# Patient Record
Sex: FEMALE | Race: WHITE | Employment: OTHER | URBAN - NONMETROPOLITAN AREA
[De-identification: names, ages, dates, MRNs, and addresses within clinical notes are randomized per-mention and may not be internally consistent; named-entity substitution may affect disease eponyms.]

---

## 2017-01-09 ENCOUNTER — TELEPHONE (OUTPATIENT)
Dept: FAMILY MEDICINE CLINIC | Age: 57
End: 2017-01-09

## 2017-01-10 ENCOUNTER — HOSPITAL ENCOUNTER (OUTPATIENT)
Dept: INTERVENTIONAL RADIOLOGY/VASCULAR | Facility: HOSPITAL | Age: 57
Discharge: HOME | End: 2017-01-10
Attending: PHYSICAL MEDICINE & REHABILITATION | Admitting: PHYSICAL MEDICINE & REHABILITATION

## 2017-01-10 ENCOUNTER — TRANSCRIBE ORDERS (OUTPATIENT)
Dept: ADMINISTRATIVE | Facility: HOSPITAL | Age: 57
End: 2017-01-10

## 2017-01-10 VITALS
RESPIRATION RATE: 22 BRPM | HEART RATE: 87 BPM | TEMPERATURE: 98 F | HEIGHT: 60 IN | BODY MASS INDEX: 37.3 KG/M2 | WEIGHT: 190 LBS | DIASTOLIC BLOOD PRESSURE: 59 MMHG | SYSTOLIC BLOOD PRESSURE: 108 MMHG | OXYGEN SATURATION: 92 %

## 2017-01-10 DIAGNOSIS — G47.9 REPETITIVE INTRUSIONS OF SLEEP: ICD-10-CM

## 2017-01-10 DIAGNOSIS — M72.2 PLANTAR FASCIAL FIBROMATOSIS: ICD-10-CM

## 2017-01-10 DIAGNOSIS — M79.10 MYALGIA: ICD-10-CM

## 2017-01-10 DIAGNOSIS — R52 PAIN MANAGEMENT: ICD-10-CM

## 2017-01-10 DIAGNOSIS — M46.1 SACROILIITIS, NOT ELSEWHERE CLASSIFIED (HCC): ICD-10-CM

## 2017-01-10 DIAGNOSIS — M51.16 NEURITIS OR RADICULITIS DUE TO RUPTURE OF LUMBAR INTERVERTEBRAL DISC: ICD-10-CM

## 2017-01-10 DIAGNOSIS — G47.9 REPETITIVE INTRUSIONS OF SLEEP: Primary | ICD-10-CM

## 2017-01-10 DIAGNOSIS — M51.26 DISPLACEMENT OF LUMBAR INTERVERTEBRAL DISC WITHOUT MYELOPATHY: ICD-10-CM

## 2017-01-10 DIAGNOSIS — M51.27 LUMBAGO-SCIATICA DUE TO DISPLACEMENT OF LUMBAR INTERVERTEBRAL DISC: ICD-10-CM

## 2017-01-10 PROCEDURE — 77003 FLUOROGUIDE FOR SPINE INJECT: CPT

## 2017-01-10 PROCEDURE — 25010000002 METHYLPREDNISOLONE PER 80 MG: Performed by: PHYSICAL MEDICINE & REHABILITATION

## 2017-01-10 RX ORDER — METHYLPREDNISOLONE ACETATE 80 MG/ML
INJECTION, SUSPENSION INTRA-ARTICULAR; INTRALESIONAL; INTRAMUSCULAR; SOFT TISSUE
Status: COMPLETED | OUTPATIENT
Start: 2017-01-10 | End: 2017-01-10

## 2017-01-10 RX ORDER — LIDOCAINE HYDROCHLORIDE 10 MG/ML
INJECTION, SOLUTION INFILTRATION; PERINEURAL
Status: COMPLETED | OUTPATIENT
Start: 2017-01-10 | End: 2017-01-10

## 2017-01-10 RX ORDER — 0.9 % SODIUM CHLORIDE 0.9 %
VIAL (ML) INJECTION
Status: COMPLETED | OUTPATIENT
Start: 2017-01-10 | End: 2017-01-10

## 2017-01-10 RX ADMIN — SODIUM CHLORIDE 3 ML: 9 INJECTION INTRAMUSCULAR; INTRAVENOUS; SUBCUTANEOUS at 09:47

## 2017-01-10 RX ADMIN — LIDOCAINE HYDROCHLORIDE 5 ML: 10 INJECTION, SOLUTION INFILTRATION; PERINEURAL at 09:45

## 2017-01-10 RX ADMIN — METHYLPREDNISOLONE ACETATE 80 MG: 80 INJECTION, SUSPENSION INTRA-ARTICULAR; INTRALESIONAL; INTRAMUSCULAR; SOFT TISSUE at 09:47

## 2017-01-10 RX ADMIN — SODIUM CHLORIDE 2 ML: 9 INJECTION INTRAMUSCULAR; INTRAVENOUS; SUBCUTANEOUS at 09:47

## 2017-01-13 ENCOUNTER — HOSPITAL ENCOUNTER (OUTPATIENT)
Dept: NON INVASIVE DIAGNOSTICS | Age: 57
Discharge: HOME | End: 2017-01-13
Payer: COMMERCIAL

## 2017-01-13 DIAGNOSIS — R60.0 BILATERAL EDEMA OF LOWER EXTREMITY: ICD-10-CM

## 2017-01-13 LAB
LV EF: 58 %
LVEF MODALITY: NORMAL

## 2017-01-13 PROCEDURE — 93306 TTE W/DOPPLER COMPLETE: CPT

## 2017-01-20 ENCOUNTER — TELEPHONE (OUTPATIENT)
Dept: FAMILY MEDICINE CLINIC | Age: 57
End: 2017-01-20

## 2017-01-20 ENCOUNTER — TRANSCRIBE ORDERS (OUTPATIENT)
Dept: ADMINISTRATIVE | Facility: HOSPITAL | Age: 57
End: 2017-01-20

## 2017-01-20 DIAGNOSIS — M51.27 LUMBAGO-SCIATICA DUE TO DISPLACEMENT OF LUMBAR INTERVERTEBRAL DISC: ICD-10-CM

## 2017-01-20 DIAGNOSIS — M72.2 PLANTAR FASCIAL FIBROMATOSIS: ICD-10-CM

## 2017-01-20 DIAGNOSIS — M79.10 MYALGIA: ICD-10-CM

## 2017-01-20 DIAGNOSIS — G47.9 REPETITIVE INTRUSIONS OF SLEEP: Primary | ICD-10-CM

## 2017-01-20 DIAGNOSIS — M51.26 DISPLACEMENT OF LUMBAR INTERVERTEBRAL DISC WITHOUT MYELOPATHY: ICD-10-CM

## 2017-01-20 DIAGNOSIS — M51.16 NEURITIS OR RADICULITIS DUE TO RUPTURE OF LUMBAR INTERVERTEBRAL DISC: ICD-10-CM

## 2017-01-20 DIAGNOSIS — M46.1 SACROILIITIS, NOT ELSEWHERE CLASSIFIED (HCC): ICD-10-CM

## 2017-01-24 ENCOUNTER — HOSPITAL ENCOUNTER (OUTPATIENT)
Dept: INTERVENTIONAL RADIOLOGY/VASCULAR | Facility: HOSPITAL | Age: 57
Discharge: HOME | End: 2017-01-24
Attending: PHYSICAL MEDICINE & REHABILITATION | Admitting: PHYSICAL MEDICINE & REHABILITATION

## 2017-01-24 VITALS
DIASTOLIC BLOOD PRESSURE: 66 MMHG | WEIGHT: 190 LBS | RESPIRATION RATE: 18 BRPM | OXYGEN SATURATION: 96 % | SYSTOLIC BLOOD PRESSURE: 105 MMHG | BODY MASS INDEX: 37.3 KG/M2 | TEMPERATURE: 97.6 F | HEIGHT: 60 IN | HEART RATE: 63 BPM

## 2017-01-24 DIAGNOSIS — M51.16 NEURITIS OR RADICULITIS DUE TO RUPTURE OF LUMBAR INTERVERTEBRAL DISC: ICD-10-CM

## 2017-01-24 DIAGNOSIS — M79.10 MYALGIA: ICD-10-CM

## 2017-01-24 DIAGNOSIS — M51.26 DISPLACEMENT OF LUMBAR INTERVERTEBRAL DISC WITHOUT MYELOPATHY: ICD-10-CM

## 2017-01-24 DIAGNOSIS — M51.27 LUMBAGO-SCIATICA DUE TO DISPLACEMENT OF LUMBAR INTERVERTEBRAL DISC: ICD-10-CM

## 2017-01-24 DIAGNOSIS — G47.9 REPETITIVE INTRUSIONS OF SLEEP: ICD-10-CM

## 2017-01-24 DIAGNOSIS — M46.1 SACROILIITIS, NOT ELSEWHERE CLASSIFIED (HCC): ICD-10-CM

## 2017-01-24 DIAGNOSIS — M72.2 PLANTAR FASCIAL FIBROMATOSIS: ICD-10-CM

## 2017-01-24 PROCEDURE — 25010000002 METHYLPREDNISOLONE PER 80 MG: Performed by: PHYSICAL MEDICINE & REHABILITATION

## 2017-01-24 RX ORDER — METHYLPREDNISOLONE ACETATE 80 MG/ML
INJECTION, SUSPENSION INTRA-ARTICULAR; INTRALESIONAL; INTRAMUSCULAR; SOFT TISSUE
Status: COMPLETED | OUTPATIENT
Start: 2017-01-24 | End: 2017-01-24

## 2017-01-24 RX ORDER — 0.9 % SODIUM CHLORIDE 0.9 %
VIAL (ML) INJECTION
Status: COMPLETED | OUTPATIENT
Start: 2017-01-24 | End: 2017-01-24

## 2017-01-24 RX ORDER — LIDOCAINE HYDROCHLORIDE 10 MG/ML
INJECTION, SOLUTION INFILTRATION; PERINEURAL
Status: COMPLETED | OUTPATIENT
Start: 2017-01-24 | End: 2017-01-24

## 2017-01-24 RX ADMIN — SODIUM CHLORIDE 2 ML: 9 INJECTION INTRAMUSCULAR; INTRAVENOUS; SUBCUTANEOUS at 08:53

## 2017-01-24 RX ADMIN — LIDOCAINE HYDROCHLORIDE 5 ML: 10 INJECTION, SOLUTION INFILTRATION; PERINEURAL at 08:52

## 2017-01-24 RX ADMIN — METHYLPREDNISOLONE ACETATE 80 MG: 80 INJECTION, SUSPENSION INTRA-ARTICULAR; INTRALESIONAL; INTRAMUSCULAR; SOFT TISSUE at 08:53

## 2017-01-24 RX ADMIN — SODIUM CHLORIDE 3 ML: 9 INJECTION INTRAMUSCULAR; INTRAVENOUS; SUBCUTANEOUS at 08:53

## 2017-01-27 ENCOUNTER — TELEPHONE (OUTPATIENT)
Dept: FAMILY MEDICINE CLINIC | Age: 57
End: 2017-01-27

## 2017-02-07 ENCOUNTER — HOSPITAL ENCOUNTER (OUTPATIENT)
Dept: GENERAL RADIOLOGY | Age: 57
Discharge: HOME | End: 2017-02-07
Payer: COMMERCIAL

## 2017-02-07 ENCOUNTER — HOSPITAL ENCOUNTER (OUTPATIENT)
Age: 57
Setting detail: OUTPATIENT SURGERY
Discharge: HOME | End: 2017-02-07
Attending: PHYSICAL MEDICINE & REHABILITATION | Admitting: PHYSICAL MEDICINE & REHABILITATION

## 2017-02-07 VITALS
BODY MASS INDEX: 37.3 KG/M2 | RESPIRATION RATE: 18 BRPM | TEMPERATURE: 97.8 F | DIASTOLIC BLOOD PRESSURE: 81 MMHG | HEIGHT: 60 IN | OXYGEN SATURATION: 95 % | HEART RATE: 74 BPM | WEIGHT: 190 LBS | SYSTOLIC BLOOD PRESSURE: 121 MMHG

## 2017-02-07 DIAGNOSIS — R52 PAIN: ICD-10-CM

## 2017-02-07 PROCEDURE — G8907 PT DOC NO EVENTS ON DISCHARG: HCPCS

## 2017-02-07 PROCEDURE — 64493 INJ PARAVERT F JNT L/S 1 LEV: CPT

## 2017-02-07 PROCEDURE — G8918 PT W/O PREOP ORDER IV AB PRO: HCPCS

## 2017-02-07 PROCEDURE — 3209999900 FLUORO FOR SURGICAL PROCEDURES

## 2017-02-07 RX ORDER — METHYLPREDNISOLONE ACETATE 80 MG/ML
INJECTION, SUSPENSION INTRA-ARTICULAR; INTRALESIONAL; INTRAMUSCULAR; SOFT TISSUE PRN
Status: DISCONTINUED | OUTPATIENT
Start: 2017-02-07 | End: 2017-02-07 | Stop reason: HOSPADM

## 2017-02-07 RX ORDER — BUPIVACAINE HYDROCHLORIDE 5 MG/ML
INJECTION, SOLUTION EPIDURAL; INTRACAUDAL PRN
Status: DISCONTINUED | OUTPATIENT
Start: 2017-02-07 | End: 2017-02-07 | Stop reason: HOSPADM

## 2017-02-07 RX ORDER — LIDOCAINE HYDROCHLORIDE 10 MG/ML
INJECTION, SOLUTION EPIDURAL; INFILTRATION; INTRACAUDAL; PERINEURAL PRN
Status: DISCONTINUED | OUTPATIENT
Start: 2017-02-07 | End: 2017-02-07 | Stop reason: HOSPADM

## 2017-02-16 ENCOUNTER — OFFICE VISIT (OUTPATIENT)
Dept: FAMILY MEDICINE CLINIC | Age: 57
End: 2017-02-16
Payer: COMMERCIAL

## 2017-02-16 ENCOUNTER — HOSPITAL ENCOUNTER (OUTPATIENT)
Dept: GENERAL RADIOLOGY | Age: 57
Discharge: HOME | End: 2017-02-16
Payer: COMMERCIAL

## 2017-02-16 VITALS
BODY MASS INDEX: 37.5 KG/M2 | DIASTOLIC BLOOD PRESSURE: 82 MMHG | TEMPERATURE: 98 F | HEART RATE: 79 BPM | SYSTOLIC BLOOD PRESSURE: 120 MMHG | OXYGEN SATURATION: 96 % | WEIGHT: 192 LBS

## 2017-02-16 DIAGNOSIS — R10.9 FLANK PAIN: ICD-10-CM

## 2017-02-16 DIAGNOSIS — R10.9 FLANK PAIN: Primary | ICD-10-CM

## 2017-02-16 DIAGNOSIS — N15.9 KIDNEY INFECTION: ICD-10-CM

## 2017-02-16 LAB
ALBUMIN SERPL-MCNC: 4.2 G/DL (ref 3.5–5.2)
ALP BLD-CCNC: 69 U/L (ref 35–104)
ALT SERPL-CCNC: 29 U/L (ref 5–33)
ANION GAP SERPL CALCULATED.3IONS-SCNC: 12 MMOL/L (ref 7–19)
AST SERPL-CCNC: 19 U/L (ref 5–32)
BILIRUB SERPL-MCNC: 0.3 MG/DL (ref 0.2–1.2)
BILIRUBIN URINE: NEGATIVE
BLOOD, URINE: NEGATIVE
BUN BLDV-MCNC: 25 MG/DL (ref 6–20)
CALCIUM SERPL-MCNC: 9.9 MG/DL (ref 8.6–10)
CHLORIDE BLD-SCNC: 95 MMOL/L (ref 98–111)
CLARITY: CLEAR
CO2: 36 MMOL/L (ref 22–29)
COLOR: YELLOW
CREAT SERPL-MCNC: 0.8 MG/DL (ref 0.5–0.9)
GFR NON-AFRICAN AMERICAN: >60
GLOBULIN: 2.7 G/DL
GLUCOSE BLD-MCNC: 115 MG/DL (ref 74–109)
GLUCOSE URINE: NEGATIVE MG/DL
HCT VFR BLD CALC: 44.6 % (ref 37–47)
HEMOGLOBIN: 14.7 G/DL (ref 12–16)
KETONES, URINE: NEGATIVE MG/DL
LEUKOCYTE ESTERASE, URINE: NEGATIVE
MCH RBC QN AUTO: 30.9 PG (ref 27–31)
MCHC RBC AUTO-ENTMCNC: 33 G/DL (ref 33–37)
MCV RBC AUTO: 93.9 FL (ref 81–99)
NITRITE, URINE: NEGATIVE
PDW BLD-RTO: 13.1 % (ref 11.5–14.5)
PH UA: 7
PLATELET # BLD: 301 K/UL (ref 130–400)
PMV BLD AUTO: 11.2 FL (ref 7.4–10.4)
POTASSIUM SERPL-SCNC: 4.1 MMOL/L (ref 3.5–5)
PROTEIN UA: NEGATIVE MG/DL
RBC # BLD: 4.75 M/UL (ref 4.2–5.4)
SODIUM BLD-SCNC: 143 MMOL/L (ref 136–145)
SPECIFIC GRAVITY UA: 1.02
TOTAL PROTEIN: 6.9 G/DL (ref 6.6–8.7)
URINE TYPE: NORMAL
UROBILINOGEN, URINE: 0.2 E.U./DL
WBC # BLD: 11.2 K/UL (ref 4.8–10.8)

## 2017-02-16 PROCEDURE — 99214 OFFICE O/P EST MOD 30 MIN: CPT | Performed by: FAMILY MEDICINE

## 2017-02-16 PROCEDURE — 74000 XR ABDOMEN LIMITED (KUB): CPT

## 2017-02-16 RX ORDER — GABAPENTIN 600 MG/1
600 TABLET ORAL 2 TIMES DAILY
COMMUNITY
End: 2018-04-13 | Stop reason: DRUGHIGH

## 2017-02-16 ASSESSMENT — ENCOUNTER SYMPTOMS
GASTROINTESTINAL NEGATIVE: 1
EYES NEGATIVE: 1
RESPIRATORY NEGATIVE: 1
VOMITING: 0
NAUSEA: 0
ALLERGIC/IMMUNOLOGIC NEGATIVE: 1

## 2017-03-13 ENCOUNTER — OFFICE VISIT (OUTPATIENT)
Dept: FAMILY MEDICINE CLINIC | Age: 57
End: 2017-03-13
Payer: COMMERCIAL

## 2017-03-13 VITALS
OXYGEN SATURATION: 96 % | SYSTOLIC BLOOD PRESSURE: 128 MMHG | DIASTOLIC BLOOD PRESSURE: 82 MMHG | HEIGHT: 60 IN | WEIGHT: 197 LBS | BODY MASS INDEX: 38.68 KG/M2 | TEMPERATURE: 98.4 F | HEART RATE: 84 BPM | RESPIRATION RATE: 16 BRPM

## 2017-03-13 DIAGNOSIS — I10 ESSENTIAL HYPERTENSION: Primary | ICD-10-CM

## 2017-03-13 DIAGNOSIS — M79.89 SWELLING OF BOTH LOWER EXTREMITIES: ICD-10-CM

## 2017-03-13 DIAGNOSIS — Z23 NEED FOR PNEUMOCOCCAL VACCINATION: ICD-10-CM

## 2017-03-13 DIAGNOSIS — I10 ESSENTIAL HYPERTENSION: ICD-10-CM

## 2017-03-13 LAB
ALBUMIN SERPL-MCNC: 4.1 G/DL (ref 3.5–5.2)
ALP BLD-CCNC: 68 U/L (ref 35–104)
ALT SERPL-CCNC: 36 U/L (ref 5–33)
ANION GAP SERPL CALCULATED.3IONS-SCNC: 15 MMOL/L (ref 7–19)
AST SERPL-CCNC: 25 U/L (ref 5–32)
BASOPHILS ABSOLUTE: 0 K/UL (ref 0–0.2)
BASOPHILS RELATIVE PERCENT: 0.2 % (ref 0–1)
BILIRUB SERPL-MCNC: 0.3 MG/DL (ref 0.2–1.2)
BUN BLDV-MCNC: 22 MG/DL (ref 6–20)
CALCIUM SERPL-MCNC: 9.9 MG/DL (ref 8.6–10)
CHLORIDE BLD-SCNC: 99 MMOL/L (ref 98–111)
CO2: 32 MMOL/L (ref 22–29)
CREAT SERPL-MCNC: 0.7 MG/DL (ref 0.5–0.9)
EOSINOPHILS ABSOLUTE: 0.2 K/UL (ref 0–0.6)
EOSINOPHILS RELATIVE PERCENT: 2.3 % (ref 0–5)
GFR NON-AFRICAN AMERICAN: >60
GLOBULIN: 2.8 G/DL
GLUCOSE BLD-MCNC: 124 MG/DL (ref 74–109)
HCT VFR BLD CALC: 47 % (ref 37–47)
HEMOGLOBIN: 15 G/DL (ref 12–16)
LYMPHOCYTES ABSOLUTE: 3.6 K/UL (ref 1.1–4.5)
LYMPHOCYTES RELATIVE PERCENT: 36.8 % (ref 20–40)
MCH RBC QN AUTO: 30.8 PG (ref 27–31)
MCHC RBC AUTO-ENTMCNC: 31.9 G/DL (ref 33–37)
MCV RBC AUTO: 96.5 FL (ref 81–99)
MONOCYTES ABSOLUTE: 0.5 K/UL (ref 0–0.9)
MONOCYTES RELATIVE PERCENT: 5.1 % (ref 0–10)
NEUTROPHILS ABSOLUTE: 5.5 K/UL (ref 1.5–7.5)
NEUTROPHILS RELATIVE PERCENT: 55.3 % (ref 50–65)
PDW BLD-RTO: 13.3 % (ref 11.5–14.5)
PLATELET # BLD: 296 K/UL (ref 130–400)
PMV BLD AUTO: 11.2 FL (ref 9.4–12.3)
POTASSIUM SERPL-SCNC: 4.1 MMOL/L (ref 3.5–5)
RBC # BLD: 4.87 M/UL (ref 4.2–5.4)
SODIUM BLD-SCNC: 146 MMOL/L (ref 136–145)
T4 FREE: 1.1 NG/ML (ref 0.9–1.7)
TOTAL PROTEIN: 6.9 G/DL (ref 6.6–8.7)
WBC # BLD: 9.9 K/UL (ref 4.8–10.8)

## 2017-03-13 PROCEDURE — 99214 OFFICE O/P EST MOD 30 MIN: CPT | Performed by: NURSE PRACTITIONER

## 2017-03-13 PROCEDURE — 90471 IMMUNIZATION ADMIN: CPT | Performed by: NURSE PRACTITIONER

## 2017-03-13 PROCEDURE — 90732 PPSV23 VACC 2 YRS+ SUBQ/IM: CPT | Performed by: NURSE PRACTITIONER

## 2017-03-13 RX ORDER — ALBUTEROL SULFATE 90 UG/1
2 AEROSOL, METERED RESPIRATORY (INHALATION) 3 TIMES DAILY
COMMUNITY
End: 2017-03-29 | Stop reason: ALTCHOICE

## 2017-03-13 RX ORDER — CHLORTHALIDONE 25 MG/1
25 TABLET ORAL DAILY
Qty: 30 TABLET | Refills: 5 | Status: SHIPPED | OUTPATIENT
Start: 2017-03-13 | End: 2017-08-22 | Stop reason: SDUPTHER

## 2017-03-13 RX ORDER — CLOBETASOL PROPIONATE 0.5 MG/G
CREAM TOPICAL
Qty: 30 G | Refills: 0 | Status: SHIPPED | OUTPATIENT
Start: 2017-03-13 | End: 2017-08-22 | Stop reason: ALTCHOICE

## 2017-03-13 ASSESSMENT — ENCOUNTER SYMPTOMS
DIARRHEA: 0
NAUSEA: 0
BACK PAIN: 1

## 2017-03-14 ENCOUNTER — TELEPHONE (OUTPATIENT)
Dept: FAMILY MEDICINE CLINIC | Age: 57
End: 2017-03-14

## 2017-03-14 DIAGNOSIS — M25.50 POLYARTHRALGIA: Primary | ICD-10-CM

## 2017-03-16 ENCOUNTER — TRANSCRIBE ORDERS (OUTPATIENT)
Dept: ADMINISTRATIVE | Facility: HOSPITAL | Age: 57
End: 2017-03-16

## 2017-03-16 DIAGNOSIS — M46.1 INFLAMMATION OF SACROILIAC JOINT (HCC): Primary | ICD-10-CM

## 2017-03-21 ENCOUNTER — HOSPITAL ENCOUNTER (OUTPATIENT)
Dept: INTERVENTIONAL RADIOLOGY/VASCULAR | Facility: HOSPITAL | Age: 57
Discharge: HOME | End: 2017-03-21
Attending: PHYSICAL MEDICINE & REHABILITATION | Admitting: PHYSICAL MEDICINE & REHABILITATION

## 2017-03-21 VITALS
RESPIRATION RATE: 16 BRPM | BODY MASS INDEX: 39.27 KG/M2 | HEIGHT: 60 IN | DIASTOLIC BLOOD PRESSURE: 69 MMHG | HEART RATE: 72 BPM | WEIGHT: 200 LBS | TEMPERATURE: 97.7 F | OXYGEN SATURATION: 94 % | SYSTOLIC BLOOD PRESSURE: 116 MMHG

## 2017-03-21 DIAGNOSIS — R52 PAIN MANAGEMENT: ICD-10-CM

## 2017-03-21 DIAGNOSIS — M46.1 INFLAMMATION OF SACROILIAC JOINT (HCC): ICD-10-CM

## 2017-03-21 PROCEDURE — 25010000002 METHYLPREDNISOLONE PER 80 MG: Performed by: PHYSICAL MEDICINE & REHABILITATION

## 2017-03-21 RX ORDER — BUPIVACAINE HYDROCHLORIDE 5 MG/ML
INJECTION, SOLUTION EPIDURAL; INTRACAUDAL
Status: COMPLETED | OUTPATIENT
Start: 2017-03-21 | End: 2017-03-21

## 2017-03-21 RX ORDER — LIDOCAINE HYDROCHLORIDE 10 MG/ML
INJECTION, SOLUTION EPIDURAL; INFILTRATION; INTRACAUDAL; PERINEURAL
Status: COMPLETED | OUTPATIENT
Start: 2017-03-21 | End: 2017-03-21

## 2017-03-21 RX ORDER — LIDOCAINE HYDROCHLORIDE 10 MG/ML
INJECTION, SOLUTION INFILTRATION; PERINEURAL
Status: COMPLETED | OUTPATIENT
Start: 2017-03-21 | End: 2017-03-21

## 2017-03-21 RX ORDER — 0.9 % SODIUM CHLORIDE 0.9 %
VIAL (ML) INJECTION
Status: COMPLETED | OUTPATIENT
Start: 2017-03-21 | End: 2017-03-21

## 2017-03-21 RX ORDER — METHYLPREDNISOLONE ACETATE 80 MG/ML
INJECTION, SUSPENSION INTRA-ARTICULAR; INTRALESIONAL; INTRAMUSCULAR; SOFT TISSUE
Status: COMPLETED | OUTPATIENT
Start: 2017-03-21 | End: 2017-03-21

## 2017-03-21 RX ADMIN — LIDOCAINE HYDROCHLORIDE 2 ML: 10 INJECTION, SOLUTION EPIDURAL; INFILTRATION; INTRACAUDAL; PERINEURAL at 09:00

## 2017-03-21 RX ADMIN — LIDOCAINE HYDROCHLORIDE 5 ML: 10 INJECTION, SOLUTION INFILTRATION; PERINEURAL at 09:00

## 2017-03-21 RX ADMIN — BUPIVACAINE HYDROCHLORIDE 2 ML: 5 INJECTION, SOLUTION EPIDURAL; INTRACAUDAL; PERINEURAL at 09:00

## 2017-03-21 RX ADMIN — METHYLPREDNISOLONE ACETATE 80 MG: 80 INJECTION, SUSPENSION INTRA-ARTICULAR; INTRALESIONAL; INTRAMUSCULAR; SOFT TISSUE at 09:01

## 2017-03-21 RX ADMIN — SODIUM CHLORIDE 3 ML: 9 INJECTION INTRAMUSCULAR; INTRAVENOUS; SUBCUTANEOUS at 09:01

## 2017-03-29 ENCOUNTER — OFFICE VISIT (OUTPATIENT)
Dept: NEUROSURGERY | Facility: CLINIC | Age: 57
End: 2017-03-29

## 2017-03-29 ENCOUNTER — OFFICE VISIT (OUTPATIENT)
Dept: FAMILY MEDICINE CLINIC | Age: 57
End: 2017-03-29
Payer: COMMERCIAL

## 2017-03-29 VITALS
RESPIRATION RATE: 16 BRPM | HEART RATE: 80 BPM | WEIGHT: 193 LBS | OXYGEN SATURATION: 96 % | BODY MASS INDEX: 37.89 KG/M2 | TEMPERATURE: 98.4 F | HEIGHT: 60 IN | SYSTOLIC BLOOD PRESSURE: 132 MMHG | DIASTOLIC BLOOD PRESSURE: 80 MMHG

## 2017-03-29 VITALS
SYSTOLIC BLOOD PRESSURE: 138 MMHG | WEIGHT: 197 LBS | BODY MASS INDEX: 38.68 KG/M2 | DIASTOLIC BLOOD PRESSURE: 80 MMHG | HEIGHT: 60 IN

## 2017-03-29 DIAGNOSIS — I10 ESSENTIAL HYPERTENSION: ICD-10-CM

## 2017-03-29 DIAGNOSIS — F17.210 CIGARETTE SMOKER: ICD-10-CM

## 2017-03-29 DIAGNOSIS — E79.0 ELEVATED URIC ACID IN BLOOD: Primary | ICD-10-CM

## 2017-03-29 DIAGNOSIS — M79.89 SWELLING OF BOTH LOWER EXTREMITIES: ICD-10-CM

## 2017-03-29 DIAGNOSIS — M47.16 SPONDYLOSIS WITH MYELOPATHY, LUMBAR REGION: Primary | ICD-10-CM

## 2017-03-29 DIAGNOSIS — R79.89 ABNORMAL THYROID BLOOD TEST: Primary | ICD-10-CM

## 2017-03-29 DIAGNOSIS — E66.3 OVER WEIGHT: ICD-10-CM

## 2017-03-29 DIAGNOSIS — M25.50 POLYARTHRALGIA: ICD-10-CM

## 2017-03-29 LAB
ANION GAP SERPL CALCULATED.3IONS-SCNC: 15 MMOL/L (ref 7–19)
BUN BLDV-MCNC: 19 MG/DL (ref 6–20)
CALCIUM SERPL-MCNC: 10 MG/DL (ref 8.6–10)
CHLORIDE BLD-SCNC: 94 MMOL/L (ref 98–111)
CHOLESTEROL, TOTAL: 190 MG/DL (ref 160–199)
CO2: 32 MMOL/L (ref 22–29)
CREAT SERPL-MCNC: 0.7 MG/DL (ref 0.5–0.9)
GFR NON-AFRICAN AMERICAN: >60
GLUCOSE BLD-MCNC: 94 MG/DL (ref 74–109)
HDLC SERPL-MCNC: 44 MG/DL (ref 65–121)
LDL CHOLESTEROL CALCULATED: 100 MG/DL
POTASSIUM SERPL-SCNC: 3.6 MMOL/L (ref 3.5–5)
RHEUMATOID FACTOR: <10 IU/ML
SEDIMENTATION RATE, ERYTHROCYTE: 11 MM/HR (ref 0–25)
SODIUM BLD-SCNC: 141 MMOL/L (ref 136–145)
TRIGL SERPL-MCNC: 232 MG/DL (ref 150–199)
URIC ACID, SERUM: 6.5 MG/DL (ref 2.4–5.7)

## 2017-03-29 PROCEDURE — 4004F PT TOBACCO SCREEN RCVD TLK: CPT | Performed by: NEUROLOGICAL SURGERY

## 2017-03-29 PROCEDURE — G8417 CALC BMI ABV UP PARAM F/U: HCPCS | Performed by: NEUROLOGICAL SURGERY

## 2017-03-29 PROCEDURE — 99214 OFFICE O/P EST MOD 30 MIN: CPT | Performed by: NURSE PRACTITIONER

## 2017-03-29 PROCEDURE — 99203 OFFICE O/P NEW LOW 30 MIN: CPT | Performed by: NEUROLOGICAL SURGERY

## 2017-03-29 RX ORDER — TIZANIDINE 4 MG/1
TABLET ORAL
COMMUNITY
End: 2017-05-09 | Stop reason: SDUPTHER

## 2017-03-29 RX ORDER — GABAPENTIN 300 MG/1
600 CAPSULE ORAL 4 TIMES DAILY
COMMUNITY
Start: 2016-12-21 | End: 2017-07-25 | Stop reason: DRUGHIGH

## 2017-03-29 RX ORDER — ACETAMINOPHEN AND CODEINE PHOSPHATE 300; 60 MG/1; MG/1
TABLET ORAL
COMMUNITY
Start: 2017-02-20 | End: 2017-07-25

## 2017-03-29 RX ORDER — GABAPENTIN 600 MG/1
TABLET ORAL
COMMUNITY
End: 2017-05-09 | Stop reason: SDUPTHER

## 2017-03-29 RX ORDER — OMEPRAZOLE 20 MG/1
CAPSULE, DELAYED RELEASE ORAL
COMMUNITY
Start: 2016-05-07 | End: 2017-05-09 | Stop reason: SDUPTHER

## 2017-03-29 RX ORDER — DULOXETIN HYDROCHLORIDE 60 MG/1
CAPSULE, DELAYED RELEASE ORAL
COMMUNITY
End: 2017-05-09 | Stop reason: SDUPTHER

## 2017-03-29 RX ORDER — CLOBETASOL PROPIONATE 0.5 MG/G
CREAM TOPICAL
COMMUNITY
Start: 2017-03-13 | End: 2017-05-09

## 2017-03-29 RX ORDER — FUROSEMIDE 20 MG/1
TABLET ORAL
COMMUNITY
Start: 2016-12-30 | End: 2017-05-09

## 2017-03-29 RX ORDER — DIVALPROEX SODIUM 500 MG/1
1000 TABLET, EXTENDED RELEASE ORAL DAILY
COMMUNITY
End: 2017-07-25 | Stop reason: DRUGHIGH

## 2017-03-29 RX ORDER — QUETIAPINE FUMARATE 25 MG/1
TABLET, FILM COATED ORAL
COMMUNITY
End: 2017-07-25 | Stop reason: DRUGHIGH

## 2017-03-29 RX ORDER — TROLAMINE SALICYLATE 10 G/100G
CREAM TOPICAL
Refills: 0 | COMMUNITY
Start: 2017-02-01 | End: 2017-05-09

## 2017-03-29 RX ORDER — LIDOCAINE AND PRILOCAINE 25; 25 MG/G; MG/G
CREAM TOPICAL
Refills: 2 | COMMUNITY
Start: 2017-02-01 | End: 2017-05-09

## 2017-03-29 RX ORDER — CHLORTHALIDONE 25 MG/1
TABLET ORAL
COMMUNITY
Start: 2017-03-13 | End: 2017-05-09 | Stop reason: SDUPTHER

## 2017-03-29 RX ORDER — CHOLECALCIFEROL (VITAMIN D3) 125 MCG
CAPSULE ORAL
COMMUNITY
End: 2017-05-09 | Stop reason: SDUPTHER

## 2017-03-29 RX ORDER — AZELASTINE 1 MG/ML
SPRAY, METERED NASAL
COMMUNITY
Start: 2016-12-30 | End: 2017-05-09

## 2017-03-29 RX ORDER — TRAMADOL HYDROCHLORIDE 50 MG/1
TABLET ORAL EVERY 8 HOURS
COMMUNITY
End: 2017-05-09 | Stop reason: SDUPTHER

## 2017-03-29 RX ORDER — PHENOL 1.4 %
AEROSOL, SPRAY (ML) MUCOUS MEMBRANE
COMMUNITY
End: 2017-05-09 | Stop reason: SDUPTHER

## 2017-03-29 RX ORDER — ALPRAZOLAM 0.5 MG/1
TABLET ORAL DAILY
COMMUNITY
End: 2017-05-09 | Stop reason: SDUPTHER

## 2017-03-29 RX ORDER — KETOROLAC TROMETHAMINE 10 MG/1
TABLET, FILM COATED ORAL
COMMUNITY
Start: 2016-12-21 | End: 2017-05-09

## 2017-03-29 RX ORDER — PRAMIPEXOLE DIHYDROCHLORIDE 1 MG/1
TABLET ORAL
COMMUNITY
End: 2017-05-09 | Stop reason: SDUPTHER

## 2017-03-29 RX ORDER — PILOCARPINE HYDROCHLORIDE 5 MG/1
TABLET, FILM COATED ORAL
COMMUNITY
End: 2017-05-09 | Stop reason: SDUPTHER

## 2017-03-29 RX ORDER — LIDOCAINE 50 MG/G
OINTMENT TOPICAL
Refills: 2 | COMMUNITY
Start: 2017-02-01 | End: 2017-05-09

## 2017-03-29 RX ORDER — QUETIAPINE FUMARATE 25 MG/1
25 TABLET, FILM COATED ORAL NIGHTLY
COMMUNITY
End: 2018-02-15 | Stop reason: DRUGHIGH

## 2017-03-29 RX ORDER — CARVEDILOL 6.25 MG/1
TABLET ORAL
COMMUNITY
Start: 2016-08-29 | End: 2017-05-09 | Stop reason: SDUPTHER

## 2017-03-29 RX ORDER — GABAPENTIN 400 MG/1
CAPSULE ORAL
COMMUNITY
Start: 2016-12-29 | End: 2017-05-09 | Stop reason: SDUPTHER

## 2017-03-29 NOTE — PROGRESS NOTES
"    Chief complaint   Chief Complaint   Patient presents with   • Back Pain        Subjective     HPI: This patient is a 57-year-old female who has had a 15 year history of chronic low back pain.  She states that it occurred while cleaning a tub.  She states equality is constant, severity is 7 out of 10, the timing is nonspecific, location is the low back.  It is associated with bilateral hip pain.  She states her left hip pain is severe.  She recently had injection into her left hip which provided up to 80% relief.  She states her left hip pain is returning and she has not yet had it evaluated.  She has had physical therapy in the past for her low back pain and she has had injections into her low back by Dr. Smith for low back pain these are provided up to 3-4 months of relief.  Modifying factors include standing, horizontal position worsen her back pain.  She has no relieving factors.  She denies any leg pain, weakness, numbness, tingling.    Review of Systems     A 12 point review of systems is obtained and is negative except for as described in HPI    Past Medical History:   Diagnosis Date   • Bipolar 1 disorder    • Hypertension      Past Surgical History:   Procedure Laterality Date   • CARPAL TUNNEL RELEASE Bilateral    • CHOLECYSTECTOMY Bilateral      History reviewed. No pertinent family history.  Social History   Substance Use Topics   • Smoking status: Current Every Day Smoker   • Smokeless tobacco: Never Used   • Alcohol use Yes      Comment: maybe 3 x yearly       (Not in a hospital admission)  Allergies:  Hctz [hydrochlorothiazide]    Objective      Vital Signs  /80  Ht 60\" (152.4 cm)  Wt 197 lb (89.4 kg)  BMI 38.47 kg/m2    Physical Exam    No acute distress  Awake alert oriented ×3  HEENT atraumatic normocephalic  Neck supple  Abdomen soft nontender  Extremities no clubbing, edema, cyanosis  Neurologic exam  Cranial nerves II through XII intact  Patient moves all extremities 5 out of 5 " strength  2+ reflexes in patellar and Achilles  No long tract signs  Gait is normal  Positive pain with left hip with abduction a left hip    Results Review:     MRI lumbar spine shows L3, 4 L4, 5 L5, S1 disc desiccation with mild to moderate L3, 4 stenosis          Assessment/Plan:     57-year-old female with low back pain and left hip pain.  She states that she will follow-up with her primary care to have her left hip evaluated.  She does have findings of disc degeneration from L3 to S1 with her MRI lumbar spine which may be contributing factors to her low back pain.  I reviewed imaging with the patient and I do not recommend surgical intervention.  I have advised her to lose weight and stop nicotine use.  I will refer her to the bariatric weight loss center and follow-up with her on a as-needed basis.  Thank you for this consultation.    I discussed the patients findings and my recommendations with patient    César Lowery MD  03/29/17  3:46 PM

## 2017-03-30 ASSESSMENT — ENCOUNTER SYMPTOMS
DIARRHEA: 1
SHORTNESS OF BREATH: 0
COUGH: 0
BACK PAIN: 1

## 2017-03-31 LAB — ANA IGG, ELISA: NORMAL

## 2017-04-20 DIAGNOSIS — E79.0 ELEVATED URIC ACID IN BLOOD: ICD-10-CM

## 2017-04-20 LAB — URIC ACID, SERUM: 7.3 MG/DL (ref 2.4–5.7)

## 2017-04-22 LAB — ANTISTREPTOLYSIN-O: <55 IU/ML (ref 0–330)

## 2017-05-08 ENCOUNTER — OFFICE VISIT (OUTPATIENT)
Dept: FAMILY MEDICINE CLINIC | Age: 57
End: 2017-05-08
Payer: COMMERCIAL

## 2017-05-08 VITALS
SYSTOLIC BLOOD PRESSURE: 124 MMHG | WEIGHT: 200 LBS | TEMPERATURE: 100.5 F | BODY MASS INDEX: 39.06 KG/M2 | OXYGEN SATURATION: 97 % | DIASTOLIC BLOOD PRESSURE: 82 MMHG | HEART RATE: 80 BPM

## 2017-05-08 DIAGNOSIS — E79.0 ELEVATED URIC ACID IN BLOOD: Primary | ICD-10-CM

## 2017-05-08 PROCEDURE — 99213 OFFICE O/P EST LOW 20 MIN: CPT | Performed by: NURSE PRACTITIONER

## 2017-05-08 RX ORDER — MIRABEGRON 25 MG/1
TABLET, FILM COATED, EXTENDED RELEASE ORAL
Qty: 90 TABLET | Refills: 0 | Status: SHIPPED | OUTPATIENT
Start: 2017-05-08 | End: 2017-06-29 | Stop reason: SDUPTHER

## 2017-05-08 RX ORDER — ALLOPURINOL 100 MG/1
100 TABLET ORAL DAILY
Qty: 30 TABLET | Refills: 3 | Status: SHIPPED | OUTPATIENT
Start: 2017-05-08 | End: 2017-08-09 | Stop reason: SDUPTHER

## 2017-05-08 ASSESSMENT — ENCOUNTER SYMPTOMS
BACK PAIN: 1
DIARRHEA: 1

## 2017-05-09 ENCOUNTER — OFFICE VISIT (OUTPATIENT)
Dept: OTOLARYNGOLOGY | Facility: CLINIC | Age: 57
End: 2017-05-09

## 2017-05-09 VITALS
HEART RATE: 83 BPM | HEIGHT: 60 IN | SYSTOLIC BLOOD PRESSURE: 131 MMHG | BODY MASS INDEX: 37.3 KG/M2 | DIASTOLIC BLOOD PRESSURE: 69 MMHG | WEIGHT: 190 LBS | TEMPERATURE: 97.1 F

## 2017-05-09 DIAGNOSIS — J34.89 NASAL VALVE STENOSIS: Primary | ICD-10-CM

## 2017-05-09 DIAGNOSIS — Z72.0 TOBACCO ABUSE DISORDER: ICD-10-CM

## 2017-05-09 DIAGNOSIS — J30.9 ALLERGIC RHINITIS, UNSPECIFIED ALLERGIC RHINITIS TRIGGER, UNSPECIFIED RHINITIS SEASONALITY: ICD-10-CM

## 2017-05-09 PROCEDURE — 99203 OFFICE O/P NEW LOW 30 MIN: CPT | Performed by: NURSE PRACTITIONER

## 2017-05-09 PROCEDURE — 4004F PT TOBACCO SCREEN RCVD TLK: CPT | Performed by: NURSE PRACTITIONER

## 2017-05-09 RX ORDER — AZELASTINE 1 MG/ML
2 SPRAY, METERED NASAL 2 TIMES DAILY
Qty: 30 ML | Refills: 6 | Status: SHIPPED | OUTPATIENT
Start: 2017-05-09 | End: 2017-06-08

## 2017-05-09 RX ORDER — ALLOPURINOL 100 MG/1
100 TABLET ORAL DAILY
COMMUNITY
Start: 2017-05-08

## 2017-05-09 RX ORDER — FLUTICASONE PROPIONATE 50 MCG
2 SPRAY, SUSPENSION (ML) NASAL DAILY
Qty: 1 BOTTLE | Refills: 6 | Status: SHIPPED | OUTPATIENT
Start: 2017-05-09 | End: 2017-06-08

## 2017-05-09 RX ORDER — QUETIAPINE FUMARATE 50 MG/1
TABLET, FILM COATED ORAL
COMMUNITY
Start: 2017-04-26 | End: 2017-05-09 | Stop reason: SDUPTHER

## 2017-05-10 ENCOUNTER — TELEPHONE (OUTPATIENT)
Dept: FAMILY MEDICINE CLINIC | Age: 57
End: 2017-05-10

## 2017-05-15 ENCOUNTER — HOSPITAL ENCOUNTER (OUTPATIENT)
Dept: GENERAL RADIOLOGY | Age: 57
Discharge: HOME | End: 2017-05-15
Payer: COMMERCIAL

## 2017-05-15 ENCOUNTER — OFFICE VISIT (OUTPATIENT)
Dept: FAMILY MEDICINE CLINIC | Age: 57
End: 2017-05-15
Payer: COMMERCIAL

## 2017-05-15 VITALS
DIASTOLIC BLOOD PRESSURE: 74 MMHG | RESPIRATION RATE: 16 BRPM | OXYGEN SATURATION: 96 % | BODY MASS INDEX: 38.28 KG/M2 | TEMPERATURE: 98.3 F | HEART RATE: 79 BPM | WEIGHT: 196 LBS | SYSTOLIC BLOOD PRESSURE: 120 MMHG

## 2017-05-15 DIAGNOSIS — M54.10 RADICULAR PAIN OF RIGHT LOWER EXTREMITY: ICD-10-CM

## 2017-05-15 DIAGNOSIS — M54.10 RADICULAR PAIN OF RIGHT LOWER EXTREMITY: Primary | ICD-10-CM

## 2017-05-15 DIAGNOSIS — M25.551 RIGHT HIP PAIN: ICD-10-CM

## 2017-05-15 LAB
BASOPHILS ABSOLUTE: 0 K/UL (ref 0–0.2)
BASOPHILS RELATIVE PERCENT: 0.4 % (ref 0–1)
EOSINOPHILS ABSOLUTE: 0.2 K/UL (ref 0–0.6)
EOSINOPHILS RELATIVE PERCENT: 1.7 % (ref 0–5)
HCT VFR BLD CALC: 39.6 % (ref 37–47)
HEMOGLOBIN: 13.1 G/DL (ref 12–16)
LYMPHOCYTES ABSOLUTE: 3.4 K/UL (ref 1.1–4.5)
LYMPHOCYTES RELATIVE PERCENT: 31 % (ref 20–40)
MCH RBC QN AUTO: 30.8 PG (ref 27–31)
MCHC RBC AUTO-ENTMCNC: 33.1 G/DL (ref 33–37)
MCV RBC AUTO: 93.2 FL (ref 81–99)
MONOCYTES ABSOLUTE: 0.7 K/UL (ref 0–0.9)
MONOCYTES RELATIVE PERCENT: 6.7 % (ref 0–10)
NEUTROPHILS ABSOLUTE: 6.5 K/UL (ref 1.5–7.5)
NEUTROPHILS RELATIVE PERCENT: 59.5 % (ref 50–65)
PDW BLD-RTO: 13 % (ref 11.5–14.5)
PLATELET # BLD: 291 K/UL (ref 130–400)
PMV BLD AUTO: 10.8 FL (ref 7.4–10.4)
RBC # BLD: 4.25 M/UL (ref 4.2–5.4)
WBC # BLD: 10.9 K/UL (ref 4.8–10.8)

## 2017-05-15 PROCEDURE — 99214 OFFICE O/P EST MOD 30 MIN: CPT | Performed by: NURSE PRACTITIONER

## 2017-05-15 PROCEDURE — 73502 X-RAY EXAM HIP UNI 2-3 VIEWS: CPT

## 2017-05-15 RX ORDER — METHYLPREDNISOLONE 4 MG/1
TABLET ORAL
Qty: 1 KIT | Refills: 0 | Status: SHIPPED | OUTPATIENT
Start: 2017-05-15 | End: 2017-05-21

## 2017-05-15 ASSESSMENT — ENCOUNTER SYMPTOMS
BACK PAIN: 1
ABDOMINAL PAIN: 1
NAUSEA: 0
DIARRHEA: 0
CONSTIPATION: 0
SHORTNESS OF BREATH: 0

## 2017-06-08 DIAGNOSIS — I10 ESSENTIAL HYPERTENSION: ICD-10-CM

## 2017-06-09 RX ORDER — CARVEDILOL 6.25 MG/1
6.25 TABLET ORAL 2 TIMES DAILY WITH MEALS
Qty: 180 TABLET | Refills: 1 | Status: SHIPPED | OUTPATIENT
Start: 2017-06-09 | End: 2017-12-28 | Stop reason: SDUPTHER

## 2017-06-28 ENCOUNTER — OFFICE VISIT (OUTPATIENT)
Dept: OTOLARYNGOLOGY | Facility: CLINIC | Age: 57
End: 2017-06-28

## 2017-06-28 VITALS
HEIGHT: 60 IN | SYSTOLIC BLOOD PRESSURE: 119 MMHG | TEMPERATURE: 98 F | DIASTOLIC BLOOD PRESSURE: 66 MMHG | BODY MASS INDEX: 38.28 KG/M2 | HEART RATE: 84 BPM | RESPIRATION RATE: 20 BRPM | WEIGHT: 195 LBS

## 2017-06-28 DIAGNOSIS — J34.2 NASAL SEPTAL DEVIATION: ICD-10-CM

## 2017-06-28 DIAGNOSIS — Z01.818 PREOPERATIVE TESTING: ICD-10-CM

## 2017-06-28 DIAGNOSIS — J34.89 NASAL VALVE STENOSIS: Primary | ICD-10-CM

## 2017-06-28 DIAGNOSIS — J30.9 ALLERGIC RHINITIS, UNSPECIFIED ALLERGIC RHINITIS TRIGGER, UNSPECIFIED RHINITIS SEASONALITY: ICD-10-CM

## 2017-06-28 DIAGNOSIS — Z72.0 TOBACCO ABUSE DISORDER: ICD-10-CM

## 2017-06-28 PROCEDURE — 99214 OFFICE O/P EST MOD 30 MIN: CPT | Performed by: OTOLARYNGOLOGY

## 2017-06-28 RX ORDER — DEXAMETHASONE SODIUM PHOSPHATE 10 MG/ML
10 INJECTION INTRAMUSCULAR; INTRAVENOUS ONCE
Status: CANCELLED | OUTPATIENT
Start: 2017-06-28 | End: 2017-06-28

## 2017-06-28 NOTE — PROGRESS NOTES
PRIMARY CARE PROVIDER: ARETHA Jhaveri  REFERRING PROVIDER: No ref. provider found    Chief Complaint   Patient presents with   • Follow-up     EVAL FOR POSSIBLE SINUS SURGERY       Subjective   History of Present Illness:  Robyn Minaya is a  57 y.o.  female who presents for followup regarding her bilateral nasal obstruction. She complains of nasal congestion and nasal obstruction. The symptoms are localized to the bilateral nostril. The patient has had worsening symptoms. The symptoms have been present since she was in her 30s. The symptoms are aggravated by  no identifiable factors (she was struck in the face by a baseball as a child - no surgery to correct).  The symptoms are improved by no identifiable factors. She states she has tried breathe right strips without improvement. She has tried nasal sprays (azelastine and fluticasone for 6 weeks) without improvement. She also denies nasal drainage or frequent sinus infections.     Bipolar 1 - she can take steroids (does so when she gets an occasional sinus infection - she describes these as more of a cold than a sinus infection and declines further sinus workup).    Review of Systems:  Review of Systems   Constitutional: Negative for chills, fatigue and fever.   HENT: Positive for congestion. Negative for ear discharge, ear pain, facial swelling, postnasal drip, rhinorrhea, sinus pressure, trouble swallowing and voice change.    Musculoskeletal: Positive for back pain.   All other systems reviewed and are negative.      Past History:  Past Medical History:   Diagnosis Date   • Allergic rhinitis    • Back pain    • Bipolar 1 disorder    • Gout    • Hypertension    • Nasal valve stenosis    • Tobacco use disorder      Past Surgical History:   Procedure Laterality Date   • CARPAL TUNNEL RELEASE Bilateral    • CHOLECYSTECTOMY Bilateral    • CYST REMOVAL      neck   • LASER ABLATION      right back     Family History   Problem Relation Age of Onset   • Cancer  Mother    • Diabetes Father    • Hypertension Father    • Cancer Father      Social History   Substance Use Topics   • Smoking status: Current Every Day Smoker   • Smokeless tobacco: Never Used   • Alcohol use Yes      Comment: maybe 3 x yearly     Allergies:  Hctz [hydrochlorothiazide]    Current Outpatient Prescriptions:   •  acetaminophen-codeine (TYLENOL with CODEINE #4) 300-60 MG per tablet, , Disp: , Rfl:   •  albuterol (PROVENTIL) (2.5 MG/3ML) 0.083% nebulizer solution, Take 2.5 mg by nebulization Every 4 (Four) Hours As Needed for wheezing. 2 puffs every 4 hours as needed, Disp: , Rfl:   •  allopurinol (ZYLOPRIM) 100 MG tablet, Take  by mouth., Disp: , Rfl:   •  ALPRAZolam (XANAX) 0.5 MG tablet, Take 0.5 mg by mouth At Night As Needed for anxiety., Disp: , Rfl:   •  calcium carbonate (OS-KAYLEY) 600 MG tablet, Take 600 mg by mouth Daily., Disp: , Rfl:   •  carvedilol (COREG) 6.25 MG tablet, Take 6.25 mg by mouth 2 (Two) Times a Day With Meals., Disp: , Rfl:   •  CHANTIX STARTING MONTH KAVEH 0.5 MG X 11 & 1 MG X 42 tablet, , Disp: , Rfl:   •  chlorthalidone (HYGROTEN) 25 MG tablet, Take 25 mg by mouth Daily. 1/2 tablet daily, Disp: , Rfl:   •  cholecalciferol (VITAMIN D3) 1000 UNITS tablet, Take 1,000 Units by mouth Daily., Disp: , Rfl:   •  divalproex (DEPAKOTE) 500 MG 24 hr tablet, Take  by mouth., Disp: , Rfl:   •  DULoxetine (CYMBALTA) 60 MG capsule, Take 60 mg by mouth Daily., Disp: , Rfl:   •  gabapentin (NEURONTIN) 300 MG capsule, , Disp: , Rfl:   •  Mirabegron ER (MYRBETRIQ) 25 MG tablet sustained-release 24 hour, Take 25 mg by mouth Daily., Disp: , Rfl:   •  Multiple Vitamin (MULTI VITAMIN PO), Take  by mouth., Disp: , Rfl:   •  pilocarpine (SALAGEN) 5 MG tablet, Take 5 mg by mouth 3 (Three) Times a Day., Disp: , Rfl:   •  Potassium 99 MG tablet, Take 99 mg by mouth Daily., Disp: , Rfl:   •  pramipexole (MIRAPEX) 1 MG tablet, Take 1 mg by mouth 2 (Two) Times a Day., Disp: , Rfl:   •  QUEtiapine (SEROquel)  "25 MG tablet, Take  by mouth., Disp: , Rfl:   •  tiZANidine (ZANAFLEX) 4 MG tablet, Take 4 mg by mouth At Night As Needed for muscle spasms., Disp: , Rfl:   •  traMADol (ULTRAM) 50 MG tablet, Take 50 mg by mouth Every 8 (Eight) Hours As Needed for moderate pain (4-6)., Disp: , Rfl:   •  vitamin B-12 (CYANOCOBALAMIN) 500 MCG tablet, Take 500 mcg by mouth Daily., Disp: , Rfl:       Objective     Vital Signs:  Temp:  [98 °F (36.7 °C)] 98 °F (36.7 °C)  Heart Rate:  [84] 84  Resp:  [20] 20  BP: (119)/(66) 119/66 /66  Pulse 84  Temp 98 °F (36.7 °C)  Resp 20  Ht 60\" (152.4 cm)  Wt 195 lb (88.5 kg)  BMI 38.08 kg/m2    Physical Exam:  Physical Exam   Constitutional: She is oriented to person, place, and time. She appears well-developed and well-nourished. She is cooperative. No distress.   HENT:   Head: Normocephalic and atraumatic.   Right Ear: Hearing and external ear normal. No swelling or tenderness. Tympanic membrane is not perforated and not erythematous. No middle ear effusion.   Left Ear: Hearing and external ear normal. No swelling or tenderness. Tympanic membrane is not perforated and not erythematous.  No middle ear effusion.   Nose: Septal deviation (very mild rightward) present.   Mouth/Throat: Uvula is midline, oropharynx is clear and moist and mucous membranes are normal.   Nasal analysis:  Very short nose  Very narrow nose  Nasal bones narrow and deflected to the left  Nasal dorsum narrow with INV collapse  Prominent medialization of the ala with bilateral parenthesis deformity - obstruction resolves with support of the supraalar depression.    Lateral view: No dorsal hump.  Appropriate rotation.  Short nose.      Eyes: Conjunctivae, EOM and lids are normal. Pupils are equal, round, and reactive to light.   Neck: Phonation normal. Neck supple.   Pulmonary/Chest: Effort normal. No stridor. No respiratory distress.   Lymphadenopathy:        Head (right side): No submental, no submandibular, no " tonsillar, no preauricular, no posterior auricular and no occipital adenopathy present.        Head (left side): No submental, no submandibular, no tonsillar, no preauricular, no posterior auricular and no occipital adenopathy present.     She has no cervical adenopathy.   Neurological: She is alert and oriented to person, place, and time. She has normal strength. No cranial nerve deficit.   Skin: Skin is warm, dry and intact.   Psychiatric: She has a normal mood and affect. Her behavior is normal. Judgment and thought content normal.       Assessment   Assessment:  1. Nasal valve stenosis    2. Allergic rhinitis, unspecified allergic rhinitis trigger, unspecified rhinitis seasonality    3. Tobacco abuse disorder    4. Preoperative testing    5. Nasal septal deviation        Plan   Plan:    Have offered septoplasty with repair of nasal vestibular stenosis.  Discussed risks benefits alternatives and complications with her.  Specifically, we will plan on performing septoplasty, bilateral  grafts, bilateral non-anatomic battens, likely osteotomies, and possible lateral crural batten grafts.          Return for 1 week postoperatively.     My findings and recommendations were discussed and questions were answered.     Mark Anthony Anderson MD

## 2017-06-30 RX ORDER — MIRABEGRON 25 MG/1
TABLET, FILM COATED, EXTENDED RELEASE ORAL
Qty: 90 TABLET | Refills: 0 | Status: SHIPPED | OUTPATIENT
Start: 2017-06-30 | End: 2017-09-11 | Stop reason: SDUPTHER

## 2017-07-25 ENCOUNTER — HOSPITAL ENCOUNTER (OUTPATIENT)
Dept: GENERAL RADIOLOGY | Facility: HOSPITAL | Age: 57
Discharge: HOME | End: 2017-07-25
Admitting: OTOLARYNGOLOGY

## 2017-07-25 ENCOUNTER — APPOINTMENT (OUTPATIENT)
Dept: PREADMISSION TESTING | Facility: HOSPITAL | Age: 57
End: 2017-07-25

## 2017-07-25 VITALS
HEIGHT: 60 IN | WEIGHT: 194.4 LBS | DIASTOLIC BLOOD PRESSURE: 77 MMHG | SYSTOLIC BLOOD PRESSURE: 108 MMHG | OXYGEN SATURATION: 94 % | BODY MASS INDEX: 38.17 KG/M2 | RESPIRATION RATE: 14 BRPM | HEART RATE: 72 BPM

## 2017-07-25 DIAGNOSIS — Z01.818 PREOPERATIVE TESTING: ICD-10-CM

## 2017-07-25 LAB
ANION GAP SERPL CALCULATED.3IONS-SCNC: 9 MMOL/L (ref 4–13)
BUN BLD-MCNC: 25 MG/DL (ref 5–21)
BUN/CREAT SERPL: 31.3 (ref 7–25)
CALCIUM SPEC-SCNC: 9.5 MG/DL (ref 8.4–10.4)
CHLORIDE SERPL-SCNC: 95 MMOL/L (ref 98–110)
CO2 SERPL-SCNC: 38 MMOL/L (ref 24–31)
CREAT BLD-MCNC: 0.8 MG/DL (ref 0.5–1.4)
DEPRECATED RDW RBC AUTO: 46.4 FL (ref 40–54)
ERYTHROCYTE [DISTWIDTH] IN BLOOD BY AUTOMATED COUNT: 13.6 % (ref 12–15)
GFR SERPL CREATININE-BSD FRML MDRD: 74 ML/MIN/1.73
GLUCOSE BLD-MCNC: 99 MG/DL (ref 70–100)
HCT VFR BLD AUTO: 44.5 % (ref 37–47)
HGB BLD-MCNC: 14.2 G/DL (ref 12–16)
MCH RBC QN AUTO: 29.8 PG (ref 28–32)
MCHC RBC AUTO-ENTMCNC: 31.9 G/DL (ref 33–36)
MCV RBC AUTO: 93.3 FL (ref 82–98)
PLATELET # BLD AUTO: 321 10*3/MM3 (ref 130–400)
PMV BLD AUTO: 10.5 FL (ref 6–12)
POTASSIUM BLD-SCNC: 3.8 MMOL/L (ref 3.5–5.3)
RBC # BLD AUTO: 4.77 10*6/MM3 (ref 4.2–5.4)
SODIUM BLD-SCNC: 142 MMOL/L (ref 135–145)
WBC NRBC COR # BLD: 12.25 10*3/MM3 (ref 4.8–10.8)

## 2017-07-25 PROCEDURE — 71010 HC CHEST PA OR AP: CPT

## 2017-07-25 PROCEDURE — 93010 ELECTROCARDIOGRAM REPORT: CPT | Performed by: INTERNAL MEDICINE

## 2017-07-25 PROCEDURE — 80048 BASIC METABOLIC PNL TOTAL CA: CPT | Performed by: OTOLARYNGOLOGY

## 2017-07-25 PROCEDURE — 93005 ELECTROCARDIOGRAM TRACING: CPT

## 2017-07-25 PROCEDURE — 85027 COMPLETE CBC AUTOMATED: CPT | Performed by: OTOLARYNGOLOGY

## 2017-07-25 PROCEDURE — 36415 COLL VENOUS BLD VENIPUNCTURE: CPT

## 2017-07-25 RX ORDER — MELOXICAM 15 MG/1
15 TABLET ORAL DAILY
COMMUNITY
End: 2017-08-01 | Stop reason: HOSPADM

## 2017-07-25 RX ORDER — DIVALPROEX SODIUM 500 MG/1
1000 TABLET, EXTENDED RELEASE ORAL DAILY
COMMUNITY
End: 2018-04-09 | Stop reason: SDUPTHER

## 2017-07-25 RX ORDER — QUETIAPINE FUMARATE 50 MG/1
25 TABLET, FILM COATED ORAL NIGHTLY
COMMUNITY
End: 2019-07-02

## 2017-07-25 RX ORDER — GABAPENTIN 600 MG/1
600 TABLET ORAL
COMMUNITY
End: 2019-04-09

## 2017-07-25 RX ORDER — MELATONIN
1000 DAILY
COMMUNITY
End: 2020-11-20

## 2017-07-25 RX ORDER — PRAMIPEXOLE DIHYDROCHLORIDE 1.5 MG/1
1.5 TABLET ORAL NIGHTLY
COMMUNITY

## 2017-07-25 NOTE — DISCHARGE INSTRUCTIONS
DAY OF SURGERY INSTRUCTIONS        YOUR SURGEON: VIPUL ISLAS    PROCEDURE: REPAIR OF NASAL VESTIBULAR STENOSIS AND SEPTOPLASTY     DATE OF SURGERY: AUGUST 1, 2017     ARRIVAL TIME: AS DIRECTED BY OFFICE    DAY OF SURGERY TAKE ONLY THESE MEDICATIONS: CARVEDILOL             BEFORE YOU COME TO THE HOSPITAL  (Pre-op instructions)  • Do not eat, drink, smoke or chew gum after midnight the night before surgery.  This also includes no mints.  • Morning of surgery take only the medicines you have been instructed with a sip of water unless otherwise instructed  by your physician.  • Do not shave, wear makeup or dark nail polish.  • Remove all jewelry including rings.  • Leave anything you consider valuable at home.  • Leave your suitcase in the car until after your surgery.  • Bring the following with you if applicable:  o Picture ID and insurance, Medicare or Medicaid cards  o Co-pay/deductible required by insurance (cash, check, credit card)  o Copy of advance directive, living will or power-of- documents if not brought to PAT  o CPAP or BIPAP mask and tubing  o Relaxation aids (MP3 player, book, magazine)  • On the day of surgery check in at registration located at the main entrance of the hospital.       Outpatient Surgery Guidelines, Adult  Outpatient procedures are those for which the person having the procedure is allowed to go home the same day as the procedure. Various procedures are done on an outpatient basis. You should follow some general guidelines if you will be having an outpatient procedure.  LET YOUR HEALTH CARE PROVIDER KNOW ABOUT:  · Any allergies you have.  · All medicines you are taking, including vitamins, herbs, eye drops, creams, and over-the-counter medicines.  · Previous problems you or members of your family have had with the use of anesthetics.  · Any blood disorders you have.  · Previous surgeries you have had.  · Medical conditions you have.  RISKS AND COMPLICATIONS  Your health care  provider will discuss possible risks and complications with you before surgery. Common risks and complications include:    · Problems due to the use of anesthetics.  · Blood loss and replacement (does not apply to minor surgical procedures).  · Temporary increase in pain due to surgery.  · Uncorrected pain or problems that the surgery was meant to correct.  · Infection.  · New damage.  BEFORE THE PROCEDURE  · Ask your health care provider about changing or stopping your regular medicines. You may need to stop taking certain medicines in the days or weeks before the procedure.  · Stop smoking at least 2 weeks before surgery. This lowers your risk for complications during and after surgery. Ask your health care provider for help with this if needed.  · Eat your usual meals and a light supper the day before surgery. Continue fluid intake. Do not drink alcohol.  · Do not eat or drink after midnight the night before your surgery.   · Arrange for someone to take you home and to stay with you for 24 hours after the procedure. Medicine given for your procedure may affect your ability to drive or to care for yourself.  · Call your health care provider's office if you develop an illness or problem that may prevent you from safely having your procedure.  AFTER THE PROCEDURE  After surgery, you will be taken to a recovery area, where your progress will be monitored. If there are no complications, you will be allowed to go home when you are awake, stable, and taking fluids well. You may have numbness around the surgical site. Healing will take some time. You will have tenderness at the surgical site and may have some swelling and bruising. You may also have some nausea.  HOME CARE INSTRUCTIONS  · Do not drive for 24 hours, or as directed by your health care provider. Do not drive while taking prescription pain medicines.  · Do not drink alcohol for 24 hours.  · Do not make important decisions or sign legal documents for 24  hours.  · You may resume a normal diet and activities as directed.  · Do not lift anything heavier than 10 pounds (4.5 kg) or play contact sports until your health care provider says it is okay.  · Change your bandages (dressings) as directed.  · Only take over-the-counter or prescription medicines as directed by your health care provider.  · Follow up with your health care provider as directed.  SEEK MEDICAL CARE IF:  · You have increased bleeding (more than a small spot) from the surgical site.  · You have redness, swelling, or increasing pain in the wound.  · You see pus coming from the wound.  · You have a fever.  · You notice a bad smell coming from the wound or dressing.  · You feel lightheaded or faint.  · You develop a rash.  · You have trouble breathing.  · You develop allergies.  MAKE SURE YOU:  · Understand these instructions.  · Will watch your condition.  · Will get help right away if you are not doing well or get worse.     This information is not intended to replace advice given to you by your health care provider. Make sure you discuss any questions you have with your health care provider.     Document Released: 09/12/2002 Document Revised: 05/03/2016 Document Reviewed: 05/22/2014  [employer] Interactive Patient Education ©2016 [employer]       Fall Prevention in Hospitals, Adult  As a hospital patient, your condition and the treatments you receive can increase your risk for falls. Some additional risk factors for falls in a hospital include:  · Being in an unfamiliar environment.  · Being on bed rest.  · Your surgery.  · Taking certain medicines.  · Your tubing requirements, such as intravenous (IV) therapy or catheters.  It is important that you learn how to decrease fall risks while at the hospital. Below are important tips that can help prevent falls.  SAFETY TIPS FOR PREVENTING FALLS  Talk about your risk of falling.  · Ask your health care provider why you are at risk for falling. Is it your  medicine, illness, tubing placement, or something else?  · Make a plan with your health care provider to keep you safe from falls.  · Ask your health care provider or pharmacist about side effects of your medicines. Some medicines can make you dizzy or affect your coordination.  Ask for help.  · Ask for help before getting out of bed. You may need to press your call button.  · Ask for assistance in getting safely to the toilet.  · Ask for a walker or cane to be put at your bedside. Ask that most of the side rails on your bed be placed up before your health care provider leaves the room.  · Ask family or friends to sit with you.  · Ask for things that are out of your reach, such as your glasses, hearing aids, telephone, bedside table, or call button.  Follow these tips to avoid falling:  · Stay lying or seated, rather than standing, while waiting for help.  · Wear rubber-soled slippers or shoes whenever you walk in the hospital.  · Avoid quick, sudden movements.  ¨ Change positions slowly.  ¨ Sit on the side of your bed before standing.  ¨ Stand up slowly and wait before you start to walk.  · Let your health care provider know if there is a spill on the floor.  · Pay careful attention to the medical equipment, electrical cords, and tubes around you.  · When you need help, use your call button by your bed or in the bathroom. Wait for one of your health care providers to help you.  · If you feel dizzy or unsure of your footing, return to bed and wait for assistance.  · Avoid being distracted by the TV, telephone, or another person in your room.  · Do not lean or support yourself on rolling objects, such as IV poles or bedside tables.     This information is not intended to replace advice given to you by your health care provider. Make sure you discuss any questions you have with your health care provider.     Document Released: 12/15/2001 Document Revised: 01/08/2016 Document Reviewed: 08/25/2013  [employer]  Patient Education ©2016 [employer]       Surgical Site Infections FAQs  What is a Surgical Site Infection (SSI)?  A surgical site infection is an infection that occurs after surgery in the part of the body where the surgery took place. Most patients who have surgery do not develop an infection. However, infections develop in about 1 to 3 out of every 100 patients who have surgery.  Some of the common symptoms of a surgical site infection are:  · Redness and pain around the area where you had surgery  · Drainage of cloudy fluid from your surgical wound  · Fever  Can SSIs be treated?  Yes. Most surgical site infections can be treated with antibiotics. The antibiotic given to you depends on the bacteria (germs) causing the infection. Sometimes patients with SSIs also need another surgery to treat the infection.  What are some of the things that hospitals are doing to prevent SSIs?  To prevent SSIs, doctors, nurses, and other healthcare providers:  · Clean their hands and arms up to their elbows with an antiseptic agent just before the surgery.  · Clean their hands with soap and water or an alcohol-based hand rub before and after caring for each patient.  · May remove some of your hair immediately before your surgery using electric clippers if the hair is in the same area where the procedure will occur. They should not shave you with a razor.  · Wear special hair covers, masks, gowns, and gloves during surgery to keep the surgery area clean.  · Give you antibiotics before your surgery starts. In most cases, you should get antibiotics within 60 minutes before the surgery starts and the antibiotics should be stopped within 24 hours after surgery.  · Clean the skin at the site of your surgery with a special soap that kills germs.  What can I do to help prevent SSIs?  Before your surgery:  · Tell your doctor about other medical problems you may have. Health problems such as allergies, diabetes, and obesity could affect  your surgery and your treatment.  · Quit smoking. Patients who smoke get more infections. Talk to your doctor about how you can quit before your surgery.  · Do not shave near where you will have surgery. Shaving with a razor can irritate your skin and make it easier to develop an infection.  At the time of your surgery:  · Speak up if someone tries to shave you with a razor before surgery. Ask why you need to be shaved and talk with your surgeon if you have any concerns.  · Ask if you will get antibiotics before surgery.  After your surgery:  · Make sure that your healthcare providers clean their hands before examining you, either with soap and water or an alcohol-based hand rub.  · If you do not see your providers clean their hands, please ask them to do so.  · Family and friends who visit you should not touch the surgical wound or dressings.  · Family and friends should clean their hands with soap and water or an alcohol-based hand rub before and after visiting you. If you do not see them clean their hands, ask them to clean their hands.  What do I need to do when I go home from the hospital?  · Before you go home, your doctor or nurse should explain everything you need to know about taking care of your wound. Make sure you understand how to care for your wound before you leave the hospital.  · Always clean your hands before and after caring for your wound.  · Before you go home, make sure you know who to contact if you have questions or problems after you get home.  · If you have any symptoms of an infection, such as redness and pain at the surgery site, drainage, or fever, call your doctor immediately.  If you have additional questions, please ask your doctor or nurse.  Developed and co-sponsored by The Society for Healthcare Epidemiology of [place] (SHEA); Infectious Diseases Society of [place] (IDSA); American Hospital Association; Association for Professionals in Infection Control and Epidemiology (APIC);  Centers for Disease Control and Prevention (CDC); and The Joint Commission.     This information is not intended to replace advice given to you by your health care provider. Make sure you discuss any questions you have with your health care provider.     Document Released: 12/23/2014 Document Revised: 01/08/2016 Document Reviewed: 03/02/2016  [employer] Interactive Patient Education ©2016 [employer]     PATIENT/FAMILY/RESPONSIBLE PARTY VERBALIZES UNDERSTANDING OF ABOVE EDUCATION.  COPY OF PAIN SCALE GIVEN AND REVIEWED WITH VERBALIZED UNDERSTANDING.

## 2017-08-01 ENCOUNTER — HOSPITAL ENCOUNTER (OUTPATIENT)
Facility: HOSPITAL | Age: 57
Setting detail: HOSPITAL OUTPATIENT SURGERY
Discharge: HOME | End: 2017-08-01
Attending: OTOLARYNGOLOGY | Admitting: OTOLARYNGOLOGY

## 2017-08-01 ENCOUNTER — ANESTHESIA (OUTPATIENT)
Dept: PERIOP | Facility: HOSPITAL | Age: 57
End: 2017-08-01

## 2017-08-01 ENCOUNTER — ANESTHESIA EVENT (OUTPATIENT)
Dept: PERIOP | Facility: HOSPITAL | Age: 57
End: 2017-08-01

## 2017-08-01 VITALS
HEART RATE: 86 BPM | DIASTOLIC BLOOD PRESSURE: 70 MMHG | RESPIRATION RATE: 16 BRPM | OXYGEN SATURATION: 95 % | TEMPERATURE: 98.1 F | SYSTOLIC BLOOD PRESSURE: 117 MMHG

## 2017-08-01 DIAGNOSIS — J34.89 NASAL VALVE STENOSIS: ICD-10-CM

## 2017-08-01 DIAGNOSIS — J34.2 NASAL SEPTAL DEVIATION: ICD-10-CM

## 2017-08-01 PROCEDURE — 25010000002 FENTANYL CITRATE (PF) 100 MCG/2ML SOLUTION: Performed by: NURSE ANESTHETIST, CERTIFIED REGISTERED

## 2017-08-01 PROCEDURE — 21235 EAR CARTILAGE GRAFT: CPT | Performed by: OTOLARYNGOLOGY

## 2017-08-01 PROCEDURE — 25010000002 PROPOFOL 10 MG/ML EMULSION: Performed by: NURSE ANESTHETIST, CERTIFIED REGISTERED

## 2017-08-01 PROCEDURE — 30802 ABLATE INF TURBINATE SUBMUC: CPT | Performed by: OTOLARYNGOLOGY

## 2017-08-01 PROCEDURE — 30520 REPAIR OF NASAL SEPTUM: CPT | Performed by: OTOLARYNGOLOGY

## 2017-08-01 PROCEDURE — 30465 REPAIR NASAL STENOSIS: CPT | Performed by: OTOLARYNGOLOGY

## 2017-08-01 PROCEDURE — 25010000002 DEXAMETHASONE PER 1 MG: Performed by: OTOLARYNGOLOGY

## 2017-08-01 PROCEDURE — 25010000003 CEFAZOLIN PER 500 MG: Performed by: NURSE ANESTHETIST, CERTIFIED REGISTERED

## 2017-08-01 PROCEDURE — 25010000002 ONDANSETRON PER 1 MG: Performed by: NURSE ANESTHETIST, CERTIFIED REGISTERED

## 2017-08-01 PROCEDURE — 25010000002 MIDAZOLAM PER 1 MG: Performed by: ANESTHESIOLOGY

## 2017-08-01 RX ORDER — HYDROCODONE BITARTRATE AND ACETAMINOPHEN 7.5; 325 MG/1; MG/1
1 TABLET ORAL EVERY 4 HOURS PRN
Qty: 40 TABLET | Refills: 0 | Status: SHIPPED | OUTPATIENT
Start: 2017-08-01 | End: 2019-01-14

## 2017-08-01 RX ORDER — MIDAZOLAM HYDROCHLORIDE 1 MG/ML
2 INJECTION INTRAMUSCULAR; INTRAVENOUS
Status: DISCONTINUED | OUTPATIENT
Start: 2017-08-01 | End: 2017-08-01 | Stop reason: HOSPADM

## 2017-08-01 RX ORDER — FAMOTIDINE 10 MG/ML
20 INJECTION, SOLUTION INTRAVENOUS
Status: DISCONTINUED | OUTPATIENT
Start: 2017-08-01 | End: 2017-08-01 | Stop reason: HOSPADM

## 2017-08-01 RX ORDER — PROPOFOL 10 MG/ML
VIAL (ML) INTRAVENOUS AS NEEDED
Status: DISCONTINUED | OUTPATIENT
Start: 2017-08-01 | End: 2017-08-01 | Stop reason: SURG

## 2017-08-01 RX ORDER — MORPHINE SULFATE 2 MG/ML
2 INJECTION, SOLUTION INTRAMUSCULAR; INTRAVENOUS AS NEEDED
Status: DISCONTINUED | OUTPATIENT
Start: 2017-08-01 | End: 2017-08-01 | Stop reason: HOSPADM

## 2017-08-01 RX ORDER — HYDROCODONE BITARTRATE AND ACETAMINOPHEN 7.5; 325 MG/1; MG/1
1 TABLET ORAL ONCE AS NEEDED
Status: DISCONTINUED | OUTPATIENT
Start: 2017-08-01 | End: 2017-08-01 | Stop reason: HOSPADM

## 2017-08-01 RX ORDER — CEFAZOLIN SODIUM 1 G/3ML
INJECTION, POWDER, FOR SOLUTION INTRAMUSCULAR; INTRAVENOUS AS NEEDED
Status: DISCONTINUED | OUTPATIENT
Start: 2017-08-01 | End: 2017-08-01 | Stop reason: SURG

## 2017-08-01 RX ORDER — ONDANSETRON 2 MG/ML
INJECTION INTRAMUSCULAR; INTRAVENOUS AS NEEDED
Status: DISCONTINUED | OUTPATIENT
Start: 2017-08-01 | End: 2017-08-01 | Stop reason: SURG

## 2017-08-01 RX ORDER — FLUMAZENIL 0.1 MG/ML
0.2 INJECTION INTRAVENOUS AS NEEDED
Status: DISCONTINUED | OUTPATIENT
Start: 2017-08-01 | End: 2017-08-01 | Stop reason: HOSPADM

## 2017-08-01 RX ORDER — MEPERIDINE HYDROCHLORIDE 25 MG/ML
12.5 INJECTION INTRAMUSCULAR; INTRAVENOUS; SUBCUTANEOUS
Status: DISCONTINUED | OUTPATIENT
Start: 2017-08-01 | End: 2017-08-01 | Stop reason: HOSPADM

## 2017-08-01 RX ORDER — MIDAZOLAM HYDROCHLORIDE 1 MG/ML
1 INJECTION INTRAMUSCULAR; INTRAVENOUS
Status: DISCONTINUED | OUTPATIENT
Start: 2017-08-01 | End: 2017-08-01 | Stop reason: HOSPADM

## 2017-08-01 RX ORDER — CEPHALEXIN 500 MG/1
500 CAPSULE ORAL 4 TIMES DAILY
Qty: 28 CAPSULE | Refills: 0 | Status: SHIPPED | OUTPATIENT
Start: 2017-08-01 | End: 2017-09-20

## 2017-08-01 RX ORDER — IPRATROPIUM BROMIDE AND ALBUTEROL SULFATE 2.5; .5 MG/3ML; MG/3ML
3 SOLUTION RESPIRATORY (INHALATION) ONCE
Status: COMPLETED | OUTPATIENT
Start: 2017-08-01 | End: 2017-08-01

## 2017-08-01 RX ORDER — LIDOCAINE HYDROCHLORIDE 10 MG/ML
0.5 INJECTION, SOLUTION INFILTRATION; PERINEURAL ONCE AS NEEDED
Status: DISCONTINUED | OUTPATIENT
Start: 2017-08-01 | End: 2017-08-01

## 2017-08-01 RX ORDER — SODIUM CHLORIDE, SODIUM LACTATE, POTASSIUM CHLORIDE, CALCIUM CHLORIDE 600; 310; 30; 20 MG/100ML; MG/100ML; MG/100ML; MG/100ML
100 INJECTION, SOLUTION INTRAVENOUS CONTINUOUS
Status: DISCONTINUED | OUTPATIENT
Start: 2017-08-01 | End: 2017-08-01 | Stop reason: HOSPADM

## 2017-08-01 RX ORDER — FENTANYL CITRATE 50 UG/ML
INJECTION, SOLUTION INTRAMUSCULAR; INTRAVENOUS AS NEEDED
Status: DISCONTINUED | OUTPATIENT
Start: 2017-08-01 | End: 2017-08-01 | Stop reason: SURG

## 2017-08-01 RX ORDER — ONDANSETRON 2 MG/ML
4 INJECTION INTRAMUSCULAR; INTRAVENOUS AS NEEDED
Status: DISCONTINUED | OUTPATIENT
Start: 2017-08-01 | End: 2017-08-01 | Stop reason: HOSPADM

## 2017-08-01 RX ORDER — ROCURONIUM BROMIDE 10 MG/ML
INJECTION, SOLUTION INTRAVENOUS AS NEEDED
Status: DISCONTINUED | OUTPATIENT
Start: 2017-08-01 | End: 2017-08-01 | Stop reason: SURG

## 2017-08-01 RX ORDER — LABETALOL HYDROCHLORIDE 5 MG/ML
5 INJECTION, SOLUTION INTRAVENOUS
Status: DISCONTINUED | OUTPATIENT
Start: 2017-08-01 | End: 2017-08-01 | Stop reason: HOSPADM

## 2017-08-01 RX ORDER — IPRATROPIUM BROMIDE AND ALBUTEROL SULFATE 2.5; .5 MG/3ML; MG/3ML
3 SOLUTION RESPIRATORY (INHALATION) ONCE AS NEEDED
Status: COMPLETED | OUTPATIENT
Start: 2017-08-01 | End: 2017-08-01

## 2017-08-01 RX ORDER — SUFENTANIL CITRATE 50 UG/ML
INJECTION EPIDURAL; INTRAVENOUS AS NEEDED
Status: DISCONTINUED | OUTPATIENT
Start: 2017-08-01 | End: 2017-08-01 | Stop reason: SURG

## 2017-08-01 RX ORDER — LIDOCAINE HYDROCHLORIDE 20 MG/ML
INJECTION, SOLUTION INFILTRATION; PERINEURAL AS NEEDED
Status: DISCONTINUED | OUTPATIENT
Start: 2017-08-01 | End: 2017-08-01 | Stop reason: SURG

## 2017-08-01 RX ORDER — OXYMETAZOLINE HYDROCHLORIDE 0.05 G/100ML
SPRAY NASAL AS NEEDED
Status: DISCONTINUED | OUTPATIENT
Start: 2017-08-01 | End: 2017-08-01 | Stop reason: HOSPADM

## 2017-08-01 RX ORDER — LIDOCAINE HYDROCHLORIDE AND EPINEPHRINE 10; 10 MG/ML; UG/ML
INJECTION, SOLUTION INFILTRATION; PERINEURAL AS NEEDED
Status: DISCONTINUED | OUTPATIENT
Start: 2017-08-01 | End: 2017-08-01 | Stop reason: HOSPADM

## 2017-08-01 RX ORDER — SODIUM CHLORIDE, SODIUM LACTATE, POTASSIUM CHLORIDE, CALCIUM CHLORIDE 600; 310; 30; 20 MG/100ML; MG/100ML; MG/100ML; MG/100ML
1000 INJECTION, SOLUTION INTRAVENOUS CONTINUOUS PRN
Status: DISCONTINUED | OUTPATIENT
Start: 2017-08-01 | End: 2017-08-01 | Stop reason: HOSPADM

## 2017-08-01 RX ORDER — DEXAMETHASONE SODIUM PHOSPHATE 10 MG/ML
10 INJECTION INTRAMUSCULAR; INTRAVENOUS ONCE
Status: COMPLETED | OUTPATIENT
Start: 2017-08-01 | End: 2017-08-01

## 2017-08-01 RX ORDER — SODIUM CHLORIDE 0.9 % (FLUSH) 0.9 %
3 SYRINGE (ML) INJECTION AS NEEDED
Status: DISCONTINUED | OUTPATIENT
Start: 2017-08-01 | End: 2017-08-01 | Stop reason: HOSPADM

## 2017-08-01 RX ORDER — SODIUM CHLORIDE 0.9 % (FLUSH) 0.9 %
1-10 SYRINGE (ML) INJECTION AS NEEDED
Status: DISCONTINUED | OUTPATIENT
Start: 2017-08-01 | End: 2017-08-01 | Stop reason: HOSPADM

## 2017-08-01 RX ORDER — HYDRALAZINE HYDROCHLORIDE 20 MG/ML
5 INJECTION INTRAMUSCULAR; INTRAVENOUS
Status: DISCONTINUED | OUTPATIENT
Start: 2017-08-01 | End: 2017-08-01 | Stop reason: HOSPADM

## 2017-08-01 RX ORDER — METOCLOPRAMIDE HYDROCHLORIDE 5 MG/ML
5 INJECTION INTRAMUSCULAR; INTRAVENOUS
Status: DISCONTINUED | OUTPATIENT
Start: 2017-08-01 | End: 2017-08-01 | Stop reason: HOSPADM

## 2017-08-01 RX ORDER — NALOXONE HCL 0.4 MG/ML
0.04 VIAL (ML) INJECTION AS NEEDED
Status: DISCONTINUED | OUTPATIENT
Start: 2017-08-01 | End: 2017-08-01 | Stop reason: HOSPADM

## 2017-08-01 RX ADMIN — SODIUM CHLORIDE, POTASSIUM CHLORIDE, SODIUM LACTATE AND CALCIUM CHLORIDE 1000 ML: 600; 310; 30; 20 INJECTION, SOLUTION INTRAVENOUS at 07:13

## 2017-08-01 RX ADMIN — PROPOFOL 150 MG: 10 INJECTION, EMULSION INTRAVENOUS at 09:16

## 2017-08-01 RX ADMIN — MIDAZOLAM HYDROCHLORIDE 2 MG: 1 INJECTION, SOLUTION INTRAMUSCULAR; INTRAVENOUS at 09:00

## 2017-08-01 RX ADMIN — IPRATROPIUM BROMIDE AND ALBUTEROL SULFATE 3 ML: .5; 3 SOLUTION RESPIRATORY (INHALATION) at 08:53

## 2017-08-01 RX ADMIN — FAMOTIDINE 20 MG: 10 INJECTION, SOLUTION INTRAVENOUS at 08:53

## 2017-08-01 RX ADMIN — LIDOCAINE HYDROCHLORIDE 80 MG: 20 INJECTION, SOLUTION INFILTRATION; PERINEURAL at 09:16

## 2017-08-01 RX ADMIN — IPRATROPIUM BROMIDE AND ALBUTEROL SULFATE 3 ML: .5; 3 SOLUTION RESPIRATORY (INHALATION) at 14:46

## 2017-08-01 RX ADMIN — FENTANYL CITRATE 100 MCG: 50 INJECTION, SOLUTION INTRAMUSCULAR; INTRAVENOUS at 09:15

## 2017-08-01 RX ADMIN — LIDOCAINE HYDROCHLORIDE 0.5 ML: 10 INJECTION, SOLUTION EPIDURAL; INFILTRATION; INTRACAUDAL; PERINEURAL at 07:16

## 2017-08-01 RX ADMIN — CEFAZOLIN 2 G: 1 INJECTION, POWDER, FOR SOLUTION INTRAVENOUS at 09:18

## 2017-08-01 RX ADMIN — DEXAMETHASONE SODIUM PHOSPHATE 10 MG: 10 INJECTION, SOLUTION INTRAMUSCULAR; INTRAVENOUS at 08:53

## 2017-08-01 RX ADMIN — HYDROCODONE BITARTRATE AND ACETAMINOPHEN 1 TABLET: 7.5; 325 TABLET ORAL at 16:11

## 2017-08-01 RX ADMIN — ROCURONIUM BROMIDE 50 MG: 10 INJECTION INTRAVENOUS at 09:16

## 2017-08-01 RX ADMIN — SUFENTANIL CITRATE 20 MCG: 50 INJECTION, SOLUTION EPIDURAL; INTRAVENOUS at 09:30

## 2017-08-01 RX ADMIN — SODIUM CHLORIDE, POTASSIUM CHLORIDE, SODIUM LACTATE AND CALCIUM CHLORIDE: 600; 310; 30; 20 INJECTION, SOLUTION INTRAVENOUS at 09:13

## 2017-08-01 RX ADMIN — ONDANSETRON HYDROCHLORIDE 4 MG: 2 SOLUTION INTRAMUSCULAR; INTRAVENOUS at 11:00

## 2017-08-01 NOTE — OP NOTE
Preprocedure diagnosis:     1. Nasal septal deviation    2.  External nasal valve stenosis    3.  Internal nasal valve stenosis    4.  Inferior turbinate hypertrophy, bilaterally       Post procedure diagnosis:     same      Procedure performed: 1. Repair of nasal vestibular stenosis with  grafting, lateral crural batten grafts, and non-anatomic alar batten grafts    2.  Septoplasty    3.  Conchal cartilage graft from left ear to nose    4.  Bilateral inferior turbinate Coblation    Surgeon: Mark Anthony Anderson M.D.    Anesthesia: General    IV fluids: Per anesthesia    Estimated blood loss: 150cc    Drains: None    Disposition: PACU    Complications: None    Specimens: None    Operative findings: The nose is very narrow and shortened.  There is bilateral internal nasal valve stenosis.  There is re-curvature of the lateral crura with collapse at the supra-alar crease.  There is a slight left septal deviation.  The inferior turbinates were large bilaterally.    The following procedures were performed to improve nasal airflow:    1) external approach to the nose    2) septoplasty for harvesting of cartilage for  grafts    3) bilateral  grafts    4) bilateral lateral crural auto batten grafts    5) bilateral nonanatomic alar batten grafts    6) bilateral inferior turbinate Coblation and outfracture    Details: after patient verification and consent material was reviewed, the patient was taken to the operating room and laid supine on the operative table.  A formal timeout procedure was performed after the induction of general endotracheal anesthesia.    I infiltrated the nasal septum and envelope with 5 mL of 1% lidocaine with 1 100,000 epinephrine.  A placed pledgets soaked in cocaine intranasally.    The patient was sterilely prepped and draped.  The pledgets were removed.      An open approach to the nose was performed.  A inverted V incision was crated using a 15 blade in the mid columella.  The  lower lateral cartilages were identified at the medial crura using curved iris scissors.  The medial crura were then skeletonized the intermediate and lateral crura, completing the marginal incision.  The anterior septal angle was identified and noted to be off towards the patient's left.  Dissection was then carried along the nasal dorsum up to the nasal bones using the curved iris scissors.  I then incised the periosteum of the upper lateral cartilages and elevated submucoperiosteal tunnel.    At this point nasal bones were again identified and noted to be straight.  The anterior septal angle was identified again.  I incised the perichondrium of the anterior septal angle and elevated bilateral submucoperichondrial and periosteal flaps over the septum.  I then released the upper lateral cartilages from the septum using a 15 blade.  I then left 1.5 centimeters of dorsal and caudal strut and incised the septal cartilage using a 15 blade.  I released the perpendicular plate of the ethmoid from the quadrangular cartilage using the Deweese elevator. I placed the cartilage on the back table in saline for later use.     The cartilage was carved for bilateral  grafts.  The  grafts were temporarily affixed to the septal dorsum with 25-gauge hypodermic needles.  I then secured the  grafts to the septum using clear 5-0 nylon suture.  I then reapproximated the upper lateral cartilages to this complex using 5-0 clear nylon suture.    I then dissected the cephalic most aspect of the lateral crura from the underlying vestibular mucosa using curved iris scissors.  The cartilage was then incised creating a cephalic trim.  I folded the cartilage on top of the lateral crura and secured this using 5-0 Vicryl suture to perform an auto batten graft into address to recur curvature.    Redraping nasal envelope, there is still a significant amount of pinching in the supra-alar crease.  As a result, I felt that a left  conchal cartilage graft would be required to relieve the obstruction.  I infiltrated the skin of the left ear using 1 mL 1% lidocaine with 1 100,000 epinephrine.  After approximately 10 minutes I incised the conchal bowl anteriorly and elevated the skin off the conchal cartilage.  Then incised, cartilages, making sure to leave the helical root in place.  I dissected this from the posterior skin using a Alpaugh elevator.  The cartilage was removed using curved iris scissors and using placed in saline and the back table for later use.    Tunnels were placed using curved iris scissors to dissect lateral to the piriform aperture at the supra-alar depression.  I carved the conchal cartilage to fit in this area, placed this secured this to the intermediate crura using 3-0 chromic suture.  This effectively lateralized the supra-alar depression.    I then placed a running locking 4-0 plain gut to reapproximate the mucosal flaps.    The interdomal ligament and medial crura were reapproximated using 5-0 clear nylon and an intradomal stitch.    The nose was closed using a deep 5-0 undyed Vicryl suture at the mid columella.  I also placed 5-0 nylon simple sutures intranasally to reapproximate the marginal incisions.  The skin was further closed using 6-0 fast absorbing gut.      I then performed the inferior turbinate reduction by placing 3, 10 second pulses of the Coblator set at 4 to each inferior turbinate.  I then used the Dickerson elevator to outfracture the inferior turbinates    I then placed bilateral Gabriel splints coated in Bactroban and secured these to the caudal septum using 2-0 silk.    A thermoplastic splint consisting of Mastisol, Steri-Strips,the foam strip, a medium thermoplastic splint, Steri-Strips, and brown paper tape was placed.next    This marks the conclusion of the procedure the patient was weaned from anesthesia and transferred to the PACU for recovery without complication

## 2017-08-01 NOTE — DISCHARGE INSTRUCTIONS

## 2017-08-01 NOTE — PLAN OF CARE
Problem: Patient Care Overview (Adult)  Goal: Plan of Care Review  Outcome: Ongoing (interventions implemented as appropriate)    08/01/17 0836   Coping/Psychosocial Response Interventions   Plan Of Care Reviewed With patient   Patient Care Overview   Progress no change

## 2017-08-01 NOTE — ANESTHESIA PROCEDURE NOTES
Airway  Urgency: elective    Date/Time: 8/1/2017 9:20 AM  Airway not difficult    General Information and Staff    Patient location during procedure: OR  CRNA: COLLINS JAIN    Indications and Patient Condition  Indications for airway management: airway protection    Preoxygenated: yes  MILS maintained throughout  Mask difficulty assessment: 1 - vent by mask    Final Airway Details  Final airway type: endotracheal airway      Successful airway: ETT  Cuffed: yes   Successful intubation technique: direct laryngoscopy  Facilitating devices/methods: intubating stylet  Endotracheal tube insertion site: oral  Blade: Marni  Blade size: #3  ETT size: 7.0 mm  Cormack-Lehane Classification: grade IIa - partial view of glottis  Placement verified by: chest auscultation and capnometry   Cuff volume (mL): 5  Measured from: lips  ETT to lips (cm): 22  Number of attempts at approach: 1

## 2017-08-01 NOTE — ANESTHESIA POSTPROCEDURE EVALUATION
Patient: Robyn Minaya    Procedure Summary     Date Anesthesia Start Anesthesia Stop Room / Location    08/01/17 0908 1205  [facility] OR 03 / [facility] OR       Procedure Diagnosis Surgeon Provider    Repair of nasal vestibular stenosis and septoplasty; cartilage graft from left ear (N/A Nose) Nasal septal deviation; Nasal valve stenosis  (Nasal septal deviation [J34.2]; Nasal valve stenosis [J34.89]) MD Garret Nuno CRNA          Anesthesia Type: general  Last vitals  BP   117/70 (08/01/17 1632)    Temp        Pulse   86 (08/01/17 1632)   Resp   16 (08/01/17 1632)    SpO2   95 % (08/01/17 1632)      Post Anesthesia Care and Evaluation    Patient location during evaluation: PACU  Patient participation: complete - patient participated  Level of consciousness: awake and alert  Pain management: adequate  Airway patency: patent  Anesthetic complications: No anesthetic complications    Cardiovascular status: acceptable  Respiratory status: acceptable  Hydration status: acceptable

## 2017-08-01 NOTE — NURSING NOTE
Dr Anderson called by SARAVANAN Mroales patient nose still driping slow and steady drips in mustache dressing. Dr Anderson ordered cool mist face tent @ 35%

## 2017-08-01 NOTE — NURSING NOTE
Dr Akins consulted to review case and evaluate ability for patient to go to opc. Patient o2 sat drops to 88% then quickly rises with deep breaths to 98% on room air. Continues to use incentive spirometer without difficulty. No periods of apnea noted.

## 2017-08-01 NOTE — NURSING NOTE
Dr Anderson here checking on pt states to try breathing tx that is ordered to improve o2 sat and order for incentive spirometer.

## 2017-08-01 NOTE — ANESTHESIA PREPROCEDURE EVALUATION
Anesthesia Evaluation     Patient summary reviewed and Nursing notes reviewed   no history of anesthetic complications:  NPO Solid Status: > 8 hours  NPO Liquid Status: > 8 hours     Airway   Mallampati: III  TM distance: >3 FB  Neck ROM: full  possible difficult intubation  Dental      Pulmonary     breath sounds clear to auscultation  (+) a smoker Current,   (-) sleep apnea  Cardiovascular     ECG reviewed  Patient on routine beta blocker and Beta blocker given within 24 hours of surgery  Rhythm: regular  Rate: normal    (+) hypertension,   (-) past MI, CAD      Neuro/Psych  (+) psychiatric history Bipolar,    (-) seizures, TIA, CVA  GI/Hepatic/Renal/Endo    (+) obesity,    (-) GERD, liver disease, no renal disease, diabetes    Musculoskeletal     Abdominal    Substance History      OB/GYN          Other   (+) arthritis                                   Anesthesia Plan    ASA 2     general     intravenous induction   Anesthetic plan and risks discussed with patient.

## 2017-08-01 NOTE — PLAN OF CARE
Problem: Patient Care Overview (Adult)  Goal: Plan of Care Review  Outcome: Ongoing (interventions implemented as appropriate)    08/01/17 7509   Coping/Psychosocial Response Interventions   Plan Of Care Reviewed With patient   Patient Care Overview   Progress improving   Outcome Evaluation   Outcome Summary/Follow up Plan o2 sat improved after using incentive spirometer meets pacu discharge criteria          Problem: Perioperative Period (Adult)  Goal: Signs and Symptoms of Listed Potential Problems Will be Absent or Manageable (Perioperative Period)  Outcome: Ongoing (interventions implemented as appropriate)

## 2017-08-01 NOTE — H&P
Chief Complaint   Patient presents with   • Follow-up       EVAL FOR POSSIBLE SINUS SURGERY            Subjective      History of Present Illness:  Robyn Minaya is a  57 y.o.  female who presents for followup regarding her bilateral nasal obstruction. She complains of nasal congestion and nasal obstruction. The symptoms are localized to the bilateral nostril. The patient has had worsening symptoms. The symptoms have been present since she was in her 30s. The symptoms are aggravated by  no identifiable factors (she was struck in the face by a baseball as a child - no surgery to correct).  The symptoms are improved by no identifiable factors. She states she has tried breathe right strips without improvement. She has tried nasal sprays (azelastine and fluticasone for 6 weeks) without improvement. She also denies nasal drainage or frequent sinus infections.      Bipolar 1 - she can take steroids (does so when she gets an occasional sinus infection - she describes these as more of a cold than a sinus infection and declines further sinus workup).    Requesting pain medications already postoperatively because of her back pain.  On Ultram and T#4.     Review of Systems:  Review of Systems   Constitutional: Negative for chills, fatigue and fever.   HENT: Positive for congestion. Negative for ear discharge, ear pain, facial swelling, postnasal drip, rhinorrhea, sinus pressure, trouble swallowing and voice change.    Musculoskeletal: Positive for back pain.   All other systems reviewed and are negative.        Past History:   Medical History         Past Medical History:   Diagnosis Date   • Allergic rhinitis     • Back pain     • Bipolar 1 disorder     • Gout     • Hypertension     • Nasal valve stenosis     • Tobacco use disorder            Surgical History          Past Surgical History:   Procedure Laterality Date   • CARPAL TUNNEL RELEASE Bilateral     • CHOLECYSTECTOMY Bilateral     • CYST REMOVAL         neck   • LASER  ABLATION         right back               Family History   Problem Relation Age of Onset   • Cancer Mother     • Diabetes Father     • Hypertension Father     • Cancer Father        Social History    Substance Use Topics    • Smoking status: Current Every Day Smoker    • Smokeless tobacco: Never Used    • Alcohol use Yes         Comment: maybe 3 x yearly       Allergies:  Hctz [hydrochlorothiazide]     Current Outpatient Prescriptions:   •  acetaminophen-codeine (TYLENOL with CODEINE #4) 300-60 MG per tablet, , Disp: , Rfl:   •  albuterol (PROVENTIL) (2.5 MG/3ML) 0.083% nebulizer solution, Take 2.5 mg by nebulization Every 4 (Four) Hours As Needed for wheezing. 2 puffs every 4 hours as needed, Disp: , Rfl:   •  allopurinol (ZYLOPRIM) 100 MG tablet, Take  by mouth., Disp: , Rfl:   •  ALPRAZolam (XANAX) 0.5 MG tablet, Take 0.5 mg by mouth At Night As Needed for anxiety., Disp: , Rfl:   •  calcium carbonate (OS-KAYLEY) 600 MG tablet, Take 600 mg by mouth Daily., Disp: , Rfl:   •  carvedilol (COREG) 6.25 MG tablet, Take 6.25 mg by mouth 2 (Two) Times a Day With Meals., Disp: , Rfl:   •  CHANTIX STARTING MONTH KAVEH 0.5 MG X 11 & 1 MG X 42 tablet, , Disp: , Rfl:   •  chlorthalidone (HYGROTEN) 25 MG tablet, Take 25 mg by mouth Daily. 1/2 tablet daily, Disp: , Rfl:   •  cholecalciferol (VITAMIN D3) 1000 UNITS tablet, Take 1,000 Units by mouth Daily., Disp: , Rfl:   •  divalproex (DEPAKOTE) 500 MG 24 hr tablet, Take  by mouth., Disp: , Rfl:   •  DULoxetine (CYMBALTA) 60 MG capsule, Take 60 mg by mouth Daily., Disp: , Rfl:   •  gabapentin (NEURONTIN) 300 MG capsule, , Disp: , Rfl:   •  Mirabegron ER (MYRBETRIQ) 25 MG tablet sustained-release 24 hour, Take 25 mg by mouth Daily., Disp: , Rfl:   •  Multiple Vitamin (MULTI VITAMIN PO), Take  by mouth., Disp: , Rfl:   •  pilocarpine (SALAGEN) 5 MG tablet, Take 5 mg by mouth 3 (Three) Times a Day., Disp: , Rfl:   •  Potassium 99 MG tablet, Take 99 mg by mouth Daily., Disp: , Rfl:   •   "pramipexole (MIRAPEX) 1 MG tablet, Take 1 mg by mouth 2 (Two) Times a Day., Disp: , Rfl:   •  QUEtiapine (SEROquel) 25 MG tablet, Take  by mouth., Disp: , Rfl:   •  tiZANidine (ZANAFLEX) 4 MG tablet, Take 4 mg by mouth At Night As Needed for muscle spasms., Disp: , Rfl:   •  traMADol (ULTRAM) 50 MG tablet, Take 50 mg by mouth Every 8 (Eight) Hours As Needed for moderate pain (4-6)., Disp: , Rfl:   •  vitamin B-12 (CYANOCOBALAMIN) 500 MCG tablet, Take 500 mcg by mouth Daily., Disp: , Rfl:            Objective         Vital Signs:  Temp:  [98 °F (36.7 °C)] 98 °F (36.7 °C)  Heart Rate:  [84] 84  Resp:  [20] 20  BP: (119)/(66) 119/66 /66  Pulse 84  Temp 98 °F (36.7 °C)  Resp 20  Ht 60\" (152.4 cm)  Wt 195 lb (88.5 kg)  BMI 38.08 kg/m2     Physical Exam:  Physical Exam   Constitutional: She is oriented to person, place, and time. She appears well-developed and well-nourished. She is cooperative. No distress.   HENT:   Head: Normocephalic and atraumatic.   Right Ear: Hearing and external ear normal. No swelling or tenderness. Tympanic membrane is not perforated and not erythematous. No middle ear effusion.   Left Ear: Hearing and external ear normal. No swelling or tenderness. Tympanic membrane is not perforated and not erythematous.  No middle ear effusion.   Nose: Septal deviation (very mild rightward) present.   Mouth/Throat: Uvula is midline, oropharynx is clear and moist and mucous membranes are normal.   Nasal analysis:  Very short nose  Very narrow nose  Nasal bones narrow and deflected to the left  Nasal dorsum narrow with INV collapse  Prominent medialization of the ala with bilateral parenthesis deformity - obstruction resolves with support of the supraalar depression.    Lateral view: No dorsal hump.  Appropriate rotation.  Short nose.      Eyes: Conjunctivae, EOM and lids are normal. Pupils are equal, round, and reactive to light.   Neck: Phonation normal. Neck supple.   Pulmonary/Chest: Effort " normal. No stridor. No respiratory distress.   Lymphadenopathy:        Head (right side): No submental, no submandibular, no tonsillar, no preauricular, no posterior auricular and no occipital adenopathy present.        Head (left side): No submental, no submandibular, no tonsillar, no preauricular, no posterior auricular and no occipital adenopathy present.     She has no cervical adenopathy.   Neurological: She is alert and oriented to person, place, and time. She has normal strength. No cranial nerve deficit.   Skin: Skin is warm, dry and intact.   Psychiatric: She has a normal mood and affect. Her behavior is normal. Judgment and thought content normal.            Assessment      Assessment:  1. Nasal valve stenosis    2. Allergic rhinitis, unspecified allergic rhinitis trigger, unspecified rhinitis seasonality    3. Tobacco abuse disorder    4. Preoperative testing    5. Nasal septal deviation             Plan      Plan:     Have offered septoplasty with repair of nasal vestibular stenosis.  Discussed risks benefits alternatives and complications with her.  Specifically, we will plan on performing septoplasty, bilateral  grafts, bilateral non-anatomic battens, likely osteotomies, and possible lateral crural batten grafts.      Return for 1 week postoperatively.      My findings and recommendations were discussed and questions were answered.      Mark Anthony Anderson MD

## 2017-08-01 NOTE — PLAN OF CARE
Problem: Patient Care Overview (Adult)  Goal: Plan of Care Review  Outcome: Outcome(s) achieved Date Met:  08/01/17 08/01/17 0390   Coping/Psychosocial Response Interventions   Plan Of Care Reviewed With patient;spouse   Patient Care Overview   Progress improving   Outcome Evaluation   Outcome Summary/Follow up Plan Patient meets discharge criteria and is ready for discharge.       Goal: Adult Individualization and Mutuality  Outcome: Outcome(s) achieved Date Met:  08/01/17  Goal: Discharge Needs Assessment  Outcome: Outcome(s) achieved Date Met:  08/01/17    Problem: Perioperative Period (Adult)  Goal: Signs and Symptoms of Listed Potential Problems Will be Absent or Manageable (Perioperative Period)  Outcome: Outcome(s) achieved Date Met:  08/01/17

## 2017-08-03 NOTE — ADDENDUM NOTE
Addendum  created 08/03/17 0964 by Niraj Mario CRNA    Visit Navigator Flowsheet section accepted

## 2017-08-03 NOTE — ADDENDUM NOTE
Addendum  created 08/03/17 1283 by Garret Fisher CRNA    Visit Navigator Flowsheet section accepted

## 2017-08-07 ENCOUNTER — OFFICE VISIT (OUTPATIENT)
Dept: OTOLARYNGOLOGY | Facility: CLINIC | Age: 57
End: 2017-08-07

## 2017-08-07 VITALS
BODY MASS INDEX: 38.09 KG/M2 | TEMPERATURE: 98 F | WEIGHT: 194 LBS | HEART RATE: 85 BPM | SYSTOLIC BLOOD PRESSURE: 113 MMHG | DIASTOLIC BLOOD PRESSURE: 76 MMHG | HEIGHT: 60 IN

## 2017-08-07 DIAGNOSIS — J34.2 NASAL SEPTAL DEVIATION: ICD-10-CM

## 2017-08-07 DIAGNOSIS — J34.3 HYPERTROPHY OF BOTH INFERIOR NASAL TURBINATES: ICD-10-CM

## 2017-08-07 DIAGNOSIS — Z72.0 TOBACCO ABUSE DISORDER: ICD-10-CM

## 2017-08-07 DIAGNOSIS — J30.9 ALLERGIC RHINITIS, UNSPECIFIED ALLERGIC RHINITIS TRIGGER, UNSPECIFIED RHINITIS SEASONALITY: ICD-10-CM

## 2017-08-07 DIAGNOSIS — J34.89 NASAL VALVE STENOSIS: Primary | ICD-10-CM

## 2017-08-07 PROCEDURE — 99024 POSTOP FOLLOW-UP VISIT: CPT | Performed by: OTOLARYNGOLOGY

## 2017-08-07 RX ORDER — VARENICLINE TARTRATE 1 MG/1
TABLET, FILM COATED ORAL
COMMUNITY
Start: 2017-07-22 | End: 2017-09-20

## 2017-08-07 NOTE — PROGRESS NOTES
Robyn Minaya presents for a routine postoperative visit following repair of nasal vestibular stenosis with  grafting, lateral crural batten grafts, and non-anatomic alar batten grafts, septoplasty, conchal cartilage graft from the left ear to nose, and bilateral ITR on 8/01/07.     Subjective: Since surgery, she is doing well without complaints.  Symptoms denied postoperatively include fever, chills, bleeding and drainage. The patient states the pain has decreased since surgery.     Objective:   Pathology review: Pathology is not applicable.     Physical Exam:  Thermoplastic splint removed. Nose very swollen. Gabriel splints removed. Decongested and suctioned. Septum is straight without hematoma or perforation. Bolster removed from the left ear- incision healing well. Good airflow    Assessment:  Robyn was seen today for post-op.    Diagnoses and all orders for this visit:    Nasal valve stenosis    Nasal septal deviation    Hypertrophy of both inferior nasal turbinates    Allergic rhinitis, unspecified allergic rhinitis trigger, unspecified rhinitis seasonality    Tobacco abuse disorder        Plan:  Nasal saline spray Q2 hours PRN. Protect from trauma.     Return in about 6 weeks (around 9/18/2017), or if symptoms worsen or fail to improve, for Recheck.      Mark Anthony Anderson MD  08/07/17  11:25 AM

## 2017-08-09 DIAGNOSIS — E79.0 ELEVATED URIC ACID IN BLOOD: ICD-10-CM

## 2017-08-10 RX ORDER — ALLOPURINOL 100 MG/1
TABLET ORAL
Qty: 30 TABLET | Refills: 0 | Status: SHIPPED | OUTPATIENT
Start: 2017-08-10 | End: 2017-10-06 | Stop reason: SDUPTHER

## 2017-08-22 ENCOUNTER — HOSPITAL ENCOUNTER (OUTPATIENT)
Dept: GENERAL RADIOLOGY | Age: 57
Discharge: HOME | End: 2017-08-22
Payer: COMMERCIAL

## 2017-08-22 ENCOUNTER — TELEPHONE (OUTPATIENT)
Dept: FAMILY MEDICINE CLINIC | Age: 57
End: 2017-08-22

## 2017-08-22 ENCOUNTER — OFFICE VISIT (OUTPATIENT)
Dept: FAMILY MEDICINE CLINIC | Age: 57
End: 2017-08-22
Payer: COMMERCIAL

## 2017-08-22 VITALS
RESPIRATION RATE: 20 BRPM | HEIGHT: 60 IN | HEART RATE: 64 BPM | TEMPERATURE: 98.4 F | WEIGHT: 209 LBS | DIASTOLIC BLOOD PRESSURE: 80 MMHG | OXYGEN SATURATION: 98 % | SYSTOLIC BLOOD PRESSURE: 136 MMHG | BODY MASS INDEX: 41.03 KG/M2

## 2017-08-22 DIAGNOSIS — G89.29 CHRONIC MIDLINE LOW BACK PAIN WITHOUT SCIATICA: ICD-10-CM

## 2017-08-22 DIAGNOSIS — G25.81 RLS (RESTLESS LEGS SYNDROME): ICD-10-CM

## 2017-08-22 DIAGNOSIS — F31.60 BIPOLAR 1 DISORDER, MIXED (HCC): ICD-10-CM

## 2017-08-22 DIAGNOSIS — Z13.220 SCREENING FOR HYPERLIPIDEMIA: ICD-10-CM

## 2017-08-22 DIAGNOSIS — M79.89 SWELLING OF BOTH LOWER EXTREMITIES: ICD-10-CM

## 2017-08-22 DIAGNOSIS — M54.50 CHRONIC MIDLINE LOW BACK PAIN WITHOUT SCIATICA: ICD-10-CM

## 2017-08-22 DIAGNOSIS — I10 ESSENTIAL HYPERTENSION: ICD-10-CM

## 2017-08-22 LAB
ALBUMIN SERPL-MCNC: 3.8 G/DL (ref 3.5–5.2)
ALP BLD-CCNC: 77 U/L (ref 35–104)
ALT SERPL-CCNC: 30 U/L (ref 5–33)
ANION GAP SERPL CALCULATED.3IONS-SCNC: 14 MMOL/L (ref 7–19)
AST SERPL-CCNC: 22 U/L (ref 5–32)
BILIRUB SERPL-MCNC: <0.2 MG/DL (ref 0.2–1.2)
BILIRUBIN DIRECT: 0.1 MG/DL (ref 0–0.3)
BILIRUBIN URINE: NEGATIVE
BILIRUBIN, INDIRECT: 0.1 MG/DL (ref 0.1–1)
BLOOD, URINE: NEGATIVE
BUN BLDV-MCNC: 17 MG/DL (ref 6–20)
CALCIUM SERPL-MCNC: 9.6 MG/DL (ref 8.6–10)
CHLORIDE BLD-SCNC: 95 MMOL/L (ref 98–111)
CLARITY: ABNORMAL
CO2: 34 MMOL/L (ref 22–29)
COLOR: YELLOW
CREAT SERPL-MCNC: 0.6 MG/DL (ref 0.5–0.9)
GFR NON-AFRICAN AMERICAN: >60
GLUCOSE BLD-MCNC: 105 MG/DL (ref 74–109)
GLUCOSE URINE: NEGATIVE MG/DL
HCT VFR BLD CALC: 41.1 % (ref 37–47)
HEMOGLOBIN: 13.6 G/DL (ref 12–16)
KETONES, URINE: NEGATIVE MG/DL
LEUKOCYTE ESTERASE, URINE: NEGATIVE
MCH RBC QN AUTO: 31.7 PG (ref 27–31)
MCHC RBC AUTO-ENTMCNC: 33.1 G/DL (ref 33–37)
MCV RBC AUTO: 95.8 FL (ref 81–99)
NITRITE, URINE: NEGATIVE
PDW BLD-RTO: 13.7 % (ref 11.5–14.5)
PH UA: 7.5
PLATELET # BLD: 299 K/UL (ref 130–400)
PMV BLD AUTO: 11.4 FL (ref 9.4–12.3)
POTASSIUM SERPL-SCNC: 3.1 MMOL/L (ref 3.5–5)
PRO-BNP: 24 PG/ML (ref 0–900)
PROTEIN UA: NEGATIVE MG/DL
RBC # BLD: 4.29 M/UL (ref 4.2–5.4)
SODIUM BLD-SCNC: 143 MMOL/L (ref 136–145)
SPECIFIC GRAVITY UA: 1.02
T4 FREE: 0.9 NG/ML (ref 0.9–1.7)
TOTAL PROTEIN: 6.6 G/DL (ref 6.6–8.7)
TSH SERPL DL<=0.05 MIU/L-ACNC: 4.73 UIU/ML (ref 0.27–4.2)
UROBILINOGEN, URINE: 1 E.U./DL
WBC # BLD: 9.5 K/UL (ref 4.8–10.8)

## 2017-08-22 PROCEDURE — 71020 XR CHEST STANDARD TWO VW: CPT

## 2017-08-22 PROCEDURE — 99214 OFFICE O/P EST MOD 30 MIN: CPT | Performed by: FAMILY MEDICINE

## 2017-08-22 RX ORDER — MELOXICAM 15 MG/1
15 TABLET ORAL DAILY
COMMUNITY
End: 2017-08-28 | Stop reason: ALTCHOICE

## 2017-08-22 RX ORDER — CHLORTHALIDONE 25 MG/1
25 TABLET ORAL 2 TIMES DAILY
Qty: 60 TABLET | Refills: 5 | Status: SHIPPED | OUTPATIENT
Start: 2017-08-22 | End: 2017-09-06 | Stop reason: ALTCHOICE

## 2017-08-22 ASSESSMENT — ENCOUNTER SYMPTOMS
NAUSEA: 0
VOMITING: 0
DIARRHEA: 0
CHEST TIGHTNESS: 0
CONSTIPATION: 0
SHORTNESS OF BREATH: 0
COUGH: 0
ABDOMINAL PAIN: 0
BLOOD IN STOOL: 0

## 2017-08-23 ENCOUNTER — TELEPHONE (OUTPATIENT)
Dept: FAMILY MEDICINE CLINIC | Age: 57
End: 2017-08-23

## 2017-08-24 LAB — KEPPRA: <2 UG/ML (ref 12–46)

## 2017-08-25 LAB — MISCELLANEOUS LAB TEST ORDER: ABNORMAL

## 2017-08-28 ENCOUNTER — OFFICE VISIT (OUTPATIENT)
Dept: FAMILY MEDICINE CLINIC | Age: 57
End: 2017-08-28
Payer: COMMERCIAL

## 2017-08-28 VITALS
SYSTOLIC BLOOD PRESSURE: 138 MMHG | HEART RATE: 67 BPM | DIASTOLIC BLOOD PRESSURE: 88 MMHG | OXYGEN SATURATION: 97 % | RESPIRATION RATE: 20 BRPM | TEMPERATURE: 98 F | BODY MASS INDEX: 40.82 KG/M2 | WEIGHT: 209 LBS

## 2017-08-28 DIAGNOSIS — E03.9 ACQUIRED HYPOTHYROIDISM: Primary | ICD-10-CM

## 2017-08-28 DIAGNOSIS — M79.89 SWELLING OF LOWER EXTREMITY: ICD-10-CM

## 2017-08-28 DIAGNOSIS — E87.6 HYPOKALEMIA: ICD-10-CM

## 2017-08-28 PROCEDURE — 99214 OFFICE O/P EST MOD 30 MIN: CPT | Performed by: NURSE PRACTITIONER

## 2017-08-28 RX ORDER — POTASSIUM CHLORIDE 1500 MG/1
TABLET, FILM COATED, EXTENDED RELEASE ORAL
Qty: 30 TABLET | Refills: 1 | Status: SHIPPED | OUTPATIENT
Start: 2017-08-28 | End: 2017-09-06 | Stop reason: SDUPTHER

## 2017-08-28 RX ORDER — LEVOTHYROXINE SODIUM 0.05 MG/1
50 TABLET ORAL
Qty: 30 TABLET | Refills: 3 | Status: SHIPPED | OUTPATIENT
Start: 2017-08-28 | End: 2017-09-13 | Stop reason: ALTCHOICE

## 2017-08-28 RX ORDER — BUMETANIDE 2 MG/1
TABLET ORAL
Qty: 30 TABLET | Refills: 1 | Status: SHIPPED | OUTPATIENT
Start: 2017-08-28 | End: 2017-09-13 | Stop reason: ALTCHOICE

## 2017-08-28 RX ORDER — DESONIDE 0.5 MG/G
CREAM TOPICAL
Qty: 15 G | Refills: 0 | Status: SHIPPED | OUTPATIENT
Start: 2017-08-28 | End: 2017-12-01 | Stop reason: ALTCHOICE

## 2017-08-30 ASSESSMENT — ENCOUNTER SYMPTOMS
TROUBLE SWALLOWING: 0
DIARRHEA: 0
WHEEZING: 0
CONSTIPATION: 0
SHORTNESS OF BREATH: 0

## 2017-09-01 ENCOUNTER — OFFICE VISIT (OUTPATIENT)
Dept: FAMILY MEDICINE CLINIC | Age: 57
End: 2017-09-01
Payer: COMMERCIAL

## 2017-09-01 VITALS
RESPIRATION RATE: 16 BRPM | BODY MASS INDEX: 38.86 KG/M2 | WEIGHT: 199 LBS | SYSTOLIC BLOOD PRESSURE: 126 MMHG | HEART RATE: 61 BPM | DIASTOLIC BLOOD PRESSURE: 82 MMHG | OXYGEN SATURATION: 94 % | TEMPERATURE: 98.7 F

## 2017-09-01 DIAGNOSIS — M79.89 SWELLING OF BOTH LOWER EXTREMITIES: Primary | ICD-10-CM

## 2017-09-01 DIAGNOSIS — E87.6 HYPOKALEMIA: ICD-10-CM

## 2017-09-01 LAB
CALCIUM IONIZED: 0.98 MMOL/L (ref 1.1–1.3)
CO2: 38 MEQ/L (ref 21–32)
GFR NON-AFRICAN AMERICAN: 46
GLUCOSE BLD-MCNC: 181 MG/DL (ref 70–99)
PERFORMED ON: ABNORMAL
POC ANION GAP: 17
POC BUN: 25 MG/DL (ref 7–18)
POC CHLORIDE: 80 MEQ/L (ref 99–110)
POC CREATININE: 1.2 MG/DL (ref 0.3–1.3)
POC POTASSIUM: 2.1 MEQ/L (ref 3.5–5.1)
POC SODIUM: 135 MEQ/L (ref 136–145)

## 2017-09-01 PROCEDURE — 99213 OFFICE O/P EST LOW 20 MIN: CPT | Performed by: NURSE PRACTITIONER

## 2017-09-02 ENCOUNTER — TELEPHONE (OUTPATIENT)
Dept: FAMILY MEDICINE CLINIC | Age: 57
End: 2017-09-02

## 2017-09-05 ENCOUNTER — TELEPHONE (OUTPATIENT)
Dept: FAMILY MEDICINE CLINIC | Age: 57
End: 2017-09-05

## 2017-09-05 DIAGNOSIS — E87.6 HYPOKALEMIA: Primary | ICD-10-CM

## 2017-09-06 ENCOUNTER — OFFICE VISIT (OUTPATIENT)
Dept: FAMILY MEDICINE CLINIC | Age: 57
End: 2017-09-06
Payer: COMMERCIAL

## 2017-09-06 VITALS
SYSTOLIC BLOOD PRESSURE: 118 MMHG | BODY MASS INDEX: 40.62 KG/M2 | TEMPERATURE: 97.9 F | OXYGEN SATURATION: 94 % | DIASTOLIC BLOOD PRESSURE: 64 MMHG | RESPIRATION RATE: 20 BRPM | WEIGHT: 208 LBS | HEART RATE: 74 BPM

## 2017-09-06 DIAGNOSIS — E87.6 HYPOKALEMIA: ICD-10-CM

## 2017-09-06 DIAGNOSIS — E87.6 HYPOKALEMIA: Primary | ICD-10-CM

## 2017-09-06 LAB
CALCIUM IONIZED: 1.09 MMOL/L (ref 1.1–1.3)
CO2: 36 MEQ/L (ref 21–32)
GFR NON-AFRICAN AMERICAN: >60
GLUCOSE BLD-MCNC: 140 MG/DL (ref 70–99)
PERFORMED ON: ABNORMAL
POC ANION GAP: 14
POC BUN: 13 MG/DL (ref 7–18)
POC CHLORIDE: 90 MEQ/L (ref 99–110)
POC CREATININE: 0.9 MG/DL (ref 0.3–1.3)
POC POTASSIUM: 3.1 MEQ/L (ref 3.5–5.1)
POC SODIUM: 140 MEQ/L (ref 136–145)

## 2017-09-06 PROCEDURE — 99213 OFFICE O/P EST LOW 20 MIN: CPT | Performed by: NURSE PRACTITIONER

## 2017-09-06 RX ORDER — POTASSIUM CHLORIDE 1500 MG/1
TABLET, FILM COATED, EXTENDED RELEASE ORAL
Qty: 120 TABLET | Refills: 1 | Status: SHIPPED | OUTPATIENT
Start: 2017-09-06 | End: 2017-09-13 | Stop reason: ALTCHOICE

## 2017-09-07 DIAGNOSIS — E87.6 HYPOKALEMIA: ICD-10-CM

## 2017-09-07 PROCEDURE — 80047 BASIC METABLC PNL IONIZED CA: CPT | Performed by: NURSE PRACTITIONER

## 2017-09-11 DIAGNOSIS — E87.6 HYPOKALEMIA: Primary | ICD-10-CM

## 2017-09-11 RX ORDER — MIRABEGRON 25 MG/1
TABLET, FILM COATED, EXTENDED RELEASE ORAL
Qty: 90 TABLET | Refills: 0 | Status: SHIPPED | OUTPATIENT
Start: 2017-09-11 | End: 2017-12-01 | Stop reason: DRUGHIGH

## 2017-09-13 ENCOUNTER — OFFICE VISIT (OUTPATIENT)
Dept: FAMILY MEDICINE CLINIC | Age: 57
End: 2017-09-13
Payer: COMMERCIAL

## 2017-09-13 VITALS
BODY MASS INDEX: 41.01 KG/M2 | OXYGEN SATURATION: 95 % | TEMPERATURE: 98.5 F | SYSTOLIC BLOOD PRESSURE: 118 MMHG | DIASTOLIC BLOOD PRESSURE: 60 MMHG | HEART RATE: 82 BPM | WEIGHT: 210 LBS

## 2017-09-13 DIAGNOSIS — I10 ESSENTIAL HYPERTENSION: ICD-10-CM

## 2017-09-13 DIAGNOSIS — M54.50 CHRONIC MIDLINE LOW BACK PAIN WITHOUT SCIATICA: ICD-10-CM

## 2017-09-13 DIAGNOSIS — G89.29 CHRONIC MIDLINE LOW BACK PAIN WITHOUT SCIATICA: ICD-10-CM

## 2017-09-13 DIAGNOSIS — F32.A DEPRESSION, UNSPECIFIED DEPRESSION TYPE: ICD-10-CM

## 2017-09-13 DIAGNOSIS — F31.60 BIPOLAR 1 DISORDER, MIXED (HCC): ICD-10-CM

## 2017-09-13 DIAGNOSIS — G25.81 RLS (RESTLESS LEGS SYNDROME): ICD-10-CM

## 2017-09-13 DIAGNOSIS — E87.6 HYPOKALEMIA: ICD-10-CM

## 2017-09-13 DIAGNOSIS — R60.1 GENERALIZED EDEMA: ICD-10-CM

## 2017-09-13 PROCEDURE — 99214 OFFICE O/P EST MOD 30 MIN: CPT | Performed by: FAMILY MEDICINE

## 2017-09-13 RX ORDER — POTASSIUM CHLORIDE 20 MEQ/1
40 TABLET, EXTENDED RELEASE ORAL 3 TIMES DAILY
Qty: 180 TABLET | Refills: 5 | Status: SHIPPED | OUTPATIENT
Start: 2017-09-13 | End: 2017-09-20 | Stop reason: ALTCHOICE

## 2017-09-13 RX ORDER — BUMETANIDE 2 MG/1
2 TABLET ORAL 2 TIMES DAILY
Qty: 60 TABLET | Refills: 5 | Status: SHIPPED | OUTPATIENT
Start: 2017-09-13 | End: 2017-10-04 | Stop reason: DRUGHIGH

## 2017-09-13 RX ORDER — SPIRONOLACTONE 50 MG/1
50 TABLET, FILM COATED ORAL 2 TIMES DAILY
Qty: 60 TABLET | Refills: 5 | Status: SHIPPED | OUTPATIENT
Start: 2017-09-13 | End: 2018-03-19 | Stop reason: SDUPTHER

## 2017-09-13 RX ORDER — LEVOTHYROXINE SODIUM 0.1 MG/1
100 TABLET ORAL DAILY
Qty: 30 TABLET | Refills: 5 | Status: SHIPPED
Start: 2017-09-13 | End: 2020-05-21 | Stop reason: DRUGHIGH

## 2017-09-13 RX ORDER — ATORVASTATIN CALCIUM 40 MG/1
40 TABLET, FILM COATED ORAL DAILY
Qty: 30 TABLET | Refills: 5 | Status: SHIPPED | OUTPATIENT
Start: 2017-09-13 | End: 2018-02-10 | Stop reason: SDUPTHER

## 2017-09-13 ASSESSMENT — ENCOUNTER SYMPTOMS
EYES NEGATIVE: 1
NAUSEA: 0
CONSTIPATION: 0
VOMITING: 0
CHEST TIGHTNESS: 0
WHEEZING: 0
SHORTNESS OF BREATH: 0
ALLERGIC/IMMUNOLOGIC NEGATIVE: 1
BLOOD IN STOOL: 0
DIARRHEA: 0

## 2017-09-14 LAB
C-REACTIVE PROTEIN: 0.6 MG/DL (ref 0–0.5)
TSH SERPL DL<=0.05 MIU/L-ACNC: 4.56 UIU/ML (ref 0.27–4.2)

## 2017-09-20 ENCOUNTER — OFFICE VISIT (OUTPATIENT)
Dept: FAMILY MEDICINE CLINIC | Age: 57
End: 2017-09-20
Payer: COMMERCIAL

## 2017-09-20 ENCOUNTER — OFFICE VISIT (OUTPATIENT)
Dept: OTOLARYNGOLOGY | Facility: CLINIC | Age: 57
End: 2017-09-20

## 2017-09-20 ENCOUNTER — TELEPHONE (OUTPATIENT)
Dept: FAMILY MEDICINE CLINIC | Age: 57
End: 2017-09-20

## 2017-09-20 VITALS
DIASTOLIC BLOOD PRESSURE: 70 MMHG | SYSTOLIC BLOOD PRESSURE: 122 MMHG | OXYGEN SATURATION: 93 % | BODY MASS INDEX: 40.08 KG/M2 | TEMPERATURE: 97.9 F | WEIGHT: 205.2 LBS | HEART RATE: 83 BPM

## 2017-09-20 VITALS
TEMPERATURE: 96.6 F | BODY MASS INDEX: 40.17 KG/M2 | WEIGHT: 204.6 LBS | DIASTOLIC BLOOD PRESSURE: 88 MMHG | HEIGHT: 60 IN | SYSTOLIC BLOOD PRESSURE: 132 MMHG

## 2017-09-20 DIAGNOSIS — E87.6 HYPOKALEMIA: ICD-10-CM

## 2017-09-20 DIAGNOSIS — E03.9 HYPOTHYROIDISM, UNSPECIFIED TYPE: Primary | ICD-10-CM

## 2017-09-20 DIAGNOSIS — J30.9 ALLERGIC RHINITIS, UNSPECIFIED ALLERGIC RHINITIS TRIGGER, UNSPECIFIED RHINITIS SEASONALITY: ICD-10-CM

## 2017-09-20 DIAGNOSIS — I10 ESSENTIAL HYPERTENSION: ICD-10-CM

## 2017-09-20 DIAGNOSIS — R60.1 GENERALIZED EDEMA: ICD-10-CM

## 2017-09-20 DIAGNOSIS — J34.89 NASAL VALVE STENOSIS: ICD-10-CM

## 2017-09-20 DIAGNOSIS — M72.2 PLANTAR FASCIITIS: ICD-10-CM

## 2017-09-20 DIAGNOSIS — R60.9 PITTING EDEMA: ICD-10-CM

## 2017-09-20 DIAGNOSIS — J34.2 NASAL SEPTAL DEVIATION: ICD-10-CM

## 2017-09-20 DIAGNOSIS — F31.60 BIPOLAR 1 DISORDER, MIXED (HCC): ICD-10-CM

## 2017-09-20 LAB
ANION GAP SERPL CALCULATED.3IONS-SCNC: 16 MMOL/L (ref 7–19)
BUN BLDV-MCNC: 18 MG/DL (ref 6–20)
CALCIUM SERPL-MCNC: 9.8 MG/DL (ref 8.6–10)
CHLORIDE BLD-SCNC: 93 MMOL/L (ref 98–111)
CO2: 31 MMOL/L (ref 22–29)
CREAT SERPL-MCNC: 0.9 MG/DL (ref 0.5–0.9)
GFR NON-AFRICAN AMERICAN: >60
GLUCOSE BLD-MCNC: 104 MG/DL (ref 74–109)
POTASSIUM SERPL-SCNC: 4.7 MMOL/L (ref 3.5–5)
SODIUM BLD-SCNC: 140 MMOL/L (ref 136–145)

## 2017-09-20 PROCEDURE — 99214 OFFICE O/P EST MOD 30 MIN: CPT | Performed by: FAMILY MEDICINE

## 2017-09-20 PROCEDURE — 99024 POSTOP FOLLOW-UP VISIT: CPT | Performed by: OTOLARYNGOLOGY

## 2017-09-20 RX ORDER — DESONIDE 0.5 MG/G
CREAM TOPICAL
Status: ON HOLD | COMMUNITY
Start: 2017-08-28 | End: 2019-04-16

## 2017-09-20 RX ORDER — POTASSIUM CHLORIDE 20 MEQ/1
20 TABLET, EXTENDED RELEASE ORAL DAILY
COMMUNITY
Start: 2017-09-13 | End: 2019-10-14

## 2017-09-20 RX ORDER — POTASSIUM CHLORIDE 20 MEQ/1
20 TABLET, EXTENDED RELEASE ORAL DAILY
Qty: 30 TABLET | Refills: 5 | Status: SHIPPED | OUTPATIENT
Start: 2017-09-20 | End: 2018-05-30 | Stop reason: SDUPTHER

## 2017-09-20 RX ORDER — BUMETANIDE 2 MG/1
3 TABLET ORAL 2 TIMES DAILY
Qty: 90 TABLET | Refills: 5 | Status: SHIPPED | OUTPATIENT
Start: 2017-09-20 | End: 2017-12-18 | Stop reason: DRUGHIGH

## 2017-09-20 RX ORDER — BUMETANIDE 2 MG/1
TABLET ORAL DAILY
COMMUNITY
Start: 2017-09-13 | End: 2019-07-02

## 2017-09-20 RX ORDER — SPIRONOLACTONE 50 MG/1
25 TABLET, FILM COATED ORAL DAILY
COMMUNITY
Start: 2017-09-13 | End: 2019-07-02

## 2017-09-20 RX ORDER — LEVOTHYROXINE SODIUM 0.1 MG/1
TABLET ORAL
COMMUNITY
Start: 2017-09-13 | End: 2019-04-09

## 2017-09-20 RX ORDER — ATORVASTATIN CALCIUM 40 MG/1
40 TABLET, FILM COATED ORAL NIGHTLY
COMMUNITY
Start: 2017-09-13

## 2017-09-20 ASSESSMENT — ENCOUNTER SYMPTOMS
VOMITING: 0
SHORTNESS OF BREATH: 0
CHEST TIGHTNESS: 0
DIARRHEA: 0
BLOOD IN STOOL: 0
NAUSEA: 0
WHEEZING: 0
CONSTIPATION: 0
EYES NEGATIVE: 1
ALLERGIC/IMMUNOLOGIC NEGATIVE: 1

## 2017-09-20 NOTE — PROGRESS NOTES
Robyn Minaya presents for a routine postoperative visit following repair of nasal vestibular stenosis with  grafting, lateral crural batten grafts, and non-anatomic alar batten grafts, septoplasty, conchal cartilage graft from the left ear to nose, and bilateral ITR on 8/01/07.     Subjective: Since surgery, she is doing fairly well, but the following is reported: tenderness to her nose while wearing glasses.  Symptoms denied postoperatively include fever, chills, bleeding and drainage. The patient states the pain has decreased since surgery.     Potassium has been down to 2.1.  Required IV infusion - Dr. Wells looking into this.  3.2 on discharge.  Skin is also very shiny.  Lower extremity edema.  K was 3.4 on 7/8/2017.  Set up to see Dr. Kemp in Nephrology.  On Bumex (new, but K was low prior).  On Aldactone for 1 week.  Now on 40mg TID of K.  Thyroid is being treated with synthroid to help with Bipolar.    Objective:     Physical Exam:  Septum is straight without hematoma or perforation. Left ear- incision healing well. Good airflow.      Assessment:  Robyn was seen today for follow-up.    Diagnoses and all orders for this visit:    Hypothyroidism, unspecified type    Hypokalemia    Nasal valve stenosis    Nasal septal deviation    Allergic rhinitis, unspecified allergic rhinitis trigger, unspecified rhinitis seasonality      Plan:  Potassium being worked up by Conner Wells and Alec Ludwig.  Protect from trauma. I expect tenderness to improve over the next 3 months.    Return in about 6 months (around 3/20/2018).      Mark Anthony Anderson MD  09/20/17  9:54 AM

## 2017-10-04 ENCOUNTER — OFFICE VISIT (OUTPATIENT)
Dept: FAMILY MEDICINE CLINIC | Age: 57
End: 2017-10-04
Payer: COMMERCIAL

## 2017-10-04 VITALS
BODY MASS INDEX: 39.85 KG/M2 | RESPIRATION RATE: 18 BRPM | SYSTOLIC BLOOD PRESSURE: 128 MMHG | DIASTOLIC BLOOD PRESSURE: 68 MMHG | TEMPERATURE: 98.6 F | WEIGHT: 203 LBS | HEIGHT: 60 IN | HEART RATE: 91 BPM | OXYGEN SATURATION: 98 %

## 2017-10-04 DIAGNOSIS — E03.9 ACQUIRED HYPOTHYROIDISM: ICD-10-CM

## 2017-10-04 DIAGNOSIS — I10 ESSENTIAL HYPERTENSION: ICD-10-CM

## 2017-10-04 DIAGNOSIS — F31.60 BIPOLAR 1 DISORDER, MIXED (HCC): ICD-10-CM

## 2017-10-04 DIAGNOSIS — E87.6 HYPOKALEMIA: ICD-10-CM

## 2017-10-04 LAB
CO2: 35 MEQ/L (ref 21–32)
GFR NON-AFRICAN AMERICAN: 51
GLUCOSE BLD-MCNC: 98 MG/DL (ref 70–99)
HEMOGLOBIN: 15 GM/DL (ref 12–18)
PERFORMED ON: ABNORMAL
POC ANION GAP: 14
POC BUN: 23 MG/DL (ref 7–18)
POC CHLORIDE: 89 MEQ/L (ref 99–110)
POC CREATININE: 1.1 MG/DL (ref 0.3–1.3)
POC HEMATOCRIT: 44 % (ref 37–52)
POC POTASSIUM: 3.5 MEQ/L (ref 3.5–5.1)
POC SODIUM: 138 MEQ/L (ref 136–145)

## 2017-10-04 PROCEDURE — 99214 OFFICE O/P EST MOD 30 MIN: CPT | Performed by: FAMILY MEDICINE

## 2017-10-04 ASSESSMENT — ENCOUNTER SYMPTOMS
EYES NEGATIVE: 1
CONSTIPATION: 0
BLOOD IN STOOL: 0
WHEEZING: 0
ALLERGIC/IMMUNOLOGIC NEGATIVE: 1
CHEST TIGHTNESS: 0
DIARRHEA: 0
VOMITING: 0
NAUSEA: 0
SHORTNESS OF BREATH: 0

## 2017-10-04 NOTE — MR AVS SNAPSHOT
After Visit Summary             Loyda Clifton   10/4/2017 10:15 AM   Office Visit    Description:  Female : 1960   Provider:  Fer Rader MD   Department:  [address] and Future Appointments         Below is a list of your follow-up and future appointments. This may not be a complete list as you may have made appointments directly with providers that we are not aware of or your providers may have made some for you. Please call your providers to confirm appointments. It is important to keep your appointments. Please bring your current insurance card, photo ID, co-pay, and all medication bottles to your appointment. If self-pay, payment is expected at the time of service. Your To-Do List     Future Appointments Provider Department Dept Phone    10/17/2017 10:30 AM Fer Rader MD [facility] [phone]    Please arrive 15 minutes prior to appointment, bring photo ID and insurance card. Future Orders Complete By Expires    POCT Chem Basic w ICA [RBT889 Custom]  10/18/2017 10/4/2018    Scheduling Instructions:    STAT         Information from Your Visit        Department     Name Address Phone Fax    [address] [phone]      You Were Seen for:         Comments    Bipolar 1 disorder, mixed [facility])   [[id number]]         Vital Signs     Blood Pressure Pulse Temperature Respirations Height Weight    128/68 (Site: Right Arm, Position: Sitting, Cuff Size: Medium Adult) 91 98.6 °F (37 °C) (Oral) 18 5' (1.524 m) 203 lb (92.1 kg)    Oxygen Saturation Body Mass Index Smoking Status             98% 39.65 kg/m2 Current Every Day Smoker         Additional Information about your Body Mass Index (BMI)           Your BMI as listed above is considered obese (30 or more). BMI is an estimate of body fat, calculated from your height and weight.   The higher aged 48 - 69, and every year for high risk patients per updated national guidelines. However these guidelines can be individualized by your provider. 9/1/2018    Colonoscopy 10/25/2019    Cholesterol Screening 3/29/2022            MyChart Signup           Our records indicate that you have an active F[employer] account. You can view your After Visit Summary by going to [email or web address] and logging in with your LeBUZZ username and password. If you don't have a LeBUZZ username and password but a parent or guardian has access to your record, the parent or guardian should login with their own Frenzoot username and password and access your record to view the After Visit Summary. Additional Information  If you have questions, please contact the physician practice where you receive care. Remember, LeBUZZ is NOT to be used for urgent needs. For medical emergencies, dial 911. For questions regarding your Frenzoot account call [phone]. If you have a clinical question, please call your doctor's office.

## 2017-10-04 NOTE — PROGRESS NOTES
Subjective:      Patient ID: Elisa Coyne is a 65 y.o. female. Chief Complaint   Patient presents with    2 Week Follow-Up     on low Potassium.  Leg Swelling     and feet x weeks. HPI  The patient is seen here today in follow-up of problem in the past with hypokalemia. She also has had significant issues with the pedal edema as well. She is seen by the undersigned regularly for management of several chronic disease states. She does have essential hypertension as well as the hypokalemia and pitting edema. Plantar fasciitis in the past has been accentuated by the peripheral edema. He states that the swelling is better and that we did obtain fasting lab BMP on her today and noted that her potassium is 3.5 which is good for her. Normal range is 3.5-5. Presently, her medication regimen includes Klor-Con 20 mEq 1 by mouth daily, Bumex 2 mg and takes one and a half pills by mouth twice a day, Aldactone 50 which is taken twice a day. She also is on Coreg 6.25 taken twice a day. Her blood pressure today was good at 128/68. This is very close to what her blood pressure usually is in the past as oftentimes it is running of just above 110 up to 120 usually. Diastolic is quite similar to usual. Patient states that she is feeling better and that the swelling is reduced and improved some. She has noted here in the office to still have at least +1 pitting pedal edema bilaterally and does have some obvious tenderness the persists in the inter aspect of her feet bilaterally. Likely as not as discussed previously, this is because of the peripheral edema causing increased tension over the area affected by plantar fasciitis. Aside from the current above-mentioned medications which are being utilized by the patient, other medications are currently remaining the same.  What I have directed the patient to do is to continue the current regimen as she is using presently, and then I would like to receive her back in 2 weeks' time and obtain a BMP stat prior to my note the patient on that date. I then will make further adjustments as is deemed appropriate. The patient's bipolar status reportedly remains stable currently. Angelica Cummings does have a history of acquired hypothyroidism. Presently she is maintained on levothyroxine at 100 µg daily and tolerates this well. Review of Systems   Constitutional: Negative. HENT: Negative. Eyes: Negative. Respiratory: Negative for chest tightness, shortness of breath and wheezing. Cardiovascular: Negative for chest pain, palpitations and leg swelling. Gastrointestinal: Negative for blood in stool, constipation, diarrhea, nausea and vomiting. Endocrine:        EDEMA   Genitourinary: Negative. Negative for hematuria. Musculoskeletal: Negative. Skin: Negative. Allergic/Immunologic: Negative. Neurological: Negative. Hematological: Negative. Psychiatric/Behavioral: Negative. Objective:   Physical Exam   Constitutional: She is oriented to person, place, and time. Vital signs are normal. She appears well-developed. She is active. Weight remains somewhat in excess of ideal.   HENT:   Head: Normocephalic and atraumatic. Right Ear: External ear normal.   Left Ear: External ear normal.   Nose: Nose normal.   Mouth/Throat: Oropharynx is clear and moist.   Eyes: Conjunctivae and EOM are normal. Pupils are equal, round, and reactive to light. Neck: Normal range of motion and full passive range of motion without pain. Neck supple. Cardiovascular: Normal rate, regular rhythm, S1 normal, S2 normal, normal heart sounds, intact distal pulses and normal pulses. The patient does have a +1 to +2 pitting pedal edema which is somewhat improved relative to prior visit on 9/20/17 she still does have some discomfort on applying weight 2 feet as she tries to walk because of the heading plantar fasciitis that is now impacted by the pedal edema as well.    Pulmonary/Chest: Effort normal and breath sounds normal.   Abdominal: Soft. Normal appearance. Musculoskeletal: Normal range of motion. Neurological: She is alert and oriented to person, place, and time. She has normal strength and normal reflexes. Skin: Skin is warm, dry and intact. Psychiatric: She has a normal mood and affect. Her speech is normal and behavior is normal. Judgment and thought content normal. Cognition and memory are normal.   Nursing note and vitals reviewed. Assessment:       1. Essential hypertension  POCT Chem Basic w ICA   2. Hypokalemia  POCT Chem Basic w ICA    CANCELED: Basic Metabolic Panel   3. Bipolar 1 disorder, mixed ([facility] [address]     4. Acquired hypothyroidism               Plan:      I am having Ms. Reynolds Dose maintain her :   Outpatient Prescriptions Marked as Taking for the 10/4/17 encounter (Office Visit) with Kaylene Wilder MD   Medication Sig Dispense Refill    bumetanide (BUMEX) 2 MG tablet Take 1.5 tablets by mouth 2 times daily 90 tablet 5    potassium chloride (KLOR-CON M) 20 MEQ extended release tablet Take 1 tablet by mouth daily 30 tablet 5    levothyroxine (SYNTHROID) 100 MCG tablet Take 1 tablet by mouth Daily 30 tablet 5    atorvastatin (LIPITOR) 40 MG tablet Take 1 tablet by mouth daily 30 tablet 5    spironolactone (ALDACTONE) 50 MG tablet Take 1 tablet by mouth 2 times daily 60 tablet 5    MYRBETRIQ 25 MG TB24 Take 1 tablet by mouth  daily for bladder 90 tablet 0    desonide (DESOWEN) 0.05 % cream Apply topically 2 times daily.  15 g 0    allopurinol (ZYLOPRIM) 100 MG tablet TAKE ONE TABLET BY MOUTH DAILY 30 tablet 0    carvedilol (COREG) 6.25 MG tablet Take 1 tablet by mouth 2 times daily (with meals) 180 tablet 1    QUEtiapine (SEROQUEL) 25 MG tablet Take 25 mg by mouth nightly      gabapentin (NEURONTIN) 600 MG tablet Take 600 mg by mouth 3 times daily      Tens Unit MISC by Does not apply route Apply and use tid prn  Dx -lumbar pain  Dispense tens and supplies 1

## 2017-10-06 DIAGNOSIS — E79.0 ELEVATED URIC ACID IN BLOOD: ICD-10-CM

## 2017-10-06 RX ORDER — ALLOPURINOL 100 MG/1
TABLET ORAL
Qty: 30 TABLET | Refills: 0 | Status: SHIPPED | OUTPATIENT
Start: 2017-10-06 | End: 2017-11-09 | Stop reason: SDUPTHER

## 2017-10-17 ENCOUNTER — OFFICE VISIT (OUTPATIENT)
Dept: FAMILY MEDICINE CLINIC | Age: 57
End: 2017-10-17
Payer: COMMERCIAL

## 2017-10-17 VITALS
HEART RATE: 77 BPM | WEIGHT: 202.13 LBS | HEIGHT: 60 IN | BODY MASS INDEX: 39.68 KG/M2 | SYSTOLIC BLOOD PRESSURE: 108 MMHG | RESPIRATION RATE: 18 BRPM | TEMPERATURE: 97.8 F | OXYGEN SATURATION: 94 % | DIASTOLIC BLOOD PRESSURE: 64 MMHG

## 2017-10-17 DIAGNOSIS — I10 ESSENTIAL HYPERTENSION: ICD-10-CM

## 2017-10-17 DIAGNOSIS — L84 CLAVUS: ICD-10-CM

## 2017-10-17 DIAGNOSIS — M54.5 MIDLINE LOW BACK PAIN, UNSPECIFIED CHRONICITY, WITH SCIATICA PRESENCE UNSPECIFIED: ICD-10-CM

## 2017-10-17 DIAGNOSIS — F31.60 BIPOLAR 1 DISORDER, MIXED (HCC): ICD-10-CM

## 2017-10-17 DIAGNOSIS — B07.8 OTHER VIRAL WARTS: ICD-10-CM

## 2017-10-17 LAB
CALCIUM IONIZED: 1.07 MMOL/L (ref 1.1–1.3)
CO2: 33 MEQ/L (ref 21–32)
GFR NON-AFRICAN AMERICAN: 51
GLUCOSE BLD-MCNC: 122 MG/DL (ref 70–99)
PERFORMED ON: ABNORMAL
POC ANION GAP: 18
POC BUN: 22 MG/DL (ref 7–18)
POC CHLORIDE: 87 MEQ/L (ref 99–110)
POC CREATININE: 1.1 MG/DL (ref 0.3–1.3)
POC POTASSIUM: 3.9 MEQ/L (ref 3.5–5.1)
POC SODIUM: 138 MEQ/L (ref 136–145)

## 2017-10-17 PROCEDURE — 99214 OFFICE O/P EST MOD 30 MIN: CPT | Performed by: FAMILY MEDICINE

## 2017-10-17 RX ORDER — CHLORAL HYDRATE 500 MG
1000 CAPSULE ORAL DAILY
COMMUNITY
End: 2018-02-14

## 2017-10-17 RX ORDER — HYDROCODONE BITARTRATE AND ACETAMINOPHEN 7.5; 325 MG/1; MG/1
1 TABLET ORAL EVERY 6 HOURS PRN
COMMUNITY
End: 2018-05-03 | Stop reason: ALTCHOICE

## 2017-10-17 RX ORDER — TORSEMIDE 20 MG/1
20 TABLET ORAL DAILY
Qty: 30 TABLET | Refills: 3 | Status: SHIPPED | OUTPATIENT
Start: 2017-10-17 | End: 2017-12-01 | Stop reason: ALTCHOICE

## 2017-10-17 RX ORDER — AMOXICILLIN AND CLAVULANATE POTASSIUM 875; 125 MG/1; MG/1
1 TABLET, FILM COATED ORAL 2 TIMES DAILY
COMMUNITY
End: 2017-12-01 | Stop reason: ALTCHOICE

## 2017-10-17 ASSESSMENT — ENCOUNTER SYMPTOMS
CHEST TIGHTNESS: 0
VOMITING: 0
WHEEZING: 0
SHORTNESS OF BREATH: 0
BLOOD IN STOOL: 0
COUGH: 0
DIARRHEA: 0
ALLERGIC/IMMUNOLOGIC NEGATIVE: 1
EYES NEGATIVE: 1
CONSTIPATION: 0
NAUSEA: 0

## 2017-10-17 NOTE — PROGRESS NOTES
Subjective:      Patient ID: Julius Kaur is a 65 y.o. female. Chief Complaint   Patient presents with    Edema     Following up on the leg and ankle swelling.  Callouses     elbow bilaterally         HPI   This patient is seen here intermittently by the undersigned regarding management of several chronic disease states. Presently she is being followed for her pedal edema. She also has had some electrolyte abnormalities which had been followed very closely as well. The edema is slightly better but she already is on Bumex 2 mg and takes one half tablets by mouth twice a day. I therefore am going to add Demadex 20 and have her use one half of a pill each morning. Her weight is down about 1 pound over the past 2 weeks. She is also on Klor-Con 20 mEq by mouth daily. Electrolytes today shows potassium has improved to 3.9 whereas sodium is 138. Creatinine is good at 1.1 with BUN up to 2. Sugar was slightly elevated at 1-2 but this is a nonfasting specimen. She is also maintained on Aldactone 50 and takes one by mouth twice a day presently. I'm not going to change the regimen of medication other than at the [facility]. She will be checked again regarding this in about 2 weeks' time at which time we will need to recheck the electrolytes as well since we are adding additional diuretic load. She will monitor her blood pressure as her pressure today was somewhat low at 108/64. She is on other blood pressure medication as well including Coreg 6.25 which is taken twice a day. She does have a lesion on her left elbow in an area that is quite callused. She appears to have a warty area. I'm going to send her to dermatology to evaluate this situation and take care of removing it for her. We will try to get her an appointment with Dr. Vicky Alvarez. Her bipolar issues, anxiety, depression, as well as her chronic low back pain issues remain stable currently on the current medication regimens as previously noted.   Review of Systems   Constitutional: Negative. HENT: Negative. Eyes: Negative. Respiratory: Negative for cough, chest tightness, shortness of breath and wheezing. Cardiovascular: Positive for leg swelling. Negative for chest pain and palpitations. Gastrointestinal: Negative for blood in stool, constipation, diarrhea, nausea and vomiting. Endocrine: Negative. Genitourinary: Negative. Negative for hematuria. Musculoskeletal: Negative. Skin:        Callus on both elbows   Allergic/Immunologic: Negative. Neurological: Negative. Hematological: Negative. Psychiatric/Behavioral: Negative. Objective:   Physical Exam   Constitutional: She is oriented to person, place, and time. Vital signs are normal. She appears well-developed. She is active. Weight remains in excess of ideal.   HENT:   Head: Normocephalic and atraumatic. Right Ear: External ear normal.   Left Ear: External ear normal.   Nose: Nose normal.   Mouth/Throat: Oropharynx is clear and moist.   Eyes: Conjunctivae and EOM are normal. Pupils are equal, round, and reactive to light. Neck: Normal range of motion and full passive range of motion without pain. Neck supple. Cardiovascular: Normal rate, regular rhythm, S1 normal, S2 normal, normal heart sounds, intact distal pulses and normal pulses. Patient is noted to have improvement in her pedal edema. She has approximate 1+ pitting pedal edema in lower extremities bilaterally. There is no evidence of any weeping and Homans sign is negative currently. Pulmonary/Chest: Effort normal and breath sounds normal.   Abdominal: Soft. Normal appearance. Musculoskeletal: Normal range of motion. Neurological: She is alert and oriented to person, place, and time. She has normal strength and normal reflexes. Skin: Skin is warm, dry and intact. Psychiatric: She has a normal mood and affect.  Her speech is normal and behavior is normal. Judgment and thought content normal. Cognition

## 2017-11-09 DIAGNOSIS — E79.0 ELEVATED URIC ACID IN BLOOD: ICD-10-CM

## 2017-11-09 RX ORDER — ALLOPURINOL 100 MG/1
100 TABLET ORAL DAILY
Qty: 30 TABLET | Refills: 2 | Status: SHIPPED | OUTPATIENT
Start: 2017-11-09 | End: 2018-01-16 | Stop reason: SDUPTHER

## 2017-11-16 ENCOUNTER — OFFICE VISIT (OUTPATIENT)
Dept: FAMILY MEDICINE CLINIC | Age: 57
End: 2017-11-16
Payer: COMMERCIAL

## 2017-11-16 VITALS
SYSTOLIC BLOOD PRESSURE: 124 MMHG | OXYGEN SATURATION: 96 % | RESPIRATION RATE: 20 BRPM | HEART RATE: 93 BPM | BODY MASS INDEX: 40.49 KG/M2 | HEIGHT: 60 IN | DIASTOLIC BLOOD PRESSURE: 80 MMHG | TEMPERATURE: 98.6 F | WEIGHT: 206.25 LBS

## 2017-11-16 DIAGNOSIS — I10 ESSENTIAL HYPERTENSION: ICD-10-CM

## 2017-11-16 DIAGNOSIS — R60.9 PITTING EDEMA: ICD-10-CM

## 2017-11-16 DIAGNOSIS — E87.6 HYPOKALEMIA: ICD-10-CM

## 2017-11-16 LAB
CALCIUM IONIZED: 1.11 MMOL/L (ref 1.1–1.3)
CO2: 36 MEQ/L (ref 21–32)
GFR NON-AFRICAN AMERICAN: >60
GLUCOSE BLD-MCNC: 193 MG/DL (ref 70–99)
PERFORMED ON: ABNORMAL
POC ANION GAP: 10
POC BUN: 16 MG/DL (ref 7–18)
POC CHLORIDE: 90 MEQ/L (ref 99–110)
POC CREATININE: 0.9 MG/DL (ref 0.3–1.3)
POC POTASSIUM: 3.6 MEQ/L (ref 3.5–5.1)
POC SODIUM: 136 MEQ/L (ref 136–145)

## 2017-11-16 PROCEDURE — 99214 OFFICE O/P EST MOD 30 MIN: CPT | Performed by: FAMILY MEDICINE

## 2017-11-16 ASSESSMENT — ENCOUNTER SYMPTOMS
NAUSEA: 0
SHORTNESS OF BREATH: 0
DIARRHEA: 0
BLOOD IN STOOL: 0
CONSTIPATION: 0
COUGH: 0
WHEEZING: 0
CHEST TIGHTNESS: 0
VOMITING: 0
ALLERGIC/IMMUNOLOGIC NEGATIVE: 1

## 2017-11-16 NOTE — PROGRESS NOTES
Subjective:      Patient ID: Roman Barboza is a 65 y.o. female. Chief Complaint   Patient presents with    Foot Swelling      bilateral leg swelling, patient states they are still alittle swollen, but not as much as they have been. HPI -  This patient is seen once again here today regarding follow-up of her chronic edema condition. She has edema involving the lower extremities bilaterally. This has been ongoing for some time and she has been put on some medications over various visits. Currently she is taking Demadex 20 and takes this once daily, Bumex 2 mg and takes 1.5 tablets by mouth twice a day, Aldactone 50 mg which she takes twice a day and she takes potassium replacement with Klor-Con 20 mEq taken it once daily. She has had trouble with muscle cramps and has had potassium issues as well. For this reason she has been placed on potassium. 2 months ago the potassium was noted to be 3.1 but follow-up later was 3.6. This was after receiving some mild high dose potassium replacement. One month later it was 3.5 in 1 month ago it was 3.9. The most recent lab done on 11/16/17 was 3.6, that being done today. Because of this is decreasing from 3.91 month ago to 3.6 currently, and increasing her Klor-Con dosing pattern to one by mouth twice a day. We will check her back in one month and perhaps recheck labs at that time as well. The patient does have essential hypertension. Her medications are helpful with control of her blood pressure. Pressure today was good at 124/80. No changes will be made as far as her antihypertensive regimen. Most of her antihypertensive regimen does involve her utilization of the diuretics. She also is maintained on Coreg at 6.25 mg by mouth twice a day as well. The other medications at the patient chronically utilizes were reviewed with her today as well. These remain unchanged presently.  So we are not going to change anything else only add the additional potassium, encourage her to avoid sodium intake and to drink plenty of water. The patient stated that she was on a trip recently and probably eating foods that were not typical for her and likely as not would have contributed to promoting swelling as well. She stated that she will redouble her efforts to try to be mindful of the proper diet and water intake. Review of Systems   Constitutional: Negative. HENT: Negative. Respiratory: Negative for cough, chest tightness, shortness of breath and wheezing. Cardiovascular: Positive for leg swelling (bilateral, improved. ). Negative for chest pain and palpitations. Gastrointestinal: Negative for blood in stool, constipation, diarrhea, nausea and vomiting. Endocrine: Negative. Genitourinary: Negative. Negative for hematuria. Musculoskeletal: Negative. Skin: Negative. Allergic/Immunologic: Negative. Neurological: Positive for headaches. Hematological: Negative. Psychiatric/Behavioral: Negative. Objective:   Physical Exam   Constitutional: She is oriented to person, place, and time. Vital signs are normal. She appears well-developed. She is active. Weight remains in excess of ideal.   HENT:   Head: Normocephalic and atraumatic. Right Ear: External ear normal.   Left Ear: External ear normal.   Nose: Nose normal.   Mouth/Throat: Oropharynx is clear and moist.   Eyes: Conjunctivae and EOM are normal. Pupils are equal, round, and reactive to light. Neck: Normal range of motion and full passive range of motion without pain. Neck supple. Cardiovascular: Normal rate, regular rhythm, S1 normal, S2 normal, normal heart sounds, intact distal pulses and normal pulses. The patient is noted to have +1 pitting pedal edema bilateral lower extremities but this is notably much improved. Pulmonary/Chest: Effort normal and breath sounds normal.   Abdominal: Soft. Normal appearance. Musculoskeletal: Normal range of motion.    Neurological: She is alert and

## 2017-12-01 ENCOUNTER — OFFICE VISIT (OUTPATIENT)
Dept: PSYCHIATRY | Age: 57
End: 2017-12-01
Payer: COMMERCIAL

## 2017-12-01 ENCOUNTER — OFFICE VISIT (OUTPATIENT)
Dept: FAMILY MEDICINE CLINIC | Age: 57
End: 2017-12-01
Payer: COMMERCIAL

## 2017-12-01 VITALS
OXYGEN SATURATION: 95 % | RESPIRATION RATE: 16 BRPM | BODY MASS INDEX: 40.04 KG/M2 | TEMPERATURE: 98.3 F | HEART RATE: 72 BPM | DIASTOLIC BLOOD PRESSURE: 80 MMHG | WEIGHT: 205 LBS | SYSTOLIC BLOOD PRESSURE: 124 MMHG

## 2017-12-01 DIAGNOSIS — G47.19 EXCESSIVE DAYTIME SLEEPINESS: ICD-10-CM

## 2017-12-01 DIAGNOSIS — Z12.31 SCREENING MAMMOGRAM, ENCOUNTER FOR: ICD-10-CM

## 2017-12-01 DIAGNOSIS — R06.83 SNORING: Primary | ICD-10-CM

## 2017-12-01 DIAGNOSIS — F43.23 ADJUSTMENT DISORDER WITH MIXED ANXIETY AND DEPRESSED MOOD: Primary | ICD-10-CM

## 2017-12-01 DIAGNOSIS — F41.9 ANXIETY AND DEPRESSION: ICD-10-CM

## 2017-12-01 DIAGNOSIS — F32.A ANXIETY AND DEPRESSION: ICD-10-CM

## 2017-12-01 PROCEDURE — 99213 OFFICE O/P EST LOW 20 MIN: CPT | Performed by: NURSE PRACTITIONER

## 2017-12-01 PROCEDURE — 90791 PSYCH DIAGNOSTIC EVALUATION: CPT | Performed by: SOCIAL WORKER

## 2017-12-01 NOTE — PATIENT INSTRUCTIONS
1.  Provided handout on Constructive Worry and Personal thought control   2. Return in 2 weeks as scheduled. 3.  Call Kallie Tiffanie at [phone] if you need to come in earlier or reschedule. Constructive Worry Worksheet  [employer]     1. ________________________                        2.  ______________________                        3.  _______________________     1. __________________________        2. __________________________        3. __________________________        1.  _________________________        2. __________________________        3. __________________________        1. __________________________        2. __________________________        3.  __________________________           Constructive Worry Instructions  When we have challenges, we tend to use our problem-solving skills to make our lives better and to relieve ourselves of anxiety. It is not surprising that some of us may use our problem-solving skills at the wrong time and place, namely bedtime. We may think about a problem, trying to solve it, but unfortunately this will oftentimes keep us awake. Constructive worry is a method for managing the tendency to worry during that quiet time when sleep is supposed to be taking over. Do this exercise early in the evening (at least 2 hours before bed). It should take only about 15 minutes to complete. Here is how it is done:  1. Write down the problem(s) facing you that has the greatest chance of keeping you awake a bedtime, and list them in the Concerns column of the [address]. 2. Then, think of the next step that might help fix it. Write it down in the Solutions column. This need not be the final solution to the problem, since most problems have to be solved by taking steps anyhow, and you will be doing this again tomorrow night and the night after until you finally get to the best solution.    If you know how to fix the problem completely, then write

## 2017-12-01 NOTE — PROGRESS NOTES
affect  Thought Content    hopelessness and excessive preoccupations  Thought Process    circumstantial and perservative  Associations    logical connections  Insight    Good  Judgment    Intact  Orientation    oriented to person, place, time, and general circumstances  Memory    recent and remote memory intact  Attention/Concentration    impaired  Morbid ideation No  Suicide Assessment    no suicidal ideation      History:    Medications:   Current Outpatient Prescriptions   Medication Sig Dispense Refill    Mirabegron ER (MYRBETRIQ) 50 MG TB24 1 po every evening 30 tablet 1    Mirabegron ER 50 MG TB24 1 po every evening for bladder 90 tablet 1    allopurinol (ZYLOPRIM) 100 MG tablet Take 1 tablet by mouth daily 30 tablet 2    Omega-3 Fatty Acids (FISH OIL) 1000 MG CAPS Take 1,000 mg by mouth daily      HYDROcodone-acetaminophen (NORCO) 7.5-325 MG per tablet Take 1 tablet by mouth every 6 hours as needed for Pain .       bumetanide (BUMEX) 2 MG tablet Take 1.5 tablets by mouth 2 times daily 90 tablet 5    potassium chloride (KLOR-CON M) 20 MEQ extended release tablet Take 1 tablet by mouth daily 30 tablet 5    levothyroxine (SYNTHROID) 100 MCG tablet Take 1 tablet by mouth Daily (Patient taking differently: Take 100 mcg by mouth Daily ) 30 tablet 5    atorvastatin (LIPITOR) 40 MG tablet Take 1 tablet by mouth daily 30 tablet 5    spironolactone (ALDACTONE) 50 MG tablet Take 1 tablet by mouth 2 times daily 60 tablet 5    carvedilol (COREG) 6.25 MG tablet Take 1 tablet by mouth 2 times daily (with meals) 180 tablet 1    QUEtiapine (SEROQUEL) 25 MG tablet Take 25 mg by mouth nightly      gabapentin (NEURONTIN) 600 MG tablet Take 600 mg by mouth 3 times daily      Tens Unit MISC by Does not apply route Apply and use tid prn  Dx -lumbar pain  Dispense tens and supplies 1 each 0    UNABLE TO FIND EMPI TENs unit pads  Dx-lumbar pain 1 Device 0    tiZANidine (ZANAFLEX) 4 MG tablet Take 4 mg by mouth nightly  Potassium 99 MG TABS Take 99 mg by mouth daily       pilocarpine (SALAGEN) 5 MG tablet Take 5 mg by mouth 3 times daily      Vitamin D (CHOLECALCIFEROL) 1000 UNITS CAPS capsule Take 1,000 Units by mouth daily      ALPRAZolam (XANAX) 0.5 MG tablet Take 0.5 mg by mouth nightly as needed for Sleep      Multiple Vitamins-Minerals (MULTIVITAMIN PO) Take by mouth daily      DULoxetine (CYMBALTA) 60 MG capsule Take 60 mg by mouth 2 times daily       traMADol (ULTRAM) 50 MG tablet Take 100 mg by mouth every 12 hours       calcium carbonate 600 MG TABS tablet Take 1 tablet by mouth daily.  divalproex (DEPAKOTE) 500 MG ER tablet Take 1,000 mg by mouth nightly.  pramipexole (MIRAPEX) 1 MG tablet Take 1 mg by mouth 2 times daily Indications: Take 1 tablet in the morning and 1 and a 1/2 at night Take 1 tablet in the morning and 1 and a 1/2 at night       No current facility-administered medications for this visit. Social History:   Social History     Social History    Marital status:      Spouse name: N/A    Number of children: 2    Years of education: N/A     Occupational History     [employer]     Social History Main Topics    Smoking status: Current Every Day Smoker     Packs/day: 1.00     Years: 30.00     Types: Cigarettes     Last attempt to quit: 7/20/2003    Smokeless tobacco: Never Used    Alcohol use Yes      Comment: rarely    Drug use: No    Sexual activity: Not on file     Other Topics Concern    Not on file     Social History Narrative    No narrative on file       TOBACCO:   reports that she has been smoking Cigarettes. She has a 30.00 pack-year smoking history. She has never used smokeless tobacco.  ETOH:   reports that she drinks alcohol.     Family History:   Family History   Problem Relation Age of Onset    Cancer Mother      lung    Heart Disease Mother     Hypertension Mother     Bipolar Disorder Mother     Heart Disease Father     Hypertension Father after until you finally get to the best solution.  If you know how to fix the problem completely, then write that down.  If you decide that this is not really a big problem, and you will just deal with it when the time comes, then write that down.  If you decide that you simply do not know what to do about it, and need to ask someone to help you, write that down.  If you decide that it is a problem, but there seems to be no good solution at all, and that you will just have to live with it, write that down, with a note to yourself that maybe sometime soon you or someone you speak with will give you a clue that will lead you to a solution. 3. Repeat this for any other concerns you have. 4. Fold the Constructive Worry Worksheet in half and place it on the nightstand next to your bed and forget about it until bedtime. 5. At bedtime, if you begin to worry actually tell yourself that you have dealt with your problems already in the best way you know how, and when you were at your problem-solving best.  Remind yourself that you will be working on them again tomorrow evening and that nothing you can do while you are so tired can help you any more than what you have already done; more effort will only make the matters worst.  Personal Thought  Control. Our thinking often creates anxiety for us. Getting better control of our thinking can go a long way in helping us cope. The following steps can be useful. Let yourself become aware of thoughts you have when you are anxious. What are the words that you are saying to yourself at that moment? Sometimes it takes a little practice before we become aware of our thoughts. Some examples might be:  I know something bad is going to happen, or This is horrible or Wynell Smiley is this happening to me!?  Write your thoughts down. Its much easier to work with our thoughts, analyze them, and replace them if they are in black and white.   Ask yourself the following questions about your thoughts:  Is it true? (Is it logically correct? Where is the evidence to support the truth of that thought? Are there alternative ways of thinking that would be more correct?). If a thought is not as true as it could be, replace it with a more realistic and helpful one. The majority of thoughts we have that generate anxiety are not the most realistic appraisals of the situation. So what? (If this is logically correct, what does it mean to me? Is there anything I can do about the situation? Is it in my best interest to get anxious about this?). Use coping self-statements. When feeling anxious, you may be able to tell yourself automatic phrases without thinking too much about it. A couple of examples would be phrases such as Its OK, I can handle it, or Ive been through things like this before and have done all right.   Notice that these statements tend to be true for all of us. Notice a change in your emotional state as you change your thinking. As your thoughts become more realistic, you will probably notice a decrease in anxiety and tension, and an increase in your ability to cope.

## 2017-12-18 ENCOUNTER — OFFICE VISIT (OUTPATIENT)
Dept: FAMILY MEDICINE CLINIC | Age: 57
End: 2017-12-18
Payer: COMMERCIAL

## 2017-12-18 VITALS
TEMPERATURE: 98.2 F | WEIGHT: 209.13 LBS | HEART RATE: 98 BPM | BODY MASS INDEX: 40.84 KG/M2 | DIASTOLIC BLOOD PRESSURE: 72 MMHG | OXYGEN SATURATION: 94 % | SYSTOLIC BLOOD PRESSURE: 126 MMHG

## 2017-12-18 DIAGNOSIS — G25.81 RLS (RESTLESS LEGS SYNDROME): ICD-10-CM

## 2017-12-18 DIAGNOSIS — R60.9 PITTING EDEMA: ICD-10-CM

## 2017-12-18 DIAGNOSIS — G89.29 CHRONIC MIDLINE LOW BACK PAIN WITHOUT SCIATICA: ICD-10-CM

## 2017-12-18 DIAGNOSIS — E87.6 HYPOKALEMIA: ICD-10-CM

## 2017-12-18 DIAGNOSIS — F31.60 BIPOLAR 1 DISORDER, MIXED (HCC): ICD-10-CM

## 2017-12-18 DIAGNOSIS — I10 ESSENTIAL HYPERTENSION: ICD-10-CM

## 2017-12-18 DIAGNOSIS — M54.50 CHRONIC MIDLINE LOW BACK PAIN WITHOUT SCIATICA: ICD-10-CM

## 2017-12-18 PROCEDURE — 99214 OFFICE O/P EST MOD 30 MIN: CPT | Performed by: FAMILY MEDICINE

## 2017-12-18 RX ORDER — BUMETANIDE 1 MG/1
1 TABLET ORAL 2 TIMES DAILY
Qty: 60 TABLET | Refills: 2 | Status: SHIPPED | OUTPATIENT
Start: 2017-12-18 | End: 2018-03-01 | Stop reason: SDUPTHER

## 2017-12-18 RX ORDER — BUMETANIDE 1 MG/1
1 TABLET ORAL 2 TIMES DAILY
Qty: 60 TABLET | Refills: 2 | Status: SHIPPED | COMMUNITY
Start: 2017-12-18 | End: 2017-12-18 | Stop reason: SDUPTHER

## 2017-12-18 RX ORDER — BUMETANIDE 1 MG/1
1 TABLET ORAL DAILY
COMMUNITY
End: 2017-12-18 | Stop reason: DRUGHIGH

## 2017-12-18 ASSESSMENT — ENCOUNTER SYMPTOMS
WHEEZING: 0
CONSTIPATION: 0
SHORTNESS OF BREATH: 0
DIARRHEA: 0
EYES NEGATIVE: 1
VOMITING: 0
CHEST TIGHTNESS: 0
BLOOD IN STOOL: 0
COUGH: 0
NAUSEA: 0
ALLERGIC/IMMUNOLOGIC NEGATIVE: 1

## 2017-12-18 NOTE — PROGRESS NOTES
Subjective:      Patient ID: Yaniv Hickman is a 65 y.o. female. Chief Complaint   Patient presents with    Follow-up     check up after medication changes and edema       HPI  This patient is seen here regularly by the undersigned for management of several chronic disease states. In addition, she does see Ioana Riojas at pain management with Dr. Magdaleno Bravo and Dr. Sixto rose. She did have lab work done this past Thursday by Dr. Sixto rose. It is my understanding that she did have electrolytes done so we called and got the results of potassium which was good at 4.3. Also I'm told that the Ioana Riojas at pain management did prescribe recently Lyrica for her. It was noted and I mentioned it to the patient that they were on Neurontin 600 mg 3 times daily already by pain management and she stated that they wanted to add the Lyrica in addition to the Neurontin. She was not able to specify the exact dose of the Lyrica. She is taking Zanaflex 4 mg by mouth at bedtime for muscle spasms. Ultram 50 has utilized at 2 pills at 12 are intervals for breakthrough pain as well. For issues with depression, Dr. Noelle Atkinson has her on 60 mg of Cymbalta by mouth twice a day as well. She does have issues with the restless leg syndrome. Currently she uses Mirapex 1 mg and takes one in the morning and one half at night. She reports that this remains helpful at controlling her restless legs. Recently, I have been following her for pitting edema. The pitting edema has been under control but has now begun to recur. I am going to increase her Bumex from 1 mg to 1 mg twice a day and have prescribed this to to the pharmacy at [address] [place] [address] for her electronically. She is maintained also on Aldactone at 50 mg by mouth twice a day. This I am not changing. Regarding management of her chronic pain, pain management does have her on Norco 7.5/325 every 6 hours intervals.  This she uses, in addition to the Neurontin 600 3 times a day and now I am told there adding Lyrica to this regimen. She does have a history of bipolar disease. She does see Dr. Rock Lawton for this. She is maintained on Depakote and takes 500 mg tablet as his 2 of these at night. Dr. Hannah Lerner does also have her on Xanax 0.5 and uses one at night to help sleep area he also has her on Seroquel 25 mg by mouth at night as well. For issues with essential hypertension, in addition to the meds above, she does utilize Coreg 6.25 twice a day, the Bumex, the Aldactone and tolerates these well. Blood pressure today was good at 126/72. Potassium replacement is taken with the Klor-Con milliequivalents extended release and she does use 1 daily currently. As noted above, potassium was within acceptable range currently. For acquired hypothyroidism, she is maintained on levothyroxine 100 µg daily. She tolerates this medicine well. For bladder spasms, she is maintained on Mirabegron ER 50 mg by mouth every 24 hours. For hyperlipidemia, she is maintained on atorvastatin 40 mg by mouth daily at bedtime. She also uses Fish oil 1000 mg by mouth daily  Review of Systems   Constitutional: Negative. HENT: Negative. Eyes: Negative. Respiratory: Negative for cough, chest tightness, shortness of breath and wheezing. Cardiovascular: Negative for chest pain, palpitations and leg swelling. Gastrointestinal: Negative for blood in stool, constipation, diarrhea, nausea and vomiting. Endocrine: Negative. Genitourinary: Negative. Negative for hematuria. Musculoskeletal: Negative. Skin: Negative. Allergic/Immunologic: Negative. Neurological: Negative. Hematological: Negative. Psychiatric/Behavioral: Negative. Objective:   Physical Exam   Constitutional: She is oriented to person, place, and time. Vital signs are normal. She appears well-developed and well-nourished. She is active. HENT:   Head: Normocephalic and atraumatic.    Right Ear: External ear normal.   Left Ear: External ear Fatty Acids (FISH OIL) 1000 MG CAPS Take 1,000 mg by mouth daily      HYDROcodone-acetaminophen (NORCO) 7.5-325 MG per tablet Take 1 tablet by mouth every 6 hours as needed for Pain .  potassium chloride (KLOR-CON M) 20 MEQ extended release tablet Take 1 tablet by mouth daily 30 tablet 5    levothyroxine (SYNTHROID) 100 MCG tablet Take 1 tablet by mouth Daily (Patient taking differently: Take 100 mcg by mouth Daily ) 30 tablet 5    atorvastatin (LIPITOR) 40 MG tablet Take 1 tablet by mouth daily 30 tablet 5    spironolactone (ALDACTONE) 50 MG tablet Take 1 tablet by mouth 2 times daily 60 tablet 5    carvedilol (COREG) 6.25 MG tablet Take 1 tablet by mouth 2 times daily (with meals) 180 tablet 1    QUEtiapine (SEROQUEL) 25 MG tablet Take 25 mg by mouth nightly      gabapentin (NEURONTIN) 600 MG tablet Take 600 mg by mouth 3 times daily      Tens Unit MISC by Does not apply route Apply and use tid prn  Dx -lumbar pain  Dispense tens and supplies 1 each 0    UNABLE TO FIND EMPI TENs unit pads  Dx-lumbar pain 1 Device 0    tiZANidine (ZANAFLEX) 4 MG tablet Take 4 mg by mouth nightly      Potassium 99 MG TABS Take 99 mg by mouth daily       pilocarpine (SALAGEN) 5 MG tablet Take 5 mg by mouth 3 times daily      Vitamin D (CHOLECALCIFEROL) 1000 UNITS CAPS capsule Take 1,000 Units by mouth daily      ALPRAZolam (XANAX) 0.5 MG tablet Take 0.5 mg by mouth nightly as needed for Sleep      Multiple Vitamins-Minerals (MULTIVITAMIN PO) Take by mouth daily      DULoxetine (CYMBALTA) 60 MG capsule Take 60 mg by mouth 2 times daily       traMADol (ULTRAM) 50 MG tablet Take 100 mg by mouth every 12 hours       calcium carbonate 600 MG TABS tablet Take 1 tablet by mouth daily.  divalproex (DEPAKOTE) 500 MG ER tablet Take 1,000 mg by mouth nightly.  pramipexole (MIRAPEX) 1 MG tablet Take 1 mg by mouth 2 times daily Indications:  Take 1 tablet in the morning and 1 and a 1/2 at night Take 1 tablet in the morning and 1 and a 1/2 at night     ,Patient states they are no longer utilizing these medication:   Medications Discontinued During This Encounter   Medication Reason    bumetanide (BUMEX) 2 MG tablet Dose adjustment    Mirabegron ER (MYRBETRIQ) 50 MG LD77 Duplicate Order    bumetanide (BUMEX) 1 MG tablet Dose adjustment    bumetanide (BUMEX) 1 MG tablet Reorder    I have refilled the following medication today:   Requested Prescriptions     Signed Prescriptions Disp Refills    bumetanide (BUMEX) 1 MG tablet 60 tablet 2     Sig: Take 1 tablet by mouth 2 times daily   .

## 2017-12-28 DIAGNOSIS — I10 ESSENTIAL HYPERTENSION: ICD-10-CM

## 2017-12-28 RX ORDER — CARVEDILOL 6.25 MG/1
TABLET ORAL
Qty: 180 TABLET | Refills: 0 | Status: SHIPPED | OUTPATIENT
Start: 2017-12-28 | End: 2018-03-01 | Stop reason: SDUPTHER

## 2018-01-02 ENCOUNTER — OFFICE VISIT (OUTPATIENT)
Dept: FAMILY MEDICINE CLINIC | Age: 58
End: 2018-01-02
Payer: COMMERCIAL

## 2018-01-02 VITALS
OXYGEN SATURATION: 96 % | HEART RATE: 100 BPM | DIASTOLIC BLOOD PRESSURE: 70 MMHG | BODY MASS INDEX: 39.72 KG/M2 | TEMPERATURE: 97.9 F | SYSTOLIC BLOOD PRESSURE: 122 MMHG | WEIGHT: 203.38 LBS

## 2018-01-02 DIAGNOSIS — I10 ESSENTIAL HYPERTENSION: ICD-10-CM

## 2018-01-02 DIAGNOSIS — E03.9 ACQUIRED HYPOTHYROIDISM: ICD-10-CM

## 2018-01-02 DIAGNOSIS — G25.81 RLS (RESTLESS LEGS SYNDROME): ICD-10-CM

## 2018-01-02 DIAGNOSIS — G89.29 CHRONIC MIDLINE LOW BACK PAIN WITHOUT SCIATICA: ICD-10-CM

## 2018-01-02 DIAGNOSIS — E87.6 HYPOKALEMIA: ICD-10-CM

## 2018-01-02 DIAGNOSIS — M54.50 CHRONIC MIDLINE LOW BACK PAIN WITHOUT SCIATICA: ICD-10-CM

## 2018-01-02 DIAGNOSIS — F31.60 BIPOLAR 1 DISORDER, MIXED (HCC): ICD-10-CM

## 2018-01-02 DIAGNOSIS — R60.9 PERIPHERAL EDEMA: ICD-10-CM

## 2018-01-02 PROCEDURE — 99214 OFFICE O/P EST MOD 30 MIN: CPT | Performed by: FAMILY MEDICINE

## 2018-01-02 RX ORDER — LANOLIN ALCOHOL/MO/W.PET/CERES
500 CREAM (GRAM) TOPICAL DAILY
COMMUNITY
End: 2021-01-08

## 2018-01-02 RX ORDER — ARIPIPRAZOLE 20 MG/1
20 TABLET ORAL DAILY
COMMUNITY
End: 2018-02-15 | Stop reason: DRUGHIGH

## 2018-01-02 ASSESSMENT — ENCOUNTER SYMPTOMS
ALLERGIC/IMMUNOLOGIC NEGATIVE: 1
RESPIRATORY NEGATIVE: 1
EYES NEGATIVE: 1
GASTROINTESTINAL NEGATIVE: 1

## 2018-01-02 NOTE — PROGRESS NOTES
(SALAGEN) 5 MG tablet Take 5 mg by mouth 3 times daily      Vitamin D (CHOLECALCIFEROL) 1000 UNITS CAPS capsule Take 1,000 Units by mouth daily      ALPRAZolam (XANAX) 0.5 MG tablet Take 0.5 mg by mouth nightly as needed for Sleep      Multiple Vitamins-Minerals (MULTIVITAMIN PO) Take by mouth daily      DULoxetine (CYMBALTA) 60 MG capsule Take 60 mg by mouth 2 times daily       traMADol (ULTRAM) 50 MG tablet Take 100 mg by mouth every 12 hours       calcium carbonate 600 MG TABS tablet Take 1 tablet by mouth daily.  divalproex (DEPAKOTE) 500 MG ER tablet Take 1,000 mg by mouth nightly.  pramipexole (MIRAPEX) 1 MG tablet Take 1 mg by mouth 2 times daily Indications: Take 1 tablet in the morning and 1 and a 1/2 at night Take 1 tablet in the morning and 1 and a 1/2 at night     ,Patient states they are no longer utilizing these medication:   Medications Discontinued During This Encounter   Medication Reason    Potassium 99 MG TABS Alternate therapy    I have refilled the following medication today:   Requested Prescriptions      No prescriptions requested or ordered in this encounter   .

## 2018-01-10 ENCOUNTER — OFFICE VISIT (OUTPATIENT)
Dept: PSYCHIATRY | Age: 58
End: 2018-01-10
Payer: COMMERCIAL

## 2018-01-10 DIAGNOSIS — F32.A DEPRESSIVE DISORDER: Primary | ICD-10-CM

## 2018-01-10 PROCEDURE — 90837 PSYTX W PT 60 MINUTES: CPT | Performed by: SOCIAL WORKER

## 2018-01-10 NOTE — PATIENT INSTRUCTIONS
1.  Recommended to think about some actions to work toward improving your quality of life and overall health and wellness. 2.  Return in 2 weeks as scheduled.

## 2018-01-11 ENCOUNTER — HOSPITAL ENCOUNTER (OUTPATIENT)
Dept: WOMENS IMAGING | Age: 58
Discharge: HOME | End: 2018-01-11
Payer: COMMERCIAL

## 2018-01-11 DIAGNOSIS — Z12.31 SCREENING MAMMOGRAM, ENCOUNTER FOR: ICD-10-CM

## 2018-01-11 PROCEDURE — 77063 BREAST TOMOSYNTHESIS BI: CPT

## 2018-01-16 DIAGNOSIS — E79.0 ELEVATED URIC ACID IN BLOOD: ICD-10-CM

## 2018-01-18 RX ORDER — ALLOPURINOL 100 MG/1
TABLET ORAL
Qty: 30 TABLET | Refills: 0 | Status: SHIPPED | OUTPATIENT
Start: 2018-01-18 | End: 2018-03-01 | Stop reason: SDUPTHER

## 2018-01-24 ENCOUNTER — OFFICE VISIT (OUTPATIENT)
Dept: FAMILY MEDICINE CLINIC | Age: 58
End: 2018-01-24
Payer: COMMERCIAL

## 2018-01-24 ENCOUNTER — OFFICE VISIT (OUTPATIENT)
Dept: PSYCHIATRY | Age: 58
End: 2018-01-24

## 2018-01-24 ENCOUNTER — HOSPITAL ENCOUNTER (EMERGENCY)
Age: 58
Discharge: HOME | End: 2018-01-24
Attending: FAMILY MEDICINE
Payer: COMMERCIAL

## 2018-01-24 VITALS
HEART RATE: 82 BPM | OXYGEN SATURATION: 90 % | BODY MASS INDEX: 39.27 KG/M2 | WEIGHT: 200 LBS | TEMPERATURE: 98.5 F | RESPIRATION RATE: 16 BRPM | SYSTOLIC BLOOD PRESSURE: 108 MMHG | DIASTOLIC BLOOD PRESSURE: 60 MMHG | HEIGHT: 60 IN

## 2018-01-24 VITALS — HEART RATE: 90 BPM | OXYGEN SATURATION: 85 % | DIASTOLIC BLOOD PRESSURE: 80 MMHG | SYSTOLIC BLOOD PRESSURE: 144 MMHG

## 2018-01-24 DIAGNOSIS — R41.82 ALTERED MENTAL STATUS, UNSPECIFIED ALTERED MENTAL STATUS TYPE: Primary | ICD-10-CM

## 2018-01-24 DIAGNOSIS — M54.50 CHRONIC MIDLINE LOW BACK PAIN WITHOUT SCIATICA: ICD-10-CM

## 2018-01-24 DIAGNOSIS — F31.60 BIPOLAR 1 DISORDER, MIXED (HCC): ICD-10-CM

## 2018-01-24 DIAGNOSIS — R09.02 HYPOXEMIA: ICD-10-CM

## 2018-01-24 DIAGNOSIS — R40.0 SOMNOLENCE: ICD-10-CM

## 2018-01-24 DIAGNOSIS — T50.905A MEDICATION SIDE EFFECT, INITIAL ENCOUNTER: ICD-10-CM

## 2018-01-24 DIAGNOSIS — G89.29 CHRONIC MIDLINE LOW BACK PAIN WITHOUT SCIATICA: ICD-10-CM

## 2018-01-24 DIAGNOSIS — F32.A DEPRESSIVE DISORDER: Primary | ICD-10-CM

## 2018-01-24 DIAGNOSIS — I10 ESSENTIAL HYPERTENSION: ICD-10-CM

## 2018-01-24 DIAGNOSIS — R42 DIZZINESS: ICD-10-CM

## 2018-01-24 LAB
ACETAMINOPHEN LEVEL: <15 UG/ML
ALBUMIN SERPL-MCNC: 3.3 G/DL (ref 3.5–5.2)
ALP BLD-CCNC: 80 U/L (ref 35–104)
ALT SERPL-CCNC: 17 U/L (ref 5–33)
AMPHETAMINE SCREEN, URINE: NEGATIVE
ANION GAP SERPL CALCULATED.3IONS-SCNC: 13 MMOL/L (ref 7–19)
AST SERPL-CCNC: 26 U/L (ref 5–32)
BARBITURATE SCREEN URINE: NEGATIVE
BASE EXCESS ARTERIAL: 7 MMOL/L (ref -2–2)
BASOPHILS ABSOLUTE: 0 K/UL (ref 0–0.2)
BASOPHILS RELATIVE PERCENT: 0.2 % (ref 0–1)
BENZODIAZEPINE SCREEN, URINE: NEGATIVE
BILIRUB SERPL-MCNC: <0.2 MG/DL (ref 0.2–1.2)
BILIRUBIN URINE: NEGATIVE
BLOOD, URINE: NEGATIVE
BUN BLDV-MCNC: 18 MG/DL (ref 6–20)
CALCIUM SERPL-MCNC: 8.5 MG/DL (ref 8.6–10)
CANNABINOID SCREEN URINE: NEGATIVE
CARBOXYHEMOGLOBIN ARTERIAL: 3.2 % (ref 0–5)
CHLORIDE BLD-SCNC: 89 MMOL/L (ref 98–111)
CLARITY: CLEAR
CO2: 33 MMOL/L (ref 22–29)
COCAINE METABOLITE SCREEN URINE: NEGATIVE
COLOR: YELLOW
CREAT SERPL-MCNC: 0.9 MG/DL (ref 0.5–0.9)
EOSINOPHILS ABSOLUTE: 0.1 K/UL (ref 0–0.6)
EOSINOPHILS RELATIVE PERCENT: 1 % (ref 0–5)
ETHANOL: <10 MG/DL (ref 0–0.08)
GFR NON-AFRICAN AMERICAN: >60
GLUCOSE BLD-MCNC: 140 MG/DL (ref 74–109)
GLUCOSE URINE: NEGATIVE MG/DL
HCO3 ARTERIAL: 32.5 MMOL/L (ref 22–26)
HCT VFR BLD CALC: 36.1 % (ref 37–47)
HEMOGLOBIN, ART, EXTENDED: 12.3 G/DL (ref 12–16)
HEMOGLOBIN: 11.4 G/DL (ref 12–16)
KETONES, URINE: NEGATIVE MG/DL
LEUKOCYTE ESTERASE, URINE: NEGATIVE
LYMPHOCYTES ABSOLUTE: 3.2 K/UL (ref 1.1–4.5)
LYMPHOCYTES RELATIVE PERCENT: 33.5 % (ref 20–40)
Lab: NORMAL
MCH RBC QN AUTO: 29.6 PG (ref 27–31)
MCHC RBC AUTO-ENTMCNC: 31.6 G/DL (ref 33–37)
MCV RBC AUTO: 93.8 FL (ref 81–99)
METHEMOGLOBIN ARTERIAL: 1.2 %
MONOCYTES ABSOLUTE: 0.7 K/UL (ref 0–0.9)
MONOCYTES RELATIVE PERCENT: 7.7 % (ref 0–10)
NEUTROPHILS ABSOLUTE: 5.3 K/UL (ref 1.5–7.5)
NEUTROPHILS RELATIVE PERCENT: 56.5 % (ref 50–65)
NITRITE, URINE: NEGATIVE
O2 CONTENT ARTERIAL: 15.2 ML/DL
O2 SAT, ARTERIAL: 88.1 %
O2 THERAPY: ABNORMAL
OPIATE SCREEN URINE: NEGATIVE
PCO2 ARTERIAL: 49 MMHG (ref 35–45)
PDW BLD-RTO: 14.2 % (ref 11.5–14.5)
PH ARTERIAL: 7.43 (ref 7.35–7.45)
PH UA: 6
PLATELET # BLD: 306 K/UL (ref 130–400)
PMV BLD AUTO: 10.3 FL (ref 9.4–12.3)
PO2 ARTERIAL: 55 MMHG (ref 80–100)
POTASSIUM REFLEX MAGNESIUM: 3.8 MMOL/L (ref 3.5–5)
POTASSIUM, WHOLE BLOOD: 3.4
PROTEIN UA: NEGATIVE MG/DL
RBC # BLD: 3.85 M/UL (ref 4.2–5.4)
SALICYLATE, SERUM: <3 MG/DL (ref 3–10)
SODIUM BLD-SCNC: 135 MMOL/L (ref 136–145)
SPECIFIC GRAVITY UA: 1.01
TOTAL PROTEIN: 6.2 G/DL (ref 6.6–8.7)
TROPONIN: <0.01 NG/ML (ref 0–0.03)
UROBILINOGEN, URINE: 0.2 E.U./DL
WBC # BLD: 9.4 K/UL (ref 4.8–10.8)

## 2018-01-24 PROCEDURE — 99215 OFFICE O/P EST HI 40 MIN: CPT | Performed by: FAMILY MEDICINE

## 2018-01-24 PROCEDURE — 99284 EMERGENCY DEPT VISIT MOD MDM: CPT | Performed by: FAMILY MEDICINE

## 2018-01-24 PROCEDURE — G0480 DRUG TEST DEF 1-7 CLASSES: HCPCS

## 2018-01-24 PROCEDURE — 36415 COLL VENOUS BLD VENIPUNCTURE: CPT

## 2018-01-24 PROCEDURE — 82803 BLOOD GASES ANY COMBINATION: CPT

## 2018-01-24 PROCEDURE — 2580000003 HC RX 258: Performed by: FAMILY MEDICINE

## 2018-01-24 PROCEDURE — 36600 WITHDRAWAL OF ARTERIAL BLOOD: CPT

## 2018-01-24 PROCEDURE — 80307 DRUG TEST PRSMV CHEM ANLYZR: CPT

## 2018-01-24 PROCEDURE — 81003 URINALYSIS AUTO W/O SCOPE: CPT

## 2018-01-24 PROCEDURE — 96361 HYDRATE IV INFUSION ADD-ON: CPT

## 2018-01-24 PROCEDURE — 6360000002 HC RX W HCPCS: Performed by: FAMILY MEDICINE

## 2018-01-24 PROCEDURE — 84132 ASSAY OF SERUM POTASSIUM: CPT

## 2018-01-24 PROCEDURE — 96374 THER/PROPH/DIAG INJ IV PUSH: CPT

## 2018-01-24 PROCEDURE — 99999 PR OFFICE/OUTPT VISIT,PROCEDURE ONLY: CPT | Performed by: SOCIAL WORKER

## 2018-01-24 PROCEDURE — 99284 EMERGENCY DEPT VISIT MOD MDM: CPT

## 2018-01-24 PROCEDURE — 93005 ELECTROCARDIOGRAM TRACING: CPT

## 2018-01-24 PROCEDURE — 80053 COMPREHEN METABOLIC PANEL: CPT

## 2018-01-24 PROCEDURE — 85025 COMPLETE CBC W/AUTO DIFF WBC: CPT

## 2018-01-24 PROCEDURE — 84484 ASSAY OF TROPONIN QUANT: CPT

## 2018-01-24 RX ORDER — 0.9 % SODIUM CHLORIDE 0.9 %
500 INTRAVENOUS SOLUTION INTRAVENOUS ONCE
Status: COMPLETED | OUTPATIENT
Start: 2018-01-24 | End: 2018-01-24

## 2018-01-24 RX ORDER — NALOXONE HYDROCHLORIDE 0.4 MG/ML
0.2 INJECTION, SOLUTION INTRAMUSCULAR; INTRAVENOUS; SUBCUTANEOUS ONCE
Status: COMPLETED | OUTPATIENT
Start: 2018-01-24 | End: 2018-01-24

## 2018-01-24 RX ADMIN — SODIUM CHLORIDE 500 ML: 9 INJECTION, SOLUTION INTRAVENOUS at 14:58

## 2018-01-24 RX ADMIN — NALXONE HYDROCHLORIDE 0.2 MG: 0.4 INJECTION INTRAMUSCULAR; INTRAVENOUS; SUBCUTANEOUS at 13:31

## 2018-01-24 ASSESSMENT — ENCOUNTER SYMPTOMS
TROUBLE SWALLOWING: 0
BACK PAIN: 0
CONSTIPATION: 0
NAUSEA: 0
APNEA: 0
VOMITING: 0
WHEEZING: 1
VOMITING: 0
CONSTIPATION: 0
DIARRHEA: 0
ABDOMINAL PAIN: 0
DIARRHEA: 0
WHEEZING: 0
BLOOD IN STOOL: 0
CHEST TIGHTNESS: 0
SORE THROAT: 0
EYES NEGATIVE: 1
ABDOMINAL DISTENTION: 0
SHORTNESS OF BREATH: 1
SHORTNESS OF BREATH: 0
CHEST TIGHTNESS: 0
ALLERGIC/IMMUNOLOGIC NEGATIVE: 1
COUGH: 0
COUGH: 0

## 2018-01-24 NOTE — PROGRESS NOTES
Behavioral Health Consultation  [facility]  1/24/2018  11:00 AM      Time spent with Patient: 20 minutes  This is patient's third  [facility] appointment. Reason for Consult:    Chief Complaint   Patient presents with    Depression    Stress     Referring Provider: No referring provider defined for this encounter. Feedback given to PCP. S:  CC:  Depressed mood and insomnia/sleep issues   Observed pt to have difficulties with her ability to remain alert. Shared that she felt \"detached\" this morning and distracted. Appeared sedated. She tried to discuss occurrences with [facility] while in session but  [facility] felt it more important for pt to be seen by her provider as she did not appear well.        O:  MSE:    Appearance    Difficulty focusing, mild distress  Appetite normal  Sleep disturbance unable to assess  Fatigue unable to assess  Loss of pleasure unable to assess  Impulsive behavior unable to assess  Speech    slurred  Mood    Euthymic   Affect    flat affect  Thought Content    Unable to assess  Thought Process    Unable to assess  Associations    logical connections, unable to assess fully   Insight    Unable to Assess  Judgment    N/A  Orientation    oriented to person, place, time, and general circumstances  Memory    Unable to assess  Attention/Concentration    impaired  Morbid ideation No  Suicide Assessment    no suicidal ideation      History:    Medications:   Current Outpatient Prescriptions   Medication Sig Dispense Refill    allopurinol (ZYLOPRIM) 100 MG tablet TAKE ONE TABLET BY MOUTH DAILY 30 tablet 0    ARIPiprazole (ABILIFY) 20 MG tablet Take 20 mg by mouth daily      niacin (SLO-NIACIN) 500 MG extended release tablet Take 500 mg by mouth nightly      carvedilol (COREG) 6.25 MG tablet TAKE 1 TABLET BY MOUTH TWO  TIMES DAILY WITH MEALS 180 tablet 0    bumetanide (BUMEX) 1 MG tablet Take 1 tablet by mouth 2 times daily 60 tablet 2    Mirabegron ER 50 MG TB24 1 po every

## 2018-01-24 NOTE — PROGRESS NOTES
and 16 respectively. He also noted that she was remaining on a lower dose of Bumex despite stopping the Demadex and lowering the dose of the Bumex Bumex was lowered to 1 mg twice a day alternating with 1 mg daily. It was noted that the patient is maintained on Neurontin at 600 mg morning, lunch and dinner and at bedtime. This had been prescribed previously by Dr. Ning Guillermo for back pain. She also is on Cymbalta 60 and takes one in the morning and 1 in the evening prescribed by Dr. Ning Guillermo. Zanaflex had been prescribed by Dr. Ning Guillermo in my understanding is that she had taken 4 mg of Zanaflex this morning. She does see Dr. Cait Rosenbaum with a history of bipolar disorder. She is on Depakote but my understanding is that this is taken as 1000 mg but only at bedtime. Dr. 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 With the stopping of the torsemide in the Bumex, he did note that if indeed her potassium became depleted, it would be appropriate to increase her Aldactone to 50 mg 3 times daily. It was noted that he found aldosterone markedly increased at 72.4 with plasma renin activity increased at 14.55. 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 He also noted that her TSH was mildly elevated at 4.73 so he did increase her levothyroxine 0.1 up to 1 tablet 5 days weekly and 1-1/2 tablets on Sunday and Wednesday. He did also suggest that she perhaps has sleep apnea and would benefit from a sleep study. When her  Denae Chun who she called did show up here. He stated that she has been having terrible issues with her chronic back pain and has not been sleeping well at night. He did note that she has become somnolent in times past when she has had sleep deprivation. As noted, she was dizzy and somewhat short of breath initially.  We did place her on oxygen at low flow 2

## 2018-01-25 ENCOUNTER — HOSPITAL ENCOUNTER (OUTPATIENT)
Dept: SLEEP CENTER | Age: 58
Discharge: HOME | End: 2018-01-25
Payer: COMMERCIAL

## 2018-01-25 PROCEDURE — 95811 POLYSOM 6/>YRS CPAP 4/> PARM: CPT

## 2018-01-25 PROCEDURE — 95811 POLYSOM 6/>YRS CPAP 4/> PARM: CPT | Performed by: PSYCHIATRY & NEUROLOGY

## 2018-01-25 NOTE — ED PROVIDER NOTES
[facility]  eMERGENCY dEPARTMENT eNCOUnter      Pt Name: Dre Torres  MRN: [id number]  [place] 1960  Date of evaluation: 1/24/2018  Provider: Jose J Murphy MD    CHIEF COMPLAINT       Chief Complaint   Patient presents with    Altered Mental Status     lethargy/decreased O2 sats          HISTORY OF PRESENT ILLNESS   (Location/Symptom, Timing/Onset, Context/Setting, Quality, Duration, Modifying Factors, Severity)  Note limiting factors. Dre Torres is a 63 y.o. female who presents to the emergency department . She was apparently at her behavioral health physician's office. They felt patient was possibly oversedated. She is having difficulty staying awake. They sent her to ED for further evaluation. On presentation patient was lethargic but was speaking. He denies any specific complaints. She has a very long medication list including Xanax, Seroquel, and several other agents which could possibly sedate her. HPI    Nursing Notes were reviewed. REVIEW OF SYSTEMS    (2-9 systems for level 4, 10 or more for level 5)     Review of Systems   Constitutional: Positive for fatigue. Negative for diaphoresis and fever. HENT: Negative for sore throat and trouble swallowing. Eyes: Negative for visual disturbance. Respiratory: Negative for apnea, cough, chest tightness, shortness of breath and wheezing. Cardiovascular: Negative for chest pain, palpitations and leg swelling. Gastrointestinal: Negative for abdominal distention, abdominal pain, constipation, diarrhea and vomiting. Musculoskeletal: Negative for back pain and myalgias. Skin: Negative for pallor. Neurological: Positive for dizziness, speech difficulty and weakness. Negative for light-headedness and headaches. Psychiatric/Behavioral: Negative for behavioral problems and suicidal ideas. All other systems reviewed and are negative.            PAST MEDICAL HISTORY     Past Medical History:   Diagnosis Date    Bipolar I components within normal limits   CBC WITH AUTO DIFFERENTIAL - Abnormal; Notable for the following:     RBC 3.85 (*)     Hemoglobin 11.4 (*)     Hematocrit 36.1 (*)     MCHC 31.6 (*)     All other components within normal limits   COMPREHENSIVE METABOLIC PANEL W/ REFLEX TO MG FOR LOW K - Abnormal; Notable for the following:     Sodium 135 (*)     Chloride 89 (*)     CO2 33 (*)     Glucose 140 (*)     Calcium 8.5 (*)     Total Protein 6.2 (*)     Alb 3.3 (*)     All other components within normal limits   POTASSIUM, WHOLE BLOOD   ETHANOL   ACETAMINOPHEN LEVEL   SALICYLATE LEVEL   URINE DRUG SCREEN   TROPONIN   URINALYSIS       All other labs were within normal range or not returned as of this dictation. EMERGENCY DEPARTMENT COURSE and DIFFERENTIAL DIAGNOSIS/MDM:   Vitals:    Vitals:    01/24/18 1547 01/24/18 1639 01/24/18 1740 01/24/18 1805   BP: (!) 89/57 106/63 110/62 116/64   Pulse: 71 75  79   Resp: 16 16 18 18   Temp:    98 °F (36.7 °C)   TempSrc:    Oral   SpO2: 90% 91% (!) 86% 91%   Weight:       Height:           MDM  Number of Diagnoses or Management Options     Amount and/or Complexity of Data Reviewed  Clinical lab tests: ordered and reviewed  Tests in the radiology section of CPT®: ordered and reviewed    Risk of Complications, Morbidity, and/or Mortality  Presenting problems: moderate  Diagnostic procedures: moderate    Patient Progress  Patient progress: improved      Reassessment  Patient markedly improved after observation period in ED. She is able to ambulate under her own power. Pulse oximetry improved to 91% on room air. Patient is stable for discharge home. She was advised to discuss her medication list with her primary care physician within one to 2 days. She was advised not to take any medication that could possibly sedate her for at least 24 hours. CONSULTS:  None    PROCEDURES:  Unless otherwise noted below, none     Procedures    FINAL IMPRESSION      1.  Altered mental status,

## 2018-01-25 NOTE — ED NOTES
MD at bedside     Donna Santos, [facility]  01/24/18 1437

## 2018-01-26 ENCOUNTER — TELEPHONE (OUTPATIENT)
Dept: FAMILY MEDICINE CLINIC | Age: 58
End: 2018-01-26

## 2018-01-26 LAB
EKG P AXIS: 50 DEGREES
EKG P-R INTERVAL: 122 MS
EKG Q-T INTERVAL: 386 MS
EKG QRS DURATION: 88 MS
EKG QTC CALCULATION (BAZETT): 408 MS
EKG T AXIS: 38 DEGREES

## 2018-01-30 ENCOUNTER — OFFICE VISIT (OUTPATIENT)
Dept: PSYCHIATRY | Age: 58
End: 2018-01-30
Payer: COMMERCIAL

## 2018-01-30 DIAGNOSIS — F32.A DEPRESSIVE DISORDER: Primary | ICD-10-CM

## 2018-01-30 PROCEDURE — 90834 PSYTX W PT 45 MINUTES: CPT | Performed by: SOCIAL WORKER

## 2018-01-30 PROCEDURE — 99999 PR OFFICE/OUTPT VISIT,PROCEDURE ONLY: CPT | Performed by: SOCIAL WORKER

## 2018-01-30 ASSESSMENT — PATIENT HEALTH QUESTIONNAIRE - PHQ9
6. FEELING BAD ABOUT YOURSELF - OR THAT YOU ARE A FAILURE OR HAVE LET YOURSELF OR YOUR FAMILY DOWN: 3
5. POOR APPETITE OR OVEREATING: 3
9. THOUGHTS THAT YOU WOULD BE BETTER OFF DEAD, OR OF HURTING YOURSELF: 1
SUM OF ALL RESPONSES TO PHQ QUESTIONS 1-9: 23
8. MOVING OR SPEAKING SO SLOWLY THAT OTHER PEOPLE COULD HAVE NOTICED. OR THE OPPOSITE, BEING SO FIGETY OR RESTLESS THAT YOU HAVE BEEN MOVING AROUND A LOT MORE THAN USUAL: 2
10. IF YOU CHECKED OFF ANY PROBLEMS, HOW DIFFICULT HAVE THESE PROBLEMS MADE IT FOR YOU TO DO YOUR WORK, TAKE CARE OF THINGS AT HOME, OR GET ALONG WITH OTHER PEOPLE: 2
4. FEELING TIRED OR HAVING LITTLE ENERGY: 3
2. FEELING DOWN, DEPRESSED OR HOPELESS: 3
7. TROUBLE CONCENTRATING ON THINGS, SUCH AS READING THE NEWSPAPER OR WATCHING TELEVISION: 2
3. TROUBLE FALLING OR STAYING ASLEEP: 3
SUM OF ALL RESPONSES TO PHQ9 QUESTIONS 1 & 2: 6
1. LITTLE INTEREST OR PLEASURE IN DOING THINGS: 3

## 2018-01-30 NOTE — PATIENT INSTRUCTIONS
behaviors, emotions, and environmental and biological factors can cause, maintain, and worsen depression. Fortunately, there are effective ways of breaking the spiral of depression. Since all the factors are interrelated (with one aspect affecting the others), even making small changes in just one or two areas can gradually lead to significant improvements in other areas. For example, Jen Guerrero noticed her worsening moods and decided to take action to break the depression spiral.  She focused first on one area that she felt would be easy to change:  her behaviors. Specifically, she wanted to make changes in how she spent her time in the evenings after work. She made a goal of spending 30 minutes each evening relaxing and doing fun activities with her family (behaviors). After several weeks, she noticed that she was beginning to feel lower levels of stress and her sleep began to improve (biological factor). Feeling more rested and better able to concentrate (biological factors) increased her belief that she could effectively manage the demands of her new job (thoughts). She noticed that she had fewer days of feeling down and depressed (emotions). Other people working to improve their depression may choose to focus initially on other areas. Some people benefit from starting an antidepressant medication (biological factor) to begin to break the depression cycle. Others focus first on increasing regular physical exercise (a behavioral and biological factor that may help decrease depression), recognizing and changing thought patterns that contribute to depression or worry (thoughts), or learning and trying out new behaviors to improve work or family situations (behaviors, e.g., problem-solving, effective communication, time management, etc.). As you can see, there are a variety of coping methods and behavioral strategies that may be helpful in decreasing depression.   It is probably not in your best interests to try all or too many strategies at any one time. Rather, keep it simple and do not overwhelm yourself. It is usually best to pick one or two strategies that sound most relevant to you, try those coping strategies for a few weeks or longer, and then move on to other coping strategies that you think may be important later on. And remember -- even if you are just working directly on one or two coping strategies, you will probably be having an indirect positive effect on other areas. Your Behavioral Health Consultant [facility]) can work with you to develop skills in some of the most effective strategies for breaking the depression spiral and decreasing depression. Four effective strategies include:    _____  a. Increasing rewarding activities    _____  b. Taking antidepressant medication as directed by PCP     _____  c. Increasing physical exercise     _____  d. Increasing balanced thinking     Talk with your [employer] [facility] about which of the above you are interested in working on to better manage your depression. Depression is an extremely common problem     Estimates are that up to 25 million, or one in ten Americans, experience depression each year and that over half of us will experience a depressive episode at some time in our lives. Depression has been called the worlds number one public health problem both because it is so prevalent and because it makes any other pre-existing health problems even worse. The fact that a large part of our population will experience significant depressive symptoms at some point in their lives suggests that depression is not a sign of weakness.   To a large degree, it is often just part of life. Sleep Hygiene Guidelines    Good dental hygiene is important in determining the health of your teeth and gums. We all know we are supposed to brush and floss regularly. Those who do so are more likely to have strong, healthy gums and less cavities.    Similarly good sleep hygiene is important in determining the quality and quantity of your sleep. Below are guidelines for good sleep hygiene practices. Review these guidelines and evaluate how well you practice good sleep hygiene. Caffeine:  Avoid Caffeine 6-8 Hours Before Bedtime       Caffeine disturbs sleep, even in people who do not think they experience a stimulation effect. Individuals with insomnia are often more sensitive to mild stimulants than are normal sleepers. Caffeine is found in items such as coffee, tea, soda, chocolate, and many over-the-counter medications (e.g., Excedrin). Thus, drinking caffeinated beverages should be avoided near bedtime and during the night. You might consider a trial period of no caffeine if you tend to be sensitive to its effects. Nicotine:  Avoid Nicotine Before Bedtime       Although some smokers claim that smoking helps them relax, but nicotine is a stimulant. The initial relaxing effects occur with the initial entry of the nicotine, but as the nicotine builds in the system it produces an effect similar to caffeine. Thus, smoking, dipping, or chewing tobacco should be avoided near bedtime and during the night. Dont smoke to get yourself back to sleep. Alcohol:  Avoid Alcohol After Dinner       Alcohol often promotes the onset of sleep, but as alcohol is metabolized sleep becomes disturbed and fragmented. Thus, a large amount of alcohol is a poor sleep aid and should not be used as such. Limit alcohol use to small quantities to moderate quantities. Sleeping Pills:  Sleep Medications are Effective Only Temporarily       Scientists have shown that sleep medications lose their effectiveness in about 2 - 4 weeks when taken regularly. Despite advertisements to the contrary, over-the-counter sleeping aids have little impact on sleep beyond the placebo effect. Over time, sleeping pills actually can make sleep problems worse.   When sleeping pills have been used for meals and spices in the evening. Do not go to bed too hungry or too full. It may help to elevate you head with some pillows. Avoid Naps       Avoid naps, the sleep you obtain during the day takes away from you sleep need that night resulting in lighter, more restless sleep, difficulty falling asleep or early morning awakening. If you must nap, keep it brief, and take the nap about 8 hours after arising. It is best to set an alarm to ensure you dont sleep more than 10-15 minutes. Limit Your Time in Bed        Restrict your sleep period to the average number of hours you have actually slept per night during the preceding week. Quality of sleep is important. Too much time in bed can decrease the quality on subsequent night and contribute to the maintenance of existing sleep problems. Dont lay in bed for extended times not sleep. If you arent asleep in about 15-20 minutes go ahead and get up. Do something outside the bedroom that is relaxing. When you feel sleepy (i.e., yawning, head bobbing, eyes closing, concentration decreasing, then return to bed. Dont confuse tiredness with sleepiness, they are different. Tiredness doesnt lead to sleep, only sleepiness does. Regular Sleep Schedule       Keep a regular time each day, 7 days a week, to get out of bed. Keeping a regular awaking time helps set your circadian rhythm set so that your body learns to sleep at the desired time. Use the attached form to develop a plan for improving you sleep hygiene. It will take time for you sleep to get back in line so once you begin your sleep hygiene plan, stick with if for at least 6-8 weeks. Planned Improvements of My Sleep Hygiene    Check Those  That Apply  _____ Avoid Caffeine 6-8 Hours Before Bedtime. I will not have caffeine after ________ PM.    ____ Avoid Nicotine Before Bedtime. I will not have a cigarette after _________ PM.    ______  Limit Alcohol Use.   I will not have more than _______ drinks in the evening.    ______ Avoid Use of Sleeping Pills. (If you are currently using them regularly, all changes should be   medical supervised by your medical provider). ______ Do Exercise Regularly, But Not Within 2 Hours of Bedtime. I ________________ for ____   minutes, on the following days ____________________________________________________    ______ Ensure your Bedroom is a Comfortable Temperature, Quiet, and Dark and Your   Mattress and Pillow are good. I will make the  following changes to my bedroom   ____________________________________________________________________________    ______ Do Take a Hot Bath 1-2 Hours Prior to Bedtime. I will take a hot bath about ______ PM.    ______ Eat a Light Snack at Bedtime but Avoid Large or Problematic Foods. I will eat     __________________  or _____________________ or __________________ before bed.    ______ Avoid Naps. I try not to nap, if I must, I will limit it to _______ minutes, about 8 hours after I   awoke and will use alarm to limit my nap time. ______ Limit Time In Bed. I have been sleeping on average ______ hours per night, therefore I will   limit my time in bed to _____ hours (the same number). If Im not asleep in about 15 to 20   minutes I will get up and not return to bed until Im sleepy.    ______ Stay on a Regular Sleep Schedule  I will get up at _______ AM, 7 days a week, no matter   how poorly I slept that night.

## 2018-01-30 NOTE — PROGRESS NOTES
tablet Take 20 mg by mouth daily      niacin (SLO-NIACIN) 500 MG extended release tablet Take 500 mg by mouth nightly      carvedilol (COREG) 6.25 MG tablet TAKE 1 TABLET BY MOUTH TWO  TIMES DAILY WITH MEALS 180 tablet 0    bumetanide (BUMEX) 1 MG tablet Take 1 tablet by mouth 2 times daily 60 tablet 2    Mirabegron ER 50 MG TB24 1 po every evening for bladder 90 tablet 1    Omega-3 Fatty Acids (FISH OIL) 1000 MG CAPS Take 1,000 mg by mouth daily      HYDROcodone-acetaminophen (NORCO) 7.5-325 MG per tablet Take 1 tablet by mouth every 6 hours as needed for Pain .  potassium chloride (KLOR-CON M) 20 MEQ extended release tablet Take 1 tablet by mouth daily 30 tablet 5    levothyroxine (SYNTHROID) 100 MCG tablet Take 1 tablet by mouth Daily (Patient taking differently: Take 100 mcg by mouth Daily ) 30 tablet 5    atorvastatin (LIPITOR) 40 MG tablet Take 1 tablet by mouth daily 30 tablet 5    spironolactone (ALDACTONE) 50 MG tablet Take 1 tablet by mouth 2 times daily 60 tablet 5    QUEtiapine (SEROQUEL) 25 MG tablet Take 25 mg by mouth nightly      gabapentin (NEURONTIN) 600 MG tablet Take 600 mg by mouth 3 times daily      Tens Unit MISC by Does not apply route Apply and use tid prn  Dx -lumbar pain  Dispense tens and supplies 1 each 0    UNABLE TO FIND EMPI TENs unit pads  Dx-lumbar pain 1 Device 0    tiZANidine (ZANAFLEX) 4 MG tablet Take 4 mg by mouth nightly      pilocarpine (SALAGEN) 5 MG tablet Take 5 mg by mouth 3 times daily      Vitamin D (CHOLECALCIFEROL) 1000 UNITS CAPS capsule Take 1,000 Units by mouth daily      ALPRAZolam (XANAX) 0.5 MG tablet Take 0.5 mg by mouth nightly as needed for Sleep      Multiple Vitamins-Minerals (MULTIVITAMIN PO) Take by mouth daily      DULoxetine (CYMBALTA) 60 MG capsule Take 60 mg by mouth 2 times daily       traMADol (ULTRAM) 50 MG tablet Take 100 mg by mouth every 12 hours       calcium carbonate 600 MG TABS tablet Take 1 tablet by mouth daily.  divalproex (DEPAKOTE) 500 MG ER tablet Take 1,000 mg by mouth nightly.  pramipexole (MIRAPEX) 1 MG tablet Take 1 mg by mouth 2 times daily Indications: Take 1 tablet in the morning and 1 and a 1/2 at night Take 1 tablet in the morning and 1 and a 1/2 at night       No current facility-administered medications for this visit. Social History:   Social History     Social History    Marital status:      Spouse name: N/A    Number of children: 2    Years of education: N/A     Occupational History     [employer]     Social History Main Topics    Smoking status: Current Every Day Smoker     Packs/day: 1.00     Years: 30.00     Types: Cigarettes     Last attempt to quit: 7/20/2003    Smokeless tobacco: Never Used    Alcohol use Yes      Comment: rarely    Drug use: No    Sexual activity: Not on file     Other Topics Concern    Not on file     Social History Narrative    No narrative on file      Social History   Substance Use Topics    Smoking status: Current Every Day Smoker     Packs/day: 1.00     Years: 30.00     Types: Cigarettes     Last attempt to quit: 7/20/2003    Smokeless tobacco: Never Used    Alcohol use Yes      Comment: rarely       Family History:   Family History   Problem Relation Age of Onset    Cancer Mother      lung    Heart Disease Mother     Hypertension Mother     Bipolar Disorder Mother     Heart Disease Father     Hypertension Father        Medical History:  Past Medical History:   Diagnosis Date    Bipolar I disorder, most recent episode (or current) mixed, unspecified     Chronic back pain     Dry mouth     Hypertension     Hypokalemia     Menopause     Overactive bladder     Plantar fasciitis     RLS (restless legs syndrome)           A:  ----------------------------------------------------------------------------------------------------------------------  Diagnosis:    1.  Depressive disorder           PHQ Scores 1/30/2018   PHQ2 Score 6   PHQ9 Score 23     Interpretation of Total Score Depression Severity: 1-4 = Minimal depression, 5-9 = Mild depression, 10-14 = Moderate depression, 15-19 = Moderately severe depression, 20-27 = Severe depression    [facility] and patient completed PHQ-9 in session today. She presents with some significant depressive symptoms and [facility] recommended pt speak with her psychiatrist about addressing depression symptoms. Discussion about medications possibly being ineffective, or due to lack of a proper nights sleep pt may be experiencing significantly more depressive symptoms. Presents as at no risk of harm to herself or others. P:  ----------------------------------------------------------------------------------------------------------------------  Pt interventions:    General:   [x] Conducted functional assessment   [x] Supportive interventions    [x] Ada-setting to identify pt's primary goals for [facility] visit / overall health   [x] Provided psychoeducation/handout on Depression management. Pt Behavioral Change Plan:  1. Recommended to speak with her Psychiatrist about her Depression Score of 23 = Severe Depression. 2.  Provided information to patient about managing depression and the depression spiral.    OVERCOMING DEPRESSION    This booklet is designed to provide information about strategies for overcoming depression. It discusses a model or framework for understanding depression (the depression spiral) and presents an overview of treatment of depression, including use of medication and strategic coping strategies. The booklet is designed to be used with the assistance of your Behavioral Health Consultant. The Depression Spiral    The figure below depicts one helpful way to think about and understand depression. Our life experience (including depression) is influenced by a number of interrelated factors: our environment, biological factors, our thoughts and beliefs, our behaviors, and our emotions. The initial relaxing effects occur with the initial entry of the nicotine, but as the nicotine builds in the system it produces an effect similar to caffeine. Thus, smoking, dipping, or chewing tobacco should be avoided near bedtime and during the night. Dont smoke to get yourself back to sleep. Alcohol:  Avoid Alcohol After Dinner       Alcohol often promotes the onset of sleep, but as alcohol is metabolized sleep becomes disturbed and fragmented. Thus, a large amount of alcohol is a poor sleep aid and should not be used as such. Limit alcohol use to small quantities to moderate quantities. Sleeping Pills:  Sleep Medications are Effective Only Temporarily       Scientists have shown that sleep medications lose their effectiveness in about 2 - 4 weeks when taken regularly. Despite advertisements to the contrary, over-the-counter sleeping aids have little impact on sleep beyond the placebo effect. Over time, sleeping pills actually can make sleep problems worse. When sleeping pills have been used for a long period, withdrawal from the medication can lead to an insomnia rebound. Thus, after long-term use, many individuals incorrectly conclude that they need sleeping pills in order to sleep normally. Keep use of sleep pills infrequent, but dont worry if you need t use one on an occasional basis. Regular Exercise       Get regular exercise, preferably 40 minutes each day of an activity that causes sweating. .  Exercise in the late afternoon or early evening seems to aid sleep, although the positive effect often takes several weeks to become noticeable. Exercising sporadically is not likely to improve sleep, and exercise within 2 hours of bedtime may elevate nervous system activity and interfere with sleep onset. Hot Baths  Spending 20 minutes in a tub of hot water an hour or two prior to bedtime may promote sleep and is strongly recommended.         Bedroom Environment: Moderate Temperature, Quiet, and Dark       Extremes of heat or cold can disrupt sleep. A quiet environment is more sleep promoting than a noisy one. Noises can be masked with background white noise (such as the noise of a fan) or with earplugs. Bedrooms may be darkened with black-out shades or sleep masks can be worn. Position clocks out-of-sight since clock-watching can increase worry about the effects of lack of sleep. Be sure your mattress is not too soft or too firm and that your pillow is the right height and firmness. Eating       A light bedtime snack, such a glass of warm milk, cheese, or a bowl of cereal can promote sleep. You should avoid the following foods at bedtime:  any caffeinated foods (e.g., chocolate), peanuts, beans, most raw fruits and vegetables (since they may cause gas), and high-fat foods such as potato chips or corn chips. Avoid snacks in the middle of the nights since awakening may become associated with hunger. If you have trouble with regurgitation, be especially careful to avid heavy meals and spices in the evening. Do not go to bed too hungry or too full. It may help to elevate you head with some pillows. Avoid Naps       Avoid naps, the sleep you obtain during the day takes away from you sleep need that night resulting in lighter, more restless sleep, difficulty falling asleep or early morning awakening. If you must nap, keep it brief, and take the nap about 8 hours after arising. It is best to set an alarm to ensure you dont sleep more than 10-15 minutes. Limit Your Time in Bed        Restrict your sleep period to the average number of hours you have actually slept per night during the preceding week. Quality of sleep is important. Too much time in bed can decrease the quality on subsequent night and contribute to the maintenance of existing sleep problems. Dont lay in bed for extended times not sleep. If you arent asleep in about 15-20 minutes go ahead and get up.   Do something outside the bedroom that is relaxing. When you feel sleepy (i.e., yawning, head bobbing, eyes closing, concentration decreasing, then return to bed. Dont confuse tiredness with sleepiness, they are different. Tiredness doesnt lead to sleep, only sleepiness does. Regular Sleep Schedule       Keep a regular time each day, 7 days a week, to get out of bed. Keeping a regular awaking time helps set your circadian rhythm set so that your body learns to sleep at the desired time. Use the attached form to develop a plan for improving you sleep hygiene. It will take time for you sleep to get back in line so once you begin your sleep hygiene plan, stick with if for at least 6-8 weeks. Planned Improvements of My Sleep Hygiene    Check Those  That Apply  _____ Avoid Caffeine 6-8 Hours Before Bedtime. I will not have caffeine after ________ PM.    ____ Avoid Nicotine Before Bedtime. I will not have a cigarette after _________ PM.    ______  Limit Alcohol Use. I will not have more than _______ drinks in the evening.    ______ Avoid Use of Sleeping Pills. (If you are currently using them regularly, all changes should be   medical supervised by your medical provider). ______ Do Exercise Regularly, But Not Within 2 Hours of Bedtime. I ________________ for ____   minutes, on the following days ____________________________________________________    ______ Ensure your Bedroom is a Comfortable Temperature, Quiet, and Dark and Your   Mattress and Pillow are good. I will make the  following changes to my bedroom   ____________________________________________________________________________    ______ Do Take a Hot Bath 1-2 Hours Prior to Bedtime. I will take a hot bath about ______ PM.    ______ Eat a Light Snack at Bedtime but Avoid Large or Problematic Foods. I will eat     __________________  or _____________________ or __________________ before bed.    ______ Avoid Naps.   I try not to nap, if I

## 2018-02-11 DIAGNOSIS — G47.33 OBSTRUCTIVE SLEEP APNEA: Primary | ICD-10-CM

## 2018-02-12 RX ORDER — ATORVASTATIN CALCIUM 40 MG/1
TABLET, FILM COATED ORAL
Qty: 30 TABLET | Refills: 0 | Status: SHIPPED | OUTPATIENT
Start: 2018-02-12 | End: 2018-03-01 | Stop reason: SDUPTHER

## 2018-02-13 ENCOUNTER — OFFICE VISIT (OUTPATIENT)
Dept: PSYCHIATRY | Age: 58
End: 2018-02-13
Payer: COMMERCIAL

## 2018-02-13 DIAGNOSIS — F32.A DEPRESSIVE DISORDER: Primary | ICD-10-CM

## 2018-02-13 PROCEDURE — 90832 PSYTX W PT 30 MINUTES: CPT | Performed by: SOCIAL WORKER

## 2018-02-13 NOTE — PATIENT INSTRUCTIONS
1.  Recommended to go to Sleep about 9:30 or 10:00 p.m. To get your sleep machine ready for sleep. 2.  Return in April after IOP is finished. Call Leah Vanegas if you need to reschedule or come in at [phone]. Sleep Hygiene Guidelines    Good dental hygiene is important in determining the health of your teeth and gums. We all know we are supposed to brush and floss regularly. Those who do so are more likely to have strong, healthy gums and less cavities. Similarly good sleep hygiene is important in determining the quality and quantity of your sleep. Below are guidelines for good sleep hygiene practices. Review these guidelines and evaluate how well you practice good sleep hygiene. Caffeine:  Avoid Caffeine 6-8 Hours Before Bedtime       Caffeine disturbs sleep, even in people who do not think they experience a stimulation effect. Individuals with insomnia are often more sensitive to mild stimulants than are normal sleepers. Caffeine is found in items such as coffee, tea, soda, chocolate, and many over-the-counter medications (e.g., Excedrin). Thus, drinking caffeinated beverages should be avoided near bedtime and during the night. You might consider a trial period of no caffeine if you tend to be sensitive to its effects. Nicotine:  Avoid Nicotine Before Bedtime       Although some smokers claim that smoking helps them relax, but nicotine is a stimulant. The initial relaxing effects occur with the initial entry of the nicotine, but as the nicotine builds in the system it produces an effect similar to caffeine. Thus, smoking, dipping, or chewing tobacco should be avoided near bedtime and during the night. Dont smoke to get yourself back to sleep. Alcohol:  Avoid Alcohol After Dinner       Alcohol often promotes the onset of sleep, but as alcohol is metabolized sleep becomes disturbed and fragmented. Thus, a large amount of alcohol is a poor sleep aid and should not be used as such. Limit alcohol use to small quantities to moderate quantities. Sleeping Pills:  Sleep Medications are Effective Only Temporarily       Scientists have shown that sleep medications lose their effectiveness in about 2 - 4 weeks when taken regularly. Despite advertisements to the contrary, over-the-counter sleeping aids have little impact on sleep beyond the placebo effect. Over time, sleeping pills actually can make sleep problems worse. When sleeping pills have been used for a long period, withdrawal from the medication can lead to an insomnia rebound. Thus, after long-term use, many individuals incorrectly conclude that they need sleeping pills in order to sleep normally. Keep use of sleep pills infrequent, but dont worry if you need t use one on an occasional basis. Regular Exercise       Get regular exercise, preferably 40 minutes each day of an activity that causes sweating. .  Exercise in the late afternoon or early evening seems to aid sleep, although the positive effect often takes several weeks to become noticeable. Exercising sporadically is not likely to improve sleep, and exercise within 2 hours of bedtime may elevate nervous system activity and interfere with sleep onset. Hot Baths  Spending 20 minutes in a tub of hot water an hour or two prior to bedtime may promote sleep and is strongly recommended. Bedroom Environment: Moderate Temperature, Quiet, and Dark       Extremes of heat or cold can disrupt sleep. A quiet environment is more sleep promoting than a noisy one. Noises can be masked with background white noise (such as the noise of a fan) or with earplugs. Bedrooms may be darkened with black-out shades or sleep masks can be worn. Position clocks out-of-sight since clock-watching can increase worry about the effects of lack of sleep. Be sure your mattress is not too soft or too firm and that your pillow is the right height and firmness.     Eating       A light bedtime snack, such a glass of warm milk, cheese, or a bowl of cereal can promote sleep. You should avoid the following foods at bedtime:  any caffeinated foods (e.g., chocolate), peanuts, beans, most raw fruits and vegetables (since they may cause gas), and high-fat foods such as potato chips or corn chips. Avoid snacks in the middle of the nights since awakening may become associated with hunger. If you have trouble with regurgitation, be especially careful to avid heavy meals and spices in the evening. Do not go to bed too hungry or too full. It may help to elevate you head with some pillows. Avoid Naps       Avoid naps, the sleep you obtain during the day takes away from you sleep need that night resulting in lighter, more restless sleep, difficulty falling asleep or early morning awakening. If you must nap, keep it brief, and take the nap about 8 hours after arising. It is best to set an alarm to ensure you dont sleep more than 10-15 minutes. Limit Your Time in Bed        Restrict your sleep period to the average number of hours you have actually slept per night during the preceding week. Quality of sleep is important. Too much time in bed can decrease the quality on subsequent night and contribute to the maintenance of existing sleep problems. Dont lay in bed for extended times not sleep. If you arent asleep in about 15-20 minutes go ahead and get up. Do something outside the bedroom that is relaxing. When you feel sleepy (i.e., yawning, head bobbing, eyes closing, concentration decreasing, then return to bed. Dont confuse tiredness with sleepiness, they are different. Tiredness doesnt lead to sleep, only sleepiness does. Regular Sleep Schedule       Keep a regular time each day, 7 days a week, to get out of bed. Keeping a regular awaking time helps set your circadian rhythm set so that your body learns to sleep at the desired time.      Use the attached form to develop a plan for

## 2018-02-13 NOTE — PROGRESS NOTES
Behavioral Health Consultation  Hui Olguin [place]  2/13/2018  8:46 AM      Time spent with Patient: 30 minutes  This is patient's fifth  [facility] appointment. Reason for Consult:    Chief Complaint   Patient presents with    Depression    Stress     Referring Provider: No referring provider defined for this encounter. Feedback given to PCP. S:  CC:  Stress/Depressed Mood  Followed through with sleep study and says she has sleep apnea  Discussed Sleep Apnea  Starting IOP- her psychiatrist referred her for the program.     Discussed program's objectives in session   Discussed occurrences since last session; stressors with special needs grandchild. Pt discussed strengths of child as well as needs. Provided care for child has been challenging to pt. Pt shares she has an awareness that her health has not been the best and has the impression that stress is a contributing factor. Pt shared that her  wants her to go to bed at the same time that he does but she hasn't been able to do so.     Discussed recent dental procedures, fibromyalgia issues   O:  MSE:    Appearance    cooperative, smiling  Appetite normal  Sleep disturbance Yes and sleep apnea diag  Fatigue Yes  Loss of pleasure Yes  Impulsive behavior No  Speech    spontaneous and normal rate  Mood    Euthymic in presentation   Affect    normal affect  Thought Content    intact  Thought Process    coherent  Associations    logical connections  Insight    Fair  Judgment    Impaired  Orientation    oriented to person, place, time, and general circumstances  Memory    recent and remote memory intact  Attention/Concentration    impaired  Morbid ideation No  Suicide Assessment    no suicidal ideation      History:    Medications:   Current Outpatient Prescriptions   Medication Sig Dispense Refill    atorvastatin (LIPITOR) 40 MG tablet TAKE ONE TABLET BY MOUTH DAILY 30 tablet 0    allopurinol (ZYLOPRIM) 100 MG tablet TAKE ONE TABLET BY MOUTH DAILY 30 tablet 0    ARIPiprazole (ABILIFY) 20 MG tablet Take 20 mg by mouth daily      niacin (SLO-NIACIN) 500 MG extended release tablet Take 500 mg by mouth nightly      carvedilol (COREG) 6.25 MG tablet TAKE 1 TABLET BY MOUTH TWO  TIMES DAILY WITH MEALS 180 tablet 0    bumetanide (BUMEX) 1 MG tablet Take 1 tablet by mouth 2 times daily 60 tablet 2    Mirabegron ER 50 MG TB24 1 po every evening for bladder 90 tablet 1    Omega-3 Fatty Acids (FISH OIL) 1000 MG CAPS Take 1,000 mg by mouth daily      HYDROcodone-acetaminophen (NORCO) 7.5-325 MG per tablet Take 1 tablet by mouth every 6 hours as needed for Pain .  potassium chloride (KLOR-CON M) 20 MEQ extended release tablet Take 1 tablet by mouth daily 30 tablet 5    levothyroxine (SYNTHROID) 100 MCG tablet Take 1 tablet by mouth Daily (Patient taking differently: Take 100 mcg by mouth Daily ) 30 tablet 5    spironolactone (ALDACTONE) 50 MG tablet Take 1 tablet by mouth 2 times daily 60 tablet 5    QUEtiapine (SEROQUEL) 25 MG tablet Take 25 mg by mouth nightly      gabapentin (NEURONTIN) 600 MG tablet Take 600 mg by mouth 3 times daily      Tens Unit MISC by Does not apply route Apply and use tid prn  Dx -lumbar pain  Dispense tens and supplies 1 each 0    UNABLE TO FIND EMPI TENs unit pads  Dx-lumbar pain 1 Device 0    tiZANidine (ZANAFLEX) 4 MG tablet Take 4 mg by mouth nightly      pilocarpine (SALAGEN) 5 MG tablet Take 5 mg by mouth 3 times daily      Vitamin D (CHOLECALCIFEROL) 1000 UNITS CAPS capsule Take 1,000 Units by mouth daily      ALPRAZolam (XANAX) 0.5 MG tablet Take 0.5 mg by mouth nightly as needed for Sleep      Multiple Vitamins-Minerals (MULTIVITAMIN PO) Take by mouth daily      DULoxetine (CYMBALTA) 60 MG capsule Take 60 mg by mouth 2 times daily       traMADol (ULTRAM) 50 MG tablet Take 100 mg by mouth every 12 hours       calcium carbonate 600 MG TABS tablet Take 1 tablet by mouth daily.       divalproex although the positive effect often takes several weeks to become noticeable. Exercising sporadically is not likely to improve sleep, and exercise within 2 hours of bedtime may elevate nervous system activity and interfere with sleep onset. Hot Baths  Spending 20 minutes in a tub of hot water an hour or two prior to bedtime may promote sleep and is strongly recommended. Bedroom Environment: Moderate Temperature, Quiet, and Dark       Extremes of heat or cold can disrupt sleep. A quiet environment is more sleep promoting than a noisy one. Noises can be masked with background white noise (such as the noise of a fan) or with earplugs. Bedrooms may be darkened with black-out shades or sleep masks can be worn. Position clocks out-of-sight since clock-watching can increase worry about the effects of lack of sleep. Be sure your mattress is not too soft or too firm and that your pillow is the right height and firmness. Eating       A light bedtime snack, such a glass of warm milk, cheese, or a bowl of cereal can promote sleep. You should avoid the following foods at bedtime:  any caffeinated foods (e.g., chocolate), peanuts, beans, most raw fruits and vegetables (since they may cause gas), and high-fat foods such as potato chips or corn chips. Avoid snacks in the middle of the nights since awakening may become associated with hunger. If you have trouble with regurgitation, be especially careful to avid heavy meals and spices in the evening. Do not go to bed too hungry or too full. It may help to elevate you head with some pillows. Avoid Naps       Avoid naps, the sleep you obtain during the day takes away from you sleep need that night resulting in lighter, more restless sleep, difficulty falling asleep or early morning awakening. If you must nap, keep it brief, and take the nap about 8 hours after arising.   It is best to set an alarm to ensure you dont sleep more than 10-15 make the  following changes to my bedroom   ____________________________________________________________________________    ______ Do Take a Hot Bath 1-2 Hours Prior to Bedtime. I will take a hot bath about ______ PM.    ______ Eat a Light Snack at Bedtime but Avoid Large or Problematic Foods. I will eat     __________________  or _____________________ or __________________ before bed.    ______ Avoid Naps. I try not to nap, if I must, I will limit it to _______ minutes, about 8 hours after I   awoke and will use alarm to limit my nap time. ______ Limit Time In Bed. I have been sleeping on average ______ hours per night, therefore I will   limit my time in bed to _____ hours (the same number). If Im not asleep in about 15 to 20   minutes I will get up and not return to bed until Im sleepy.    ______ Stay on a Regular Sleep Schedule  I will get up at _______ AM, 7 days a week, no matter   how poorly I slept that night.

## 2018-02-14 ENCOUNTER — OFFICE VISIT (OUTPATIENT)
Dept: FAMILY MEDICINE CLINIC | Age: 58
End: 2018-02-14
Payer: COMMERCIAL

## 2018-02-14 ENCOUNTER — TELEPHONE (OUTPATIENT)
Dept: NEUROLOGY | Age: 58
End: 2018-02-14

## 2018-02-14 VITALS
BODY MASS INDEX: 36.13 KG/M2 | HEART RATE: 86 BPM | OXYGEN SATURATION: 94 % | SYSTOLIC BLOOD PRESSURE: 122 MMHG | DIASTOLIC BLOOD PRESSURE: 84 MMHG | WEIGHT: 185 LBS | TEMPERATURE: 98 F | RESPIRATION RATE: 16 BRPM

## 2018-02-14 DIAGNOSIS — R25.1 TREMOR: ICD-10-CM

## 2018-02-14 DIAGNOSIS — N39.3 STRESS INCONTINENCE OF URINE: Primary | ICD-10-CM

## 2018-02-14 DIAGNOSIS — N39.3 STRESS INCONTINENCE OF URINE: ICD-10-CM

## 2018-02-14 LAB
ALBUMIN SERPL-MCNC: 3.3 G/DL (ref 3.5–5.2)
ALP BLD-CCNC: 92 U/L (ref 35–104)
ALT SERPL-CCNC: 15 U/L (ref 5–33)
ANION GAP SERPL CALCULATED.3IONS-SCNC: 15 MMOL/L (ref 7–19)
AST SERPL-CCNC: 15 U/L (ref 5–32)
BASOPHILS ABSOLUTE: 0.1 K/UL (ref 0–0.2)
BASOPHILS RELATIVE PERCENT: 0.6 % (ref 0–1)
BILIRUB SERPL-MCNC: 0.3 MG/DL (ref 0.2–1.2)
BILIRUBIN URINE: ABNORMAL
BLOOD, URINE: NEGATIVE
BUN BLDV-MCNC: 15 MG/DL (ref 6–20)
CALCIUM SERPL-MCNC: 9.4 MG/DL (ref 8.6–10)
CHLORIDE BLD-SCNC: 90 MMOL/L (ref 98–111)
CLARITY: CLEAR
CO2: 29 MMOL/L (ref 22–29)
COLOR: ABNORMAL
CREAT SERPL-MCNC: 0.8 MG/DL (ref 0.5–0.9)
EOSINOPHILS ABSOLUTE: 0.1 K/UL (ref 0–0.6)
EOSINOPHILS RELATIVE PERCENT: 0.5 % (ref 0–5)
GFR NON-AFRICAN AMERICAN: >60
GLUCOSE BLD-MCNC: 117 MG/DL (ref 74–109)
GLUCOSE URINE: NEGATIVE MG/DL
HCT VFR BLD CALC: 40 % (ref 37–47)
HEMOGLOBIN: 12.3 G/DL (ref 12–16)
KETONES, URINE: ABNORMAL MG/DL
LEUKOCYTE ESTERASE, URINE: NEGATIVE
LYMPHOCYTES ABSOLUTE: 3.3 K/UL (ref 1.1–4.5)
LYMPHOCYTES RELATIVE PERCENT: 25.7 % (ref 20–40)
MAGNESIUM: 2.1 MG/DL (ref 1.6–2.6)
MCH RBC QN AUTO: 28.6 PG (ref 27–31)
MCHC RBC AUTO-ENTMCNC: 30.8 G/DL (ref 33–37)
MCV RBC AUTO: 93 FL (ref 81–99)
MONOCYTES ABSOLUTE: 1 K/UL (ref 0–0.9)
MONOCYTES RELATIVE PERCENT: 7.4 % (ref 0–10)
NEUTROPHILS ABSOLUTE: 8.2 K/UL (ref 1.5–7.5)
NEUTROPHILS RELATIVE PERCENT: 63.4 % (ref 50–65)
NITRITE, URINE: NEGATIVE
PDW BLD-RTO: 14.6 % (ref 11.5–14.5)
PH UA: 7
PLATELET # BLD: 475 K/UL (ref 130–400)
PMV BLD AUTO: 10.7 FL (ref 9.4–12.3)
POTASSIUM SERPL-SCNC: 4.6 MMOL/L (ref 3.5–5)
PROTEIN UA: NEGATIVE MG/DL
RBC # BLD: 4.3 M/UL (ref 4.2–5.4)
SODIUM BLD-SCNC: 134 MMOL/L (ref 136–145)
SPECIFIC GRAVITY UA: 1.02
T4 FREE: 1.8 NG/DL (ref 0.9–1.7)
TOTAL PROTEIN: 7 G/DL (ref 6.6–8.7)
TSH SERPL DL<=0.05 MIU/L-ACNC: 2.86 UIU/ML (ref 0.27–4.2)
URINE REFLEX TO CULTURE: ABNORMAL
UROBILINOGEN, URINE: 1 E.U./DL
VITAMIN B-12: 735 PG/ML (ref 211–946)
VITAMIN D 25-HYDROXY: 58.3 NG/ML
WBC # BLD: 13 K/UL (ref 4.8–10.8)

## 2018-02-14 PROCEDURE — 99214 OFFICE O/P EST MOD 30 MIN: CPT | Performed by: NURSE PRACTITIONER

## 2018-02-14 RX ORDER — TROSPIUM CHLORIDE 20 MG/1
20 TABLET, FILM COATED ORAL 2 TIMES DAILY
Qty: 60 TABLET | Refills: 3 | Status: SHIPPED | OUTPATIENT
Start: 2018-02-14 | End: 2018-04-30

## 2018-02-14 NOTE — PROGRESS NOTES
MG tablet Take 100 mg by mouth every 12 hours       calcium carbonate 600 MG TABS tablet Take 1 tablet by mouth daily.  divalproex (DEPAKOTE) 500 MG ER tablet Take 1,000 mg by mouth nightly.  pramipexole (MIRAPEX) 1 MG tablet Take 1 mg by mouth 2 times daily Indications: Take 1 tablet in the morning and 1 and a 1/2 at night Take 1 tablet in the morning and 1 and a 1/2 at night       No current facility-administered medications on file prior to visit. Allergies   Allergen Reactions    Hctz [Hydrochlorothiazide] Other (See Comments)     Acid reflux         Objective:   Physical Exam   Constitutional: She is oriented to person, place, and time. She appears well-developed and well-nourished. Overweight but bmi improving   HENT:   Head: Normocephalic and atraumatic. Eyes: Conjunctivae and EOM are normal. Pupils are equal, round, and reactive to light. Neck: Normal range of motion. Neck supple. No tracheal deviation present. Cardiovascular: Normal rate, regular rhythm and normal heart sounds. Pulmonary/Chest: Effort normal and breath sounds normal.   Musculoskeletal: She exhibits no edema. Neurological: She is alert and oriented to person, place, and time. She displays tremor (noted with intent to BUE, BLE mildly worse on right UE and RLE). Skin: Skin is warm and dry. Psychiatric: She has a normal mood and affect. Her speech is normal and behavior is normal. Judgment and thought content normal. Her mood appears not anxious. Her affect is not angry. Cognition and memory are normal. She does not exhibit a depressed mood. Nursing note and vitals reviewed. /84 (Site: Right Arm, Position: Sitting, Cuff Size: Small Adult)   Pulse 86   Temp 98 °F (36.7 °C) (Temporal)   Resp 16   Wt 185 lb (83.9 kg)   SpO2 94%   BMI 36.13 kg/m²     Assessment:      1.  Stress incontinence of urine  trospium (SANCTURA) 20 MG tablet    [facility] [address], [place]    Urinalysis

## 2018-02-15 ENCOUNTER — OFFICE VISIT (OUTPATIENT)
Dept: NEUROLOGY | Age: 58
End: 2018-02-15
Payer: COMMERCIAL

## 2018-02-15 ENCOUNTER — HOSPITAL ENCOUNTER (OUTPATIENT)
Dept: PSYCHIATRY | Age: 58
Setting detail: THERAPIES SERIES
Discharge: HOME | End: 2018-02-15
Payer: COMMERCIAL

## 2018-02-15 VITALS
BODY MASS INDEX: 36.32 KG/M2 | OXYGEN SATURATION: 98 % | WEIGHT: 185 LBS | HEIGHT: 60 IN | TEMPERATURE: 97.4 F | DIASTOLIC BLOOD PRESSURE: 79 MMHG | SYSTOLIC BLOOD PRESSURE: 114 MMHG | RESPIRATION RATE: 20 BRPM | HEART RATE: 80 BPM

## 2018-02-15 VITALS
HEART RATE: 175 BPM | OXYGEN SATURATION: 81 % | SYSTOLIC BLOOD PRESSURE: 98 MMHG | DIASTOLIC BLOOD PRESSURE: 67 MMHG | BODY MASS INDEX: 36.52 KG/M2 | WEIGHT: 186 LBS | HEIGHT: 60 IN

## 2018-02-15 DIAGNOSIS — R27.8 ASTERIXIS: ICD-10-CM

## 2018-02-15 DIAGNOSIS — G47.33 SLEEP APNEA, OBSTRUCTIVE: ICD-10-CM

## 2018-02-15 DIAGNOSIS — F31.60 BIPOLAR 1 DISORDER, MIXED (HCC): Primary | ICD-10-CM

## 2018-02-15 DIAGNOSIS — G25.0 TREMOR, ESSENTIAL: Primary | ICD-10-CM

## 2018-02-15 PROCEDURE — 99203 OFFICE O/P NEW LOW 30 MIN: CPT | Performed by: PSYCHIATRY & NEUROLOGY

## 2018-02-15 PROCEDURE — 90792 PSYCH DIAG EVAL W/MED SRVCS: CPT | Performed by: NURSE PRACTITIONER

## 2018-02-15 RX ORDER — ARIPIPRAZOLE 5 MG/1
5 TABLET ORAL DAILY
COMMUNITY
End: 2018-02-28 | Stop reason: ALTCHOICE

## 2018-02-15 RX ORDER — QUETIAPINE FUMARATE 50 MG/1
25 TABLET, FILM COATED ORAL NIGHTLY
COMMUNITY
End: 2020-10-26 | Stop reason: ALTCHOICE

## 2018-02-15 ASSESSMENT — PAIN DESCRIPTION - PROGRESSION: CLINICAL_PROGRESSION: GRADUALLY WORSENING

## 2018-02-15 ASSESSMENT — PAIN DESCRIPTION - ORIENTATION: ORIENTATION: LOWER

## 2018-02-15 ASSESSMENT — PAIN DESCRIPTION - LOCATION: LOCATION: BACK;NECK

## 2018-02-15 ASSESSMENT — PAIN DESCRIPTION - DESCRIPTORS: DESCRIPTORS: DULL

## 2018-02-15 ASSESSMENT — PAIN DESCRIPTION - FREQUENCY: FREQUENCY: CONTINUOUS

## 2018-02-15 ASSESSMENT — PAIN DESCRIPTION - PAIN TYPE: TYPE: CHRONIC PAIN

## 2018-02-15 ASSESSMENT — PAIN SCALES - GENERAL: PAINLEVEL_OUTOF10: 8

## 2018-02-15 ASSESSMENT — PAIN DESCRIPTION - ONSET: ONSET: ON-GOING

## 2018-02-15 NOTE — PROGRESS NOTES
CARE PLAN/ TREATMENT PLAN:      CSW met with pt to review care plan goals. Pt verbalized understanding of care plan goals and is in agreement with goals. Treatment team and pt completed the signature sheet.
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
## 2018-02-15 NOTE — PLAN OF CARE
Problem: Pain:  Goal: Control of chronic pain  Control of chronic pain   Outcome: Ongoing  D:  Pt admitted to [facility] this am.  She reports hx of chronic pain in back, neck and legs. She reports that she goes to pain mgmt for injections. Pt ambulates with a cane. She says she has a TENS unit that she occasionally uses. A:  Pt given verbal and written info on measures that may help her manage her pain. Discussed with pt how CBT may help her be able to  Manage her pain more effectively. Pt verbalized understanding of info provided.

## 2018-02-15 NOTE — PROGRESS NOTES
in [facility], and is unemployed    REVIEW OF SYSTEMS:  Review of Systems   Constitutional: Negative for chills and fever. HENT: Negative for hearing loss and tinnitus. Eyes: Positive for blurred vision. Negative for double vision. Respiratory: Positive for cough and shortness of breath. Negative for hemoptysis and sputum production. Cardiovascular: Negative for chest pain and palpitations. Gastrointestinal: Positive for constipation and diarrhea. Negative for abdominal pain, blood in stool, nausea and vomiting. Genitourinary: Positive for frequency. Negative for hematuria and urgency. Musculoskeletal: Positive for back pain, joint pain, myalgias and neck pain. Skin: Negative for itching and rash. Neurological: Positive for dizziness, tingling, tremors and sensory change. Negative for speech change, focal weakness, seizures, loss of consciousness and headaches. Endo/Heme/Allergies: Positive for environmental allergies. Bruises/bleeds easily. Psychiatric/Behavioral: Positive for depression. Negative for hallucinations, memory loss, substance abuse and suicidal ideas. The patient is nervous/anxious and has insomnia. PHYSICAL EXAMINATION:  Vitals: BP 98/67   Pulse 175   Ht 5' (1.524 m)   Wt 186 lb (84.4 kg)   SpO2 (!) 81%   BMI 36.33 kg/m²   General appearance: alert, appears stated age and cooperative  Skin: Skin color, texture, turgor normal. No rashes or lesions  HEENT: Head: Normal, normocephalic, atraumatic.   Neck: no adenopathy, no carotid bruit, no JVD, supple, symmetrical, trachea midline and thyroid not enlarged, symmetric, no tenderness/mass/nodules  Lungs: clear to auscultation bilaterally  Heart: regular rate and rhythm, S1, S2 normal, no murmur, click, rub or gallop  Abdomen: soft, non-tender; bowel sounds normal; no masses,  no organomegaly  Extremities: extremities normal, atraumatic, no cyanosis or edema    NEUROLOGIC EXAMINATION:  Neurologic Exam     Mental absent  Intention tremor: absent  Action tremor: left arm and right arm    Reflexes   Right brachioradialis: 1+  Left brachioradialis: 1+  Right biceps: 1+  Left biceps: 1+  Right triceps: 1+  Left triceps: 1+  Right patellar: 0  Left patellar: 0  Right achilles: 0  Left achilles: 0  Right plantar: normal  Left plantar: normal  Right Varghese: absent  Left Varghese: absent  Right ankle clonus: absent  Left ankle clonus: absent      ADDITIONAL REVIEW:  TFTs recently were normal                          IMPRESSION:    ICD-10-CM ICD-9-CM    1. Tremor, essential G25.0 333.1    2. Asterixis R27.8 781.3    3. Sleep apnea, obstructive G47.33 327.23    The tremor may be due to medications - antipsychotics or Depakote. The Asterixis could be from gabapentin or high ammonia related to Depakote, or she could have high pCO2 levels as she is a smoker. She has severe RADHA with hypoxemia. I discussed the diagnosis of obstructive sleep apnea with Jaime Nye including the pathophysiology (namely the mechanism of breathing and obstruction of upper airway, interruptions of sleep, hypoxemia, hypercapnia, and results of repetitive sympathetic activation), risks, evaluation, and treatment options. I discussed the risks of driving when drowsy and advised that Jaime Nye not drive when drowsy and avoid sedating medications and respiratory suppressants. PLAN:  1. Labs - Depakote level and ammonia level  2. Reduce the gabapentin to 600 mg twice a day for a week, then 1/2 tablet twice a day  3. Note to Dr. Rachana Vargas, can we get her off one of the antipsychotics? 4. Consider use of primidone at night for the tremor  5. Use the BiPAP during sleep  6. FU in 2 months.     Narciso Hernandez M.D.

## 2018-02-15 NOTE — BH NOTE
observed during the nursing assessment. A:  Support provided. Nursing assessment completed. Dania Chatman MAEVE given update on pt prior to seeing her. Pt oriented to IOP. Medications reviewed and discussed in detail. R:  Pt appears tired with dyphoric affect. Pt says she needs to learn how to take care of herself. Pt can benefit from IOP for stabilization of her mood and anxiety and to learn effective coping skills.

## 2018-02-15 NOTE — PATIENT INSTRUCTIONS
INSTRUCTIONS:  1. Will have your get some labs, Depakote level and ammonia level  2. Reduce the gabapentin to 600 mg twice a day for a week, then 1/2 tablet twice a day  3. Will ask Dr. Marcus Smith if can we get her off one of the antipsychotics? 4. Consider use of primidone at night for the tremor  5. Use the BiPAP with oxygen during sleep  6. Follow up in 2 months.

## 2018-02-16 ENCOUNTER — APPOINTMENT (OUTPATIENT)
Dept: PSYCHIATRY | Age: 58
End: 2018-02-16
Payer: COMMERCIAL

## 2018-02-18 ASSESSMENT — ENCOUNTER SYMPTOMS: BACK PAIN: 1

## 2018-02-19 ENCOUNTER — HOSPITAL ENCOUNTER (OUTPATIENT)
Dept: PSYCHIATRY | Age: 58
Setting detail: THERAPIES SERIES
Discharge: HOME | End: 2018-02-19
Payer: COMMERCIAL

## 2018-02-19 VITALS — SYSTOLIC BLOOD PRESSURE: 108 MMHG | DIASTOLIC BLOOD PRESSURE: 72 MMHG | RESPIRATION RATE: 18 BRPM | HEART RATE: 103 BPM

## 2018-02-19 PROCEDURE — 90853 GROUP PSYCHOTHERAPY: CPT | Performed by: SOCIAL WORKER

## 2018-02-19 NOTE — PLAN OF CARE
Problem: Falls - Risk of:  Goal: Will remain free from falls  Will remain free from falls   Outcome: Ongoing  Pt here for IOP. She is ambulating with assist of cane without difficulty.   No dizziness or drowsiness observed or reported this am.

## 2018-02-19 NOTE — PLAN OF CARE
Problem: Altered Mood, Depressive Behavior:  Goal: Able to verbalize support systems  Able to verbalize support systems   Outcome: Ongoing                                                                      Group Therapy Note    Date: 2/19/2018  Start Time:1000  End Time:  1100  Number of Participants: 9    Type of Group: Psychoeducation    Patient's Goal:  Handout: [employer] - Smart[employer]. Process emotions and actions in how to relate to others or their relationship with others. Notes:  Pt attended process group as schedule. Pt participated by identified an issue to work on today regarding how they interact and relate to others. Participated in group discussion of emotional, reasonable, and wise minded thoughts and discussions. Pt gave support and encouragement to group members. Status After Intervention:  Unchanged    Participation Level:  Active Listener and Interactive    Participation Quality: Appropriate, Attentive, Sharing and Supportive      Speech:  normal      Thought Process/Content: Logical  Linear      Affective Functioning: Congruent      Mood: depressed      Level of consciousness:  Alert, Oriented x4 and Attentive      Response to Learning: Able to verbalize current knowledge/experience and Progressing to goal      Endings: None Reported    Modes of Intervention: Education, Support, Socialization, Exploration and Clarifying      Discipline Responsible: /Counselor      Signature:  BETTY Hinds Mt

## 2018-02-20 ENCOUNTER — HOSPITAL ENCOUNTER (OUTPATIENT)
Dept: PSYCHIATRY | Age: 58
Setting detail: THERAPIES SERIES
Discharge: HOME | End: 2018-02-20
Payer: COMMERCIAL

## 2018-02-20 VITALS — DIASTOLIC BLOOD PRESSURE: 72 MMHG | SYSTOLIC BLOOD PRESSURE: 103 MMHG | RESPIRATION RATE: 18 BRPM | HEART RATE: 84 BPM

## 2018-02-20 PROCEDURE — 90853 GROUP PSYCHOTHERAPY: CPT | Performed by: SOCIAL WORKER

## 2018-02-20 PROCEDURE — 90832 PSYTX W PT 30 MINUTES: CPT | Performed by: SOCIAL WORKER

## 2018-02-20 ASSESSMENT — SLEEP AND FATIGUE QUESTIONNAIRES
DO YOU USE A SLEEP AID: YES
DIFFICULTY ARISING: NO
RESTFUL SLEEP: NO
SLEEP PATTERN: DIFFICULTY FALLING ASLEEP;INSOMNIA
AVERAGE NUMBER OF SLEEP HOURS: 7
DIFFICULTY FALLING ASLEEP: YES
DIFFICULTY STAYING ASLEEP: NO
DO YOU HAVE DIFFICULTY SLEEPING: NO

## 2018-02-20 ASSESSMENT — LIFESTYLE VARIABLES: HISTORY_ALCOHOL_USE: NO

## 2018-02-20 ASSESSMENT — PATIENT HEALTH QUESTIONNAIRE - PHQ9: SUM OF ALL RESPONSES TO PHQ QUESTIONS 1-9: 21

## 2018-02-20 NOTE — PLAN OF CARE
Problem: Altered Mood, Depressive Behavior:  Goal: Able to verbalize and/or display a decrease in depressive symptoms  Able to verbalize and/or display a decrease in depressive symptoms   Outcome: Ongoing                                                                      Group Therapy Note    Date: 2/20/2018  Start Time: 1000  End Time:  1100  Number of Participants: 11    Type of Group: Psychotherapy    Patient's Goal:  To process emotions and how those relate to others and those relationships    Notes:  Pt was present for group. Pt participated and was able to identify an issue to work on today and how this relates to others. Pt stated she has a lot of anger and resentment with her daughter because she is raising her grandson. The group offered support and encouragement. Status After Intervention:  Unchanged    Participation Level: Active Listener and Interactive    Participation Quality: Appropriate, Attentive, Sharing and Supportive      Speech:  normal      Thought Process/Content: Logical  Linear      Affective Functioning: Congruent      Mood:  anxious and depressed      Level of consciousness:  Alert, Oriented x4      Response to Learning: Able to verbalize current knowledge/experience, Able to verbalize/acknowledge new learning and Able to retain information    Endings: None Reported    Modes of Intervention: Education, Support, Socialization, Exploration and Clarifying      Discipline Responsible: /Counselor      Signature:   Betty Hatfield

## 2018-02-20 NOTE — BH NOTE
D:  Pt here for IOP today. She reports that her PCP did call her yesterday in response to her pulse ox readings while in IOP and she has appt with them on Thursday of this week. Pt says she is using her CPAP during the night.

## 2018-02-20 NOTE — BH NOTE
D:  While pt here at 11:00 am took pulse ox just after walking 85% then after several seconds went up to 93% then after pt coughed and took a few deep breathes went up to 97%--YANNI GRECO

## 2018-02-21 ENCOUNTER — HOSPITAL ENCOUNTER (OUTPATIENT)
Dept: PSYCHIATRY | Age: 58
Setting detail: THERAPIES SERIES
Discharge: HOME | End: 2018-02-21
Payer: COMMERCIAL

## 2018-02-21 VITALS
HEART RATE: 85 BPM | DIASTOLIC BLOOD PRESSURE: 68 MMHG | RESPIRATION RATE: 18 BRPM | SYSTOLIC BLOOD PRESSURE: 104 MMHG | OXYGEN SATURATION: 94 %

## 2018-02-21 PROCEDURE — 90853 GROUP PSYCHOTHERAPY: CPT | Performed by: SOCIAL WORKER

## 2018-02-21 NOTE — PLAN OF CARE
Problem: Altered Mood, Depressive Behavior:  Goal: Participates in care planning  Participates in care planning   Outcome: Ongoing                                                                      Group Therapy Note    Date: 2/21/2018  Start Time: 1115  End Time:  1215  Number of Participants: 10    Type of Group: Cognitive Skills    Patient's Goal:  Handouts: February Journal Prompts and 30 day [employer]. Process emotions and actions in how to relate to others or their relationship with others. Notes:  Pt attended process group as schedule. Pt participated by identified an issue to work on today regarding how they interact and relate to others. Participated in group discussion. Pt gave support and encouragement to group members. Status After Intervention:  Unchanged    Participation Level:  Active Listener and Interactive    Participation Quality: Appropriate, Attentive and Sharing      Speech:  normal      Thought Process/Content: Logical  Linear      Affective Functioning: Congruent      Mood: depressed      Level of consciousness:  Alert, Oriented x4 and Attentive      Response to Learning: Able to verbalize current knowledge/experience and Progressing to goal      Endings: None Reported    Modes of Intervention: Education, Support, Socialization, Exploration and Clarifying      Discipline Responsible: /Counselor      Signature:  BETTY Castle

## 2018-02-22 ENCOUNTER — OFFICE VISIT (OUTPATIENT)
Dept: FAMILY MEDICINE CLINIC | Age: 58
End: 2018-02-22
Payer: COMMERCIAL

## 2018-02-22 ENCOUNTER — OFFICE VISIT (OUTPATIENT)
Dept: UROLOGY | Age: 58
End: 2018-02-22
Payer: COMMERCIAL

## 2018-02-22 ENCOUNTER — HOSPITAL ENCOUNTER (OUTPATIENT)
Dept: GENERAL RADIOLOGY | Age: 58
Discharge: HOME | End: 2018-02-22
Payer: COMMERCIAL

## 2018-02-22 VITALS
SYSTOLIC BLOOD PRESSURE: 119 MMHG | BODY MASS INDEX: 35.53 KG/M2 | TEMPERATURE: 97.5 F | HEART RATE: 90 BPM | DIASTOLIC BLOOD PRESSURE: 65 MMHG | HEIGHT: 60 IN | WEIGHT: 181 LBS

## 2018-02-22 VITALS
WEIGHT: 181 LBS | DIASTOLIC BLOOD PRESSURE: 70 MMHG | SYSTOLIC BLOOD PRESSURE: 132 MMHG | TEMPERATURE: 97.6 F | RESPIRATION RATE: 18 BRPM | HEART RATE: 89 BPM | BODY MASS INDEX: 35.35 KG/M2 | OXYGEN SATURATION: 96 %

## 2018-02-22 DIAGNOSIS — D72.829 LEUKOCYTOSIS, UNSPECIFIED TYPE: ICD-10-CM

## 2018-02-22 DIAGNOSIS — N39.46 MIXED URGE AND STRESS INCONTINENCE: Primary | ICD-10-CM

## 2018-02-22 DIAGNOSIS — J40 BRONCHITIS: Primary | ICD-10-CM

## 2018-02-22 DIAGNOSIS — R79.89 ABNORMAL THYROID BLOOD TEST: ICD-10-CM

## 2018-02-22 DIAGNOSIS — R27.8 ASTERIXIS: ICD-10-CM

## 2018-02-22 DIAGNOSIS — J40 BRONCHITIS: ICD-10-CM

## 2018-02-22 DIAGNOSIS — G25.0 TREMOR, ESSENTIAL: ICD-10-CM

## 2018-02-22 LAB
AMMONIA: 46 UMOL/L (ref 11–51)
APPEARANCE FLUID: NORMAL
BILIRUBIN, POC: NORMAL
BLOOD URINE, POC: NORMAL
CLARITY, POC: CLEAR
COLOR, POC: YELLOW
GLUCOSE URINE, POC: NORMAL
KETONES, POC: NORMAL
LEUKOCYTE EST, POC: NORMAL
NITRITE, POC: NORMAL
PH, POC: 6.5
PROTEIN, POC: NORMAL
SPECIFIC GRAVITY, POC: 1.01
UROBILINOGEN, POC: 0.2
VALPROIC ACID LEVEL: 87 UG/ML (ref 50–100)

## 2018-02-22 PROCEDURE — 51798 US URINE CAPACITY MEASURE: CPT | Performed by: PHYSICIAN ASSISTANT

## 2018-02-22 PROCEDURE — 99202 OFFICE O/P NEW SF 15 MIN: CPT | Performed by: PHYSICIAN ASSISTANT

## 2018-02-22 PROCEDURE — 71046 X-RAY EXAM CHEST 2 VIEWS: CPT

## 2018-02-22 PROCEDURE — 81003 URINALYSIS AUTO W/O SCOPE: CPT | Performed by: PHYSICIAN ASSISTANT

## 2018-02-22 PROCEDURE — 99213 OFFICE O/P EST LOW 20 MIN: CPT | Performed by: NURSE PRACTITIONER

## 2018-02-22 RX ORDER — METHYLPREDNISOLONE 4 MG/1
TABLET ORAL
Qty: 1 KIT | Refills: 0 | Status: SHIPPED | OUTPATIENT
Start: 2018-02-22 | End: 2018-02-28

## 2018-02-22 RX ORDER — BENZONATATE 200 MG/1
200 CAPSULE ORAL 3 TIMES DAILY PRN
Qty: 21 CAPSULE | Refills: 0 | Status: SHIPPED | OUTPATIENT
Start: 2018-02-22 | End: 2018-03-01

## 2018-02-22 RX ORDER — DOXYCYCLINE HYCLATE 100 MG
100 TABLET ORAL 2 TIMES DAILY
Qty: 20 TABLET | Refills: 0 | Status: SHIPPED | OUTPATIENT
Start: 2018-02-22 | End: 2018-03-04

## 2018-02-22 ASSESSMENT — ENCOUNTER SYMPTOMS
COUGH: 1
WHEEZING: 0
SHORTNESS OF BREATH: 0
EYE PAIN: 0
SINUS PAIN: 0
SHORTNESS OF BREATH: 0
ABDOMINAL PAIN: 0
BLURRED VISION: 0
BACK PAIN: 0
VOMITING: 0

## 2018-02-22 NOTE — PROGRESS NOTES
Sherice Watson is a 63 y.o. female who presents today   Chief Complaint   Patient presents with    Incontinence     Urinary Incontinence  Patient complains of urinary incontinence. This has been present for a few years But worse last 6 months. She leaks urine with bending, coughing, lifting, standing, sneezing, with urge. Patient describes the symptoms as urge to urinate with little or no warning and urine leakage with coughing/heavy physical activity. Patient has some urge incontinence but primarily stress type incontinence. She has tried [employer] and more recently Miriam Alfred and Farida without any improvement in symptoms. Evaluation so far has been just urinalysis. She has not seen a urologist before patient denies any history of kidney stones gross hematuria or recurrent urinary tract infections. Past Medical History:   Diagnosis Date    Arthritis     Bipolar I disorder, most recent episode (or current) mixed, unspecified     Chronic back pain     Dry mouth     Hyperlipidemia     Hypertension     Hypokalemia     Menopause     Overactive bladder     Plantar fasciitis     RLS (restless legs syndrome)     Thyroid disease        Past Surgical History:   Procedure Laterality Date    CARPAL TUNNEL RELEASE Bilateral 2002    CHOLECYSTECTOMY  10/2014    COLON SURGERY      COLON SURGERY      biopsy     COLONOSCOPY      CYST REMOVAL  05/2017    Under left ear    HARDWARE REMOVAL Right 2011    Foot     HERNIA REPAIR  10/2014    NOSE SURGERY  08/01/2017    Dr Alcon Gonzalez L/S,1 LEVEL Bilateral 2/7/2017    INJECTION MEDICATION FACET JOINT performed by Mariano Garcia at [facility] OR       Current Outpatient Prescriptions   Medication Sig Dispense Refill    methylPREDNISolone (MEDROL DOSEPACK) 4 MG tablet Take by mouth.  1 kit 0    benzonatate (TESSALON) 200 MG capsule Take 1 capsule by mouth 3 times daily as needed for Cough 21 capsule 0    doxycycline hyclate (VIBRA-TABS) 100 MG tablet Take 1 Constitutional: Negative for malaise/fatigue and weight loss. HENT: Negative for nosebleeds and sinus pain. Eyes: Negative for blurred vision and pain. Respiratory: Negative for shortness of breath and wheezing. Cardiovascular: Negative for palpitations and leg swelling. Gastrointestinal: Negative for abdominal pain and vomiting. Genitourinary: Positive for flank pain, frequency and urgency. Negative for dysuria and hematuria. Musculoskeletal: Negative for back pain and myalgias. Skin: Negative for itching and rash. Neurological: Negative for tremors and sensory change. Endo/Heme/Allergies: Negative for environmental allergies and polydipsia. Psychiatric/Behavioral: Negative for hallucinations. The patient is not nervous/anxious. PHYSICAL EXAM:  /65   Pulse 90   Temp 97.5 °F (36.4 °C) (Temporal)   Ht 5' (1.524 m)   Wt 181 lb (82.1 kg)   BMI 35.35 kg/m²   Physical Exam   Constitutional: No distress. HENT:   Mouth/Throat: No oropharyngeal exudate. Eyes: Left eye exhibits no discharge. No scleral icterus. Neck: No JVD present. No tracheal deviation present. No thyromegaly present. Cardiovascular: Exam reveals no gallop and no friction rub. Pulmonary/Chest: No stridor. She has no rales. Abdominal: She exhibits no distension and no mass. There is no rebound and no guarding. Musculoskeletal: She exhibits no edema. Neurological: No cranial nerve deficit. She exhibits normal muscle tone. Coordination normal.   Skin: She is not diaphoretic. No pallor.            DATA:  U/A:    Lab Results   Component Value Date    NITRITE neg 02/22/2018    COLORU yellow 02/22/2018    COLORU DK YELLOW 02/14/2018    PROTEINU neg 02/22/2018    PROTEINU Negative 02/14/2018    PHUR 6.5 02/22/2018    PHUR 7.0 02/14/2018    WBCUA 9 06/18/2014    RBCUA 1 06/18/2014    BACTERIA NEGATIVE 06/18/2014    CLARITYU clear 02/22/2018    CLARITYU Clear 02/14/2018    SPECGRAV 1.010 02/22/2018 SPECGRAV 1.024 02/14/2018    LEUKOCYTESUR neg 02/22/2018    LEUKOCYTESUR Negative 02/14/2018    UROBILINOGEN 1.0 02/14/2018    BILIRUBINUR neg 02/22/2018    BLOODU neg 02/22/2018    BLOODU Negative 02/14/2018    GLUCOSEU neg 02/22/2018    GLUCOSEU Negative 02/14/2018               1. Mixed urge and stress incontinence  Patient has primarily stress incontinence which she has had for several years however it but worse last 6 months. She is on [place] twice a day she's also tried Myrbetriq she's had no response. I explained that stress incontinence is normally not improve the medication and requires surgical procedure such as a TVT sling. I finally we need to schedule her for urodynamics to better evaluate her incontinence and urine symptoms.  - POCT Urinalysis no Micro  - [place] MEASUREMENT,POST-VOID RESIDUAL VOLUME BY US,NON-IMAGING      Orders Placed This Encounter   Procedures    POCT Urinalysis no Micro    W[place] MEASUREMENT,POST-VOID RESIDUAL VOLUME BY US,NON-IMAGING     27 ml bladder scan     No orders of the defined types were placed in this encounter. Plan:  Patient will be scheduled for urodynamics.

## 2018-02-22 NOTE — PROGRESS NOTES
SUBJECTIVE:    Patient ID: Rico Robles is a 63 y.o. female. Chief Complaint   Patient presents with    Other     Patient reports having decreased o2 levels after walking, etc.       HPI:   HPI       Pt is reporting that she was told that her Spo2 was low at her outpatient behavioral health visit. Pt reports o2 dropped to 84 when ambulating but states it did go up to 88 when sitting. Pt reports that with coughing and work, o2 did measure up to 97% at that time. Today pt's spo2 is measured at 80 and that is with ambulation. Pt does report having a lingering cough that she states has been ongoing x 1yr. Pt feels draining in her throat. No fevers. Pt states over the year she has had treatment for sinuses and bronchitis. Pt feels she has had an increase in cough and congestion over the last 1-2wks. Since our last ov pt's lab results are in for review and are as follows:  No visits with results within 1 Day(s) from this visit.    Latest known visit with results is:   Orders Only on 02/14/2018   Component Date Value Ref Range Status    WBC 02/14/2018 13.0* 4.8 - 10.8 K/uL Final    RBC 02/14/2018 4.30  4.20 - 5.40 M/uL Final    Hemoglobin 02/14/2018 12.3  12.0 - 16.0 g/dL Final    Hematocrit 02/14/2018 40.0  37.0 - 47.0 % Final    MCV 02/14/2018 93.0  81.0 - 99.0 fL Final    MCH 02/14/2018 28.6  27.0 - 31.0 pg Final    MCHC 02/14/2018 30.8* 33.0 - 37.0 g/dL Final    RDW 02/14/2018 14.6* 11.5 - 14.5 % Final    Platelets 14/88/0636 475* 130 - 400 K/uL Final    MPV 02/14/2018 10.7  9.4 - 12.3 fL Final    Neutrophils % 02/14/2018 63.4  50.0 - 65.0 % Final    Lymphocytes % 02/14/2018 25.7  20.0 - 40.0 % Final    Monocytes % 02/14/2018 7.4  0.0 - 10.0 % Final    Eosinophils % 02/14/2018 0.5  0.0 - 5.0 % Final    Basophils % 02/14/2018 0.6  0.0 - 1.0 % Final    Neutrophils # 02/14/2018 8.2* 1.5 - 7.5 K/uL Final    Lymphocytes # 02/14/2018 3.3  1.1 - 4.5 K/uL Final    Monocytes # 02/14/2018  Protein, UA 02/14/2018 Negative  Negative mg/dL Final    Urobilinogen, Urine 02/14/2018 1.0  <2.0 E.U./dL Final    Nitrite, Urine 02/14/2018 Negative  Negative Final    Leukocyte Esterase, Urine 02/14/2018 Negative  Negative Final    Urine Reflex to Culture 02/14/2018 Not Indicated   Final     Lab reviewed with patient in detail               Past Medical History:   Diagnosis Date    Arthritis     Bipolar I disorder, most recent episode (or current) mixed, unspecified     Chronic back pain     Dry mouth     Hyperlipidemia     Hypertension     Hypokalemia     Menopause     Overactive bladder     Plantar fasciitis     RLS (restless legs syndrome)     Thyroid disease      Current Outpatient Prescriptions on File Prior to Visit   Medication Sig Dispense Refill    QUEtiapine (SEROQUEL) 50 MG tablet Take 50 mg by mouth nightly      ARIPiprazole (ABILIFY) 5 MG tablet Take 5 mg by mouth daily      metFORMIN (GLUCOPHAGE) 500 MG tablet Take 500 mg by mouth 2 times daily (with meals)      trospium (SANCTURA) 20 MG tablet Take 1 tablet by mouth 2 times daily For bladder 60 tablet 3    atorvastatin (LIPITOR) 40 MG tablet TAKE ONE TABLET BY MOUTH DAILY 30 tablet 0    allopurinol (ZYLOPRIM) 100 MG tablet TAKE ONE TABLET BY MOUTH DAILY 30 tablet 0    niacin (SLO-NIACIN) 500 MG extended release tablet Take 500 mg by mouth daily       carvedilol (COREG) 6.25 MG tablet TAKE 1 TABLET BY MOUTH TWO  TIMES DAILY WITH MEALS 180 tablet 0    bumetanide (BUMEX) 1 MG tablet Take 1 tablet by mouth 2 times daily (Patient taking differently: Take 1 mg by mouth daily Pt states that she take a total of 2 mg in the am only.) 60 tablet 2    HYDROcodone-acetaminophen (NORCO) 7.5-325 MG per tablet Take 1 tablet by mouth every 6 hours as needed for Pain Pt only take with dental work. Myra Albarran potassium chloride (KLOR-CON M) 20 MEQ extended release tablet Take 1 tablet by mouth daily 30 tablet 5    levothyroxine (SYNTHROID) 100 MCG tablet Take 1 tablet by mouth Daily (Patient taking differently: Take 100 mcg by mouth Daily 2/15/18 pt reports PCP has increased to 125 mcg daily.) 30 tablet 5    spironolactone (ALDACTONE) 50 MG tablet Take 1 tablet by mouth 2 times daily 60 tablet 5    gabapentin (NEURONTIN) 600 MG tablet Take 600 mg by mouth 2 times daily 2/15/18 pt reports that she is suppose to take med 4 times a day. 2/20/18 pt states neurologist decreased the med to 2 times a day. Zarina Pritchett Tens Unit MISC by Does not apply route Apply and use tid prn  Dx -lumbar pain  Dispense tens and supplies 1 each 0    UNABLE TO FIND EMPI TENs unit pads  Dx-lumbar pain 1 Device 0    tiZANidine (ZANAFLEX) 4 MG tablet Take 4 mg by mouth nightly      pilocarpine (SALAGEN) 5 MG tablet Take 5 mg by mouth 3 times daily      Vitamin D (CHOLECALCIFEROL) 1000 UNITS CAPS capsule Take 1,000 Units by mouth daily      ALPRAZolam (XANAX) 0.5 MG tablet Take 0.5 mg by mouth nightly as needed for Sleep      Multiple Vitamins-Minerals (MULTIVITAMIN PO) Take by mouth daily      DULoxetine (CYMBALTA) 60 MG capsule Take 60 mg by mouth 2 times daily       traMADol (ULTRAM) 50 MG tablet Take 100 mg by mouth every 12 hours       calcium carbonate 600 MG TABS tablet Take 1 tablet by mouth daily.  divalproex (DEPAKOTE) 500 MG ER tablet Take 1,000 mg by mouth nightly.  pramipexole (MIRAPEX) 1 MG tablet Take 1 mg by mouth 2 times daily Indications: Take 1 tablet in the morning and 1 and a 1/2 at night Take 1 tablet in the morning and 1 and a 1/2 at night       No current facility-administered medications on file prior to visit.       Allergies   Allergen Reactions    Hctz [Hydrochlorothiazide] Other (See Comments)     Acid reflux       Past Surgical History:   Procedure Laterality Date    CARPAL TUNNEL RELEASE Bilateral 2002    CHOLECYSTECTOMY  10/2014    COLON SURGERY      COLON SURGERY      biopsy     COLONOSCOPY      CYST REMOVAL  05/2017 Neck: Normal range of motion. Neck supple. No tracheal deviation present. Cardiovascular: Normal rate, regular rhythm and normal heart sounds. Pulmonary/Chest: Effort normal and breath sounds normal. No respiratory distress. Musculoskeletal: She exhibits edema (trace to ble). Lymphadenopathy:     She has no cervical adenopathy. Neurological: She is alert and oriented to person, place, and time. Skin: Skin is warm and dry. She is not diaphoretic. Psychiatric: She has a normal mood and affect. Her behavior is normal.   Nursing note and vitals reviewed. /70 (Site: Left Arm, Position: Sitting, Cuff Size: Large Adult)   Pulse 89   Temp 97.6 °F (36.4 °C) (Temporal)   Resp 18   Wt 181 lb (82.1 kg)   SpO2 96% Comment: ambulating  BMI 35.35 kg/m²      ASSESSMENT:      ICD-10-CM ICD-9-CM    1. Bronchitis J40 490 XR CHEST STANDARD (2 VW)      methylPREDNISolone (MEDROL DOSEPACK) 4 MG tablet      benzonatate (TESSALON) 200 MG capsule      doxycycline hyclate (VIBRA-TABS) 100 MG tablet   2. Leukocytosis, unspecified type D72.829 288.60 doxycycline hyclate (VIBRA-TABS) 100 MG tablet   3. Abnormal thyroid blood test R94.6 794.5 Pt to keep f/u with endo/Dr. Yaron Duke       PLAN:    Lien Oz: Other (Patient reports having decreased o2 levels after walking, etc.)  Spo2 needed again as pt walks the [place] of clinic setting. Pt states she does not have time to do this and will have to come back today for this and CXR. Lab reviewed with pt. Pt will be f/u with Dr. Yaron Duke for thyroid in approx 2wks. Plan as associated above. Increase fluids, rest, tylenol or ibuprofen as needed. Follow up in 3 days if you are not feeling improvement and ASAP if worse. Diagnosis and orders for this visit are above.

## 2018-02-23 ENCOUNTER — APPOINTMENT (OUTPATIENT)
Dept: PSYCHIATRY | Age: 58
End: 2018-02-23
Payer: COMMERCIAL

## 2018-02-23 ENCOUNTER — TELEPHONE (OUTPATIENT)
Dept: NEUROLOGY | Age: 58
End: 2018-02-23

## 2018-02-23 NOTE — TELEPHONE ENCOUNTER
----- Message from Lonnie Chacon MD sent at 2/22/2018  5:08 PM CST -----  depakote and ammonia levels were good

## 2018-02-23 NOTE — PROGRESS NOTES
Patient returned to clinic 2/22/18 for cxr and additional ambulating pulse ox (o2). I walked with patient around clinic. Patient's ambulating O2 was 96%.

## 2018-02-24 ASSESSMENT — ENCOUNTER SYMPTOMS
ABDOMINAL PAIN: 0
COUGH: 1
DOUBLE VISION: 0
SPUTUM PRODUCTION: 0
VOMITING: 0
BLURRED VISION: 1
HEMOPTYSIS: 0
DIARRHEA: 1
SHORTNESS OF BREATH: 1
BACK PAIN: 1
NAUSEA: 0
CONSTIPATION: 1
BLOOD IN STOOL: 0

## 2018-02-26 ENCOUNTER — HOSPITAL ENCOUNTER (OUTPATIENT)
Dept: PSYCHIATRY | Age: 58
Setting detail: THERAPIES SERIES
Discharge: HOME | End: 2018-02-26
Payer: COMMERCIAL

## 2018-02-26 VITALS
OXYGEN SATURATION: 94 % | RESPIRATION RATE: 18 BRPM | DIASTOLIC BLOOD PRESSURE: 80 MMHG | SYSTOLIC BLOOD PRESSURE: 127 MMHG | HEART RATE: 88 BPM

## 2018-02-26 PROCEDURE — 90853 GROUP PSYCHOTHERAPY: CPT | Performed by: SOCIAL WORKER

## 2018-02-26 NOTE — PLAN OF CARE
Problem: Altered Mood, Depressive Behavior:  Goal: Participates in care planning  Participates in care planning   Outcome: Ongoing                                                                      Group Therapy Note    Date: 2/26/2018  Start Time: 1000  End Time:  1100  Number of Participants: 9    Type of Group: Psychotherapy    Patient's Goal:  Process emotions and actions in how to relate to others or their relationship with others. Notes:  Pt attended process group as schedule. Pt participated by identified an issue to work on today regarding how they interact and relate to others. Participated in group discussion. Pt gave support and encouragement to group members. Status After Intervention:  Unchanged    Participation Level:  Active Listener and Interactive    Participation Quality: Appropriate, Attentive, Sharing and Supportive      Speech:  normal      Thought Process/Content: Logical  Linear      Affective Functioning: Congruent      Mood: depressed      Level of consciousness:  Alert, Oriented x4 and Attentive      Response to Learning: Able to verbalize current knowledge/experience and Progressing to goal      Endings: None Reported    Modes of Intervention: Education, Support, Socialization and Exploration      Discipline Responsible: /Counselor      Signature:  BETTY Lubin

## 2018-02-27 ENCOUNTER — TELEPHONE (OUTPATIENT)
Dept: PSYCHIATRY | Age: 58
End: 2018-02-27

## 2018-02-28 ENCOUNTER — HOSPITAL ENCOUNTER (OUTPATIENT)
Dept: PSYCHIATRY | Age: 58
Setting detail: THERAPIES SERIES
Discharge: HOME | End: 2018-02-28
Payer: COMMERCIAL

## 2018-02-28 PROCEDURE — 90853 GROUP PSYCHOTHERAPY: CPT | Performed by: SOCIAL WORKER

## 2018-03-01 ENCOUNTER — OFFICE VISIT (OUTPATIENT)
Dept: FAMILY MEDICINE CLINIC | Age: 58
End: 2018-03-01
Payer: COMMERCIAL

## 2018-03-01 VITALS
WEIGHT: 178.38 LBS | TEMPERATURE: 98.8 F | SYSTOLIC BLOOD PRESSURE: 122 MMHG | DIASTOLIC BLOOD PRESSURE: 80 MMHG | BODY MASS INDEX: 34.84 KG/M2 | OXYGEN SATURATION: 96 % | HEART RATE: 89 BPM

## 2018-03-01 DIAGNOSIS — G89.29 CHRONIC MIDLINE LOW BACK PAIN WITHOUT SCIATICA: ICD-10-CM

## 2018-03-01 DIAGNOSIS — M67.40 GANGLION CYST: ICD-10-CM

## 2018-03-01 DIAGNOSIS — M54.50 CHRONIC MIDLINE LOW BACK PAIN WITHOUT SCIATICA: ICD-10-CM

## 2018-03-01 DIAGNOSIS — F31.60 BIPOLAR 1 DISORDER, MIXED (HCC): ICD-10-CM

## 2018-03-01 DIAGNOSIS — E78.2 MIXED HYPERLIPIDEMIA: ICD-10-CM

## 2018-03-01 DIAGNOSIS — I10 ESSENTIAL HYPERTENSION: ICD-10-CM

## 2018-03-01 DIAGNOSIS — E79.0 ELEVATED URIC ACID IN BLOOD: ICD-10-CM

## 2018-03-01 DIAGNOSIS — J18.9 PNEUMONIA OF LEFT LOWER LOBE DUE TO INFECTIOUS ORGANISM: Primary | ICD-10-CM

## 2018-03-01 DIAGNOSIS — R73.9 HYPERGLYCEMIA: Primary | ICD-10-CM

## 2018-03-01 DIAGNOSIS — R60.9 SWELLING: ICD-10-CM

## 2018-03-01 DIAGNOSIS — G25.81 RLS (RESTLESS LEGS SYNDROME): ICD-10-CM

## 2018-03-01 PROCEDURE — 99214 OFFICE O/P EST MOD 30 MIN: CPT | Performed by: FAMILY MEDICINE

## 2018-03-01 RX ORDER — ALLOPURINOL 100 MG/1
TABLET ORAL
Qty: 90 TABLET | Refills: 3 | Status: SHIPPED | OUTPATIENT
Start: 2018-03-01 | End: 2019-01-22 | Stop reason: SDUPTHER

## 2018-03-01 RX ORDER — CARVEDILOL 6.25 MG/1
TABLET ORAL
Qty: 180 TABLET | Refills: 3 | Status: SHIPPED | OUTPATIENT
Start: 2018-03-01 | End: 2019-01-22 | Stop reason: SDUPTHER

## 2018-03-01 RX ORDER — ATORVASTATIN CALCIUM 40 MG/1
TABLET, FILM COATED ORAL
Qty: 30 TABLET | Refills: 0 | Status: SHIPPED | OUTPATIENT
Start: 2018-03-01 | End: 2019-08-07 | Stop reason: SDUPTHER

## 2018-03-01 RX ORDER — ATORVASTATIN CALCIUM 40 MG/1
TABLET, FILM COATED ORAL
Qty: 90 TABLET | Refills: 3 | Status: SHIPPED | OUTPATIENT
Start: 2018-03-01 | End: 2018-03-01 | Stop reason: SDUPTHER

## 2018-03-01 RX ORDER — BUMETANIDE 1 MG/1
1 TABLET ORAL 2 TIMES DAILY
Qty: 180 TABLET | Refills: 3 | Status: ON HOLD | OUTPATIENT
Start: 2018-03-01 | End: 2020-07-03

## 2018-03-01 ASSESSMENT — ENCOUNTER SYMPTOMS
DIARRHEA: 0
NAUSEA: 0
EYES NEGATIVE: 1
ALLERGIC/IMMUNOLOGIC NEGATIVE: 1
SHORTNESS OF BREATH: 0
CHEST TIGHTNESS: 0
CONSTIPATION: 0
WHEEZING: 0
VOMITING: 0
COUGH: 0
BLOOD IN STOOL: 0

## 2018-03-02 ENCOUNTER — TELEPHONE (OUTPATIENT)
Dept: PSYCHIATRY | Age: 58
End: 2018-03-02

## 2018-03-02 NOTE — TELEPHONE ENCOUNTER
Pt called to report she would not be attending IOP as scheduled today or possible all next week. Pt's daughter who lives in [place] has passed away for unknown reasons.

## 2018-03-06 ENCOUNTER — TELEPHONE (OUTPATIENT)
Dept: PSYCHIATRY | Age: 58
End: 2018-03-06

## 2018-03-06 NOTE — TELEPHONE ENCOUNTER
D:  Pt called last week stating that her daughter who lived in [place] had  and that she would be going there. A:  She reported that she would not be back to IOP this week. R:  She plans to return to IOP next week.

## 2018-03-12 ENCOUNTER — APPOINTMENT (OUTPATIENT)
Dept: PSYCHIATRY | Age: 58
End: 2018-03-12
Payer: COMMERCIAL

## 2018-03-12 ENCOUNTER — TELEPHONE (OUTPATIENT)
Dept: PSYCHIATRY | Age: 58
End: 2018-03-12

## 2018-03-12 NOTE — TELEPHONE ENCOUNTER
Pt reported she would be at [facility] on Wednesday, they are still traveling home from their daughter's .

## 2018-03-13 ENCOUNTER — APPOINTMENT (OUTPATIENT)
Dept: PSYCHIATRY | Age: 58
End: 2018-03-13
Payer: COMMERCIAL

## 2018-03-14 ENCOUNTER — HOSPITAL ENCOUNTER (OUTPATIENT)
Dept: PSYCHIATRY | Age: 58
Setting detail: THERAPIES SERIES
Discharge: HOME | End: 2018-03-14
Payer: COMMERCIAL

## 2018-03-14 ENCOUNTER — HOSPITAL ENCOUNTER (OUTPATIENT)
Dept: GENERAL RADIOLOGY | Age: 58
Discharge: HOME | End: 2018-03-14
Payer: COMMERCIAL

## 2018-03-14 ENCOUNTER — OFFICE VISIT (OUTPATIENT)
Dept: FAMILY MEDICINE CLINIC | Age: 58
End: 2018-03-14
Payer: COMMERCIAL

## 2018-03-14 VITALS
TEMPERATURE: 98.4 F | HEART RATE: 88 BPM | WEIGHT: 181 LBS | OXYGEN SATURATION: 96 % | RESPIRATION RATE: 20 BRPM | DIASTOLIC BLOOD PRESSURE: 76 MMHG | SYSTOLIC BLOOD PRESSURE: 128 MMHG | BODY MASS INDEX: 35.35 KG/M2

## 2018-03-14 DIAGNOSIS — Z09 FOLLOW-UP EXAM AFTER TREATMENT: Primary | ICD-10-CM

## 2018-03-14 DIAGNOSIS — J18.9 PNEUMONIA OF LEFT LOWER LOBE DUE TO INFECTIOUS ORGANISM: ICD-10-CM

## 2018-03-14 DIAGNOSIS — J30.9 CHRONIC ALLERGIC RHINITIS, UNSPECIFIED SEASONALITY, UNSPECIFIED TRIGGER: ICD-10-CM

## 2018-03-14 PROCEDURE — 99213 OFFICE O/P EST LOW 20 MIN: CPT | Performed by: NURSE PRACTITIONER

## 2018-03-14 PROCEDURE — 90853 GROUP PSYCHOTHERAPY: CPT | Performed by: SOCIAL WORKER

## 2018-03-14 PROCEDURE — 71046 X-RAY EXAM CHEST 2 VIEWS: CPT

## 2018-03-14 NOTE — PROGRESS NOTES
ADMINISTRATIVE NOTE:  Reviewed and approved group notes authored by BETTY Cuellar today. We discussed progress of this patient and group issues and participation level.

## 2018-03-14 NOTE — PROGRESS NOTES
SUBJECTIVE:    Patient ID: Disha Montenegro is a 63 y.o. female. Chief Complaint   Patient presents with    Follow-up     Patient is here today for a follow up r/t cough. Patient reports that the cough is not any better. HPI:   HPI     Patient is here today following up for cough. Patient was seen on February 22, 2018 complaining of cough congestion and shortness of breath. That day I did order a chest x-ray and patient had returned later that day for the x-ray. X-ray was then later received as a result and did show that there was a left lung pneumonia beginning. Patient was started on doxycycline and has completed treatment plan. Patient states that her cough is better although still continuing at times. Patient denies fever. Patient has ongoing allergy symptoms that do contribute to an occasional cough. Since last office visit, patient's daughter who lives in [place] has passed away. Patient has become very upset in discussion of this today however feels that she is still in denial. Patient states that she is doing intense therapy 3 times per week and will be continuing this. CXR has been done today in office and pt has waited for results. No infection has been noted by radiology and pt is aware.       Past Medical History:   Diagnosis Date    Arthritis     Bipolar I disorder, most recent episode (or current) mixed, unspecified     Chronic back pain     Dry mouth     Hyperlipidemia     Hypertension     Hypokalemia     Menopause     Overactive bladder     Plantar fasciitis     RLS (restless legs syndrome)     Thyroid disease      Current Outpatient Prescriptions on File Prior to Visit   Medication Sig Dispense Refill    allopurinol (ZYLOPRIM) 100 MG tablet TAKE ONE TABLET BY MOUTH DAILY 90 tablet 3    carvedilol (COREG) 6.25 MG tablet TAKE 1 TABLET BY MOUTH TWO  TIMES DAILY WITH MEALS 180 tablet 3    bumetanide (BUMEX) 1 MG tablet Take 1 tablet by mouth 2 times daily 180 tablet 3   

## 2018-03-14 NOTE — PLAN OF CARE
Problem: Altered Mood, Depressive Behavior:  Goal: Able to verbalize acceptance of life and situations over which he or she has no control  Able to verbalize acceptance of life and situations over which he or she has no control   Outcome: Ongoing                                                                      Group Therapy Note    Date: 3/14/2018  Start Time: 830  End Time:  945  Number of Participants: 12    Type of Group: Psychotherapy    Patient's Goal:  To process emotions and how those relate to others and those relationships    Notes:  Pt was present for group. Pt participated and was able to identify an issue to work on today and how this relates to others. The group offered support and encouragement. Status After Intervention:  Unchanged    Participation Level: Active Listener and Interactive    Participation Quality: Appropriate, Attentive, Sharing and Supportive      Speech:  normal      Thought Process/Content: Logical  Linear      Affective Functioning: Congruent      Mood: anxious and depressed      Level of consciousness:  Alert, Oriented x4 and Attentive      Response to Learning: Able to verbalize current knowledge/experience, Able to verbalize/acknowledge new learning, Able to retain information, Capable of insight and Able to change behavior      Endings: None Reported    Modes of Intervention: Education, Support, Socialization, Exploration and Clarifying      Discipline Responsible: /Counselor      Signature:   Gissell Omalley

## 2018-03-15 ASSESSMENT — ENCOUNTER SYMPTOMS
COUGH: 1
SHORTNESS OF BREATH: 0

## 2018-03-16 ENCOUNTER — HOSPITAL ENCOUNTER (OUTPATIENT)
Dept: PSYCHIATRY | Age: 58
Setting detail: THERAPIES SERIES
Discharge: HOME | End: 2018-03-16
Payer: COMMERCIAL

## 2018-03-16 PROCEDURE — 90832 PSYTX W PT 30 MINUTES: CPT | Performed by: NURSE PRACTITIONER

## 2018-03-16 PROCEDURE — 90853 GROUP PSYCHOTHERAPY: CPT | Performed by: SOCIAL WORKER

## 2018-03-16 NOTE — PLAN OF CARE
Problem: Altered Mood, Depressive Behavior:  Goal: Able to verbalize acceptance of life and situations over which he or she has no control  Able to verbalize acceptance of life and situations over which he or she has no control   Outcome: Ongoing                                                                      Group Therapy Note    Date: 3/16/2018  Start Time: 830  End Time:  945  Number of Participants: 8    Type of Group: Psychotherapy    Patient's Goal:  To process emotions and how those relate to others and those relationships    Notes:  Pt was present for group. Pt participated and was able to identify an issue to work on today and how this relates to others. Pt stated she still feels numb over the death of her daughter. The group offered support and encouragement. Status After Intervention:  Unchanged    Participation Level: Active Listener and Interactive    Participation Quality: Appropriate, Attentive, Sharing and Supportive      Speech:  normal      Thought Process/Content: Logical  Linear      Affective Functioning: Flat      Mood: anxious and depressed      Level of consciousness:  Alert, Oriented x4 and Attentive      Response to Learning: Able to verbalize current knowledge/experience      Endings: None Reported    Modes of Intervention: Education, Support, Socialization and Exploration      Discipline Responsible: /Counselor      Signature:   Omar Herrera

## 2018-03-16 NOTE — PLAN OF CARE
Problem: Altered Mood, Depressive Behavior:  Goal: Able to verbalize and/or display a decrease in depressive symptoms  Able to verbalize and/or display a decrease in depressive symptoms   Outcome: Ongoing                                                                      Group Therapy Note    Date: 3/16/2018  Start Time: 1115  End Time:  1215  Number of Participants: 7    Type of Group: Psychotherapy    Patient's Goal:  Hand Out: Gratitude Exercise and to process emotions and how those relate to others and those relationships    Notes:  Pt was present for group. Pt participated and was able to identify things they are grateful for and how these impact their life. Pt participated in group conversation and offered feed back as needed. The group offered support and encouragement. Status After Intervention:  Unchanged    Participation Level: Active Listener and Interactive    Participation Quality: Appropriate, Attentive, Sharing and Supportive      Speech:  normal      Thought Process/Content: Logical  Linear      Affective Functioning: Blunted      Mood: anxious and depressed and angry      Level of consciousness:  Alert, Oriented x4 and Attentive      Response to Learning: Able to verbalize current knowledge/experience, Able to verbalize new learning, Capable of Insight      Endings: None Reported    Modes of Intervention: Education, Support, Socialization and Exploration      Discipline Responsible: /Counselor      Signature:   Wilver Noland

## 2018-03-16 NOTE — BH NOTE
and patient    Current Meds:    Prior to Admission medications    Medication Sig Start Date End Date Taking? Authorizing Provider   allopurinol (ZYLOPRIM) 100 MG tablet TAKE ONE TABLET BY MOUTH DAILY 3/1/18   Amalia Sharma MD   carvedilol (COREG) 6.25 MG tablet TAKE 1 TABLET BY MOUTH TWO  TIMES DAILY WITH MEALS 3/1/18   Amalia Sharma MD   bumetanide [facility]) 1 MG tablet Take 1 tablet by mouth 2 times daily 3/1/18   Amalia Sharma MD   atorvastatin (LIPITOR) 40 MG tablet TAKE ONE TABLET BY MOUTH DAILY 3/1/18   Amalia Sharma MD   QUEtiapine (SEROQUEL) 50 MG tablet Take 50 mg by mouth nightly    Historical Provider, MD   metFORMIN (GLUCOPHAGE) 500 MG tablet Take 500 mg by mouth 2 times daily (with meals)    Cleatus Donell   trospium (SANCTURA) 20 MG tablet Take 1 tablet by mouth 2 times daily For bladder 2/14/18   Malinda Lesches, APRN   niacin (SLO-NIACIN) 500 MG extended release tablet Take 500 mg by mouth daily     Historical Provider, MD   HYDROcodone-acetaminophen (NORCO) 7.5-325 MG per tablet Take 1 tablet by mouth every 6 hours as needed for Pain Pt only take with dental work. .    Historical Provider, MD   potassium chloride (KLOR-CON M) 20 MEQ extended release tablet Take 1 tablet by mouth daily 9/20/17   Amalia Sharma MD   levothyroxine (SYNTHROID) 100 MCG tablet Take 1 tablet by mouth Daily  Patient taking differently: Take 100 mcg by mouth Daily 2/15/18 pt reports PCP has increased to 125 mcg daily. 9/13/17   Amalia Sharma MD   spironolactone (ALDACTONE) 50 MG tablet Take 1 tablet by mouth 2 times daily 9/13/17   Amalia Sharma MD   gabapentin (NEURONTIN) 600 MG tablet Take 600 mg by mouth 2 times daily 2/15/18 pt reports that she is suppose to take med 4 times a day. 2/20/18 pt states neurologist decreased the med to 2 times a day. [place]     Historical Provider, MD   Tens Unit MISC by Does not apply route Apply and use tid prn  Dx -lumbar pain  Dispense tens and supplies 1/30/17   Malinda Lesches, APRN   UNABLE TO FIND EMPI TENs unit pads  Dx-lumbar pain 1/20/17   Malinda Lesches, APRN   tiZANidine (ZANAFLEX) 4 MG tablet Take 4 mg by mouth nightly    Historical Provider, MD   pilocarpine (SALAGEN) 5 MG tablet Take 5 mg by mouth 3 times daily    Historical Provider, MD   Vitamin D (CHOLECALCIFEROL) 1000 UNITS CAPS capsule Take 1,000 Units by mouth daily    Historical Provider, MD   ALPRAZolam Jerlene Reading) 0.5 MG tablet Take 0.5 mg by mouth nightly as needed for Sleep    Historical Provider, MD   Multiple Vitamins-Minerals (MULTIVITAMIN PO) Take by mouth daily    Historical Provider, MD   DULoxetine (CYMBALTA) 60 MG capsule Take 60 mg by mouth 2 times daily     Historical Provider, MD   traMADol (ULTRAM) 50 MG tablet Take 100 mg by mouth every 12 hours     Historical Provider, MD   calcium carbonate 600 MG TABS tablet Take 1 tablet by mouth daily. Historical Provider, MD   divalproex (DEPAKOTE) 500 MG ER tablet Take 1,000 mg by mouth nightly. Historical Provider, MD   pramipexole (MIRAPEX) 1 MG tablet Take 1 mg by mouth 2 times daily Indications: Take 1 tablet in the morning and 1 and a 1/2 at night Take 1 tablet in the morning and 1 and a 1/2 at night    Historical Provider, MD        MSE:  Patient is  A & O x3. Appearance:  well-appearing appropriately dressed for season and age. Cognition:  Recent memory intact , remote memory intact , good fund of knowledge, average  intelligence level. Speech:  normal  Language: Naming: intact; Word Finding: intact  Conversation no evidence of delusions  Behavior:  Cooperative and Good eye contact  Mood: euthymic  Affect: congruent with mood  Thought Content: no evidence of overt psychosis, delusional thought or suicidal /homicidal ideation or plan  Thought Process: linear, goal directed and coherent  Judgement Insight:  normal and appropriate  Gait and Station: slow to rise and move   Musculoskeletal: WNL      Assesment: See above    Plan: Continue medications and IOP program  1.

## 2018-03-19 ENCOUNTER — HOSPITAL ENCOUNTER (OUTPATIENT)
Dept: PSYCHIATRY | Age: 58
Setting detail: THERAPIES SERIES
Discharge: HOME | End: 2018-03-19
Payer: COMMERCIAL

## 2018-03-19 VITALS — HEART RATE: 83 BPM | DIASTOLIC BLOOD PRESSURE: 71 MMHG | RESPIRATION RATE: 18 BRPM | SYSTOLIC BLOOD PRESSURE: 103 MMHG

## 2018-03-19 PROCEDURE — 90832 PSYTX W PT 30 MINUTES: CPT | Performed by: NURSE PRACTITIONER

## 2018-03-19 PROCEDURE — 90853 GROUP PSYCHOTHERAPY: CPT | Performed by: SOCIAL WORKER

## 2018-03-19 NOTE — PLAN OF CARE
Problem: Falls - Risk of:  Goal: Will remain free from falls  Will remain free from falls   Outcome: Ongoing  D:  Pt here for IOP this am.  Gait was steady. No c/o dizziness.

## 2018-03-19 NOTE — PLAN OF CARE
Problem: Altered Mood, Depressive Behavior:  Goal: Ability to disclose and discuss suicidal ideas will improve  Ability to disclose and discuss suicidal ideas will improve   Outcome: Ongoing                                                                      Group Therapy Note    Date: 3/19/2018  Start Time: 1115  End Time:  1215  Number of Participants: 9    Type of Group: Cognitive Skills    Patient's Goal:  Activity: Draw three pictures that represent your past, present, and future. Process emotions and actions in how to relate to others or their relationship with others. Notes:  Pt attended process group as schedule. Pt participated by drawsing pictures and sharing with the group, thoughts about drawing. Participated in group discussion. Pt gave support and encouragement to group members. Status After Intervention:  Improved    Participation Level:  Active Listener and Interactive    Participation Quality: Appropriate, Attentive, Sharing and Supportive      Speech:  normal      Thought Process/Content: Logical  Linear      Affective Functioning: Congruent      Mood: anxious and depressed      Level of consciousness:  Alert, Oriented x4 and Attentive      Response to Learning: Able to verbalize current knowledge/experience and Progressing to goal      Endings: None Reported    Modes of Intervention: Support, Socialization and Activity      Discipline Responsible: /Counselor      Signature:  BETTY Thomas

## 2018-03-19 NOTE — BH NOTE
except Denies change  History obtained via chart review and patient    Current Meds:    Prior to Admission medications    Medication Sig Start Date End Date Taking? Authorizing Provider   allopurinol (ZYLOPRIM) 100 MG tablet TAKE ONE TABLET BY MOUTH DAILY 3/1/18   Sari Eddy MD   carvedilol (COREG) 6.25 MG tablet TAKE 1 TABLET BY MOUTH TWO  TIMES DAILY WITH MEALS 3/1/18   Sari Eddy MD   bumetanide [facility]) 1 MG tablet Take 1 tablet by mouth 2 times daily 3/1/18   Sari Eddy MD   atorvastatin (LIPITOR) 40 MG tablet TAKE ONE TABLET BY MOUTH DAILY 3/1/18   Sari Eddy MD   QUEtiapine (SEROQUEL) 50 MG tablet Take 50 mg by mouth nightly    Historical Provider, MD   metFORMIN (GLUCOPHAGE) 500 MG tablet Take 500 mg by mouth 2 times daily (with meals)    Lesvia De La Garza   trospium (SANCTURA) 20 MG tablet Take 1 tablet by mouth 2 times daily For bladder 2/14/18   MAEVE Ely   niacin (SLO-NIACIN) 500 MG extended release tablet Take 500 mg by mouth daily     Historical Provider, MD   HYDROcodone-acetaminophen (NORCO) 7.5-325 MG per tablet Take 1 tablet by mouth every 6 hours as needed for Pain Pt only take with dental work. .    Historical Provider, MD   potassium chloride (KLOR-CON M) 20 MEQ extended release tablet Take 1 tablet by mouth daily 9/20/17   Sari Eddy MD   levothyroxine (SYNTHROID) 100 MCG tablet Take 1 tablet by mouth Daily  Patient taking differently: Take 100 mcg by mouth Daily 2/15/18 pt reports PCP has increased to 125 mcg daily. 9/13/17   Sari Eddy MD   spironolactone (ALDACTONE) 50 MG tablet Take 1 tablet by mouth 2 times daily 9/13/17   Sari Eddy MD   gabapentin (NEURONTIN) 600 MG tablet Take 600 mg by mouth 2 times daily 2/15/18 pt reports that she is suppose to take med 4 times a day. 2/20/18 pt states neurologist decreased the med to 2 times a day. Lisy Mediate     Historical Provider, MD   Tens Unit MISC by Does not apply route Apply and use tid prn  Dx -lumbar pain  Dispense tens and supplies 1/30/17   MAEVE Ireland   UNABLE TO FIND EMPI TENs unit pads  Dx-lumbar pain 1/20/17   MAEVE Ireland   tiZANidine (ZANAFLEX) 4 MG tablet Take 4 mg by mouth nightly    Historical Provider, MD   pilocarpine (SALAGEN) 5 MG tablet Take 5 mg by mouth 3 times daily    Historical Provider, MD   Vitamin D (CHOLECALCIFEROL) 1000 UNITS CAPS capsule Take 1,000 Units by mouth daily    Historical Provider, MD   ALPRAZolam Selestino Light) 0.5 MG tablet Take 0.5 mg by mouth nightly as needed for Sleep    Historical Provider, MD   Multiple Vitamins-Minerals (MULTIVITAMIN PO) Take by mouth daily    Historical Provider, MD   DULoxetine (CYMBALTA) 60 MG capsule Take 60 mg by mouth 2 times daily     Historical Provider, MD   traMADol (ULTRAM) 50 MG tablet Take 100 mg by mouth every 12 hours     Historical Provider, MD   calcium carbonate 600 MG TABS tablet Take 1 tablet by mouth daily. Historical Provider, MD   divalproex (DEPAKOTE) 500 MG ER tablet Take 1,000 mg by mouth nightly. Historical Provider, MD   pramipexole (MIRAPEX) 1 MG tablet Take 1 mg by mouth 2 times daily Indications: Take 1 tablet in the morning and 1 and a 1/2 at night Take 1 tablet in the morning and 1 and a 1/2 at night    Historical Provider, MD      MSE:  Patient is  A & O x3. Appearance:  well-appearing appropriately dressed for season and age. Cognition:  Recent memory intact , remote memory intact , good fund of knowledge, average  intelligence level. Speech:  normal  Language: Naming: intact;  Word Finding: intact  Conversation no evidence of delusions  Behavior:  Cooperative and Good eye contact  Mood: euthymic  Affect: congruent with mood  Thought Content: no evidence of overt psychosis, delusional thought or suicidal /homicidal ideation or plan  Thought Process: linear, goal directed and coherent  Judgement Insight:  Fair   Gait and Station: slow to rise and walk   Musculoskeletal: WNL      Assesment: See above    Plan:

## 2018-03-20 RX ORDER — SPIRONOLACTONE 50 MG/1
TABLET, FILM COATED ORAL
Qty: 60 TABLET | Refills: 5 | Status: SHIPPED | OUTPATIENT
Start: 2018-03-20 | End: 2018-05-18 | Stop reason: SDUPTHER

## 2018-03-21 ENCOUNTER — HOSPITAL ENCOUNTER (OUTPATIENT)
Dept: PSYCHIATRY | Age: 58
Setting detail: THERAPIES SERIES
Discharge: HOME | End: 2018-03-21
Payer: COMMERCIAL

## 2018-03-21 VITALS — OXYGEN SATURATION: 96 %

## 2018-03-21 PROCEDURE — 90853 GROUP PSYCHOTHERAPY: CPT

## 2018-03-21 NOTE — PLAN OF CARE
Problem: Altered Mood, Depressive Behavior:  Goal: Ability to disclose and discuss suicidal ideas will improve  Ability to disclose and discuss suicidal ideas will improve   Outcome: Ongoing                                                                      Group Therapy Note    Date: 3/21/2018  Start Time: 1000  End Time:  1100  Number of Participants: 7    Type of Group: Psychotherapy    Patient's Goal:  Process emotions and actions in how to relate to others or their relationship with others. Notes:  Pt attended process group as schedule. Pt participated by identified an issue to work on today regarding how they interact and relate to others. Participated in group discussion. Pt gave support and encouragement to group members. Pt shared she didn't know if she could stay in IOP due to her falling asleep, and continued grief over her daughter. Status After Intervention:  Unchanged    Participation Level:  Active Listener and Interactive    Participation Quality: Appropriate, Attentive, Sharing and Supportive      Speech:  normal      Thought Process/Content: Logical  Linear      Affective Functioning: Congruent      Mood: depressed      Level of consciousness:  Alert, Oriented x4 and Attentive      Response to Learning: Able to verbalize current knowledge/experience, Able to change behavior and Progressing to goal      Endings: None Reported    Modes of Intervention: Education, Support, Socialization, Exploration and Clarifying      Discipline Responsible: /Counselor      Signature:  BETTY Winchester

## 2018-03-23 ENCOUNTER — HOSPITAL ENCOUNTER (OUTPATIENT)
Dept: PSYCHIATRY | Age: 58
Setting detail: THERAPIES SERIES
Discharge: HOME | End: 2018-03-23
Payer: COMMERCIAL

## 2018-03-23 PROCEDURE — 90853 GROUP PSYCHOTHERAPY: CPT | Performed by: SOCIAL WORKER

## 2018-03-26 ENCOUNTER — HOSPITAL ENCOUNTER (OUTPATIENT)
Dept: PSYCHIATRY | Age: 58
Setting detail: THERAPIES SERIES
Discharge: HOME | End: 2018-03-26
Payer: COMMERCIAL

## 2018-03-26 VITALS
RESPIRATION RATE: 18 BRPM | HEART RATE: 92 BPM | SYSTOLIC BLOOD PRESSURE: 117 MMHG | TEMPERATURE: 97.9 F | DIASTOLIC BLOOD PRESSURE: 74 MMHG

## 2018-03-26 PROCEDURE — 90853 GROUP PSYCHOTHERAPY: CPT

## 2018-03-26 NOTE — PROGRESS NOTES
ADMINISTRATIVE NOTE:  Reviewed and approved group notes authored by Noemi Doan on 3/23/18. We discussed progress of this patient and group issues and participation level.

## 2018-03-26 NOTE — PLAN OF CARE
Problem: Altered Mood, Depressive Behavior:  Goal: STG-Medication therapy compliance  Pt will report any med side effects to staff. Outcome: Ongoing  D:  Pt attended IOP this am.  She rates her depression, anger and anxiety 10 with 10 beng the highest.  She reports an increased appetite and low energy level. She denies any thoughts of self harm. Pt reports that she only slept 3 hrs last night. She says she is compliant with her CPAP. She reports Friday night she woke up and had difficulty catching her breathe. Today B/P 117/74 P=92 R=18 unable to obtain pulse ox due to \"fake nails\". A: Discussed with pt her sleep issues. She reports that she has a lot on her mind due to the report made to [employer] on Friday. She says she did not try to go to sleep. A:  Discussed with pt measures for good sleep hygiene and given written info. Pt says she knows what she needs to do-\"It's a behavioral modification issue\". Jai MCFARLANE given update on pt to include the above info. R:  Pt with worried affect. Pt with several stressors to include the recent death of her daughter and the need for a report to [employer].

## 2018-03-26 NOTE — PROGRESS NOTES
ADMINISTRATIVE NOTE:  Reviewed and approved group notes authored by BETTY Blackwood today. We discussed progress of this patient and group issues and participation level.

## 2018-03-26 NOTE — PLAN OF CARE
Problem: Altered Mood, Depressive Behavior:  Goal: Able to verbalize and/or display a decrease in depressive symptoms  Able to verbalize and/or display a decrease in depressive symptoms   Outcome: Ongoing                                                                      Group Therapy Note    Date: 3/26/2018  Start Time: 1000  End Time:  1100  Number of Participants: 6    Type of Group: Psychotherapy    Patient's Goal:  Hand Out: 10 Things to Stop Expecting to fromOthers and how those relate to others and those relationships    Notes:  Pt was present for group. Pt participated and was able to identify things they expect from others and how this affects those relationships. Pt participated in group discussion and offered feed back as needed. The group offered support and encouragement. Status After Intervention:  Unchanged    Participation Level: Active Listener and Interactive    Participation Quality: Appropriate, Attentive, Sharing and Supportive      Speech:  normal      Thought Process/Content: Logical  Linear      Affective Functioning: Congruent      Mood: anxious and depressed and angry      Level of consciousness:  Alert, Oriented x4 and Attentive      Response to Learning: Able to verbalize current knowledge/experience, Able to verbalize/acknowledge new learning, Able to retain information and Capable of insight      Endings: None Reported    Modes of Intervention: Education, Support, Socialization, Exploration and Clarifying      Discipline Responsible: /Counselor      Signature:   Jennifer Neri

## 2018-03-28 ENCOUNTER — HOSPITAL ENCOUNTER (OUTPATIENT)
Dept: PSYCHIATRY | Age: 58
Setting detail: THERAPIES SERIES
Discharge: HOME | End: 2018-03-28
Payer: COMMERCIAL

## 2018-03-28 PROCEDURE — 90853 GROUP PSYCHOTHERAPY: CPT

## 2018-03-28 NOTE — PLAN OF CARE
Problem: Altered Mood, Depressive Behavior:  Goal: Able to verbalize and/or display a decrease in depressive symptoms  Able to verbalize and/or display a decrease in depressive symptoms   Outcome: Ongoing                                                                      Group Therapy Note    Date: 3/28/2018  Start Time: 1115  End Time:  1215  Number of Participants: 9    Type of Group: Psychotherapy    Patient's Goal:  Hand Out: Fun Activities to do to cleaning depression     Notes:  Pt was present for group. Pt participated and was able to identify things they do to help decrease depression. Pt offered feed back to other group members and participated in group discussion. The group offered support and encouragement. Status After Intervention:  Unchanged    Participation Level: Active Listener and Interactive    Participation Quality: Appropriate, Attentive, Sharing and Supportive      Speech:  normal      Thought Process/Content: Logical  Linear      Affective Functioning: Blunted    Mood: anxious and depressed and angry      Level of consciousness:  Alert, Oriented x4 and Attentive      Response to Learning: Able to verbalize current knowledge/experience, Able to verbalize/acknowledge new learning, Able to retain information, Capable of insight       Endings: None Reported    Modes of Intervention: Education, Support, Socialization, Exploration and Clarifying      Discipline Responsible: /Counselor      Signature:   Zulay Dean

## 2018-03-29 ENCOUNTER — OFFICE VISIT (OUTPATIENT)
Dept: FAMILY MEDICINE CLINIC | Age: 58
End: 2018-03-29
Payer: COMMERCIAL

## 2018-03-29 VITALS
DIASTOLIC BLOOD PRESSURE: 60 MMHG | WEIGHT: 181 LBS | SYSTOLIC BLOOD PRESSURE: 114 MMHG | TEMPERATURE: 98.8 F | BODY MASS INDEX: 35.53 KG/M2 | HEART RATE: 76 BPM | RESPIRATION RATE: 16 BRPM | OXYGEN SATURATION: 98 % | HEIGHT: 60 IN

## 2018-03-29 DIAGNOSIS — J34.89 POSTERIOR RHINORRHEA: ICD-10-CM

## 2018-03-29 DIAGNOSIS — J01.90 ACUTE BACTERIAL SINUSITIS: Primary | ICD-10-CM

## 2018-03-29 DIAGNOSIS — R05.9 COUGH: ICD-10-CM

## 2018-03-29 DIAGNOSIS — B96.89 ACUTE BACTERIAL SINUSITIS: Primary | ICD-10-CM

## 2018-03-29 LAB
INFLUENZA A ANTIBODY: NEGATIVE
INFLUENZA B ANTIBODY: NEGATIVE

## 2018-03-29 PROCEDURE — 87804 INFLUENZA ASSAY W/OPTIC: CPT | Performed by: FAMILY MEDICINE

## 2018-03-29 PROCEDURE — 99213 OFFICE O/P EST LOW 20 MIN: CPT | Performed by: FAMILY MEDICINE

## 2018-03-29 RX ORDER — AMOXICILLIN AND CLAVULANATE POTASSIUM 875; 125 MG/1; MG/1
1 TABLET, FILM COATED ORAL 2 TIMES DAILY
Qty: 20 TABLET | Refills: 0 | Status: SHIPPED | OUTPATIENT
Start: 2018-03-29 | End: 2018-04-08

## 2018-03-29 RX ORDER — DICLOFENAC SODIUM 75 MG/1
TABLET, DELAYED RELEASE ORAL 2 TIMES DAILY
Status: ON HOLD | COMMUNITY
Start: 2018-03-13 | End: 2020-04-25 | Stop reason: HOSPADM

## 2018-03-29 RX ORDER — LORATADINE 10 MG/1
10 TABLET ORAL DAILY
Qty: 30 TABLET | Refills: 2 | Status: SHIPPED | OUTPATIENT
Start: 2018-03-29 | End: 2019-06-13

## 2018-03-29 ASSESSMENT — ENCOUNTER SYMPTOMS
SINUS PAIN: 1
EYE PAIN: 0
SHORTNESS OF BREATH: 0
WHEEZING: 0
NAUSEA: 0
VOMITING: 0
SORE THROAT: 0
SINUS PRESSURE: 1
ABDOMINAL PAIN: 0
EYE DISCHARGE: 0
CONSTIPATION: 0
COUGH: 1
DIARRHEA: 0
RHINORRHEA: 1

## 2018-03-29 NOTE — PROGRESS NOTES
Lien Hagan is a 63 y.o. female who presents today for   Chief Complaint   Patient presents with    Cough    Congestion     moderate drainage    Facial Pain     states she has sinus pressure       Chief Complaint: Sinus pain    HPI   Patient reports that for the last 7-10 days she's been having congestion with a cough that has been mildly productive. She reports that her biggest problem has been facial pain with sinus pressure. She is also noticed some ear pressure associated with the symptoms. She is not taking anything in attempts to improve her symptoms. She denies any aggravating or alleviating factors. She denies any fever or known sick contacts. Review of Systems   Constitutional: Negative for activity change, appetite change and fever. HENT: Positive for congestion, rhinorrhea, sinus pain and sinus pressure. Negative for ear discharge, ear pain and sore throat. Eyes: Negative for pain and discharge. Respiratory: Positive for cough. Negative for shortness of breath and wheezing. Cardiovascular: Negative for chest pain and palpitations. Gastrointestinal: Negative for abdominal pain, constipation, diarrhea, nausea and vomiting. Endocrine: Negative for cold intolerance and heat intolerance. Genitourinary: Negative for dysuria and hematuria. Musculoskeletal: Negative for gait problem and neck pain. Skin: Negative for rash and wound. Neurological: Negative for syncope, weakness and numbness. Hematological: Negative for adenopathy. Does not bruise/bleed easily. Psychiatric/Behavioral: Negative for dysphoric mood and sleep disturbance. The patient is not nervous/anxious.         Past Medical History:   Diagnosis Date    Arthritis     Bipolar I disorder, most recent episode (or current) mixed, unspecified     Chronic back pain     Dry mouth     Hyperlipidemia     Hypertension     Hypokalemia     Menopause     Overactive bladder     Plantar fasciitis     RLS (restless legs EMPI TENs unit pads  Dx-lumbar pain 1 Device 0    tiZANidine (ZANAFLEX) 4 MG tablet Take 4 mg by mouth nightly      pilocarpine (SALAGEN) 5 MG tablet Take 5 mg by mouth 3 times daily      Vitamin D (CHOLECALCIFEROL) 1000 UNITS CAPS capsule Take 1,000 Units by mouth daily      Multiple Vitamins-Minerals (MULTIVITAMIN PO) Take by mouth daily      DULoxetine (CYMBALTA) 60 MG capsule Take 60 mg by mouth 2 times daily       traMADol (ULTRAM) 50 MG tablet Take 100 mg by mouth every 12 hours       calcium carbonate 600 MG TABS tablet Take 1 tablet by mouth daily.  divalproex (DEPAKOTE) 500 MG ER tablet Take 1,000 mg by mouth nightly.  pramipexole (MIRAPEX) 1 MG tablet Take 1 mg by mouth 2 times daily Indications: Take 1 tablet in the morning and 1 and a 1/2 at night Take 1 tablet in the morning and 1 and a 1/2 at night       No current facility-administered medications for this visit. Allergies   Allergen Reactions    Hctz [Hydrochlorothiazide] Other (See Comments)     Acid reflux         Past Surgical History:   Procedure Laterality Date    CARPAL TUNNEL RELEASE Bilateral 2002    CHOLECYSTECTOMY  10/2014    COLON SURGERY      COLON SURGERY      biopsy     COLONOSCOPY      CYST REMOVAL  05/2017    Under left ear    HARDWARE REMOVAL Right 2011    Foot     HERNIA REPAIR  10/2014    NOSE SURGERY  08/01/2017    Dr Carlos Arzate L/S,1 LEVEL Bilateral 2/7/2017    INJECTION MEDICATION FACET JOINT performed by Carley Hawthorne at [facility] History   Substance Use Topics    Smoking status: Current Every Day Smoker     Packs/day: 1.00     Years: 30.00     Types: Cigarettes     Last attempt to quit: 7/20/2003    Smokeless tobacco: Never Used      Comment: Pt not interested at this time in stopping.     Alcohol use Yes      Comment: rarely       Family History   Problem Relation Age of Onset    Cancer Mother      lung    Heart Disease Mother     Hypertension Mother  Bipolar Disorder Mother     Heart Disease Father     Hypertension Father        /60 (Site: Left Arm, Position: Sitting, Cuff Size: Large Adult)   Pulse 76   Temp 98.8 °F (37.1 °C) (Temporal)   Resp 16   Ht 5' (1.524 m)   Wt 181 lb (82.1 kg)   SpO2 98%   BMI 35.35 kg/m²     Physical Exam   Constitutional: She is oriented to person, place, and time. She appears well-developed and well-nourished. HENT:   Head: Normocephalic and atraumatic. Right Ear: Hearing, tympanic membrane, external ear and ear canal normal.   Left Ear: Hearing, tympanic membrane, external ear and ear canal normal.   Mouth/Throat: No oropharyngeal exudate. Posterior drainage  Left frontal sinus tenderness   Eyes: Conjunctivae are normal. Pupils are equal, round, and reactive to light. Right eye exhibits no discharge. Left eye exhibits no discharge. No scleral icterus. Neck: Neck supple. No tracheal deviation present. No thyromegaly present. Cardiovascular: Normal rate, regular rhythm, normal heart sounds and intact distal pulses. Exam reveals no gallop and no friction rub. No murmur heard. Pulmonary/Chest: Effort normal and breath sounds normal. No respiratory distress. She has no wheezes. She has no rales. Abdominal: Soft. Bowel sounds are normal. She exhibits no distension. There is no tenderness. Musculoskeletal: She exhibits no edema (bilateral lower extremities) or deformity ( no gross deformities of upper or lower extremities bilaterally). Lymphadenopathy:     She has no cervical adenopathy. Neurological: She is alert and oriented to person, place, and time. No cranial nerve deficit. She exhibits normal muscle tone. Skin: Skin is warm and dry. No rash noted. No erythema. Psychiatric: She has a normal mood and affect. Her behavior is normal. Thought content normal.   Nursing note and vitals reviewed. Assessment:    ICD-10-CM ICD-9-CM    1.  Acute bacterial sinusitis J01.90 461.9

## 2018-03-30 NOTE — PATIENT INSTRUCTIONS

## 2018-04-02 ENCOUNTER — HOSPITAL ENCOUNTER (OUTPATIENT)
Dept: PSYCHIATRY | Age: 58
Setting detail: THERAPIES SERIES
Discharge: HOME | End: 2018-04-02
Payer: COMMERCIAL

## 2018-04-02 VITALS — SYSTOLIC BLOOD PRESSURE: 109 MMHG | DIASTOLIC BLOOD PRESSURE: 75 MMHG | RESPIRATION RATE: 18 BRPM | HEART RATE: 86 BPM

## 2018-04-02 DIAGNOSIS — F31.9 BIPOLAR I DISORDER WITH MIXED FEATURES (HCC): ICD-10-CM

## 2018-04-02 DIAGNOSIS — F31.9 BIPOLAR I DISORDER WITH MIXED FEATURES (HCC): Primary | ICD-10-CM

## 2018-04-02 LAB
AMPHETAMINE SCREEN, URINE: NEGATIVE
BARBITURATE SCREEN URINE: NEGATIVE
BENZODIAZEPINE SCREEN, URINE: NEGATIVE
CANNABINOID SCREEN URINE: NEGATIVE
COCAINE METABOLITE SCREEN URINE: NEGATIVE
Lab: NORMAL
OPIATE SCREEN URINE: NEGATIVE

## 2018-04-02 PROCEDURE — 90832 PSYTX W PT 30 MINUTES: CPT | Performed by: NURSE PRACTITIONER

## 2018-04-02 PROCEDURE — 80307 DRUG TEST PRSMV CHEM ANLYZR: CPT

## 2018-04-02 PROCEDURE — 90853 GROUP PSYCHOTHERAPY: CPT | Performed by: SOCIAL WORKER

## 2018-04-05 ENCOUNTER — TELEPHONE (OUTPATIENT)
Dept: PSYCHIATRY | Age: 58
End: 2018-04-05

## 2018-04-05 ENCOUNTER — OFFICE VISIT (OUTPATIENT)
Dept: FAMILY MEDICINE CLINIC | Age: 58
End: 2018-04-05
Payer: COMMERCIAL

## 2018-04-05 VITALS
TEMPERATURE: 98.2 F | BODY MASS INDEX: 35.15 KG/M2 | RESPIRATION RATE: 16 BRPM | HEART RATE: 82 BPM | DIASTOLIC BLOOD PRESSURE: 76 MMHG | OXYGEN SATURATION: 95 % | WEIGHT: 180 LBS | SYSTOLIC BLOOD PRESSURE: 110 MMHG

## 2018-04-05 DIAGNOSIS — R40.0 DAYTIME SLEEPINESS: Primary | ICD-10-CM

## 2018-04-05 PROCEDURE — 99213 OFFICE O/P EST LOW 20 MIN: CPT | Performed by: NURSE PRACTITIONER

## 2018-04-09 ENCOUNTER — TELEPHONE (OUTPATIENT)
Dept: PSYCHIATRY | Age: 58
End: 2018-04-09

## 2018-04-09 ENCOUNTER — OFFICE VISIT (OUTPATIENT)
Dept: OTOLARYNGOLOGY | Facility: CLINIC | Age: 58
End: 2018-04-09

## 2018-04-09 VITALS
DIASTOLIC BLOOD PRESSURE: 61 MMHG | SYSTOLIC BLOOD PRESSURE: 116 MMHG | WEIGHT: 204 LBS | HEART RATE: 85 BPM | TEMPERATURE: 98 F | HEIGHT: 60 IN | RESPIRATION RATE: 20 BRPM | BODY MASS INDEX: 40.05 KG/M2

## 2018-04-09 DIAGNOSIS — J30.9 ALLERGIC RHINITIS, UNSPECIFIED CHRONICITY, UNSPECIFIED SEASONALITY, UNSPECIFIED TRIGGER: ICD-10-CM

## 2018-04-09 DIAGNOSIS — J34.89 NASAL VALVE STENOSIS: Primary | ICD-10-CM

## 2018-04-09 DIAGNOSIS — Z72.0 TOBACCO ABUSE DISORDER: ICD-10-CM

## 2018-04-09 DIAGNOSIS — J34.3 HYPERTROPHY OF BOTH INFERIOR NASAL TURBINATES: ICD-10-CM

## 2018-04-09 DIAGNOSIS — J34.2 NASAL SEPTAL DEVIATION: ICD-10-CM

## 2018-04-09 PROCEDURE — 99214 OFFICE O/P EST MOD 30 MIN: CPT | Performed by: OTOLARYNGOLOGY

## 2018-04-09 RX ORDER — DIVALPROEX SODIUM 500 MG/1
1000 TABLET, EXTENDED RELEASE ORAL NIGHTLY
COMMUNITY
End: 2020-10-22

## 2018-04-09 RX ORDER — TROSPIUM CHLORIDE 20 MG/1
TABLET, FILM COATED ORAL
COMMUNITY
Start: 2018-03-19 | End: 2018-04-09 | Stop reason: SDUPTHER

## 2018-04-09 RX ORDER — TROSPIUM CHLORIDE 20 MG/1
20 TABLET, FILM COATED ORAL
COMMUNITY
Start: 2018-02-14 | End: 2019-04-09

## 2018-04-09 RX ORDER — FLUTICASONE PROPIONATE 50 MCG
2 SPRAY, SUSPENSION (ML) NASAL DAILY
Qty: 1 BOTTLE | Refills: 6 | Status: SHIPPED | OUTPATIENT
Start: 2018-04-09 | End: 2018-05-09

## 2018-04-09 NOTE — PROGRESS NOTES
PRIMARY CARE PROVIDER: ARETHA Jhaveri  REFERRING PROVIDER: No ref. provider found    Chief Complaint   Patient presents with   • Follow-up     NASAL VESTIBULAR STENOSIS       Subjective   History of Present Illness:  Robyn Minaya is a  58 y.o. female  who presents for follow up s/p repair of nasal vestibular stenosis with  grafting, lateral crural batten grafts, non-anatomic alar batten grafts, septoplasty, conchal cartilage graft from the left ear to nose, and bilateral ITR on 8/1/17. The patient has had a relatively normal postoperative course. The patient has had a recent flair up of nasal congestion. The symptoms are localized to both nasal cavities. The symptoms severity was described as: mild to moderate The symptoms have been: present for the last several weeks The symptoms are aggravated by  allergy. There have been no factors that have improved the symptoms. .She is currently taking Claritin for this. She reports she is overall breathing better postoperatively and is pleased with the appearance of her nose. She also reports the tenderness has also improved.     Review of Systems:  Review of Systems   Constitutional: Negative for chills and fever.   HENT: Positive for congestion. Negative for ear discharge, ear pain, postnasal drip, rhinorrhea, sinus pain, sinus pressure, sore throat, trouble swallowing and voice change.    Musculoskeletal: Positive for arthralgias and back pain.   Allergic/Immunologic: Positive for environmental allergies.   All other systems reviewed and are negative.      Past History:  Past Medical History:   Diagnosis Date   • Acid reflux     RESOLVED PT PT   • Allergic rhinitis    • Arthritis     BACK   • Back pain    • Bipolar 1 disorder    • Deviated nasal septum    • Disease of thyroid gland    • Gout    • Hypertension    • Hypertrophy of nasal turbinates    • Hypokalemia    • Hypothyroidism    • Nasal septal deviation    • Nasal valve stenosis    • Spinal headache     • Tobacco use disorder      Past Surgical History:   Procedure Laterality Date   • CARPAL TUNNEL RELEASE Bilateral 2004   • CHOLECYSTECTOMY  2013   • CYST REMOVAL  2016    neck   • FOOT SURGERY Right 2010    X3   • LASER ABLATION      right back   • NASAL VESTIBULE LESION/PAPILLOMA EXCISION N/A 8/1/2017    Procedure: Repair of nasal vestibular stenosis and septoplasty; cartilage graft from left ear;  Surgeon: Mark Anthony Anderson MD;  Location: [facility] OR;  Service:      Family History   Problem Relation Age of Onset   • Cancer Mother    • Diabetes Father    • Hypertension Father    • Cancer Father      Social History   Substance Use Topics   • Smoking status: Current Every Day Smoker     Packs/day: 1.00     Types: Cigarettes   • Smokeless tobacco: Never Used   • Alcohol use Yes      Comment: maybe 3 x yearly     Allergies:  Hctz [hydrochlorothiazide]    Current Outpatient Prescriptions:   •  allopurinol (ZYLOPRIM) 100 MG tablet, Take 100 mg by mouth Daily., Disp: , Rfl:   •  atorvastatin (LIPITOR) 40 MG tablet, Take  by mouth., Disp: , Rfl:   •  bumetanide (BUMEX) 2 MG tablet, Take  by mouth., Disp: , Rfl:   •  calcium carbonate (OS-KAYLEY) 600 MG tablet, Take 600 mg by mouth Daily., Disp: , Rfl:   •  carvedilol (COREG) 6.25 MG tablet, Take 6.25 mg by mouth 2 (Two) Times a Day With Meals., Disp: , Rfl:   •  cholecalciferol (VITAMIN D3) 1000 UNITS tablet, Take 1,000 Units by mouth Daily., Disp: , Rfl:   •  desonide (DESOWEN) 0.05 % cream, , Disp: , Rfl:   •  divalproex (DEPAKOTE) 500 MG 24 hr tablet, Take 1,000 mg by mouth., Disp: , Rfl:   •  DULoxetine (CYMBALTA) 60 MG capsule, Take 60 mg by mouth 2 (Two) Times a Day., Disp: , Rfl:   •  gabapentin (NEURONTIN) 600 MG tablet, Take 600 mg by mouth 4 (Four) Times a Day Before Meals & at Bedtime., Disp: , Rfl:   •  HYDROcodone-acetaminophen (NORCO) 7.5-325 MG per tablet, Take 1 tablet by mouth Every 4 (Four) Hours As Needed for Moderate Pain (4-6) (Pain)., Disp: 40 tablet,  Rfl: 0  •  levothyroxine (SYNTHROID, LEVOTHROID) 100 MCG tablet, Take  by mouth., Disp: , Rfl:   •  Multiple Vitamin (MULTI VITAMIN PO), Take 1 dose by mouth Daily., Disp: , Rfl:   •  pilocarpine (SALAGEN) 5 MG tablet, Take 5 mg by mouth 3 (Three) Times a Day., Disp: , Rfl:   •  Potassium 99 MG tablet, Take  by mouth., Disp: , Rfl:   •  potassium chloride (K-DUR,KLOR-CON) 20 MEQ CR tablet, Take  by mouth., Disp: , Rfl:   •  pramipexole (MIRAPEX) 1 MG tablet, Take 1 mg by mouth Every Morning., Disp: , Rfl:   •  pramipexole (MIRAPEX) 1.5 MG tablet, Take 1.5 mg by mouth Every Night., Disp: , Rfl:   •  QUEtiapine (SEROquel) 50 MG tablet, Take 50 mg by mouth Every Night., Disp: , Rfl:   •  spironolactone (ALDACTONE) 50 MG tablet, Take  by mouth., Disp: , Rfl:   •  tiZANidine (ZANAFLEX) 4 MG tablet, Take 4 mg by mouth Take As Directed. PT TAKES 1/2 4 MG TABLET IN AM AND AT NOON AND ONE 4 MG TABLET AT BEDTIME., Disp: , Rfl:   •  traMADol (ULTRAM) 50 MG tablet, Take 50 mg by mouth 2 (Two) Times a Day., Disp: , Rfl:   •  trospium (SANCTURA) 20 MG tablet, Take 20 mg by mouth., Disp: , Rfl:   •  fluticasone (FLONASE) 50 MCG/ACT nasal spray, 2 sprays into each nostril Daily for 30 days. Administer 2 sprays in each nostril for each dose., Disp: 1 bottle, Rfl: 6      Objective     Vital Signs:  Temp:  [98 °F (36.7 °C)] 98 °F (36.7 °C)  Heart Rate:  [85] 85  Resp:  [20] 20  BP: (116)/(61) 116/61    Physical Exam:  Physical Exam   Constitutional: She is oriented to person, place, and time. She appears well-developed and well-nourished. She is cooperative. No distress.   HENT:   Head: Normocephalic and atraumatic.   Right Ear: Tympanic membrane, external ear and ear canal normal.   Left Ear: Tympanic membrane, external ear and ear canal normal.   Nose: Nose normal. No mucosal edema, rhinorrhea or septal deviation.   Mouth/Throat: Uvula is midline, oropharynx is clear and moist and mucous membranes are normal.   Base of nose slightly  asymmetric and twisted. Nasal cavities appear dry with crusting   Eyes: Conjunctivae, EOM and lids are normal.   Neck: Phonation normal. Neck supple. No thyroid mass and no thyromegaly present.   Pulmonary/Chest: Effort normal. No stridor.   Lymphadenopathy:        Head (right side): No submental, no submandibular, no tonsillar, no preauricular, no posterior auricular and no occipital adenopathy present.        Head (left side): No submental, no submandibular, no tonsillar, no preauricular, no posterior auricular and no occipital adenopathy present.     She has no cervical adenopathy.   Neurological: She is alert and oriented to person, place, and time. No cranial nerve deficit.   Psychiatric: She has a normal mood and affect. Her behavior is normal. Judgment and thought content normal.     PREOP 6/28/17                Assessment   Assessment:  1. Nasal valve stenosis    2. Nasal septal deviation    3. Hypertrophy of both inferior nasal turbinates    4. Allergic rhinitis, unspecified chronicity, unspecified seasonality, unspecified trigger    5. Tobacco abuse disorder    6. BMI 39.0-39.9,adult        Plan   Plan:    May start Flonase. The proper use of nasal inhalers was discussed including the need for regular use and build up of drug levels before full effects. Call for any problems or worsening symptoms.     QUALITY MEASURES    Body Mass Index Screening and Follow-Up Plan  Body mass index is 39.84 kg/m².  Discussed the patient's BMI with her. BMI is above normal parameters. Follow-up plan includes:  educational material.    Tobacco Use: Screening and Cessation Intervention  Smoking status: Current Every Day Smoker                                                   Packs/day: 1.00      Years: 0.00         Types: Cigarettes  Smokeless tobacco: Never Used                        Smoking cessation information given in after visit summary.    Return in about 6 months (around 10/9/2018), or if symptoms worsen or fail to  improve, for Recheck.    My findings and recommendations were discussed and questions were answered.     Mark Anthony Anderson MD  04/09/18  2:18 PM

## 2018-04-09 NOTE — PATIENT INSTRUCTIONS
##### TOBACCO CESSATION #####   IF YOU SMOKE OR USE TOBACCO PLEASE READ THE FOLLOWING:    Why is smoking bad for me?  Smoking increases the risk of heart disease, lung disease, vascular disease, stroke, and cancer.     If you smoke, STOP!    If you would like more information on quitting smoking, please visit the [employer] website: [email or web address]/[email or web address]/healthier-[email or web address]/smoke   This link will provide additional resources including the QUIT line and the Beat the Pack support groups.     For more information:    Quit Now [place]  1-800-QUIT-NOW  https://[place].quitlogix.org/en-[place]/  ###### BMI  #####   MyPlate from [employer]  The general, healthful diet is based on the 2010 Dietary Guidelines for Americans. The amount of food you need to eat from each food group depends on your age, sex, and level of physical activity and can be individualized by a dietitian. Go to ChooseMyPlate.gov for more information.  What do I need to know about the MyPlate plan?  · Enjoy your food, but eat less.  · Avoid oversized portions.  ¨ ½ of your plate should include fruits and vegetables.  ¨ ¼ of your plate should be grains.  ¨ ¼ of your plate should be protein.  Grains   · Make at least half of your grains whole grains.  · For a 2,000 calorie daily food plan, eat 6 oz every day.  · 1 oz is about 1 slice bread, 1 cup cereal, or ½ cup cooked rice, cereal, or pasta.  Vegetables   · Make half your plate fruits and vegetables.  · For a 2,000 calorie daily food plan, eat 2½ cups every day.  · 1 cup is about 1 cup raw or cooked vegetables or vegetable juice or 2 cups raw leafy greens.  Fruits   · Make half your plate fruits and vegetables.  · For a 2,000 calorie daily food plan, eat 2 cups every day.  · 1 cup is about 1 cup fruit or 100% fruit juice or ½ cup dried fruit.  Protein   · For a 2,000 calorie daily food plan, eat 5½ oz every day.  · 1 oz is about 1 oz meat, poultry, or fish, ¼ cup cooked beans, 1 egg, 1 Tbsp  peanut butter, or ½ oz nuts or seeds.  Dairy   · Switch to fat-free or low-fat (1%) milk.  · For a 2,000 calorie daily food plan, eat 3 cups every day.  · 1 cup is about 1 cup milk or yogurt or soy milk (soy beverage), 1½ oz natural cheese, or 2 oz processed cheese.  Fats, Oils, and Empty Calories   · Only small amounts of oils are recommended.  · Empty calories are calories from solid fats or added sugars.  · Compare sodium in foods like soup, bread, and frozen meals. Choose the foods with lower numbers.  · Drink water instead of sugary drinks.  What foods can I eat?  Grains   Whole grains such as whole wheat, quinoa, millet, and bulgur. Bread, rolls, and pasta made from whole grains. Brown or wild rice. Hot or cold cereals made from whole grains and without added sugar.  Vegetables   All fresh vegetables, especially fresh red, dark green, or orange vegetables. Peas and beans. Low-sodium frozen or canned vegetables prepared without added salt. Low-sodium vegetable juices.  Fruits   All fresh, frozen, and dried fruits. Canned fruit packed in water or fruit juice without added sugar. Fruit juices without added sugar.  Meats and Other Protein Sources   Boiled, baked, or grilled lean meat trimmed of fat. Skinless poultry. Fresh seafood and shellfish. Canned seafood packed in water. Unsalted nuts and unsalted nut butters. Tofu. Dried beans and pea. Eggs.  Dairy   Low-fat or fat-free milk, yogurt, and cheeses.  Sweets and Desserts   Frozen desserts made from low-fat milk.  Fats and Oils   Olive, peanut, and canola oils and margarine. Salad dressing and mayonnaise made from these oils.  Other   Soups and casseroles made from allowed ingredients and without added fat or salt.  The items listed above may not be a complete list of recommended foods or beverages. Contact your dietitian for more options.   What foods are not recommended?  Grains   Sweetened, low-fiber cereals. Packaged baked goods. Snack crackers and chips.  Cheese crackers, butter crackers, and biscuits. Frozen waffles, sweet breads, doughnuts, pastries, packaged baking mixes, pancakes, cakes, and cookies.  Vegetables   Regular canned or frozen vegetables or vegetables prepared with salt. Canned tomatoes. Canned tomato sauce. Fried vegetables. Vegetables in cream sauce or cheese sauce.  Fruits   Fruits packed in syrup or made with added sugar.  Meats and Other Protein Sources   Marbled or fatty meats such as ribs. Poultry with skin. Fried meats, poultry, eggs, or fish. Sausages, hot dogs, and deli meats such as pastrami, bologna, or salami.  Dairy   Whole milk, cream, cheeses made from whole milk, sour cream. Ice cream or yogurt made from whole milk or with added sugar.  Beverages   For adults, no more than one alcoholic drink per day. Regular soft drinks or other sugary beverages. Juice drinks.  Sweets and Desserts   Sugary or fatty desserts, candy, and other sweets.  Fats and Oils   Solid shortening or partially hydrogenated oils. Solid margarine. Margarine that contains trans fats. Butter.  The items listed above may not be a complete list of foods and beverages to avoid. Contact your dietitian for more information.   This information is not intended to replace advice given to you by your health care provider. Make sure you discuss any questions you have with your health care provider.  Document Released: 01/06/2009 Document Revised: 05/25/2017 Document Reviewed: 11/26/2014  [employer] Interactive Patient Education © 2017 [employer]     Calorie Counting for Weight Loss  Calories are units of energy. Your body needs a certain amount of calories from food to keep you going throughout the day. When you eat more calories than your body needs, your body stores the extra calories as fat. When you eat fewer calories than your body needs, your body burns fat to get the energy it needs.  Calorie counting means keeping track of how many calories you eat and drink each day.  Calorie counting can be helpful if you need to lose weight. If you make sure to eat fewer calories than your body needs, you should lose weight. Ask your health care provider what a healthy weight is for you.  For calorie counting to work, you will need to eat the right number of calories in a day in order to lose a healthy amount of weight per week. A dietitian can help you determine how many calories you need in a day and will give you suggestions on how to reach your calorie goal.  · A healthy amount of weight to lose per week is usually 1-2 lb (0.5-0.9 kg). This usually means that your daily calorie intake should be reduced by 500-750 calories.  · Eating 1,200 - 1,500 calories per day can help most women lose weight.  · Eating 1,500 - 1,800 calories per day can help most men lose weight.  What is my plan?  My goal is to have __________ calories per day.  If I have this many calories per day, I should lose around __________ pounds per week.  What do I need to know about calorie counting?  In order to meet your daily calorie goal, you will need to:  · Find out how many calories are in each food you would like to eat. Try to do this before you eat.  · Decide how much of the food you plan to eat.  · Write down what you ate and how many calories it had. Doing this is called keeping a food log.  To successfully lose weight, it is important to balance calorie counting with a healthy lifestyle that includes regular activity. Aim for 150 minutes of moderate exercise (such as walking) or 75 minutes of vigorous exercise (such as running) each week.  Where do I find calorie information?     The number of calories in a food can be found on a Nutrition Facts label. If a food does not have a Nutrition Facts label, try to look up the calories online or ask your dietitian for help.  Remember that calories are listed per serving. If you choose to have more than one serving of a food, you will have to multiply the calories per  "serving by the amount of servings you plan to eat. For example, the label on a package of bread might say that a serving size is 1 slice and that there are 90 calories in a serving. If you eat 1 slice, you will have eaten 90 calories. If you eat 2 slices, you will have eaten 180 calories.  How do I keep a food log?  Immediately after each meal, record the following information in your food log:  · What you ate. Don't forget to include toppings, sauces, and other extras on the food.  · How much you ate. This can be measured in cups, ounces, or number of items.  · How many calories each food and drink had.  · The total number of calories in the meal.  Keep your food log near you, such as in a small notebook in your pocket, or use a mobile bernard or website. Some programs will calculate calories for you and show you how many calories you have left for the day to meet your goal.  What are some calorie counting tips?  · Use your calories on foods and drinks that will fill you up and not leave you hungry:  ¨ Some examples of foods that fill you up are nuts and nut butters, vegetables, lean proteins, and high-fiber foods like whole grains. High-fiber foods are foods with more than 5 g fiber per serving.  ¨ Drinks such as sodas, specialty coffee drinks, alcohol, and juices have a lot of calories, yet do not fill you up.  · Eat nutritious foods and avoid empty calories. Empty calories are calories you get from foods or beverages that do not have many vitamins or protein, such as candy, sweets, and soda. It is better to have a nutritious high-calorie food (such as an avocado) than a food with few nutrients (such as a bag of chips).  · Know how many calories are in the foods you eat most often. This will help you calculate calorie counts faster.  · Pay attention to calories in drinks. Low-calorie drinks include water and unsweetened drinks.  · Pay attention to nutrition labels for \"low fat\" or \"fat free\" foods. These foods " sometimes have the same amount of calories or more calories than the full fat versions. They also often have added sugar, starch, or salt, to make up for flavor that was removed with the fat.  · Find a way of tracking calories that works for you. Get creative. Try different apps or programs if writing down calories does not work for you.  What are some portion control tips?  · Know how many calories are in a serving. This will help you know how many servings of a certain food you can have.  · Use a measuring cup to measure serving sizes. You could also try weighing out portions on a kitchen scale. With time, you will be able to estimate serving sizes for some foods.  · Take some time to put servings of different foods on your favorite plates, bowls, and cups so you know what a serving looks like.  · Try not to eat straight from a bag or box. Doing this can lead to overeating. Put the amount you would like to eat in a cup or on a plate to make sure you are eating the right portion.  · Use smaller plates, glasses, and bowls to prevent overeating.  · Try not to multitask (for example, watch TV or use your computer) while eating. If it is time to eat, sit down at a table and enjoy your food. This will help you to know when you are full. It will also help you to be aware of what you are eating and how much you are eating.  What are tips for following this plan?  Reading food labels   · Check the calorie count compared to the serving size. The serving size may be smaller than what you are used to eating.  · Check the source of the calories. Make sure the food you are eating is high in vitamins and protein and low in saturated and trans fats.  Shopping   · Read nutrition labels while you shop. This will help you make healthy decisions before you decide to purchase your food.  · Make a grocery list and stick to it.  Cooking   · Try to cook your favorite foods in a healthier way. For example, try baking instead of  frying.  · Use low-fat dairy products.  Meal planning   · Use more fruits and vegetables. Half of your plate should be fruits and vegetables.  · Include lean proteins like poultry and fish.  How do I count calories when eating out?  · Ask for smaller portion sizes.  · Consider sharing an entree and sides instead of getting your own entree.  · If you get your own entree, eat only half. Ask for a box at the beginning of your meal and put the rest of your entree in it so you are not tempted to eat it.  · If calories are listed on the menu, choose the lower calorie options.  · Choose dishes that include vegetables, fruits, whole grains, low-fat dairy products, and lean protein.  · Choose items that are boiled, broiled, grilled, or steamed. Stay away from items that are buttered, battered, fried, or served with cream sauce. Items labeled “crispy” are usually fried, unless stated otherwise.  · Choose water, low-fat milk, unsweetened iced tea, or other drinks without added sugar. If you want an alcoholic beverage, choose a lower calorie option such as a glass of wine or light beer.  · Ask for dressings, sauces, and syrups on the side. These are usually high in calories, so you should limit the amount you eat.  · If you want a salad, choose a garden salad and ask for grilled meats. Avoid extra toppings like ewing, cheese, or fried items. Ask for the dressing on the side, or ask for olive oil and vinegar or lemon to use as dressing.  · Estimate how many servings of a food you are given. For example, a serving of cooked rice is ½ cup or about the size of half a baseball. Knowing serving sizes will help you be aware of how much food you are eating at restaurants. The list below tells you how big or small some common portion sizes are based on everyday objects:  ¨ 1 oz--4 stacked dice.  ¨ 3 oz--1 deck of cards.  ¨ 1 tsp--1 die.  ¨ 1 Tbsp--½ a ping-pong ball.  ¨ 2 Tbsp--1 ping-pong ball.  ¨ ½ cup--½ baseball.  ¨ 1 cup--1  baseball.  Summary  · Calorie counting means keeping track of how many calories you eat and drink each day. If you eat fewer calories than your body needs, you should lose weight.  · A healthy amount of weight to lose per week is usually 1-2 lb (0.5-0.9 kg). This usually means reducing your daily calorie intake by 500-750 calories.  · The number of calories in a food can be found on a Nutrition Facts label. If a food does not have a Nutrition Facts label, try to look up the calories online or ask your dietitian for help.  · Use your calories on foods and drinks that will fill you up, and not on foods and drinks that will leave you hungry.  · Use smaller plates, glasses, and bowls to prevent overeating.  This information is not intended to replace advice given to you by your health care provider. Make sure you discuss any questions you have with your health care provider.  Document Released: 12/18/2006 Document Revised: 11/17/2017 Document Reviewed: 11/17/2017  [employer] Interactive Patient Education © 2017 [employer]     Exercising to Lose Weight  Exercising can help you to lose weight. In order to lose weight through exercise, you need to do vigorous-intensity exercise. You can tell that you are exercising with vigorous intensity if you are breathing very hard and fast and cannot hold a conversation while exercising.  Moderate-intensity exercise helps to maintain your current weight. You can tell that you are exercising at a moderate level if you have a higher heart rate and faster breathing, but you are still able to hold a conversation.  How often should I exercise?  Choose an activity that you enjoy and set realistic goals. Your health care provider can help you to make an activity plan that works for you. Exercise regularly as directed by your health care provider. This may include:  · Doing resistance training twice each week, such as:  ¨ Push-ups.  ¨ Sit-ups.  ¨ Lifting weights.  ¨ Using resistance  bands.  · Doing a given intensity of exercise for a given amount of time. Choose from these options:  ¨ 150 minutes of moderate-intensity exercise every week.  ¨ 75 minutes of vigorous-intensity exercise every week.  ¨ A mix of moderate-intensity and vigorous-intensity exercise every week.  Children, pregnant women, people who are out of shape, people who are overweight, and older adults may need to consult a health care provider for individual recommendations. If you have any sort of medical condition, be sure to consult your health care provider before starting a new exercise program.  What are some activities that can help me to lose weight?  · Walking at a rate of at least 4.5 miles an hour.  · Jogging or running at a rate of 5 miles per hour.  · Biking at a rate of at least 10 miles per hour.  · Lap swimming.  · Roller-skating or in-line skating.  · Cross-country skiing.  · Vigorous competitive sports, such as football, basketball, and soccer.  · Jumping rope.  · Aerobic dancing.  How can I be more active in my day-to-day activities?  · Use the stairs instead of the elevator.  · Take a walk during your lunch break.  · If you drive, park your car farther away from work or school.  · If you take public transportation, get off one stop early and walk the rest of the way.  · Make all of your phone calls while standing up and walking around.  · Get up, stretch, and walk around every 30 minutes throughout the day.  What guidelines should I follow while exercising?  · Do not exercise so much that you hurt yourself, feel dizzy, or get very short of breath.  · Consult your health care provider prior to starting a new exercise program.  · Wear comfortable clothes and shoes with good support.  · Drink plenty of water while you exercise to prevent dehydration or heat stroke. Body water is lost during exercise and must be replaced.  · Work out until you breathe faster and your heart beats faster.  This information is not  intended to replace advice given to you by your health care provider. Make sure you discuss any questions you have with your health care provider.  Document Released: 01/20/2012 Document Revised: 05/25/2017 Document Reviewed: 05/21/2015  [employer] Interactive Patient Education © 2017 [employer]

## 2018-04-10 ENCOUNTER — TELEPHONE (OUTPATIENT)
Dept: PSYCHIATRY | Age: 58
End: 2018-04-10

## 2018-04-12 ENCOUNTER — HOSPITAL ENCOUNTER (OUTPATIENT)
Dept: PSYCHIATRY | Age: 58
Setting detail: THERAPIES SERIES
Discharge: HOME | End: 2018-04-12
Payer: COMMERCIAL

## 2018-04-12 PROCEDURE — 90853 GROUP PSYCHOTHERAPY: CPT

## 2018-04-13 ENCOUNTER — TELEPHONE (OUTPATIENT)
Dept: PSYCHIATRY | Age: 58
End: 2018-04-13

## 2018-04-13 RX ORDER — PRAMIPEXOLE DIHYDROCHLORIDE 1.5 MG/1
1.5 TABLET ORAL NIGHTLY
COMMUNITY
End: 2020-05-11 | Stop reason: SDUPTHER

## 2018-04-13 RX ORDER — GABAPENTIN 300 MG/1
300 CAPSULE ORAL NIGHTLY
COMMUNITY
End: 2018-04-30

## 2018-04-16 ENCOUNTER — TELEPHONE (OUTPATIENT)
Dept: PSYCHIATRY | Age: 58
End: 2018-04-16

## 2018-04-17 ENCOUNTER — HOSPITAL ENCOUNTER (OUTPATIENT)
Dept: PSYCHIATRY | Age: 58
Setting detail: THERAPIES SERIES
Discharge: HOME | End: 2018-04-17
Payer: COMMERCIAL

## 2018-04-17 PROCEDURE — 90853 GROUP PSYCHOTHERAPY: CPT | Performed by: SOCIAL WORKER

## 2018-04-26 ENCOUNTER — TELEPHONE (OUTPATIENT)
Dept: PSYCHIATRY | Age: 58
End: 2018-04-26

## 2018-04-27 ENCOUNTER — HOSPITAL ENCOUNTER (OUTPATIENT)
Dept: PSYCHIATRY | Age: 58
Setting detail: THERAPIES SERIES
Discharge: HOME | End: 2018-04-27
Payer: COMMERCIAL

## 2018-04-27 VITALS
RESPIRATION RATE: 18 BRPM | DIASTOLIC BLOOD PRESSURE: 70 MMHG | SYSTOLIC BLOOD PRESSURE: 130 MMHG | HEART RATE: 78 BPM | OXYGEN SATURATION: 96 %

## 2018-04-27 PROCEDURE — 90853 GROUP PSYCHOTHERAPY: CPT | Performed by: SOCIAL WORKER

## 2018-04-30 ENCOUNTER — OFFICE VISIT (OUTPATIENT)
Dept: FAMILY MEDICINE CLINIC | Age: 58
End: 2018-04-30
Payer: COMMERCIAL

## 2018-04-30 ENCOUNTER — HOSPITAL ENCOUNTER (OUTPATIENT)
Dept: PSYCHIATRY | Age: 58
Setting detail: THERAPIES SERIES
Discharge: HOME | End: 2018-04-30
Payer: COMMERCIAL

## 2018-04-30 VITALS
BODY MASS INDEX: 33.01 KG/M2 | HEART RATE: 90 BPM | DIASTOLIC BLOOD PRESSURE: 66 MMHG | WEIGHT: 169 LBS | TEMPERATURE: 98.2 F | OXYGEN SATURATION: 95 % | SYSTOLIC BLOOD PRESSURE: 112 MMHG | RESPIRATION RATE: 20 BRPM

## 2018-04-30 DIAGNOSIS — N39.46 MIXED URGE AND STRESS INCONTINENCE: Primary | ICD-10-CM

## 2018-04-30 PROCEDURE — 90853 GROUP PSYCHOTHERAPY: CPT

## 2018-04-30 PROCEDURE — 99213 OFFICE O/P EST LOW 20 MIN: CPT | Performed by: NURSE PRACTITIONER

## 2018-05-02 ENCOUNTER — HOSPITAL ENCOUNTER (OUTPATIENT)
Dept: PSYCHIATRY | Age: 58
Setting detail: THERAPIES SERIES
Discharge: HOME | End: 2018-05-02
Payer: COMMERCIAL

## 2018-05-02 PROCEDURE — 90853 GROUP PSYCHOTHERAPY: CPT | Performed by: SOCIAL WORKER

## 2018-05-03 ENCOUNTER — OFFICE VISIT (OUTPATIENT)
Dept: NEUROLOGY | Age: 58
End: 2018-05-03
Payer: COMMERCIAL

## 2018-05-03 VITALS
BODY MASS INDEX: 34.16 KG/M2 | HEIGHT: 60 IN | SYSTOLIC BLOOD PRESSURE: 112 MMHG | DIASTOLIC BLOOD PRESSURE: 68 MMHG | HEART RATE: 80 BPM | OXYGEN SATURATION: 94 % | WEIGHT: 174 LBS

## 2018-05-03 DIAGNOSIS — G25.0 TREMOR, ESSENTIAL: ICD-10-CM

## 2018-05-03 DIAGNOSIS — R27.8 ASTERIXIS: ICD-10-CM

## 2018-05-03 DIAGNOSIS — G47.33 SLEEP APNEA, OBSTRUCTIVE: Primary | ICD-10-CM

## 2018-05-03 PROCEDURE — 99213 OFFICE O/P EST LOW 20 MIN: CPT | Performed by: PSYCHIATRY & NEUROLOGY

## 2018-05-03 RX ORDER — FLUTICASONE PROPIONATE 50 MCG
2 SPRAY, SUSPENSION (ML) NASAL DAILY PRN
COMMUNITY
Start: 2018-04-09 | End: 2018-05-09

## 2018-05-03 RX ORDER — DIVALPROEX SODIUM 500 MG/1
1000 TABLET, EXTENDED RELEASE ORAL DAILY
COMMUNITY
End: 2018-05-03 | Stop reason: SDUPTHER

## 2018-05-04 ENCOUNTER — HOSPITAL ENCOUNTER (OUTPATIENT)
Dept: PSYCHIATRY | Age: 58
Setting detail: THERAPIES SERIES
Discharge: HOME | End: 2018-05-04
Payer: COMMERCIAL

## 2018-05-04 PROCEDURE — 90853 GROUP PSYCHOTHERAPY: CPT | Performed by: SOCIAL WORKER

## 2018-05-07 ENCOUNTER — HOSPITAL ENCOUNTER (OUTPATIENT)
Dept: PSYCHIATRY | Age: 58
Setting detail: THERAPIES SERIES
Discharge: HOME | End: 2018-05-07
Payer: COMMERCIAL

## 2018-05-07 VITALS
DIASTOLIC BLOOD PRESSURE: 69 MMHG | TEMPERATURE: 97.6 F | RESPIRATION RATE: 18 BRPM | OXYGEN SATURATION: 96 % | HEART RATE: 86 BPM | SYSTOLIC BLOOD PRESSURE: 105 MMHG

## 2018-05-07 PROCEDURE — 90853 GROUP PSYCHOTHERAPY: CPT | Performed by: SOCIAL WORKER

## 2018-05-09 ENCOUNTER — HOSPITAL ENCOUNTER (OUTPATIENT)
Dept: PSYCHIATRY | Age: 58
Setting detail: THERAPIES SERIES
Discharge: HOME | End: 2018-05-09
Payer: COMMERCIAL

## 2018-05-09 DIAGNOSIS — F31.9 BIPOLAR I DISORDER WITH MIXED FEATURES (HCC): Primary | ICD-10-CM

## 2018-05-09 PROCEDURE — 90832 PSYTX W PT 30 MINUTES: CPT | Performed by: NURSE PRACTITIONER

## 2018-05-09 PROCEDURE — 90853 GROUP PSYCHOTHERAPY: CPT | Performed by: SOCIAL WORKER

## 2018-05-09 RX ORDER — ESCITALOPRAM OXALATE 10 MG/1
10 TABLET ORAL DAILY
Qty: 30 TABLET | Refills: 0 | Status: SHIPPED | OUTPATIENT
Start: 2018-05-09 | End: 2019-06-13

## 2018-05-10 ENCOUNTER — PROCEDURE VISIT (OUTPATIENT)
Dept: UROLOGY | Age: 58
End: 2018-05-10
Payer: COMMERCIAL

## 2018-05-10 DIAGNOSIS — N39.46 MIXED URGE AND STRESS INCONTINENCE: Primary | ICD-10-CM

## 2018-05-10 DIAGNOSIS — F31.9 BIPOLAR I DISORDER WITH MIXED FEATURES (HCC): ICD-10-CM

## 2018-05-10 LAB
ALBUMIN SERPL-MCNC: 3.9 G/DL (ref 3.5–5.2)
ALP BLD-CCNC: 79 U/L (ref 35–104)
ALT SERPL-CCNC: 12 U/L (ref 5–33)
ANION GAP SERPL CALCULATED.3IONS-SCNC: 12 MMOL/L (ref 7–19)
APPEARANCE FLUID: CLEAR
AST SERPL-CCNC: 11 U/L (ref 5–32)
BILIRUB SERPL-MCNC: 0.3 MG/DL (ref 0.2–1.2)
BILIRUBIN, POC: NORMAL
BLOOD URINE, POC: NORMAL
BUN BLDV-MCNC: 31 MG/DL (ref 6–20)
CALCIUM SERPL-MCNC: 10.4 MG/DL (ref 8.6–10)
CHLORIDE BLD-SCNC: 96 MMOL/L (ref 98–111)
CLARITY, POC: NORMAL
CO2: 33 MMOL/L (ref 22–29)
COLOR, POC: NORMAL
CREAT SERPL-MCNC: 1.1 MG/DL (ref 0.5–0.9)
GFR NON-AFRICAN AMERICAN: 51
GLUCOSE BLD-MCNC: 90 MG/DL (ref 74–109)
GLUCOSE URINE, POC: NORMAL
KETONES, POC: NORMAL
LEUKOCYTE EST, POC: NORMAL
NITRITE, POC: NORMAL
PH, POC: 5.5
POTASSIUM SERPL-SCNC: 5.9 MMOL/L (ref 3.5–5)
PROTEIN, POC: NORMAL
SODIUM BLD-SCNC: 141 MMOL/L (ref 136–145)
SPECIFIC GRAVITY, POC: 1.02
TOTAL PROTEIN: 7.3 G/DL (ref 6.6–8.7)
UROBILINOGEN, POC: 0.2

## 2018-05-10 PROCEDURE — 99999 PR OFFICE/OUTPT VISIT,PROCEDURE ONLY: CPT | Performed by: UROLOGY

## 2018-05-10 PROCEDURE — 51729 CYSTOMETROGRAM W/VP&UP: CPT | Performed by: UROLOGY

## 2018-05-10 PROCEDURE — 51784 ANAL/URINARY MUSCLE STUDY: CPT | Performed by: UROLOGY

## 2018-05-10 PROCEDURE — 51797 INTRAABDOMINAL PRESSURE TEST: CPT | Performed by: UROLOGY

## 2018-05-10 PROCEDURE — 81003 URINALYSIS AUTO W/O SCOPE: CPT | Performed by: UROLOGY

## 2018-05-11 ENCOUNTER — HOSPITAL ENCOUNTER (OUTPATIENT)
Dept: PSYCHIATRY | Age: 58
Setting detail: THERAPIES SERIES
Discharge: HOME | End: 2018-05-11
Payer: COMMERCIAL

## 2018-05-11 PROCEDURE — 90853 GROUP PSYCHOTHERAPY: CPT | Performed by: SOCIAL WORKER

## 2018-05-12 LAB — VALPROIC ACID, FREE: 6.2 UG/ML (ref 7–23)

## 2018-05-14 ENCOUNTER — HOSPITAL ENCOUNTER (OUTPATIENT)
Dept: PSYCHIATRY | Age: 58
Setting detail: THERAPIES SERIES
End: 2018-05-14
Payer: COMMERCIAL

## 2018-05-16 ENCOUNTER — HOSPITAL ENCOUNTER (OUTPATIENT)
Dept: PSYCHIATRY | Age: 58
Setting detail: THERAPIES SERIES
Discharge: HOME | End: 2018-05-16
Payer: COMMERCIAL

## 2018-05-16 VITALS
OXYGEN SATURATION: 98 % | DIASTOLIC BLOOD PRESSURE: 71 MMHG | RESPIRATION RATE: 18 BRPM | SYSTOLIC BLOOD PRESSURE: 112 MMHG | TEMPERATURE: 97.7 F | HEART RATE: 70 BPM

## 2018-05-16 PROCEDURE — 90853 GROUP PSYCHOTHERAPY: CPT | Performed by: SOCIAL WORKER

## 2018-05-16 RX ORDER — METHYLPHENIDATE HYDROCHLORIDE 5 MG/1
5 TABLET ORAL 2 TIMES DAILY
COMMUNITY
End: 2018-06-15

## 2018-05-18 ENCOUNTER — OFFICE VISIT (OUTPATIENT)
Dept: FAMILY MEDICINE CLINIC | Age: 58
End: 2018-05-18
Payer: COMMERCIAL

## 2018-05-18 VITALS
TEMPERATURE: 98 F | HEIGHT: 60 IN | WEIGHT: 170 LBS | OXYGEN SATURATION: 96 % | HEART RATE: 81 BPM | SYSTOLIC BLOOD PRESSURE: 100 MMHG | RESPIRATION RATE: 16 BRPM | BODY MASS INDEX: 33.38 KG/M2 | DIASTOLIC BLOOD PRESSURE: 60 MMHG

## 2018-05-18 DIAGNOSIS — B96.89 ACUTE BACTERIAL SINUSITIS: Primary | ICD-10-CM

## 2018-05-18 DIAGNOSIS — S00.411A ABRASION OF RIGHT EAR, INITIAL ENCOUNTER: ICD-10-CM

## 2018-05-18 DIAGNOSIS — I10 ESSENTIAL HYPERTENSION: ICD-10-CM

## 2018-05-18 DIAGNOSIS — J01.90 ACUTE BACTERIAL SINUSITIS: Primary | ICD-10-CM

## 2018-05-18 PROCEDURE — 99214 OFFICE O/P EST MOD 30 MIN: CPT | Performed by: NURSE PRACTITIONER

## 2018-05-18 RX ORDER — SPIRONOLACTONE 25 MG/1
TABLET ORAL
Qty: 90 TABLET | Refills: 2 | Status: SHIPPED | OUTPATIENT
Start: 2018-05-18 | End: 2018-07-18 | Stop reason: SDUPTHER

## 2018-05-18 RX ORDER — AZELASTINE 1 MG/ML
2 SPRAY, METERED NASAL 2 TIMES DAILY
Qty: 1 BOTTLE | Refills: 3 | Status: SHIPPED | OUTPATIENT
Start: 2018-05-18 | End: 2018-06-15

## 2018-05-18 RX ORDER — ALBUTEROL SULFATE 1.25 MG/3ML
1 SOLUTION RESPIRATORY (INHALATION) EVERY 6 HOURS PRN
Qty: 120 ML | Refills: 0 | Status: SHIPPED | OUTPATIENT
Start: 2018-05-18 | End: 2018-06-15

## 2018-05-18 RX ORDER — DOXYCYCLINE HYCLATE 100 MG
100 TABLET ORAL 2 TIMES DAILY
Qty: 20 TABLET | Refills: 0 | Status: SHIPPED | OUTPATIENT
Start: 2018-05-18 | End: 2018-05-28

## 2018-05-18 ASSESSMENT — ENCOUNTER SYMPTOMS
COUGH: 1
SORE THROAT: 1
SINUS PAIN: 1

## 2018-05-30 DIAGNOSIS — E87.6 HYPOKALEMIA: ICD-10-CM

## 2018-05-30 RX ORDER — POTASSIUM CHLORIDE 20 MEQ/1
TABLET, EXTENDED RELEASE ORAL
Qty: 30 TABLET | Refills: 0 | Status: SHIPPED | OUTPATIENT
Start: 2018-05-30 | End: 2018-07-06 | Stop reason: SDUPTHER

## 2018-05-31 ENCOUNTER — OFFICE VISIT (OUTPATIENT)
Dept: UROLOGY | Age: 58
End: 2018-05-31
Payer: COMMERCIAL

## 2018-05-31 VITALS
SYSTOLIC BLOOD PRESSURE: 114 MMHG | BODY MASS INDEX: 32.98 KG/M2 | DIASTOLIC BLOOD PRESSURE: 62 MMHG | TEMPERATURE: 97.6 F | WEIGHT: 168 LBS | HEART RATE: 84 BPM | HEIGHT: 60 IN

## 2018-05-31 DIAGNOSIS — N39.46 MIXED URGE AND STRESS INCONTINENCE: Primary | ICD-10-CM

## 2018-05-31 LAB
APPEARANCE FLUID: CLEAR
BILIRUBIN, POC: NORMAL
BLOOD URINE, POC: NORMAL
CLARITY, POC: NORMAL
COLOR, POC: NORMAL
GLUCOSE URINE, POC: NORMAL
KETONES, POC: NORMAL
LEUKOCYTE EST, POC: NORMAL
NITRITE, POC: NORMAL
PH, POC: 5
PROTEIN, POC: NORMAL
SPECIFIC GRAVITY, POC: 1.01
UROBILINOGEN, POC: 0.2

## 2018-05-31 PROCEDURE — 81002 URINALYSIS NONAUTO W/O SCOPE: CPT | Performed by: UROLOGY

## 2018-05-31 PROCEDURE — 99214 OFFICE O/P EST MOD 30 MIN: CPT | Performed by: UROLOGY

## 2018-05-31 RX ORDER — NORTRIPTYLINE HYDROCHLORIDE 10 MG/1
CAPSULE ORAL
COMMUNITY
Start: 2018-05-14 | End: 2018-06-15

## 2018-05-31 ASSESSMENT — ENCOUNTER SYMPTOMS
NAUSEA: 0
EYE REDNESS: 0
WHEEZING: 0
EYE DISCHARGE: 0
HEARTBURN: 0
SHORTNESS OF BREATH: 0

## 2018-06-06 ENCOUNTER — NURSE ONLY (OUTPATIENT)
Dept: UROLOGY | Age: 58
End: 2018-06-06
Payer: COMMERCIAL

## 2018-06-06 DIAGNOSIS — N39.46 MIXED URGE AND STRESS INCONTINENCE: Primary | ICD-10-CM

## 2018-06-06 PROCEDURE — 64566 NEUROELTRD STIM POST TIBIAL: CPT | Performed by: PHYSICIAN ASSISTANT

## 2018-06-06 PROCEDURE — 99999 PR OFFICE/OUTPT VISIT,PROCEDURE ONLY: CPT | Performed by: PHYSICIAN ASSISTANT

## 2018-06-06 PROCEDURE — 99999 PR POST TIBIAL NEUROSTIMULATION,PERC NEEDLE ELECTRODE: CPT | Performed by: PHYSICIAN ASSISTANT

## 2018-06-06 ASSESSMENT — ENCOUNTER SYMPTOMS
SORE THROAT: 0
BLURRED VISION: 0
HEARTBURN: 0
SHORTNESS OF BREATH: 0
DOUBLE VISION: 0
NAUSEA: 0
WHEEZING: 0

## 2018-06-12 ENCOUNTER — NURSE ONLY (OUTPATIENT)
Dept: UROLOGY | Age: 58
End: 2018-06-12
Payer: COMMERCIAL

## 2018-06-12 VITALS — TEMPERATURE: 96.3 F

## 2018-06-12 DIAGNOSIS — N39.46 MIXED URGE AND STRESS INCONTINENCE: Primary | ICD-10-CM

## 2018-06-12 PROCEDURE — 64566 NEUROELTRD STIM POST TIBIAL: CPT | Performed by: PHYSICIAN ASSISTANT

## 2018-06-12 PROCEDURE — 99999 PR OFFICE/OUTPT VISIT,PROCEDURE ONLY: CPT | Performed by: PHYSICIAN ASSISTANT

## 2018-06-12 ASSESSMENT — ENCOUNTER SYMPTOMS
SHORTNESS OF BREATH: 0
WHEEZING: 0
NAUSEA: 0
HEARTBURN: 0
SORE THROAT: 0
BLURRED VISION: 0
DOUBLE VISION: 0

## 2018-06-15 ENCOUNTER — OFFICE VISIT (OUTPATIENT)
Dept: PRIMARY CARE CLINIC | Age: 58
End: 2018-06-15
Payer: COMMERCIAL

## 2018-06-15 VITALS
DIASTOLIC BLOOD PRESSURE: 76 MMHG | WEIGHT: 171 LBS | TEMPERATURE: 97.6 F | OXYGEN SATURATION: 96 % | HEART RATE: 88 BPM | HEIGHT: 60 IN | SYSTOLIC BLOOD PRESSURE: 130 MMHG | BODY MASS INDEX: 33.57 KG/M2

## 2018-06-15 DIAGNOSIS — B96.89 ACUTE BACTERIAL SINUSITIS: Primary | ICD-10-CM

## 2018-06-15 DIAGNOSIS — R09.81 NASAL CONGESTION: ICD-10-CM

## 2018-06-15 DIAGNOSIS — J01.90 ACUTE BACTERIAL SINUSITIS: Primary | ICD-10-CM

## 2018-06-15 PROCEDURE — 99213 OFFICE O/P EST LOW 20 MIN: CPT | Performed by: NURSE PRACTITIONER

## 2018-06-15 RX ORDER — AZELASTINE 1 MG/ML
2 SPRAY, METERED NASAL 2 TIMES DAILY
Qty: 1 BOTTLE | Refills: 3 | Status: SHIPPED | OUTPATIENT
Start: 2018-06-15 | End: 2018-10-19

## 2018-06-15 RX ORDER — AMOXICILLIN AND CLAVULANATE POTASSIUM 875; 125 MG/1; MG/1
1 TABLET, FILM COATED ORAL 2 TIMES DAILY
Qty: 20 TABLET | Refills: 0 | Status: SHIPPED | OUTPATIENT
Start: 2018-06-15 | End: 2018-06-25

## 2018-06-15 ASSESSMENT — ENCOUNTER SYMPTOMS
WHEEZING: 0
COUGH: 0
SORE THROAT: 0
BLOOD IN STOOL: 0
ABDOMINAL PAIN: 0
TROUBLE SWALLOWING: 0
EYE REDNESS: 0
DIARRHEA: 0
SHORTNESS OF BREATH: 0
RHINORRHEA: 1
CONSTIPATION: 0
EYE DISCHARGE: 0

## 2018-06-19 ENCOUNTER — NURSE ONLY (OUTPATIENT)
Dept: UROLOGY | Age: 58
End: 2018-06-19
Payer: COMMERCIAL

## 2018-06-19 DIAGNOSIS — N39.46 MIXED URGE AND STRESS INCONTINENCE: Primary | ICD-10-CM

## 2018-06-19 PROCEDURE — 64566 NEUROELTRD STIM POST TIBIAL: CPT | Performed by: PHYSICIAN ASSISTANT

## 2018-06-19 RX ORDER — TRIAMCINOLONE ACETONIDE 5 MG/G
OINTMENT TOPICAL
Status: ON HOLD | COMMUNITY
Start: 2018-06-04 | End: 2020-07-03

## 2018-06-19 ASSESSMENT — ENCOUNTER SYMPTOMS
DOUBLE VISION: 0
NAUSEA: 0
WHEEZING: 0
SHORTNESS OF BREATH: 0
SORE THROAT: 0
BLURRED VISION: 0
HEARTBURN: 0

## 2018-06-26 ENCOUNTER — NURSE ONLY (OUTPATIENT)
Dept: UROLOGY | Age: 58
End: 2018-06-26
Payer: COMMERCIAL

## 2018-06-26 VITALS — TEMPERATURE: 96.3 F

## 2018-06-26 DIAGNOSIS — N39.46 MIXED URGE AND STRESS INCONTINENCE: Primary | ICD-10-CM

## 2018-06-26 PROCEDURE — 99999 PR OFFICE/OUTPT VISIT,PROCEDURE ONLY: CPT | Performed by: PHYSICIAN ASSISTANT

## 2018-06-26 PROCEDURE — 99999 PR POST TIBIAL NEUROSTIMULATION,PERC NEEDLE ELECTRODE: CPT | Performed by: PHYSICIAN ASSISTANT

## 2018-06-26 PROCEDURE — 64566 NEUROELTRD STIM POST TIBIAL: CPT | Performed by: PHYSICIAN ASSISTANT

## 2018-06-26 ASSESSMENT — ENCOUNTER SYMPTOMS
DOUBLE VISION: 0
BLURRED VISION: 0
HEARTBURN: 0
SORE THROAT: 0
WHEEZING: 0
NAUSEA: 0
SHORTNESS OF BREATH: 0

## 2018-07-06 DIAGNOSIS — E87.6 HYPOKALEMIA: ICD-10-CM

## 2018-07-10 RX ORDER — POTASSIUM CHLORIDE 20 MEQ/1
TABLET, EXTENDED RELEASE ORAL
Qty: 30 TABLET | Refills: 1 | Status: SHIPPED | OUTPATIENT
Start: 2018-07-10 | End: 2018-08-09 | Stop reason: SDUPTHER

## 2018-07-11 ENCOUNTER — NURSE ONLY (OUTPATIENT)
Dept: UROLOGY | Age: 58
End: 2018-07-11
Payer: COMMERCIAL

## 2018-07-11 VITALS — HEIGHT: 60 IN | BODY MASS INDEX: 33.57 KG/M2 | WEIGHT: 171 LBS

## 2018-07-11 DIAGNOSIS — N39.46 MIXED URGE AND STRESS INCONTINENCE: Primary | ICD-10-CM

## 2018-07-11 PROCEDURE — 99999 PR OFFICE/OUTPT VISIT,PROCEDURE ONLY: CPT | Performed by: PHYSICIAN ASSISTANT

## 2018-07-11 PROCEDURE — 64566 NEUROELTRD STIM POST TIBIAL: CPT | Performed by: PHYSICIAN ASSISTANT

## 2018-07-11 ASSESSMENT — ENCOUNTER SYMPTOMS
NAUSEA: 0
BLURRED VISION: 0
SORE THROAT: 0
WHEEZING: 0
DOUBLE VISION: 0
SHORTNESS OF BREATH: 0
HEARTBURN: 0

## 2018-07-11 NOTE — PROGRESS NOTES
Gurmeet Faulkner is a 63 y.o. female who presents today   Chief Complaint   Patient presents with    Follow-up     I am here today for my uroplasty treatment # 5     Uroplasty treatment #5 history of mixed urge and stress incontinence:  Patient is a 78-year-old female with history of incontinence she has primarily urgent continence that was demonstrated on urodynamics. Classic stress incontinence was not demonstrated. She had failed first-line medical therapy with Myrbetriq and an anticholinergic so she was offered neuromodulation treatment which she is here for today. She is not interested in permanent implant nor is she interested in Botox. This past week patient has noticed a slight improvement in symptoms she is going less frequently.     Past Medical History:   Diagnosis Date    Arthritis     Bipolar I disorder, most recent episode (or current) mixed, unspecified     Chronic back pain     Dry mouth     Hyperlipidemia     Hypertension     Hypokalemia     Menopause     Overactive bladder     Plantar fasciitis     RLS (restless legs syndrome)     Thyroid disease        Past Surgical History:   Procedure Laterality Date    CARPAL TUNNEL RELEASE Bilateral 2002    CHOLECYSTECTOMY  10/2014    COLON SURGERY      COLON SURGERY      biopsy     COLONOSCOPY      CYST REMOVAL  05/2017    Under left ear    HARDWARE REMOVAL Right 2011    Foot     HERNIA REPAIR  10/2014    NOSE SURGERY  08/01/2017    Dr Susan Domínguez L/S,1 LEVEL Bilateral 2/7/2017    INJECTION MEDICATION FACET JOINT performed by Enedina Chapa at [address] [facility] OR       Current Outpatient Prescriptions   Medication Sig Dispense Refill    potassium chloride (KLOR-CON M) 20 MEQ extended release tablet TAKE ONE TABLET BY MOUTH DAILY 30 tablet 1    triamcinolone (ARISTOCORT) 0.5 % ointment       azelastine (ASTELIN) 0.1 % nasal spray 2 sprays by Nasal route 2 times daily Use in each nostril as directed 1 Bottle 3   

## 2018-07-18 ENCOUNTER — NURSE ONLY (OUTPATIENT)
Dept: UROLOGY | Age: 58
End: 2018-07-18
Payer: COMMERCIAL

## 2018-07-18 DIAGNOSIS — N39.46 MIXED URGE AND STRESS INCONTINENCE: Primary | ICD-10-CM

## 2018-07-18 PROCEDURE — 64566 NEUROELTRD STIM POST TIBIAL: CPT | Performed by: PHYSICIAN ASSISTANT

## 2018-07-18 PROCEDURE — 99999 PR OFFICE/OUTPT VISIT,PROCEDURE ONLY: CPT | Performed by: PHYSICIAN ASSISTANT

## 2018-07-18 ASSESSMENT — ENCOUNTER SYMPTOMS
NAUSEA: 0
BLURRED VISION: 0
SHORTNESS OF BREATH: 0
HEARTBURN: 0
DOUBLE VISION: 0
SORE THROAT: 0
WHEEZING: 0

## 2018-07-18 NOTE — PROGRESS NOTES
cephalad to the medial malleolus. 3. Surface electrode pad is placed over the medial aspect of the calcaneus on the same leg. 4.Needle electrode is connected to the external pulse generator. 5.The pulse generator is turned on and the millivolts used are 9. 6.Patient is treated for 30 minutes. 7.The needle and pad are removed.     Plan:  Follow-up in 1 week for next treatment #7

## 2018-07-25 ENCOUNTER — NURSE ONLY (OUTPATIENT)
Dept: UROLOGY | Age: 58
End: 2018-07-25
Payer: COMMERCIAL

## 2018-07-25 DIAGNOSIS — N39.46 MIXED URGE AND STRESS INCONTINENCE: Primary | ICD-10-CM

## 2018-07-25 PROCEDURE — 99999 PR OFFICE/OUTPT VISIT,PROCEDURE ONLY: CPT | Performed by: PHYSICIAN ASSISTANT

## 2018-07-25 PROCEDURE — 64566 NEUROELTRD STIM POST TIBIAL: CPT | Performed by: PHYSICIAN ASSISTANT

## 2018-07-25 ASSESSMENT — ENCOUNTER SYMPTOMS
DOUBLE VISION: 0
SHORTNESS OF BREATH: 0
HEARTBURN: 0
NAUSEA: 0
SORE THROAT: 0
BLURRED VISION: 0
WHEEZING: 0

## 2018-07-25 NOTE — PROGRESS NOTES
Elaina Pelayo is a 63 y.o. female who presents today   Chief Complaint   Patient presents with    Follow-up     I am here today for my uroplasty # 7 for urge and stress incontinence. Uroplasty treatment #7 history of mixed urge and stress incontinence:  Patient is a 71-year-old female with history of incontinence she has primarily urgent continence that was demonstrated on urodynamics. Classic stress incontinence was not demonstrated. She had failed first-line medical therapy with Myrbetriq and an anticholinergic so she was offered neuromodulation treatment which she is here for today. She is not interested in permanent implant nor is she interested in Botox. This past week patient has noticed a slight improvement in symptoms she is going less frequently.     Past Medical History:   Diagnosis Date    Arthritis     Bipolar I disorder, most recent episode (or current) mixed, unspecified     Chronic back pain     Dry mouth     Hyperlipidemia     Hypertension     Hypokalemia     Menopause     Overactive bladder     Plantar fasciitis     RLS (restless legs syndrome)     Thyroid disease        Past Surgical History:   Procedure Laterality Date    CARPAL TUNNEL RELEASE Bilateral 2002    CHOLECYSTECTOMY  10/2014    COLON SURGERY      COLON SURGERY      biopsy     COLONOSCOPY      CYST REMOVAL  05/2017    Under left ear    HARDWARE REMOVAL Right 2011    Foot     HERNIA REPAIR  10/2014    NOSE SURGERY  08/01/2017    Dr Elder Manifold L/S,1 LEVEL Bilateral 2/7/2017    INJECTION MEDICATION FACET JOINT performed by Asya Velez at [address] [facility] OR       Current Outpatient Prescriptions   Medication Sig Dispense Refill    spironolactone (ALDACTONE) 25 MG tablet TAKE TWO TABLETS BY MOUTH EVERY MORNING & TAKE ONE TABLET EVERY EVENING 90 tablet 0    potassium chloride (KLOR-CON M) 20 MEQ extended release tablet TAKE ONE TABLET BY MOUTH DAILY 30 tablet 1    triamcinolone (ARISTOCORT) 0.5 % ointment       azelastine (ASTELIN) 0.1 % nasal spray 2 sprays by Nasal route 2 times daily Use in each nostril as directed 1 Bottle 3    escitalopram (LEXAPRO) 10 MG tablet Take 1 tablet by mouth daily 30 tablet 0    pramipexole (MIRAPEX) 1.5 MG tablet Take 1.5 mg by mouth nightly      diclofenac (VOLTAREN) 75 MG EC tablet 2 times daily       loratadine (CLARITIN) 10 MG tablet Take 1 tablet by mouth daily 30 tablet 2    allopurinol (ZYLOPRIM) 100 MG tablet TAKE ONE TABLET BY MOUTH DAILY 90 tablet 3    carvedilol (COREG) 6.25 MG tablet TAKE 1 TABLET BY MOUTH TWO  TIMES DAILY WITH MEALS 180 tablet 3    bumetanide (BUMEX) 1 MG tablet Take 1 tablet by mouth 2 times daily (Patient taking differently: Take 1 mg by mouth 2 times daily ONE TAB M/W/F/S AND 1/2 T/TH/S) 180 tablet 3    atorvastatin (LIPITOR) 40 MG tablet TAKE ONE TABLET BY MOUTH DAILY 30 tablet 0    QUEtiapine (SEROQUEL) 50 MG tablet Take 25 mg by mouth nightly       metFORMIN (GLUCOPHAGE) 500 MG tablet Take 500 mg by mouth 2 times daily (with meals)      niacin (SLO-NIACIN) 500 MG extended release tablet Take 500 mg by mouth daily       levothyroxine (SYNTHROID) 100 MCG tablet Take 1 tablet by mouth Daily (Patient taking differently: Take 100 mcg by mouth Daily 2/15/18 pt reports PCP has increased to 125 mcg daily.) 30 tablet 5    Tens Unit MISC by Does not apply route Apply and use tid prn  Dx -lumbar pain  Dispense tens and supplies 1 each 0    tiZANidine (ZANAFLEX) 4 MG tablet Take 4 mg by mouth nightly as needed       pilocarpine (SALAGEN) 5 MG tablet Take 5 mg by mouth 3 times daily      Vitamin D (CHOLECALCIFEROL) 1000 UNITS CAPS capsule Take 1,000 Units by mouth daily      Multiple Vitamins-Minerals (MULTIVITAMIN PO) Take by mouth daily      DULoxetine (CYMBALTA) 60 MG capsule Take 60 mg by mouth 2 times daily       traMADol (ULTRAM) 50 MG tablet Take 100 mg by mouth every 12 hours       calcium carbonate 600 MG TABS tablet Take 1

## 2018-08-09 ENCOUNTER — NURSE ONLY (OUTPATIENT)
Dept: UROLOGY | Age: 58
End: 2018-08-09
Payer: COMMERCIAL

## 2018-08-09 VITALS — TEMPERATURE: 97.2 F | WEIGHT: 168 LBS | HEIGHT: 60 IN | BODY MASS INDEX: 32.98 KG/M2

## 2018-08-09 DIAGNOSIS — N39.46 MIXED URGE AND STRESS INCONTINENCE: ICD-10-CM

## 2018-08-09 DIAGNOSIS — E87.6 HYPOKALEMIA: ICD-10-CM

## 2018-08-09 PROCEDURE — 99999 PR OFFICE/OUTPT VISIT,PROCEDURE ONLY: CPT | Performed by: PHYSICIAN ASSISTANT

## 2018-08-09 PROCEDURE — 64566 NEUROELTRD STIM POST TIBIAL: CPT | Performed by: PHYSICIAN ASSISTANT

## 2018-08-09 RX ORDER — POTASSIUM CHLORIDE 20 MEQ/1
TABLET, EXTENDED RELEASE ORAL
Qty: 30 TABLET | Refills: 1 | Status: SHIPPED | OUTPATIENT
Start: 2018-08-09 | End: 2020-01-07

## 2018-08-09 ASSESSMENT — ENCOUNTER SYMPTOMS
BLURRED VISION: 0
SORE THROAT: 0
WHEEZING: 0
NAUSEA: 0
HEARTBURN: 0
SHORTNESS OF BREATH: 0
DOUBLE VISION: 0

## 2018-08-09 NOTE — PROGRESS NOTES
treatment. # 9    1. Patient is seated with the left treatment leg elevated. 2. 34 gauge fine needle electrode is inserted into lower inner aspect of the leg. Slightly cephalad to the medial malleolus. 3. Surface electrode pad is placed over the medial aspect of the calcaneus on the same leg. 4.Needle electrode is connected to the external pulse generator. 5.The pulse generator is turned on and the millivolts used are 9. 6.Patient is treated for 30 minutes. 7.The needle and pad are removed.     Plan:  Follow-up in 1 weeks for next treatment #9

## 2018-08-15 ENCOUNTER — NURSE ONLY (OUTPATIENT)
Dept: UROLOGY | Age: 58
End: 2018-08-15
Payer: COMMERCIAL

## 2018-08-15 VITALS — TEMPERATURE: 96.3 F

## 2018-08-15 DIAGNOSIS — N39.46 MIXED URGE AND STRESS INCONTINENCE: Primary | ICD-10-CM

## 2018-08-15 PROCEDURE — 64566 NEUROELTRD STIM POST TIBIAL: CPT | Performed by: PHYSICIAN ASSISTANT

## 2018-08-15 PROCEDURE — 99999 PR OFFICE/OUTPT VISIT,PROCEDURE ONLY: CPT | Performed by: PHYSICIAN ASSISTANT

## 2018-08-15 ASSESSMENT — ENCOUNTER SYMPTOMS
HEARTBURN: 0
SORE THROAT: 0
SHORTNESS OF BREATH: 0
NAUSEA: 0
WHEEZING: 0
BLURRED VISION: 0
DOUBLE VISION: 0

## 2018-08-15 NOTE — PROGRESS NOTES
distress. HENT:   Mouth/Throat: No oropharyngeal exudate. Eyes: Left eye exhibits no discharge. No scleral icterus. Neck: No JVD present. No tracheal deviation present. No thyromegaly present. Cardiovascular: Exam reveals no gallop and no friction rub. Pulmonary/Chest: No stridor. She has no rales. Abdominal: She exhibits no distension and no mass. There is no rebound and no guarding. Musculoskeletal: She exhibits no edema. Neurological: No cranial nerve deficit. She exhibits normal muscle tone. Coordination normal.   Skin: She is not diaphoretic. No pallor. DATA:  U/A:    Lab Results   Component Value Date    NITRITE neg 05/31/2018    COLORU yellow 02/22/2018    COLORU DK YELLOW 02/14/2018    PROTEINU neg 05/31/2018    PROTEINU Negative 02/14/2018    PHUR 5.0 05/31/2018    PHUR 7.0 02/14/2018    WBCUA 9 06/18/2014    RBCUA 1 06/18/2014    BACTERIA NEGATIVE 06/18/2014    CLARITYU clear 02/22/2018    CLARITYU Clear 02/14/2018    SPECGRAV 1.015 05/31/2018    SPECGRAV 1.024 02/14/2018    LEUKOCYTESUR neg 05/31/2018    LEUKOCYTESUR Negative 02/14/2018    UROBILINOGEN 1.0 02/14/2018    BILIRUBINUR neg 05/31/2018    BLOODU neg 05/31/2018    BLOODU Negative 02/14/2018    GLUCOSEU neg 05/31/2018    GLUCOSEU Negative 02/14/2018                1. Mixed urge and stress incontinence  Patient who has failed conservative therapy on medications Myrbetriq anticholinergics is here for her 9th Treatment. Since last treatment patient states she's noticed a regression or worsening of symptoms. She will complete the 12 week course and reassess at that time. - CT POST TIBIAL NEUROSTIMULATION,PERC NEEDLE ELECTRODE      No orders of the defined types were placed in this encounter. No orders of the defined types were placed in this encounter. Plan:  Follow up in 1 week for next treatment. # 10    1. Patient is seated with the left treatment leg elevated.   2. 34 gauge fine needle electrode is inserted into lower inner aspect of the leg. Slightly cephalad to the medial malleolus. 3. Surface electrode pad is placed over the medial aspect of the calcaneus on the same leg. 4.Needle electrode is connected to the external pulse generator. 5.The pulse generator is turned on and the millivolts used are 7. 6.Patient is treated for 30 minutes. 7.The needle and pad are removed.     Plan:  Follow-up in 1 weeks for next treatment #10

## 2018-08-22 ENCOUNTER — NURSE ONLY (OUTPATIENT)
Dept: UROLOGY | Age: 58
End: 2018-08-22
Payer: COMMERCIAL

## 2018-08-22 VITALS — TEMPERATURE: 96.3 F

## 2018-08-22 DIAGNOSIS — N39.46 MIXED URGE AND STRESS INCONTINENCE: Primary | ICD-10-CM

## 2018-08-22 PROCEDURE — 99211 OFF/OP EST MAY X REQ PHY/QHP: CPT | Performed by: PHYSICIAN ASSISTANT

## 2018-08-22 ASSESSMENT — ENCOUNTER SYMPTOMS
SHORTNESS OF BREATH: 0
WHEEZING: 0
DOUBLE VISION: 0
NAUSEA: 0
BLURRED VISION: 0
SORE THROAT: 0
HEARTBURN: 0

## 2018-08-22 NOTE — PROGRESS NOTES
Abundio Lott is a 63 y.o. female who presents today   Chief Complaint   Patient presents with    Follow-up     im here today for my urolplasty      Uroplasty treatment #9 history of mixed urge and stress incontinence:  Patient is a 52-year-old female with history of incontinence she has primarily urgent continence that was demonstrated on urodynamics. Classic stress incontinence was not demonstrated. She had failed first-line medical therapy with Myrbetriq and an anticholinergic so she was offered neuromodulation treatment which she is here for today. She is not interested in permanent implant nor is she interested in Botox. The patient States she's had some worsening of symptoms since last treatment.     Past Medical History:   Diagnosis Date    Arthritis     Bipolar I disorder, most recent episode (or current) mixed, unspecified     Chronic back pain     Dry mouth     Hyperlipidemia     Hypertension     Hypokalemia     Menopause     Overactive bladder     Plantar fasciitis     RLS (restless legs syndrome)     Thyroid disease        Past Surgical History:   Procedure Laterality Date    CARPAL TUNNEL RELEASE Bilateral 2002    CHOLECYSTECTOMY  10/2014    COLON SURGERY      COLON SURGERY      biopsy     COLONOSCOPY      CYST REMOVAL  05/2017    Under left ear    HARDWARE REMOVAL Right 2011    Foot     HERNIA REPAIR  10/2014    NOSE SURGERY  08/01/2017    Dr Michelle Haskins L/S,1 LEVEL Bilateral 2/7/2017    INJECTION MEDICATION FACET JOINT performed by Juventino Jackson at [address] [facility] OR       Current Outpatient Prescriptions   Medication Sig Dispense Refill    potassium chloride (KLOR-CON M) 20 MEQ extended release tablet TAKE ONE TABLET BY MOUTH DAILY 30 tablet 1    spironolactone (ALDACTONE) 25 MG tablet TAKE TWO TABLETS BY MOUTH EVERY MORNING & TAKE ONE TABLET EVERY EVENING 90 tablet 0    triamcinolone (ARISTOCORT) 0.5 % ointment       azelastine (ASTELIN) 0.1 % nasal spray 2 sprays by Nasal route 2 times daily Use in each nostril as directed 1 Bottle 3    escitalopram (LEXAPRO) 10 MG tablet Take 1 tablet by mouth daily 30 tablet 0    pramipexole (MIRAPEX) 1.5 MG tablet Take 1.5 mg by mouth nightly      diclofenac (VOLTAREN) 75 MG EC tablet 2 times daily       loratadine (CLARITIN) 10 MG tablet Take 1 tablet by mouth daily 30 tablet 2    allopurinol (ZYLOPRIM) 100 MG tablet TAKE ONE TABLET BY MOUTH DAILY 90 tablet 3    carvedilol (COREG) 6.25 MG tablet TAKE 1 TABLET BY MOUTH TWO  TIMES DAILY WITH MEALS 180 tablet 3    bumetanide (BUMEX) 1 MG tablet Take 1 tablet by mouth 2 times daily (Patient taking differently: Take 1 mg by mouth 2 times daily ONE TAB M/W/F/S AND 1/2 T/TH/S) 180 tablet 3    atorvastatin (LIPITOR) 40 MG tablet TAKE ONE TABLET BY MOUTH DAILY 30 tablet 0    QUEtiapine (SEROQUEL) 50 MG tablet Take 25 mg by mouth nightly       metFORMIN (GLUCOPHAGE) 500 MG tablet Take 500 mg by mouth 2 times daily (with meals)      niacin (SLO-NIACIN) 500 MG extended release tablet Take 500 mg by mouth daily       levothyroxine (SYNTHROID) 100 MCG tablet Take 1 tablet by mouth Daily (Patient taking differently: Take 100 mcg by mouth Daily 2/15/18 pt reports PCP has increased to 125 mcg daily.) 30 tablet 5    Tens Unit MISC by Does not apply route Apply and use tid prn  Dx -lumbar pain  Dispense tens and supplies 1 each 0    tiZANidine (ZANAFLEX) 4 MG tablet Take 4 mg by mouth nightly as needed       pilocarpine (SALAGEN) 5 MG tablet Take 5 mg by mouth 3 times daily      Vitamin D (CHOLECALCIFEROL) 1000 UNITS CAPS capsule Take 1,000 Units by mouth daily      Multiple Vitamins-Minerals (MULTIVITAMIN PO) Take by mouth daily      DULoxetine (CYMBALTA) 60 MG capsule Take 60 mg by mouth 2 times daily       traMADol (ULTRAM) 50 MG tablet Take 100 mg by mouth every 12 hours       calcium carbonate 600 MG TABS tablet Take 1 tablet by mouth daily.       divalproex (DEPAKOTE) 500 MG ER tablet Take 1,000 mg by mouth nightly. No current facility-administered medications for this visit. Allergies   Allergen Reactions    Hctz [Hydrochlorothiazide] Other (See Comments)     Acid reflux         Social History     Social History    Marital status:      Spouse name: Elo Mcneal Number of children: 2    Years of education: some college     Occupational History     [employer]     Social History Main Topics    Smoking status: Current Every Day Smoker     Packs/day: 1.00     Years: 30.00     Types: Cigarettes     Last attempt to quit: 7/20/2003    Smokeless tobacco: Never Used      Comment: Pt not interested at this time in stopping.  Alcohol use Yes      Comment: rarely    Drug use: No      Comment: pt admits \"may have misused in past\"--Pt was vague with details.  Sexual activity: Not Asked     Other Topics Concern    None     Social History Narrative    None       Family History   Problem Relation Age of Onset    Cancer Mother         lung    Heart Disease Mother     Hypertension Mother     Bipolar Disorder Mother     Heart Disease Father     Hypertension Father        REVIEW OF SYSTEMS:  Review of Systems   Constitutional: Negative for chills and fever. HENT: Negative for congestion and sore throat. Eyes: Negative for blurred vision and double vision. Respiratory: Negative for shortness of breath and wheezing. Cardiovascular: Negative for chest pain and palpitations. Gastrointestinal: Negative for heartburn and nausea. Genitourinary: Negative for dysuria, flank pain, frequency, hematuria and urgency. Musculoskeletal: Negative for falls and neck pain. Skin: Negative for itching and rash. Neurological: Negative for dizziness and tingling. Endo/Heme/Allergies: Does not bruise/bleed easily. Psychiatric/Behavioral: The patient does not have insomnia.         PHYSICAL EXAM:  Temp 96.3 °F (35.7 °C) (Temporal)   Physical Exam   Constitutional: No distress. HENT:   Mouth/Throat: No oropharyngeal exudate. Eyes: Left eye exhibits no discharge. No scleral icterus. Neck: No JVD present. No tracheal deviation present. No thyromegaly present. Cardiovascular: Exam reveals no gallop and no friction rub. Pulmonary/Chest: No stridor. She has no rales. Abdominal: She exhibits no distension and no mass. There is no rebound and no guarding. Musculoskeletal: She exhibits no edema. Neurological: No cranial nerve deficit. She exhibits normal muscle tone. Coordination normal.   Skin: She is not diaphoretic. No pallor. DATA:  U/A:    Lab Results   Component Value Date    NITRITE neg 05/31/2018    COLORU yellow 02/22/2018    COLORU DK YELLOW 02/14/2018    PROTEINU neg 05/31/2018    PROTEINU Negative 02/14/2018    PHUR 5.0 05/31/2018    PHUR 7.0 02/14/2018    WBCUA 9 06/18/2014    RBCUA 1 06/18/2014    BACTERIA NEGATIVE 06/18/2014    CLARITYU clear 02/22/2018    CLARITYU Clear 02/14/2018    SPECGRAV 1.015 05/31/2018    SPECGRAV 1.024 02/14/2018    LEUKOCYTESUR neg 05/31/2018    LEUKOCYTESUR Negative 02/14/2018    UROBILINOGEN 1.0 02/14/2018    BILIRUBINUR neg 05/31/2018    BLOODU neg 05/31/2018    BLOODU Negative 02/14/2018    GLUCOSEU neg 05/31/2018    GLUCOSEU Negative 02/14/2018                1. Mixed urge and stress incontinence  Patient who has failed conservative therapy on medications Myrbetriq anticholinergics is here for her 10th Treatment. Since last treatment patient states she's noticed a regression or worsening of symptoms. She will complete the 12 week course and reassess at that time. - [place] POST TIBIAL NEUROSTIMULATION,PERC NEEDLE ELECTRODE      No orders of the defined types were placed in this encounter. No orders of the defined types were placed in this encounter. Plan:  Follow up in 1 week for next treatment. # 11    1. Patient is seated with the left treatment leg elevated.   2. 34 gauge fine needle electrode is inserted into

## 2018-08-29 ENCOUNTER — NURSE ONLY (OUTPATIENT)
Dept: UROLOGY | Age: 58
End: 2018-08-29
Payer: COMMERCIAL

## 2018-08-29 DIAGNOSIS — N39.46 MIXED URGE AND STRESS INCONTINENCE: Primary | ICD-10-CM

## 2018-08-29 PROCEDURE — 64566 NEUROELTRD STIM POST TIBIAL: CPT | Performed by: PHYSICIAN ASSISTANT

## 2018-08-29 PROCEDURE — 99999 PR OFFICE/OUTPT VISIT,PROCEDURE ONLY: CPT | Performed by: PHYSICIAN ASSISTANT

## 2018-08-29 ASSESSMENT — ENCOUNTER SYMPTOMS
WHEEZING: 0
NAUSEA: 0
SHORTNESS OF BREATH: 0
DOUBLE VISION: 0
HEARTBURN: 0
BLURRED VISION: 0
SORE THROAT: 0

## 2018-08-29 NOTE — PROGRESS NOTES
hours       calcium carbonate 600 MG TABS tablet Take 1 tablet by mouth daily.  divalproex (DEPAKOTE) 500 MG ER tablet Take 1,000 mg by mouth nightly. No current facility-administered medications for this visit. Allergies   Allergen Reactions    Hctz [Hydrochlorothiazide] Other (See Comments)     Acid reflux         Social History     Social History    Marital status:      Spouse name: Jamel Soriano Number of children: 2    Years of education: some college     Occupational History     [employer]     Social History Main Topics    Smoking status: Current Every Day Smoker     Packs/day: 1.00     Years: 30.00     Types: Cigarettes     Last attempt to quit: 7/20/2003    Smokeless tobacco: Never Used      Comment: Pt not interested at this time in stopping.  Alcohol use Yes      Comment: rarely    Drug use: No      Comment: pt admits \"may have misused in past\"--Pt was vague with details.  Sexual activity: Not Asked     Other Topics Concern    None     Social History Narrative    None       Family History   Problem Relation Age of Onset    Cancer Mother         lung    Heart Disease Mother     Hypertension Mother     Bipolar Disorder Mother     Heart Disease Father     Hypertension Father        REVIEW OF SYSTEMS:  Review of Systems   Constitutional: Negative for chills and fever. HENT: Negative for congestion and sore throat. Eyes: Negative for blurred vision and double vision. Respiratory: Negative for shortness of breath and wheezing. Cardiovascular: Negative for chest pain and palpitations. Gastrointestinal: Negative for heartburn and nausea. Genitourinary: Negative for dysuria, flank pain, frequency, hematuria and urgency. Musculoskeletal: Negative for falls and neck pain. Skin: Negative for itching and rash. Neurological: Negative for dizziness and tingling. Endo/Heme/Allergies: Does not bruise/bleed easily.    Psychiatric/Behavioral: The patient does not have insomnia. PHYSICAL EXAM:  There were no vitals taken for this visit. Physical Exam   Constitutional: No distress. HENT:   Mouth/Throat: No oropharyngeal exudate. Eyes: Left eye exhibits no discharge. No scleral icterus. Neck: No JVD present. No tracheal deviation present. No thyromegaly present. Cardiovascular: Exam reveals no gallop and no friction rub. Pulmonary/Chest: No stridor. She has no rales. Abdominal: She exhibits no distension and no mass. There is no rebound and no guarding. Musculoskeletal: She exhibits no edema. Neurological: No cranial nerve deficit. She exhibits normal muscle tone. Coordination normal.   Skin: She is not diaphoretic. No pallor. DATA:  U/A:    Lab Results   Component Value Date    NITRITE neg 05/31/2018    COLORU yellow 02/22/2018    COLORU DK YELLOW 02/14/2018    PROTEINU neg 05/31/2018    PROTEINU Negative 02/14/2018    PHUR 5.0 05/31/2018    PHUR 7.0 02/14/2018    WBCUA 9 06/18/2014    RBCUA 1 06/18/2014    BACTERIA NEGATIVE 06/18/2014    CLARITYU clear 02/22/2018    CLARITYU Clear 02/14/2018    SPECGRAV 1.015 05/31/2018    SPECGRAV 1.024 02/14/2018    LEUKOCYTESUR neg 05/31/2018    LEUKOCYTESUR Negative 02/14/2018    UROBILINOGEN 1.0 02/14/2018    BILIRUBINUR neg 05/31/2018    BLOODU neg 05/31/2018    BLOODU Negative 02/14/2018    GLUCOSEU neg 05/31/2018    GLUCOSEU Negative 02/14/2018                1. Mixed urge and stress incontinence  Patient who has failed conservative therapy on medications Myrbetriq anticholinergics is here for her 10th Treatment. Since last treatment patient states she's noticed a regression or worsening of symptoms. She will complete the 12 week course and reassess at that time.     - [place] POST TIBIAL NEUROSTIMULATION,PERC NEEDLE ELECTRODE      Orders Placed This Encounter   Procedures    [place] POST TIBIAL NEUROSTIMULATION,PERC NEEDLE ELECTRODE     # 10     No orders of the defined types were placed in this

## 2018-09-05 ENCOUNTER — NURSE ONLY (OUTPATIENT)
Dept: UROLOGY | Age: 58
End: 2018-09-05
Payer: COMMERCIAL

## 2018-09-05 VITALS — TEMPERATURE: 96.3 F

## 2018-09-05 DIAGNOSIS — N39.46 MIXED URGE AND STRESS INCONTINENCE: Primary | ICD-10-CM

## 2018-09-05 PROCEDURE — 64566 NEUROELTRD STIM POST TIBIAL: CPT | Performed by: PHYSICIAN ASSISTANT

## 2018-09-05 ASSESSMENT — ENCOUNTER SYMPTOMS
NAUSEA: 0
SHORTNESS OF BREATH: 0
HEARTBURN: 0
WHEEZING: 0
SORE THROAT: 0
BLURRED VISION: 0
DOUBLE VISION: 0

## 2018-09-05 NOTE — PROGRESS NOTES
ointment       azelastine (ASTELIN) 0.1 % nasal spray 2 sprays by Nasal route 2 times daily Use in each nostril as directed 1 Bottle 3    escitalopram (LEXAPRO) 10 MG tablet Take 1 tablet by mouth daily 30 tablet 0    pramipexole (MIRAPEX) 1.5 MG tablet Take 1.5 mg by mouth nightly      diclofenac (VOLTAREN) 75 MG EC tablet 2 times daily       loratadine (CLARITIN) 10 MG tablet Take 1 tablet by mouth daily 30 tablet 2    allopurinol (ZYLOPRIM) 100 MG tablet TAKE ONE TABLET BY MOUTH DAILY 90 tablet 3    carvedilol (COREG) 6.25 MG tablet TAKE 1 TABLET BY MOUTH TWO  TIMES DAILY WITH MEALS 180 tablet 3    bumetanide (BUMEX) 1 MG tablet Take 1 tablet by mouth 2 times daily (Patient taking differently: Take 1 mg by mouth 2 times daily ONE TAB M/W/F/S AND 1/2 T/TH/S) 180 tablet 3    atorvastatin (LIPITOR) 40 MG tablet TAKE ONE TABLET BY MOUTH DAILY 30 tablet 0    QUEtiapine (SEROQUEL) 50 MG tablet Take 25 mg by mouth nightly       metFORMIN (GLUCOPHAGE) 500 MG tablet Take 500 mg by mouth 2 times daily (with meals)      niacin (SLO-NIACIN) 500 MG extended release tablet Take 500 mg by mouth daily       levothyroxine (SYNTHROID) 100 MCG tablet Take 1 tablet by mouth Daily (Patient taking differently: Take 100 mcg by mouth Daily 2/15/18 pt reports PCP has increased to 125 mcg daily.) 30 tablet 5    Tens Unit MISC by Does not apply route Apply and use tid prn  Dx -lumbar pain  Dispense tens and supplies 1 each 0    tiZANidine (ZANAFLEX) 4 MG tablet Take 4 mg by mouth nightly as needed       pilocarpine (SALAGEN) 5 MG tablet Take 5 mg by mouth 3 times daily      Vitamin D (CHOLECALCIFEROL) 1000 UNITS CAPS capsule Take 1,000 Units by mouth daily      Multiple Vitamins-Minerals (MULTIVITAMIN PO) Take by mouth daily      DULoxetine (CYMBALTA) 60 MG capsule Take 60 mg by mouth 2 times daily       traMADol (ULTRAM) 50 MG tablet Take 100 mg by mouth every 12 hours       calcium carbonate 600 MG TABS tablet Take 1 tablet by mouth daily.  divalproex (DEPAKOTE) 500 MG ER tablet Take 1,000 mg by mouth nightly. No current facility-administered medications for this visit. Allergies   Allergen Reactions    Hctz [Hydrochlorothiazide] Other (See Comments)     Acid reflux         Social History     Social History    Marital status:      Spouse name: Bentley Godinez Number of children: 2    Years of education: some college     Occupational History     [employer]     Social History Main Topics    Smoking status: Current Every Day Smoker     Packs/day: 1.00     Years: 30.00     Types: Cigarettes     Last attempt to quit: 7/20/2003    Smokeless tobacco: Never Used      Comment: Pt not interested at this time in stopping.  Alcohol use Yes      Comment: rarely    Drug use: No      Comment: pt admits \"may have misused in past\"--Pt was vague with details.  Sexual activity: Not Asked     Other Topics Concern    None     Social History Narrative    None       Family History   Problem Relation Age of Onset    Cancer Mother         lung    Heart Disease Mother     Hypertension Mother     Bipolar Disorder Mother     Heart Disease Father     Hypertension Father        REVIEW OF SYSTEMS:  Review of Systems   Constitutional: Negative for chills and fever. HENT: Negative for congestion and sore throat. Eyes: Negative for blurred vision and double vision. Respiratory: Negative for shortness of breath and wheezing. Cardiovascular: Negative for chest pain and palpitations. Gastrointestinal: Negative for heartburn and nausea. Genitourinary: Negative for dysuria, flank pain, frequency, hematuria and urgency. Musculoskeletal: Negative for falls and neck pain. Skin: Negative for itching and rash. Neurological: Negative for dizziness and tingling. Endo/Heme/Allergies: Does not bruise/bleed easily. Psychiatric/Behavioral: The patient does not have insomnia.         PHYSICAL EXAM:  Temp 96.3 °F (35.7 types were placed in this encounter. No orders of the defined types were placed in this encounter. Plan:  Follow up in 1 week for next treatment. # 12    1. Patient is seated with the left treatment leg elevated. 2. 34 gauge fine needle electrode is inserted into lower inner aspect of the leg. Slightly cephalad to the medial malleolus. 3. Surface electrode pad is placed over the medial aspect of the calcaneus on the same leg. 4.Needle electrode is connected to the external pulse generator. 5.The pulse generator is turned on and the millivolts used are 3. 6.Patient is treated for 30 minutes. 7.The needle and pad are removed.     Plan:  Follow-up in 1 weeks for next treatment #12

## 2018-09-12 ENCOUNTER — NURSE ONLY (OUTPATIENT)
Dept: UROLOGY | Age: 58
End: 2018-09-12
Payer: COMMERCIAL

## 2018-09-12 DIAGNOSIS — N39.46 MIXED URGE AND STRESS INCONTINENCE: Primary | ICD-10-CM

## 2018-09-12 PROCEDURE — 64566 NEUROELTRD STIM POST TIBIAL: CPT | Performed by: PHYSICIAN ASSISTANT

## 2018-09-12 PROCEDURE — 99999 PR OFFICE/OUTPT VISIT,PROCEDURE ONLY: CPT | Performed by: PHYSICIAN ASSISTANT

## 2018-09-12 ASSESSMENT — ENCOUNTER SYMPTOMS
NAUSEA: 0
BLURRED VISION: 0
WHEEZING: 0
DOUBLE VISION: 0
HEARTBURN: 0
SORE THROAT: 0
SHORTNESS OF BREATH: 0

## 2018-09-12 NOTE — PROGRESS NOTES
EVENING 90 tablet 0    triamcinolone (ARISTOCORT) 0.5 % ointment       azelastine (ASTELIN) 0.1 % nasal spray 2 sprays by Nasal route 2 times daily Use in each nostril as directed 1 Bottle 3    escitalopram (LEXAPRO) 10 MG tablet Take 1 tablet by mouth daily 30 tablet 0    pramipexole (MIRAPEX) 1.5 MG tablet Take 1.5 mg by mouth nightly      diclofenac (VOLTAREN) 75 MG EC tablet 2 times daily       loratadine (CLARITIN) 10 MG tablet Take 1 tablet by mouth daily 30 tablet 2    allopurinol (ZYLOPRIM) 100 MG tablet TAKE ONE TABLET BY MOUTH DAILY 90 tablet 3    carvedilol (COREG) 6.25 MG tablet TAKE 1 TABLET BY MOUTH TWO  TIMES DAILY WITH MEALS 180 tablet 3    bumetanide (BUMEX) 1 MG tablet Take 1 tablet by mouth 2 times daily (Patient taking differently: Take 1 mg by mouth 2 times daily ONE TAB M/W/F/S AND 1/2 T/TH/S) 180 tablet 3    atorvastatin (LIPITOR) 40 MG tablet TAKE ONE TABLET BY MOUTH DAILY 30 tablet 0    QUEtiapine (SEROQUEL) 50 MG tablet Take 25 mg by mouth nightly       metFORMIN (GLUCOPHAGE) 500 MG tablet Take 500 mg by mouth 2 times daily (with meals)      niacin (SLO-NIACIN) 500 MG extended release tablet Take 500 mg by mouth daily       levothyroxine (SYNTHROID) 100 MCG tablet Take 1 tablet by mouth Daily (Patient taking differently: Take 100 mcg by mouth Daily 2/15/18 pt reports PCP has increased to 125 mcg daily.) 30 tablet 5    Tens Unit MISC by Does not apply route Apply and use tid prn  Dx -lumbar pain  Dispense tens and supplies 1 each 0    tiZANidine (ZANAFLEX) 4 MG tablet Take 4 mg by mouth nightly as needed       pilocarpine (SALAGEN) 5 MG tablet Take 5 mg by mouth 3 times daily      Vitamin D (CHOLECALCIFEROL) 1000 UNITS CAPS capsule Take 1,000 Units by mouth daily      Multiple Vitamins-Minerals (MULTIVITAMIN PO) Take by mouth daily      DULoxetine (CYMBALTA) 60 MG capsule Take 60 mg by mouth 2 times daily       traMADol (ULTRAM) 50 MG tablet Take 100 mg by mouth every 12 encounter. Plan:  Follow up in 1 month for next treatment    1. Patient is seated with the left treatment leg elevated. 2. 34 gauge fine needle electrode is inserted into lower inner aspect of the leg. Slightly cephalad to the medial malleolus. 3. Surface electrode pad is placed over the medial aspect of the calcaneus on the same leg. 4.Needle electrode is connected to the external pulse generator. 5.The pulse generator is turned on and the millivolts used are 8. 6.Patient is treated for 30 minutes. 7.The needle and pad are removed.     Plan:  Follow-up in 1 month for next treatment

## 2018-10-19 ENCOUNTER — OFFICE VISIT (OUTPATIENT)
Dept: FAMILY MEDICINE CLINIC | Age: 58
End: 2018-10-19
Payer: COMMERCIAL

## 2018-10-19 VITALS
HEIGHT: 60 IN | TEMPERATURE: 97.9 F | OXYGEN SATURATION: 98 % | WEIGHT: 166 LBS | BODY MASS INDEX: 32.59 KG/M2 | RESPIRATION RATE: 20 BRPM | HEART RATE: 76 BPM | SYSTOLIC BLOOD PRESSURE: 96 MMHG | DIASTOLIC BLOOD PRESSURE: 62 MMHG

## 2018-10-19 DIAGNOSIS — B96.89 ACUTE BACTERIAL SINUSITIS: Primary | ICD-10-CM

## 2018-10-19 DIAGNOSIS — J01.90 ACUTE BACTERIAL SINUSITIS: Primary | ICD-10-CM

## 2018-10-19 PROCEDURE — 99213 OFFICE O/P EST LOW 20 MIN: CPT | Performed by: NURSE PRACTITIONER

## 2018-10-19 RX ORDER — SPIRONOLACTONE 25 MG/1
25 TABLET ORAL DAILY
COMMUNITY
End: 2018-11-16 | Stop reason: SDUPTHER

## 2018-10-19 RX ORDER — AZELASTINE 1 MG/ML
2 SPRAY, METERED NASAL 2 TIMES DAILY
Qty: 1 BOTTLE | Refills: 3 | Status: SHIPPED | OUTPATIENT
Start: 2018-10-19 | End: 2019-06-13

## 2018-10-19 RX ORDER — ALPRAZOLAM 0.5 MG/1
0.5 TABLET ORAL 2 TIMES DAILY
Refills: 1 | COMMUNITY
Start: 2018-09-06 | End: 2020-10-26 | Stop reason: ALTCHOICE

## 2018-10-19 RX ORDER — GUAIFENESIN 600 MG/1
1200 TABLET, EXTENDED RELEASE ORAL 2 TIMES DAILY
Qty: 60 TABLET | Refills: 0 | Status: SHIPPED | OUTPATIENT
Start: 2018-10-19 | End: 2021-01-08

## 2018-10-19 RX ORDER — DOXYCYCLINE HYCLATE 100 MG
100 TABLET ORAL 2 TIMES DAILY
Qty: 42 TABLET | Refills: 0 | Status: SHIPPED | OUTPATIENT
Start: 2018-10-19 | End: 2018-11-09

## 2018-10-19 ASSESSMENT — ENCOUNTER SYMPTOMS
SHORTNESS OF BREATH: 0
SINUS PAIN: 1
SINUS PRESSURE: 1
COUGH: 1

## 2018-10-22 ENCOUNTER — NURSE ONLY (OUTPATIENT)
Dept: UROLOGY | Age: 58
End: 2018-10-22
Payer: COMMERCIAL

## 2018-10-22 VITALS — TEMPERATURE: 96.3 F

## 2018-10-22 DIAGNOSIS — N39.46 MIXED URGE AND STRESS INCONTINENCE: Primary | ICD-10-CM

## 2018-10-22 PROCEDURE — 99213 OFFICE O/P EST LOW 20 MIN: CPT | Performed by: PHYSICIAN ASSISTANT

## 2018-10-22 ASSESSMENT — ENCOUNTER SYMPTOMS
SORE THROAT: 0
HEARTBURN: 0
WHEEZING: 0
SHORTNESS OF BREATH: 0
NAUSEA: 0
BLURRED VISION: 0
DOUBLE VISION: 0

## 2018-10-22 NOTE — PROGRESS NOTES
times daily 60 tablet 0    azelastine (ASTELIN) 0.1 % nasal spray 2 sprays by Nasal route 2 times daily Use in each nostril as directed 1 Bottle 3    potassium chloride (KLOR-CON M) 20 MEQ extended release tablet TAKE ONE TABLET BY MOUTH DAILY (Patient taking differently: takes one on monday wednesday and friday) 30 tablet 1    triamcinolone (ARISTOCORT) 0.5 % ointment       escitalopram (LEXAPRO) 10 MG tablet Take 1 tablet by mouth daily 30 tablet 0    pramipexole (MIRAPEX) 1.5 MG tablet Take 1.5 mg by mouth nightly      diclofenac (VOLTAREN) 75 MG EC tablet 2 times daily       loratadine (CLARITIN) 10 MG tablet Take 1 tablet by mouth daily 30 tablet 2    allopurinol (ZYLOPRIM) 100 MG tablet TAKE ONE TABLET BY MOUTH DAILY 90 tablet 3    carvedilol (COREG) 6.25 MG tablet TAKE 1 TABLET BY MOUTH TWO  TIMES DAILY WITH MEALS 180 tablet 3    bumetanide (BUMEX) 1 MG tablet Take 1 tablet by mouth 2 times daily (Patient taking differently: Take 1 mg by mouth daily takes one on monday wednesday and friday) 180 tablet 3    atorvastatin (LIPITOR) 40 MG tablet TAKE ONE TABLET BY MOUTH DAILY 30 tablet 0    QUEtiapine (SEROQUEL) 50 MG tablet Take 25 mg by mouth nightly       metFORMIN (GLUCOPHAGE) 500 MG tablet Take 500 mg by mouth 2 times daily (with meals)      niacin (SLO-NIACIN) 500 MG extended release tablet Take 500 mg by mouth daily       levothyroxine (SYNTHROID) 100 MCG tablet Take 1 tablet by mouth Daily (Patient taking differently: Take 100 mcg by mouth Daily 2/15/18 pt reports PCP has increased to 125 mcg daily.) 30 tablet 5    Tens Unit MISC by Does not apply route Apply and use tid prn  Dx -lumbar pain  Dispense tens and supplies 1 each 0    tiZANidine (ZANAFLEX) 4 MG tablet Take 4 mg by mouth nightly as needed       pilocarpine (SALAGEN) 5 MG tablet Take 5 mg by mouth 3 times daily      Vitamin D (CHOLECALCIFEROL) 1000 UNITS CAPS capsule Take 1,000 Units by mouth daily      Multiple schedule a monthly treatment. - [place] POST TIBIAL NEUROSTIMULATION,PERC NEEDLE ELECTRODE      No orders of the defined types were placed in this encounter. No orders of the defined types were placed in this encounter. Plan:  Follow-up For next available uroplasty treatment.

## 2018-10-24 ENCOUNTER — NURSE ONLY (OUTPATIENT)
Dept: UROLOGY | Age: 58
End: 2018-10-24
Payer: COMMERCIAL

## 2018-10-24 DIAGNOSIS — N39.46 MIXED URGE AND STRESS INCONTINENCE: Primary | ICD-10-CM

## 2018-10-24 PROCEDURE — 99999 PR OFFICE/OUTPT VISIT,PROCEDURE ONLY: CPT | Performed by: PHYSICIAN ASSISTANT

## 2018-10-24 PROCEDURE — 64566 NEUROELTRD STIM POST TIBIAL: CPT | Performed by: PHYSICIAN ASSISTANT

## 2018-10-24 ASSESSMENT — ENCOUNTER SYMPTOMS
SHORTNESS OF BREATH: 0
HEARTBURN: 0
BLURRED VISION: 0
NAUSEA: 0
SORE THROAT: 0
WHEEZING: 0
DOUBLE VISION: 0

## 2018-10-24 NOTE — PROGRESS NOTES
Caroline Lord is a 63 y.o. female who presents today   Chief Complaint   Patient presents with    Follow-up     I'm here for my uroplasty     Follow-up mixed urge and stress incontinence:  Patient is a 81-year-old female who was undergoing posterior tibial nerve stimulation treatments she completed 12 week course of treatments with her last treatment being 09/12/18. She had mixed response to the treatments however after being off any form of treatment about 5-6 weeks she's has that her symptoms worsen and would like to resume and start monthly treatments at this time. She is here for her treatment.     Past Medical History:   Diagnosis Date    Arthritis     Bipolar I disorder, most recent episode (or current) mixed, unspecified     Chronic back pain     Depression     Dry mouth     Hyperlipidemia     Hypertension     Hypokalemia     Hypothyroidism     Menopause     Overactive bladder     Plantar fasciitis     RLS (restless legs syndrome)     Sleep apnea     Thyroid disease        Past Surgical History:   Procedure Laterality Date    CARPAL TUNNEL RELEASE Bilateral 2002    CHOLECYSTECTOMY  10/2014    COLON SURGERY      COLON SURGERY      biopsy     COLONOSCOPY      CYST REMOVAL  05/2017    Under left ear    HARDWARE REMOVAL Right 2011    Foot     HERNIA REPAIR  10/2014    NOSE SURGERY  08/01/2017    Dr Charis Rubinstein L/S,1 LEVEL Bilateral 2/7/2017    INJECTION MEDICATION FACET JOINT performed by Krys Granda at [address] [facility] OR       Current Outpatient Prescriptions   Medication Sig Dispense Refill    ALPRAZolam (XANAX) 0.5 MG tablet   1    spironolactone (ALDACTONE) 25 MG tablet Take 25 mg by mouth daily      doxycycline hyclate (VIBRA-TABS) 100 MG tablet Take 1 tablet by mouth 2 times daily for 21 days 42 tablet 0    guaiFENesin (MUCINEX) 600 MG extended release tablet Take 2 tablets by mouth 2 times daily 60 tablet 0    azelastine (ASTELIN) 0.1 % nasal spray 2 sprays by capsule Take 60 mg by mouth 2 times daily       traMADol (ULTRAM) 50 MG tablet Take 100 mg by mouth every 12 hours       calcium carbonate 600 MG TABS tablet Take 1 tablet by mouth daily.  divalproex (DEPAKOTE) 500 MG ER tablet Take 1,000 mg by mouth nightly. No current facility-administered medications for this visit. Allergies   Allergen Reactions    Hctz [Hydrochlorothiazide] Other (See Comments)     Acid reflux         Social History     Social History    Marital status:      Spouse name: Thierno Davidson Number of children: 2    Years of education: some college     Occupational History     [employer]     Social History Main Topics    Smoking status: Current Every Day Smoker     Packs/day: 1.00     Years: 30.00     Types: Cigarettes     Last attempt to quit: 7/20/2003    Smokeless tobacco: Never Used      Comment: Pt not interested at this time in stopping.  Alcohol use Yes      Comment: rarely    Drug use: No      Comment: pt admits \"may have misused in past\"--Pt was vague with details.  Sexual activity: Not on file     Other Topics Concern    Not on file     Social History Narrative    No narrative on file       Family History   Problem Relation Age of Onset    Cancer Mother         lung    Heart Disease Mother     Hypertension Mother     Bipolar Disorder Mother     Heart Disease Father     Hypertension Father        REVIEW OF SYSTEMS:  Review of Systems   Constitutional: Negative for chills and fever. HENT: Negative for congestion and sore throat. Eyes: Negative for blurred vision and double vision. Respiratory: Negative for shortness of breath and wheezing. Cardiovascular: Negative for chest pain and palpitations. Gastrointestinal: Negative for heartburn and nausea. Genitourinary: Negative for dysuria, flank pain, frequency, hematuria and urgency. Musculoskeletal: Negative for falls and neck pain. Skin: Negative for itching and rash.    Neurological: Negative for dizziness and tingling. Endo/Heme/Allergies: Does not bruise/bleed easily. Psychiatric/Behavioral: The patient does not have insomnia. PHYSICAL EXAM:  There were no vitals taken for this visit. Physical Exam   Constitutional: No distress. HENT:   Mouth/Throat: No oropharyngeal exudate. Eyes: Left eye exhibits no discharge. No scleral icterus. Neck: No JVD present. No tracheal deviation present. No thyromegaly present. Cardiovascular: Exam reveals no gallop and no friction rub. Pulmonary/Chest: No stridor. She has no rales. Abdominal: She exhibits no distension and no mass. There is no rebound and no guarding. Musculoskeletal: She exhibits no edema. Neurological: No cranial nerve deficit. She exhibits normal muscle tone. Coordination normal.   Skin: She is not diaphoretic. No pallor. DATA:  U/A:    Lab Results   Component Value Date    NITRITE neg 05/31/2018    COLORU yellow 02/22/2018    COLORU DK YELLOW 02/14/2018    PROTEINU neg 05/31/2018    PROTEINU Negative 02/14/2018    PHUR 5.0 05/31/2018    PHUR 7.0 02/14/2018    WBCUA 9 06/18/2014    RBCUA 1 06/18/2014    BACTERIA NEGATIVE 06/18/2014    CLARITYU clear 02/22/2018    CLARITYU Clear 02/14/2018    SPECGRAV 1.015 05/31/2018    SPECGRAV 1.024 02/14/2018    LEUKOCYTESUR neg 05/31/2018    LEUKOCYTESUR Negative 02/14/2018    UROBILINOGEN 1.0 02/14/2018    BILIRUBINUR neg 05/31/2018    BLOODU neg 05/31/2018    BLOODU Negative 02/14/2018    GLUCOSEU neg 05/31/2018    GLUCOSEU Negative 02/14/2018       Procedure:  1. Patient is seated with the right treatment leg elevated. 2. 34 gauge fine needle electrode is inserted into lower inner aspect of the leg. Slightly cephalad to the medial malleolus. 3. Surface electrode pad is placed over the medial aspect of the calcaneus on the same leg. 4.Needle electrode is connected to the external pulse generator.   5.The pulse generator is turned on and the millivolts used are 3. 6.Patient is treated for 30 minutes. 7.The needle and pad are removed. 1. Mixed urge and stress incontinence  Follow-up for next available your plasty treatment patient came in today primarily to us to discuss how she had done on a 12 week course of treatment. He has had mixed response although since she has been off the treatment since September 2018 she has noticed that her symptoms have worsened she would like to go ahead and schedule a monthly treatment. - [place] POST TIBIAL NEUROSTIMULATION,PERC NEEDLE ELECTRODE      No orders of the defined types were placed in this encounter. No orders of the defined types were placed in this encounter. Plan:  Follow-up 1 month for next treatment.

## 2018-11-05 ENCOUNTER — OFFICE VISIT (OUTPATIENT)
Dept: NEUROLOGY | Age: 58
End: 2018-11-05
Payer: COMMERCIAL

## 2018-11-05 VITALS
HEART RATE: 92 BPM | WEIGHT: 166.8 LBS | HEIGHT: 60 IN | DIASTOLIC BLOOD PRESSURE: 69 MMHG | BODY MASS INDEX: 32.75 KG/M2 | RESPIRATION RATE: 20 BRPM | SYSTOLIC BLOOD PRESSURE: 111 MMHG

## 2018-11-05 DIAGNOSIS — G47.33 SLEEP APNEA, OBSTRUCTIVE: Primary | ICD-10-CM

## 2018-11-05 DIAGNOSIS — G25.0 TREMOR, ESSENTIAL: ICD-10-CM

## 2018-11-05 DIAGNOSIS — F51.01 PRIMARY INSOMNIA: ICD-10-CM

## 2018-11-05 DIAGNOSIS — G25.81 RLS (RESTLESS LEGS SYNDROME): ICD-10-CM

## 2018-11-05 PROCEDURE — 99213 OFFICE O/P EST LOW 20 MIN: CPT | Performed by: PSYCHIATRY & NEUROLOGY

## 2018-11-05 NOTE — PROGRESS NOTES
[facility]  [address] [place] [address] Drive, [address] [place] [address],[address]  Elena Perry  Phone [phone]  Fax [phone]     [address] Follow Up Encounter  2018 11:26 AM    Information:   Patient Name: Sapphire Bowen  :   1960  Age:   63 y.o. MRN:   [id number]  Account #:  [de-identified]  Today:  18    Provider: Franci Bui M.D. Chief Complaint:   Chief Complaint   Patient presents with    6 Month Follow-Up    Sleep Apnea       Subjective:   Sapphire Bowen is a 63 y.o. woman  with a history of obstructive sleep apnea, tremor, insomnia, and restless legs who is following up. She has noted no further tremor. She is sleeping better but is not using her BiPAP nightly. She says she falls asleep in the living room and sleeps there much or all of the night. Her RLS is doing well on the Mirapex. Objective:     Past Medical History:  Past Medical History:   Diagnosis Date    Arthritis     Bipolar I disorder, most recent episode (or current) mixed, unspecified     Chronic back pain     Depression     Dry mouth     Hyperlipidemia     Hypertension     Hypokalemia     Hypothyroidism     Menopause     Overactive bladder     Plantar fasciitis     RLS (restless legs syndrome)     Sleep apnea     Thyroid disease        Past Surgical History:   Procedure Laterality Date    CARPAL TUNNEL RELEASE Bilateral     CHOLECYSTECTOMY  10/2014    COLON SURGERY      COLON SURGERY      biopsy     COLONOSCOPY      CYST REMOVAL  2017    Under left ear    HARDWARE REMOVAL Right     Foot     HERNIA REPAIR  10/2014    NOSE SURGERY  2017    Dr Dioni Weeks L/S,1 LEVEL Bilateral 2017    INJECTION MEDICATION FACET JOINT performed by Nikolai Lloyd at [facility] 3  · None    Significant Injuries  · None    Habits  Sapphire Bowen reports that she has been smoking Cigarettes. She has a 30.00 pack-year smoking history.  She has never used smokeless tobacco. She reports that she drinks alcohol. She reports that she does not use drugs. Family History   Problem Relation Age of Onset    Cancer Mother         lung    Heart Disease Mother     Hypertension Mother     Bipolar Disorder Mother     Heart Disease Father     Hypertension Father        Social History  Asia Perkins is , lives in [facility], and is unemployed.     Medications:  Current Outpatient Prescriptions   Medication Sig Dispense Refill    ALPRAZolam (XANAX) 0.5 MG tablet   1    spironolactone (ALDACTONE) 25 MG tablet Take 25 mg by mouth daily      doxycycline hyclate (VIBRA-TABS) 100 MG tablet Take 1 tablet by mouth 2 times daily for 21 days 42 tablet 0    guaiFENesin (MUCINEX) 600 MG extended release tablet Take 2 tablets by mouth 2 times daily 60 tablet 0    azelastine (ASTELIN) 0.1 % nasal spray 2 sprays by Nasal route 2 times daily Use in each nostril as directed 1 Bottle 3    potassium chloride (KLOR-CON M) 20 MEQ extended release tablet TAKE ONE TABLET BY MOUTH DAILY (Patient taking differently: takes one on monday wednesday and friday) 30 tablet 1    pramipexole (MIRAPEX) 1.5 MG tablet Take 1.5 mg by mouth nightly      diclofenac (VOLTAREN) 75 MG EC tablet 2 times daily       allopurinol (ZYLOPRIM) 100 MG tablet TAKE ONE TABLET BY MOUTH DAILY 90 tablet 3    carvedilol (COREG) 6.25 MG tablet TAKE 1 TABLET BY MOUTH TWO  TIMES DAILY WITH MEALS 180 tablet 3    bumetanide (BUMEX) 1 MG tablet Take 1 tablet by mouth 2 times daily (Patient taking differently: Take 1 mg by mouth daily takes one on monday wednesday and friday) 180 tablet 3    atorvastatin (LIPITOR) 40 MG tablet TAKE ONE TABLET BY MOUTH DAILY 30 tablet 0    QUEtiapine (SEROQUEL) 50 MG tablet Take 25 mg by mouth nightly       metFORMIN (GLUCOPHAGE) 500 MG tablet Take 500 mg by mouth 2 times daily (with meals)      niacin (SLO-NIACIN) 500 MG extended release tablet Take 500 mg by

## 2018-11-16 ENCOUNTER — OFFICE VISIT (OUTPATIENT)
Dept: FAMILY MEDICINE CLINIC | Age: 58
End: 2018-11-16
Payer: COMMERCIAL

## 2018-11-16 VITALS
RESPIRATION RATE: 20 BRPM | OXYGEN SATURATION: 98 % | WEIGHT: 173 LBS | TEMPERATURE: 99 F | HEART RATE: 82 BPM | HEIGHT: 60 IN | DIASTOLIC BLOOD PRESSURE: 66 MMHG | SYSTOLIC BLOOD PRESSURE: 124 MMHG | BODY MASS INDEX: 33.96 KG/M2

## 2018-11-16 DIAGNOSIS — R71.0 DECREASED HEMOGLOBIN: Primary | ICD-10-CM

## 2018-11-16 DIAGNOSIS — R19.7 DIARRHEA, UNSPECIFIED TYPE: ICD-10-CM

## 2018-11-16 DIAGNOSIS — J32.0 CHRONIC MAXILLARY SINUSITIS: ICD-10-CM

## 2018-11-16 DIAGNOSIS — R19.7 DIARRHEA, UNSPECIFIED TYPE: Primary | ICD-10-CM

## 2018-11-16 LAB
ALBUMIN SERPL-MCNC: 3.8 G/DL (ref 3.5–5.2)
ALP BLD-CCNC: 84 U/L (ref 35–104)
ALT SERPL-CCNC: 14 U/L (ref 5–33)
ANION GAP SERPL CALCULATED.3IONS-SCNC: 16 MMOL/L (ref 7–19)
AST SERPL-CCNC: 12 U/L (ref 5–32)
BASOPHILS ABSOLUTE: 0.1 K/UL (ref 0–0.2)
BASOPHILS RELATIVE PERCENT: 0.6 % (ref 0–1)
BILIRUB SERPL-MCNC: <0.2 MG/DL (ref 0.2–1.2)
BUN BLDV-MCNC: 17 MG/DL (ref 6–20)
CALCIUM SERPL-MCNC: 9.8 MG/DL (ref 8.6–10)
CHLORIDE BLD-SCNC: 98 MMOL/L (ref 98–111)
CO2: 29 MMOL/L (ref 22–29)
CREAT SERPL-MCNC: 0.7 MG/DL (ref 0.5–0.9)
EOSINOPHILS ABSOLUTE: 0.3 K/UL (ref 0–0.6)
EOSINOPHILS RELATIVE PERCENT: 3.3 % (ref 0–5)
GFR NON-AFRICAN AMERICAN: >60
GLUCOSE BLD-MCNC: 88 MG/DL (ref 74–109)
HCT VFR BLD CALC: 37.6 % (ref 37–47)
HEMOGLOBIN: 11.5 G/DL (ref 12–16)
LYMPHOCYTES ABSOLUTE: 3.8 K/UL (ref 1.1–4.5)
LYMPHOCYTES RELATIVE PERCENT: 36.9 % (ref 20–40)
MCH RBC QN AUTO: 28.7 PG (ref 27–31)
MCHC RBC AUTO-ENTMCNC: 30.6 G/DL (ref 33–37)
MCV RBC AUTO: 93.8 FL (ref 81–99)
MONOCYTES ABSOLUTE: 0.6 K/UL (ref 0–0.9)
MONOCYTES RELATIVE PERCENT: 6.2 % (ref 0–10)
NEUTROPHILS ABSOLUTE: 5.4 K/UL (ref 1.5–7.5)
NEUTROPHILS RELATIVE PERCENT: 51.9 % (ref 50–65)
PDW BLD-RTO: 14.6 % (ref 11.5–14.5)
PLATELET # BLD: 323 K/UL (ref 130–400)
PMV BLD AUTO: 10.8 FL (ref 9.4–12.3)
POTASSIUM SERPL-SCNC: 4.2 MMOL/L (ref 3.5–5)
RBC # BLD: 4.01 M/UL (ref 4.2–5.4)
SODIUM BLD-SCNC: 143 MMOL/L (ref 136–145)
TOTAL PROTEIN: 6.8 G/DL (ref 6.6–8.7)
WBC # BLD: 10.3 K/UL (ref 4.8–10.8)

## 2018-11-16 PROCEDURE — 99214 OFFICE O/P EST MOD 30 MIN: CPT | Performed by: NURSE PRACTITIONER

## 2018-11-16 RX ORDER — DOXYCYCLINE HYCLATE 100 MG
100 TABLET ORAL 2 TIMES DAILY
Qty: 14 TABLET | Refills: 0 | Status: SHIPPED | OUTPATIENT
Start: 2018-11-16 | End: 2018-11-23

## 2018-11-16 RX ORDER — SPIRONOLACTONE 25 MG/1
25 TABLET ORAL DAILY
Qty: 30 TABLET | Refills: 0 | Status: SHIPPED | OUTPATIENT
Start: 2018-11-16 | End: 2018-11-16 | Stop reason: SDUPTHER

## 2018-11-16 RX ORDER — SPIRONOLACTONE 25 MG/1
25 TABLET ORAL DAILY
Qty: 90 TABLET | Refills: 1 | Status: SHIPPED | OUTPATIENT
Start: 2018-11-16 | End: 2021-01-08 | Stop reason: SDUPTHER

## 2018-11-16 RX ORDER — SPIRONOLACTONE 25 MG/1
25 TABLET ORAL DAILY
Qty: 90 TABLET | Refills: 1 | Status: SHIPPED | OUTPATIENT
Start: 2018-11-16 | End: 2019-06-13 | Stop reason: SDUPTHER

## 2018-11-16 RX ORDER — SPIRONOLACTONE 25 MG/1
25 TABLET ORAL DAILY
Qty: 30 TABLET | Refills: 0 | Status: CANCELLED | OUTPATIENT
Start: 2018-11-16

## 2018-11-16 ASSESSMENT — ENCOUNTER SYMPTOMS
COUGH: 1
DIARRHEA: 1
SINUS PRESSURE: 1

## 2018-11-21 DIAGNOSIS — D64.9 LOW HEMOGLOBIN: Primary | ICD-10-CM

## 2018-11-26 ENCOUNTER — HOSPITAL ENCOUNTER (OUTPATIENT)
Dept: GENERAL RADIOLOGY | Age: 58
Discharge: HOME | End: 2018-11-26
Payer: COMMERCIAL

## 2018-11-26 DIAGNOSIS — J32.0 CHRONIC MAXILLARY SINUSITIS: ICD-10-CM

## 2018-11-26 PROCEDURE — 70486 CT MAXILLOFACIAL W/O DYE: CPT

## 2018-11-27 ENCOUNTER — NURSE ONLY (OUTPATIENT)
Dept: UROLOGY | Age: 58
End: 2018-11-27
Payer: COMMERCIAL

## 2018-11-27 VITALS — TEMPERATURE: 97.3 F

## 2018-11-27 DIAGNOSIS — N39.46 MIXED URGE AND STRESS INCONTINENCE: Primary | ICD-10-CM

## 2018-11-27 PROCEDURE — 64566 NEUROELTRD STIM POST TIBIAL: CPT | Performed by: PHYSICIAN ASSISTANT

## 2018-11-27 PROCEDURE — 99999 PR OFFICE/OUTPT VISIT,PROCEDURE ONLY: CPT | Performed by: PHYSICIAN ASSISTANT

## 2018-11-27 ASSESSMENT — ENCOUNTER SYMPTOMS
DOUBLE VISION: 0
SHORTNESS OF BREATH: 0
SORE THROAT: 0
WHEEZING: 0
BLURRED VISION: 0
HEARTBURN: 0
NAUSEA: 0

## 2018-11-27 NOTE — PROGRESS NOTES
97.3 °F (36.3 °C) (Temporal)   Physical Exam   Constitutional: No distress. HENT:   Mouth/Throat: No oropharyngeal exudate. Eyes: Left eye exhibits no discharge. No scleral icterus. Neck: No JVD present. No tracheal deviation present. No thyromegaly present. Cardiovascular: Exam reveals no gallop and no friction rub. Pulmonary/Chest: No stridor. She has no rales. Abdominal: She exhibits no distension and no mass. There is no rebound and no guarding. Musculoskeletal: She exhibits no edema. Neurological: No cranial nerve deficit. She exhibits normal muscle tone. Coordination normal.   Skin: She is not diaphoretic. No pallor. DATA:  U/A:    Lab Results   Component Value Date    NITRITE neg 05/31/2018    COLORU yellow 02/22/2018    COLORU DK YELLOW 02/14/2018    PROTEINU neg 05/31/2018    PROTEINU Negative 02/14/2018    PHUR 5.0 05/31/2018    PHUR 7.0 02/14/2018    WBCUA 9 06/18/2014    RBCUA 1 06/18/2014    BACTERIA NEGATIVE 06/18/2014    CLARITYU clear 02/22/2018    CLARITYU Clear 02/14/2018    SPECGRAV 1.015 05/31/2018    SPECGRAV 1.024 02/14/2018    LEUKOCYTESUR neg 05/31/2018    LEUKOCYTESUR Negative 02/14/2018    UROBILINOGEN 1.0 02/14/2018    BILIRUBINUR neg 05/31/2018    BLOODU neg 05/31/2018    BLOODU Negative 02/14/2018    GLUCOSEU neg 05/31/2018    GLUCOSEU Negative 02/14/2018       Procedure:  1. Patient is seated with the right treatment leg elevated. 2. 34 gauge fine needle electrode is inserted into lower inner aspect of the leg. Slightly cephalad to the medial malleolus. 3. Surface electrode pad is placed over the medial aspect of the calcaneus on the same leg. 4.Needle electrode is connected to the external pulse generator. 5.The pulse generator is turned on and the millivolts used are 7. 6.Patient is treated for 30 minutes. 7.The needle and pad are removed.            1. Mixed urge and stress incontinence  Follow-up In 1 month for next treatment she still has incontinence but thinks it is improved with the treatments. - [place] POST TIBIAL NEUROSTIMULATION,PERC NEEDLE ELECTRODE      No orders of the defined types were placed in this encounter. No orders of the defined types were placed in this encounter. Plan:  Follow-up 1 month for next treatment.

## 2018-11-30 ENCOUNTER — OFFICE VISIT (OUTPATIENT)
Dept: OTOLARYNGOLOGY | Age: 58
End: 2018-11-30
Payer: COMMERCIAL

## 2018-11-30 VITALS
DIASTOLIC BLOOD PRESSURE: 78 MMHG | BODY MASS INDEX: 33.96 KG/M2 | SYSTOLIC BLOOD PRESSURE: 124 MMHG | RESPIRATION RATE: 18 BRPM | WEIGHT: 173 LBS | HEIGHT: 60 IN | TEMPERATURE: 98.1 F | HEART RATE: 86 BPM | OXYGEN SATURATION: 98 %

## 2018-11-30 DIAGNOSIS — J34.89 NASAL SEPTAL PERFORATION: ICD-10-CM

## 2018-11-30 DIAGNOSIS — J32.0 CHRONIC LEFT MAXILLARY SINUSITIS: ICD-10-CM

## 2018-11-30 PROCEDURE — 31231 NASAL ENDOSCOPY DX: CPT | Performed by: OTOLARYNGOLOGY

## 2018-11-30 PROCEDURE — 99242 OFF/OP CONSLTJ NEW/EST SF 20: CPT | Performed by: OTOLARYNGOLOGY

## 2018-11-30 NOTE — PROGRESS NOTES
[] N  congestion [x] Y [] N   obstruction [x] Y [] N        Allergic/ immunologic:    [x] normal immune status    [] Y [] N  seasonal allergies  [] Y [] N  perennial allergies   allergy testing: [] Y [] N  [] pos [] neg    HIV risk   [] Y [] N      Throat:   [] denies all unless marked     pain [] Y [] N tonsillitis  [] Y [] N   bruxism  [] Y [] N halitosis [] Y [] N   loss of taste [] Y [] N hoarse  [] Y [] N   vocal difficulties [] Y [x] N globus/ lump in throat [] Y [] N   difficulty swallowing [] Y [] N painful swallowing  [] Y [] N   reflux [] Y [] N  phlegm [] Y [] N   throat clearing [] Y [] N      Neck:  [] denies all unless marked      lumps/ mass [] Y [x] N thyroid problem  [] Y [] N   enlarged thyroid [] Y [] N thyroid nodule  [] Y [] N   swollen glands [] Y [] N neck pain  [] Y [] N     Respiratory:   [] denies all unless marked    cough [] Y [] N sputum [] Y [] N   hemoptysis [] Y [] N pleurisy [] Y [] N   pneumonia or bronchitis [] Y [] N asthma  [] Y [] N   wheezing [] Y [] N dyspnea on exertion [] Y [] N   emphysema [] Y [] N  chronic bronchitis [] Y [] N   stridor [] Y [] N      Skin:  [] denies all unless marked     pigmentation [] Y [] N rash  [] Y [] N   scaling [] Y [] N itching [] Y [] N   bruising [] Y [] N lumps or bumps [] Y [] N   hair changes [] Y [] N nail changes [] Y [] N   suspicious lesions [] Y [] N dry [] Y [] N        Comments:     PHYSICAL EXAM:    Vitals:    11/30/18 1042   BP: 124/78   Pulse: 86   Resp: 18   Temp: 98.1 °F (36.7 °C)   SpO2: 98%     Body mass index is 33.79 kg/m².     General Appearance:   [x] well-developed and well-nourished     [] N [] Y  distress:  []mild    []moderate [] severe   [] frail-appearing [] cachectic   [] thin [] husky   [x] overweight [] obese   [] morbidly obese [] disheveled    [] pale    [] appears older than stated age [] appears younger than stated age   [] ambulatory [] wheel chair      Vocal Quanity:   [x] good/ normal     [] hyponasal [] Tubes: [] patent dry good position [] in position but occluded                      [] in position but extruding [] extruded tube in canal                      [] purulent discharge through tube [] granulation tissue     Hearing:   [x] grossly intact  [] diminished   Rinne A>B:  [] L   [] R Rinne A<B: [] L   []  R   Rinne A=B :  [] L   [] R   [] Clark M   [] Clark L [] Clark R   [] Air R=L  [] Air R>L   [] Air R<L [] see audiogram      Nose:    [] nares normal [x] septal perforation    [] septum midline [] septum deviated  [] L  [] R    mucosa       []normal [x] inflamed    [] active bleeding [] clotted blood    [x] N [] Y discharge: []clear []purulent [] mucoid [] sero-sanguinous [] serous     [] scant [] mild  []moderate   []copious        Turbinates: [] normal  [x] hypertrophic [] engorged [] pale [x] inflamed    [] bullous middle turbinate      mass [x] N  [] Y  [] L [] R  [] B    polyps [x] N [] Y  [] L [] R  [] B    sinus tenderness [] N  [] Y  [] L [] R  [] B    [x] See endoscopy report      Oral:   Lips: [x] normal    [] leision: [] upper [] lower [] s/p cleft repair   [] scarring        Teeth:    []good dentition  dentition: [] full  []sparce []caries  []malocclosion                  [] gingivitis   [] edentulous  [] maxilla [] mandible [] full                 [] gingival hypertrophy [] alveolar granuloma                 TMJ tender [] Y[]N [] torus mandibulatis                 []orthidontia [] extensive restoration/ vaneers       Palate: []normal      [] inflamed  [] petechiae   [] vesicles  [] ulcers   [] torus palatinus  [] elongated    Uvula []normal    [] elongated  [] hypotrophic  [] leision       Tongue: [x]normal     [] inflamed [] glassey   [] enlarged  [] black hairy   [] geographic [] leukoplakia   [] petechia [] ulcers   [] mass        Pharynx: [x]normal      [] inflamed   [] cobblestoned    [] postnasal discharge [] ulcers    [] mass    [] arch adequate [] arch narrowed       Tonsils: [] normal coordination and gait    [] confusion      [] anxious   rhonberg [] pos [] neg    finger- nose: [] NL  [] ABNL     Psych/ mood:     [x] co-oporative    []agitated     [] no depression, anxiety or agitated  [] flat affect       [] depressed      [] labile   [] inappropriate affect    [] anxious     Eyes:     [] no gross abnormalities     [] conjunctivitis     [] sclera icteric     [] strabismus     [] proptosis   [] Y [] N  nystagmus []  R []  L    Respiratory:    [] not examined     [] no rales, rhonchi, or wheezing   [] rales    [] rhonchi      [] wheezing     [] stritor [] Y [] N    Cardiovascular:     [] not examined     []regular rate and rhythm    Murmur [] Y [] N    [] strong carotid pulse     [] weak carotid pulse     carotid bruit [] Y [] N      Assessment & Plan:    Problem List Items Addressed This Visit     Nasal septal perforation     Likely postsurgical but patient unaware that she has a perforation and impossible for me to know given her history of prior surgery. There is inflammation and adhesions to the middle turbinates which compromises her nasal airway. This is likely contributing factor in her chronic nasal obstruction. Relevant Orders    [place] Nasal endoscopy, diagnostic (Completed)    Chronic left maxillary sinusitis     I reviewed her CT scan and basically her other sinuses are clear. She appears to have polypoid mucosa obstructing the ostiomeatal complex at the left maxillary sinus and some mild mucoperiosteal thickening. She was tender over the left maxillary sinus. She underwent nasal surgery in the past for symptoms of nasal obstruction by Dr. Russell Balderrama. It does not appear she had any type of sinus surgery. It is likely that some of the scarring that has occurred postoperatively has occluded the sinus outflow on the left side. For now I recommended nasal irrigations.  It does not sound like she had a bad experience with Dr. Russell Balderrama therefore with her history I feel is her

## 2018-12-14 ENCOUNTER — OFFICE VISIT (OUTPATIENT)
Dept: FAMILY MEDICINE CLINIC | Age: 58
End: 2018-12-14
Payer: COMMERCIAL

## 2018-12-14 ENCOUNTER — TELEPHONE (OUTPATIENT)
Dept: FAMILY MEDICINE CLINIC | Age: 58
End: 2018-12-14

## 2018-12-14 VITALS
RESPIRATION RATE: 20 BRPM | OXYGEN SATURATION: 98 % | SYSTOLIC BLOOD PRESSURE: 110 MMHG | HEART RATE: 93 BPM | DIASTOLIC BLOOD PRESSURE: 60 MMHG | WEIGHT: 176 LBS | TEMPERATURE: 98.3 F | BODY MASS INDEX: 34.37 KG/M2

## 2018-12-14 DIAGNOSIS — Z79.899 MEDICATION MANAGEMENT: ICD-10-CM

## 2018-12-14 DIAGNOSIS — R09.89 CHRONIC SINUS COMPLAINTS: Primary | ICD-10-CM

## 2018-12-14 PROCEDURE — 99213 OFFICE O/P EST LOW 20 MIN: CPT | Performed by: NURSE PRACTITIONER

## 2018-12-14 RX ORDER — DOXYCYCLINE HYCLATE 100 MG
100 TABLET ORAL 2 TIMES DAILY
Qty: 28 TABLET | Refills: 0 | Status: SHIPPED | OUTPATIENT
Start: 2018-12-14 | End: 2019-05-15 | Stop reason: SDUPTHER

## 2018-12-14 ASSESSMENT — ENCOUNTER SYMPTOMS
COUGH: 0
SINUS PAIN: 1
SINUS PRESSURE: 1

## 2018-12-14 NOTE — PROGRESS NOTES
pilocarpine (SALAGEN) 5 MG tablet Take 5 mg by mouth 3 times daily Yes Historical Provider, MD   Vitamin D (CHOLECALCIFEROL) 1000 UNITS CAPS capsule Take 1,000 Units by mouth daily Yes Historical Provider, MD   Multiple Vitamins-Minerals (MULTIVITAMIN PO) Take by mouth daily Yes Historical Provider, MD   DULoxetine (CYMBALTA) 60 MG capsule Take 60 mg by mouth 2 times daily  Yes Historical Provider, MD   traMADol (ULTRAM) 50 MG tablet Take 100 mg by mouth every 12 hours  Yes Historical Provider, MD   calcium carbonate 600 MG TABS tablet Take 1 tablet by mouth daily. Yes Historical Provider, MD   divalproex (DEPAKOTE) 500 MG ER tablet Take 1,000 mg by mouth nightly.  Yes Historical Provider, MD     Allergies   Allergen Reactions    Hctz [Hydrochlorothiazide] Other (See Comments)     Acid reflux       Past Surgical History:   Procedure Laterality Date    CARPAL TUNNEL RELEASE Bilateral 2002    CHOLECYSTECTOMY  10/2014    COLON SURGERY      COLON SURGERY      biopsy     COLONOSCOPY      CYST REMOVAL  05/2017    Under left ear    HARDWARE REMOVAL Right 2011    Foot     HERNIA REPAIR  10/2014    NOSE SURGERY  08/01/2017    Dr Tawanna Crook L/S,1 LEVEL Bilateral 2/7/2017    INJECTION MEDICATION FACET JOINT performed by Marizol Power at [facility]     Family History   Problem Relation Age of Onset    Cancer Mother         lung    Heart Disease Mother     Hypertension Mother     Bipolar Disorder Mother     Heart Disease Father     Hypertension Father      Social History     Social History    Marital status:      Spouse name: Tao Pierre Number of children: 2    Years of education: some college     Occupational History     [employer]     Social History Main Topics    Smoking status: Current Every Day Smoker     Packs/day: 1.00     Years: 30.00     Types: Cigarettes     Last attempt to quit: 7/20/2003    Smokeless tobacco: Never Used      Comment: Pt not interested at this time in

## 2018-12-14 NOTE — TELEPHONE ENCOUNTER
I have potassium as daily on med list.  However, pt reported that she was taking Bumex only on MWF. If she is only doing potassium on those days, then when increasing bumex to 1/2 dose on TTSS, she needs to add potassium at 1/2 dose on those days as well. She may be recalling that I said recheck lab in 1wk.

## 2018-12-20 DIAGNOSIS — E79.0 ELEVATED URIC ACID IN BLOOD: ICD-10-CM

## 2018-12-20 DIAGNOSIS — R73.9 HYPERGLYCEMIA: ICD-10-CM

## 2018-12-20 DIAGNOSIS — D64.9 LOW HEMOGLOBIN: ICD-10-CM

## 2018-12-20 DIAGNOSIS — Z79.899 MEDICATION MANAGEMENT: ICD-10-CM

## 2018-12-20 LAB
ANION GAP SERPL CALCULATED.3IONS-SCNC: 14 MMOL/L (ref 7–19)
BASOPHILS ABSOLUTE: 0.1 K/UL (ref 0–0.2)
BASOPHILS RELATIVE PERCENT: 0.5 % (ref 0–1)
BUN BLDV-MCNC: 24 MG/DL (ref 6–20)
CALCIUM SERPL-MCNC: 9.7 MG/DL (ref 8.6–10)
CHLORIDE BLD-SCNC: 97 MMOL/L (ref 98–111)
CO2: 30 MMOL/L (ref 22–29)
CREAT SERPL-MCNC: 1 MG/DL (ref 0.5–0.9)
EOSINOPHILS ABSOLUTE: 0.2 K/UL (ref 0–0.6)
EOSINOPHILS RELATIVE PERCENT: 2.4 % (ref 0–5)
GFR NON-AFRICAN AMERICAN: 57
GLUCOSE BLD-MCNC: 119 MG/DL (ref 74–109)
HBA1C MFR BLD: 5.9 % (ref 4–6)
HCT VFR BLD CALC: 38.1 % (ref 37–47)
HEMOGLOBIN: 11.8 G/DL (ref 12–16)
LYMPHOCYTES ABSOLUTE: 3.8 K/UL (ref 1.1–4.5)
LYMPHOCYTES RELATIVE PERCENT: 37.7 % (ref 20–40)
MCH RBC QN AUTO: 28.6 PG (ref 27–31)
MCHC RBC AUTO-ENTMCNC: 31 G/DL (ref 33–37)
MCV RBC AUTO: 92.3 FL (ref 81–99)
MONOCYTES ABSOLUTE: 0.6 K/UL (ref 0–0.9)
MONOCYTES RELATIVE PERCENT: 6.3 % (ref 0–10)
NEUTROPHILS ABSOLUTE: 5.3 K/UL (ref 1.5–7.5)
NEUTROPHILS RELATIVE PERCENT: 52.3 % (ref 50–65)
PDW BLD-RTO: 14.1 % (ref 11.5–14.5)
PLATELET # BLD: 411 K/UL (ref 130–400)
PMV BLD AUTO: 10 FL (ref 9.4–12.3)
POTASSIUM SERPL-SCNC: 4.2 MMOL/L (ref 3.5–5)
RBC # BLD: 4.13 M/UL (ref 4.2–5.4)
SODIUM BLD-SCNC: 141 MMOL/L (ref 136–145)
URIC ACID, SERUM: 5.6 MG/DL (ref 2.4–5.7)
WBC # BLD: 10.1 K/UL (ref 4.8–10.8)

## 2019-01-02 ENCOUNTER — NURSE ONLY (OUTPATIENT)
Dept: UROLOGY | Age: 59
End: 2019-01-02
Payer: COMMERCIAL

## 2019-01-02 VITALS — TEMPERATURE: 96.3 F

## 2019-01-02 DIAGNOSIS — N39.46 MIXED URGE AND STRESS INCONTINENCE: Primary | ICD-10-CM

## 2019-01-02 PROCEDURE — 64566 NEUROELTRD STIM POST TIBIAL: CPT | Performed by: PHYSICIAN ASSISTANT

## 2019-01-02 PROCEDURE — 99999 PR OFFICE/OUTPT VISIT,PROCEDURE ONLY: CPT | Performed by: PHYSICIAN ASSISTANT

## 2019-01-02 ASSESSMENT — ENCOUNTER SYMPTOMS
NAUSEA: 0
DOUBLE VISION: 0
SORE THROAT: 0
BLURRED VISION: 0
HEARTBURN: 0
SHORTNESS OF BREATH: 0
WHEEZING: 0

## 2019-01-07 ENCOUNTER — OFFICE VISIT (OUTPATIENT)
Dept: FAMILY MEDICINE CLINIC | Age: 59
End: 2019-01-07
Payer: COMMERCIAL

## 2019-01-07 VITALS
OXYGEN SATURATION: 98 % | HEART RATE: 92 BPM | DIASTOLIC BLOOD PRESSURE: 78 MMHG | WEIGHT: 173 LBS | BODY MASS INDEX: 33.79 KG/M2 | RESPIRATION RATE: 20 BRPM | TEMPERATURE: 98.2 F | SYSTOLIC BLOOD PRESSURE: 128 MMHG

## 2019-01-07 DIAGNOSIS — J32.9 CHRONIC SINUSITIS, UNSPECIFIED LOCATION: Primary | ICD-10-CM

## 2019-01-07 PROCEDURE — 99213 OFFICE O/P EST LOW 20 MIN: CPT | Performed by: NURSE PRACTITIONER

## 2019-01-07 RX ORDER — DOXYCYCLINE HYCLATE 100 MG
100 TABLET ORAL 2 TIMES DAILY
Qty: 14 TABLET | Refills: 0 | Status: SHIPPED | OUTPATIENT
Start: 2019-01-07 | End: 2019-01-14

## 2019-01-07 ASSESSMENT — ENCOUNTER SYMPTOMS: SINUS PRESSURE: 1

## 2019-01-14 ENCOUNTER — OFFICE VISIT (OUTPATIENT)
Dept: OTOLARYNGOLOGY | Facility: CLINIC | Age: 59
End: 2019-01-14

## 2019-01-14 VITALS
DIASTOLIC BLOOD PRESSURE: 82 MMHG | TEMPERATURE: 97.7 F | BODY MASS INDEX: 33.21 KG/M2 | SYSTOLIC BLOOD PRESSURE: 118 MMHG | HEART RATE: 81 BPM | WEIGHT: 169.13 LBS | HEIGHT: 60 IN

## 2019-01-14 DIAGNOSIS — J34.89 NASAL VESTIBULITIS: ICD-10-CM

## 2019-01-14 DIAGNOSIS — J34.89 NASAL SEPTAL PERFORATION: ICD-10-CM

## 2019-01-14 DIAGNOSIS — M90.80 WEGNER'S DISEASE (CONGENITAL SYPHILITIC OSTEOCHONDRITIS): Primary | ICD-10-CM

## 2019-01-14 DIAGNOSIS — A50.02 WEGNER'S DISEASE (CONGENITAL SYPHILITIC OSTEOCHONDRITIS): Primary | ICD-10-CM

## 2019-01-14 PROCEDURE — 99214 OFFICE O/P EST MOD 30 MIN: CPT | Performed by: NURSE PRACTITIONER

## 2019-01-14 PROCEDURE — 31231 NASAL ENDOSCOPY DX: CPT | Performed by: NURSE PRACTITIONER

## 2019-01-14 RX ORDER — AZELASTINE HYDROCHLORIDE 137 UG/1
SPRAY, METERED NASAL
Refills: 3 | COMMUNITY
Start: 2018-10-19 | End: 2019-04-09

## 2019-01-14 RX ORDER — DOXYCYCLINE HYCLATE 100 MG
100 TABLET ORAL
COMMUNITY
Start: 2019-01-07 | End: 2019-01-14

## 2019-01-14 NOTE — PATIENT INSTRUCTIONS
Add nasal moisture, bactroban, saline rinses    Obtain her images from [facility] on CD    Call for problems or worsening symptoms    Do not pick scabbing    Dr Anderson has seen and evaluated

## 2019-01-14 NOTE — PROGRESS NOTES
YOB: 1960  Location: [facility]  Location Address: [address] [place] [address],  [address], [address] [place] [address]  Location Phone: [phone]    Chief Complaint   Patient presents with   • Sinus Problem       History of Present Illness  Robyn Minaya is a 58 y.o. female.  Robyn Minaya is here for follow up of ENT complaints. The patient has had problems with nasal congestion, nasal obstruction and sinus pressure  The symptoms are not localized to a particular location. The patient has had moderate symptoms. The symptoms have been present for the last several months The symptoms are aggravated by  no identifiable factors. The symptoms are improved by no identifiable factors.  Hx of nasal surgery 17  1. Repair of nasal vestibular stenosis with  grafting, lateral crural batten grafts, and non-anatomic alar batten grafts    2.  Septoplasty    3.  Conchal cartilage graft from left ear to nose    4.  Bilateral inferior turbinate Coblation    She has recently had a reexacerbation of symptoms.  She was seen by Dr Urias at [facility] and referred back to us for followup evaluation.      November  CT SINUS WO [id number]/26/2018  [place], [place]  Result Impression   1. Diffuse mucosal thickening involving the right maxillary sinus and  extending into the maxillary sinus ostia and infundibulum with  obstruction.  2. Asymmetric soft tissue masslike prominence involving the right  nasal sinus extending minimally into the ethmoid sinus anteriorly, as  described above in detail. Chronic inflammatory changes considered,  and  Malignancy not excluded.    Signed by Dr Cristian Mercer on 2018 9:03 AM   Result Narrative   CT SINUS WO CONTRAST 2018 7:21 AM  HISTORY: J32.0 chronic maxillary sinus disease  COMPARISON: None.  TECHNIQUE:  Radiation dose equals  mGy cm. AUTOMATED EXPOSURE  CONTROL dose reduction technique was implemented.  Thin section axial images were obtained without intravenous  contrast.  Multi projection reconstruction images were generated.  FINDINGS:   There is mucosal thickening identified diffusely in the right  maxillary sinus extending superiorly to involve the maxillary sinus  ostia and infundibulum that appears obstructed  The left maxillary sinuses clear without significant wall thickening.  The maxillary sinus ostium and infundibulum are unobstructed.  The frontal sinuses are hypoplastic with mild frontal sinus  development appreciated on the left.  There is mucosal thickening observed in the ethmoid sinuses,  particularly anteriorly.  There is mucosal thickening and soft tissue prominence identified in  the nasal cavity particularly the right side extends along the nasal  septum superiorly again asymmetrically greater on the right. This soft  tissue prominence is observed along the anterior portion of the nasal  septum and extends along the right side of the superior nasal cavity  anteriorly with mild soft tissue irregularity and underlying mass not  excluded.. Correlate with physical findings. ENT consultation  recommended as clinically indicated.  The right sided nasal cavity soft tissue prominence extends inferiorly  along the septum to involve the inferior turbinate with inferior  turbinate hypertrophy.  The sphenoid sinus is clear. Hypoplastic frontal sinuses clear.  There is right-sided nasal septal deviation cartilaginous spur.  Mild erosive changes of the anterior nasal septum and the inferior  right medial turbinate questioned with bony irregularity. Again  correlate with physical examination and clinical findings.  Again these findings may represent changes from chronic nasal sinus  disease. Underlying malignancy not excluded.   Other Result Information   [facility] Incoming Radiology Results From [facility] - 11/26/2018  8:05 AM CST  CT SINUS WO CONTRAST 11/26/2018 7:21 AM  HISTORY: J32.0 chronic maxillary sinus disease  COMPARISON: None.  TECHNIQUE:  Radiation dose  equals  mGy cm. AUTOMATED EXPOSURE  CONTROL dose reduction technique was implemented.  Thin section axial images were obtained without intravenous contrast.  Multi projection reconstruction images were generated.  FINDINGS:   There is mucosal thickening identified diffusely in the right  maxillary sinus extending superiorly to involve the maxillary sinus  ostia and infundibulum that appears obstructed  The left maxillary sinuses clear without significant wall thickening.  The maxillary sinus ostium and infundibulum are unobstructed.  The frontal sinuses are hypoplastic with mild frontal sinus  development appreciated on the left.  There is mucosal thickening observed in the ethmoid sinuses,  particularly anteriorly.  There is mucosal thickening and soft tissue prominence identified in  the nasal cavity particularly the right side extends along the nasal  septum superiorly again asymmetrically greater on the right. This soft  tissue prominence is observed along the anterior portion of the nasal  septum and extends along the right side of the superior nasal cavity  anteriorly with mild soft tissue irregularity and underlying mass not  excluded.. Correlate with physical findings. ENT consultation  recommended as clinically indicated.  The right sided nasal cavity soft tissue prominence extends inferiorly  along the septum to involve the inferior turbinate with inferior  turbinate hypertrophy.  The sphenoid sinus is clear. Hypoplastic frontal sinuses clear.  There is right-sided nasal septal deviation cartilaginous spur.  Mild erosive changes of the anterior nasal septum and the inferior  right medial turbinate questioned with bony irregularity. Again  correlate with physical examination and clinical findings.  Again these findings may represent changes from chronic nasal sinus  disease. Underlying malignancy not excluded.  IMPRESSION:  1. Diffuse mucosal thickening involving the right maxillary sinus  and  extending into the maxillary sinus ostia and infundibulum with  obstruction.  2. Asymmetric soft tissue masslike prominence involving the right  nasal sinus extending minimally into the ethmoid sinus anteriorly, as  described above in detail. Chronic inflammatory changes considered,  and  Malignancy not excluded.    Signed by Dr Cristian Mercer on 11/26/2018 9:03 AM              Past Medical History:   Diagnosis Date   • Acid reflux     RESOLVED PT PT   • Allergic rhinitis    • Arthritis     BACK   • Bipolar 1 disorder (CMS/HCC)    • Deviated nasal septum    • Disease of thyroid gland    • Gout    • Hypertension    • Hypertrophy of nasal turbinates    • Hypokalemia    • Hypothyroidism    • Nasal septal deviation    • Nasal valve stenosis    • Spinal headache        Past Surgical History:   Procedure Laterality Date   • CARPAL TUNNEL RELEASE Bilateral 2004   • CHOLECYSTECTOMY  2013   • CYST REMOVAL  2016    neck   • FOOT SURGERY Right 2010    X3   • LASER ABLATION      right back   • NASAL VESTIBULE LESION/PAPILLOMA EXCISION N/A 8/1/2017    Procedure: Repair of nasal vestibular stenosis and septoplasty; cartilage graft from left ear;  Surgeon: Mark Anthony Anderson MD;  Location: [facility] OR;  Service:        Outpatient Medications Marked as Taking for the 1/14/19 encounter (Office Visit) with Zelda Callaway APRN   Medication Sig Dispense Refill   • allopurinol (ZYLOPRIM) 100 MG tablet Take 100 mg by mouth Daily.     • atorvastatin (LIPITOR) 40 MG tablet Take  by mouth.     • Azelastine HCl 137 MCG/SPRAY solution   3   • bumetanide (BUMEX) 2 MG tablet Take  by mouth.     • calcium carbonate (OS-KAYLEY) 600 MG tablet Take 600 mg by mouth Daily.     • carvedilol (COREG) 6.25 MG tablet Take 6.25 mg by mouth 2 (Two) Times a Day With Meals.     • cholecalciferol (VITAMIN D3) 1000 UNITS tablet Take 1,000 Units by mouth Daily.     • desonide (DESOWEN) 0.05 % cream      • divalproex (DEPAKOTE) 500 MG 24 hr tablet Take 1,000 mg by  mouth.     • doxycycline (VIBRAMYICN) 100 MG tablet Take 100 mg by mouth.     • DULoxetine (CYMBALTA) 60 MG capsule Take 60 mg by mouth 2 (Two) Times a Day.     • gabapentin (NEURONTIN) 600 MG tablet Take 600 mg by mouth 4 (Four) Times a Day Before Meals & at Bedtime.     • levothyroxine (SYNTHROID, LEVOTHROID) 100 MCG tablet Take  by mouth.     • metFORMIN (GLUCOPHAGE) 500 MG tablet Take 500 mg by mouth.     • Multiple Vitamin (MULTI VITAMIN PO) Take 1 dose by mouth Daily.     • pilocarpine (SALAGEN) 5 MG tablet Take 5 mg by mouth 3 (Three) Times a Day.     • Potassium 99 MG tablet Take  by mouth.     • potassium chloride (K-DUR,KLOR-CON) 20 MEQ CR tablet Take  by mouth.     • pramipexole (MIRAPEX) 1.5 MG tablet Take 1.5 mg by mouth Every Night.     • QUEtiapine (SEROquel) 50 MG tablet Take 50 mg by mouth Every Night.     • spironolactone (ALDACTONE) 50 MG tablet Take  by mouth.     • tiZANidine (ZANAFLEX) 4 MG tablet Take 4 mg by mouth Take As Directed. PT TAKES 1/2 4 MG TABLET IN AM AND AT NOON AND ONE 4 MG TABLET AT BEDTIME.     • traMADol (ULTRAM) 50 MG tablet Take 50 mg by mouth 2 (Two) Times a Day.     • trospium (SANCTURA) 20 MG tablet Take 20 mg by mouth.     • [DISCONTINUED] HYDROcodone-acetaminophen (NORCO) 7.5-325 MG per tablet Take 1 tablet by mouth Every 4 (Four) Hours As Needed for Moderate Pain (4-6) (Pain). 40 tablet 0       Hctz [hydrochlorothiazide]    Family History   Problem Relation Age of Onset   • Cancer Mother    • Diabetes Father    • Hypertension Father    • Cancer Father        Social History     Socioeconomic History   • Marital status:      Spouse name: Not on file   • Number of children: Not on file   • Years of education: Not on file   • Highest education level: Not on file   Social Needs   • Financial resource strain: Not on file   • Food insecurity - worry: Not on file   • Food insecurity - inability: Not on file   • Transportation needs - medical: Not on file   •  Transportation needs - non-medical: Not on file   Occupational History   • Not on file   Tobacco Use   • Smoking status: Current Every Day Smoker     Packs/day: 1.00     Types: Cigarettes   • Smokeless tobacco: Never Used   Substance and Sexual Activity   • Alcohol use: Yes     Comment: maybe 3 x yearly   • Drug use: No   • Sexual activity: Defer   Other Topics Concern   • Not on file   Social History Narrative   • Not on file       Review of Systems   Constitutional: Negative.    HENT:        SEE HPI   Eyes: Negative.    Respiratory: Negative.    Cardiovascular: Negative.    Gastrointestinal: Negative.    Endocrine: Negative.    Genitourinary: Negative.    Musculoskeletal: Negative.    Skin: Negative.    Allergic/Immunologic: Negative.    Neurological: Negative.    Hematological: Negative.    Psychiatric/Behavioral: Negative.        Vitals:    01/14/19 1337   BP: 118/82   Pulse: 81   Temp: 97.7 °F (36.5 °C)       Body mass index is 33.03 kg/m².    Objective     Physical Exam  CONSTITUTIONAL: well nourished, alert, oriented, in no acute distress     COMMUNICATION AND VOICE: able to communicate normally, normal voice quality    HEAD: normocephalic, no lesions, atraumatic, no tenderness, no masses     FACE: appearance normal, no lesions, no tenderness, no deformities, facial motion symmetric    SALIVARY GLANDS: parotid glands with no tenderness, no swelling, no masses, submandibular glands with normal size, nontender    EYES: ocular motility normal, eyelids normal, orbits normal, no proptosis, conjunctiva normal , pupils equal, round     EARS:  Hearing: response to conversational voice normal bilaterally   External Ears: auricles without lesions  Otoscopic: tympanic membrane appearance normal, no lesions, no perforation, normal mobility, no fluid    NOSE:  External Nose: mild nasal bridge collapse, mild stenosis right nare   Intranasal Exam: see scope    ORAL:  Lips: upper and lower lips without lesion   Teeth:  dentition within normal limits for age   Gums: gingivae healthy   Oral Mucosa: oral mucosa normal, no mucosal lesions   Floor of Mouth: Warthin’s duct patent, mucosa normal  Tongue: lingual mucosa normal without lesions, normal tongue mobility   Palate: soft and hard palates with normal mucosa and structure  Oropharynx: oropharyngeal mucosa normal    NECK: neck appearance normal, no mass,  noted without erythema or tenderness    LYMPH NODES: no lymphadenopathy    CHEST/RESPIRATORY: respiratory effort normal,     CARDIOVASCULAR:  extremities without cyanosis or edema      NEUROLOGIC/PSYCHIATRIC: oriented to time, place and person, mood normal, affect appropriate, CN II-XII intact grossly    Assessment/Plan   Robyn was seen today for sinus problem.    Diagnoses and all orders for this visit:    Marlene's disease (congenital syphilitic osteochondritis)  -     Anti-neutrophil Antibody; Future  -     Anti-neutrophilic Cytoplasmic Antibody; Future  -     Sedimentation Rate; Future  -     C-reactive Protein; Future  -     Rheumatoid Factor, Quant; Future    Nasal septal perforation    Nasal vestibulitis    Other orders  -     mupirocin (BACTROBAN) 2 % nasal ointment; Apply intranasally twice daily      * Surgery not found *  Orders Placed This Encounter   Procedures   • Anti-neutrophil Antibody     Standing Status:   Future     Standing Expiration Date:   1/14/2020   • Anti-neutrophilic Cytoplasmic Antibody     Standing Status:   Future     Standing Expiration Date:   1/14/2020   • Sedimentation Rate     Standing Status:   Future     Standing Expiration Date:   1/14/2020   • C-reactive Protein     Standing Status:   Future     Standing Expiration Date:   1/14/2020   • Rheumatoid Factor, Quant     Standing Status:   Future     Standing Expiration Date:   1/14/2020     Return in about 6 weeks (around 2/25/2019) for ballert.       Patient Instructions   Add nasal moisture, bactroban, saline rinses    Obtain her images from  [facility] on CD    Call for problems or worsening symptoms    Do not pick scabbing    Dr Anderson has seen and evaluated

## 2019-01-14 NOTE — PROGRESS NOTES
PROCEDURE NOTE      PROCEDURE:    Nasal Endoscopy  ANESTHESIA:  Topical Ponticaine and Afrin Spray   REASON FOR THE PROCEDURE:  Patient is status post recent nasal surgery with a need for evaluation of the surgical siteThe patient consented to operative intervention after a full discussion of risks, benefits, and alternatives. No guarantees were made or implied.   DETAILS OF THE OPERATION:  The patient was seated in the exam room chair. Nasal endoscopy with debridement was performed with the 0 degree scope through the nares . A 0 degree endoscope was introduced into the nasal cavity and gently directed to the deeper nasal cavity examining the following structures.   FINDINGS:  Mucosal Surfaces:  The mucosal surfaces demonstrated dryness with dried bloody crusting  Nasal Septum:  The nasal septum was found to have large anterior septal perforation with bloody crusting  Inferior Turbinates:  The inferior turbinates were found to have normal mucosa.   Middle Turbinates:  The middle turbinates were found to have normal mucosa.   Middle Meatus:  The middle meatus were found to have crusting  Sphenoethmoid Recess:  The sphenoethmoidal recess was not visualized  Nasopharynx:  The nasopharynx was not visualized due to crusting

## 2019-01-22 DIAGNOSIS — I10 ESSENTIAL HYPERTENSION: ICD-10-CM

## 2019-01-22 DIAGNOSIS — E79.0 ELEVATED URIC ACID IN BLOOD: ICD-10-CM

## 2019-01-22 DIAGNOSIS — E78.2 MIXED HYPERLIPIDEMIA: ICD-10-CM

## 2019-01-22 RX ORDER — ALLOPURINOL 100 MG/1
TABLET ORAL
Qty: 90 TABLET | Refills: 0 | Status: SHIPPED | OUTPATIENT
Start: 2019-01-22 | End: 2019-05-06 | Stop reason: SDUPTHER

## 2019-01-22 RX ORDER — ATORVASTATIN CALCIUM 40 MG/1
TABLET, FILM COATED ORAL
Qty: 90 TABLET | Refills: 0 | Status: SHIPPED | OUTPATIENT
Start: 2019-01-22 | End: 2019-05-06 | Stop reason: SDUPTHER

## 2019-01-23 RX ORDER — CARVEDILOL 6.25 MG/1
TABLET ORAL
Qty: 180 TABLET | Refills: 3 | Status: ON HOLD | OUTPATIENT
Start: 2019-01-23 | End: 2020-04-25 | Stop reason: HOSPADM

## 2019-01-24 RX ORDER — AMOXICILLIN AND CLAVULANATE POTASSIUM 875; 125 MG/1; MG/1
1 TABLET, FILM COATED ORAL EVERY 12 HOURS SCHEDULED
Qty: 20 TABLET | Refills: 0 | Status: SHIPPED | OUTPATIENT
Start: 2019-01-24 | End: 2019-02-03

## 2019-02-05 ENCOUNTER — NURSE ONLY (OUTPATIENT)
Dept: UROLOGY | Age: 59
End: 2019-02-05
Payer: COMMERCIAL

## 2019-02-05 VITALS — TEMPERATURE: 96.3 F

## 2019-02-05 DIAGNOSIS — N39.46 MIXED URGE AND STRESS INCONTINENCE: Primary | ICD-10-CM

## 2019-02-05 PROCEDURE — 64566 NEUROELTRD STIM POST TIBIAL: CPT | Performed by: PHYSICIAN ASSISTANT

## 2019-02-05 PROCEDURE — 99999 PR OFFICE/OUTPT VISIT,PROCEDURE ONLY: CPT | Performed by: PHYSICIAN ASSISTANT

## 2019-02-05 ASSESSMENT — ENCOUNTER SYMPTOMS
NAUSEA: 0
SORE THROAT: 0
DOUBLE VISION: 0
HEARTBURN: 0
BLURRED VISION: 0
SHORTNESS OF BREATH: 0
WHEEZING: 0

## 2019-02-13 ENCOUNTER — OFFICE VISIT (OUTPATIENT)
Dept: UROLOGY | Age: 59
End: 2019-02-13
Payer: COMMERCIAL

## 2019-02-13 DIAGNOSIS — N39.46 MIXED URGE AND STRESS INCONTINENCE: Primary | ICD-10-CM

## 2019-02-13 DIAGNOSIS — N39.46 MIXED INCONTINENCE URGE AND STRESS (MALE)(FEMALE): Primary | ICD-10-CM

## 2019-02-13 PROBLEM — M47.16 SPONDYLOSIS WITH MYELOPATHY, LUMBAR REGION: Status: ACTIVE | Noted: 2017-03-29

## 2019-02-13 PROBLEM — R60.9 EDEMA: Status: ACTIVE | Noted: 2019-02-13

## 2019-02-13 PROBLEM — Z87.59 HISTORY OF SPONTANEOUS ABORTION: Status: ACTIVE | Noted: 2019-02-13

## 2019-02-13 PROBLEM — K80.50 BILIARY COLIC: Status: ACTIVE | Noted: 2019-02-13

## 2019-02-13 PROBLEM — E78.5 HYPERLIPIDEMIA: Status: ACTIVE | Noted: 2019-02-13

## 2019-02-13 PROCEDURE — 99213 OFFICE O/P EST LOW 20 MIN: CPT | Performed by: PHYSICIAN ASSISTANT

## 2019-02-13 ASSESSMENT — ENCOUNTER SYMPTOMS
WHEEZING: 0
ABDOMINAL PAIN: 0
BACK PAIN: 0
EYE PAIN: 0
SHORTNESS OF BREATH: 0
VOMITING: 0
SINUS PAIN: 0

## 2019-02-19 LAB
C-REACTIVE PROTEIN: 0.33 MG/DL (ref 0–0.5)
RHEUMATOID FACTOR: <10 IU/ML
SEDIMENTATION RATE, ERYTHROCYTE: 12 MM/HR (ref 0–25)

## 2019-02-21 LAB — ANCA IFA: NORMAL

## 2019-03-11 ENCOUNTER — TELEPHONE (OUTPATIENT)
Dept: FAMILY MEDICINE CLINIC | Age: 59
End: 2019-03-11

## 2019-03-11 DIAGNOSIS — R19.7 DIARRHEA, UNSPECIFIED TYPE: Primary | ICD-10-CM

## 2019-03-11 DIAGNOSIS — R19.7 DIARRHEA, UNSPECIFIED TYPE: ICD-10-CM

## 2019-03-11 LAB
OCCULT BLOOD DIAGNOSTIC: NORMAL
OCCULT BLOOD QC: NORMAL

## 2019-03-14 DIAGNOSIS — K52.9 CHRONIC DIARRHEA: Primary | ICD-10-CM

## 2019-03-14 LAB
CULTURE, STOOL: NORMAL
E COLI SHIGA TOXIN ASSAY: NORMAL
H PYLORI ANTIGEN STOOL: POSITIVE
OVA AND PARASITE TRICHROME: NEGATIVE
OVA AND PARASITE WET MOUNT: NEGATIVE

## 2019-03-15 LAB
C DIFFICILE TOXIN, EIA: NORMAL
LACTOFERRIN, FECAL: NEGATIVE

## 2019-03-27 ENCOUNTER — OFFICE VISIT (OUTPATIENT)
Dept: OTOLARYNGOLOGY | Facility: CLINIC | Age: 59
End: 2019-03-27

## 2019-03-27 ENCOUNTER — TELEPHONE (OUTPATIENT)
Dept: FAMILY MEDICINE CLINIC | Age: 59
End: 2019-03-27

## 2019-03-27 VITALS
SYSTOLIC BLOOD PRESSURE: 124 MMHG | TEMPERATURE: 98 F | DIASTOLIC BLOOD PRESSURE: 76 MMHG | BODY MASS INDEX: 34.36 KG/M2 | HEIGHT: 60 IN | HEART RATE: 80 BPM | RESPIRATION RATE: 20 BRPM | WEIGHT: 175 LBS

## 2019-03-27 DIAGNOSIS — Z72.0 TOBACCO ABUSE DISORDER: ICD-10-CM

## 2019-03-27 DIAGNOSIS — J34.89 NASAL VALVE STENOSIS: ICD-10-CM

## 2019-03-27 DIAGNOSIS — J32.0 CHRONIC MAXILLARY SINUSITIS: ICD-10-CM

## 2019-03-27 DIAGNOSIS — J34.89 NASAL SEPTAL PERFORATION: Primary | ICD-10-CM

## 2019-03-27 DIAGNOSIS — Q30.9 ABNORMAL NASAL SEPTUM: ICD-10-CM

## 2019-03-27 PROCEDURE — 99214 OFFICE O/P EST MOD 30 MIN: CPT | Performed by: OTOLARYNGOLOGY

## 2019-03-27 RX ORDER — TETRACYCLINE HYDROCHLORIDE 500 MG/1
500 CAPSULE ORAL 4 TIMES DAILY
Qty: 40 CAPSULE | Refills: 0 | OUTPATIENT
Start: 2019-03-27 | End: 2019-06-13

## 2019-03-27 RX ORDER — BISMUTH SUBSALICYLATE 262 MG/1
524 TABLET, CHEWABLE ORAL 4 TIMES DAILY
Qty: 40 TABLET | Refills: 0 | Status: SHIPPED | OUTPATIENT
Start: 2019-03-27 | End: 2019-06-13

## 2019-03-27 RX ORDER — PREDNISOLONE ACETATE 10 MG/ML
SUSPENSION/ DROPS OPHTHALMIC
Refills: 0 | COMMUNITY
Start: 2019-03-25 | End: 2019-04-09

## 2019-03-27 RX ORDER — OMEPRAZOLE 20 MG/1
20 CAPSULE, DELAYED RELEASE ORAL
Qty: 20 CAPSULE | Refills: 0 | Status: SHIPPED | OUTPATIENT
Start: 2019-03-27

## 2019-03-27 RX ORDER — LANOLIN ALCOHOL/MO/W.PET/CERES
500 CREAM (GRAM) TOPICAL DAILY
COMMUNITY
End: 2020-11-20

## 2019-03-27 RX ORDER — TETRACYCLINE HYDROCHLORIDE 500 MG/1
500 CAPSULE ORAL 4 TIMES DAILY
Qty: 40 CAPSULE | Refills: 0 | Status: SHIPPED | OUTPATIENT
Start: 2019-03-27 | End: 2019-03-27 | Stop reason: SDUPTHER

## 2019-03-27 RX ORDER — OMEPRAZOLE 20 MG/1
20 CAPSULE, DELAYED RELEASE ORAL DAILY
COMMUNITY
Start: 2019-03-27

## 2019-03-27 RX ORDER — METRONIDAZOLE 250 MG/1
250 TABLET ORAL 4 TIMES DAILY
Qty: 40 TABLET | Refills: 0 | Status: SHIPPED | OUTPATIENT
Start: 2019-03-27 | End: 2019-04-06

## 2019-03-27 RX ORDER — GUAIFENESIN 600 MG/1
1200 TABLET, EXTENDED RELEASE ORAL 2 TIMES DAILY
COMMUNITY
Start: 2018-10-19 | End: 2020-11-20

## 2019-03-27 RX ORDER — LEVOTHYROXINE SODIUM 0.12 MG/1
100 TABLET ORAL DAILY
COMMUNITY
Start: 2019-03-13 | End: 2020-11-20

## 2019-03-27 NOTE — PROGRESS NOTES
PRIMARY CARE PROVIDER: Tena Hough APRN  REFERRING PROVIDER: No ref. provider found    Chief Complaint   Patient presents with   • Follow-up     Recurrent Sinus Infections       Subjective   History of Present Illness:  Robyn Minaya is a  58 y.o. female  who presents for follow up. She is s/p repair of nasal vestibular stenosis with  grafting, lateral crural batten grafts, non-anatomic alar batten grafts, septoplasty, conchal cartilage graft from the left ear to nose, and bilateral ITR on 8/1/17. The patient has had problems with nasal congestion, nasal obstruction, and sinus pressure. The symptoms are localized to both nasal cavities. The symptoms severity was described as: moderate. The symptoms have been: present for the last several months. She did well for approximately 1 year after her surgery.  She noticed increased L>R nasal obstruction and sinus drainage and facial pressure.  The pressure lasts most of the day.  It is mild-moderate and improves with antibiotics and possibly with steroids.  Nasal obstruction is 5/10 and worse on the left.  Nasal drainage is clear and thin - not worse with exertion.      She was evaluated with CT scan of the sinuses (11/26/18) on CD.    Review of Systems:  Review of Systems   Constitutional: Negative for chills and fever.   HENT: Positive for congestion, dental problem (Tooth decay secondary to dry mouth), ear pain, rhinorrhea and sinus pressure. Negative for ear discharge and postnasal drip.         Dry mouth   Eyes: Negative for visual disturbance.        Dry eyes   Musculoskeletal: Negative for neck pain.   Skin: Negative for rash.   Neurological: Positive for headaches. Negative for dizziness, facial asymmetry and light-headedness.   Hematological: Negative for adenopathy.   Psychiatric/Behavioral: Negative for agitation and behavioral problems.       Past History:  Past Medical History:   Diagnosis Date   • Acid reflux     RESOLVED PT PT   • Allergic rhinitis     • Arthritis     BACK   • Bipolar 1 disorder (CMS/HCC)    • Deviated nasal septum    • Disease of thyroid gland    • Gout    • Hypertension    • Hypertrophy of nasal turbinates    • Hypokalemia    • Hypothyroidism    • Nasal septal deviation    • Nasal valve stenosis    • Spinal headache      Past Surgical History:   Procedure Laterality Date   • CARPAL TUNNEL RELEASE Bilateral 2004   • CHOLECYSTECTOMY  2013   • CYST REMOVAL  2016    neck   • FOOT SURGERY Right 2010    X3   • LASER ABLATION      right back   • NASAL VESTIBULE LESION/PAPILLOMA EXCISION N/A 8/1/2017    Procedure: Repair of nasal vestibular stenosis and septoplasty; cartilage graft from left ear;  Surgeon: Mark Anthony Anderson MD;  Location: [facility] OR;  Service:      Family History   Problem Relation Age of Onset   • Cancer Mother    • Diabetes Father    • Hypertension Father    • Cancer Father      Social History     Tobacco Use   • Smoking status: Current Every Day Smoker     Packs/day: 1.00     Types: Cigarettes   • Smokeless tobacco: Never Used   Substance Use Topics   • Alcohol use: Yes     Comment: maybe 3 x yearly   • Drug use: No     Allergies:  Hctz [hydrochlorothiazide]    Current Outpatient Medications:   •  allopurinol (ZYLOPRIM) 100 MG tablet, Take 100 mg by mouth Daily., Disp: , Rfl:   •  atorvastatin (LIPITOR) 40 MG tablet, Take  by mouth., Disp: , Rfl:   •  Azelastine HCl 137 MCG/SPRAY solution, , Disp: , Rfl: 3  •  bumetanide (BUMEX) 2 MG tablet, Take  by mouth., Disp: , Rfl:   •  calcium carbonate (OS-KAYLEY) 600 MG tablet, Take 600 mg by mouth Daily., Disp: , Rfl:   •  carvedilol (COREG) 6.25 MG tablet, Take 6.25 mg by mouth 2 (Two) Times a Day With Meals., Disp: , Rfl:   •  cholecalciferol (VITAMIN D3) 1000 UNITS tablet, Take 1,000 Units by mouth Daily., Disp: , Rfl:   •  desonide (DESOWEN) 0.05 % cream, , Disp: , Rfl:   •  divalproex (DEPAKOTE) 500 MG 24 hr tablet, Take 1,000 mg by mouth., Disp: , Rfl:   •  DULoxetine (CYMBALTA) 60 MG  capsule, Take 60 mg by mouth 2 (Two) Times a Day., Disp: , Rfl:   •  gabapentin (NEURONTIN) 600 MG tablet, Take 600 mg by mouth 4 (Four) Times a Day Before Meals & at Bedtime., Disp: , Rfl:   •  guaiFENesin (MUCINEX) 600 MG 12 hr tablet, Take 1,200 mg by mouth., Disp: , Rfl:   •  levothyroxine (SYNTHROID, LEVOTHROID) 100 MCG tablet, Take  by mouth., Disp: , Rfl:   •  levothyroxine (SYNTHROID, LEVOTHROID) 125 MCG tablet, , Disp: , Rfl:   •  metFORMIN (GLUCOPHAGE) 500 MG tablet, Take 500 mg by mouth., Disp: , Rfl:   •  Multiple Vitamin (MULTI VITAMIN PO), Take 1 dose by mouth Daily., Disp: , Rfl:   •  mupirocin (BACTROBAN) 2 % nasal ointment, Apply intranasally twice daily, Disp: 3 g, Rfl: 3  •  niacin (SLO-NIACIN) 500 MG CR tablet, Take 500 mg by mouth., Disp: , Rfl:   •  omeprazole (priLOSEC) 20 MG capsule, Take 20 mg by mouth., Disp: , Rfl:   •  pilocarpine (SALAGEN) 5 MG tablet, Take 5 mg by mouth 3 (Three) Times a Day., Disp: , Rfl:   •  Potassium 99 MG tablet, Take  by mouth., Disp: , Rfl:   •  potassium chloride (K-DUR,KLOR-CON) 20 MEQ CR tablet, Take  by mouth., Disp: , Rfl:   •  pramipexole (MIRAPEX) 1.5 MG tablet, Take 1.5 mg by mouth Every Night., Disp: , Rfl:   •  prednisoLONE acetate (PRED FORTE) 1 % ophthalmic suspension, , Disp: , Rfl: 0  •  QUEtiapine (SEROquel) 50 MG tablet, Take 50 mg by mouth Every Night., Disp: , Rfl:   •  spironolactone (ALDACTONE) 50 MG tablet, Take  by mouth., Disp: , Rfl:   •  tiZANidine (ZANAFLEX) 4 MG tablet, Take 4 mg by mouth Take As Directed. PT TAKES 1/2 4 MG TABLET IN AM AND AT NOON AND ONE 4 MG TABLET AT BEDTIME., Disp: , Rfl:   •  traMADol (ULTRAM) 50 MG tablet, Take 50 mg by mouth 2 (Two) Times a Day., Disp: , Rfl:   •  trospium (SANCTURA) 20 MG tablet, Take 20 mg by mouth., Disp: , Rfl:       Objective     Vital Signs:  Temp:  [98 °F (36.7 °C)] 98 °F (36.7 °C)  Heart Rate:  [80] 80  Resp:  [20] 20  BP: (124)/(76) 124/76    Physical Exam:  Physical Exam    Constitutional: She is oriented to person, place, and time. She appears well-developed and well-nourished. She is cooperative. No distress.   HENT:   Head: Normocephalic and atraumatic.   Right Ear: Hearing, tympanic membrane, external ear and ear canal normal.   Left Ear: Hearing, tympanic membrane, external ear and ear canal normal.   Nose:       Mouth/Throat: Uvula is midline and oropharynx is clear and moist. She has dentures.   Upper denture removed - no palatal defect.   Eyes: Conjunctivae and EOM are normal. Pupils are equal, round, and reactive to light. Right eye exhibits no discharge. Left eye exhibits no discharge. No scleral icterus.   Neck: Normal range of motion. Neck supple. No JVD present. No tracheal deviation present. No thyromegaly present.   Pulmonary/Chest: Effort normal. No stridor.   Musculoskeletal: Normal range of motion. She exhibits no edema or deformity.   Lymphadenopathy:     She has no cervical adenopathy.   Neurological: She is alert and oriented to person, place, and time. She has normal strength. No cranial nerve deficit. Coordination normal.   Skin: Skin is warm and dry. No rash noted. She is not diaphoretic. No erythema. No pallor.   Psychiatric: She has a normal mood and affect. Her speech is normal and behavior is normal. Judgment and thought content normal. Cognition and memory are normal.   Nursing note and vitals reviewed.      Results Review:     I have personally reviewed the CT scan images of the sinuses from the outside hospital dated November 26, 2018.  The following is my interpretation: There is a large nasal septal perforation with a soft tissue mass density seen at the superior aspect.  This is slightly deflected towards the right.  There is mucosal thickening in the right greater than left maxillary sinuses.  The remainder of the sinuses appear healthy.          Assessment   Assessment:  1. Nasal septal perforation    2. Chronic maxillary sinusitis    3. Nasal  valve stenosis    4. Tobacco abuse disorder    5. Abnormal nasal septum        Plan   Plan:    With her complaints of dry mouth and eyes, will obtain Sjogren's labs and ZEKE.  I have recommended nasal endoscopy with biopsy of the nasal septum and bilateral maxillary antrostomy with septal prosthesis placement.  Follow-up 1 week postoperatively.  If the labs and path are all benign, and she does not like the prosthesis, then we would consider a rib graft rhinoplasty for her developing saddle nose deformity.    Return for 1 week postoperatively.    My findings and recommendations were discussed and questions were answered.     Mark Anthony Anderson MD  03/27/19  12:02 PM

## 2019-03-30 LAB
ANA IGG, ELISA: NORMAL
ENA TO SSB (LA) ANTIBODY: 10 AU/ML (ref 0–40)
SSA 52 (RO) (ENA) AB, IGG: 7 AU/ML (ref 0–40)
SSA 60 (RO) (ENA) AB, IGG: 1 AU/ML (ref 0–40)

## 2019-04-01 ENCOUNTER — TELEPHONE (OUTPATIENT)
Dept: OTOLARYNGOLOGY | Facility: CLINIC | Age: 59
End: 2019-04-01

## 2019-04-01 NOTE — TELEPHONE ENCOUNTER
Patient called-requesting to reschedule surgery with Dr. Anderson to a later date. She is having all of her lower teeth pulled this week. Patient feels she will need extra time to heal before proceeding with surgery. Patient scheudled for 04/15-Will have surgery schedulers (Kiera) give her a call with a new date.

## 2019-04-02 ENCOUNTER — TELEPHONE (OUTPATIENT)
Dept: FAMILY MEDICINE CLINIC | Age: 59
End: 2019-04-02

## 2019-04-02 NOTE — TELEPHONE ENCOUNTER
Pt has appt this week with GI. Please advise that she can stop tx until speaking with GI as he/she may wish to scope prior to completing any further tx. (I had hoped she would be completed immediately prior to GI eval).

## 2019-04-02 NOTE — TELEPHONE ENCOUNTER
Patient called, states she is unable to tolerate one or both of the antibiotics being used to treat h pylori. She said it is causing severe abdominal cramping, vomiting and diarrhea. She said she cannot function on the meds, but does not know which one it is.

## 2019-04-05 ENCOUNTER — TELEPHONE (OUTPATIENT)
Dept: OTOLARYNGOLOGY | Facility: CLINIC | Age: 59
End: 2019-04-05

## 2019-04-05 LAB
ALBUMIN SERPL-MCNC: 4.1 G/DL (ref 3.5–5.2)
ALP BLD-CCNC: 103 U/L (ref 35–104)
ALT SERPL-CCNC: 32 U/L (ref 5–33)
ANION GAP SERPL CALCULATED.3IONS-SCNC: 14 MMOL/L (ref 7–19)
AST SERPL-CCNC: 22 U/L (ref 5–32)
BILIRUB SERPL-MCNC: 0.4 MG/DL (ref 0.2–1.2)
BUN BLDV-MCNC: 31 MG/DL (ref 6–20)
CALCIUM SERPL-MCNC: 9.9 MG/DL (ref 8.6–10)
CHLORIDE BLD-SCNC: 97 MMOL/L (ref 98–111)
CHOLESTEROL, TOTAL: 87 MG/DL (ref 160–199)
CO2: 30 MMOL/L (ref 22–29)
CREAT SERPL-MCNC: 0.9 MG/DL (ref 0.5–0.9)
CREATININE URINE: 198.5 MG/DL (ref 4.2–622)
GFR NON-AFRICAN AMERICAN: >60
GLUCOSE BLD-MCNC: 111 MG/DL (ref 74–109)
HBA1C MFR BLD: 6 % (ref 4–6)
HDLC SERPL-MCNC: 35 MG/DL (ref 65–121)
LDL CHOLESTEROL CALCULATED: 29 MG/DL
MAGNESIUM: 2 MG/DL (ref 1.6–2.6)
MICROALBUMIN UR-MCNC: <1.2 MG/DL (ref 0–19)
MICROALBUMIN/CREAT UR-RTO: NORMAL MG/G
POTASSIUM SERPL-SCNC: 4.2 MMOL/L (ref 3.5–5)
SODIUM BLD-SCNC: 141 MMOL/L (ref 136–145)
TOTAL PROTEIN: 7.1 G/DL (ref 6.6–8.7)
TRIGL SERPL-MCNC: 117 MG/DL (ref 0–149)
TSH SERPL DL<=0.05 MIU/L-ACNC: 0.95 UIU/ML (ref 0.27–4.2)

## 2019-04-05 NOTE — TELEPHONE ENCOUNTER
Patient called. States that she wants to keep her surgery date. She postponed her dental work for now. Patient was placed back on surgery schedule.

## 2019-04-09 ENCOUNTER — APPOINTMENT (OUTPATIENT)
Dept: PREADMISSION TESTING | Facility: HOSPITAL | Age: 59
End: 2019-04-09

## 2019-04-09 VITALS
HEIGHT: 61 IN | BODY MASS INDEX: 33.38 KG/M2 | HEART RATE: 84 BPM | DIASTOLIC BLOOD PRESSURE: 73 MMHG | WEIGHT: 176.81 LBS | SYSTOLIC BLOOD PRESSURE: 132 MMHG | RESPIRATION RATE: 16 BRPM | OXYGEN SATURATION: 94 %

## 2019-04-09 LAB
ANION GAP SERPL CALCULATED.3IONS-SCNC: 7 MMOL/L (ref 4–13)
BUN BLD-MCNC: 21 MG/DL (ref 5–21)
BUN/CREAT SERPL: 27.3 (ref 7–25)
CALCIUM SPEC-SCNC: 9.7 MG/DL (ref 8.4–10.4)
CHLORIDE SERPL-SCNC: 100 MMOL/L (ref 98–110)
CO2 SERPL-SCNC: 34 MMOL/L (ref 24–31)
CREAT BLD-MCNC: 0.77 MG/DL (ref 0.5–1.4)
DEPRECATED RDW RBC AUTO: 45 FL (ref 40–54)
ERYTHROCYTE [DISTWIDTH] IN BLOOD BY AUTOMATED COUNT: 13.9 % (ref 12–15)
GFR SERPL CREATININE-BSD FRML MDRD: 77 ML/MIN/1.73
GLUCOSE BLD-MCNC: 103 MG/DL (ref 70–100)
HCT VFR BLD AUTO: 37.3 % (ref 37–47)
HGB BLD-MCNC: 12 G/DL (ref 12–16)
MCH RBC QN AUTO: 28.6 PG (ref 28–32)
MCHC RBC AUTO-ENTMCNC: 32.2 G/DL (ref 33–36)
MCV RBC AUTO: 89 FL (ref 82–98)
PLATELET # BLD AUTO: 317 10*3/MM3 (ref 130–400)
PMV BLD AUTO: 10.7 FL (ref 6–12)
POTASSIUM BLD-SCNC: 4.3 MMOL/L (ref 3.5–5.3)
RBC # BLD AUTO: 4.19 10*6/MM3 (ref 4.2–5.4)
SODIUM BLD-SCNC: 141 MMOL/L (ref 135–145)
WBC NRBC COR # BLD: 15.32 10*3/MM3 (ref 4.8–10.8)

## 2019-04-09 PROCEDURE — 85027 COMPLETE CBC AUTOMATED: CPT | Performed by: OTOLARYNGOLOGY

## 2019-04-09 PROCEDURE — 80048 BASIC METABOLIC PNL TOTAL CA: CPT | Performed by: OTOLARYNGOLOGY

## 2019-04-09 PROCEDURE — 93005 ELECTROCARDIOGRAM TRACING: CPT

## 2019-04-09 PROCEDURE — 93010 ELECTROCARDIOGRAM REPORT: CPT | Performed by: INTERNAL MEDICINE

## 2019-04-09 PROCEDURE — 36415 COLL VENOUS BLD VENIPUNCTURE: CPT

## 2019-04-09 RX ORDER — LURASIDONE HYDROCHLORIDE 40 MG/1
20 TABLET, FILM COATED ORAL
COMMUNITY
End: 2019-05-29

## 2019-04-09 RX ORDER — DICLOFENAC SODIUM AND MISOPROSTOL 75; 200 MG/1; UG/1
1 TABLET, DELAYED RELEASE ORAL 2 TIMES DAILY
COMMUNITY
End: 2019-07-02

## 2019-04-09 NOTE — DISCHARGE INSTRUCTIONS
DAY OF SURGERY INSTRUCTIONS        YOUR SURGEON: ***jon gruber    PROCEDURE: ***sinus surgery    DATE OF SURGERY: ***04/16/2019    ARRIVAL TIME: AS DIRECTED BY OFFICE    YOU MAY TAKE THE FOLLOWING MEDICATION(S) THE MORNING OF SURGERY WITH A SIP OF WATER: ***    ALL OTHER HOME MEDICATIONS CHECK WITH YOUR DOCTOR              MANAGING PAIN AFTER SURGERY    We know you are probably wondering what your pain will be like after surgery.  Following surgery it is unrealistic to expect you will not have pain.   Pain is how our bodies let us know that something is wrong or cautions us to be careful.  That said, our goal is to make your pain tolerable.    Methods we may use to treat your pain include (oral or IV medications, PCAs, epidurals, nerve blocks, etc.)   While some procedures require IV pain medications for a short time after surgery, transitioning to pain medications by mouth allows for better management of pain.   Your nurse will encourage you to take oral pain medications whenever possible.  IV medications work almost immediately, but only last a short while.  Taking medications by mouth allows for a more constant level of medication in your blood stream for a longer period of time.      Once your pain is out of control it is harder to get back under control.  It is important you are aware when your next dose of pain medication is due.  If you are admitted, your nurse may write the time of your next dose on the white board in your room to help you remember.      We are interested in your pain and encourage you to inform us about aggravating factors during your visit.   Many times a simple repositioning every few hours can make a big difference.    If your physician says it is okay, do not let your pain prevent you from getting out of bed. Be sure to call your nurse for assistance prior to getting up so you do not fall.      Before surgery, please decide your tolerable pain goal.  These faces help describe the pain  ratings we use on a 0-10 scale.   Be prepared to tell us your goal and whether or not you take pain or anxiety medications at home.            BEFORE YOU COME TO THE HOSPITAL  (Pre-op instructions)  • Do not eat, drink, smoke or chew gum after midnight the night before surgery.  This also includes no mints.  • Morning of surgery take only the medicines you have been instructed with a sip of water unless otherwise instructed  by your physician.  • Do not shave, wear makeup or dark nail polish.  • Remove all jewelry including rings.  • Leave anything you consider valuable at home.  • Leave your suitcase in the car until after your surgery.  • Bring the following with you if applicable:  o Picture ID and insurance, Medicare or Medicaid cards  o Co-pay/deductible required by insurance (cash, check, credit card)  o Copy of advance directive, living will or power-of- documents if not brought to PAT  o CPAP or BIPAP mask and tubing  o Relaxation aids (MP3 player, book, magazine)  • On the day of surgery check in at registration located at the main entrance of the hospital.       Outpatient Surgery Guidelines, Adult  Outpatient procedures are those for which the person having the procedure is allowed to go home the same day as the procedure. Various procedures are done on an outpatient basis. You should follow some general guidelines if you will be having an outpatient procedure.  LET YOUR HEALTH CARE PROVIDER KNOW ABOUT:  · Any allergies you have.  · All medicines you are taking, including vitamins, herbs, eye drops, creams, and over-the-counter medicines.  · Previous problems you or members of your family have had with the use of anesthetics.  · Any blood disorders you have.  · Previous surgeries you have had.  · Medical conditions you have.  RISKS AND COMPLICATIONS  Your health care provider will discuss possible risks and complications with you before surgery. Common risks and complications include:     · Problems due to the use of anesthetics.  · Blood loss and replacement (does not apply to minor surgical procedures).  · Temporary increase in pain due to surgery.  · Uncorrected pain or problems that the surgery was meant to correct.  · Infection.  · New damage.  BEFORE THE PROCEDURE  · Ask your health care provider about changing or stopping your regular medicines. You may need to stop taking certain medicines in the days or weeks before the procedure.  · Stop smoking at least 2 weeks before surgery. This lowers your risk for complications during and after surgery. Ask your health care provider for help with this if needed.  · Eat your usual meals and a light supper the day before surgery. Continue fluid intake. Do not drink alcohol.  · Do not eat or drink after midnight the night before your surgery.   · Arrange for someone to take you home and to stay with you for 24 hours after the procedure. Medicine given for your procedure may affect your ability to drive or to care for yourself.  · Call your health care provider's office if you develop an illness or problem that may prevent you from safely having your procedure.  AFTER THE PROCEDURE  After surgery, you will be taken to a recovery area, where your progress will be monitored. If there are no complications, you will be allowed to go home when you are awake, stable, and taking fluids well. You may have numbness around the surgical site. Healing will take some time. You will have tenderness at the surgical site and may have some swelling and bruising. You may also have some nausea.  HOME CARE INSTRUCTIONS  · Do not drive for 24 hours, or as directed by your health care provider. Do not drive while taking prescription pain medicines.  · Do not drink alcohol for 24 hours.  · Do not make important decisions or sign legal documents for 24 hours.  · You may resume a normal diet and activities as directed.  · Do not lift anything heavier than 10 pounds (4.5 kg) or  play contact sports until your health care provider says it is okay.  · Change your bandages (dressings) as directed.  · Only take over-the-counter or prescription medicines as directed by your health care provider.  · Follow up with your health care provider as directed.  SEEK MEDICAL CARE IF:  · You have increased bleeding (more than a small spot) from the surgical site.  · You have redness, swelling, or increasing pain in the wound.  · You see pus coming from the wound.  · You have a fever.  · You notice a bad smell coming from the wound or dressing.  · You feel lightheaded or faint.  · You develop a rash.  · You have trouble breathing.  · You develop allergies.  MAKE SURE YOU:  · Understand these instructions.  · Will watch your condition.  · Will get help right away if you are not doing well or get worse.     This information is not intended to replace advice given to you by your health care provider. Make sure you discuss any questions you have with your health care provider.     Document Released: 09/12/2002 Document Revised: 05/03/2016 Document Reviewed: 05/22/2014  [employer] Interactive Patient Education ©2016 [employer]       Fall Prevention in Hospitals, Adult  As a hospital patient, your condition and the treatments you receive can increase your risk for falls. Some additional risk factors for falls in a hospital include:  · Being in an unfamiliar environment.  · Being on bed rest.  · Your surgery.  · Taking certain medicines.  · Your tubing requirements, such as intravenous (IV) therapy or catheters.  It is important that you learn how to decrease fall risks while at the hospital. Below are important tips that can help prevent falls.  SAFETY TIPS FOR PREVENTING FALLS  Talk about your risk of falling.  · Ask your health care provider why you are at risk for falling. Is it your medicine, illness, tubing placement, or something else?  · Make a plan with your health care provider to keep you safe from  falls.  · Ask your health care provider or pharmacist about side effects of your medicines. Some medicines can make you dizzy or affect your coordination.  Ask for help.  · Ask for help before getting out of bed. You may need to press your call button.  · Ask for assistance in getting safely to the toilet.  · Ask for a walker or cane to be put at your bedside. Ask that most of the side rails on your bed be placed up before your health care provider leaves the room.  · Ask family or friends to sit with you.  · Ask for things that are out of your reach, such as your glasses, hearing aids, telephone, bedside table, or call button.  Follow these tips to avoid falling:  · Stay lying or seated, rather than standing, while waiting for help.  · Wear rubber-soled slippers or shoes whenever you walk in the hospital.  · Avoid quick, sudden movements.  ¨ Change positions slowly.  ¨ Sit on the side of your bed before standing.  ¨ Stand up slowly and wait before you start to walk.  · Let your health care provider know if there is a spill on the floor.  · Pay careful attention to the medical equipment, electrical cords, and tubes around you.  · When you need help, use your call button by your bed or in the bathroom. Wait for one of your health care providers to help you.  · If you feel dizzy or unsure of your footing, return to bed and wait for assistance.  · Avoid being distracted by the TV, telephone, or another person in your room.  · Do not lean or support yourself on rolling objects, such as IV poles or bedside tables.     This information is not intended to replace advice given to you by your health care provider. Make sure you discuss any questions you have with your health care provider.     Document Released: 12/15/2001 Document Revised: 01/08/2016 Document Reviewed: 08/25/2013  [employer] Interactive Patient Education ©2016 [employer]       Surgical Site Infections FAQs  What is a Surgical Site Infection (SSI)?  A  surgical site infection is an infection that occurs after surgery in the part of the body where the surgery took place. Most patients who have surgery do not develop an infection. However, infections develop in about 1 to 3 out of every 100 patients who have surgery.  Some of the common symptoms of a surgical site infection are:  · Redness and pain around the area where you had surgery  · Drainage of cloudy fluid from your surgical wound  · Fever  Can SSIs be treated?  Yes. Most surgical site infections can be treated with antibiotics. The antibiotic given to you depends on the bacteria (germs) causing the infection. Sometimes patients with SSIs also need another surgery to treat the infection.  What are some of the things that hospitals are doing to prevent SSIs?  To prevent SSIs, doctors, nurses, and other healthcare providers:  · Clean their hands and arms up to their elbows with an antiseptic agent just before the surgery.  · Clean their hands with soap and water or an alcohol-based hand rub before and after caring for each patient.  · May remove some of your hair immediately before your surgery using electric clippers if the hair is in the same area where the procedure will occur. They should not shave you with a razor.  · Wear special hair covers, masks, gowns, and gloves during surgery to keep the surgery area clean.  · Give you antibiotics before your surgery starts. In most cases, you should get antibiotics within 60 minutes before the surgery starts and the antibiotics should be stopped within 24 hours after surgery.  · Clean the skin at the site of your surgery with a special soap that kills germs.  What can I do to help prevent SSIs?  Before your surgery:  · Tell your doctor about other medical problems you may have. Health problems such as allergies, diabetes, and obesity could affect your surgery and your treatment.  · Quit smoking. Patients who smoke get more infections. Talk to your doctor about how  you can quit before your surgery.  · Do not shave near where you will have surgery. Shaving with a razor can irritate your skin and make it easier to develop an infection.  At the time of your surgery:  · Speak up if someone tries to shave you with a razor before surgery. Ask why you need to be shaved and talk with your surgeon if you have any concerns.  · Ask if you will get antibiotics before surgery.  After your surgery:  · Make sure that your healthcare providers clean their hands before examining you, either with soap and water or an alcohol-based hand rub.  · If you do not see your providers clean their hands, please ask them to do so.  · Family and friends who visit you should not touch the surgical wound or dressings.  · Family and friends should clean their hands with soap and water or an alcohol-based hand rub before and after visiting you. If you do not see them clean their hands, ask them to clean their hands.  What do I need to do when I go home from the hospital?  · Before you go home, your doctor or nurse should explain everything you need to know about taking care of your wound. Make sure you understand how to care for your wound before you leave the hospital.  · Always clean your hands before and after caring for your wound.  · Before you go home, make sure you know who to contact if you have questions or problems after you get home.  · If you have any symptoms of an infection, such as redness and pain at the surgery site, drainage, or fever, call your doctor immediately.  If you have additional questions, please ask your doctor or nurse.  Developed and co-sponsored by The Society for Healthcare Epidemiology of [place] (SHEA); Infectious Diseases Society of [place] (IDSA); American Hospital Association; Association for Professionals in Infection Control and Epidemiology (APIC); Centers for Disease Control and Prevention (CDC); and The Joint Commission.     This information is not intended to replace  advice given to you by your health care provider. Make sure you discuss any questions you have with your health care provider.     Document Released: 12/23/2014 Document Revised: 01/08/2016 Document Reviewed: 03/02/2016  [employer] Interactive Patient Education ©2016 [employer]     PATIENT/FAMILY/RESPONSIBLE PARTY VERBALIZES UNDERSTANDING OF ABOVE EDUCATION.  COPY OF PAIN SCALE GIVEN AND REVIEWED WITH VERBALIZED UNDERSTANDING.

## 2019-04-10 ENCOUNTER — OFFICE VISIT (OUTPATIENT)
Dept: GASTROENTEROLOGY | Facility: CLINIC | Age: 59
End: 2019-04-10

## 2019-04-10 VITALS
OXYGEN SATURATION: 99 % | TEMPERATURE: 98.3 F | SYSTOLIC BLOOD PRESSURE: 122 MMHG | BODY MASS INDEX: 33.96 KG/M2 | HEART RATE: 78 BPM | HEIGHT: 60 IN | DIASTOLIC BLOOD PRESSURE: 70 MMHG | WEIGHT: 173 LBS

## 2019-04-10 DIAGNOSIS — R10.30 LOWER ABDOMINAL PAIN: ICD-10-CM

## 2019-04-10 DIAGNOSIS — R10.13 EPIGASTRIC PAIN: Primary | ICD-10-CM

## 2019-04-10 DIAGNOSIS — Z79.1 ENCOUNTER FOR LONG-TERM (CURRENT) USE OF NSAIDS: ICD-10-CM

## 2019-04-10 DIAGNOSIS — R10.9 ABDOMINAL CRAMPING: ICD-10-CM

## 2019-04-10 DIAGNOSIS — R19.7 DIARRHEA, UNSPECIFIED TYPE: ICD-10-CM

## 2019-04-10 DIAGNOSIS — R11.0 NAUSEA: ICD-10-CM

## 2019-04-10 PROCEDURE — 99214 OFFICE O/P EST MOD 30 MIN: CPT | Performed by: NURSE PRACTITIONER

## 2019-04-10 NOTE — PROGRESS NOTES
Chief Complaint:   Chief Complaint   Patient presents with   • Diarrhea     Pt has been having issues with diarrhea for the last year or so-just keeps getting worse; Pt had stool studies last month that showed h.pylori-was put on flagyl and tetracycline but did not tolerate the antiobiotics   • Abdominal Pain     Pt has intermittent abdominal pain/cramping         Patient ID: Robyn Minaya is a 59 y.o. female     History of Present Illness: This is a very pleasant 59-year-old female who is here today with complaints of diarrhea and abdominal pain.    The pateint states for a year she has had intermittent diarrhea with about 6 watery stools a day and treated herself with immodium and then finally went to see PCP with labs noted below.  The patient states she was treated with Flagyl and tetracycline for stools positive for H pylori.  She states that she was able to tolerate about 4 days of the medication and then began to have extreme nausea and diarrhea and therefore stopped the medications.  She states that she is continued on having the watery diarrhea with associated lower abdominal pain and abdominal cramping.  She states she is also had quite a bit of nausea and epigastric pain.  She states that her symptoms are not always associated with eating and can occur at any time.  She states that she has been taking Prilosec 20 mg daily.    The patients last colonoscopy was 10/17/14 with findings of colon polyps. 4/9/19 CMP wnl. CBC with WBC 15.  Stools negative for C. difficile and E. coli.  Stools positive for H pylori    The patient denies any vomiting, dysphagia, pyrosis or hematemesis.  The patient denies any fever or chills.  Denies any melena or hematochezia.  Denies any unintentional weight loss or loss of appetite.    Past Medical History:   Diagnosis Date   • Acid reflux     RESOLVED PT PT   • Allergic rhinitis    • Arthritis     BACK   • Bipolar 1 disorder (CMS/HCC)    • Chronic back pain    • Degenerative  arthritis    • Depression    • Deviated nasal septum    • Disease of thyroid gland    • Eczema    • Gout    • Hyperlipidemia    • Hypertension    • Hypertrophy of nasal turbinates    • Hypokalemia    • Hypothyroidism    • Menopause    • Nasal septal deviation    • Nasal valve stenosis    • Overactive bladder    • Plantar fasciitis    • RLS (restless legs syndrome)    • Sleep apnea    • Spinal headache        Past Surgical History:   Procedure Laterality Date   • CARPAL TUNNEL RELEASE Bilateral 2004   • CHOLECYSTECTOMY  2013   • COLONOSCOPY  10/17/2014    One 5-6mm tubular adenomatous polyp in the ascending colon; Two less than 4mm hyperplastic polyps in the sigmoid colon; Three rectal hyperplastic polyps; Diverticulosis; Repeat 5 years    • COLONOSCOPY  07/14/2010    Internal hemorrhoids; Repeat 7 years    • CYST REMOVAL  2016    neck   • FOOT SURGERY Right 2010    X3   • HERNIA REPAIR     • LASER ABLATION      right back   • NASAL VESTIBULE LESION/PAPILLOMA EXCISION N/A 8/1/2017    Procedure: Repair of nasal vestibular stenosis and septoplasty; cartilage graft from left ear;  Surgeon: Mark Anthony Anderson MD;  Location: [facility] OR;  Service:          Current Outpatient Medications:   •  allopurinol (ZYLOPRIM) 100 MG tablet, Take 100 mg by mouth Daily., Disp: , Rfl:   •  atorvastatin (LIPITOR) 40 MG tablet, Take 40 mg by mouth Every Night., Disp: , Rfl:   •  bumetanide (BUMEX) 2 MG tablet, Take  by mouth Daily. Takes 1 tab m- w-f and 1/2 tab on Tuesday and thursday, Disp: , Rfl:   •  calcium carbonate (OS-KAYLEY) 600 MG tablet, Take 600 mg by mouth Daily., Disp: , Rfl:   •  carvedilol (COREG) 6.25 MG tablet, Take 6.25 mg by mouth 2 (Two) Times a Day With Meals., Disp: , Rfl:   •  cholecalciferol (VITAMIN D3) 1000 UNITS tablet, Take 1,000 Units by mouth Daily., Disp: , Rfl:   •  desonide (DESOWEN) 0.05 % cream, , Disp: , Rfl:   •  diclofenac-misoprostol (ARTHROTEC 75) 75-0.2 MG EC tablet, Take 1 tablet by mouth 2 (Two) Times  a Day., Disp: , Rfl:   •  divalproex (DEPAKOTE) 500 MG 24 hr tablet, Take 1,000 mg by mouth Every Night., Disp: , Rfl:   •  DULoxetine (CYMBALTA) 60 MG capsule, Take 60 mg by mouth 2 (Two) Times a Day., Disp: , Rfl:   •  guaiFENesin (MUCINEX) 600 MG 12 hr tablet, Take 1,200 mg by mouth 2 (Two) Times a Day., Disp: , Rfl:   •  levothyroxine (SYNTHROID, LEVOTHROID) 125 MCG tablet, Take  by mouth Daily. Patient states  0.125mg, Disp: , Rfl:   •  Loperamide HCl (IMODIUM PO), Take 2 mg by mouth As Needed., Disp: , Rfl:   •  lurasidone (LATUDA) 40 MG tablet tablet, Take 20 mg by mouth Daily Before Supper., Disp: , Rfl:   •  metFORMIN (GLUCOPHAGE) 500 MG tablet, Take 500 mg by mouth 2 (Two) Times a Day With Meals., Disp: , Rfl:   •  Multiple Vitamin (MULTI VITAMIN PO), Take 1 dose by mouth Daily., Disp: , Rfl:   •  mupirocin (BACTROBAN) 2 % nasal ointment, Apply intranasally twice daily (Patient taking differently: into the nostril(s) as directed by provider Daily As Needed. Apply intranasally twice daily), Disp: 3 g, Rfl: 3  •  niacin (SLO-NIACIN) 500 MG CR tablet, Take 500 mg by mouth Daily., Disp: , Rfl:   •  omeprazole (priLOSEC) 20 MG capsule, Take 20 mg by mouth Daily., Disp: , Rfl:   •  pilocarpine (SALAGEN) 5 MG tablet, Take 5 mg by mouth 3 (Three) Times a Day., Disp: , Rfl:   •  potassium chloride (K-DUR,KLOR-CON) 20 MEQ CR tablet, Daily. Daily on Monday only, Disp: , Rfl:   •  pramipexole (MIRAPEX) 1.5 MG tablet, Take 1.5 mg by mouth Every Night., Disp: , Rfl:   •  QUEtiapine (SEROquel) 50 MG tablet, Take 25 mg by mouth Every Night., Disp: , Rfl:   •  spironolactone (ALDACTONE) 50 MG tablet, Take 25 mg by mouth Daily., Disp: , Rfl:   •  tiZANidine (ZANAFLEX) 4 MG tablet, Take 4 mg by mouth Every Night., Disp: , Rfl:   •  traMADol (ULTRAM) 50 MG tablet, Take 100 mg by mouth 2 (Two) Times a Day., Disp: , Rfl:     Allergies   Allergen Reactions   • Hctz [Hydrochlorothiazide] Other (See Comments)     Acid reflux  "      Social History     Socioeconomic History   • Marital status:      Spouse name: Not on file   • Number of children: Not on file   • Years of education: Not on file   • Highest education level: Not on file   Tobacco Use   • Smoking status: Current Every Day Smoker     Packs/day: 1.00     Types: Cigarettes   • Smokeless tobacco: Never Used   Substance and Sexual Activity   • Alcohol use: Yes     Comment: maybe 3 x yearly   • Drug use: No   • Sexual activity: Defer       Family History   Problem Relation Age of Onset   • Cancer Mother    • Diabetes Father    • Hypertension Father    • Cancer Father    • Colon cancer Neg Hx    • Colon polyps Neg Hx        Vitals:    04/10/19 0850   BP: 122/70   BP Location: Left arm   Patient Position: Sitting   Cuff Size: Adult   Pulse: 78   Temp: 98.3 °F (36.8 °C)   TempSrc: Tympanic   SpO2: 99%   Weight: 78.5 kg (173 lb)   Height: 152.4 cm (60\")       Review of Systems:    General:    Present -feeling well   Skin:    Not Present-Rash   HEENT:     Not Present-Acute visual changes or Acute hearing changes   Neck :    Not Present- swollen glands   Genitourinary:      Not Present- burning, frequency, urgency hematuria, dysuria,   Cardiovascular:   Not Present-chest pain, palpitations, or pressure   Respiratory:   Not Present- shortness of breath or cough   Gastrointestinal:  Musculoskeletal:  Neurological:  Psychiatric:   Present as mentioned in the HP    Not Present. Recent gait disturbances.    Not Present-Seizures and weakness in extremities.    Not Present- Anxiety or Depression.       Physical Exam:    General Appearance:    Alert, cooperative, in no acute distress   Psych:    Mood appropriate    Eyes:          conjunctivae and sclerae normal, no   icterus, no pallor   ENMT:    Ears appear intact with no abnormalities noted oral mucosa moist   Neck:   No adenopathy, supple, trachea midline, no thyromegaly, no   carotid bruit, no JVD    Cardiovascular:    Regular rhythm " and normal rate, normal S1 and S2, no            murmur, no gallop, no rub, no click   Gastrointestinal:     Inspection normal.  Normal bowel sounds, no masses, no organomegaly, soft round non-tender, non-distended, no guarding, no rebound or tenderness. No hepatosplenomegaly.   Skin:   No bleeding, bruising or rash   Neurologic:   nonfocal       Lab Results   Component Value Date    WBC 15.32 (H) 04/09/2019    WBC 12.25 (H) 07/25/2017    WBC 8.82 07/08/2014    HGB 12.0 04/09/2019    HGB 14.2 07/25/2017    HGB 13.9 07/08/2014    HCT 37.3 04/09/2019    HCT 44.5 07/25/2017    HCT 40.9 07/08/2014     04/09/2019     07/25/2017     07/08/2014        Lab Results   Component Value Date     04/09/2019     07/25/2017     07/08/2014    K 4.3 04/09/2019    K 3.8 07/25/2017    K 3.4 (L) 07/08/2014     04/09/2019    CL 95 (L) 07/25/2017    CL 96 (L) 07/08/2014    CO2 34.0 (H) 04/09/2019    CO2 38.0 (H) 07/25/2017    CO2 34 (H) 07/08/2014    BUN 21 04/09/2019    BUN 25 (H) 07/25/2017    BUN 16 07/08/2014    CREATININE 0.77 04/09/2019    CREATININE 0.80 07/25/2017    CREATININE 0.72 07/08/2014    BILITOT <0.1 (L) 07/08/2014    ALKPHOS 59 07/08/2014    ALT 30 07/08/2014    AST 18 07/08/2014    GLUCOSE 103 (H) 04/09/2019    GLUCOSE 99 07/25/2017    GLUCOSE 117 (H) 07/08/2014       Lab Results   Component Value Date    INR 0.89 (L) 07/08/2014       Body mass index is 33.79 kg/m². Patient's Body mass index is 33.79 kg/m². BMI is above normal parameters. Recommendations include: nutrition counseling.    Assessment and Plan:  Assessment/Plan   Robyn was seen today for diarrhea and abdominal pain.    Diagnoses and all orders for this visit:    Epigastric pain  Comments:  Schedule EGD.  Continue PPI  Orders:  -     Case Request; Standing  -     Case Request    Nausea  -     CT Abdomen Pelvis With & Without Contrast; Future  -     Case Request; Standing  -     Case Request    Diarrhea,  unspecified type  Comments:  Will check CT of abdomen and pelvis and repeat gastrointestinal panel  Orders:  -     CT Abdomen Pelvis With & Without Contrast; Future  -     Gastrointestinal Panel, PCR - Stool, Per Rectum; Future  -     Fecal Leukocytes - Stool, Per Rectum; Future    Lower abdominal pain  -     CT Abdomen Pelvis With & Without Contrast; Future    Abdominal cramping  -     CT Abdomen Pelvis With & Without Contrast; Future    Encounter for long-term (current) use of NSAIDs  Comments:  Patient instructed to discontinue all NSAIDs and use Tylenol only for pain    Other orders  -     Follow Anesthesia Guidelines / Standing Orders; Future  -     Implement Anesthesia Orders Day of Procedure; Standing  -     Obtain Informed Consent; Standing             There are no Patient Instructions on file for this visit.    Next follow-up appointment      The risks, benefits, and alternatives of endoscopy were reviewed with the patient today.  Risks including perforation, with or without dilation, possibly requiring surgery.  Additional risks include risk of bleeding.  There is also the risk of a drug reaction or problems with anesthesia.  This will be discussed with the further by the anesthesia team on the day of the procedure. The benefits include the diagnosis and management of disease of the upper digestive tract.  Alternatives to endoscopy include upper GI series, laboratory testing, radiographic evaluation, or no intervention.  The patient verbalizes understanding and agrees.      EMR Dragon/Transcription disclaimer:  Much of this encounter note is an electronic transcription/translation of spoken language to printed text. The electronic translation of spoken language may permit erroneous, or at times, nonsensical words or phrases to be inadvertently transcribed; although I have reviewed the note for such errors, some may still exist.

## 2019-04-10 NOTE — H&P (VIEW-ONLY)
Chief Complaint:   Chief Complaint   Patient presents with   • Diarrhea     Pt has been having issues with diarrhea for the last year or so-just keeps getting worse; Pt had stool studies last month that showed h.pylori-was put on flagyl and tetracycline but did not tolerate the antiobiotics   • Abdominal Pain     Pt has intermittent abdominal pain/cramping         Patient ID: Robyn Minaya is a 59 y.o. female     History of Present Illness: This is a very pleasant 59-year-old female who is here today with complaints of diarrhea and abdominal pain.    The pateint states for a year she has had intermittent diarrhea with about 6 watery stools a day and treated herself with immodium and then finally went to see PCP with labs noted below.  The patient states she was treated with Flagyl and tetracycline for stools positive for H pylori.  She states that she was able to tolerate about 4 days of the medication and then began to have extreme nausea and diarrhea and therefore stopped the medications.  She states that she is continued on having the watery diarrhea with associated lower abdominal pain and abdominal cramping.  She states she is also had quite a bit of nausea and epigastric pain.  She states that her symptoms are not always associated with eating and can occur at any time.  She states that she has been taking Prilosec 20 mg daily.    The patients last colonoscopy was 10/17/14 with findings of colon polyps. 4/9/19 CMP wnl. CBC with WBC 15.  Stools negative for C. difficile and E. coli.  Stools positive for H pylori    The patient denies any vomiting, dysphagia, pyrosis or hematemesis.  The patient denies any fever or chills.  Denies any melena or hematochezia.  Denies any unintentional weight loss or loss of appetite.    Past Medical History:   Diagnosis Date   • Acid reflux     RESOLVED PT PT   • Allergic rhinitis    • Arthritis     BACK   • Bipolar 1 disorder (CMS/HCC)    • Chronic back pain    • Degenerative  arthritis    • Depression    • Deviated nasal septum    • Disease of thyroid gland    • Eczema    • Gout    • Hyperlipidemia    • Hypertension    • Hypertrophy of nasal turbinates    • Hypokalemia    • Hypothyroidism    • Menopause    • Nasal septal deviation    • Nasal valve stenosis    • Overactive bladder    • Plantar fasciitis    • RLS (restless legs syndrome)    • Sleep apnea    • Spinal headache        Past Surgical History:   Procedure Laterality Date   • CARPAL TUNNEL RELEASE Bilateral 2004   • CHOLECYSTECTOMY  2013   • COLONOSCOPY  10/17/2014    One 5-6mm tubular adenomatous polyp in the ascending colon; Two less than 4mm hyperplastic polyps in the sigmoid colon; Three rectal hyperplastic polyps; Diverticulosis; Repeat 5 years    • COLONOSCOPY  07/14/2010    Internal hemorrhoids; Repeat 7 years    • CYST REMOVAL  2016    neck   • FOOT SURGERY Right 2010    X3   • HERNIA REPAIR     • LASER ABLATION      right back   • NASAL VESTIBULE LESION/PAPILLOMA EXCISION N/A 8/1/2017    Procedure: Repair of nasal vestibular stenosis and septoplasty; cartilage graft from left ear;  Surgeon: Mark Anthony Anderson MD;  Location: [facility] OR;  Service:          Current Outpatient Medications:   •  allopurinol (ZYLOPRIM) 100 MG tablet, Take 100 mg by mouth Daily., Disp: , Rfl:   •  atorvastatin (LIPITOR) 40 MG tablet, Take 40 mg by mouth Every Night., Disp: , Rfl:   •  bumetanide (BUMEX) 2 MG tablet, Take  by mouth Daily. Takes 1 tab m- w-f and 1/2 tab on Tuesday and thursday, Disp: , Rfl:   •  calcium carbonate (OS-KAYLEY) 600 MG tablet, Take 600 mg by mouth Daily., Disp: , Rfl:   •  carvedilol (COREG) 6.25 MG tablet, Take 6.25 mg by mouth 2 (Two) Times a Day With Meals., Disp: , Rfl:   •  cholecalciferol (VITAMIN D3) 1000 UNITS tablet, Take 1,000 Units by mouth Daily., Disp: , Rfl:   •  desonide (DESOWEN) 0.05 % cream, , Disp: , Rfl:   •  diclofenac-misoprostol (ARTHROTEC 75) 75-0.2 MG EC tablet, Take 1 tablet by mouth 2 (Two) Times  a Day., Disp: , Rfl:   •  divalproex (DEPAKOTE) 500 MG 24 hr tablet, Take 1,000 mg by mouth Every Night., Disp: , Rfl:   •  DULoxetine (CYMBALTA) 60 MG capsule, Take 60 mg by mouth 2 (Two) Times a Day., Disp: , Rfl:   •  guaiFENesin (MUCINEX) 600 MG 12 hr tablet, Take 1,200 mg by mouth 2 (Two) Times a Day., Disp: , Rfl:   •  levothyroxine (SYNTHROID, LEVOTHROID) 125 MCG tablet, Take  by mouth Daily. Patient states  0.125mg, Disp: , Rfl:   •  Loperamide HCl (IMODIUM PO), Take 2 mg by mouth As Needed., Disp: , Rfl:   •  lurasidone (LATUDA) 40 MG tablet tablet, Take 20 mg by mouth Daily Before Supper., Disp: , Rfl:   •  metFORMIN (GLUCOPHAGE) 500 MG tablet, Take 500 mg by mouth 2 (Two) Times a Day With Meals., Disp: , Rfl:   •  Multiple Vitamin (MULTI VITAMIN PO), Take 1 dose by mouth Daily., Disp: , Rfl:   •  mupirocin (BACTROBAN) 2 % nasal ointment, Apply intranasally twice daily (Patient taking differently: into the nostril(s) as directed by provider Daily As Needed. Apply intranasally twice daily), Disp: 3 g, Rfl: 3  •  niacin (SLO-NIACIN) 500 MG CR tablet, Take 500 mg by mouth Daily., Disp: , Rfl:   •  omeprazole (priLOSEC) 20 MG capsule, Take 20 mg by mouth Daily., Disp: , Rfl:   •  pilocarpine (SALAGEN) 5 MG tablet, Take 5 mg by mouth 3 (Three) Times a Day., Disp: , Rfl:   •  potassium chloride (K-DUR,KLOR-CON) 20 MEQ CR tablet, Daily. Daily on Monday only, Disp: , Rfl:   •  pramipexole (MIRAPEX) 1.5 MG tablet, Take 1.5 mg by mouth Every Night., Disp: , Rfl:   •  QUEtiapine (SEROquel) 50 MG tablet, Take 25 mg by mouth Every Night., Disp: , Rfl:   •  spironolactone (ALDACTONE) 50 MG tablet, Take 25 mg by mouth Daily., Disp: , Rfl:   •  tiZANidine (ZANAFLEX) 4 MG tablet, Take 4 mg by mouth Every Night., Disp: , Rfl:   •  traMADol (ULTRAM) 50 MG tablet, Take 100 mg by mouth 2 (Two) Times a Day., Disp: , Rfl:     Allergies   Allergen Reactions   • Hctz [Hydrochlorothiazide] Other (See Comments)     Acid reflux  "      Social History     Socioeconomic History   • Marital status:      Spouse name: Not on file   • Number of children: Not on file   • Years of education: Not on file   • Highest education level: Not on file   Tobacco Use   • Smoking status: Current Every Day Smoker     Packs/day: 1.00     Types: Cigarettes   • Smokeless tobacco: Never Used   Substance and Sexual Activity   • Alcohol use: Yes     Comment: maybe 3 x yearly   • Drug use: No   • Sexual activity: Defer       Family History   Problem Relation Age of Onset   • Cancer Mother    • Diabetes Father    • Hypertension Father    • Cancer Father    • Colon cancer Neg Hx    • Colon polyps Neg Hx        Vitals:    04/10/19 0850   BP: 122/70   BP Location: Left arm   Patient Position: Sitting   Cuff Size: Adult   Pulse: 78   Temp: 98.3 °F (36.8 °C)   TempSrc: Tympanic   SpO2: 99%   Weight: 78.5 kg (173 lb)   Height: 152.4 cm (60\")       Review of Systems:    General:    Present -feeling well   Skin:    Not Present-Rash   HEENT:     Not Present-Acute visual changes or Acute hearing changes   Neck :    Not Present- swollen glands   Genitourinary:      Not Present- burning, frequency, urgency hematuria, dysuria,   Cardiovascular:   Not Present-chest pain, palpitations, or pressure   Respiratory:   Not Present- shortness of breath or cough   Gastrointestinal:  Musculoskeletal:  Neurological:  Psychiatric:   Present as mentioned in the HP    Not Present. Recent gait disturbances.    Not Present-Seizures and weakness in extremities.    Not Present- Anxiety or Depression.       Physical Exam:    General Appearance:    Alert, cooperative, in no acute distress   Psych:    Mood appropriate    Eyes:          conjunctivae and sclerae normal, no   icterus, no pallor   ENMT:    Ears appear intact with no abnormalities noted oral mucosa moist   Neck:   No adenopathy, supple, trachea midline, no thyromegaly, no   carotid bruit, no JVD    Cardiovascular:    Regular rhythm " and normal rate, normal S1 and S2, no            murmur, no gallop, no rub, no click   Gastrointestinal:     Inspection normal.  Normal bowel sounds, no masses, no organomegaly, soft round non-tender, non-distended, no guarding, no rebound or tenderness. No hepatosplenomegaly.   Skin:   No bleeding, bruising or rash   Neurologic:   nonfocal       Lab Results   Component Value Date    WBC 15.32 (H) 04/09/2019    WBC 12.25 (H) 07/25/2017    WBC 8.82 07/08/2014    HGB 12.0 04/09/2019    HGB 14.2 07/25/2017    HGB 13.9 07/08/2014    HCT 37.3 04/09/2019    HCT 44.5 07/25/2017    HCT 40.9 07/08/2014     04/09/2019     07/25/2017     07/08/2014        Lab Results   Component Value Date     04/09/2019     07/25/2017     07/08/2014    K 4.3 04/09/2019    K 3.8 07/25/2017    K 3.4 (L) 07/08/2014     04/09/2019    CL 95 (L) 07/25/2017    CL 96 (L) 07/08/2014    CO2 34.0 (H) 04/09/2019    CO2 38.0 (H) 07/25/2017    CO2 34 (H) 07/08/2014    BUN 21 04/09/2019    BUN 25 (H) 07/25/2017    BUN 16 07/08/2014    CREATININE 0.77 04/09/2019    CREATININE 0.80 07/25/2017    CREATININE 0.72 07/08/2014    BILITOT <0.1 (L) 07/08/2014    ALKPHOS 59 07/08/2014    ALT 30 07/08/2014    AST 18 07/08/2014    GLUCOSE 103 (H) 04/09/2019    GLUCOSE 99 07/25/2017    GLUCOSE 117 (H) 07/08/2014       Lab Results   Component Value Date    INR 0.89 (L) 07/08/2014       Body mass index is 33.79 kg/m². Patient's Body mass index is 33.79 kg/m². BMI is above normal parameters. Recommendations include: nutrition counseling.    Assessment and Plan:  Assessment/Plan   Robyn was seen today for diarrhea and abdominal pain.    Diagnoses and all orders for this visit:    Epigastric pain  Comments:  Schedule EGD.  Continue PPI  Orders:  -     Case Request; Standing  -     Case Request    Nausea  -     CT Abdomen Pelvis With & Without Contrast; Future  -     Case Request; Standing  -     Case Request    Diarrhea,  unspecified type  Comments:  Will check CT of abdomen and pelvis and repeat gastrointestinal panel  Orders:  -     CT Abdomen Pelvis With & Without Contrast; Future  -     Gastrointestinal Panel, PCR - Stool, Per Rectum; Future  -     Fecal Leukocytes - Stool, Per Rectum; Future    Lower abdominal pain  -     CT Abdomen Pelvis With & Without Contrast; Future    Abdominal cramping  -     CT Abdomen Pelvis With & Without Contrast; Future    Encounter for long-term (current) use of NSAIDs  Comments:  Patient instructed to discontinue all NSAIDs and use Tylenol only for pain    Other orders  -     Follow Anesthesia Guidelines / Standing Orders; Future  -     Implement Anesthesia Orders Day of Procedure; Standing  -     Obtain Informed Consent; Standing             There are no Patient Instructions on file for this visit.    Next follow-up appointment      The risks, benefits, and alternatives of endoscopy were reviewed with the patient today.  Risks including perforation, with or without dilation, possibly requiring surgery.  Additional risks include risk of bleeding.  There is also the risk of a drug reaction or problems with anesthesia.  This will be discussed with the further by the anesthesia team on the day of the procedure. The benefits include the diagnosis and management of disease of the upper digestive tract.  Alternatives to endoscopy include upper GI series, laboratory testing, radiographic evaluation, or no intervention.  The patient verbalizes understanding and agrees.      EMR Dragon/Transcription disclaimer:  Much of this encounter note is an electronic transcription/translation of spoken language to printed text. The electronic translation of spoken language may permit erroneous, or at times, nonsensical words or phrases to be inadvertently transcribed; although I have reviewed the note for such errors, some may still exist.

## 2019-04-11 ENCOUNTER — ANESTHESIA (OUTPATIENT)
Dept: GASTROENTEROLOGY | Facility: HOSPITAL | Age: 59
End: 2019-04-11

## 2019-04-11 ENCOUNTER — ANESTHESIA EVENT (OUTPATIENT)
Dept: GASTROENTEROLOGY | Facility: HOSPITAL | Age: 59
End: 2019-04-11

## 2019-04-11 ENCOUNTER — HOSPITAL ENCOUNTER (OUTPATIENT)
Facility: HOSPITAL | Age: 59
Setting detail: HOSPITAL OUTPATIENT SURGERY
Discharge: HOME | End: 2019-04-11
Attending: INTERNAL MEDICINE | Admitting: ANESTHESIOLOGY

## 2019-04-11 VITALS
OXYGEN SATURATION: 95 % | TEMPERATURE: 97.2 F | HEIGHT: 60 IN | RESPIRATION RATE: 19 BRPM | BODY MASS INDEX: 33.96 KG/M2 | HEART RATE: 71 BPM | SYSTOLIC BLOOD PRESSURE: 108 MMHG | WEIGHT: 173 LBS | DIASTOLIC BLOOD PRESSURE: 64 MMHG

## 2019-04-11 DIAGNOSIS — R11.0 NAUSEA: ICD-10-CM

## 2019-04-11 DIAGNOSIS — R10.13 EPIGASTRIC PAIN: ICD-10-CM

## 2019-04-11 LAB — GLUCOSE BLDC GLUCOMTR-MCNC: 103 MG/DL (ref 70–130)

## 2019-04-11 PROCEDURE — 25010000002 PROPOFOL 10 MG/ML EMULSION: Performed by: NURSE ANESTHETIST, CERTIFIED REGISTERED

## 2019-04-11 PROCEDURE — 82962 GLUCOSE BLOOD TEST: CPT

## 2019-04-11 PROCEDURE — 88305 TISSUE EXAM BY PATHOLOGIST: CPT | Performed by: INTERNAL MEDICINE

## 2019-04-11 PROCEDURE — 43239 EGD BIOPSY SINGLE/MULTIPLE: CPT | Performed by: INTERNAL MEDICINE

## 2019-04-11 PROCEDURE — 87081 CULTURE SCREEN ONLY: CPT | Performed by: INTERNAL MEDICINE

## 2019-04-11 RX ORDER — SODIUM CHLORIDE 0.9 % (FLUSH) 0.9 %
3 SYRINGE (ML) INJECTION AS NEEDED
Status: DISCONTINUED | OUTPATIENT
Start: 2019-04-11 | End: 2019-04-11 | Stop reason: HOSPADM

## 2019-04-11 RX ORDER — SODIUM CHLORIDE 9 MG/ML
500 INJECTION, SOLUTION INTRAVENOUS CONTINUOUS PRN
Status: DISCONTINUED | OUTPATIENT
Start: 2019-04-11 | End: 2019-04-11 | Stop reason: HOSPADM

## 2019-04-11 RX ORDER — PROPOFOL 10 MG/ML
VIAL (ML) INTRAVENOUS AS NEEDED
Status: DISCONTINUED | OUTPATIENT
Start: 2019-04-11 | End: 2019-04-11 | Stop reason: SURG

## 2019-04-11 RX ORDER — LIDOCAINE HYDROCHLORIDE 20 MG/ML
INJECTION, SOLUTION INFILTRATION; PERINEURAL AS NEEDED
Status: DISCONTINUED | OUTPATIENT
Start: 2019-04-11 | End: 2019-04-11 | Stop reason: SURG

## 2019-04-11 RX ADMIN — LIDOCAINE HYDROCHLORIDE 100 MG: 20 INJECTION, SOLUTION INFILTRATION; PERINEURAL at 09:34

## 2019-04-11 RX ADMIN — PROPOFOL 250 MG: 10 INJECTION, EMULSION INTRAVENOUS at 09:34

## 2019-04-11 RX ADMIN — SODIUM CHLORIDE 500 ML: 9 INJECTION, SOLUTION INTRAVENOUS at 09:08

## 2019-04-11 NOTE — ANESTHESIA POSTPROCEDURE EVALUATION
"Patient: Robyn Minaya    Procedure Summary     Date:  04/11/19 Room / Location:  [facility] ENDOSCOPY 2 / [facility]    Anesthesia Start:  0930 Anesthesia Stop:  0946    Procedure:  ESOPHAGOGASTRODUODENOSCOPY WITH ANESTHESIA (N/A ) Diagnosis:       Nausea      Epigastric pain      (Nausea [R11.0])      (Epigastric pain [R10.13])    Surgeon:  Robyn Frazier MD Provider:  Paul Dior CRNA    Anesthesia Type:  MAC ASA Status:  2          Anesthesia Type: MAC  Last vitals  BP   126/77 (04/11/19 0851)   Temp   97.2 °F (36.2 °C) (04/11/19 0851)   Pulse   73 (04/11/19 0851)   Resp   20 (04/11/19 0851)     SpO2   94 % (04/11/19 0851)     Post Anesthesia Care and Evaluation    Patient location during evaluation: PHASE II  Patient participation: complete - patient participated  Level of consciousness: awake and alert  Pain management: adequate  Airway patency: patent  Anesthetic complications: No anesthetic complications    Cardiovascular status: acceptable  Respiratory status: acceptable  Hydration status: acceptable    Comments: Blood pressure 126/77, pulse 73, temperature 97.2 °F (36.2 °C), temperature source Temporal, resp. rate 20, height 151.1 cm (59.5\"), weight 78.5 kg (173 lb), SpO2 94 %, not currently breastfeeding.    Pt discharged from PACU based on migdalia score >8      "

## 2019-04-11 NOTE — ANESTHESIA PREPROCEDURE EVALUATION
Anesthesia Evaluation     Patient summary reviewed and Nursing notes reviewed   no history of anesthetic complications:  NPO Solid Status: > 8 hours  NPO Liquid Status: > 8 hours           Airway   Mallampati: I  TM distance: >3 FB  Neck ROM: full  No difficulty expected  Dental      Pulmonary    (+) a smoker Current, sleep apnea on CPAP,   (-) COPD  Cardiovascular   Exercise tolerance: poor (<4 METS)    Patient on routine beta blocker and Beta blocker given within 24 hours of surgery    (+) hypertension, hyperlipidemia,       Neuro/Psych  (+) psychiatric history Depression and Bipolar,     GI/Hepatic/Renal/Endo    (+)  GERD,  hypothyroidism,   (-) liver disease, no renal disease, diabetes    Musculoskeletal     (+) back pain,   Abdominal    Substance History      OB/GYN          Other   (+) arthritis                     Anesthesia Plan    ASA 2     MAC     intravenous induction   Anesthetic plan, all risks, benefits, and alternatives have been provided, discussed and informed consent has been obtained with: patient.

## 2019-04-11 NOTE — INTERVAL H&P NOTE
H&P updated. The patient was examined and the following changes are noted:  See below     She has had diarrhea for about a year which is about the same time she started metformin.    She is on diclofenac.

## 2019-04-12 LAB — UREASE TISS QL: NEGATIVE

## 2019-04-15 ENCOUNTER — TELEPHONE (OUTPATIENT)
Dept: GASTROENTEROLOGY | Facility: CLINIC | Age: 59
End: 2019-04-15

## 2019-04-15 ENCOUNTER — HOSPITAL ENCOUNTER (OUTPATIENT)
Dept: CT IMAGING | Facility: HOSPITAL | Age: 59
Discharge: HOME | End: 2019-04-15
Admitting: NURSE PRACTITIONER

## 2019-04-15 DIAGNOSIS — R19.7 DIARRHEA, UNSPECIFIED TYPE: ICD-10-CM

## 2019-04-15 DIAGNOSIS — R10.9 ABDOMINAL CRAMPING: ICD-10-CM

## 2019-04-15 DIAGNOSIS — R11.0 NAUSEA: ICD-10-CM

## 2019-04-15 DIAGNOSIS — R10.30 LOWER ABDOMINAL PAIN: ICD-10-CM

## 2019-04-15 LAB
CREAT BLDA-MCNC: 1 MG/DL (ref 0.6–1.3)
CYTO UR: NORMAL
LAB AP CASE REPORT: NORMAL
PATH REPORT.FINAL DX SPEC: NORMAL
PATH REPORT.GROSS SPEC: NORMAL

## 2019-04-15 PROCEDURE — 82565 ASSAY OF CREATININE: CPT

## 2019-04-15 PROCEDURE — 25010000002 IOPAMIDOL 61 % SOLUTION: Performed by: NURSE PRACTITIONER

## 2019-04-15 PROCEDURE — 74178 CT ABD&PLV WO CNTR FLWD CNTR: CPT

## 2019-04-15 RX ADMIN — IOPAMIDOL 100 ML: 612 INJECTION, SOLUTION INTRAVENOUS at 09:05

## 2019-04-15 NOTE — TELEPHONE ENCOUNTER
Pt called me and wanted to let you know that she discussed discontinuing her Metformin with her PCP. Her PCP is going to switch her to the Metformin ER to see if that does better for pt. Advised her I would let you know and if she needed anything else to call us back.

## 2019-04-15 NOTE — TELEPHONE ENCOUNTER
Pt states that since she stopped the metformin she has not had any watery stool. She states that she is doing better.

## 2019-04-16 ENCOUNTER — ANESTHESIA (OUTPATIENT)
Dept: PERIOP | Facility: HOSPITAL | Age: 59
End: 2019-04-16

## 2019-04-16 ENCOUNTER — HOSPITAL ENCOUNTER (OUTPATIENT)
Facility: HOSPITAL | Age: 59
Setting detail: HOSPITAL OUTPATIENT SURGERY
Discharge: HOME | End: 2019-04-16
Attending: OTOLARYNGOLOGY | Admitting: OTOLARYNGOLOGY

## 2019-04-16 ENCOUNTER — TELEPHONE (OUTPATIENT)
Dept: FAMILY MEDICINE CLINIC | Age: 59
End: 2019-04-16

## 2019-04-16 ENCOUNTER — ANESTHESIA EVENT (OUTPATIENT)
Dept: PERIOP | Facility: HOSPITAL | Age: 59
End: 2019-04-16

## 2019-04-16 VITALS
DIASTOLIC BLOOD PRESSURE: 73 MMHG | RESPIRATION RATE: 14 BRPM | SYSTOLIC BLOOD PRESSURE: 129 MMHG | TEMPERATURE: 97.6 F | HEART RATE: 69 BPM | OXYGEN SATURATION: 90 %

## 2019-04-16 DIAGNOSIS — J34.89 NASAL SEPTAL PERFORATION: ICD-10-CM

## 2019-04-16 DIAGNOSIS — G47.33 SLEEP APNEA, OBSTRUCTIVE: Primary | ICD-10-CM

## 2019-04-16 DIAGNOSIS — H02.831 DERMATOCHALASIS OF BOTH UPPER EYELIDS: Primary | ICD-10-CM

## 2019-04-16 DIAGNOSIS — H02.834 DERMATOCHALASIS OF BOTH UPPER EYELIDS: Primary | ICD-10-CM

## 2019-04-16 DIAGNOSIS — J32.0 CHRONIC MAXILLARY SINUSITIS: ICD-10-CM

## 2019-04-16 LAB
GLUCOSE BLDC GLUCOMTR-MCNC: 115 MG/DL (ref 70–130)
GLUCOSE BLDC GLUCOMTR-MCNC: 129 MG/DL (ref 70–130)

## 2019-04-16 PROCEDURE — 82962 GLUCOSE BLOOD TEST: CPT

## 2019-04-16 PROCEDURE — 31237 NSL/SINS NDSC SURG BX POLYPC: CPT | Performed by: OTOLARYNGOLOGY

## 2019-04-16 PROCEDURE — 25010000002 METOCLOPRAMIDE PER 10 MG: Performed by: ANESTHESIOLOGY

## 2019-04-16 PROCEDURE — 25010000002 PROPOFOL 10 MG/ML EMULSION: Performed by: NURSE ANESTHETIST, CERTIFIED REGISTERED

## 2019-04-16 PROCEDURE — 25010000002 SUCCINYLCHOLINE PER 20 MG: Performed by: NURSE ANESTHETIST, CERTIFIED REGISTERED

## 2019-04-16 PROCEDURE — 25010000002 ONDANSETRON PER 1 MG: Performed by: NURSE ANESTHETIST, CERTIFIED REGISTERED

## 2019-04-16 PROCEDURE — 25010000002 FENTANYL CITRATE (PF) 250 MCG/5ML SOLUTION: Performed by: NURSE ANESTHETIST, CERTIFIED REGISTERED

## 2019-04-16 PROCEDURE — 88334 PATH CONSLTJ SURG CYTO XM EA: CPT | Performed by: OTOLARYNGOLOGY

## 2019-04-16 PROCEDURE — 88312 SPECIAL STAINS GROUP 1: CPT | Performed by: OTOLARYNGOLOGY

## 2019-04-16 PROCEDURE — 88305 TISSUE EXAM BY PATHOLOGIST: CPT | Performed by: OTOLARYNGOLOGY

## 2019-04-16 PROCEDURE — 31256 EXPLORATION MAXILLARY SINUS: CPT | Performed by: OTOLARYNGOLOGY

## 2019-04-16 PROCEDURE — 30220 INSERT NASAL SEPTAL BUTTON: CPT | Performed by: OTOLARYNGOLOGY

## 2019-04-16 DEVICE — DEVICE 1524105 NASAL SEPTAL BUTTON SILIC: Type: IMPLANTABLE DEVICE | Status: NON-FUNCTIONAL

## 2019-04-16 RX ORDER — SODIUM CHLORIDE, SODIUM LACTATE, POTASSIUM CHLORIDE, CALCIUM CHLORIDE 600; 310; 30; 20 MG/100ML; MG/100ML; MG/100ML; MG/100ML
100 INJECTION, SOLUTION INTRAVENOUS CONTINUOUS
Status: DISCONTINUED | OUTPATIENT
Start: 2019-04-16 | End: 2019-04-16 | Stop reason: HOSPADM

## 2019-04-16 RX ORDER — MIDAZOLAM HYDROCHLORIDE 1 MG/ML
2 INJECTION INTRAMUSCULAR; INTRAVENOUS
Status: DISCONTINUED | OUTPATIENT
Start: 2019-04-16 | End: 2019-04-16 | Stop reason: HOSPADM

## 2019-04-16 RX ORDER — SODIUM CHLORIDE 0.9 % (FLUSH) 0.9 %
3 SYRINGE (ML) INJECTION EVERY 12 HOURS SCHEDULED
Status: DISCONTINUED | OUTPATIENT
Start: 2019-04-16 | End: 2019-04-16 | Stop reason: HOSPADM

## 2019-04-16 RX ORDER — METOCLOPRAMIDE HYDROCHLORIDE 5 MG/ML
5 INJECTION INTRAMUSCULAR; INTRAVENOUS ONCE AS NEEDED
Status: COMPLETED | OUTPATIENT
Start: 2019-04-16 | End: 2019-04-16

## 2019-04-16 RX ORDER — FENTANYL CITRATE 50 UG/ML
25 INJECTION, SOLUTION INTRAMUSCULAR; INTRAVENOUS AS NEEDED
Status: DISCONTINUED | OUTPATIENT
Start: 2019-04-16 | End: 2019-04-16 | Stop reason: HOSPADM

## 2019-04-16 RX ORDER — MEPERIDINE HYDROCHLORIDE 25 MG/ML
12.5 INJECTION INTRAMUSCULAR; INTRAVENOUS; SUBCUTANEOUS
Status: DISCONTINUED | OUTPATIENT
Start: 2019-04-16 | End: 2019-04-16 | Stop reason: HOSPADM

## 2019-04-16 RX ORDER — ACETAMINOPHEN 500 MG
1000 TABLET ORAL ONCE
Status: DISCONTINUED | OUTPATIENT
Start: 2019-04-16 | End: 2019-04-16 | Stop reason: HOSPADM

## 2019-04-16 RX ORDER — ONDANSETRON 2 MG/ML
4 INJECTION INTRAMUSCULAR; INTRAVENOUS ONCE AS NEEDED
Status: DISCONTINUED | OUTPATIENT
Start: 2019-04-16 | End: 2019-04-16 | Stop reason: HOSPADM

## 2019-04-16 RX ORDER — LABETALOL HYDROCHLORIDE 5 MG/ML
5 INJECTION, SOLUTION INTRAVENOUS
Status: DISCONTINUED | OUTPATIENT
Start: 2019-04-16 | End: 2019-04-16 | Stop reason: HOSPADM

## 2019-04-16 RX ORDER — VASOPRESSIN 20 U/ML
INJECTION PARENTERAL AS NEEDED
Status: DISCONTINUED | OUTPATIENT
Start: 2019-04-16 | End: 2019-04-16 | Stop reason: SURG

## 2019-04-16 RX ORDER — OXYCODONE AND ACETAMINOPHEN 10; 325 MG/1; MG/1
1 TABLET ORAL ONCE AS NEEDED
Status: COMPLETED | OUTPATIENT
Start: 2019-04-16 | End: 2019-04-16

## 2019-04-16 RX ORDER — SUCCINYLCHOLINE CHLORIDE 20 MG/ML
INJECTION INTRAMUSCULAR; INTRAVENOUS AS NEEDED
Status: DISCONTINUED | OUTPATIENT
Start: 2019-04-16 | End: 2019-04-16 | Stop reason: SURG

## 2019-04-16 RX ORDER — FENTANYL CITRATE 50 UG/ML
INJECTION, SOLUTION INTRAMUSCULAR; INTRAVENOUS AS NEEDED
Status: DISCONTINUED | OUTPATIENT
Start: 2019-04-16 | End: 2019-04-16 | Stop reason: SURG

## 2019-04-16 RX ORDER — NALOXONE HCL 0.4 MG/ML
0.4 VIAL (ML) INJECTION AS NEEDED
Status: DISCONTINUED | OUTPATIENT
Start: 2019-04-16 | End: 2019-04-16 | Stop reason: HOSPADM

## 2019-04-16 RX ORDER — HYDROCODONE BITARTRATE AND ACETAMINOPHEN 7.5; 325 MG/1; MG/1
1 TABLET ORAL ONCE AS NEEDED
Status: DISCONTINUED | OUTPATIENT
Start: 2019-04-16 | End: 2019-04-16 | Stop reason: HOSPADM

## 2019-04-16 RX ORDER — OXYCODONE AND ACETAMINOPHEN 7.5; 325 MG/1; MG/1
2 TABLET ORAL EVERY 4 HOURS PRN
Status: DISCONTINUED | OUTPATIENT
Start: 2019-04-16 | End: 2019-04-16 | Stop reason: HOSPADM

## 2019-04-16 RX ORDER — LIDOCAINE HYDROCHLORIDE 20 MG/ML
INJECTION, SOLUTION INFILTRATION; PERINEURAL AS NEEDED
Status: DISCONTINUED | OUTPATIENT
Start: 2019-04-16 | End: 2019-04-16 | Stop reason: SURG

## 2019-04-16 RX ORDER — HYDROCODONE BITARTRATE AND ACETAMINOPHEN 7.5; 325 MG/1; MG/1
1 TABLET ORAL EVERY 4 HOURS PRN
Qty: 12 TABLET | Refills: 0 | Status: SHIPPED | OUTPATIENT
Start: 2019-04-16 | End: 2019-04-22

## 2019-04-16 RX ORDER — SODIUM CHLORIDE, SODIUM LACTATE, POTASSIUM CHLORIDE, CALCIUM CHLORIDE 600; 310; 30; 20 MG/100ML; MG/100ML; MG/100ML; MG/100ML
1000 INJECTION, SOLUTION INTRAVENOUS CONTINUOUS
Status: DISCONTINUED | OUTPATIENT
Start: 2019-04-16 | End: 2019-04-16 | Stop reason: HOSPADM

## 2019-04-16 RX ORDER — PROPOFOL 10 MG/ML
VIAL (ML) INTRAVENOUS AS NEEDED
Status: DISCONTINUED | OUTPATIENT
Start: 2019-04-16 | End: 2019-04-16 | Stop reason: SURG

## 2019-04-16 RX ORDER — OXYMETAZOLINE HYDROCHLORIDE 0.05 G/100ML
2 SPRAY NASAL 2 TIMES DAILY
Status: DISCONTINUED | OUTPATIENT
Start: 2019-04-16 | End: 2019-04-16 | Stop reason: HOSPADM

## 2019-04-16 RX ORDER — SODIUM CHLORIDE 0.9 % (FLUSH) 0.9 %
1-10 SYRINGE (ML) INJECTION AS NEEDED
Status: DISCONTINUED | OUTPATIENT
Start: 2019-04-16 | End: 2019-04-16 | Stop reason: HOSPADM

## 2019-04-16 RX ORDER — MAGNESIUM HYDROXIDE 1200 MG/15ML
LIQUID ORAL AS NEEDED
Status: DISCONTINUED | OUTPATIENT
Start: 2019-04-16 | End: 2019-04-16 | Stop reason: HOSPADM

## 2019-04-16 RX ORDER — BUPIVACAINE HCL/0.9 % NACL/PF 0.1 %
2 PLASTIC BAG, INJECTION (ML) EPIDURAL ONCE
Status: COMPLETED | OUTPATIENT
Start: 2019-04-16 | End: 2019-04-16

## 2019-04-16 RX ORDER — ROCURONIUM BROMIDE 10 MG/ML
INJECTION, SOLUTION INTRAVENOUS AS NEEDED
Status: DISCONTINUED | OUTPATIENT
Start: 2019-04-16 | End: 2019-04-16 | Stop reason: SURG

## 2019-04-16 RX ORDER — IPRATROPIUM BROMIDE AND ALBUTEROL SULFATE 2.5; .5 MG/3ML; MG/3ML
3 SOLUTION RESPIRATORY (INHALATION) ONCE AS NEEDED
Status: DISCONTINUED | OUTPATIENT
Start: 2019-04-16 | End: 2019-04-16 | Stop reason: HOSPADM

## 2019-04-16 RX ORDER — PROMETHAZINE HYDROCHLORIDE 25 MG/1
12.5 TABLET ORAL ONCE AS NEEDED
Status: DISCONTINUED | OUTPATIENT
Start: 2019-04-16 | End: 2019-04-16 | Stop reason: HOSPADM

## 2019-04-16 RX ORDER — METOCLOPRAMIDE HYDROCHLORIDE 5 MG/ML
5 INJECTION INTRAMUSCULAR; INTRAVENOUS
Status: DISCONTINUED | OUTPATIENT
Start: 2019-04-16 | End: 2019-04-16 | Stop reason: HOSPADM

## 2019-04-16 RX ORDER — OXYMETAZOLINE HYDROCHLORIDE 0.05 G/100ML
SPRAY NASAL AS NEEDED
Status: DISCONTINUED | OUTPATIENT
Start: 2019-04-16 | End: 2019-04-16 | Stop reason: HOSPADM

## 2019-04-16 RX ORDER — MIDAZOLAM HYDROCHLORIDE 1 MG/ML
1 INJECTION INTRAMUSCULAR; INTRAVENOUS
Status: DISCONTINUED | OUTPATIENT
Start: 2019-04-16 | End: 2019-04-16 | Stop reason: HOSPADM

## 2019-04-16 RX ORDER — ONDANSETRON 2 MG/ML
INJECTION INTRAMUSCULAR; INTRAVENOUS AS NEEDED
Status: DISCONTINUED | OUTPATIENT
Start: 2019-04-16 | End: 2019-04-16 | Stop reason: SURG

## 2019-04-16 RX ORDER — IBUPROFEN 600 MG/1
600 TABLET ORAL ONCE AS NEEDED
Status: DISCONTINUED | OUTPATIENT
Start: 2019-04-16 | End: 2019-04-16 | Stop reason: HOSPADM

## 2019-04-16 RX ORDER — SODIUM CHLORIDE 0.9 % (FLUSH) 0.9 %
3 SYRINGE (ML) INJECTION AS NEEDED
Status: DISCONTINUED | OUTPATIENT
Start: 2019-04-16 | End: 2019-04-16 | Stop reason: HOSPADM

## 2019-04-16 RX ADMIN — ONDANSETRON HYDROCHLORIDE 4 MG: 2 SOLUTION INTRAMUSCULAR; INTRAVENOUS at 09:25

## 2019-04-16 RX ADMIN — VASOPRESSIN 1 UNITS: 20 INJECTION INTRAVENOUS at 09:22

## 2019-04-16 RX ADMIN — LIDOCAINE HYDROCHLORIDE 100 MG: 20 INJECTION, SOLUTION INFILTRATION; PERINEURAL at 09:05

## 2019-04-16 RX ADMIN — FENTANYL CITRATE 50 MCG: 50 INJECTION INTRAMUSCULAR; INTRAVENOUS at 09:35

## 2019-04-16 RX ADMIN — SODIUM CHLORIDE, POTASSIUM CHLORIDE, SODIUM LACTATE AND CALCIUM CHLORIDE 1000 ML: 600; 310; 30; 20 INJECTION, SOLUTION INTRAVENOUS at 08:20

## 2019-04-16 RX ADMIN — OXYCODONE HYDROCHLORIDE AND ACETAMINOPHEN 1 TABLET: 10; 325 TABLET ORAL at 10:10

## 2019-04-16 RX ADMIN — PROPOFOL 150 MG: 10 INJECTION, EMULSION INTRAVENOUS at 09:05

## 2019-04-16 RX ADMIN — SUCCINYLCHOLINE CHLORIDE 120 MG: 20 INJECTION, SOLUTION INTRAMUSCULAR; INTRAVENOUS at 09:05

## 2019-04-16 RX ADMIN — OXYMETAZOLINE HYDROCHLORIDE 2 SPRAY: 5 SPRAY NASAL at 08:46

## 2019-04-16 RX ADMIN — ROCURONIUM BROMIDE 5 MG: 10 INJECTION INTRAVENOUS at 09:05

## 2019-04-16 RX ADMIN — Medication 2 G: at 09:09

## 2019-04-16 RX ADMIN — METOCLOPRAMIDE 5 MG: 5 INJECTION, SOLUTION INTRAMUSCULAR; INTRAVENOUS at 09:15

## 2019-04-16 RX ADMIN — FENTANYL CITRATE 100 MCG: 50 INJECTION INTRAMUSCULAR; INTRAVENOUS at 09:05

## 2019-04-16 NOTE — TELEPHONE ENCOUNTER
Patient had nasal surgery today. (Dr. Valeria Solis) She said she always has problems with her oxygen saturation, and is wanting you to order a pulse ox. We are to let her know when it is ready and she will  at  to take to medical supply store.

## 2019-04-16 NOTE — ANESTHESIA POSTPROCEDURE EVALUATION
Patient: Robyn Minaya    Procedure Summary     Date:  04/16/19 Room / Location:   [facility] OR 03 /  [facility] OR    Anesthesia Start:  0859 Anesthesia Stop:  1001    Procedures:           1. Functional endoscopic sinus surgery with bilateral maxillary antrostomy    2. Bilateral nasal endoscopy with biopsy of nasal septum    3.  Nasal septal prosthesis placement   (N/A Nose)      PLACEMENT OF NASAL SEPTAL PROSTHESIS (N/A )      ENDOSCOPIC FUNCTIONAL SINUS SURGERY (bilareral maxillary antrostomy without image guidance) (Bilateral Nose) Diagnosis:       Nasal septal perforation      Chronic maxillary sinusitis      (Nasal septal perforation [J34.89])      (Chronic maxillary sinusitis [J32.0])    Surgeon:  Mark Anthony Anderson MD Provider:  Ronel Saldana CRNA    Anesthesia Type:  general ASA Status:  3          Anesthesia Type: general  Last vitals  BP   129/73 (04/16/19 1115)   Temp   97.6 °F (36.4 °C) (04/16/19 1055)   Pulse   69 (04/16/19 1115)   Resp   14 (04/16/19 1055)     SpO2   90 % (04/16/19 1115)     Post Anesthesia Care and Evaluation    Patient location during evaluation: PACU  Patient participation: complete - patient participated  Level of consciousness: awake and alert  Pain management: adequate  Airway patency: patent  Anesthetic complications: No anesthetic complications  PONV Status: none  Cardiovascular status: acceptable and hemodynamically stable  Respiratory status: acceptable  Hydration status: acceptable    Comments: Blood pressure 129/73, pulse 69, temperature 97.6 °F (36.4 °C), temperature source Temporal, resp. rate 14, SpO2 90 %, not currently breastfeeding.    Patient discharged from PACU based upon Sherman score. Please see RN notes for further details

## 2019-04-16 NOTE — ANESTHESIA PROCEDURE NOTES
Airway  Urgency: elective    Airway not difficult    General Information and Staff    Patient location during procedure: OR  CRNA: Eye, Ronel, CRNA    Indications and Patient Condition  Indications for airway management: airway protection    Preoxygenated: yes  Mask difficulty assessment: 1 - vent by mask    Final Airway Details  Final airway type: endotracheal airway      Successful airway: ETT  Cuffed: yes   Successful intubation technique: direct laryngoscopy  Facilitating devices/methods: intubating stylet  Endotracheal tube insertion site: oral  Blade: Marni  Blade size: 3.5.  ETT size (mm): 7.5  Cormack-Lehane Classification: grade I - full view of glottis  Placement verified by: chest auscultation and capnometry   Cuff volume (mL): 5  Measured from: lips  ETT to lips (cm): 21  Number of attempts at approach: 1

## 2019-04-16 NOTE — ANESTHESIA PREPROCEDURE EVALUATION
Anesthesia Evaluation     Patient summary reviewed   no history of anesthetic complications:  NPO Solid Status: > 8 hours  NPO Liquid Status: > 2 hours           Airway   Mallampati: I  TM distance: >3 FB  Neck ROM: full  Dental - normal exam         Pulmonary - normal exam    breath sounds clear to auscultation  (+) a smoker (occasional) Current Abstained day of surgery, sleep apnea (not compliant with CPAP),   (-) asthma, recent URI  Cardiovascular - normal exam  Exercise tolerance: good (4-7 METS)    ECG reviewed  Patient on routine beta blocker and Beta blocker given within 24 hours of surgery  Rhythm: regular  Rate: normal    (+) hypertension, hyperlipidemia,   (-) pacemaker, past MI, angina, cardiac stents, CABG      Neuro/Psych  (+) psychiatric history Depression and Bipolar,     (-) seizures, TIA, CVA  GI/Hepatic/Renal/Endo    (+) obesity,  GERD,  diabetes mellitus, hypothyroidism,   (-) liver disease, no renal disease, hyperthyroidism    Musculoskeletal     Abdominal    Substance History      OB/GYN          Other                        Anesthesia Plan    ASA 3     general     intravenous induction   Anesthetic plan, all risks, benefits, and alternatives have been provided, discussed and informed consent has been obtained with: patient.

## 2019-04-18 LAB
CYTO UR: NORMAL
LAB AP CASE REPORT: NORMAL
LAB AP CLINICAL INFORMATION: NORMAL
LAB AP INTRADEPARTMENTAL CONSULT: NORMAL
Lab: NORMAL
PATH REPORT.FINAL DX SPEC: NORMAL
PATH REPORT.GROSS SPEC: NORMAL

## 2019-04-22 ENCOUNTER — OFFICE VISIT (OUTPATIENT)
Dept: OTOLARYNGOLOGY | Facility: CLINIC | Age: 59
End: 2019-04-22

## 2019-04-22 VITALS
BODY MASS INDEX: 33.69 KG/M2 | SYSTOLIC BLOOD PRESSURE: 100 MMHG | WEIGHT: 171.6 LBS | TEMPERATURE: 97.7 F | RESPIRATION RATE: 18 BRPM | DIASTOLIC BLOOD PRESSURE: 74 MMHG | HEIGHT: 60 IN

## 2019-04-22 DIAGNOSIS — H02.831 DERMATOCHALASIS OF BOTH UPPER EYELIDS: Primary | ICD-10-CM

## 2019-04-22 DIAGNOSIS — J32.0 CHRONIC MAXILLARY SINUSITIS: ICD-10-CM

## 2019-04-22 DIAGNOSIS — H02.834 DERMATOCHALASIS OF BOTH UPPER EYELIDS: Primary | ICD-10-CM

## 2019-04-22 DIAGNOSIS — J34.89 NASAL SEPTAL PERFORATION: ICD-10-CM

## 2019-04-22 PROCEDURE — 99024 POSTOP FOLLOW-UP VISIT: CPT | Performed by: OTOLARYNGOLOGY

## 2019-04-22 NOTE — PROGRESS NOTES
"Robyn Minaya returns to the office status post FESS with bilateral maxillary antrostomy, nasal endoscopy with biopsy of nasal septum, and nasal septal prothesis placement on 4/16/19.    SUBJECTIVE:  Since surgery, she is doing relatively well. She does report some nasal congestion. She does not feel the nasal septal prothesis is bothersome. She is tolerating this without difficulty. She denies any pain, fever, chills, bleeding, or drainage.     She states she is also interested in referral for visual field testing.     OBJECTIVE:  /74 (BP Location: Left arm, Patient Position: Sitting, Cuff Size: Adult)   Temp 97.7 °F (36.5 °C) (Temporal)   Resp 18   Ht 152.4 cm (60\")   Wt 77.8 kg (171 lb 9.6 oz)   BMI 33.51 kg/m²   Nasal septal prosthesis in place. There was significant crusting to both nasal cavities removed with forceps. This did help improve her congestion. Bilateral upper eyelids with hooding present.           ASSESSMENT:  Robyn was seen today for post-op.    Diagnoses and all orders for this visit:    Dermatochalasis of both upper eyelids    Nasal septal perforation    Chronic maxillary sinusitis    Other orders  -     Ambulatory Referral to Ophthalmology        PLAN:   Continue nasal saline sprays Q2 hours while awake. Continue saline rinses BID-TID. I would like to see her back in 4-6 weeks. I will refer her to Dr. Lock for visual field testing in the meantime.         Mark Anthony Anderson MD   04/22/2019  5:04 PM      "

## 2019-04-23 ENCOUNTER — OFFICE VISIT (OUTPATIENT)
Dept: UROLOGY | Facility: CLINIC | Age: 59
End: 2019-04-23

## 2019-04-23 ENCOUNTER — TELEPHONE (OUTPATIENT)
Dept: OTOLARYNGOLOGY | Facility: CLINIC | Age: 59
End: 2019-04-23

## 2019-04-23 VITALS — HEIGHT: 60 IN | BODY MASS INDEX: 33.4 KG/M2 | TEMPERATURE: 97.5 F | WEIGHT: 170.1 LBS

## 2019-04-23 DIAGNOSIS — R32 URINARY INCONTINENCE, UNSPECIFIED TYPE: Primary | ICD-10-CM

## 2019-04-23 PROCEDURE — 99204 OFFICE O/P NEW MOD 45 MIN: CPT | Performed by: UROLOGY

## 2019-04-23 PROCEDURE — 51798 US URINE CAPACITY MEASURE: CPT | Performed by: UROLOGY

## 2019-04-23 NOTE — PATIENT INSTRUCTIONS

## 2019-04-23 NOTE — PROGRESS NOTES
Ms. Minaya is 59 y.o. female    Chief Complaint   Patient presents with   • Urinary Incontinence   • Urinary Frequency       History of Present Illness  Urinary Incontinence  Patient complains of urinary incontinence. This has been present for several years. The patient leaks urine with with urge. Patient describes the symptoms as urge to urinate with little or no warning and urine leaking unpredictably. Factors associated with symptoms include none known. Evaluation to date includes Urodynamics: OAB. Treatment to date includes anticholinergics, myrbetriq, uroplasty.    The following portions of the patient's history were reviewed and updated as appropriate: allergies, current medications, past family history, past medical history, past social history, past surgical history and problem list.    Review of Systems   Constitutional: Negative for appetite change, chills, fatigue, fever and unexpected weight change.   HENT: Negative for congestion, dental problem, ear pain, hearing loss, nosebleeds, sinus pressure and trouble swallowing.    Eyes: Negative for pain, discharge, redness and itching.   Respiratory: Negative for apnea, cough, choking and shortness of breath.    Cardiovascular: Negative for chest pain and palpitations.   Gastrointestinal: Negative for abdominal distention, abdominal pain, blood in stool, constipation, diarrhea, nausea and vomiting.   Endocrine: Negative for cold intolerance and heat intolerance.   Genitourinary: Positive for frequency and urgency. Negative for decreased urine volume, difficulty urinating, dyspareunia, dysuria, enuresis, flank pain, genital sores, hematuria, menstrual problem, pelvic pain, vaginal bleeding, vaginal discharge and vaginal pain.   Musculoskeletal: Negative for arthralgias, back pain and gait problem.   Skin: Negative for pallor, rash and wound.   Allergic/Immunologic: Negative for immunocompromised state.   Neurological: Negative for dizziness, tremors, seizures,  weakness, numbness and headaches.   Hematological: Negative for adenopathy. Does not bruise/bleed easily.   Psychiatric/Behavioral: Negative for agitation, behavioral problems, hallucinations, self-injury and suicidal ideas.       I have reviewed the review of systems      Current Outpatient Medications:   •  allopurinol (ZYLOPRIM) 100 MG tablet, Take 100 mg by mouth Daily., Disp: , Rfl:   •  atorvastatin (LIPITOR) 40 MG tablet, Take 40 mg by mouth Every Night., Disp: , Rfl:   •  bumetanide (BUMEX) 2 MG tablet, Take  by mouth Daily. Takes 1 tab m- w-f and 1/2 tab on Tuesday and thursday, Disp: , Rfl:   •  calcium carbonate (OS-KAYLEY) 600 MG tablet, Take 600 mg by mouth Daily., Disp: , Rfl:   •  carvedilol (COREG) 6.25 MG tablet, Take 6.25 mg by mouth 2 (Two) Times a Day With Meals., Disp: , Rfl:   •  cholecalciferol (VITAMIN D3) 1000 UNITS tablet, Take 1,000 Units by mouth Daily., Disp: , Rfl:   •  diclofenac-misoprostol (ARTHROTEC 75) 75-0.2 MG EC tablet, Take 1 tablet by mouth 2 (Two) Times a Day., Disp: , Rfl:   •  divalproex (DEPAKOTE) 500 MG 24 hr tablet, Take 1,000 mg by mouth Every Night., Disp: , Rfl:   •  DULoxetine (CYMBALTA) 60 MG capsule, Take 60 mg by mouth 2 (Two) Times a Day., Disp: , Rfl:   •  guaiFENesin (MUCINEX) 600 MG 12 hr tablet, Take 1,200 mg by mouth 2 (Two) Times a Day., Disp: , Rfl:   •  levothyroxine (SYNTHROID, LEVOTHROID) 125 MCG tablet, Take  by mouth Daily. Patient states  0.125mg, Disp: , Rfl:   •  lurasidone (LATUDA) 40 MG tablet tablet, Take 20 mg by mouth Daily Before Supper., Disp: , Rfl:   •  metFORMIN (GLUCOPHAGE) 500 MG tablet, Take 500 mg by mouth 2 (Two) Times a Day With Meals., Disp: , Rfl:   •  Multiple Vitamin (MULTI VITAMIN PO), Take 1 dose by mouth Daily., Disp: , Rfl:   •  niacin (SLO-NIACIN) 500 MG CR tablet, Take 500 mg by mouth Daily., Disp: , Rfl:   •  omeprazole (priLOSEC) 20 MG capsule, Take 800 mg by mouth Daily., Disp: , Rfl:   •  pilocarpine (SALAGEN) 5 MG  tablet, Take 5 mg by mouth 3 (Three) Times a Day., Disp: , Rfl:   •  potassium chloride (K-DUR,KLOR-CON) 20 MEQ CR tablet, Daily. Daily on Monday only, Disp: , Rfl:   •  pramipexole (MIRAPEX) 1.5 MG tablet, Take 1.5 mg by mouth Every Night., Disp: , Rfl:   •  QUEtiapine (SEROquel) 50 MG tablet, Take 25 mg by mouth Every Night., Disp: , Rfl:   •  spironolactone (ALDACTONE) 50 MG tablet, Take 25 mg by mouth Daily., Disp: , Rfl:   •  tiZANidine (ZANAFLEX) 4 MG tablet, Take 4 mg by mouth Every Night., Disp: , Rfl:   •  traMADol (ULTRAM) 50 MG tablet, Take 100 mg by mouth 2 (Two) Times a Day., Disp: , Rfl:     Past Medical History:   Diagnosis Date   • Acid reflux     RESOLVED PT PT   • Allergic rhinitis    • Arthritis     BACK   • Bipolar 1 disorder (CMS/HCC)    • Chronic back pain    • Degenerative arthritis    • Depression    • Deviated nasal septum    • Disease of thyroid gland    • Eczema    • Gout    • Hyperlipidemia    • Hypertension    • Hypertrophy of nasal turbinates    • Hypokalemia    • Hypothyroidism    • Menopause    • Nasal septal deviation    • Nasal valve stenosis    • Overactive bladder    • Plantar fasciitis    • RLS (restless legs syndrome)    • Sleep apnea    • Spinal headache        Past Surgical History:   Procedure Laterality Date   • CARPAL TUNNEL RELEASE Bilateral 2004   • CHOLECYSTECTOMY  2013   • COLONOSCOPY  10/17/2014    One 5-6mm tubular adenomatous polyp in the ascending colon; Two less than 4mm hyperplastic polyps in the sigmoid colon; Three rectal hyperplastic polyps; Diverticulosis; Repeat 5 years    • COLONOSCOPY  07/14/2010    Internal hemorrhoids; Repeat 7 years    • CYST REMOVAL  2016    neck   • ENDOSCOPIC FUNCTIONAL SINUS SURGERY (FESS) Bilateral 4/16/2019    Procedure: ENDOSCOPIC FUNCTIONAL SINUS SURGERY (bilareral maxillary antrostomy without image guidance);  Surgeon: Mark Anthony Anderson MD;  Location: [facility];  Service: ENT   • ENDOSCOPY N/A 4/11/2019    Procedure:  "ESOPHAGOGASTRODUODENOSCOPY WITH ANESTHESIA;  Surgeon: Robyn Frazier MD;  Location: [facility] ENDOSCOPY;  Service: Gastroenterology   • FOOT SURGERY Right 2010    X3   • HERNIA REPAIR     • LASER ABLATION      right back   • NASAL ENDOSCOPY N/A 4/16/2019    Procedure:     1. Functional endoscopic sinus surgery with bilateral maxillary antrostomy    2. Bilateral nasal endoscopy with biopsy of nasal septum    3.  Nasal septal prosthesis placement  ;  Surgeon: Mark Anthony Anderson MD;  Location: [facility] OR;  Service: ENT   • NASAL VESTIBULE LESION/PAPILLOMA EXCISION N/A 8/1/2017    Procedure: Repair of nasal vestibular stenosis and septoplasty; cartilage graft from left ear;  Surgeon: Mark Anthony Anderson MD;  Location: [facility] OR;  Service:    • SEPTOPLASTY N/A 4/16/2019    Procedure: PLACEMENT OF NASAL SEPTAL PROSTHESIS;  Surgeon: Mark Anthony Anderson MD;  Location: [facility] OR;  Service: ENT       Social History     Socioeconomic History   • Marital status:      Spouse name: Not on file   • Number of children: Not on file   • Years of education: Not on file   • Highest education level: Not on file   Tobacco Use   • Smoking status: Current Every Day Smoker     Packs/day: 0.50     Types: Cigarettes   • Smokeless tobacco: Never Used   Substance and Sexual Activity   • Alcohol use: Yes     Comment: maybe 3 x yearly   • Drug use: No   • Sexual activity: Defer       Family History   Problem Relation Age of Onset   • Cancer Mother    • Diabetes Father    • Hypertension Father    • Cancer Father    • Colon cancer Neg Hx    • Colon polyps Neg Hx        Objective    Temp 97.5 °F (36.4 °C)   Ht 152.4 cm (60\")   Wt 77.2 kg (170 lb 1.6 oz)   BMI 33.22 kg/m²     Physical Exam  Constitutional: Well nourished, Well developed; No apparent distress, her vital signs are reviewed  Psychiatric: Appropriate affect; Alert and oriented  Eyes: Unremarkable  Musculoskeletal: Normal gait and station  GI: Abdomen is soft, non-tender  Respiratory: No " distress; Unlabored movement; No accessory musculature needed with symmetric movements  Skin: No pallor or diaphoresis  : Labia normal; Urethral meatus normal position, not stenotic; grade 1 cystocele   Admission on 04/16/2019, Discharged on 04/16/2019   Component Date Value Ref Range Status   • Glucose 04/16/2019 115  70 - 130 mg/dL Final    : [id number] Gabino Cedillo ID: [id number]   • Case Report 04/16/2019    Final                    Value:Surgical Pathology Report                         Case: [id number]                                  Authorizing Provider:  Mark Anthony Anderson MD        Collected:           04/16/2019 09:21 AM          Ordering Location:     [facility] OR  Received:            04/16/2019 10:03 AM          Pathologist:           Darleen Knight MD                                                          Intraop:               Darleen Knight MD                                                          Specimen:    Nose, nasal septal mass                                                                   • Clinical Information 04/16/2019    Final                    Value:This result contains rich text formatting which cannot be displayed here.   • Final Diagnosis 04/16/2019    Final                    Value:This result contains rich text formatting which cannot be displayed here.   • Intraoperative Consultation 04/16/2019    Final                    Value:This result contains rich text formatting which cannot be displayed here.   • Intradepartmental Consult 04/16/2019    Final                    Value:This result contains rich text formatting which cannot be displayed here.   • Gross Description 04/16/2019    Final                    Value:This result contains rich text formatting which cannot be displayed here.   • Microscopic Description 04/16/2019    Final                    Value:This result contains rich text formatting which cannot be displayed here.   • Glucose  04/16/2019 129  70 - 130 mg/dL Final    : [id number] Silvino EverettMeter ID: [id number]       Results for orders placed or performed during the hospital encounter of 04/16/19   POC Glucose Once   Result Value Ref Range    Glucose 115 70 - 130 mg/dL   POC Glucose Once   Result Value Ref Range    Glucose 129 70 - 130 mg/dL   Tissue Pathology Exam   Result Value Ref Range    Case Report       Surgical Pathology Report                         Case: [id number]                                  Authorizing Provider:  Mark Anthony Anderson MD        Collected:           04/16/2019 09:21 AM          Ordering Location:     [facility] OR  Received:            04/16/2019 10:03 AM          Pathologist:           Darleen Knight MD                                                          Intraop:               Darleen Knight MD                                                          Specimen:    Nose, nasal septal mass                                                                    Clinical Information       Dr anderson took spec to path      Final Diagnosis       Nasal septal mass, excision:  Fragments of benign respiratory mucosa and submucosa with chronic inflammation  Fragments of squamous mucosa and submucosa with mixed inflammation  Fragments of degenerated, non-viable bone  Fragments of viable bone  Necrotic tissue with mixed inflammation  Negative for fungus ball  Negative for malignancy        Intraoperative Consultation       Paranasal biopsy:  Touch preparations demonstrate epithelial tissue  Negative for evidence of lymphoma  Flow cytometry will not be performed on the specimen  The report was given to Dr. Fontana by Dr. Knight on 4-.      Intradepartmental Consult       Dr. Juwan López has reviewed this case and concurs with the above benign diagnosis.      Gross Description       Specimen #1 is received fresh labeled with the patient's name, date of birth, and designated nasal septal mass.   The specimen consists of a mesh sock with tissue inside and outside of that.  The tissue is pink-gray, soft and aggregates to 2.4 x 1.5 x 0.7 cm.  A small amount of calcified tissue is noted.  Touch prep slides are made and submitted for H&E and Diff Quik stains.  The fragments are totally submitted in blocks 1A and 1B.  Block 1B is submitted for decalcification.      Microscopic Description       Histologic sections demonstrate benign, viable bone having nuclei within the lacunae.  In addition, there are fragments of markedly devitalized bone.  There is soft tissue with ulceration and acute inflammation.  There are fragments of respiratory mucosa having columnar epithelium with basally oriented nuclei.  There fragments of stratified squamous mucosa and submucosa with mixed inflammation.  Given the necrosis and acute inflammation, a GMS stain is performed on blocks 1A and 1B.  Fungal organisms are not identified.  Control stains appropriately.  Patient's history is reviewed including the patient's previous sinus procedure in 2017.  Given the patient's previous surgery, these findings most likely represent reactive/reparative changes.  Negative for dysplasia or malignancy.       Patient's Body mass index is 33.22 kg/m². BMI is above normal parameters. Recommendations include: educational material.    Estimation of residual urine via abdominal ultrasound  Residual Urine: 307 ml  Indication: incontinence  Position: Supine  Examination: Incremental scanning of the suprapubic area using 3 MHz transducer using copious amounts of acoustic gel.   Findings: An anechoic area was demonstrated which represented the bladder, with measurement of residual urine as noted. I inspected this myself. In that the residual urine was stable or insignificant, no treatment will be necessary at this time.     Post void:  Estimation of residual urine via abdominal ultrasound  Residual Urine: 13 ml  Indication: incontinence  Position:  Supine  Examination: Incremental scanning of the suprapubic area using 3 MHz transducer using copious amounts of acoustic gel.   Findings: An anechoic area was demonstrated which represented the bladder, with measurement of residual urine as noted. I inspected this myself. In that the residual urine was stable or insignificant, no treatment will be necessary at this time.             Assessment and Plan    Robyn was seen today for urinary incontinence and urinary frequency.    Diagnoses and all orders for this visit:    Urinary incontinence, unspecified type    I reviewed her outside records.  In summary this is a 59-year-old female with significant urge with urge incontinence who was previously seen by Dr. Irene and Garret Abad at [facility] urology.  She had been tried on anticholinergics and Myrbetriq with no help.  She was started on neuroplasty which also did not improve her symptoms.  She refused Botox.  They did a urodynamic study showing no significant stress incontinence but she did have significant involuntary bladder contractions per her report.    Patient does have refractory urge urinary incontinence and I think she would likely benefit from a trial of InterStim with a percutaneous nerve evaluation.  She has never had a cystoscopy and I think she does need to start with this.  Risk as below.  I have given her information on InterStim today    Cystoscopy as the definitive lower urinary tract study is discussed . The risks of pain and discomfort, infection, and urethral stricture are discussed with the patient including the technique used in the office setting.  All patient questions were answered.

## 2019-04-23 NOTE — TELEPHONE ENCOUNTER
Patient has been contacted with her date and time with Dr Hayde Becker on 04/30/2019 @ 10am for her VFT.

## 2019-04-30 ENCOUNTER — PROCEDURE VISIT (OUTPATIENT)
Dept: UROLOGY | Facility: CLINIC | Age: 59
End: 2019-04-30

## 2019-04-30 DIAGNOSIS — R32 URINARY INCONTINENCE, UNSPECIFIED TYPE: Primary | ICD-10-CM

## 2019-04-30 LAB
BILIRUB BLD-MCNC: ABNORMAL MG/DL
CLARITY, POC: CLEAR
COLOR UR: YELLOW
GLUCOSE UR STRIP-MCNC: NEGATIVE MG/DL
KETONES UR QL: ABNORMAL
LEUKOCYTE EST, POC: NEGATIVE
NITRITE UR-MCNC: NEGATIVE MG/ML
PH UR: 5 [PH] (ref 5–8)
PROT UR STRIP-MCNC: NEGATIVE MG/DL
RBC # UR STRIP: NEGATIVE /UL
SP GR UR: 1.03 (ref 1–1.03)
UROBILINOGEN UR QL: NORMAL

## 2019-04-30 PROCEDURE — 81003 URINALYSIS AUTO W/O SCOPE: CPT | Performed by: UROLOGY

## 2019-04-30 PROCEDURE — 52000 CYSTOURETHROSCOPY: CPT | Performed by: UROLOGY

## 2019-04-30 NOTE — PROGRESS NOTES
Pre- operative diagnosis:  incontinence    Post operative diagnosis:  Same    Procedure:  The patient was prepped and draped in a normal sterile fashion.  The urethra was anesthetized with 2% lidocaine jelly.  A rigid cystoscope was introduced per urethra.  The urethra is normal in appearance without obstruction or mass.  The bladder is without evidence of mucosal lesion.   There is no abnormality of the urothelium.  There is minimal trabeculation of the detrusor muscle.  The ureteral orifices are relatively normal in position and they efflux clear urine.    Patient tolerated the procedure well    Complications: none    Blood loss: minimal    Follow up:    No abnormality seen within the bladder.  She is interested in InterStim.  I would like to start with a trial percutaneous nerve evaluation.  She understands the risks involved in this including but not limited to bleeding, infection, damage to nerve, pain associated with this.  I will do this for her at her convenience.

## 2019-05-01 ENCOUNTER — TELEPHONE (OUTPATIENT)
Dept: UROLOGY | Facility: CLINIC | Age: 59
End: 2019-05-01

## 2019-05-06 ENCOUNTER — PROCEDURE VISIT (OUTPATIENT)
Dept: UROLOGY | Facility: CLINIC | Age: 59
End: 2019-05-06

## 2019-05-06 DIAGNOSIS — E78.2 MIXED HYPERLIPIDEMIA: ICD-10-CM

## 2019-05-06 DIAGNOSIS — E79.0 ELEVATED URIC ACID IN BLOOD: ICD-10-CM

## 2019-05-06 DIAGNOSIS — R32 URINARY INCONTINENCE, UNSPECIFIED TYPE: Primary | ICD-10-CM

## 2019-05-06 PROCEDURE — 64561 IMPLANT NEUROELECTRODES: CPT | Performed by: UROLOGY

## 2019-05-06 RX ORDER — ALLOPURINOL 100 MG/1
TABLET ORAL
Qty: 90 TABLET | Refills: 0 | Status: SHIPPED | OUTPATIENT
Start: 2019-05-06 | End: 2019-08-07 | Stop reason: SDUPTHER

## 2019-05-06 RX ORDER — ATORVASTATIN CALCIUM 40 MG/1
TABLET, FILM COATED ORAL
Qty: 90 TABLET | Refills: 0 | Status: SHIPPED | OUTPATIENT
Start: 2019-05-06 | End: 2019-06-13 | Stop reason: SDUPTHER

## 2019-05-09 ENCOUNTER — OFFICE VISIT (OUTPATIENT)
Dept: UROLOGY | Facility: CLINIC | Age: 59
End: 2019-05-09

## 2019-05-09 VITALS — BODY MASS INDEX: 33.38 KG/M2 | WEIGHT: 170 LBS | HEIGHT: 60 IN | TEMPERATURE: 98 F

## 2019-05-09 DIAGNOSIS — R32 URINARY INCONTINENCE, UNSPECIFIED TYPE: Primary | ICD-10-CM

## 2019-05-09 PROCEDURE — 99212 OFFICE O/P EST SF 10 MIN: CPT | Performed by: UROLOGY

## 2019-05-09 NOTE — PROGRESS NOTES
Ms. Minaya is 59 y.o. female    Chief Complaint   Patient presents with   • Urinary Frequency       Urinary Frequency    This is a chronic problem. The current episode started more than 1 year ago. The problem has been gradually worsening. The patient is experiencing no pain. There has been no fever. Pertinent negatives include no chills, flank pain, frequency, hematuria or urgency. Treatments tried: PNE. The treatment provided significant relief.       The following portions of the patient's history were reviewed and updated as appropriate: allergies, current medications, past family history, past medical history, past social history, past surgical history and problem list.    Review of Systems   Constitutional: Negative for chills and fever.   Gastrointestinal: Negative for abdominal pain, anal bleeding and blood in stool.   Genitourinary: Negative for decreased urine volume, difficulty urinating, dyspareunia, dysuria, enuresis, flank pain, frequency, genital sores, hematuria, menstrual problem, pelvic pain, urgency, vaginal bleeding, vaginal discharge and vaginal pain.        Symptoms have improved since having the PNE placed. Pt is interested in have the Interstim       I have reviewed the review of systems      Current Outpatient Medications:   •  allopurinol (ZYLOPRIM) 100 MG tablet, Take 100 mg by mouth Daily., Disp: , Rfl:   •  atorvastatin (LIPITOR) 40 MG tablet, Take 40 mg by mouth Every Night., Disp: , Rfl:   •  bumetanide (BUMEX) 2 MG tablet, Take  by mouth Daily. Takes 1 tab m- w-f and 1/2 tab on Tuesday and thursday, Disp: , Rfl:   •  calcium carbonate (OS-KAYLEY) 600 MG tablet, Take 600 mg by mouth Daily., Disp: , Rfl:   •  carvedilol (COREG) 6.25 MG tablet, Take 6.25 mg by mouth 2 (Two) Times a Day With Meals., Disp: , Rfl:   •  cholecalciferol (VITAMIN D3) 1000 UNITS tablet, Take 1,000 Units by mouth Daily., Disp: , Rfl:   •  diclofenac-misoprostol (ARTHROTEC 75) 75-0.2 MG EC tablet, Take 1 tablet by  mouth 2 (Two) Times a Day., Disp: , Rfl:   •  divalproex (DEPAKOTE) 500 MG 24 hr tablet, Take 1,000 mg by mouth Every Night., Disp: , Rfl:   •  DULoxetine (CYMBALTA) 60 MG capsule, Take 60 mg by mouth 2 (Two) Times a Day., Disp: , Rfl:   •  guaiFENesin (MUCINEX) 600 MG 12 hr tablet, Take 1,200 mg by mouth 2 (Two) Times a Day., Disp: , Rfl:   •  levothyroxine (SYNTHROID, LEVOTHROID) 125 MCG tablet, Take  by mouth Daily. Patient states  0.125mg, Disp: , Rfl:   •  lurasidone (LATUDA) 40 MG tablet tablet, Take 20 mg by mouth Daily Before Supper., Disp: , Rfl:   •  metFORMIN (GLUCOPHAGE) 500 MG tablet, Take 500 mg by mouth 2 (Two) Times a Day With Meals., Disp: , Rfl:   •  Multiple Vitamin (MULTI VITAMIN PO), Take 1 dose by mouth Daily., Disp: , Rfl:   •  niacin (SLO-NIACIN) 500 MG CR tablet, Take 500 mg by mouth Daily., Disp: , Rfl:   •  omeprazole (priLOSEC) 20 MG capsule, Take 800 mg by mouth Daily., Disp: , Rfl:   •  pilocarpine (SALAGEN) 5 MG tablet, Take 5 mg by mouth 3 (Three) Times a Day., Disp: , Rfl:   •  potassium chloride (K-DUR,KLOR-CON) 20 MEQ CR tablet, Daily. Daily on Monday only, Disp: , Rfl:   •  pramipexole (MIRAPEX) 1.5 MG tablet, Take 1.5 mg by mouth Every Night., Disp: , Rfl:   •  QUEtiapine (SEROquel) 50 MG tablet, Take 25 mg by mouth Every Night., Disp: , Rfl:   •  spironolactone (ALDACTONE) 50 MG tablet, Take 25 mg by mouth Daily., Disp: , Rfl:   •  tiZANidine (ZANAFLEX) 4 MG tablet, Take 4 mg by mouth Every Night., Disp: , Rfl:   •  traMADol (ULTRAM) 50 MG tablet, Take 100 mg by mouth 2 (Two) Times a Day., Disp: , Rfl:     Past Medical History:   Diagnosis Date   • Acid reflux     RESOLVED PT PT   • Allergic rhinitis    • Arthritis     BACK   • Bipolar 1 disorder (CMS/HCC)    • Chronic back pain    • Degenerative arthritis    • Depression    • Deviated nasal septum    • Disease of thyroid gland    • Eczema    • Gout    • Hyperlipidemia    • Hypertension    • Hypertrophy of nasal turbinates    •  Hypokalemia    • Hypothyroidism    • Menopause    • Nasal septal deviation    • Nasal valve stenosis    • Overactive bladder    • Plantar fasciitis    • RLS (restless legs syndrome)    • Sleep apnea    • Spinal headache        Past Surgical History:   Procedure Laterality Date   • CARPAL TUNNEL RELEASE Bilateral 2004   • CHOLECYSTECTOMY  2013   • COLONOSCOPY  10/17/2014    One 5-6mm tubular adenomatous polyp in the ascending colon; Two less than 4mm hyperplastic polyps in the sigmoid colon; Three rectal hyperplastic polyps; Diverticulosis; Repeat 5 years    • COLONOSCOPY  07/14/2010    Internal hemorrhoids; Repeat 7 years    • CYST REMOVAL  2016    neck   • ENDOSCOPIC FUNCTIONAL SINUS SURGERY (FESS) Bilateral 4/16/2019    Procedure: ENDOSCOPIC FUNCTIONAL SINUS SURGERY (bilareral maxillary antrostomy without image guidance);  Surgeon: Mark Anthony Anderson MD;  Location: [facility] OR;  Service: ENT   • ENDOSCOPY N/A 4/11/2019    Procedure: ESOPHAGOGASTRODUODENOSCOPY WITH ANESTHESIA;  Surgeon: Robyn Frazier MD;  Location: [facility] ENDOSCOPY;  Service: Gastroenterology   • FOOT SURGERY Right 2010    X3   • HERNIA REPAIR     • LASER ABLATION      right back   • NASAL ENDOSCOPY N/A 4/16/2019    Procedure:     1. Functional endoscopic sinus surgery with bilateral maxillary antrostomy    2. Bilateral nasal endoscopy with biopsy of nasal septum    3.  Nasal septal prosthesis placement  ;  Surgeon: Mark Anthony Anderson MD;  Location: [facility] OR;  Service: ENT   • NASAL VESTIBULE LESION/PAPILLOMA EXCISION N/A 8/1/2017    Procedure: Repair of nasal vestibular stenosis and septoplasty; cartilage graft from left ear;  Surgeon: Mark Anthony Anderson MD;  Location:  [facility] OR;  Service:    • SEPTOPLASTY N/A 4/16/2019    Procedure: PLACEMENT OF NASAL SEPTAL PROSTHESIS;  Surgeon: Mark Anthony Anderson MD;  Location: [facility] OR;  Service: ENT       Social History     Socioeconomic History   • Marital status:      Spouse name: Not on file   • Number of  "children: Not on file   • Years of education: Not on file   • Highest education level: Not on file   Tobacco Use   • Smoking status: Current Every Day Smoker     Packs/day: 0.50     Types: Cigarettes   • Smokeless tobacco: Never Used   Substance and Sexual Activity   • Alcohol use: Yes     Comment: maybe 3 x yearly   • Drug use: No   • Sexual activity: Defer       Family History   Problem Relation Age of Onset   • Cancer Mother    • Diabetes Father    • Hypertension Father    • Cancer Father    • Colon cancer Neg Hx    • Colon polyps Neg Hx        Objective    Temp 98 °F (36.7 °C)   Ht 152.4 cm (60\")   Wt 77.1 kg (170 lb)   BMI 33.20 kg/m²     Physical Exam   Constitutional: She is oriented to person, place, and time. She appears well-developed and well-nourished. No distress.   Pulmonary/Chest: Effort normal.   Abdominal: Soft. She exhibits no distension and no mass. There is no tenderness. There is no rebound and no guarding. No hernia.   Neurological: She is alert and oriented to person, place, and time.   Skin: Skin is warm and dry. She is not diaphoretic.   Psychiatric: She has a normal mood and affect.   Vitals reviewed.      Procedure visit on 04/30/2019   Component Date Value Ref Range Status   • Color 04/30/2019 Yellow  Yellow, Straw, Dark Yellow, Ml Final   • Clarity, UA 04/30/2019 Clear  Clear Final   • Glucose, UA 04/30/2019 Negative  Negative, 1000 mg/dL (3+) mg/dL Final   • Bilirubin 04/30/2019 Small (1+)* Negative Final   • Ketones, UA 04/30/2019 15 mg/dL* Negative Final   • Specific Gravity  04/30/2019 1.030  1.005 - 1.030 Final   • Blood, UA 04/30/2019 Negative  Negative Final   • pH, Urine 04/30/2019 5.0  5.0 - 8.0 Final   • Protein, POC 04/30/2019 Negative  Negative mg/dL Final   • Urobilinogen, UA 04/30/2019 Normal  Normal Final   • Nitrite, UA 04/30/2019 Negative  Negative Final   • Leukocytes 04/30/2019 Negative  Negative Final       Results for orders placed or performed in visit on " 04/30/19   POC Urinalysis Dipstick, Multipro   Result Value Ref Range    Color Yellow Yellow, Straw, Dark Yellow, Ml    Clarity, UA Clear Clear    Glucose, UA Negative Negative, 1000 mg/dL (3+) mg/dL    Bilirubin Small (1+) (A) Negative    Ketones, UA 15 mg/dL (A) Negative    Specific Gravity  1.030 1.005 - 1.030    Blood, UA Negative Negative    pH, Urine 5.0 5.0 - 8.0    Protein, POC Negative Negative mg/dL    Urobilinogen, UA Normal Normal    Nitrite, UA Negative Negative    Leukocytes Negative Negative     Patient's Body mass index is 33.2 kg/m². BMI is above normal parameters. Recommendations include: educational material.    Assessment and Plan    Robyn was seen today for urinary frequency.    Diagnoses and all orders for this visit:    Urinary incontinence, unspecified type  -     Case Request; Standing  -     ceFAZolin (ANCEF) 2 g in sodium chloride 0.9 % 100 mL IVPB  -     Case Request    Other orders  -     Follow Anesthesia Guidelines / Standing Orders; Future  -     Obtain informed consent  -     Provide NPO Instructions to Patient; Future  -     Follow Anesthesia Guidelines / Standing Orders; Standing  -     Verify NPO Status; Standing  -     Verify / Perform Chlorhexidine Skin Prep if Indicated (If Not Already Completed); Standing    Patient returns today after percutaneous nerve evaluation.  She is overall very happy with the results with greater than 50% reduction in both frequency and incontinence episodes.  Stimulated on the right.  She is very happy with these results and would like to proceed with full implant of the InterStim.  She understands risks of this including but not limited to bleeding, infection, damage to nerves, need for removal, pain associated with device, device malfunction or migration, inability to get an MRI below the level of the neck.

## 2019-05-09 NOTE — PATIENT INSTRUCTIONS

## 2019-05-15 ENCOUNTER — OFFICE VISIT (OUTPATIENT)
Dept: FAMILY MEDICINE CLINIC | Age: 59
End: 2019-05-15
Payer: COMMERCIAL

## 2019-05-15 VITALS
RESPIRATION RATE: 18 BRPM | OXYGEN SATURATION: 97 % | HEIGHT: 60 IN | TEMPERATURE: 98.1 F | SYSTOLIC BLOOD PRESSURE: 128 MMHG | WEIGHT: 169 LBS | BODY MASS INDEX: 33.18 KG/M2 | DIASTOLIC BLOOD PRESSURE: 82 MMHG | HEART RATE: 83 BPM

## 2019-05-15 DIAGNOSIS — J01.90 ACUTE BACTERIAL SINUSITIS: Primary | ICD-10-CM

## 2019-05-15 DIAGNOSIS — B96.89 ACUTE BACTERIAL SINUSITIS: Primary | ICD-10-CM

## 2019-05-15 PROCEDURE — 99213 OFFICE O/P EST LOW 20 MIN: CPT | Performed by: NURSE PRACTITIONER

## 2019-05-15 RX ORDER — DOXYCYCLINE HYCLATE 100 MG
100 TABLET ORAL 2 TIMES DAILY
Qty: 28 TABLET | Refills: 0 | Status: SHIPPED | OUTPATIENT
Start: 2019-05-15 | End: 2019-05-29

## 2019-05-15 RX ORDER — GUAIFENESIN 600 MG/1
1200 TABLET, EXTENDED RELEASE ORAL 2 TIMES DAILY
Qty: 40 TABLET | Refills: 0 | Status: SHIPPED | OUTPATIENT
Start: 2019-05-15 | End: 2019-05-25

## 2019-05-15 RX ORDER — FLUCONAZOLE 150 MG/1
TABLET ORAL
Qty: 2 TABLET | Refills: 0 | Status: SHIPPED | OUTPATIENT
Start: 2019-05-15 | End: 2020-01-07

## 2019-05-15 ASSESSMENT — ENCOUNTER SYMPTOMS
COUGH: 1
SINUS PRESSURE: 1

## 2019-05-15 NOTE — PROGRESS NOTES
100 MG tablet TAKE 1 TABLET BY MOUTH  DAILY Yes MAEVE Cuevas   atorvastatin (LIPITOR) 40 MG tablet TAKE 1 TABLET BY MOUTH  DAILY Yes MAEVE Cuevas   spironolactone (ALDACTONE) 50 MG tablet TAKE ONE TABLET BY MOUTH TWICE A DAY Yes MAEVE Cuevas   bismuth subsalicylate (PEPTO BISMOL) 262 MG chewable tablet Take 2 tablets by mouth 4 times daily Yes MAEVE Cuevas   omeprazole (PRILOSEC) 20 MG delayed release capsule Take 1 capsule by mouth 2 times daily (before meals) Yes MAEVE Cuevas   tetracycline (ACHROMYCIN;SUMYCIN) 500 MG capsule Take 1 capsule by mouth 4 times daily Yes MAEVE Cuevas   carvedilol (COREG) 6.25 MG tablet TAKE 1 TABLET BY MOUTH TWO  TIMES DAILY WITH MEALS Yes MAEVE Cuevas   spironolactone (ALDACTONE) 25 MG tablet Take 1 tablet by mouth daily Yes MAEVE Cuevas   spironolactone (ALDACTONE) 25 MG tablet Take 1 tablet by mouth daily Yes MAEVE Cuevas   ALPRAZolam (XANAX) 0.5 MG tablet Take 0.5 mg by mouth nightly as needed.  . Yes Historical Provider, MD   guaiFENesin (MUCINEX) 600 MG extended release tablet Take 2 tablets by mouth 2 times daily Yes MAEVE Cuevas   azelastine (ASTELIN) 0.1 % nasal spray 2 sprays by Nasal route 2 times daily Use in each nostril as directed Yes MAEVE Cuevas   potassium chloride (KLOR-CON M) 20 MEQ extended release tablet TAKE ONE TABLET BY MOUTH DAILY  Patient taking differently: takes one on monday wednesday and friday Yes Kristyn Lux MD   triamcinolone (ARISTOCORT) 0.5 % ointment  Yes Historical Provider, MD   escitalopram (LEXAPRO) 10 MG tablet Take 1 tablet by mouth daily Yes MAEVE Lew   pramipexole (MIRAPEX) 1.5 MG tablet Take 1.5 mg by mouth nightly Yes Historical Provider, MD   diclofenac (VOLTAREN) 75 MG EC tablet 2 times daily  Yes Historical Provider, MD   loratadine (CLARITIN) 10 MG tablet Take 1 tablet by mouth daily Yes Lo Hartley MD   bumetanide (BUMEX) 1 MG tablet Take 1 tablet by mouth 2 times daily  Patient taking differently: Take 1 mg by mouth daily takes one on monday wednesday and friday Yes Lynette King MD   atorvastatin (LIPITOR) 40 MG tablet TAKE ONE TABLET BY MOUTH DAILY Yes Lynette King MD   QUEtiapine (SEROQUEL) 50 MG tablet Take 25 mg by mouth nightly  Yes Historical Provider, MD   metFORMIN (GLUCOPHAGE) 500 MG tablet Take 500 mg by mouth 2 times daily (with meals) Yes Kristina Urrutia   niacin (SLO-NIACIN) 500 MG extended release tablet Take 500 mg by mouth daily  Yes Historical Provider, MD   levothyroxine (SYNTHROID) 100 MCG tablet Take 1 tablet by mouth Daily  Patient taking differently: Take 100 mcg by mouth Daily 2/15/18 pt reports PCP has increased to 125 mcg daily. Yes Lynette King MD   Tens Unit MISC by Does not apply route Apply and use tid prn  Dx -lumbar pain  Dispense tens and supplies Yes MAEVE Cuevsa   tiZANidine (ZANAFLEX) 4 MG tablet Take 4 mg by mouth nightly as needed  Yes Historical Provider, MD   pilocarpine (SALAGEN) 5 MG tablet Take 5 mg by mouth 3 times daily Yes Historical Provider, MD   Vitamin D (CHOLECALCIFEROL) 1000 UNITS CAPS capsule Take 1,000 Units by mouth daily Yes Historical Provider, MD   Multiple Vitamins-Minerals (MULTIVITAMIN PO) Take by mouth daily Yes Historical Provider, MD   DULoxetine (CYMBALTA) 60 MG capsule Take 60 mg by mouth 2 times daily  Yes Historical Provider, MD   traMADol (ULTRAM) 50 MG tablet Take 100 mg by mouth every 12 hours  Yes Historical Provider, MD   calcium carbonate 600 MG TABS tablet Take 1 tablet by mouth daily. Yes Historical Provider, MD   divalproex (DEPAKOTE) 500 MG ER tablet Take 1,000 mg by mouth nightly.  Yes Historical Provider, MD     Allergies   Allergen Reactions    Hctz [Hydrochlorothiazide] Other (See Comments)     Acid reflux       Past Surgical History:   Procedure Laterality Date    CARPAL TUNNEL RELEASE Bilateral 2002    CHOLECYSTECTOMY  10/2014    COLON SURGERY      COLON SURGERY      biopsy     Fear of current or ex partner: Not on file     Emotionally abused: Not on file     Physically abused: Not on file     Forced sexual activity: Not on file   Other Topics Concern    Not on file   Social History Narrative    Not on file       Review of Systems   Constitutional: Negative for fever. HENT: Positive for congestion, ear pain, sinus pressure and sneezing. Respiratory: Positive for cough. Musculoskeletal:        Leg numbness to left         OBJECTIVE:    Physical Exam   Constitutional: She is oriented to person, place, and time. She appears well-developed and well-nourished. No distress. HENT:   Head: Normocephalic and atraumatic. Right Ear: Tympanic membrane, external ear and ear canal normal.   Left Ear: Tympanic membrane, external ear and ear canal normal.   Nose: Right sinus exhibits maxillary sinus tenderness and frontal sinus tenderness. Left sinus exhibits maxillary sinus tenderness and frontal sinus tenderness. Mouth/Throat: Uvula is midline, oropharynx is clear and moist and mucous membranes are normal. No oropharyngeal exudate, posterior oropharyngeal edema or posterior oropharyngeal erythema (pnd noted). No tonsillar exudate. Eyes: Pupils are equal, round, and reactive to light. Conjunctivae and EOM are normal.   Neck: Neck supple. No tracheal deviation present. Cardiovascular: Normal rate, regular rhythm and normal heart sounds. Pulmonary/Chest: Effort normal and breath sounds normal. No stridor. No respiratory distress. She has no wheezes. Lymphadenopathy:     She has no cervical adenopathy. Neurological: She is alert and oriented to person, place, and time. Skin: Skin is warm and dry. She is not diaphoretic. Psychiatric: She has a normal mood and affect. Her behavior is normal.   Nursing note and vitals reviewed.       /82 (Site: Left Upper Arm, Position: Sitting, Cuff Size: Large Adult)   Pulse 83   Temp 98.1 °F (36.7 °C) (Oral)   Resp 18   Ht 4' 11.5\"

## 2019-05-16 ENCOUNTER — TELEPHONE (OUTPATIENT)
Dept: UROLOGY | Facility: CLINIC | Age: 59
End: 2019-05-16

## 2019-05-16 NOTE — TELEPHONE ENCOUNTER
Pt called stating that since she has had the interstem she has developed numbness in her leg. She is scheduled to get the permanent procedure on 6/5/19. Pt is concerned that this numbness and tingling will be a chronic issue or will this subside. Will call back and get more information from pt on what is going on and make Dr. Guerrero aware of her concerns prior to procedure.

## 2019-05-20 NOTE — TELEPHONE ENCOUNTER
Numbness is not a typical reaction with this procedure.  If she is having numbness in her leg that is persistent at this point, I will need her to see a neurologist.

## 2019-05-20 NOTE — TELEPHONE ENCOUNTER
Called pt back and she states it is just her left leg that is having the numbness and tingling feeling since having the interstem placed. She will call back on what she would like to do regarding the referral and the upcoming surgery.

## 2019-05-29 ENCOUNTER — APPOINTMENT (OUTPATIENT)
Dept: PREADMISSION TESTING | Facility: HOSPITAL | Age: 59
End: 2019-05-29

## 2019-05-29 VITALS
HEART RATE: 80 BPM | DIASTOLIC BLOOD PRESSURE: 53 MMHG | OXYGEN SATURATION: 95 % | SYSTOLIC BLOOD PRESSURE: 129 MMHG | HEIGHT: 60 IN | BODY MASS INDEX: 33.33 KG/M2 | WEIGHT: 169.75 LBS | RESPIRATION RATE: 18 BRPM

## 2019-05-29 LAB
ANION GAP SERPL CALCULATED.3IONS-SCNC: 6 MMOL/L (ref 4–13)
BUN BLD-MCNC: 22 MG/DL (ref 5–21)
BUN/CREAT SERPL: 22.2 (ref 7–25)
CALCIUM SPEC-SCNC: 9.5 MG/DL (ref 8.4–10.4)
CHLORIDE SERPL-SCNC: 99 MMOL/L (ref 98–110)
CO2 SERPL-SCNC: 36 MMOL/L (ref 24–31)
CREAT BLD-MCNC: 0.99 MG/DL (ref 0.5–1.4)
DEPRECATED RDW RBC AUTO: 43.8 FL (ref 40–54)
ERYTHROCYTE [DISTWIDTH] IN BLOOD BY AUTOMATED COUNT: 13.4 % (ref 12–15)
GFR SERPL CREATININE-BSD FRML MDRD: 58 ML/MIN/1.73
GLUCOSE BLD-MCNC: 110 MG/DL (ref 70–100)
HCT VFR BLD AUTO: 37.9 % (ref 37–47)
HGB BLD-MCNC: 12.2 G/DL (ref 12–16)
MCH RBC QN AUTO: 29 PG (ref 28–32)
MCHC RBC AUTO-ENTMCNC: 32.2 G/DL (ref 33–36)
MCV RBC AUTO: 90.2 FL (ref 82–98)
PLATELET # BLD AUTO: 318 10*3/MM3 (ref 130–400)
PMV BLD AUTO: 11.4 FL (ref 6–12)
POTASSIUM BLD-SCNC: 3.9 MMOL/L (ref 3.5–5.3)
RBC # BLD AUTO: 4.2 10*6/MM3 (ref 4.2–5.4)
SODIUM BLD-SCNC: 141 MMOL/L (ref 135–145)
WBC NRBC COR # BLD: 7.85 10*3/MM3 (ref 4.8–10.8)

## 2019-05-29 PROCEDURE — 80048 BASIC METABOLIC PNL TOTAL CA: CPT | Performed by: UROLOGY

## 2019-05-29 PROCEDURE — 85027 COMPLETE CBC AUTOMATED: CPT | Performed by: UROLOGY

## 2019-05-29 PROCEDURE — 36415 COLL VENOUS BLD VENIPUNCTURE: CPT

## 2019-06-04 DIAGNOSIS — M54.10 RADICULAR PAIN OF RIGHT LOWER EXTREMITY: Primary | ICD-10-CM

## 2019-06-04 DIAGNOSIS — M25.551 RIGHT HIP PAIN: ICD-10-CM

## 2019-06-05 ENCOUNTER — APPOINTMENT (OUTPATIENT)
Dept: GENERAL RADIOLOGY | Facility: HOSPITAL | Age: 59
End: 2019-06-05

## 2019-06-05 ENCOUNTER — HOSPITAL ENCOUNTER (OUTPATIENT)
Facility: HOSPITAL | Age: 59
Setting detail: HOSPITAL OUTPATIENT SURGERY
Discharge: HOME | End: 2019-06-05
Attending: UROLOGY | Admitting: UROLOGY

## 2019-06-05 ENCOUNTER — ANESTHESIA EVENT (OUTPATIENT)
Dept: PERIOP | Facility: HOSPITAL | Age: 59
End: 2019-06-05

## 2019-06-05 ENCOUNTER — ANESTHESIA (OUTPATIENT)
Dept: PERIOP | Facility: HOSPITAL | Age: 59
End: 2019-06-05

## 2019-06-05 VITALS
OXYGEN SATURATION: 90 % | RESPIRATION RATE: 16 BRPM | DIASTOLIC BLOOD PRESSURE: 86 MMHG | TEMPERATURE: 97.2 F | SYSTOLIC BLOOD PRESSURE: 115 MMHG | HEART RATE: 74 BPM

## 2019-06-05 DIAGNOSIS — R32 URINARY INCONTINENCE, UNSPECIFIED TYPE: ICD-10-CM

## 2019-06-05 LAB
GLUCOSE BLDC GLUCOMTR-MCNC: 104 MG/DL (ref 70–130)
GLUCOSE BLDC GLUCOMTR-MCNC: 107 MG/DL (ref 70–130)

## 2019-06-05 PROCEDURE — 72220 X-RAY EXAM SACRUM TAILBONE: CPT

## 2019-06-05 PROCEDURE — 64581 OPN IMPLTJ NEA SACRAL NERVE: CPT | Performed by: UROLOGY

## 2019-06-05 PROCEDURE — 25010000002 PROPOFOL 10 MG/ML EMULSION: Performed by: NURSE ANESTHETIST, CERTIFIED REGISTERED

## 2019-06-05 PROCEDURE — C1767 GENERATOR, NEURO NON-RECHARG: HCPCS | Performed by: UROLOGY

## 2019-06-05 PROCEDURE — 25010000002 SUCCINYLCHOLINE PER 20 MG: Performed by: NURSE ANESTHETIST, CERTIFIED REGISTERED

## 2019-06-05 PROCEDURE — 25010000002 ONDANSETRON PER 1 MG: Performed by: NURSE ANESTHETIST, CERTIFIED REGISTERED

## 2019-06-05 PROCEDURE — 76000 FLUOROSCOPY <1 HR PHYS/QHP: CPT

## 2019-06-05 PROCEDURE — C1778 LEAD, NEUROSTIMULATOR: HCPCS | Performed by: UROLOGY

## 2019-06-05 PROCEDURE — 82962 GLUCOSE BLOOD TEST: CPT

## 2019-06-05 PROCEDURE — 95972 ALYS CPLX SP/PN NPGT W/PRGRM: CPT | Performed by: UROLOGY

## 2019-06-05 PROCEDURE — 64590 INS/RPL PRPH SAC/GSTR NPG/R: CPT | Performed by: UROLOGY

## 2019-06-05 PROCEDURE — 94640 AIRWAY INHALATION TREATMENT: CPT

## 2019-06-05 PROCEDURE — C1787 PATIENT PROGR, NEUROSTIM: HCPCS | Performed by: UROLOGY

## 2019-06-05 PROCEDURE — 99024 POSTOP FOLLOW-UP VISIT: CPT | Performed by: UROLOGY

## 2019-06-05 PROCEDURE — C1894 INTRO/SHEATH, NON-LASER: HCPCS | Performed by: UROLOGY

## 2019-06-05 PROCEDURE — 25010000002 FENTANYL CITRATE (PF) 250 MCG/5ML SOLUTION: Performed by: NURSE ANESTHETIST, CERTIFIED REGISTERED

## 2019-06-05 DEVICE — LD INTERSTIM TINED 28CM 388928: Type: IMPLANTABLE DEVICE | Status: FUNCTIONAL

## 2019-06-05 DEVICE — NEUROSTIMULAR INTERSTIM 2: Type: IMPLANTABLE DEVICE | Status: FUNCTIONAL

## 2019-06-05 RX ORDER — IBUPROFEN 600 MG/1
600 TABLET ORAL ONCE AS NEEDED
Status: DISCONTINUED | OUTPATIENT
Start: 2019-06-05 | End: 2019-06-05 | Stop reason: HOSPADM

## 2019-06-05 RX ORDER — HYDROCODONE BITARTRATE AND ACETAMINOPHEN 5; 325 MG/1; MG/1
1 TABLET ORAL EVERY 4 HOURS PRN
Qty: 18 TABLET | Refills: 0 | Status: ON HOLD | OUTPATIENT
Start: 2019-06-05 | End: 2019-07-09

## 2019-06-05 RX ORDER — HYDROCODONE BITARTRATE AND ACETAMINOPHEN 5; 325 MG/1; MG/1
1 TABLET ORAL ONCE AS NEEDED
Status: DISCONTINUED | OUTPATIENT
Start: 2019-06-05 | End: 2019-06-05 | Stop reason: HOSPADM

## 2019-06-05 RX ORDER — OXYCODONE AND ACETAMINOPHEN 7.5; 325 MG/1; MG/1
2 TABLET ORAL EVERY 4 HOURS PRN
Status: DISCONTINUED | OUTPATIENT
Start: 2019-06-05 | End: 2019-06-05 | Stop reason: HOSPADM

## 2019-06-05 RX ORDER — ONDANSETRON 2 MG/ML
4 INJECTION INTRAMUSCULAR; INTRAVENOUS ONCE AS NEEDED
Status: DISCONTINUED | OUTPATIENT
Start: 2019-06-05 | End: 2019-06-05 | Stop reason: SDUPTHER

## 2019-06-05 RX ORDER — ONDANSETRON 2 MG/ML
4 INJECTION INTRAMUSCULAR; INTRAVENOUS ONCE AS NEEDED
Status: DISCONTINUED | OUTPATIENT
Start: 2019-06-05 | End: 2019-06-05 | Stop reason: HOSPADM

## 2019-06-05 RX ORDER — ONDANSETRON 2 MG/ML
INJECTION INTRAMUSCULAR; INTRAVENOUS AS NEEDED
Status: DISCONTINUED | OUTPATIENT
Start: 2019-06-05 | End: 2019-06-05 | Stop reason: SURG

## 2019-06-05 RX ORDER — ROCURONIUM BROMIDE 10 MG/ML
INJECTION, SOLUTION INTRAVENOUS AS NEEDED
Status: DISCONTINUED | OUTPATIENT
Start: 2019-06-05 | End: 2019-06-05 | Stop reason: SURG

## 2019-06-05 RX ORDER — SODIUM CHLORIDE 0.9 % (FLUSH) 0.9 %
3 SYRINGE (ML) INJECTION EVERY 12 HOURS SCHEDULED
Status: DISCONTINUED | OUTPATIENT
Start: 2019-06-05 | End: 2019-06-05 | Stop reason: HOSPADM

## 2019-06-05 RX ORDER — BUPIVACAINE HCL/0.9 % NACL/PF 0.1 %
2 PLASTIC BAG, INJECTION (ML) EPIDURAL ONCE
Status: COMPLETED | OUTPATIENT
Start: 2019-06-05 | End: 2019-06-05

## 2019-06-05 RX ORDER — SULFAMETHOXAZOLE AND TRIMETHOPRIM 800; 160 MG/1; MG/1
1 TABLET ORAL 2 TIMES DAILY
Qty: 14 TABLET | Refills: 0 | Status: SHIPPED | OUTPATIENT
Start: 2019-06-05 | End: 2019-06-12

## 2019-06-05 RX ORDER — SODIUM CHLORIDE, SODIUM LACTATE, POTASSIUM CHLORIDE, CALCIUM CHLORIDE 600; 310; 30; 20 MG/100ML; MG/100ML; MG/100ML; MG/100ML
1000 INJECTION, SOLUTION INTRAVENOUS CONTINUOUS
Status: DISCONTINUED | OUTPATIENT
Start: 2019-06-05 | End: 2019-06-05 | Stop reason: HOSPADM

## 2019-06-05 RX ORDER — SODIUM CHLORIDE 0.9 % (FLUSH) 0.9 %
3 SYRINGE (ML) INJECTION AS NEEDED
Status: DISCONTINUED | OUTPATIENT
Start: 2019-06-05 | End: 2019-06-05 | Stop reason: HOSPADM

## 2019-06-05 RX ORDER — ACETAMINOPHEN 500 MG
1000 TABLET ORAL ONCE
Status: COMPLETED | OUTPATIENT
Start: 2019-06-05 | End: 2019-06-05

## 2019-06-05 RX ORDER — OXYCODONE AND ACETAMINOPHEN 10; 325 MG/1; MG/1
1 TABLET ORAL ONCE AS NEEDED
Status: DISCONTINUED | OUTPATIENT
Start: 2019-06-05 | End: 2019-06-05 | Stop reason: HOSPADM

## 2019-06-05 RX ORDER — LIDOCAINE HYDROCHLORIDE 20 MG/ML
INJECTION, SOLUTION INFILTRATION; PERINEURAL AS NEEDED
Status: DISCONTINUED | OUTPATIENT
Start: 2019-06-05 | End: 2019-06-05 | Stop reason: SURG

## 2019-06-05 RX ORDER — MAGNESIUM HYDROXIDE 1200 MG/15ML
LIQUID ORAL AS NEEDED
Status: DISCONTINUED | OUTPATIENT
Start: 2019-06-05 | End: 2019-06-05 | Stop reason: HOSPADM

## 2019-06-05 RX ORDER — IPRATROPIUM BROMIDE AND ALBUTEROL SULFATE 2.5; .5 MG/3ML; MG/3ML
3 SOLUTION RESPIRATORY (INHALATION) ONCE
Status: COMPLETED | OUTPATIENT
Start: 2019-06-05 | End: 2019-06-05

## 2019-06-05 RX ORDER — IPRATROPIUM BROMIDE AND ALBUTEROL SULFATE 2.5; .5 MG/3ML; MG/3ML
3 SOLUTION RESPIRATORY (INHALATION) ONCE AS NEEDED
Status: COMPLETED | OUTPATIENT
Start: 2019-06-05 | End: 2019-06-05

## 2019-06-05 RX ORDER — FENTANYL CITRATE 50 UG/ML
25 INJECTION, SOLUTION INTRAMUSCULAR; INTRAVENOUS AS NEEDED
Status: DISCONTINUED | OUTPATIENT
Start: 2019-06-05 | End: 2019-06-05 | Stop reason: HOSPADM

## 2019-06-05 RX ORDER — FENTANYL CITRATE 50 UG/ML
INJECTION, SOLUTION INTRAMUSCULAR; INTRAVENOUS AS NEEDED
Status: DISCONTINUED | OUTPATIENT
Start: 2019-06-05 | End: 2019-06-05 | Stop reason: SURG

## 2019-06-05 RX ORDER — BUPIVACAINE HYDROCHLORIDE 2.5 MG/ML
INJECTION, SOLUTION INFILTRATION; PERINEURAL AS NEEDED
Status: DISCONTINUED | OUTPATIENT
Start: 2019-06-05 | End: 2019-06-05 | Stop reason: HOSPADM

## 2019-06-05 RX ORDER — PROPOFOL 10 MG/ML
VIAL (ML) INTRAVENOUS AS NEEDED
Status: DISCONTINUED | OUTPATIENT
Start: 2019-06-05 | End: 2019-06-05 | Stop reason: SURG

## 2019-06-05 RX ORDER — LABETALOL HYDROCHLORIDE 5 MG/ML
5 INJECTION, SOLUTION INTRAVENOUS
Status: DISCONTINUED | OUTPATIENT
Start: 2019-06-05 | End: 2019-06-05 | Stop reason: HOSPADM

## 2019-06-05 RX ORDER — NALOXONE HCL 0.4 MG/ML
0.4 VIAL (ML) INJECTION AS NEEDED
Status: DISCONTINUED | OUTPATIENT
Start: 2019-06-05 | End: 2019-06-05 | Stop reason: HOSPADM

## 2019-06-05 RX ORDER — LIDOCAINE HYDROCHLORIDE 40 MG/ML
SOLUTION TOPICAL AS NEEDED
Status: DISCONTINUED | OUTPATIENT
Start: 2019-06-05 | End: 2019-06-05 | Stop reason: SURG

## 2019-06-05 RX ORDER — SODIUM CHLORIDE 0.9 % (FLUSH) 0.9 %
1-10 SYRINGE (ML) INJECTION AS NEEDED
Status: DISCONTINUED | OUTPATIENT
Start: 2019-06-05 | End: 2019-06-05 | Stop reason: HOSPADM

## 2019-06-05 RX ORDER — SUCCINYLCHOLINE CHLORIDE 20 MG/ML
INJECTION INTRAMUSCULAR; INTRAVENOUS AS NEEDED
Status: DISCONTINUED | OUTPATIENT
Start: 2019-06-05 | End: 2019-06-05 | Stop reason: SURG

## 2019-06-05 RX ORDER — SODIUM CHLORIDE, SODIUM LACTATE, POTASSIUM CHLORIDE, CALCIUM CHLORIDE 600; 310; 30; 20 MG/100ML; MG/100ML; MG/100ML; MG/100ML
100 INJECTION, SOLUTION INTRAVENOUS CONTINUOUS
Status: DISCONTINUED | OUTPATIENT
Start: 2019-06-05 | End: 2019-06-05 | Stop reason: HOSPADM

## 2019-06-05 RX ORDER — METOCLOPRAMIDE HYDROCHLORIDE 5 MG/ML
5 INJECTION INTRAMUSCULAR; INTRAVENOUS
Status: DISCONTINUED | OUTPATIENT
Start: 2019-06-05 | End: 2019-06-05 | Stop reason: HOSPADM

## 2019-06-05 RX ADMIN — ONDANSETRON HYDROCHLORIDE 4 MG: 2 SOLUTION INTRAMUSCULAR; INTRAVENOUS at 08:10

## 2019-06-05 RX ADMIN — LIDOCAINE HYDROCHLORIDE 100 MG: 20 INJECTION, SOLUTION INFILTRATION; PERINEURAL at 07:18

## 2019-06-05 RX ADMIN — SUCCINYLCHOLINE CHLORIDE 100 MG: 20 INJECTION, SOLUTION INTRAMUSCULAR; INTRAVENOUS at 07:18

## 2019-06-05 RX ADMIN — SODIUM CHLORIDE, POTASSIUM CHLORIDE, SODIUM LACTATE AND CALCIUM CHLORIDE 1000 ML: 600; 310; 30; 20 INJECTION, SOLUTION INTRAVENOUS at 06:06

## 2019-06-05 RX ADMIN — IPRATROPIUM BROMIDE AND ALBUTEROL SULFATE 3 ML: 2.5; .5 SOLUTION RESPIRATORY (INHALATION) at 09:38

## 2019-06-05 RX ADMIN — PROPOFOL 100 MG: 10 INJECTION, EMULSION INTRAVENOUS at 07:18

## 2019-06-05 RX ADMIN — IPRATROPIUM BROMIDE AND ALBUTEROL SULFATE 3 ML: 2.5; .5 SOLUTION RESPIRATORY (INHALATION) at 06:56

## 2019-06-05 RX ADMIN — FENTANYL CITRATE 50 MCG: 50 INJECTION INTRAMUSCULAR; INTRAVENOUS at 07:43

## 2019-06-05 RX ADMIN — Medication 2 G: at 07:34

## 2019-06-05 RX ADMIN — ROCURONIUM BROMIDE 10 MG: 10 INJECTION INTRAVENOUS at 07:18

## 2019-06-05 RX ADMIN — LIDOCAINE HYDROCHLORIDE 1 EACH: 40 SOLUTION TOPICAL at 07:19

## 2019-06-05 RX ADMIN — ACETAMINOPHEN 1000 MG: 500 TABLET, FILM COATED ORAL at 06:56

## 2019-06-05 RX ADMIN — SODIUM CHLORIDE, POTASSIUM CHLORIDE, SODIUM LACTATE AND CALCIUM CHLORIDE: 600; 310; 30; 20 INJECTION, SOLUTION INTRAVENOUS at 07:45

## 2019-06-05 RX ADMIN — FENTANYL CITRATE 100 MCG: 50 INJECTION INTRAMUSCULAR; INTRAVENOUS at 07:18

## 2019-06-05 NOTE — DISCHARGE INSTRUCTIONS

## 2019-06-05 NOTE — OP NOTE
"INTERSTIM STAGES 1 AND 2 LEAD AND GENERATOR PLACEMENT  Procedure Note    Robyn Minaya  6/5/2019    Pre-op Diagnosis:   Urinary incontinence, unspecified type [R32]    Post-op Diagnosis:     Post-Op Diagnosis Codes:     * Urinary incontinence, unspecified type [R32]    Procedure/CPT® Codes:      Procedure(s):  INTERSTIM STAGES 1 AND 2 LEAD AND GENERATOR PLACEMENT    Surgeon(s):  Endy Guerrero MD    Anesthesia: General    Staff:   Circulator: Janet Jasso RN  Scrub Person: Ricardo Carlin CST  Assistant: Meli Tillman      Indications:  Refractory OAB with incontinence    Procedure details:  Patient was properly identified and placed in prone position per or protocol.  General anesthesia was administered.  Pillows were placed under lower abdomen to flatten the sacrum and under the shins to allow the toes to dangle freely.  A ground pad was placed on the bottom of the patient's foot and the long test stimulation cable was connected to the ground pad and the external test stimulator.  Patient was prepped and draped in the usual sterile fashion.    The C-arm was moved into AP position to provide fluoroscopic guidance of the sacrum.  The medial edges of the foramen were identified and marked.  The C-arm was then moved into position to identify the S3 foramen.  Once the needle entry point was determined local injection of 1% lidocaine was administered.  A 3.5\" inch foramen needle was placed into the superior medial aspect of the S3 foramen and appropriate needle depth was visualized using fluoroscopy.  Proper S3 needle location was also confirmed by direct observation of the lifting of the perineum or bellowing and plantar flexion of the great toe utilizing the J-hook patient cable and the external test stimulator. This was done bilaterally with greatest results on the patient's left side     The foramen needle stylette was removed and a directional guide was placed through the needle using markers on the " guide to assure appropriate depth.  The foramen needle was removed by sliding over the directional guide.  A small incision was made peripherally to the directional guide through the skin.  The lead introducer with dilator was placed over the directional guide and utilizing fluoroscopic guidance the lead introducer was advanced until the radiopaque gay was MCFP through the foramen.  The dilator was removed along with the directional guide.  Using fluoroscopic dense, the tined lead with bent stylette was placed through the introducer until electrodes 2 and 3 straddled the anterior surface of the sacrum.  All 4 electrodes were tested observing cruz and plantar flexion of the great toe utilizing the J-hook patient cable and the external test stimulator.  After satisfactory lead placement was confirmed, the introducer was retracted over the lead under fluoroscopy, deploying the tines into the presacral tissue.  Retesting of all 4 electrodes confirmed appropriate responses was completed.    The internal neurostimulator pocket site was identified below the iliac crest and lateral to the sacrum on the patient's right side.  Local was administered and an incision was made into the subcutaneous tissue.  Blunt dissection was used to create a pocket with hemostasis achieved.    A tunneling tool with sheath was placed from the lead exit site subcutaneously to the incised pocket site.  The tunneling total was removed and the lead was fed through the sheath exiting at the pocket site.  The sheath was removed.  The lead was cleaned and dried.    The lead was inserted into the header of the InterStim neurostimulator until the blue tip was visualized at the distal window.  The single setscrew was tightened using the torque wrench until a audible click was heard.    The neurostimulator was placed into the pocket with the estimated identification side placed upwards and any excessive lead was placed around the neurostimulator.   The clinician  telemetry had, covered in a sterile sleeve, was placed over the implanted neurostimulator to ensure proper lead connection and at the parameters were within normal range.  Impedances were confirmed to be within normal limits, greater than 50 and less than 400 ohms.    The wound was irrigated with sterile water and closed with Vicryl subcutaneous sutures and Monocryl skin sutures.  This was then covered with skin affix and the procedure was ended        Estimated Blood Loss: minimal    Specimens:                None      Complications: none     DISPOSITION: The patient will be discharged from the hospital to follow up with nurse practitioner in 2 weeks for a wound check.

## 2019-06-05 NOTE — NURSING NOTE
Spoke with Dr JANNET Ruiz regarding pt remaining extremely sleepy. Pt has been in PACU for 2 hours verbal orders rec'd

## 2019-06-05 NOTE — ANESTHESIA PROCEDURE NOTES
Airway  Urgency: elective    Airway not difficult    General Information and Staff    Patient location during procedure: OR  CRNA: Osbaldo Soriano CRNA    Indications and Patient Condition  Indications for airway management: airway protection    Preoxygenated: yes  Mask difficulty assessment: 1 - vent by mask    Final Airway Details  Final airway type: endotracheal airway      Successful airway: ETT  Cuffed: yes   Successful intubation technique: direct laryngoscopy  Facilitating devices/methods: intubating stylet  Endotracheal tube insertion site: oral  Blade: Nicholson  Blade size: 2  ETT size (mm): 7.5  Cormack-Lehane Classification: grade I - full view of glottis  Placement verified by: chest auscultation and capnometry   Cuff volume (mL): 8  Measured from: teeth  ETT to teeth (cm): 21  Number of attempts at approach: 1

## 2019-06-05 NOTE — ANESTHESIA PREPROCEDURE EVALUATION
Anesthesia Evaluation     Patient summary reviewed and Nursing notes reviewed   history of anesthetic complications (spinal headache):  NPO Solid Status: > 8 hours  NPO Liquid Status: > 8 hours           Airway   Mallampati: I  TM distance: >3 FB  Neck ROM: full  Dental      Pulmonary     breath sounds clear to auscultation  (+) a smoker Current Smoked day of surgery, sleep apnea,     ROS comment: Sinus surgery 4/19/19    Cough x 1 week. No fever.   Cardiovascular   Exercise tolerance: poor (<4 METS)    ECG reviewed  Patient on routine beta blocker and Beta blocker given within 24 hours of surgery  Rhythm: regular  Rate: normal    (+) hypertension, hyperlipidemia,       Neuro/Psych  (+) psychiatric history Depression and Bipolar,     GI/Hepatic/Renal/Endo    (+)  GERD,  diabetes mellitus type 2, hypothyroidism,   (-) liver disease, no renal disease    Musculoskeletal     Abdominal    Substance History      OB/GYN          Other   (+) arthritis                     Anesthesia Plan    ASA 3     general   (Pt falling asleep during preop discussion. Hold versed. Discussed potential prolonged post op pulm complications. Will pre tx with duoneb)  intravenous induction   Anesthetic plan, all risks, benefits, and alternatives have been provided, discussed and informed consent has been obtained with: patient.

## 2019-06-05 NOTE — ANESTHESIA POSTPROCEDURE EVALUATION
Patient: Robyn Minaya    Procedure Summary     Date:  06/05/19 Room / Location:   [facility] OR  /  [facility]    Anesthesia Start:  0711 Anesthesia Stop:  0831    Procedure:  INTERSTIM STAGES 1 AND 2 LEAD AND GENERATOR PLACEMENT (N/A ) Diagnosis:       Urinary incontinence, unspecified type      (Urinary incontinence, unspecified type [R32])    Surgeon:  Endy Guerrero MD Provider:  Osbaldo Soriano CRNA    Anesthesia Type:  general ASA Status:  3          Anesthesia Type: general  Last vitals  BP   146/93 (06/05/19 1013)   Temp   97.2 °F (36.2 °C) (06/05/19 1013)   Pulse   71 (06/05/19 1013)   Resp   16 (06/05/19 1013)     SpO2   95 % (06/05/19 1013)     Post Anesthesia Care and Evaluation    Patient location during evaluation: PACU  Patient participation: complete - patient participated  Level of consciousness: awake and alert  Pain management: adequate  Airway patency: patent  Anesthetic complications: No anesthetic complications  PONV Status: none  Cardiovascular status: acceptable and hemodynamically stable  Respiratory status: acceptable  Hydration status: acceptable    Comments: Blood pressure 146/93, pulse 71, temperature 97.2 °F (36.2 °C), temperature source Temporal, resp. rate 16, SpO2 95 %, not currently breastfeeding      Came to see patient at request of nurse. Pt has been in pacu 2.5 hours. She continues to be very sleepy and falling asleep. This is consistent with pre-op state. She did not receive versed and received 150 mcg of fentanyl intra-op. Her o2 sats are dropping to mid 80s when sleeping. She feels that if she can get up and walk she will do better. She has been on room air for quite some time. I am going to let her go to outpatient with continuous pulse ox. She can be discharged from hospital when o2 sat is above 88 consistently. She has been given incentive spirometer and duoneb.

## 2019-06-10 ENCOUNTER — OFFICE VISIT (OUTPATIENT)
Dept: OTOLARYNGOLOGY | Facility: CLINIC | Age: 59
End: 2019-06-10

## 2019-06-10 VITALS
HEIGHT: 60 IN | WEIGHT: 171.4 LBS | SYSTOLIC BLOOD PRESSURE: 129 MMHG | BODY MASS INDEX: 33.65 KG/M2 | RESPIRATION RATE: 16 BRPM | TEMPERATURE: 97.9 F | HEART RATE: 83 BPM | DIASTOLIC BLOOD PRESSURE: 91 MMHG

## 2019-06-10 DIAGNOSIS — H53.40 VISUAL FIELD DEFECT: ICD-10-CM

## 2019-06-10 DIAGNOSIS — H02.834 DERMATOCHALASIS OF BOTH UPPER EYELIDS: Primary | ICD-10-CM

## 2019-06-10 DIAGNOSIS — J32.0 CHRONIC MAXILLARY SINUSITIS: ICD-10-CM

## 2019-06-10 DIAGNOSIS — J34.89 NASAL VALVE STENOSIS: ICD-10-CM

## 2019-06-10 DIAGNOSIS — J34.89 NASAL SEPTAL PERFORATION: ICD-10-CM

## 2019-06-10 DIAGNOSIS — H02.831 DERMATOCHALASIS OF BOTH UPPER EYELIDS: Primary | ICD-10-CM

## 2019-06-10 PROCEDURE — 99214 OFFICE O/P EST MOD 30 MIN: CPT | Performed by: OTOLARYNGOLOGY

## 2019-06-10 NOTE — PROGRESS NOTES
Chief Complaint:   Chief Complaint   Patient presents with   • Bleph Eval   • Follow-up     FESS       HPI: Robyn Minaya is a 59 y.o. who is here for complaints of excess eyelid skin affecting both upper eyelids for several years. She states symptoms occur all the time and are associated with daily activities. She states symptoms are severe enough that they are causing difficulty performing daily activities. She states there are aggravating factors including being tired. She states there are alleviating factors including manually lifting lids.  A visual field test has been performed. The results are available in the chart. Visual field testing demonstrates a 30 degree improvement on the right and a 36 degree improvement on the left with taping. She denies significant dry eye symptoms.    She is also status post FESS with bilateral maxillary antrostomy, nasal endoscopy with biopsy of nasal septum, and nasal septal prothesis placement on 4/16/19. She reports left sided nasal congestion and obstruction. The nasal prosthesis is now very bothersome to her and is requests this to be removed. She denies pain, fever, chills, or drainage.     Past Medical History:   Diagnosis Date   • Acid reflux     RESOLVED PT PT   • Allergic rhinitis    • Arthritis     BACK   • Bipolar 1 disorder (CMS/HCC)    • Chronic back pain    • Degenerative arthritis    • Depression    • Deviated nasal septum    • Diabetes mellitus (CMS/HCC)    • Eczema    • Gout    • Hyperlipidemia    • Hypertension    • Hypertrophy of nasal turbinates    • Hypokalemia    • Hypothyroidism    • Incontinence in female    • Insomnia    • Menopause    • Nasal septal deviation    • Nasal valve stenosis    • Overactive bladder    • Plantar fasciitis    • RLS (restless legs syndrome)    • Sleep apnea    • Spinal headache    • Wears glasses      Past Surgical History:   Procedure Laterality Date   • CARPAL TUNNEL RELEASE Bilateral 2004   • CHOLECYSTECTOMY  2013   •  COLONOSCOPY  10/17/2014    One 5-6mm tubular adenomatous polyp in the ascending colon; Two less than 4mm hyperplastic polyps in the sigmoid colon; Three rectal hyperplastic polyps; Diverticulosis; Repeat 5 years    • COLONOSCOPY  07/14/2010    Internal hemorrhoids; Repeat 7 years    • CYST REMOVAL  2016    neck   • ENDOSCOPIC FUNCTIONAL SINUS SURGERY (FESS) Bilateral 4/16/2019    Procedure: ENDOSCOPIC FUNCTIONAL SINUS SURGERY (bilareral maxillary antrostomy without image guidance);  Surgeon: Mark Anthony Anderson MD;  Location: [facility] OR;  Service: ENT   • ENDOSCOPY N/A 4/11/2019    Procedure: ESOPHAGOGASTRODUODENOSCOPY WITH ANESTHESIA;  Surgeon: Robyn Frazier MD;  Location: [facility] ENDOSCOPY;  Service: Gastroenterology   • FOOT SURGERY Right 2010    X3   • HERNIA REPAIR     • INTERSTIM PLACEMENT N/A 6/5/2019    Procedure: INTERSTIM STAGES 1 AND 2 LEAD AND GENERATOR PLACEMENT;  Surgeon: Endy Guerrero MD;  Location: [facility] OR;  Service: Urology   • LASER ABLATION      right back   • NASAL ENDOSCOPY N/A 4/16/2019    Procedure:     1. Functional endoscopic sinus surgery with bilateral maxillary antrostomy    2. Bilateral nasal endoscopy with biopsy of nasal septum    3.  Nasal septal prosthesis placement  ;  Surgeon: Mark Anthony Anderson MD;  Location:  [facility] OR;  Service: ENT   • NASAL VESTIBULE LESION/PAPILLOMA EXCISION N/A 8/1/2017    Procedure: Repair of nasal vestibular stenosis and septoplasty; cartilage graft from left ear;  Surgeon: Mark Anthony Anderson MD;  Location: [facility] OR;  Service:    • SEPTOPLASTY N/A 4/16/2019    Procedure: PLACEMENT OF NASAL SEPTAL PROSTHESIS;  Surgeon: Mark Anthony Anderson MD;  Location: [facility] OR;  Service: ENT     Family History   Problem Relation Age of Onset   • Cancer Mother    • Diabetes Father    • Hypertension Father    • Cancer Father    • Colon cancer Neg Hx    • Colon polyps Neg Hx      Social History     Tobacco Use   • Smoking status: Current Every Day Smoker     Packs/day: 1.00      Types: Cigarettes   • Smokeless tobacco: Never Used   Substance Use Topics   • Alcohol use: Yes     Frequency: Monthly or less     Comment: maybe 3 x yearly   • Drug use: No     Allergies:  Hctz [hydrochlorothiazide]    Current Outpatient Medications:   •  allopurinol (ZYLOPRIM) 100 MG tablet, Take 100 mg by mouth Daily., Disp: , Rfl:   •  atorvastatin (LIPITOR) 40 MG tablet, Take 40 mg by mouth Every Night., Disp: , Rfl:   •  bumetanide (BUMEX) 2 MG tablet, Take  by mouth Daily. Takes 1 tab m- w-f and 1/2 tab on Tuesday and thursday, Disp: , Rfl:   •  calcium carbonate (OS-KAYLEY) 600 MG tablet, Take 600 mg by mouth Daily., Disp: , Rfl:   •  carvedilol (COREG) 6.25 MG tablet, Take 6.25 mg by mouth 2 (Two) Times a Day With Meals., Disp: , Rfl:   •  cholecalciferol (VITAMIN D3) 1000 UNITS tablet, Take 1,000 Units by mouth Daily., Disp: , Rfl:   •  diclofenac-misoprostol (ARTHROTEC 75) 75-0.2 MG EC tablet, Take 1 tablet by mouth 2 (Two) Times a Day., Disp: , Rfl:   •  divalproex (DEPAKOTE) 500 MG 24 hr tablet, Take 1,000 mg by mouth Every Night., Disp: , Rfl:   •  DULoxetine (CYMBALTA) 60 MG capsule, Take 60 mg by mouth 2 (Two) Times a Day., Disp: , Rfl:   •  guaiFENesin (MUCINEX) 600 MG 12 hr tablet, Take 1,200 mg by mouth 2 (Two) Times a Day., Disp: , Rfl:   •  HYDROcodone-acetaminophen (NORCO) 5-325 MG per tablet, Take 1 tablet by mouth Every 4 (Four) Hours As Needed for Moderate Pain ., Disp: 18 tablet, Rfl: 0  •  levothyroxine (SYNTHROID, LEVOTHROID) 125 MCG tablet, Take  by mouth Daily. Patient states  0.125mg, Disp: , Rfl:   •  Multiple Vitamin (MULTI VITAMIN PO), Take 1 dose by mouth Daily., Disp: , Rfl:   •  niacin (SLO-NIACIN) 500 MG CR tablet, Take 500 mg by mouth Daily., Disp: , Rfl:   •  omeprazole (priLOSEC) 20 MG capsule, Take 20 mg by mouth Daily., Disp: , Rfl:   •  pilocarpine (SALAGEN) 5 MG tablet, Take 5 mg by mouth 3 (Three) Times a Day., Disp: , Rfl:   •  potassium chloride (K-DUR,KLOR-CON) 20  "MEQ CR tablet, Daily. Daily on Monday only, Disp: , Rfl:   •  pramipexole (MIRAPEX) 1.5 MG tablet, Take 1.5 mg by mouth Every Night., Disp: , Rfl:   •  QUEtiapine (SEROquel) 50 MG tablet, Take 25 mg by mouth Every Night., Disp: , Rfl:   •  spironolactone (ALDACTONE) 50 MG tablet, Take 25 mg by mouth Daily., Disp: , Rfl:   •  sulfamethoxazole-trimethoprim (BACTRIM DS) 800-160 MG per tablet, Take 1 tablet by mouth 2 (Two) Times a Day for 7 days., Disp: 14 tablet, Rfl: 0  •  tiZANidine (ZANAFLEX) 4 MG tablet, Take 4 mg by mouth Every Night., Disp: , Rfl:   •  traMADol (ULTRAM) 50 MG tablet, Take 100 mg by mouth 2 (Two) Times a Day., Disp: , Rfl:     Review of Systems   Constitutional: Negative for chills and fever.   HENT: Positive for congestion. Negative for sinus pressure, sinus pain and voice change.    Eyes: Positive for visual disturbance.   Respiratory: Negative for cough and shortness of breath.    Cardiovascular: Negative for chest pain.   Gastrointestinal: Negative for constipation, diarrhea, nausea, rectal pain and vomiting.       Constitutional:  /91   Pulse 83   Temp 97.9 °F (36.6 °C)   Resp 16   Ht 152.4 cm (60\")   Wt 77.7 kg (171 lb 6.4 oz)   BMI 33.47 kg/m² .    Well-developed and well nourished.    Eyes: PERR.  EOMI.  No icterus.  Conjunctiva is healthy.  Lids:  -OD: Excessive upper eyelid skin with hooding.  Decent levator excursion without evidence of ptosis.  MRD-1: 2 mm   -OS: Excessive upper eyelid skin with hooding.  Decent levator excursion without evidence of ptosis.  MRD-1: 2 mm   Brow: at orbital rim.    Ears, Nose, Mouth, and throat:: NCAT.  External auricles normal without EAC discharge.  Nose: Nasal prosthesis is in place. Nasal obstruction improved with alar support    Neck: No mass.  Symmetric.  Trachea midline.    Respiratory: Normal effort.  CTAB.    Cardiovascular: RRR.  No murmurs.    Skin: No concerning lesions of the head/neck    Neuro: CN II-XII intact bilaterally.  "     MS: Normal gait and station.    Robyn was seen today for bleph eval and follow-up.    Diagnoses and all orders for this visit:    Dermatochalasis of both upper eyelids  -     Case Request; Standing  -     Follow Anesthesia Guidelines / Standing Orders; Standing  -     Verify / Perform Chlorhexidine Skin Prep if Indicated (If Not Already Completed); Standing  -     ceFAZolin (ANCEF) 2 g in sodium chloride 0.9 % 100 mL IVPB  -     Case Request    Visual field defect  -     Case Request; Standing  -     Follow Anesthesia Guidelines / Standing Orders; Standing  -     Verify / Perform Chlorhexidine Skin Prep if Indicated (If Not Already Completed); Standing  -     ceFAZolin (ANCEF) 2 g in sodium chloride 0.9 % 100 mL IVPB  -     Case Request    Nasal septal perforation  -     Case Request; Standing  -     Follow Anesthesia Guidelines / Standing Orders; Standing  -     Verify / Perform Chlorhexidine Skin Prep if Indicated (If Not Already Completed); Standing  -     ceFAZolin (ANCEF) 2 g in sodium chloride 0.9 % 100 mL IVPB  -     Case Request    Nasal valve stenosis    Chronic maxillary sinusitis      Due to the severity of symptoms, visual field testing, and MRD-1 measurements, I have offered a bilateral upper blepharoplasty with excision of excessive tissue weighing the eyelids down.  We discussed the risks, benefits, alternatives, and complications of a bilateral upper blepharoplasty, which include, but are not limited to, bleeding, bruising, dry eyes, milia formation, damage to the eyes, loss of sight, pain, eyelid malposition.    We have also discussed nasal endoscopy with removal of the nasal septal prosthesis at the same time. The patient would like to proceed with this. We will allow her time to heal and consider repair of nasal vestibular stenosis after she is well healed.     Mark Anthony Anderson MD  06/10/19  4:28 PM

## 2019-06-11 PROBLEM — H02.831 DERMATOCHALASIS OF BOTH UPPER EYELIDS: Status: ACTIVE | Noted: 2019-06-11

## 2019-06-11 PROBLEM — H53.40 VISUAL FIELD DEFECT: Status: ACTIVE | Noted: 2019-06-11

## 2019-06-11 PROBLEM — H02.834 DERMATOCHALASIS OF BOTH UPPER EYELIDS: Status: ACTIVE | Noted: 2019-06-11

## 2019-06-13 ENCOUNTER — OFFICE VISIT (OUTPATIENT)
Dept: FAMILY MEDICINE CLINIC | Age: 59
End: 2019-06-13
Payer: COMMERCIAL

## 2019-06-13 VITALS
HEART RATE: 68 BPM | SYSTOLIC BLOOD PRESSURE: 120 MMHG | OXYGEN SATURATION: 96 % | TEMPERATURE: 98.4 F | BODY MASS INDEX: 32.97 KG/M2 | RESPIRATION RATE: 20 BRPM | DIASTOLIC BLOOD PRESSURE: 80 MMHG | WEIGHT: 166 LBS

## 2019-06-13 DIAGNOSIS — T50.905A ADVERSE EFFECT OF DRUG, INITIAL ENCOUNTER: Primary | ICD-10-CM

## 2019-06-13 PROCEDURE — 96372 THER/PROPH/DIAG INJ SC/IM: CPT | Performed by: NURSE PRACTITIONER

## 2019-06-13 PROCEDURE — 99213 OFFICE O/P EST LOW 20 MIN: CPT | Performed by: NURSE PRACTITIONER

## 2019-06-13 RX ORDER — TRIAMCINOLONE ACETONIDE 40 MG/ML
40 INJECTION, SUSPENSION INTRA-ARTICULAR; INTRAMUSCULAR ONCE
Status: COMPLETED | OUTPATIENT
Start: 2019-06-13 | End: 2019-06-13

## 2019-06-13 RX ORDER — DIPHENHYDRAMINE HCL 25 MG
25 TABLET ORAL EVERY 6 HOURS PRN
Qty: 20 TABLET | Refills: 0 | Status: SHIPPED | OUTPATIENT
Start: 2019-06-13 | End: 2019-06-18

## 2019-06-13 RX ORDER — DEXAMETHASONE SODIUM PHOSPHATE 10 MG/ML
4 INJECTION INTRAMUSCULAR; INTRAVENOUS ONCE
Status: COMPLETED | OUTPATIENT
Start: 2019-06-13 | End: 2019-06-13

## 2019-06-13 RX ADMIN — DEXAMETHASONE SODIUM PHOSPHATE 4 MG: 10 INJECTION INTRAMUSCULAR; INTRAVENOUS at 15:44

## 2019-06-13 RX ADMIN — TRIAMCINOLONE ACETONIDE 40 MG: 40 INJECTION, SUSPENSION INTRA-ARTICULAR; INTRAMUSCULAR at 15:45

## 2019-06-13 ASSESSMENT — ENCOUNTER SYMPTOMS
SHORTNESS OF BREATH: 0
TROUBLE SWALLOWING: 0

## 2019-06-13 NOTE — PROGRESS NOTES
SUBJECTIVE:  Here today for a rash. Patient ID: Celso Baez is a 63 y.o. female. HPI:   HPI   Sudden onset at 10:30 for a rash on the arms and then on back and legs. New meds taking for one week. Januvia and also something for 10 days after surgery I suspect an antibx. Concern over a reaction with the antibx. She is to stop both. Had surgery at [facility]   Itching no shortness of breath. No chest pain. Past Medical History:   Diagnosis Date    Arthritis     Bipolar I disorder, most recent episode (or current) mixed, unspecified     Chronic back pain     Depression     Dry mouth     Hyperlipidemia     Hypertension     Hypokalemia     Hypothyroidism     Menopause     Overactive bladder     Plantar fasciitis     RLS (restless legs syndrome)     Sleep apnea     Thyroid disease       Prior to Visit Medications    Medication Sig Taking? Authorizing Provider   diphenhydrAMINE (BENADRYL ALLERGY) 25 MG tablet Take 1 tablet by mouth every 6 hours as needed for Itching Yes Sherren Manning, APRN   fluconazole (DIFLUCAN) 150 MG tablet 1 po at onset of yeast infection and may repeat in 3days Yes MAEVE Cuevas   allopurinol (ZYLOPRIM) 100 MG tablet TAKE 1 TABLET BY MOUTH  DAILY Yes MAEVE Cuevas   omeprazole (PRILOSEC) 20 MG delayed release capsule Take 1 capsule by mouth 2 times daily (before meals) Yes MAEVE Cuevas   carvedilol (COREG) 6.25 MG tablet TAKE 1 TABLET BY MOUTH TWO  TIMES DAILY WITH MEALS Yes MAEVE Cuevas   spironolactone (ALDACTONE) 25 MG tablet Take 1 tablet by mouth daily Yes MAEVE Cuevas   ALPRAZolam (XANAX) 0.5 MG tablet Take 0.5 mg by mouth nightly as needed.  . Yes Historical Provider, MD   guaiFENesin (MUCINEX) 600 MG extended release tablet Take 2 tablets by mouth 2 times daily Yes MAEVE Cuevas   potassium chloride (KLOR-CON M) 20 MEQ extended release tablet TAKE ONE TABLET BY MOUTH DAILY  Patient taking differently: takes one on monday wednesday and friday Yes Skylar Mclaughlin MD   triamcinolone (ARISTOCORT) 0.5 % ointment  Yes Historical Provider, MD   pramipexole (MIRAPEX) 1.5 MG tablet Take 1.5 mg by mouth nightly Yes Historical Provider, MD   diclofenac (VOLTAREN) 75 MG EC tablet 2 times daily  Yes Historical Provider, MD   bumetanide (BUMEX) 1 MG tablet Take 1 tablet by mouth 2 times daily  Patient taking differently: Take 1 mg by mouth daily takes one on monday wednesday and friday Yes Skylar Mclaughlin MD   atorvastatin (LIPITOR) 40 MG tablet TAKE ONE TABLET BY MOUTH DAILY Yes Skylar Mclaughlin MD   QUEtiapine (SEROQUEL) 50 MG tablet Take 25 mg by mouth nightly  Yes Historical Provider, MD   niacin (SLO-NIACIN) 500 MG extended release tablet Take 500 mg by mouth daily  Yes Historical Provider, MD   levothyroxine (SYNTHROID) 100 MCG tablet Take 1 tablet by mouth Daily  Patient taking differently: Take 100 mcg by mouth Daily 2/15/18 pt reports PCP has increased to 125 mcg daily. Yes Skylar Mclaughlin MD   Tens Unit MISC by Does not apply route Apply and use tid prn  Dx -lumbar pain  Dispense tens and supplies Yes MAEVE Cuevas   tiZANidine (ZANAFLEX) 4 MG tablet Take 4 mg by mouth nightly as needed  Yes Historical Provider, MD   pilocarpine (SALAGEN) 5 MG tablet Take 5 mg by mouth 3 times daily Yes Historical Provider, MD   Vitamin D (CHOLECALCIFEROL) 1000 UNITS CAPS capsule Take 1,000 Units by mouth daily Yes Historical Provider, MD   Multiple Vitamins-Minerals (MULTIVITAMIN PO) Take by mouth daily Yes Historical Provider, MD   DULoxetine (CYMBALTA) 60 MG capsule Take 60 mg by mouth 2 times daily  Yes Historical Provider, MD   traMADol (ULTRAM) 50 MG tablet Take 100 mg by mouth every 12 hours  Yes Historical Provider, MD   calcium carbonate 600 MG TABS tablet Take 1 tablet by mouth daily. Yes Historical Provider, MD   divalproex (DEPAKOTE) 500 MG ER tablet Take 1,000 mg by mouth nightly.  Yes Historical Provider, MD      Allergies   Allergen Reactions    Hctz [Hydrochlorothiazide] Other (See Comments)     Acid reflux         Review of Systems   Constitutional: Negative for fever. HENT: Negative for trouble swallowing. Respiratory: Negative for shortness of breath. Cardiovascular: Negative for chest pain. Skin: Positive for rash. OBJECTIVE:    Physical Exam   Constitutional: She is oriented to person, place, and time. She appears well-developed and well-nourished. HENT:   Head: Normocephalic and atraumatic. Right Ear: External ear normal.   Left Ear: External ear normal.   Eyes: Conjunctivae and lids are normal.   Neck: Normal range of motion. Cardiovascular: Normal rate, regular rhythm and normal heart sounds. Pulmonary/Chest: Effort normal and breath sounds normal.   Abdominal: Normal appearance. Neurological: She is alert and oriented to person, place, and time. Skin: Skin is warm and dry. Psychiatric: She has a normal mood and affect. Her behavior is normal.      /80 (Site: Left Upper Arm, Position: Sitting, Cuff Size: Medium Adult)   Pulse 68   Temp 98.4 °F (36.9 °C) (Oral)   Resp 20   Wt 166 lb (75.3 kg)   SpO2 96%   BMI 32.97 kg/m²      ASSESSMENT:      ICD-10-CM    1. Adverse effect of drug, initial encounter T50.905A diphenhydrAMINE (BENADRYL ALLERGY) 25 MG tablet     dexamethasone (DECADRON) injection 4 mg     triamcinolone acetonide (KENALOG-40) injection 40 mg       PLAN:    Mello Parisi: Rash (covered in rash , just came up today after physical therapy , has not eaten or tried anything new )      Diagnosis and orders for this visit are above.

## 2019-06-19 ENCOUNTER — OFFICE VISIT (OUTPATIENT)
Dept: UROLOGY | Facility: CLINIC | Age: 59
End: 2019-06-19

## 2019-06-19 VITALS — HEIGHT: 60 IN | TEMPERATURE: 97.3 F | WEIGHT: 167 LBS | BODY MASS INDEX: 32.79 KG/M2

## 2019-06-19 DIAGNOSIS — R32 URINARY INCONTINENCE, UNSPECIFIED TYPE: Primary | ICD-10-CM

## 2019-06-19 PROCEDURE — 99024 POSTOP FOLLOW-UP VISIT: CPT | Performed by: NURSE PRACTITIONER

## 2019-06-19 NOTE — PATIENT INSTRUCTIONS
BMI for Adults  Body mass index (BMI) is a number that is calculated from a person's weight and height. In most adults, the number is used to find how much of an adult's weight is made up of fat. BMI is not as accurate as a direct measure of body fat.  How is BMI calculated?  BMI is calculated by dividing weight in kilograms by height in meters squared. It can also be calculated by dividing weight in pounds by height in inches squared, then multiplying the resulting number by 703. Charts are available to help you find your BMI quickly and easily without doing this calculation.  How is BMI interpreted?  Health care professionals use BMI charts to identify whether an adult is underweight, at a normal weight, or overweight based on the following guidelines:  · Underweight: BMI less than 18.5.  · Normal weight: BMI between 18.5 and 24.9.  · Overweight: BMI between 25 and 29.9.  · Obese: BMI of 30 and above.    BMI is usually interpreted the same for males and females.  Weight includes both fat and muscle, so someone with a muscular build, such as an athlete, may have a BMI that is higher than 24.9. In cases like these, BMI may not accurately depict body fat. To determine if excess body fat is the cause of a BMI of 25 or higher, further assessments may need to be done by a health care provider.  Why is BMI a useful tool?  BMI is used to identify a possible weight problem that may be related to a medical problem or may increase the risk for medical problems. BMI can also be used to promote changes to reach a healthy weight.  This information is not intended to replace advice given to you by your health care provider. Make sure you discuss any questions you have with your health care provider.  Document Released: 08/29/2005 Document Revised: 04/27/2017 Document Reviewed: 05/15/2015  [employer] Interactive Patient Education © 2018 [employer]    Smoking Tobacco Information, Adult  Smoking tobacco will very likely harm your  health. Tobacco contains a poisonous (toxic), colorless chemical called nicotine. Nicotine affects the brain and makes tobacco addictive. This change in your brain can make it hard to stop smoking. Tobacco also has other toxic chemicals that can hurt your body and raise your risk of many cancers.  How can smoking tobacco affect me?  Smoking tobacco can increase your chances of having serious health conditions, such as:  · Cancer. Smoking is most commonly associated with lung cancer, but can lead to cancer in other parts of the body.  · Chronic obstructive pulmonary disease (COPD). This is a long-term lung condition that makes it hard to breathe. It also gets worse over time.  · High blood pressure (hypertension), heart disease, stroke, or heart attack.  · Lung infections, such as pneumonia.  · Cataracts. This is when the lenses in the eyes become clouded.  · Digestive problems. This may include peptic ulcers, heartburn, and gastroesophageal reflux disease (GERD).  · Oral health problems, such as gum disease and tooth loss.  · Loss of taste and smell.    Smoking can affect your appearance by causing:  · Wrinkles.  · Yellow or stained teeth, fingers, and fingernails.    Smoking tobacco can also affect your social life.  · Many workplaces, restaurants, hotels, and public places are tobacco-free. This means that you may experience challenges in finding places to smoke when away from home.  · The cost of a smoking habit can be expensive. Expenses for someone who smokes come in two ways:  ? You spend money on a regular basis to buy tobacco.  ? Your health care costs in the long-term are higher if you smoke.  · Tobacco smoke can also affect the health of those around you. Children of smokers have greater chances of:  ? Sudden infant death syndrome (SIDS).  ? Ear infections.  ? Lung infections.    What lifestyle changes can be made?  · Do not start smoking. Quit if you already do.  · To quit smoking:  ? Make a plan to quit  smoking and commit yourself to it. Look for programs to help you and ask your health care provider for recommendations and ideas.  ? Talk with your health care provider about using nicotine replacement medicines to help you quit. Medicine replacement medicines include gum, lozenges, patches, sprays, or pills.  ? Do not replace cigarette smoking with electronic cigarettes, which are commonly called e-cigarettes. The safety of e-cigarettes is not known, and some may contain harmful chemicals.  ? Avoid places, people, or situations that tempt you to smoke.  ? If you try to quit but return to smoking, don't give up hope. It is very common for people to try a number of times before they fully succeed. When you feel ready again, give it another try.  · Quitting smoking might affect the way you eat as well as your weight. Be prepared to monitor your eating habits. Get support in planning and following a healthy diet.  · Ask your health care provider about having regular tests (screenings) to check for cancer. This may include blood tests, imaging tests, and other tests.  · Exercise regularly. Consider taking walks, joining a gym, or doing yoga or exercise classes.  · Develop skills to manage your stress. These skills include meditation.  What are the benefits of quitting smoking?  By quitting smoking, you may:  · Lower your risk of getting cancer and other diseases caused by smoking.  · Live longer.  · Breathe better.  · Lower your blood pressure and heart rate.  · Stop your addiction to tobacco.  · Stop creating secondhand smoke that hurts other people.  · Improve your sense of taste and smell.  · Look better over time, due to having fewer wrinkles and less staining.    What can happen if changes are not made?  If you do not stop smoking, you may:  · Get cancer and other diseases.  · Develop COPD or other long-term (chronic) lung conditions.  · Develop serious problems with your heart and blood vessels (cardiovascular  system).  · Need more tests to screen for problems caused by smoking.  · Have higher, long-term healthcare costs from medicines or treatments related to smoking.  · Continue to have worsening changes in your lungs, mouth, and nose.    Where to find support  To get support to quit smoking, consider:  · Asking your health care provider for more information and resources.  · Taking classes to learn more about quitting smoking.  · Looking for local organizations that offer resources about quitting smoking.  · Joining a support group for people who want to quit smoking in your local community.    Where to find more information  You may find more information about quitting smoking from:  · HelpGuide.org: www.helpguide.org/articles/addictions/how-to-quit-smoking.htm  · Smokefree.gov: smokefree.gov  · American Lung Association: www.lung.org    Contact a health care provider if:  · You have problems breathing.  · Your lips, nose, or fingers turn blue.  · You have chest pain.  · You are coughing up blood.  · You feel faint or you pass out.  · You have other noticeable changes that cause you to worry.  Summary  · Smoking tobacco can negatively affect your health, the health of those around you, your finances, and your social life.  · Do not start smoking. Quit if you already do. If you need help quitting, ask your health care provider.  · Think about joining a support group for people who want to quit smoking in your local community. There are many effective programs that will help you to quit this behavior.  This information is not intended to replace advice given to you by your health care provider. Make sure you discuss any questions you have with your health care provider.  Document Released: 01/02/2018 Document Revised: 01/02/2018 Document Reviewed: 01/02/2018  [employer] Interactive Patient Education © 2019 [employer]

## 2019-06-19 NOTE — PROGRESS NOTES
Ms. Minaya is 59 y.o. female    Chief Complaint   Patient presents with   • Follow-up       History of Present Illness  Patient is status post InterStim placement on June 5 with Dr. Guerrero.  She denies fevers chills, nausea, vomiting, abdominal pain, incisional pain, purulent drainage.  She does report improvement in her urinary symptoms.    The following portions of the patient's history were reviewed and updated as appropriate: allergies, current medications, past family history, past medical history, past social history, past surgical history and problem list.    Review of Systems   Constitutional: Negative for chills and fever.   Gastrointestinal: Negative for abdominal pain, anal bleeding and blood in stool.   Genitourinary: Positive for urgency. Negative for decreased urine volume, difficulty urinating, dyspareunia, dysuria, enuresis, flank pain, frequency, genital sores, hematuria, menstrual problem, pelvic pain, vaginal bleeding, vaginal discharge and vaginal pain.   Musculoskeletal: Positive for back pain (tender ).         Current Outpatient Medications:   •  allopurinol (ZYLOPRIM) 100 MG tablet, Take 100 mg by mouth Daily., Disp: , Rfl:   •  atorvastatin (LIPITOR) 40 MG tablet, Take 40 mg by mouth Every Night., Disp: , Rfl:   •  bumetanide (BUMEX) 2 MG tablet, Take  by mouth Daily. Takes 1 tab m- w-f and 1/2 tab on Tuesday and thursday, Disp: , Rfl:   •  calcium carbonate (OS-KAYLEY) 600 MG tablet, Take 600 mg by mouth Daily., Disp: , Rfl:   •  carvedilol (COREG) 6.25 MG tablet, Take 6.25 mg by mouth 2 (Two) Times a Day With Meals., Disp: , Rfl:   •  cholecalciferol (VITAMIN D3) 1000 UNITS tablet, Take 1,000 Units by mouth Daily., Disp: , Rfl:   •  diclofenac-misoprostol (ARTHROTEC 75) 75-0.2 MG EC tablet, Take 1 tablet by mouth 2 (Two) Times a Day., Disp: , Rfl:   •  divalproex (DEPAKOTE) 500 MG 24 hr tablet, Take 1,000 mg by mouth Every Night., Disp: , Rfl:   •  DULoxetine (CYMBALTA) 60 MG capsule, Take 60  mg by mouth 2 (Two) Times a Day., Disp: , Rfl:   •  guaiFENesin (MUCINEX) 600 MG 12 hr tablet, Take 1,200 mg by mouth 2 (Two) Times a Day., Disp: , Rfl:   •  HYDROcodone-acetaminophen (NORCO) 5-325 MG per tablet, Take 1 tablet by mouth Every 4 (Four) Hours As Needed for Moderate Pain ., Disp: 18 tablet, Rfl: 0  •  levothyroxine (SYNTHROID, LEVOTHROID) 125 MCG tablet, Take  by mouth Daily. Patient states  0.125mg, Disp: , Rfl:   •  Multiple Vitamin (MULTI VITAMIN PO), Take 1 dose by mouth Daily., Disp: , Rfl:   •  niacin (SLO-NIACIN) 500 MG CR tablet, Take 500 mg by mouth Daily., Disp: , Rfl:   •  omeprazole (priLOSEC) 20 MG capsule, Take 20 mg by mouth Daily., Disp: , Rfl:   •  pilocarpine (SALAGEN) 5 MG tablet, Take 5 mg by mouth 3 (Three) Times a Day., Disp: , Rfl:   •  potassium chloride (K-DUR,KLOR-CON) 20 MEQ CR tablet, Daily. Daily on Monday only, Disp: , Rfl:   •  pramipexole (MIRAPEX) 1.5 MG tablet, Take 1.5 mg by mouth Every Night., Disp: , Rfl:   •  QUEtiapine (SEROquel) 50 MG tablet, Take 25 mg by mouth Every Night., Disp: , Rfl:   •  spironolactone (ALDACTONE) 50 MG tablet, Take 25 mg by mouth Daily., Disp: , Rfl:   •  tiZANidine (ZANAFLEX) 4 MG tablet, Take 4 mg by mouth Every Night., Disp: , Rfl:   •  traMADol (ULTRAM) 50 MG tablet, Take 100 mg by mouth 2 (Two) Times a Day., Disp: , Rfl:     Past Medical History:   Diagnosis Date   • Acid reflux     RESOLVED PT PT   • Allergic rhinitis    • Arthritis     BACK   • Bipolar 1 disorder (CMS/HCC)    • Chronic back pain    • Degenerative arthritis    • Depression    • Deviated nasal septum    • Diabetes mellitus (CMS/HCC)    • Eczema    • Gout    • Hyperlipidemia    • Hypertension    • Hypertrophy of nasal turbinates    • Hypokalemia    • Hypothyroidism    • Incontinence in female    • Insomnia    • Menopause    • Nasal septal deviation    • Nasal valve stenosis    • Overactive bladder    • Plantar fasciitis    • RLS (restless legs syndrome)    • Sleep apnea     • Spinal headache    • Wears glasses        Past Surgical History:   Procedure Laterality Date   • CARPAL TUNNEL RELEASE Bilateral 2004   • CHOLECYSTECTOMY  2013   • COLONOSCOPY  10/17/2014    One 5-6mm tubular adenomatous polyp in the ascending colon; Two less than 4mm hyperplastic polyps in the sigmoid colon; Three rectal hyperplastic polyps; Diverticulosis; Repeat 5 years    • COLONOSCOPY  07/14/2010    Internal hemorrhoids; Repeat 7 years    • CYST REMOVAL  2016    neck   • ENDOSCOPIC FUNCTIONAL SINUS SURGERY (FESS) Bilateral 4/16/2019    Procedure: ENDOSCOPIC FUNCTIONAL SINUS SURGERY (bilareral maxillary antrostomy without image guidance);  Surgeon: Mark Anthony Anderson MD;  Location: [facility] OR;  Service: ENT   • ENDOSCOPY N/A 4/11/2019    Procedure: ESOPHAGOGASTRODUODENOSCOPY WITH ANESTHESIA;  Surgeon: Robyn Frazier MD;  Location: [facility] ENDOSCOPY;  Service: Gastroenterology   • FOOT SURGERY Right 2010    X3   • HERNIA REPAIR     • INTERSTIM PLACEMENT N/A 6/5/2019    Procedure: INTERSTIM STAGES 1 AND 2 LEAD AND GENERATOR PLACEMENT;  Surgeon: Endy Guerrero MD;  Location: [facility] OR;  Service: Urology   • LASER ABLATION      right back   • NASAL ENDOSCOPY N/A 4/16/2019    Procedure:     1. Functional endoscopic sinus surgery with bilateral maxillary antrostomy    2. Bilateral nasal endoscopy with biopsy of nasal septum    3.  Nasal septal prosthesis placement  ;  Surgeon: Mark Anthony Anderson MD;  Location: [facility] OR;  Service: ENT   • NASAL VESTIBULE LESION/PAPILLOMA EXCISION N/A 8/1/2017    Procedure: Repair of nasal vestibular stenosis and septoplasty; cartilage graft from left ear;  Surgeon: Mark Anthony Anderson MD;  Location: [facility] OR;  Service:    • SEPTOPLASTY N/A 4/16/2019    Procedure: PLACEMENT OF NASAL SEPTAL PROSTHESIS;  Surgeon: Mark Anthony Anderson MD;  Location:  [facility] OR;  Service: ENT       Social History     Socioeconomic History   • Marital status:      Spouse name: Not on file   • Number of  "children: Not on file   • Years of education: Not on file   • Highest education level: Not on file   Tobacco Use   • Smoking status: Current Every Day Smoker     Packs/day: 1.00     Types: Cigarettes   • Smokeless tobacco: Never Used   Substance and Sexual Activity   • Alcohol use: Yes     Frequency: Monthly or less     Comment: maybe 3 x yearly   • Drug use: No   • Sexual activity: Defer       Family History   Problem Relation Age of Onset   • Cancer Mother    • Diabetes Father    • Hypertension Father    • Cancer Father    • Colon cancer Neg Hx    • Colon polyps Neg Hx        Objective    Temp 97.3 °F (36.3 °C)   Ht 152.4 cm (60\")   Wt 75.8 kg (167 lb)   BMI 32.61 kg/m²     Physical Exam   Constitutional: She is oriented to person, place, and time. She appears well-developed.   HENT:   Head: Normocephalic.   Pulmonary/Chest: Effort normal. No respiratory distress.   Abdominal: Soft. She exhibits no distension.   Musculoskeletal: She exhibits no edema.   Neurological: She is alert and oriented to person, place, and time.   Skin: Skin is warm and dry.   Incisions healing well.   Psychiatric: She has a normal mood and affect. Her behavior is normal.   Vitals reviewed.    Patient's Body mass index is 32.61 kg/m². BMI is above normal parameters. Recommendations include: educational material.      Admission on 06/05/2019, Discharged on 06/05/2019   Component Date Value Ref Range Status   • Glucose 06/05/2019 107  70 - 130 mg/dL Final    : [id number] Karie Bell RMeter ID: [id number]   • Glucose 06/05/2019 104  70 - 130 mg/dL Final    : [id number] Lukas Rodriguez  SMeter ID: [id number]       Results for orders placed or performed during the hospital encounter of 06/05/19   POC Glucose Once   Result Value Ref Range    Glucose 107 70 - 130 mg/dL   POC Glucose Once   Result Value Ref Range    Glucose 104 70 - 130 mg/dL      Patient's Body mass index is 32.61 kg/m². BMI is above normal parameters. Recommendations " include: educational material.    Assessment/Plan   Assessment and Plan    Robyn was seen today for follow-up.    Diagnoses and all orders for this visit:    Urinary incontinence, unspecified type  -     POC Urinalysis Dipstick, Multipro    Patient presents for follow-up after InterStim placement.  She does report some improvement in her symptoms.  We spoke about realistic expectations of InterStim placement meaning 50% improvement of symptoms and more good days than bad days.  I also answered questions regarding her InterStim controller.    She will follow-up as needed.

## 2019-07-02 ENCOUNTER — APPOINTMENT (OUTPATIENT)
Dept: PREADMISSION TESTING | Facility: HOSPITAL | Age: 59
End: 2019-07-02

## 2019-07-02 VITALS
SYSTOLIC BLOOD PRESSURE: 125 MMHG | DIASTOLIC BLOOD PRESSURE: 58 MMHG | HEART RATE: 84 BPM | WEIGHT: 162.48 LBS | OXYGEN SATURATION: 94 % | HEIGHT: 59 IN | BODY MASS INDEX: 32.76 KG/M2 | RESPIRATION RATE: 20 BRPM

## 2019-07-02 LAB
ANION GAP SERPL CALCULATED.3IONS-SCNC: 7 MMOL/L (ref 4–13)
BUN BLD-MCNC: 15 MG/DL (ref 5–21)
BUN/CREAT SERPL: 15.6 (ref 7–25)
CALCIUM SPEC-SCNC: 8.9 MG/DL (ref 8.4–10.4)
CHLORIDE SERPL-SCNC: 100 MMOL/L (ref 98–110)
CO2 SERPL-SCNC: 34 MMOL/L (ref 24–31)
CREAT BLD-MCNC: 0.96 MG/DL (ref 0.5–1.4)
DEPRECATED RDW RBC AUTO: 44.4 FL (ref 40–54)
ERYTHROCYTE [DISTWIDTH] IN BLOOD BY AUTOMATED COUNT: 13.7 % (ref 12–15)
GFR SERPL CREATININE-BSD FRML MDRD: 60 ML/MIN/1.73
GLUCOSE BLD-MCNC: 118 MG/DL (ref 70–100)
GLUCOSE BLDC GLUCOMTR-MCNC: 120 MG/DL (ref 70–130)
HCT VFR BLD AUTO: 36.5 % (ref 37–47)
HGB BLD-MCNC: 11.7 G/DL (ref 12–16)
MCH RBC QN AUTO: 28.7 PG (ref 28–32)
MCHC RBC AUTO-ENTMCNC: 32.1 G/DL (ref 33–36)
MCV RBC AUTO: 89.5 FL (ref 82–98)
PLATELET # BLD AUTO: 336 10*3/MM3 (ref 130–400)
PMV BLD AUTO: 10.4 FL (ref 6–12)
POTASSIUM BLD-SCNC: 4.1 MMOL/L (ref 3.5–5.3)
RBC # BLD AUTO: 4.08 10*6/MM3 (ref 4.2–5.4)
SODIUM BLD-SCNC: 141 MMOL/L (ref 135–145)
WBC NRBC COR # BLD: 7.09 10*3/MM3 (ref 4.8–10.8)

## 2019-07-02 PROCEDURE — 85027 COMPLETE CBC AUTOMATED: CPT | Performed by: OTOLARYNGOLOGY

## 2019-07-02 PROCEDURE — 80048 BASIC METABOLIC PNL TOTAL CA: CPT | Performed by: OTOLARYNGOLOGY

## 2019-07-02 PROCEDURE — 36415 COLL VENOUS BLD VENIPUNCTURE: CPT

## 2019-07-02 PROCEDURE — 82962 GLUCOSE BLOOD TEST: CPT

## 2019-07-02 RX ORDER — BUMETANIDE 1 MG/1
1 TABLET ORAL DAILY
COMMUNITY
End: 2022-03-02

## 2019-07-02 RX ORDER — QUETIAPINE FUMARATE 50 MG/1
25 TABLET, FILM COATED ORAL NIGHTLY
COMMUNITY
End: 2020-10-22

## 2019-07-02 RX ORDER — SPIRONOLACTONE 25 MG/1
25 TABLET ORAL 3 TIMES DAILY
COMMUNITY

## 2019-07-02 NOTE — DISCHARGE INSTRUCTIONS
DAY OF SURGERY INSTRUCTIONS        YOUR SURGEON: Mark Anthony Anderson     PROCEDURE: Bilateral Upper Blepharoplasty with Excision of Excess of Tissue Weighing Down; Nasal Endoscopy with Removal of Septal Prosthesis      DATE OF SURGERY: July 9, 2019     ARRIVAL TIME: AS DIRECTED BY OFFICE    YOU MAY TAKE THE FOLLOWING MEDICATION(S) THE MORNING OF SURGERY WITH A SIP OF WATER: carvedilol     ALL OTHER HOME MEDICATIONS CHECK WITH YOUR DOCTOR              MANAGING PAIN AFTER SURGERY    We know you are probably wondering what your pain will be like after surgery.  Following surgery it is unrealistic to expect you will not have pain.   Pain is how our bodies let us know that something is wrong or cautions us to be careful.  That said, our goal is to make your pain tolerable.    Methods we may use to treat your pain include (oral or IV medications, PCAs, epidurals, nerve blocks, etc.)   While some procedures require IV pain medications for a short time after surgery, transitioning to pain medications by mouth allows for better management of pain.   Your nurse will encourage you to take oral pain medications whenever possible.  IV medications work almost immediately, but only last a short while.  Taking medications by mouth allows for a more constant level of medication in your blood stream for a longer period of time.      Once your pain is out of control it is harder to get back under control.  It is important you are aware when your next dose of pain medication is due.  If you are admitted, your nurse may write the time of your next dose on the white board in your room to help you remember.      We are interested in your pain and encourage you to inform us about aggravating factors during your visit.   Many times a simple repositioning every few hours can make a big difference.    If your physician says it is okay, do not let your pain prevent you from getting out of bed. Be sure to call your nurse for assistance prior to  getting up so you do not fall.      Before surgery, please decide your tolerable pain goal.  These faces help describe the pain ratings we use on a 0-10 scale.   Be prepared to tell us your goal and whether or not you take pain or anxiety medications at home.            BEFORE YOU COME TO THE HOSPITAL  (Pre-op instructions)  • Do not eat, drink, smoke or chew gum after midnight the night before surgery.  This also includes no mints.  • Morning of surgery take only the medicines you have been instructed with a sip of water unless otherwise instructed  by your physician.  • Do not shave, wear makeup or dark nail polish.  • Remove all jewelry including rings.  • Leave anything you consider valuable at home.  • Leave your suitcase in the car until after your surgery.  • Bring the following with you if applicable:  o Picture ID and insurance, Medicare or Medicaid cards  o Co-pay/deductible required by insurance (cash, check, credit card)  o Copy of advance directive, living will or power-of- documents if not brought to PAT  o CPAP or BIPAP mask and tubing  o Relaxation aids (MP3 player, book, magazine)  • On the day of surgery check in at registration located at the main entrance of the hospital.       Outpatient Surgery Guidelines, Adult  Outpatient procedures are those for which the person having the procedure is allowed to go home the same day as the procedure. Various procedures are done on an outpatient basis. You should follow some general guidelines if you will be having an outpatient procedure.  LET YOUR HEALTH CARE PROVIDER KNOW ABOUT:  · Any allergies you have.  · All medicines you are taking, including vitamins, herbs, eye drops, creams, and over-the-counter medicines.  · Previous problems you or members of your family have had with the use of anesthetics.  · Any blood disorders you have.  · Previous surgeries you have had.  · Medical conditions you have.  RISKS AND COMPLICATIONS  Your health care  provider will discuss possible risks and complications with you before surgery. Common risks and complications include:    · Problems due to the use of anesthetics.  · Blood loss and replacement (does not apply to minor surgical procedures).  · Temporary increase in pain due to surgery.  · Uncorrected pain or problems that the surgery was meant to correct.  · Infection.  · New damage.  BEFORE THE PROCEDURE  · Ask your health care provider about changing or stopping your regular medicines. You may need to stop taking certain medicines in the days or weeks before the procedure.  · Stop smoking at least 2 weeks before surgery. This lowers your risk for complications during and after surgery. Ask your health care provider for help with this if needed.  · Eat your usual meals and a light supper the day before surgery. Continue fluid intake. Do not drink alcohol.  · Do not eat or drink after midnight the night before your surgery.   · Arrange for someone to take you home and to stay with you for 24 hours after the procedure. Medicine given for your procedure may affect your ability to drive or to care for yourself.  · Call your health care provider's office if you develop an illness or problem that may prevent you from safely having your procedure.  AFTER THE PROCEDURE  After surgery, you will be taken to a recovery area, where your progress will be monitored. If there are no complications, you will be allowed to go home when you are awake, stable, and taking fluids well. You may have numbness around the surgical site. Healing will take some time. You will have tenderness at the surgical site and may have some swelling and bruising. You may also have some nausea.  HOME CARE INSTRUCTIONS  · Do not drive for 24 hours, or as directed by your health care provider. Do not drive while taking prescription pain medicines.  · Do not drink alcohol for 24 hours.  · Do not make important decisions or sign legal documents for 24  hours.  · You may resume a normal diet and activities as directed.  · Do not lift anything heavier than 10 pounds (4.5 kg) or play contact sports until your health care provider says it is okay.  · Change your bandages (dressings) as directed.  · Only take over-the-counter or prescription medicines as directed by your health care provider.  · Follow up with your health care provider as directed.  SEEK MEDICAL CARE IF:  · You have increased bleeding (more than a small spot) from the surgical site.  · You have redness, swelling, or increasing pain in the wound.  · You see pus coming from the wound.  · You have a fever.  · You notice a bad smell coming from the wound or dressing.  · You feel lightheaded or faint.  · You develop a rash.  · You have trouble breathing.  · You develop allergies.  MAKE SURE YOU:  · Understand these instructions.  · Will watch your condition.  · Will get help right away if you are not doing well or get worse.     This information is not intended to replace advice given to you by your health care provider. Make sure you discuss any questions you have with your health care provider.     Document Released: 09/12/2002 Document Revised: 05/03/2016 Document Reviewed: 05/22/2014  [employer] Interactive Patient Education ©2016 [employer]       Fall Prevention in Hospitals, Adult  As a hospital patient, your condition and the treatments you receive can increase your risk for falls. Some additional risk factors for falls in a hospital include:  · Being in an unfamiliar environment.  · Being on bed rest.  · Your surgery.  · Taking certain medicines.  · Your tubing requirements, such as intravenous (IV) therapy or catheters.  It is important that you learn how to decrease fall risks while at the hospital. Below are important tips that can help prevent falls.  SAFETY TIPS FOR PREVENTING FALLS  Talk about your risk of falling.  · Ask your health care provider why you are at risk for falling. Is it your  medicine, illness, tubing placement, or something else?  · Make a plan with your health care provider to keep you safe from falls.  · Ask your health care provider or pharmacist about side effects of your medicines. Some medicines can make you dizzy or affect your coordination.  Ask for help.  · Ask for help before getting out of bed. You may need to press your call button.  · Ask for assistance in getting safely to the toilet.  · Ask for a walker or cane to be put at your bedside. Ask that most of the side rails on your bed be placed up before your health care provider leaves the room.  · Ask family or friends to sit with you.  · Ask for things that are out of your reach, such as your glasses, hearing aids, telephone, bedside table, or call button.  Follow these tips to avoid falling:  · Stay lying or seated, rather than standing, while waiting for help.  · Wear rubber-soled slippers or shoes whenever you walk in the hospital.  · Avoid quick, sudden movements.  ¨ Change positions slowly.  ¨ Sit on the side of your bed before standing.  ¨ Stand up slowly and wait before you start to walk.  · Let your health care provider know if there is a spill on the floor.  · Pay careful attention to the medical equipment, electrical cords, and tubes around you.  · When you need help, use your call button by your bed or in the bathroom. Wait for one of your health care providers to help you.  · If you feel dizzy or unsure of your footing, return to bed and wait for assistance.  · Avoid being distracted by the TV, telephone, or another person in your room.  · Do not lean or support yourself on rolling objects, such as IV poles or bedside tables.     This information is not intended to replace advice given to you by your health care provider. Make sure you discuss any questions you have with your health care provider.     Document Released: 12/15/2001 Document Revised: 01/08/2016 Document Reviewed: 08/25/2013  [email or web address]  Patient Education ©2016 [employer]       Surgical Site Infections FAQs  What is a Surgical Site Infection (SSI)?  A surgical site infection is an infection that occurs after surgery in the part of the body where the surgery took place. Most patients who have surgery do not develop an infection. However, infections develop in about 1 to 3 out of every 100 patients who have surgery.  Some of the common symptoms of a surgical site infection are:  · Redness and pain around the area where you had surgery  · Drainage of cloudy fluid from your surgical wound  · Fever  Can SSIs be treated?  Yes. Most surgical site infections can be treated with antibiotics. The antibiotic given to you depends on the bacteria (germs) causing the infection. Sometimes patients with SSIs also need another surgery to treat the infection.  What are some of the things that hospitals are doing to prevent SSIs?  To prevent SSIs, doctors, nurses, and other healthcare providers:  · Clean their hands and arms up to their elbows with an antiseptic agent just before the surgery.  · Clean their hands with soap and water or an alcohol-based hand rub before and after caring for each patient.  · May remove some of your hair immediately before your surgery using electric clippers if the hair is in the same area where the procedure will occur. They should not shave you with a razor.  · Wear special hair covers, masks, gowns, and gloves during surgery to keep the surgery area clean.  · Give you antibiotics before your surgery starts. In most cases, you should get antibiotics within 60 minutes before the surgery starts and the antibiotics should be stopped within 24 hours after surgery.  · Clean the skin at the site of your surgery with a special soap that kills germs.  What can I do to help prevent SSIs?  Before your surgery:  · Tell your doctor about other medical problems you may have. Health problems such as allergies, diabetes, and obesity could affect  your surgery and your treatment.  · Quit smoking. Patients who smoke get more infections. Talk to your doctor about how you can quit before your surgery.  · Do not shave near where you will have surgery. Shaving with a razor can irritate your skin and make it easier to develop an infection.  At the time of your surgery:  · Speak up if someone tries to shave you with a razor before surgery. Ask why you need to be shaved and talk with your surgeon if you have any concerns.  · Ask if you will get antibiotics before surgery.  After your surgery:  · Make sure that your healthcare providers clean their hands before examining you, either with soap and water or an alcohol-based hand rub.  · If you do not see your providers clean their hands, please ask them to do so.  · Family and friends who visit you should not touch the surgical wound or dressings.  · Family and friends should clean their hands with soap and water or an alcohol-based hand rub before and after visiting you. If you do not see them clean their hands, ask them to clean their hands.  What do I need to do when I go home from the hospital?  · Before you go home, your doctor or nurse should explain everything you need to know about taking care of your wound. Make sure you understand how to care for your wound before you leave the hospital.  · Always clean your hands before and after caring for your wound.  · Before you go home, make sure you know who to contact if you have questions or problems after you get home.  · If you have any symptoms of an infection, such as redness and pain at the surgery site, drainage, or fever, call your doctor immediately.  If you have additional questions, please ask your doctor or nurse.  Developed and co-sponsored by The Society for Healthcare Epidemiology of [place] (SHEA); Infectious Diseases Society of [place] (IDSA); American Hospital Association; Association for Professionals in Infection Control and Epidemiology (APIC);  Centers for Disease Control and Prevention (CDC); and The Joint Commission.     This information is not intended to replace advice given to you by your health care provider. Make sure you discuss any questions you have with your health care provider.     Document Released: 12/23/2014 Document Revised: 01/08/2016 Document Reviewed: 03/02/2016  [employer] Interactive Patient Education ©2016 [employer]     PATIENT/FAMILY/RESPONSIBLE PARTY VERBALIZES UNDERSTANDING OF ABOVE EDUCATION.  COPY OF PAIN SCALE GIVEN AND REVIEWED WITH VERBALIZED UNDERSTANDING.

## 2019-07-09 ENCOUNTER — HOSPITAL ENCOUNTER (OUTPATIENT)
Facility: HOSPITAL | Age: 59
Setting detail: HOSPITAL OUTPATIENT SURGERY
Discharge: HOME | End: 2019-07-09
Attending: OTOLARYNGOLOGY | Admitting: OTOLARYNGOLOGY

## 2019-07-09 ENCOUNTER — ANESTHESIA EVENT (OUTPATIENT)
Dept: PERIOP | Facility: HOSPITAL | Age: 59
End: 2019-07-09

## 2019-07-09 ENCOUNTER — ANESTHESIA (OUTPATIENT)
Dept: PERIOP | Facility: HOSPITAL | Age: 59
End: 2019-07-09

## 2019-07-09 VITALS
SYSTOLIC BLOOD PRESSURE: 114 MMHG | OXYGEN SATURATION: 94 % | DIASTOLIC BLOOD PRESSURE: 69 MMHG | RESPIRATION RATE: 16 BRPM | HEART RATE: 74 BPM | TEMPERATURE: 97.2 F

## 2019-07-09 DIAGNOSIS — H02.831 DERMATOCHALASIS OF BOTH UPPER EYELIDS: ICD-10-CM

## 2019-07-09 DIAGNOSIS — H02.834 DERMATOCHALASIS OF BOTH UPPER EYELIDS: ICD-10-CM

## 2019-07-09 DIAGNOSIS — J34.89 NASAL SEPTAL PERFORATION: ICD-10-CM

## 2019-07-09 DIAGNOSIS — H53.40 VISUAL FIELD DEFECT: ICD-10-CM

## 2019-07-09 LAB
GLUCOSE BLDC GLUCOMTR-MCNC: 102 MG/DL (ref 70–130)
GLUCOSE BLDC GLUCOMTR-MCNC: 98 MG/DL (ref 70–130)

## 2019-07-09 PROCEDURE — 15823 BLEPHARP UPR EYELID XCSV SKN: CPT | Performed by: OTOLARYNGOLOGY

## 2019-07-09 PROCEDURE — 25010000002 ONDANSETRON PER 1 MG: Performed by: NURSE ANESTHETIST, CERTIFIED REGISTERED

## 2019-07-09 PROCEDURE — 25010000002 PROPOFOL 10 MG/ML EMULSION: Performed by: NURSE ANESTHETIST, CERTIFIED REGISTERED

## 2019-07-09 PROCEDURE — 82962 GLUCOSE BLOOD TEST: CPT

## 2019-07-09 PROCEDURE — 25010000002 FENTANYL CITRATE (PF) 100 MCG/2ML SOLUTION: Performed by: NURSE ANESTHETIST, CERTIFIED REGISTERED

## 2019-07-09 PROCEDURE — 25010000002 DEXAMETHASONE PER 1 MG: Performed by: ANESTHESIOLOGY

## 2019-07-09 RX ORDER — OXYCODONE AND ACETAMINOPHEN 7.5; 325 MG/1; MG/1
2 TABLET ORAL EVERY 4 HOURS PRN
Status: DISCONTINUED | OUTPATIENT
Start: 2019-07-09 | End: 2019-07-09 | Stop reason: HOSPADM

## 2019-07-09 RX ORDER — DEXTROSE MONOHYDRATE 25 G/50ML
12.5 INJECTION, SOLUTION INTRAVENOUS AS NEEDED
Status: DISCONTINUED | OUTPATIENT
Start: 2019-07-09 | End: 2019-07-09 | Stop reason: HOSPADM

## 2019-07-09 RX ORDER — HYDROCODONE BITARTRATE AND ACETAMINOPHEN 7.5; 325 MG/1; MG/1
1 TABLET ORAL EVERY 4 HOURS PRN
Qty: 12 TABLET | Refills: 0 | Status: SHIPPED | OUTPATIENT
Start: 2019-07-09 | End: 2019-07-16

## 2019-07-09 RX ORDER — ROCURONIUM BROMIDE 10 MG/ML
INJECTION, SOLUTION INTRAVENOUS AS NEEDED
Status: DISCONTINUED | OUTPATIENT
Start: 2019-07-09 | End: 2019-07-09 | Stop reason: SURG

## 2019-07-09 RX ORDER — SODIUM CHLORIDE, SODIUM LACTATE, POTASSIUM CHLORIDE, CALCIUM CHLORIDE 600; 310; 30; 20 MG/100ML; MG/100ML; MG/100ML; MG/100ML
9 INJECTION, SOLUTION INTRAVENOUS CONTINUOUS
Status: DISCONTINUED | OUTPATIENT
Start: 2019-07-09 | End: 2019-07-09 | Stop reason: HOSPADM

## 2019-07-09 RX ORDER — NALOXONE HCL 0.4 MG/ML
0.4 VIAL (ML) INJECTION AS NEEDED
Status: DISCONTINUED | OUTPATIENT
Start: 2019-07-09 | End: 2019-07-09 | Stop reason: HOSPADM

## 2019-07-09 RX ORDER — HYDROCODONE BITARTRATE AND ACETAMINOPHEN 7.5; 325 MG/1; MG/1
1 TABLET ORAL EVERY 4 HOURS PRN
Qty: 12 TABLET | Refills: 0 | Status: SHIPPED | OUTPATIENT
Start: 2019-07-09 | End: 2019-10-14

## 2019-07-09 RX ORDER — PROPOFOL 10 MG/ML
VIAL (ML) INTRAVENOUS AS NEEDED
Status: DISCONTINUED | OUTPATIENT
Start: 2019-07-09 | End: 2019-07-09 | Stop reason: SURG

## 2019-07-09 RX ORDER — BUPIVACAINE HCL/0.9 % NACL/PF 0.1 %
2 PLASTIC BAG, INJECTION (ML) EPIDURAL ONCE
Status: COMPLETED | OUTPATIENT
Start: 2019-07-09 | End: 2019-07-09

## 2019-07-09 RX ORDER — HYDROCODONE BITARTRATE AND ACETAMINOPHEN 7.5; 325 MG/1; MG/1
1 TABLET ORAL ONCE AS NEEDED
Status: DISCONTINUED | OUTPATIENT
Start: 2019-07-09 | End: 2019-07-09 | Stop reason: HOSPADM

## 2019-07-09 RX ORDER — BACITRACIN 500 [USP'U]/G
OINTMENT OPHTHALMIC AS NEEDED
Status: DISCONTINUED | OUTPATIENT
Start: 2019-07-09 | End: 2019-07-09 | Stop reason: HOSPADM

## 2019-07-09 RX ORDER — ONDANSETRON 2 MG/ML
4 INJECTION INTRAMUSCULAR; INTRAVENOUS ONCE AS NEEDED
Status: DISCONTINUED | OUTPATIENT
Start: 2019-07-09 | End: 2019-07-09 | Stop reason: HOSPADM

## 2019-07-09 RX ORDER — SODIUM CHLORIDE, SODIUM LACTATE, POTASSIUM CHLORIDE, CALCIUM CHLORIDE 600; 310; 30; 20 MG/100ML; MG/100ML; MG/100ML; MG/100ML
1000 INJECTION, SOLUTION INTRAVENOUS CONTINUOUS
Status: DISCONTINUED | OUTPATIENT
Start: 2019-07-09 | End: 2019-07-09 | Stop reason: HOSPADM

## 2019-07-09 RX ORDER — SCOLOPAMINE TRANSDERMAL SYSTEM 1 MG/1
1 PATCH, EXTENDED RELEASE TRANSDERMAL CONTINUOUS
Status: DISCONTINUED | OUTPATIENT
Start: 2019-07-09 | End: 2019-07-09 | Stop reason: HOSPADM

## 2019-07-09 RX ORDER — SODIUM CHLORIDE 0.9 % (FLUSH) 0.9 %
1-10 SYRINGE (ML) INJECTION AS NEEDED
Status: DISCONTINUED | OUTPATIENT
Start: 2019-07-09 | End: 2019-07-09 | Stop reason: HOSPADM

## 2019-07-09 RX ORDER — OXYCODONE AND ACETAMINOPHEN 10; 325 MG/1; MG/1
1 TABLET ORAL ONCE AS NEEDED
Status: DISCONTINUED | OUTPATIENT
Start: 2019-07-09 | End: 2019-07-09 | Stop reason: HOSPADM

## 2019-07-09 RX ORDER — LIDOCAINE HYDROCHLORIDE AND EPINEPHRINE 10; 10 MG/ML; UG/ML
INJECTION, SOLUTION INFILTRATION; PERINEURAL AS NEEDED
Status: DISCONTINUED | OUTPATIENT
Start: 2019-07-09 | End: 2019-07-09 | Stop reason: HOSPADM

## 2019-07-09 RX ORDER — PHENYLEPHRINE HCL IN 0.9% NACL 0.8MG/10ML
SYRINGE (ML) INTRAVENOUS AS NEEDED
Status: DISCONTINUED | OUTPATIENT
Start: 2019-07-09 | End: 2019-07-09 | Stop reason: SURG

## 2019-07-09 RX ORDER — LABETALOL HYDROCHLORIDE 5 MG/ML
5 INJECTION, SOLUTION INTRAVENOUS
Status: DISCONTINUED | OUTPATIENT
Start: 2019-07-09 | End: 2019-07-09 | Stop reason: HOSPADM

## 2019-07-09 RX ORDER — MAGNESIUM HYDROXIDE 1200 MG/15ML
LIQUID ORAL AS NEEDED
Status: DISCONTINUED | OUTPATIENT
Start: 2019-07-09 | End: 2019-07-09 | Stop reason: HOSPADM

## 2019-07-09 RX ORDER — ONDANSETRON 2 MG/ML
INJECTION INTRAMUSCULAR; INTRAVENOUS AS NEEDED
Status: DISCONTINUED | OUTPATIENT
Start: 2019-07-09 | End: 2019-07-09 | Stop reason: SURG

## 2019-07-09 RX ORDER — HYDROCODONE BITARTRATE AND ACETAMINOPHEN 5; 325 MG/1; MG/1
1 TABLET ORAL EVERY 4 HOURS PRN
Qty: 12 TABLET | Refills: 0 | Status: SHIPPED | OUTPATIENT
Start: 2019-07-09 | End: 2019-07-16

## 2019-07-09 RX ORDER — METOCLOPRAMIDE HYDROCHLORIDE 5 MG/ML
5 INJECTION INTRAMUSCULAR; INTRAVENOUS
Status: DISCONTINUED | OUTPATIENT
Start: 2019-07-09 | End: 2019-07-09 | Stop reason: HOSPADM

## 2019-07-09 RX ORDER — DEXAMETHASONE SODIUM PHOSPHATE 4 MG/ML
4 INJECTION, SOLUTION INTRA-ARTICULAR; INTRALESIONAL; INTRAMUSCULAR; INTRAVENOUS; SOFT TISSUE ONCE AS NEEDED
Status: COMPLETED | OUTPATIENT
Start: 2019-07-09 | End: 2019-07-09

## 2019-07-09 RX ORDER — FENTANYL CITRATE 50 UG/ML
INJECTION, SOLUTION INTRAMUSCULAR; INTRAVENOUS AS NEEDED
Status: DISCONTINUED | OUTPATIENT
Start: 2019-07-09 | End: 2019-07-09 | Stop reason: SURG

## 2019-07-09 RX ORDER — SODIUM CHLORIDE 0.9 % (FLUSH) 0.9 %
3 SYRINGE (ML) INJECTION AS NEEDED
Status: DISCONTINUED | OUTPATIENT
Start: 2019-07-09 | End: 2019-07-09 | Stop reason: HOSPADM

## 2019-07-09 RX ORDER — LIDOCAINE HYDROCHLORIDE 40 MG/ML
SOLUTION TOPICAL AS NEEDED
Status: DISCONTINUED | OUTPATIENT
Start: 2019-07-09 | End: 2019-07-09 | Stop reason: SURG

## 2019-07-09 RX ORDER — IBUPROFEN 600 MG/1
600 TABLET ORAL ONCE AS NEEDED
Status: DISCONTINUED | OUTPATIENT
Start: 2019-07-09 | End: 2019-07-09 | Stop reason: HOSPADM

## 2019-07-09 RX ORDER — OXYMETAZOLINE HYDROCHLORIDE 0.05 G/100ML
SPRAY NASAL AS NEEDED
Status: DISCONTINUED | OUTPATIENT
Start: 2019-07-09 | End: 2019-07-09 | Stop reason: HOSPADM

## 2019-07-09 RX ORDER — FENTANYL CITRATE 50 UG/ML
25 INJECTION, SOLUTION INTRAMUSCULAR; INTRAVENOUS AS NEEDED
Status: DISCONTINUED | OUTPATIENT
Start: 2019-07-09 | End: 2019-07-09 | Stop reason: HOSPADM

## 2019-07-09 RX ORDER — FENTANYL CITRATE 50 UG/ML
25 INJECTION, SOLUTION INTRAMUSCULAR; INTRAVENOUS
Status: DISCONTINUED | OUTPATIENT
Start: 2019-07-09 | End: 2019-07-09 | Stop reason: HOSPADM

## 2019-07-09 RX ORDER — IPRATROPIUM BROMIDE AND ALBUTEROL SULFATE 2.5; .5 MG/3ML; MG/3ML
3 SOLUTION RESPIRATORY (INHALATION) ONCE AS NEEDED
Status: DISCONTINUED | OUTPATIENT
Start: 2019-07-09 | End: 2019-07-09 | Stop reason: HOSPADM

## 2019-07-09 RX ADMIN — Medication 80 MCG: at 17:30

## 2019-07-09 RX ADMIN — OXYCODONE HYDROCHLORIDE AND ACETAMINOPHEN 2 TABLET: 7.5; 325 TABLET ORAL at 18:09

## 2019-07-09 RX ADMIN — FENTANYL CITRATE 100 MCG: 50 INJECTION, SOLUTION INTRAMUSCULAR; INTRAVENOUS at 16:56

## 2019-07-09 RX ADMIN — ROCURONIUM BROMIDE 20 MG: 10 INJECTION INTRAVENOUS at 16:58

## 2019-07-09 RX ADMIN — SODIUM CHLORIDE, POTASSIUM CHLORIDE, SODIUM LACTATE AND CALCIUM CHLORIDE: 600; 310; 30; 20 INJECTION, SOLUTION INTRAVENOUS at 17:45

## 2019-07-09 RX ADMIN — DEXAMETHASONE SODIUM PHOSPHATE 4 MG: 4 INJECTION, SOLUTION INTRAMUSCULAR; INTRAVENOUS at 15:49

## 2019-07-09 RX ADMIN — PROPOFOL 150 MG: 10 INJECTION, EMULSION INTRAVENOUS at 16:58

## 2019-07-09 RX ADMIN — ONDANSETRON HYDROCHLORIDE 4 MG: 2 SOLUTION INTRAMUSCULAR; INTRAVENOUS at 17:25

## 2019-07-09 RX ADMIN — Medication 80 MCG: at 17:12

## 2019-07-09 RX ADMIN — SODIUM CHLORIDE, POTASSIUM CHLORIDE, SODIUM LACTATE AND CALCIUM CHLORIDE 1000 ML: 600; 310; 30; 20 INJECTION, SOLUTION INTRAVENOUS at 14:53

## 2019-07-09 RX ADMIN — Medication 80 MCG: at 17:24

## 2019-07-09 RX ADMIN — LIDOCAINE HYDROCHLORIDE 1 EACH: 40 SOLUTION TOPICAL at 17:01

## 2019-07-09 RX ADMIN — SCOPALAMINE 1 PATCH: 1 PATCH, EXTENDED RELEASE TRANSDERMAL at 15:49

## 2019-07-09 RX ADMIN — Medication 2 G: at 17:05

## 2019-07-09 NOTE — ANESTHESIA PREPROCEDURE EVALUATION
Anesthesia Evaluation     Patient summary reviewed and Nursing notes reviewed   history of anesthetic complications: prolonged sedation  NPO Solid Status: > 8 hours  NPO Liquid Status: > 8 hours           Airway   Mallampati: I  TM distance: >3 FB  Neck ROM: full  Dental      Pulmonary    (+) a smoker Current, sleep apnea,   (-) COPD, asthma  Cardiovascular   Exercise tolerance: good (4-7 METS)    ECG reviewed  Patient on routine beta blocker and Beta blocker given within 24 hours of surgery    (+) hypertension, hyperlipidemia,   (-) pacemaker, past MI, angina, cardiac stents      Neuro/Psych  (+) psychiatric history Depression and Bipolar,     (-) seizures, TIA, CVA  GI/Hepatic/Renal/Endo    (+) obesity,  GERD,  diabetes mellitus type 2, hypothyroidism,   (-) liver disease, no renal disease    Musculoskeletal     Abdominal    Substance History      OB/GYN          Other   (+) arthritis                       Anesthesia Plan    ASA 2     general     intravenous induction   Anesthetic plan, all risks, benefits, and alternatives have been provided, discussed and informed consent has been obtained with: patient.

## 2019-07-09 NOTE — DISCHARGE INSTRUCTIONS

## 2019-07-09 NOTE — OP NOTE
Preprocedure diagnosis: 1. Pseudoptosis, bilateral     2. Visual field obstruction     3.  Saddlenose deformity with septal button    Post procedure diagnosis: 1. Pseudoptosis, bilateral     2. Visual field obstruction     3.  Saddlenose deformity with septal button    Procedure performed:      1. Bilateral upper blepharoplasty with excision of excessive tissue weighing eyelids down     2.  Removal of nasal septal button    Surgeon: Mark Anthony Anderson M.D.    Anesthesia: Gen. with 2 cc of 1% lidocaine with 1:100,000 epinephrine    Specimen:  none     Complications: None    Disposition: Home    Indications and operative findings:     VFT (visual field testing):             OD: 30 degree improvement with taping             OS: 36 degree improvement with taping     MRD-1 OD: 2.0 mm     MRD-1 OS: 2.0 mm         Details: After patient verification and informed consent was obtained, the patient's upper eyelids were marked for excision in the upright position in the preoperative area.  The patient was taken to the operating room and laid supine on the operative table.  The skin was infiltrated with 1% lidocaine of 1:100,000 epinephrine.      Bayonets were used to remove the nasal septal prosthesis and this was discarded.    The skin was sterilely prepped with Betadine and the patient was sterilely draped.    Bilateral tarsorrhaphy stitches were placed at the gray line using 5-0 black nylon suture.    The premarked excision was verified with the pinch test demonstrating mild lash eversion.    1.5 mm of mid pupillary skin was marked for excision    The left eyelid was addressed first.  A 15 blade was used to incise skin and this was undermined in the immediate subcutaneous plane.  Hemostasis was obtained with bipolar cautery set at 12.  The excised skin was placed in cool saline and the back table for possible later use. I then placed downward pressure on the brow to demonstrate the redundant orbicularis oculi muscle.  This was  gently grasped using a Brown-Adson forceps and trimmed with curved iris scissors, making sure not to violate the orbital septum.  Hemostasis was again obtained with bipolar cautery set at 15.    At this point, the medial fat pad was addressed.  The orbital septum overlying the prolapsing orbital fat was incised using curved iris scissors.  I then identified the medial fat and gently teased this from the surrounding tissue using the curved iris scissors.  I then clamped this using mosquito clamps and trimmed the fat, leaving a tiny bit for cautery.  I then cauterized the fat, released the clamp, and cauterized the fat again.    I then cauterized the orbital septum to achieve a slight amount of retraction.    The skin was then closed using a loop stitch of 5-0 nylon at the lateral canthus.  I then placed a running, subcuticular 5-0 nylon.  I then further closed this using simple 6-0 fast absorbing gut sutures.    An identical procedure was performed on the right.    The tarsorrhaphy stitches were then removed.  The eyes were irrigated with BSS.  I then placed a small amount of bacitracin ophthalmic ointment on the incision lines.    The patient was weaned from anesthesia, and transferred to the PACU for recovery without apparent complication.

## 2019-07-09 NOTE — ANESTHESIA PROCEDURE NOTES
Airway  Urgency: elective    Airway not difficult    General Information and Staff    Patient location during procedure: OR  CRNA: Garret Fisher CRNA    Indications and Patient Condition  Indications for airway management: airway protection    Preoxygenated: yes  MILS maintained throughout  Mask difficulty assessment: 1 - vent by mask    Final Airway Details  Final airway type: endotracheal airway      Successful airway: ETT  Cuffed: yes   Successful intubation technique: direct laryngoscopy  Endotracheal tube insertion site: oral  Blade: Nicholson  Blade size: 2  ETT size (mm): 7.0  Cormack-Lehane Classification: grade I - full view of glottis  Placement verified by: chest auscultation and capnometry   Cuff volume (mL): 5  Measured from: gums  ETT to gums (cm): 21  Number of attempts at approach: 1    Additional Comments  Atraumatic dl

## 2019-07-10 NOTE — ANESTHESIA POSTPROCEDURE EVALUATION
Patient: Robyn Minaya    Procedure Summary     Date:  07/09/19 Room / Location:   [facility]  /  [facility]    Anesthesia Start:  1655 Anesthesia Stop:  1755    Procedure:  1) bilateral upper blepharoplasty with excision of excess of tissue weighing down 2) nasal endoscopy with removal of nasal septal prosthesis (Bilateral Eye) Diagnosis:       Dermatochalasis of both upper eyelids      Visual field defect      Nasal septal perforation      (Dermatochalasis of both upper eyelids [H02.831, H02.834])      (Visual field defect [H53.40])      (Nasal septal perforation [J34.89])    Surgeon:  Mark Anthony Anderson MD Provider:  Garret Fisher CRNA    Anesthesia Type:  general ASA Status:  2          Anesthesia Type: general  Last vitals  BP   116/72 (07/09/19 1850)   Temp   97.2 °F (36.2 °C) (07/09/19 1823)   Pulse   77 (07/09/19 1850)   Resp   16 (07/09/19 1850)     SpO2   91 % (07/09/19 1850)     Post Anesthesia Care and Evaluation    PONV Status: none  Comments: Patient d/c from PACU prior to anes eval based on Sherman score.  Please see RN notes for details of d/c criteria.    Blood pressure 116/72, pulse 77, temperature 97.2 °F (36.2 °C), resp. rate 16, SpO2 91 %, not currently breastfeeding.

## 2019-07-18 ENCOUNTER — OFFICE VISIT (OUTPATIENT)
Dept: OTOLARYNGOLOGY | Facility: CLINIC | Age: 59
End: 2019-07-18

## 2019-07-18 VITALS
RESPIRATION RATE: 20 BRPM | BODY MASS INDEX: 30.44 KG/M2 | DIASTOLIC BLOOD PRESSURE: 78 MMHG | HEIGHT: 59 IN | WEIGHT: 151 LBS | HEART RATE: 80 BPM | TEMPERATURE: 98 F | SYSTOLIC BLOOD PRESSURE: 140 MMHG

## 2019-07-18 DIAGNOSIS — Q30.9 ABNORMAL NASAL SEPTUM: Primary | ICD-10-CM

## 2019-07-18 DIAGNOSIS — H02.831 DERMATOCHALASIS OF BOTH UPPER EYELIDS: ICD-10-CM

## 2019-07-18 DIAGNOSIS — M90.80 WEGNER'S DISEASE (CONGENITAL SYPHILITIC OSTEOCHONDRITIS): ICD-10-CM

## 2019-07-18 DIAGNOSIS — J34.89 NASAL SEPTAL PERFORATION: ICD-10-CM

## 2019-07-18 DIAGNOSIS — H53.40 VISUAL FIELD DEFECT: ICD-10-CM

## 2019-07-18 DIAGNOSIS — A50.02 WEGNER'S DISEASE (CONGENITAL SYPHILITIC OSTEOCHONDRITIS): ICD-10-CM

## 2019-07-18 DIAGNOSIS — H02.834 DERMATOCHALASIS OF BOTH UPPER EYELIDS: ICD-10-CM

## 2019-07-18 DIAGNOSIS — J34.89 NASAL VALVE STENOSIS: ICD-10-CM

## 2019-07-18 PROCEDURE — 99024 POSTOP FOLLOW-UP VISIT: CPT | Performed by: OTOLARYNGOLOGY

## 2019-07-18 RX ORDER — SITAGLIPTIN 100 MG/1
TABLET, FILM COATED ORAL
Refills: 6 | COMMUNITY
Start: 2019-06-08 | End: 2019-10-14 | Stop reason: SINTOL

## 2019-07-18 RX ORDER — ALPRAZOLAM 0.5 MG/1
0.5 TABLET ORAL AS NEEDED
COMMUNITY
Start: 2019-07-09 | End: 2020-11-20

## 2019-07-18 NOTE — PROGRESS NOTES
"Robyn Minaya returns to the office following bilateral upper blepharoplasty and excision removal of nasal septal prosthesis on July 9, 2019.    SUBJECTIVE:  She is doing well and is without complaint.  She does report some dry eye symptoms.  She has had dry eye prior to the surgery.  She also reports significant improvement of her nasal airflow    OBJECTIVE:  /78   Pulse 80   Temp 98 °F (36.7 °C)   Resp 20   Ht 150.9 cm (59.4\")   Wt 68.5 kg (151 lb)   BMI 30.09 kg/m²   Sutures were removed.  There is no lagophthalmos.  Upper eyelids are healing well.  Nasal septal perforation is present.    ASSESSMENT:  Robyn was seen today for post-op.    Diagnoses and all orders for this visit:    Abnormal nasal septum    Dermatochalasis of both upper eyelids    Visual field defect    Nasal septal perforation    Nasal valve stenosis    Marlene's disease (congenital syphilitic osteochondritis)        PLAN:   Follow-up in approximately 3 months.  We may consider possible nasal surgery at that time for her saddlenose deformity and septal perforation.  We need to determine the reason ability of any nasal surgery with her diagnosis of possible Wegener's.    Mark Anthony Anderson MD   07/18/2019  12:08 PM    "

## 2019-07-29 ENCOUNTER — OFFICE VISIT (OUTPATIENT)
Dept: FAMILY MEDICINE CLINIC | Age: 59
End: 2019-07-29
Payer: COMMERCIAL

## 2019-07-29 VITALS
DIASTOLIC BLOOD PRESSURE: 84 MMHG | TEMPERATURE: 98.3 F | SYSTOLIC BLOOD PRESSURE: 124 MMHG | WEIGHT: 167 LBS | OXYGEN SATURATION: 93 % | BODY MASS INDEX: 33.17 KG/M2 | HEART RATE: 54 BPM

## 2019-07-29 DIAGNOSIS — R10.2 PELVIC PAIN: ICD-10-CM

## 2019-07-29 DIAGNOSIS — T81.9XXA ABNORMAL SURGICAL WOUND, INITIAL ENCOUNTER: ICD-10-CM

## 2019-07-29 DIAGNOSIS — R10.31 RIGHT LOWER QUADRANT ABDOMINAL PAIN: Primary | ICD-10-CM

## 2019-07-29 DIAGNOSIS — M25.551 RIGHT HIP PAIN: ICD-10-CM

## 2019-07-29 LAB
APPEARANCE FLUID: CLEAR
BILIRUBIN, POC: NORMAL
BLOOD URINE, POC: NEGATIVE
CLARITY, POC: CLEAR
COLOR, POC: YELLOW
GLUCOSE URINE, POC: NEGATIVE
KETONES, POC: 15
LEUKOCYTE EST, POC: NEGATIVE
NITRITE, POC: NEGATIVE
PH, POC: 5.5
PROTEIN, POC: 30
SPECIFIC GRAVITY, POC: >=1.03
UROBILINOGEN, POC: 0.2

## 2019-07-29 PROCEDURE — 81002 URINALYSIS NONAUTO W/O SCOPE: CPT | Performed by: NURSE PRACTITIONER

## 2019-07-29 PROCEDURE — 99214 OFFICE O/P EST MOD 30 MIN: CPT | Performed by: NURSE PRACTITIONER

## 2019-07-29 ASSESSMENT — ENCOUNTER SYMPTOMS
DIARRHEA: 0
BACK PAIN: 1
CONSTIPATION: 0

## 2019-07-29 NOTE — PROGRESS NOTES
 Thyroid disease      Prior to Visit Medications    Medication Sig Taking? Authorizing Provider   mupirocin (BACTROBAN) 2 % ointment Apply 3 times daily. Yes MAEVE Cuevas   fluconazole (DIFLUCAN) 150 MG tablet 1 po at onset of yeast infection and may repeat in 3days Yes MAEVE Cuevas   allopurinol (ZYLOPRIM) 100 MG tablet TAKE 1 TABLET BY MOUTH  DAILY Yes MAEVE Cuevas   omeprazole (PRILOSEC) 20 MG delayed release capsule Take 1 capsule by mouth 2 times daily (before meals) Yes MAEVE Cuevas   carvedilol (COREG) 6.25 MG tablet TAKE 1 TABLET BY MOUTH TWO  TIMES DAILY WITH MEALS Yes MAEVE Cuevas   spironolactone (ALDACTONE) 25 MG tablet Take 1 tablet by mouth daily Yes MAEVE Cuevas   ALPRAZolam (XANAX) 0.5 MG tablet Take 0.5 mg by mouth nightly as needed.  . Yes Historical Provider, MD   guaiFENesin (MUCINEX) 600 MG extended release tablet Take 2 tablets by mouth 2 times daily Yes MAEVE Cuevas   potassium chloride (KLOR-CON M) 20 MEQ extended release tablet TAKE ONE TABLET BY MOUTH DAILY  Patient taking differently: takes one on monday wednesday and friday Yes Nelsy Morales MD   triamcinolone (ARISTOCORT) 0.5 % ointment  Yes Historical Provider, MD   pramipexole (MIRAPEX) 1.5 MG tablet Take 1.5 mg by mouth nightly Yes Historical Provider, MD   diclofenac (VOLTAREN) 75 MG EC tablet 2 times daily  Yes Historical Provider, MD   bumetanide (BUMEX) 1 MG tablet Take 1 tablet by mouth 2 times daily  Patient taking differently: Take 1 mg by mouth daily takes one on monday wednesday and friday Yes Nelsy Morales MD   atorvastatin (LIPITOR) 40 MG tablet TAKE ONE TABLET BY MOUTH DAILY Yes Nelsy Morales MD   QUEtiapine (SEROQUEL) 50 MG tablet Take 25 mg by mouth nightly  Yes Historical Provider, MD   niacin (SLO-NIACIN) 500 MG extended release tablet Take 500 mg by mouth daily  Yes Historical Provider, MD   levothyroxine (SYNTHROID) 100 MCG tablet Take 1 tablet by mouth Daily  Patient taking above  Diagnosis and orders for this visit are above. Please note that this chart was generated using dragon dictationsoftware. Although every effort was made to ensure the accuracy of this automated transcription, some errors in transcription may have occurred.

## 2019-08-07 DIAGNOSIS — E79.0 ELEVATED URIC ACID IN BLOOD: ICD-10-CM

## 2019-08-07 DIAGNOSIS — E78.2 MIXED HYPERLIPIDEMIA: ICD-10-CM

## 2019-08-07 DIAGNOSIS — R10.31 RIGHT LOWER QUADRANT ABDOMINAL PAIN: Primary | ICD-10-CM

## 2019-08-09 ENCOUNTER — TELEPHONE (OUTPATIENT)
Dept: FAMILY MEDICINE CLINIC | Age: 59
End: 2019-08-09

## 2019-08-09 RX ORDER — ALLOPURINOL 100 MG/1
TABLET ORAL
Qty: 90 TABLET | Refills: 0 | Status: SHIPPED | OUTPATIENT
Start: 2019-08-09 | End: 2019-11-05 | Stop reason: SDUPTHER

## 2019-08-09 RX ORDER — ATORVASTATIN CALCIUM 40 MG/1
TABLET, FILM COATED ORAL
Qty: 90 TABLET | Refills: 0 | Status: SHIPPED | OUTPATIENT
Start: 2019-08-09 | End: 2019-11-05 | Stop reason: SDUPTHER

## 2019-08-28 ENCOUNTER — HOSPITAL ENCOUNTER (OUTPATIENT)
Dept: GENERAL RADIOLOGY | Age: 59
Discharge: HOME | End: 2019-08-28
Payer: COMMERCIAL

## 2019-08-28 ENCOUNTER — OFFICE VISIT (OUTPATIENT)
Dept: FAMILY MEDICINE CLINIC | Age: 59
End: 2019-08-28
Payer: COMMERCIAL

## 2019-08-28 VITALS
DIASTOLIC BLOOD PRESSURE: 66 MMHG | HEART RATE: 100 BPM | SYSTOLIC BLOOD PRESSURE: 116 MMHG | TEMPERATURE: 97.2 F | OXYGEN SATURATION: 95 %

## 2019-08-28 DIAGNOSIS — J40 BRONCHITIS: ICD-10-CM

## 2019-08-28 DIAGNOSIS — J40 BRONCHITIS: Primary | ICD-10-CM

## 2019-08-28 DIAGNOSIS — R06.00 DYSPNEA, UNSPECIFIED TYPE: ICD-10-CM

## 2019-08-28 LAB
ANION GAP SERPL CALCULATED.3IONS-SCNC: 14 MMOL/L (ref 7–19)
BASOPHILS ABSOLUTE: 0.1 K/UL (ref 0–0.2)
BASOPHILS RELATIVE PERCENT: 0.4 % (ref 0–1)
BUN BLDV-MCNC: 19 MG/DL (ref 6–20)
CALCIUM SERPL-MCNC: 9.7 MG/DL (ref 8.6–10)
CHLORIDE BLD-SCNC: 94 MMOL/L (ref 98–111)
CO2: 31 MMOL/L (ref 22–29)
CREAT SERPL-MCNC: 0.6 MG/DL (ref 0.5–0.9)
EOSINOPHILS ABSOLUTE: 0.2 K/UL (ref 0–0.6)
EOSINOPHILS RELATIVE PERCENT: 1.3 % (ref 0–5)
GFR NON-AFRICAN AMERICAN: >60
GLUCOSE BLD-MCNC: 105 MG/DL (ref 74–109)
HCT VFR BLD CALC: 45.2 % (ref 37–47)
HEMOGLOBIN: 14 G/DL (ref 12–16)
IMMATURE GRANULOCYTES #: 0.1 K/UL
LYMPHOCYTES ABSOLUTE: 3.9 K/UL (ref 1.1–4.5)
LYMPHOCYTES RELATIVE PERCENT: 27.3 % (ref 20–40)
MCH RBC QN AUTO: 28.2 PG (ref 27–31)
MCHC RBC AUTO-ENTMCNC: 31 G/DL (ref 33–37)
MCV RBC AUTO: 90.9 FL (ref 81–99)
MONOCYTES ABSOLUTE: 1 K/UL (ref 0–0.9)
MONOCYTES RELATIVE PERCENT: 6.7 % (ref 0–10)
NEUTROPHILS ABSOLUTE: 9 K/UL (ref 1.5–7.5)
NEUTROPHILS RELATIVE PERCENT: 63.7 % (ref 50–65)
PDW BLD-RTO: 13.9 % (ref 11.5–14.5)
PLATELET # BLD: 446 K/UL (ref 130–400)
PMV BLD AUTO: 10.7 FL (ref 9.4–12.3)
POTASSIUM SERPL-SCNC: 4 MMOL/L (ref 3.5–5)
PRO-BNP: 41 PG/ML (ref 0–900)
RBC # BLD: 4.97 M/UL (ref 4.2–5.4)
SODIUM BLD-SCNC: 139 MMOL/L (ref 136–145)
WBC # BLD: 14.1 K/UL (ref 4.8–10.8)

## 2019-08-28 PROCEDURE — 99214 OFFICE O/P EST MOD 30 MIN: CPT | Performed by: NURSE PRACTITIONER

## 2019-08-28 PROCEDURE — 94640 AIRWAY INHALATION TREATMENT: CPT | Performed by: NURSE PRACTITIONER

## 2019-08-28 PROCEDURE — 71046 X-RAY EXAM CHEST 2 VIEWS: CPT

## 2019-08-28 RX ORDER — DOXYCYCLINE HYCLATE 100 MG
100 TABLET ORAL 2 TIMES DAILY
Qty: 20 TABLET | Refills: 0 | Status: SHIPPED | OUTPATIENT
Start: 2019-08-28 | End: 2019-09-07

## 2019-08-28 RX ORDER — ALBUTEROL SULFATE 2.5 MG/3ML
2.5 SOLUTION RESPIRATORY (INHALATION) ONCE
Status: COMPLETED | OUTPATIENT
Start: 2019-08-28 | End: 2019-08-28

## 2019-08-28 RX ORDER — GUAIFENESIN 600 MG/1
1200 TABLET, EXTENDED RELEASE ORAL 2 TIMES DAILY
Qty: 40 TABLET | Refills: 0 | Status: SHIPPED | OUTPATIENT
Start: 2019-08-28 | End: 2019-09-07

## 2019-08-28 RX ORDER — PREDNISONE 20 MG/1
TABLET ORAL
Qty: 10 TABLET | Refills: 0 | Status: SHIPPED | OUTPATIENT
Start: 2019-08-28 | End: 2019-09-05 | Stop reason: ALTCHOICE

## 2019-08-28 RX ORDER — ALBUTEROL SULFATE 2.5 MG/3ML
2.5 SOLUTION RESPIRATORY (INHALATION) EVERY 6 HOURS PRN
Qty: 120 EACH | Refills: 3 | Status: ON HOLD | OUTPATIENT
Start: 2019-08-28 | End: 2020-07-03

## 2019-08-28 RX ORDER — NEBULIZER ACCESSORIES
1 KIT MISCELLANEOUS DAILY PRN
Qty: 1 KIT | Refills: 0 | Status: SHIPPED | OUTPATIENT
Start: 2019-08-28 | End: 2020-10-29

## 2019-08-28 RX ADMIN — ALBUTEROL SULFATE 2.5 MG: 2.5 SOLUTION RESPIRATORY (INHALATION) at 15:44

## 2019-08-28 ASSESSMENT — ENCOUNTER SYMPTOMS
COUGH: 1
SHORTNESS OF BREATH: 1

## 2019-08-28 NOTE — PROGRESS NOTES
SUBJECTIVE:    Patient ID: Jillyn Boeck is a62 y.o. female. Jillyn Boeck is here today for Shortness of Breath (Patient presents c/o shortness of breath since yesterday.) and Cough  . HPI:   HPI   Pt states she is here today for cough and congestion. Pt states that onset of soa was yesterday. She reports having cough for 1wk or more and it is worsening. She is unsure of fever but does think she has likely had fever. Tired. tx-none  H/o recurrent sinusitis with h/o nasal surgery within last 1yr. Upon walking to exam room and spo2 measured, pt was found to be hypoxic and reversed quickly with o2/nc at [facility]. No radiation information found for this patient   Narrative   XR CHEST (2 VW) 8/28/2019 2:01 PM   HISTORY: R06.00   COMPARISON: March 14, 2018. FINDINGS:    The lungs are clear. The cardiomediastinal silhouette and pulmonary   vascularity are within normal limits. The osseous structures and   surrounding soft tissues demonstrate no acute abnormality.       Impression   1. No radiographic evidence of acute cardiopulmonary process. Signed by Dr Hollis Addison on 8/28/2019 3:10 PM               Past Medical History:   Diagnosis Date    Arthritis     Bipolar I disorder, most recent episode (or current) mixed, unspecified     Chronic back pain     Depression     Dry mouth     Hyperlipidemia     Hypertension     Hypokalemia     Hypothyroidism     Menopause     Overactive bladder     Plantar fasciitis     RLS (restless legs syndrome)     Sleep apnea     Thyroid disease      Prior to Visit Medications    Medication Sig Taking?  Authorizing Provider   Respiratory Therapy Supplies (NEBULIZER/TUBING/MOUTHPIECE) KIT 1 kit by Does not apply route daily as needed (for qid prn use  DX-bronchitis) Yes MAEVE Cuevas   albuterol (PROVENTIL) (2.5 MG/3ML) 0.083% nebulizer solution Take 3 mLs by nebulization every 6 hours as needed for Wheezing Yes MAEVE Cuevas   doxycycline hyclate

## 2019-09-05 ENCOUNTER — OFFICE VISIT (OUTPATIENT)
Dept: FAMILY MEDICINE CLINIC | Age: 59
End: 2019-09-05
Payer: COMMERCIAL

## 2019-09-05 VITALS
DIASTOLIC BLOOD PRESSURE: 82 MMHG | TEMPERATURE: 97.1 F | SYSTOLIC BLOOD PRESSURE: 122 MMHG | RESPIRATION RATE: 20 BRPM | WEIGHT: 168 LBS | HEART RATE: 98 BPM | OXYGEN SATURATION: 98 % | BODY MASS INDEX: 33.87 KG/M2 | HEIGHT: 59 IN

## 2019-09-05 DIAGNOSIS — R06.00 DYSPNEA, UNSPECIFIED TYPE: ICD-10-CM

## 2019-09-05 DIAGNOSIS — J40 BRONCHITIS: Primary | ICD-10-CM

## 2019-09-05 DIAGNOSIS — R09.02 OXYGEN DESATURATION: ICD-10-CM

## 2019-09-05 PROCEDURE — 99214 OFFICE O/P EST MOD 30 MIN: CPT | Performed by: NURSE PRACTITIONER

## 2019-09-05 PROCEDURE — 96372 THER/PROPH/DIAG INJ SC/IM: CPT | Performed by: NURSE PRACTITIONER

## 2019-09-05 RX ORDER — CEFPROZIL 500 MG/1
500 TABLET, FILM COATED ORAL 2 TIMES DAILY
Qty: 20 TABLET | Refills: 0 | Status: SHIPPED | OUTPATIENT
Start: 2019-09-05 | End: 2019-09-15

## 2019-09-05 RX ORDER — METHYLPREDNISOLONE 4 MG/1
TABLET ORAL
Qty: 1 KIT | Refills: 0 | Status: SHIPPED | OUTPATIENT
Start: 2019-09-05 | End: 2020-01-07

## 2019-09-05 ASSESSMENT — ENCOUNTER SYMPTOMS
VOMITING: 0
SHORTNESS OF BREATH: 1
WHEEZING: 1
COUGH: 1

## 2019-09-05 NOTE — PROGRESS NOTES
Provider, MD   pilocarpine (SALAGEN) 5 MG tablet Take 5 mg by mouth 3 times daily  Historical Provider, MD   Vitamin D (CHOLECALCIFEROL) 1000 UNITS CAPS capsule Take 1,000 Units by mouth daily  Historical Provider, MD   Multiple Vitamins-Minerals (MULTIVITAMIN PO) Take by mouth daily  Historical Provider, MD   DULoxetine (CYMBALTA) 60 MG capsule Take 60 mg by mouth 2 times daily   Historical Provider, MD   traMADol (ULTRAM) 50 MG tablet Take 100 mg by mouth every 12 hours   Historical Provider, MD   calcium carbonate 600 MG TABS tablet Take 1 tablet by mouth daily. Historical Provider, MD   divalproex (DEPAKOTE) 500 MG ER tablet Take 1,000 mg by mouth nightly.   Historical Provider, MD     Allergies   Allergen Reactions    Hctz [Hydrochlorothiazide] Other (See Comments)     Acid reflux      Sitagliptin Rash     Past Surgical History:   Procedure Laterality Date    CARPAL TUNNEL RELEASE Bilateral 2002    CHOLECYSTECTOMY  10/2014    COLON SURGERY      COLON SURGERY      biopsy     COLONOSCOPY      CYST REMOVAL  05/2017    Under left ear    HARDWARE REMOVAL Right 2011    Foot     HERNIA REPAIR  10/2014    NOSE SURGERY  08/01/2017; 04/16/2019    Dr Dev NASCIMENTO/S,1 LEVEL Bilateral 2/7/2017    INJECTION MEDICATION FACET JOINT performed by Tonia Roger at [facility]     Family History   Problem Relation Age of Onset    Cancer Mother         lung    Heart Disease Mother     Hypertension Mother     Bipolar Disorder Mother     Heart Disease Father     Hypertension Father      Social History     Socioeconomic History    Marital status:      Spouse name: Nelida Springer Number of children: 2    Years of education: some college    Highest education level: Not on file   Occupational History     Employer: [employer]   Social Needs    Financial resource strain: Not on file    Food insecurity:     Worry: Not on file     Inability: Not on file    Transportation needs:     Medical: Not on

## 2019-09-06 ENCOUNTER — NURSE ONLY (OUTPATIENT)
Dept: FAMILY MEDICINE CLINIC | Age: 59
End: 2019-09-06
Payer: COMMERCIAL

## 2019-09-06 DIAGNOSIS — J40 BRONCHITIS: Primary | ICD-10-CM

## 2019-09-06 PROCEDURE — 96372 THER/PROPH/DIAG INJ SC/IM: CPT | Performed by: NURSE PRACTITIONER

## 2019-09-06 RX ORDER — CEFTRIAXONE 1 G/1
1 INJECTION, POWDER, FOR SOLUTION INTRAMUSCULAR; INTRAVENOUS ONCE
Status: COMPLETED | OUTPATIENT
Start: 2019-09-06 | End: 2019-09-06

## 2019-09-06 RX ADMIN — CEFTRIAXONE 1 G: 1 INJECTION, POWDER, FOR SOLUTION INTRAMUSCULAR; INTRAVENOUS at 13:02

## 2019-09-07 ENCOUNTER — OFFICE VISIT (OUTPATIENT)
Dept: FAMILY MEDICINE CLINIC | Age: 59
End: 2019-09-07
Payer: COMMERCIAL

## 2019-09-07 VITALS
DIASTOLIC BLOOD PRESSURE: 78 MMHG | HEART RATE: 90 BPM | RESPIRATION RATE: 24 BRPM | SYSTOLIC BLOOD PRESSURE: 120 MMHG | OXYGEN SATURATION: 98 % | TEMPERATURE: 97.9 F

## 2019-09-07 DIAGNOSIS — J40 BRONCHITIS: Primary | ICD-10-CM

## 2019-09-07 PROCEDURE — 96372 THER/PROPH/DIAG INJ SC/IM: CPT | Performed by: NURSE PRACTITIONER

## 2019-09-07 PROCEDURE — 99213 OFFICE O/P EST LOW 20 MIN: CPT | Performed by: NURSE PRACTITIONER

## 2019-09-07 ASSESSMENT — ENCOUNTER SYMPTOMS
COUGH: 1
WHEEZING: 0
SHORTNESS OF BREATH: 0

## 2019-09-07 NOTE — PROGRESS NOTES
Take 1 tablet by mouth 2 times daily for 10 days Yes MAEVE Cuevas   methylPREDNISolone (MEDROL DOSEPACK) 4 MG tablet Take by mouth. Yes MAEVE Hoffman   Respiratory Therapy Supplies (NEBULIZER/TUBING/MOUTHPIECE) KIT 1 kit by Does not apply route daily as needed (for qid prn use  DX-bronchitis) Yes MAEVE Cuevas   albuterol (PROVENTIL) (2.5 MG/3ML) 0.083% nebulizer solution Take 3 mLs by nebulization every 6 hours as needed for Wheezing Yes MAEVE Cuevas   doxycycline hyclate (VIBRA-TABS) 100 MG tablet Take 1 tablet by mouth 2 times daily for 10 days Yes MAEVE Cuevas   guaiFENesin (MUCINEX) 600 MG extended release tablet Take 2 tablets by mouth 2 times daily for 10 days Yes MAEVE Cuevas   atorvastatin (LIPITOR) 40 MG tablet TAKE 1 TABLET BY MOUTH  DAILY Yes MAEVE Cuevas   allopurinol (ZYLOPRIM) 100 MG tablet TAKE 1 TABLET BY MOUTH  DAILY Yes MAEVE Cuevas   fluconazole (DIFLUCAN) 150 MG tablet 1 po at onset of yeast infection and may repeat in 3days Yes MAEVE Cuevas   omeprazole (PRILOSEC) 20 MG delayed release capsule Take 1 capsule by mouth 2 times daily (before meals) Yes MAEVE Cuevas   carvedilol (COREG) 6.25 MG tablet TAKE 1 TABLET BY MOUTH TWO  TIMES DAILY WITH MEALS Yes MAEVE Cuevas   spironolactone (ALDACTONE) 25 MG tablet Take 1 tablet by mouth daily Yes MAEVE Cuevsa   ALPRAZolam (XANAX) 0.5 MG tablet Take 0.5 mg by mouth nightly as needed.  . Yes Historical Provider, MD   guaiFENesin (MUCINEX) 600 MG extended release tablet Take 2 tablets by mouth 2 times daily Yes MAEVE Cuevas   potassium chloride (KLOR-CON M) 20 MEQ extended release tablet TAKE ONE TABLET BY MOUTH DAILY  Patient taking differently: takes one on monday wednesday and friday Yes Pilar Fuentes MD   triamcinolone (ARISTOCORT) 0.5 % ointment  Yes Historical Provider, MD   pramipexole (MIRAPEX) 1.5 MG tablet Take 1.5 mg by mouth nightly Yes Historical Provider, MD   diclofenac (VOLTAREN) 75 MG EC

## 2019-09-11 ENCOUNTER — HOSPITAL ENCOUNTER (OUTPATIENT)
Dept: GENERAL RADIOLOGY | Age: 59
Discharge: HOME | End: 2019-09-11
Payer: COMMERCIAL

## 2019-09-11 DIAGNOSIS — R06.00 DYSPNEA, UNSPECIFIED TYPE: ICD-10-CM

## 2019-09-11 DIAGNOSIS — J40 BRONCHITIS: ICD-10-CM

## 2019-09-11 DIAGNOSIS — R09.02 OXYGEN DESATURATION: ICD-10-CM

## 2019-09-11 PROCEDURE — 71250 CT THORAX DX C-: CPT

## 2019-10-14 ENCOUNTER — OFFICE VISIT (OUTPATIENT)
Dept: GASTROENTEROLOGY | Facility: CLINIC | Age: 59
End: 2019-10-14

## 2019-10-14 VITALS
WEIGHT: 178 LBS | BODY MASS INDEX: 34.95 KG/M2 | SYSTOLIC BLOOD PRESSURE: 116 MMHG | TEMPERATURE: 98 F | DIASTOLIC BLOOD PRESSURE: 62 MMHG | HEART RATE: 86 BPM | OXYGEN SATURATION: 98 % | HEIGHT: 60 IN

## 2019-10-14 DIAGNOSIS — R10.30 LOWER ABDOMINAL PAIN: ICD-10-CM

## 2019-10-14 DIAGNOSIS — K59.09 OTHER CONSTIPATION: ICD-10-CM

## 2019-10-14 DIAGNOSIS — Z86.010 HX OF COLONIC POLYPS: Primary | ICD-10-CM

## 2019-10-14 PROBLEM — Z86.0100 HX OF COLONIC POLYPS: Status: ACTIVE | Noted: 2019-10-14

## 2019-10-14 PROCEDURE — 99214 OFFICE O/P EST MOD 30 MIN: CPT | Performed by: NURSE PRACTITIONER

## 2019-10-14 RX ORDER — SODIUM, POTASSIUM,MAG SULFATES 17.5-3.13G
1 SOLUTION, RECONSTITUTED, ORAL ORAL EVERY 12 HOURS
Qty: 2 BOTTLE | Refills: 0 | Status: SHIPPED | OUTPATIENT
Start: 2019-10-14 | End: 2019-10-16

## 2019-10-14 NOTE — PROGRESS NOTES
Chief Complaint   Patient presents with   • Abdominal Pain     Pt c/o lower abdominal pain X 3 months-has been to ER twice and they tell her she is constipated   • Constipation     Pt also c/o constipation-states she does have a BM every day though; Pt's last colon was 10/17/2014-Personal history of colon polyps     Subjective   HPI  Robyn Minaya is a 59 y.o. female who presents as a referral for preventative maintenance. She has no complaints of nausea or vomiting.  No wt loss. No BRBPR. No melena. There is no family hx for colon cancer. No abdominal pain. There was no Cologuard screening this year.  The patient's last colonoscopy was performed on 10/17/2014 with findings of adenomatous polyps.    The patient states she went to [facility] with complaint of lower abdominal pain and constipation. She states she was give magcitrate . CT of abdomen and pelvis was found to show constipation.  She states that for about one year she had diarrhea due to Metformin and that was stopped. She states the diarrhea then stopped. She states that the constipation has been going off and on for years.She states the longest she will go is about 3 days.  The patient was seen by ethan lancaster approximately 6 months ago same sort of complaints.  CT of abdomen and pelvis noted below.  The patient was instructed to start MiraLAX.    The patient denies any nausea, vomiting, epigastric pain, dysphagia, pyrosis or hematemesis.  The patient denies any fever or chills.  Denies any melena or hematochezia.  Denies any unintentional weight loss or loss of appetite.      CT of abdomen and pelvis   history:  59-year-old with lower abdominal pain and diarrhea.      Reference:  CTA abdomen December 2006.  IMPRESSION:  No acute findings in the abdomen or pelvis. No evidence of a malignant  process. Moderate stool in the colon with no annular bowel masses.  This report was finalized on 04/15/2019 09:43 by Dr Orville Chavez, .      Past Medical History:    Diagnosis Date   • Acid reflux     RESOLVED PT PT   • Allergic rhinitis    • Arthritis     BACK   • Bipolar 1 disorder (CMS/HCC)    • Chronic back pain    • Degenerative arthritis    • Depression    • Deviated nasal septum    • Diabetes mellitus (CMS/HCC)    • Eczema    • Gout    • History of colon polyps    • Hyperlipidemia    • Hypertension    • Hypertrophy of nasal turbinates    • Hypokalemia    • Hypothyroidism    • Incontinence in female    • Insomnia    • Menopause    • Nasal septal deviation    • Nasal valve stenosis    • Overactive bladder    • Plantar fasciitis    • RLS (restless legs syndrome)    • Sleep apnea    • Spinal headache    • Wears glasses      Past Surgical History:   Procedure Laterality Date   • BLEPHAROPLASTY Bilateral 7/9/2019    Procedure: 1) bilateral upper blepharoplasty with excision of excess of tissue weighing down 2) nasal endoscopy with removal of nasal septal prosthesis;  Surgeon: Mark Anthony Anderson MD;  Location: [facility] OR;  Service: ENT   • CARPAL TUNNEL RELEASE Bilateral 2004   • CHOLECYSTECTOMY  2013   • COLONOSCOPY  10/17/2014    One 5-6mm tubular adenomatous polyp in the ascending colon; Two less than 4mm hyperplastic polyps in the sigmoid colon; Three rectal hyperplastic polyps; Diverticulosis; Repeat 5 years    • COLONOSCOPY  07/14/2010    Internal hemorrhoids; Repeat 7 years    • CYST REMOVAL  2016    neck   • ENDOSCOPIC FUNCTIONAL SINUS SURGERY (FESS) Bilateral 4/16/2019    Procedure: ENDOSCOPIC FUNCTIONAL SINUS SURGERY (bilareral maxillary antrostomy without image guidance);  Surgeon: Mark Anthony Anderson MD;  Location: [facility] OR;  Service: ENT   • ENDOSCOPY N/A 4/11/2019    Esophageal mucosal changes suggestive of eosinophilic esophagitis-biopsied; A few gastric polyps-resected and retrieved-biopsied; Normal examined duodenum-biopsied   • FOOT SURGERY Right 2010    X3   • HERNIA REPAIR     • INTERSTIM PLACEMENT N/A 6/5/2019    Procedure: INTERSTIM STAGES 1 AND 2 LEAD AND  GENERATOR PLACEMENT;  Surgeon: Endy Guerrero MD;  Location:  [facility] OR;  Service: Urology   • LASER ABLATION      right back   • NASAL ENDOSCOPY N/A 4/16/2019    Procedure:     1. Functional endoscopic sinus surgery with bilateral maxillary antrostomy    2. Bilateral nasal endoscopy with biopsy of nasal septum    3.  Nasal septal prosthesis placement  ;  Surgeon: Mark Anthony Anderson MD;  Location:  [facility] OR;  Service: ENT   • NASAL VESTIBULE LESION/PAPILLOMA EXCISION N/A 8/1/2017    Procedure: Repair of nasal vestibular stenosis and septoplasty; cartilage graft from left ear;  Surgeon: Mark Anthony Anderson MD;  Location:  [facility] OR;  Service:    • SEPTOPLASTY N/A 4/16/2019    Procedure: PLACEMENT OF NASAL SEPTAL PROSTHESIS;  Surgeon: Mark Anthony Anderson MD;  Location:  [facility] OR;  Service: ENT       Current Outpatient Medications:   •  allopurinol (ZYLOPRIM) 100 MG tablet, Take 100 mg by mouth Daily., Disp: , Rfl:   •  ALPRAZolam (XANAX) 0.5 MG tablet, Take 0.5 mg by mouth As Needed., Disp: , Rfl:   •  atorvastatin (LIPITOR) 40 MG tablet, Take 40 mg by mouth Every Night., Disp: , Rfl:   •  bumetanide (BUMEX) 1 MG tablet, Take 1 mg by mouth 2 (Two) Times a Week., Disp: , Rfl:   •  calcium carbonate (OS-KAYLEY) 600 MG tablet, Take 600 mg by mouth Daily., Disp: , Rfl:   •  carvedilol (COREG) 6.25 MG tablet, Take 6.25 mg by mouth 2 (Two) Times a Day With Meals., Disp: , Rfl:   •  cholecalciferol (VITAMIN D3) 1000 UNITS tablet, Take 1,000 Units by mouth Daily., Disp: , Rfl:   •  DICLOFENAC PO, Take 75 mg by mouth 2 (Two) Times a Day., Disp: , Rfl:   •  divalproex (DEPAKOTE) 500 MG 24 hr tablet, Take 1,000 mg by mouth Every Night., Disp: , Rfl:   •  DULoxetine (CYMBALTA) 60 MG capsule, Take 60 mg by mouth 2 (Two) Times a Day., Disp: , Rfl:   •  guaiFENesin (MUCINEX) 600 MG 12 hr tablet, Take 1,200 mg by mouth 2 (Two) Times a Day., Disp: , Rfl:   •  levothyroxine (SYNTHROID, LEVOTHROID) 125 MCG tablet, Take 125 mcg by mouth Daily.,  Disp: , Rfl:   •  Loperamide HCl (IMODIUM PO), Take 2 mg by mouth As Needed., Disp: , Rfl:   •  Multiple Vitamin (MULTI VITAMIN PO), Take 1 dose by mouth Daily., Disp: , Rfl:   •  niacin (SLO-NIACIN) 500 MG CR tablet, Take 500 mg by mouth Daily., Disp: , Rfl:   •  omeprazole (priLOSEC) 20 MG capsule, Take 20 mg by mouth Daily., Disp: , Rfl:   •  pilocarpine (SALAGEN) 5 MG tablet, Take 5 mg by mouth 3 (Three) Times a Day., Disp: , Rfl:   •  pramipexole (MIRAPEX) 1.5 MG tablet, Take 1.5 mg by mouth Every Night., Disp: , Rfl:   •  QUEtiapine (SEROquel) 50 MG tablet, Take 50 mg by mouth Every Night., Disp: , Rfl:   •  spironolactone (ALDACTONE) 25 MG tablet, Take 25 mg by mouth Daily., Disp: , Rfl:   •  tiZANidine (ZANAFLEX) 4 MG tablet, Take 4 mg by mouth Every Night., Disp: , Rfl:   •  traMADol (ULTRAM) 50 MG tablet, Take 100 mg by mouth 3 (Three) Times a Day., Disp: , Rfl:   •  sodium-potassium-magnesium sulfates (SUPREP BOWEL PREP KIT) 17.5-3.13-1.6 GM/177ML solution oral solution, Take 1 bottle by mouth Every 12 (Twelve) Hours. Split dose prep as directed by office instructions provided.  2 bottles = one kit., Disp: 2 bottle, Rfl: 0  Allergies   Allergen Reactions   • Hctz [Hydrochlorothiazide] Other (See Comments)     Acid reflux   • Januvia [Sitagliptin] Rash     Social History     Socioeconomic History   • Marital status:      Spouse name: Not on file   • Number of children: Not on file   • Years of education: Not on file   • Highest education level: Not on file   Tobacco Use   • Smoking status: Current Every Day Smoker     Packs/day: 1.00     Types: Cigarettes   • Smokeless tobacco: Never Used   Substance and Sexual Activity   • Alcohol use: Yes     Frequency: Monthly or less     Comment: Rarely-maybe 3x/year   • Drug use: No   • Sexual activity: Defer     Family History   Problem Relation Age of Onset   • Cancer Mother    • Diabetes Father    • Hypertension Father    • Cancer Father    • Colon cancer  "Neg Hx    • Colon polyps Neg Hx    • Esophageal cancer Neg Hx    • Liver cancer Neg Hx    • Liver disease Neg Hx    • Rectal cancer Neg Hx    • Stomach cancer Neg Hx        REVIEW OF SYSTEMS  General: well appearing, no fever chills or sweats, no unexplained wt loss  HEENT: no acute visual or hearing disturbances  Cardiovascular: No chest pain or palpitations  Pulmonary: No shortness of breath, coughing, wheezing or hemoptysis  : No burning, urgency, hematuria, or dysuria  Musculoskeletal: No joint pain or stiffness  Peripheral: no edema  Skin: No lesions or rashes  Neuro: No dizziness, headaches, stroke, syncope  Endocrine: No hot or cold intolerances  Hematological: No blood dyscrasias    Objective   Vitals:    10/14/19 0910   BP: 116/62   BP Location: Left arm   Patient Position: Sitting   Cuff Size: Adult   Pulse: 86   Temp: 98 °F (36.7 °C)   TempSrc: Tympanic   SpO2: 98%   Weight: 80.7 kg (178 lb)   Height: 152.4 cm (60\")     Body mass index is 34.76 kg/m².    PHYSICAL EXAM  General: age appropriate well nourished well appearing, no acute distress  Head: normocephalic and atraumatic  Global assessment-supple  Neck-No JVD noted, no lymphadenopathy  Pulmonary-clear to auscultation bilaterally, normal respiratory effort  Cardiovascular-normal rate and rhythm, normal heart sounds, S1 and S2 noted  Abdomen-soft, non tender, non distended, normal bowel sounds all 4 quadrants, no hepatosplenomegaly noted  Extremities-No clubbing cyanosis or edema  Neuro-Non focal, converses appropriately, awake, alert, oriented    Imaging Results (most recent)     None        Assessment/Plan   Robyn was seen today for abdominal pain and constipation.    Diagnoses and all orders for this visit:    Hx of colonic polyps  Comments:  Colonoscopy  Orders:  -     Case Request; Standing  -     Case Request    Other constipation  -     Case Request; Standing  -     Case Request    Lower abdominal pain  -     Case Request; Standing  -     " Case Request    Other orders  -     Follow Anesthesia Guidelines / Standing Orders; Future  -     Obtain Informed Consent; Future  -     Implement Anesthesia Orders Day of Procedure; Standing  -     Obtain Informed Consent; Standing  -     Verify bowel prep was successful; Standing  -     sodium-potassium-magnesium sulfates (SUPREP BOWEL PREP KIT) 17.5-3.13-1.6 GM/177ML solution oral solution; Take 1 bottle by mouth Every 12 (Twelve) Hours. Split dose prep as directed by office instructions provided.  2 bottles = one kit.      COLONOSCOPY WITH ANESTHESIA (N/A)       Body mass index is 34.76 kg/m². Patient's Body mass index is 34.76 kg/m². BMI is above normal parameters. Recommendations include: nutrition counseling.      All risks, benefits, alternatives, and indications of colonoscopy procedure have been discussed with the patient. Risks to include perforation of the colon requiring possible surgery or colostomy, risk of bleeding from biopsies or removal of colon tissue, possibility of missing a colon polyp or cancer, or adverse drug reaction.  Benefits to include the diagnosis and management of disease of the colon and rectum. Alternatives to include barium enema, radiographic evaluation, lab testing or no intervention. Pt verbalizes understanding and agrees.

## 2019-10-14 NOTE — H&P (VIEW-ONLY)
Chief Complaint   Patient presents with   • Abdominal Pain     Pt c/o lower abdominal pain X 3 months-has been to ER twice and they tell her she is constipated   • Constipation     Pt also c/o constipation-states she does have a BM every day though; Pt's last colon was 10/17/2014-Personal history of colon polyps     Subjective   HPI  Robyn Minaya is a 59 y.o. female who presents as a referral for preventative maintenance. She has no complaints of nausea or vomiting.  No wt loss. No BRBPR. No melena. There is no family hx for colon cancer. No abdominal pain. There was no Cologuard screening this year.  The patient's last colonoscopy was performed on 10/17/2014 with findings of adenomatous polyps.    The patient states she went to [facility] with complaint of lower abdominal pain and constipation. She states she was give magcitrate . CT of abdomen and pelvis was found to show constipation.  She states that for about one year she had diarrhea due to Metformin and that was stopped. She states the diarrhea then stopped. She states that the constipation has been going off and on for years.She states the longest she will go is about 3 days.  The patient was seen by ethan lancaster approximately 6 months ago same sort of complaints.  CT of abdomen and pelvis noted below.  The patient was instructed to start MiraLAX.    The patient denies any nausea, vomiting, epigastric pain, dysphagia, pyrosis or hematemesis.  The patient denies any fever or chills.  Denies any melena or hematochezia.  Denies any unintentional weight loss or loss of appetite.      CT of abdomen and pelvis   history:  59-year-old with lower abdominal pain and diarrhea.      Reference:  CTA abdomen December 2006.  IMPRESSION:  No acute findings in the abdomen or pelvis. No evidence of a malignant  process. Moderate stool in the colon with no annular bowel masses.  This report was finalized on 04/15/2019 09:43 by Dr Orvlile Chavez, .      Past Medical History:    Diagnosis Date   • Acid reflux     RESOLVED PT PT   • Allergic rhinitis    • Arthritis     BACK   • Bipolar 1 disorder (CMS/HCC)    • Chronic back pain    • Degenerative arthritis    • Depression    • Deviated nasal septum    • Diabetes mellitus (CMS/HCC)    • Eczema    • Gout    • History of colon polyps    • Hyperlipidemia    • Hypertension    • Hypertrophy of nasal turbinates    • Hypokalemia    • Hypothyroidism    • Incontinence in female    • Insomnia    • Menopause    • Nasal septal deviation    • Nasal valve stenosis    • Overactive bladder    • Plantar fasciitis    • RLS (restless legs syndrome)    • Sleep apnea    • Spinal headache    • Wears glasses      Past Surgical History:   Procedure Laterality Date   • BLEPHAROPLASTY Bilateral 7/9/2019    Procedure: 1) bilateral upper blepharoplasty with excision of excess of tissue weighing down 2) nasal endoscopy with removal of nasal septal prosthesis;  Surgeon: Mark Anthony Anderson MD;  Location: [facility] OR;  Service: ENT   • CARPAL TUNNEL RELEASE Bilateral 2004   • CHOLECYSTECTOMY  2013   • COLONOSCOPY  10/17/2014    One 5-6mm tubular adenomatous polyp in the ascending colon; Two less than 4mm hyperplastic polyps in the sigmoid colon; Three rectal hyperplastic polyps; Diverticulosis; Repeat 5 years    • COLONOSCOPY  07/14/2010    Internal hemorrhoids; Repeat 7 years    • CYST REMOVAL  2016    neck   • ENDOSCOPIC FUNCTIONAL SINUS SURGERY (FESS) Bilateral 4/16/2019    Procedure: ENDOSCOPIC FUNCTIONAL SINUS SURGERY (bilareral maxillary antrostomy without image guidance);  Surgeon: Mark Anthony Anderson MD;  Location: [facility] OR;  Service: ENT   • ENDOSCOPY N/A 4/11/2019    Esophageal mucosal changes suggestive of eosinophilic esophagitis-biopsied; A few gastric polyps-resected and retrieved-biopsied; Normal examined duodenum-biopsied   • FOOT SURGERY Right 2010    X3   • HERNIA REPAIR     • INTERSTIM PLACEMENT N/A 6/5/2019    Procedure: INTERSTIM STAGES 1 AND 2 LEAD AND  GENERATOR PLACEMENT;  Surgeon: Endy Guerrero MD;  Location:  [facility] OR;  Service: Urology   • LASER ABLATION      right back   • NASAL ENDOSCOPY N/A 4/16/2019    Procedure:     1. Functional endoscopic sinus surgery with bilateral maxillary antrostomy    2. Bilateral nasal endoscopy with biopsy of nasal septum    3.  Nasal septal prosthesis placement  ;  Surgeon: Mark Anthony Anderson MD;  Location:  [facility] OR;  Service: ENT   • NASAL VESTIBULE LESION/PAPILLOMA EXCISION N/A 8/1/2017    Procedure: Repair of nasal vestibular stenosis and septoplasty; cartilage graft from left ear;  Surgeon: Mark Anthony Anderson MD;  Location:  [facility] OR;  Service:    • SEPTOPLASTY N/A 4/16/2019    Procedure: PLACEMENT OF NASAL SEPTAL PROSTHESIS;  Surgeon: Mark Anthony Anderson MD;  Location:  [facility] OR;  Service: ENT       Current Outpatient Medications:   •  allopurinol (ZYLOPRIM) 100 MG tablet, Take 100 mg by mouth Daily., Disp: , Rfl:   •  ALPRAZolam (XANAX) 0.5 MG tablet, Take 0.5 mg by mouth As Needed., Disp: , Rfl:   •  atorvastatin (LIPITOR) 40 MG tablet, Take 40 mg by mouth Every Night., Disp: , Rfl:   •  bumetanide (BUMEX) 1 MG tablet, Take 1 mg by mouth 2 (Two) Times a Week., Disp: , Rfl:   •  calcium carbonate (OS-KAYLEY) 600 MG tablet, Take 600 mg by mouth Daily., Disp: , Rfl:   •  carvedilol (COREG) 6.25 MG tablet, Take 6.25 mg by mouth 2 (Two) Times a Day With Meals., Disp: , Rfl:   •  cholecalciferol (VITAMIN D3) 1000 UNITS tablet, Take 1,000 Units by mouth Daily., Disp: , Rfl:   •  DICLOFENAC PO, Take 75 mg by mouth 2 (Two) Times a Day., Disp: , Rfl:   •  divalproex (DEPAKOTE) 500 MG 24 hr tablet, Take 1,000 mg by mouth Every Night., Disp: , Rfl:   •  DULoxetine (CYMBALTA) 60 MG capsule, Take 60 mg by mouth 2 (Two) Times a Day., Disp: , Rfl:   •  guaiFENesin (MUCINEX) 600 MG 12 hr tablet, Take 1,200 mg by mouth 2 (Two) Times a Day., Disp: , Rfl:   •  levothyroxine (SYNTHROID, LEVOTHROID) 125 MCG tablet, Take 125 mcg by mouth Daily.,  Disp: , Rfl:   •  Loperamide HCl (IMODIUM PO), Take 2 mg by mouth As Needed., Disp: , Rfl:   •  Multiple Vitamin (MULTI VITAMIN PO), Take 1 dose by mouth Daily., Disp: , Rfl:   •  niacin (SLO-NIACIN) 500 MG CR tablet, Take 500 mg by mouth Daily., Disp: , Rfl:   •  omeprazole (priLOSEC) 20 MG capsule, Take 20 mg by mouth Daily., Disp: , Rfl:   •  pilocarpine (SALAGEN) 5 MG tablet, Take 5 mg by mouth 3 (Three) Times a Day., Disp: , Rfl:   •  pramipexole (MIRAPEX) 1.5 MG tablet, Take 1.5 mg by mouth Every Night., Disp: , Rfl:   •  QUEtiapine (SEROquel) 50 MG tablet, Take 50 mg by mouth Every Night., Disp: , Rfl:   •  spironolactone (ALDACTONE) 25 MG tablet, Take 25 mg by mouth Daily., Disp: , Rfl:   •  tiZANidine (ZANAFLEX) 4 MG tablet, Take 4 mg by mouth Every Night., Disp: , Rfl:   •  traMADol (ULTRAM) 50 MG tablet, Take 100 mg by mouth 3 (Three) Times a Day., Disp: , Rfl:   •  sodium-potassium-magnesium sulfates (SUPREP BOWEL PREP KIT) 17.5-3.13-1.6 GM/177ML solution oral solution, Take 1 bottle by mouth Every 12 (Twelve) Hours. Split dose prep as directed by office instructions provided.  2 bottles = one kit., Disp: 2 bottle, Rfl: 0  Allergies   Allergen Reactions   • Hctz [Hydrochlorothiazide] Other (See Comments)     Acid reflux   • Januvia [Sitagliptin] Rash     Social History     Socioeconomic History   • Marital status:      Spouse name: Not on file   • Number of children: Not on file   • Years of education: Not on file   • Highest education level: Not on file   Tobacco Use   • Smoking status: Current Every Day Smoker     Packs/day: 1.00     Types: Cigarettes   • Smokeless tobacco: Never Used   Substance and Sexual Activity   • Alcohol use: Yes     Frequency: Monthly or less     Comment: Rarely-maybe 3x/year   • Drug use: No   • Sexual activity: Defer     Family History   Problem Relation Age of Onset   • Cancer Mother    • Diabetes Father    • Hypertension Father    • Cancer Father    • Colon cancer  "Neg Hx    • Colon polyps Neg Hx    • Esophageal cancer Neg Hx    • Liver cancer Neg Hx    • Liver disease Neg Hx    • Rectal cancer Neg Hx    • Stomach cancer Neg Hx        REVIEW OF SYSTEMS  General: well appearing, no fever chills or sweats, no unexplained wt loss  HEENT: no acute visual or hearing disturbances  Cardiovascular: No chest pain or palpitations  Pulmonary: No shortness of breath, coughing, wheezing or hemoptysis  : No burning, urgency, hematuria, or dysuria  Musculoskeletal: No joint pain or stiffness  Peripheral: no edema  Skin: No lesions or rashes  Neuro: No dizziness, headaches, stroke, syncope  Endocrine: No hot or cold intolerances  Hematological: No blood dyscrasias    Objective   Vitals:    10/14/19 0910   BP: 116/62   BP Location: Left arm   Patient Position: Sitting   Cuff Size: Adult   Pulse: 86   Temp: 98 °F (36.7 °C)   TempSrc: Tympanic   SpO2: 98%   Weight: 80.7 kg (178 lb)   Height: 152.4 cm (60\")     Body mass index is 34.76 kg/m².    PHYSICAL EXAM  General: age appropriate well nourished well appearing, no acute distress  Head: normocephalic and atraumatic  Global assessment-supple  Neck-No JVD noted, no lymphadenopathy  Pulmonary-clear to auscultation bilaterally, normal respiratory effort  Cardiovascular-normal rate and rhythm, normal heart sounds, S1 and S2 noted  Abdomen-soft, non tender, non distended, normal bowel sounds all 4 quadrants, no hepatosplenomegaly noted  Extremities-No clubbing cyanosis or edema  Neuro-Non focal, converses appropriately, awake, alert, oriented    Imaging Results (most recent)     None        Assessment/Plan   Robyn was seen today for abdominal pain and constipation.    Diagnoses and all orders for this visit:    Hx of colonic polyps  Comments:  Colonoscopy  Orders:  -     Case Request; Standing  -     Case Request    Other constipation  -     Case Request; Standing  -     Case Request    Lower abdominal pain  -     Case Request; Standing  -     " Case Request    Other orders  -     Follow Anesthesia Guidelines / Standing Orders; Future  -     Obtain Informed Consent; Future  -     Implement Anesthesia Orders Day of Procedure; Standing  -     Obtain Informed Consent; Standing  -     Verify bowel prep was successful; Standing  -     sodium-potassium-magnesium sulfates (SUPREP BOWEL PREP KIT) 17.5-3.13-1.6 GM/177ML solution oral solution; Take 1 bottle by mouth Every 12 (Twelve) Hours. Split dose prep as directed by office instructions provided.  2 bottles = one kit.      COLONOSCOPY WITH ANESTHESIA (N/A)       Body mass index is 34.76 kg/m². Patient's Body mass index is 34.76 kg/m². BMI is above normal parameters. Recommendations include: nutrition counseling.      All risks, benefits, alternatives, and indications of colonoscopy procedure have been discussed with the patient. Risks to include perforation of the colon requiring possible surgery or colostomy, risk of bleeding from biopsies or removal of colon tissue, possibility of missing a colon polyp or cancer, or adverse drug reaction.  Benefits to include the diagnosis and management of disease of the colon and rectum. Alternatives to include barium enema, radiographic evaluation, lab testing or no intervention. Pt verbalizes understanding and agrees.

## 2019-10-16 ENCOUNTER — OFFICE VISIT (OUTPATIENT)
Dept: OTOLARYNGOLOGY | Facility: CLINIC | Age: 59
End: 2019-10-16

## 2019-10-16 VITALS
HEIGHT: 60 IN | SYSTOLIC BLOOD PRESSURE: 117 MMHG | HEART RATE: 76 BPM | DIASTOLIC BLOOD PRESSURE: 78 MMHG | BODY MASS INDEX: 34.95 KG/M2 | RESPIRATION RATE: 20 BRPM | TEMPERATURE: 98 F | WEIGHT: 178 LBS

## 2019-10-16 DIAGNOSIS — H53.40 VISUAL FIELD DEFECT: ICD-10-CM

## 2019-10-16 DIAGNOSIS — J01.01 ACUTE RECURRENT MAXILLARY SINUSITIS: ICD-10-CM

## 2019-10-16 DIAGNOSIS — J34.89 NASAL SEPTAL PERFORATION: ICD-10-CM

## 2019-10-16 DIAGNOSIS — Q30.9 ABNORMAL NASAL SEPTUM: Primary | ICD-10-CM

## 2019-10-16 DIAGNOSIS — J34.89 NASAL VALVE STENOSIS: ICD-10-CM

## 2019-10-16 DIAGNOSIS — H02.834 DERMATOCHALASIS OF BOTH UPPER EYELIDS: ICD-10-CM

## 2019-10-16 DIAGNOSIS — H02.831 DERMATOCHALASIS OF BOTH UPPER EYELIDS: ICD-10-CM

## 2019-10-16 PROCEDURE — 99214 OFFICE O/P EST MOD 30 MIN: CPT | Performed by: OTOLARYNGOLOGY

## 2019-10-16 RX ORDER — DOXYCYCLINE HYCLATE 100 MG/1
100 TABLET, DELAYED RELEASE ORAL 2 TIMES DAILY
Qty: 28 TABLET | Refills: 0 | Status: SHIPPED | OUTPATIENT
Start: 2019-10-16 | End: 2019-10-30

## 2019-10-16 RX ORDER — METHYLPREDNISOLONE 4 MG/1
TABLET ORAL
Qty: 1 EACH | Refills: 0 | Status: SHIPPED | OUTPATIENT
Start: 2019-10-16 | End: 2019-10-22

## 2019-10-16 NOTE — PROGRESS NOTES
PRIMARY CARE PROVIDER: Tena Hough APRN  REFERRING PROVIDER: No ref. provider found    Chief Complaint   Patient presents with   • Follow-up     Bleph       Subjective   History of Present Illness:  Robyn Minaya is a  59 y.o. female who returns to the office with regards to her nasal congestion.  She reports recent flareup in her nasal congestion.  This started 1 day ago.  She gets frequent sinus infections, which require antibiotics and steroids for resolution.  This feels just like the start of 1 of those infections.  She reports bilateral nasal congestion that is worse than her baseline.  This is moderate to severe.  She denies sinus pain or pressure.  She is status post repair of nasal vestibular stenosis with  grafting, lateral crural batten grafts, and non-anatomical alar batten grafts, septoplasty, conchal cartilage graft from left ear to the nose, and bilateral inferior turbinate Coblation on August 1, 2017.  When seen April 9, 2018, she had a slight asymmetric and twisted nasal base.  Her septum was healthy.  In January 2019, she was seen in follow-up regarding recent onset of nasal congestion.  She is noted to have a large septal perforation.  She is also status post FESS with bilateral maxillary antrostomy, nasal endoscopy with biopsy of nasal septum, and nasal septal prothesis placement on 4/16/19.  Autoimmune work-up in March 2019 was negative.    Pleased with the results of her blepharoplasty.    Review of Systems:  Review of Systems   Constitutional: Negative for chills and fever.   HENT: Positive for congestion and rhinorrhea. Negative for sinus pressure, sinus pain and sore throat.    Eyes: Negative for visual disturbance.   Respiratory: Negative for shortness of breath.    Cardiovascular: Negative for chest pain.   Skin: Negative for wound.   Neurological: Negative for headaches.   Hematological: Negative for adenopathy. Does not bruise/bleed easily.       Past History:  Past  Medical History:   Diagnosis Date   • Acid reflux     RESOLVED PT PT   • Allergic rhinitis    • Arthritis     BACK   • Bipolar 1 disorder (CMS/HCC)    • Chronic back pain    • Degenerative arthritis    • Depression    • Deviated nasal septum    • Diabetes mellitus (CMS/HCC)    • Eczema    • Gout    • History of colon polyps    • Hyperlipidemia    • Hypertension    • Hypertrophy of nasal turbinates    • Hypokalemia    • Hypothyroidism    • Incontinence in female    • Insomnia    • Menopause    • Nasal septal deviation    • Nasal valve stenosis    • Overactive bladder    • Plantar fasciitis    • RLS (restless legs syndrome)    • Sleep apnea    • Spinal headache    • Wears glasses      Past Surgical History:   Procedure Laterality Date   • BLEPHAROPLASTY Bilateral 7/9/2019    Procedure: 1) bilateral upper blepharoplasty with excision of excess of tissue weighing down 2) nasal endoscopy with removal of nasal septal prosthesis;  Surgeon: Mark Anthony Anderson MD;  Location: [facility] OR;  Service: ENT   • CARPAL TUNNEL RELEASE Bilateral 2004   • CHOLECYSTECTOMY  2013   • COLONOSCOPY  10/17/2014    One 5-6mm tubular adenomatous polyp in the ascending colon; Two less than 4mm hyperplastic polyps in the sigmoid colon; Three rectal hyperplastic polyps; Diverticulosis; Repeat 5 years    • COLONOSCOPY  07/14/2010    Internal hemorrhoids; Repeat 7 years    • CYST REMOVAL  2016    neck   • ENDOSCOPIC FUNCTIONAL SINUS SURGERY (FESS) Bilateral 4/16/2019    Procedure: ENDOSCOPIC FUNCTIONAL SINUS SURGERY (bilareral maxillary antrostomy without image guidance);  Surgeon: Mark Anthony Anderson MD;  Location: [facility] OR;  Service: ENT   • ENDOSCOPY N/A 4/11/2019    Esophageal mucosal changes suggestive of eosinophilic esophagitis-biopsied; A few gastric polyps-resected and retrieved-biopsied; Normal examined duodenum-biopsied   • FOOT SURGERY Right 2010    X3   • HERNIA REPAIR     • INTERSTIM PLACEMENT N/A 6/5/2019    Procedure: INTERSTIM STAGES 1  AND 2 LEAD AND GENERATOR PLACEMENT;  Surgeon: Endy Guerrero MD;  Location:  [facility] OR;  Service: Urology   • LASER ABLATION      right back   • NASAL ENDOSCOPY N/A 4/16/2019    Procedure:     1. Functional endoscopic sinus surgery with bilateral maxillary antrostomy    2. Bilateral nasal endoscopy with biopsy of nasal septum    3.  Nasal septal prosthesis placement  ;  Surgeon: Mark Anthony Anderson MD;  Location:  [facility] OR;  Service: ENT   • NASAL VESTIBULE LESION/PAPILLOMA EXCISION N/A 8/1/2017    Procedure: Repair of nasal vestibular stenosis and septoplasty; cartilage graft from left ear;  Surgeon: Mark Anthony Anderson MD;  Location:  [facility] OR;  Service:    • SEPTOPLASTY N/A 4/16/2019    Procedure: PLACEMENT OF NASAL SEPTAL PROSTHESIS;  Surgeon: Mark Anthony Anderson MD;  Location:  [facility] OR;  Service: ENT     Family History   Problem Relation Age of Onset   • Cancer Mother    • Diabetes Father    • Hypertension Father    • Cancer Father    • Colon cancer Neg Hx    • Colon polyps Neg Hx    • Esophageal cancer Neg Hx    • Liver cancer Neg Hx    • Liver disease Neg Hx    • Rectal cancer Neg Hx    • Stomach cancer Neg Hx      Social History     Tobacco Use   • Smoking status: Current Every Day Smoker     Packs/day: 1.00     Types: Cigarettes   • Smokeless tobacco: Never Used   Substance Use Topics   • Alcohol use: Yes     Frequency: Monthly or less     Comment: Rarely-maybe 3x/year   • Drug use: No     Allergies:  Hctz [hydrochlorothiazide] and Januvia [sitagliptin]    Current Outpatient Medications:   •  allopurinol (ZYLOPRIM) 100 MG tablet, Take 100 mg by mouth Daily., Disp: , Rfl:   •  ALPRAZolam (XANAX) 0.5 MG tablet, Take 0.5 mg by mouth As Needed., Disp: , Rfl:   •  atorvastatin (LIPITOR) 40 MG tablet, Take 40 mg by mouth Every Night., Disp: , Rfl:   •  bumetanide (BUMEX) 1 MG tablet, Take 1 mg by mouth 2 (Two) Times a Week., Disp: , Rfl:   •  calcium carbonate (OS-KAYLEY) 600 MG tablet, Take 600 mg by mouth Daily.,  Disp: , Rfl:   •  carvedilol (COREG) 6.25 MG tablet, Take 6.25 mg by mouth 2 (Two) Times a Day With Meals., Disp: , Rfl:   •  cholecalciferol (VITAMIN D3) 1000 UNITS tablet, Take 1,000 Units by mouth Daily., Disp: , Rfl:   •  DICLOFENAC PO, Take 75 mg by mouth 2 (Two) Times a Day., Disp: , Rfl:   •  divalproex (DEPAKOTE) 500 MG 24 hr tablet, Take 1,000 mg by mouth Every Night., Disp: , Rfl:   •  DULoxetine (CYMBALTA) 60 MG capsule, Take 60 mg by mouth 2 (Two) Times a Day., Disp: , Rfl:   •  guaiFENesin (MUCINEX) 600 MG 12 hr tablet, Take 1,200 mg by mouth 2 (Two) Times a Day., Disp: , Rfl:   •  levothyroxine (SYNTHROID, LEVOTHROID) 125 MCG tablet, Take 125 mcg by mouth Daily., Disp: , Rfl:   •  Loperamide HCl (IMODIUM PO), Take 2 mg by mouth As Needed., Disp: , Rfl:   •  Multiple Vitamin (MULTI VITAMIN PO), Take 1 dose by mouth Daily., Disp: , Rfl:   •  niacin (SLO-NIACIN) 500 MG CR tablet, Take 500 mg by mouth Daily., Disp: , Rfl:   •  omeprazole (priLOSEC) 20 MG capsule, Take 20 mg by mouth Daily., Disp: , Rfl:   •  pilocarpine (SALAGEN) 5 MG tablet, Take 5 mg by mouth 3 (Three) Times a Day., Disp: , Rfl:   •  pramipexole (MIRAPEX) 1.5 MG tablet, Take 1.5 mg by mouth Every Night., Disp: , Rfl:   •  QUEtiapine (SEROquel) 50 MG tablet, Take 50 mg by mouth Every Night., Disp: , Rfl:   •  spironolactone (ALDACTONE) 25 MG tablet, Take 25 mg by mouth Daily., Disp: , Rfl:   •  tiZANidine (ZANAFLEX) 4 MG tablet, Take 4 mg by mouth Every Night., Disp: , Rfl:   •  traMADol (ULTRAM) 50 MG tablet, Take 100 mg by mouth 3 (Three) Times a Day., Disp: , Rfl:   •  doxycycline (DORYX) 100 MG enteric coated tablet, Take 1 tablet by mouth 2 (Two) Times a Day for 14 days., Disp: 28 tablet, Rfl: 0  •  methylPREDNISolone (MEDROL) 4 MG tablet, Take as directed on package instructions., Disp: 1 each, Rfl: 0  •  mupirocin (BACTROBAN) 2 % nasal ointment, into the nostril(s) as directed by provider 2 (Two) Times a Day for 14 days. Apply to  the nose twice daily, Disp: 3 g, Rfl: 0      Objective     Vital Signs:  Temp:  [98 °F (36.7 °C)] 98 °F (36.7 °C)  Heart Rate:  [76] 76  Resp:  [20] 20  BP: (117)/(78) 117/78    Physical Exam:  Physical Exam   Constitutional: She is oriented to person, place, and time. She appears well-developed and well-nourished. She is cooperative. No distress.   HENT:   Head: Normocephalic and atraumatic.   Right Ear: External ear normal.   Left Ear: External ear normal.   Nose: Nose normal.   Mouth/Throat: Oropharynx is clear and moist.   Large septal perforation.  Crusting present around the periphery.  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 Pupils are equal, round, and reactive to light. Right eye exhibits no discharge. Left eye exhibits no discharge. No scleral icterus.   Neck: Normal range of motion. Neck supple. No JVD present. No tracheal deviation present. No thyromegaly present.   Pulmonary/Chest: Effort normal. No stridor.   Musculoskeletal: Normal range of motion. She exhibits no edema or deformity.   Lymphadenopathy:     She has no cervical adenopathy.   Neurological: She is alert and oriented to person, place, and time. She has normal strength. No cranial nerve deficit. Coordination normal.   Skin: Skin is warm and dry. No rash noted. She is not diaphoretic. No erythema. No pallor.   Psychiatric: She has a normal mood and affect. Her speech is normal and behavior is normal. Judgment and thought content normal. Cognition and memory are normal.   Nursing note and vitals reviewed.      Results Review:   Pathology from April 16, 2019 from the septal biopsy was reviewed.  There is no evidence of malignancy.  Necrotic tissue with mixed inflammation.    Assessment   Assessment:  1. Abnormal nasal septum    2. Dermatochalasis of both upper eyelids    3. Visual field defect    4. Nasal septal perforation    5. Nasal valve stenosis     6. Acute recurrent maxillary sinusitis        Plan   Plan:    I would like to see her back when she is not suffering from an acute onset   Sinus infection to perform nasal manipulation but maneuvers to suggest what would improve her nasal airflow.  I will treat her with doxycycline, as this is worked in the past with her primary care provider, prednisone, and mupirocin.  We will treat her for 2 weeks.  Will then see her back in approximately 3 weeks to assess response and to assess her for possible rib graft repair of nasal vestibular stenosis.    She is pleased with the results of her blepharoplasty.    New Medications Ordered This Visit   Medications   • doxycycline (DORYX) 100 MG enteric coated tablet     Sig: Take 1 tablet by mouth 2 (Two) Times a Day for 14 days.     Dispense:  28 tablet     Refill:  0   • methylPREDNISolone (MEDROL) 4 MG tablet     Sig: Take as directed on package instructions.     Dispense:  1 each     Refill:  0   • mupirocin (BACTROBAN) 2 % nasal ointment     Sig: into the nostril(s) as directed by provider 2 (Two) Times a Day for 14 days. Apply to the nose twice daily     Dispense:  3 g     Refill:  0     There are no Patient Instructions on file for this visit.  Return in about 3 weeks (around 11/6/2019).    My findings and recommendations were discussed and questions were answered.     Mark Anthony Anderson MD  10/16/19  1:05 PM

## 2019-10-23 ENCOUNTER — ANESTHESIA EVENT (OUTPATIENT)
Dept: GASTROENTEROLOGY | Facility: HOSPITAL | Age: 59
End: 2019-10-23

## 2019-10-23 ENCOUNTER — HOSPITAL ENCOUNTER (OUTPATIENT)
Facility: HOSPITAL | Age: 59
Setting detail: HOSPITAL OUTPATIENT SURGERY
Discharge: HOME | End: 2019-10-23
Attending: INTERNAL MEDICINE | Admitting: INTERNAL MEDICINE

## 2019-10-23 ENCOUNTER — ANESTHESIA (OUTPATIENT)
Dept: GASTROENTEROLOGY | Facility: HOSPITAL | Age: 59
End: 2019-10-23

## 2019-10-23 VITALS
OXYGEN SATURATION: 95 % | DIASTOLIC BLOOD PRESSURE: 63 MMHG | RESPIRATION RATE: 18 BRPM | SYSTOLIC BLOOD PRESSURE: 112 MMHG | HEART RATE: 87 BPM | HEIGHT: 60 IN | WEIGHT: 177 LBS | TEMPERATURE: 98.6 F | BODY MASS INDEX: 34.75 KG/M2

## 2019-10-23 PROCEDURE — 45378 DIAGNOSTIC COLONOSCOPY: CPT | Performed by: INTERNAL MEDICINE

## 2019-10-23 PROCEDURE — 25010000002 PROPOFOL 10 MG/ML EMULSION: Performed by: NURSE ANESTHETIST, CERTIFIED REGISTERED

## 2019-10-23 RX ORDER — PROPOFOL 10 MG/ML
VIAL (ML) INTRAVENOUS AS NEEDED
Status: DISCONTINUED | OUTPATIENT
Start: 2019-10-23 | End: 2019-10-23 | Stop reason: SURG

## 2019-10-23 RX ORDER — SODIUM CHLORIDE 9 MG/ML
500 INJECTION, SOLUTION INTRAVENOUS CONTINUOUS PRN
Status: DISCONTINUED | OUTPATIENT
Start: 2019-10-23 | End: 2019-10-23 | Stop reason: HOSPADM

## 2019-10-23 RX ORDER — SODIUM CHLORIDE 0.9 % (FLUSH) 0.9 %
10 SYRINGE (ML) INJECTION AS NEEDED
Status: DISCONTINUED | OUTPATIENT
Start: 2019-10-23 | End: 2019-10-23 | Stop reason: HOSPADM

## 2019-10-23 RX ADMIN — SODIUM CHLORIDE 500 ML: 9 INJECTION, SOLUTION INTRAVENOUS at 08:59

## 2019-10-23 RX ADMIN — PROPOFOL 100 MG: 10 INJECTION, EMULSION INTRAVENOUS at 09:50

## 2019-10-23 RX ADMIN — PROPOFOL 100 MG: 10 INJECTION, EMULSION INTRAVENOUS at 09:55

## 2019-10-23 RX ADMIN — PROPOFOL 100 MG: 10 INJECTION, EMULSION INTRAVENOUS at 09:45

## 2019-10-23 RX ADMIN — PROPOFOL 100 MG: 10 INJECTION, EMULSION INTRAVENOUS at 09:41

## 2019-10-23 NOTE — ANESTHESIA POSTPROCEDURE EVALUATION
Patient: Robyn Minaya    Procedure Summary     Date:  10/23/19 Room / Location:  [facility] ENDOSCOPY 5 /  [facility]    Anesthesia Start:  0935 Anesthesia Stop:  1002    Procedure:  COLONOSCOPY WITH ANESTHESIA (N/A ) Diagnosis:       Hx of colonic polyps      Other constipation      Lower abdominal pain      (Hx of colonic polyps [Z86.010])      (Other constipation [K59.09])      (Lower abdominal pain [R10.30])    Surgeon:  Robyn Frazier MD Provider:  Du Hsieh CRNA    Anesthesia Type:  MAC ASA Status:  3          Anesthesia Type: MAC  Last vitals  BP   130/82 (10/23/19 0820)   Temp   98.6 °F (37 °C) (10/23/19 0820)   Pulse   105 (10/23/19 0820)   Resp   18 (10/23/19 0820)     SpO2   96 % (10/23/19 0820)     Post Anesthesia Care and Evaluation    Patient location during evaluation: PHASE II  Patient participation: complete - patient participated  Level of consciousness: awake and alert  Pain management: adequate  Airway patency: patent  Anesthetic complications: No anesthetic complications    Cardiovascular status: acceptable  Respiratory status: acceptable  Hydration status: acceptable

## 2019-10-23 NOTE — INTERVAL H&P NOTE
H&P updated. The patient was examined and the following changes are noted:        She states that she has severe lower abdominal pain.  She is not really complaining of constipation because she does have a bowel movement daily.  She denies melena or hematochezia.

## 2019-10-23 NOTE — ANESTHESIA PREPROCEDURE EVALUATION
Anesthesia Evaluation     Patient summary reviewed and Nursing notes reviewed   no history of anesthetic complications:  NPO Solid Status: > 8 hours  NPO Liquid Status: > 2 hours           Airway   Mallampati: I  TM distance: >3 FB  Neck ROM: full  Dental    (+) upper dentures and lower dentures    Pulmonary    (+) a smoker Current Abstained day of surgery, sleep apnea on CPAP,   (-) asthma  Cardiovascular   Exercise tolerance: poor (<4 METS)    ECG reviewed    (+) hypertension, hyperlipidemia,   (-) past MI, CAD, cardiac stents      Neuro/Psych  (+) psychiatric history Bipolar,     (-) TIA, CVA  GI/Hepatic/Renal/Endo    (+) obesity,  GERD,  diabetes mellitus type 2, hypothyroidism,   (-) liver disease, no renal disease    Musculoskeletal     Abdominal    Substance History      OB/GYN          Other   (+) arthritis                     Anesthesia Plan    ASA 3     MAC     intravenous induction   Anesthetic plan, all risks, benefits, and alternatives have been provided, discussed and informed consent has been obtained with: patient.

## 2019-10-29 ENCOUNTER — TELEPHONE (OUTPATIENT)
Dept: GASTROENTEROLOGY | Facility: CLINIC | Age: 59
End: 2019-10-29

## 2019-10-29 NOTE — TELEPHONE ENCOUNTER
Pt just called me-Dr. Frazier placed her on Linzess 145mcg for her constipation after he colonoscopy recently. Pt states she is having diarrhea after she takes the medicine-states it will last a couple of hours and usually goes away.     But it is getting to the point that she has to take Imodium if she has to leave the house after she takes it. She was asking if there was a lower dose? Or a different med she could try? She has done multiple OTC meds and they never worked for her. I told her I would call her back with your recommendations. Thanks!

## 2019-10-29 NOTE — TELEPHONE ENCOUNTER
Called and spoke to pt-she will let me know how she does on the lower dose or if she has any further questions/problems.

## 2019-11-06 ENCOUNTER — OFFICE VISIT (OUTPATIENT)
Dept: OTOLARYNGOLOGY | Facility: CLINIC | Age: 59
End: 2019-11-06

## 2019-11-06 VITALS
HEIGHT: 60 IN | BODY MASS INDEX: 34.75 KG/M2 | TEMPERATURE: 98.1 F | DIASTOLIC BLOOD PRESSURE: 71 MMHG | WEIGHT: 177 LBS | HEART RATE: 80 BPM | SYSTOLIC BLOOD PRESSURE: 111 MMHG | RESPIRATION RATE: 20 BRPM

## 2019-11-06 DIAGNOSIS — J34.89 NASAL SEPTAL PERFORATION: ICD-10-CM

## 2019-11-06 DIAGNOSIS — Q30.9 ABNORMAL NASAL SEPTUM: ICD-10-CM

## 2019-11-06 DIAGNOSIS — J01.00 SUBACUTE MAXILLARY SINUSITIS: Primary | ICD-10-CM

## 2019-11-06 DIAGNOSIS — J34.89 NASAL CAVITY MASS: ICD-10-CM

## 2019-11-06 PROCEDURE — 87147 CULTURE TYPE IMMUNOLOGIC: CPT | Performed by: OTOLARYNGOLOGY

## 2019-11-06 PROCEDURE — 87070 CULTURE OTHR SPECIMN AEROBIC: CPT | Performed by: OTOLARYNGOLOGY

## 2019-11-06 PROCEDURE — 87205 SMEAR GRAM STAIN: CPT | Performed by: OTOLARYNGOLOGY

## 2019-11-06 PROCEDURE — 87186 SC STD MICRODIL/AGAR DIL: CPT | Performed by: OTOLARYNGOLOGY

## 2019-11-06 PROCEDURE — 31231 NASAL ENDOSCOPY DX: CPT | Performed by: OTOLARYNGOLOGY

## 2019-11-06 PROCEDURE — 99214 OFFICE O/P EST MOD 30 MIN: CPT | Performed by: OTOLARYNGOLOGY

## 2019-11-06 RX ORDER — ALBUTEROL SULFATE 2.5 MG/3ML
SOLUTION RESPIRATORY (INHALATION) AS NEEDED
Refills: 3 | COMMUNITY
Start: 2019-08-28 | End: 2020-10-22

## 2019-11-06 RX ORDER — DOXYCYCLINE HYCLATE 100 MG
TABLET ORAL
Refills: 0 | COMMUNITY
Start: 2019-10-16 | End: 2020-01-09

## 2019-11-06 NOTE — PROGRESS NOTES
Procedure Note    Pre-operative Diagnosis: Subacute sinusitis    Post-operative Diagnosis: Nasal cavity mass    Procedure Type:  Nasal Endoscopy with culture    Anesthesia: none    Procedure Details:    After topical anesthesia and decongestion, the patient was placed in the sitting position.  An endoscope was passed along the right nasal floor. An identical procedure was performed on the left side.  The following findings were noted as stated below:    Findings: There is near complete loss of the nasal septum.  Posteriorly, filling the entire posterior nasal cavity is a large mass.  Culture swab was placed to culture this, however, minimal discharge was noted.  I did use the culture swab swab superiorly and along the lateral nasal wall to catch some mucus in this area.  Photographs and videos were taken.        Condition:  Stable.  Patient tolerated procedure well.    Complications:  None

## 2019-11-06 NOTE — PROGRESS NOTES
PRIMARY CARE PROVIDER: Tena Hough APRN  REFERRING PROVIDER: No ref. provider found    Chief Complaint   Patient presents with   • Follow-up     Bleph/sinus       Subjective   History of Present Illness:  Robyn Minaya is a  59 y.o. female who returns to the office with regards to her nasal congestion.  She reports continued nasal congestion and nasal drainage.  This started 4 weeks ago. She was treated with doxycycline, medrol dose pack, and mupirocin. She states her symptoms have improved, but have not completely resolved. She has continued yellow nasal discharge. She gets frequent sinus infections, but states it is usual for them to completely resolve. She denies neck pain, ear pain, weight loss, neck swelling, facial swelling, or lymph node enlargement.  Her congestion is moderate and progressive.    Prior history was reviewed: She is status post repair of nasal vestibular stenosis with  grafting, lateral crural batten grafts, and non-anatomical alar batten grafts, septoplasty, conchal cartilage graft from left ear to the nose, and bilateral inferior turbinate Coblation on August 1, 2017.  When seen April 9, 2018, she had a slight asymmetric and twisted nasal base.  Her septum was healthy.  In January 2019, she was seen in follow-up regarding recent onset of nasal congestion.  She is noted to have a large septal perforation.  She is also status post FESS with bilateral maxillary antrostomy, nasal endoscopy with biopsy of nasal septum, and nasal septal prothesis placement on 4/16/19.  Autoimmune work-up in March 2019 was negative. Biopsy demonstrated necrotic tissue with inflammation and no evidence of malignancy.    Pleased with the results of her blepharoplasty.     She reports a history of sleep apnea.  This has been formally diagnosed.    Review of Systems:  Review of Systems   Constitutional: Positive for fatigue. Negative for chills and fever.   HENT: Positive for congestion and rhinorrhea.  Negative for sinus pressure, sinus pain and sore throat.    Eyes: Negative for visual disturbance.   Respiratory: Negative for shortness of breath.    Cardiovascular: Negative for chest pain.   Musculoskeletal: Negative for neck pain and neck stiffness.   Skin: Negative for wound.   Neurological: Negative for headaches.   Hematological: Negative for adenopathy. Does not bruise/bleed easily.       Past History:  Past Medical History:   Diagnosis Date   • Acid reflux     RESOLVED PT PT   • Allergic rhinitis    • Arthritis     BACK   • Bipolar 1 disorder (CMS/HCC)    • Chronic back pain    • Degenerative arthritis    • Depression    • Deviated nasal septum    • Eczema    • Gout    • History of colon polyps    • Hyperlipidemia    • Hypertension    • Hypertrophy of nasal turbinates    • Hypokalemia    • Hypothyroidism    • Incontinence in female    • Insomnia    • Menopause    • Nasal septal deviation    • Nasal valve stenosis    • Overactive bladder    • Plantar fasciitis    • RLS (restless legs syndrome)    • Sleep apnea    • Spinal headache    • Wears glasses      Past Surgical History:   Procedure Laterality Date   • BLEPHAROPLASTY Bilateral 7/9/2019    Procedure: 1) bilateral upper blepharoplasty with excision of excess of tissue weighing down 2) nasal endoscopy with removal of nasal septal prosthesis;  Surgeon: Mark Anthony Anderson MD;  Location: [facility] OR;  Service: ENT   • CARPAL TUNNEL RELEASE Bilateral 2004   • CHOLECYSTECTOMY  2013   • COLONOSCOPY  10/17/2014    One 5-6mm tubular adenomatous polyp in the ascending colon; Two less than 4mm hyperplastic polyps in the sigmoid colon; Three rectal hyperplastic polyps; Diverticulosis; Repeat 5 years    • COLONOSCOPY  07/14/2010    Internal hemorrhoids; Repeat 7 years    • COLONOSCOPY N/A 10/23/2019    Procedure: COLONOSCOPY WITH ANESTHESIA;  Surgeon: Robyn Frazier MD;  Location: [facility] ENDOSCOPY;  Service: Gastroenterology   • CYST REMOVAL  2016    neck   • ENDOSCOPIC  FUNCTIONAL SINUS SURGERY (FESS) Bilateral 4/16/2019    Procedure: ENDOSCOPIC FUNCTIONAL SINUS SURGERY (bilareral maxillary antrostomy without image guidance);  Surgeon: Mark Anthony Anderson MD;  Location:  [facility] OR;  Service: ENT   • ENDOSCOPY N/A 4/11/2019    Esophageal mucosal changes suggestive of eosinophilic esophagitis-biopsied; A few gastric polyps-resected and retrieved-biopsied; Normal examined duodenum-biopsied   • FOOT SURGERY Right 2010    X3   • HERNIA REPAIR     • INTERSTIM PLACEMENT N/A 6/5/2019    Procedure: INTERSTIM STAGES 1 AND 2 LEAD AND GENERATOR PLACEMENT;  Surgeon: Endy Guerrero MD;  Location: [facility] OR;  Service: Urology   • LASER ABLATION      right back   • NASAL ENDOSCOPY N/A 4/16/2019    Procedure:     1. Functional endoscopic sinus surgery with bilateral maxillary antrostomy    2. Bilateral nasal endoscopy with biopsy of nasal septum    3.  Nasal septal prosthesis placement  ;  Surgeon: Mark Anthony Anderson MD;  Location:  [facility] OR;  Service: ENT   • NASAL VESTIBULE LESION/PAPILLOMA EXCISION N/A 8/1/2017    Procedure: Repair of nasal vestibular stenosis and septoplasty; cartilage graft from left ear;  Surgeon: Mark Anthony Anderson MD;  Location: [facility] OR;  Service:    • SEPTOPLASTY N/A 4/16/2019    Procedure: PLACEMENT OF NASAL SEPTAL PROSTHESIS;  Surgeon: Mark Anthony Anderson MD;  Location:  [facility] OR;  Service: ENT     Family History   Problem Relation Age of Onset   • Cancer Mother    • Diabetes Father    • Hypertension Father    • Cancer Father    • Colon cancer Neg Hx    • Colon polyps Neg Hx    • Esophageal cancer Neg Hx    • Liver cancer Neg Hx    • Liver disease Neg Hx    • Rectal cancer Neg Hx    • Stomach cancer Neg Hx      Social History     Tobacco Use   • Smoking status: Current Every Day Smoker     Packs/day: 1.00     Types: Cigarettes   • Smokeless tobacco: Never Used   Substance Use Topics   • Alcohol use: Yes     Frequency: Monthly or less     Comment: Rarely-maybe 3x/year   •  Drug use: No     Allergies:  Hctz [hydrochlorothiazide] and Januvia [sitagliptin]    Current Outpatient Medications:   •  albuterol (PROVENTIL) (2.5 MG/3ML) 0.083% nebulizer solution, , Disp: , Rfl: 3  •  allopurinol (ZYLOPRIM) 100 MG tablet, Take 100 mg by mouth Daily., Disp: , Rfl:   •  ALPRAZolam (XANAX) 0.5 MG tablet, Take 0.5 mg by mouth As Needed., Disp: , Rfl:   •  atorvastatin (LIPITOR) 40 MG tablet, Take 40 mg by mouth Every Night., Disp: , Rfl:   •  bumetanide (BUMEX) 1 MG tablet, Take 1 mg by mouth 2 (Two) Times a Week., Disp: , Rfl:   •  calcium carbonate (OS-KAYLEY) 600 MG tablet, Take 600 mg by mouth Daily., Disp: , Rfl:   •  carvedilol (COREG) 6.25 MG tablet, Take 6.25 mg by mouth 2 (Two) Times a Day With Meals., Disp: , Rfl:   •  cholecalciferol (VITAMIN D3) 1000 UNITS tablet, Take 1,000 Units by mouth Daily., Disp: , Rfl:   •  DICLOFENAC PO, Take 75 mg by mouth 2 (Two) Times a Day., Disp: , Rfl:   •  divalproex (DEPAKOTE) 500 MG 24 hr tablet, Take 1,000 mg by mouth Every Night., Disp: , Rfl:   •  doxycycline (VIBRAMYICN) 100 MG tablet, , Disp: , Rfl: 0  •  DULoxetine (CYMBALTA) 60 MG capsule, Take 60 mg by mouth 2 (Two) Times a Day., Disp: , Rfl:   •  guaiFENesin (MUCINEX) 600 MG 12 hr tablet, Take 1,200 mg by mouth 2 (Two) Times a Day., Disp: , Rfl:   •  levothyroxine (SYNTHROID, LEVOTHROID) 125 MCG tablet, Take 125 mcg by mouth Daily., Disp: , Rfl:   •  linaclotide (LINZESS) 72 MCG capsule capsule, Take 1 capsule by mouth Every Morning Before Breakfast., Disp: 30 capsule, Rfl: 12  •  Loperamide HCl (IMODIUM PO), Take 2 mg by mouth As Needed., Disp: , Rfl:   •  Multiple Vitamin (MULTI VITAMIN PO), Take 1 dose by mouth Daily., Disp: , Rfl:   •  mupirocin (BACTROBAN) 2 % ointment, , Disp: , Rfl: 0  •  niacin (SLO-NIACIN) 500 MG CR tablet, Take 500 mg by mouth Daily., Disp: , Rfl:   •  omeprazole (priLOSEC) 20 MG capsule, Take 20 mg by mouth Daily., Disp: , Rfl:   •  pilocarpine (SALAGEN) 5 MG tablet,  Take 5 mg by mouth 3 (Three) Times a Day., Disp: , Rfl:   •  pramipexole (MIRAPEX) 1.5 MG tablet, Take 1.5 mg by mouth Every Night., Disp: , Rfl:   •  QUEtiapine (SEROquel) 50 MG tablet, Take 50 mg by mouth Every Night., Disp: , Rfl:   •  spironolactone (ALDACTONE) 25 MG tablet, Take 25 mg by mouth Daily., Disp: , Rfl:   •  tiZANidine (ZANAFLEX) 4 MG tablet, Take 4 mg by mouth Every Night., Disp: , Rfl:   •  traMADol (ULTRAM) 50 MG tablet, Take 100 mg by mouth 3 (Three) Times a Day., Disp: , Rfl:     I have reviewed and edited the CC/HPI/ROS/PFSH/Allergy/Medication information entered into the note by the patient/ancillary staff to accurately document the details of this visit.    Objective     Vital Signs:  Temp:  [98.1 °F (36.7 °C)] 98.1 °F (36.7 °C)  Heart Rate:  [80] 80  Resp:  [20] 20  BP: (111)/(71) 111/71    Physical Exam:  Physical Exam   Constitutional: She is oriented to person, place, and time. She appears well-developed and well-nourished. She is sleeping and cooperative. No distress.   Very drowsy. Somnolent   HENT:   Head: Normocephalic and atraumatic.   Right Ear: External ear normal.   Left Ear: External ear normal.   Nose: Nose normal.   Mouth/Throat: Oropharynx is clear and moist.       See scope note   Eyes: Conjunctivae and EOM are normal. Pupils are equal, round, and reactive to light. Right eye exhibits no discharge. Left eye exhibits no discharge. No scleral icterus.   Neck: Normal range of motion. Neck supple. No JVD present. No tracheal deviation present. No thyromegaly present.   Pulmonary/Chest: Effort normal. No stridor.   Musculoskeletal: Normal range of motion. She exhibits no edema or deformity.   Lymphadenopathy:     She has no cervical adenopathy.   Neurological: She is oriented to person, place, and time. She has normal strength. No cranial nerve deficit. Coordination normal.   Skin: Skin is warm and dry. No rash noted. She is not diaphoretic. No erythema. No pallor.   Psychiatric:  She has a normal mood and affect. Her speech is normal and behavior is normal. Judgment and thought content normal. Cognition and memory are normal.   Nursing note and vitals reviewed.      Results Review:   Pathology from April 16, 2019 from the septal biopsy was reviewed.  There is no evidence of malignancy.  Necrotic tissue with mixed inflammation.      Assessment   Assessment:  1. Subacute maxillary sinusitis    2. Nasal cavity mass    3. Abnormal nasal septum    4. Nasal septal perforation        Plan   Plan:    Culture obtained. I would like to get a contrasted CT scan to further evaluate the mass of the nasal cavity. I would like to see her back following the CT scan to discuss results. Concerning for malignancy, despite prior negative biopsy.    Return in about 2 weeks (around 11/20/2019).    My findings and recommendations were discussed and questions were answered.     Mark Anthony Anderson MD  11/06/19  11:28 AM

## 2019-11-08 LAB
BACTERIA SPEC AEROBE CULT: ABNORMAL
GRAM STN SPEC: ABNORMAL
GRAM STN SPEC: ABNORMAL

## 2019-11-13 ENCOUNTER — HOSPITAL ENCOUNTER (OUTPATIENT)
Dept: CT IMAGING | Facility: HOSPITAL | Age: 59
Discharge: HOME | End: 2019-11-13
Admitting: OTOLARYNGOLOGY

## 2019-11-13 LAB — CREAT BLDA-MCNC: 0.9 MG/DL (ref 0.6–1.3)

## 2019-11-13 PROCEDURE — 25010000002 IOPAMIDOL 61 % SOLUTION: Performed by: OTOLARYNGOLOGY

## 2019-11-13 PROCEDURE — 70492 CT SFT TSUE NCK W/O & W/DYE: CPT

## 2019-11-13 PROCEDURE — 82565 ASSAY OF CREATININE: CPT

## 2019-11-13 RX ADMIN — IOPAMIDOL 100 ML: 612 INJECTION, SOLUTION INTRAVENOUS at 13:26

## 2019-11-25 ENCOUNTER — TELEPHONE (OUTPATIENT)
Dept: GASTROENTEROLOGY | Facility: CLINIC | Age: 59
End: 2019-11-25

## 2019-11-25 NOTE — TELEPHONE ENCOUNTER
Pt just called me-she has been on the Linzess 72mcg for the last month or so. She is still having diarrhea even with the lower dose. I advised pt to quit taking the Linzess and see if bowels regulate out. If she feels constipated after stopping the Linzess-then she needs to get Miralax OTC and start that. I advised that she could take it up to QID, but to start out at just once daily and work her way up-she VU.  Pt advised to call me back with any further questions/problems.

## 2019-12-11 ENCOUNTER — OFFICE VISIT (OUTPATIENT)
Dept: OTOLARYNGOLOGY | Facility: CLINIC | Age: 59
End: 2019-12-11

## 2019-12-11 VITALS
DIASTOLIC BLOOD PRESSURE: 69 MMHG | HEIGHT: 60 IN | HEART RATE: 81 BPM | BODY MASS INDEX: 34.75 KG/M2 | RESPIRATION RATE: 20 BRPM | TEMPERATURE: 98 F | WEIGHT: 177 LBS | SYSTOLIC BLOOD PRESSURE: 118 MMHG

## 2019-12-11 DIAGNOSIS — Z72.0 TOBACCO ABUSE DISORDER: ICD-10-CM

## 2019-12-11 DIAGNOSIS — J34.89 NASAL CAVITY MASS: Primary | ICD-10-CM

## 2019-12-11 DIAGNOSIS — Q30.9 ABNORMAL NASAL SEPTUM: ICD-10-CM

## 2019-12-11 DIAGNOSIS — J34.89 NASAL SEPTAL PERFORATION: ICD-10-CM

## 2019-12-11 PROCEDURE — 31231 NASAL ENDOSCOPY DX: CPT | Performed by: OTOLARYNGOLOGY

## 2019-12-11 PROCEDURE — 99213 OFFICE O/P EST LOW 20 MIN: CPT | Performed by: OTOLARYNGOLOGY

## 2019-12-11 NOTE — PROGRESS NOTES
PRIMARY CARE PROVIDER: Tena Hough APRN  REFERRING PROVIDER: No ref. provider found    Chief Complaint   Patient presents with   • Follow-up     Nasal Cavity Mass / CT results       Subjective   History of Present Illness:  Robyn Minaya is a  59 y.o. female presents for follow-up, pathology discussion, and CT scan review.  She is status post repair of nasal vestibular stenosis with  grafting, lateral crural batten grafts, nonanatomic alar batten grafts, septoplasty, conchal cartilage graft from left ear to the nose, and bilateral inferior turbinate Coblation.  She was doing well until approximately 1 year postoperatively, when she developed nasal swelling and pain.  She is noted to have a large nasal septal perforation.  An autoimmune lab work-up was negative.  She was noted to have a nasal perforation with mass and bilateral chronic maxillary sinusitis.  She was taken the operating room on April 16, 2019 for bilateral maxillary enterostomy, nasal endoscopy with biopsy, and nasal septal prosthesis placement.  She was taken the operating room on July 9, 2019 for bilateral upper blepharoplasty and removal of nasal septal button.   When seen on November 6, 2019, she was noted to have another large mass in the posterior aspect of the nasal septum.  She was scheduled for CT scan and a culture was performed.    She reports moderate, bilateral nasal congestion that has been present for many months.  Nothing has made this better, nor worse.  She reports associated crusting of the bilateral nasal cavities that gets better with mupirocin.    Review of Systems:  Review of Systems   Constitutional: Negative for chills and fever.   HENT: Positive for congestion. Negative for nosebleeds, sinus pressure and sinus pain.    Eyes: Negative for photophobia and visual disturbance.   Neurological: Negative for facial asymmetry.       Past History:  Past Medical History:   Diagnosis Date   • Acid reflux     RESOLVED PT PT    • Allergic rhinitis    • Arthritis     BACK   • Bipolar 1 disorder (CMS/HCC)    • Chronic back pain    • Degenerative arthritis    • Depression    • Deviated nasal septum    • Eczema    • Gout    • History of colon polyps    • Hyperlipidemia    • Hypertension    • Hypertrophy of nasal turbinates    • Hypokalemia    • Hypothyroidism    • Incontinence in female    • Insomnia    • Menopause    • Nasal septal deviation    • Nasal valve stenosis    • Overactive bladder    • Plantar fasciitis    • RLS (restless legs syndrome)    • Sleep apnea    • Spinal headache    • Wears glasses      Past Surgical History:   Procedure Laterality Date   • BLEPHAROPLASTY Bilateral 7/9/2019    Procedure: 1) bilateral upper blepharoplasty with excision of excess of tissue weighing down 2) nasal endoscopy with removal of nasal septal prosthesis;  Surgeon: Mark Anthony Anderson MD;  Location: [facility] OR;  Service: ENT   • CARPAL TUNNEL RELEASE Bilateral 2004   • CHOLECYSTECTOMY  2013   • COLONOSCOPY  10/17/2014    One 5-6mm tubular adenomatous polyp in the ascending colon; Two less than 4mm hyperplastic polyps in the sigmoid colon; Three rectal hyperplastic polyps; Diverticulosis; Repeat 5 years    • COLONOSCOPY  07/14/2010    Internal hemorrhoids; Repeat 7 years    • COLONOSCOPY N/A 10/23/2019    Procedure: COLONOSCOPY WITH ANESTHESIA;  Surgeon: Robyn Frazier MD;  Location: [facility] ENDOSCOPY;  Service: Gastroenterology   • CYST REMOVAL  2016    neck   • ENDOSCOPIC FUNCTIONAL SINUS SURGERY (FESS) Bilateral 4/16/2019    Procedure: ENDOSCOPIC FUNCTIONAL SINUS SURGERY (bilareral maxillary antrostomy without image guidance);  Surgeon: Mark Anthony Anderson MD;  Location: [facility] OR;  Service: ENT   • ENDOSCOPY N/A 4/11/2019    Esophageal mucosal changes suggestive of eosinophilic esophagitis-biopsied; A few gastric polyps-resected and retrieved-biopsied; Normal examined duodenum-biopsied   • FOOT SURGERY Right 2010    X3   • HERNIA REPAIR     • INTERSTIM  PLACEMENT N/A 6/5/2019    Procedure: INTERSTIM STAGES 1 AND 2 LEAD AND GENERATOR PLACEMENT;  Surgeon: Endy Guerrero MD;  Location:  [facility] OR;  Service: Urology   • LASER ABLATION      right back   • NASAL ENDOSCOPY N/A 4/16/2019    Procedure:     1. Functional endoscopic sinus surgery with bilateral maxillary antrostomy    2. Bilateral nasal endoscopy with biopsy of nasal septum    3.  Nasal septal prosthesis placement  ;  Surgeon: Mark Anthony Anderson MD;  Location:  [facility] OR;  Service: ENT   • NASAL VESTIBULE LESION/PAPILLOMA EXCISION N/A 8/1/2017    Procedure: Repair of nasal vestibular stenosis and septoplasty; cartilage graft from left ear;  Surgeon: Mark Anthony Anderson MD;  Location:  [facility] OR;  Service:    • SEPTOPLASTY N/A 4/16/2019    Procedure: PLACEMENT OF NASAL SEPTAL PROSTHESIS;  Surgeon: Mark Anthony Anderson MD;  Location:  [facility] OR;  Service: ENT     Family History   Problem Relation Age of Onset   • Cancer Mother    • Diabetes Father    • Hypertension Father    • Cancer Father    • Colon cancer Neg Hx    • Colon polyps Neg Hx    • Esophageal cancer Neg Hx    • Liver cancer Neg Hx    • Liver disease Neg Hx    • Rectal cancer Neg Hx    • Stomach cancer Neg Hx      Social History     Tobacco Use   • Smoking status: Current Every Day Smoker     Packs/day: 1.00     Types: Cigarettes   • Smokeless tobacco: Never Used   Substance Use Topics   • Alcohol use: Yes     Frequency: Monthly or less     Comment: Rarely-maybe 3x/year   • Drug use: No     Allergies:  Hctz [hydrochlorothiazide] and Januvia [sitagliptin]    Current Outpatient Medications:   •  albuterol (PROVENTIL) (2.5 MG/3ML) 0.083% nebulizer solution, , Disp: , Rfl: 3  •  allopurinol (ZYLOPRIM) 100 MG tablet, Take 100 mg by mouth Daily., Disp: , Rfl:   •  ALPRAZolam (XANAX) 0.5 MG tablet, Take 0.5 mg by mouth As Needed., Disp: , Rfl:   •  atorvastatin (LIPITOR) 40 MG tablet, Take 40 mg by mouth Every Night., Disp: , Rfl:   •  bumetanide (BUMEX) 1 MG  tablet, Take 1 mg by mouth 2 (Two) Times a Week., Disp: , Rfl:   •  calcium carbonate (OS-KAYLEY) 600 MG tablet, Take 600 mg by mouth Daily., Disp: , Rfl:   •  carvedilol (COREG) 6.25 MG tablet, Take 6.25 mg by mouth 2 (Two) Times a Day With Meals., Disp: , Rfl:   •  cholecalciferol (VITAMIN D3) 1000 UNITS tablet, Take 1,000 Units by mouth Daily., Disp: , Rfl:   •  DICLOFENAC PO, Take 75 mg by mouth 2 (Two) Times a Day., Disp: , Rfl:   •  divalproex (DEPAKOTE) 500 MG 24 hr tablet, Take 1,000 mg by mouth Every Night., Disp: , Rfl:   •  doxycycline (VIBRAMYICN) 100 MG tablet, , Disp: , Rfl: 0  •  DULoxetine (CYMBALTA) 60 MG capsule, Take 60 mg by mouth 2 (Two) Times a Day., Disp: , Rfl:   •  guaiFENesin (MUCINEX) 600 MG 12 hr tablet, Take 1,200 mg by mouth 2 (Two) Times a Day., Disp: , Rfl:   •  levothyroxine (SYNTHROID, LEVOTHROID) 125 MCG tablet, Take 125 mcg by mouth Daily., Disp: , Rfl:   •  linaclotide (LINZESS) 72 MCG capsule capsule, Take 1 capsule by mouth Every Morning Before Breakfast., Disp: 30 capsule, Rfl: 12  •  Loperamide HCl (IMODIUM PO), Take 2 mg by mouth As Needed., Disp: , Rfl:   •  Multiple Vitamin (MULTI VITAMIN PO), Take 1 dose by mouth Daily., Disp: , Rfl:   •  mupirocin (BACTROBAN) 2 % ointment, , Disp: , Rfl: 0  •  niacin (SLO-NIACIN) 500 MG CR tablet, Take 500 mg by mouth Daily., Disp: , Rfl:   •  omeprazole (priLOSEC) 20 MG capsule, Take 20 mg by mouth Daily., Disp: , Rfl:   •  pilocarpine (SALAGEN) 5 MG tablet, Take 5 mg by mouth 3 (Three) Times a Day., Disp: , Rfl:   •  pramipexole (MIRAPEX) 1.5 MG tablet, Take 1.5 mg by mouth Every Night., Disp: , Rfl:   •  QUEtiapine (SEROquel) 50 MG tablet, Take 50 mg by mouth Every Night., Disp: , Rfl:   •  spironolactone (ALDACTONE) 25 MG tablet, Take 25 mg by mouth Daily., Disp: , Rfl:   •  tiZANidine (ZANAFLEX) 4 MG tablet, Take 4 mg by mouth Every Night., Disp: , Rfl:   •  traMADol (ULTRAM) 50 MG tablet, Take 100 mg by mouth 3 (Three) Times a Day.,  Disp: , Rfl:     I have reviewed and edited the CC/HPI/ROS/PFSH/Allergy/Medication information entered into the note by the patient/ancillary staff to accurately document the details of this visit.    Objective     Vital Signs:  Temp:  [98 °F (36.7 °C)] 98 °F (36.7 °C)  Heart Rate:  [81] 81  Resp:  [20] 20  BP: (118)/(69) 118/69    Physical Exam:  Physical Exam   Constitutional: She is oriented to person, place, and time. She appears well-developed and well-nourished. She is cooperative. No distress.   HENT:   Head: Normocephalic and atraumatic.   Right Ear: External ear normal.   Left Ear: External ear normal.   Nose: Nose normal.       Intranasally, there is a large nasal septal perforation.   Eyes: Pupils are equal, round, and reactive to light. Conjunctivae and EOM are normal. Right eye exhibits no discharge. Left eye exhibits no discharge. No scleral icterus.   Neck: Normal range of motion. Neck supple. No JVD present. No tracheal deviation present. No thyromegaly present.   Pulmonary/Chest: Effort normal. No stridor.   Musculoskeletal: Normal range of motion. She exhibits no edema or deformity.   Lymphadenopathy:     She has no cervical adenopathy.   Neurological: She is alert and oriented to person, place, and time. She has normal strength. No cranial nerve deficit. Coordination normal.   Skin: Skin is warm and dry. No rash noted. She is not diaphoretic. No erythema. No pallor.   Psychiatric: She has a normal mood and affect. Her speech is normal and behavior is normal. Judgment and thought content normal. Cognition and memory are normal.   Nursing note and vitals reviewed.      Results Review:   Wound culture from November 6, 2019 was significant for staph aureus, resistant to clindamycin, susceptible to Bactrim.    I have personally reviewed the CT scan with contrast of the neck dated November 13, 2019.  The following is my interpretation:  There is a 26 mm anterior posterior nasal septal perforation.  The  posterior aspect of this flares out and looks to be contacting the lateral nasal wall bilaterally.  There is some mild, scattered sinus mucosal thickening.        The report was also reviewed:      Assessment   Assessment:  1. Nasal cavity mass    2. Abnormal nasal septum    3. Nasal septal perforation    4. Tobacco abuse disorder        Plan   Plan:  Very odd presentation.  Prior autoimmune work-up was negative.  We will proceed with an ZEKE by immunofluorescence antibody.  Also recommended going to the operating room for excision of the scar tissue at the posterior aspect of the septum, which may be contributing to nasal obstruction.  Anticipate placing Silastic stents bilaterally to help prevent re-adherence of the synechia to the lateral nasal walls.  We would send this for pathological diagnosis, again.    Mupirocin ointment to the nostril TID for 2 weeks.  We discussed nasal endoscopy with taking down the scar bands, and placement of Silastic stents for approximately 3 weeks.  Feel this may help decrease her obstruction and possibly negate the need for rib graft.  Due to her history, I am very hesitant to perform a rib graft on her.    RBAC discussed.    My findings and recommendations were discussed and questions were answered.     Mark Anthony Anderson MD  12/11/19  11:22 AM

## 2019-12-11 NOTE — PROGRESS NOTES
Procedure Note    Pre-operative Diagnosis: Nasal cavity mass and septal perforation    Post-operative Diagnosis: Nasal cavity scarring and septal perforation    Procedure Type:  Nasal Endoscopy with culture    Anesthesia: none    Procedure Details:    After topical anesthesia and decongestion, the patient was placed in the sitting position.  A 0 degree nasal endoscope was passed into the left nasal cavity.  There is near complete loss of the nasal septum.  On the left, there is scarring versus mass to the lateral nasal wall.  Inferiorly, I was able to place the camera past this.  This was very thin, approximate 4 mm, and there is no nasal pharyngeal mass or posterior extension of the area.  This is more consistent with scarring.  The scope was withdrawn and passed into the right nasal cavity.  The scarring was again noted.  I cannot pass the scope posteriorly to this.    Condition:  Stable.  Patient tolerated procedure well.    Complications:  None

## 2019-12-12 PROBLEM — Q30.9 ABNORMAL NASAL SEPTUM: Status: ACTIVE | Noted: 2019-12-12

## 2019-12-12 PROBLEM — J34.89 NASAL CAVITY MASS: Status: ACTIVE | Noted: 2019-12-12

## 2019-12-30 ENCOUNTER — NURSE TRIAGE (OUTPATIENT)
Dept: OTHER | Facility: CLINIC | Age: 59
End: 2019-12-30

## 2019-12-30 ENCOUNTER — TELEPHONE (OUTPATIENT)
Dept: FAMILY MEDICINE CLINIC | Age: 59
End: 2019-12-30

## 2020-01-07 ENCOUNTER — OFFICE VISIT (OUTPATIENT)
Dept: NEUROLOGY | Age: 60
End: 2020-01-07
Payer: COMMERCIAL

## 2020-01-07 VITALS
SYSTOLIC BLOOD PRESSURE: 117 MMHG | WEIGHT: 187 LBS | RESPIRATION RATE: 16 BRPM | DIASTOLIC BLOOD PRESSURE: 71 MMHG | HEIGHT: 60 IN | BODY MASS INDEX: 36.71 KG/M2 | HEART RATE: 81 BPM

## 2020-01-07 PROCEDURE — 99214 OFFICE O/P EST MOD 30 MIN: CPT | Performed by: PSYCHIATRY & NEUROLOGY

## 2020-01-07 NOTE — PROGRESS NOTES
[facility]  [address] [place] [address] Drive, [address] [address]  Elena Perry  Phone [phone]  Fax [phone]     [address] Follow Up Encounter  2020 2:58 PM    Information:   Patient Name: Kevin Bhagat  :   1960  Age:   63 y.o. MRN:   [id number]  Account #:  [de-identified]  Today:  20    Provider: Bere Martinez M.D. Chief Complaint:   Chief Complaint   Patient presents with    Follow-up    Sleep Apnea       Subjective:   Kevin Bhagat is a 63 y.o. woman with a history of sleep apnea, tremor, insomnia, and restless legs who is following up. She has had a few nasal surgeries this year and says more are planned. She cannot breath through her nose well. She is not using her CPAP much due to this. Her tremor resolved with medication changes. She takes Mirapex for there RLS and that helps fairly well. Objective:     Past Medical History:  Past Medical History:   Diagnosis Date    Arthritis     Bipolar I disorder, most recent episode (or current) mixed, unspecified     Chronic back pain     Depression     Dry mouth     Hyperlipidemia     Hypertension     Hypokalemia     Hypothyroidism     Menopause     Overactive bladder     Plantar fasciitis     RLS (restless legs syndrome)     Sleep apnea     Thyroid disease        Past Surgical History:   Procedure Laterality Date    CARPAL TUNNEL RELEASE Bilateral     CHOLECYSTECTOMY  10/2014    COLON SURGERY      COLON SURGERY      biopsy     COLONOSCOPY      CYST REMOVAL  2017    Under left ear    HARDWARE REMOVAL Right     Foot     HERNIA REPAIR  10/2014    NOSE SURGERY  2017; 2019    Dr Fanny Christie L/S,1 LEVEL Bilateral 2017    INJECTION MEDICATION FACET JOINT performed by Jacki Ozuna at [facility] 3  · None    Significant Injuries  · None    Habits  Kevin Bhagat reports that she has been smoking cigarettes.  She has a daily       levothyroxine (SYNTHROID) 100 MCG tablet Take 1 tablet by mouth Daily (Patient taking differently: Take 100 mcg by mouth Daily 2/15/18 pt reports PCP has increased to 125 mcg daily.) 30 tablet 5    Tens Unit MISC by Does not apply route Apply and use tid prn  Dx -lumbar pain  Dispense tens and supplies 1 each 0    tiZANidine (ZANAFLEX) 4 MG tablet Take 4 mg by mouth nightly as needed       pilocarpine (SALAGEN) 5 MG tablet Take 5 mg by mouth 3 times daily      Vitamin D (CHOLECALCIFEROL) 1000 UNITS CAPS capsule Take 1,000 Units by mouth daily      Multiple Vitamins-Minerals (MULTIVITAMIN PO) Take by mouth daily      DULoxetine (CYMBALTA) 60 MG capsule Take 60 mg by mouth 2 times daily       traMADol (ULTRAM) 50 MG tablet Take 100 mg by mouth every 12 hours       calcium carbonate 600 MG TABS tablet Take 1 tablet by mouth daily.  divalproex (DEPAKOTE) 500 MG ER tablet Take 1,000 mg by mouth nightly.  triamcinolone (ARISTOCORT) 0.5 % ointment        No current facility-administered medications for this visit. Allergies:   Allergies as of 01/07/2020 - Review Complete 01/07/2020   Allergen Reaction Noted    Hctz [hydrochlorothiazide] Other (See Comments) 06/03/2016    Sitagliptin Rash 07/09/2019       Review of Systems:  General ROS: negative for - chills or fever  Psychological ROS: negative for - anxiety or depression  ENT ROS: negative for - headaches or sinus pain  Hematological and Lymphatic ROS: negative for - bleeding problems, bruising or swollen lymph nodes  Respiratory ROS: negative for - cough, hemoptysis or shortness of breath  Cardiovascular ROS: negative for - chest pain or palpitations  Gastrointestinal ROS: negative for - blood in stools, constipation, diarrhea or nausea/vomiting  Genito-Urinary ROS: negative for - hematuria or urinary frequency/urgency  Musculoskeletal ROS: positive for - joint pain, joint stiffness or joint swelling  Neurological ROS: positive for - memory loss, numbness/tingling or weakness     Examination:  Vitals:  /71   Pulse 81   Resp 16   Ht 5' (1.524 m)   Wt 187 lb (84.8 kg)   BMI 36.52 kg/m²   General appearance:  alert and cooperative with exam  HEENT:  Sclera clear, anicteric, Oropharynx clear, no lesions, Neck supple with midline trachea, Thyroid without masses and Trachea midline  Heart::  regular rate and rhythm, S1, S2 normal, no murmur, click, rub or gallop  No carotid bruit  Lungs:  clear to auscultation bilaterally  Extremities:  extremities normal, atraumatic, no cyanosis or edema  Neurologic:  Extraocular movements are intact without nystagmus.  Visual fields are full to confrontation.  Facial movements are symmetrical and normal.  Speech is precise.  Extremity strength is normal in both uppers and lowers.  Deep tendon reflexes are intact and symmetrical.  Rapid alternating movements are unimpaired.  Finger-to-nose testing is performed well, without dysmetria.  Gait is normal.    Pertinent Diagnostic Studies:  Auto BiPAP compliance shows she is not compliant with use. Assessment:       ICD-10-CM    1. Sleep apnea, obstructive G47.33    2. Tremor, essential G25.0    3. RLS (restless legs syndrome) G25.81    Stable. I discussed other RADHA treatment options with her. She is not inclined to pursue other treatment options. Plan:   1. Use CPAP more during sleep  2. I discussed the diagnosis of obstructive sleep apnea with Sukumar Loza including the pathophysiology (namely the mechanism of breathing and obstruction of upper airway, interruptions of sleep, hypoxemia, hypercapnia, and results of repetitive sympathetic activation), risks, evaluation, and treatment options. 3. I discussed the risks of driving when drowsy and advised that Sukumar Loza not drive when drowsy and avoid sedating medications and respiratory suppressants.   4. FU for a year    Electronically signed by Makenzie Josue MD on 1/7/2020

## 2020-01-09 ENCOUNTER — APPOINTMENT (OUTPATIENT)
Dept: PREADMISSION TESTING | Facility: HOSPITAL | Age: 60
End: 2020-01-09

## 2020-01-09 VITALS
SYSTOLIC BLOOD PRESSURE: 126 MMHG | RESPIRATION RATE: 22 BRPM | HEART RATE: 91 BPM | OXYGEN SATURATION: 97 % | WEIGHT: 192.46 LBS | HEIGHT: 60 IN | BODY MASS INDEX: 37.79 KG/M2 | DIASTOLIC BLOOD PRESSURE: 57 MMHG

## 2020-01-09 LAB
ANION GAP SERPL CALCULATED.3IONS-SCNC: 10 MMOL/L (ref 5–15)
BUN BLD-MCNC: 15 MG/DL (ref 6–20)
BUN/CREAT SERPL: 18.3 (ref 7–25)
CALCIUM SPEC-SCNC: 9.9 MG/DL (ref 8.6–10.5)
CHLORIDE SERPL-SCNC: 100 MMOL/L (ref 98–107)
CO2 SERPL-SCNC: 32 MMOL/L (ref 22–29)
CREAT BLD-MCNC: 0.82 MG/DL (ref 0.57–1)
DEPRECATED RDW RBC AUTO: 44.6 FL (ref 37–54)
ERYTHROCYTE [DISTWIDTH] IN BLOOD BY AUTOMATED COUNT: 13.8 % (ref 12.3–15.4)
GFR SERPL CREATININE-BSD FRML MDRD: 72 ML/MIN/1.73
GLUCOSE BLD-MCNC: 116 MG/DL (ref 65–99)
HCT VFR BLD AUTO: 37 % (ref 34–46.6)
HGB BLD-MCNC: 12 G/DL (ref 12–15.9)
MCH RBC QN AUTO: 28.7 PG (ref 26.6–33)
MCHC RBC AUTO-ENTMCNC: 32.4 G/DL (ref 31.5–35.7)
MCV RBC AUTO: 88.5 FL (ref 79–97)
PLATELET # BLD AUTO: 350 10*3/MM3 (ref 140–450)
PMV BLD AUTO: 10.4 FL (ref 6–12)
POTASSIUM BLD-SCNC: 4.1 MMOL/L (ref 3.5–5.2)
RBC # BLD AUTO: 4.18 10*6/MM3 (ref 3.77–5.28)
SODIUM BLD-SCNC: 142 MMOL/L (ref 136–145)
WBC NRBC COR # BLD: 8.75 10*3/MM3 (ref 3.4–10.8)

## 2020-01-09 PROCEDURE — 80048 BASIC METABOLIC PNL TOTAL CA: CPT | Performed by: OTOLARYNGOLOGY

## 2020-01-09 PROCEDURE — 93005 ELECTROCARDIOGRAM TRACING: CPT

## 2020-01-09 PROCEDURE — 36415 COLL VENOUS BLD VENIPUNCTURE: CPT

## 2020-01-09 PROCEDURE — 93010 ELECTROCARDIOGRAM REPORT: CPT | Performed by: INTERNAL MEDICINE

## 2020-01-09 PROCEDURE — 85027 COMPLETE CBC AUTOMATED: CPT | Performed by: OTOLARYNGOLOGY

## 2020-01-09 NOTE — DISCHARGE INSTRUCTIONS
DAY OF SURGERY INSTRUCTIONS        YOUR SURGEON: dr gruber    PROCEDURE: ***nasal endoscopy with deptal debridement, placement of silastic stents    DATE OF SURGERY: ***1/14/2020    ARRIVAL TIME: AS DIRECTED BY OFFICE    YOU MAY TAKE THE FOLLOWING MEDICATION(S) THE MORNING OF SURGERY WITH A SIP OF WATER: ***carvediolol    ALL OTHER HOME MEDICATIONS CHECK WITH YOUR DOCTOR              MANAGING PAIN AFTER SURGERY    We know you are probably wondering what your pain will be like after surgery.  Following surgery it is unrealistic to expect you will not have pain.   Pain is how our bodies let us know that something is wrong or cautions us to be careful.  That said, our goal is to make your pain tolerable.    Methods we may use to treat your pain include (oral or IV medications, PCAs, epidurals, nerve blocks, etc.)   While some procedures require IV pain medications for a short time after surgery, transitioning to pain medications by mouth allows for better management of pain.   Your nurse will encourage you to take oral pain medications whenever possible.  IV medications work almost immediately, but only last a short while.  Taking medications by mouth allows for a more constant level of medication in your blood stream for a longer period of time.      Once your pain is out of control it is harder to get back under control.  It is important you are aware when your next dose of pain medication is due.  If you are admitted, your nurse may write the time of your next dose on the white board in your room to help you remember.      We are interested in your pain and encourage you to inform us about aggravating factors during your visit.   Many times a simple repositioning every few hours can make a big difference.    If your physician says it is okay, do not let your pain prevent you from getting out of bed. Be sure to call your nurse for assistance prior to getting up so you do not fall.      Before surgery, please decide  your tolerable pain goal.  These faces help describe the pain ratings we use on a 0-10 scale.   Be prepared to tell us your goal and whether or not you take pain or anxiety medications at home.      BEFORE YOU COME TO THE HOSPITAL  (Pre-op instructions)  • Do not eat, drink, smoke or chew gum after midnight the night before surgery.  This also includes no mints.  • Morning of surgery take only the medicines you have been instructed with a sip of water unless otherwise instructed  by your physician.  • Do not shave, wear makeup or dark nail polish.  • Remove all jewelry including rings.  • Leave anything you consider valuable at home.  • Leave your suitcase in the car until after your surgery.  • Bring the following with you if applicable:  o Picture ID and insurance, Medicare or Medicaid cards  o Co-pay/deductible required by insurance (cash, check, credit card)  o Copy of advance directive, living will or power-of- documents if not brought to PAT  o CPAP or BIPAP mask and tubing  o Relaxation aids ( book, magazine), etc.  o Hearing aids                                 ON THE DAY OF SURGERY  · On the day of surgery check in at registration located at the main entrance of the hospital.   ? You will be registered and given a beeper with instructions where to wait in the main lobby.  ? When your beeper lights up and vibrates a member of the Outpatient Surgery staff will meet you at the double doors under the stair steps and escort you to your preoperative room.   · You may have cloth compression devices placed on your legs. These help to prevent blood clots and reduce swelling in your legs.  · An IV may be inserted into one of your veins.  · In the operating room, you may be given one or more of the following:  ? A medicine to help you relax (sedative).  ? A medicine to numb the area (local anesthetic).  ? A medicine to make you fall asleep (general anesthetic).  ? A medicine that is injected into an area of  "your body to numb everything below the injection site (regional anesthetic).  · Your surgical site will be marked or identified.  · You may be given an antibiotic through your IV to help prevent infection.  Contact a health care provider if you:  · Develop a fever of more than 100.4°F (38°C) or other feelings of illness during the 48 hours before your surgery.  · Have symptoms that get worse.  Have questions or concerns about your surgery    General Anesthesia/Surgery, Adult  General anesthesia is the use of medicines to make a person \"go to sleep\" (unconscious) for a medical procedure. General anesthesia must be used for certain procedures, and is often recommended for procedures that:  · Last a long time.  · Require you to be still or in an unusual position.  · Are major and can cause blood loss.  The medicines used for general anesthesia are called general anesthetics. As well as making you unconscious for a certain amount of time, these medicines:  · Prevent pain.  · Control your blood pressure.  · Relax your muscles.  Tell a health care provider about:  · Any allergies you have.  · All medicines you are taking, including vitamins, herbs, eye drops, creams, and over-the-counter medicines.  · Any problems you or family members have had with anesthetic medicines.  · Types of anesthetics you have had in the past.  · Any blood disorders you have.  · Any surgeries you have had.  · Any medical conditions you have.  · Any recent upper respiratory, chest, or ear infections.  · Any history of:  ? Heart or lung conditions, such as heart failure, sleep apnea, asthma, or chronic obstructive pulmonary disease (COPD).  ?  service.  ? Depression or anxiety.  · Any tobacco or drug use, including marijuana or alcohol use.  · Whether you are pregnant or may be pregnant.  What are the risks?  Generally, this is a safe procedure. However, problems may occur, including:  · Allergic reaction.  · Lung and heart " problems.  · Inhaling food or liquid from the stomach into the lungs (aspiration).  · Nerve injury.  · Air in the bloodstream, which can lead to stroke.  · Extreme agitation or confusion (delirium) when you wake up from the anesthetic.  · Waking up during your procedure and being unable to move. This is rare.  These problems are more likely to develop if you are having a major surgery or if you have an advanced or serious medical condition. You can prevent some of these complications by answering all of your health care provider's questions thoroughly and by following all instructions before your procedure.  General anesthesia can cause side effects, including:  · Nausea or vomiting.  · A sore throat from the breathing tube.  · Hoarseness.  · Wheezing or coughing.  · Shaking chills.  · Tiredness.  · Body aches.  · Anxiety.  · Sleepiness or drowsiness.  · Confusion or agitation.  RISKS AND COMPLICATIONS OF SURGERY  Your health care provider will discuss possible risks and complications with you before surgery. Common risks and complications include:    · Problems due to the use of anesthetics.  · Blood loss and replacement (does not apply to minor surgical procedures).  · Temporary increase in pain due to surgery.  · Uncorrected pain or problems that the surgery was meant to correct.  · Infection.  · New damage.    What happens before the procedure?    Medicines  Ask your health care provider about:  · Changing or stopping your regular medicines. This is especially important if you are taking diabetes medicines or blood thinners.  · Taking medicines such as aspirin and ibuprofen. These medicines can thin your blood. Do not take these medicines unless your health care provider tells you to take them.  · Taking over-the-counter medicines, vitamins, herbs, and supplements. Do not take these during the week before your procedure unless your health care provider approves them.  General instructions  · Starting 3-6 weeks  before the procedure, do not use any products that contain nicotine or tobacco, such as cigarettes and e-cigarettes. If you need help quitting, ask your health care provider.  · If you brush your teeth on the morning of the procedure, make sure to spit out all of the toothpaste.  · Tell your health care provider if you become ill or develop a cold, cough, or fever.  · If instructed by your health care provider, bring your sleep apnea device with you on the day of your surgery (if applicable).  · Ask your health care provider if you will be going home the same day, the following day, or after a longer hospital stay.  ? Plan to have someone take you home from the hospital or clinic.  ? Plan to have a responsible adult care for you for at least 24 hours after you leave the hospital or clinic. This is important.  What happens during the procedure?  · You will be given anesthetics through both of the following:  ? A mask placed over your nose and mouth.  ? An IV in one of your veins.  · You may receive a medicine to help you relax (sedative).  · After you are unconscious, a breathing tube may be inserted down your throat to help you breathe. This will be removed before you wake up.  · An anesthesia specialist will stay with you throughout your procedure. He or she will:  ? Keep you comfortable and safe by continuing to give you medicines and adjusting the amount of medicine that you get.  ? Monitor your blood pressure, pulse, and oxygen levels to make sure that the anesthetics do not cause any problems.  The procedure may vary among health care providers and hospitals.  What happens after the procedure?  · Your blood pressure, temperature, heart rate, breathing rate, and blood oxygen level will be monitored until the medicines you were given have worn off.  · You will wake up in a recovery area. You may wake up slowly.  · If you feel anxious or agitated, you may be given medicine to help you calm down.  · If you will be  going home the same day, your health care provider may check to make sure you can walk, drink, and urinate.  · Your health care provider will treat any pain or side effects you have before you go home.  · Do not drive for 24 hours if you were given a sedative.  Summary  · General anesthesia is used to keep you still and prevent pain during a procedure.  · It is important to tell your healthcare provider about your medical history and any surgeries you have had, and previous experience with anesthesia.  · Follow your healthcare provider’s instructions about when to stop eating, drinking, or taking certain medicines before your procedure.  · Plan to have someone take you home from the hospital or clinic.  This information is not intended to replace advice given to you by your health care provider. Make sure you discuss any questions you have with your health care provider.  Document Released: 03/26/2009 Document Revised: 08/03/2018 Document Reviewed: 08/03/2018  [employer] Interactive Patient Education © 2019 [employer]      Fall Prevention in Hospitals, Adult  As a hospital patient, your condition and the treatments you receive can increase your risk for falls. Some additional risk factors for falls in a hospital include:  · Being in an unfamiliar environment.  · Being on bed rest.  · Your surgery.  · Taking certain medicines.  · Your tubing requirements, such as intravenous (IV) therapy or catheters.  It is important that you learn how to decrease fall risks while at the hospital. Below are important tips that can help prevent falls.  SAFETY TIPS FOR PREVENTING FALLS  Talk about your risk of falling.  · Ask your health care provider why you are at risk for falling. Is it your medicine, illness, tubing placement, or something else?  · Make a plan with your health care provider to keep you safe from falls.  · Ask your health care provider or pharmacist about side effects of your medicines. Some medicines can make you  dizzy or affect your coordination.  Ask for help.  · Ask for help before getting out of bed. You may need to press your call button.  · Ask for assistance in getting safely to the toilet.  · Ask for a walker or cane to be put at your bedside. Ask that most of the side rails on your bed be placed up before your health care provider leaves the room.  · Ask family or friends to sit with you.  · Ask for things that are out of your reach, such as your glasses, hearing aids, telephone, bedside table, or call button.  Follow these tips to avoid falling:  · Stay lying or seated, rather than standing, while waiting for help.  · Wear rubber-soled slippers or shoes whenever you walk in the hospital.  · Avoid quick, sudden movements.  ¨ Change positions slowly.  ¨ Sit on the side of your bed before standing.  ¨ Stand up slowly and wait before you start to walk.  · Let your health care provider know if there is a spill on the floor.  · Pay careful attention to the medical equipment, electrical cords, and tubes around you.  · When you need help, use your call button by your bed or in the bathroom. Wait for one of your health care providers to help you.  · If you feel dizzy or unsure of your footing, return to bed and wait for assistance.  · Avoid being distracted by the TV, telephone, or another person in your room.  · Do not lean or support yourself on rolling objects, such as IV poles or bedside tables.     This information is not intended to replace advice given to you by your health care provider. Make sure you discuss any questions you have with your health care provider.     Document Released: 12/15/2001 Document Revised: 01/08/2016 Document Reviewed: 08/25/2013  [employer] Interactive Patient Education ©2016 [employer]       Surgical Site Infections FAQs  What is a Surgical Site Infection (SSI)?  A surgical site infection is an infection that occurs after surgery in the part of the body where the surgery took place. Most  patients who have surgery do not develop an infection. However, infections develop in about 1 to 3 out of every 100 patients who have surgery.  Some of the common symptoms of a surgical site infection are:  · Redness and pain around the area where you had surgery  · Drainage of cloudy fluid from your surgical wound  · Fever  Can SSIs be treated?  Yes. Most surgical site infections can be treated with antibiotics. The antibiotic given to you depends on the bacteria (germs) causing the infection. Sometimes patients with SSIs also need another surgery to treat the infection.  What are some of the things that hospitals are doing to prevent SSIs?  To prevent SSIs, doctors, nurses, and other healthcare providers:  · Clean their hands and arms up to their elbows with an antiseptic agent just before the surgery.  · Clean their hands with soap and water or an alcohol-based hand rub before and after caring for each patient.  · May remove some of your hair immediately before your surgery using electric clippers if the hair is in the same area where the procedure will occur. They should not shave you with a razor.  · Wear special hair covers, masks, gowns, and gloves during surgery to keep the surgery area clean.  · Give you antibiotics before your surgery starts. In most cases, you should get antibiotics within 60 minutes before the surgery starts and the antibiotics should be stopped within 24 hours after surgery.  · Clean the skin at the site of your surgery with a special soap that kills germs.  What can I do to help prevent SSIs?  Before your surgery:  · Tell your doctor about other medical problems you may have. Health problems such as allergies, diabetes, and obesity could affect your surgery and your treatment.  · Quit smoking. Patients who smoke get more infections. Talk to your doctor about how you can quit before your surgery.  · Do not shave near where you will have surgery. Shaving with a razor can irritate your  skin and make it easier to develop an infection.  At the time of your surgery:  · Speak up if someone tries to shave you with a razor before surgery. Ask why you need to be shaved and talk with your surgeon if you have any concerns.  · Ask if you will get antibiotics before surgery.  After your surgery:  · Make sure that your healthcare providers clean their hands before examining you, either with soap and water or an alcohol-based hand rub.  · If you do not see your providers clean their hands, please ask them to do so.  · Family and friends who visit you should not touch the surgical wound or dressings.  · Family and friends should clean their hands with soap and water or an alcohol-based hand rub before and after visiting you. If you do not see them clean their hands, ask them to clean their hands.  What do I need to do when I go home from the hospital?  · Before you go home, your doctor or nurse should explain everything you need to know about taking care of your wound. Make sure you understand how to care for your wound before you leave the hospital.  · Always clean your hands before and after caring for your wound.  · Before you go home, make sure you know who to contact if you have questions or problems after you get home.  · If you have any symptoms of an infection, such as redness and pain at the surgery site, drainage, or fever, call your doctor immediately.  If you have additional questions, please ask your doctor or nurse.  Developed and co-sponsored by The Society for Healthcare Epidemiology of [place] (SHEA); Infectious Diseases Society of [place] (IDSA); American Hospital Association; Association for Professionals in Infection Control and Epidemiology (APIC); Centers for Disease Control and Prevention (CDC); and The Joint Commission.     This information is not intended to replace advice given to you by your health care provider. Make sure you discuss any questions you have with your health care  provider.     Document Released: 12/23/2014 Document Revised: 01/08/2016 Document Reviewed: 03/02/2016  [employer] Interactive Patient Education ©2016 [employer]     PATIENT/FAMILY/RESPONSIBLE PARTY VERBALIZES UNDERSTANDING OF ABOVE EDUCATION.  COPY OF PAIN SCALE GIVEN AND REVIEWED WITH VERBALIZED UNDERSTANDING.

## 2020-01-14 ENCOUNTER — HOSPITAL ENCOUNTER (OUTPATIENT)
Facility: HOSPITAL | Age: 60
Setting detail: HOSPITAL OUTPATIENT SURGERY
Discharge: HOME | End: 2020-01-14
Attending: OTOLARYNGOLOGY | Admitting: OTOLARYNGOLOGY

## 2020-01-14 ENCOUNTER — ANESTHESIA (OUTPATIENT)
Dept: PERIOP | Facility: HOSPITAL | Age: 60
End: 2020-01-14

## 2020-01-14 ENCOUNTER — ANESTHESIA EVENT (OUTPATIENT)
Dept: PERIOP | Facility: HOSPITAL | Age: 60
End: 2020-01-14

## 2020-01-14 VITALS
DIASTOLIC BLOOD PRESSURE: 74 MMHG | HEART RATE: 82 BPM | RESPIRATION RATE: 20 BRPM | OXYGEN SATURATION: 96 % | SYSTOLIC BLOOD PRESSURE: 121 MMHG | TEMPERATURE: 97.8 F

## 2020-01-14 DIAGNOSIS — J34.89 NASAL CAVITY MASS: ICD-10-CM

## 2020-01-14 DIAGNOSIS — Q30.9 ABNORMAL NASAL SEPTUM: ICD-10-CM

## 2020-01-14 DIAGNOSIS — J34.89 NASAL SEPTAL PERFORATION: ICD-10-CM

## 2020-01-14 PROCEDURE — C9046 COCAINE HCL NASAL SOLUTION: HCPCS | Performed by: OTOLARYNGOLOGY

## 2020-01-14 PROCEDURE — 25010000002 FENTANYL CITRATE (PF) 100 MCG/2ML SOLUTION: Performed by: NURSE ANESTHETIST, CERTIFIED REGISTERED

## 2020-01-14 PROCEDURE — 25010000002 SUCCINYLCHOLINE PER 20 MG: Performed by: NURSE ANESTHETIST, CERTIFIED REGISTERED

## 2020-01-14 PROCEDURE — 25010000002 ONDANSETRON PER 1 MG: Performed by: ANESTHESIOLOGY

## 2020-01-14 PROCEDURE — 31237 NSL/SINS NDSC SURG BX POLYPC: CPT | Performed by: OTOLARYNGOLOGY

## 2020-01-14 PROCEDURE — 25010000002 DEXAMETHASONE PER 1 MG: Performed by: ANESTHESIOLOGY

## 2020-01-14 PROCEDURE — 30220 INSERT NASAL SEPTAL BUTTON: CPT | Performed by: OTOLARYNGOLOGY

## 2020-01-14 PROCEDURE — 88305 TISSUE EXAM BY PATHOLOGIST: CPT | Performed by: OTOLARYNGOLOGY

## 2020-01-14 PROCEDURE — 88311 DECALCIFY TISSUE: CPT | Performed by: OTOLARYNGOLOGY

## 2020-01-14 PROCEDURE — 25010000002 PROPOFOL 10 MG/ML EMULSION: Performed by: NURSE ANESTHETIST, CERTIFIED REGISTERED

## 2020-01-14 PROCEDURE — 94640 AIRWAY INHALATION TREATMENT: CPT

## 2020-01-14 PROCEDURE — 25010000002 COCAINE HCL 40 MG/ML SOLUTION: Performed by: OTOLARYNGOLOGY

## 2020-01-14 PROCEDURE — S0260 H&P FOR SURGERY: HCPCS | Performed by: OTOLARYNGOLOGY

## 2020-01-14 RX ORDER — OXYCODONE AND ACETAMINOPHEN 7.5; 325 MG/1; MG/1
2 TABLET ORAL EVERY 4 HOURS PRN
Status: DISCONTINUED | OUTPATIENT
Start: 2020-01-14 | End: 2020-01-14 | Stop reason: HOSPADM

## 2020-01-14 RX ORDER — SODIUM CHLORIDE, SODIUM LACTATE, POTASSIUM CHLORIDE, CALCIUM CHLORIDE 600; 310; 30; 20 MG/100ML; MG/100ML; MG/100ML; MG/100ML
1000 INJECTION, SOLUTION INTRAVENOUS CONTINUOUS
Status: DISCONTINUED | OUTPATIENT
Start: 2020-01-14 | End: 2020-01-14 | Stop reason: HOSPADM

## 2020-01-14 RX ORDER — ONDANSETRON 2 MG/ML
4 INJECTION INTRAMUSCULAR; INTRAVENOUS ONCE AS NEEDED
Status: DISCONTINUED | OUTPATIENT
Start: 2020-01-14 | End: 2020-01-14 | Stop reason: HOSPADM

## 2020-01-14 RX ORDER — PROMETHAZINE HYDROCHLORIDE 25 MG/1
12.5 TABLET ORAL ONCE AS NEEDED
Status: DISCONTINUED | OUTPATIENT
Start: 2020-01-14 | End: 2020-01-14 | Stop reason: HOSPADM

## 2020-01-14 RX ORDER — IPRATROPIUM BROMIDE AND ALBUTEROL SULFATE 2.5; .5 MG/3ML; MG/3ML
3 SOLUTION RESPIRATORY (INHALATION) ONCE
Status: COMPLETED | OUTPATIENT
Start: 2020-01-14 | End: 2020-01-14

## 2020-01-14 RX ORDER — LIDOCAINE HYDROCHLORIDE 20 MG/ML
INJECTION, SOLUTION INFILTRATION; PERINEURAL AS NEEDED
Status: DISCONTINUED | OUTPATIENT
Start: 2020-01-14 | End: 2020-01-14 | Stop reason: SURG

## 2020-01-14 RX ORDER — METOCLOPRAMIDE HYDROCHLORIDE 5 MG/ML
5 INJECTION INTRAMUSCULAR; INTRAVENOUS
Status: DISCONTINUED | OUTPATIENT
Start: 2020-01-14 | End: 2020-01-14 | Stop reason: HOSPADM

## 2020-01-14 RX ORDER — SODIUM CHLORIDE 0.9 % (FLUSH) 0.9 %
3 SYRINGE (ML) INJECTION EVERY 12 HOURS SCHEDULED
Status: DISCONTINUED | OUTPATIENT
Start: 2020-01-14 | End: 2020-01-14 | Stop reason: HOSPADM

## 2020-01-14 RX ORDER — LABETALOL HYDROCHLORIDE 5 MG/ML
5 INJECTION, SOLUTION INTRAVENOUS
Status: DISCONTINUED | OUTPATIENT
Start: 2020-01-14 | End: 2020-01-14 | Stop reason: HOSPADM

## 2020-01-14 RX ORDER — MAGNESIUM HYDROXIDE 1200 MG/15ML
LIQUID ORAL AS NEEDED
Status: DISCONTINUED | OUTPATIENT
Start: 2020-01-14 | End: 2020-01-14 | Stop reason: HOSPADM

## 2020-01-14 RX ORDER — SODIUM CHLORIDE, SODIUM LACTATE, POTASSIUM CHLORIDE, CALCIUM CHLORIDE 600; 310; 30; 20 MG/100ML; MG/100ML; MG/100ML; MG/100ML
100 INJECTION, SOLUTION INTRAVENOUS CONTINUOUS
Status: DISCONTINUED | OUTPATIENT
Start: 2020-01-14 | End: 2020-01-14 | Stop reason: HOSPADM

## 2020-01-14 RX ORDER — LIDOCAINE HYDROCHLORIDE 10 MG/ML
0.5 INJECTION, SOLUTION EPIDURAL; INFILTRATION; INTRACAUDAL; PERINEURAL ONCE AS NEEDED
Status: DISCONTINUED | OUTPATIENT
Start: 2020-01-14 | End: 2020-01-14 | Stop reason: HOSPADM

## 2020-01-14 RX ORDER — SODIUM CHLORIDE 0.9 % (FLUSH) 0.9 %
3 SYRINGE (ML) INJECTION AS NEEDED
Status: DISCONTINUED | OUTPATIENT
Start: 2020-01-14 | End: 2020-01-14 | Stop reason: HOSPADM

## 2020-01-14 RX ORDER — DEXAMETHASONE SODIUM PHOSPHATE 4 MG/ML
4 INJECTION, SOLUTION INTRA-ARTICULAR; INTRALESIONAL; INTRAMUSCULAR; INTRAVENOUS; SOFT TISSUE ONCE AS NEEDED
Status: COMPLETED | OUTPATIENT
Start: 2020-01-14 | End: 2020-01-14

## 2020-01-14 RX ORDER — FENTANYL CITRATE 50 UG/ML
INJECTION, SOLUTION INTRAMUSCULAR; INTRAVENOUS AS NEEDED
Status: DISCONTINUED | OUTPATIENT
Start: 2020-01-14 | End: 2020-01-14 | Stop reason: SURG

## 2020-01-14 RX ORDER — LIDOCAINE HYDROCHLORIDE 40 MG/ML
SOLUTION TOPICAL AS NEEDED
Status: DISCONTINUED | OUTPATIENT
Start: 2020-01-14 | End: 2020-01-14 | Stop reason: SURG

## 2020-01-14 RX ORDER — COCAINE HYDROCHLORIDE 40 MG/ML
SOLUTION NASAL AS NEEDED
Status: DISCONTINUED | OUTPATIENT
Start: 2020-01-14 | End: 2020-01-14 | Stop reason: HOSPADM

## 2020-01-14 RX ORDER — HYDROCODONE BITARTRATE AND ACETAMINOPHEN 7.5; 325 MG/1; MG/1
1 TABLET ORAL EVERY 4 HOURS PRN
Qty: 12 TABLET | Refills: 0 | Status: SHIPPED | OUTPATIENT
Start: 2020-01-14 | End: 2020-03-09

## 2020-01-14 RX ORDER — SODIUM CHLORIDE, SODIUM LACTATE, POTASSIUM CHLORIDE, CALCIUM CHLORIDE 600; 310; 30; 20 MG/100ML; MG/100ML; MG/100ML; MG/100ML
INJECTION, SOLUTION INTRAVENOUS CONTINUOUS PRN
Status: COMPLETED | OUTPATIENT
Start: 2020-01-14 | End: 2020-01-14

## 2020-01-14 RX ORDER — OXYMETAZOLINE HYDROCHLORIDE 0.05 G/100ML
2 SPRAY NASAL
Status: DISPENSED | OUTPATIENT
Start: 2020-01-14 | End: 2020-01-14

## 2020-01-14 RX ORDER — OXYCODONE AND ACETAMINOPHEN 10; 325 MG/1; MG/1
1 TABLET ORAL ONCE AS NEEDED
Status: DISCONTINUED | OUTPATIENT
Start: 2020-01-14 | End: 2020-01-14 | Stop reason: HOSPADM

## 2020-01-14 RX ORDER — PROPOFOL 10 MG/ML
VIAL (ML) INTRAVENOUS AS NEEDED
Status: DISCONTINUED | OUTPATIENT
Start: 2020-01-14 | End: 2020-01-14 | Stop reason: SURG

## 2020-01-14 RX ORDER — HYDROCODONE BITARTRATE AND ACETAMINOPHEN 7.5; 325 MG/1; MG/1
1 TABLET ORAL ONCE AS NEEDED
Status: DISCONTINUED | OUTPATIENT
Start: 2020-01-14 | End: 2020-01-14 | Stop reason: HOSPADM

## 2020-01-14 RX ORDER — IBUPROFEN 600 MG/1
600 TABLET ORAL ONCE AS NEEDED
Status: DISCONTINUED | OUTPATIENT
Start: 2020-01-14 | End: 2020-01-14 | Stop reason: HOSPADM

## 2020-01-14 RX ORDER — ACETAMINOPHEN 500 MG
1000 TABLET ORAL ONCE
Status: COMPLETED | OUTPATIENT
Start: 2020-01-14 | End: 2020-01-14

## 2020-01-14 RX ORDER — ROCURONIUM BROMIDE 10 MG/ML
INJECTION, SOLUTION INTRAVENOUS AS NEEDED
Status: DISCONTINUED | OUTPATIENT
Start: 2020-01-14 | End: 2020-01-14 | Stop reason: SURG

## 2020-01-14 RX ORDER — NALOXONE HCL 0.4 MG/ML
0.4 VIAL (ML) INJECTION AS NEEDED
Status: DISCONTINUED | OUTPATIENT
Start: 2020-01-14 | End: 2020-01-14 | Stop reason: HOSPADM

## 2020-01-14 RX ORDER — SUCCINYLCHOLINE CHLORIDE 20 MG/ML
INJECTION INTRAMUSCULAR; INTRAVENOUS AS NEEDED
Status: DISCONTINUED | OUTPATIENT
Start: 2020-01-14 | End: 2020-01-14 | Stop reason: SURG

## 2020-01-14 RX ORDER — ONDANSETRON 2 MG/ML
4 INJECTION INTRAMUSCULAR; INTRAVENOUS ONCE AS NEEDED
Status: COMPLETED | OUTPATIENT
Start: 2020-01-14 | End: 2020-01-14

## 2020-01-14 RX ORDER — FENTANYL CITRATE 50 UG/ML
25 INJECTION, SOLUTION INTRAMUSCULAR; INTRAVENOUS AS NEEDED
Status: DISCONTINUED | OUTPATIENT
Start: 2020-01-14 | End: 2020-01-14 | Stop reason: HOSPADM

## 2020-01-14 RX ORDER — LIDOCAINE HYDROCHLORIDE AND EPINEPHRINE 10; 10 MG/ML; UG/ML
INJECTION, SOLUTION INFILTRATION; PERINEURAL AS NEEDED
Status: DISCONTINUED | OUTPATIENT
Start: 2020-01-14 | End: 2020-01-14 | Stop reason: HOSPADM

## 2020-01-14 RX ORDER — IPRATROPIUM BROMIDE AND ALBUTEROL SULFATE 2.5; .5 MG/3ML; MG/3ML
3 SOLUTION RESPIRATORY (INHALATION) ONCE AS NEEDED
Status: COMPLETED | OUTPATIENT
Start: 2020-01-14 | End: 2020-01-14

## 2020-01-14 RX ORDER — SODIUM CHLORIDE 0.9 % (FLUSH) 0.9 %
3-10 SYRINGE (ML) INJECTION AS NEEDED
Status: DISCONTINUED | OUTPATIENT
Start: 2020-01-14 | End: 2020-01-14 | Stop reason: HOSPADM

## 2020-01-14 RX ORDER — COCAINE HYDROCHLORIDE 40 MG/ML
SOLUTION NASAL
Status: DISCONTINUED
Start: 2020-01-14 | End: 2020-01-14 | Stop reason: HOSPADM

## 2020-01-14 RX ADMIN — ROCURONIUM BROMIDE 5 MG: 10 INJECTION INTRAVENOUS at 08:03

## 2020-01-14 RX ADMIN — DEXAMETHASONE SODIUM PHOSPHATE 4 MG: 4 INJECTION, SOLUTION INTRAMUSCULAR; INTRAVENOUS at 07:19

## 2020-01-14 RX ADMIN — VASOPRESSIN 1 ML: 20 INJECTION INTRAVENOUS at 08:09

## 2020-01-14 RX ADMIN — IPRATROPIUM BROMIDE AND ALBUTEROL SULFATE 3 ML: 2.5; .5 SOLUTION RESPIRATORY (INHALATION) at 10:28

## 2020-01-14 RX ADMIN — Medication 2 SPRAY: at 07:34

## 2020-01-14 RX ADMIN — LIDOCAINE HYDROCHLORIDE 1 EACH: 40 SOLUTION TOPICAL at 08:03

## 2020-01-14 RX ADMIN — FENTANYL CITRATE 50 MCG: 50 INJECTION, SOLUTION INTRAMUSCULAR; INTRAVENOUS at 08:03

## 2020-01-14 RX ADMIN — FENTANYL CITRATE 50 MCG: 50 INJECTION, SOLUTION INTRAMUSCULAR; INTRAVENOUS at 09:04

## 2020-01-14 RX ADMIN — SODIUM CHLORIDE, POTASSIUM CHLORIDE, SODIUM LACTATE AND CALCIUM CHLORIDE 1000 ML: 600; 310; 30; 20 INJECTION, SOLUTION INTRAVENOUS at 06:15

## 2020-01-14 RX ADMIN — SUCCINYLCHOLINE CHLORIDE 100 MG: 20 INJECTION, SOLUTION INTRAMUSCULAR; INTRAVENOUS at 08:03

## 2020-01-14 RX ADMIN — ONDANSETRON HYDROCHLORIDE 4 MG: 2 SOLUTION INTRAMUSCULAR; INTRAVENOUS at 09:56

## 2020-01-14 RX ADMIN — Medication 2 SPRAY: at 07:24

## 2020-01-14 RX ADMIN — LIDOCAINE HYDROCHLORIDE 1 MG: 20 INJECTION, SOLUTION INFILTRATION; PERINEURAL at 08:03

## 2020-01-14 RX ADMIN — ACETAMINOPHEN 1000 MG: 500 TABLET, FILM COATED ORAL at 07:18

## 2020-01-14 RX ADMIN — IPRATROPIUM BROMIDE AND ALBUTEROL SULFATE 3 ML: 2.5; .5 SOLUTION RESPIRATORY (INHALATION) at 07:26

## 2020-01-14 RX ADMIN — PROPOFOL 125 MG: 10 INJECTION, EMULSION INTRAVENOUS at 08:03

## 2020-01-14 RX ADMIN — Medication 2 SPRAY: at 07:29

## 2020-01-14 RX ADMIN — HYDROCODONE BITARTRATE AND ACETAMINOPHEN 1 TABLET: 7.5; 325 TABLET ORAL at 11:33

## 2020-01-14 NOTE — NURSING NOTE
Spoke w/Dr Abad regarding pt's c/o pain but her O2 sat running 88-91% Dr Abad stated once pt's O2 sat reaches and is maintained at 90% or above and meets criteria to be discharged from PACU pt can go to [facility]. As far as pain medication with pt's low O2 sat, even through pt states that is her normal O2 level, and pt continue to fall asleep easily it is unsafe to give pt any pain medication at this time  This was explained to pt

## 2020-01-14 NOTE — H&P
Chief Complaint   Patient presents with   • Follow-up       Nasal Cavity Mass / Nasal obstruction            Subjective      History of Present Illness:  Robyn Minaya is a  59 y.o. female presents for surgical nasal debridement.    She is status post repair of nasal vestibular stenosis with  grafting, lateral crural batten grafts, nonanatomic alar batten grafts, septoplasty, conchal cartilage graft from left ear to the nose, and bilateral inferior turbinate Coblation.  She was doing well until approximately 1 year postoperatively, when she developed nasal swelling and pain.  She is noted to have a large nasal septal perforation.  An autoimmune lab work-up was negative.  She was noted to have a nasal perforation with mass and bilateral chronic maxillary sinusitis.  She was taken the operating room on April 16, 2019 for bilateral maxillary enterostomy, nasal endoscopy with biopsy, and nasal septal prosthesis placement.  She was taken the operating room on July 9, 2019 for bilateral upper blepharoplasty and removal of nasal septal button.   When seen on November 6, 2019, she was noted to have another large mass in the posterior aspect of the nasal septum.       She reports moderate, bilateral nasal congestion that has been present for many months.  Nothing has made this better, nor worse.  She reports associated crusting of the bilateral nasal cavities that gets better with mupirocin.     Past Medical History:   Diagnosis Date   • Acid reflux     RESOLVED PT PT   • Allergic rhinitis    • Arthritis     BACK   • Bipolar 1 disorder (CMS/HCC)    • Chronic back pain    • Degenerative arthritis    • Depression    • Deviated nasal septum    • Eczema    • Gout    • History of colon polyps    • Hyperlipidemia    • Hypertension    • Hypertrophy of nasal turbinates    • Hypokalemia    • Hypothyroidism    • Incontinence in female    • Insomnia    • Menopause    • Nasal septal deviation    • Nasal valve stenosis    •  Overactive bladder    • Plantar fasciitis    • RLS (restless legs syndrome)    • Sleep apnea     cpap   • Wears glasses        Past Surgical History:   Procedure Laterality Date   • BLEPHAROPLASTY Bilateral 7/9/2019    Procedure: 1) bilateral upper blepharoplasty with excision of excess of tissue weighing down 2) nasal endoscopy with removal of nasal septal prosthesis;  Surgeon: Mark Anthony Anderson MD;  Location: [facility] OR;  Service: ENT   • CARPAL TUNNEL RELEASE Bilateral 2004   • CHOLECYSTECTOMY  2013   • COLONOSCOPY  10/17/2014    One 5-6mm tubular adenomatous polyp in the ascending colon; Two less than 4mm hyperplastic polyps in the sigmoid colon; Three rectal hyperplastic polyps; Diverticulosis; Repeat 5 years    • COLONOSCOPY  07/14/2010    Internal hemorrhoids; Repeat 7 years    • COLONOSCOPY N/A 10/23/2019    Procedure: COLONOSCOPY WITH ANESTHESIA;  Surgeon: Robyn Frazier MD;  Location:  [facility];  Service: Gastroenterology   • CYST REMOVAL  2016    neck   • ENDOSCOPIC FUNCTIONAL SINUS SURGERY (FESS) Bilateral 4/16/2019    Procedure: ENDOSCOPIC FUNCTIONAL SINUS SURGERY (bilareral maxillary antrostomy without image guidance);  Surgeon: Mark Anthony Anderson MD;  Location: [facility] OR;  Service: ENT   • ENDOSCOPY N/A 4/11/2019    Esophageal mucosal changes suggestive of eosinophilic esophagitis-biopsied; A few gastric polyps-resected and retrieved-biopsied; Normal examined duodenum-biopsied   • FOOT SURGERY Right 2010    X3   • HERNIA REPAIR     • INTERSTIM PLACEMENT N/A 6/5/2019    Procedure: INTERSTIM STAGES 1 AND 2 LEAD AND GENERATOR PLACEMENT;  Surgeon: Endy Guerrero MD;  Location: [facility] OR;  Service: Urology   • LASER ABLATION      right back   • NASAL ENDOSCOPY N/A 4/16/2019    Procedure:     1. Functional endoscopic sinus surgery with bilateral maxillary antrostomy    2. Bilateral nasal endoscopy with biopsy of nasal septum    3.  Nasal septal prosthesis placement  ;  Surgeon: Mark Anthony Anderson  MD;  Location:  [facility] OR;  Service: ENT   • NASAL VESTIBULE LESION/PAPILLOMA EXCISION N/A 8/1/2017    Procedure: Repair of nasal vestibular stenosis and septoplasty; cartilage graft from left ear;  Surgeon: Mark Anthony Anderson MD;  Location:  [facility] OR;  Service:    • SEPTOPLASTY N/A 4/16/2019    Procedure: PLACEMENT OF NASAL SEPTAL PROSTHESIS;  Surgeon: Mark Anthony Anderson MD;  Location:  [facility] OR;  Service: ENT       Family History   Problem Relation Age of Onset   • Cancer Mother    • Diabetes Father    • Hypertension Father    • Cancer Father    • Colon cancer Neg Hx    • Colon polyps Neg Hx    • Esophageal cancer Neg Hx    • Liver cancer Neg Hx    • Liver disease Neg Hx    • Rectal cancer Neg Hx    • Stomach cancer Neg Hx        Social History     Socioeconomic History   • Marital status:      Spouse name: Not on file   • Number of children: Not on file   • Years of education: Not on file   • Highest education level: Not on file   Tobacco Use   • Smoking status: Current Every Day Smoker     Packs/day: 1.00     Years: 40.00     Pack years: 40.00     Types: Cigarettes   • Smokeless tobacco: Never Used   Substance and Sexual Activity   • Alcohol use: Yes     Frequency: Monthly or less     Comment: Rarely-maybe 3x/year   • Drug use: No   • Sexual activity: Defer       Allergies   Allergen Reactions   • Hctz [Hydrochlorothiazide] Other (See Comments)     Acid reflux   • Januvia [Sitagliptin] Rash         Current Facility-Administered Medications:   •  buffered lidocaine syringe 0.5 mL, 0.5 mL, Intradermal, Once PRN, Mark Anthony Anderson MD  •  lactated ringers infusion 1,000 mL, 1,000 mL, Intravenous, Continuous, Mark Anthony Anderson MD, Last Rate: 25 mL/hr at 01/14/20 0615, 1,000 mL at 01/14/20 0615  •  lactated ringers infusion, 100 mL/hr, Intravenous, Continuous, Kathie Abad MD  •  lidocaine PF 1% (XYLOCAINE) injection 0.5 mL, 0.5 mL, Injection, Once PRN, Kathie Abad MD  •  oxymetazoline (AFRIN)  nasal spray 2 spray, 2 spray, Each Nare, Q5 Min, Mark Anthony Anderson MD, 2 spray at 01/14/20 0729  •  sodium chloride 0.9 % flush 3 mL, 3 mL, Intravenous, PRN, Mark Anthony Anderson MD  •  sodium chloride 0.9 % flush 3 mL, 3 mL, Intravenous, Q12H, Kathie Abad MD  •  sodium chloride 0.9 % flush 3-10 mL, 3-10 mL, Intravenous, PRN, Kathie Abad MD     Physical Exam:  Physical Exam   /59 (Patient Position: Sitting)   Pulse 76   Temp 98.3 °F (36.8 °C) (Temporal)   Resp 18   SpO2 (!) 89% Comment: Dr Abad notified  Breastfeeding No    Constitutional: She is oriented to person, place, and time. She appears well-developed and well-nourished. She is cooperative. No distress.   HENT:   Head: Normocephalic and atraumatic.   Right Ear: External ear normal.   Left Ear: External ear normal.   Nose: Nose normal.       Intranasally, there is a large nasal septal perforation.   Eyes: Pupils are equal, round, and reactive to light. Conjunctivae and EOM are normal. Right eye exhibits no discharge. Left eye exhibits no discharge. No scleral icterus.   Neck: Normal range of motion. Neck supple. No JVD present. No tracheal deviation present. No thyromegaly present.   Pulmonary/Chest: Effort normal. No stridor.   Musculoskeletal: Normal range of motion. She exhibits no edema or deformity.   Lymphadenopathy:     She has no cervical adenopathy.   Neurological: She is alert and oriented to person, place, and time. She has normal strength. No cranial nerve deficit. Coordination normal.   Skin: Skin is warm and dry. No rash noted. She is not diaphoretic. No erythema. No pallor.   Psychiatric: She has a normal mood and affect. Her speech is normal and behavior is normal. Judgment and thought content normal. Cognition and memory are normal.   Nursing note and vitals reviewed.           Assessment      Assessment:  1. Nasal cavity mass    2. Abnormal nasal septum    3. Nasal septal perforation    4. Tobacco abuse  disorder             Plan      Plan:  I have recommended going to the operating room for excision of the scar tissue at the posterior aspect of the septum, which may be contributing to nasal obstruction.  Anticipate placing Silastic stents bilaterally to help prevent re-adherence of the synechia to the lateral nasal walls.  We would send this for pathological diagnosis, again.    RBAC discussed.     My findings and recommendations were discussed and questions were answered.      Mark Anthony Anderson MD

## 2020-01-14 NOTE — OP NOTE
PATIENT NAME:  Robyn Minaya     DATE:  01/14/20    PREOPERATIVE DIAGNOSIS:  1) nasal septal mass    2) nasal septal perforation    POSTOPERATIVE DIAGNOSIS:   1) nasal septal mass    2) nasal septal perforation    PROCEDURE:  1) nasal endoscopy with biopsy and debridement    2) nasal endoscopy with placement of custom nasal septal prosthesis    SURGEON:  Mark Anthony Anderson MD, FACS    FACILITY: [facility] Operating Room    ANESTHESIA: General endotracheal    DICTATED BY:  Mark Anthony Anderson MD, FACS    IVF: Per anesthesia    EBL: 25 cc    IMPLANTS: Custom Silastic nasal septal prosthesis    DRAINS: None    SPECIMENS: Nasal septal mass    COMPLICATIONS: None    INDICATIONS FOR SURGERY: The patient presented with a large nasal septal perforation and posterior septal mass.  Biopsies in the past have revealed no evidence of carcinoma.  The mass was consistent with retracted mucosa, but occluding the posterior nasal cavity.    OPERATIVE FINDINGS: There is a large nasal septal perforation.  The posterior mucosa was retracted, resulting in the nasal obstruction.    OPERATIVE DETAILS: After patient verification and consent was reviewed, the patient was taken to the operating room and laid supine on the operative table.  A formal timeout procedure was performed after the induction of general endotracheal anesthesia.  The nasal septum was infiltrated with 2 cc of 1% lidocaine with 1-100,000 epinephrine.  Pledgets soaked in cocaine were placed intranasally.    The 0 degree rigid nasal endoscope was passed into the right and left nasal cavities, where photographs were taken of the large septal perforation and posterior septal mass.  Palpation utilizing the freer elevator demonstrated the mass was soft and consistent with retracted mucosa.  There was some slight deflection of the nasal septum towards the right.  The microdebrider was used to remove the retracted mucosa the posterior aspect, as this was deemed not  usable for any septal reconstruction.  This was sent for permanent section pathology.    I then measured the septal perforation at 2 cm.  I created a bivalved Silastic stent to protect the debrided mucosa from the turbinate mucosa.  This was split in half, sewed together utilizing 2 silk sutures in horizontal mattress fashion in order to allow bivalving without lateralization.    I then placed this in the posterior aspect of the perforation.  I sewed this to the residual septum utilizing two 3-0 silk sutures.  The grafts were taken.  The nose was irrigated with saline.  Mupirocin ointment was placed.       DISPOSITION:  The procedures were completed without complication and tolerated well.  The patient was released in the company of her  to return home in satisfactory condition.  A follow-up appointment will be scheduled, routine post-op medications prescribed (if required), and post-op instructions were given to the responsible party.           Mark Anthony Anderson MD, FACS  Board Certified Facial Plastic and Reconstructive Surgery  Board Certified Otolaryngology -- Head and Neck Surgery  01/14/20  9:28 AM

## 2020-01-14 NOTE — ANESTHESIA PROCEDURE NOTES
Airway  Urgency: elective    Date/Time: 1/14/2020 8:05 AM  Airway not difficult    General Information and Staff    Patient location during procedure: OR  CRNA: Endy Brand CRNA    Indications and Patient Condition  Indications for airway management: airway protection    Preoxygenated: yes  MILS maintained throughout  Mask difficulty assessment: 1 - vent by mask    Final Airway Details  Final airway type: endotracheal airway      Successful airway: ETT  Cuffed: yes   Successful intubation technique: direct laryngoscopy  Endotracheal tube insertion site: oral  Blade: Oliver  Blade size: 4  ETT size (mm): 7.5  Cormack-Lehane Classification: grade I - full view of glottis  Placement verified by: chest auscultation and capnometry   Cuff volume (mL): 5  Measured from: lips  ETT/EBT  to lips (cm): 22  Number of attempts at approach: 1  Assessment: lips, teeth, and gum same as pre-op and atraumatic intubation

## 2020-01-14 NOTE — ANESTHESIA PREPROCEDURE EVALUATION
Anesthesia Evaluation     Patient summary reviewed   no history of anesthetic complications:  NPO Solid Status: > 8 hours  NPO Liquid Status: > 2 hours           Airway   Mallampati: II  TM distance: >3 FB  Neck ROM: full  Dental    (+) upper dentures and lower dentures    Pulmonary    (+) a smoker Current Smoked day of surgery, sleep apnea on CPAP,   (-) asthma  Cardiovascular   Exercise tolerance: good (4-7 METS)    Patient on routine beta blocker and Beta blocker given within 24 hours of surgery    (+) hypertension, hyperlipidemia,   (-) past MI, CAD, cardiac stents      Neuro/Psych  (+) psychiatric history Bipolar,     (-) seizures, TIA, CVA  GI/Hepatic/Renal/Endo    (+) obesity,  GERD,  thyroid problem hypothyroidism  (-) liver disease, no renal disease, diabetes    Musculoskeletal     Abdominal    Substance History      OB/GYN          Other   arthritis,                    Anesthesia Plan    ASA 2     general   (Patient falling asleep mid sentence, although able to provide accurate timing and history of medications as medical history. Will avoid versed preop)  intravenous induction     Anesthetic plan, all risks, benefits, and alternatives have been provided, discussed and informed consent has been obtained with: patient.

## 2020-01-14 NOTE — DISCHARGE INSTRUCTIONS

## 2020-01-14 NOTE — ANESTHESIA POSTPROCEDURE EVALUATION
Patient: Robyn Minaya    Procedure Summary     Date:  01/14/20 Room / Location:   [facility] /  [facility]    Anesthesia Start:  0800 Anesthesia Stop:  0932    Procedure:  Nasal endoscopy with septal debridement, and placement of Silastic stents (Bilateral Nose) Diagnosis:       Nasal cavity mass      Abnormal nasal septum      Nasal septal perforation      (Nasal cavity mass [J34.89])      (Abnormal nasal septum [Q30.9])      (Nasal septal perforation [J34.89])    Surgeon:  Mark Anthony Anderson MD Provider:  Endy Brand CRNA    Anesthesia Type:  general ASA Status:  2          Anesthesia Type: general    Vitals  Vitals Value Taken Time   /70 1/14/2020 10:48 AM   Temp 97.8 °F (36.6 °C) 1/14/2020 10:43 AM   Pulse 80 1/14/2020 10:51 AM   Resp 16 1/14/2020 10:48 AM   SpO2 92 % 1/14/2020 10:51 AM   Vitals shown include unvalidated device data.        Post Anesthesia Care and Evaluation    Patient location during evaluation: PACU  Patient participation: complete - patient participated  Level of consciousness: awake and alert  Pain management: adequate  Airway patency: patent  Anesthetic complications: No anesthetic complications  PONV Status: none  Cardiovascular status: acceptable and hemodynamically stable  Respiratory status: acceptable  Hydration status: acceptable    Comments: Blood pressure 121/76, pulse 83, temperature 97.8 °F (36.6 °C), temperature source Temporal, resp. rate 20, SpO2 90 %, not currently breastfeeding.    Patient continues to drift to low 90s when asleep. Although reporting pain to bedside RN, the patient can not remain awake enough to be given pain medication in setting of low 02 saturation when sleeping.    Patient discharged from PACU based upon Sherman score. Please see RN notes for further details

## 2020-01-15 LAB
CYTO UR: NORMAL
LAB AP CASE REPORT: NORMAL
PATH REPORT.FINAL DX SPEC: NORMAL
PATH REPORT.GROSS SPEC: NORMAL

## 2020-01-24 ENCOUNTER — TELEPHONE (OUTPATIENT)
Dept: FAMILY MEDICINE CLINIC | Age: 60
End: 2020-01-24

## 2020-01-24 ENCOUNTER — OFFICE VISIT (OUTPATIENT)
Dept: OTOLARYNGOLOGY | Facility: CLINIC | Age: 60
End: 2020-01-24

## 2020-01-24 VITALS
HEART RATE: 65 BPM | SYSTOLIC BLOOD PRESSURE: 126 MMHG | HEIGHT: 60 IN | TEMPERATURE: 98 F | DIASTOLIC BLOOD PRESSURE: 77 MMHG | RESPIRATION RATE: 20 BRPM | WEIGHT: 192 LBS | BODY MASS INDEX: 37.69 KG/M2

## 2020-01-24 DIAGNOSIS — J34.89 NASAL VALVE STENOSIS: ICD-10-CM

## 2020-01-24 DIAGNOSIS — R76.8 ANA POSITIVE: ICD-10-CM

## 2020-01-24 DIAGNOSIS — Q30.9 ABNORMAL NASAL SEPTUM: Primary | ICD-10-CM

## 2020-01-24 DIAGNOSIS — Z72.0 TOBACCO ABUSE DISORDER: ICD-10-CM

## 2020-01-24 DIAGNOSIS — J34.3 HYPERTROPHY OF BOTH INFERIOR NASAL TURBINATES: ICD-10-CM

## 2020-01-24 DIAGNOSIS — J34.89 NASAL SEPTAL PERFORATION: ICD-10-CM

## 2020-01-24 PROCEDURE — 99024 POSTOP FOLLOW-UP VISIT: CPT | Performed by: OTOLARYNGOLOGY

## 2020-01-24 RX ORDER — ATORVASTATIN CALCIUM 40 MG/1
TABLET, FILM COATED ORAL
Qty: 90 TABLET | Refills: 3 | Status: SHIPPED | OUTPATIENT
Start: 2020-01-24 | End: 2020-10-21

## 2020-01-24 NOTE — PROGRESS NOTES
"CC/Reason for visit: Robyn Minaya returns to the office following nasal endoscopy with biopsy and debridement and placement of custom nasal septal prosthesis on January 14, 2020.    SUBJECTIVE:  She reports recurrence of her nasal congestion and obstruction.    In the postoperative area, while emerging from anesthesia, she was blowing her nose quite vigorously.    OBJECTIVE:  /77   Pulse 65   Temp 98 °F (36.7 °C)   Resp 20   Ht 152.4 cm (60\")   Wt 87.1 kg (192 lb)   BMI 37.50 kg/m²   Intranasally, the dental's have been displaced to the right nasal cavity.  These were removed.  There is still a large perforation with some crusting on the periphery.  Pathology:      ASSESSMENT:  Robyn was seen today for post-op.    Diagnoses and all orders for this visit:    Abnormal nasal septum    Nasal septal perforation    Tobacco abuse disorder    Nasal valve stenosis    Hypertrophy of both inferior nasal turbinates    ZEKE positive        PLAN:   Continue nasal saline sprays and irrigation and mupirocin.  Follow-up approximate 6 weeks.    Mark Anthony Anderson MD   01/24/2020  8:47 AM    "

## 2020-01-24 NOTE — PROGRESS NOTES
PRIMARY CARE PROVIDER: Tena Hough APRN  REFERRING PROVIDER: No ref. provider found    Chief Complaint   Patient presents with   • Post-op     Nasal Septal mass exc       Subjective   History of Present Illness:  Robyn Minaya is a  60 y.o. female who returns to the office following nasal endoscopy with biopsy and debridement and placement of custom nasal septal prosthesis on January 14, 2020.  She reports recurrence of her nasal obstruction.  She has not been using her CPAP.  Her nasal obstruction is moderate.  This is constant.  This is been present for the last week.  Nothing is made this better, nor worse.    Review of Systems:  Review of Systems   Constitutional: Negative for chills, fever and unexpected weight change.   HENT: Positive for congestion. Negative for nosebleeds.    Eyes: Negative for visual disturbance.   Musculoskeletal: Positive for gait problem.   Skin: Negative for wound.   Hematological: Negative for adenopathy.       Past History:  Past Medical History:   Diagnosis Date   • Acid reflux     RESOLVED PT PT   • Allergic rhinitis    • Arthritis     BACK   • Bipolar 1 disorder (CMS/HCC)    • Chronic back pain    • Degenerative arthritis    • Depression    • Deviated nasal septum    • Eczema    • Gout    • History of colon polyps    • Hyperlipidemia    • Hypertension    • Hypertrophy of nasal turbinates    • Hypokalemia    • Hypothyroidism    • Incontinence in female    • Insomnia    • Menopause    • Nasal septal deviation    • Nasal valve stenosis    • Overactive bladder    • Plantar fasciitis    • RLS (restless legs syndrome)    • Sleep apnea     cpap   • Wears glasses      Past Surgical History:   Procedure Laterality Date   • BLEPHAROPLASTY Bilateral 7/9/2019    Procedure: 1) bilateral upper blepharoplasty with excision of excess of tissue weighing down 2) nasal endoscopy with removal of nasal septal prosthesis;  Surgeon: Mark Anthony Anderson MD;  Location: [facility] OR;  Service: ENT   •  CARPAL TUNNEL RELEASE Bilateral 2004   • CHOLECYSTECTOMY  2013   • COLONOSCOPY  10/17/2014    One 5-6mm tubular adenomatous polyp in the ascending colon; Two less than 4mm hyperplastic polyps in the sigmoid colon; Three rectal hyperplastic polyps; Diverticulosis; Repeat 5 years    • COLONOSCOPY  07/14/2010    Internal hemorrhoids; Repeat 7 years    • COLONOSCOPY N/A 10/23/2019    Procedure: COLONOSCOPY WITH ANESTHESIA;  Surgeon: Robyn Frazier MD;  Location: [facility] ENDOSCOPY;  Service: Gastroenterology   • CYST REMOVAL  2016    neck   • ENDOSCOPIC FUNCTIONAL SINUS SURGERY (FESS) Bilateral 4/16/2019    Procedure: ENDOSCOPIC FUNCTIONAL SINUS SURGERY (bilareral maxillary antrostomy without image guidance);  Surgeon: Mark Anthony Anderson MD;  Location: [facility] OR;  Service: ENT   • ENDOSCOPY N/A 4/11/2019    Esophageal mucosal changes suggestive of eosinophilic esophagitis-biopsied; A few gastric polyps-resected and retrieved-biopsied; Normal examined duodenum-biopsied   • FOOT SURGERY Right 2010    X3   • HERNIA REPAIR     • INTERSTIM PLACEMENT N/A 6/5/2019    Procedure: INTERSTIM STAGES 1 AND 2 LEAD AND GENERATOR PLACEMENT;  Surgeon: Endy Guerrero MD;  Location: [facility] OR;  Service: Urology   • LASER ABLATION      right back   • NASAL ENDOSCOPY N/A 4/16/2019    Procedure:     1. Functional endoscopic sinus surgery with bilateral maxillary antrostomy    2. Bilateral nasal endoscopy with biopsy of nasal septum    3.  Nasal septal prosthesis placement  ;  Surgeon: Mark Anthony Anderson MD;  Location: [facility] OR;  Service: ENT   • NASAL VESTIBULE LESION/PAPILLOMA EXCISION N/A 8/1/2017    Procedure: Repair of nasal vestibular stenosis and septoplasty; cartilage graft from left ear;  Surgeon: Mark Anthony Anderson MD;  Location: [facility] OR;  Service:    • SEPTOPLASTY N/A 4/16/2019    Procedure: PLACEMENT OF NASAL SEPTAL PROSTHESIS;  Surgeon: Mark Anthony Anderson MD;  Location: [facility] OR;  Service: ENT   • SEPTOPLASTY Bilateral 1/14/2020      Procedure: Nasal endoscopy with septal debridement, and placement of Silastic stents;  Surgeon: Mark Anthony Anderson MD;  Location: [facility] OR;  Service: ENT     Family History   Problem Relation Age of Onset   • Cancer Mother    • Diabetes Father    • Hypertension Father    • Cancer Father    • Colon cancer Neg Hx    • Colon polyps Neg Hx    • Esophageal cancer Neg Hx    • Liver cancer Neg Hx    • Liver disease Neg Hx    • Rectal cancer Neg Hx    • Stomach cancer Neg Hx      Social History     Tobacco Use   • Smoking status: Current Every Day Smoker     Packs/day: 1.00     Years: 40.00     Pack years: 40.00     Types: Cigarettes   • Smokeless tobacco: Never Used   Substance Use Topics   • Alcohol use: Yes     Frequency: Monthly or less     Comment: Rarely-maybe 3x/year   • Drug use: No     Allergies:  Hctz [hydrochlorothiazide] and Januvia [sitagliptin]    Current Outpatient Medications:   •  albuterol (PROVENTIL) (2.5 MG/3ML) 0.083% nebulizer solution, As Needed., Disp: , Rfl: 3  •  allopurinol (ZYLOPRIM) 100 MG tablet, Take 100 mg by mouth Daily., Disp: , Rfl:   •  ALPRAZolam (XANAX) 0.5 MG tablet, Take 0.5 mg by mouth As Needed., Disp: , Rfl:   •  atorvastatin (LIPITOR) 40 MG tablet, Take 40 mg by mouth Every Night., Disp: , Rfl:   •  bumetanide (BUMEX) 1 MG tablet, Take 1 mg by mouth 2 (Two) Times a Week., Disp: , Rfl:   •  calcium carbonate (OS-KAYLEY) 600 MG tablet, Take 600 mg by mouth Daily., Disp: , Rfl:   •  carvedilol (COREG) 6.25 MG tablet, Take 6.25 mg by mouth 2 (Two) Times a Day With Meals., Disp: , Rfl:   •  cholecalciferol (VITAMIN D3) 1000 UNITS tablet, Take 1,000 Units by mouth Daily., Disp: , Rfl:   •  DICLOFENAC PO, Take 75 mg by mouth 2 (Two) Times a Day., Disp: , Rfl:   •  divalproex (DEPAKOTE) 500 MG 24 hr tablet, Take 1,000 mg by mouth Every Night., Disp: , Rfl:   •  DULoxetine (CYMBALTA) 60 MG capsule, Take 60 mg by mouth 2 (Two) Times a Day., Disp: , Rfl:   •  guaiFENesin (MUCINEX) 600 MG 12  hr tablet, Take 1,200 mg by mouth 2 (Two) Times a Day., Disp: , Rfl:   •  HYDROcodone-acetaminophen (NORCO) 7.5-325 MG per tablet, Take 1 tablet by mouth Every 4 (Four) Hours As Needed for Moderate Pain  (Pain)., Disp: 12 tablet, Rfl: 0  •  levothyroxine (SYNTHROID, LEVOTHROID) 125 MCG tablet, Take 125 mcg by mouth Daily., Disp: , Rfl:   •  Loperamide HCl (IMODIUM PO), Take 2 mg by mouth As Needed., Disp: , Rfl:   •  methylcellulose, Laxative, (EQ FIBER THERAPY) 500 MG tablet tablet, Take 1 tablet by mouth Daily., Disp: , Rfl:   •  Multiple Vitamin (MULTI VITAMIN PO), Take 1 dose by mouth Daily., Disp: , Rfl:   •  mupirocin (BACTROBAN) 2 % ointment, , Disp: , Rfl: 0  •  niacin (SLO-NIACIN) 500 MG CR tablet, Take 500 mg by mouth Daily., Disp: , Rfl:   •  omeprazole (priLOSEC) 20 MG capsule, Take 20 mg by mouth Daily., Disp: , Rfl:   •  pilocarpine (SALAGEN) 5 MG tablet, Take 5 mg by mouth 3 (Three) Times a Day., Disp: , Rfl:   •  pramipexole (MIRAPEX) 1.5 MG tablet, Take 1.5 mg by mouth Every Night., Disp: , Rfl:   •  QUEtiapine (SEROquel) 50 MG tablet, Take 50 mg by mouth Every Night., Disp: , Rfl:   •  spironolactone (ALDACTONE) 25 MG tablet, Take 25 mg by mouth 2 (Two) Times a Day., Disp: , Rfl:   •  tiZANidine (ZANAFLEX) 4 MG tablet, Take 4 mg by mouth Every Night., Disp: , Rfl:   •  traMADol (ULTRAM) 50 MG tablet, Take 50 mg by mouth 2 (Two) Times a Day., Disp: , Rfl:   •  mupirocin (BACTROBAN) 2 % ointment, Apply  topically to the appropriate area as directed 3 (Three) Times a Day for 7 days., Disp: 22 g, Rfl: 0      Objective     Vital Signs:  Temp:  [98 °F (36.7 °C)] 98 °F (36.7 °C)  Heart Rate:  [65] 65  Resp:  [20] 20  BP: (126)/(77) 126/77    Physical Exam:  Physical Exam   Constitutional: She is oriented to person, place, and time. She appears well-developed and well-nourished. She is cooperative. No distress.   HENT:   Head: Normocephalic and atraumatic.   Right Ear: External ear normal.   Left Ear:  External ear normal.   Nose: Nose normal.   Intranasally, the prosthesis is deflected into the patient's right nasal cavity.  This was removed with an alligator forceps after clipping the residual suture superiorly.  The septum demonstrates a large septal perforation with some crusting on the periphery.   Eyes: Pupils are equal, round, and reactive to light. Conjunctivae and EOM are normal. Right eye exhibits no discharge. Left eye exhibits no discharge. No scleral icterus.   Neck: Normal range of motion. Neck supple. No JVD present. No tracheal deviation present. No thyromegaly present.   Pulmonary/Chest: Effort normal. No stridor.   Musculoskeletal: Normal range of motion. She exhibits no edema or deformity.   Lymphadenopathy:     She has no cervical adenopathy.   Neurological: She is alert and oriented to person, place, and time. She has normal strength. No cranial nerve deficit. Coordination normal.   Skin: Skin is warm and dry. No rash noted. She is not diaphoretic. No erythema. No pallor.   Psychiatric: She has a normal mood and affect. Her speech is normal and behavior is normal. Judgment and thought content normal. Cognition and memory are normal.   Nursing note and vitals reviewed.      Results Review:       Assessment   Assessment:  1. Abnormal nasal septum    2. Nasal septal perforation    3. Tobacco abuse disorder    4. Nasal valve stenosis    5. Hypertrophy of both inferior nasal turbinates    6. ZEKE positive        Plan   Plan:    Continue nasal saline sprays and irrigations.  Continue mupirocin.  Prescription has been sent.  Follow-up in approximately 6 weeks.  You may use your CPAP.    New Medications Ordered This Visit   Medications   • mupirocin (BACTROBAN) 2 % ointment     Sig: Apply  topically to the appropriate area as directed 3 (Three) Times a Day for 7 days.     Dispense:  22 g     Refill:  0       Return in about 6 weeks (around 3/6/2020).    My findings and recommendations were discussed  and questions were answered.     Mark Anthony Anderson MD  01/24/20  11:35 AM

## 2020-02-10 ENCOUNTER — TELEPHONE (OUTPATIENT)
Dept: FAMILY MEDICINE CLINIC | Age: 60
End: 2020-02-10

## 2020-02-10 NOTE — TELEPHONE ENCOUNTER
Patient needs script for CPAP mask sent in. She did not say where she wanted it sent so I have left a voice mail requesting she call back.

## 2020-02-11 ENCOUNTER — TELEPHONE (OUTPATIENT)
Dept: FAMILY MEDICINE CLINIC | Age: 60
End: 2020-02-11

## 2020-03-09 ENCOUNTER — OFFICE VISIT (OUTPATIENT)
Dept: OTOLARYNGOLOGY | Facility: CLINIC | Age: 60
End: 2020-03-09

## 2020-03-09 VITALS
WEIGHT: 192 LBS | RESPIRATION RATE: 20 BRPM | HEART RATE: 80 BPM | TEMPERATURE: 98 F | SYSTOLIC BLOOD PRESSURE: 120 MMHG | HEIGHT: 60 IN | BODY MASS INDEX: 37.69 KG/M2 | DIASTOLIC BLOOD PRESSURE: 68 MMHG

## 2020-03-09 DIAGNOSIS — J34.89 NASAL SEPTAL PERFORATION: Primary | ICD-10-CM

## 2020-03-09 PROCEDURE — 31237 NSL/SINS NDSC SURG BX POLYPC: CPT | Performed by: OTOLARYNGOLOGY

## 2020-03-09 PROCEDURE — 99213 OFFICE O/P EST LOW 20 MIN: CPT | Performed by: OTOLARYNGOLOGY

## 2020-03-09 RX ORDER — NEBULIZER ACCESSORIES
1 KIT MISCELLANEOUS
COMMUNITY
Start: 2019-08-28 | End: 2020-10-22

## 2020-03-09 NOTE — PROGRESS NOTES
Procedure Note    Pre-operative Diagnosis: Nasal septal perforation with crusting    Post-operative Diagnosis: same    Procedure Type:  Nasal Endoscopy with debridement    Anesthesia: none    Procedure Details:    After topical anesthesia and decongestion, the patient was placed in the sitting position.  A rigid endoscope was passed into the right nasal cavity.  The large septal perforation persists.  There is significant dried crusting which was removed with 5 Rosario tip suctioning and bayonet's.  There appears to be erosion of the middle turbinates versus auto necrosis.  An identical procedure was performed on the left.    Condition:  Stable.  Patient tolerated procedure well.    Complications:  None

## 2020-03-09 NOTE — PROGRESS NOTES
PRIMARY CARE PROVIDER: Tena Hough APRN  REFERRING PROVIDER: No ref. provider found    Chief Complaint   Patient presents with   • Follow-up     Nasal Septum Mass exc       Subjective   History of Present Illness:  Robyn Minaya is a  60 y.o. female who returns to the office following nasal endoscopy with biopsy and debridement and placement of custom nasal septal prosthesis on January 14, 2020.  She reports recurrence of her nasal obstruction.  She has been using her CPAP.  Her nasal obstruction is moderate.  This is constant.  There is associated bilateral intranasal crusting.  Nothing has made this better, nor worse.    Review of Systems:  Review of Systems   Constitutional: Negative for chills, fever and unexpected weight change.   HENT: Positive for congestion. Negative for nosebleeds.    Eyes: Negative for visual disturbance.   Respiratory: Negative for cough.    Musculoskeletal: Positive for gait problem.   Skin: Negative for wound.   Hematological: Negative for adenopathy.       Past History:  Past Medical History:   Diagnosis Date   • Acid reflux     RESOLVED PT PT   • Allergic rhinitis    • Arthritis     BACK   • Bipolar 1 disorder (CMS/HCC)    • Chronic back pain    • Degenerative arthritis    • Depression    • Deviated nasal septum    • Eczema    • Gout    • History of colon polyps    • Hyperlipidemia    • Hypertension    • Hypertrophy of nasal turbinates    • Hypokalemia    • Hypothyroidism    • Incontinence in female    • Insomnia    • Menopause    • Nasal septal deviation    • Nasal valve stenosis    • Overactive bladder    • Plantar fasciitis    • RLS (restless legs syndrome)    • Sleep apnea     cpap   • Wears glasses      Past Surgical History:   Procedure Laterality Date   • BLEPHAROPLASTY Bilateral 7/9/2019    Procedure: 1) bilateral upper blepharoplasty with excision of excess of tissue weighing down 2) nasal endoscopy with removal of nasal septal prosthesis;  Surgeon: Mark Anthony Anderson,  MD;  Location: [facility] OR;  Service: ENT   • CARPAL TUNNEL RELEASE Bilateral 2004   • CHOLECYSTECTOMY  2013   • COLONOSCOPY  10/17/2014    One 5-6mm tubular adenomatous polyp in the ascending colon; Two less than 4mm hyperplastic polyps in the sigmoid colon; Three rectal hyperplastic polyps; Diverticulosis; Repeat 5 years    • COLONOSCOPY  07/14/2010    Internal hemorrhoids; Repeat 7 years    • COLONOSCOPY N/A 10/23/2019    Procedure: COLONOSCOPY WITH ANESTHESIA;  Surgeon: Robyn Frazier MD;  Location: [facility] ENDOSCOPY;  Service: Gastroenterology   • CYST REMOVAL  2016    neck   • ENDOSCOPIC FUNCTIONAL SINUS SURGERY (FESS) Bilateral 4/16/2019    Procedure: ENDOSCOPIC FUNCTIONAL SINUS SURGERY (bilareral maxillary antrostomy without image guidance);  Surgeon: Mark Anthony Anderson MD;  Location: [facility] OR;  Service: ENT   • ENDOSCOPY N/A 4/11/2019    Esophageal mucosal changes suggestive of eosinophilic esophagitis-biopsied; A few gastric polyps-resected and retrieved-biopsied; Normal examined duodenum-biopsied   • FOOT SURGERY Right 2010    X3   • HERNIA REPAIR     • INTERSTIM PLACEMENT N/A 6/5/2019    Procedure: INTERSTIM STAGES 1 AND 2 LEAD AND GENERATOR PLACEMENT;  Surgeon: Endy Guerreor MD;  Location: [facility] OR;  Service: Urology   • LASER ABLATION      right back   • NASAL ENDOSCOPY N/A 4/16/2019    Procedure:     1. Functional endoscopic sinus surgery with bilateral maxillary antrostomy    2. Bilateral nasal endoscopy with biopsy of nasal septum    3.  Nasal septal prosthesis placement  ;  Surgeon: Mark Anthony Anderson MD;  Location: [facility] OR;  Service: ENT   • NASAL VESTIBULE LESION/PAPILLOMA EXCISION N/A 8/1/2017    Procedure: Repair of nasal vestibular stenosis and septoplasty; cartilage graft from left ear;  Surgeon: Mark Anthony Anderson MD;  Location: [facility] OR;  Service:    • SEPTOPLASTY N/A 4/16/2019    Procedure: PLACEMENT OF NASAL SEPTAL PROSTHESIS;  Surgeon: Mark Anthony Anderson MD;  Location: [facility] OR;   Service: ENT   • SEPTOPLASTY Bilateral 1/14/2020    Procedure: Nasal endoscopy with septal debridement, and placement of Silastic stents;  Surgeon: Mark Anthony Anderson MD;  Location: [facility];  Service: ENT     Family History   Problem Relation Age of Onset   • Cancer Mother    • Diabetes Father    • Hypertension Father    • Cancer Father    • Colon cancer Neg Hx    • Colon polyps Neg Hx    • Esophageal cancer Neg Hx    • Liver cancer Neg Hx    • Liver disease Neg Hx    • Rectal cancer Neg Hx    • Stomach cancer Neg Hx      Social History     Tobacco Use   • Smoking status: Current Every Day Smoker     Packs/day: 1.00     Years: 40.00     Pack years: 40.00     Types: Cigarettes   • Smokeless tobacco: Never Used   Substance Use Topics   • Alcohol use: Yes     Frequency: Monthly or less     Comment: Rarely-maybe 3x/year   • Drug use: No     Allergies:  Hctz [hydrochlorothiazide] and Januvia [sitagliptin]    Current Outpatient Medications:   •  albuterol (PROVENTIL) (2.5 MG/3ML) 0.083% nebulizer solution, As Needed., Disp: , Rfl: 3  •  allopurinol (ZYLOPRIM) 100 MG tablet, Take 100 mg by mouth Daily., Disp: , Rfl:   •  ALPRAZolam (XANAX) 0.5 MG tablet, Take 0.5 mg by mouth As Needed., Disp: , Rfl:   •  atorvastatin (LIPITOR) 40 MG tablet, Take 40 mg by mouth Every Night., Disp: , Rfl:   •  bumetanide (BUMEX) 1 MG tablet, Take 1 mg by mouth 2 (Two) Times a Week., Disp: , Rfl:   •  calcium carbonate (OS-KAYLEY) 600 MG tablet, Take 600 mg by mouth Daily., Disp: , Rfl:   •  carvedilol (COREG) 6.25 MG tablet, Take 6.25 mg by mouth 2 (Two) Times a Day With Meals., Disp: , Rfl:   •  cholecalciferol (VITAMIN D3) 1000 UNITS tablet, Take 1,000 Units by mouth Daily., Disp: , Rfl:   •  DICLOFENAC PO, Take 75 mg by mouth 2 (Two) Times a Day., Disp: , Rfl:   •  divalproex (DEPAKOTE) 500 MG 24 hr tablet, Take 1,000 mg by mouth Every Night., Disp: , Rfl:   •  DULoxetine (CYMBALTA) 60 MG capsule, Take 60 mg by mouth 2 (Two) Times a Day.,  Disp: , Rfl:   •  guaiFENesin (MUCINEX) 600 MG 12 hr tablet, Take 1,200 mg by mouth 2 (Two) Times a Day., Disp: , Rfl:   •  levothyroxine (SYNTHROID, LEVOTHROID) 125 MCG tablet, Take 125 mcg by mouth Daily., Disp: , Rfl:   •  Loperamide HCl (IMODIUM PO), Take 2 mg by mouth As Needed., Disp: , Rfl:   •  methylcellulose, Laxative, (EQ FIBER THERAPY) 500 MG tablet tablet, Take 1 tablet by mouth Daily., Disp: , Rfl:   •  Multiple Vitamin (MULTI VITAMIN PO), Take 1 dose by mouth Daily., Disp: , Rfl:   •  mupirocin (BACTROBAN) 2 % ointment, , Disp: , Rfl: 0  •  niacin (SLO-NIACIN) 500 MG CR tablet, Take 500 mg by mouth Daily., Disp: , Rfl:   •  omeprazole (priLOSEC) 20 MG capsule, Take 20 mg by mouth Daily., Disp: , Rfl:   •  pilocarpine (SALAGEN) 5 MG tablet, Take 5 mg by mouth 3 (Three) Times a Day., Disp: , Rfl:   •  pramipexole (MIRAPEX) 1.5 MG tablet, Take 1.5 mg by mouth Every Night., Disp: , Rfl:   •  QUEtiapine (SEROquel) 50 MG tablet, Take 50 mg by mouth Every Night., Disp: , Rfl:   •  Respiratory Therapy Supplies (NEBULIZER/TUBING/MOUTHPIECE) kit, 1 kit., Disp: , Rfl:   •  spironolactone (ALDACTONE) 25 MG tablet, Take 25 mg by mouth 2 (Two) Times a Day., Disp: , Rfl:   •  tiZANidine (ZANAFLEX) 4 MG tablet, Take 4 mg by mouth Every Night., Disp: , Rfl:   •  traMADol (ULTRAM) 50 MG tablet, Take 50 mg by mouth 2 (Two) Times a Day., Disp: , Rfl:       Objective     Vital Signs:  Temp:  [98 °F (36.7 °C)] 98 °F (36.7 °C)  Heart Rate:  [80] 80  Resp:  [20] 20  BP: (120)/(68) 120/68    Physical Exam:  Physical Exam   Constitutional: She is oriented to person, place, and time. She appears well-developed and well-nourished. She is cooperative. No distress.   HENT:   Head: Normocephalic and atraumatic.   Right Ear: External ear normal.   Left Ear: External ear normal.   Nose: Nose normal.   Intranasally, there is a large septal perforation.  There is very thick, dried crusting.  There appears to be erosion versus auto  necrosis of her middle turbinates.   Eyes: Pupils are equal, round, and reactive to light. Conjunctivae and EOM are normal. Right eye exhibits no discharge. Left eye exhibits no discharge. No scleral icterus.   Neck: Normal range of motion. Neck supple. No JVD present. No tracheal deviation present. No thyromegaly present.   Pulmonary/Chest: Effort normal. No stridor.   Musculoskeletal: Normal range of motion. She exhibits no edema or deformity.   Lymphadenopathy:     She has no cervical adenopathy.   Neurological: She is alert and oriented to person, place, and time. She has normal strength. No cranial nerve deficit. Coordination normal.   Skin: Skin is warm and dry. No rash noted. She is not diaphoretic. No erythema. No pallor.   Psychiatric: She has a normal mood and affect. Her speech is normal and behavior is normal. Judgment and thought content normal. Cognition and memory are normal.   Nursing note and vitals reviewed.      Results Review:       I have also reviewed her prior autoimmune lab results from 12 March 2019.  Her SSA and B were both negative.  ZEKE was negative.  ZEKE by immunofluorescence was negative.  Rheumatoid factor was negative.    Assessment   Assessment:  1. Nasal septal perforation        Plan   Plan:    Continue nasal saline sprays and irrigations.  Continue mupirocin.  I am not convinced that there is not some type of autoimmune versus vasculitic process.  Will obtain a urinalysis, an ACE level, and a repeat CBC.  Follow-up in approximately 2 to 4 weeks.    Return in about 4 weeks (around 4/6/2020).    My findings and recommendations were discussed and questions were answered.     Mark Anthony Anderson MD  03/09/20  5:24 PM

## 2020-04-21 LAB
CHOLESTEROL, TOTAL: 126 MG/DL
CHOLESTEROL/HDL RATIO: NORMAL
HDLC SERPL-MCNC: 42 MG/DL (ref 35–70)
LDL CHOLESTEROL CALCULATED: 65 MG/DL (ref 0–160)
TRIGL SERPL-MCNC: 94 MG/DL
VLDLC SERPL CALC-MCNC: 19 MG/DL

## 2020-04-22 ENCOUNTER — TELEMEDICINE (OUTPATIENT)
Dept: FAMILY MEDICINE CLINIC | Age: 60
End: 2020-04-22
Payer: COMMERCIAL

## 2020-04-22 VITALS — SYSTOLIC BLOOD PRESSURE: 137 MMHG | DIASTOLIC BLOOD PRESSURE: 85 MMHG | HEART RATE: 100 BPM

## 2020-04-22 PROCEDURE — 99214 OFFICE O/P EST MOD 30 MIN: CPT | Performed by: NURSE PRACTITIONER

## 2020-04-22 PROCEDURE — 96372 THER/PROPH/DIAG INJ SC/IM: CPT | Performed by: NURSE PRACTITIONER

## 2020-04-22 RX ORDER — SULFAMETHOXAZOLE AND TRIMETHOPRIM 800; 160 MG/1; MG/1
1 TABLET ORAL 2 TIMES DAILY
Qty: 20 TABLET | Refills: 0 | Status: SHIPPED | OUTPATIENT
Start: 2020-04-22 | End: 2020-04-23

## 2020-04-22 ASSESSMENT — ENCOUNTER SYMPTOMS: VOMITING: 0

## 2020-04-22 NOTE — PROGRESS NOTES
2020    TELEHEALTH EVALUATION -- Audio/Visual (During FJWFN-23 public health emergency)    Chief Complaint   Patient presents with   Jose Carolina 84     follow up from fast pace           Emily Atul (:  1960) has requested an audio/video evaluation for the following concern(s):  HPI:  Fanny [place] to [facility] and Sat for f/u then. Pt states she was bit by a brown recluse. Pt states that she started a probiotic and oral antibiotic that day. She started clindamycin Weds. A culture was taken and she was called yesterday and told that staph and strep were positive. She is now supposed to start Augmentin but states that she has not picked it up yet. Left calf --dark area  Pt reports that she felt the burn initially and has since then noted changes in skin. Started clindamycin and then sought treatment. Spouse reports that area went from rash to \"white edges and renetta black in the middle and area around it was red\"  States that area had begun to worsen and since starting clindamycin she feels it has improved somewhat. Pt held off on getting augmenting until we could speak today. Records are not yet available from [employer]. Request sent and pt will be called with plan for today. She is aware to hold off on obtaining augmentin until we speak. Review of Systems   Constitutional: Negative for chills and fever. Gastrointestinal: Negative for vomiting. Skin: Positive for wound. Neurological: Negative for weakness. Psychiatric/Behavioral: Negative for sleep disturbance (not related to wound). Prior to Visit Medications    Medication Sig Taking? Authorizing Provider   sulfamethoxazole-trimethoprim (BACTRIM DS;SEPTRA DS) 800-160 MG per tablet Take 1 tablet by mouth 2 times daily for 10 days  MAEVE Cuevas   mupirocin (BACTROBAN) 2 % ointment Apply 3 times daily.   MAEVE Cuevas   atorvastatin (LIPITOR) 40 MG tablet 1 po nightly for cholesterol  MAEVE Cuevas   allopurinol (ZYLOPRIM) 100 MG tablet TAKE 1 TABLET BY MOUTH  DAILY  MAEVE Cuevas   Respiratory Therapy Supplies (NEBULIZER/TUBING/MOUTHPIECE) KIT 1 kit by Does not apply route daily as needed (for qid prn use  DX-bronchitis)  MAEVE Cuevas   albuterol (PROVENTIL) (2.5 MG/3ML) 0.083% nebulizer solution Take 3 mLs by nebulization every 6 hours as needed for Wheezing  MAEVE Cuevas   omeprazole (PRILOSEC) 20 MG delayed release capsule Take 1 capsule by mouth 2 times daily (before meals)  MAEVE Cuevas   carvedilol (COREG) 6.25 MG tablet TAKE 1 TABLET BY MOUTH TWO  TIMES DAILY WITH MEALS  MAEVE Cuevas   spironolactone (ALDACTONE) 25 MG tablet Take 1 tablet by mouth daily  MAEVE Cuevas   ALPRAZolam (XANAX) 0.5 MG tablet Take 0.5 mg by mouth nightly as needed. Donnie Pedraza Historical Provider, MD   guaiFENesin (MUCINEX) 600 MG extended release tablet Take 2 tablets by mouth 2 times daily  MAEVE Cuevas   triamcinolone (ARISTOCORT) 0.5 % ointment   Historical Provider, MD   pramipexole (MIRAPEX) 1.5 MG tablet Take 1.5 mg by mouth nightly  Historical Provider, MD   diclofenac (VOLTAREN) 75 MG EC tablet 2 times daily   Historical Provider, MD   bumetanide (BUMEX) 1 MG tablet Take 1 tablet by mouth 2 times daily  Patient taking differently: Take 1 mg by mouth daily takes one on monday wednesday and friday  Tiny Coles MD   QUEtiapine (SEROQUEL) 50 MG tablet Take 25 mg by mouth nightly   Historical Provider, MD   niacin (SLO-NIACIN) 500 MG extended release tablet Take 500 mg by mouth daily   Historical Provider, MD   levothyroxine (SYNTHROID) 100 MCG tablet Take 1 tablet by mouth Daily  Patient taking differently: Take 100 mcg by mouth Daily 2/15/18 pt reports PCP has increased to 125 mcg daily.   Tiny Coles MD   Tens Unit MISC by Does not apply route Apply and use tid prn  Dx -lumbar pain  Dispense tens and supplies  MAEVE Cuevas   tiZANidine (ZANAFLEX) 4 MG tablet Take 4 mg by mouth nightly as needed   Historical Provider, MD   pilocarpine (SALAGEN) 5 MG tablet Take 5 mg by mouth 3 times daily  Historical Provider, MD   Vitamin D (CHOLECALCIFEROL) 1000 UNITS CAPS capsule Take 1,000 Units by mouth daily  Historical Provider, MD   Multiple Vitamins-Minerals (MULTIVITAMIN PO) Take by mouth daily  Historical Provider, MD   DULoxetine (CYMBALTA) 60 MG capsule Take 60 mg by mouth 2 times daily   Historical Provider, MD   traMADol (ULTRAM) 50 MG tablet Take 100 mg by mouth every 12 hours   Historical Provider, MD   calcium carbonate 600 MG TABS tablet Take 1 tablet by mouth daily. Historical Provider, MD   divalproex (DEPAKOTE) 500 MG ER tablet Take 1,000 mg by mouth nightly. Historical Provider, MD       Social History     Tobacco Use    Smoking status: Current Every Day Smoker     Packs/day: 1.00     Years: 30.00     Pack years: 30.00     Types: Cigarettes     Last attempt to quit: 2003     Years since quittin.7    Smokeless tobacco: Never Used    Tobacco comment: Pt not interested at this time in stopping. Substance Use Topics    Alcohol use: Yes     Comment: rarely    Drug use: No     Comment: pt admits \"may have misused in past\"--Pt was vague with details.         Allergies   Allergen Reactions    Hctz [Hydrochlorothiazide] Other (See Comments)     Acid reflux      Sitagliptin Rash   ,   Past Medical History:   Diagnosis Date    Arthritis     Bipolar I disorder, most recent episode (or current) mixed, unspecified     Chronic back pain     Depression     Dry mouth     Hyperlipidemia     Hypertension     Hypokalemia     Hypothyroidism     Menopause     Overactive bladder     Plantar fasciitis     RLS (restless legs syndrome)     Sleep apnea     Thyroid disease    ,   Past Surgical History:   Procedure Laterality Date    CARPAL TUNNEL RELEASE Bilateral 2002    CHOLECYSTECTOMY  10/2014    COLON SURGERY      COLON SURGERY      biopsy     COLONOSCOPY      CYST REMOVAL  2017    Under left medical treatment and/or call 911 if deemed necessary. Services were provided through a video synchronous discussion virtually to substitute for in-person clinic visit. Patient and provider were located at their individual homes. --MAEVE Hernandez on 4/22/2020 at 1:47 PM    An electronic signature was used to authenticate this note.

## 2020-04-23 ENCOUNTER — PATIENT MESSAGE (OUTPATIENT)
Dept: FAMILY MEDICINE CLINIC | Age: 60
End: 2020-04-23

## 2020-04-23 ENCOUNTER — TELEPHONE (OUTPATIENT)
Dept: FAMILY MEDICINE CLINIC | Age: 60
End: 2020-04-23

## 2020-04-23 ENCOUNTER — APPOINTMENT (OUTPATIENT)
Dept: GENERAL RADIOLOGY | Age: 60
DRG: 918 | End: 2020-04-23
Payer: COMMERCIAL

## 2020-04-23 ENCOUNTER — HOSPITAL ENCOUNTER (INPATIENT)
Age: 60
LOS: 2 days | Discharge: HOME | DRG: 918 | End: 2020-04-25
Attending: EMERGENCY MEDICINE | Admitting: HOSPITALIST
Payer: COMMERCIAL

## 2020-04-23 ENCOUNTER — OFFICE VISIT (OUTPATIENT)
Dept: PRIMARY CARE CLINIC | Age: 60
End: 2020-04-23
Payer: COMMERCIAL

## 2020-04-23 VITALS — HEART RATE: 119 BPM | TEMPERATURE: 100.3 F | OXYGEN SATURATION: 96 %

## 2020-04-23 PROBLEM — L03.119 CELLULITIS AND ABSCESS OF FOOT: Status: ACTIVE | Noted: 2020-04-23

## 2020-04-23 PROBLEM — L02.619 CELLULITIS AND ABSCESS OF FOOT: Status: ACTIVE | Noted: 2020-04-23

## 2020-04-23 LAB
ALBUMIN SERPL-MCNC: 3.6 G/DL (ref 3.5–5.2)
ALP BLD-CCNC: 97 U/L (ref 35–104)
ALT SERPL-CCNC: 18 U/L (ref 5–33)
ANION GAP SERPL CALCULATED.3IONS-SCNC: 13 MMOL/L (ref 7–19)
AST SERPL-CCNC: 12 U/L (ref 5–32)
BASOPHILS ABSOLUTE: 0 K/UL (ref 0–0.2)
BASOPHILS RELATIVE PERCENT: 0.3 % (ref 0–1)
BILIRUB SERPL-MCNC: 0.3 MG/DL (ref 0.2–1.2)
BILIRUBIN URINE: NEGATIVE
BLOOD, URINE: NEGATIVE
BUN BLDV-MCNC: 22 MG/DL (ref 6–20)
CALCIUM SERPL-MCNC: 9.2 MG/DL (ref 8.6–10)
CHLORIDE BLD-SCNC: 95 MMOL/L (ref 98–111)
CLARITY: CLEAR
CO2: 26 MMOL/L (ref 22–29)
COLOR: YELLOW
CREAT SERPL-MCNC: 0.9 MG/DL (ref 0.5–0.9)
EOSINOPHILS ABSOLUTE: 0.1 K/UL (ref 0–0.6)
EOSINOPHILS RELATIVE PERCENT: 0.4 % (ref 0–5)
GFR NON-AFRICAN AMERICAN: >60
GLUCOSE BLD-MCNC: 120 MG/DL (ref 74–109)
GLUCOSE URINE: 100 MG/DL
HCT VFR BLD CALC: 38.7 % (ref 37–47)
HEMOGLOBIN: 12 G/DL (ref 12–16)
IMMATURE GRANULOCYTES #: 0.1 K/UL
KETONES, URINE: NEGATIVE MG/DL
LACTIC ACID: 1.6 MMOL/L (ref 0.5–1.9)
LEUKOCYTE ESTERASE, URINE: NEGATIVE
LYMPHOCYTES ABSOLUTE: 0.7 K/UL (ref 1.1–4.5)
LYMPHOCYTES RELATIVE PERCENT: 5.1 % (ref 20–40)
MCH RBC QN AUTO: 26.5 PG (ref 27–31)
MCHC RBC AUTO-ENTMCNC: 31 G/DL (ref 33–37)
MCV RBC AUTO: 85.6 FL (ref 81–99)
MONOCYTES ABSOLUTE: 0.3 K/UL (ref 0–0.9)
MONOCYTES RELATIVE PERCENT: 2 % (ref 0–10)
NEUTROPHILS ABSOLUTE: 12.5 K/UL (ref 1.5–7.5)
NEUTROPHILS RELATIVE PERCENT: 91.5 % (ref 50–65)
NITRITE, URINE: NEGATIVE
PDW BLD-RTO: 15.1 % (ref 11.5–14.5)
PH UA: 7.5 (ref 5–8)
PLATELET # BLD: 467 K/UL (ref 130–400)
PMV BLD AUTO: 9.6 FL (ref 9.4–12.3)
POTASSIUM REFLEX MAGNESIUM: 4.5 MMOL/L (ref 3.5–5)
PROTEIN UA: NEGATIVE MG/DL
RBC # BLD: 4.52 M/UL (ref 4.2–5.4)
SARS-COV-2, NAAT: NOT DETECTED
SODIUM BLD-SCNC: 134 MMOL/L (ref 136–145)
SPECIFIC GRAVITY UA: 1.02 (ref 1–1.03)
TOTAL PROTEIN: 7.1 G/DL (ref 6.6–8.7)
URINE REFLEX TO CULTURE: ABNORMAL
UROBILINOGEN, URINE: 0.2 E.U./DL
WBC # BLD: 13.7 K/UL (ref 4.8–10.8)

## 2020-04-23 PROCEDURE — 2580000003 HC RX 258: Performed by: EMERGENCY MEDICINE

## 2020-04-23 PROCEDURE — 36415 COLL VENOUS BLD VENIPUNCTURE: CPT

## 2020-04-23 PROCEDURE — 83605 ASSAY OF LACTIC ACID: CPT

## 2020-04-23 PROCEDURE — 85025 COMPLETE CBC W/AUTO DIFF WBC: CPT

## 2020-04-23 PROCEDURE — 81003 URINALYSIS AUTO W/O SCOPE: CPT

## 2020-04-23 PROCEDURE — 6370000000 HC RX 637 (ALT 250 FOR IP): Performed by: SURGERY

## 2020-04-23 PROCEDURE — 1210000000 HC MED SURG R&B

## 2020-04-23 PROCEDURE — 99214 OFFICE O/P EST MOD 30 MIN: CPT | Performed by: NURSE PRACTITIONER

## 2020-04-23 PROCEDURE — 2580000003 HC RX 258: Performed by: HOSPITALIST

## 2020-04-23 PROCEDURE — 99284 EMERGENCY DEPT VISIT MOD MDM: CPT

## 2020-04-23 PROCEDURE — 96374 THER/PROPH/DIAG INJ IV PUSH: CPT

## 2020-04-23 PROCEDURE — 2500000003 HC RX 250 WO HCPCS: Performed by: EMERGENCY MEDICINE

## 2020-04-23 PROCEDURE — 6370000000 HC RX 637 (ALT 250 FOR IP): Performed by: HOSPITALIST

## 2020-04-23 PROCEDURE — 6360000002 HC RX W HCPCS: Performed by: SURGERY

## 2020-04-23 PROCEDURE — 87040 BLOOD CULTURE FOR BACTERIA: CPT

## 2020-04-23 PROCEDURE — 80053 COMPREHEN METABOLIC PANEL: CPT

## 2020-04-23 PROCEDURE — 6360000002 HC RX W HCPCS: Performed by: EMERGENCY MEDICINE

## 2020-04-23 PROCEDURE — 96375 TX/PRO/DX INJ NEW DRUG ADDON: CPT

## 2020-04-23 PROCEDURE — 71045 X-RAY EXAM CHEST 1 VIEW: CPT

## 2020-04-23 PROCEDURE — 6360000002 HC RX W HCPCS: Performed by: HOSPITALIST

## 2020-04-23 PROCEDURE — U0002 COVID-19 LAB TEST NON-CDC: HCPCS

## 2020-04-23 RX ORDER — ONDANSETRON 2 MG/ML
4 INJECTION INTRAMUSCULAR; INTRAVENOUS EVERY 6 HOURS PRN
Status: DISCONTINUED | OUTPATIENT
Start: 2020-04-23 | End: 2020-04-25 | Stop reason: HOSPADM

## 2020-04-23 RX ORDER — POTASSIUM CHLORIDE 20 MEQ/1
40 TABLET, EXTENDED RELEASE ORAL PRN
Status: DISCONTINUED | OUTPATIENT
Start: 2020-04-23 | End: 2020-04-25 | Stop reason: HOSPADM

## 2020-04-23 RX ORDER — QUETIAPINE FUMARATE 25 MG/1
25 TABLET, FILM COATED ORAL NIGHTLY
Status: DISCONTINUED | OUTPATIENT
Start: 2020-04-23 | End: 2020-04-25 | Stop reason: HOSPADM

## 2020-04-23 RX ORDER — SODIUM CHLORIDE 0.9 % (FLUSH) 0.9 %
10 SYRINGE (ML) INJECTION PRN
Status: DISCONTINUED | OUTPATIENT
Start: 2020-04-23 | End: 2020-04-25 | Stop reason: HOSPADM

## 2020-04-23 RX ORDER — METHYLPREDNISOLONE SODIUM SUCCINATE 125 MG/2ML
125 INJECTION, POWDER, LYOPHILIZED, FOR SOLUTION INTRAMUSCULAR; INTRAVENOUS ONCE
Status: COMPLETED | OUTPATIENT
Start: 2020-04-23 | End: 2020-04-23

## 2020-04-23 RX ORDER — PANTOPRAZOLE SODIUM 40 MG/1
40 TABLET, DELAYED RELEASE ORAL
Status: DISCONTINUED | OUTPATIENT
Start: 2020-04-23 | End: 2020-04-25 | Stop reason: HOSPADM

## 2020-04-23 RX ORDER — ALBUTEROL SULFATE 2.5 MG/3ML
2.5 SOLUTION RESPIRATORY (INHALATION) EVERY 6 HOURS PRN
Status: DISCONTINUED | OUTPATIENT
Start: 2020-04-23 | End: 2020-04-25 | Stop reason: HOSPADM

## 2020-04-23 RX ORDER — METHYLPREDNISOLONE 4 MG/1
4 TABLET ORAL SEE ADMIN INSTRUCTIONS
Status: DISCONTINUED | OUTPATIENT
Start: 2020-04-23 | End: 2020-04-23 | Stop reason: SDUPTHER

## 2020-04-23 RX ORDER — DIPHENHYDRAMINE HCL 25 MG
50 TABLET ORAL EVERY 6 HOURS PRN
Status: DISCONTINUED | OUTPATIENT
Start: 2020-04-23 | End: 2020-04-25 | Stop reason: HOSPADM

## 2020-04-23 RX ORDER — SPIRONOLACTONE 25 MG/1
25 TABLET ORAL DAILY
Status: DISCONTINUED | OUTPATIENT
Start: 2020-04-23 | End: 2020-04-25 | Stop reason: HOSPADM

## 2020-04-23 RX ORDER — POTASSIUM CHLORIDE 7.45 MG/ML
10 INJECTION INTRAVENOUS PRN
Status: DISCONTINUED | OUTPATIENT
Start: 2020-04-23 | End: 2020-04-25 | Stop reason: HOSPADM

## 2020-04-23 RX ORDER — DIPHENHYDRAMINE HCL 25 MG
50 TABLET ORAL EVERY 6 HOURS PRN
Qty: 60 TABLET | Refills: 0 | Status: SHIPPED | OUTPATIENT
Start: 2020-04-23 | End: 2020-05-23

## 2020-04-23 RX ORDER — ALLOPURINOL 100 MG/1
100 TABLET ORAL DAILY
Status: DISCONTINUED | OUTPATIENT
Start: 2020-04-23 | End: 2020-04-25 | Stop reason: HOSPADM

## 2020-04-23 RX ORDER — ACETAMINOPHEN 650 MG/1
650 SUPPOSITORY RECTAL EVERY 6 HOURS PRN
Status: DISCONTINUED | OUTPATIENT
Start: 2020-04-23 | End: 2020-04-25 | Stop reason: HOSPADM

## 2020-04-23 RX ORDER — SODIUM CHLORIDE 0.9 % (FLUSH) 0.9 %
10 SYRINGE (ML) INJECTION EVERY 12 HOURS SCHEDULED
Status: DISCONTINUED | OUTPATIENT
Start: 2020-04-23 | End: 2020-04-25 | Stop reason: HOSPADM

## 2020-04-23 RX ORDER — 0.9 % SODIUM CHLORIDE 0.9 %
1000 INTRAVENOUS SOLUTION INTRAVENOUS ONCE
Status: COMPLETED | OUTPATIENT
Start: 2020-04-23 | End: 2020-04-23

## 2020-04-23 RX ORDER — DOXYCYCLINE HYCLATE 100 MG
100 TABLET ORAL 2 TIMES DAILY
Qty: 20 TABLET | Refills: 0 | Status: SHIPPED | OUTPATIENT
Start: 2020-04-23 | End: 2020-05-03

## 2020-04-23 RX ORDER — ASPIRIN 325 MG
325 TABLET ORAL DAILY
Status: DISCONTINUED | OUTPATIENT
Start: 2020-04-24 | End: 2020-04-25 | Stop reason: HOSPADM

## 2020-04-23 RX ORDER — DIPHENHYDRAMINE HYDROCHLORIDE 50 MG/ML
50 INJECTION INTRAMUSCULAR; INTRAVENOUS ONCE
Status: COMPLETED | OUTPATIENT
Start: 2020-04-23 | End: 2020-04-23

## 2020-04-23 RX ORDER — DIAPER,BRIEF,INFANT-TODD,DISP
EACH MISCELLANEOUS 2 TIMES DAILY
Status: DISCONTINUED | OUTPATIENT
Start: 2020-04-23 | End: 2020-04-25 | Stop reason: HOSPADM

## 2020-04-23 RX ORDER — CARVEDILOL 3.12 MG/1
3.12 TABLET ORAL 2 TIMES DAILY WITH MEALS
Status: DISCONTINUED | OUTPATIENT
Start: 2020-04-23 | End: 2020-04-25 | Stop reason: HOSPADM

## 2020-04-23 RX ORDER — LEVOTHYROXINE SODIUM 0.1 MG/1
100 TABLET ORAL DAILY
Status: DISCONTINUED | OUTPATIENT
Start: 2020-04-23 | End: 2020-04-25 | Stop reason: HOSPADM

## 2020-04-23 RX ORDER — PHENOL 1.4 %
1 AEROSOL, SPRAY (ML) MUCOUS MEMBRANE DAILY
Status: DISCONTINUED | OUTPATIENT
Start: 2020-04-23 | End: 2020-04-25 | Stop reason: HOSPADM

## 2020-04-23 RX ORDER — DIPHENHYDRAMINE HCL 25 MG
25 TABLET ORAL EVERY 6 HOURS SCHEDULED
Status: DISCONTINUED | OUTPATIENT
Start: 2020-04-24 | End: 2020-04-25 | Stop reason: HOSPADM

## 2020-04-23 RX ORDER — ATORVASTATIN CALCIUM 20 MG/1
20 TABLET, FILM COATED ORAL DAILY
Status: DISCONTINUED | OUTPATIENT
Start: 2020-04-23 | End: 2020-04-25 | Stop reason: HOSPADM

## 2020-04-23 RX ORDER — ACETAMINOPHEN 325 MG/1
650 TABLET ORAL EVERY 6 HOURS PRN
Status: DISCONTINUED | OUTPATIENT
Start: 2020-04-23 | End: 2020-04-25 | Stop reason: HOSPADM

## 2020-04-23 RX ORDER — METHYLPREDNISOLONE 4 MG/1
24 TABLET ORAL ONCE
Status: COMPLETED | OUTPATIENT
Start: 2020-04-23 | End: 2020-04-23

## 2020-04-23 RX ORDER — METHYLPREDNISOLONE 4 MG/1
8 TABLET ORAL NIGHTLY
Status: COMPLETED | OUTPATIENT
Start: 2020-04-24 | End: 2020-04-24

## 2020-04-23 RX ORDER — ALPRAZOLAM 0.5 MG/1
0.5 TABLET ORAL NIGHTLY PRN
Status: DISCONTINUED | OUTPATIENT
Start: 2020-04-23 | End: 2020-04-25 | Stop reason: HOSPADM

## 2020-04-23 RX ORDER — METHYLPREDNISOLONE 4 MG/1
TABLET ORAL
Qty: 1 KIT | Refills: 0 | Status: ON HOLD | OUTPATIENT
Start: 2020-04-23 | End: 2020-07-03

## 2020-04-23 RX ORDER — MAGNESIUM SULFATE IN WATER 40 MG/ML
2 INJECTION, SOLUTION INTRAVENOUS PRN
Status: DISCONTINUED | OUTPATIENT
Start: 2020-04-23 | End: 2020-04-25 | Stop reason: HOSPADM

## 2020-04-23 RX ORDER — FAMOTIDINE 20 MG/1
40 TABLET, FILM COATED ORAL NIGHTLY
Status: DISCONTINUED | OUTPATIENT
Start: 2020-04-23 | End: 2020-04-24

## 2020-04-23 RX ORDER — METHYLPREDNISOLONE 4 MG/1
4 TABLET ORAL
Status: DISCONTINUED | OUTPATIENT
Start: 2020-04-24 | End: 2020-04-25 | Stop reason: HOSPADM

## 2020-04-23 RX ORDER — DIVALPROEX SODIUM 500 MG/1
1000 TABLET, EXTENDED RELEASE ORAL NIGHTLY
Status: DISCONTINUED | OUTPATIENT
Start: 2020-04-23 | End: 2020-04-25 | Stop reason: HOSPADM

## 2020-04-23 RX ORDER — BUMETANIDE 1 MG/1
1 TABLET ORAL 2 TIMES DAILY
Status: DISCONTINUED | OUTPATIENT
Start: 2020-04-23 | End: 2020-04-25 | Stop reason: HOSPADM

## 2020-04-23 RX ORDER — OMEPRAZOLE 20 MG/1
20 CAPSULE, DELAYED RELEASE ORAL
Status: DISCONTINUED | OUTPATIENT
Start: 2020-04-23 | End: 2020-04-23 | Stop reason: CLARIF

## 2020-04-23 RX ORDER — DULOXETIN HYDROCHLORIDE 60 MG/1
60 CAPSULE, DELAYED RELEASE ORAL 2 TIMES DAILY
Status: DISCONTINUED | OUTPATIENT
Start: 2020-04-23 | End: 2020-04-25 | Stop reason: HOSPADM

## 2020-04-23 RX ORDER — METHYLPREDNISOLONE 4 MG/1
4 TABLET ORAL NIGHTLY
Status: DISCONTINUED | OUTPATIENT
Start: 2020-04-25 | End: 2020-04-25 | Stop reason: HOSPADM

## 2020-04-23 RX ADMIN — Medication 600 MG: at 18:00

## 2020-04-23 RX ADMIN — PRAMIPEXOLE DIHYDROCHLORIDE 1.5 MG: 1 TABLET ORAL at 20:54

## 2020-04-23 RX ADMIN — FAMOTIDINE 20 MG: 10 INJECTION, SOLUTION INTRAVENOUS at 12:28

## 2020-04-23 RX ADMIN — DIVALPROEX SODIUM 1000 MG: 500 TABLET, EXTENDED RELEASE ORAL at 20:54

## 2020-04-23 RX ADMIN — DIPHENHYDRAMINE HYDROCHLORIDE 50 MG: 50 INJECTION, SOLUTION INTRAMUSCULAR; INTRAVENOUS at 12:28

## 2020-04-23 RX ADMIN — SODIUM CHLORIDE 1000 ML: 9 INJECTION, SOLUTION INTRAVENOUS at 12:28

## 2020-04-23 RX ADMIN — ATORVASTATIN CALCIUM 20 MG: 20 TABLET, FILM COATED ORAL at 18:00

## 2020-04-23 RX ADMIN — DIPHENHYDRAMINE HCL 25 MG: 25 TABLET ORAL at 23:10

## 2020-04-23 RX ADMIN — ALLOPURINOL 100 MG: 100 TABLET ORAL at 18:00

## 2020-04-23 RX ADMIN — ENOXAPARIN SODIUM 40 MG: 40 INJECTION SUBCUTANEOUS at 18:00

## 2020-04-23 RX ADMIN — LEVOTHYROXINE SODIUM 100 MCG: 100 TABLET ORAL at 18:00

## 2020-04-23 RX ADMIN — METHYLPREDNISOLONE 24 MG: 4 TABLET ORAL at 23:10

## 2020-04-23 RX ADMIN — SPIRONOLACTONE 25 MG: 25 TABLET ORAL at 18:00

## 2020-04-23 RX ADMIN — FAMOTIDINE 40 MG: 20 TABLET ORAL at 23:10

## 2020-04-23 RX ADMIN — PIPERACILLIN AND TAZOBACTAM 3.38 G: 3; .375 INJECTION, POWDER, LYOPHILIZED, FOR SOLUTION INTRAVENOUS at 15:42

## 2020-04-23 RX ADMIN — HYDROCORTISONE: 1 CREAM TOPICAL at 20:54

## 2020-04-23 RX ADMIN — DULOXETINE 60 MG: 60 CAPSULE, DELAYED RELEASE ORAL at 20:54

## 2020-04-23 RX ADMIN — METHYLPREDNISOLONE SODIUM SUCCINATE 125 MG: 125 INJECTION, POWDER, FOR SOLUTION INTRAMUSCULAR; INTRAVENOUS at 12:28

## 2020-04-23 RX ADMIN — QUETIAPINE FUMARATE 25 MG: 25 TABLET ORAL at 20:54

## 2020-04-23 RX ADMIN — SODIUM CHLORIDE, PRESERVATIVE FREE 10 ML: 5 INJECTION INTRAVENOUS at 20:54

## 2020-04-23 RX ADMIN — PIPERACILLIN AND TAZOBACTAM 3.38 G: 3; .375 INJECTION, POWDER, LYOPHILIZED, FOR SOLUTION INTRAVENOUS at 22:15

## 2020-04-23 RX ADMIN — VANCOMYCIN HYDROCHLORIDE 1250 MG: 10 INJECTION, POWDER, LYOPHILIZED, FOR SOLUTION INTRAVENOUS at 18:03

## 2020-04-23 ASSESSMENT — ENCOUNTER SYMPTOMS
TROUBLE SWALLOWING: 0
WHEEZING: 0
COUGH: 1
VOMITING: 0
NAUSEA: 0
SINUS PRESSURE: 0
TROUBLE SWALLOWING: 0
CONSTIPATION: 0
DIFFICULTY BREATHING: 0
DIARRHEA: 0
ALLERGIC REACTION: 1
SHORTNESS OF BREATH: 1
ABDOMINAL PAIN: 0
SORE THROAT: 0
RHINORRHEA: 0

## 2020-04-23 NOTE — TELEPHONE ENCOUNTER
From: Marylu Chaudhry  To: MAEVE Aden  Sent: 4/23/2020 6:05 AM CDT  Subject: Prescription Question    Tea, I'm sick as a dog. I'm achy, itchy, I have a rash all over the place and my head is going to explode. I think I'm done with the Bactrim. I'll be calling the office a little later. I need some assistance. Temp: 99.3. Bp:160/87. P:115. Oxy: can't get a reading.

## 2020-04-23 NOTE — PROGRESS NOTES
mouth 2 times daily (Patient taking differently: Take 1 mg by mouth daily takes one on monday wednesday and friday) 180 tablet 3    QUEtiapine (SEROQUEL) 50 MG tablet Take 25 mg by mouth nightly       niacin (SLO-NIACIN) 500 MG extended release tablet Take 500 mg by mouth daily       levothyroxine (SYNTHROID) 100 MCG tablet Take 1 tablet by mouth Daily (Patient taking differently: Take 100 mcg by mouth Daily 2/15/18 pt reports PCP has increased to 125 mcg daily.) 30 tablet 5    Tens Unit MISC by Does not apply route Apply and use tid prn  Dx -lumbar pain  Dispense tens and supplies 1 each 0    tiZANidine (ZANAFLEX) 4 MG tablet Take 4 mg by mouth nightly as needed       pilocarpine (SALAGEN) 5 MG tablet Take 5 mg by mouth 3 times daily      Vitamin D (CHOLECALCIFEROL) 1000 UNITS CAPS capsule Take 1,000 Units by mouth daily      Multiple Vitamins-Minerals (MULTIVITAMIN PO) Take by mouth daily      DULoxetine (CYMBALTA) 60 MG capsule Take 60 mg by mouth 2 times daily       traMADol (ULTRAM) 50 MG tablet Take 100 mg by mouth every 12 hours       calcium carbonate 600 MG TABS tablet Take 1 tablet by mouth daily.  divalproex (DEPAKOTE) 500 MG ER tablet Take 1,000 mg by mouth nightly.        Current Facility-Administered Medications   Medication Dose Route Frequency Provider Last Rate Last Dose    cefTRIAXone (ROCEPHIN) 1,000 mg in lidocaine 1 % 2.86 mL IM Injection  1,000 mg Intramuscular Q24H Tea Tabitha, APRN   1,000 mg at 04/22/20 1535     Allergies   Allergen Reactions    Bactrim [Sulfamethoxazole-Trimethoprim]     Hctz [Hydrochlorothiazide] Other (See Comments)     Acid reflux      Sitagliptin Rash       Health Maintenance   Topic Date Due    Hepatitis C screen  1960    HIV screen  01/17/1976    DTaP/Tdap/Td vaccine (1 - Tdap) 01/17/1980    Shingles Vaccine (1 of 2) 01/17/2011    Cervical cancer screen  09/22/2017    Breast cancer screen  01/11/2020    A1C test (Diabetic or Prediabetic) 04/05/2020    Lipid screen  04/05/2020    Potassium monitoring  08/28/2020    Creatinine monitoring  08/28/2020    Flu vaccine (Season Ended) 09/01/2020    Low dose CT lung screening  09/11/2020    Colon cancer screen colonoscopy  04/18/2024    Pneumococcal 0-64 years Vaccine  Completed    Hepatitis A vaccine  Aged Out    Hepatitis B vaccine  Aged Out    Hib vaccine  Aged Out    Meningococcal (ACWY) vaccine  Aged Out       Subjective:      Review of Systems   Constitutional: Positive for fever. Negative for activity change, appetite change, fatigue and unexpected weight change. HENT: Positive for congestion. Negative for hearing loss, rhinorrhea, sinus pressure, sore throat and trouble swallowing. Eyes: Negative for visual disturbance. Respiratory: Positive for cough and shortness of breath. Cardiovascular: Negative for chest pain, palpitations and leg swelling. Gastrointestinal: Negative for abdominal pain, constipation, diarrhea, nausea and vomiting. Endocrine: Negative for cold intolerance and heat intolerance. Genitourinary: Negative for flank pain, menstrual problem, pelvic pain, urgency and vaginal discharge. Musculoskeletal: Negative for arthralgias. Skin: Positive for wound (left calf). Negative for rash. Neurological: Negative for headaches. Psychiatric/Behavioral: Negative for dysphoric mood and sleep disturbance. The patient is not nervous/anxious. Objective:     Physical Exam  Vitals signs reviewed. Constitutional:       Appearance: Normal appearance. HENT:      Head: Normocephalic and atraumatic. Right Ear: Tympanic membrane, ear canal and external ear normal.      Left Ear: Tympanic membrane, ear canal and external ear normal.      Nose: Nose normal.      Mouth/Throat:      Mouth: Mucous membranes are moist.      Pharynx: Oropharynx is clear.    Eyes:      Conjunctiva/sclera: Conjunctivae normal.   Neck:      Musculoskeletal: Normal range of motion and neck supple. Cardiovascular:      Rate and Rhythm: Regular rhythm. Tachycardia present. Pulses: Normal pulses. Heart sounds: Normal heart sounds. Pulmonary:      Effort: Pulmonary effort is normal.      Breath sounds: Normal breath sounds. Abdominal:      General: There is no distension. Palpations: Abdomen is soft. Tenderness: There is no abdominal tenderness. There is no guarding. Skin:     General: Skin is warm. Comments: Left calf with 2cm dark scabbed area with surrounding erythema and warmth to touch that extends down into the foot/ankle. There is no drainage. Neurological:      Mental Status: She is alert and oriented to person, place, and time. Pulse 119   Temp 100.3 °F (37.9 °C) (Oral)   SpO2 96%   No results found for this visit on 04/23/20. Assessment/ Plan     ASSESSMENT:         Diagnosis Orders   1. Fever, unspecified fever cause   concern would possibly be COVID given her cough, congestion and SOA. This could also be coming from her cellulitis to her left leg. I discussed this with Dr. Isela Wilson with general surgery who is agreeable with plan for patient be transferred to the emergency department for further evaluation. From there they can consult her to look at the wound if needed. She is stable for transportation per private vehicle. I discussed this with patient and her family and they are agreeable with plan    Spoke with Sonya Wren NP with [facility] and they are expecting her. 2. Cellulitis of left lower extremity     3. Spider bite wound, accidental or unintentional, subsequent encounter     4. Cough         PLAN:     No follow-ups on file. Patient given educational materials - see patient instructions. Discussed use, benefit, and side effects of prescribed medications. All patient questions answered. Pt voiced understanding. Patient agreed with treatment plan.  Follow up as needed      Electronically signed by Houston Monet

## 2020-04-23 NOTE — H&P
or Allergies 4/23/20 5/23/20  MAEVE Cuevas   mupirocin (BACTROBAN) 2 % ointment Apply 3 times daily. 4/22/20 4/29/20  MAEVE Cuevas   atorvastatin (LIPITOR) 40 MG tablet 1 po nightly for cholesterol 1/24/20   MAEVE Cuevas   allopurinol (ZYLOPRIM) 100 MG tablet TAKE 1 TABLET BY MOUTH  DAILY 11/5/19   MAEVE Delcid   Respiratory Therapy Supplies (NEBULIZER/TUBING/MOUTHPIECE) KIT 1 kit by Does not apply route daily as needed (for qid prn use  DX-bronchitis) 8/28/19   MAEVE Cuevas   albuterol (PROVENTIL) (2.5 MG/3ML) 0.083% nebulizer solution Take 3 mLs by nebulization every 6 hours as needed for Wheezing 8/28/19   MAEVE Cuevas   omeprazole (PRILOSEC) 20 MG delayed release capsule Take 1 capsule by mouth 2 times daily (before meals) 3/27/19   MAEVE Cuevas   carvedilol (COREG) 6.25 MG tablet TAKE 1 TABLET BY MOUTH TWO  TIMES DAILY WITH MEALS 1/23/19   MAEVE Cuevas   spironolactone (ALDACTONE) 25 MG tablet Take 1 tablet by mouth daily 11/16/18   MAEVE Cuevas   ALPRAZolam (XANAX) 0.5 MG tablet Take 0.5 mg by mouth nightly as needed.  . 9/6/18   Historical Provider, MD   guaiFENesin (MUCINEX) 600 MG extended release tablet Take 2 tablets by mouth 2 times daily 10/19/18   MAEVE Cuevas   triamcinolone (ARISTOCORT) 0.5 % ointment  6/4/18   Historical Provider, MD   pramipexole (MIRAPEX) 1.5 MG tablet Take 1.5 mg by mouth nightly    Historical Provider, MD   diclofenac (VOLTAREN) 75 MG EC tablet 2 times daily  3/13/18   Historical Provider, MD   bumetanide (BUMEX) 1 MG tablet Take 1 tablet by mouth 2 times daily  Patient taking differently: Take 1 mg by mouth daily takes one on monday wednesday and friday 3/1/18   Berry Bui MD   QUEtiapine (SEROQUEL) 50 MG tablet Take 25 mg by mouth nightly     Historical Provider, MD   niacin (SLO-NIACIN) 500 MG extended release tablet Take 500 mg by mouth daily     Historical Provider, MD   levothyroxine (SYNTHROID) 100 MCG tablet Take 1 tablet by

## 2020-04-24 PROBLEM — S80.862A INSECT BITE OF LEFT LOWER LEG: Status: ACTIVE | Noted: 2020-04-24

## 2020-04-24 PROBLEM — L03.116 CELLULITIS OF LEFT LOWER EXTREMITY: Status: ACTIVE | Noted: 2020-04-23
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
## 2020-04-24 LAB
ANION GAP SERPL CALCULATED.3IONS-SCNC: 13 MMOL/L (ref 7–19)
BUN BLDV-MCNC: 21 MG/DL (ref 6–20)
CALCIUM SERPL-MCNC: 9.2 MG/DL (ref 8.6–10)
CHLORIDE BLD-SCNC: 99 MMOL/L (ref 98–111)
CO2: 25 MMOL/L (ref 22–29)
CREAT SERPL-MCNC: 0.8 MG/DL (ref 0.5–0.9)
GFR NON-AFRICAN AMERICAN: >60
GLUCOSE BLD-MCNC: 149 MG/DL (ref 74–109)
HCT VFR BLD CALC: 33.6 % (ref 37–47)
HEMOGLOBIN: 10.3 G/DL (ref 12–16)
MCH RBC QN AUTO: 26.6 PG (ref 27–31)
MCHC RBC AUTO-ENTMCNC: 30.7 G/DL (ref 33–37)
MCV RBC AUTO: 86.8 FL (ref 81–99)
PDW BLD-RTO: 15.3 % (ref 11.5–14.5)
PLATELET # BLD: 404 K/UL (ref 130–400)
PMV BLD AUTO: 10.1 FL (ref 9.4–12.3)
POTASSIUM REFLEX MAGNESIUM: 4.4 MMOL/L (ref 3.5–5)
RBC # BLD: 3.87 M/UL (ref 4.2–5.4)
SODIUM BLD-SCNC: 137 MMOL/L (ref 136–145)
WBC # BLD: 12.1 K/UL (ref 4.8–10.8)

## 2020-04-24 PROCEDURE — 6360000002 HC RX W HCPCS: Performed by: HOSPITALIST

## 2020-04-24 PROCEDURE — 6360000002 HC RX W HCPCS: Performed by: SURGERY

## 2020-04-24 PROCEDURE — 6370000000 HC RX 637 (ALT 250 FOR IP): Performed by: HOSPITALIST

## 2020-04-24 PROCEDURE — 85027 COMPLETE CBC AUTOMATED: CPT

## 2020-04-24 PROCEDURE — 99221 1ST HOSP IP/OBS SF/LOW 40: CPT | Performed by: SURGERY

## 2020-04-24 PROCEDURE — 36415 COLL VENOUS BLD VENIPUNCTURE: CPT

## 2020-04-24 PROCEDURE — 80048 BASIC METABOLIC PNL TOTAL CA: CPT

## 2020-04-24 PROCEDURE — 6370000000 HC RX 637 (ALT 250 FOR IP): Performed by: SURGERY

## 2020-04-24 PROCEDURE — 2580000003 HC RX 258: Performed by: HOSPITALIST

## 2020-04-24 PROCEDURE — 1210000000 HC MED SURG R&B

## 2020-04-24 RX ORDER — PILOCARPINE HYDROCHLORIDE 5 MG/1
5 TABLET, FILM COATED ORAL 3 TIMES DAILY
Status: DISCONTINUED | OUTPATIENT
Start: 2020-04-24 | End: 2020-04-25 | Stop reason: HOSPADM

## 2020-04-24 RX ORDER — FAMOTIDINE 20 MG/1
40 TABLET, FILM COATED ORAL NIGHTLY
Status: DISCONTINUED | OUTPATIENT
Start: 2020-04-24 | End: 2020-04-25 | Stop reason: HOSPADM

## 2020-04-24 RX ADMIN — DIPHENHYDRAMINE HCL 25 MG: 25 TABLET ORAL at 05:33

## 2020-04-24 RX ADMIN — SODIUM CHLORIDE, PRESERVATIVE FREE 10 ML: 5 INJECTION INTRAVENOUS at 20:12

## 2020-04-24 RX ADMIN — PRAMIPEXOLE DIHYDROCHLORIDE 1.5 MG: 1 TABLET ORAL at 20:11

## 2020-04-24 RX ADMIN — PIPERACILLIN AND TAZOBACTAM 3.38 G: 3; .375 INJECTION, POWDER, LYOPHILIZED, FOR SOLUTION INTRAVENOUS at 20:13

## 2020-04-24 RX ADMIN — DIPHENHYDRAMINE HCL 25 MG: 25 TABLET ORAL at 11:00

## 2020-04-24 RX ADMIN — BUMETANIDE 1 MG: 1 TABLET ORAL at 20:12

## 2020-04-24 RX ADMIN — QUETIAPINE FUMARATE 25 MG: 25 TABLET ORAL at 20:12

## 2020-04-24 RX ADMIN — ALLOPURINOL 100 MG: 100 TABLET ORAL at 07:59

## 2020-04-24 RX ADMIN — METHYLPREDNISOLONE 8 MG: 4 TABLET ORAL at 20:12

## 2020-04-24 RX ADMIN — PILOCARPINE HYDROCHLORIDE 5 MG: 5 TABLET, FILM COATED ORAL at 20:11

## 2020-04-24 RX ADMIN — DULOXETINE 60 MG: 60 CAPSULE, DELAYED RELEASE ORAL at 20:12

## 2020-04-24 RX ADMIN — ATORVASTATIN CALCIUM 20 MG: 20 TABLET, FILM COATED ORAL at 07:58

## 2020-04-24 RX ADMIN — VANCOMYCIN HYDROCHLORIDE 1250 MG: 10 INJECTION, POWDER, LYOPHILIZED, FOR SOLUTION INTRAVENOUS at 06:00

## 2020-04-24 RX ADMIN — HYDROCORTISONE: 1 CREAM TOPICAL at 20:12

## 2020-04-24 RX ADMIN — PIPERACILLIN AND TAZOBACTAM 3.38 G: 3; .375 INJECTION, POWDER, LYOPHILIZED, FOR SOLUTION INTRAVENOUS at 10:57

## 2020-04-24 RX ADMIN — Medication 600 MG: at 07:59

## 2020-04-24 RX ADMIN — ENOXAPARIN SODIUM 40 MG: 40 INJECTION SUBCUTANEOUS at 17:34

## 2020-04-24 RX ADMIN — DIPHENHYDRAMINE HCL 50 MG: 25 TABLET ORAL at 08:00

## 2020-04-24 RX ADMIN — PANTOPRAZOLE SODIUM 40 MG: 40 TABLET, DELAYED RELEASE ORAL at 05:33

## 2020-04-24 RX ADMIN — VANCOMYCIN HYDROCHLORIDE 1250 MG: 10 INJECTION, POWDER, LYOPHILIZED, FOR SOLUTION INTRAVENOUS at 17:33

## 2020-04-24 RX ADMIN — FAMOTIDINE 40 MG: 20 TABLET ORAL at 20:11

## 2020-04-24 RX ADMIN — DIPHENHYDRAMINE HCL 25 MG: 25 TABLET ORAL at 17:33

## 2020-04-24 RX ADMIN — DULOXETINE 60 MG: 60 CAPSULE, DELAYED RELEASE ORAL at 07:59

## 2020-04-24 RX ADMIN — METHYLPREDNISOLONE 4 MG: 4 TABLET ORAL at 11:00

## 2020-04-24 RX ADMIN — METHYLPREDNISOLONE 4 MG: 4 TABLET ORAL at 05:33

## 2020-04-24 RX ADMIN — DIVALPROEX SODIUM 500 MG: 500 TABLET, EXTENDED RELEASE ORAL at 20:11

## 2020-04-24 RX ADMIN — PIPERACILLIN AND TAZOBACTAM 3.38 G: 3; .375 INJECTION, POWDER, LYOPHILIZED, FOR SOLUTION INTRAVENOUS at 05:26

## 2020-04-24 RX ADMIN — METHYLPREDNISOLONE 4 MG: 4 TABLET ORAL at 17:33

## 2020-04-24 RX ADMIN — HYDROCORTISONE: 1 CREAM TOPICAL at 08:01

## 2020-04-24 RX ADMIN — ASPIRIN 325 MG: 325 TABLET, FILM COATED ORAL at 08:00

## 2020-04-24 RX ADMIN — LEVOTHYROXINE SODIUM 100 MCG: 100 TABLET ORAL at 05:33

## 2020-04-24 ASSESSMENT — ENCOUNTER SYMPTOMS
ABDOMINAL PAIN: 0
SHORTNESS OF BREATH: 0
DIARRHEA: 0
COLOR CHANGE: 0
CONSTIPATION: 0
ABDOMINAL DISTENTION: 0
BACK PAIN: 0
NAUSEA: 0
SORE THROAT: 0
COUGH: 0
EYE REDNESS: 0
CHEST TIGHTNESS: 0
EYE PAIN: 0
VOMITING: 0

## 2020-04-24 ASSESSMENT — PAIN SCALES - GENERAL: PAINLEVEL_OUTOF10: 0

## 2020-04-24 NOTE — CONSULTS
10 mL Intravenous PRN Avtar Patel MD        acetaminophen (TYLENOL) tablet 650 mg  650 mg Oral Q6H PRN Avtar Patel MD        Or   Alpha Bathe acetaminophen (TYLENOL) suppository 650 mg  650 mg Rectal Q6H PRN Avtar Patel MD        magnesium hydroxide (MILK OF MAGNESIA) 400 MG/5ML suspension 30 mL  30 mL Oral Daily PRN Avtar Patel MD        enoxaparin (LOVENOX) injection 40 mg  40 mg Subcutaneous Q24H Avtar Patel MD   40 mg at 04/23/20 1800    potassium chloride (KLOR-CON M) extended release tablet 40 mEq  40 mEq Oral PRN Avtar Patel MD        Or   Alpha Bathcesar potassium bicarb-citric acid (EFFER-K) effervescent tablet 40 mEq  40 mEq Oral PRN Avtar Patel MD        Or   Alpha Bathcesar potassium chloride 10 mEq/100 mL IVPB (Peripheral Line)  10 mEq Intravenous PRN Avtar Patel MD        potassium chloride 10 mEq/100 mL IVPB (Peripheral Line)  10 mEq Intravenous PRN Avtar Patel MD        magnesium sulfate 2 g in 50 mL IVPB premix  2 g Intravenous PRN Avtar Patel MD        ondansetron (ZOFRAN) injection 4 mg  4 mg Intravenous Q6H PRN Avtar Patel MD        piperacillin-tazobactam (ZOSYN) 3.375 g in dextrose 5 % 50 mL IVPB (mini-bag)  3.375 g Intravenous Q6H Avtar Patel  mL/hr at 04/24/20 1057 3.375 g at 04/24/20 1057    vancomycin (VANCOCIN) 1,250 mg in dextrose 5 % 250 mL IVPB  1,250 mg Intravenous Q12H Avtar Patel MD   Stopped at 04/24/20 0813    vancomycin (VANCOCIN) intermittent dosing (placeholder)   Other RX Placeholder Avtar Patel MD        pantoprazole (PROTONIX) tablet 40 mg  40 mg Oral QAM AC Avtar Patel MD   40 mg at 04/24/20 0533    hydrocortisone 1 % cream   Topical BID Avtar Patel MD        diphenhydrAMINE (BENADRYL) tablet 25 mg  25 mg Oral 4 times per day Paula Chavez DO   25 mg at 04/24/20 1100    aspirin tablet 325 mg  031 mg Oral Daily Paula Chavez DO   033 mg at 04/24/20 0800    methylPREDNISolone (MEDROL) tablet 4 mg  4 mg Oral [facility] Paula Chavez DO   4 mg at

## 2020-04-25 VITALS
HEART RATE: 80 BPM | RESPIRATION RATE: 18 BRPM | DIASTOLIC BLOOD PRESSURE: 71 MMHG | OXYGEN SATURATION: 92 % | BODY MASS INDEX: 39.27 KG/M2 | SYSTOLIC BLOOD PRESSURE: 117 MMHG | HEIGHT: 60 IN | WEIGHT: 200 LBS | TEMPERATURE: 98 F

## 2020-04-25 LAB — VANCOMYCIN TROUGH: 14.9 UG/ML (ref 10–20)

## 2020-04-25 PROCEDURE — 80202 ASSAY OF VANCOMYCIN: CPT

## 2020-04-25 PROCEDURE — 6360000002 HC RX W HCPCS: Performed by: HOSPITALIST

## 2020-04-25 PROCEDURE — 6360000002 HC RX W HCPCS: Performed by: SURGERY

## 2020-04-25 PROCEDURE — 6370000000 HC RX 637 (ALT 250 FOR IP): Performed by: HOSPITALIST

## 2020-04-25 PROCEDURE — 6370000000 HC RX 637 (ALT 250 FOR IP): Performed by: SURGERY

## 2020-04-25 PROCEDURE — 99232 SBSQ HOSP IP/OBS MODERATE 35: CPT | Performed by: SURGERY

## 2020-04-25 PROCEDURE — 36415 COLL VENOUS BLD VENIPUNCTURE: CPT

## 2020-04-25 PROCEDURE — 2580000003 HC RX 258: Performed by: HOSPITALIST

## 2020-04-25 RX ORDER — CARVEDILOL 3.12 MG/1
3.12 TABLET ORAL 2 TIMES DAILY WITH MEALS
Qty: 60 TABLET | Refills: 3 | Status: SHIPPED | OUTPATIENT
Start: 2020-04-25 | End: 2020-06-03 | Stop reason: SDUPTHER

## 2020-04-25 RX ORDER — FAMOTIDINE 40 MG/1
40 TABLET, FILM COATED ORAL NIGHTLY
Qty: 60 TABLET | Refills: 3 | Status: ON HOLD | OUTPATIENT
Start: 2020-04-25 | End: 2020-07-03

## 2020-04-25 RX ORDER — CLINDAMYCIN HYDROCHLORIDE 300 MG/1
300 CAPSULE ORAL 3 TIMES DAILY
Qty: 21 CAPSULE | Refills: 0 | Status: SHIPPED | OUTPATIENT
Start: 2020-04-25 | End: 2020-05-02

## 2020-04-25 RX ORDER — ASPIRIN 325 MG
325 TABLET ORAL DAILY
Qty: 30 TABLET | Refills: 3 | Status: ON HOLD | OUTPATIENT
Start: 2020-04-26 | End: 2020-07-03

## 2020-04-25 RX ADMIN — DULOXETINE 60 MG: 60 CAPSULE, DELAYED RELEASE ORAL at 09:12

## 2020-04-25 RX ADMIN — PILOCARPINE HYDROCHLORIDE 5 MG: 5 TABLET, FILM COATED ORAL at 09:12

## 2020-04-25 RX ADMIN — METHYLPREDNISOLONE 4 MG: 4 TABLET ORAL at 12:12

## 2020-04-25 RX ADMIN — ATORVASTATIN CALCIUM 20 MG: 20 TABLET, FILM COATED ORAL at 09:12

## 2020-04-25 RX ADMIN — VANCOMYCIN HYDROCHLORIDE 1250 MG: 10 INJECTION, POWDER, LYOPHILIZED, FOR SOLUTION INTRAVENOUS at 06:12

## 2020-04-25 RX ADMIN — METHYLPREDNISOLONE 4 MG: 4 TABLET ORAL at 06:12

## 2020-04-25 RX ADMIN — PILOCARPINE HYDROCHLORIDE 5 MG: 5 TABLET, FILM COATED ORAL at 15:08

## 2020-04-25 RX ADMIN — COLLAGENASE SANTYL: 250 OINTMENT TOPICAL at 09:10

## 2020-04-25 RX ADMIN — ALLOPURINOL 100 MG: 100 TABLET ORAL at 09:12

## 2020-04-25 RX ADMIN — PIPERACILLIN AND TAZOBACTAM 3.38 G: 3; .375 INJECTION, POWDER, LYOPHILIZED, FOR SOLUTION INTRAVENOUS at 15:08

## 2020-04-25 RX ADMIN — Medication 600 MG: at 09:12

## 2020-04-25 RX ADMIN — SPIRONOLACTONE 25 MG: 25 TABLET ORAL at 09:12

## 2020-04-25 RX ADMIN — ASPIRIN 325 MG: 325 TABLET, FILM COATED ORAL at 09:11

## 2020-04-25 RX ADMIN — DIPHENHYDRAMINE HCL 25 MG: 25 TABLET ORAL at 01:31

## 2020-04-25 RX ADMIN — HYDROCORTISONE: 1 CREAM TOPICAL at 09:12

## 2020-04-25 RX ADMIN — PIPERACILLIN AND TAZOBACTAM 3.38 G: 3; .375 INJECTION, POWDER, LYOPHILIZED, FOR SOLUTION INTRAVENOUS at 09:21

## 2020-04-25 RX ADMIN — DIPHENHYDRAMINE HCL 25 MG: 25 TABLET ORAL at 12:12

## 2020-04-25 RX ADMIN — DIPHENHYDRAMINE HCL 25 MG: 25 TABLET ORAL at 06:12

## 2020-04-25 RX ADMIN — PANTOPRAZOLE SODIUM 40 MG: 40 TABLET, DELAYED RELEASE ORAL at 06:12

## 2020-04-25 RX ADMIN — PIPERACILLIN AND TAZOBACTAM 3.38 G: 3; .375 INJECTION, POWDER, LYOPHILIZED, FOR SOLUTION INTRAVENOUS at 01:31

## 2020-04-25 RX ADMIN — CARVEDILOL 3.12 MG: 3.12 TABLET, FILM COATED ORAL at 09:11

## 2020-04-25 RX ADMIN — LEVOTHYROXINE SODIUM 100 MCG: 100 TABLET ORAL at 06:12

## 2020-04-25 NOTE — DISCHARGE SUMMARY
Hospitalist   Discharge Summary    Chantale Parks  :  1960  MRN:  [id number]    Admit date:  2020  Discharge date:  2020    Admitting Physician:  Benson Purdy MD    Advance Directive: Full Code    Consults: surgery     Primary Care Physician:  MAEVE Baron    Discharge Diagnoses: Active Problems:    Cellulitis of left lower extremity    Insect bite of left lower leg  Resolved Problems:    * No resolved hospital problems. *      Significant Diagnostic Studies:   Xr Chest Portable    Result Date: 2020  XR CHEST PORTABLE 2020 11:15 AM HISTORY: Cough COMPARISON: 2019. FINDINGS: Frontal and lateral views of the chest were obtained. Mild emphysematous changes are present. Hazy opacities over both lungs are likely due to soft tissue attenuation. The cardiomediastinal silhouette and pulmonary vascularity are unchanged. The osseous structures and surrounding soft tissues demonstrate no acute abnormality. There are calcifications in the region of the left rotator cuff. 1. No radiographic evidence of acute cardiopulmonary process. Signed by Dr Ladonna Zaragoza on 2020 12:56 PM      Labs:    CBC:   Recent Labs     20  1210 20  0124   WBC 13.7* 12.1*   HGB 12.0 10.3*   * 404*     BMP:    Recent Labs     20  1210 20  0124   * 137   K 4.5 4.4   CL 95* 99   CO2 26 25   BUN 22* 21*   CREATININE 0.9 0.8   GLUCOSE 120* 149*     Hepatic:   Recent Labs     20  1210   AST 12   ALT 18   BILITOT 0.3   ALKPHOS 97     Troponin: No results for input(s): TROPONINI in the last 72 hours. BNP: No results for input(s): BNP in the last 72 hours. Lipids: No results for input(s): CHOL, HDL in the last 72 hours. Invalid input(s): LDLCALCU  INR: No results for input(s): INR in the last 72 hours. ABG: No results for input(s): PHART, EKY1FWK, PO2ART, HDL6QRQ, A3AONQMS, BEART in the last 72 hours.     30 Day lookback of cultures:    Blood Culture Recent:   Recent

## 2020-04-25 NOTE — PROGRESS NOTES
PHARMACY NOTE    Chantale Parks was ordered Zosyn 3.375 g IV extended infusion every 8 hours. This has been changed to Zosyn 3.375 g IV standard infusion every 6 hours to accommodate administration of multiple antibiotics. Patient is also receiving Vancomycin.     Electronically signed by Claudell Gray, [address] [place] [address] on 4/23/2020 at 4:23 PM
Pharmacy Vancomycin Consult     Vancomycin Day: 3  Current Dosinmg Q12hr    Temp max:  98.7    Recent Labs     20  1210 20  0124   BUN 22* 21*       Recent Labs     20  1210 20  0124   CREATININE 0.9 0.8       Recent Labs     20  1210 20  0124   WBC 13.7* 12.1*         Intake/Output Summary (Last 24 hours) at 2020 0444  Last data filed at 2020 1821  Gross per 24 hour   Intake 480 ml   Output --   Net 480 ml       Culture Date Source Results   20 Blood No growth                 Ht Readings from Last 1 Encounters:   20 5' (1.524 m)        Wt Readings from Last 1 Encounters:   20 200 lb (90.7 kg)         Body mass index is 39.06 kg/m². Estimated Creatinine Clearance: 76 mL/min (based on SCr of 0.8 mg/dL).     Trough: 14.9    Assessment/Plan: Level good; continue same    Electronically signed by Randolph Mckay [address] [place] [address] on 2020 at 4:44 AM
Pt medication list verified with [address] [place] [address] pharmacy.   Electronically signed by Alison Cisneros RN on 4/23/2020 at 5:51 PM
[facility] General Surgery Progress Note    Chief Complaint:   Chief Complaint   Patient presents with    Allergic Reaction       SUBJECTIVE:  Ms. Eileen Dennis is a 62 y.o. female is seen and examined at bedside. She notes significantly less pain in her leg today. She is out of bed to chair and would like to go home. She denies fever, chills, chest pain, SOB, n/v/d/c.      OBJECTIVE:  /71   Pulse 80   Temp 98 °F (36.7 °C) (Temporal)   Resp 18   Ht 5' (1.524 m)   Wt 200 lb (90.7 kg)   SpO2 92%   BMI 39.06 kg/m²   CONSTITUTIONAL: Alert, appropriate, no acute distress  SKIN: warm, dry; eschar to posterior left calf unchanged, erythema significantly improved today. HEENT: NCAT, Non icteric, PERR. Trachea Midline. CHEST/LUNGS: CTA bilaterally. Normal respiratory effort. CARDIOVASCULAR: RRR, No edema today. ABDOMEN: soft, ND, appropriately TTP, +BS  NEUROLOGIC: CN II-XI grossly intact, no motor or sensory deficits   MUSCULOSKELETAL: No clubbing or cyanosis. PSYCHIATRIC:  Normal Mood and Affect. Alert and Oriented. Labs:  CBC:   Recent Labs     04/23/20  1210 04/24/20  0124   WBC 13.7* 12.1*   HGB 12.0 10.3*   * 404*     BMP:    Recent Labs     04/23/20  1210 04/24/20  0124   * 137   K 4.5 4.4   CL 95* 99   CO2 26 25   BUN 22* 21*   CREATININE 0.9 0.8   GLUCOSE 120* 149*       ASSESSMENT:  Active Problems:    Cellulitis of left lower extremity    Insect bite of left lower leg  Resolved Problems:    * No resolved hospital problems. *      PLAN:  Surgically stable for discharge. Recommend MRSA coverage oral antibiotics and continued spider bite regimen protocol without bactrim or dapsone d/t allergies. Outpatient [facility] [facility] f/u.      Lubna Massey DO   Electronically Signed on 4/25/2020 at 10:41 AM
Negative   UROBILINOGEN 0.2   BILIRUBINUR Negative   BLOODU Negative   GLUCOSEU 100*       Imaging:   XR CHEST PORTABLE   Final Result   1. No radiographic evidence of acute cardiopulmonary process. Signed by Dr Faustino Agustin on 2020 12:56 PM        Micro: No results found for: BC, No components found for: [place]  Patient Active Problem List    Diagnosis Date Noted    Cellulitis and abscess of foot     Biliary colic     Edema 2019    History of spontaneous  2019    Hyperlipidemia 2019    Nasal septal perforation 2018    Chronic left maxillary sinusitis 2018    BMI 39.0-39.9,adult 2018    Moderate episode of recurrent major depressive disorder ([facility] [address]     Hyperglycemia 2018    Sleep apnea, obstructive 02/15/2018    Asterixis 02/15/2018    Tremor, essential 02/15/2018    Chronic midline low back pain without sciatica 2017    Hypokalemia 2017    Spondylosis with myelopathy, lumbar region 2017    Dry mouth     RLS (restless legs syndrome)     Plantar fasciitis     Bipolar I disorder with mixed features ([address] 04/15/2014    Hypertension 04/15/2014       Assessment/plan: Active Problems:    Cellulitis and abscess of foot  Resolved Problems:    * No resolved hospital problems. *      Plan:       20  The patient is a 62 y.o. female with PMH  of morbid obesity , bipolar, HTN, ? RADHA who presented with above apparently this been going on for a week?  Brown Recluse bite with necrosis and subsequent cellulitis, also allergic reaction to Bactrim, [facility] and mikey Grant MD  Hospitalist Service  2020  7:39 AM

## 2020-04-27 ENCOUNTER — TELEPHONE (OUTPATIENT)
Dept: PRIMARY CARE CLINIC | Age: 60
End: 2020-04-27

## 2020-04-27 ENCOUNTER — TELEPHONE (OUTPATIENT)
Dept: ADMINISTRATIVE | Age: 60
End: 2020-04-27

## 2020-04-27 NOTE — TELEPHONE ENCOUNTER
[facility] Transitions Initial Follow Up Call    Outreach made within 2 business days of discharge: Yes    Patient: Sapphire Lorenzana Patient : 1960   MRN: [id number]  Reason for Admission: cellulitis  Discharge Date: 20       Spoke with: no one. LM.  Needs an appt in 7 days    Discharge department/facility: [facility]        Scheduled appointment with PCP within 7-14 days    Follow Up  Future Appointments   Date Time Provider Sanket Cabrera   2021 10:30 AM Isabela Kimbrough MD [facility] NEURO P-EUGENIE Lowe MA

## 2020-04-28 LAB
BLOOD CULTURE, ROUTINE: NORMAL
CULTURE, BLOOD 2: NORMAL

## 2020-04-28 NOTE — TELEPHONE ENCOUNTER
Ishmael [facility] Transitions Initial Follow Up Call    Outreach made within 2 business days of discharge: Yes    Patient: Marylu Chaudhry Patient : 1960   MRN: [id number]  Reason for Admission: CellulitisDischarge Date: 20       Spoke with: pt    Discharge department/facility: [address] [place] [address] Interactive Patient Contact:  Was patient able to fill all prescriptions: Yes  Was patient instructed to bring all medications to the follow-up visit: Yes  Is patient taking all medications as directed in the discharge summary?  Yes  Does patient understand their discharge instructions: Yes  Does patient have questions or concerns that need addressed prior to 7-14 day follow up office visit: no    Scheduled appointment with PCP within 7-14 days    Follow Up  Future Appointments   Date Time Provider Sanket Cabrera   2020  2:45 PM Reny Doyle PA-C [facility]   2021 10:30 AM Hari Dewitt MD [facility] NE[facility]O [facility]       Nieves Joy MA

## 2020-04-29 ENCOUNTER — OFFICE VISIT (OUTPATIENT)
Dept: SURGERY | Age: 60
End: 2020-04-29
Payer: COMMERCIAL

## 2020-04-29 VITALS
DIASTOLIC BLOOD PRESSURE: 91 MMHG | BODY MASS INDEX: 36.91 KG/M2 | SYSTOLIC BLOOD PRESSURE: 137 MMHG | TEMPERATURE: 97.1 F | WEIGHT: 188 LBS | HEART RATE: 92 BPM | HEIGHT: 60 IN

## 2020-04-29 PROCEDURE — 99213 OFFICE O/P EST LOW 20 MIN: CPT | Performed by: PHYSICIAN ASSISTANT

## 2020-04-29 NOTE — PROGRESS NOTES
Mrs. Brigitte Yip is a pleasant 71-year-old  female that presents for a 1-week follow-up after being hospitalized for a spider bite on 4/24/2020. She was seen in consultation by Dr. Claire Rivas and was placed on the spider bite regiment except for dapsone. Currently she reports that she is feeling better with the spider bite wound having less pain and no drainage. She is tolerated the medications well and is about to complete the medications by the end of the week. Examination revealed the patient to have a 1.5 cm x 1.5 cm necrotic area in the central portion of the circular spider bite wound to the right calf. The necrosis is noted be dry with no drainage. There was no peripheral erythema or any evidence of infection. Her tenderness seems to be appropriate to palpation with no fluctuance. Impression: Improving spider bite to the right calf status post 2 weeks from initial injury    Plan: Patient is to follow-up in the clinic as a virtual visit in 2 weeks. She is advised not to take any more medications after she completes her current regiment. She is reminded to call if this worsens or she has any questions prior to her follow-up. She was advised to apply a Band-Aid on an as needed basis for drainage. Otherwise she can leave it open to air and clean the wound with soap and water.         Electronically signed by Albert Vo PA-C on 4/29/20 at 4:18 PM CDT

## 2020-05-11 ENCOUNTER — NURSE ONLY (OUTPATIENT)
Dept: FAMILY MEDICINE CLINIC | Age: 60
End: 2020-05-11
Payer: COMMERCIAL

## 2020-05-11 PROCEDURE — 93000 ELECTROCARDIOGRAM COMPLETE: CPT | Performed by: NURSE PRACTITIONER

## 2020-05-13 ENCOUNTER — HOSPITAL ENCOUNTER (OUTPATIENT)
Dept: VASCULAR LAB | Age: 60
Discharge: HOME | End: 2020-05-13
Payer: COMMERCIAL

## 2020-05-13 ENCOUNTER — OFFICE VISIT (OUTPATIENT)
Dept: SURGERY | Age: 60
End: 2020-05-13
Payer: COMMERCIAL

## 2020-05-13 VITALS — DIASTOLIC BLOOD PRESSURE: 82 MMHG | HEART RATE: 80 BPM | SYSTOLIC BLOOD PRESSURE: 128 MMHG

## 2020-05-13 PROBLEM — T63.301S: Status: ACTIVE | Noted: 2020-05-13

## 2020-05-13 PROBLEM — M79.662 BILATERAL CALF PAIN: Status: ACTIVE | Noted: 2020-05-13

## 2020-05-13 PROBLEM — M79.661 BILATERAL CALF PAIN: Status: ACTIVE | Noted: 2020-05-13

## 2020-05-13 PROBLEM — L03.116 CELLULITIS OF LEFT FOOT: Status: ACTIVE | Noted: 2020-05-13

## 2020-05-13 PROCEDURE — 99213 OFFICE O/P EST LOW 20 MIN: CPT | Performed by: PHYSICIAN ASSISTANT

## 2020-05-13 PROCEDURE — 93971 EXTREMITY STUDY: CPT

## 2020-05-13 RX ORDER — KETOROLAC TROMETHAMINE 10 MG/1
TABLET, FILM COATED ORAL
Qty: 21 TABLET | Refills: 0 | Status: ON HOLD | OUTPATIENT
Start: 2020-05-13 | End: 2020-07-03

## 2020-05-13 RX ORDER — CLINDAMYCIN HYDROCHLORIDE 300 MG/1
300 CAPSULE ORAL 3 TIMES DAILY
Qty: 42 CAPSULE | Refills: 0 | Status: SHIPPED | OUTPATIENT
Start: 2020-05-13 | End: 2020-05-27

## 2020-05-14 ENCOUNTER — TELEPHONE (OUTPATIENT)
Dept: SURGERY | Age: 60
End: 2020-05-14

## 2020-05-14 ENCOUNTER — HOSPITAL ENCOUNTER (OUTPATIENT)
Dept: INFUSION THERAPY | Age: 60
Setting detail: INFUSION SERIES
Discharge: HOME | End: 2020-05-14
Payer: COMMERCIAL

## 2020-05-14 VITALS
OXYGEN SATURATION: 93 % | TEMPERATURE: 97.8 F | RESPIRATION RATE: 20 BRPM | SYSTOLIC BLOOD PRESSURE: 121 MMHG | DIASTOLIC BLOOD PRESSURE: 73 MMHG | HEART RATE: 86 BPM

## 2020-05-14 DIAGNOSIS — M79.661 BILATERAL CALF PAIN: ICD-10-CM

## 2020-05-14 DIAGNOSIS — T63.301S: ICD-10-CM

## 2020-05-14 DIAGNOSIS — M79.662 BILATERAL CALF PAIN: ICD-10-CM

## 2020-05-14 DIAGNOSIS — L03.116 CELLULITIS OF LEFT FOOT: Primary | ICD-10-CM

## 2020-05-14 PROCEDURE — 2580000003 HC RX 258: Performed by: PHYSICIAN ASSISTANT

## 2020-05-14 PROCEDURE — 96365 THER/PROPH/DIAG IV INF INIT: CPT

## 2020-05-14 PROCEDURE — 6360000002 HC RX W HCPCS: Performed by: PHYSICIAN ASSISTANT

## 2020-05-14 RX ADMIN — SODIUM CHLORIDE 100 MG: 9 INJECTION, SOLUTION INTRAVENOUS at 14:26

## 2020-05-14 NOTE — TELEPHONE ENCOUNTER
Patient called in and stated that the infusions are not helping and that the bite looks worse today. She would like a call back to advise on what she needs to do. Marie 30 [phone]  [facility].   Thank you

## 2020-05-14 NOTE — TELEPHONE ENCOUNTER
Infusion lab called and asked that Kaleb call either Britni or Kailyn Salter about orders for the infusion.    Ph [phone]  CC Cynthia Callahan  Thank you

## 2020-05-21 ENCOUNTER — TELEPHONE (OUTPATIENT)
Dept: FAMILY MEDICINE CLINIC | Age: 60
End: 2020-05-21

## 2020-05-21 RX ORDER — LEVOTHYROXINE SODIUM 0.12 MG/1
125 TABLET ORAL DAILY
Qty: 90 TABLET | Refills: 0 | Status: SHIPPED | OUTPATIENT
Start: 2020-05-21 | End: 2020-08-31

## 2020-06-03 ENCOUNTER — TELEMEDICINE (OUTPATIENT)
Dept: FAMILY MEDICINE CLINIC | Age: 60
End: 2020-06-03
Payer: COMMERCIAL

## 2020-06-03 VITALS — OXYGEN SATURATION: 95 % | HEART RATE: 108 BPM | SYSTOLIC BLOOD PRESSURE: 143 MMHG | DIASTOLIC BLOOD PRESSURE: 84 MMHG

## 2020-06-03 PROCEDURE — 99213 OFFICE O/P EST LOW 20 MIN: CPT | Performed by: NURSE PRACTITIONER

## 2020-06-03 RX ORDER — ALLOPURINOL 100 MG/1
TABLET ORAL
Qty: 90 TABLET | Refills: 1 | Status: SHIPPED | OUTPATIENT
Start: 2020-06-03 | End: 2020-09-09

## 2020-06-03 RX ORDER — LINEZOLID 600 MG/1
600 TABLET, FILM COATED ORAL 2 TIMES DAILY
Status: ON HOLD | COMMUNITY
End: 2020-07-05 | Stop reason: HOSPADM

## 2020-06-03 RX ORDER — CARVEDILOL 6.25 MG/1
6.25 TABLET ORAL 2 TIMES DAILY WITH MEALS
Qty: 180 TABLET | Refills: 1 | Status: SHIPPED | OUTPATIENT
Start: 2020-06-03 | End: 2020-09-09

## 2020-06-03 RX ORDER — OXYCODONE AND ACETAMINOPHEN 7.5; 325 MG/1; MG/1
1 TABLET ORAL EVERY 4 HOURS PRN
COMMUNITY
End: 2020-07-07

## 2020-06-03 RX ORDER — LEVOFLOXACIN 500 MG/1
500 TABLET, FILM COATED ORAL DAILY
Status: ON HOLD | COMMUNITY
End: 2020-07-03

## 2020-06-03 ASSESSMENT — ENCOUNTER SYMPTOMS: SHORTNESS OF BREATH: 0

## 2020-06-03 NOTE — PROGRESS NOTES
Hypothyroidism     Menopause     Overactive bladder     Plantar fasciitis     RLS (restless legs syndrome)     Sleep apnea     Thyroid disease    ,   Past Surgical History:   Procedure Laterality Date    CARPAL TUNNEL RELEASE Bilateral 2002    CHOLECYSTECTOMY  10/2014    COLON SURGERY      COLON SURGERY      biopsy     COLONOSCOPY      CYST REMOVAL  2017    Under left ear    HARDWARE REMOVAL Right 2011    Foot     HERNIA REPAIR  10/2014    NOSE SURGERY  2017; 2019    Dr Tal Brewer L/S,1 LEVEL Bilateral 2017    INJECTION MEDICATION FACET JOINT performed by Reena Lira at [facility]   ,   Social History     Tobacco Use    Smoking status: Current Every Day Smoker     Packs/day: 1.00     Years: 30.00     Pack years: 30.00     Types: Cigarettes     Last attempt to quit: 2003     Years since quittin.8    Smokeless tobacco: Never Used    Tobacco comment: Pt not interested at this time in stopping. Substance Use Topics    Alcohol use: Yes     Comment: rarely    Drug use: No     Comment: pt admits \"may have misused in past\"--Pt was vague with details.    ,   Family History   Problem Relation Age of Onset    Cancer Mother         lung    Heart Disease Mother     Hypertension Mother     Bipolar Disorder Mother     Heart Disease Father     Hypertension Father        PHYSICAL EXAMINATION:  [ INSTRUCTIONS:  \"[x]\" Indicates a positive item  \"[]\" Indicates a negative item  -- DELETE ALL ITEMS NOT EXAMINED]  Vital Signs: BP (!) 143/84 Comment: home report  Pulse 108   SpO2 95%     Constitutional: [x] Appears well-developed and well-nourished [x] No apparent distress      [] Abnormal-   Mental status  [x] Alert and awake  [x] Oriented to person/place/time [x]Able to follow commands      Eyes:  EOM    [x]  Normal  [] Abnormal-  Sclera  [x]  Normal  [] Abnormal -         Discharge [x]  None visible  [] Abnormal -    HENT:   [x] Normocephalic, atraumatic. [] Abnormal   [x] Mouth/Throat: Mucous membranes are moist.     External Ears [x] Normal  [] Abnormal-     Neck: [x] No visualized mass     Pulmonary/Chest: [x] Respiratory effort normal.  [x] No visualized signs of difficulty breathing or respiratory distress        [] Abnormal-      Musculoskeletal:   [] Normal gait with no signs of ataxia         [x] Normal range of motion of neck        [x] Abnormal- pt does have limp when walking throughout home. Swelling noted to affected leg. Neurological:        [x] No Facial Asymmetry (Cranial nerve 7 motor function) (limited exam to video visit)          [x] No gaze palsy        [] Abnormal-         Skin:        [x] No significant exanthematous lesions or discoloration noted on facial skin         [] Abnormal-            Psychiatric:       [x] Normal Affect [x] No Hallucinations        [] Abnormal-     Other pertinent observable physical exam findings-     ASSESSMENT/PLAN:  1. Cellulitis of left leg  -continue tx and wound care ordered by surgeon  - levoFLOXacin (LEVAQUIN) 500 MG tablet; Take 500 mg by mouth daily x10d  - linezolid (ZYVOX) 600 MG tablet; Take 600 mg by mouth 2 times daily x14d    2. Elevated uric acid in blood    - allopurinol (ZYLOPRIM) 100 MG tablet; 1 po daily  Dispense: 90 tablet; Refill: 1    Keep f/u with surgeon next week  F/u here prn   No follow-ups on file. Adin Cooper is a 62 y.o. female being evaluated by a Virtual Visit (video visit) encounter to address concerns as mentioned above. A caregiver was present when appropriate. Due to this being a TeleHealth encounter (During OSWMJ-88 public health emergency), evaluation of the following organ systems was limited: Vitals/Constitutional/EENT/Resp/CV/GI//MS/Neuro/Skin/Heme-Lymph-Imm.   Pursuant to the emergency declaration under the [facility], [address] [place] [address] authority and the [employer] [facility] Act, this Virtual Visit was conducted with patient's (and/or legal guardian's) consent, to reduce the patient's risk of exposure to COVID-19 and provide necessary medical care. The patient (and/or legal guardian) has also been advised to contact this office for worsening conditions or problems, and seek emergency medical treatment and/or call 911 if deemed necessary. Services were provided through a video synchronous discussion virtually to substitute for in-person clinic visit. Patient and provider were located at their individual homes. --MAEVE Shankar on 6/3/2020 at 3:34 PM    An electronic signature was used to authenticate this note.

## 2020-07-03 ENCOUNTER — HOSPITAL ENCOUNTER (INPATIENT)
Age: 60
LOS: 2 days | Discharge: HOME | DRG: 603 | End: 2020-07-05
Attending: FAMILY MEDICINE | Admitting: INTERNAL MEDICINE
Payer: COMMERCIAL

## 2020-07-03 LAB
ALBUMIN SERPL-MCNC: 3.6 G/DL (ref 3.5–5.2)
ALP BLD-CCNC: 94 U/L (ref 35–104)
ALT SERPL-CCNC: 14 U/L (ref 5–33)
ANION GAP SERPL CALCULATED.3IONS-SCNC: 14 MMOL/L (ref 7–19)
AST SERPL-CCNC: 11 U/L (ref 5–32)
BASOPHILS ABSOLUTE: 0 K/UL (ref 0–0.2)
BASOPHILS RELATIVE PERCENT: 0.4 % (ref 0–1)
BILIRUB SERPL-MCNC: <0.2 MG/DL (ref 0.2–1.2)
BUN BLDV-MCNC: 8 MG/DL (ref 6–20)
C-REACTIVE PROTEIN: 7.11 MG/DL (ref 0–0.5)
CALCIUM SERPL-MCNC: 9.2 MG/DL (ref 8.6–10)
CHLORIDE BLD-SCNC: 96 MMOL/L (ref 98–111)
CO2: 29 MMOL/L (ref 22–29)
CREAT SERPL-MCNC: 0.9 MG/DL (ref 0.5–0.9)
EOSINOPHILS ABSOLUTE: 0.6 K/UL (ref 0–0.6)
EOSINOPHILS RELATIVE PERCENT: 5 % (ref 0–5)
FERRITIN: 47.1 NG/ML (ref 13–150)
GFR NON-AFRICAN AMERICAN: >60
GLUCOSE BLD-MCNC: 148 MG/DL (ref 74–109)
HCT VFR BLD CALC: 30.2 % (ref 37–47)
HEMOGLOBIN: 9.1 G/DL (ref 12–16)
IMMATURE GRANULOCYTES #: 0.1 K/UL
LACTIC ACID: 1.4 MMOL/L (ref 0.5–1.9)
LYMPHOCYTES ABSOLUTE: 3.1 K/UL (ref 1.1–4.5)
LYMPHOCYTES RELATIVE PERCENT: 27.5 % (ref 20–40)
MCH RBC QN AUTO: 26.5 PG (ref 27–31)
MCHC RBC AUTO-ENTMCNC: 30.1 G/DL (ref 33–37)
MCV RBC AUTO: 87.8 FL (ref 81–99)
MONOCYTES ABSOLUTE: 0.5 K/UL (ref 0–0.9)
MONOCYTES RELATIVE PERCENT: 4.1 % (ref 0–10)
NEUTROPHILS ABSOLUTE: 6.9 K/UL (ref 1.5–7.5)
NEUTROPHILS RELATIVE PERCENT: 62.2 % (ref 50–65)
PDW BLD-RTO: 17.2 % (ref 11.5–14.5)
PLATELET # BLD: 450 K/UL (ref 130–400)
PMV BLD AUTO: 9.8 FL (ref 9.4–12.3)
POTASSIUM REFLEX MAGNESIUM: 3.8 MMOL/L (ref 3.5–5)
PROCALCITONIN: 0.05 NG/ML (ref 0–0.09)
RBC # BLD: 3.44 M/UL (ref 4.2–5.4)
SEDIMENTATION RATE, ERYTHROCYTE: 106 MM/HR (ref 0–25)
SODIUM BLD-SCNC: 139 MMOL/L (ref 136–145)
TOTAL PROTEIN: 6.3 G/DL (ref 6.6–8.7)
WBC # BLD: 11.1 K/UL (ref 4.8–10.8)

## 2020-07-03 PROCEDURE — 82728 ASSAY OF FERRITIN: CPT

## 2020-07-03 PROCEDURE — 85652 RBC SED RATE AUTOMATED: CPT

## 2020-07-03 PROCEDURE — 85025 COMPLETE CBC W/AUTO DIFF WBC: CPT

## 2020-07-03 PROCEDURE — 87040 BLOOD CULTURE FOR BACTERIA: CPT

## 2020-07-03 PROCEDURE — 36415 COLL VENOUS BLD VENIPUNCTURE: CPT

## 2020-07-03 PROCEDURE — 6370000000 HC RX 637 (ALT 250 FOR IP): Performed by: INTERNAL MEDICINE

## 2020-07-03 PROCEDURE — 1210000000 HC MED SURG R&B

## 2020-07-03 PROCEDURE — 84145 PROCALCITONIN (PCT): CPT

## 2020-07-03 PROCEDURE — 6360000002 HC RX W HCPCS: Performed by: INTERNAL MEDICINE

## 2020-07-03 PROCEDURE — 80053 COMPREHEN METABOLIC PANEL: CPT

## 2020-07-03 PROCEDURE — 83605 ASSAY OF LACTIC ACID: CPT

## 2020-07-03 PROCEDURE — 2580000003 HC RX 258: Performed by: INTERNAL MEDICINE

## 2020-07-03 PROCEDURE — 86140 C-REACTIVE PROTEIN: CPT

## 2020-07-03 RX ORDER — OXYCODONE HYDROCHLORIDE AND ACETAMINOPHEN 5; 325 MG/1; MG/1
1 TABLET ORAL EVERY 6 HOURS PRN
Status: DISCONTINUED | OUTPATIENT
Start: 2020-07-03 | End: 2020-07-04

## 2020-07-03 RX ORDER — HEPARIN SODIUM 5000 [USP'U]/ML
5000 INJECTION, SOLUTION INTRAVENOUS; SUBCUTANEOUS EVERY 8 HOURS SCHEDULED
Status: DISCONTINUED | OUTPATIENT
Start: 2020-07-03 | End: 2020-07-05 | Stop reason: HOSPADM

## 2020-07-03 RX ORDER — ALLOPURINOL 100 MG/1
100 TABLET ORAL DAILY
Status: DISCONTINUED | OUTPATIENT
Start: 2020-07-04 | End: 2020-07-05 | Stop reason: HOSPADM

## 2020-07-03 RX ORDER — ASPIRIN 81 MG/1
81 TABLET ORAL DAILY
Status: DISCONTINUED | OUTPATIENT
Start: 2020-07-03 | End: 2020-07-05 | Stop reason: HOSPADM

## 2020-07-03 RX ORDER — LEVOTHYROXINE SODIUM 0.12 MG/1
125 TABLET ORAL DAILY
Status: DISCONTINUED | OUTPATIENT
Start: 2020-07-04 | End: 2020-07-05 | Stop reason: HOSPADM

## 2020-07-03 RX ORDER — CEPHALEXIN 250 MG/1
250 CAPSULE ORAL 3 TIMES DAILY
Status: ON HOLD | COMMUNITY
End: 2020-07-05 | Stop reason: HOSPADM

## 2020-07-03 RX ORDER — ALBUTEROL SULFATE 2.5 MG/3ML
2.5 SOLUTION RESPIRATORY (INHALATION) EVERY 6 HOURS PRN
Status: DISCONTINUED | OUTPATIENT
Start: 2020-07-03 | End: 2020-07-05 | Stop reason: HOSPADM

## 2020-07-03 RX ORDER — SPIRONOLACTONE 25 MG/1
25 TABLET ORAL DAILY
Status: DISCONTINUED | OUTPATIENT
Start: 2020-07-03 | End: 2020-07-05 | Stop reason: HOSPADM

## 2020-07-03 RX ORDER — POTASSIUM CHLORIDE 7.45 MG/ML
10 INJECTION INTRAVENOUS PRN
Status: DISCONTINUED | OUTPATIENT
Start: 2020-07-03 | End: 2020-07-05 | Stop reason: HOSPADM

## 2020-07-03 RX ORDER — ONDANSETRON 2 MG/ML
4 INJECTION INTRAMUSCULAR; INTRAVENOUS EVERY 6 HOURS PRN
Status: DISCONTINUED | OUTPATIENT
Start: 2020-07-03 | End: 2020-07-05 | Stop reason: HOSPADM

## 2020-07-03 RX ORDER — POTASSIUM CHLORIDE 20 MEQ/1
40 TABLET, EXTENDED RELEASE ORAL PRN
Status: DISCONTINUED | OUTPATIENT
Start: 2020-07-03 | End: 2020-07-05 | Stop reason: HOSPADM

## 2020-07-03 RX ORDER — ASPIRIN 81 MG/1
81 TABLET ORAL DAILY
COMMUNITY
End: 2021-01-08

## 2020-07-03 RX ORDER — ALPRAZOLAM 0.5 MG/1
0.5 TABLET ORAL 2 TIMES DAILY PRN
Status: DISCONTINUED | OUTPATIENT
Start: 2020-07-03 | End: 2020-07-05 | Stop reason: HOSPADM

## 2020-07-03 RX ORDER — SODIUM CHLORIDE 0.9 % (FLUSH) 0.9 %
10 SYRINGE (ML) INJECTION PRN
Status: DISCONTINUED | OUTPATIENT
Start: 2020-07-03 | End: 2020-07-05 | Stop reason: HOSPADM

## 2020-07-03 RX ORDER — QUETIAPINE FUMARATE 50 MG/1
50 TABLET, FILM COATED ORAL NIGHTLY
Status: DISCONTINUED | OUTPATIENT
Start: 2020-07-03 | End: 2020-07-05 | Stop reason: HOSPADM

## 2020-07-03 RX ORDER — CARVEDILOL 6.25 MG/1
6.25 TABLET ORAL 2 TIMES DAILY WITH MEALS
Status: DISCONTINUED | OUTPATIENT
Start: 2020-07-03 | End: 2020-07-05 | Stop reason: HOSPADM

## 2020-07-03 RX ORDER — ONDANSETRON 4 MG/1
4 TABLET, ORALLY DISINTEGRATING ORAL EVERY 8 HOURS PRN
Status: DISCONTINUED | OUTPATIENT
Start: 2020-07-03 | End: 2020-07-05 | Stop reason: HOSPADM

## 2020-07-03 RX ORDER — TIZANIDINE 4 MG/1
4 TABLET ORAL 3 TIMES DAILY PRN
Status: DISCONTINUED | OUTPATIENT
Start: 2020-07-03 | End: 2020-07-05 | Stop reason: HOSPADM

## 2020-07-03 RX ORDER — PILOCARPINE HYDROCHLORIDE 5 MG/1
5 TABLET, FILM COATED ORAL 3 TIMES DAILY
Status: DISCONTINUED | OUTPATIENT
Start: 2020-07-03 | End: 2020-07-05 | Stop reason: HOSPADM

## 2020-07-03 RX ORDER — ACETAMINOPHEN 650 MG/1
650 SUPPOSITORY RECTAL EVERY 6 HOURS PRN
Status: DISCONTINUED | OUTPATIENT
Start: 2020-07-03 | End: 2020-07-05 | Stop reason: HOSPADM

## 2020-07-03 RX ORDER — ATORVASTATIN CALCIUM 40 MG/1
40 TABLET, FILM COATED ORAL NIGHTLY
Status: DISCONTINUED | OUTPATIENT
Start: 2020-07-03 | End: 2020-07-05 | Stop reason: HOSPADM

## 2020-07-03 RX ORDER — ACETAMINOPHEN 325 MG/1
650 TABLET ORAL EVERY 6 HOURS PRN
Status: DISCONTINUED | OUTPATIENT
Start: 2020-07-03 | End: 2020-07-05 | Stop reason: HOSPADM

## 2020-07-03 RX ORDER — SODIUM CHLORIDE 0.9 % (FLUSH) 0.9 %
10 SYRINGE (ML) INJECTION EVERY 12 HOURS SCHEDULED
Status: DISCONTINUED | OUTPATIENT
Start: 2020-07-03 | End: 2020-07-05 | Stop reason: HOSPADM

## 2020-07-03 RX ORDER — VITAMIN B COMPLEX
1000 TABLET ORAL DAILY
Status: DISCONTINUED | OUTPATIENT
Start: 2020-07-04 | End: 2020-07-05 | Stop reason: HOSPADM

## 2020-07-03 RX ORDER — BUMETANIDE 1 MG/1
1 TABLET ORAL 2 TIMES DAILY
Status: DISCONTINUED | OUTPATIENT
Start: 2020-07-03 | End: 2020-07-05 | Stop reason: HOSPADM

## 2020-07-03 RX ADMIN — HEPARIN SODIUM 5000 UNITS: 5000 INJECTION INTRAVENOUS; SUBCUTANEOUS at 22:24

## 2020-07-03 RX ADMIN — CARVEDILOL 6.25 MG: 6.25 TABLET, FILM COATED ORAL at 18:42

## 2020-07-03 RX ADMIN — MUPIROCIN: 20 OINTMENT TOPICAL at 22:25

## 2020-07-03 RX ADMIN — TIZANIDINE 4 MG: 4 TABLET ORAL at 22:27

## 2020-07-03 RX ADMIN — PRAMIPEXOLE DIHYDROCHLORIDE 1.5 MG: 1 TABLET ORAL at 22:25

## 2020-07-03 RX ADMIN — ALPRAZOLAM 0.5 MG: 0.5 TABLET ORAL at 22:25

## 2020-07-03 RX ADMIN — OXYCODONE HYDROCHLORIDE AND ACETAMINOPHEN 1 TABLET: 5; 325 TABLET ORAL at 23:29

## 2020-07-03 RX ADMIN — QUETIAPINE FUMARATE 50 MG: 50 TABLET ORAL at 22:25

## 2020-07-03 RX ADMIN — ATORVASTATIN CALCIUM 40 MG: 40 TABLET, FILM COATED ORAL at 22:25

## 2020-07-03 RX ADMIN — PIPERACILLIN SODIUM AND TAZOBACTAM SODIUM 3.38 G: 3; .375 INJECTION, POWDER, LYOPHILIZED, FOR SOLUTION INTRAVENOUS at 15:29

## 2020-07-03 RX ADMIN — DEXTROSE MONOHYDRATE 1250 MG: 50 INJECTION, SOLUTION INTRAVENOUS at 17:19

## 2020-07-03 RX ADMIN — PIPERACILLIN SODIUM AND TAZOBACTAM SODIUM 3.38 G: 3; .375 INJECTION, POWDER, LYOPHILIZED, FOR SOLUTION INTRAVENOUS at 22:48

## 2020-07-03 RX ADMIN — HEPARIN SODIUM 5000 UNITS: 5000 INJECTION INTRAVENOUS; SUBCUTANEOUS at 15:29

## 2020-07-03 RX ADMIN — BUMETANIDE 1 MG: 1 TABLET ORAL at 18:42

## 2020-07-03 RX ADMIN — SODIUM CHLORIDE, PRESERVATIVE FREE 10 ML: 5 INJECTION INTRAVENOUS at 22:25

## 2020-07-03 RX ADMIN — OXYCODONE HYDROCHLORIDE AND ACETAMINOPHEN 1 TABLET: 5; 325 TABLET ORAL at 17:19

## 2020-07-03 ASSESSMENT — PAIN DESCRIPTION - LOCATION
LOCATION: LEG

## 2020-07-03 ASSESSMENT — PAIN SCALES - GENERAL
PAINLEVEL_OUTOF10: 0
PAINLEVEL_OUTOF10: 6
PAINLEVEL_OUTOF10: 6
PAINLEVEL_OUTOF10: 8
PAINLEVEL_OUTOF10: 7

## 2020-07-03 ASSESSMENT — PAIN DESCRIPTION - FREQUENCY
FREQUENCY: INTERMITTENT

## 2020-07-03 ASSESSMENT — PAIN DESCRIPTION - ORIENTATION
ORIENTATION: LEFT

## 2020-07-03 ASSESSMENT — PAIN DESCRIPTION - DESCRIPTORS
DESCRIPTORS: ACHING;THROBBING
DESCRIPTORS: THROBBING
DESCRIPTORS: THROBBING;ACHING
DESCRIPTORS: ACHING;THROBBING

## 2020-07-03 ASSESSMENT — PAIN DESCRIPTION - ONSET
ONSET: ON-GOING

## 2020-07-03 ASSESSMENT — PAIN DESCRIPTION - PAIN TYPE
TYPE: ACUTE PAIN

## 2020-07-03 ASSESSMENT — PAIN DESCRIPTION - PROGRESSION
CLINICAL_PROGRESSION: NOT CHANGED

## 2020-07-03 ASSESSMENT — PAIN - FUNCTIONAL ASSESSMENT
PAIN_FUNCTIONAL_ASSESSMENT: PREVENTS OR INTERFERES SOME ACTIVE ACTIVITIES AND ADLS

## 2020-07-03 NOTE — PROGRESS NOTES
Pharmacy Note  Vancomycin Consult    Alejandro Shepard is a 62 y.o. female started on Vancomycin for cellulitis; consult received from Dr. Saba Edge to manage therapy. Also receiving the following antibiotics: Zosyn. Active Problems:    Hypertension    Sleep apnea, obstructive    Cellulitis of left lower extremity  Resolved Problems:    * No resolved hospital problems. *      Allergies:  Bactrim [sulfamethoxazole-trimethoprim]; Hctz [hydrochlorothiazide]; and Sitagliptin     Temp max: 97    Recent Labs     07/03/20  1359   BUN 8       Recent Labs     07/03/20  1359   CREATININE 0.9       Recent Labs     07/03/20  1358   WBC 11.1*       No intake or output data in the 24 hours ending 07/03/20 1617    Culture Date Source Results   07/03/20 Blood x 2 Collected                 Ht Readings from Last 1 Encounters:   07/03/20 5' (1.524 m)        Wt Readings from Last 1 Encounters:   07/03/20 181 lb (82.1 kg)         Body mass index is 35.35 kg/m². Estimated Creatinine Clearance: 64 mL/min (based on SCr of 0.9 mg/dL). Assessment/Plan:  Will initiate vancomycin 1250 mg IV every 18 hours. Timing of trough level will be determined based on culture results, renal function, and clinical response. Thank you for the consult. Will continue to follow.     Electronically signed by Brian Vann, [facility] on 7/3/2020 at 4:17 PM

## 2020-07-03 NOTE — H&P
[facility]      History & Physical    [id number]  PCP: MAEVE Driscoll    Date of Admission:7/3/2020    Patient:  Kierra Amaya  MRN: [id number]    Date of Service: Pt seen/examined on 7/3/2020 and Admitted to Inpatient with expected LOS greater than two midnights due to medical therapy. CHIEF COMPLAINT: Transferred from [facility], for left lower extremity cellulitis. History Obtained From:  patient, electronic medical record  Primary Care Physician: MAEVE Driscoll    HISTORY OF PRESENT ILLNESS:    The patient is a 62 y.o. female transferred from [facility], on account of worsening left lower extremity cellulitis. Patient was initially admitted to [facility] on 06/27/2020, on account of worsening lower extremity cellulitis/wound. Patient had been discharged previously on p.o. linezolid, after being treated for cellulitis of left lower extremity. Patient reportedly returning with a chief complaint of, no improvement in presenting worsening left lower calf swelling, redness, and nonhealing wound. Patient was admitted for failed outpatient treatment, and general surgery consulted at [facility]. Patient reports progressively worsening pain, increased redness and swelling of the left posterior calf, and nonhealing/not improving wound after being discharged from [address] [place] [address] 04/25/2020, with p.o. linezolid. Patient endorses some chills, but no subjective fever. Recently admitted to Health system, for brown recluse spider bite, with necrosis and subsequent cellulitis to the  Left calf: 04/23/2020 - 04/25/2020    Patient transferred to [address] [place] [address], for further work-up and management.        PAST MEDICAL & SURGICAL HISTORY    Past Medical History:      Diagnosis Date    Arthritis     Bipolar I disorder, most recent episode (or current) mixed, unspecified     Chronic back pain     Depression     Dry mouth     Hyperlipidemia     Hypertension     Hypokalemia     Hypothyroidism     Menopause     Overactive bladder     Plantar fasciitis     RLS (restless legs syndrome)     Sleep apnea     Thyroid disease          Past Surgical History:      Procedure Laterality Date    CARPAL TUNNEL RELEASE Bilateral 2002    CHOLECYSTECTOMY  10/2014    COLON SURGERY      COLON SURGERY      biopsy     COLONOSCOPY      CYST REMOVAL  05/2017    Under left ear    HARDWARE REMOVAL Right 2011    Foot     HERNIA REPAIR  10/2014    NOSE SURGERY  08/01/2017; 04/16/2019    Dr Tracey Colon L/S,1 LEVEL Bilateral 2/7/2017    INJECTION MEDICATION FACET JOINT performed by Dashawn Montenegro at [place] [address] HISTORY:    Social History:   TOBACCO:   reports that she has been smoking cigarettes. She has a 30.00 pack-year smoking history. She has never used smokeless tobacco.  ETOH:   reports current alcohol use. Family History:       Problem Relation Age of Onset    Cancer Mother         lung    Heart Disease Mother     Hypertension Mother     Bipolar Disorder Mother     Heart Disease Father     Hypertension Father         MEDICATIONS:    Medications Prior to Admission:    Prior to Admission medications    Medication Sig Start Date End Date Taking? Authorizing Provider   aspirin 81 MG EC tablet Take 81 mg by mouth daily   Yes Historical Provider, MD   DICLOFENAC PO Take 75 mg by mouth 2 times daily   Yes Historical Provider, MD   cephALEXin (KEFLEX) 250 MG capsule Take 250 mg by mouth 3 times daily For 7 days- pt unsure how many she has left   Yes Historical Provider, MD   linezolid (ZYVOX) 600 MG tablet Take 600 mg by mouth 2 times daily x14d   Yes Historical Provider, MD   oxyCODONE-acetaminophen (PERCOCET) 7.5-325 MG per tablet Take 1 tablet by mouth every 4 hours as needed for Pain.    Yes Historical Provider, MD   carvedilol (COREG) 6.25 MG tablet Take 1 tablet by mouth 2 times daily (with meals) 6/3/20  Yes MAEVE Cuevas   allopurinol (ZYLOPRIM) 100 MG tablet 1 po daily 6/3/20  Yes MAEVE Cuevas   levothyroxine (SYNTHROID) 125 MCG tablet Take 1 tablet by mouth Daily 5/21/20  Yes MAEVE Cuevas   pilocarpine (SALAGEN) 5 MG tablet Take 1 tablet by mouth 3 times daily 5/13/20  Yes MAEVE Cuevas   pramipexole (MIRAPEX) 1.5 MG tablet Take 1 tablet by mouth nightly 5/13/20  Yes MAEVE Cuevas   atorvastatin (LIPITOR) 40 MG tablet 1 po nightly for cholesterol 1/24/20  Yes MAEVE Cuevas   ALPRAZolam (XANAX) 0.5 MG tablet Take 0.5 mg by mouth 2 times daily. 9/6/18  Yes Historical Provider, MD   guaiFENesin (MUCINEX) 600 MG extended release tablet Take 2 tablets by mouth 2 times daily 10/19/18  Yes MAEVE Cuevas   QUEtiapine (SEROQUEL) 50 MG tablet Take 50 mg by mouth nightly    Yes Historical Provider, MD   niacin (SLO-NIACIN) 500 MG extended release tablet Take 500 mg by mouth daily    Yes Historical Provider, MD   tiZANidine (ZANAFLEX) 4 MG tablet Take 4 mg by mouth 3 times daily as needed    Yes Historical Provider, MD   Vitamin D (CHOLECALCIFEROL) 1000 UNITS CAPS capsule Take 1,000 Units by mouth daily   Yes Historical Provider, MD   Multiple Vitamins-Minerals (MULTIVITAMIN PO) Take by mouth daily   Yes Historical Provider, MD   traMADol (ULTRAM) 50 MG tablet Take 50 mg by mouth 2 times daily as needed for Pain. Yes Historical Provider, MD   calcium carbonate 600 MG TABS tablet Take 1 tablet by mouth daily.    Yes Historical Provider, MD   bumetanide (BUMEX) 1 MG tablet Take 1 tablet by mouth 2 times daily 5/13/20   MAEVE Villalobos   Respiratory Therapy Supplies (NEBULIZER/TUBING/MOUTHPIECE) KIT 1 kit by Does not apply route daily as needed (for qid prn use  DX-bronchitis) 8/28/19   MAEVE Cuevas   omeprazole (PRILOSEC) 20 MG delayed release capsule Take 1 capsule by mouth 2 times daily (before meals) 3/27/19   MAEVE Cuevas   spironolactone (ALDACTONE) 25 MG tablet Take 1 tablet by mouth daily  Patient taking differently: Take 25 mg by mouth 2 times daily  11/16/18 normal. There is no distension. Palpations: Abdomen is soft. Tenderness: There is no abdominal tenderness. There is no guarding or rebound. Musculoskeletal: Normal range of motion. General: No swelling or deformity. Right lower leg: No edema. Left lower leg: Edema ( Tender, erythema, edema, of left calf ) present. Comments: Left posterior calf wound, as per photo below   Skin:     General: Skin is warm and dry. Capillary Refill: Capillary refill takes less than 2 seconds. Coloration: Skin is not jaundiced or pale. Findings: No bruising. Neurological:      General: No focal deficit present. Mental Status: She is alert and oriented to person, place, and time. Cranial Nerves: No cranial nerve deficit. Sensory: No sensory deficit. Motor: No weakness. Coordination: Coordination normal.   Psychiatric:         Mood and Affect: Mood normal.         Behavior: Behavior normal.         Thought Content: Thought content normal.         Judgment: Judgment normal.                   DIAGNOSTIC STUDIES:    I have reviewedLaboratory and Imaging data report today. Recent Labs     07/03/20  1358   WBC 11.1*   HGB 9.1*   *     Recent Labs     07/03/20  1359      K 3.8   CL 96*   CO2 29   BUN 8   CREATININE 0.9   GLUCOSE 148*   AST 11   ALT 14   BILITOT <0.2   ALKPHOS 94     Troponin T: No results for input(s): TROPONINI in the last 72 hours. Pro-BNP: No results found for: BNP  Lactate:   Lab Results   Component Value Date    LACTA 1.4 07/03/2020         ABGs:   Lab Results   Component Value Date    PHART 7.430 01/24/2018    PO2ART 55.0 01/24/2018    CQZ7AED 49.0 01/24/2018     INR: No results for input(s): INR in the last 72 hours.   TSH:   Lab Results   Component Value Date    TSH 0.952 04/05/2019     URINALYSIS:No results for input(s): NITRITE, COLORU, PHUR, LABCAST, WBCUA, RBCUA, MUCUS, TRICHOMONAS, YEAST, BACTERIA, CLARITYU, SPECGRAV, LEUKOCYTESUR, UROBILINOGEN, BILIRUBINUR, BLOODU, GLUCOSEU, AMORPHOUS in the last 72 hours. Invalid input(s): Mandi Jones / IMAGING REPORTS:     No results found. PATIENT SUMMARY      ASSESSMENT / IMPRESSION & PLAN:          Hospital Problems           Last Modified POA    Hypertension 7/3/2020 Yes    Sleep apnea, obstructive 7/3/2020 Yes    Cellulitis of left lower extremity 7/3/2020 Yes          Active Problems:    Hypertension    Sleep apnea, obstructive    Cellulitis of left lower extremity  Resolved Problems:    * No resolved hospital problems. *      Worsening Cellulitis of left lower extremity   · Reported left lower extremity arterial Doppler study unremarkable, as per OSH report. · Continue wound care  · Blood cultures x2 sets  · Empiric antibiotic (vancomycin and piperacillin-tazobactam)  · Consider general surgery consult, to evaluate for possible debridement          Continue management of other chronic medical conditions - Please see orders above         CONSULTS:    IP CONSULT TO SOCIAL WORK  PHARMACY TO DOSE VANCOMYCIN  IP CONSULT TO GENERAL SURGERY        INPATIENT CHECKLIST:      Nutrition: DIET CARDIAC;    Prophylaxis Orders:   VTE - Heparin     CODE STATUS: FULL  ISOLATION:       DISCHARGE PLAN: tbd     Total face-to-face time spent with this patient, time spent reviewing medical records, and in coordination of care with the nursing staff, in the examination, evaluation/assessment, counseling, review of medications and plan, was more than 50 mins . Electronically signed by   Cristobal Sanchez MD, MPH  Internal Medicine Hospitalist   7/3/2020 3:47 PM      EMR Dragon/Transcription disclaimer:   Much of this encounter note is an electronic transcription/translation of spoken language to printed text.  The electronic translation of spoken language may permit erroneous, or at times, nonsensical words or phrases to be inadvertently transcribed; although attempts have made to review the note for such errors, some may still exist.

## 2020-07-03 NOTE — PROGRESS NOTES
4 Eyes Skin Assessment    Tika Wisdom is being assessed upon: Admission    I agree that Alicia Rahman, along with Radha NASCIMENTO (either 2 RN's or 1 LPN and 1 RN) have performed a thorough Head to Toe Skin Assessment on the patient. ALL assessment sites listed below have been assessed. Areas assessed by both nurses:     [x]   Head, Face, and Ears   [x]   Shoulders, Back, and Chest  [x]   Arms, Elbows, and Hands   [x]   Coccyx, Sacrum, and Ischium  [x]   Legs, Feet, and Heels    Does the Patient have Skin Breakdown? Yes, wound(s) noted upon assessment. It is the responsibility of the Primary Nurse to assure that the following documentation, preventions, orders, and consults are complete on the above noted wound(s): Wound LDA initiated. LDA Flowsheet Documentation includes the Hailey-wound, Wound Assessment, Measurements, Dressing Treatment, Drainage, and Color.    LLE cellulitis/wound   Ulises Prevention initiated: NA  Wound Care Orders initiated: Yes    [address]  nurse consulted for Pressure Injury (Stage 3,4, Unstageable, DTI, NWPT, and Complex wounds) and New or Established Ostomies: YES      Primary Nurse eSignature: Garry Palomo RN on 7/3/2020 at 1:47 PM      Co-Signer eSignature: Electronically signed by Ramon Cueto RN on 7/3/20 at 4:01 PM CDT

## 2020-07-03 NOTE — PROGRESS NOTES
Pt has own bottle of pilocarpine that is locked in cabinet in pt room.   Electronically signed by Colton Murphy RN on 7/3/2020 at 6:40 PM

## 2020-07-03 NOTE — PROGRESS NOTES
Benja Devi arrived to room # 322. Presented with: Cellulitis  Mental Status: Patient is oriented and alert. There were no vitals filed for this visit. Patient safety contract and falls prevention contract reviewed with patient Yes. Oriented Patient to room. Call light within reach. Yes.   Needs, issues or concerns expressed at this time: no.      Electronically signed by Payal Hoffmann RN on 7/3/2020 at 1:46 PM

## 2020-07-04 LAB
ANION GAP SERPL CALCULATED.3IONS-SCNC: 15 MMOL/L (ref 7–19)
BASOPHILS ABSOLUTE: 0 K/UL (ref 0–0.2)
BASOPHILS RELATIVE PERCENT: 0.4 % (ref 0–1)
BUN BLDV-MCNC: 7 MG/DL (ref 6–20)
CALCIUM SERPL-MCNC: 8.8 MG/DL (ref 8.6–10)
CHLORIDE BLD-SCNC: 99 MMOL/L (ref 98–111)
CO2: 26 MMOL/L (ref 22–29)
CREAT SERPL-MCNC: 1 MG/DL (ref 0.5–0.9)
EOSINOPHILS ABSOLUTE: 0.5 K/UL (ref 0–0.6)
EOSINOPHILS RELATIVE PERCENT: 4.7 % (ref 0–5)
GFR NON-AFRICAN AMERICAN: 57
GLUCOSE BLD-MCNC: 139 MG/DL (ref 74–109)
HBA1C MFR BLD: 6.1 % (ref 4–6)
HCT VFR BLD CALC: 27.1 % (ref 37–47)
HEMOGLOBIN: 8.3 G/DL (ref 12–16)
IMMATURE GRANULOCYTES #: 0.1 K/UL
LYMPHOCYTES ABSOLUTE: 3.2 K/UL (ref 1.1–4.5)
LYMPHOCYTES RELATIVE PERCENT: 33.3 % (ref 20–40)
MAGNESIUM: 1.9 MG/DL (ref 1.6–2.6)
MCH RBC QN AUTO: 26.9 PG (ref 27–31)
MCHC RBC AUTO-ENTMCNC: 30.6 G/DL (ref 33–37)
MCV RBC AUTO: 87.7 FL (ref 81–99)
MONOCYTES ABSOLUTE: 0.4 K/UL (ref 0–0.9)
MONOCYTES RELATIVE PERCENT: 4.4 % (ref 0–10)
NEUTROPHILS ABSOLUTE: 5.5 K/UL (ref 1.5–7.5)
NEUTROPHILS RELATIVE PERCENT: 56.4 % (ref 50–65)
PDW BLD-RTO: 17.3 % (ref 11.5–14.5)
PLATELET # BLD: 411 K/UL (ref 130–400)
PMV BLD AUTO: 10.2 FL (ref 9.4–12.3)
POTASSIUM REFLEX MAGNESIUM: 3.4 MMOL/L (ref 3.5–5)
RBC # BLD: 3.09 M/UL (ref 4.2–5.4)
SODIUM BLD-SCNC: 140 MMOL/L (ref 136–145)
WBC # BLD: 9.7 K/UL (ref 4.8–10.8)

## 2020-07-04 PROCEDURE — 6370000000 HC RX 637 (ALT 250 FOR IP): Performed by: HOSPITALIST

## 2020-07-04 PROCEDURE — 6360000002 HC RX W HCPCS: Performed by: HOSPITALIST

## 2020-07-04 PROCEDURE — 36415 COLL VENOUS BLD VENIPUNCTURE: CPT

## 2020-07-04 PROCEDURE — 83036 HEMOGLOBIN GLYCOSYLATED A1C: CPT

## 2020-07-04 PROCEDURE — 6370000000 HC RX 637 (ALT 250 FOR IP): Performed by: INTERNAL MEDICINE

## 2020-07-04 PROCEDURE — 6360000002 HC RX W HCPCS: Performed by: INTERNAL MEDICINE

## 2020-07-04 PROCEDURE — 85025 COMPLETE CBC W/AUTO DIFF WBC: CPT

## 2020-07-04 PROCEDURE — 99221 1ST HOSP IP/OBS SF/LOW 40: CPT | Performed by: SURGERY

## 2020-07-04 PROCEDURE — 83735 ASSAY OF MAGNESIUM: CPT

## 2020-07-04 PROCEDURE — 1210000000 HC MED SURG R&B

## 2020-07-04 PROCEDURE — 2580000003 HC RX 258: Performed by: INTERNAL MEDICINE

## 2020-07-04 PROCEDURE — 80048 BASIC METABOLIC PNL TOTAL CA: CPT

## 2020-07-04 RX ORDER — OXYCODONE HYDROCHLORIDE AND ACETAMINOPHEN 5; 325 MG/1; MG/1
1 TABLET ORAL EVERY 6 HOURS PRN
Status: DISCONTINUED | OUTPATIENT
Start: 2020-07-04 | End: 2020-07-05 | Stop reason: HOSPADM

## 2020-07-04 RX ORDER — MORPHINE SULFATE 4 MG/ML
1 INJECTION, SOLUTION INTRAMUSCULAR; INTRAVENOUS EVERY 6 HOURS PRN
Status: DISCONTINUED | OUTPATIENT
Start: 2020-07-04 | End: 2020-07-05 | Stop reason: HOSPADM

## 2020-07-04 RX ORDER — OXYCODONE HYDROCHLORIDE AND ACETAMINOPHEN 5; 325 MG/1; MG/1
1 TABLET ORAL EVERY 8 HOURS PRN
Status: DISPENSED | OUTPATIENT
Start: 2020-07-04 | End: 2020-07-04

## 2020-07-04 RX ORDER — LOPERAMIDE HYDROCHLORIDE 2 MG/1
2 CAPSULE ORAL 4 TIMES DAILY PRN
Status: DISCONTINUED | OUTPATIENT
Start: 2020-07-04 | End: 2020-07-05 | Stop reason: HOSPADM

## 2020-07-04 RX ADMIN — OXYCODONE HYDROCHLORIDE AND ACETAMINOPHEN 1 TABLET: 5; 325 TABLET ORAL at 22:41

## 2020-07-04 RX ADMIN — QUETIAPINE FUMARATE 50 MG: 50 TABLET ORAL at 20:04

## 2020-07-04 RX ADMIN — ALLOPURINOL 100 MG: 100 TABLET ORAL at 08:27

## 2020-07-04 RX ADMIN — ASPIRIN 81 MG: 81 TABLET, COATED ORAL at 08:27

## 2020-07-04 RX ADMIN — PILOCARPINE HYDROCHLORIDE 5 MG: 5 TABLET, FILM COATED ORAL at 08:27

## 2020-07-04 RX ADMIN — BUMETANIDE 1 MG: 1 TABLET ORAL at 08:27

## 2020-07-04 RX ADMIN — HEPARIN SODIUM 5000 UNITS: 5000 INJECTION INTRAVENOUS; SUBCUTANEOUS at 05:10

## 2020-07-04 RX ADMIN — VANCOMYCIN HYDROCHLORIDE 1250 MG: 1 INJECTION, POWDER, LYOPHILIZED, FOR SOLUTION INTRAVENOUS at 18:26

## 2020-07-04 RX ADMIN — PIPERACILLIN SODIUM AND TAZOBACTAM SODIUM 3.38 G: 3; .375 INJECTION, POWDER, LYOPHILIZED, FOR SOLUTION INTRAVENOUS at 08:28

## 2020-07-04 RX ADMIN — MORPHINE SULFATE 1 MG: 4 INJECTION INTRAVENOUS at 20:04

## 2020-07-04 RX ADMIN — SPIRONOLACTONE 25 MG: 25 TABLET ORAL at 08:27

## 2020-07-04 RX ADMIN — OXYCODONE HYDROCHLORIDE AND ACETAMINOPHEN 1 TABLET: 5; 325 TABLET ORAL at 16:05

## 2020-07-04 RX ADMIN — PILOCARPINE HYDROCHLORIDE 5 MG: 5 TABLET, FILM COATED ORAL at 20:04

## 2020-07-04 RX ADMIN — LEVOTHYROXINE SODIUM 125 MCG: 125 TABLET ORAL at 05:10

## 2020-07-04 RX ADMIN — PRAMIPEXOLE DIHYDROCHLORIDE 1.5 MG: 1 TABLET ORAL at 20:04

## 2020-07-04 RX ADMIN — ATORVASTATIN CALCIUM 40 MG: 40 TABLET, FILM COATED ORAL at 20:04

## 2020-07-04 RX ADMIN — MUPIROCIN: 20 OINTMENT TOPICAL at 08:27

## 2020-07-04 RX ADMIN — CARVEDILOL 6.25 MG: 6.25 TABLET, FILM COATED ORAL at 08:27

## 2020-07-04 RX ADMIN — PIPERACILLIN SODIUM AND TAZOBACTAM SODIUM: 3; .375 INJECTION, POWDER, LYOPHILIZED, FOR SOLUTION INTRAVENOUS at 20:04

## 2020-07-04 RX ADMIN — POTASSIUM CHLORIDE 40 MEQ: 1500 TABLET, EXTENDED RELEASE ORAL at 12:39

## 2020-07-04 RX ADMIN — MUPIROCIN: 20 OINTMENT TOPICAL at 22:43

## 2020-07-04 RX ADMIN — CARVEDILOL 6.25 MG: 6.25 TABLET, FILM COATED ORAL at 16:05

## 2020-07-04 RX ADMIN — HEPARIN SODIUM 5000 UNITS: 5000 INJECTION INTRAVENOUS; SUBCUTANEOUS at 16:04

## 2020-07-04 RX ADMIN — BUMETANIDE 1 MG: 1 TABLET ORAL at 16:05

## 2020-07-04 RX ADMIN — PILOCARPINE HYDROCHLORIDE 5 MG: 5 TABLET, FILM COATED ORAL at 16:05

## 2020-07-04 RX ADMIN — OXYCODONE HYDROCHLORIDE AND ACETAMINOPHEN 1 TABLET: 5; 325 TABLET ORAL at 08:27

## 2020-07-04 RX ADMIN — VITAMIN D, TAB 1000IU (100/BT) 1000 UNITS: 25 TAB at 08:27

## 2020-07-04 RX ADMIN — HEPARIN SODIUM 5000 UNITS: 5000 INJECTION INTRAVENOUS; SUBCUTANEOUS at 22:41

## 2020-07-04 RX ADMIN — PIPERACILLIN SODIUM AND TAZOBACTAM SODIUM: 3; .375 INJECTION, POWDER, LYOPHILIZED, FOR SOLUTION INTRAVENOUS at 16:24

## 2020-07-04 RX ADMIN — PIPERACILLIN SODIUM AND TAZOBACTAM SODIUM 3.38 G: 3; .375 INJECTION, POWDER, LYOPHILIZED, FOR SOLUTION INTRAVENOUS at 05:10

## 2020-07-04 ASSESSMENT — PAIN DESCRIPTION - ORIENTATION
ORIENTATION: LEFT
ORIENTATION: LEFT

## 2020-07-04 ASSESSMENT — PAIN DESCRIPTION - FREQUENCY
FREQUENCY: INTERMITTENT
FREQUENCY: INTERMITTENT

## 2020-07-04 ASSESSMENT — PAIN SCALES - GENERAL
PAINLEVEL_OUTOF10: 10
PAINLEVEL_OUTOF10: 9
PAINLEVEL_OUTOF10: 2
PAINLEVEL_OUTOF10: 7
PAINLEVEL_OUTOF10: 6
PAINLEVEL_OUTOF10: 10

## 2020-07-04 ASSESSMENT — PAIN DESCRIPTION - ONSET
ONSET: ON-GOING
ONSET: ON-GOING

## 2020-07-04 ASSESSMENT — PAIN DESCRIPTION - DESCRIPTORS
DESCRIPTORS: SHARP;THROBBING;ACHING
DESCRIPTORS: SHARP;THROBBING;ACHING

## 2020-07-04 ASSESSMENT — PAIN DESCRIPTION - PAIN TYPE
TYPE: ACUTE PAIN
TYPE: ACUTE PAIN

## 2020-07-04 ASSESSMENT — PAIN DESCRIPTION - LOCATION
LOCATION: LEG
LOCATION: LEG

## 2020-07-04 ASSESSMENT — PAIN - FUNCTIONAL ASSESSMENT
PAIN_FUNCTIONAL_ASSESSMENT: PREVENTS OR INTERFERES SOME ACTIVE ACTIVITIES AND ADLS
PAIN_FUNCTIONAL_ASSESSMENT: PREVENTS OR INTERFERES SOME ACTIVE ACTIVITIES AND ADLS

## 2020-07-04 ASSESSMENT — PAIN DESCRIPTION - PROGRESSION
CLINICAL_PROGRESSION: NOT CHANGED
CLINICAL_PROGRESSION: NOT CHANGED

## 2020-07-04 NOTE — PLAN OF CARE
Problem: Falls - Risk of:  Goal: Will remain free from falls  Description: Will remain free from falls  Outcome: Ongoing  Goal: Absence of physical injury  Description: Absence of physical injury  Outcome: Ongoing     Problem: Discharge Planning:  Goal: Discharged to appropriate level of care  Description: Discharged to appropriate level of care  Outcome: Ongoing     Problem:  Body Temperature - Imbalanced:  Goal: Ability to maintain a body temperature in the normal range will improve  Description: Ability to maintain a body temperature in the normal range will improve  Outcome: Ongoing     Problem: Mobility - Impaired:  Goal: Mobility will improve to maximum level  Description: Mobility will improve to maximum level  Outcome: Ongoing     Problem: Pain:  Goal: Pain level will decrease  Description: Pain level will decrease  Outcome: Ongoing  Goal: Control of acute pain  Description: Control of acute pain  Outcome: Ongoing  Goal: Control of chronic pain  Description: Control of chronic pain  Outcome: Ongoing     Problem: Skin Integrity - Impaired:  Goal: Will show no infection signs and symptoms  Description: Will show no infection signs and symptoms  Outcome: Ongoing  Goal: Absence of new skin breakdown  Description: Absence of new skin breakdown  Outcome: Ongoing     Problem: ABCDS Injury Assessment  Goal: Absence of physical injury  Outcome: Ongoing     Problem: Nutritional:  Goal: Nutritional status will improve  Description: Nutritional status will improve  Outcome: Ongoing  Goal: Adjustment of eating patterns to appropriate times will improve  Description: Adjustment of eating patterns to appropriate times will improve  Outcome: Ongoing  Goal: Ability to make healthy dietary choices will improve  Description: Ability to make healthy dietary choices will improve  Outcome: Ongoing  Goal: Progress toward achieving an optimal weight will improve  Description: Progress toward achieving an optimal weight will improve  Outcome: Ongoing     Problem: Physical Regulation:  Goal: Diagnostic test results will improve  Description: Diagnostic test results will improve  Outcome: Ongoing  Goal: Will remain free from infection  Description: Will remain free from infection  Outcome: Ongoing  Goal: Ability to maintain vital signs within normal range will improve  Description: Ability to maintain vital signs within normal range will improve  Outcome: Ongoing     Problem: Respiratory:  Goal: Ability to maintain normal respiratory secretions will improve  Description: Ability to maintain normal respiratory secretions will improve  Outcome: Ongoing     Problem: Skin Integrity:  Goal: Demonstration of wound healing without infection will improve  Description: Demonstration of wound healing without infection will improve  Outcome: Ongoing  Goal: Complications related to intravenous access or infusion will be avoided or minimized  Description: Complications related to intravenous access or infusion will be avoided or minimized  Outcome: Ongoing     Problem:  Activity:  Goal: Amount of time patient spends in regular exercise will increase  Description: Amount of time patient spends in regular exercise will increase  Outcome: Ongoing     Problem: Coping:  Goal: Ability to identify and develop effective coping behavior will improve  Description: Ability to identify and develop effective coping behavior will improve  Outcome: Ongoing     Problem: Self-Concept:  Goal: Ability to verbalize positive feelings about self will improve  Description: Ability to verbalize positive feelings about self will improve  Outcome: Ongoing     Problem: Bleeding:  Goal: Will show no signs and symptoms of excessive bleeding  Description: Will show no signs and symptoms of excessive bleeding  Outcome: Ongoing     Problem: Infection:  Goal: Will remain free from infection  Description: Will remain free from infection  Outcome: Ongoing     Problem: Safety:  Goal: Free from accidental physical injury  Description: Free from accidental physical injury  Outcome: Ongoing  Goal: Free from intentional harm  Description: Free from intentional harm  Outcome: Ongoing     Problem: Daily Care:  Goal: Daily care needs are met  Description: Daily care needs are met  Outcome: Ongoing     Problem: Pain:  Goal: Pain level will decrease  Description: Pain level will decrease  Outcome: Ongoing  Goal: Patient's pain/discomfort is manageable  Description: Patient's pain/discomfort is manageable  Outcome: Ongoing  Goal: Control of acute pain  Description: Control of acute pain  Outcome: Ongoing  Goal: Control of chronic pain  Description: Control of chronic pain  Outcome: Ongoing     Problem: Skin Integrity:  Goal: Skin integrity will stabilize  Description: Skin integrity will stabilize  Outcome: Ongoing     Problem: Discharge Planning:  Goal: Patients continuum of care needs are met  Description: Patients continuum of care needs are met  Outcome: Ongoing

## 2020-07-04 NOTE — PROGRESS NOTES
Pt reporting 6 bowel movements since admission. Nothing documented on I & Os, per Lee Villasenor, night nurse she was unaware of pt going to the restroom. However, Dr. Andree Montemayor notified and will collect stool sample as ordered.

## 2020-07-04 NOTE — CONSULTS
in sodium chloride 0.9 % 50 mL IVPB   Intravenous Q6H Juve Jiménez MD        loperamide (IMODIUM) capsule 2 mg  2 mg Oral 4x Daily PRN Juve Jiménez MD        vancomycin (VANCOCIN) 1,250 mg in sodium chloride 0.9 % 250 mL IVPB  1,250 mg Intravenous Q18H Juve Jiménez MD        sodium chloride flush 0.9 % injection 10 mL  10 mL Intravenous 2 times per day Juve Jiménez MD   10 mL at 07/03/20 2225    sodium chloride flush 0.9 % injection 10 mL  10 mL Intravenous PRN Juve Jiménez MD        potassium chloride (KLOR-CON M) extended release tablet 40 mEq  40 mEq Oral PRN Juve Jiménez MD   40 mEq at 07/04/20 1239    Or    potassium bicarb-citric acid (EFFER-K) effervescent tablet 40 mEq  40 mEq Oral PRN Juve Jiménez MD        Or    potassium chloride 10 mEq/100 mL IVPB (Peripheral Line)  10 mEq Intravenous PRN Juve Jiménez MD        acetaminophen (TYLENOL) tablet 650 mg  650 mg Oral Q6H PRN Juve Jiménez MD        Or    acetaminophen (TYLENOL) suppository 650 mg  650 mg Rectal Q6H PRN Juve Jiménez MD        magnesium hydroxide (MILK OF MAGNESIA) 400 MG/5ML suspension 30 mL  30 mL Oral Daily PRN Juve Jiménez MD        ondansetron (ZOFRAN-ODT) disintegrating tablet 4 mg  4 mg Oral Q8H PRN Juve Jiménez MD        Or    ondansetron [facility]) injection 4 mg  4 mg Intravenous Q6H PRN Juve Jiménez MD        heparin (porcine) injection 5,000 Units  5,000 Units Subcutaneous 3 times per day Juve Jiménez MD   5,000 Units at 07/04/20 1604    albuterol (PROVENTIL) nebulizer solution 2.5 mg  2.5 mg Nebulization Q6H PRN Juve Jiménez MD        vancomycin Sally Glance) intermittent dosing (placeholder)   Other RX Kimberli Holcomb MD        mupirocin (BACTROBAN) 2 % ointment   Nasal BID Juve Jiménez MD        ALPRAZolam [place]) tablet 0.5 mg  0.5 mg Oral BID PRN Juve Jiménez MD   0.5 mg at 07/03/20 2225    aspirin EC tablet 81 mg  81 mg Oral Daily Rajesh Interiano MD   81 mg at 07/04/20 0827    atorvastatin (LIPITOR) tablet 40 mg  40 mg Oral Nightly Rajesh Interiano MD   40 mg at 07/03/20 2225    bumetanide (BUMEX) tablet 1 mg  1 mg Oral BID Rajesh Interiano MD   1 mg at 07/04/20 1605    carvedilol (COREG) tablet 6.25 mg  6.25 mg Oral BID WC Rajesh Interiano MD   6.25 mg at 07/04/20 1605    levothyroxine (SYNTHROID) tablet 125 mcg  125 mcg Oral Daily Rajesh Interiano MD   125 mcg at 07/04/20 0510    QUEtiapine (SEROQUEL) tablet 50 mg  50 mg Oral Nightly Rajesh Interiano MD   50 mg at 07/03/20 2225    spironolactone (ALDACTONE) tablet 25 mg  25 mg Oral Daily Rajesh Interiano MD   25 mg at 07/04/20 0827    tiZANidine (ZANAFLEX) tablet 4 mg  4 mg Oral TID PRN Rajesh Interiano MD   4 mg at 07/03/20 2227    pramipexole (MIRAPEX) tablet 1.5 mg  1.5 mg Oral Nightly Rajesh Interiano MD   1.5 mg at 07/03/20 2225    allopurinol (ZYLOPRIM) tablet 100 mg  100 mg Oral Daily Rajesh Interiano MD   100 mg at 07/04/20 0827    pilocarpine (SALAGEN) tablet 5 mg  5 mg Oral TID Rajesh Interiano MD   5 mg at 07/04/20 1605    Vitamin D (CHOLECALCIFEROL) tablet 1,000 Units  1,000 Units Oral Daily Rajesh Interiano MD   1,000 Units at 07/04/20 0827     Allergies: Bactrim [sulfamethoxazole-trimethoprim];  Hctz [hydrochlorothiazide]; and Sitagliptin  Past Medical History:   Diagnosis Date    Arthritis     Bipolar I disorder, most recent episode (or current) mixed, unspecified     Chronic back pain     Depression     Dry mouth     Hyperlipidemia     Hypertension     Hypokalemia     Hypothyroidism     Menopause     Overactive bladder     Plantar fasciitis     RLS (restless legs syndrome)     Sleep apnea     Thyroid disease      Past Surgical History:   Procedure Laterality Date    CARPAL TUNNEL RELEASE Bilateral 2002    CHOLECYSTECTOMY  10/2014    COLON SURGERY      COLON SURGERY      biopsy     COLONOSCOPY      CYST REMOVAL  2017    Under left ear    HARDWARE REMOVAL Right 2011    Foot     HERNIA REPAIR  10/2014    NOSE SURGERY  2017; 2019    Dr Douglas Fail L/S,1 LEVEL Bilateral 2017    INJECTION MEDICATION FACET JOINT performed by Eleanor Valladares at [facility]     Family History   Problem Relation Age of Onset   Laly Serra Cancer Mother         lung    Heart Disease Mother     Hypertension Mother     Bipolar Disorder Mother     Heart Disease Father     Hypertension Father      Social History     Tobacco Use    Smoking status: Current Every Day Smoker     Packs/day: 1.00     Years: 30.00     Pack years: 30.00     Types: Cigarettes     Last attempt to quit: 2003     Years since quittin.9    Smokeless tobacco: Never Used    Tobacco comment: Pt not interested at this time in stopping. Substance Use Topics    Alcohol use: Yes     Comment: rarely       ROS:  14 point review of systems is negative except for the above. PHYSICAL EXAM:    The patient is a 62 y.o. female  in no acute distress. She is alert oriented and cooperative. Mood and affect are appropriate. Skin is warm and dry without rashes. /64   Pulse 74   Temp 98.5 °F (36.9 °C) (Temporal)   Resp 18   Ht 5' (1.524 m)   Wt 181 lb (82.1 kg)   SpO2 91%   BMI 35.35 kg/m²       HEENT: Normocephalic and atraumatic. EOMs intact. Pupils equal and round and reactive to light and accommodation. External ears and nose are normal.  Sclera nonicteric. Conjunctiva normal  Oropharynx without masses or lesions. Neck: Neck is supple without masses or thyromegaly    Chest: Lungs are clear to auscultation. Respiratory effort normal    Cardiac: Regular rate and rhythm without rubs, murmurs, or gallops    Abdomen: The abdomen is soft and nontender with no hepatosplenomegaly. There are no abdominal hernias noted. Extremities:  The extremities are normal. There are no signs of clubbing, cyanosis, or edema. There is cellulitis and open wound and posterior left calf. There is a little tissue that could be debrided but certainly no undrained pus.      IMPRESSION: Cellulitis left leg    PLAN: Elevate her left leg while in hospital              Follow-up with wound care              Follow-up with wound care              It would probably be in her best interest to follow-up with wound care              I think she would be best served to go to wound care

## 2020-07-04 NOTE — PROGRESS NOTES
[place] [facility] Hospitalists Progress Note    Patient:  Sindi Suarez  YOB: 1960  Date of Service: 7/4/2020  MRN: [id number]   Acct: [de-identified]   Primary Care Physician: MAEVE Kaur  Advance Directive: Full Code  Admit Date: 7/3/2020       Hospital Day: 1      CHIEF COMPLAINT:  Worsening left lower calf swelling, redness, and nonhealing wound      7/4/2020 8:55 AM  Subjective / Interval History:   07/04/2020  Doing well this a.m. Left lower extremity pain moderately controlled at this time. Denies any fever or chills. Laying comfortably in bed in no apparent acute distress. No acute changes or acute overnight event reported. Brief History:   Ms Martha Cannon, a 62 y.o. female transferred from [facility], on account of worsening left lower extremity cellulitis.     Patient was initially admitted to [facility] on 06/27/2020, on account of worsening lower extremity cellulitis/wound. Patient had been discharged previously on p.o. linezolid, after being treated for cellulitis of left lower extremity. Patient reportedly returning with a chief complaint of, no improvement in presenting worsening left lower calf swelling, redness, and nonhealing wound. Patient was admitted for failed outpatient treatment, and general surgery consulted at [facility].     Patient reports progressively worsening pain, increased redness and swelling of the left posterior calf, and nonhealing/not improving wound after being discharged from [address] [place] [address] 04/25/2020, with p.o. linezolid. Patient endorses some chills, but no subjective fever.     Recently admitted to [facility], for brown recluse spider bite, with necrosis and subsequent cellulitis to the  Left calf: 04/23/2020 - 04/25/2020     Patient transferred to [facility], for further work-up and management. Review of Systems:   Review of Systems  ROS: 14 point review of systems is negative except as specifically addressed above. DIET CARDIAC;     Intake/Output Summary (Last 24 hours) at 7/4/2020 0855  Last data filed at 7/4/2020 0557  Gross per 24 hour   Intake 640 ml   Output --   Net 640 ml       Medications:  Current Facility-Administered Medications   Medication Dose Route Frequency Provider Last Rate Last Dose    sodium chloride flush 0.9 % injection 10 mL  10 mL Intravenous 2 times per day Kendell Trejo MD   10 mL at 07/03/20 2225    sodium chloride flush 0.9 % injection 10 mL  10 mL Intravenous PRN Kendell Trejo MD        potassium chloride (KLOR-CON M) extended release tablet 40 mEq  40 mEq Oral PRN Kendell Trejo MD        Or    potassium bicarb-citric acid (EFFER-K) effervescent tablet 40 mEq  40 mEq Oral PRN Kendell Trejo MD        Or    potassium chloride 10 mEq/100 mL IVPB (Peripheral Line)  10 mEq Intravenous PRN Kendell Trejo MD        acetaminophen (TYLENOL) tablet 650 mg  650 mg Oral Q6H PRN Kendell Trejo MD        Or    acetaminophen (TYLENOL) suppository 650 mg  650 mg Rectal Q6H PRN Kendell Trejo MD        magnesium hydroxide (MILK OF MAGNESIA) 400 MG/5ML suspension 30 mL  30 mL Oral Daily PRN Kendell Trejo MD        ondansetron (ZOFRAN-ODT) disintegrating tablet 4 mg  4 mg Oral Q8H PRN Kendell Trejo MD        Or    ondansetron [facility]) injection 4 mg  4 mg Intravenous Q6H PRN Kendell Trejo MD        heparin (porcine) injection 5,000 Units  5,000 Units Subcutaneous 3 times per day Kendell Trejo MD   5,000 Units at 07/04/20 0510    albuterol (PROVENTIL) nebulizer solution 2.5 mg  2.5 mg Nebulization Q6H PRN Kendell Trejo MD        vancomycin (VANCOCIN) intermittent dosing (placeholder)   Other RX Placeholder Kendell Trejo MD        piperacillin-tazobactam (ZOSYN) 3.375 g in dextrose 5 % 50 mL IVPB (mini-bag)  3.375 g Intravenous Q6H Kendell Trejo  mL/hr at 07/04/20 0828 3.375 g at 07/04/20 0828    vancomycin (VANCOCIN) 1,250 mg in dextrose 5 % 250 mL IVPB  1,250 mg Intravenous Q18H Patrice Aguilar MD   Stopped at 07/03/20 1849    mupirocin (BACTROBAN) 2 % ointment   Nasal BID Patrice Aguilar MD        oxyCODONE-acetaminophen (PERCOCET) 5-325 MG per tablet 1 tablet  1 tablet Oral Q6H PRN Patrice Aguilar MD   1 tablet at 07/04/20 0827    ALPRAZolam [place]) tablet 0.5 mg  0.5 mg Oral BID PRN Patrice Aguilar MD   0.5 mg at 07/03/20 2225    aspirin EC tablet 81 mg  81 mg Oral Daily Patrice Aguilar MD   81 mg at 07/04/20 0827    atorvastatin (LIPITOR) tablet 40 mg  40 mg Oral Nightly Patrice Aguilar MD   40 mg at 07/03/20 2225    bumetanide (BUMEX) tablet 1 mg  1 mg Oral BID Patrice Aguilar MD   1 mg at 07/04/20 0827    carvedilol (COREG) tablet 6.25 mg  6.25 mg Oral BID WC Patrice Aguilar MD   6.25 mg at 07/04/20 0827    levothyroxine (SYNTHROID) tablet 125 mcg  125 mcg Oral Daily Patrice Aguilar MD   125 mcg at 07/04/20 0510    QUEtiapine (SEROQUEL) tablet 50 mg  50 mg Oral Nightly Patrice Aguilar MD   50 mg at 07/03/20 2225    spironolactone (ALDACTONE) tablet 25 mg  25 mg Oral Daily Patrice Aguilar MD   25 mg at 07/04/20 0827    tiZANidine (ZANAFLEX) tablet 4 mg  4 mg Oral TID PRN Patrice Aguilar MD   4 mg at 07/03/20 2227    pramipexole (MIRAPEX) tablet 1.5 mg  1.5 mg Oral Nightly Patrice Aguilar MD   1.5 mg at 07/03/20 2225    allopurinol (ZYLOPRIM) tablet 100 mg  100 mg Oral Daily Patrice Aguilar MD   100 mg at 07/04/20 0827    pilocarpine (SALAGEN) tablet 5 mg  5 mg Oral TID Patrice Aguilar MD   5 mg at 07/04/20 0827    Vitamin D (CHOLECALCIFEROL) tablet 1,000 Units  1,000 Units Oral Daily Patrice Aguilar MD   1,000 Units at 07/04/20 0827           sodium chloride flush  10 mL Intravenous 2 times per day    heparin (porcine)  5,000 Units Subcutaneous 3 times per day    vancomycin (VANCOCIN) intermittent dosing (placeholder)   Other RX Placeholder    piperacillin-tazobactam  3.375 g Intravenous Q6H    vancomycin  1,250 mg Intravenous Q18H    mupirocin   Nasal BID    aspirin  81 mg Oral Daily    atorvastatin  40 mg Oral Nightly    bumetanide  1 mg Oral BID    carvedilol  6.25 mg Oral BID WC    levothyroxine  125 mcg Oral Daily    QUEtiapine  50 mg Oral Nightly    spironolactone  25 mg Oral Daily    pramipexole  1.5 mg Oral Nightly    allopurinol  100 mg Oral Daily    pilocarpine  5 mg Oral TID    Vitamin D  1,000 Units Oral Daily     sodium chloride flush, potassium chloride **OR** potassium alternative oral replacement **OR** potassium chloride, acetaminophen **OR** acetaminophen, magnesium hydroxide, ondansetron **OR** ondansetron, albuterol, oxyCODONE-acetaminophen, ALPRAZolam, tiZANidine  DIET CARDIAC;       Labs:   CBC with DIFF:  Recent Labs     07/03/20  1358 07/04/20  0145   WBC 11.1* 9.7   RBC 3.44* 3.09*   HGB 9.1* 8.3*   HCT 30.2* 27.1*   MCV 87.8 87.7   MCH 26.5* 26.9*   MCHC 30.1* 30.6*   RDW 17.2* 17.3*   * 411*   MPV 9.8 10.2   NEUTOPHILPCT 62.2 56.4   LYMPHOPCT 27.5 33.3   MONOPCT 4.1 4.4   EOSRELPCT 5.0 4.7   BASOPCT 0.4 0.4   NEUTROABS 6.9 5.5   LYMPHSABS 3.1 3.2   MONOSABS 0.50 0.40   EOSABS 0.60 0.50   BASOSABS 0.00 0.00       CMP/BMP:  Recent Labs     07/03/20  1359 07/04/20  0145    140   K 3.8 3.4*   CL 96* 99   CO2 29 26   ANIONGAP 14 15   GLUCOSE 148* 139*   BUN 8 7   CREATININE 0.9 1.0*   LABGLOM >60 57*   CALCIUM 9.2 8.8   PROT 6.3*  --    LABALBU 3.6  --    BILITOT <0.2  --    ALKPHOS 94  --    ALT 14  --    AST 11  --          CRP:    Recent Labs     07/03/20  1359   CRP 7.11*     Sed Rate:    Recent Labs     07/03/20  1358   SEDRATE 106*         HgBA1c:  No components found for: HGBA1C  FLP:    Lab Results   Component Value Date    TRIG 94 04/21/2020    HDL 42 04/21/2020    LDLCALC 65 04/21/2020     TSH:    Lab Results   Component Value Date    TSH 0.952 04/05/2019     Troponin T: No results for input(s): TROPONINI in the last 72 hours.   Pro-BNP: No results for input(s): BNP in the last 72 hours. INR: No results for input(s): INR in the last 72 hours. ABGs:   Lab Results   Component Value Date    PHART 7.430 01/24/2018    PO2ART 55.0 01/24/2018    MKV0SHZ 49.0 01/24/2018     UA:No results for input(s): NITRITE, COLORU, PHUR, LABCAST, WBCUA, RBCUA, MUCUS, TRICHOMONAS, YEAST, BACTERIA, CLARITYU, SPECGRAV, LEUKOCYTESUR, UROBILINOGEN, BILIRUBINUR, BLOODU, GLUCOSEU, AMORPHOUS in the last 72 hours. Invalid input(s): Renita Nares      Culture Results:    No results for input(s): CXSURG in the last 720 hours. Blood Culture Recent: No results for input(s): BC in the last 720 hours. Cultures:   No results for input(s): CULTURE in the last 72 hours. No results for input(s): BC, Susen Cisneros in the last 72 hours. No results for input(s): CXSURG in the last 72 hours. Recent Labs     07/04/20  0145   MG 1.9     Recent Labs     07/03/20  1359   AST 11   ALT 14   BILITOT <0.2   ALKPHOS 94         RAD:   No results found. Objective:   Vitals:   /64   Pulse 74   Temp 98.5 °F (36.9 °C) (Temporal)   Resp 18   Ht 5' (1.524 m)   Wt 181 lb (82.1 kg)   SpO2 91%   BMI 35.35 kg/m²       Patient Vitals for the past 24 hrs:   BP Temp Temp src Pulse Resp SpO2 Height Weight   07/04/20 0630 102/64 98.5 °F (36.9 °C) Temporal 74 18 91 % -- --   07/04/20 0010 92/64 96.9 °F (36.1 °C) Temporal 70 18 93 % -- --   07/03/20 1849 94/61 97.4 °F (36.3 °C) Temporal 84 16 91 % -- --   07/03/20 1606 -- -- -- -- -- -- -- 181 lb (82.1 kg)   07/03/20 1430 116/70 97 °F (36.1 °C) Temporal 84 16 94 % 5' (1.524 m) --       24HR INTAKE/OUTPUT:      Intake/Output Summary (Last 24 hours) at 7/4/2020 0855  Last data filed at 7/4/2020 0557  Gross per 24 hour   Intake 640 ml   Output --   Net 640 ml       Physical Exam  Vitals signs and nursing note reviewed. Constitutional:       General: She is not in acute distress. Appearance: Normal appearance. She is obese.  She is not Coordination: Coordination normal.   Psychiatric:         Mood and Affect: Mood normal.         Behavior: Behavior normal.         Thought Content: Thought content normal.         Judgment: Judgment normal.           Assessment/plan:         Hospital Problems           Last Modified POA    * (Principal) Cellulitis of left lower extremity 7/4/2020 Yes    Hypertension 7/3/2020 Yes    Hypokalemia 7/4/2020 Yes    Sleep apnea, obstructive 7/3/2020 Yes    Hyperglycemia 7/4/2020 Yes          Principal Problem:    Cellulitis of left lower extremity  Active Problems:    Hypertension    Hypokalemia    Sleep apnea, obstructive    Hyperglycemia  Resolved Problems:    * No resolved hospital problems. *    Worsening Cellulitis of left lower extremity   · Reported left lower extremity arterial Doppler study unremarkable, as per OSH report. · Continue wound care  · Blood cultures x2 sets  · Empiric antibiotic (vancomycin and piperacillin-tazobactam)  · General surgery consult, to evaluate for possible debridement     Hyperglycemia  · No documented history of diabetes  · Hyperglycemia, likely secondary to dextrose infusion via antibiotics  · Hemoglobin A1c level  · Consider switching antibiotic with saline instead of dextrose. · Monitor POC glucose      Hypokalemia  · Replace as per protocol    History of hypertension  · Continue home regimen    History of hyperlipidemia  · Atorvastatin 40 mg p.o. nightly    Continue management of other chronic medical conditions - see above and orders. Advance Directive: Full Code    DIET CARDIAC;     DVT prophylaxis: Heparin    Consults Made:   IP CONSULT TO SOCIAL WORK  PHARMACY TO DOSE VANCOMYCIN  IP CONSULT TO GENERAL SURGERY    Discharge planning: tbd    Time Spent is 25 mins in the examination, evaluation, counseling and review of medications, assessment and plan.      Electronically signed by   Ady Lu MD, MPH,   Internal Medicine Hospitalist   7/4/2020 8:55 AM

## 2020-07-05 VITALS
WEIGHT: 181 LBS | RESPIRATION RATE: 18 BRPM | HEIGHT: 60 IN | DIASTOLIC BLOOD PRESSURE: 59 MMHG | TEMPERATURE: 97.8 F | OXYGEN SATURATION: 92 % | BODY MASS INDEX: 35.53 KG/M2 | SYSTOLIC BLOOD PRESSURE: 105 MMHG | HEART RATE: 79 BPM

## 2020-07-05 PROBLEM — E87.6 HYPOKALEMIA: Status: RESOLVED | Noted: 2017-09-01 | Resolved: 2020-07-05

## 2020-07-05 LAB
ANION GAP SERPL CALCULATED.3IONS-SCNC: 16 MMOL/L (ref 7–19)
BASOPHILS ABSOLUTE: 0.1 K/UL (ref 0–0.2)
BASOPHILS RELATIVE PERCENT: 0.5 % (ref 0–1)
BUN BLDV-MCNC: 8 MG/DL (ref 6–20)
CALCIUM SERPL-MCNC: 8.9 MG/DL (ref 8.6–10)
CHLORIDE BLD-SCNC: 99 MMOL/L (ref 98–111)
CO2: 28 MMOL/L (ref 22–29)
CREAT SERPL-MCNC: 0.9 MG/DL (ref 0.5–0.9)
EOSINOPHILS ABSOLUTE: 0.4 K/UL (ref 0–0.6)
EOSINOPHILS RELATIVE PERCENT: 3.9 % (ref 0–5)
GFR NON-AFRICAN AMERICAN: >60
GLUCOSE BLD-MCNC: 135 MG/DL (ref 74–109)
HCT VFR BLD CALC: 28.8 % (ref 37–47)
HEMOGLOBIN: 8.7 G/DL (ref 12–16)
IMMATURE GRANULOCYTES #: 0.1 K/UL
LYMPHOCYTES ABSOLUTE: 3.7 K/UL (ref 1.1–4.5)
LYMPHOCYTES RELATIVE PERCENT: 37.5 % (ref 20–40)
MCH RBC QN AUTO: 26.5 PG (ref 27–31)
MCHC RBC AUTO-ENTMCNC: 30.2 G/DL (ref 33–37)
MCV RBC AUTO: 87.8 FL (ref 81–99)
MONOCYTES ABSOLUTE: 0.5 K/UL (ref 0–0.9)
MONOCYTES RELATIVE PERCENT: 5 % (ref 0–10)
NEUTROPHILS ABSOLUTE: 5.2 K/UL (ref 1.5–7.5)
NEUTROPHILS RELATIVE PERCENT: 52.3 % (ref 50–65)
PDW BLD-RTO: 17.2 % (ref 11.5–14.5)
PLATELET # BLD: 394 K/UL (ref 130–400)
PMV BLD AUTO: 9.6 FL (ref 9.4–12.3)
POTASSIUM REFLEX MAGNESIUM: 3.7 MMOL/L (ref 3.5–5)
RBC # BLD: 3.28 M/UL (ref 4.2–5.4)
SODIUM BLD-SCNC: 143 MMOL/L (ref 136–145)
WBC # BLD: 9.9 K/UL (ref 4.8–10.8)

## 2020-07-05 PROCEDURE — 80048 BASIC METABOLIC PNL TOTAL CA: CPT

## 2020-07-05 PROCEDURE — 6370000000 HC RX 637 (ALT 250 FOR IP): Performed by: INTERNAL MEDICINE

## 2020-07-05 PROCEDURE — 6360000002 HC RX W HCPCS: Performed by: HOSPITALIST

## 2020-07-05 PROCEDURE — 2580000003 HC RX 258: Performed by: INTERNAL MEDICINE

## 2020-07-05 PROCEDURE — 85025 COMPLETE CBC W/AUTO DIFF WBC: CPT

## 2020-07-05 PROCEDURE — 6360000002 HC RX W HCPCS: Performed by: INTERNAL MEDICINE

## 2020-07-05 PROCEDURE — 6370000000 HC RX 637 (ALT 250 FOR IP): Performed by: HOSPITALIST

## 2020-07-05 PROCEDURE — 36415 COLL VENOUS BLD VENIPUNCTURE: CPT

## 2020-07-05 RX ORDER — CLINDAMYCIN HYDROCHLORIDE 300 MG/1
300 CAPSULE ORAL 2 TIMES DAILY
Qty: 14 CAPSULE | Refills: 0 | Status: CANCELLED | OUTPATIENT
Start: 2020-07-05 | End: 2020-07-12

## 2020-07-05 RX ORDER — AMOXICILLIN AND CLAVULANATE POTASSIUM 875; 125 MG/1; MG/1
1 TABLET, FILM COATED ORAL 2 TIMES DAILY
Qty: 20 TABLET | Refills: 0 | Status: SHIPPED | OUTPATIENT
Start: 2020-07-05 | End: 2020-07-15

## 2020-07-05 RX ORDER — OXYCODONE HYDROCHLORIDE AND ACETAMINOPHEN 5; 325 MG/1; MG/1
1 TABLET ORAL EVERY 6 HOURS PRN
Qty: 12 TABLET | Refills: 0 | Status: SHIPPED | OUTPATIENT
Start: 2020-07-05 | End: 2020-07-08

## 2020-07-05 RX ADMIN — VITAMIN D, TAB 1000IU (100/BT) 1000 UNITS: 25 TAB at 11:15

## 2020-07-05 RX ADMIN — SPIRONOLACTONE 25 MG: 25 TABLET ORAL at 11:15

## 2020-07-05 RX ADMIN — MUPIROCIN: 20 OINTMENT TOPICAL at 11:17

## 2020-07-05 RX ADMIN — PIPERACILLIN SODIUM AND TAZOBACTAM SODIUM: 3; .375 INJECTION, POWDER, LYOPHILIZED, FOR SOLUTION INTRAVENOUS at 02:22

## 2020-07-05 RX ADMIN — SODIUM CHLORIDE, PRESERVATIVE FREE 10 ML: 5 INJECTION INTRAVENOUS at 11:29

## 2020-07-05 RX ADMIN — OXYCODONE HYDROCHLORIDE AND ACETAMINOPHEN 1 TABLET: 5; 325 TABLET ORAL at 15:09

## 2020-07-05 RX ADMIN — CARVEDILOL 6.25 MG: 6.25 TABLET, FILM COATED ORAL at 11:15

## 2020-07-05 RX ADMIN — PIPERACILLIN SODIUM AND TAZOBACTAM SODIUM: 3; .375 INJECTION, POWDER, LYOPHILIZED, FOR SOLUTION INTRAVENOUS at 11:15

## 2020-07-05 RX ADMIN — MORPHINE SULFATE 1 MG: 4 INJECTION INTRAVENOUS at 02:22

## 2020-07-05 RX ADMIN — HEPARIN SODIUM 5000 UNITS: 5000 INJECTION INTRAVENOUS; SUBCUTANEOUS at 05:59

## 2020-07-05 RX ADMIN — ALLOPURINOL 100 MG: 100 TABLET ORAL at 11:15

## 2020-07-05 RX ADMIN — OXYCODONE HYDROCHLORIDE AND ACETAMINOPHEN 1 TABLET: 5; 325 TABLET ORAL at 05:50

## 2020-07-05 RX ADMIN — LEVOTHYROXINE SODIUM 125 MCG: 125 TABLET ORAL at 05:50

## 2020-07-05 RX ADMIN — PILOCARPINE HYDROCHLORIDE 5 MG: 5 TABLET, FILM COATED ORAL at 11:14

## 2020-07-05 RX ADMIN — MORPHINE SULFATE 1 MG: 4 INJECTION INTRAVENOUS at 11:15

## 2020-07-05 RX ADMIN — ASPIRIN 81 MG: 81 TABLET, COATED ORAL at 11:14

## 2020-07-05 RX ADMIN — BUMETANIDE 1 MG: 1 TABLET ORAL at 11:14

## 2020-07-05 ASSESSMENT — PAIN SCALES - GENERAL
PAINLEVEL_OUTOF10: 10
PAINLEVEL_OUTOF10: 9
PAINLEVEL_OUTOF10: 8
PAINLEVEL_OUTOF10: 8

## 2020-07-05 NOTE — PLAN OF CARE
Problem: Falls - Risk of:  Goal: Will remain free from falls  Description: Will remain free from falls  Outcome: Ongoing  Goal: Absence of physical injury  Description: Absence of physical injury  Outcome: Ongoing     Problem: Discharge Planning:  Goal: Discharged to appropriate level of care  Description: Discharged to appropriate level of care  Outcome: Ongoing     Problem:  Body Temperature - Imbalanced:  Goal: Ability to maintain a body temperature in the normal range will improve  Description: Ability to maintain a body temperature in the normal range will improve  Outcome: Ongoing     Problem: Mobility - Impaired:  Goal: Mobility will improve to maximum level  Description: Mobility will improve to maximum level  Outcome: Ongoing     Problem: Pain:  Goal: Pain level will decrease  Description: Pain level will decrease  Outcome: Ongoing  Goal: Control of acute pain  Description: Control of acute pain  Outcome: Ongoing  Goal: Control of chronic pain  Description: Control of chronic pain  Outcome: Ongoing     Problem: Skin Integrity - Impaired:  Goal: Will show no infection signs and symptoms  Description: Will show no infection signs and symptoms  Outcome: Ongoing  Goal: Absence of new skin breakdown  Description: Absence of new skin breakdown  Outcome: Ongoing     Problem: ABCDS Injury Assessment  Goal: Absence of physical injury  Outcome: Ongoing     Problem: Nutritional:  Goal: Nutritional status will improve  Description: Nutritional status will improve  Outcome: Ongoing  Goal: Adjustment of eating patterns to appropriate times will improve  Description: Adjustment of eating patterns to appropriate times will improve  Outcome: Ongoing  Goal: Ability to make healthy dietary choices will improve  Description: Ability to make healthy dietary choices will improve  Outcome: Ongoing  Goal: Progress toward achieving an optimal weight will improve  Description: Progress toward achieving an optimal weight will improve  Outcome: Ongoing     Problem: Physical Regulation:  Goal: Diagnostic test results will improve  Description: Diagnostic test results will improve  Outcome: Ongoing  Goal: Will remain free from infection  Description: Will remain free from infection  Outcome: Ongoing  Goal: Ability to maintain vital signs within normal range will improve  Description: Ability to maintain vital signs within normal range will improve  Outcome: Ongoing     Problem: Respiratory:  Goal: Ability to maintain normal respiratory secretions will improve  Description: Ability to maintain normal respiratory secretions will improve  Outcome: Ongoing     Problem: Skin Integrity:  Goal: Demonstration of wound healing without infection will improve  Description: Demonstration of wound healing without infection will improve  Outcome: Ongoing  Goal: Complications related to intravenous access or infusion will be avoided or minimized  Description: Complications related to intravenous access or infusion will be avoided or minimized  Outcome: Ongoing     Problem:  Activity:  Goal: Amount of time patient spends in regular exercise will increase  Description: Amount of time patient spends in regular exercise will increase  Outcome: Ongoing     Problem: Coping:  Goal: Ability to identify and develop effective coping behavior will improve  Description: Ability to identify and develop effective coping behavior will improve  Outcome: Ongoing     Problem: Self-Concept:  Goal: Ability to verbalize positive feelings about self will improve  Description: Ability to verbalize positive feelings about self will improve  Outcome: Ongoing     Problem: Bleeding:  Goal: Will show no signs and symptoms of excessive bleeding  Description: Will show no signs and symptoms of excessive bleeding  Outcome: Ongoing     Problem: Infection:  Goal: Will remain free from infection  Description: Will remain free from infection  Outcome: Ongoing     Problem: Safety:  Goal: Free from accidental physical injury  Description: Free from accidental physical injury  Outcome: Ongoing  Goal: Free from intentional harm  Description: Free from intentional harm  Outcome: Ongoing     Problem: Daily Care:  Goal: Daily care needs are met  Description: Daily care needs are met  Outcome: Ongoing     Problem: Pain:  Goal: Pain level will decrease  Description: Pain level will decrease  Outcome: Ongoing  Goal: Patient's pain/discomfort is manageable  Description: Patient's pain/discomfort is manageable  Outcome: Ongoing  Goal: Control of acute pain  Description: Control of acute pain  Outcome: Ongoing  Goal: Control of chronic pain  Description: Control of chronic pain  Outcome: Ongoing     Problem: Skin Integrity:  Goal: Skin integrity will stabilize  Description: Skin integrity will stabilize  Outcome: Ongoing     Problem: Discharge Planning:  Goal: Patients continuum of care needs are met  Description: Patients continuum of care needs are met  Outcome: Ongoing   Electronically signed by Eriberto Nugent RN on 7/5/2020 at 3:34 AM

## 2020-07-05 NOTE — PROGRESS NOTES
Patient crying in excruciating pain.  notified. Pt cannot tolerate dressing changes without pain medication.

## 2020-07-06 ENCOUNTER — TELEPHONE (OUTPATIENT)
Dept: FAMILY MEDICINE CLINIC | Age: 60
End: 2020-07-06

## 2020-07-06 NOTE — TELEPHONE ENCOUNTER
Jame Amador called this morning wishing to schedule a same day appt with Tea only. [facility] is unable to accommodate. Please contact patient to discuss. Thank you.

## 2020-07-07 ENCOUNTER — HOSPITAL ENCOUNTER (OUTPATIENT)
Dept: WOUND CARE | Age: 60
Discharge: HOME | End: 2020-07-07
Payer: COMMERCIAL

## 2020-07-07 VITALS
TEMPERATURE: 98.8 F | SYSTOLIC BLOOD PRESSURE: 140 MMHG | BODY MASS INDEX: 35.53 KG/M2 | HEIGHT: 60 IN | HEART RATE: 100 BPM | RESPIRATION RATE: 20 BRPM | DIASTOLIC BLOOD PRESSURE: 86 MMHG | WEIGHT: 181 LBS

## 2020-07-07 PROBLEM — Z72.0 TOBACCO ABUSE: Status: ACTIVE | Noted: 2020-07-07

## 2020-07-07 PROBLEM — L97.922 SKIN ULCER OF LEFT LOWER LEG WITH FAT LAYER EXPOSED (HCC): Status: ACTIVE | Noted: 2020-07-07

## 2020-07-07 PROCEDURE — 99214 OFFICE O/P EST MOD 30 MIN: CPT

## 2020-07-07 PROCEDURE — 99215 OFFICE O/P EST HI 40 MIN: CPT | Performed by: NURSE PRACTITIONER

## 2020-07-07 RX ORDER — METHYLPREDNISOLONE 4 MG/1
TABLET ORAL
Qty: 1 KIT | Refills: 0 | Status: SHIPPED | OUTPATIENT
Start: 2020-07-07 | End: 2020-07-13

## 2020-07-07 RX ORDER — LIDOCAINE HYDROCHLORIDE 20 MG/ML
JELLY TOPICAL PRN
Status: DISCONTINUED | OUTPATIENT
Start: 2020-07-07 | End: 2020-07-09 | Stop reason: HOSPADM

## 2020-07-07 ASSESSMENT — PAIN SCALES - GENERAL: PAINLEVEL_OUTOF10: 9

## 2020-07-07 ASSESSMENT — ENCOUNTER SYMPTOMS: COLOR CHANGE: 1

## 2020-07-07 ASSESSMENT — PAIN DESCRIPTION - PAIN TYPE: TYPE: ACUTE PAIN

## 2020-07-07 ASSESSMENT — VISUAL ACUITY: OU: 1

## 2020-07-07 NOTE — PLAN OF CARE
Problem: Wound:  Goal: Will show signs of wound healing; wound closure and no evidence of infection  Description: Will show signs of wound healing; wound closure and no evidence of infection  Outcome: Ongoing     Problem: Venous:  Goal: Signs of wound healing will improve  Description: Signs of wound healing will improve  Outcome: Ongoing     Problem: Smoking cessation:  Goal: Ability to formulate a plan to maintain a tobacco-free life will be supported  Description: Ability to formulate a plan to maintain a tobacco-free life will be supported  Outcome: Ongoing     Problem: Compression therapy:  Goal: Will be free from complications associated with compression therapy  Description: Will be free from complications associated with compression therapy  Outcome: Ongoing

## 2020-07-07 NOTE — PROGRESS NOTES
mixed features ([facility]) F31.9    Hypertension I10    Dry mouth R68.2    RLS (restless legs syndrome) G25.81    Plantar fasciitis M72.2    Chronic midline low back pain without sciatica M54.5, G89.29    Sleep apnea, obstructive G47.33    Asterixis R27.8    Tremor, essential G25.0    Hyperglycemia R73.9    Moderate episode of recurrent major depressive disorder ([facility]) F33.1    Nasal septal perforation J34.89    Chronic left maxillary sinusitis N50.2    Biliary colic B59.95    BMI 53.9-48.2,TSWLF Z68.39    Edema R60.9    History of spontaneous  Z87.59    Hyperlipidemia E78.5    Spondylosis with myelopathy, lumbar region M47.16    Cellulitis of left lower extremity L03. 116    Insect bite of left lower leg S80.862A, W57. XXXA    Cellulitis of left foot L03. 116    Toxic effect of spider venom, accidental or unintentional, sequela T63.301S    Bilateral calf pain M79.661, M79.662    Skin ulcer of left lower leg with fat layer exposed ([address] W67.362       She reports she developed a wound on left leg. This started 3 month(s) ago. She believes this is not healing. She has been applying antibiotic ointment. She has not had  fever orchills. She has a history of surgical debridement and iv antibiotics in hospital.    Karolina Lopez is a 62 y.o. female with the following history reviewed and recorded in [facility]:    Current Outpatient Medications   Medication Sig Dispense Refill    collagenase (SANTYL) 250 UNIT/GM ointment Apply topically daily. Wound left leg 8x8x0. 5 1 Tube 3    methylPREDNISolone (MEDROL DOSEPACK) 4 MG tablet Take by mouth. 1 kit 0    mupirocin (BACTROBAN) 2 % ointment Apply topically 3 times daily. 1 Tube 0    amoxicillin-clavulanate (AUGMENTIN) 875-125 MG per tablet Take 1 tablet by mouth 2 times daily for 10 days 20 tablet 0    oxyCODONE-acetaminophen (PERCOCET) 5-325 MG per tablet Take 1 tablet by mouth every 6 hours as needed for Pain for up to 3 days.  12 tablet 0    aspirin reviewed and are negative. All other review of systems are negative. Physical Exam    BP (!) 140/86   Pulse 100   Temp 98.8 °F (37.1 °C) (Temporal)   Resp 20   Ht 5' (1.524 m)   Wt 181 lb (82.1 kg)   BMI 35.35 kg/m²     Physical Exam  Vitals signs reviewed. Constitutional:       Appearance: Normal appearance. She is obese. HENT:      Head: Normocephalic and atraumatic. Right Ear: External ear normal.      Left Ear: External ear normal.   Eyes:      General: Lids are normal. Lids are everted, no foreign bodies appreciated. Vision grossly intact. Gaze aligned appropriately. Neck:      Musculoskeletal: Normal range of motion. Cardiovascular:      Rate and Rhythm: Normal rate. Pulses: Normal pulses. Heart sounds: Normal heart sounds. Pulmonary:      Effort: Pulmonary effort is normal.      Breath sounds: Normal breath sounds. Abdominal:      General: Bowel sounds are normal.   Musculoskeletal: Normal range of motion. General: Swelling and tenderness present. Left lower leg: Edema present. Skin:     General: Skin is warm and dry. Capillary Refill: Capillary refill takes 2 to 3 seconds. Findings: Erythema present. Neurological:      Mental Status: She is alert and oriented to person, place, and time. Psychiatric:         Mood and Affect: Mood normal.         Behavior: Behavior normal.         Thought Content: Thought content normal.         Judgment: Judgment normal.             Post Debridement Measurements and Assessment:    The patientspain isPain Level: 9 Pain Type: Acute pain. Please refer to nursing measurements and assessment regarding wound pre and postdebridement. Wound 07/03/20 Leg Left;Posterior (Active)   Wound Image   7/7/2020  2:49 PM   Wound Venous 7/7/2020  2:49 PM   Dressing Status Clean;Dry; Intact; Changed 7/7/2020  2:49 PM   Dressing Changed Changed/New 7/7/2020  2:49 PM   Dressing/Treatment Moist to dry;ABD;Roll gauze 7/7/2020  2:49 PM   Wound Cleansed Rinsed/Irrigated with saline 7/7/2020  2:49 PM   Wound Length (cm) 8 cm 7/7/2020  2:49 PM   Wound Width (cm) 8 cm 7/7/2020  2:49 PM   Wound Depth (cm) 0.6 cm 7/7/2020  2:49 PM   Wound Surface Area (cm^2) 64 cm^2 7/7/2020  2:49 PM   Wound Volume (cm^3) 38.4 cm^3 7/7/2020  2:49 PM   Wound Assessment Red;Slough; Hyper granulation tissue;Pink 7/7/2020  2:49 PM   Drainage Amount Moderate 7/7/2020  2:49 PM   Drainage Description Serosanguinous; Yellow 7/7/2020  2:49 PM   Odor None 7/7/2020  2:49 PM   Margins Attached edges 7/7/2020  2:49 PM   Hailey-wound Assessment Red; Swelling 7/7/2020  2:49 PM   Non-staged Wound Description Full thickness 7/7/2020  2:49 PM   [place]%Wound Bed 10 7/7/2020  2:49 PM   Red%Wound Bed 0 7/7/2020  2:49 PM   Yellow%Wound Bed 90 7/7/2020  2:49 PM   Number of days: 3            Assessment    1. Insect bite of left lower leg, subsequent encounter    2. Skin ulcer of left lower leg with fat layer exposed ([address]    3. Tobacco abuse    4. BMI 39.0-39.9,adult          Plan    1. Review SHEBA    Planfor wound - Dress per physician order  Treatment:     Compression : Yes   Offloading : Yes   Dressing : see AVS    Discussed importance of compression, offloading, wound care, smoking cessation, and leg elevation. Patient understanding and questions answered. I spent a total of  60 minutes face to face with the patient. Over 75% of that time was spent on counseling and care coordination. Patient was told that if symptoms worsen or new symptoms develop they are to go to the emergency department immediately. Patient was educated on diagnosis and treatment plan. All of patient's questions were answered, and the patient understands the discharge plan. Discussed appropriate home care of this wound. Wound redressed. Patient instructions were given.   Recommend no smoking  Offloading instructions given

## 2020-07-08 LAB
BLOOD CULTURE, ROUTINE: NORMAL
CULTURE, BLOOD 2: NORMAL

## 2020-07-10 ENCOUNTER — OFFICE VISIT (OUTPATIENT)
Dept: FAMILY MEDICINE CLINIC | Age: 60
End: 2020-07-10
Payer: COMMERCIAL

## 2020-07-10 VITALS
HEART RATE: 101 BPM | SYSTOLIC BLOOD PRESSURE: 128 MMHG | TEMPERATURE: 98 F | BODY MASS INDEX: 34.18 KG/M2 | RESPIRATION RATE: 18 BRPM | WEIGHT: 175 LBS | DIASTOLIC BLOOD PRESSURE: 72 MMHG | OXYGEN SATURATION: 96 %

## 2020-07-10 PROCEDURE — 99214 OFFICE O/P EST MOD 30 MIN: CPT | Performed by: NURSE PRACTITIONER

## 2020-07-10 PROCEDURE — 1111F DSCHRG MED/CURRENT MED MERGE: CPT | Performed by: NURSE PRACTITIONER

## 2020-07-10 RX ORDER — HYDROCODONE BITARTRATE AND ACETAMINOPHEN 10; 325 MG/1; MG/1
1 TABLET ORAL EVERY 8 HOURS PRN
Qty: 54 TABLET | Refills: 0 | Status: SHIPPED | OUTPATIENT
Start: 2020-07-10 | End: 2020-07-28

## 2020-07-10 ASSESSMENT — ENCOUNTER SYMPTOMS
SHORTNESS OF BREATH: 0
BACK PAIN: 1

## 2020-07-10 NOTE — PROGRESS NOTES
Post-Discharge Transitional Care Management Services or Hospital Follow Up      Randall Cannon   YOB: 1960    Date of Office Visit:  7/10/2020  Date of Hospital Admission: 7/3/20  Date of Hospital Discharge: 20  Readmission Risk Score(high >=14%.  Medium >=10%):Readmission Risk Score: 13      Care management risk score Rising risk (score 2-5) and Complex Care (Scores >=6): 4     Non face to face  following discharge, date last encounter closed (first attempt may have been earlier): *No documented post hospital discharge outreach found in the last 14 days *No documented post hospital discharge outreach found in the last 14 days    Call initiated 2 business days of discharge: *No response recorded in the last 14 days     Patient Active Problem List   Diagnosis    Bipolar I disorder with mixed features ([address]    Hypertension    Dry mouth    RLS (restless legs syndrome)    Plantar fasciitis    Chronic midline low back pain without sciatica    Sleep apnea, obstructive    Asterixis    Tremor, essential    Hyperglycemia    Moderate episode of recurrent major depressive disorder (HCC)    Nasal septal perforation    Chronic left maxillary sinusitis    Biliary colic    BMI 31.2-08.1,BWDGV    Edema    History of spontaneous     Hyperlipidemia    Spondylosis with myelopathy, lumbar region    Cellulitis of left lower extremity    Insect bite of left lower leg    Cellulitis of left foot    Toxic effect of spider venom, accidental or unintentional, sequela    Bilateral calf pain    Skin ulcer of left lower leg with fat layer exposed ([address]    Tobacco abuse       Allergies   Allergen Reactions    Bactrim [Sulfamethoxazole-Trimethoprim]     Hctz [Hydrochlorothiazide] Other (See Comments)     Acid reflux      Sitagliptin Rash       Medications listed as ordered at the time of discharge from hospital   Jr Odor   Home Medication Instructions JOON:    Printed on:07/10/20 [phone] Medication Information                      allopurinol (ZYLOPRIM) 100 MG tablet  1 po daily             ALPRAZolam (XANAX) 0.5 MG tablet  Take 0.5 mg by mouth 2 times daily. amoxicillin-clavulanate (AUGMENTIN) 875-125 MG per tablet  Take 1 tablet by mouth 2 times daily for 10 days             aspirin 81 MG EC tablet  Take 81 mg by mouth daily             atorvastatin (LIPITOR) 40 MG tablet  1 po nightly for cholesterol             bumetanide (BUMEX) 1 MG tablet  Take 1 tablet by mouth 2 times daily             calcium carbonate 600 MG TABS tablet  Take 1 tablet by mouth daily. carvedilol (COREG) 6.25 MG tablet  Take 1 tablet by mouth 2 times daily (with meals)             collagenase (SANTYL) 250 UNIT/GM ointment  Apply topically daily. Wound left leg 8x8x0. 5             DICLOFENAC PO  Take 75 mg by mouth 2 times daily             guaiFENesin (MUCINEX) 600 MG extended release tablet  Take 2 tablets by mouth 2 times daily             HYDROcodone-acetaminophen (NORCO)  MG per tablet  Take 1 tablet by mouth every 8 hours as needed for Pain for up to 18 days. Intended supply: 30 days             levothyroxine (SYNTHROID) 125 MCG tablet  Take 1 tablet by mouth Daily             methylPREDNISolone (MEDROL DOSEPACK) 4 MG tablet  Take by mouth. Multiple Vitamins-Minerals (MULTIVITAMIN PO)  Take by mouth daily             mupirocin (BACTROBAN) 2 % ointment  Apply topically 3 times daily.              niacin (SLO-NIACIN) 500 MG extended release tablet  Take 500 mg by mouth daily              omeprazole (PRILOSEC) 20 MG delayed release capsule  Take 1 capsule by mouth 2 times daily (before meals)             pilocarpine (SALAGEN) 5 MG tablet  Take 1 tablet by mouth 3 times daily             pramipexole (MIRAPEX) 1.5 MG tablet  Take 1 tablet by mouth nightly             QUEtiapine (SEROQUEL) 50 MG tablet  Take 50 mg by mouth nightly              Respiratory Therapy Supplies Therapy Supplies (NEBULIZER/TUBING/MOUTHPIECE) KIT 1 kit by Does not apply route daily as needed (for qid prn use  DX-bronchitis) 1 kit 0    omeprazole (PRILOSEC) 20 MG delayed release capsule Take 1 capsule by mouth 2 times daily (before meals) 20 capsule 0    spironolactone (ALDACTONE) 25 MG tablet Take 1 tablet by mouth daily (Patient taking differently: Take 25 mg by mouth 2 times daily ) 90 tablet 1    ALPRAZolam (XANAX) 0.5 MG tablet Take 0.5 mg by mouth 2 times daily. 1    guaiFENesin (MUCINEX) 600 MG extended release tablet Take 2 tablets by mouth 2 times daily 60 tablet 0    QUEtiapine (SEROQUEL) 50 MG tablet Take 50 mg by mouth nightly       niacin (SLO-NIACIN) 500 MG extended release tablet Take 500 mg by mouth daily       tiZANidine (ZANAFLEX) 4 MG tablet Take 4 mg by mouth 3 times daily as needed       Vitamin D (CHOLECALCIFEROL) 1000 UNITS CAPS capsule Take 1,000 Units by mouth daily      Multiple Vitamins-Minerals (MULTIVITAMIN PO) Take by mouth daily      traMADol (ULTRAM) 50 MG tablet Take 50 mg by mouth 2 times daily as needed for Pain.  calcium carbonate 600 MG TABS tablet Take 1 tablet by mouth daily. Medications patient taking as of now reconciled against medications ordered at time of hospital discharge: Yes    Chief Complaint   Patient presents with    Follow-Up from Hospital     Patient presents for hospital follow up for spider bite on left lower leg. Patient is also seeing wound care at [facility]. HPI  Onset of wound April and this has been ongoing since then. \"everything possible is going wrong\"  Pt has had inpatient stays inermittently since onset. July 27, 2020 pain management appt for spine f/u but is concerned she will have to have a different appt for left leg wound pain. Pain is a \"10+ out of this world\"  No sleeping related to pain.   Pt states she was discharged with Percocet 5mg and \"did not help a great deal because I was taking morphine and Mental Status: She is alert and oriented to person, place, and time. Mental status is at baseline. Psychiatric:         Mood and Affect: Mood normal. Mood is not anxious or depressed. Affect is not angry. Speech: Speech normal.         Behavior: Behavior normal.         Thought Content: Thought content normal.         Judgment: Judgment normal.             Assessment/Plan:  1. Hospital discharge follow-up    - [place] DISCHARGE MEDS RECONCILED W/ CURRENT OUTPATIENT MED LIST  - HYDROcodone-acetaminophen ([address])  MG per tablet; Take 1 tablet by mouth every 8 hours as needed for Pain for up to 18 days. Intended supply: 30 days  Dispense: 54 tablet; Refill: 0    2. Open wound of left lower leg, subsequent encounter    - HYDROcodone-acetaminophen (NORCO)  MG per tablet; Take 1 tablet by mouth every 8 hours as needed for Pain for up to 18 days. Intended supply: 30 days  Dispense: 54 tablet; Refill: 0      Call pain management regarding the contract she is on there for spine pain---now has left leg wound and needs additional control---I will cover until next pain management appt  Keep f/u with wound care on July 21, 2020 (Dr. Abundio CANDELARIA  Temporary contract for this med here today (until seeing pain management)  Pt is aware to only use med as ordered   UDS done with pain management.   Medical Decision Making: moderate complexity

## 2020-07-20 ENCOUNTER — TELEPHONE (OUTPATIENT)
Dept: WOUND CARE | Age: 60
End: 2020-07-20

## 2020-07-20 NOTE — TELEPHONE ENCOUNTER
Ms. Ghislaine Bashir states the santyl is burning her skin, she is unable to continue this part of her dressing. She still has redness to the marques-wound and pain. I instructed to her continue to keep her leg elevated and wear compression socks. She will see Dr. Alex Gonzales tomorrow for further wound care instructions and plan of care.

## 2020-07-21 ENCOUNTER — HOSPITAL ENCOUNTER (OUTPATIENT)
Dept: WOUND CARE | Age: 60
Discharge: HOME | End: 2020-07-21
Payer: COMMERCIAL

## 2020-07-21 ENCOUNTER — HOSPITAL ENCOUNTER (OUTPATIENT)
Dept: NON INVASIVE DIAGNOSTICS | Age: 60
Discharge: HOME | End: 2020-07-21
Payer: COMMERCIAL

## 2020-07-21 VITALS
HEART RATE: 76 BPM | BODY MASS INDEX: 33.57 KG/M2 | HEIGHT: 60 IN | WEIGHT: 171 LBS | DIASTOLIC BLOOD PRESSURE: 71 MMHG | RESPIRATION RATE: 16 BRPM | SYSTOLIC BLOOD PRESSURE: 116 MMHG | TEMPERATURE: 97 F

## 2020-07-21 PROBLEM — I87.2 VENOUS STASIS DERMATITIS OF BOTH LOWER EXTREMITIES: Chronic | Status: ACTIVE | Noted: 2020-07-21

## 2020-07-21 PROBLEM — I89.0 LYMPHEDEMA OF BOTH LOWER EXTREMITIES: Chronic | Status: ACTIVE | Noted: 2020-07-21

## 2020-07-21 PROBLEM — I87.2 VENOUS STASIS DERMATITIS OF BOTH LOWER EXTREMITIES: Status: ACTIVE | Noted: 2020-07-21

## 2020-07-21 PROCEDURE — 93923 UPR/LXTR ART STDY 3+ LVLS: CPT

## 2020-07-21 PROCEDURE — 11045 DBRDMT SUBQ TISS EACH ADDL: CPT

## 2020-07-21 PROCEDURE — 11042 DBRDMT SUBQ TIS 1ST 20SQCM/<: CPT | Performed by: SURGERY

## 2020-07-21 PROCEDURE — 11042 DBRDMT SUBQ TIS 1ST 20SQCM/<: CPT

## 2020-07-21 PROCEDURE — 11045 DBRDMT SUBQ TISS EACH ADDL: CPT | Performed by: SURGERY

## 2020-07-21 ASSESSMENT — PAIN DESCRIPTION - DESCRIPTORS: DESCRIPTORS: SHARP;DULL;BURNING

## 2020-07-21 ASSESSMENT — PAIN DESCRIPTION - LOCATION: LOCATION: LEG

## 2020-07-21 ASSESSMENT — PAIN DESCRIPTION - PAIN TYPE: TYPE: ACUTE PAIN

## 2020-07-21 ASSESSMENT — PAIN DESCRIPTION - FREQUENCY: FREQUENCY: CONTINUOUS

## 2020-07-21 ASSESSMENT — PAIN - FUNCTIONAL ASSESSMENT: PAIN_FUNCTIONAL_ASSESSMENT: PREVENTS OR INTERFERES SOME ACTIVE ACTIVITIES AND ADLS

## 2020-07-21 ASSESSMENT — PAIN SCALES - GENERAL: PAINLEVEL_OUTOF10: 10

## 2020-07-21 ASSESSMENT — PAIN DESCRIPTION - ORIENTATION: ORIENTATION: LEFT

## 2020-07-21 ASSESSMENT — PAIN DESCRIPTION - PROGRESSION: CLINICAL_PROGRESSION: NOT CHANGED

## 2020-07-21 ASSESSMENT — PAIN DESCRIPTION - ONSET: ONSET: ON-GOING

## 2020-07-24 ENCOUNTER — TELEPHONE (OUTPATIENT)
Dept: WOUND CARE | Age: 60
End: 2020-07-24

## 2020-07-24 RX ORDER — LANOLIN ALCOHOL/MO/W.PET/CERES
3 CREAM (GRAM) TOPICAL NIGHTLY PRN
Qty: 30 TABLET | Refills: 0 | COMMUNITY
Start: 2020-07-24 | End: 2020-10-29

## 2020-07-24 NOTE — TELEPHONE ENCOUNTER
Beulah Gama from [facility] called stating patient has pain 10 out of 10 and crying. She has not been sleeping and has fallen twice. She refuses to go to ER. Phoned patient, she was crying when she answered the phone. She stated that the wound pain is keeping her from sleeping, nothing relieves it. She stated she thinks she fell because she was dizzy headed from lack of sleep. I reminded her that Carlynn Shackleton 10 and muscle relaxer's could also contribute. I told her she will need to use her cane, she is agreeable to this. We discussed going to the emergency room if her pain was uncontrollable, she does not want to do this, she says she just needs some sleep. I discussed with her that as the wound starts to heal over the next few weeks she will feel less pain, but there are some things we can try, today, that may help her. We discussed other things she could do to ease the pain at night,so she can sleep, like using a over the counter lidocaine patch near the wound to calm the nerves in the area, drinking some Chamomile tea before bed or taking Melatonin to see if that might help her sleep. Sleep on your side with a pillow between legs so nothing is touching the area. Stop the Santyl, for now, (which she already had done) and just use the Saline dressing to cut down on the burning sensation. Patient was receptive and will try these things. Contacted Dr. Jodi Peñaloza and she is agreeable to all of these suggestions.

## 2020-07-28 ENCOUNTER — APPOINTMENT (OUTPATIENT)
Dept: ULTRASOUND IMAGING | Facility: HOSPITAL | Age: 60
End: 2020-07-28

## 2020-07-28 ENCOUNTER — APPOINTMENT (OUTPATIENT)
Dept: CT IMAGING | Facility: HOSPITAL | Age: 60
End: 2020-07-28

## 2020-07-28 ENCOUNTER — HOSPITAL ENCOUNTER (INPATIENT)
Facility: HOSPITAL | Age: 60
LOS: 6 days | Discharge: HOME HEALTH | End: 2020-08-03
Attending: FAMILY MEDICINE | Admitting: FAMILY MEDICINE

## 2020-07-28 ENCOUNTER — OFFICE VISIT (OUTPATIENT)
Dept: INTERNAL MEDICINE | Facility: CLINIC | Age: 60
End: 2020-07-28

## 2020-07-28 ENCOUNTER — TELEPHONE (OUTPATIENT)
Dept: INTERNAL MEDICINE | Facility: CLINIC | Age: 60
End: 2020-07-28

## 2020-07-28 VITALS
OXYGEN SATURATION: 98 % | SYSTOLIC BLOOD PRESSURE: 132 MMHG | HEIGHT: 60 IN | RESPIRATION RATE: 18 BRPM | DIASTOLIC BLOOD PRESSURE: 84 MMHG | WEIGHT: 180.2 LBS | TEMPERATURE: 98.5 F | BODY MASS INDEX: 35.38 KG/M2 | HEART RATE: 98 BPM

## 2020-07-28 DIAGNOSIS — S81.802D WOUND OF LEFT LOWER EXTREMITY, SUBSEQUENT ENCOUNTER: Primary | ICD-10-CM

## 2020-07-28 DIAGNOSIS — G89.4 CHRONIC PAIN SYNDROME: ICD-10-CM

## 2020-07-28 DIAGNOSIS — S81.802A WOUND OF LEFT LOWER EXTREMITY, INITIAL ENCOUNTER: ICD-10-CM

## 2020-07-28 DIAGNOSIS — R73.03 PREDIABETES: ICD-10-CM

## 2020-07-28 DIAGNOSIS — A49.02 INFECTION OF WOUND DUE TO METHICILLIN RESISTANT STAPHYLOCOCCUS AUREUS (MRSA): ICD-10-CM

## 2020-07-28 DIAGNOSIS — I10 ESSENTIAL HYPERTENSION: ICD-10-CM

## 2020-07-28 DIAGNOSIS — L03.116 CELLULITIS OF LEFT LOWER EXTREMITY: Primary | ICD-10-CM

## 2020-07-28 PROBLEM — N17.9 AKI (ACUTE KIDNEY INJURY) (HCC): Status: ACTIVE | Noted: 2020-07-28

## 2020-07-28 PROBLEM — R52 ACUTE PAIN: Status: ACTIVE | Noted: 2020-07-28

## 2020-07-28 PROBLEM — E03.9 ACQUIRED HYPOTHYROIDISM: Status: ACTIVE | Noted: 2020-07-28

## 2020-07-28 PROBLEM — G89.29 CHRONIC BACK PAIN GREATER THAN 3 MONTHS DURATION: Status: ACTIVE | Noted: 2020-07-28

## 2020-07-28 PROBLEM — E78.2 MIXED HYPERLIPIDEMIA: Status: ACTIVE | Noted: 2020-07-28

## 2020-07-28 PROBLEM — F41.9 ANXIETY: Status: ACTIVE | Noted: 2020-07-28

## 2020-07-28 PROBLEM — M54.9 CHRONIC BACK PAIN GREATER THAN 3 MONTHS DURATION: Status: ACTIVE | Noted: 2020-07-28

## 2020-07-28 LAB
ALBUMIN SERPL-MCNC: 3.8 G/DL (ref 3.5–5.2)
ALBUMIN/GLOB SERPL: 1.2 G/DL
ALP SERPL-CCNC: 98 U/L (ref 39–117)
ALT SERPL W P-5'-P-CCNC: 15 U/L (ref 1–33)
ANION GAP SERPL CALCULATED.3IONS-SCNC: 15 MMOL/L (ref 5–15)
AST SERPL-CCNC: 12 U/L (ref 1–32)
BASOPHILS # BLD AUTO: 0.05 10*3/MM3 (ref 0–0.2)
BASOPHILS NFR BLD AUTO: 0.4 % (ref 0–1.5)
BILIRUB SERPL-MCNC: <0.2 MG/DL (ref 0–1.2)
BUN SERPL-MCNC: 32 MG/DL (ref 6–20)
BUN/CREAT SERPL: 18 (ref 7–25)
CALCIUM SPEC-SCNC: 9.4 MG/DL (ref 8.6–10.5)
CHLORIDE SERPL-SCNC: 101 MMOL/L (ref 98–107)
CO2 SERPL-SCNC: 24 MMOL/L (ref 22–29)
CREAT SERPL-MCNC: 1.78 MG/DL (ref 0.57–1)
D-LACTATE SERPL-SCNC: 1.1 MMOL/L (ref 0.5–2)
DEPRECATED RDW RBC AUTO: 48.8 FL (ref 37–54)
EOSINOPHIL # BLD AUTO: 0.45 10*3/MM3 (ref 0–0.4)
EOSINOPHIL NFR BLD AUTO: 3.9 % (ref 0.3–6.2)
ERYTHROCYTE [DISTWIDTH] IN BLOOD BY AUTOMATED COUNT: 16.2 % (ref 12.3–15.4)
GFR SERPL CREATININE-BSD FRML MDRD: 29 ML/MIN/1.73
GLOBULIN UR ELPH-MCNC: 3.2 GM/DL
GLUCOSE BLDC GLUCOMTR-MCNC: 108 MG/DL (ref 70–130)
GLUCOSE BLDC GLUCOMTR-MCNC: 174 MG/DL (ref 70–130)
GLUCOSE SERPL-MCNC: 112 MG/DL (ref 65–99)
HCT VFR BLD AUTO: 29.3 % (ref 34–46.6)
HGB BLD-MCNC: 9.3 G/DL (ref 12–15.9)
IMM GRANULOCYTES # BLD AUTO: 0.11 10*3/MM3 (ref 0–0.05)
IMM GRANULOCYTES NFR BLD AUTO: 0.9 % (ref 0–0.5)
LYMPHOCYTES # BLD AUTO: 3.01 10*3/MM3 (ref 0.7–3.1)
LYMPHOCYTES NFR BLD AUTO: 25.9 % (ref 19.6–45.3)
MAGNESIUM SERPL-MCNC: 2.2 MG/DL (ref 1.6–2.6)
MCH RBC QN AUTO: 26.3 PG (ref 26.6–33)
MCHC RBC AUTO-ENTMCNC: 31.7 G/DL (ref 31.5–35.7)
MCV RBC AUTO: 83 FL (ref 79–97)
MONOCYTES # BLD AUTO: 0.56 10*3/MM3 (ref 0.1–0.9)
MONOCYTES NFR BLD AUTO: 4.8 % (ref 5–12)
NEUTROPHILS NFR BLD AUTO: 64.1 % (ref 42.7–76)
NEUTROPHILS NFR BLD AUTO: 7.43 10*3/MM3 (ref 1.7–7)
NRBC BLD AUTO-RTO: 0 /100 WBC (ref 0–0.2)
PHOSPHATE SERPL-MCNC: 4.3 MG/DL (ref 2.5–4.5)
PLATELET # BLD AUTO: 496 10*3/MM3 (ref 140–450)
PMV BLD AUTO: 9.5 FL (ref 6–12)
POTASSIUM SERPL-SCNC: 4.3 MMOL/L (ref 3.5–5.2)
PROCALCITONIN SERPL-MCNC: 0.14 NG/ML (ref 0–0.25)
PROT SERPL-MCNC: 7 G/DL (ref 6–8.5)
RBC # BLD AUTO: 3.53 10*6/MM3 (ref 3.77–5.28)
SODIUM SERPL-SCNC: 140 MMOL/L (ref 136–145)
WBC # BLD AUTO: 11.61 10*3/MM3 (ref 3.4–10.8)

## 2020-07-28 PROCEDURE — 25010000002 LORAZEPAM PER 2 MG: Performed by: NURSE PRACTITIONER

## 2020-07-28 PROCEDURE — 80053 COMPREHEN METABOLIC PANEL: CPT | Performed by: NURSE PRACTITIONER

## 2020-07-28 PROCEDURE — 94799 UNLISTED PULMONARY SVC/PX: CPT

## 2020-07-28 PROCEDURE — 87077 CULTURE AEROBIC IDENTIFY: CPT | Performed by: NURSE PRACTITIONER

## 2020-07-28 PROCEDURE — 25010000002 HYDROMORPHONE PER 4 MG: Performed by: NURSE PRACTITIONER

## 2020-07-28 PROCEDURE — 87205 SMEAR GRAM STAIN: CPT | Performed by: NURSE PRACTITIONER

## 2020-07-28 PROCEDURE — 83735 ASSAY OF MAGNESIUM: CPT | Performed by: NURSE PRACTITIONER

## 2020-07-28 PROCEDURE — 87186 SC STD MICRODIL/AGAR DIL: CPT | Performed by: NURSE PRACTITIONER

## 2020-07-28 PROCEDURE — 85025 COMPLETE CBC W/AUTO DIFF WBC: CPT | Performed by: NURSE PRACTITIONER

## 2020-07-28 PROCEDURE — 93971 EXTREMITY STUDY: CPT | Performed by: SURGERY

## 2020-07-28 PROCEDURE — 84100 ASSAY OF PHOSPHORUS: CPT | Performed by: NURSE PRACTITIONER

## 2020-07-28 PROCEDURE — 82962 GLUCOSE BLOOD TEST: CPT

## 2020-07-28 PROCEDURE — 93971 EXTREMITY STUDY: CPT

## 2020-07-28 PROCEDURE — 87070 CULTURE OTHR SPECIMN AEROBIC: CPT | Performed by: NURSE PRACTITIONER

## 2020-07-28 PROCEDURE — 83605 ASSAY OF LACTIC ACID: CPT | Performed by: NURSE PRACTITIONER

## 2020-07-28 PROCEDURE — 99221 1ST HOSP IP/OBS SF/LOW 40: CPT | Performed by: NURSE PRACTITIONER

## 2020-07-28 PROCEDURE — 73700 CT LOWER EXTREMITY W/O DYE: CPT

## 2020-07-28 PROCEDURE — 87147 CULTURE TYPE IMMUNOLOGIC: CPT | Performed by: NURSE PRACTITIONER

## 2020-07-28 PROCEDURE — 25010000002 VANCOMYCIN PER 500 MG: Performed by: FAMILY MEDICINE

## 2020-07-28 PROCEDURE — 99204 OFFICE O/P NEW MOD 45 MIN: CPT | Performed by: FAMILY MEDICINE

## 2020-07-28 PROCEDURE — 84145 PROCALCITONIN (PCT): CPT | Performed by: NURSE PRACTITIONER

## 2020-07-28 PROCEDURE — 87040 BLOOD CULTURE FOR BACTERIA: CPT | Performed by: NURSE PRACTITIONER

## 2020-07-28 RX ORDER — ONDANSETRON 2 MG/ML
4 INJECTION INTRAMUSCULAR; INTRAVENOUS EVERY 6 HOURS PRN
Status: DISCONTINUED | OUTPATIENT
Start: 2020-07-28 | End: 2020-08-03 | Stop reason: HOSPADM

## 2020-07-28 RX ORDER — HYDROCODONE BITARTRATE AND ACETAMINOPHEN 10; 325 MG/1; MG/1
1 TABLET ORAL EVERY 6 HOURS PRN
Status: ON HOLD | COMMUNITY
Start: 2020-07-10 | End: 2020-08-03

## 2020-07-28 RX ORDER — GUAIFENESIN 600 MG/1
1200 TABLET, EXTENDED RELEASE ORAL 2 TIMES DAILY
Status: DISCONTINUED | OUTPATIENT
Start: 2020-07-28 | End: 2020-08-03 | Stop reason: HOSPADM

## 2020-07-28 RX ORDER — ATORVASTATIN CALCIUM 40 MG/1
40 TABLET, FILM COATED ORAL NIGHTLY
Status: DISCONTINUED | OUTPATIENT
Start: 2020-07-28 | End: 2020-08-03 | Stop reason: HOSPADM

## 2020-07-28 RX ORDER — SPIRONOLACTONE 25 MG/1
25 TABLET ORAL
Status: DISCONTINUED | OUTPATIENT
Start: 2020-07-28 | End: 2020-08-03 | Stop reason: HOSPADM

## 2020-07-28 RX ORDER — CARVEDILOL 6.25 MG/1
6.25 TABLET ORAL 2 TIMES DAILY WITH MEALS
Status: DISCONTINUED | OUTPATIENT
Start: 2020-07-28 | End: 2020-08-03 | Stop reason: HOSPADM

## 2020-07-28 RX ORDER — SODIUM CHLORIDE 9 MG/ML
75 INJECTION, SOLUTION INTRAVENOUS CONTINUOUS
Status: DISCONTINUED | OUTPATIENT
Start: 2020-07-28 | End: 2020-07-30

## 2020-07-28 RX ORDER — NICOTINE 21 MG/24HR
1 PATCH, TRANSDERMAL 24 HOURS TRANSDERMAL
Status: DISCONTINUED | OUTPATIENT
Start: 2020-07-28 | End: 2020-08-03 | Stop reason: HOSPADM

## 2020-07-28 RX ORDER — DULOXETIN HYDROCHLORIDE 30 MG/1
60 CAPSULE, DELAYED RELEASE ORAL EVERY 12 HOURS SCHEDULED
Status: DISCONTINUED | OUTPATIENT
Start: 2020-07-28 | End: 2020-08-03 | Stop reason: HOSPADM

## 2020-07-28 RX ORDER — VANCOMYCIN HYDROCHLORIDE 1 G/200ML
1000 INJECTION, SOLUTION INTRAVENOUS EVERY 24 HOURS
Status: DISCONTINUED | OUTPATIENT
Start: 2020-07-28 | End: 2020-07-29

## 2020-07-28 RX ORDER — ALLOPURINOL 100 MG/1
100 TABLET ORAL DAILY
Status: DISCONTINUED | OUTPATIENT
Start: 2020-07-28 | End: 2020-08-03 | Stop reason: HOSPADM

## 2020-07-28 RX ORDER — HYDROCODONE BITARTRATE AND ACETAMINOPHEN 10; 325 MG/1; MG/1
1 TABLET ORAL EVERY 4 HOURS PRN
Status: DISCONTINUED | OUTPATIENT
Start: 2020-07-28 | End: 2020-08-03 | Stop reason: HOSPADM

## 2020-07-28 RX ORDER — SODIUM CHLORIDE 0.9 % (FLUSH) 0.9 %
10 SYRINGE (ML) INJECTION AS NEEDED
Status: DISCONTINUED | OUTPATIENT
Start: 2020-07-28 | End: 2020-08-03 | Stop reason: HOSPADM

## 2020-07-28 RX ORDER — LORAZEPAM 2 MG/ML
0.5 INJECTION INTRAMUSCULAR ONCE
Status: COMPLETED | OUTPATIENT
Start: 2020-07-28 | End: 2020-07-28

## 2020-07-28 RX ORDER — SODIUM CHLORIDE 0.9 % (FLUSH) 0.9 %
10 SYRINGE (ML) INJECTION EVERY 12 HOURS SCHEDULED
Status: DISCONTINUED | OUTPATIENT
Start: 2020-07-28 | End: 2020-08-03 | Stop reason: HOSPADM

## 2020-07-28 RX ORDER — HYDROMORPHONE HYDROCHLORIDE 1 MG/ML
0.5 INJECTION, SOLUTION INTRAMUSCULAR; INTRAVENOUS; SUBCUTANEOUS ONCE
Status: COMPLETED | OUTPATIENT
Start: 2020-07-28 | End: 2020-07-28

## 2020-07-28 RX ORDER — PANTOPRAZOLE SODIUM 40 MG/1
40 TABLET, DELAYED RELEASE ORAL
Status: DISCONTINUED | OUTPATIENT
Start: 2020-07-29 | End: 2020-08-03 | Stop reason: HOSPADM

## 2020-07-28 RX ORDER — QUETIAPINE FUMARATE 25 MG/1
50 TABLET, FILM COATED ORAL NIGHTLY
Status: DISCONTINUED | OUTPATIENT
Start: 2020-07-28 | End: 2020-08-03 | Stop reason: HOSPADM

## 2020-07-28 RX ORDER — DIVALPROEX SODIUM 500 MG/1
1000 TABLET, EXTENDED RELEASE ORAL NIGHTLY
Status: DISCONTINUED | OUTPATIENT
Start: 2020-07-28 | End: 2020-08-03 | Stop reason: HOSPADM

## 2020-07-28 RX ORDER — PILOCARPINE HYDROCHLORIDE 5 MG/1
5 TABLET, FILM COATED ORAL 3 TIMES DAILY
Status: DISCONTINUED | OUTPATIENT
Start: 2020-07-28 | End: 2020-08-01

## 2020-07-28 RX ORDER — LEVOTHYROXINE SODIUM 0.12 MG/1
125 TABLET ORAL
Status: DISCONTINUED | OUTPATIENT
Start: 2020-07-29 | End: 2020-08-03 | Stop reason: HOSPADM

## 2020-07-28 RX ORDER — TIZANIDINE 4 MG/1
4 TABLET ORAL NIGHTLY
Status: DISCONTINUED | OUTPATIENT
Start: 2020-07-28 | End: 2020-08-03 | Stop reason: HOSPADM

## 2020-07-28 RX ORDER — ONDANSETRON 4 MG/1
4 TABLET, FILM COATED ORAL EVERY 6 HOURS PRN
Status: DISCONTINUED | OUTPATIENT
Start: 2020-07-28 | End: 2020-08-03 | Stop reason: HOSPADM

## 2020-07-28 RX ORDER — LANOLIN ALCOHOL/MO/W.PET/CERES
3 CREAM (GRAM) TOPICAL DAILY
COMMUNITY
Start: 2020-07-24 | End: 2020-08-23

## 2020-07-28 RX ORDER — NICOTINE POLACRILEX 4 MG
15 LOZENGE BUCCAL
Status: DISCONTINUED | OUTPATIENT
Start: 2020-07-28 | End: 2020-08-03 | Stop reason: HOSPADM

## 2020-07-28 RX ORDER — DEXTROSE MONOHYDRATE 25 G/50ML
25 INJECTION, SOLUTION INTRAVENOUS
Status: DISCONTINUED | OUTPATIENT
Start: 2020-07-28 | End: 2020-08-03 | Stop reason: HOSPADM

## 2020-07-28 RX ORDER — ALPRAZOLAM 0.5 MG/1
0.5 TABLET ORAL 3 TIMES DAILY PRN
Status: DISCONTINUED | OUTPATIENT
Start: 2020-07-28 | End: 2020-08-03 | Stop reason: HOSPADM

## 2020-07-28 RX ORDER — LANOLIN ALCOHOL/MO/W.PET/CERES
3 CREAM (GRAM) TOPICAL NIGHTLY
Status: DISCONTINUED | OUTPATIENT
Start: 2020-07-28 | End: 2020-08-03 | Stop reason: HOSPADM

## 2020-07-28 RX ADMIN — Medication 3 MG: at 20:03

## 2020-07-28 RX ADMIN — DULOXETINE HYDROCHLORIDE 60 MG: 30 CAPSULE, DELAYED RELEASE ORAL at 20:03

## 2020-07-28 RX ADMIN — HYDROCODONE BITARTRATE AND ACETAMINOPHEN 1 TABLET: 10; 325 TABLET ORAL at 20:02

## 2020-07-28 RX ADMIN — PILOCARPINE HYDROCHLORIDE 5 MG: 5 TABLET, FILM COATED ORAL at 20:07

## 2020-07-28 RX ADMIN — LORAZEPAM 0.5 MG: 2 INJECTION INTRAMUSCULAR; INTRAVENOUS at 17:07

## 2020-07-28 RX ADMIN — SODIUM CHLORIDE, PRESERVATIVE FREE 10 ML: 5 INJECTION INTRAVENOUS at 20:09

## 2020-07-28 RX ADMIN — ALLOPURINOL 100 MG: 100 TABLET ORAL at 20:04

## 2020-07-28 RX ADMIN — MUPIROCIN: 20 OINTMENT TOPICAL at 20:07

## 2020-07-28 RX ADMIN — VANCOMYCIN HYDROCHLORIDE 1000 MG: 1 INJECTION, SOLUTION INTRAVENOUS at 20:03

## 2020-07-28 RX ADMIN — SODIUM CHLORIDE 125 ML/HR: 9 INJECTION, SOLUTION INTRAVENOUS at 20:03

## 2020-07-28 RX ADMIN — QUETIAPINE FUMARATE 50 MG: 25 TABLET ORAL at 20:03

## 2020-07-28 RX ADMIN — GUAIFENESIN 1200 MG: 600 TABLET, EXTENDED RELEASE ORAL at 20:03

## 2020-07-28 RX ADMIN — ATORVASTATIN CALCIUM 40 MG: 40 TABLET, FILM COATED ORAL at 20:03

## 2020-07-28 RX ADMIN — TIZANIDINE 4 MG: 4 TABLET ORAL at 20:19

## 2020-07-28 RX ADMIN — HYDROMORPHONE HYDROCHLORIDE 0.5 MG: 1 INJECTION, SOLUTION INTRAMUSCULAR; INTRAVENOUS; SUBCUTANEOUS at 17:07

## 2020-07-28 RX ADMIN — PRAMIPEXOLE DIHYDROCHLORIDE 1.5 MG: 0.25 TABLET ORAL at 20:02

## 2020-07-28 RX ADMIN — CARVEDILOL 6.25 MG: 6.25 TABLET, FILM COATED ORAL at 20:03

## 2020-07-28 RX ADMIN — NICOTINE 1 PATCH: 21 PATCH, EXTENDED RELEASE TRANSDERMAL at 20:04

## 2020-07-28 RX ADMIN — SPIRONOLACTONE 25 MG: 25 TABLET ORAL at 20:03

## 2020-07-28 NOTE — TELEPHONE ENCOUNTER
Dr. Victor requested for patient to be directly admitted to [facility] for cellulitis and wound on left lower leg. I spoke to patients insurance (Ed A). Faxing clinical information to [phone] ATTN Nunu Cheatham (In patient case management at this time).    Approval # [id number]. I spoke to admissions at [facility] and gave them this authorization code for the patient.     Faxed clinical information to Nunu Cheatham at this time.

## 2020-07-28 NOTE — PROGRESS NOTES
Subjective     Chief Complaint   Patient presents with   • Insect Bite     Patient was bitten by a brown recluse in April. Patient establishing care.        History of Present Illness  Patient referred to our office by pain management.  She apparently has had a spider bite.  This happened in April.  She now has a gaping wound that is worsening and is having significant pain with this.  She is also having progressing erythema and swelling associated with this.  By her history she has been seen at [facility].  Was sent to [facility] wound management.  Was hospitalized at least once for this.  At some point she had some debridement of the wound.  Sounded like this was extremely painful and done without any prior warning to the patient.  She is very verbally upset when she talks about this.  She is not willing to return to the [facility] system for any further care.  She has recently been on Bactrim and apparently did have an allergic reaction to this.  The patient is very emotionally distraught.  And is a poor historian due to this.  She is unable to tolerate the pain anymore.    Patient's PMR from outside medical facility reviewed and noted.    Review of Systems   Constitutional: Positive for activity change and fatigue. Negative for fever.   HENT: Negative.    Eyes: Negative.    Respiratory: Negative.    Cardiovascular: Negative.    Gastrointestinal: Negative.    Endocrine: Negative.    Genitourinary: Negative.    Musculoskeletal: Positive for arthralgias, back pain and gait problem.   Skin: Positive for color change and wound.   Allergic/Immunologic: Negative.    Neurological: Positive for dizziness.   Hematological: Negative.    Psychiatric/Behavioral: Negative.       Otherwise complete ROS reviewed and negative except as mentioned in the HPI.    Past Medical History:   Past Medical History:   Diagnosis Date   • Acid reflux     RESOLVED PT PT   • Allergic rhinitis    • Arthritis     BACK   • Bipolar 1 disorder  (CMS/HCC)    • Chronic back pain    • Degenerative arthritis    • Depression    • Deviated nasal septum    • Eczema    • Gout    • History of colon polyps    • Hyperlipidemia    • Hypertension    • Hypertrophy of nasal turbinates    • Hypokalemia    • Hypothyroidism    • Incontinence in female    • Insomnia    • Menopause    • Nasal septal deviation    • Nasal valve stenosis    • Overactive bladder    • Plantar fasciitis    • Prediabetes    • RLS (restless legs syndrome)    • Sleep apnea     cpap   • Wears glasses      Past Surgical History:  Past Surgical History:   Procedure Laterality Date   • BLEPHAROPLASTY Bilateral 7/9/2019    Procedure: 1) bilateral upper blepharoplasty with excision of excess of tissue weighing down 2) nasal endoscopy with removal of nasal septal prosthesis;  Surgeon: Mark Anthony Anderson MD;  [facility];  Service: ENT   • CARPAL TUNNEL RELEASE Bilateral 2004   • CHOLECYSTECTOMY  2013   • COLONOSCOPY  10/17/2014    One 5-6mm tubular adenomatous polyp in the ascending colon; Two less than 4mm hyperplastic polyps in the sigmoid colon; Three rectal hyperplastic polyps; Diverticulosis; Repeat 5 years    • COLONOSCOPY  07/14/2010    Internal hemorrhoids; Repeat 7 years    • COLONOSCOPY N/A 10/23/2019    Procedure: COLONOSCOPY WITH ANESTHESIA;  Surgeon: Robyn Frazier MD;  Location: [facility] ENDOSCOPY;  Service: Gastroenterology   • CYST REMOVAL  2016    neck   • ENDOSCOPIC FUNCTIONAL SINUS SURGERY (FESS) Bilateral 4/16/2019    Procedure: ENDOSCOPIC FUNCTIONAL SINUS SURGERY (bilareral maxillary antrostomy without image guidance);  Surgeon: Mark Anthony Anderson MD;  Location: [facility] OR;  Service: ENT   • ENDOSCOPY N/A 4/11/2019    Esophageal mucosal changes suggestive of eosinophilic esophagitis-biopsied; A few gastric polyps-resected and retrieved-biopsied; Normal examined duodenum-biopsied   • FOOT SURGERY Right 2010    X3   • HERNIA REPAIR     • INTERSTIM PLACEMENT N/A 6/5/2019    Procedure:  INTERSTIM STAGES 1 AND 2 LEAD AND GENERATOR PLACEMENT;  Surgeon: Endy Guerrero MD;  Location:  [facility] OR;  Service: Urology   • LASER ABLATION      right back   • NASAL ENDOSCOPY N/A 4/16/2019    Procedure:     1. Functional endoscopic sinus surgery with bilateral maxillary antrostomy    2. Bilateral nasal endoscopy with biopsy of nasal septum    3.  Nasal septal prosthesis placement  ;  Surgeon: Mark Anthony Anderson MD;  Location:  [facility] OR;  Service: ENT   • NASAL VESTIBULE LESION/PAPILLOMA EXCISION N/A 8/1/2017    Procedure: Repair of nasal vestibular stenosis and septoplasty; cartilage graft from left ear;  Surgeon: Mark Anthony Anderson MD;  Location:  [facility] OR;  Service:    • SEPTOPLASTY N/A 4/16/2019    Procedure: PLACEMENT OF NASAL SEPTAL PROSTHESIS;  Surgeon: Mark Anthony Anderson MD;  Location:  [facility] OR;  Service: ENT   • SEPTOPLASTY Bilateral 1/14/2020    Procedure: Nasal endoscopy with septal debridement, and placement of Silastic stents;  Surgeon: Mark Anthony Anderson MD;  Location:  [facility] OR;  Service: ENT     Social History:  reports that she has been smoking cigarettes. She has a 20.00 pack-year smoking history. She has never used smokeless tobacco. She reports that she drinks alcohol. She reports that she does not use drugs.    Family History: family history includes Cancer in her father and mother; Diabetes in her father; Hypertension in her father.       Allergies:  Allergies   Allergen Reactions   • Bactrim [Sulfamethoxazole-Trimethoprim] Rash     Itching   • Hctz [Hydrochlorothiazide] Other (See Comments)     Acid reflux   • Januvia [Sitagliptin] Rash     Medications:  Prior to Admission medications    Medication Sig Start Date End Date Taking? Authorizing Provider   allopurinol (ZYLOPRIM) 100 MG tablet Take 100 mg by mouth Daily. 5/8/17  Yes ProviderBrenda MD   ALPRAZolam (XANAX) 0.5 MG tablet Take 0.5 mg by mouth As Needed. 7/9/19  Yes Brenda Mcnair MD   atorvastatin (LIPITOR) 40 MG tablet  Take 40 mg by mouth Every Night. 9/13/17  Yes Brenda Mcnair MD   bumetanide (BUMEX) 1 MG tablet Take 1 mg by mouth 2 (Two) Times a Week.   Yes Brenda Mcnair MD   calcium carbonate (OS-KAYLEY) 600 MG tablet Take 600 mg by mouth Daily.   Yes Brenda Mcnair MD   carvedilol (COREG) 6.25 MG tablet Take 6.25 mg by mouth 2 (Two) Times a Day With Meals.   Yes Brenda Mcnair MD   cholecalciferol (VITAMIN D3) 1000 UNITS tablet Take 1,000 Units by mouth Daily.   Yes Brenda Mcnair MD   DICLOFENAC PO Take 75 mg by mouth 2 (Two) Times a Day.   Yes Brenda Mcnair MD   guaiFENesin (MUCINEX) 600 MG 12 hr tablet Take 1,200 mg by mouth 2 (Two) Times a Day. 10/19/18  Yes Brenda Mcnair MD   levothyroxine (SYNTHROID, LEVOTHROID) 125 MCG tablet Take 125 mcg by mouth Daily. 3/13/19  Yes Brenda Mcnair MD   Loperamide HCl (IMODIUM PO) Take 2 mg by mouth As Needed.   Yes Brenda Mcnair MD   melatonin 3 MG tablet Take 3 mg by mouth Daily. 7/24/20 8/23/20 Yes Brenda Mcnair MD   methylcellulose, Laxative, (EQ FIBER THERAPY) 500 MG tablet tablet Take 1 tablet by mouth Daily.   Yes Brenda Mcnair MD   Multiple Vitamin (MULTI VITAMIN PO) Take 1 dose by mouth Daily.   Yes Brenda Mcnair MD   niacin (SLO-NIACIN) 500 MG CR tablet Take 500 mg by mouth Daily.   Yes Brenda Mcnair MD   omeprazole (priLOSEC) 20 MG capsule Take 20 mg by mouth Daily. 3/27/19  Yes Brenda Mcnair MD   pilocarpine (SALAGEN) 5 MG tablet Take 5 mg by mouth 3 (Three) Times a Day.   Yes Brenda Mcnair MD   pramipexole (MIRAPEX) 1.5 MG tablet Take 1.5 mg by mouth Every Night.   Yes Brenda Mcnair MD   QUEtiapine (SEROquel) 50 MG tablet Take 50 mg by mouth Every Night.   Yes Brenda Mcnair MD   spironolactone (ALDACTONE) 25 MG tablet Take 25 mg by mouth 2 (Two) Times a Day.   Yes Brenda Mcnair MD   tiZANidine (ZANAFLEX) 4 MG tablet Take 4 mg by mouth  "Every Night.   Yes Brenda Mcnair MD   traMADol (ULTRAM) 50 MG tablet Take 50 mg by mouth 2 (Two) Times a Day.   Yes Brenda Mcnair MD   albuterol (PROVENTIL) (2.5 MG/3ML) 0.083% nebulizer solution As Needed. 8/28/19   Brenda Mcnair MD   divalproex (DEPAKOTE) 500 MG 24 hr tablet Take 1,000 mg by mouth Every Night.    Brenda Mcnair MD   DULoxetine (CYMBALTA) 60 MG capsule Take 60 mg by mouth 2 (Two) Times a Day.    Brenda Mcnair MD   HYDROcodone-acetaminophen (NORCO)  MG per tablet Take 1 tablet by mouth Every 6 (Six) Hours As Needed. 7/10/20 7/28/20  Brenda Mcnair MD   mupirocin (BACTROBAN) 2 % ointment  10/16/19   Brenda Mcnair MD   Respiratory Therapy Supplies (NEBULIZER/TUBING/MOUTHPIECE) kit 1 kit. 8/28/19   Brenda Mcnair MD       Objective     Vital Signs: /84 (BP Location: Right arm, Patient Position: Sitting, Cuff Size: Adult)   Pulse 98   Temp 98.5 °F (36.9 °C) (Skin)   Resp 18   Ht 152.4 cm (60\")   Wt 81.7 kg (180 lb 3.2 oz)   SpO2 98%   BMI 35.19 kg/m²   Physical Exam   Constitutional: She is oriented to person, place, and time. She appears well-developed and well-nourished. She appears distressed.   HENT:   Head: Normocephalic and atraumatic.   Right Ear: External ear normal.   Left Ear: External ear normal.   Nose: Nose normal.   Mouth/Throat: Oropharynx is clear and moist. No oropharyngeal exudate.   Eyes: Pupils are equal, round, and reactive to light. Conjunctivae and EOM are normal. Right eye exhibits no discharge. Left eye exhibits no discharge. No scleral icterus.   Neck: Normal range of motion. Neck supple. No JVD present.   Cardiovascular: Normal rate, regular rhythm, normal heart sounds and intact distal pulses. Exam reveals no gallop and no friction rub.   No murmur heard.  Pulmonary/Chest: Effort normal and breath sounds normal.   Abdominal: Soft. Bowel sounds are normal.   Musculoskeletal: She exhibits edema and " tenderness.   Neurological: She is alert and oriented to person, place, and time. No cranial nerve deficit.   Skin: Capillary refill takes more than 3 seconds. She is not diaphoretic.   Left lower extremity with erythema extending from approximately 4 inches above the flexor the knee to the toes.  This is warm to touch.  There is marked edema of this extremity.  The dressing that was in place when she arrived was removed.  The patient has a large gaping wound down to the muscle on the posterior aspect of this leg.  Currently there is no marked drainage.  It appears that she has just recently changed his dressing.  There is marked tenderness to palpation over the wound and leg.  There is vitiligo reticularis present bilateral lower extremities.   Psychiatric:   Anxious, tearful.   Nursing note and vitals reviewed.      Patient's Body mass index is 35.19 kg/m².       Results Reviewed:  Glucose   Date Value Ref Range Status   01/09/2020 116 (H) 65 - 99 mg/dL Final   08/28/2019 105 74 - 109 mg/dL Final     BUN   Date Value Ref Range Status   01/09/2020 15 6 - 20 mg/dL Final   08/28/2019 19 6 - 20 mg/dL Final     Creatinine   Date Value Ref Range Status   01/09/2020 0.82 0.57 - 1.00 mg/dL Final   11/13/2019 0.90 0.60 - 1.30 mg/dL Final     Comment:     Serial Number: [id number]:  [id number]   08/28/2019 0.6 0.5 - 0.9 mg/dL Final     Sodium   Date Value Ref Range Status   01/09/2020 142 136 - 145 mmol/L Final   08/28/2019 139 136 - 145 mmol/L Final     Potassium   Date Value Ref Range Status   01/09/2020 4.1 3.5 - 5.2 mmol/L Final   08/28/2019 4.0 3.5 - 5.0 mmol/L Final     Chloride   Date Value Ref Range Status   01/09/2020 100 98 - 107 mmol/L Final   08/28/2019 94 (L) 98 - 111 mmol/L Final     CO2   Date Value Ref Range Status   01/09/2020 32.0 (H) 22.0 - 29.0 mmol/L Final   08/28/2019 31 (H) 22 - 29 mmol/L Final     Calcium   Date Value Ref Range Status   01/09/2020 9.9 8.6 - 10.5 mg/dL Final   08/28/2019 9.7 8.6 -  10.0 mg/dL Final     ALT (SGPT)   Date Value Ref Range Status   04/05/2019 32 5 - 33 U/L Final     AST (SGOT)   Date Value Ref Range Status   04/05/2019 22 5 - 32 U/L Final     WBC   Date Value Ref Range Status   07/05/2020 9.9 4.8 - 10.8 K/uL Final     Hematocrit   Date Value Ref Range Status   07/05/2020 28.8 (L) 37.0 - 47.0 % Final     Platelets   Date Value Ref Range Status   07/05/2020 394 130 - 400 K/uL Final     Triglycerides   Date Value Ref Range Status   04/05/2019 117 0 - 149 mg/dL Final     HDL Cholesterol   Date Value Ref Range Status   04/05/2019 35 (L) 65 - 121 mg/dL Final     Comment:     VALUES>60 MG/DL ARE ASSOCIATED WITH A DECREASED RISK OF  ATHEROSCLEROTIC CARDIOVASCULAR DISEASE     LDL Cholesterol    Date Value Ref Range Status   04/05/2019 29 <100 mg/dL Final     Comment:     <100 MG/DL=OPITIMAL    100-129 MG/DL=DESIRABLE    130-159 MG/DL BORDERLINE=INCREASED RISK OF ATHEROSCLEROTIC  CARDIOVASCULAR DISEASE    > OR = 160 MG/DL=ASSOCIATED WITH AN INCREASE RISK OF  ATHEROSCLEROTIC CARDIOVASCULAR DISEASE     Hemoglobin A1C   Date Value Ref Range Status   07/04/2020 6.1 (H) 4.0 - 6.0 % Final     Comment:     HbA1c levels >6% are an indication of hyperglycemia during the preceding 2  to 3 months or longer.    HbA1c levels may reach 20% or higher in poorly controlled diabetes.  Therapeutic action is suggested at levels above 8%.    Diabetes patients with HbA1c levels below 7% meet the goal of the American  Diabetes Association.    HbA1c levels below the established reference range may indicate recent  episodes of hypoglycemia, the presence of Hb variants, or shortened lifetime  of erythrocytes.          Assessment / Plan     Assessment/Plan:  1. Cellulitis of left lower extremity      2. Wound of left lower extremity, initial encounter  Open complex.    3. Prediabetes  Last a1c 6.1    4. Essential hypertension      5. Chronic pain syndrome  Followed by Dr Cedeño    Recommend admission to the  [facility].  The [facility] system was called.  A discussion was had with Dr. Melchor Conway [facility]  He is agreed to accept her for admission.  A call was placed to the patient's insurance to inform them of patient's need for admission to the hospital.  This was discussed with the patient and she is in agreements with this.        No follow-ups on file. unless patient needs to be seen sooner or acute issues arise.        I have discussed the patient results/orders and and plan/recommendation with them at today's visit.      Tabitha Victor, DO   07/28/2020

## 2020-07-29 PROBLEM — Z72.0 TOBACCO ABUSE: Status: ACTIVE | Noted: 2020-07-29

## 2020-07-29 LAB
ALBUMIN SERPL-MCNC: 3.6 G/DL (ref 3.5–5.2)
ALBUMIN/GLOB SERPL: 1.1 G/DL
ALP SERPL-CCNC: 99 U/L (ref 39–117)
ALT SERPL W P-5'-P-CCNC: 16 U/L (ref 1–33)
ANION GAP SERPL CALCULATED.3IONS-SCNC: 13 MMOL/L (ref 5–15)
AST SERPL-CCNC: 16 U/L (ref 1–32)
BASOPHILS # BLD AUTO: 0.05 10*3/MM3 (ref 0–0.2)
BASOPHILS NFR BLD AUTO: 0.4 % (ref 0–1.5)
BILIRUB SERPL-MCNC: <0.2 MG/DL (ref 0–1.2)
BUN SERPL-MCNC: 23 MG/DL (ref 6–20)
BUN/CREAT SERPL: 20 (ref 7–25)
CALCIUM SPEC-SCNC: 9.2 MG/DL (ref 8.6–10.5)
CHLORIDE SERPL-SCNC: 103 MMOL/L (ref 98–107)
CHOLEST SERPL-MCNC: 101 MG/DL (ref 0–200)
CO2 SERPL-SCNC: 24 MMOL/L (ref 22–29)
CREAT SERPL-MCNC: 1.15 MG/DL (ref 0.57–1)
DEPRECATED RDW RBC AUTO: 49.3 FL (ref 37–54)
EOSINOPHIL # BLD AUTO: 0.5 10*3/MM3 (ref 0–0.4)
EOSINOPHIL NFR BLD AUTO: 4 % (ref 0.3–6.2)
ERYTHROCYTE [DISTWIDTH] IN BLOOD BY AUTOMATED COUNT: 16.2 % (ref 12.3–15.4)
GFR SERPL CREATININE-BSD FRML MDRD: 48 ML/MIN/1.73
GLOBULIN UR ELPH-MCNC: 3.2 GM/DL
GLUCOSE BLDC GLUCOMTR-MCNC: 103 MG/DL (ref 70–130)
GLUCOSE BLDC GLUCOMTR-MCNC: 109 MG/DL (ref 70–130)
GLUCOSE SERPL-MCNC: 124 MG/DL (ref 65–99)
HBA1C MFR BLD: 6.5 % (ref 4.8–5.6)
HCT VFR BLD AUTO: 30.3 % (ref 34–46.6)
HDLC SERPL-MCNC: 29 MG/DL (ref 40–60)
HGB BLD-MCNC: 9.3 G/DL (ref 12–15.9)
IMM GRANULOCYTES # BLD AUTO: 0.12 10*3/MM3 (ref 0–0.05)
IMM GRANULOCYTES NFR BLD AUTO: 0.9 % (ref 0–0.5)
LDLC SERPL CALC-MCNC: 30 MG/DL (ref 0–100)
LDLC/HDLC SERPL: 1.04 {RATIO}
LYMPHOCYTES # BLD AUTO: 3.26 10*3/MM3 (ref 0.7–3.1)
LYMPHOCYTES NFR BLD AUTO: 25.8 % (ref 19.6–45.3)
MCH RBC QN AUTO: 25.9 PG (ref 26.6–33)
MCHC RBC AUTO-ENTMCNC: 30.7 G/DL (ref 31.5–35.7)
MCV RBC AUTO: 84.4 FL (ref 79–97)
MONOCYTES # BLD AUTO: 0.5 10*3/MM3 (ref 0.1–0.9)
MONOCYTES NFR BLD AUTO: 4 % (ref 5–12)
NEUTROPHILS NFR BLD AUTO: 64.9 % (ref 42.7–76)
NEUTROPHILS NFR BLD AUTO: 8.21 10*3/MM3 (ref 1.7–7)
NRBC BLD AUTO-RTO: 0 /100 WBC (ref 0–0.2)
PLATELET # BLD AUTO: 505 10*3/MM3 (ref 140–450)
PMV BLD AUTO: 10 FL (ref 6–12)
POTASSIUM SERPL-SCNC: 4.3 MMOL/L (ref 3.5–5.2)
PROT SERPL-MCNC: 6.8 G/DL (ref 6–8.5)
RBC # BLD AUTO: 3.59 10*6/MM3 (ref 3.77–5.28)
SODIUM SERPL-SCNC: 140 MMOL/L (ref 136–145)
TRIGL SERPL-MCNC: 209 MG/DL (ref 0–150)
TSH SERPL DL<=0.05 MIU/L-ACNC: 1.52 UIU/ML (ref 0.27–4.2)
VANCOMYCIN SERPL-MCNC: 14.7 MCG/ML (ref 5–40)
VLDLC SERPL-MCNC: 41.8 MG/DL
WBC # BLD AUTO: 12.64 10*3/MM3 (ref 3.4–10.8)

## 2020-07-29 PROCEDURE — 99232 SBSQ HOSP IP/OBS MODERATE 35: CPT | Performed by: NURSE PRACTITIONER

## 2020-07-29 PROCEDURE — 80061 LIPID PANEL: CPT | Performed by: NURSE PRACTITIONER

## 2020-07-29 PROCEDURE — 99406 BEHAV CHNG SMOKING 3-10 MIN: CPT

## 2020-07-29 PROCEDURE — 80053 COMPREHEN METABOLIC PANEL: CPT | Performed by: NURSE PRACTITIONER

## 2020-07-29 PROCEDURE — 80202 ASSAY OF VANCOMYCIN: CPT | Performed by: FAMILY MEDICINE

## 2020-07-29 PROCEDURE — 25010000002 HYDROMORPHONE PER 4 MG: Performed by: INTERNAL MEDICINE

## 2020-07-29 PROCEDURE — 84443 ASSAY THYROID STIM HORMONE: CPT | Performed by: NURSE PRACTITIONER

## 2020-07-29 PROCEDURE — 94664 DEMO&/EVAL PT USE INHALER: CPT

## 2020-07-29 PROCEDURE — 82962 GLUCOSE BLOOD TEST: CPT

## 2020-07-29 PROCEDURE — 85025 COMPLETE CBC W/AUTO DIFF WBC: CPT | Performed by: NURSE PRACTITIONER

## 2020-07-29 PROCEDURE — 25010000002 VANCOMYCIN 10 G RECONSTITUTED SOLUTION: Performed by: FAMILY MEDICINE

## 2020-07-29 PROCEDURE — 83036 HEMOGLOBIN GLYCOSYLATED A1C: CPT | Performed by: NURSE PRACTITIONER

## 2020-07-29 RX ORDER — GABAPENTIN 100 MG/1
100 CAPSULE ORAL 3 TIMES DAILY
Status: DISCONTINUED | OUTPATIENT
Start: 2020-07-29 | End: 2020-08-03 | Stop reason: HOSPADM

## 2020-07-29 RX ORDER — ALBUTEROL SULFATE 2.5 MG/3ML
2.5 SOLUTION RESPIRATORY (INHALATION) EVERY 4 HOURS PRN
Status: DISCONTINUED | OUTPATIENT
Start: 2020-07-29 | End: 2020-08-03 | Stop reason: HOSPADM

## 2020-07-29 RX ORDER — HYDROMORPHONE HYDROCHLORIDE 1 MG/ML
0.5 INJECTION, SOLUTION INTRAMUSCULAR; INTRAVENOUS; SUBCUTANEOUS ONCE
Status: COMPLETED | OUTPATIENT
Start: 2020-07-29 | End: 2020-07-29

## 2020-07-29 RX ADMIN — LEVOTHYROXINE SODIUM 125 MCG: 125 TABLET ORAL at 05:32

## 2020-07-29 RX ADMIN — MUPIROCIN: 20 OINTMENT TOPICAL at 10:10

## 2020-07-29 RX ADMIN — SPIRONOLACTONE 25 MG: 25 TABLET ORAL at 18:20

## 2020-07-29 RX ADMIN — GUAIFENESIN 1200 MG: 600 TABLET, EXTENDED RELEASE ORAL at 10:09

## 2020-07-29 RX ADMIN — SODIUM CHLORIDE 75 ML/HR: 9 INJECTION, SOLUTION INTRAVENOUS at 23:38

## 2020-07-29 RX ADMIN — CARVEDILOL 6.25 MG: 6.25 TABLET, FILM COATED ORAL at 18:20

## 2020-07-29 RX ADMIN — PRAMIPEXOLE DIHYDROCHLORIDE 1.5 MG: 0.25 TABLET ORAL at 21:59

## 2020-07-29 RX ADMIN — HYDROCODONE BITARTRATE AND ACETAMINOPHEN 1 TABLET: 10; 325 TABLET ORAL at 18:29

## 2020-07-29 RX ADMIN — HYDROCODONE BITARTRATE AND ACETAMINOPHEN 1 TABLET: 10; 325 TABLET ORAL at 00:12

## 2020-07-29 RX ADMIN — VANCOMYCIN HYDROCHLORIDE 1250 MG: 10 INJECTION, POWDER, LYOPHILIZED, FOR SOLUTION INTRAVENOUS at 12:17

## 2020-07-29 RX ADMIN — PILOCARPINE HYDROCHLORIDE 5 MG: 5 TABLET, FILM COATED ORAL at 15:14

## 2020-07-29 RX ADMIN — HYDROMORPHONE HYDROCHLORIDE 0.5 MG: 1 INJECTION, SOLUTION INTRAMUSCULAR; INTRAVENOUS; SUBCUTANEOUS at 01:24

## 2020-07-29 RX ADMIN — GUAIFENESIN 1200 MG: 600 TABLET, EXTENDED RELEASE ORAL at 21:59

## 2020-07-29 RX ADMIN — DULOXETINE HYDROCHLORIDE 60 MG: 30 CAPSULE, DELAYED RELEASE ORAL at 10:11

## 2020-07-29 RX ADMIN — ALPRAZOLAM 0.5 MG: 0.5 TABLET ORAL at 01:24

## 2020-07-29 RX ADMIN — CARVEDILOL 6.25 MG: 6.25 TABLET, FILM COATED ORAL at 10:09

## 2020-07-29 RX ADMIN — ALLOPURINOL 100 MG: 100 TABLET ORAL at 10:09

## 2020-07-29 RX ADMIN — GABAPENTIN 100 MG: 100 CAPSULE ORAL at 15:14

## 2020-07-29 RX ADMIN — ATORVASTATIN CALCIUM 40 MG: 40 TABLET, FILM COATED ORAL at 21:59

## 2020-07-29 RX ADMIN — HYDROCODONE BITARTRATE AND ACETAMINOPHEN 1 TABLET: 10; 325 TABLET ORAL at 08:48

## 2020-07-29 RX ADMIN — DIVALPROEX SODIUM 1000 MG: 500 TABLET, EXTENDED RELEASE ORAL at 22:38

## 2020-07-29 RX ADMIN — PANTOPRAZOLE SODIUM 40 MG: 40 TABLET, DELAYED RELEASE ORAL at 05:33

## 2020-07-29 RX ADMIN — SPIRONOLACTONE 25 MG: 25 TABLET ORAL at 10:09

## 2020-07-29 RX ADMIN — SODIUM CHLORIDE, PRESERVATIVE FREE 10 ML: 5 INJECTION INTRAVENOUS at 10:11

## 2020-07-29 RX ADMIN — GABAPENTIN 100 MG: 100 CAPSULE ORAL at 08:48

## 2020-07-29 RX ADMIN — Medication 3 MG: at 21:59

## 2020-07-29 RX ADMIN — NICOTINE 1 PATCH: 21 PATCH, EXTENDED RELEASE TRANSDERMAL at 10:10

## 2020-07-29 RX ADMIN — PILOCARPINE HYDROCHLORIDE 5 MG: 5 TABLET, FILM COATED ORAL at 21:59

## 2020-07-29 RX ADMIN — MUPIROCIN: 20 OINTMENT TOPICAL at 22:13

## 2020-07-29 RX ADMIN — ALPRAZOLAM 0.5 MG: 0.5 TABLET ORAL at 18:29

## 2020-07-29 RX ADMIN — QUETIAPINE FUMARATE 50 MG: 25 TABLET ORAL at 21:59

## 2020-07-29 RX ADMIN — DULOXETINE HYDROCHLORIDE 60 MG: 30 CAPSULE, DELAYED RELEASE ORAL at 21:59

## 2020-07-29 RX ADMIN — GABAPENTIN 100 MG: 100 CAPSULE ORAL at 21:59

## 2020-07-29 RX ADMIN — PILOCARPINE HYDROCHLORIDE 5 MG: 5 TABLET, FILM COATED ORAL at 10:09

## 2020-07-29 RX ADMIN — TIZANIDINE 4 MG: 4 TABLET ORAL at 21:59

## 2020-07-29 RX ADMIN — SODIUM CHLORIDE 125 ML/HR: 9 INJECTION, SOLUTION INTRAVENOUS at 08:48

## 2020-07-30 LAB
ANION GAP SERPL CALCULATED.3IONS-SCNC: 11 MMOL/L (ref 5–15)
BASOPHILS # BLD AUTO: 0.05 10*3/MM3 (ref 0–0.2)
BASOPHILS NFR BLD AUTO: 0.4 % (ref 0–1.5)
BUN SERPL-MCNC: 13 MG/DL (ref 6–20)
BUN/CREAT SERPL: 15.7 (ref 7–25)
CALCIUM SPEC-SCNC: 9 MG/DL (ref 8.6–10.5)
CHLORIDE SERPL-SCNC: 105 MMOL/L (ref 98–107)
CO2 SERPL-SCNC: 25 MMOL/L (ref 22–29)
CREAT SERPL-MCNC: 0.83 MG/DL (ref 0.57–1)
CRP SERPL-MCNC: 4.98 MG/DL (ref 0–0.5)
DEPRECATED RDW RBC AUTO: 49.5 FL (ref 37–54)
EOSINOPHIL # BLD AUTO: 0.31 10*3/MM3 (ref 0–0.4)
EOSINOPHIL NFR BLD AUTO: 2.7 % (ref 0.3–6.2)
ERYTHROCYTE [DISTWIDTH] IN BLOOD BY AUTOMATED COUNT: 16 % (ref 12.3–15.4)
GFR SERPL CREATININE-BSD FRML MDRD: 70 ML/MIN/1.73
GLUCOSE BLDC GLUCOMTR-MCNC: 191 MG/DL (ref 70–130)
GLUCOSE SERPL-MCNC: 119 MG/DL (ref 65–99)
HCT VFR BLD AUTO: 27 % (ref 34–46.6)
HGB BLD-MCNC: 8.3 G/DL (ref 12–15.9)
IMM GRANULOCYTES # BLD AUTO: 0.09 10*3/MM3 (ref 0–0.05)
IMM GRANULOCYTES NFR BLD AUTO: 0.8 % (ref 0–0.5)
LYMPHOCYTES # BLD AUTO: 2.6 10*3/MM3 (ref 0.7–3.1)
LYMPHOCYTES NFR BLD AUTO: 23 % (ref 19.6–45.3)
MCH RBC QN AUTO: 26.2 PG (ref 26.6–33)
MCHC RBC AUTO-ENTMCNC: 30.7 G/DL (ref 31.5–35.7)
MCV RBC AUTO: 85.2 FL (ref 79–97)
MONOCYTES # BLD AUTO: 0.5 10*3/MM3 (ref 0.1–0.9)
MONOCYTES NFR BLD AUTO: 4.4 % (ref 5–12)
NEUTROPHILS NFR BLD AUTO: 68.7 % (ref 42.7–76)
NEUTROPHILS NFR BLD AUTO: 7.76 10*3/MM3 (ref 1.7–7)
NRBC BLD AUTO-RTO: 0 /100 WBC (ref 0–0.2)
PLATELET # BLD AUTO: 419 10*3/MM3 (ref 140–450)
PMV BLD AUTO: 9.8 FL (ref 6–12)
POTASSIUM SERPL-SCNC: 4.3 MMOL/L (ref 3.5–5.2)
RBC # BLD AUTO: 3.17 10*6/MM3 (ref 3.77–5.28)
SODIUM SERPL-SCNC: 141 MMOL/L (ref 136–145)
VANCOMYCIN SERPL-MCNC: 15.3 MCG/ML (ref 5–40)
WBC # BLD AUTO: 11.31 10*3/MM3 (ref 3.4–10.8)

## 2020-07-30 PROCEDURE — 94664 DEMO&/EVAL PT USE INHALER: CPT

## 2020-07-30 PROCEDURE — 80202 ASSAY OF VANCOMYCIN: CPT | Performed by: FAMILY MEDICINE

## 2020-07-30 PROCEDURE — 82962 GLUCOSE BLOOD TEST: CPT

## 2020-07-30 PROCEDURE — 94799 UNLISTED PULMONARY SVC/PX: CPT

## 2020-07-30 PROCEDURE — 25010000002 VANCOMYCIN 10 G RECONSTITUTED SOLUTION: Performed by: INTERNAL MEDICINE

## 2020-07-30 PROCEDURE — 80048 BASIC METABOLIC PNL TOTAL CA: CPT | Performed by: NURSE PRACTITIONER

## 2020-07-30 PROCEDURE — 85025 COMPLETE CBC W/AUTO DIFF WBC: CPT | Performed by: NURSE PRACTITIONER

## 2020-07-30 PROCEDURE — 87635 SARS-COV-2 COVID-19 AMP PRB: CPT | Performed by: FAMILY MEDICINE

## 2020-07-30 PROCEDURE — 99233 SBSQ HOSP IP/OBS HIGH 50: CPT | Performed by: PODIATRIST

## 2020-07-30 PROCEDURE — 86140 C-REACTIVE PROTEIN: CPT | Performed by: NURSE PRACTITIONER

## 2020-07-30 PROCEDURE — 25010000002 CEFEPIME PER 500 MG: Performed by: INTERNAL MEDICINE

## 2020-07-30 RX ADMIN — MUPIROCIN: 20 OINTMENT TOPICAL at 20:49

## 2020-07-30 RX ADMIN — PILOCARPINE HYDROCHLORIDE 5 MG: 5 TABLET, FILM COATED ORAL at 20:44

## 2020-07-30 RX ADMIN — PILOCARPINE HYDROCHLORIDE 5 MG: 5 TABLET, FILM COATED ORAL at 16:12

## 2020-07-30 RX ADMIN — GUAIFENESIN 1200 MG: 600 TABLET, EXTENDED RELEASE ORAL at 09:11

## 2020-07-30 RX ADMIN — GABAPENTIN 100 MG: 100 CAPSULE ORAL at 20:43

## 2020-07-30 RX ADMIN — CARVEDILOL 6.25 MG: 6.25 TABLET, FILM COATED ORAL at 17:43

## 2020-07-30 RX ADMIN — HYDROCODONE BITARTRATE AND ACETAMINOPHEN 1 TABLET: 10; 325 TABLET ORAL at 03:51

## 2020-07-30 RX ADMIN — CEFEPIME HYDROCHLORIDE 2 G: 2 INJECTION, POWDER, FOR SOLUTION INTRAVENOUS at 20:44

## 2020-07-30 RX ADMIN — ALPRAZOLAM 0.5 MG: 0.5 TABLET ORAL at 03:51

## 2020-07-30 RX ADMIN — QUETIAPINE FUMARATE 50 MG: 25 TABLET ORAL at 20:43

## 2020-07-30 RX ADMIN — DULOXETINE HYDROCHLORIDE 60 MG: 30 CAPSULE, DELAYED RELEASE ORAL at 20:43

## 2020-07-30 RX ADMIN — SODIUM CHLORIDE, PRESERVATIVE FREE 10 ML: 5 INJECTION INTRAVENOUS at 20:48

## 2020-07-30 RX ADMIN — LEVOTHYROXINE SODIUM 125 MCG: 125 TABLET ORAL at 05:54

## 2020-07-30 RX ADMIN — CARVEDILOL 6.25 MG: 6.25 TABLET, FILM COATED ORAL at 09:11

## 2020-07-30 RX ADMIN — ATORVASTATIN CALCIUM 40 MG: 40 TABLET, FILM COATED ORAL at 20:43

## 2020-07-30 RX ADMIN — SPIRONOLACTONE 25 MG: 25 TABLET ORAL at 09:09

## 2020-07-30 RX ADMIN — PRAMIPEXOLE DIHYDROCHLORIDE 1.5 MG: 0.25 TABLET ORAL at 20:44

## 2020-07-30 RX ADMIN — Medication 3 MG: at 20:43

## 2020-07-30 RX ADMIN — VANCOMYCIN HYDROCHLORIDE 750 MG: 10 INJECTION, POWDER, LYOPHILIZED, FOR SOLUTION INTRAVENOUS at 11:58

## 2020-07-30 RX ADMIN — DIVALPROEX SODIUM 1000 MG: 500 TABLET, EXTENDED RELEASE ORAL at 20:44

## 2020-07-30 RX ADMIN — HYDROCODONE BITARTRATE AND ACETAMINOPHEN 1 TABLET: 10; 325 TABLET ORAL at 14:37

## 2020-07-30 RX ADMIN — PILOCARPINE HYDROCHLORIDE 5 MG: 5 TABLET, FILM COATED ORAL at 09:10

## 2020-07-30 RX ADMIN — SPIRONOLACTONE 25 MG: 25 TABLET ORAL at 17:43

## 2020-07-30 RX ADMIN — DULOXETINE HYDROCHLORIDE 60 MG: 30 CAPSULE, DELAYED RELEASE ORAL at 09:11

## 2020-07-30 RX ADMIN — NICOTINE 1 PATCH: 21 PATCH, EXTENDED RELEASE TRANSDERMAL at 09:12

## 2020-07-30 RX ADMIN — GABAPENTIN 100 MG: 100 CAPSULE ORAL at 09:09

## 2020-07-30 RX ADMIN — PANTOPRAZOLE SODIUM 40 MG: 40 TABLET, DELAYED RELEASE ORAL at 05:54

## 2020-07-30 RX ADMIN — TIZANIDINE 4 MG: 4 TABLET ORAL at 20:44

## 2020-07-30 RX ADMIN — GABAPENTIN 100 MG: 100 CAPSULE ORAL at 16:12

## 2020-07-30 RX ADMIN — HYDROCODONE BITARTRATE AND ACETAMINOPHEN 1 TABLET: 10; 325 TABLET ORAL at 18:46

## 2020-07-30 RX ADMIN — GUAIFENESIN 1200 MG: 600 TABLET, EXTENDED RELEASE ORAL at 20:44

## 2020-07-30 RX ADMIN — HYDROCODONE BITARTRATE AND ACETAMINOPHEN 1 TABLET: 10; 325 TABLET ORAL at 09:00

## 2020-07-30 RX ADMIN — MUPIROCIN: 20 OINTMENT TOPICAL at 11:58

## 2020-07-30 RX ADMIN — ALLOPURINOL 100 MG: 100 TABLET ORAL at 09:11

## 2020-07-31 ENCOUNTER — ANESTHESIA EVENT (OUTPATIENT)
Dept: PERIOP | Facility: HOSPITAL | Age: 60
End: 2020-07-31

## 2020-07-31 ENCOUNTER — ANESTHESIA (OUTPATIENT)
Dept: PERIOP | Facility: HOSPITAL | Age: 60
End: 2020-07-31

## 2020-07-31 LAB
ABO GROUP BLD: NORMAL
BACTERIA SPEC AEROBE CULT: ABNORMAL
BACTERIA SPEC AEROBE CULT: ABNORMAL
BASOPHILS # BLD AUTO: 0.04 10*3/MM3 (ref 0–0.2)
BASOPHILS NFR BLD AUTO: 0.4 % (ref 0–1.5)
BLD GP AB SCN SERPL QL: NEGATIVE
CREAT SERPL-MCNC: 0.74 MG/DL (ref 0.57–1)
DEPRECATED RDW RBC AUTO: 50.5 FL (ref 37–54)
EOSINOPHIL # BLD AUTO: 0.29 10*3/MM3 (ref 0–0.4)
EOSINOPHIL NFR BLD AUTO: 3.2 % (ref 0.3–6.2)
ERYTHROCYTE [DISTWIDTH] IN BLOOD BY AUTOMATED COUNT: 16.3 % (ref 12.3–15.4)
GFR SERPL CREATININE-BSD FRML MDRD: 80 ML/MIN/1.73
GRAM STN SPEC: ABNORMAL
HCT VFR BLD AUTO: 24.2 % (ref 34–46.6)
HGB BLD-MCNC: 7.4 G/DL (ref 12–15.9)
IMM GRANULOCYTES # BLD AUTO: 0.07 10*3/MM3 (ref 0–0.05)
IMM GRANULOCYTES NFR BLD AUTO: 0.8 % (ref 0–0.5)
LYMPHOCYTES # BLD AUTO: 3.13 10*3/MM3 (ref 0.7–3.1)
LYMPHOCYTES NFR BLD AUTO: 34.7 % (ref 19.6–45.3)
MCH RBC QN AUTO: 25.9 PG (ref 26.6–33)
MCHC RBC AUTO-ENTMCNC: 30.6 G/DL (ref 31.5–35.7)
MCV RBC AUTO: 84.6 FL (ref 79–97)
MONOCYTES # BLD AUTO: 0.49 10*3/MM3 (ref 0.1–0.9)
MONOCYTES NFR BLD AUTO: 5.4 % (ref 5–12)
NEUTROPHILS NFR BLD AUTO: 4.99 10*3/MM3 (ref 1.7–7)
NEUTROPHILS NFR BLD AUTO: 55.5 % (ref 42.7–76)
NRBC BLD AUTO-RTO: 0 /100 WBC (ref 0–0.2)
PLATELET # BLD AUTO: 339 10*3/MM3 (ref 140–450)
PMV BLD AUTO: 9.9 FL (ref 6–12)
RBC # BLD AUTO: 2.86 10*6/MM3 (ref 3.77–5.28)
RH BLD: POSITIVE
SARS-COV-2 RNA RESP QL NAA+PROBE: NOT DETECTED
T&S EXPIRATION DATE: NORMAL
WBC # BLD AUTO: 9.01 10*3/MM3 (ref 3.4–10.8)

## 2020-07-31 PROCEDURE — 25010000002 VANCOMYCIN 10 G RECONSTITUTED SOLUTION: Performed by: INTERNAL MEDICINE

## 2020-07-31 PROCEDURE — 87176 TISSUE HOMOGENIZATION CULTR: CPT | Performed by: FAMILY MEDICINE

## 2020-07-31 PROCEDURE — 88304 TISSUE EXAM BY PATHOLOGIST: CPT | Performed by: PODIATRIST

## 2020-07-31 PROCEDURE — 25010000002 CEFEPIME PER 500 MG: Performed by: INTERNAL MEDICINE

## 2020-07-31 PROCEDURE — 86850 RBC ANTIBODY SCREEN: CPT | Performed by: PODIATRIST

## 2020-07-31 PROCEDURE — 87205 SMEAR GRAM STAIN: CPT | Performed by: FAMILY MEDICINE

## 2020-07-31 PROCEDURE — 25010000002 CEFEPIME PER 500 MG: Performed by: PODIATRIST

## 2020-07-31 PROCEDURE — 87070 CULTURE OTHR SPECIMN AEROBIC: CPT | Performed by: FAMILY MEDICINE

## 2020-07-31 PROCEDURE — 82565 ASSAY OF CREATININE: CPT | Performed by: FAMILY MEDICINE

## 2020-07-31 PROCEDURE — 86901 BLOOD TYPING SEROLOGIC RH(D): CPT | Performed by: PODIATRIST

## 2020-07-31 PROCEDURE — 86900 BLOOD TYPING SEROLOGIC ABO: CPT | Performed by: PODIATRIST

## 2020-07-31 PROCEDURE — 85025 COMPLETE CBC W/AUTO DIFF WBC: CPT | Performed by: NURSE PRACTITIONER

## 2020-07-31 PROCEDURE — 25010000002 FENTANYL CITRATE (PF) 100 MCG/2ML SOLUTION: Performed by: ANESTHESIOLOGY

## 2020-07-31 PROCEDURE — 80202 ASSAY OF VANCOMYCIN: CPT | Performed by: PODIATRIST

## 2020-07-31 PROCEDURE — 11046 DBRDMT MUSC&/FSCA EA ADDL: CPT | Performed by: PODIATRIST

## 2020-07-31 PROCEDURE — 0JBP0ZZ EXCISION OF LEFT LOWER LEG SUBCUTANEOUS TISSUE AND FASCIA, OPEN APPROACH: ICD-10-PCS | Performed by: PODIATRIST

## 2020-07-31 PROCEDURE — 87186 SC STD MICRODIL/AGAR DIL: CPT | Performed by: FAMILY MEDICINE

## 2020-07-31 PROCEDURE — 11043 DBRDMT MUSC&/FSCA 1ST 20/<: CPT | Performed by: PODIATRIST

## 2020-07-31 PROCEDURE — 25010000002 FENTANYL CITRATE (PF) 100 MCG/2ML SOLUTION: Performed by: NURSE ANESTHETIST, CERTIFIED REGISTERED

## 2020-07-31 PROCEDURE — 94664 DEMO&/EVAL PT USE INHALER: CPT

## 2020-07-31 PROCEDURE — 25010000002 PROPOFOL 10 MG/ML EMULSION: Performed by: NURSE ANESTHETIST, CERTIFIED REGISTERED

## 2020-07-31 PROCEDURE — 25010000002 ONDANSETRON PER 1 MG: Performed by: NURSE ANESTHETIST, CERTIFIED REGISTERED

## 2020-07-31 PROCEDURE — 25010000002 SUCCINYLCHOLINE PER 20 MG: Performed by: NURSE ANESTHETIST, CERTIFIED REGISTERED

## 2020-07-31 RX ORDER — ONDANSETRON 2 MG/ML
INJECTION INTRAMUSCULAR; INTRAVENOUS AS NEEDED
Status: DISCONTINUED | OUTPATIENT
Start: 2020-07-31 | End: 2020-07-31 | Stop reason: SURG

## 2020-07-31 RX ORDER — SODIUM CHLORIDE 0.9 % (FLUSH) 0.9 %
10 SYRINGE (ML) INJECTION EVERY 12 HOURS SCHEDULED
Status: DISCONTINUED | OUTPATIENT
Start: 2020-07-31 | End: 2020-07-31 | Stop reason: HOSPADM

## 2020-07-31 RX ORDER — MAGNESIUM HYDROXIDE 1200 MG/15ML
LIQUID ORAL AS NEEDED
Status: DISCONTINUED | OUTPATIENT
Start: 2020-07-31 | End: 2020-07-31 | Stop reason: HOSPADM

## 2020-07-31 RX ORDER — COLLAGENASE SANTYL 250 [ARB'U]/G
OINTMENT TOPICAL
Qty: 90 G | Refills: 2 | Status: SHIPPED
Start: 2020-07-31 | End: 2020-10-29

## 2020-07-31 RX ORDER — FENTANYL CITRATE 50 UG/ML
25 INJECTION, SOLUTION INTRAMUSCULAR; INTRAVENOUS
Status: DISCONTINUED | OUTPATIENT
Start: 2020-07-31 | End: 2020-07-31 | Stop reason: HOSPADM

## 2020-07-31 RX ORDER — SODIUM CHLORIDE 0.9 % (FLUSH) 0.9 %
10 SYRINGE (ML) INJECTION AS NEEDED
Status: DISCONTINUED | OUTPATIENT
Start: 2020-07-31 | End: 2020-07-31 | Stop reason: HOSPADM

## 2020-07-31 RX ORDER — ONDANSETRON 2 MG/ML
4 INJECTION INTRAMUSCULAR; INTRAVENOUS AS NEEDED
Status: DISCONTINUED | OUTPATIENT
Start: 2020-07-31 | End: 2020-07-31 | Stop reason: HOSPADM

## 2020-07-31 RX ORDER — BUPIVACAINE HCL/0.9 % NACL/PF 0.1 %
2 PLASTIC BAG, INJECTION (ML) EPIDURAL ONCE
Status: COMPLETED | OUTPATIENT
Start: 2020-07-31 | End: 2020-07-31

## 2020-07-31 RX ORDER — FENTANYL CITRATE 50 UG/ML
INJECTION, SOLUTION INTRAMUSCULAR; INTRAVENOUS AS NEEDED
Status: DISCONTINUED | OUTPATIENT
Start: 2020-07-31 | End: 2020-07-31 | Stop reason: SURG

## 2020-07-31 RX ORDER — MORPHINE SULFATE 2 MG/ML
2 INJECTION, SOLUTION INTRAMUSCULAR; INTRAVENOUS
Status: DISCONTINUED | OUTPATIENT
Start: 2020-07-31 | End: 2020-07-31 | Stop reason: HOSPADM

## 2020-07-31 RX ORDER — SUCCINYLCHOLINE CHLORIDE 20 MG/ML
INJECTION INTRAMUSCULAR; INTRAVENOUS AS NEEDED
Status: DISCONTINUED | OUTPATIENT
Start: 2020-07-31 | End: 2020-07-31 | Stop reason: SURG

## 2020-07-31 RX ORDER — PROPOFOL 10 MG/ML
VIAL (ML) INTRAVENOUS AS NEEDED
Status: DISCONTINUED | OUTPATIENT
Start: 2020-07-31 | End: 2020-07-31 | Stop reason: SURG

## 2020-07-31 RX ORDER — FENTANYL CITRATE 50 UG/ML
25 INJECTION, SOLUTION INTRAMUSCULAR; INTRAVENOUS
Status: COMPLETED | OUTPATIENT
Start: 2020-07-31 | End: 2020-07-31

## 2020-07-31 RX ORDER — SODIUM CHLORIDE, SODIUM LACTATE, POTASSIUM CHLORIDE, CALCIUM CHLORIDE 600; 310; 30; 20 MG/100ML; MG/100ML; MG/100ML; MG/100ML
9 INJECTION, SOLUTION INTRAVENOUS CONTINUOUS
Status: DISCONTINUED | OUTPATIENT
Start: 2020-07-31 | End: 2020-08-03 | Stop reason: HOSPADM

## 2020-07-31 RX ORDER — LABETALOL HYDROCHLORIDE 5 MG/ML
5 INJECTION, SOLUTION INTRAVENOUS
Status: DISCONTINUED | OUTPATIENT
Start: 2020-07-31 | End: 2020-07-31 | Stop reason: HOSPADM

## 2020-07-31 RX ORDER — NALOXONE HCL 0.4 MG/ML
0.04 VIAL (ML) INJECTION AS NEEDED
Status: DISCONTINUED | OUTPATIENT
Start: 2020-07-31 | End: 2020-07-31 | Stop reason: HOSPADM

## 2020-07-31 RX ORDER — DEXTROSE MONOHYDRATE 25 G/50ML
12.5 INJECTION, SOLUTION INTRAVENOUS AS NEEDED
Status: DISCONTINUED | OUTPATIENT
Start: 2020-07-31 | End: 2020-07-31 | Stop reason: HOSPADM

## 2020-07-31 RX ORDER — BUPIVACAINE HYDROCHLORIDE AND EPINEPHRINE 5; 5 MG/ML; UG/ML
INJECTION, SOLUTION PERINEURAL AS NEEDED
Status: DISCONTINUED | OUTPATIENT
Start: 2020-07-31 | End: 2020-07-31 | Stop reason: HOSPADM

## 2020-07-31 RX ORDER — LIDOCAINE HYDROCHLORIDE 10 MG/ML
0.5 INJECTION, SOLUTION EPIDURAL; INFILTRATION; INTRACAUDAL; PERINEURAL ONCE AS NEEDED
Status: DISCONTINUED | OUTPATIENT
Start: 2020-07-31 | End: 2020-07-31 | Stop reason: HOSPADM

## 2020-07-31 RX ORDER — OXYCODONE AND ACETAMINOPHEN 10; 325 MG/1; MG/1
1 TABLET ORAL ONCE AS NEEDED
Status: DISCONTINUED | OUTPATIENT
Start: 2020-07-31 | End: 2020-07-31 | Stop reason: HOSPADM

## 2020-07-31 RX ORDER — OXYCODONE AND ACETAMINOPHEN 7.5; 325 MG/1; MG/1
2 TABLET ORAL ONCE AS NEEDED
Status: COMPLETED | OUTPATIENT
Start: 2020-07-31 | End: 2020-07-31

## 2020-07-31 RX ORDER — FLUMAZENIL 0.1 MG/ML
0.2 INJECTION INTRAVENOUS AS NEEDED
Status: DISCONTINUED | OUTPATIENT
Start: 2020-07-31 | End: 2020-07-31 | Stop reason: HOSPADM

## 2020-07-31 RX ADMIN — OXYCODONE HYDROCHLORIDE AND ACETAMINOPHEN 2 TABLET: 7.5; 325 TABLET ORAL at 17:36

## 2020-07-31 RX ADMIN — HYDROCODONE BITARTRATE AND ACETAMINOPHEN 1 TABLET: 10; 325 TABLET ORAL at 21:15

## 2020-07-31 RX ADMIN — GUAIFENESIN 1200 MG: 600 TABLET, EXTENDED RELEASE ORAL at 08:56

## 2020-07-31 RX ADMIN — GUAIFENESIN 1200 MG: 600 TABLET, EXTENDED RELEASE ORAL at 20:51

## 2020-07-31 RX ADMIN — SODIUM CHLORIDE, POTASSIUM CHLORIDE, SODIUM LACTATE AND CALCIUM CHLORIDE 9 ML/HR: 600; 310; 30; 20 INJECTION, SOLUTION INTRAVENOUS at 16:11

## 2020-07-31 RX ADMIN — GABAPENTIN 100 MG: 100 CAPSULE ORAL at 20:51

## 2020-07-31 RX ADMIN — Medication 3 MG: at 20:51

## 2020-07-31 RX ADMIN — DULOXETINE HYDROCHLORIDE 60 MG: 30 CAPSULE, DELAYED RELEASE ORAL at 08:56

## 2020-07-31 RX ADMIN — PILOCARPINE HYDROCHLORIDE 5 MG: 5 TABLET, FILM COATED ORAL at 08:56

## 2020-07-31 RX ADMIN — HYDROCODONE BITARTRATE AND ACETAMINOPHEN 1 TABLET: 10; 325 TABLET ORAL at 08:55

## 2020-07-31 RX ADMIN — HYDROCODONE BITARTRATE AND ACETAMINOPHEN 1 TABLET: 10; 325 TABLET ORAL at 12:47

## 2020-07-31 RX ADMIN — DIVALPROEX SODIUM 1000 MG: 500 TABLET, EXTENDED RELEASE ORAL at 20:51

## 2020-07-31 RX ADMIN — PRAMIPEXOLE DIHYDROCHLORIDE 1.5 MG: 0.25 TABLET ORAL at 20:50

## 2020-07-31 RX ADMIN — CARVEDILOL 6.25 MG: 6.25 TABLET, FILM COATED ORAL at 08:56

## 2020-07-31 RX ADMIN — FENTANYL CITRATE 25 MCG: 50 INJECTION, SOLUTION INTRAMUSCULAR; INTRAVENOUS at 16:14

## 2020-07-31 RX ADMIN — CEFEPIME HYDROCHLORIDE 2 G: 2 INJECTION, POWDER, FOR SOLUTION INTRAVENOUS at 20:51

## 2020-07-31 RX ADMIN — FENTANYL CITRATE 25 MCG: 50 INJECTION, SOLUTION INTRAMUSCULAR; INTRAVENOUS at 17:44

## 2020-07-31 RX ADMIN — PILOCARPINE HYDROCHLORIDE 5 MG: 5 TABLET, FILM COATED ORAL at 20:50

## 2020-07-31 RX ADMIN — HYDROCODONE BITARTRATE AND ACETAMINOPHEN 1 TABLET: 10; 325 TABLET ORAL at 00:47

## 2020-07-31 RX ADMIN — VANCOMYCIN HYDROCHLORIDE 750 MG: 10 INJECTION, POWDER, LYOPHILIZED, FOR SOLUTION INTRAVENOUS at 00:47

## 2020-07-31 RX ADMIN — FENTANYL CITRATE 25 MCG: 50 INJECTION, SOLUTION INTRAMUSCULAR; INTRAVENOUS at 16:09

## 2020-07-31 RX ADMIN — NICOTINE 1 PATCH: 21 PATCH, EXTENDED RELEASE TRANSDERMAL at 08:56

## 2020-07-31 RX ADMIN — FENTANYL CITRATE 25 MCG: 50 INJECTION, SOLUTION INTRAMUSCULAR; INTRAVENOUS at 16:26

## 2020-07-31 RX ADMIN — QUETIAPINE FUMARATE 50 MG: 25 TABLET ORAL at 20:50

## 2020-07-31 RX ADMIN — PROPOFOL 200 MG: 10 INJECTION, EMULSION INTRAVENOUS at 16:34

## 2020-07-31 RX ADMIN — HYDROCODONE BITARTRATE AND ACETAMINOPHEN 1 TABLET: 10; 325 TABLET ORAL at 04:38

## 2020-07-31 RX ADMIN — CEFEPIME HYDROCHLORIDE 2 G: 2 INJECTION, POWDER, FOR SOLUTION INTRAVENOUS at 08:57

## 2020-07-31 RX ADMIN — FENTANYL CITRATE 100 MCG: 50 INJECTION, SOLUTION INTRAMUSCULAR; INTRAVENOUS at 16:34

## 2020-07-31 RX ADMIN — ALLOPURINOL 100 MG: 100 TABLET ORAL at 08:56

## 2020-07-31 RX ADMIN — VANCOMYCIN HYDROCHLORIDE 750 MG: 10 INJECTION, POWDER, LYOPHILIZED, FOR SOLUTION INTRAVENOUS at 12:47

## 2020-07-31 RX ADMIN — GABAPENTIN 100 MG: 100 CAPSULE ORAL at 08:56

## 2020-07-31 RX ADMIN — DULOXETINE HYDROCHLORIDE 60 MG: 30 CAPSULE, DELAYED RELEASE ORAL at 20:51

## 2020-07-31 RX ADMIN — SUCCINYLCHOLINE CHLORIDE 120 MG: 20 INJECTION, SOLUTION INTRAMUSCULAR; INTRAVENOUS at 16:34

## 2020-07-31 RX ADMIN — FENTANYL CITRATE 25 MCG: 50 INJECTION, SOLUTION INTRAMUSCULAR; INTRAVENOUS at 16:21

## 2020-07-31 RX ADMIN — ONDANSETRON HYDROCHLORIDE 4 MG: 2 SOLUTION INTRAMUSCULAR; INTRAVENOUS at 16:39

## 2020-07-31 RX ADMIN — ATORVASTATIN CALCIUM 40 MG: 40 TABLET, FILM COATED ORAL at 20:51

## 2020-07-31 RX ADMIN — SPIRONOLACTONE 25 MG: 25 TABLET ORAL at 08:56

## 2020-07-31 RX ADMIN — LIDOCAINE HYDROCHLORIDE 60 MG: 20 INJECTION, SOLUTION INTRAVENOUS at 16:34

## 2020-07-31 RX ADMIN — SODIUM CHLORIDE, PRESERVATIVE FREE 10 ML: 5 INJECTION INTRAVENOUS at 20:57

## 2020-07-31 RX ADMIN — Medication 2 G: at 16:40

## 2020-07-31 RX ADMIN — SODIUM CHLORIDE, PRESERVATIVE FREE 10 ML: 5 INJECTION INTRAVENOUS at 08:57

## 2020-07-31 RX ADMIN — TIZANIDINE 4 MG: 4 TABLET ORAL at 20:50

## 2020-07-31 NOTE — ANESTHESIA PREPROCEDURE EVALUATION
Anesthesia Evaluation     Patient summary reviewed   no history of anesthetic complications:  NPO Solid Status: > 8 hours  NPO Liquid Status: > 2 hours           Airway   Mallampati: II  TM distance: >3 FB  Neck ROM: full  Dental    (+) upper dentures and lower dentures    Pulmonary    (+) a smoker, sleep apnea,   (-) asthma  Cardiovascular   Exercise tolerance: good (4-7 METS)    Patient on routine beta blocker and Beta blocker given within 24 hours of surgery    (+) hypertension, hyperlipidemia,   (-) past MI, CAD, cardiac stents      Neuro/Psych  (+) psychiatric history Bipolar,     (-) seizures, TIA, CVA  GI/Hepatic/Renal/Endo    (+) obesity,  GERD,  thyroid problem hypothyroidism  (-) liver disease, no renal disease, diabetes    Musculoskeletal     Abdominal    Substance History      OB/GYN          Other   arthritis, blood dyscrasia anemia,                       Anesthesia Plan    ASA 3     general     intravenous induction     Anesthetic plan, all risks, benefits, and alternatives have been provided, discussed and informed consent has been obtained with: patient.

## 2020-07-31 NOTE — ANESTHESIA POSTPROCEDURE EVALUATION
Patient: Robyn Minaya    Procedure Summary     Date:  07/31/20 Room / [facility]    Anesthesia Start:  1630 Anesthesia Stop:  1715    Procedure:  LOWER POSTERIOR LEG WOUND DEBRIDEMENT - LEFT (Left ) Diagnosis:       Wound of left lower extremity, subsequent encounter      (Wound of left lower extremity, subsequent encounter [E42.995K])    Surgeon:  Roberto Wells DPM Provider:  Mark Anthony Johnson CRNA    Anesthesia Type:  general ASA Status:  3          Anesthesia Type: general    Vitals  Vitals Value Taken Time   /81 7/31/2020  6:15 PM   Temp 97.9 °F (36.6 °C) 7/31/2020  6:15 PM   Pulse 83 7/31/2020  6:15 PM   Resp 20 7/31/2020  6:15 PM   SpO2 97 % 7/31/2020  6:15 PM           Post Anesthesia Care and Evaluation    Patient location during evaluation: PACU  Patient participation: complete - patient participated  Level of consciousness: awake and alert  Pain management: adequate  Airway patency: patent  Anesthetic complications: No anesthetic complications    Cardiovascular status: acceptable  Respiratory status: acceptable  Hydration status: acceptable    Comments: Blood pressure 115/71, pulse 80, temperature 98.2 °F (36.8 °C), temperature source Oral, resp. rate 16, SpO2 100 %, not currently breastfeeding.    Pt discharged from PACU based on migdalia score >8

## 2020-08-01 PROBLEM — A49.02 INFECTION OF WOUND DUE TO METHICILLIN RESISTANT STAPHYLOCOCCUS AUREUS (MRSA): Status: ACTIVE | Noted: 2020-08-01

## 2020-08-01 LAB
BASOPHILS # BLD AUTO: 0.03 10*3/MM3 (ref 0–0.2)
BASOPHILS NFR BLD AUTO: 0.3 % (ref 0–1.5)
DEPRECATED RDW RBC AUTO: 51.1 FL (ref 37–54)
EOSINOPHIL # BLD AUTO: 0.21 10*3/MM3 (ref 0–0.4)
EOSINOPHIL NFR BLD AUTO: 2.4 % (ref 0.3–6.2)
ERYTHROCYTE [DISTWIDTH] IN BLOOD BY AUTOMATED COUNT: 16.3 % (ref 12.3–15.4)
HCT VFR BLD AUTO: 24.1 % (ref 34–46.6)
HGB BLD-MCNC: 7.4 G/DL (ref 12–15.9)
IMM GRANULOCYTES # BLD AUTO: 0.07 10*3/MM3 (ref 0–0.05)
IMM GRANULOCYTES NFR BLD AUTO: 0.8 % (ref 0–0.5)
LYMPHOCYTES # BLD AUTO: 2.32 10*3/MM3 (ref 0.7–3.1)
LYMPHOCYTES NFR BLD AUTO: 26.1 % (ref 19.6–45.3)
MCH RBC QN AUTO: 26.3 PG (ref 26.6–33)
MCHC RBC AUTO-ENTMCNC: 30.7 G/DL (ref 31.5–35.7)
MCV RBC AUTO: 85.8 FL (ref 79–97)
MONOCYTES # BLD AUTO: 0.39 10*3/MM3 (ref 0.1–0.9)
MONOCYTES NFR BLD AUTO: 4.4 % (ref 5–12)
NEUTROPHILS NFR BLD AUTO: 5.87 10*3/MM3 (ref 1.7–7)
NEUTROPHILS NFR BLD AUTO: 66 % (ref 42.7–76)
NRBC BLD AUTO-RTO: 0 /100 WBC (ref 0–0.2)
PLATELET # BLD AUTO: 349 10*3/MM3 (ref 140–450)
PMV BLD AUTO: 10.1 FL (ref 6–12)
RBC # BLD AUTO: 2.81 10*6/MM3 (ref 3.77–5.28)
VANCOMYCIN TROUGH SERPL-MCNC: 16.5 MCG/ML (ref 5–20)
WBC # BLD AUTO: 8.89 10*3/MM3 (ref 3.4–10.8)

## 2020-08-01 PROCEDURE — 25010000002 MORPHINE SULFATE (PF) 2 MG/ML SOLUTION: Performed by: PODIATRIST

## 2020-08-01 PROCEDURE — 25010000002 VANCOMYCIN 10 G RECONSTITUTED SOLUTION: Performed by: PODIATRIST

## 2020-08-01 PROCEDURE — 25010000002 CEFEPIME PER 500 MG: Performed by: PODIATRIST

## 2020-08-01 PROCEDURE — 85025 COMPLETE CBC W/AUTO DIFF WBC: CPT | Performed by: NURSE PRACTITIONER

## 2020-08-01 PROCEDURE — 99232 SBSQ HOSP IP/OBS MODERATE 35: CPT | Performed by: PODIATRIST

## 2020-08-01 RX ORDER — PILOCARPINE HYDROCHLORIDE 5 MG/1
5 TABLET, FILM COATED ORAL
Status: DISCONTINUED | OUTPATIENT
Start: 2020-08-01 | End: 2020-08-03 | Stop reason: HOSPADM

## 2020-08-01 RX ORDER — MORPHINE SULFATE 1 MG/ML
2 INJECTION, SOLUTION INTRAVENOUS ONCE
Status: DISCONTINUED | OUTPATIENT
Start: 2020-08-01 | End: 2020-08-01

## 2020-08-01 RX ORDER — MORPHINE SULFATE 2 MG/ML
2 INJECTION, SOLUTION INTRAMUSCULAR; INTRAVENOUS ONCE
Status: COMPLETED | OUTPATIENT
Start: 2020-08-01 | End: 2020-08-01

## 2020-08-01 RX ADMIN — TIZANIDINE 4 MG: 4 TABLET ORAL at 21:23

## 2020-08-01 RX ADMIN — CARVEDILOL 6.25 MG: 6.25 TABLET, FILM COATED ORAL at 08:15

## 2020-08-01 RX ADMIN — SODIUM CHLORIDE, PRESERVATIVE FREE 10 ML: 5 INJECTION INTRAVENOUS at 08:19

## 2020-08-01 RX ADMIN — SODIUM CHLORIDE, PRESERVATIVE FREE 10 ML: 5 INJECTION INTRAVENOUS at 21:26

## 2020-08-01 RX ADMIN — LEVOTHYROXINE SODIUM 125 MCG: 125 TABLET ORAL at 05:08

## 2020-08-01 RX ADMIN — PILOCARPINE HYDROCHLORIDE 5 MG: 5 TABLET, FILM COATED ORAL at 12:10

## 2020-08-01 RX ADMIN — HYDROCODONE BITARTRATE AND ACETAMINOPHEN 1 TABLET: 10; 325 TABLET ORAL at 05:08

## 2020-08-01 RX ADMIN — PILOCARPINE HYDROCHLORIDE 5 MG: 5 TABLET, FILM COATED ORAL at 16:55

## 2020-08-01 RX ADMIN — DULOXETINE HYDROCHLORIDE 60 MG: 30 CAPSULE, DELAYED RELEASE ORAL at 21:24

## 2020-08-01 RX ADMIN — DULOXETINE HYDROCHLORIDE 60 MG: 30 CAPSULE, DELAYED RELEASE ORAL at 08:15

## 2020-08-01 RX ADMIN — VANCOMYCIN HYDROCHLORIDE 750 MG: 10 INJECTION, POWDER, LYOPHILIZED, FOR SOLUTION INTRAVENOUS at 12:04

## 2020-08-01 RX ADMIN — ATORVASTATIN CALCIUM 40 MG: 40 TABLET, FILM COATED ORAL at 21:25

## 2020-08-01 RX ADMIN — MORPHINE SULFATE 2 MG: 2 INJECTION, SOLUTION INTRAMUSCULAR; INTRAVENOUS at 11:31

## 2020-08-01 RX ADMIN — NICOTINE 1 PATCH: 21 PATCH, EXTENDED RELEASE TRANSDERMAL at 08:16

## 2020-08-01 RX ADMIN — ALPRAZOLAM 0.5 MG: 0.5 TABLET ORAL at 22:35

## 2020-08-01 RX ADMIN — QUETIAPINE FUMARATE 50 MG: 25 TABLET ORAL at 21:24

## 2020-08-01 RX ADMIN — PRAMIPEXOLE DIHYDROCHLORIDE 1.5 MG: 0.25 TABLET ORAL at 21:24

## 2020-08-01 RX ADMIN — ALLOPURINOL 100 MG: 100 TABLET ORAL at 08:15

## 2020-08-01 RX ADMIN — HYDROCODONE BITARTRATE AND ACETAMINOPHEN 1 TABLET: 10; 325 TABLET ORAL at 18:13

## 2020-08-01 RX ADMIN — DIVALPROEX SODIUM 1000 MG: 500 TABLET, EXTENDED RELEASE ORAL at 21:25

## 2020-08-01 RX ADMIN — ALPRAZOLAM 0.5 MG: 0.5 TABLET ORAL at 11:30

## 2020-08-01 RX ADMIN — HYDROCODONE BITARTRATE AND ACETAMINOPHEN 1 TABLET: 10; 325 TABLET ORAL at 22:35

## 2020-08-01 RX ADMIN — GABAPENTIN 100 MG: 100 CAPSULE ORAL at 08:15

## 2020-08-01 RX ADMIN — HYDROCODONE BITARTRATE AND ACETAMINOPHEN 1 TABLET: 10; 325 TABLET ORAL at 09:04

## 2020-08-01 RX ADMIN — VANCOMYCIN HYDROCHLORIDE 750 MG: 10 INJECTION, POWDER, LYOPHILIZED, FOR SOLUTION INTRAVENOUS at 00:37

## 2020-08-01 RX ADMIN — PILOCARPINE HYDROCHLORIDE 5 MG: 5 TABLET, FILM COATED ORAL at 08:15

## 2020-08-01 RX ADMIN — PANTOPRAZOLE SODIUM 40 MG: 40 TABLET, DELAYED RELEASE ORAL at 05:08

## 2020-08-01 RX ADMIN — CEFEPIME HYDROCHLORIDE 2 G: 2 INJECTION, POWDER, FOR SOLUTION INTRAVENOUS at 08:15

## 2020-08-01 RX ADMIN — Medication 3 MG: at 21:25

## 2020-08-01 RX ADMIN — GUAIFENESIN 1200 MG: 600 TABLET, EXTENDED RELEASE ORAL at 08:15

## 2020-08-01 RX ADMIN — SPIRONOLACTONE 25 MG: 25 TABLET ORAL at 08:15

## 2020-08-01 RX ADMIN — GUAIFENESIN 1200 MG: 600 TABLET, EXTENDED RELEASE ORAL at 21:24

## 2020-08-01 RX ADMIN — GABAPENTIN 100 MG: 100 CAPSULE ORAL at 21:24

## 2020-08-01 RX ADMIN — CEFEPIME HYDROCHLORIDE 2 G: 2 INJECTION, POWDER, FOR SOLUTION INTRAVENOUS at 21:22

## 2020-08-01 RX ADMIN — GABAPENTIN 100 MG: 100 CAPSULE ORAL at 16:55

## 2020-08-01 RX ADMIN — HYDROCODONE BITARTRATE AND ACETAMINOPHEN 1 TABLET: 10; 325 TABLET ORAL at 13:55

## 2020-08-01 RX ADMIN — SPIRONOLACTONE 25 MG: 25 TABLET ORAL at 17:11

## 2020-08-01 RX ADMIN — CARVEDILOL 6.25 MG: 6.25 TABLET, FILM COATED ORAL at 17:11

## 2020-08-01 NOTE — PROGRESS NOTES
"Infectious Diseases Progress Note    Patient:  Robyn Minaya  YOB: 1960  MRN: [id number]   Admit date: 7/28/2020   Admitting Physician: Melchor Conway MD  Primary Care Physician: Tabitha Victor DO    Chief Complaint/Interval History: She had some surgical debridement of lower extremity wound yesterday.  She is without fever.  No new symptoms.  She is sitting up at bedside.    Intake/Output Summary (Last 24 hours) at 8/1/2020 1021  Last data filed at 8/1/2020 0834  Gross per 24 hour   Intake 710 ml   Output 250 ml   Net 460 ml     Allergies:   Allergies   Allergen Reactions   • Bactrim [Sulfamethoxazole-Trimethoprim] Rash     Itching   • Hctz [Hydrochlorothiazide] Other (See Comments)     Acid reflux   • Januvia [Sitagliptin] Rash     Current Scheduled Medications:     allopurinol 100 mg Oral Daily   atorvastatin 40 mg Oral Nightly   carvedilol 6.25 mg Oral BID With Meals   cefepime 2 g Intravenous Q12H   divalproex 1,000 mg Oral Nightly   DULoxetine 60 mg Oral Q12H   gabapentin 100 mg Oral TID   guaiFENesin 1,200 mg Oral BID   levothyroxine 125 mcg Oral Q AM   melatonin 3 mg Oral Nightly   mupirocin  Topical Q12H   nicotine 1 patch Transdermal Q24H   pantoprazole 40 mg Oral Q AM   pilocarpine 5 mg Oral TID   pramipexole 1.5 mg Oral Nightly   QUEtiapine 50 mg Oral Nightly   sodium chloride 10 mL Intravenous Q12H   spironolactone 25 mg Oral BID   tiZANidine 4 mg Oral Nightly   vancomycin 750 mg Intravenous Q12H     Current PRN Medications:  •  albuterol  •  ALPRAZolam  •  dextrose  •  dextrose  •  glucagon (human recombinant)  •  HYDROcodone-acetaminophen  •  ondansetron **OR** ondansetron  •  sodium chloride    Review of Systems no diarrhea or rash.    Vital Signs:  BP 95/55 (BP Location: Left arm, Patient Position: Sitting)   Pulse 85   Temp 98 °F (36.7 °C) (Oral)   Resp 16   Ht 152.4 cm (60\")   Wt 81.7 kg (180 lb 3.2 oz)   SpO2 100%   BMI 35.19 kg/m²     Physical Exam  Vital " signs - reviewed.  Left lower extremity wrapped.  Clean and dry.    Lab Results:  CBC: Results from last 7 days   Lab Units 08/01/20  0903 07/31/20  0352 07/30/20  0431 07/29/20  0605 07/28/20  1707   WBC 10*3/mm3 8.89 9.01 11.31* 12.64* 11.61*   HEMOGLOBIN g/dL 7.4* 7.4* 8.3* 9.3* 9.3*   HEMATOCRIT % 24.1* 24.2* 27.0* 30.3* 29.3*   PLATELETS 10*3/mm3 349 339 419 505* 496*     BMP:  Results from last 7 days   Lab Units 07/31/20  0836 07/30/20  0431 07/29/20  0546 07/28/20  1707   SODIUM mmol/L  --  141 140 140   POTASSIUM mmol/L  --  4.3 4.3 4.3   CHLORIDE mmol/L  --  105 103 101   CO2 mmol/L  --  25.0 24.0 24.0   BUN mg/dL  --  13 23* 32*   CREATININE mg/dL 0.74 0.83 1.15* 1.78*   GLUCOSE mg/dL  --  119* 124* 112*   CALCIUM mg/dL  --  9.0 9.2 9.4   ALT (SGPT) U/L  --   --  16 15     Culture Results:   Blood Culture   Date Value Ref Range Status   07/28/2020 No growth at 3 days  Preliminary   07/28/2020 No growth at 3 days  Preliminary     Wound Culture   Date Value Ref Range Status   07/28/2020 Heavy growth (4+) Staphylococcus aureus, MRSA (A)  Final     Comment:       Methicillin resistant Staphylococcus aureus, Patient may be an isolation risk.   07/28/2020 Heavy growth (4+) Mixed Gram Negative Elizabeth (A)  Final   Tissue left leg collected yesterday:      Gram Stain  Many (4+) WBCs seen      Rare (1+) Gram positive cocci                 Radiology: None  Additional Studies Reviewed: None    Impression:   Superficial wounds left lower extremity.    Recommendations:   Continue vancomycin  Continue cefepime  Await most recent operative cultures  Continue local care    Rehan Guillermo MD

## 2020-08-01 NOTE — PROGRESS NOTES
[facility] - PODIATRY    Today's Date: 08/01/20    Patient Name: Robyn Minaya  MRN: [id number]  CSN: [id number]  PCP: Tabitha Victor DO  Referring Provider: Melchor Conway, *  Attending Provider: Melchor Conway MD  Length of Stay: 4    SUBJECTIVE   Chief Complaint: Shooting pain in left leg    HPI: Robyn Minaya, a 60 y.o.female. 1 day s/p left wound debridement. Pain improved today. She has been keeping leg elevated in bed. Says she plans to quit smoking at discharge.     Past Medical History:   Diagnosis Date   • Acid reflux     RESOLVED PT PT   • Allergic rhinitis    • Arthritis     BACK   • Bipolar 1 disorder (CMS/HCC)    • Chronic back pain    • Degenerative arthritis    • Depression    • Deviated nasal septum    • Eczema    • Gout    • History of colon polyps    • Hyperlipidemia    • Hypertension    • Hypertrophy of nasal turbinates    • Hypokalemia    • Hypothyroidism    • Incontinence in female    • Insomnia    • Menopause    • Nasal septal deviation    • Nasal valve stenosis    • Overactive bladder    • Plantar fasciitis    • Prediabetes    • RLS (restless legs syndrome)    • Sleep apnea     cpap   • Wears glasses      Past Surgical History:   Procedure Laterality Date   • BLEPHAROPLASTY Bilateral 7/9/2019    Procedure: 1) bilateral upper blepharoplasty with excision of excess of tissue weighing down 2) nasal endoscopy with removal of nasal septal prosthesis;  Surgeon: Mark Anthony Anderson MD;  Location: [facility];  Service: ENT   • CARPAL TUNNEL RELEASE Bilateral 2004   • CHOLECYSTECTOMY  2013   • COLONOSCOPY  10/17/2014    One 5-6mm tubular adenomatous polyp in the ascending colon; Two less than 4mm hyperplastic polyps in the sigmoid colon; Three rectal hyperplastic polyps; Diverticulosis; Repeat 5 years    • COLONOSCOPY  07/14/2010    Internal hemorrhoids; Repeat 7 years    • COLONOSCOPY N/A 10/23/2019    Procedure: COLONOSCOPY WITH ANESTHESIA;  Surgeon: Freddie  Robyn ELAINE MD;  Location: [facility] ENDOSCOPY;  Service: Gastroenterology   • CYST REMOVAL  2016    neck   • ENDOSCOPIC FUNCTIONAL SINUS SURGERY (FESS) Bilateral 4/16/2019    Procedure: ENDOSCOPIC FUNCTIONAL SINUS SURGERY (bilareral maxillary antrostomy without image guidance);  Surgeon: Mark Anthony Anderson MD;  Location: [facility] OR;  Service: ENT   • ENDOSCOPY N/A 4/11/2019    Esophageal mucosal changes suggestive of eosinophilic esophagitis-biopsied; A few gastric polyps-resected and retrieved-biopsied; Normal examined duodenum-biopsied   • FOOT SURGERY Right 2010    X3   • HERNIA REPAIR     • INTERSTIM PLACEMENT N/A 6/5/2019    Procedure: INTERSTIM STAGES 1 AND 2 LEAD AND GENERATOR PLACEMENT;  Surgeon: Endy Guerrero MD;  Location: [facility] OR;  Service: Urology   • LASER ABLATION      right back   • NASAL ENDOSCOPY N/A 4/16/2019    Procedure:     1. Functional endoscopic sinus surgery with bilateral maxillary antrostomy    2. Bilateral nasal endoscopy with biopsy of nasal septum    3.  Nasal septal prosthesis placement  ;  Surgeon: Mark Anthony Anderson MD;  Location: [facility] OR;  Service: ENT   • NASAL VESTIBULE LESION/PAPILLOMA EXCISION N/A 8/1/2017    Procedure: Repair of nasal vestibular stenosis and septoplasty; cartilage graft from left ear;  Surgeon: Mark Anthony Anderson MD;  Location: [facility] OR;  Service:    • SEPTOPLASTY N/A 4/16/2019    Procedure: PLACEMENT OF NASAL SEPTAL PROSTHESIS;  Surgeon: Mark Anthony Anderson MD;  Location: [facility] OR;  Service: ENT   • SEPTOPLASTY Bilateral 1/14/2020    Procedure: Nasal endoscopy with septal debridement, and placement of Silastic stents;  Surgeon: Mark Anthony Anderson MD;  Location: [facility] OR;  Service: ENT     Family History   Problem Relation Age of Onset   • Cancer Mother    • Diabetes Father    • Hypertension Father    • Cancer Father    • Colon cancer Neg Hx    • Colon polyps Neg Hx    • Esophageal cancer Neg Hx    • Liver cancer Neg Hx    • Liver disease Neg Hx    • Rectal cancer  Neg Hx    • Stomach cancer Neg Hx      Social History     Socioeconomic History   • Marital status:      Spouse name: Not on file   • Number of children: Not on file   • Years of education: Not on file   • Highest education level: Not on file   Tobacco Use   • Smoking status: Current Every Day Smoker     Packs/day: 0.50     Years: 40.00     Pack years: 20.00     Types: Cigarettes   • Smokeless tobacco: Never Used   Substance and Sexual Activity   • Alcohol use: Yes     Frequency: Monthly or less     Comment: Rarely-maybe 3x/year   • Drug use: No   • Sexual activity: Defer     Allergies   Allergen Reactions   • Bactrim [Sulfamethoxazole-Trimethoprim] Rash     Itching   • Hctz [Hydrochlorothiazide] Other (See Comments)     Acid reflux   • Januvia [Sitagliptin] Rash     Current Facility-Administered Medications   Medication Dose Route Frequency Provider Last Rate Last Dose   • albuterol (PROVENTIL) nebulizer solution 0.083% 2.5 mg/3mL  2.5 mg Nebulization Q4H PRN Roberto Wells, DPM       • allopurinol (ZYLOPRIM) tablet 100 mg  100 mg Oral Daily Roberto Wells DPM   100 mg at 08/01/20 0815   • ALPRAZolam (XANAX) tablet 0.5 mg  0.5 mg Oral TID PRN Roberto Wells DPM   0.5 mg at 07/30/20 0351   • atorvastatin (LIPITOR) tablet 40 mg  40 mg Oral Nightly Roberto Wells DPM   40 mg at 07/31/20 2051   • carvedilol (COREG) tablet 6.25 mg  6.25 mg Oral BID With Meals Roberto Wells DPM   6.25 mg at 08/01/20 0815   • cefepime (MAXIPIME) 2 g/100 mL 0.9% NS (mbp)  2 g Intravenous Q12H Roberto Wells DPM   2 g at 08/01/20 0815   • dextrose (D50W) 25 g/ 50mL Intravenous Solution 25 g  25 g Intravenous Q15 Min PRN Roberto Wells DPM       • dextrose (GLUTOSE) oral gel 15 g  15 g Oral Q15 Min PRN Roberto Wells DPM       • divalproex (DEPAKOTE) 24 hr tablet 1,000 mg  1,000 mg Oral Nightly Roberto Wells DPM   1,000 mg at 07/31/20 2051   • DULoxetine (CYMBALTA) DR capsule 60 mg  60 mg Oral Q12H  Roberto Wells DPM   60 mg at 08/01/20 0815   • gabapentin (NEURONTIN) capsule 100 mg  100 mg Oral TID Roberto Wells DPM   100 mg at 08/01/20 0815   • glucagon (human recombinant) (GLUCAGEN DIAGNOSTIC) injection 1 mg  1 mg Subcutaneous Q15 Min PRN Roberto Wells DPM       • guaiFENesin (MUCINEX) 12 hr tablet 1,200 mg  1,200 mg Oral BID Roberto Wells DPM   1,200 mg at 08/01/20 0815   • HYDROcodone-acetaminophen (NORCO)  MG per tablet 1 tablet  1 tablet Oral Q4H PRN Roberto Wells DPM   1 tablet at 08/01/20 0904   • lactated ringers infusion  9 mL/hr Intravenous Continuous Roberto Wells DPM 9 mL/hr at 07/31/20 1639     • levothyroxine (SYNTHROID, LEVOTHROID) tablet 125 mcg  125 mcg Oral Q AM Roberto Wells DPM   125 mcg at 08/01/20 0508   • melatonin tablet 3 mg  3 mg Oral Nightly Roberto Wells DPM   3 mg at 07/31/20 2051   • Morphine 1mg/ml infusion 30ml  2 mg/hr Intravenous Once Roberto Wells DPM       • mupirocin (BACTROBAN) 2 % ointment   Topical Q12H Roberto Wells DPM       • nicotine (NICODERM CQ) 21 MG/24HR patch 1 patch  1 patch Transdermal Q24H Roberto Wells DPM   1 patch at 08/01/20 0816   • ondansetron (ZOFRAN) tablet 4 mg  4 mg Oral Q6H PRN Roberto Wells DPM        Or   • ondansetron (ZOFRAN) injection 4 mg  4 mg Intravenous Q6H PRN Roberto Wells DPM       • pantoprazole (PROTONIX) EC tablet 40 mg  40 mg Oral Q AM Roberto Wells DPM   40 mg at 08/01/20 0508   • pilocarpine (SALAGEN) tablet 5 mg  5 mg Oral TID Roberto Wells, DPM   5 mg at 08/01/20 0815   • pramipexole (MIRAPEX) tablet 1.5 mg  1.5 mg Oral Nightly Roberto Wells, DPM   1.5 mg at 07/31/20 2050   • QUEtiapine (SEROquel) tablet 50 mg  50 mg Oral Nightly Roberto Wells, DPM   50 mg at 07/31/20 2050   • sodium chloride 0.9 % flush 10 mL  10 mL Intravenous Q12H Roberto Wells, DPM   10 mL at 08/01/20 0819   • sodium chloride 0.9 % flush 10 mL  10 mL Intravenous PRN  Roberto Wells A, DPM       • spironolactone (ALDACTONE) tablet 25 mg  25 mg Oral BID Lavelle Wellsck A, DPM   25 mg at 20 0815   • tiZANidine (ZANAFLEX) tablet 4 mg  4 mg Oral Nightly Lavelle Wellsck A, DPM   4 mg at 20 2050   • vancomycin 750 mg/250 mL 0.9% NS IVPB ([facility])  750 mg Intravenous Q12H Roberto Wells A, DPM   750 mg at 20 0037     Review of Systems   Constitutional: Positive for activity change. Negative for chills and fever.   HENT: Negative for congestion.    Respiratory: Negative for shortness of breath.    Cardiovascular: Positive for leg swelling.   Gastrointestinal: Negative for nausea and vomiting.   Musculoskeletal: Positive for arthralgias and myalgias.   Skin: Positive for color change and wound.   Neurological: Positive for numbness.   Psychiatric/Behavioral: Positive for sleep disturbance.       OBJECTIVE     Vitals:    20 1112   BP: 101/49   Pulse: 75   Resp: 16   Temp: 97.6 °F (36.4 °C)   SpO2: 95%       PHYSICAL EXAM  GEN:   A&Ox3, NAD. Pt presents in hospital bed. Accompanied by nursing staff.    Foot/Ankle Exam:       General:   Appearance: appears stated age and healthy and obesity    Orientation: AAOx3    Affect: appropriate      VASCULAR      Right Foot Vascularity   Dorsalis pedis:  1+  Posterior tibial:  1+  Edema Gradin+ and pitting  CFT:  3  Pedal Hair Growth:  Present  Varicosities: mild varicosities       Left Foot Vascularity   Dorsalis pedis:  1+  Posterior tibial:  1+  Edema Gradin+, pitting and non-pitting (improving)  CFT:  3  Pedal Hair Growth:  Present  Varicosities: mild varicosities        NEUROLOGIC     Right Foot Neurologic   Normal sensation    Light touch sensation:  Normal  Vibratory sensation:  Normal  Hot/Cold sensation: normal       Left Foot Neurologic   Normal sensation    Light touch sensation:  Normal  Vibratory sensation:  Normal  Hot/cold sensation: normal       MUSCULOSKELETAL      Right Foot Musculoskeletal     Amputation   Right toes amputated: No    Ecchymosis:  None  Tenderness: none    Arch:  Normal     Left Foot Musculoskeletal    Amputation   Left toes amputated: No    Ecchymosis:  None  Tenderness comment:  Generalized to distal lower extremity, worse around wound  Arch:  Normal     MUSCLE STRENGTH     Right Foot Muscle Strength   Normal strength    Foot dorsiflexion:  5  Foot plantar flexion:  5  Foot inversion:  5  Foot eversion:  5     Left Foot Muscle Strength   Foot dorsiflexion:  5  Foot plantar flexion:  5  Foot inversion:  5  Foot eversion:  5     DERMATOLOGIC     Right Foot Dermatologic   Skin: skin intact       Left Foot Dermatologic   Skin: cellulitis (improving), drainage, erythema, maceration (minimal), skin changes and ulcer    Skin: no left foot blister        Left Foot Additional Comments: Dressing in place with ace wrap for pressure/securement. Mild strikethrough to bandage.  Significant decrease in wound slough and necrotic tissue.  Noticeable improvement in granulation tissue.  No deep probing or tracking.  Improved periwound erythema.     Before:       After:         RADIOLOGY/NUCLEAR:  Us Venous Doppler Lower Extremity Left (duplex)    Result Date: 7/29/2020  Narrative: History: Pain and swelling      Impression: Impression: There is no evidence of deep venous thrombosis or superficial thrombophlebitis of the left lower extremity.  Comments: Left lower extremity venous duplex exam was performed using color Doppler flow, Doppler wave form analysis, and grayscale imaging, with and without compression. There is no evidence of deep venous thrombosis of the common femoral, superficial femoral, and popliteal veins. The tibial veins were not visualized due to bandaging. There is no thrombus identified in the saphenofemoral junction or the greater saphenous vein. There is no evidence of clot in the right common femoral vein.  This report was finalized on 07/29/2020 15:09 by Dr. Trace Hurd,  MD.    Ct Lower Extremity Left Without Contrast    Result Date: 7/28/2020  Narrative: EXAMINATION:  CT LOWER EXTREMITY LEFT WO CONTRAST-  7/28/2020 6:42 PM CDT  HISTORY: Spider bite 3 months ago left calf region.  COMPARISON: No comparison study.  TECHNIQUE: Spiral CT was performed of the left lower extremity from just above the knee joint to just below the ankle joint. Sagittal and coronal images were reconstructed. DLP: 227 mGy-cm.  FINDINGS: There is diffuse subcutaneous edema throughout the visualized left leg below the knee. Posteriorly, there may be some ulceration involving the skin and subcutaneous fat that extends back to the fascia of the gastrocnemius muscle. I do not see a visible soft tissue fluid collection to suggest a drainable abscess. The tibia and fibula are intact without evidence of osteomyelitis.      Impression: 1. Diffuse subcutaneous edema throughout the visualized left leg below the knee. 2. There appears to be skin ulceration and ulceration extending into the subcutaneous fat posteriorly that extends to the fascia of the gastrocnemius muscle. No drainable fluid collection is identified on the CT images. 3. Tibia and fibula are intact without evidence of osteomyelitis. This report was finalized on 07/28/2020 19:24 by Dr. Evin Elliott MD.      LABORATORY/CULTURE RESULTS:  Results from last 7 days   Lab Units 08/01/20  0903 07/31/20  0352 07/30/20  0431   WBC 10*3/mm3 8.89 9.01 11.31*   HEMOGLOBIN g/dL 7.4* 7.4* 8.3*   HEMATOCRIT % 24.1* 24.2* 27.0*   PLATELETS 10*3/mm3 349 339 419     Results from last 7 days   Lab Units 07/31/20  0836 07/30/20  0431 07/29/20  0546 07/28/20  1707   SODIUM mmol/L  --  141 140 140   POTASSIUM mmol/L  --  4.3 4.3 4.3   CHLORIDE mmol/L  --  105 103 101   CO2 mmol/L  --  25.0 24.0 24.0   BUN mg/dL  --  13 23* 32*   CREATININE mg/dL 0.74 0.83 1.15* 1.78*   CALCIUM mg/dL  --  9.0 9.2 9.4   BILIRUBIN mg/dL  --   --  <0.2 <0.2   ALK PHOS U/L  --   --  99 98   ALT  (SGPT) U/L  --   --  16 15   AST (SGOT) U/L  --   --  16 12   GLUCOSE mg/dL  --  119* 124* 112*         Microbiology Results (last 10 days)     Procedure Component Value - Date/Time    Tissue / Bone Culture - Tissue, Leg, Left [[id number]] Collected:  07/31/20 1728    Lab Status:  Preliminary result Specimen:  Tissue from Leg, Left Updated:  07/31/20 1826     Gram Stain Many (4+) WBCs seen      Rare (1+) Gram positive cocci    COVID-19,CEPHEID,COR/FLORENCIO/PAD IN-HOUSE(OR EMERGENT/ADD-ON),NP SWAB IN TRANSPORT MEDIA 3-4 HR TAT - Swab, Nasopharynx [[id number]]  (Normal) Collected:  07/30/20 1757    Lab Status:  Final result Specimen:  Swab from Nasopharynx Updated:  07/31/20 0916     COVID19 Not Detected    Narrative:       Fact sheet for providers: https://www.fda.gov/media/465071/download     Fact sheet for patients: https://www.fda.gov/media/395095/download    Wound Culture - Wound, Leg, Left [[id number]]  (Abnormal)  (Susceptibility) Collected:  07/28/20 1715    Lab Status:  Final result Specimen:  Wound from Leg, Left Updated:  07/31/20 0955     Wound Culture Heavy growth (4+) Staphylococcus aureus, MRSA     Comment:   Methicillin resistant Staphylococcus aureus, Patient may be an isolation risk.         Heavy growth (4+) Mixed Gram Negative Elizabeth     Gram Stain Few (2+) WBCs seen      Few (2+) Gram positive cocci      Many (4+) Gram negative bacilli    Susceptibility      Staphylococcus aureus, MRSA     SHANNON     Clindamycin Resistant     Erythromycin Resistant     Inducible Clindamycin Resistance Negative     Oxacillin Resistant     Penicillin G Resistant     Rifampin Susceptible     Tetracycline Susceptible     Trimethoprim + Sulfamethoxazole Susceptible     Vancomycin Susceptible                    Blood Culture - Blood, Arm, Right [[id number]] Collected:  07/28/20 1706    Lab Status:  Preliminary result Specimen:  Blood from Arm, Right Updated:  07/31/20 1745     Blood Culture No growth at 3 days    Blood Culture -  Blood, Arm, Left [[id number]] Collected:  07/28/20 1706    Lab Status:  Preliminary result Specimen:  Blood from Arm, Left Updated:  07/31/20 1745     Blood Culture No growth at 3 days          PATHOLOGY RESULTS:       ASSESSMENT/PLAN   1.  Chronic wound left lower extremity - 1 day s/p surgical debridement  2.  Cellulitis of left lower extremity-MRSA positive   3.  Peripheral edema  4.  Chronic pain syndrome    Labs and cultures reviewed-ID following   Hospitalist progress notes reviewed   Continued to encourage leg elevation when in bed-okay to ambulate when needed. Continue twice daily dressing changes with Mupirocin, xeroform to wound bed, ABD pad, kerlix, and ACE wrap for compression.    Dressing removed and wound visualized. Updated photos taken and uploaded to chart.     We will continue to follow throughout hospital course of stay. Upon discharge she will need follow-up in outpatient [facility].     Lab Frequency Next Occurrence   Follow Anesthesia Guidelines / Standing Orders Once 07/28/2017   Follow Anesthesia Guidelines / Standing Orders Once 04/27/2019   Provide NPO Instructions to Patient Once 04/01/2019   Follow Anesthesia Guidelines / Standing Orders Once 04/10/2019   Follow Anesthesia Guidelines / Standing Orders Once 05/10/2019   Provide NPO Instructions to Patient Once 05/14/2019   Follow Anesthesia Guidelines / Standing Orders Once 10/14/2019   Obtain Informed Consent Once 10/19/2019   Diet: Once 10/23/2019   ZEKE by IFA, Reflex 9-biomarkers profile Once 12/11/2020   Urinalysis With Microscopic - Urine, Clean Catch Once 03/09/2021   Angiotensin Converting Enzyme Once 03/09/2021   CBC & Differential Once 03/09/2021       This document has been electronically signed by Roberto Wells DPM on August 1, 2020 11:15

## 2020-08-01 NOTE — PLAN OF CARE
Problem: Patient Care Overview  Goal: Plan of Care Review  Outcome: Ongoing (interventions implemented as appropriate)  Flowsheets  Taken 8/1/2020 1524  Progress: no change  Outcome Summary: Patient c.o pain see MAR, voiding per BRP, up with walker, dressing changed this morning per Dr Wells, continue IV abx awaiting culture results, patient resting with FOB elevated, will continue to monitor.  Taken 8/1/2020 0800  Plan of Care Reviewed With: patient

## 2020-08-01 NOTE — PROGRESS NOTES
[facility]  INPATIENT PROGRESS NOTE    Length of Stay: 4  Date of Admission: 7/28/2020  Primary Care Physician: Tabitha Victor DO    Subjective   Chief Complaint: Follow-up left leg pain, wound  HPI   Patient was seen with Dr. Wells earlier today during dressing change.  Patient was lying in bed and complains of shooting pain in surgical site.  Wound debrided yesterday by Dr. Wells and surgery with 12 cm x 10 cm open wound left posterior leg.  Wound bed much  today.  She denies nausea, vomiting or abdominal pain.  Denies chest pain, palpitations, shortness of breath.    Review of Systems     All pertinent negatives and positives are as above. All other systems have been reviewed and are negative unless otherwise stated.     Objective    Temp:  [97.5 °F (36.4 °C)-99.2 °F (37.3 °C)] 97.6 °F (36.4 °C)  Heart Rate:  [64-93] 75  Resp:  [14-20] 16  BP: ()/() 101/49  Physical Exam  Constitutional: She is oriented to person, place, and time.   Lying in bed.  No oxygen in use.  No family in room.  No distress.   HENT:   Head: Normocephalic and atraumatic.   Eyes: Pupils are equal, round, and reactive to light. EOM are normal.   Neck: Normal range of motion. Neck supple.   Cardiovascular: Normal rate, regular rhythm, normal heart sounds and intact distal pulses. Exam reveals no gallop and no friction rub.   No murmur heard.  Normal sinus rhythm   on telemetry   Pulmonary/Chest: Effort normal and breath sounds normal. She has no wheezes. She has no rales.   No oxygen in use.   Abdominal: Soft. Bowel sounds are normal. She exhibits no distension. There is no tenderness.   Genitourinary:   Genitourinary Comments: Voiding.   Musculoskeletal: She exhibits edema (Left lower extremity, improved).   Neurological: She is alert and oriented to person, place, and time.   Skin:   Left lower extremity with 10 cm x 12 cm open wound.  Debrided 7/31 by   Russell.  Wound bed much .  Minimal blistering of skin.  Psychiatric: Normal affect, normal behavior, thought process normal, normal judgment.    Results Review:  I have reviewed the labs, radiology results, and diagnostic studies.    Laboratory Data:   Results from last 7 days   Lab Units 08/01/20  0903 07/31/20  0352 07/30/20  0431   WBC 10*3/mm3 8.89 9.01 11.31*   HEMOGLOBIN g/dL 7.4* 7.4* 8.3*   HEMATOCRIT % 24.1* 24.2* 27.0*   PLATELETS 10*3/mm3 349 339 419        Results from last 7 days   Lab Units 07/31/20  0836 07/30/20  0431 07/29/20  0546 07/28/20  1707   SODIUM mmol/L  --  141 140 140   POTASSIUM mmol/L  --  4.3 4.3 4.3   CHLORIDE mmol/L  --  105 103 101   CO2 mmol/L  --  25.0 24.0 24.0   BUN mg/dL  --  13 23* 32*   CREATININE mg/dL 0.74 0.83 1.15* 1.78*   CALCIUM mg/dL  --  9.0 9.2 9.4   BILIRUBIN mg/dL  --   --  <0.2 <0.2   ALK PHOS U/L  --   --  99 98   ALT (SGPT) U/L  --   --  16 15   AST (SGOT) U/L  --   --  16 12   GLUCOSE mg/dL  --  119* 124* 112*     Blood Culture   Date Value Ref Range Status   07/28/2020 No growth at 3 days  Preliminary   07/28/2020 No growth at 3 days  Preliminary     Wound Culture   Date Value Ref Range Status   07/28/2020 Heavy growth (4+) Staphylococcus aureus, MRSA (A)  Final     Comment:       Methicillin resistant Staphylococcus aureus, Patient may be an isolation risk.   07/28/2020 Heavy growth (4+) Mixed Gram Negative Elizabeth (A)  Final     Staphylococcus aureus, MRSA       SHANNON     Clindamycin >=4  Resistant     Erythromycin >=8  Resistant     Inducible Clindamycin Resistance NEG  Negative     Oxacillin >=4  Resistant     Penicillin G >=0.5  Resistant     Rifampin <=0.5  Susceptible     Tetracycline 2  Susceptible     Trimethoprim + Sulfamethoxazole <=10  Susceptible     Vancomycin <=0.5  Susceptible      Imaging Results (All)     Procedure Component Value Units Date/Time    US Venous Doppler Lower Extremity Left (duplex) [[id number]] Collected:  07/29/20 1503      Updated:  07/29/20 1512    Narrative:       History: Pain and swelling       Impression:       Impression: There is no evidence of deep venous thrombosis or  superficial thrombophlebitis of the left lower extremity.     Comments: Left lower extremity venous duplex exam was performed using  color Doppler flow, Doppler wave form analysis, and grayscale imaging,  with and without compression. There is no evidence of deep venous  thrombosis of the common femoral, superficial femoral, and popliteal  veins. The tibial veins were not visualized due to bandaging. There is  no thrombus identified in the saphenofemoral junction or the greater  saphenous vein. There is no evidence of clot in the right common femoral  vein.     This report was finalized on 07/29/2020 15:09 by Dr. Trace Hurd MD.    CT Lower Extremity Left Without Contrast [[id number]] Collected:  07/28/20 1917     Updated:  07/28/20 1927    Narrative:       EXAMINATION:  CT LOWER EXTREMITY LEFT WO CONTRAST-  7/28/2020 6:42 PM  CDT     HISTORY: Spider bite 3 months ago left calf region.      COMPARISON: No comparison study.     TECHNIQUE: Spiral CT was performed of the left lower extremity from just  above the knee joint to just below the ankle joint. Sagittal and coronal  images were reconstructed. DLP: 227 mGy-cm.     FINDINGS: There is diffuse subcutaneous edema throughout the visualized  left leg below the knee. Posteriorly, there may be some ulceration  involving the skin and subcutaneous fat that extends back to the fascia  of the gastrocnemius muscle. I do not see a visible soft tissue fluid  collection to suggest a drainable abscess. The tibia and fibula are  intact without evidence of osteomyelitis.       Impression:       1. Diffuse subcutaneous edema throughout the visualized left leg below  the knee.  2. There appears to be skin ulceration and ulceration extending into the  subcutaneous fat posteriorly that extends to the fascia of  the  gastrocnemius muscle. No drainable fluid collection is identified on the  CT images.  3. Tibia and fibula are intact without evidence of osteomyelitis.  This report was finalized on 07/28/2020 19:24 by Dr. Evin Elliott MD.          Intake/Output    Intake/Output Summary (Last 24 hours) at 8/1/2020 1342  Last data filed at 8/1/2020 0834  Gross per 24 hour   Intake 710 ml   Output 250 ml   Net 460 ml       Scheduled Meds    allopurinol 100 mg Oral Daily   atorvastatin 40 mg Oral Nightly   carvedilol 6.25 mg Oral BID With Meals   cefepime 2 g Intravenous Q12H   divalproex 1,000 mg Oral Nightly   DULoxetine 60 mg Oral Q12H   gabapentin 100 mg Oral TID   guaiFENesin 1,200 mg Oral BID   levothyroxine 125 mcg Oral Q AM   melatonin 3 mg Oral Nightly   mupirocin  Topical Q12H   nicotine 1 patch Transdermal Q24H   pantoprazole 40 mg Oral Q AM   pilocarpine 5 mg Oral TID AC   pramipexole 1.5 mg Oral Nightly   QUEtiapine 50 mg Oral Nightly   sodium chloride 10 mL Intravenous Q12H   spironolactone 25 mg Oral BID   tiZANidine 4 mg Oral Nightly   vancomycin 750 mg Intravenous Q12H       I have reviewed the patient current medications.     Assessment/Plan     Active Hospital Problems    Diagnosis   • **Wound of left leg     MRSA culture 7/28/20     • Infection of wound due to methicillin resistant Staphylococcus aureus (MRSA)   • Tobacco abuse   • Cellulitis of left lower extremity   • Essential hypertension   • Chronic pain syndrome   • Acute pain   • Anxiety   • DOMINIC (acute kidney injury) (CMS/LTAC, located within [facility])   • Prediabetes     Plan:  1.  To ER 7/28 with complaints of left leg pain and wound. 4/12/2020 suspected brown recluse spider bite.  Followed [facility] care [facility], surgical debridement with wound VAC in May.  Patient felt wound worsened after wound VAC placed.  Followed by wound care clinic every 2 weeks.   2.  CT lower extremity diffuse subcutaneous edema throughout the left leg below the knee.  Appears to be skin  ulceration and ulceration extending into subcutaneous fat posteriorly that extends to the fascia of the gastrocnemius muscle.  No drainage able fluid collection identified.  Tibia and fibula intact without evidence of osteomyelitis.  3.  Venous Dopplers negative for DVT  4.  Podiatry consulted.  Dr. Wells recommends elevation, twice daily dressing changes with mupirocin, Xeroform to wound bed, ABD pad, Kerlix and Ace wrap for compression.  Surgical debridement 7/31.  Pathology sent.  Discussed with Dr. Wells today.  Observed wound during dressing change.  5.  Wound culture 7/28 heavy growth MRSA, gram negative rain. Wound culture 7/31 moderate gram negative bacilli.  Blood cultures no growth at 3 days  6.  Infectious disease consulted.  Dr. Musa suspects pyoderma gangrenosum.  Continue topical antibiotics, empiric antibiotics.  Elevate foot.  Continue vancomycin. Cefepime added 7/30  7.  Acute kidney injury. Creatinine 1.78 on admit down to 0.74 today.  8.  A1c 6.5.   9.  Discussed smoking cessation.  Nicotine patch.  10.  CRP 4.98.  WBC 11. 6 1 on admission.  8.89 today.  H/H 9.3/29.3 on admit.  7.4/24.1 today.  CBC tomorrow.  11.  SCDs for deep vein thrombosis prophylaxis.  Add Lovenox  12.  Home medications reviewed and resumed if appropriate.  13.  Will need home health to assist with dressing changes at discharge.  Discussed with nurse Claudia for  to participate with dressing changes if possible.     Her  will assist with decision-making if she is unable to make decisions for herself  The above documentation resulted from a face-to-face encounter by me Rajwinder CARIAS, [facility]    Discharge Planning: I expect patient to be discharged to be determined.    Electronically signed by ARETHA Carvajal, 8/1/2020, 13:42.

## 2020-08-01 NOTE — PLAN OF CARE
Problem: Patient Care Overview  Goal: Plan of Care Review  Flowsheets (Taken 8/1/2020 0222)  Progress: improving  Plan of Care Reviewed With: patient  Note:   Pain med given x 1;  Left leg elevated while in bed with pillow;  Vanc Trough was 16.5 and pharmacy confirmed use of current dose of 750 mg of Vanco be given to pt;  will continue to monitor

## 2020-08-02 LAB
BACTERIA SPEC AEROBE CULT: NORMAL
BACTERIA SPEC AEROBE CULT: NORMAL
BASOPHILS # BLD AUTO: 0.03 10*3/MM3 (ref 0–0.2)
BASOPHILS NFR BLD AUTO: 0.3 % (ref 0–1.5)
DEPRECATED RDW RBC AUTO: 50.9 FL (ref 37–54)
EOSINOPHIL # BLD AUTO: 0.28 10*3/MM3 (ref 0–0.4)
EOSINOPHIL NFR BLD AUTO: 3.3 % (ref 0.3–6.2)
ERYTHROCYTE [DISTWIDTH] IN BLOOD BY AUTOMATED COUNT: 16.4 % (ref 12.3–15.4)
HCT VFR BLD AUTO: 23.1 % (ref 34–46.6)
HGB BLD-MCNC: 7.1 G/DL (ref 12–15.9)
HOLD SPECIMEN: NORMAL
IMM GRANULOCYTES # BLD AUTO: 0.06 10*3/MM3 (ref 0–0.05)
IMM GRANULOCYTES NFR BLD AUTO: 0.7 % (ref 0–0.5)
LYMPHOCYTES # BLD AUTO: 2.38 10*3/MM3 (ref 0.7–3.1)
LYMPHOCYTES NFR BLD AUTO: 27.7 % (ref 19.6–45.3)
MCH RBC QN AUTO: 26.4 PG (ref 26.6–33)
MCHC RBC AUTO-ENTMCNC: 30.7 G/DL (ref 31.5–35.7)
MCV RBC AUTO: 85.9 FL (ref 79–97)
MONOCYTES # BLD AUTO: 0.54 10*3/MM3 (ref 0.1–0.9)
MONOCYTES NFR BLD AUTO: 6.3 % (ref 5–12)
NEUTROPHILS NFR BLD AUTO: 5.31 10*3/MM3 (ref 1.7–7)
NEUTROPHILS NFR BLD AUTO: 61.7 % (ref 42.7–76)
NRBC BLD AUTO-RTO: 0 /100 WBC (ref 0–0.2)
PLATELET # BLD AUTO: 340 10*3/MM3 (ref 140–450)
PMV BLD AUTO: 10.1 FL (ref 6–12)
RBC # BLD AUTO: 2.69 10*6/MM3 (ref 3.77–5.28)
WBC # BLD AUTO: 8.6 10*3/MM3 (ref 3.4–10.8)

## 2020-08-02 PROCEDURE — 86901 BLOOD TYPING SEROLOGIC RH(D): CPT

## 2020-08-02 PROCEDURE — P9016 RBC LEUKOCYTES REDUCED: HCPCS

## 2020-08-02 PROCEDURE — 86900 BLOOD TYPING SEROLOGIC ABO: CPT

## 2020-08-02 PROCEDURE — 85025 COMPLETE CBC W/AUTO DIFF WBC: CPT | Performed by: NURSE PRACTITIONER

## 2020-08-02 PROCEDURE — 36430 TRANSFUSION BLD/BLD COMPNT: CPT

## 2020-08-02 PROCEDURE — 86920 COMPATIBILITY TEST SPIN: CPT

## 2020-08-02 PROCEDURE — 25010000002 CEFEPIME PER 500 MG: Performed by: PODIATRIST

## 2020-08-02 PROCEDURE — 25010000002 VANCOMYCIN 10 G RECONSTITUTED SOLUTION: Performed by: PODIATRIST

## 2020-08-02 PROCEDURE — 99231 SBSQ HOSP IP/OBS SF/LOW 25: CPT | Performed by: PODIATRIST

## 2020-08-02 RX ADMIN — SODIUM CHLORIDE, PRESERVATIVE FREE 10 ML: 5 INJECTION INTRAVENOUS at 08:23

## 2020-08-02 RX ADMIN — VANCOMYCIN HYDROCHLORIDE 750 MG: 10 INJECTION, POWDER, LYOPHILIZED, FOR SOLUTION INTRAVENOUS at 01:45

## 2020-08-02 RX ADMIN — ALLOPURINOL 100 MG: 100 TABLET ORAL at 08:20

## 2020-08-02 RX ADMIN — HYDROCODONE BITARTRATE AND ACETAMINOPHEN 1 TABLET: 10; 325 TABLET ORAL at 03:07

## 2020-08-02 RX ADMIN — PILOCARPINE HYDROCHLORIDE 5 MG: 5 TABLET, FILM COATED ORAL at 12:19

## 2020-08-02 RX ADMIN — HYDROCODONE BITARTRATE AND ACETAMINOPHEN 1 TABLET: 10; 325 TABLET ORAL at 07:47

## 2020-08-02 RX ADMIN — CARVEDILOL 6.25 MG: 6.25 TABLET, FILM COATED ORAL at 17:03

## 2020-08-02 RX ADMIN — ALPRAZOLAM 0.5 MG: 0.5 TABLET ORAL at 07:47

## 2020-08-02 RX ADMIN — DULOXETINE HYDROCHLORIDE 60 MG: 30 CAPSULE, DELAYED RELEASE ORAL at 20:01

## 2020-08-02 RX ADMIN — PILOCARPINE HYDROCHLORIDE 5 MG: 5 TABLET, FILM COATED ORAL at 07:47

## 2020-08-02 RX ADMIN — HYDROCODONE BITARTRATE AND ACETAMINOPHEN 1 TABLET: 10; 325 TABLET ORAL at 15:47

## 2020-08-02 RX ADMIN — Medication 3 MG: at 20:02

## 2020-08-02 RX ADMIN — GABAPENTIN 100 MG: 100 CAPSULE ORAL at 08:20

## 2020-08-02 RX ADMIN — SODIUM CHLORIDE, PRESERVATIVE FREE 10 ML: 5 INJECTION INTRAVENOUS at 21:38

## 2020-08-02 RX ADMIN — CEFEPIME HYDROCHLORIDE 2 G: 2 INJECTION, POWDER, FOR SOLUTION INTRAVENOUS at 21:37

## 2020-08-02 RX ADMIN — GABAPENTIN 100 MG: 100 CAPSULE ORAL at 15:47

## 2020-08-02 RX ADMIN — HYDROCODONE BITARTRATE AND ACETAMINOPHEN 1 TABLET: 10; 325 TABLET ORAL at 12:18

## 2020-08-02 RX ADMIN — GUAIFENESIN 1200 MG: 600 TABLET, EXTENDED RELEASE ORAL at 20:01

## 2020-08-02 RX ADMIN — GUAIFENESIN 1200 MG: 600 TABLET, EXTENDED RELEASE ORAL at 08:20

## 2020-08-02 RX ADMIN — NICOTINE 1 PATCH: 21 PATCH, EXTENDED RELEASE TRANSDERMAL at 08:21

## 2020-08-02 RX ADMIN — VANCOMYCIN HYDROCHLORIDE 750 MG: 10 INJECTION, POWDER, LYOPHILIZED, FOR SOLUTION INTRAVENOUS at 13:12

## 2020-08-02 RX ADMIN — PANTOPRAZOLE SODIUM 40 MG: 40 TABLET, DELAYED RELEASE ORAL at 05:23

## 2020-08-02 RX ADMIN — LEVOTHYROXINE SODIUM 125 MCG: 125 TABLET ORAL at 05:23

## 2020-08-02 RX ADMIN — ATORVASTATIN CALCIUM 40 MG: 40 TABLET, FILM COATED ORAL at 20:01

## 2020-08-02 RX ADMIN — SPIRONOLACTONE 25 MG: 25 TABLET ORAL at 17:03

## 2020-08-02 RX ADMIN — ALPRAZOLAM 0.5 MG: 0.5 TABLET ORAL at 15:47

## 2020-08-02 RX ADMIN — HYDROCODONE BITARTRATE AND ACETAMINOPHEN 1 TABLET: 10; 325 TABLET ORAL at 23:04

## 2020-08-02 RX ADMIN — CEFEPIME HYDROCHLORIDE 2 G: 2 INJECTION, POWDER, FOR SOLUTION INTRAVENOUS at 09:42

## 2020-08-02 RX ADMIN — GABAPENTIN 100 MG: 100 CAPSULE ORAL at 20:00

## 2020-08-02 RX ADMIN — DIVALPROEX SODIUM 1000 MG: 500 TABLET, EXTENDED RELEASE ORAL at 20:02

## 2020-08-02 RX ADMIN — PILOCARPINE HYDROCHLORIDE 5 MG: 5 TABLET, FILM COATED ORAL at 17:03

## 2020-08-02 RX ADMIN — PRAMIPEXOLE DIHYDROCHLORIDE 1.5 MG: 0.25 TABLET ORAL at 20:01

## 2020-08-02 RX ADMIN — DULOXETINE HYDROCHLORIDE 60 MG: 30 CAPSULE, DELAYED RELEASE ORAL at 08:20

## 2020-08-02 RX ADMIN — TIZANIDINE 4 MG: 4 TABLET ORAL at 20:01

## 2020-08-02 RX ADMIN — QUETIAPINE FUMARATE 50 MG: 25 TABLET ORAL at 20:01

## 2020-08-02 RX ADMIN — CARVEDILOL 6.25 MG: 6.25 TABLET, FILM COATED ORAL at 07:48

## 2020-08-02 RX ADMIN — SPIRONOLACTONE 25 MG: 25 TABLET ORAL at 08:20

## 2020-08-02 NOTE — DISCHARGE PLACEMENT REQUEST
"From:  Asiya Thompson, GIOVANNA  [phone]    Rboyn Dominguez (60 y.o. Female)     Date of Birth Social Security Number Address Home Phone MRN    1960  [id number] [facility] [phone] [id number]    [employer] Mar[employer] Status          Other        Admission Date Admission Type Admitting Provider Attending Provider Department, Room/Bed    7/28/20 Urgent Melchor Conway MD Moore, Jonathan Scott, MD [address], [address]    Discharge Date Discharge Disposition Discharge Destination                       Attending Provider:  Melchor Conway MD    Allergies:  Bactrim [Sulfamethoxazole-trimethoprim], Hctz [Hydrochlorothiazide], Januvia [Sitagliptin]    Isolation:  None   Infection:  MRSA (07/30/20)   Code Status:  CPR    Ht:  152.4 cm (60\")   Wt:  81.7 kg (180 lb 3.2 oz)    Admission Cmt:  None   Principal Problem:  Wound of left leg [S81.802A] More...                 Active Insurance as of 7/28/2020     Primary Coverage     Payor Plan Insurance Group Employer/Plan Group    [facility] [id number]     Payor Plan Address Payor Plan Phone Number Payor Plan Fax Number Effective Dates    [address] [phone]  1/1/2020 - None Entered    [facility] [id number]       Subscriber Name Subscriber Birth Date Member ID       ROBYN DOMINGUEZ 1960 [id number]               Case Management  Consult [[id number]] (Order [id number])   Order   Date: 8/2/2020 Department: [address] Released By/Authorizing: Rajwinder Sandoval APRN (auto-released)   Order History   Inpatient   Date/Time Action Taken User Additional Information   08/02/20 1244 Release Rajwinder Sandoval APRN (auto-released) From Order: [id number]   Order Details     Frequency Duration Priority Order Class   Once 1  occurrence Routine Hospital Performed   Order Questions     Question Answer Comment   Reason for Consult? Home health at discharge.  Wound instructions per " Dr. Wells Continue twice daily dressing changes with Mupirocin, xeroform to wound bed, ABD pad, kerlix, and ACE wrap for compression.           Consult Order Info     ID Description Priority Start Date Start Time   [id number] Case Management  Consult Routine 08/02/2020 12:44 PM   Provider Specialty Referred to   ______________________________________ _____________________________________   Reprint Order Requisition     Case Management  Consult (Order #[id number]) on 8/2/20   Encounter     View Encounter              Order Provider Info         Office phone Pager E-mail   Ordering User Rajwinder Sandoval APRN [phone] -- --   Authorizing Provider Rajwinder Sandoval APRN [phone] -- --   Attending Provider Melchor Cnoway [phone] -- --   Tracking Reports      Cosign Tracking Order Transmittal Tracking       Emergency Contacts      (Rel.) Home Phone Work Phone Mobile Phone    Rizwan Minaya (Spouse) [phone] -- --               History & Physical      Shannon Mishra APRN at 07/28/20 1600     Attestation signed by Melchor Conway MD at 07/28/20 1826    I personally evaluated and examined the patient in conjunction with ARETHA Bang and agree with the assessment, treatment plan, and disposition of the patient as recorded by her. My history, exam, and further recommendations are:     S:  Patient with LLE wound, erythema and swelling.      O:  CVS RRR    A:  LLE wound with cellulitis secondary to brown recluse bite    DOMINIC    P:  IV abx, IVF consult ID, Consult Podiatry      Electronically signed by Melchor Conway MD, 7/28/2020, 18:25.                        [facility]  HISTORY AND PHYSICAL    Date of Admission: 7/28/2020  Primary Care Physician: Tabitha Victor DO    Subjective     Chief Complaint: Nonhealing left lower extremity wound    History of Present Illness  Robyn Minaya is a  "60-year-old female with a vast medical history to include bipolar 1 disorder, anxiety, chronic pain syndrome, hypertension, hypothyroidism, hyperlipidemia, sleep apnea-wears CPAP, prediabetes please see below for complete list.  Patient established care today with Dr. Allen Ho for nonhealing spider bite from April, 2020.  She has been followed by [facility] wound care, feels care has not been appropriate, refuses to see them again.  She was advised to seek evaluation direct admit [facility].  Patient seen in conjunction with Dr. Melchor Conway.  She is crying, emotionally distraught.  Complaining of severe pain.  She voices unhappiness regarding care she has received at [facility].  Dr. Conway has spoke with Dr. Wells who will see patient today.  Patient smokes approximately 1 pack of cigarettes per day and is hoping to quit therefore we will start NicoDerm patch.  She is denying shortness of breathing, chest pain or abdominal pain.  Patient takes numerous home medications, please see below.  Due to anxiety, crying obtaining in-depth history is difficult.   at bedside somewhat confrontational regarding patient will not be leaving until \"something is done\".  She is admitted for further evaluation treatment.    Review of Systems   A 10 point review of systems was completed, all negative except for those discussed in HPI    Past Medical History:   Past Medical History:   Diagnosis Date   • Acid reflux     RESOLVED PT PT   • Allergic rhinitis    • Arthritis     BACK   • Bipolar 1 disorder (CMS/HCC)    • Chronic back pain    • Degenerative arthritis    • Depression    • Deviated nasal septum    • Eczema    • Gout    • History of colon polyps    • Hyperlipidemia    • Hypertension    • Hypertrophy of nasal turbinates    • Hypokalemia    • Hypothyroidism    • Incontinence in female    • Insomnia    • Menopause    • Nasal septal deviation    • Nasal valve stenosis    • Overactive bladder    • Plantar " fasciitis    • Prediabetes    • RLS (restless legs syndrome)    • Sleep apnea     cpap   • Wears glasses        Past Surgical History:   Past Surgical History:   Procedure Laterality Date   • BLEPHAROPLASTY Bilateral 7/9/2019    Procedure: 1) bilateral upper blepharoplasty with excision of excess of tissue weighing down 2) nasal endoscopy with removal of nasal septal prosthesis;  Surgeon: Mark Anthony Anderson MD;  Location: [facility] OR;  Service: ENT   • CARPAL TUNNEL RELEASE Bilateral 2004   • CHOLECYSTECTOMY  2013   • COLONOSCOPY  10/17/2014    One 5-6mm tubular adenomatous polyp in the ascending colon; Two less than 4mm hyperplastic polyps in the sigmoid colon; Three rectal hyperplastic polyps; Diverticulosis; Repeat 5 years    • COLONOSCOPY  07/14/2010    Internal hemorrhoids; Repeat 7 years    • COLONOSCOPY N/A 10/23/2019    Procedure: COLONOSCOPY WITH ANESTHESIA;  Surgeon: Robyn Frazier MD;  Location: [facility] ENDOSCOPY;  Service: Gastroenterology   • CYST REMOVAL  2016    neck   • ENDOSCOPIC FUNCTIONAL SINUS SURGERY (FESS) Bilateral 4/16/2019    Procedure: ENDOSCOPIC FUNCTIONAL SINUS SURGERY (bilareral maxillary antrostomy without image guidance);  Surgeon: Mark Anthony Anderson MD;  Location: [facility] OR;  Service: ENT   • ENDOSCOPY N/A 4/11/2019    Esophageal mucosal changes suggestive of eosinophilic esophagitis-biopsied; A few gastric polyps-resected and retrieved-biopsied; Normal examined duodenum-biopsied   • FOOT SURGERY Right 2010    X3   • HERNIA REPAIR     • INTERSTIM PLACEMENT N/A 6/5/2019    Procedure: INTERSTIM STAGES 1 AND 2 LEAD AND GENERATOR PLACEMENT;  Surgeon: Endy Guerrero MD;  Location: [facility] OR;  Service: Urology   • LASER ABLATION      right back   • NASAL ENDOSCOPY N/A 4/16/2019    Procedure:     1. Functional endoscopic sinus surgery with bilateral maxillary antrostomy    2. Bilateral nasal endoscopy with biopsy of nasal septum    3.  Nasal septal prosthesis placement  ;  Surgeon: Justin  Mark Anthony ELAINE MD;  Location:  [facility] OR;  Service: ENT   • NASAL VESTIBULE LESION/PAPILLOMA EXCISION N/A 8/1/2017    Procedure: Repair of nasal vestibular stenosis and septoplasty; cartilage graft from left ear;  Surgeon: Mark Anthony Anderson MD;  Location:  [facility] OR;  Service:    • SEPTOPLASTY N/A 4/16/2019    Procedure: PLACEMENT OF NASAL SEPTAL PROSTHESIS;  Surgeon: Mark Anthony Anderson MD;  Location:  [facility] OR;  Service: ENT   • SEPTOPLASTY Bilateral 1/14/2020    Procedure: Nasal endoscopy with septal debridement, and placement of Silastic stents;  Surgeon: Mark Anthony Anderson MD;  Location:  [facility] OR;  Service: ENT       Family History: family history includes Cancer in her father and mother; Diabetes in her father; Hypertension in her father.    Social History:  reports that she has been smoking cigarettes. She has a 20.00 pack-year smoking history. She has never used smokeless tobacco. She reports that she drinks alcohol. She reports that she does not use drugs.    Code Status: Full, if unable speak for self her  Rizwan will speak for her      Allergies:  Allergies   Allergen Reactions   • Bactrim [Sulfamethoxazole-Trimethoprim] Rash     Itching   • Hctz [Hydrochlorothiazide] Other (See Comments)     Acid reflux   • Januvia [Sitagliptin] Rash       Medications:  Prior to Admission medications    Medication Sig Start Date End Date Taking? Authorizing Provider   albuterol (PROVENTIL) (2.5 MG/3ML) 0.083% nebulizer solution As Needed. 8/28/19   Brenda Mcnair MD   allopurinol (ZYLOPRIM) 100 MG tablet Take 100 mg by mouth Daily. 5/8/17   Brenda Mcnair MD   ALPRAZolam (XANAX) 0.5 MG tablet Take 0.5 mg by mouth As Needed. 7/9/19   Brenda Mcnair MD   atorvastatin (LIPITOR) 40 MG tablet Take 40 mg by mouth Every Night. 9/13/17   Brenda Mcnair MD   bumetanide (BUMEX) 1 MG tablet Take 1 mg by mouth 2 (Two) Times a Week.    Brenda Mcnair MD   calcium carbonate (OS-KAYLEY) 600 MG tablet Take 600 mg  by mouth Daily.    Brenda Mcnair MD   carvedilol (COREG) 6.25 MG tablet Take 6.25 mg by mouth 2 (Two) Times a Day With Meals.    Brenda Mcnair MD   cholecalciferol (VITAMIN D3) 1000 UNITS tablet Take 1,000 Units by mouth Daily.    Brenda Mcnair MD   DICLOFENAC PO Take 75 mg by mouth 2 (Two) Times a Day.    Brenda Mcnair MD   divalproex (DEPAKOTE) 500 MG 24 hr tablet Take 1,000 mg by mouth Every Night.    Brenda Mcnair MD   DULoxetine (CYMBALTA) 60 MG capsule Take 60 mg by mouth 2 (Two) Times a Day.    Brenda Mcnair MD   guaiFENesin (MUCINEX) 600 MG 12 hr tablet Take 1,200 mg by mouth 2 (Two) Times a Day. 10/19/18   Bernda Mcnair MD   HYDROcodone-acetaminophen (NORCO)  MG per tablet Take 1 tablet by mouth Every 6 (Six) Hours As Needed. 7/10/20 7/28/20  Brenda Mcnair MD   levothyroxine (SYNTHROID, LEVOTHROID) 125 MCG tablet Take 125 mcg by mouth Daily. 3/13/19   Brenda Mcnair MD   Loperamide HCl (IMODIUM PO) Take 2 mg by mouth As Needed.    Brenda Mcnair MD   melatonin 3 MG tablet Take 3 mg by mouth Daily. 7/24/20 8/23/20  Brenda Mcnair MD   methylcellulose, Laxative, (EQ FIBER THERAPY) 500 MG tablet tablet Take 1 tablet by mouth Daily.    Brenda Mcnair MD   Multiple Vitamin (MULTI VITAMIN PO) Take 1 dose by mouth Daily.    Brenda Mcnair MD   mupirocin (BACTROBAN) 2 % ointment  10/16/19   Brenda Mcnair MD   niacin (SLO-NIACIN) 500 MG CR tablet Take 500 mg by mouth Daily.    Brenda Mcnair MD   omeprazole (priLOSEC) 20 MG capsule Take 20 mg by mouth Daily. 3/27/19   Brenda Mcnair MD   pilocarpine (SALAGEN) 5 MG tablet Take 5 mg by mouth 3 (Three) Times a Day.    Brenda Mcnair MD   pramipexole (MIRAPEX) 1.5 MG tablet Take 1.5 mg by mouth Every Night.    Brenda Mcnair MD   QUEtiapine (SEROquel) 50 MG tablet Take 50 mg by mouth Every Night.    Brenda Mcnair, MD      Respiratory Therapy Supplies (NEBULIZER/TUBING/MOUTHPIECE) kit 1 kit. 8/28/19   Brenda Mcnair MD   spironolactone (ALDACTONE) 25 MG tablet Take 25 mg by mouth 2 (Two) Times a Day.    Brenda Mcnair MD   tiZANidine (ZANAFLEX) 4 MG tablet Take 4 mg by mouth Every Night.    Brenda Mcnair MD   traMADol (ULTRAM) 50 MG tablet Take 50 mg by mouth 2 (Two) Times a Day.    Brenda Mcnair MD       Objective     /74 (BP Location: Right arm, Patient Position: Lying)   Pulse 94   Temp 98 °F (36.7 °C) (Oral)   Resp 18   SpO2 97%   Physical Exam   Constitutional: She is oriented to person, place, and time. She appears well-developed and well-nourished. No distress.   HENT:   Head: Normocephalic and atraumatic.   Eyes: Pupils are equal, round, and reactive to light. Conjunctivae and EOM are normal. No scleral icterus.   Neck: Normal range of motion. Neck supple. No JVD present. No tracheal deviation present.   Cardiovascular: Normal rate, regular rhythm, normal heart sounds and intact distal pulses. Exam reveals no gallop.   No murmur heard.  Pulmonary/Chest: Effort normal. No respiratory distress. She has no wheezes. She has no rales.   Diminished without adventitious breath sounds   Abdominal: Soft. Bowel sounds are normal. She exhibits no distension. There is no tenderness. There is no guarding.   Musculoskeletal: Normal range of motion. She exhibits no edema.   Neurological: She is alert and oriented to person, place, and time.   Skin: Skin is warm and dry. No rash noted. She is not diaphoretic. No erythema. No pallor.   Large open wound left calf with purulent drainage, cellulitis noted entire remainder of limb and foot   Psychiatric:   Emotionally labile, excessive for situation   Vitals reviewed.      Pertinent Data: Awaiting stat labs    Assessment / Plan     Assessment:   Active Hospital Problems    Diagnosis   • **Wound of left leg   • Cellulitis of left lower extremity   •  Essential hypertension   • Chronic pain syndrome   • Acute pain   • Anxiety   • DOMINIC (acute kidney injury) (CMS/[facility])   Polypharmacy  Prediabetes    Plan:   1.  Admit as inpatient  2.  Consult Dr. Wells, podiatry and infectious diseases  3.  Consult pharmacy to dose vancomycin, await ID recommendations  4.  Stat labs to include lactic acid, procalcitonin and blood and wound cultures  5.  Normal saline 100 mL/hour  6.  CT of the left lower extremity with and without contrast, await creatinine results  7.  Home medications reviewed and restarted as appropriate  8.  DVT prophylaxis with SCD to right lower extremity only  9.  Incentive spirometry, supplemental oxygen as needed, RT to initiate breathing treatment protocol  10.  NicoDerm patch  11.  Monitor glucose before meals and at bedtime with regular insulin sliding scale coverage      I discussed the patient's findings and my recommendations with: Melchor Conway MD  Time spent: 40 minutes    Patient seen and examined by me on 7/28/2020 at 4:00 PM    Electronically signed by ARETHA Galeano, 07/28/20, 18:20.    Electronically signed by Melchor Conway MD at 07/28/20 1826       Prior to Admission Medications     Prescriptions Last Dose Informant Patient Reported? Taking?    albuterol (PROVENTIL) (2.5 MG/3ML) 0.083% nebulizer solution  Self Yes No    As Needed.    allopurinol (ZYLOPRIM) 100 MG tablet  Self Yes No    Take 100 mg by mouth Daily.    ALPRAZolam (XANAX) 0.5 MG tablet   Yes No    Take 0.5 mg by mouth As Needed.    atorvastatin (LIPITOR) 40 MG tablet   Yes No    Take 40 mg by mouth Every Night.    bumetanide (BUMEX) 1 MG tablet   Yes No    Take 1 mg by mouth 2 (Two) Times a Week.    calcium carbonate (OS-KAYLEY) 600 MG tablet  Self Yes No    Take 600 mg by mouth Daily.    carvedilol (COREG) 6.25 MG tablet  Self Yes No    Take 6.25 mg by mouth 2 (Two) Times a Day With Meals.    cholecalciferol (VITAMIN D3) 1000 UNITS tablet  Self Yes No    Take  1,000 Units by mouth Daily.    DICLOFENAC PO  Self Yes No    Take 75 mg by mouth 2 (Two) Times a Day.    divalproex (DEPAKOTE) 500 MG 24 hr tablet   Yes No    Take 1,000 mg by mouth Every Night.    DULoxetine (CYMBALTA) 60 MG capsule  Self Yes No    Take 60 mg by mouth 2 (Two) Times a Day.    guaiFENesin (MUCINEX) 600 MG 12 hr tablet   Yes No    Take 1,200 mg by mouth 2 (Two) Times a Day.    HYDROcodone-acetaminophen (NORCO)  MG per tablet   Yes No    Take 1 tablet by mouth Every 6 (Six) Hours As Needed.    levothyroxine (SYNTHROID, LEVOTHROID) 125 MCG tablet   Yes No    Take 125 mcg by mouth Daily.    Loperamide HCl (IMODIUM PO)  Self Yes No    Take 2 mg by mouth As Needed.    melatonin 3 MG tablet   Yes No    Take 3 mg by mouth Daily.    methylcellulose, Laxative, (EQ FIBER THERAPY) 500 MG tablet tablet  Self Yes No    Take 1 tablet by mouth Daily.    Multiple Vitamin (MULTI VITAMIN PO)   Yes No    Take 1 dose by mouth Daily.    mupirocin (BACTROBAN) 2 % ointment   Yes No    niacin (SLO-NIACIN) 500 MG CR tablet   Yes No    Take 500 mg by mouth Daily.    omeprazole (priLOSEC) 20 MG capsule   Yes No    Take 20 mg by mouth Daily.    pilocarpine (SALAGEN) 5 MG tablet   Yes No    Take 5 mg by mouth 3 (Three) Times a Day.    pramipexole (MIRAPEX) 1.5 MG tablet  Self Yes No    Take 1.5 mg by mouth Every Night.    QUEtiapine (SEROquel) 50 MG tablet  Self Yes No    Take 50 mg by mouth Every Night.    Respiratory Therapy Supplies (NEBULIZER/TUBING/MOUTHPIECE) kit   Yes No    1 kit.    spironolactone (ALDACTONE) 25 MG tablet  Self Yes No    Take 25 mg by mouth 2 (Two) Times a Day.    tiZANidine (ZANAFLEX) 4 MG tablet  Self Yes No    Take 4 mg by mouth Every Night.    traMADol (ULTRAM) 50 MG tablet  Self Yes No    Take 50 mg by mouth 2 (Two) Times a Day.          Current Facility-Administered Medications   Medication Dose Route Frequency Provider Last Rate Last Dose   • albuterol (PROVENTIL) nebulizer solution 0.083%  2.5 mg/3mL  2.5 mg Nebulization Q4H PRN Roberto Wells, DPM       • allopurinol (ZYLOPRIM) tablet 100 mg  100 mg Oral Daily Roberto Wells DPM   100 mg at 08/02/20 0820   • ALPRAZolam (XANAX) tablet 0.5 mg  0.5 mg Oral TID PRN Roberto Wells DPM   0.5 mg at 08/02/20 0747   • atorvastatin (LIPITOR) tablet 40 mg  40 mg Oral Nightly Roberto Wells DPM   40 mg at 08/01/20 2125   • carvedilol (COREG) tablet 6.25 mg  6.25 mg Oral BID With Meals Roberto Wells DPM   6.25 mg at 08/02/20 0748   • cefepime (MAXIPIME) 2 g/100 mL 0.9% NS (mbp)  2 g Intravenous Q12H Roberto Wells DPM   2 g at 08/02/20 0942   • dextrose (D50W) 25 g/ 50mL Intravenous Solution 25 g  25 g Intravenous Q15 Min PRN Roberto Wells DPM       • dextrose (GLUTOSE) oral gel 15 g  15 g Oral Q15 Min PRN Roberto Wells DPM       • divalproex (DEPAKOTE) 24 hr tablet 1,000 mg  1,000 mg Oral Nightly Roberto Wells DPM   1,000 mg at 08/01/20 2125   • DULoxetine (CYMBALTA) DR capsule 60 mg  60 mg Oral Q12H Roberto Wells DPM   60 mg at 08/02/20 0820   • gabapentin (NEURONTIN) capsule 100 mg  100 mg Oral TID Roberto Wells DPM   100 mg at 08/02/20 0820   • glucagon (human recombinant) (GLUCAGEN DIAGNOSTIC) injection 1 mg  1 mg Subcutaneous Q15 Min PRN Roberto Wells DPM       • guaiFENesin (MUCINEX) 12 hr tablet 1,200 mg  1,200 mg Oral BID Roberto Wells DPM   1,200 mg at 08/02/20 0820   • HYDROcodone-acetaminophen (NORCO)  MG per tablet 1 tablet  1 tablet Oral Q4H PRN Roberto Wells DPM   1 tablet at 08/02/20 1218   • lactated ringers infusion  9 mL/hr Intravenous Continuous Roberto Wells DPM 9 mL/hr at 07/31/20 1639     • levothyroxine (SYNTHROID, LEVOTHROID) tablet 125 mcg  125 mcg Oral Q AM Roberto Wells DPM   125 mcg at 08/02/20 0523   • melatonin tablet 3 mg  3 mg Oral Nightly Roberto Wells DPM   3 mg at 08/01/20 2128   • mupirocin (BACTROBAN) 2 % ointment   Topical Q12H Russell  Roberto ELAINE DPM       • nicotine (NICODERM CQ) 21 MG/24HR patch 1 patch  1 patch Transdermal Q24H Roberto Wells DPM   1 patch at 08/02/20 0821   • ondansetron (ZOFRAN) tablet 4 mg  4 mg Oral Q6H PRN Roberto Wells DPM        Or   • ondansetron (ZOFRAN) injection 4 mg  4 mg Intravenous Q6H PRN Roberto Wells DPRONI       • pantoprazole (PROTONIX) EC tablet 40 mg  40 mg Oral Q AM Roberto Wells DPM   40 mg at 08/02/20 0523   • pilocarpine (SALAGEN) tablet 5 mg  5 mg Oral TID Melchor Llamas MD   5 mg at 08/02/20 1219   • pramipexole (MIRAPEX) tablet 1.5 mg  1.5 mg Oral Nightly Roberto Wells DPM   1.5 mg at 08/01/20 2124   • QUEtiapine (SEROquel) tablet 50 mg  50 mg Oral Nightly Roberto Wells DPM   50 mg at 08/01/20 2124   • sodium chloride 0.9 % flush 10 mL  10 mL Intravenous Q12H Roberto Wells DPM   10 mL at 08/02/20 0823   • sodium chloride 0.9 % flush 10 mL  10 mL Intravenous PRN Roberto Wells DPM       • spironolactone (ALDACTONE) tablet 25 mg  25 mg Oral BID Roberto Wells DPM   25 mg at 08/02/20 0820   • tiZANidine (ZANAFLEX) tablet 4 mg  4 mg Oral Nightly Robetro Wells DPM   4 mg at 08/01/20 2123   • vancomycin 750 mg/250 mL 0.9% NS IVPB ([facility])  750 mg Intravenous Q12H Roberto Wells DPM   750 mg at 08/02/20 0145        Operative/Procedure Notes (most recent note)      Roberto Wells DPM at 07/31/20 1553          POST-OPERATIVE NOTE    Pre-Operative Diagnosis:  1. Chronic Non-healing Ulceration Posterior Lower Leg - Left    Post-Operative Diagnosis:  1. Chronic Non-healing Ulceration Posterior Lower Leg - Left    Operative Procedures:  1. Posterior Lower Leg Wound Debridement - Left    Surgeon: Cecilio Wells DPM    Anesthesia: General    Hemostasis: Anatomic Dissection, Epinepherine, Electric cauterization    Estimated Blood Loss: minimal    Pathology:   Specimens     ID Source Type Tests Collected By Collected At Frozen?      1 Leg, Left Wound · WOUND  CULTURE   Roberto Wells DPM 7/31/20 165      Description: left lower posterior leg wound    A Leg, Left Tissue · TISSUE PATHOLOGY EXAM   Roberto Wells DPM 7/31/20 1657 No     Description: left lower posterior leg wound          Materials: Nothing was implanted during the procedure    Injectables: 40mL 0.5% Marcaine with Epinephrine    Fluids: See anesthesia log.    Drains: None    Complications: None    Post-Op Condition: Stable. Patient tolerated procedure and anesthesia well. Patient left the operating room with vital signs stable and vascular status intact.     Operative Findings: Consistent with pre-operative diagnosis.    Indications for Procedure: This 60 year old patient presents with an infected non-healing wound to the left posterior lower leg.  Patient states that she has failed conservative therapy and opts for surgical intervention at this time. The patient has been NPO for greater than 8 hours. The patient is ready for surgical intervention.    DESCRIPTION OF PROCEDURE  Under mild sedation, patient was brought into the operating room and place on the operating room table in prone position. Pre-operative antibiotic was given. The patient was placed under General anesthesia, then local block was performed at the surgical site using the above mentioned local anesthesia. The foot and ankle were then scrubbed, prepped and draped in the usual aseptic manner.    Attention was then directed left posterior distal leg where the large ulceration was visualized.  A circumferential incision was made about the entire ulceration removing 2 mm of skin at the edges.  The wound was then extensively debrided utilizing combination of sharp dissection and curettage.  A section of tissue was sent to pathology for analysis.  Tissue was also sent to microbiology for culture.  There is extensive necrotic tissue within the wound.  There is moderate liquefactive fatty necrosis within the wound.  The wound extended to  the superficial layer but not through.  The wound did not expose the Achilles tendon or muscle belly.  Electrocautery was utilized for hemostasis.  No vital neural or vascular structures were encountered.  The wound was flushed with copious amounts of sterile normal saline via pulse lavage.  The wound was inspected for any additional pathologic tissue and none was found.  The end resulting wound measured 11.0 cm x 10.0 cm x 0.5 cm.    A bandage consisting of mupirocin ointment, Xeroform, ABD, Kerlix, and Ace bandage was applied.    The patient tolerated the procedures and anesthesia well.  She is transferred to the recovery room vital signs stable and vascular status intact to the left lower extremity.  After period of postoperative monitoring, the patient will be transferred back to the floor under care of the hospitalist team.  She should remain on broad-spectrum antibiotic therapy.  She will need ongoing extensive wound care.    Electronically signed by Roberto Wells DPM at 07/31/20 6487          Physician Progress Notes (most recent note)      Rajwinder Sandoval APRN at 08/02/20 1227              [facility] Medicine Services  INPATIENT PROGRESS NOTE    Length of Stay: 5  Date of Admission: 7/28/2020  Primary Care Physician: Tabitha Victor DO    Subjective   Chief Complaint: Follow-up left leg pain wound  HPI   Patient was sitting on side of bed using coloring pencils.  She reports less edema left lower extremity.  She was pleased with the way the wound looked with Dr. Wells change the dressing yesterday.  She tells me she was able to change her wound dressings at home with the assistance of home health.  She hopes that she and her  together will be able to change the dressings after discharge.  She is expected be on antibiotics at the time of discharge.  Denies nausea, vomiting or abdominal pain.  Denies palpitations, shortness of breath or chest pain.  No  oxygen in use.  Infectious disease managing antibiotics.  Surgical wound culture Citrobacter and Pseudomonas.  Previous culture MRSA.  On cefepime and vancomycin    Review of Systems     All pertinent negatives and positives are as above. All other systems have been reviewed and are negative unless otherwise stated.     Objective    Temp:  [97.3 °F (36.3 °C)-98.3 °F (36.8 °C)] 97.3 °F (36.3 °C)  Heart Rate:  [78-86] 84  Resp:  [16-18] 18  BP: (103)/(53-71) 103/58  Physical Exam  Constitutional: She is oriented to person, place, and time.   Lying in bed.  No oxygen in use.  No family in room.  No distress.   HENT:   Head: Normocephalic and atraumatic.   Eyes: Pupils are equal, round, and reactive to light. EOM are normal.   Neck: Normal range of motion. Neck supple.   Cardiovascular: Normal rate, regular rhythm, normal heart sounds and intact distal pulses. Exam reveals no gallop and no friction rub.   No murmur heard.  Normal sinus rhythm 66/100 on telemetry   Pulmonary/Chest: Effort normal and breath sounds normal. She has no wheezes. She has no rales.   No oxygen in use.   Abdominal: Soft. Bowel sounds are normal. She exhibits no distension. There is no tenderness.   Genitourinary:   Genitourinary Comments: Voiding.   Musculoskeletal: She exhibits edema (Left lower extremity, improved).   Neurological: She is alert and oriented to person, place, and time.   Skin:   Left lower extremity with 10 cm x 12 cm open wound.  Debrided 7/31 by Dr. Wells. Dressing in place  Psychiatric: Normal affect, normal behavior, thought process normal, normal judgment.    Results Review:  I have reviewed the labs, radiology results, and diagnostic studies.    Laboratory Data:   Results from last 7 days   Lab Units 08/02/20  0503 08/01/20  0903 07/31/20  0352   WBC 10*3/mm3 8.60 8.89 9.01   HEMOGLOBIN g/dL 7.1* 7.4* 7.4*   HEMATOCRIT % 23.1* 24.1* 24.2*   PLATELETS 10*3/mm3 340 349 339        Results from last 7 days   Lab Units  07/31/20  0836 07/30/20  0431 07/29/20  0546 07/28/20  1707   SODIUM mmol/L  --  141 140 140   POTASSIUM mmol/L  --  4.3 4.3 4.3   CHLORIDE mmol/L  --  105 103 101   CO2 mmol/L  --  25.0 24.0 24.0   BUN mg/dL  --  13 23* 32*   CREATININE mg/dL 0.74 0.83 1.15* 1.78*   CALCIUM mg/dL  --  9.0 9.2 9.4   BILIRUBIN mg/dL  --   --  <0.2 <0.2   ALK PHOS U/L  --   --  99 98   ALT (SGPT) U/L  --   --  16 15   AST (SGOT) U/L  --   --  16 12   GLUCOSE mg/dL  --  119* 124* 112*     Blood Culture   Date Value Ref Range Status   07/28/2020 No growth at 4 days  Preliminary   07/28/2020 No growth at 4 days  Preliminary     Wound Culture   Date Value Ref Range Status   07/28/2020 Heavy growth (4+) Staphylococcus aureus, MRSA (A)  Final     Comment:       Methicillin resistant Staphylococcus aureus, Patient may be an isolation risk.   07/28/2020 Heavy growth (4+) Mixed Gram Negative Elizabeth (A)  Final     Staphylococcus aureus, MRSA       SHANNON     Clindamycin >=4  Resistant     Erythromycin >=8  Resistant     Inducible Clindamycin Resistance NEG  Negative     Oxacillin >=4  Resistant     Penicillin G >=0.5  Resistant     Rifampin <=0.5  Susceptible     Tetracycline 2  Susceptible     Trimethoprim + Sulfamethoxazole <=10  Susceptible     Vancomycin <=0.5  Susceptible      07/31/2020 1728 08/02/2020 0820 Tissue / Bone Culture - Tissue, Leg, Left [[id number]]    (Abnormal)   Tissue from Leg, Left    Preliminary result Component Value   Tissue Culture Moderate growth (3+) Citrobacter freundiiAbnormal  P    Scant growth (1+) Pseudomonas speciesAbnormal  P   Gram Stain Many (4+) WBCs seen P    Rare (1+) Gram positive cocci P       Susceptibility      Citrobacter freundii     SHANNON     Cefepime <=1  Susceptible     Ceftazidime <=1  Susceptible     Ceftriaxone <=1  Susceptible     Gentamicin <=1  Susceptible     Levofloxacin <=0.12  Susceptible     Piperacillin + Tazobactam <=4  Susceptible     Tetracycline <=1  Susceptible     Trimethoprim +  Sulfamethoxazole <=20  Susceptible             Imaging Results (All)     Procedure Component Value Units Date/Time    US Venous Doppler Lower Extremity Left (duplex) [[id number]] Collected:  07/29/20 1508     Updated:  07/29/20 1512    Narrative:       History: Pain and swelling       Impression:       Impression: There is no evidence of deep venous thrombosis or  superficial thrombophlebitis of the left lower extremity.     Comments: Left lower extremity venous duplex exam was performed using  color Doppler flow, Doppler wave form analysis, and grayscale imaging,  with and without compression. There is no evidence of deep venous  thrombosis of the common femoral, superficial femoral, and popliteal  veins. The tibial veins were not visualized due to bandaging. There is  no thrombus identified in the saphenofemoral junction or the greater  saphenous vein. There is no evidence of clot in the right common femoral  vein.     This report was finalized on 07/29/2020 15:09 by Dr. Trace Hurd MD.    CT Lower Extremity Left Without Contrast [[id number]] Collected:  07/28/20 1917     Updated:  07/28/20 1927    Narrative:       EXAMINATION:  CT LOWER EXTREMITY LEFT WO CONTRAST-  7/28/2020 6:42 PM  CDT     HISTORY: Spider bite 3 months ago left calf region.      COMPARISON: No comparison study.     TECHNIQUE: Spiral CT was performed of the left lower extremity from just  above the knee joint to just below the ankle joint. Sagittal and coronal  images were reconstructed. DLP: 227 mGy-cm.     FINDINGS: There is diffuse subcutaneous edema throughout the visualized  left leg below the knee. Posteriorly, there may be some ulceration  involving the skin and subcutaneous fat that extends back to the fascia  of the gastrocnemius muscle. I do not see a visible soft tissue fluid  collection to suggest a drainable abscess. The tibia and fibula are  intact without evidence of osteomyelitis.       Impression:       1. Diffuse  subcutaneous edema throughout the visualized left leg below  the knee.  2. There appears to be skin ulceration and ulceration extending into the  subcutaneous fat posteriorly that extends to the fascia of the  gastrocnemius muscle. No drainable fluid collection is identified on the  CT images.  3. Tibia and fibula are intact without evidence of osteomyelitis.  This report was finalized on 07/28/2020 19:24 by Dr. vEin Elliott MD.        Intake/Output    Intake/Output Summary (Last 24 hours) at 8/2/2020 1227  Last data filed at 8/1/2020 1801  Gross per 24 hour   Intake 600 ml   Output --   Net 600 ml       Scheduled Meds    allopurinol 100 mg Oral Daily   atorvastatin 40 mg Oral Nightly   carvedilol 6.25 mg Oral BID With Meals   cefepime 2 g Intravenous Q12H   divalproex 1,000 mg Oral Nightly   DULoxetine 60 mg Oral Q12H   gabapentin 100 mg Oral TID   guaiFENesin 1,200 mg Oral BID   levothyroxine 125 mcg Oral Q AM   melatonin 3 mg Oral Nightly   mupirocin  Topical Q12H   nicotine 1 patch Transdermal Q24H   pantoprazole 40 mg Oral Q AM   pilocarpine 5 mg Oral TID AC   pramipexole 1.5 mg Oral Nightly   QUEtiapine 50 mg Oral Nightly   sodium chloride 10 mL Intravenous Q12H   spironolactone 25 mg Oral BID   tiZANidine 4 mg Oral Nightly   vancomycin 750 mg Intravenous Q12H       I have reviewed the patient current medications.     Assessment/Plan     Active Hospital Problems    Diagnosis   • **Wound of left leg     MRSA culture 7/28/20; Citrobacter, Pseudomonas culture 7/31/2020     • Infection of wound due to methicillin resistant Staphylococcus aureus (MRSA)   • Tobacco abuse   • Cellulitis of left lower extremity   • Essential hypertension   • Chronic pain syndrome   • Acute pain   • Anxiety   • DOMINIC (acute kidney injury) (CMS/HCC)   • Prediabetes     Plan:  1.  To ER 7/28 with left leg pain and wound. 4/12/2020 suspected brown recluse spider bite.  Followed by wound care [facility], surgical debridement with wound  VAC in May.  Patient felt wound worsened after wound VAC placed.  Followed by wound care clinic every 2 weeks.   2.  CT lower extremity diffuse subcutaneous edema throughout the left leg below the knee.  Appears to be skin ulceration and ulceration extending into subcutaneous fat posteriorly that extends to the fascia of the gastrocnemius muscle.  No drainage able fluid collection identified.  Tibia and fibula intact without evidence of osteomyelitis.  3.  Venous Dopplers negative for DVT  4.  Podiatry consulted.  Dr. Wells recommends elevation, twice daily dressing changes with mupirocin, Xeroform to wound bed, ABD pad, Kerlix and Ace wrap for compression.  Surgical debridement 7/31.  Pathology sent.  No results available.  Discussed with Dr. Wells today. Okay to ambulate.  Follow-up wound clinic  5.  Wound culture 7/28 heavy growth MRSA, gram negative  rain. Wound culture 7/31   Citrobacter freundi and Pseudomonas. Blood cultures no growth at 4 days  6.  Infectious disease consulted.  Dr. Musa suspects pyoderma gangrenosum.  Continue topical antibiotics, empiric antibiotics.  Elevate foot.  Continue vancomycin. Cefepime added 7/30  7.  Acute kidney injury. Creatinine 1.78 on admit down to 0.74.  8.  A1c 6.5.   9.  Discussed smoking cessation.  Nicotine patch.  10.  CRP 4.98.  WBC 11. 6 1 on admission.  8. 6 today.  H/H 9.3/29.3 on admit. 7.1/23.1 today. Asymptomatic CBC  in AM  11.  SCDs for deep vein thrombosis prophylaxis.    12.  Home medications reviewed and resumed if appropriate.  13.   Home health to assist with dressing changes at discharge.  Discussed with nurse Claudia for  to participate with dressing changes if possible.  Patient reports she changed her dressings at home prior to admission.  Nurse, Claudia to observe and instruct patient with next dressing change.     Her  will assist with decision-making if she is unable to make decisions for herself  The above documentation resulted  "from a face-to-face encounter by me Rajwinder CARIAS, [facility]     Discharge Planning: I expect patient to be discharged to be determined.     Electronically signed by ARETHA Carvajal, 8/2/2020, 12:27.        Electronically signed by Rajwinder Sandoval APRN at 08/02/20 1241          Consult Notes (most recent note)      Stacy Higuera MD at 07/29/20 1534      Consult Orders    1. Inpatient Infectious Diseases Consult [[id number]] ordered by Shannon Mishra APRN at 07/28/20 1627                INFECTIOUS DISEASES CONSULT NOTE    Patient:  Robyn Minaya 60 y.o. female  ROOM # 378/1  YOB: 1960  MRN: [id number]  CSN:  [id number]  Admit date: 7/28/2020   Admitting Physician: Melchor Conway MD  Primary Care Physician: Tabitha Victor DO  REFERRING PROVIDER: Melchor Conway, *      REASON FOR CONSULTATION :  non healing wound with cellulitis left lower extremity      HISTORY OF PRESENT ILLNESS: 60-year-old with quite a long convoluted history of possible spider bite diagnosed several months ago.  She saw a nurse practitioner, general surgery at [facility], Dr. Zaragoza in [facility]… She reports she was admitted to [facility] underwent debridement by Dr. Arnold.  She was readmitted [facility] another time but did not undergo debridement.  She was then transferred to [facility].    She reports \"this fourth time, I came here.\"    She reports at one point she was placed on Bactrim and had allergic reaction prompting her first hospitalization to [facility]    She reports she has been seen by Dr. Wells and they are discussing the possibility of debridement.    Past Medical History:   Diagnosis Date   • Acid reflux     RESOLVED PT PT   • Allergic rhinitis    • Arthritis     BACK   • Bipolar 1 disorder (CMS/HCC)    • Chronic back pain    • Degenerative arthritis    • Depression    • Deviated nasal septum    • Eczema    • Gout  "   • History of colon polyps    • Hyperlipidemia    • Hypertension    • Hypertrophy of nasal turbinates    • Hypokalemia    • Hypothyroidism    • Incontinence in female    • Insomnia    • Menopause    • Nasal septal deviation    • Nasal valve stenosis    • Overactive bladder    • Plantar fasciitis    • Prediabetes    • RLS (restless legs syndrome)    • Sleep apnea     cpap   • Wears glasses      Past Surgical History:   Procedure Laterality Date   • BLEPHAROPLASTY Bilateral 7/9/2019    Procedure: 1) bilateral upper blepharoplasty with excision of excess of tissue weighing down 2) nasal endoscopy with removal of nasal septal prosthesis;  Surgeon: Mark Anthony Anderson MD;  Location: [facility] OR;  Service: ENT   • CARPAL TUNNEL RELEASE Bilateral 2004   • CHOLECYSTECTOMY  2013   • COLONOSCOPY  10/17/2014    One 5-6mm tubular adenomatous polyp in the ascending colon; Two less than 4mm hyperplastic polyps in the sigmoid colon; Three rectal hyperplastic polyps; Diverticulosis; Repeat 5 years    • COLONOSCOPY  07/14/2010    Internal hemorrhoids; Repeat 7 years    • COLONOSCOPY N/A 10/23/2019    Procedure: COLONOSCOPY WITH ANESTHESIA;  Surgeon: Robyn Frazier MD;  Location: [facility] ENDOSCOPY;  Service: Gastroenterology   • CYST REMOVAL  2016    neck   • ENDOSCOPIC FUNCTIONAL SINUS SURGERY (FESS) Bilateral 4/16/2019    Procedure: ENDOSCOPIC FUNCTIONAL SINUS SURGERY (bilareral maxillary antrostomy without image guidance);  Surgeon: Mark Anthony Anderson MD;  Location: [facility] OR;  Service: ENT   • ENDOSCOPY N/A 4/11/2019    Esophageal mucosal changes suggestive of eosinophilic esophagitis-biopsied; A few gastric polyps-resected and retrieved-biopsied; Normal examined duodenum-biopsied   • FOOT SURGERY Right 2010    X3   • HERNIA REPAIR     • INTERSTIM PLACEMENT N/A 6/5/2019    Procedure: INTERSTIM STAGES 1 AND 2 LEAD AND GENERATOR PLACEMENT;  Surgeon: Endy Guerrero MD;  Location: [facility] OR;  Service: Urology   • LASER ABLATION       right back   • NASAL ENDOSCOPY N/A 4/16/2019    Procedure:     1. Functional endoscopic sinus surgery with bilateral maxillary antrostomy    2. Bilateral nasal endoscopy with biopsy of nasal septum    3.  Nasal septal prosthesis placement  ;  Surgeon: Mark Anthony Anderson MD;  Location: [facility] OR;  Service: ENT   • NASAL VESTIBULE LESION/PAPILLOMA EXCISION N/A 8/1/2017    Procedure: Repair of nasal vestibular stenosis and septoplasty; cartilage graft from left ear;  Surgeon: Mark Anthony Anderson MD;  Location: [facility] OR;  Service:    • SEPTOPLASTY N/A 4/16/2019    Procedure: PLACEMENT OF NASAL SEPTAL PROSTHESIS;  Surgeon: Mark Anthony Anderson MD;  Location: [facility] OR;  Service: ENT   • SEPTOPLASTY Bilateral 1/14/2020    Procedure: Nasal endoscopy with septal debridement, and placement of Silastic stents;  Surgeon: Mark Anthony Anderson MD;  Location: [facility] OR;  Service: ENT     Family History   Problem Relation Age of Onset   • Cancer Mother    • Diabetes Father    • Hypertension Father    • Cancer Father    • Colon cancer Neg Hx    • Colon polyps Neg Hx    • Esophageal cancer Neg Hx    • Liver cancer Neg Hx    • Liver disease Neg Hx    • Rectal cancer Neg Hx    • Stomach cancer Neg Hx      Social History     Socioeconomic History   • Marital status:      Spouse name: Not on file   • Number of children: Not on file   • Years of education: Not on file   • Highest education level: Not on file   Tobacco Use   • Smoking status: Current Every Day Smoker     Packs/day: 0.50     Years: 40.00     Pack years: 20.00     Types: Cigarettes   • Smokeless tobacco: Never Used   Substance and Sexual Activity   • Alcohol use: Yes     Frequency: Monthly or less     Comment: Rarely-maybe 3x/year   • Drug use: No   • Sexual activity: Defer           Current Meds:     Current Facility-Administered Medications   Medication Dose Route Frequency Provider Last Rate Last Dose   • albuterol (PROVENTIL) nebulizer solution 0.083% 2.5 mg/3mL  2.5 mg  Nebulization Q4H PRN Melchor Conway MD       • allopurinol (ZYLOPRIM) tablet 100 mg  100 mg Oral Daily Shannon Mishra, APRN   100 mg at 07/29/20 1009   • ALPRAZolam (XANAX) tablet 0.5 mg  0.5 mg Oral TID PRN Shannon Mishra APRN   0.5 mg at 07/29/20 0124   • atorvastatin (LIPITOR) tablet 40 mg  40 mg Oral Nightly Shannon Mishra, APRN   40 mg at 07/28/20 2003   • carvedilol (COREG) tablet 6.25 mg  6.25 mg Oral BID With Meals Shannon Mishra APRN   6.25 mg at 07/29/20 1009   • dextrose (D50W) 25 g/ 50mL Intravenous Solution 25 g  25 g Intravenous Q15 Min PRN Shannon Mishra, APRN       • dextrose (GLUTOSE) oral gel 15 g  15 g Oral Q15 Min PRN Shannon Misrha, APRN       • divalproex (DEPAKOTE) 24 hr tablet 1,000 mg  1,000 mg Oral Nightly Shannon Mishra, APRN       • DULoxetine (CYMBALTA) DR capsule 60 mg  60 mg Oral Q12H Shannon Mishra APRN   60 mg at 07/29/20 1011   • gabapentin (NEURONTIN) capsule 100 mg  100 mg Oral TID Brian Lauren, APRN   100 mg at 07/29/20 1514   • glucagon (human recombinant) (GLUCAGEN DIAGNOSTIC) injection 1 mg  1 mg Subcutaneous Q15 Min PRN Shannon Mishra, APRN       • guaiFENesin (MUCINEX) 12 hr tablet 1,200 mg  1,200 mg Oral BID Shannon Mishra, APRN   1,200 mg at 07/29/20 1009   • HYDROcodone-acetaminophen (NORCO)  MG per tablet 1 tablet  1 tablet Oral Q4H PRN Shannon Mishra APRN   1 tablet at 07/29/20 0848   • levothyroxine (SYNTHROID, LEVOTHROID) tablet 125 mcg  125 mcg Oral Q AM Shannon Mishra, APRN   125 mcg at 07/29/20 0532   • melatonin tablet 3 mg  3 mg Oral Nightly Shannon Mishra APRN   3 mg at 07/28/20 2003   • mupirocin (BACTROBAN) 2 % ointment   Topical Q12H Brian Lauren, APRN       • nicotine (NICODERM CQ) 21 MG/24HR patch 1 patch  1 patch Transdermal Q24H Shannon Mishra, APRMIKE   1 patch at 07/29/20 1010   • ondansetron (ZOFRAN) tablet 4 mg  4 mg Oral Q6H PRN Shannon Mishra, APRN        Or   • ondansetron  (ZOFRAN) injection 4 mg  4 mg Intravenous Q6H PRN Shannon Mishra, APRN       • pantoprazole (PROTONIX) EC tablet 40 mg  40 mg Oral Q AM Shannon Mishra, APRN   40 mg at 07/29/20 0533   • pilocarpine (SALAGEN) tablet 5 mg  5 mg Oral TID Shannon Mishra, APRN   5 mg at 07/29/20 1514   • pramipexole (MIRAPEX) tablet 1.5 mg  1.5 mg Oral Nightly Shannon Mishra, APRN   1.5 mg at 07/28/20 2002   • QUEtiapine (SEROquel) tablet 50 mg  50 mg Oral Nightly Shannon Mishra, APRN   50 mg at 07/28/20 2003   • sodium chloride 0.9 % flush 10 mL  10 mL Intravenous Q12H Shannon Mishra, APRN   10 mL at 07/29/20 1011   • sodium chloride 0.9 % flush 10 mL  10 mL Intravenous PRN Shannon Mishra, APRN       • sodium chloride 0.9 % infusion  125 mL/hr Intravenous Continuous Shannon Mishra, APRN 125 mL/hr at 07/29/20 0848 125 mL/hr at 07/29/20 0848   • spironolactone (ALDACTONE) tablet 25 mg  25 mg Oral BID Shannon Mishra, APRN   25 mg at 07/29/20 1009   • tiZANidine (ZANAFLEX) tablet 4 mg  4 mg Oral Nightly Shannon Mishra, APRN   4 mg at 07/28/20 2019   • vancomycin 1250 mg/250 mL 0.9% NS IVPB ([facility])  1,250 mg Intravenous Q24H Melchor Conway MD   1,250 mg at 07/29/20 1217       Home Meds:  Prior to Admission medications    Medication Sig Start Date End Date Taking? Authorizing Provider   albuterol (PROVENTIL) (2.5 MG/3ML) 0.083% nebulizer solution As Needed. 8/28/19   Brenda Mcnair MD   allopurinol (ZYLOPRIM) 100 MG tablet Take 100 mg by mouth Daily. 5/8/17   Brenda Mcnair MD   ALPRAZolam (XANAX) 0.5 MG tablet Take 0.5 mg by mouth As Needed. 7/9/19   Brenda Mcnair MD   atorvastatin (LIPITOR) 40 MG tablet Take 40 mg by mouth Every Night. 9/13/17   Brenda Mcnair MD   bumetanide (BUMEX) 1 MG tablet Take 1 mg by mouth 2 (Two) Times a Week.    Brenda Mcnair MD   calcium carbonate (OS-KAYLEY) 600 MG tablet Take 600 mg by mouth Daily.    Brenda Mcnair MD   carvedilol  (COREG) 6.25 MG tablet Take 6.25 mg by mouth 2 (Two) Times a Day With Meals.    Brenda Mcnair MD   cholecalciferol (VITAMIN D3) 1000 UNITS tablet Take 1,000 Units by mouth Daily.    Brenda Mcnair MD   DICLOFENAC PO Take 75 mg by mouth 2 (Two) Times a Day.    Brenda Mcnair MD   divalproex (DEPAKOTE) 500 MG 24 hr tablet Take 1,000 mg by mouth Every Night.    Brenda Mcnair MD   DULoxetine (CYMBALTA) 60 MG capsule Take 60 mg by mouth 2 (Two) Times a Day.    Brenda Mcnair MD   guaiFENesin (MUCINEX) 600 MG 12 hr tablet Take 1,200 mg by mouth 2 (Two) Times a Day. 10/19/18   Brenda Mcnair MD   HYDROcodone-acetaminophen (NORCO)  MG per tablet Take 1 tablet by mouth Every 6 (Six) Hours As Needed. 7/10/20 7/28/20  Brenda Mcniar MD   levothyroxine (SYNTHROID, LEVOTHROID) 125 MCG tablet Take 125 mcg by mouth Daily. 3/13/19   Brenda Mcnair MD   Loperamide HCl (IMODIUM PO) Take 2 mg by mouth As Needed.    Brenda Mcnair MD   melatonin 3 MG tablet Take 3 mg by mouth Daily. 7/24/20 8/23/20  Brenda Mcnair MD   methylcellulose, Laxative, (EQ FIBER THERAPY) 500 MG tablet tablet Take 1 tablet by mouth Daily.    Brenda Mcnair MD   Multiple Vitamin (MULTI VITAMIN PO) Take 1 dose by mouth Daily.    Brenda Mcnair MD   mupirocin (BACTROBAN) 2 % ointment  10/16/19   Brenda Mcnair MD   niacin (SLO-NIACIN) 500 MG CR tablet Take 500 mg by mouth Daily.    Brenda Mcnair MD   omeprazole (priLOSEC) 20 MG capsule Take 20 mg by mouth Daily. 3/27/19   Brenda Mcnair MD   pilocarpine (SALAGEN) 5 MG tablet Take 5 mg by mouth 3 (Three) Times a Day.    Brenda Mcnair MD   pramipexole (MIRAPEX) 1.5 MG tablet Take 1.5 mg by mouth Every Night.    Brenda Mcnair MD   QUEtiapine (SEROquel) 50 MG tablet Take 50 mg by mouth Every Night.    Brenda Mcnair MD   Respiratory Therapy Supplies (NEBULIZER/TUBING/MOUTHPIECE) kit  1 kit. 19   ProviderBrenda MD   spironolactone (ALDACTONE) 25 MG tablet Take 25 mg by mouth 2 (Two) Times a Day.    ProviderBrenda MD   tiZANidine (ZANAFLEX) 4 MG tablet Take 4 mg by mouth Every Night.    Brenda Mcnair MD   traMADol (ULTRAM) 50 MG tablet Take 50 mg by mouth 2 (Two) Times a Day.    Brenda Mcnair MD            Allergies   Allergen Reactions   • Bactrim [Sulfamethoxazole-Trimethoprim] Rash     Itching   • Hctz [Hydrochlorothiazide] Other (See Comments)     Acid reflux   • Januvia [Sitagliptin] Rash       Review of Systems   Constitutional: Negative for fever.   HENT: Negative for hearing loss.    Eyes: Negative for photophobia.   Respiratory: Negative for chest tightness.    Cardiovascular: Positive for leg swelling.   Gastrointestinal: Negative for vomiting.   Genitourinary: Negative for dysuria.   Musculoskeletal: Negative for joint swelling.   Skin: Positive for color change and wound.   Psychiatric/Behavioral: Negative for confusion.         Vital Signs:  /77 (BP Location: Right arm, Patient Position: Sitting)   Pulse 79   Temp 97.6 °F (36.4 °C)   Resp 16   SpO2 97%   Temp (24hrs), Av.1 °F (36.7 °C), Min:97.6 °F (36.4 °C), Max:98.6 °F (37 °C)      Physical Exam   General: Patient's mildly uncomfortable appearing female lying in bed with a foot of the bed down.  HEENT: Sclera anicteric and noninjected  Neck: Supple  Respiratory: Effort even and unlabored  Abdomen: Obese, soft, bowel sounds are positive  Extremities: Pale erythema bilateral lower extremities.  Somewhat increase in the left lower extremity.  Dressing was removed.  See photo.  Wound bed already looks much  when compared to the photos just from yesterday  Neuro: Alert, oriented, speech clear  Psych: Anxious              Results Review:    I reviewed the patient's new clinical results.    Lab Results:  CBC:   Lab Results   Lab 20  1707 20  0605   WBC 11.61* 12.64*    HEMOGLOBIN 9.3* 9.3*   HEMATOCRIT 29.3* 30.3*   PLATELETS 496* 505*       CMP:   Lab Results   Lab 07/28/20  1707 07/29/20 0546   SODIUM 140 140   POTASSIUM 4.3 4.3   CHLORIDE 101 103   CO2 24.0 24.0   BUN 32* 23*   CREATININE 1.78* 1.15*   CALCIUM 9.4 9.2   BILIRUBIN <0.2 <0.2   ALK PHOS 98 99   ALT (SGPT) 15 16   AST (SGOT) 12 16   GLUCOSE 112* 124*       Lab Results (last 72 hours)     Procedure Component Value Units Date/Time    Wound Culture - Wound, Leg, Left [[id number]] Collected:  07/28/20 1715    Specimen:  Wound from Leg, Left Updated:  07/29/20 1249     Wound Culture Culture in progress     Gram Stain Few (2+) WBCs seen      Few (2+) Gram positive cocci      Many (4+) Gram negative bacilli    POC Glucose Once [[id number]]  (Normal) Collected:  07/29/20 1216    Specimen:  Blood Updated:  07/29/20 1227     Glucose 103 mg/dL      Comment: : [id number] [employer] ServicestaneyMeter ID: [id number]       POC Glucose Once [[id number]]  (Normal) Collected:  07/29/20 0849    Specimen:  Blood Updated:  07/29/20 0901     Glucose 109 mg/dL      Comment: : [id number] Freddie CareemtaneyMeter ID: [id number]       Vancomycin, Random [[id number]]  (Normal) Collected:  07/29/20 0546    Specimen:  Blood Updated:  07/29/20 0848     Vancomycin Random 14.70 mcg/mL     Hemoglobin A1c [[id number]]  (Abnormal) Collected:  07/29/20 0546    Specimen:  Blood Updated:  07/29/20 0717     Hemoglobin A1C 6.50 %     Narrative:       Hemoglobin A1C Ranges:    Increased Risk for Diabetes  5.7% to 6.4%  Diabetes                     >= 6.5%  Diabetic Goal                < 7.0%    Comprehensive Metabolic Panel [[id number]]  (Abnormal) Collected:  07/29/20 0546    Specimen:  Blood Updated:  07/29/20 0640     Glucose 124 mg/dL      BUN 23 mg/dL      Creatinine 1.15 mg/dL      Sodium 140 mmol/L      Potassium 4.3 mmol/L      Chloride 103 mmol/L      CO2 24.0 mmol/L      Calcium 9.2 mg/dL      Total Protein 6.8 g/dL      Albumin 3.60 g/dL      ALT (SGPT)  16 U/L      AST (SGOT) 16 U/L      Alkaline Phosphatase 99 U/L      Total Bilirubin <0.2 mg/dL      eGFR Non African Amer 48 mL/min/1.73      Globulin 3.2 gm/dL      A/G Ratio 1.1 g/dL      BUN/Creatinine Ratio 20.0     Anion Gap 13.0 mmol/L     Narrative:       GFR Normal >60  Chronic Kidney Disease <60  Kidney Failure <15      Lipid Panel [[id number]]  (Abnormal) Collected:  07/29/20 0546    Specimen:  Blood Updated:  07/29/20 0640     Total Cholesterol 101 mg/dL      Triglycerides 209 mg/dL      HDL Cholesterol 29 mg/dL      LDL Cholesterol  30 mg/dL      VLDL Cholesterol 41.8 mg/dL      LDL/HDL Ratio 1.04    Narrative:       Cholesterol Reference Ranges  (U.S. Department of Health and Human Services ATP III Classifications)    Desirable          <200 mg/dL  Borderline High    200-239 mg/dL  High Risk          >240 mg/dL      Triglyceride Reference Ranges  (U.S. Department of Health and Human Services ATP III Classifications)    Normal           <150 mg/dL  Borderline High  150-199 mg/dL  High             200-499 mg/dL  Very High        >500 mg/dL    HDL Reference Ranges  (U.S. Department of Health and Human Services ATP III Classifcations)    Low     <40 mg/dl (major risk factor for CHD)  High    >60 mg/dl ('negative' risk factor for CHD)        LDL Reference Ranges  (U.S. Department of Health and Human Services ATP III Classifcations)    Optimal          <100 mg/dL  Near Optimal     100-129 mg/dL  Borderline High  130-159 mg/dL  High             160-189 mg/dL  Very High        >189 mg/dL    TSH [[id number]]  (Normal) Collected:  07/29/20 0546    Specimen:  Blood Updated:  07/29/20 0640     TSH 1.520 uIU/mL     CBC Auto Differential [[id number]]  (Abnormal) Collected:  07/29/20 0605    Specimen:  Blood Updated:  07/29/20 0617     WBC 12.64 10*3/mm3      RBC 3.59 10*6/mm3      Hemoglobin 9.3 g/dL      Hematocrit 30.3 %      MCV 84.4 fL      MCH 25.9 pg      MCHC 30.7 g/dL      RDW 16.2 %      RDW-SD 49.3 fl      MPV  "10.0 fL      Platelets 505 10*3/mm3      Neutrophil % 64.9 %      Lymphocyte % 25.8 %      Monocyte % 4.0 %      Eosinophil % 4.0 %      Basophil % 0.4 %      Immature Grans % 0.9 %      Neutrophils, Absolute 8.21 10*3/mm3      Lymphocytes, Absolute 3.26 10*3/mm3      Monocytes, Absolute 0.50 10*3/mm3      Eosinophils, Absolute 0.50 10*3/mm3      Basophils, Absolute 0.05 10*3/mm3      Immature Grans, Absolute 0.12 10*3/mm3      nRBC 0.0 /100 WBC     POC Glucose Once [[id number]]  (Abnormal) Collected:  07/28/20 2029    Specimen:  Blood Updated:  07/28/20 2059     Glucose 174 mg/dL      Comment: : [id number] Marcela Dinero ID: [id number]       Procalcitonin [[id number]]  (Normal) Collected:  07/28/20 1707    Specimen:  Blood Updated:  07/28/20 1738     Procalcitonin 0.14 ng/mL     Narrative:       As a Marker for Sepsis (Non-Neonates):   1. <0.5 ng/mL represents a low risk of severe sepsis and/or septic shock.  1. >2 ng/mL represents a high risk of severe sepsis and/or septic shock.    As a Marker for Lower Respiratory Tract Infections that require antibiotic therapy:  PCT on Admission     Antibiotic Therapy             6-12 Hrs later  > 0.5                Strongly Recommended            >0.25 - <0.5         Recommended  0.1 - 0.25           Discouraged                   Remeasure/reassess PCT  <0.1                 Strongly Discouraged          Remeasure/reassess PCT      As 28 day mortality risk marker: \"Change in Procalcitonin Result\" (> 80 % or <=80 %) if Day 0 (or Day 1) and Day 4 values are available. Refer to http://www.[employer]-pct-calculator.com/   Change in PCT <=80 %   A decrease of PCT levels below or equal to 80 % defines a positive change in PCT test result representing a higher risk for 28-day all-cause mortality of patients diagnosed with severe sepsis or septic shock.  Change in PCT > 80 %   A decrease of PCT levels of more than 80 % defines a negative change in PCT result representing a lower risk " for 28-day all-cause mortality of patients diagnosed with severe sepsis or septic shock.                Results may be falsely decreased if patient taking Biotin.     Comprehensive Metabolic Panel [[id number]]  (Abnormal) Collected:  07/28/20 1707    Specimen:  Blood Updated:  07/28/20 1735     Glucose 112 mg/dL      BUN 32 mg/dL      Creatinine 1.78 mg/dL      Sodium 140 mmol/L      Potassium 4.3 mmol/L      Chloride 101 mmol/L      CO2 24.0 mmol/L      Calcium 9.4 mg/dL      Total Protein 7.0 g/dL      Albumin 3.80 g/dL      ALT (SGPT) 15 U/L      AST (SGOT) 12 U/L      Alkaline Phosphatase 98 U/L      Total Bilirubin <0.2 mg/dL      eGFR Non African Amer 29 mL/min/1.73      Globulin 3.2 gm/dL      A/G Ratio 1.2 g/dL      BUN/Creatinine Ratio 18.0     Anion Gap 15.0 mmol/L     Narrative:       GFR Normal >60  Chronic Kidney Disease <60  Kidney Failure <15      POC Glucose Once [[id number]]  (Normal) Collected:  07/28/20 1723    Specimen:  Blood Updated:  07/28/20 1735     Glucose 108 mg/dL      Comment: : [id number] Samra SifuentesMeter ID: [id number]       Blood Culture - Blood, Arm, Right [[id number]] Collected:  07/28/20 1706    Specimen:  Blood from Arm, Right Updated:  07/28/20 1734    Blood Culture - Blood, Arm, Left [[id number]] Collected:  07/28/20 1706    Specimen:  Blood from Arm, Left Updated:  07/28/20 1734    Phosphorus [[id number]]  (Normal) Collected:  07/28/20 1707    Specimen:  Blood Updated:  07/28/20 1733     Phosphorus 4.3 mg/dL     Magnesium [[id number]]  (Normal) Collected:  07/28/20 1707    Specimen:  Blood Updated:  07/28/20 1730     Magnesium 2.2 mg/dL     Lactic Acid, Plasma [[id number]]  (Normal) Collected:  07/28/20 1707    Specimen:  Blood Updated:  07/28/20 1729     Lactate 1.1 mmol/L     CBC Auto Differential [[id number]]  (Abnormal) Collected:  07/28/20 1707    Specimen:  Blood Updated:  07/28/20 1715     WBC 11.61 10*3/mm3      RBC 3.53 10*6/mm3      Hemoglobin 9.3 g/dL      Hematocrit  29.3 %      MCV 83.0 fL      MCH 26.3 pg      MCHC 31.7 g/dL      RDW 16.2 %      RDW-SD 48.8 fl      MPV 9.5 fL      Platelets 496 10*3/mm3      Neutrophil % 64.1 %      Lymphocyte % 25.9 %      Monocyte % 4.8 %      Eosinophil % 3.9 %      Basophil % 0.4 %      Immature Grans % 0.9 %      Neutrophils, Absolute 7.43 10*3/mm3      Lymphocytes, Absolute 3.01 10*3/mm3      Monocytes, Absolute 0.56 10*3/mm3      Eosinophils, Absolute 0.45 10*3/mm3      Basophils, Absolute 0.05 10*3/mm3      Immature Grans, Absolute 0.11 10*3/mm3      nRBC 0.0 /100 WBC           Culture Results:    Wound Culture   Date Value Ref Range Status   07/28/2020 Culture in progress  Preliminary         Radiology:   Imaging Results (Last 72 Hours)     Procedure Component Value Units Date/Time    US Venous Doppler Lower Extremity Left (duplex) [[id number]] Collected:  07/29/20 1508     Updated:  07/29/20 1512    Narrative:       History: Pain and swelling       Impression:       Impression: There is no evidence of deep venous thrombosis or  superficial thrombophlebitis of the left lower extremity.     Comments: Left lower extremity venous duplex exam was performed using  color Doppler flow, Doppler wave form analysis, and grayscale imaging,  with and without compression. There is no evidence of deep venous  thrombosis of the common femoral, superficial femoral, and popliteal  veins. The tibial veins were not visualized due to bandaging. There is  no thrombus identified in the saphenofemoral junction or the greater  saphenous vein. There is no evidence of clot in the right common femoral  vein.     This report was finalized on 07/29/2020 15:09 by Dr. Trace Hurd MD.    CT Lower Extremity Left Without Contrast [[id number]] Collected:  07/28/20 1917     Updated:  07/28/20 1927    Narrative:       EXAMINATION:  CT LOWER EXTREMITY LEFT WO CONTRAST-  7/28/2020 6:42 PM  CDT     HISTORY: Spider bite 3 months ago left calf region.      COMPARISON: No  comparison study.     TECHNIQUE: Spiral CT was performed of the left lower extremity from just  above the knee joint to just below the ankle joint. Sagittal and coronal  images were reconstructed. DLP: 227 mGy-cm.     FINDINGS: There is diffuse subcutaneous edema throughout the visualized  left leg below the knee. Posteriorly, there may be some ulceration  involving the skin and subcutaneous fat that extends back to the fascia  of the gastrocnemius muscle. I do not see a visible soft tissue fluid  collection to suggest a drainable abscess. The tibia and fibula are  intact without evidence of osteomyelitis.       Impression:       1. Diffuse subcutaneous edema throughout the visualized left leg below  the knee.  2. There appears to be skin ulceration and ulceration extending into the  subcutaneous fat posteriorly that extends to the fascia of the  gastrocnemius muscle. No drainable fluid collection is identified on the  CT images.  3. Tibia and fibula are intact without evidence of osteomyelitis.  This report was finalized on 07/28/2020 19:24 by Dr. Evin Elliott MD.            Assessment/Plan       Wound of left leg    Essential hypertension    Prediabetes    Cellulitis of left lower extremity    Chronic pain syndrome    Acute pain    Anxiety    DOMINIC (acute kidney injury) (CMS/HCC)    Tobacco abuse    Pyoderma gangrenosum??    RECOMMENDATION:   · ELEVATION LOWER EXTREMITIES - elevated the foot of the bed mechanically, reviewed with the patient and the nurse in the room.  · Continue topical therapy.  · Continue empiric antibiotic therapy.        Stacy Higuera MD  07/29/20  15:34          Electronically signed by Stacy Higuera MD at 07/29/20 [id number]       Physical Therapy Notes (most recent note)    No notes exist for this encounter.

## 2020-08-02 NOTE — PROGRESS NOTES
Infectious Diseases Progress Note    Patient:  Robyn Minaya  YOB: 1960  MRN: [id number]   Admit date: 7/28/2020   Admitting Physician: Melchor Conway MD  Primary Care Physician: Tabitha Victor DO    Chief Complaint/Interval History: He feels okay.  He is without new symptoms.  Nursing indicates the wound looked excellent this morning with dressing change.  There would put pictures in epic for next dressing change.  Discussed with hospitalist.  Oral antibiotic treatment with Cipro and doxycycline would appear to have good activity against previously cultured pathogens.  Possibly home tomorrow on oral antibiotic treatment.  She will need to accept the risks of antibiotic therapy including the specific risks of fluoroquinolone treatment with joint soreness, tendon soreness in rare circumstances tendon rupture.    Intake/Output Summary (Last 24 hours) at 8/2/2020 1709  Last data filed at 8/1/2020 1801  Gross per 24 hour   Intake 240 ml   Output --   Net 240 ml     Allergies:   Allergies   Allergen Reactions   • Bactrim [Sulfamethoxazole-Trimethoprim] Rash     Itching   • Hctz [Hydrochlorothiazide] Other (See Comments)     Acid reflux   • Januvia [Sitagliptin] Rash     Current Scheduled Medications:     allopurinol 100 mg Oral Daily   atorvastatin 40 mg Oral Nightly   carvedilol 6.25 mg Oral BID With Meals   cefepime 2 g Intravenous Q12H   divalproex 1,000 mg Oral Nightly   DULoxetine 60 mg Oral Q12H   gabapentin 100 mg Oral TID   guaiFENesin 1,200 mg Oral BID   levothyroxine 125 mcg Oral Q AM   melatonin 3 mg Oral Nightly   mupirocin  Topical Q12H   nicotine 1 patch Transdermal Q24H   pantoprazole 40 mg Oral Q AM   pilocarpine 5 mg Oral TID AC   pramipexole 1.5 mg Oral Nightly   QUEtiapine 50 mg Oral Nightly   sodium chloride 10 mL Intravenous Q12H   spironolactone 25 mg Oral BID   tiZANidine 4 mg Oral Nightly   vancomycin 750 mg Intravenous Q12H     Current PRN Medications:  •   "albuterol  •  ALPRAZolam  •  dextrose  •  dextrose  •  glucagon (human recombinant)  •  HYDROcodone-acetaminophen  •  ondansetron **OR** ondansetron  •  sodium chloride    Review of Systems no nausea, diarrhea, or rash    Vital Signs:  /63 (BP Location: Right arm, Patient Position: Lying)   Pulse 80   Temp 98.3 °F (36.8 °C) (Oral)   Resp 16   Ht 152.4 cm (60\")   Wt 81.7 kg (180 lb 3.2 oz)   SpO2 99%   BMI 35.19 kg/m²     Physical Exam  Vital signs - reviewed.  Line/IV (peripheral) site - No erythema, warmth, induration, or tenderness.  Lower extremity dressing clean/dry.  Recently changed.    Lab Results:  CBC: Results from last 7 days   Lab Units 08/02/20  0503 08/01/20  0903 07/31/20  0352 07/30/20  0431 07/29/20  0605 07/28/20  1707   WBC 10*3/mm3 8.60 8.89 9.01 11.31* 12.64* 11.61*   HEMOGLOBIN g/dL 7.1* 7.4* 7.4* 8.3* 9.3* 9.3*   HEMATOCRIT % 23.1* 24.1* 24.2* 27.0* 30.3* 29.3*   PLATELETS 10*3/mm3 340 349 339 419 505* 496*     BMP:  Results from last 7 days   Lab Units 07/31/20  0836 07/30/20  0431 07/29/20  0546 07/28/20  1707   SODIUM mmol/L  --  141 140 140   POTASSIUM mmol/L  --  4.3 4.3 4.3   CHLORIDE mmol/L  --  105 103 101   CO2 mmol/L  --  25.0 24.0 24.0   BUN mg/dL  --  13 23* 32*   CREATININE mg/dL 0.74 0.83 1.15* 1.78*   GLUCOSE mg/dL  --  119* 124* 112*   CALCIUM mg/dL  --  9.0 9.2 9.4   ALT (SGPT) U/L  --   --  16 15     Culture Results:   Blood Culture   Date Value Ref Range Status   07/28/2020 No growth at 4 days  Preliminary   07/28/2020 No growth at 4 days  Preliminary     Wound Culture   Date Value Ref Range Status   07/28/2020 Heavy growth (4+) Staphylococcus aureus, MRSA (A)  Final     Comment:       Methicillin resistant Staphylococcus aureus, Patient may be an isolation risk.   07/28/2020 Heavy growth (4+) Mixed Gram Negative Elizabeth (A)  Final   Tissue cultures from July 31, 2020:  Susceptibility      Citrobacter freundii Pseudomonas aeruginosa     SHANNON SHANNON     Cefepime <=1 " ug/ml Susceptible <=1 ug/ml Susceptible     Ceftazidime <=1 ug/ml Susceptible 2 ug/ml Susceptible     Ceftriaxone <=1 ug/ml Susceptible       Ciprofloxacin   <=0.25 ug/ml Susceptible     Gentamicin <=1 ug/ml Susceptible <=1 ug/ml Susceptible     Levofloxacin <=0.12 ug/ml Susceptible 0.25 ug/ml Susceptible     Piperacillin + Tazobactam <=4 ug/ml Susceptible 8 ug/ml Susceptible     Tetracycline <=1 ug/ml Susceptible       Trimethoprim + Sulfamethoxazole <=20 ug/ml Susceptible        Wound culture slightly 8/2020:  Susceptibility      Staphylococcus aureus, MRSA     SHANNON     Clindamycin >=4 ug/ml Resistant     Erythromycin >=8 ug/ml Resistant     Inducible Clindamycin Resistance NEG ug/ml Negative     Oxacillin >=4 ug/ml Resistant     Penicillin G >=0.5 ug/ml Resistant     Rifampin <=0.5 ug/ml Susceptible     Tetracycline 2 ug/ml Susceptible     Trimethoprim + Sulfamethoxazole <=10 ug/ml Susceptible     Vancomycin <=0.5 ug/ml Susceptible        Radiology: None  Additional Studies Reviewed: None    Impression:   Lower extremity wounds left lower extremity.  Not felt to have osteomyelitis.  Improving.  Short course of oral antibiotic treatment directed at cultured pathogens including MRSA, Pseudomonas, and Citrobacter would appear to be reasonable.  Her local dressing changes and recent debridement have also provided tremendous benefit.    Recommendations:   Likely okay for discharge home on August 3, 2020  Would recommend doxycycline 100 mg orally every 12 hours and ciprofloxacin 500 mg orally every 12 hours each for 1 week  Would make sure she is accepting of the fluoroquinolone risks including tendon soreness, tendon weakening and in very rare circumstances tendon rupture  She will need to accept the more generalized risks of antibiotic treatment as well  Talked with nursing about trying to put photos of her wounds with her next dressing change in epic  If ongoing improvement likely discharge home on August 3,  2020  Discussed with hospitalist service    Rehan Guillermo MD

## 2020-08-02 NOTE — PROGRESS NOTES
[facility] - PODIATRY    Today's Date: 08/02/20    Patient Name: Robyn Minaya  MRN: [id number]  CSN: [id number]  PCP: Tabitha Victor DO  Referring Provider: Melchor Conway, *  Attending Provider: Melchor Conway MD  Length of Stay: 5    SUBJECTIVE   Chief Complaint: Shooting pain in left leg    HPI: Robyn Minaya, a 60 y.o.female. 2 days s/p left wound debridement. Pain improved today. Says she is tired and does not want bandage removed during my visit. Nursing to change later.     Past Medical History:   Diagnosis Date   • Acid reflux     RESOLVED PT PT   • Allergic rhinitis    • Arthritis     BACK   • Bipolar 1 disorder (CMS/HCC)    • Chronic back pain    • Degenerative arthritis    • Depression    • Deviated nasal septum    • Eczema    • Gout    • History of colon polyps    • Hyperlipidemia    • Hypertension    • Hypertrophy of nasal turbinates    • Hypokalemia    • Hypothyroidism    • Incontinence in female    • Insomnia    • Menopause    • Nasal septal deviation    • Nasal valve stenosis    • Overactive bladder    • Plantar fasciitis    • Prediabetes    • RLS (restless legs syndrome)    • Sleep apnea     cpap   • Wears glasses      Past Surgical History:   Procedure Laterality Date   • BLEPHAROPLASTY Bilateral 7/9/2019    Procedure: 1) bilateral upper blepharoplasty with excision of excess of tissue weighing down 2) nasal endoscopy with removal of nasal septal prosthesis;  Surgeon: Mark Anthony Anderson MD;  Location: [facility];  Service: ENT   • CARPAL TUNNEL RELEASE Bilateral 2004   • CHOLECYSTECTOMY  2013   • COLONOSCOPY  10/17/2014    One 5-6mm tubular adenomatous polyp in the ascending colon; Two less than 4mm hyperplastic polyps in the sigmoid colon; Three rectal hyperplastic polyps; Diverticulosis; Repeat 5 years    • COLONOSCOPY  07/14/2010    Internal hemorrhoids; Repeat 7 years    • COLONOSCOPY N/A 10/23/2019    Procedure: COLONOSCOPY WITH ANESTHESIA;  Surgeon:  Robyn Frazier MD;  Location: [facility] ENDOSCOPY;  Service: Gastroenterology   • CYST REMOVAL  2016    neck   • ENDOSCOPIC FUNCTIONAL SINUS SURGERY (FESS) Bilateral 4/16/2019    Procedure: ENDOSCOPIC FUNCTIONAL SINUS SURGERY (bilareral maxillary antrostomy without image guidance);  Surgeon: Mark Anthony Anderson MD;  Location: [facility] OR;  Service: ENT   • ENDOSCOPY N/A 4/11/2019    Esophageal mucosal changes suggestive of eosinophilic esophagitis-biopsied; A few gastric polyps-resected and retrieved-biopsied; Normal examined duodenum-biopsied   • FOOT SURGERY Right 2010    X3   • HERNIA REPAIR     • INTERSTIM PLACEMENT N/A 6/5/2019    Procedure: INTERSTIM STAGES 1 AND 2 LEAD AND GENERATOR PLACEMENT;  Surgeon: Endy Guerrero MD;  Location: [facility] OR;  Service: Urology   • LASER ABLATION      right back   • LEG DEBRIDEMENT Left 7/31/2020    Procedure: LOWER POSTERIOR LEG WOUND DEBRIDEMENT - LEFT;  Surgeon: Roberto Wells DPM;  Location: [facility] OR;  Service: Podiatry;  Laterality: Left;   • NASAL ENDOSCOPY N/A 4/16/2019    Procedure:     1. Functional endoscopic sinus surgery with bilateral maxillary antrostomy    2. Bilateral nasal endoscopy with biopsy of nasal septum    3.  Nasal septal prosthesis placement  ;  Surgeon: Mrak Anthony Anderson MD;  Location: [facility] OR;  Service: ENT   • NASAL VESTIBULE LESION/PAPILLOMA EXCISION N/A 8/1/2017    Procedure: Repair of nasal vestibular stenosis and septoplasty; cartilage graft from left ear;  Surgeon: Mark Anthony Anderson MD;  Location: [facility] OR;  Service:    • SEPTOPLASTY N/A 4/16/2019    Procedure: PLACEMENT OF NASAL SEPTAL PROSTHESIS;  Surgeon: Mark Anthony Anderson MD;  Location:  [facility] OR;  Service: ENT   • SEPTOPLASTY Bilateral 1/14/2020    Procedure: Nasal endoscopy with septal debridement, and placement of Silastic stents;  Surgeon: Mark Anthony Anderson MD;  Location:  [facility] OR;  Service: ENT     Family History   Problem Relation Age of Onset   • Cancer Mother    • Diabetes  Father    • Hypertension Father    • Cancer Father    • Colon cancer Neg Hx    • Colon polyps Neg Hx    • Esophageal cancer Neg Hx    • Liver cancer Neg Hx    • Liver disease Neg Hx    • Rectal cancer Neg Hx    • Stomach cancer Neg Hx      Social History     Socioeconomic History   • Marital status:      Spouse name: Not on file   • Number of children: Not on file   • Years of education: Not on file   • Highest education level: Not on file   Tobacco Use   • Smoking status: Current Every Day Smoker     Packs/day: 0.50     Years: 40.00     Pack years: 20.00     Types: Cigarettes   • Smokeless tobacco: Never Used   Substance and Sexual Activity   • Alcohol use: Yes     Frequency: Monthly or less     Comment: Rarely-maybe 3x/year   • Drug use: No   • Sexual activity: Defer     Allergies   Allergen Reactions   • Bactrim [Sulfamethoxazole-Trimethoprim] Rash     Itching   • Hctz [Hydrochlorothiazide] Other (See Comments)     Acid reflux   • Januvia [Sitagliptin] Rash     Current Facility-Administered Medications   Medication Dose Route Frequency Provider Last Rate Last Dose   • albuterol (PROVENTIL) nebulizer solution 0.083% 2.5 mg/3mL  2.5 mg Nebulization Q4H PRN Roberto Wells, DPM       • allopurinol (ZYLOPRIM) tablet 100 mg  100 mg Oral Daily Roberto Wells, DPM   100 mg at 08/02/20 0820   • ALPRAZolam (XANAX) tablet 0.5 mg  0.5 mg Oral TID PRN Roberto Wells, DPM   0.5 mg at 08/02/20 0747   • atorvastatin (LIPITOR) tablet 40 mg  40 mg Oral Nightly Roberto Wells, DPM   40 mg at 08/01/20 2125   • carvedilol (COREG) tablet 6.25 mg  6.25 mg Oral BID With Meals Roberto Wells A, DPM   6.25 mg at 08/02/20 0748   • cefepime (MAXIPIME) 2 g/100 mL 0.9% NS (mbp)  2 g Intravenous Q12H Roberto Wells, DPM   2 g at 08/02/20 0942   • dextrose (D50W) 25 g/ 50mL Intravenous Solution 25 g  25 g Intravenous Q15 Min PRN Roberto Wells DPM       • dextrose (GLUTOSE) oral gel 15 g  15 g Oral Q15 Min PRN Russell,  Roberto ELAINE DPM       • divalproex (DEPAKOTE) 24 hr tablet 1,000 mg  1,000 mg Oral Nightly Roberto Wells DPM   1,000 mg at 08/01/20 2125   • DULoxetine (CYMBALTA) DR capsule 60 mg  60 mg Oral Q12H Roberto Wells DPM   60 mg at 08/02/20 0820   • gabapentin (NEURONTIN) capsule 100 mg  100 mg Oral TID Roberto Wells DPM   100 mg at 08/02/20 0820   • glucagon (human recombinant) (GLUCAGEN DIAGNOSTIC) injection 1 mg  1 mg Subcutaneous Q15 Min PRN Roberto Wells DPM       • guaiFENesin (MUCINEX) 12 hr tablet 1,200 mg  1,200 mg Oral BID Roberto Wells DPM   1,200 mg at 08/02/20 0820   • HYDROcodone-acetaminophen (NORCO)  MG per tablet 1 tablet  1 tablet Oral Q4H PRN Roberto Wells DPM   1 tablet at 08/02/20 0747   • lactated ringers infusion  9 mL/hr Intravenous Continuous Roberto Wells DPM 9 mL/hr at 07/31/20 1639     • levothyroxine (SYNTHROID, LEVOTHROID) tablet 125 mcg  125 mcg Oral Q AM Roberto Wells DPM   125 mcg at 08/02/20 0523   • melatonin tablet 3 mg  3 mg Oral Nightly Roberto Wells DPM   3 mg at 08/01/20 2125   • mupirocin (BACTROBAN) 2 % ointment   Topical Q12H Roberto Wells DPM       • nicotine (NICODERM CQ) 21 MG/24HR patch 1 patch  1 patch Transdermal Q24H Roberto Wells DPM   1 patch at 08/02/20 0821   • ondansetron (ZOFRAN) tablet 4 mg  4 mg Oral Q6H PRN Roberto Wells DPM        Or   • ondansetron (ZOFRAN) injection 4 mg  4 mg Intravenous Q6H PRN Roberto Wells DPM       • pantoprazole (PROTONIX) EC tablet 40 mg  40 mg Oral Q AM Roberto Wells DPM   40 mg at 08/02/20 0523   • pilocarpine (SALAGEN) tablet 5 mg  5 mg Oral TID Melchor Llamas MD   5 mg at 08/02/20 0747   • pramipexole (MIRAPEX) tablet 1.5 mg  1.5 mg Oral Nightly Roberto Wells DPM   1.5 mg at 08/01/20 2124   • QUEtiapine (SEROquel) tablet 50 mg  50 mg Oral Nightly Roberto Wells DPM   50 mg at 08/01/20 2124   • sodium chloride 0.9 % flush 10 mL  10 mL  Intravenous Q12H Russell, [place] A, DPM   10 mL at 20 0823   • sodium chloride 0.9 % flush 10 mL  10 mL Intravenous PRN Russell, Roberto A, DPM       • spironolactone (ALDACTONE) tablet 25 mg  25 mg Oral BID Russell, [place] A, DPM   25 mg at 20 0820   • tiZANidine (ZANAFLEX) tablet 4 mg  4 mg Oral Nightly Russell, Roberto A, DPM   4 mg at 203   • vancomycin 750 mg/250 mL 0.9% NS IVPB ([facility])  750 mg Intravenous Q12H Russell, Roberto A, DPM   750 mg at 20 0145     Review of Systems   Constitutional: Positive for activity change. Negative for chills and fever.   HENT: Negative for congestion.    Respiratory: Negative for shortness of breath.    Cardiovascular: Positive for leg swelling.   Gastrointestinal: Negative for nausea and vomiting.   Musculoskeletal: Positive for arthralgias and myalgias.   Skin: Positive for color change and wound.   Neurological: Positive for numbness.   Psychiatric/Behavioral: Positive for sleep disturbance.       OBJECTIVE     Vitals:    20 0746   BP: 103/71   Pulse: 86   Resp: 16   Temp: 98 °F (36.7 °C)   SpO2: 95%       PHYSICAL EXAM  GEN:   NAD, Somnolent today. Pt presents in hospital bed. Accompanied by none.    Foot/Ankle Exam:       General:   Appearance: appears stated age and healthy and obesity    Orientation: AAOx3    Affect: appropriate      VASCULAR      Right Foot Vascularity   Dorsalis pedis:  1+  Posterior tibial:  1+  Edema Gradin+ and pitting  CFT:  3  Pedal Hair Growth:  Present  Varicosities: mild varicosities       Left Foot Vascularity   Dorsalis pedis:  1+  Posterior tibial:  1+  Edema Gradin+, pitting and non-pitting (improving)  CFT:  3  Pedal Hair Growth:  Present  Varicosities: mild varicosities        NEUROLOGIC     Right Foot Neurologic   Normal sensation    Light touch sensation:  Normal  Vibratory sensation:  Normal  Hot/Cold sensation: normal       Left Foot Neurologic   Normal sensation    Light touch sensation:   Normal  Vibratory sensation:  Normal  Hot/cold sensation: normal       MUSCULOSKELETAL      Right Foot Musculoskeletal    Amputation   Right toes amputated: No    Ecchymosis:  None  Tenderness: none    Arch:  Normal     Left Foot Musculoskeletal    Amputation   Left toes amputated: No    Ecchymosis:  None  Tenderness comment:  Generalized to distal lower extremity, worse around wound  Arch:  Normal     MUSCLE STRENGTH     Right Foot Muscle Strength   Normal strength    Foot dorsiflexion:  5  Foot plantar flexion:  5  Foot inversion:  5  Foot eversion:  5     Left Foot Muscle Strength   Foot dorsiflexion:  5  Foot plantar flexion:  5  Foot inversion:  5  Foot eversion:  5     DERMATOLOGIC     Right Foot Dermatologic   Skin: skin intact       Left Foot Dermatologic   Skin: cellulitis (improving), drainage, erythema, maceration (minimal), skin changes and ulcer    Skin: no left foot blister        Left Foot Additional Comments: Dressing in place with ace wrap for pressure/securement. Mild strikethrough to bandage.  Significant decrease in wound slough and necrotic tissue.  Noticeable improvement in granulation tissue.  No deep probing or tracking.  Improved periwound erythema.     Before:       After:         RADIOLOGY/NUCLEAR:  Us Venous Doppler Lower Extremity Left (duplex)    Result Date: 7/29/2020  Narrative: History: Pain and swelling      Impression: Impression: There is no evidence of deep venous thrombosis or superficial thrombophlebitis of the left lower extremity.  Comments: Left lower extremity venous duplex exam was performed using color Doppler flow, Doppler wave form analysis, and grayscale imaging, with and without compression. There is no evidence of deep venous thrombosis of the common femoral, superficial femoral, and popliteal veins. The tibial veins were not visualized due to bandaging. There is no thrombus identified in the saphenofemoral junction or the greater saphenous vein. There is no evidence  of clot in the right common femoral vein.  This report was finalized on 07/29/2020 15:09 by Dr. Trace Hurd MD.    Ct Lower Extremity Left Without Contrast    Result Date: 7/28/2020  Narrative: EXAMINATION:  CT LOWER EXTREMITY LEFT WO CONTRAST-  7/28/2020 6:42 PM CDT  HISTORY: Spider bite 3 months ago left calf region.  COMPARISON: No comparison study.  TECHNIQUE: Spiral CT was performed of the left lower extremity from just above the knee joint to just below the ankle joint. Sagittal and coronal images were reconstructed. DLP: 227 mGy-cm.  FINDINGS: There is diffuse subcutaneous edema throughout the visualized left leg below the knee. Posteriorly, there may be some ulceration involving the skin and subcutaneous fat that extends back to the fascia of the gastrocnemius muscle. I do not see a visible soft tissue fluid collection to suggest a drainable abscess. The tibia and fibula are intact without evidence of osteomyelitis.      Impression: 1. Diffuse subcutaneous edema throughout the visualized left leg below the knee. 2. There appears to be skin ulceration and ulceration extending into the subcutaneous fat posteriorly that extends to the fascia of the gastrocnemius muscle. No drainable fluid collection is identified on the CT images. 3. Tibia and fibula are intact without evidence of osteomyelitis. This report was finalized on 07/28/2020 19:24 by Dr. Evin Elliott MD.      LABORATORY/CULTURE RESULTS:  Results from last 7 days   Lab Units 08/02/20  0503 08/01/20  0903 07/31/20  0352   WBC 10*3/mm3 8.60 8.89 9.01   HEMOGLOBIN g/dL 7.1* 7.4* 7.4*   HEMATOCRIT % 23.1* 24.1* 24.2*   PLATELETS 10*3/mm3 340 349 339     Results from last 7 days   Lab Units 07/31/20  0836 07/30/20  0431 07/29/20  0546 07/28/20  1707   SODIUM mmol/L  --  141 140 140   POTASSIUM mmol/L  --  4.3 4.3 4.3   CHLORIDE mmol/L  --  105 103 101   CO2 mmol/L  --  25.0 24.0 24.0   BUN mg/dL  --  13 23* 32*   CREATININE mg/dL 0.74 0.83 1.15*  1.78*   CALCIUM mg/dL  --  9.0 9.2 9.4   BILIRUBIN mg/dL  --   --  <0.2 <0.2   ALK PHOS U/L  --   --  99 98   ALT (SGPT) U/L  --   --  16 15   AST (SGOT) U/L  --   --  16 12   GLUCOSE mg/dL  --  119* 124* 112*         Microbiology Results (last 10 days)     Procedure Component Value - Date/Time    Tissue / Bone Culture - Tissue, Leg, Left [[id number]]  (Abnormal)  (Susceptibility) Collected:  07/31/20 1728    Lab Status:  Preliminary result Specimen:  Tissue from Leg, Left Updated:  08/02/20 0820     Tissue Culture Moderate growth (3+) Citrobacter freundii      Scant growth (1+) Pseudomonas species     Gram Stain Many (4+) WBCs seen      Rare (1+) Gram positive cocci    Susceptibility      Citrobacter freundii     SHANNON     Cefepime Susceptible     Ceftazidime Susceptible     Ceftriaxone Susceptible     Gentamicin Susceptible     Levofloxacin Susceptible     Piperacillin + Tazobactam Susceptible     Tetracycline Susceptible     Trimethoprim + Sulfamethoxazole Susceptible                Susceptibility Comments     Citrobacter freundii    Cefazolin sensitivity will not be reported for Enterobacteriaceae in non-urine isolates. If cefazolin is preferred, please call the microbiology lab to request an E-test.  With the exception of urinary-sourced infections, aminoglycosides should not be used as monotherapy.             COVID-19,CEPHEID,COR/FLORENCIO/[facility] IN-HOUSE(OR EMERGENT/ADD-ON),NP SWAB IN TRANSPORT MEDIA 3-4 HR TAT - Swab, Nasopharynx [[id number]]  (Normal) Collected:  07/30/20 1757    Lab Status:  Final result Specimen:  Swab from Nasopharynx Updated:  07/31/20 0916     COVID19 Not Detected    Narrative:       Fact sheet for providers: https://www.fda.gov/media/897378/download     Fact sheet for patients: https://www.fda.gov/media/355757/download    Wound Culture - Wound, Leg, Left [[id number]]  (Abnormal)  (Susceptibility) Collected:  07/28/20 1715    Lab Status:  Final result Specimen:  Wound from Leg, Left Updated:   07/31/20 0955     Wound Culture Heavy growth (4+) Staphylococcus aureus, MRSA     Comment:   Methicillin resistant Staphylococcus aureus, Patient may be an isolation risk.         Heavy growth (4+) Mixed Gram Negative Elizabeth     Gram Stain Few (2+) WBCs seen      Few (2+) Gram positive cocci      Many (4+) Gram negative bacilli    Susceptibility      Staphylococcus aureus, MRSA     SHANNON     Clindamycin Resistant     Erythromycin Resistant     Inducible Clindamycin Resistance Negative     Oxacillin Resistant     Penicillin G Resistant     Rifampin Susceptible     Tetracycline Susceptible     Trimethoprim + Sulfamethoxazole Susceptible     Vancomycin Susceptible                    Blood Culture - Blood, Arm, Right [[id number]] Collected:  07/28/20 1706    Lab Status:  Preliminary result Specimen:  Blood from Arm, Right Updated:  08/01/20 1745     Blood Culture No growth at 4 days    Blood Culture - Blood, Arm, Left [[id number]] Collected:  07/28/20 1706    Lab Status:  Preliminary result Specimen:  Blood from Arm, Left Updated:  08/01/20 1745     Blood Culture No growth at 4 days          PATHOLOGY RESULTS:       ASSESSMENT/PLAN   1.  Chronic wound left lower extremity - 2 days s/p surgical debridement  2.  Cellulitis of left lower extremity-MRSA positive   3.  Peripheral edema  4.  Chronic pain syndrome    Labs and cultures reviewed - ID following   Hospitalist progress notes reviewed   Continued to encourage leg elevation when in bed - okay to ambulate when needed.   Continue twice daily dressing changes with Mupirocin, xeroform to wound bed, ABD pad, kerlix, and ACE wrap for compression.      We will continue to follow throughout hospital course of stay. Upon discharge she will need follow-up in outpatient [facility].     Lab Frequency Next Occurrence   Follow Anesthesia Guidelines / Standing Orders Once 07/28/2017   Follow Anesthesia Guidelines / Standing Orders Once 04/27/2019   Provide NPO Instructions to Patient Once  04/01/2019   Follow Anesthesia Guidelines / Standing Orders Once 04/10/2019   Follow Anesthesia Guidelines / Standing Orders Once 05/10/2019   Provide NPO Instructions to Patient Once 05/14/2019   Follow Anesthesia Guidelines / Standing Orders Once 10/14/2019   Obtain Informed Consent Once 10/19/2019   Diet: Once 10/23/2019   ZEKE by IFA, Reflex 9-biomarkers profile Once 12/11/2020   Urinalysis With Microscopic - Urine, Clean Catch Once 03/09/2021   Angiotensin Converting Enzyme Once 03/09/2021   CBC & Differential Once 03/09/2021       This document has been electronically signed by Roberto Wells DPM on August 2, 2020 10:35

## 2020-08-02 NOTE — PROGRESS NOTES
Discharge Planning Assessment  [facility]     Patient Name: Robyn Minaya  MRN: [id number]  Today's Date: 8/2/2020    Admit Date: 7/28/2020    Discharge Needs Assessment     Row Name 08/02/20 1301       Living Environment    Lives With  spouse    Name(s) of Who Lives With Patient  Rizwan    Current Living Arrangements  home/apartment/condo    Primary Care Provided by  self    Provides Primary Care For  no one    Family Caregiver if Needed  spouse    Quality of Family Relationships  helpful;involved;supportive    Able to Return to Prior Arrangements  yes       Resource/Environmental Concerns    Resource/Environmental Concerns  none       Transition Planning    Patient/Family Anticipates Transition to  home with family    Patient/Family Anticipated Services at Transition  home health care    Transportation Anticipated  family or friend will provide       Discharge Needs Assessment    Readmission Within the Last 30 Days  no previous admission in last 30 days    Concerns to be Addressed  no discharge needs identified    Equipment Currently Used at Home  cane, quad;walker, rolling    Anticipated Changes Related to Illness  none    Equipment Needed After Discharge  none    Discharge Coordination/Progress  Pt has PCP and RX coverage.  Pt can afford medications.  Pt denies any dc needs.  Referral for .  Pt states she has used [facility] in the past and wants them again.  SW has faxed referral to .        Discharge Plan    No documentation.       Destination      Coordination has not been started for this encounter.      Durable Medical Equipment      Coordination has not been started for this encounter.      Dialysis/Infusion      Coordination has not been started for this encounter.      Home Medical Care      Coordination has not been started for this encounter.      Therapy      Coordination has not been started for this encounter.      Community Resources      Coordination has not been started for  this encounter.          Demographic Summary    No documentation.       Functional Status    No documentation.       Psychosocial    No documentation.       Abuse/Neglect    No documentation.       Legal    No documentation.       Substance Abuse    No documentation.       Patient Forms    No documentation.           REUBEN AriasW

## 2020-08-02 NOTE — PLAN OF CARE
Problem: Patient Care Overview  Goal: Plan of Care Review  Outcome: Ongoing (interventions implemented as appropriate)  Flowsheets  Taken 8/2/2020 2393  Progress: improving  Outcome Summary: Patient able to change own dressing with supervision, patient stated that she would be able to change dressing at home herself, patient c.o pain see MAR, tolerating diet, voiding per BRP, continue IV abx, IV site changed due to tenderness, patient resting comfortably, will continue to monitor.  Taken 8/2/2020 4104  Plan of Care Reviewed With: patient

## 2020-08-02 NOTE — PLAN OF CARE
Pt medicated for pain x2, using po pain meds. IV abx infusing per order. Pt sleeping between care. vss. will continue to monitor. safety maintained.

## 2020-08-02 NOTE — PROGRESS NOTES
[facility] Medicine Services  INPATIENT PROGRESS NOTE    Length of Stay: 5  Date of Admission: 7/28/2020  Primary Care Physician: Tabitha Vicotr DO    Subjective   Chief Complaint: Follow-up left leg pain wound  HPI   Patient was sitting on side of bed using coloring pencils.  She reports less edema left lower extremity.  She was pleased with the way the wound looked with Dr. Wells change the dressing yesterday.  She tells me she was able to change her wound dressings at home with the assistance of home health.  She hopes that she and her  together will be able to change the dressings after discharge.  She is expected be on antibiotics at the time of discharge.  Denies nausea, vomiting or abdominal pain.  Denies palpitations, shortness of breath or chest pain.  No oxygen in use.  Infectious disease managing antibiotics.  Surgical wound culture Citrobacter and Pseudomonas.  Previous culture MRSA.  On cefepime and vancomycin    Review of Systems     All pertinent negatives and positives are as above. All other systems have been reviewed and are negative unless otherwise stated.     Objective    Temp:  [97.3 °F (36.3 °C)-98.3 °F (36.8 °C)] 97.3 °F (36.3 °C)  Heart Rate:  [78-86] 84  Resp:  [16-18] 18  BP: (103)/(53-71) 103/58  Physical Exam  Constitutional: She is oriented to person, place, and time.   Lying in bed.  No oxygen in use.  No family in room.  No distress.   HENT:   Head: Normocephalic and atraumatic.   Eyes: Pupils are equal, round, and reactive to light. EOM are normal.   Neck: Normal range of motion. Neck supple.   Cardiovascular: Normal rate, regular rhythm, normal heart sounds and intact distal pulses. Exam reveals no gallop and no friction rub.   No murmur heard.  Normal sinus rhythm 66/100 on telemetry   Pulmonary/Chest: Effort normal and breath sounds normal. She has no wheezes. She has no rales.   No oxygen in use.   Abdominal: Soft. Bowel sounds  are normal. She exhibits no distension. There is no tenderness.   Genitourinary:   Genitourinary Comments: Voiding.   Musculoskeletal: She exhibits edema (Left lower extremity, improved).   Neurological: She is alert and oriented to person, place, and time.   Skin:   Left lower extremity with 10 cm x 12 cm open wound.  Debrided 7/31 by Dr. Wells. Dressing in place  Psychiatric: Normal affect, normal behavior, thought process normal, normal judgment.    Results Review:  I have reviewed the labs, radiology results, and diagnostic studies.    Laboratory Data:   Results from last 7 days   Lab Units 08/02/20  0503 08/01/20  0903 07/31/20  0352   WBC 10*3/mm3 8.60 8.89 9.01   HEMOGLOBIN g/dL 7.1* 7.4* 7.4*   HEMATOCRIT % 23.1* 24.1* 24.2*   PLATELETS 10*3/mm3 340 349 339        Results from last 7 days   Lab Units 07/31/20  0836 07/30/20  0431 07/29/20  0546 07/28/20  1707   SODIUM mmol/L  --  141 140 140   POTASSIUM mmol/L  --  4.3 4.3 4.3   CHLORIDE mmol/L  --  105 103 101   CO2 mmol/L  --  25.0 24.0 24.0   BUN mg/dL  --  13 23* 32*   CREATININE mg/dL 0.74 0.83 1.15* 1.78*   CALCIUM mg/dL  --  9.0 9.2 9.4   BILIRUBIN mg/dL  --   --  <0.2 <0.2   ALK PHOS U/L  --   --  99 98   ALT (SGPT) U/L  --   --  16 15   AST (SGOT) U/L  --   --  16 12   GLUCOSE mg/dL  --  119* 124* 112*     Blood Culture   Date Value Ref Range Status   07/28/2020 No growth at 4 days  Preliminary   07/28/2020 No growth at 4 days  Preliminary     Wound Culture   Date Value Ref Range Status   07/28/2020 Heavy growth (4+) Staphylococcus aureus, MRSA (A)  Final     Comment:       Methicillin resistant Staphylococcus aureus, Patient may be an isolation risk.   07/28/2020 Heavy growth (4+) Mixed Gram Negative Elizabeth (A)  Final     Staphylococcus aureus, MRSA       SHANNON     Clindamycin >=4  Resistant     Erythromycin >=8  Resistant     Inducible Clindamycin Resistance NEG  Negative     Oxacillin >=4  Resistant     Penicillin G >=0.5  Resistant     Rifampin  <=0.5  Susceptible     Tetracycline 2  Susceptible     Trimethoprim + Sulfamethoxazole <=10  Susceptible     Vancomycin <=0.5  Susceptible      07/31/2020 1728 08/02/2020 0820 Tissue / Bone Culture - Tissue, Leg, Left [[id number]]    (Abnormal)   Tissue from Leg, Left    Preliminary result Component Value   Tissue Culture Moderate growth (3+) Citrobacter freundiiAbnormal  P    Scant growth (1+) Pseudomonas speciesAbnormal  P   Gram Stain Many (4+) WBCs seen P    Rare (1+) Gram positive cocci P       Susceptibility      Citrobacter freundii     SHANNON     Cefepime <=1  Susceptible     Ceftazidime <=1  Susceptible     Ceftriaxone <=1  Susceptible     Gentamicin <=1  Susceptible     Levofloxacin <=0.12  Susceptible     Piperacillin + Tazobactam <=4  Susceptible     Tetracycline <=1  Susceptible     Trimethoprim + Sulfamethoxazole <=20  Susceptible             Imaging Results (All)     Procedure Component Value Units Date/Time    US Venous Doppler Lower Extremity Left (duplex) [[id number]] Collected:  07/29/20 1508     Updated:  07/29/20 1512    Narrative:       History: Pain and swelling       Impression:       Impression: There is no evidence of deep venous thrombosis or  superficial thrombophlebitis of the left lower extremity.     Comments: Left lower extremity venous duplex exam was performed using  color Doppler flow, Doppler wave form analysis, and grayscale imaging,  with and without compression. There is no evidence of deep venous  thrombosis of the common femoral, superficial femoral, and popliteal  veins. The tibial veins were not visualized due to bandaging. There is  no thrombus identified in the saphenofemoral junction or the greater  saphenous vein. There is no evidence of clot in the right common femoral  vein.     This report was finalized on 07/29/2020 15:09 by Dr. Trace Hurd MD.    CT Lower Extremity Left Without Contrast [[id number]] Collected:  07/28/20 1917     Updated:  07/28/20 1927     Narrative:       EXAMINATION:  CT LOWER EXTREMITY LEFT WO CONTRAST-  7/28/2020 6:42 PM  CDT     HISTORY: Spider bite 3 months ago left calf region.      COMPARISON: No comparison study.     TECHNIQUE: Spiral CT was performed of the left lower extremity from just  above the knee joint to just below the ankle joint. Sagittal and coronal  images were reconstructed. DLP: 227 mGy-cm.     FINDINGS: There is diffuse subcutaneous edema throughout the visualized  left leg below the knee. Posteriorly, there may be some ulceration  involving the skin and subcutaneous fat that extends back to the fascia  of the gastrocnemius muscle. I do not see a visible soft tissue fluid  collection to suggest a drainable abscess. The tibia and fibula are  intact without evidence of osteomyelitis.       Impression:       1. Diffuse subcutaneous edema throughout the visualized left leg below  the knee.  2. There appears to be skin ulceration and ulceration extending into the  subcutaneous fat posteriorly that extends to the fascia of the  gastrocnemius muscle. No drainable fluid collection is identified on the  CT images.  3. Tibia and fibula are intact without evidence of osteomyelitis.  This report was finalized on 07/28/2020 19:24 by Dr. Evin Elliott MD.        Intake/Output    Intake/Output Summary (Last 24 hours) at 8/2/2020 1227  Last data filed at 8/1/2020 1801  Gross per 24 hour   Intake 600 ml   Output --   Net 600 ml       Scheduled Meds    allopurinol 100 mg Oral Daily   atorvastatin 40 mg Oral Nightly   carvedilol 6.25 mg Oral BID With Meals   cefepime 2 g Intravenous Q12H   divalproex 1,000 mg Oral Nightly   DULoxetine 60 mg Oral Q12H   gabapentin 100 mg Oral TID   guaiFENesin 1,200 mg Oral BID   levothyroxine 125 mcg Oral Q AM   melatonin 3 mg Oral Nightly   mupirocin  Topical Q12H   nicotine 1 patch Transdermal Q24H   pantoprazole 40 mg Oral Q AM   pilocarpine 5 mg Oral TID AC   pramipexole 1.5 mg Oral Nightly   QUEtiapine 50  mg Oral Nightly   sodium chloride 10 mL Intravenous Q12H   spironolactone 25 mg Oral BID   tiZANidine 4 mg Oral Nightly   vancomycin 750 mg Intravenous Q12H       I have reviewed the patient current medications.     Assessment/Plan     Active Hospital Problems    Diagnosis   • **Wound of left leg     MRSA culture 7/28/20; Citrobacter, Pseudomonas culture 7/31/2020     • Infection of wound due to methicillin resistant Staphylococcus aureus (MRSA)   • Tobacco abuse   • Cellulitis of left lower extremity   • Essential hypertension   • Chronic pain syndrome   • Acute pain   • Anxiety   • DOMINIC (acute kidney injury) (CMS/HCC)   • Prediabetes     Plan:  1.  To ER 7/28 with left leg pain and wound. 4/12/2020 suspected brown recluse spider bite.  Followed by wound care [facility], surgical debridement with wound VAC in May.  Patient felt wound worsened after wound VAC placed.  Followed by wound care clinic every 2 weeks.   2.  CT lower extremity diffuse subcutaneous edema throughout the left leg below the knee.  Appears to be skin ulceration and ulceration extending into subcutaneous fat posteriorly that extends to the fascia of the gastrocnemius muscle.  No drainage able fluid collection identified.  Tibia and fibula intact without evidence of osteomyelitis.  3.  Venous Dopplers negative for DVT  4.  Podiatry consulted.  Dr. Wells recommends elevation, twice daily dressing changes with mupirocin, Xeroform to wound bed, ABD pad, Kerlix and Ace wrap for compression.  Surgical debridement 7/31.  Pathology sent.  No results available.  Discussed with Dr. Wells today. Okay to ambulate.  Follow-up wound clinic  5.  Wound culture 7/28 heavy growth MRSA, gram negative  rain. Wound culture 7/31  Citrobacter freundi and Pseudomonas. Blood cultures no growth at 4 days  6.  Infectious disease consulted.  Dr. Musa suspects pyoderma gangrenosum.  Continue topical antibiotics, empiric antibiotics.  Elevate foot.  Continue vancomycin.  Cefepime added 7/30  7.  Acute kidney injury. Creatinine 1.78 on admit down to 0.74.  8.  A1c 6.5.   9.  Discussed smoking cessation.  Nicotine patch.  10.  CRP 4.98.  WBC 11. 6 1 on admission.  8.6 today.  H/H 9.3/29.3 on admit. 7.1/23.1 today. Asymptomatic CBC in AM  11.  SCDs for deep vein thrombosis prophylaxis.    12.  Home medications reviewed and resumed if appropriate.  13.  Home health to assist with dressing changes at discharge.  Discussed with nurse Claudia for  to participate with dressing changes if possible.  Patient reports she changed her dressings at home prior to admission.  Nurse, Claudia to observe and instruct patient with next dressing change.     Her  will assist with decision-making if she is unable to make decisions for herself  The above documentation resulted from a face-to-face encounter by me Rajwinder CARIAS, [facility]     Discharge Planning: I expect patient to be discharged to be determined.     Electronically signed by ARETHA Carvajal, 8/2/2020, 12:27.

## 2020-08-03 ENCOUNTER — READMISSION MANAGEMENT (OUTPATIENT)
Dept: CALL CENTER | Facility: HOSPITAL | Age: 60
End: 2020-08-03

## 2020-08-03 ENCOUNTER — TELEPHONE (OUTPATIENT)
Dept: WOUND CARE | Age: 60
End: 2020-08-03

## 2020-08-03 VITALS
OXYGEN SATURATION: 99 % | BODY MASS INDEX: 35.38 KG/M2 | SYSTOLIC BLOOD PRESSURE: 140 MMHG | WEIGHT: 180.2 LBS | TEMPERATURE: 98.1 F | RESPIRATION RATE: 16 BRPM | DIASTOLIC BLOOD PRESSURE: 66 MMHG | HEART RATE: 82 BPM | HEIGHT: 60 IN

## 2020-08-03 PROBLEM — D62 ACUTE BLOOD LOSS ANEMIA: Status: ACTIVE | Noted: 2020-08-03

## 2020-08-03 LAB
ANION GAP SERPL CALCULATED.3IONS-SCNC: 10 MMOL/L (ref 5–15)
BACTERIA SPEC AEROBE CULT: ABNORMAL
BACTERIA SPEC AEROBE CULT: ABNORMAL
BUN SERPL-MCNC: 7 MG/DL (ref 6–20)
BUN/CREAT SERPL: 11.1 (ref 7–25)
CALCIUM SPEC-SCNC: 8.8 MG/DL (ref 8.6–10.5)
CHLORIDE SERPL-SCNC: 105 MMOL/L (ref 98–107)
CO2 SERPL-SCNC: 27 MMOL/L (ref 22–29)
CREAT SERPL-MCNC: 0.63 MG/DL (ref 0.57–1)
DEPRECATED RDW RBC AUTO: 49.7 FL (ref 37–54)
ERYTHROCYTE [DISTWIDTH] IN BLOOD BY AUTOMATED COUNT: 16.3 % (ref 12.3–15.4)
GFR SERPL CREATININE-BSD FRML MDRD: 97 ML/MIN/1.73
GLUCOSE SERPL-MCNC: 150 MG/DL (ref 65–99)
GRAM STN SPEC: ABNORMAL
GRAM STN SPEC: ABNORMAL
HCT VFR BLD AUTO: 28 % (ref 34–46.6)
HGB BLD-MCNC: 8.7 G/DL (ref 12–15.9)
MCH RBC QN AUTO: 26.6 PG (ref 26.6–33)
MCHC RBC AUTO-ENTMCNC: 31.1 G/DL (ref 31.5–35.7)
MCV RBC AUTO: 85.6 FL (ref 79–97)
PLATELET # BLD AUTO: 352 10*3/MM3 (ref 140–450)
PMV BLD AUTO: 10 FL (ref 6–12)
POTASSIUM SERPL-SCNC: 3.7 MMOL/L (ref 3.5–5.2)
RBC # BLD AUTO: 3.27 10*6/MM3 (ref 3.77–5.28)
SODIUM SERPL-SCNC: 142 MMOL/L (ref 136–145)
WBC # BLD AUTO: 9.03 10*3/MM3 (ref 3.4–10.8)

## 2020-08-03 PROCEDURE — 25010000002 CEFEPIME PER 500 MG: Performed by: PODIATRIST

## 2020-08-03 PROCEDURE — 99406 BEHAV CHNG SMOKING 3-10 MIN: CPT

## 2020-08-03 PROCEDURE — 85027 COMPLETE CBC AUTOMATED: CPT | Performed by: NURSE PRACTITIONER

## 2020-08-03 PROCEDURE — 99231 SBSQ HOSP IP/OBS SF/LOW 25: CPT | Performed by: NURSE PRACTITIONER

## 2020-08-03 PROCEDURE — 94664 DEMO&/EVAL PT USE INHALER: CPT

## 2020-08-03 PROCEDURE — 80048 BASIC METABOLIC PNL TOTAL CA: CPT | Performed by: NURSE PRACTITIONER

## 2020-08-03 PROCEDURE — 25010000002 VANCOMYCIN 10 G RECONSTITUTED SOLUTION: Performed by: PODIATRIST

## 2020-08-03 RX ORDER — CIPROFLOXACIN 500 MG/1
500 TABLET, FILM COATED ORAL 2 TIMES DAILY
Qty: 14 TABLET | Refills: 0 | Status: SHIPPED | OUTPATIENT
Start: 2020-08-03 | End: 2020-08-10

## 2020-08-03 RX ORDER — DOXYCYCLINE 100 MG/1
100 CAPSULE ORAL 2 TIMES DAILY
Qty: 14 CAPSULE | Refills: 0 | Status: SHIPPED | OUTPATIENT
Start: 2020-08-03 | End: 2020-08-10

## 2020-08-03 RX ORDER — HYDROCODONE BITARTRATE AND ACETAMINOPHEN 10; 325 MG/1; MG/1
1 TABLET ORAL EVERY 6 HOURS PRN
Qty: 12 TABLET | Refills: 0 | Status: SHIPPED | OUTPATIENT
Start: 2020-08-03 | End: 2020-08-13 | Stop reason: SDUPTHER

## 2020-08-03 RX ADMIN — SODIUM CHLORIDE, PRESERVATIVE FREE 10 ML: 5 INJECTION INTRAVENOUS at 08:14

## 2020-08-03 RX ADMIN — HYDROCODONE BITARTRATE AND ACETAMINOPHEN 1 TABLET: 10; 325 TABLET ORAL at 03:14

## 2020-08-03 RX ADMIN — LEVOTHYROXINE SODIUM 125 MCG: 125 TABLET ORAL at 05:21

## 2020-08-03 RX ADMIN — VANCOMYCIN HYDROCHLORIDE 750 MG: 10 INJECTION, POWDER, LYOPHILIZED, FOR SOLUTION INTRAVENOUS at 00:38

## 2020-08-03 RX ADMIN — CEFEPIME HYDROCHLORIDE 2 G: 2 INJECTION, POWDER, FOR SOLUTION INTRAVENOUS at 08:14

## 2020-08-03 RX ADMIN — SPIRONOLACTONE 25 MG: 25 TABLET ORAL at 08:10

## 2020-08-03 RX ADMIN — DULOXETINE HYDROCHLORIDE 60 MG: 30 CAPSULE, DELAYED RELEASE ORAL at 08:10

## 2020-08-03 RX ADMIN — HYDROCODONE BITARTRATE AND ACETAMINOPHEN 1 TABLET: 10; 325 TABLET ORAL at 08:11

## 2020-08-03 RX ADMIN — CARVEDILOL 6.25 MG: 6.25 TABLET, FILM COATED ORAL at 08:10

## 2020-08-03 RX ADMIN — GUAIFENESIN 1200 MG: 600 TABLET, EXTENDED RELEASE ORAL at 08:10

## 2020-08-03 RX ADMIN — MUPIROCIN: 20 OINTMENT TOPICAL at 08:10

## 2020-08-03 RX ADMIN — NICOTINE 1 PATCH: 21 PATCH, EXTENDED RELEASE TRANSDERMAL at 08:10

## 2020-08-03 RX ADMIN — PANTOPRAZOLE SODIUM 40 MG: 40 TABLET, DELAYED RELEASE ORAL at 05:21

## 2020-08-03 RX ADMIN — PILOCARPINE HYDROCHLORIDE 5 MG: 5 TABLET, FILM COATED ORAL at 08:10

## 2020-08-03 RX ADMIN — ALLOPURINOL 100 MG: 100 TABLET ORAL at 08:10

## 2020-08-03 RX ADMIN — GABAPENTIN 100 MG: 100 CAPSULE ORAL at 08:14

## 2020-08-03 NOTE — DISCHARGE SUMMARY
[facility] Medicine Services  DISCHARGE SUMMARY     Date of Admission: 7/28/2020  Date of Discharge:  8/3/2020  Primary Care Physician: Tena Hough APRN    Presenting Problem/History of Present Illness:  Wound of left leg [S84.536I]     Discharge Diagnoses:  Active Hospital Problems    Diagnosis   • **Wound of left leg     MRSA culture 7/28/20; Citrobacter, Pseudomonas culture 7/31/2020     • Acute blood loss anemia   • Infection of wound due to methicillin resistant Staphylococcus aureus (MRSA)   • Tobacco abuse   • Cellulitis of left lower extremity   • Essential hypertension   • Chronic pain syndrome   • Acute pain   • Anxiety   • DOMINIC (acute kidney injury) (CMS/Formerly [facility])   • Prediabetes     Chief Complaint on Day of Discharge: No complaints  History of Present Illness on Day of Discharge:   Up in room.  Left lower extremity dressing in place.  Denies chest pain, palpitations, or shortness of breath.  Feels better today.  Denies nausea, vomiting or abdominal pain.  Wants to get a shower today.  Antibiotics with transition to oral per infectious disease.    Consults:   1.  Dr. Roberto Wells, podiatry  2.  Dr. Micheal Harris, infectious disease    Procedures Performed:   Lower posterior leg wound debridement 7/31/2020 Dr. Wells    Pertinent Test Results:   Laboratory Data:    Results from last 7 days   Lab Units 08/03/20  0515 08/02/20  0503 08/01/20  0903   WBC 10*3/mm3 9.03 8.60 8.89   HEMOGLOBIN g/dL 8.7* 7.1* 7.4*   HEMATOCRIT % 28.0* 23.1* 24.1*   PLATELETS 10*3/mm3 352 340 349        Results from last 7 days   Lab Units 08/03/20  0515 07/31/20  0836 07/30/20  0431 07/29/20  0546 07/28/20  1707   SODIUM mmol/L 142  --  141 140 140   POTASSIUM mmol/L 3.7  --  4.3 4.3 4.3   CHLORIDE mmol/L 105  --  105 103 101   CO2 mmol/L 27.0  --  25.0 24.0 24.0   BUN mg/dL 7  --  13 23* 32*   CREATININE mg/dL 0.63 0.74 0.83 1.15* 1.78*   CALCIUM mg/dL 8.8  --  9.0 9.2 9.4   BILIRUBIN mg/dL  --    --   --  <0.2 <0.2   ALK PHOS U/L  --   --   --  99 98   ALT (SGPT) U/L  --   --   --  16 15   AST (SGOT) U/L  --   --   --  16 12   GLUCOSE mg/dL 150*  --  119* 124* 112*     Lab Results (all)     Procedure Component Value Units Date/Time    Tissue Pathology Exam [[id number]] Collected:  07/31/20 1657    Specimen:  Tissue from Leg, Left Updated:  08/03/20 0758    Basic Metabolic Panel [[id number]]  (Abnormal) Collected:  08/03/20 0515    Specimen:  Blood Updated:  08/03/20 0552     Glucose 150 mg/dL      BUN 7 mg/dL      Creatinine 0.63 mg/dL      Sodium 142 mmol/L      Potassium 3.7 mmol/L      Chloride 105 mmol/L      CO2 27.0 mmol/L      Calcium 8.8 mg/dL      eGFR Non African Amer 97 mL/min/1.73      BUN/Creatinine Ratio 11.1     Anion Gap 10.0 mmol/L     Narrative:       GFR Normal >60  Chronic Kidney Disease <60  Kidney Failure <15      CBC (No Diff) [[id number]]  (Abnormal) Collected:  08/03/20 0515    Specimen:  Blood Updated:  08/03/20 0538     WBC 9.03 10*3/mm3      RBC 3.27 10*6/mm3      Hemoglobin 8.7 g/dL      Hematocrit 28.0 %      MCV 85.6 fL      MCH 26.6 pg      MCHC 31.1 g/dL      RDW 16.3 %      RDW-SD 49.7 fl      MPV 10.0 fL      Platelets 352 10*3/mm3     Tissue / Bone Culture - Tissue, Leg, Left [[id number]]  (Abnormal)  (Susceptibility) Collected:  07/31/20 1728    Specimen:  Tissue from Leg, Left Updated:  08/03/20 0531     Tissue Culture Moderate growth (3+) Citrobacter freundii      Scant growth (1+) Pseudomonas aeruginosa     Gram Stain Many (4+) WBCs seen      Rare (1+) Gram positive cocci    Susceptibility      Citrobacter freundii     SHANNON     Cefepime Susceptible     Ceftazidime Susceptible     Ceftriaxone Susceptible     Gentamicin Susceptible     Levofloxacin Susceptible     Piperacillin + Tazobactam Susceptible     Tetracycline Susceptible     Trimethoprim + Sulfamethoxazole Susceptible                Susceptibility      Pseudomonas aeruginosa     SHANNON     Cefepime Susceptible      Ceftazidime Susceptible     Ciprofloxacin Susceptible     Gentamicin Susceptible     Levofloxacin Susceptible     Piperacillin + Tazobactam Susceptible                Susceptibility Comments     Citrobacter freundii    Cefazolin sensitivity will not be reported for Enterobacteriaceae in non-urine isolates. If cefazolin is preferred, please call the microbiology lab to request an E-test.  With the exception of urinary-sourced infections, aminoglycosides should not be used as monotherapy.             Blood Culture - Blood, Arm, Right [[id number]] Collected:  07/28/20 1706    Specimen:  Blood from Arm, Right Updated:  08/02/20 1745     Blood Culture No growth at 5 days    Blood Culture - Blood, Arm, Left [[id number]] Collected:  07/28/20 1706    Specimen:  Blood from Arm, Left Updated:  08/02/20 1745     Blood Culture No growth at 5 days    Extra Tubes [[id number]] Collected:  08/02/20 0503    Specimen:  Blood, Venous Line Updated:  08/02/20 0700    Narrative:       The following orders were created for panel order Extra Tubes.  Procedure                               Abnormality         Status                     ---------                               -----------         ------                     Green Top (Gel)[[id number]]                                  Final result                 Please view results for these tests on the individual orders.    Green Top (Gel) [[id number]] Collected:  08/02/20 0503    Specimen:  Blood Updated:  08/02/20 0700     Extra Tube Hold for add-ons.     Comment: Auto resulted.       CBC & Differential [[id number]] Collected:  08/02/20 0503    Specimen:  Blood Updated:  08/02/20 0524    Narrative:       The following orders were created for panel order CBC & Differential.  Procedure                               Abnormality         Status                     ---------                               -----------         ------                     CBC Auto Differential[[id number]]        Abnormal             Final result                 Please view results for these tests on the individual orders.    CBC Auto Differential [[id number]]  (Abnormal) Collected:  08/02/20 0503    Specimen:  Blood Updated:  08/02/20 0524     WBC 8.60 10*3/mm3      RBC 2.69 10*6/mm3      Hemoglobin 7.1 g/dL      Hematocrit 23.1 %      MCV 85.9 fL      MCH 26.4 pg      MCHC 30.7 g/dL      RDW 16.4 %      RDW-SD 50.9 fl      MPV 10.1 fL      Platelets 340 10*3/mm3      Neutrophil % 61.7 %      Lymphocyte % 27.7 %      Monocyte % 6.3 %      Eosinophil % 3.3 %      Basophil % 0.3 %      Immature Grans % 0.7 %      Neutrophils, Absolute 5.31 10*3/mm3      Lymphocytes, Absolute 2.38 10*3/mm3      Monocytes, Absolute 0.54 10*3/mm3      Eosinophils, Absolute 0.28 10*3/mm3      Basophils, Absolute 0.03 10*3/mm3      Immature Grans, Absolute 0.06 10*3/mm3      nRBC 0.0 /100 WBC     CBC & Differential [[id number]] Collected:  08/01/20 0903    Specimen:  Blood Updated:  08/01/20 0928    Narrative:       The following orders were created for panel order CBC & Differential.  Procedure                               Abnormality         Status                     ---------                               -----------         ------                     CBC Auto Differential[[id number]]        Abnormal            Final result                 Please view results for these tests on the individual orders.    CBC Auto Differential [[id number]]  (Abnormal) Collected:  08/01/20 0903    Specimen:  Blood Updated:  08/01/20 0928     WBC 8.89 10*3/mm3      RBC 2.81 10*6/mm3      Hemoglobin 7.4 g/dL      Hematocrit 24.1 %      MCV 85.8 fL      MCH 26.3 pg      MCHC 30.7 g/dL      RDW 16.3 %      RDW-SD 51.1 fl      MPV 10.1 fL      Platelets 349 10*3/mm3      Neutrophil % 66.0 %      Lymphocyte % 26.1 %      Monocyte % 4.4 %      Eosinophil % 2.4 %      Basophil % 0.3 %      Immature Grans % 0.8 %      Neutrophils, Absolute 5.87 10*3/mm3      Lymphocytes, Absolute 2.32  10*3/mm3      Monocytes, Absolute 0.39 10*3/mm3      Eosinophils, Absolute 0.21 10*3/mm3      Basophils, Absolute 0.03 10*3/mm3      Immature Grans, Absolute 0.07 10*3/mm3      nRBC 0.0 /100 WBC     Vancomycin, Trough Please call results to Pharmacy prior to administering next dose [[id number]]  (Normal) Collected:  07/31/20 2354    Specimen:  Blood Updated:  08/01/20 0014     Vancomycin Trough 16.50 mcg/mL     Wound Culture - Wound, Leg, Left [[id number]]  (Abnormal)  (Susceptibility) Collected:  07/28/20 1715    Specimen:  Wound from Leg, Left Updated:  07/31/20 0955     Wound Culture Heavy growth (4+) Staphylococcus aureus, MRSA     Comment:   Methicillin resistant Staphylococcus aureus, Patient may be an isolation risk.         Heavy growth (4+) Mixed Gram Negative Elizabeth     Gram Stain Few (2+) WBCs seen      Few (2+) Gram positive cocci      Many (4+) Gram negative bacilli    Susceptibility      Staphylococcus aureus, MRSA     SHANNON     Clindamycin Resistant     Erythromycin Resistant     Inducible Clindamycin Resistance Negative     Oxacillin Resistant     Penicillin G Resistant     Rifampin Susceptible     Tetracycline Susceptible     Trimethoprim + Sulfamethoxazole Susceptible     Vancomycin Susceptible                    COVID-19,CEPHEID,COR/FLORENCIO/PAD IN-HOUSE(OR EMERGENT/ADD-ON),NP SWAB IN TRANSPORT MEDIA 3-4 HR TAT - Swab, Nasopharynx [[id number]]  (Normal) Collected:  07/30/20 1757    Specimen:  Swab from Nasopharynx Updated:  07/31/20 0916     COVID19 Not Detected    Narrative:       Fact sheet for providers: https://www.fda.gov/media/563226/download     Fact sheet for patients: https://www.fda.gov/media/867005/download    Creatinine, Serum [[id number]]  (Normal) Collected:  07/31/20 0836    Specimen:  Blood Updated:  07/31/20 0900     Creatinine 0.74 mg/dL      eGFR Non African Amer 80 mL/min/1.73     Narrative:       GFR Normal >60  Chronic Kidney Disease <60  Kidney Failure <15      CBC & Differential  [[id number]] Collected:  07/31/20 0352    Specimen:  Blood Updated:  07/31/20 0429    Narrative:       The following orders were created for panel order CBC & Differential.  Procedure                               Abnormality         Status                     ---------                               -----------         ------                     CBC Auto Differential[[id number]]        Abnormal            Final result                 Please view results for these tests on the individual orders.    CBC Auto Differential [[id number]]  (Abnormal) Collected:  07/31/20 0352    Specimen:  Blood Updated:  07/31/20 0429     WBC 9.01 10*3/mm3      RBC 2.86 10*6/mm3      Hemoglobin 7.4 g/dL      Hematocrit 24.2 %      MCV 84.6 fL      MCH 25.9 pg      MCHC 30.6 g/dL      RDW 16.3 %      RDW-SD 50.5 fl      MPV 9.9 fL      Platelets 339 10*3/mm3      Neutrophil % 55.5 %      Lymphocyte % 34.7 %      Monocyte % 5.4 %      Eosinophil % 3.2 %      Basophil % 0.4 %      Immature Grans % 0.8 %      Neutrophils, Absolute 4.99 10*3/mm3      Lymphocytes, Absolute 3.13 10*3/mm3      Monocytes, Absolute 0.49 10*3/mm3      Eosinophils, Absolute 0.29 10*3/mm3      Basophils, Absolute 0.04 10*3/mm3      Immature Grans, Absolute 0.07 10*3/mm3      nRBC 0.0 /100 WBC     POC Glucose Once [[id number]]  (Abnormal) Collected:  07/30/20 1157    Specimen:  Blood Updated:  07/30/20 1211     Glucose 191 mg/dL      Comment: : [id number] [place] [address] ID: [id number]       Vancomycin, Random [[id number]]  (Normal) Collected:  07/30/20 0431    Specimen:  Blood Updated:  07/30/20 1026     Vancomycin Random 15.30 mcg/mL     C-reactive Protein [[id number]]  (Abnormal) Collected:  07/30/20 0431    Specimen:  Blood Updated:  07/30/20 0528     C-Reactive Protein 4.98 mg/dL     Basic Metabolic Panel [[id number]]  (Abnormal) Collected:  07/30/20 0431    Specimen:  Blood Updated:  07/30/20 0528     Glucose 119 mg/dL      BUN 13 mg/dL      Creatinine 0.83 mg/dL       Sodium 141 mmol/L      Potassium 4.3 mmol/L      Chloride 105 mmol/L      CO2 25.0 mmol/L      Calcium 9.0 mg/dL      eGFR Non African Amer 70 mL/min/1.73      BUN/Creatinine Ratio 15.7     Anion Gap 11.0 mmol/L     Narrative:       GFR Normal >60  Chronic Kidney Disease <60  Kidney Failure <15      CBC & Differential [[id number]] Collected:  07/30/20 0431    Specimen:  Blood Updated:  07/30/20 0502    Narrative:       The following orders were created for panel order CBC & Differential.  Procedure                               Abnormality         Status                     ---------                               -----------         ------                     CBC Auto Differential[[id number]]        Abnormal            Final result                 Please view results for these tests on the individual orders.    CBC Auto Differential [[id number]]  (Abnormal) Collected:  07/30/20 0431    Specimen:  Blood Updated:  07/30/20 0502     WBC 11.31 10*3/mm3      RBC 3.17 10*6/mm3      Hemoglobin 8.3 g/dL      Hematocrit 27.0 %      MCV 85.2 fL      MCH 26.2 pg      MCHC 30.7 g/dL      RDW 16.0 %      RDW-SD 49.5 fl      MPV 9.8 fL      Platelets 419 10*3/mm3      Neutrophil % 68.7 %      Lymphocyte % 23.0 %      Monocyte % 4.4 %      Eosinophil % 2.7 %      Basophil % 0.4 %      Immature Grans % 0.8 %      Neutrophils, Absolute 7.76 10*3/mm3      Lymphocytes, Absolute 2.60 10*3/mm3      Monocytes, Absolute 0.50 10*3/mm3      Eosinophils, Absolute 0.31 10*3/mm3      Basophils, Absolute 0.05 10*3/mm3      Immature Grans, Absolute 0.09 10*3/mm3      nRBC 0.0 /100 WBC     POC Glucose Once [[id number]]  (Normal) Collected:  07/29/20 1216    Specimen:  Blood Updated:  07/29/20 1227     Glucose 103 mg/dL      Comment: : [id number] Freddie BrittaneyMeter ID: [id number]       POC Glucose Once [[id number]]  (Normal) Collected:  07/29/20 0849    Specimen:  Blood Updated:  07/29/20 0901     Glucose 109 mg/dL      Comment: :  [id number] Fredide Ritter ID: [id number]       Vancomycin, Random [[id number]]  (Normal) Collected:  07/29/20 0546    Specimen:  Blood Updated:  07/29/20 0848     Vancomycin Random 14.70 mcg/mL     Hemoglobin A1c [[id number]]  (Abnormal) Collected:  07/29/20 0546    Specimen:  Blood Updated:  07/29/20 0717     Hemoglobin A1C 6.50 %     Narrative:       Hemoglobin A1C Ranges:    Increased Risk for Diabetes  5.7% to 6.4%  Diabetes                     >= 6.5%  Diabetic Goal                < 7.0%    Comprehensive Metabolic Panel [[id number]]  (Abnormal) Collected:  07/29/20 0546    Specimen:  Blood Updated:  07/29/20 0640     Glucose 124 mg/dL      BUN 23 mg/dL      Creatinine 1.15 mg/dL      Sodium 140 mmol/L      Potassium 4.3 mmol/L      Chloride 103 mmol/L      CO2 24.0 mmol/L      Calcium 9.2 mg/dL      Total Protein 6.8 g/dL      Albumin 3.60 g/dL      ALT (SGPT) 16 U/L      AST (SGOT) 16 U/L      Alkaline Phosphatase 99 U/L      Total Bilirubin <0.2 mg/dL      eGFR Non African Amer 48 mL/min/1.73      Globulin 3.2 gm/dL      A/G Ratio 1.1 g/dL      BUN/Creatinine Ratio 20.0     Anion Gap 13.0 mmol/L     Narrative:       GFR Normal >60  Chronic Kidney Disease <60  Kidney Failure <15      Lipid Panel [[id number]]  (Abnormal) Collected:  07/29/20 0546    Specimen:  Blood Updated:  07/29/20 0640     Total Cholesterol 101 mg/dL      Triglycerides 209 mg/dL      HDL Cholesterol 29 mg/dL      LDL Cholesterol  30 mg/dL      VLDL Cholesterol 41.8 mg/dL      LDL/HDL Ratio 1.04    Narrative:       Cholesterol Reference Ranges  (U.S. Department of Health and Human Services ATP III Classifications)    Desirable          <200 mg/dL  Borderline High    200-239 mg/dL  High Risk          >240 mg/dL      Triglyceride Reference Ranges  (U.S. Department of Health and Human Services ATP III Classifications)    Normal           <150 mg/dL  Borderline High  150-199 mg/dL  High             200-499 mg/dL  Very High        >500 mg/dL    HDL  Reference Ranges  (U.S. Department of Health and Human Services ATP III Classifcations)    Low     <40 mg/dl (major risk factor for CHD)  High    >60 mg/dl ('negative' risk factor for CHD)        LDL Reference Ranges  (U.S. Department of Health and Human Services ATP III Classifcations)    Optimal          <100 mg/dL  Near Optimal     100-129 mg/dL  Borderline High  130-159 mg/dL  High             160-189 mg/dL  Very High        >189 mg/dL    TSH [[id number]]  (Normal) Collected:  07/29/20 0546    Specimen:  Blood Updated:  07/29/20 0640     TSH 1.520 uIU/mL     CBC Auto Differential [[id number]]  (Abnormal) Collected:  07/29/20 0605    Specimen:  Blood Updated:  07/29/20 0617     WBC 12.64 10*3/mm3      RBC 3.59 10*6/mm3      Hemoglobin 9.3 g/dL      Hematocrit 30.3 %      MCV 84.4 fL      MCH 25.9 pg      MCHC 30.7 g/dL      RDW 16.2 %      RDW-SD 49.3 fl      MPV 10.0 fL      Platelets 505 10*3/mm3      Neutrophil % 64.9 %      Lymphocyte % 25.8 %      Monocyte % 4.0 %      Eosinophil % 4.0 %      Basophil % 0.4 %      Immature Grans % 0.9 %      Neutrophils, Absolute 8.21 10*3/mm3      Lymphocytes, Absolute 3.26 10*3/mm3      Monocytes, Absolute 0.50 10*3/mm3      Eosinophils, Absolute 0.50 10*3/mm3      Basophils, Absolute 0.05 10*3/mm3      Immature Grans, Absolute 0.12 10*3/mm3      nRBC 0.0 /100 WBC     POC Glucose Once [[id number]]  (Abnormal) Collected:  07/28/20 2029    Specimen:  Blood Updated:  07/28/20 2059     Glucose 174 mg/dL      Comment: : [id number] Marcela Dinero ID: [id number]       Procalcitonin [[id number]]  (Normal) Collected:  07/28/20 1707    Specimen:  Blood Updated:  07/28/20 1738     Procalcitonin 0.14 ng/mL     Narrative:       As a Marker for Sepsis (Non-Neonates):   1. <0.5 ng/mL represents a low risk of severe sepsis and/or septic shock.  1. >2 ng/mL represents a high risk of severe sepsis and/or septic shock.    As a Marker for Lower Respiratory Tract Infections that require  "antibiotic therapy:  PCT on Admission     Antibiotic Therapy             6-12 Hrs later  > 0.5                Strongly Recommended            >0.25 - <0.5         Recommended  0.1 - 0.25           Discouraged                   Remeasure/reassess PCT  <0.1                 Strongly Discouraged          Remeasure/reassess PCT      As 28 day mortality risk marker: \"Change in Procalcitonin Result\" (> 80 % or <=80 %) if Day 0 (or Day 1) and Day 4 values are available. Refer to http://[email or web address]calculator.[email or web address]/   Change in PCT <=80 %   A decrease of PCT levels below or equal to 80 % defines a positive change in PCT test result representing a higher risk for 28-day all-cause mortality of patients diagnosed with severe sepsis or septic shock.  Change in PCT > 80 %   A decrease of PCT levels of more than 80 % defines a negative change in PCT result representing a lower risk for 28-day all-cause mortality of patients diagnosed with severe sepsis or septic shock.    Results may be falsely decreased if patient taking Biotin.     Comprehensive Metabolic Panel [[id number]]  (Abnormal) Collected:  07/28/20 1707    Specimen:  Blood Updated:  07/28/20 1735     Glucose 112 mg/dL      BUN 32 mg/dL      Creatinine 1.78 mg/dL      Sodium 140 mmol/L      Potassium 4.3 mmol/L      Chloride 101 mmol/L      CO2 24.0 mmol/L      Calcium 9.4 mg/dL      Total Protein 7.0 g/dL      Albumin 3.80 g/dL      ALT (SGPT) 15 U/L      AST (SGOT) 12 U/L      Alkaline Phosphatase 98 U/L      Total Bilirubin <0.2 mg/dL      eGFR Non African Amer 29 mL/min/1.73      Globulin 3.2 gm/dL      A/G Ratio 1.2 g/dL      BUN/Creatinine Ratio 18.0     Anion Gap 15.0 mmol/L     Narrative:       GFR Normal >60  Chronic Kidney Disease <60  Kidney Failure <15      POC Glucose Once [[id number]]  (Normal) Collected:  07/28/20 1723    Specimen:  Blood Updated:  07/28/20 1735     Glucose 108 mg/dL      Comment: : [id number] Samra KingeeMeter ID: [id number]       " Phosphorus [[id number]]  (Normal) Collected:  07/28/20 1707    Specimen:  Blood Updated:  07/28/20 1733     Phosphorus 4.3 mg/dL     Magnesium [[id number]]  (Normal) Collected:  07/28/20 1707    Specimen:  Blood Updated:  07/28/20 1730     Magnesium 2.2 mg/dL     Lactic Acid, Plasma [[id number]]  (Normal) Collected:  07/28/20 1707    Specimen:  Blood Updated:  07/28/20 1729     Lactate 1.1 mmol/L     CBC Auto Differential [[id number]]  (Abnormal) Collected:  07/28/20 1707    Specimen:  Blood Updated:  07/28/20 1715     WBC 11.61 10*3/mm3      RBC 3.53 10*6/mm3      Hemoglobin 9.3 g/dL      Hematocrit 29.3 %      MCV 83.0 fL      MCH 26.3 pg      MCHC 31.7 g/dL      RDW 16.2 %      RDW-SD 48.8 fl      MPV 9.5 fL      Platelets 496 10*3/mm3      Neutrophil % 64.1 %      Lymphocyte % 25.9 %      Monocyte % 4.8 %      Eosinophil % 3.9 %      Basophil % 0.4 %      Immature Grans % 0.9 %      Neutrophils, Absolute 7.43 10*3/mm3      Lymphocytes, Absolute 3.01 10*3/mm3      Monocytes, Absolute 0.56 10*3/mm3      Eosinophils, Absolute 0.45 10*3/mm3      Basophils, Absolute 0.05 10*3/mm3      Immature Grans, Absolute 0.11 10*3/mm3      nRBC 0.0 /100 WBC         Culture Data:   Blood Culture   Date Value Ref Range Status   07/28/2020 No growth at 5 days  Final   07/28/2020 No growth at 5 days  Final     Wound Culture   Date Value Ref Range Status   07/28/2020 Heavy growth (4+) Staphylococcus aureus, MRSA (A)  Final     Comment:       Methicillin resistant Staphylococcus aureus, Patient may be an isolation risk.   07/28/2020 Heavy growth (4+) Mixed Gram Negative Elizabeth (A)  Final     Staphylococcus aureus, MRSA       SHANNON     Clindamycin >=4  Resistant     Erythromycin >=8  Resistant     Inducible Clindamycin Resistance NEG  Negative     Oxacillin >=4  Resistant     Penicillin G >=0.5  Resistant     Rifampin <=0.5  Susceptible     Tetracycline 2  Susceptible     Trimethoprim + Sulfamethoxazole <=10  Susceptible     Vancomycin <=0.5   Susceptible            Linear View       07/31/2020 1728 08/03/2020 0531 Tissue / Bone Culture - Tissue, Leg, Left [[id number]]    (Abnormal)   Tissue from Leg, Left    Final result Component Value   Tissue Culture Moderate growth (3+) Citrobacter freundiiAbnormal     Scant growth (1+) Pseudomonas aeruginosaAbnormal    Gram Stain Many (4+) WBCs seen    Rare (1+) Gram positive cocci       Susceptibility      Citrobacter freundii Pseudomonas aeruginosa     SHANNON SHANNON     Cefepime <=1  Susceptible <=1  Susceptible     Ceftazidime <=1  Susceptible 2  Susceptible     Ceftriaxone <=1  Susceptible       Ciprofloxacin   <=0.25  Susceptible     Gentamicin <=1  Susceptible <=1  Susceptible     Levofloxacin <=0.12  Susceptible 0.25  Susceptible     Piperacillin + Tazobactam <=4  Susceptible 8  Susceptible     Tetracycline <=1  Susceptible       Trimethoprim + Sulfamethoxazole <=20  Susceptible               Hospital Course  Patient is a 60 y.o. female presented [facility] emergency room 7/28/2020 with nonhealing left lower extremity wound.  On 4/12/2020 patient reported suspected brown recluse spider bite.  She was followed by wound care [facility] and had surgical debridement with wound VAC placement in May.  Patient felt that wound worsened after wound VAC placed.  She has been followed by wound care clinic every 2 weeks.  She saw Dr. Ho on 7/28 to establish care as new patient for nonhealing spider bite.  She was a direct admit to our facility.    She was admitted to the medical floor with cellulitis and nonhealing wound left lower extremity.  Vancomycin started on admission.  Hydration with IV fluids normal saline.  CT scan of the lower extremity showed diffuse subcutaneous edema throughout the visualized left leg below the knee.  Appears to be skin ulceration and ulceration extending into the subcutaneous fat posteriorly that extends to the fascia of the gastrocnemius muscle.  No drainable fluid collection  identified on the CT imaging.  Tibia and fibula are intact without evidence of osteomyelitis.  Venous Dopplers left lower extremity no evidence of deep vein thrombosis or superficial thrombophlebitis of the left lower extremity.    Dr. Wells, podiatry consulted with recommendations to remain conservative with antibiotic ointment, daily dressing changes and light compression.  May eventually need surgical debridement.  Vancomycin continued.  Wound culture heavy growth staph aureus MRSA.  Twice daily dressing changes continued.  Dr. Wells elected for surgical debridement on 7/31.  Pathology sent.  Surgical wound culture positive for Citrobacter and Pseudomonas.  Blood cultures remain no growth at 4 days.  Dr. Wells recommends twice daily dressing changes with mupirocin, Xeroform to wound bed, ABD pad, Kerlix and Ace wraps for compression.  Dr. Wells will follow-up in wound clinic on 8/7/2020.    Infectious disease consulted she was seen by Dr. Burton with suspicion for pyoderma gangrenosum.  Recommendations to elevate foot, topical antibiotics continue empiric antibiotics.  Initial wound culture also had mixed gram-negative rain and cefepime added to antibiotic regimen.  Surgical wound culture from 7/31 reported Citrobacter freundi and Pseudomonas.  Discussed with Dr. Flor who recommended doxycycline 100 mg twice daily for 7 days and Cipro 500 mg twice daily for 7 days at discharge.  Discussed risk of tendon soreness, tendon weakening and tendon rupture with patient with use of Cipro.  Patient willing to accept risk.  Advised to avoid light while taking doxycycline.    Acute kidney injury with creatinine 1.78 on admission trended down to 0.74.  Hemoglobin A1c 6.5.    CRP 4.98, WBC 11.6 on admission trended down to 9.03 at discharge.  Hemoglobin 9.3 with hematocrit 30.3 on admission.  Hemoglobin trended down to 7.1 with hematocrit 23.1 postoperatively.  Transfuse 1 unit packed red blood cells on 8/2/2020.   "Hemoglobin 8.7 with hematocrit 28 at discharge.    Patient was able to demonstrate dressing changes with nurse Claudia.  Patient had been changing her dressing at home with assistance of home health.  Social service consulted for discharge planning to arrange home health to assist with dressing changes and wound assessment after discharge.  Patient has [facility] home health in the past.  Home health arranged at discharge.  Photos of wound before and after surgery noted in Dr. Wells's notes.  Patient also has photo left leg wound after surgical debridement by Dr. Wells on her phone.    On 8/3/2020 she is stable for discharge.  Surgical debridement left lower extremity 7/31/2020.  She has been transitioned to oral doxycycline and oral Cipro for 7 days after discharge.  Follow-up with Dr. Wells in wound clinic on 8/7.  Follow-up with primary care provider ARETHA Perrin 1 week.  Follow-up with Dr. Ho 8/11/2020.  Follow-up with infectious disease as needed.    Physical Exam on Discharge:  /66 (BP Location: Right arm, Patient Position: Lying)   Pulse 82   Temp 98.1 °F (36.7 °C) (Oral)   Resp 16   Ht 152.4 cm (60\")   Wt 81.7 kg (180 lb 3.2 oz)   SpO2 99%   BMI 35.19 kg/m²   Physical Exam   Constitutional: She is oriented to person, place, and time.   No distress.  Up walking in room.  No oxygen in use.  No family in room.   HENT:   Head: Normocephalic and atraumatic.   Eyes: Pupils are equal, round, and reactive to light. EOM are normal.   Neck: Normal range of motion. Neck supple.   Cardiovascular: Normal rate, regular rhythm, normal heart sounds and intact distal pulses. Exam reveals no gallop and no friction rub.   No murmur heard.  Normal sinus rhythm 73-85 on telemetry   Pulmonary/Chest:   No oxygen use.   Abdominal: Soft. Bowel sounds are normal.   Genitourinary:   Genitourinary Comments: Voiding.   Musculoskeletal: She exhibits edema (Left lower extremity) and tenderness (Left lower " extremity).   Neurological: She is alert and oriented to person, place, and time.   Skin: There is erythema (Left lower extremity, improved).   Left lower extremity with 10 cm x 12 cm open wound.  Debrided 7/31 by Dr. Wells. Dressing in place   Psychiatric: She has a normal mood and affect. Her behavior is normal. Judgment and thought content normal.     Condition on Discharge: Stable    Discharge Disposition: Home with home health    Discharge Diet:   Diet Instructions     Advance Diet As Tolerated        As tolerated    Activity at Discharge:   Activity Instructions     Activity as Tolerated        As tolerated    Discharge Care Plan / Instructions:   1.  For worsening drainage, erythema wound left lower extremity seek medical attention.  2.  Doxycycline 100 mg twice daily for 7 days.  3.  Cipro 500 mg twice daily for 7 days  4.  Follow-up with Dr. Wells wound clinic 8/7/20  5.  Home with home health with dressing changes per Dr. Wells  Continue twice daily dressing changes with Mupirocin, xeroform to wound bed, ABD pad, kerlix, and ACE wrap for compression.   6.  Follow-up with Dr. Higuera as needed  7.  Follow-up with Tena Hough 1 week.  8.  Follow-up with Dr. Victor 1 week, 8/11/2020    Discharge Medications:     Discharge Medications      New Medications      Instructions Start Date   ciprofloxacin 500 MG tablet  Commonly known as:  Cipro   500 mg, Oral, 2 Times Daily      doxycycline 100 MG capsule  Commonly known as:  MONODOX   100 mg, Oral, 2 Times Daily         Changes to Medications      Instructions Start Date   HYDROcodone-acetaminophen  MG per tablet  Commonly known as:  NORCO  What changed:  reasons to take this   1 tablet, Oral, Every 6 Hours PRN         Continue These Medications      Instructions Start Date   albuterol (2.5 MG/3ML) 0.083% nebulizer solution  Commonly known as:  PROVENTIL   As Needed      allopurinol 100 MG tablet  Commonly known as:  ZYLOPRIM   100 mg, Oral, Daily       ALPRAZolam 0.5 MG tablet  Commonly known as:  XANAX   0.5 mg, Oral, As Needed      atorvastatin 40 MG tablet  Commonly known as:  LIPITOR   40 mg, Oral, Nightly      bumetanide 1 MG tablet  Commonly known as:  BUMEX   1 mg, Oral, 2 Times Weekly      calcium carbonate 600 MG tablet  Commonly known as:  OS-KAYLEY   600 mg, Oral, Daily      carvedilol 6.25 MG tablet  Commonly known as:  COREG   6.25 mg, Oral, 2 Times Daily With Meals      cholecalciferol 25 MCG (1000 UT) tablet  Commonly known as:  VITAMIN D3   1,000 Units, Oral, Daily      DICLOFENAC PO   75 mg, Oral, 2 Times Daily      divalproex 500 MG 24 hr tablet  Commonly known as:  DEPAKOTE   1,000 mg, Oral, Nightly      DULoxetine 60 MG capsule  Commonly known as:  CYMBALTA   60 mg, Oral, 2 Times Daily      EQ Fiber Therapy 500 MG tablet tablet  Generic drug:  methylcellulose (Laxative)   1 tablet, Oral, Daily      IMODIUM PO   2 mg, Oral, As Needed      levothyroxine 125 MCG tablet  Commonly known as:  SYNTHROID, LEVOTHROID   125 mcg, Oral, Daily      melatonin 3 MG tablet   3 mg, Oral, Daily      Mucinex 600 MG 12 hr tablet  Generic drug:  guaiFENesin   1,200 mg, Oral, 2 Times Daily      MULTI VITAMIN PO   1 dose, Oral, Daily      mupirocin 2 % ointment  Commonly known as:  BACTROBAN   No dose, route, or frequency recorded.      Nebulizer/Tubing/Mouthpiece kit   1 kit, Does not apply      niacin 500 MG CR tablet  Commonly known as:  SLO-NIACIN   500 mg, Oral, Daily      omeprazole 20 MG capsule  Commonly known as:  priLOSEC   20 mg, Oral, Daily      pilocarpine 5 MG tablet  Commonly known as:  SALAGEN   5 mg, Oral, 3 Times Daily      pramipexole 1.5 MG tablet  Commonly known as:  MIRAPEX   1.5 mg, Oral, Nightly      QUEtiapine 50 MG tablet  Commonly known as:  SEROquel   50 mg, Oral, Nightly      spironolactone 25 MG tablet  Commonly known as:  ALDACTONE   25 mg, Oral, 2 Times Daily      tiZANidine 4 MG tablet  Commonly known as:  ZANAFLEX   4 mg, Oral,  Nightly      traMADol 50 MG tablet  Commonly known as:  ULTRAM   50 mg, Oral, 2 Times Daily           Follow-up Appointments:   Follow-up Information     [facility] Follow up.    Why:  Friday August 7 at 9:00  Contact information:  [address]  [address]  [facility] [address]           Rehan Flor MD Follow up.    Specialty:  Infectious Diseases  Why:  Follow up as needed  Contact information:  [address]  Infectious Disease  [facility] [address]  [phone]             Tena Hough APRN Follow up.    Specialty:  Family Medicine  Contact information:  [address] [address]  [phone]             Tabitha Victor DO Follow up.    Specialties:  Family Medicine, Internal Medicine  Why:  Tuesday August 11 at 11:00  Contact information:  [address] [facility] [address]  [phone]                 Future Appointments:  Future Appointments   Date Time Provider Department Center   8/7/2020  9:00 AM Roberto Wells DPM [facility]   8/11/2020 11:00 AM Tabitha Victor DO [facility]     Test Results Pending at Discharge: None    The above documentation resulted from a face-to-face encounter by me Rajwinder CARIAS, [facility].    Electronically signed by ARETHA Carvajal, 8/3/2020, 11:19.    Time: This discharge process required 35 minutes for completion.    Plan discussed with Dr. Roberts, Dr. Flor, Dr. Wells, and patient    Time spent in face-to-face evaluation, chart review, planning and education 35 minutes.      I personally evaluated and examined the patient in conjunction with ARETHA Keys and agree with the assessment, treatment plan, and disposition of the patient as recorded by her. My history, exam, and further recommendations are: I have reviewed and agree with the plans. Nathaniel

## 2020-08-03 NOTE — DISCHARGE PLACEMENT REQUEST
[address] [address]  Phone:  [phone]  Fax:          Patient:     Robyn Minaya MRN:  [id number]   [address] [facility] [address] :  1960  SSN:    Phone: [phone] Sex:  F      [employer] # SUBSCRIBER ID   Primary:    SANGITA [id number]   Admitting Diagnosis: Wound of left leg [S81.802A]  Order Date:  Aug 2, 2020         Case Management  Consult       (Order ID: [id number])     Diagnosis:         Priority:  Routine Expected Date:   Expiration Date:        Interval:  Once Count:    Reason for Consult? Home health at discharge.  Wound instructions per Dr. Wells Continue twice daily dressing changes with Mupirocin, xeroform to wound bed, ABD pad, kerlix, and ACE wrap for compression.     Specimen Type:   Specimen Source:   Specimen Taken Date:   Specimen Taken Time:                   Authorizing Provider:Rajwinder Sandoval APRN  Authorizing Provider's NPI: [id number]  Order Entered By: Rajwinder Sandoval APRN 2020 12:43 PM     Electronically signed by: Rajwinder Sandoval APRN 2020 12:43 PM               Discharge Summary      Rajwinder Sandoval APRN at 20 0924              [facility] Medicine Services  DISCHARGE SUMMARY     Date of Admission: 2020  Date of Discharge:  8/3/2020  Primary Care Physician: Tena Hough APRN    Presenting Problem/History of Present Illness:  Wound of left leg [S81.802A]     Discharge Diagnoses:  Active Hospital Problems    Diagnosis   • **Wound of left leg     MRSA culture 20; Citrobacter, Pseudomonas culture 2020     • Acute blood loss anemia   • Infection of wound due to methicillin resistant Staphylococcus aureus (MRSA)   • Tobacco abuse   • Cellulitis of left lower extremity   • Essential hypertension   • Chronic pain syndrome   • Acute pain   • Anxiety   • DOMINIC (acute kidney injury)  (CMS/[facility])   • Prediabetes     Chief Complaint on Day of Discharge: No complaints  History of Present Illness on Day of Discharge:   Up in room.  Left lower extremity dressing in place.  Denies chest pain, palpitations, or shortness of breath.  Feels better today.  Denies nausea, vomiting or abdominal pain.  Wants to get a shower today.  Antibiotics with transition to oral per infectious disease.    Consults:   1.  Dr. Roberto Wells, podiatry  2.  Dr. Micheal Harris, infectious disease    Procedures Performed:   Lower posterior leg wound debridement 7/31/2020 Dr. Wells    Pertinent Test Results:   Laboratory Data:    Results from last 7 days   Lab Units 08/03/20  0515 08/02/20  0503 08/01/20  0903   WBC 10*3/mm3 9.03 8.60 8.89   HEMOGLOBIN g/dL 8.7* 7.1* 7.4*   HEMATOCRIT % 28.0* 23.1* 24.1*   PLATELETS 10*3/mm3 352 340 349        Results from last 7 days   Lab Units 08/03/20  0515 07/31/20  0836 07/30/20  0431 07/29/20  0546 07/28/20  1707   SODIUM mmol/L 142  --  141 140 140   POTASSIUM mmol/L 3.7  --  4.3 4.3 4.3   CHLORIDE mmol/L 105  --  105 103 101   CO2 mmol/L 27.0  --  25.0 24.0 24.0   BUN mg/dL 7  --  13 23* 32*   CREATININE mg/dL 0.63 0.74 0.83 1.15* 1.78*   CALCIUM mg/dL 8.8  --  9.0 9.2 9.4   BILIRUBIN mg/dL  --   --   --  <0.2 <0.2   ALK PHOS U/L  --   --   --  99 98   ALT (SGPT) U/L  --   --   --  16 15   AST (SGOT) U/L  --   --   --  16 12   GLUCOSE mg/dL 150*  --  119* 124* 112*     Lab Results (all)     Procedure Component Value Units Date/Time    Tissue Pathology Exam [[id number]] Collected:  07/31/20 1657    Specimen:  Tissue from Leg, Left Updated:  08/03/20 0758    Basic Metabolic Panel [[id number]]  (Abnormal) Collected:  08/03/20 0515    Specimen:  Blood Updated:  08/03/20 0552     Glucose 150 mg/dL      BUN 7 mg/dL      Creatinine 0.63 mg/dL      Sodium 142 mmol/L      Potassium 3.7 mmol/L      Chloride 105 mmol/L      CO2 27.0 mmol/L      Calcium 8.8 mg/dL      eGFR Non  Amer 97  mL/min/1.73      BUN/Creatinine Ratio 11.1     Anion Gap 10.0 mmol/L     Narrative:       GFR Normal >60  Chronic Kidney Disease <60  Kidney Failure <15      CBC (No Diff) [[id number]]  (Abnormal) Collected:  08/03/20 0515    Specimen:  Blood Updated:  08/03/20 0538     WBC 9.03 10*3/mm3      RBC 3.27 10*6/mm3      Hemoglobin 8.7 g/dL      Hematocrit 28.0 %      MCV 85.6 fL      MCH 26.6 pg      MCHC 31.1 g/dL      RDW 16.3 %      RDW-SD 49.7 fl      MPV 10.0 fL      Platelets 352 10*3/mm3     Tissue / Bone Culture - Tissue, Leg, Left [[id number]]  (Abnormal)  (Susceptibility) Collected:  07/31/20 1728    Specimen:  Tissue from Leg, Left Updated:  08/03/20 0531     Tissue Culture Moderate growth (3+) Citrobacter freundii      Scant growth (1+) Pseudomonas aeruginosa     Gram Stain Many (4+) WBCs seen      Rare (1+) Gram positive cocci    Susceptibility      Citrobacter freundii     SHANNON     Cefepime Susceptible     Ceftazidime Susceptible     Ceftriaxone Susceptible     Gentamicin Susceptible     Levofloxacin Susceptible     Piperacillin + Tazobactam Susceptible     Tetracycline Susceptible     Trimethoprim + Sulfamethoxazole Susceptible                Susceptibility      Pseudomonas aeruginosa     SHANNON     Cefepime Susceptible     Ceftazidime Susceptible     Ciprofloxacin Susceptible     Gentamicin Susceptible     Levofloxacin Susceptible     Piperacillin + Tazobactam Susceptible                Susceptibility Comments     Citrobacter freundii    Cefazolin sensitivity will not be reported for Enterobacteriaceae in non-urine isolates. If cefazolin is preferred, please call the microbiology lab to request an E-test.  With the exception of urinary-sourced infections, aminoglycosides should not be used as monotherapy.             Blood Culture - Blood, Arm, Right [[id number]] Collected:  07/28/20 1706    Specimen:  Blood from Arm, Right Updated:  08/02/20 1745     Blood Culture No growth at 5 days    Blood Culture - Blood,  Arm, Left [[id number]] Collected:  07/28/20 1706    Specimen:  Blood from Arm, Left Updated:  08/02/20 1745     Blood Culture No growth at 5 days    Extra Tubes [[id number]] Collected:  08/02/20 0503    Specimen:  Blood, Venous Line Updated:  08/02/20 0700    Narrative:       The following orders were created for panel order Extra Tubes.  Procedure                               Abnormality         Status                     ---------                               -----------         ------                     Green Top (Gel)[[id number]]                                  Final result                 Please view results for these tests on the individual orders.    Green Top (Gel) [[id number]] Collected:  08/02/20 0503    Specimen:  Blood Updated:  08/02/20 0700     Extra Tube Hold for add-ons.     Comment: Auto resulted.       CBC & Differential [[id number]] Collected:  08/02/20 0503    Specimen:  Blood Updated:  08/02/20 0524    Narrative:       The following orders were created for panel order CBC & Differential.  Procedure                               Abnormality         Status                     ---------                               -----------         ------                     CBC Auto Differential[[id number]]        Abnormal            Final result                 Please view results for these tests on the individual orders.    CBC Auto Differential [[id number]]  (Abnormal) Collected:  08/02/20 0503    Specimen:  Blood Updated:  08/02/20 0524     WBC 8.60 10*3/mm3      RBC 2.69 10*6/mm3      Hemoglobin 7.1 g/dL      Hematocrit 23.1 %      MCV 85.9 fL      MCH 26.4 pg      MCHC 30.7 g/dL      RDW 16.4 %      RDW-SD 50.9 fl      MPV 10.1 fL      Platelets 340 10*3/mm3      Neutrophil % 61.7 %      Lymphocyte % 27.7 %      Monocyte % 6.3 %      Eosinophil % 3.3 %      Basophil % 0.3 %      Immature Grans % 0.7 %      Neutrophils, Absolute 5.31 10*3/mm3      Lymphocytes, Absolute 2.38 10*3/mm3      Monocytes, Absolute  0.54 10*3/mm3      Eosinophils, Absolute 0.28 10*3/mm3      Basophils, Absolute 0.03 10*3/mm3      Immature Grans, Absolute 0.06 10*3/mm3      nRBC 0.0 /100 WBC     CBC & Differential [[id number]] Collected:  08/01/20 0903    Specimen:  Blood Updated:  08/01/20 0928    Narrative:       The following orders were created for panel order CBC & Differential.  Procedure                               Abnormality         Status                     ---------                               -----------         ------                     CBC Auto Differential[[id number]]        Abnormal            Final result                 Please view results for these tests on the individual orders.    CBC Auto Differential [[id number]]  (Abnormal) Collected:  08/01/20 0903    Specimen:  Blood Updated:  08/01/20 0928     WBC 8.89 10*3/mm3      RBC 2.81 10*6/mm3      Hemoglobin 7.4 g/dL      Hematocrit 24.1 %      MCV 85.8 fL      MCH 26.3 pg      MCHC 30.7 g/dL      RDW 16.3 %      RDW-SD 51.1 fl      MPV 10.1 fL      Platelets 349 10*3/mm3      Neutrophil % 66.0 %      Lymphocyte % 26.1 %      Monocyte % 4.4 %      Eosinophil % 2.4 %      Basophil % 0.3 %      Immature Grans % 0.8 %      Neutrophils, Absolute 5.87 10*3/mm3      Lymphocytes, Absolute 2.32 10*3/mm3      Monocytes, Absolute 0.39 10*3/mm3      Eosinophils, Absolute 0.21 10*3/mm3      Basophils, Absolute 0.03 10*3/mm3      Immature Grans, Absolute 0.07 10*3/mm3      nRBC 0.0 /100 WBC     Vancomycin, Trough Please call results to Pharmacy prior to administering next dose [[id number]]  (Normal) Collected:  07/31/20 2354    Specimen:  Blood Updated:  08/01/20 0014     Vancomycin Trough 16.50 mcg/mL     Wound Culture - Wound, Leg, Left [[id number]]  (Abnormal)  (Susceptibility) Collected:  07/28/20 1715    Specimen:  Wound from Leg, Left Updated:  07/31/20 0955     Wound Culture Heavy growth (4+) Staphylococcus aureus, MRSA     Comment:   Methicillin resistant Staphylococcus aureus,  Patient may be an isolation risk.         Heavy growth (4+) Mixed Gram Negative Elizabeth     Gram Stain Few (2+) WBCs seen      Few (2+) Gram positive cocci      Many (4+) Gram negative bacilli    Susceptibility      Staphylococcus aureus, MRSA     SHANNON     Clindamycin Resistant     Erythromycin Resistant     Inducible Clindamycin Resistance Negative     Oxacillin Resistant     Penicillin G Resistant     Rifampin Susceptible     Tetracycline Susceptible     Trimethoprim + Sulfamethoxazole Susceptible     Vancomycin Susceptible                    COVID-19,CEPHEID,COR/FLORENCIO/PAD IN-HOUSE(OR EMERGENT/ADD-ON),NP SWAB IN TRANSPORT MEDIA 3-4 HR TAT - Swab, Nasopharynx [[id number]]  (Normal) Collected:  07/30/20 1757    Specimen:  Swab from Nasopharynx Updated:  07/31/20 0916     COVID19 Not Detected    Narrative:       Fact sheet for providers: https://www.fda.gov/media/502311/download     Fact sheet for patients: https://www.fda.gov/media/737619/download    Creatinine, Serum [[id number]]  (Normal) Collected:  07/31/20 0836    Specimen:  Blood Updated:  07/31/20 0900     Creatinine 0.74 mg/dL      eGFR Non African Amer 80 mL/min/1.73     Narrative:       GFR Normal >60  Chronic Kidney Disease <60  Kidney Failure <15      CBC & Differential [[id number]] Collected:  07/31/20 0352    Specimen:  Blood Updated:  07/31/20 0429    Narrative:       The following orders were created for panel order CBC & Differential.  Procedure                               Abnormality         Status                     ---------                               -----------         ------                     CBC Auto Differential[[id number]]        Abnormal            Final result                 Please view results for these tests on the individual orders.    CBC Auto Differential [[id number]]  (Abnormal) Collected:  07/31/20 0352    Specimen:  Blood Updated:  07/31/20 0429     WBC 9.01 10*3/mm3      RBC 2.86 10*6/mm3      Hemoglobin 7.4 g/dL      Hematocrit  24.2 %      MCV 84.6 fL      MCH 25.9 pg      MCHC 30.6 g/dL      RDW 16.3 %      RDW-SD 50.5 fl      MPV 9.9 fL      Platelets 339 10*3/mm3      Neutrophil % 55.5 %      Lymphocyte % 34.7 %      Monocyte % 5.4 %      Eosinophil % 3.2 %      Basophil % 0.4 %      Immature Grans % 0.8 %      Neutrophils, Absolute 4.99 10*3/mm3      Lymphocytes, Absolute 3.13 10*3/mm3      Monocytes, Absolute 0.49 10*3/mm3      Eosinophils, Absolute 0.29 10*3/mm3      Basophils, Absolute 0.04 10*3/mm3      Immature Grans, Absolute 0.07 10*3/mm3      nRBC 0.0 /100 WBC     POC Glucose Once [[id number]]  (Abnormal) Collected:  07/30/20 1157    Specimen:  Blood Updated:  07/30/20 1211     Glucose 191 mg/dL      Comment: : [id number] Freddie LaurenMeter ID: [id number]       Vancomycin, Random [[id number]]  (Normal) Collected:  07/30/20 0431    Specimen:  Blood Updated:  07/30/20 1026     Vancomycin Random 15.30 mcg/mL     C-reactive Protein [[id number]]  (Abnormal) Collected:  07/30/20 0431    Specimen:  Blood Updated:  07/30/20 0528     C-Reactive Protein 4.98 mg/dL     Basic Metabolic Panel [[id number]]  (Abnormal) Collected:  07/30/20 0431    Specimen:  Blood Updated:  07/30/20 0528     Glucose 119 mg/dL      BUN 13 mg/dL      Creatinine 0.83 mg/dL      Sodium 141 mmol/L      Potassium 4.3 mmol/L      Chloride 105 mmol/L      CO2 25.0 mmol/L      Calcium 9.0 mg/dL      eGFR Non African Amer 70 mL/min/1.73      BUN/Creatinine Ratio 15.7     Anion Gap 11.0 mmol/L     Narrative:       GFR Normal >60  Chronic Kidney Disease <60  Kidney Failure <15      CBC & Differential [[id number]] Collected:  07/30/20 0431    Specimen:  Blood Updated:  07/30/20 0502    Narrative:       The following orders were created for panel order CBC & Differential.  Procedure                               Abnormality         Status                     ---------                               -----------         ------                     CBC Auto Differential[[id number]]         Abnormal            Final result                 Please view results for these tests on the individual orders.    CBC Auto Differential [[id number]]  (Abnormal) Collected:  07/30/20 0431    Specimen:  Blood Updated:  07/30/20 0502     WBC 11.31 10*3/mm3      RBC 3.17 10*6/mm3      Hemoglobin 8.3 g/dL      Hematocrit 27.0 %      MCV 85.2 fL      MCH 26.2 pg      MCHC 30.7 g/dL      RDW 16.0 %      RDW-SD 49.5 fl      MPV 9.8 fL      Platelets 419 10*3/mm3      Neutrophil % 68.7 %      Lymphocyte % 23.0 %      Monocyte % 4.4 %      Eosinophil % 2.7 %      Basophil % 0.4 %      Immature Grans % 0.8 %      Neutrophils, Absolute 7.76 10*3/mm3      Lymphocytes, Absolute 2.60 10*3/mm3      Monocytes, Absolute 0.50 10*3/mm3      Eosinophils, Absolute 0.31 10*3/mm3      Basophils, Absolute 0.05 10*3/mm3      Immature Grans, Absolute 0.09 10*3/mm3      nRBC 0.0 /100 WBC     POC Glucose Once [[id number]]  (Normal) Collected:  07/29/20 1216    Specimen:  Blood Updated:  07/29/20 1227     Glucose 103 mg/dL      Comment: : [id number] Freddie BrittaneyMeter ID: [id number]       POC Glucose Once [[id number]]  (Normal) Collected:  07/29/20 0849    Specimen:  Blood Updated:  07/29/20 0901     Glucose 109 mg/dL      Comment: : [id number] Freddie BrittaneyMeter ID: [id number]       Vancomycin, Random [[id number]]  (Normal) Collected:  07/29/20 0546    Specimen:  Blood Updated:  07/29/20 0848     Vancomycin Random 14.70 mcg/mL     Hemoglobin A1c [[id number]]  (Abnormal) Collected:  07/29/20 0546    Specimen:  Blood Updated:  07/29/20 0717     Hemoglobin A1C 6.50 %     Narrative:       Hemoglobin A1C Ranges:    Increased Risk for Diabetes  5.7% to 6.4%  Diabetes                     >= 6.5%  Diabetic Goal                < 7.0%    Comprehensive Metabolic Panel [[id number]]  (Abnormal) Collected:  07/29/20 0546    Specimen:  Blood Updated:  07/29/20 0640     Glucose 124 mg/dL      BUN 23 mg/dL      Creatinine 1.15 mg/dL      Sodium 140  mmol/L      Potassium 4.3 mmol/L      Chloride 103 mmol/L      CO2 24.0 mmol/L      Calcium 9.2 mg/dL      Total Protein 6.8 g/dL      Albumin 3.60 g/dL      ALT (SGPT) 16 U/L      AST (SGOT) 16 U/L      Alkaline Phosphatase 99 U/L      Total Bilirubin <0.2 mg/dL      eGFR Non African Amer 48 mL/min/1.73      Globulin 3.2 gm/dL      A/G Ratio 1.1 g/dL      BUN/Creatinine Ratio 20.0     Anion Gap 13.0 mmol/L     Narrative:       GFR Normal >60  Chronic Kidney Disease <60  Kidney Failure <15      Lipid Panel [[id number]]  (Abnormal) Collected:  07/29/20 0546    Specimen:  Blood Updated:  07/29/20 0640     Total Cholesterol 101 mg/dL      Triglycerides 209 mg/dL      HDL Cholesterol 29 mg/dL      LDL Cholesterol  30 mg/dL      VLDL Cholesterol 41.8 mg/dL      LDL/HDL Ratio 1.04    Narrative:       Cholesterol Reference Ranges  (U.S. Department of Health and Human Services ATP III Classifications)    Desirable          <200 mg/dL  Borderline High    200-239 mg/dL  High Risk          >240 mg/dL      Triglyceride Reference Ranges  (U.S. Department of Health and Human Services ATP III Classifications)    Normal           <150 mg/dL  Borderline High  150-199 mg/dL  High             200-499 mg/dL  Very High        >500 mg/dL    HDL Reference Ranges  (U.S. Department of Health and Human Services ATP III Classifcations)    Low     <40 mg/dl (major risk factor for CHD)  High    >60 mg/dl ('negative' risk factor for CHD)        LDL Reference Ranges  (U.S. Department of Health and Human Services ATP III Classifcations)    Optimal          <100 mg/dL  Near Optimal     100-129 mg/dL  Borderline High  130-159 mg/dL  High             160-189 mg/dL  Very High        >189 mg/dL    TSH [[id number]]  (Normal) Collected:  07/29/20 0546    Specimen:  Blood Updated:  07/29/20 0640     TSH 1.520 uIU/mL     CBC Auto Differential [[id number]]  (Abnormal) Collected:  07/29/20 0605    Specimen:  Blood Updated:  07/29/20 0617     WBC 12.64 10*3/mm3   "    RBC 3.59 10*6/mm3      Hemoglobin 9.3 g/dL      Hematocrit 30.3 %      MCV 84.4 fL      MCH 25.9 pg      MCHC 30.7 g/dL      RDW 16.2 %      RDW-SD 49.3 fl      MPV 10.0 fL      Platelets 505 10*3/mm3      Neutrophil % 64.9 %      Lymphocyte % 25.8 %      Monocyte % 4.0 %      Eosinophil % 4.0 %      Basophil % 0.4 %      Immature Grans % 0.9 %      Neutrophils, Absolute 8.21 10*3/mm3      Lymphocytes, Absolute 3.26 10*3/mm3      Monocytes, Absolute 0.50 10*3/mm3      Eosinophils, Absolute 0.50 10*3/mm3      Basophils, Absolute 0.05 10*3/mm3      Immature Grans, Absolute 0.12 10*3/mm3      nRBC 0.0 /100 WBC     POC Glucose Once [[id number]]  (Abnormal) Collected:  07/28/20 2029    Specimen:  Blood Updated:  07/28/20 2059     Glucose 174 mg/dL      Comment: : [id number] Marcela Dinero ID: [id number]       Procalcitonin [[id number]]  (Normal) Collected:  07/28/20 1707    Specimen:  Blood Updated:  07/28/20 1738     Procalcitonin 0.14 ng/mL     Narrative:       As a Marker for Sepsis (Non-Neonates):   1. <0.5 ng/mL represents a low risk of severe sepsis and/or septic shock.  1. >2 ng/mL represents a high risk of severe sepsis and/or septic shock.    As a Marker for Lower Respiratory Tract Infections that require antibiotic therapy:  PCT on Admission     Antibiotic Therapy             6-12 Hrs later  > 0.5                Strongly Recommended            >0.25 - <0.5         Recommended  0.1 - 0.25           Discouraged                   Remeasure/reassess PCT  <0.1                 Strongly Discouraged          Remeasure/reassess PCT      As 28 day mortality risk marker: \"Change in Procalcitonin Result\" (> 80 % or <=80 %) if Day 0 (or Day 1) and Day 4 values are available. Refer to http://www.[employer]-pct-calculator.com/   Change in PCT <=80 %   A decrease of PCT levels below or equal to 80 % defines a positive change in PCT test result representing a higher risk for 28-day all-cause mortality of patients diagnosed " with severe sepsis or septic shock.  Change in PCT > 80 %   A decrease of PCT levels of more than 80 % defines a negative change in PCT result representing a lower risk for 28-day all-cause mortality of patients diagnosed with severe sepsis or septic shock.    Results may be falsely decreased if patient taking Biotin.     Comprehensive Metabolic Panel [[id number]]  (Abnormal) Collected:  07/28/20 1707    Specimen:  Blood Updated:  07/28/20 1735     Glucose 112 mg/dL      BUN 32 mg/dL      Creatinine 1.78 mg/dL      Sodium 140 mmol/L      Potassium 4.3 mmol/L      Chloride 101 mmol/L      CO2 24.0 mmol/L      Calcium 9.4 mg/dL      Total Protein 7.0 g/dL      Albumin 3.80 g/dL      ALT (SGPT) 15 U/L      AST (SGOT) 12 U/L      Alkaline Phosphatase 98 U/L      Total Bilirubin <0.2 mg/dL      eGFR Non African Amer 29 mL/min/1.73      Globulin 3.2 gm/dL      A/G Ratio 1.2 g/dL      BUN/Creatinine Ratio 18.0     Anion Gap 15.0 mmol/L     Narrative:       GFR Normal >60  Chronic Kidney Disease <60  Kidney Failure <15      POC Glucose Once [[id number]]  (Normal) Collected:  07/28/20 1723    Specimen:  Blood Updated:  07/28/20 1735     Glucose 108 mg/dL      Comment: : [id number] SifuentesMeter ID: [id number]       Phosphorus [[id number]]  (Normal) Collected:  07/28/20 1707    Specimen:  Blood Updated:  07/28/20 1733     Phosphorus 4.3 mg/dL     Magnesium [[id number]]  (Normal) Collected:  07/28/20 1707    Specimen:  Blood Updated:  07/28/20 1730     Magnesium 2.2 mg/dL     Lactic Acid, Plasma [[id number]]  (Normal) Collected:  07/28/20 1707    Specimen:  Blood Updated:  07/28/20 1729     Lactate 1.1 mmol/L     CBC Auto Differential [[id number]]  (Abnormal) Collected:  07/28/20 1707    Specimen:  Blood Updated:  07/28/20 1715     WBC 11.61 10*3/mm3      RBC 3.53 10*6/mm3      Hemoglobin 9.3 g/dL      Hematocrit 29.3 %      MCV 83.0 fL      MCH 26.3 pg      MCHC 31.7 g/dL      RDW 16.2 %      RDW-SD 48.8 fl      MPV 9.5 fL       Platelets 496 10*3/mm3      Neutrophil % 64.1 %      Lymphocyte % 25.9 %      Monocyte % 4.8 %      Eosinophil % 3.9 %      Basophil % 0.4 %      Immature Grans % 0.9 %      Neutrophils, Absolute 7.43 10*3/mm3      Lymphocytes, Absolute 3.01 10*3/mm3      Monocytes, Absolute 0.56 10*3/mm3      Eosinophils, Absolute 0.45 10*3/mm3      Basophils, Absolute 0.05 10*3/mm3      Immature Grans, Absolute 0.11 10*3/mm3      nRBC 0.0 /100 WBC         Culture Data:   Blood Culture   Date Value Ref Range Status   07/28/2020 No growth at 5 days  Final   07/28/2020 No growth at 5 days  Final     Wound Culture   Date Value Ref Range Status   07/28/2020 Heavy growth (4+) Staphylococcus aureus, MRSA (A)  Final     Comment:       Methicillin resistant Staphylococcus aureus, Patient may be an isolation risk.   07/28/2020 Heavy growth (4+) Mixed Gram Negative Elizabeth (A)  Final     Staphylococcus aureus, MRSA       SHANNON     Clindamycin >=4  Resistant     Erythromycin >=8  Resistant     Inducible Clindamycin Resistance NEG  Negative     Oxacillin >=4  Resistant     Penicillin G >=0.5  Resistant     Rifampin <=0.5  Susceptible     Tetracycline 2  Susceptible     Trimethoprim + Sulfamethoxazole <=10  Susceptible     Vancomycin <=0.5  Susceptible            Linear View       07/31/2020 1728 08/03/2020 0531 Tissue / Bone Culture - Tissue, Leg, Left [[id number]]    (Abnormal)   Tissue from Leg, Left    Final result Component Value   Tissue Culture Moderate growth (3+) Citrobacter freundiiAbnormal     Scant growth (1+) Pseudomonas aeruginosaAbnormal    Gram Stain Many (4+) WBCs seen    Rare (1+) Gram positive cocci       Susceptibility      Citrobacter freundii Pseudomonas aeruginosa     SHANNON SHANNON     Cefepime <=1  Susceptible <=1  Susceptible     Ceftazidime <=1  Susceptible 2  Susceptible     Ceftriaxone <=1  Susceptible       Ciprofloxacin   <=0.25  Susceptible     Gentamicin <=1  Susceptible <=1  Susceptible     Levofloxacin <=0.12   Susceptible 0.25  Susceptible     Piperacillin + Tazobactam <=4  Susceptible 8  Susceptible     Tetracycline <=1  Susceptible       Trimethoprim + Sulfamethoxazole <=20  Susceptible               Hospital Course  Patient is a 60 y.o. female presented [facility] emergency room 7/28/2020 with nonhealing left lower extremity wound.  On 4/12/2020 patient reported suspected brown recluse spider bite.  She was followed by wound care [facility] and had surgical debridement with wound VAC placement in May.  Patient felt that wound worsened after wound VAC placed.  She has been followed by wound care clinic every 2 weeks.  She saw Dr. Ho on 7/28 to establish care as new patient for nonhealing spider bite.  She was a direct admit to our facility.    She was admitted to the medical floor with cellulitis and nonhealing wound left lower extremity.  Vancomycin started on admission.  Hydration with IV fluids normal saline.  CT scan of the lower extremity showed diffuse subcutaneous edema throughout the visualized left leg below the knee.  Appears to be skin ulceration and ulceration extending into the subcutaneous fat posteriorly that extends to the fascia of the gastrocnemius muscle.  No drainable fluid collection identified on the CT imaging.  Tibia and fibula are intact without evidence of osteomyelitis.  Venous Dopplers left lower extremity no evidence of deep vein thrombosis or superficial thrombophlebitis of the left lower extremity.    Dr. Wells, podiatry consulted with recommendations to remain conservative with antibiotic ointment, daily dressing changes and light compression.  May eventually need surgical debridement.  Vancomycin continued.  Wound culture heavy growth staph aureus MRSA.  Twice daily dressing changes continued.  Dr. Wells elected for surgical debridement on 7/31.  Pathology sent.  Surgical wound culture positive for Citrobacter and Pseudomonas.  Blood cultures remain no growth at 4 days.   Dr. Wells recommends twice daily dressing changes with mupirocin, Xeroform to wound bed, ABD pad, Kerlix and Ace wraps for compression.  Dr. Wells will follow-up in wound clinic on 8/7/2020.    Infectious disease consulted she was seen by Dr. Burton with suspicion for pyoderma gangrenosum.  Recommendations to elevate foot, topical antibiotics continue empiric antibiotics.  Initial wound culture also had mixed gram-negative rain and cefepime added to antibiotic regimen.  Surgical wound culture from 7/31 reported Citrobacter freundi and Pseudomonas.  Discussed with Dr. Flor who recommended doxycycline 100 mg twice daily for 7 days and Cipro 500 mg twice daily for 7 days at discharge.  Discussed risk of tendon soreness, tendon weakening and tendon rupture with patient with use of Cipro.  Patient willing to accept risk.  Advised to avoid light while taking doxycycline.    Acute kidney injury with creatinine 1.78 on admission trended down to 0.74.  Hemoglobin A1c 6.5.    CRP 4.98, WBC 11.6 on admission trended down to 9.03 at discharge.  Hemoglobin 9.3 with hematocrit 30.3 on admission.  Hemoglobin trended down to 7.1 with hematocrit 23.1 postoperatively.  Transfuse 1 unit packed red blood cells on 8/2/2020.  Hemoglobin 8.7 with hematocrit 28 at discharge.    Patient was able to demonstrate dressing changes with nurse Claudia.  Patient had been changing her dressing at home with assistance of home health.  Social service consulted for discharge planning to arrange home health to assist with dressing changes and wound assessment after discharge.  Patient has Psychiatric health in the past.  Home health arranged at discharge.  Photos of wound before and after surgery noted in Dr. Wells's notes.  Patient also has photo left leg wound after surgical debridement by Dr. Wells on her phone.    On 8/3/2020 she is stable for discharge.  Surgical debridement left lower extremity 7/31/2020.  She has been transitioned to  "oral doxycycline and oral Cipro for 7 days after discharge.  Follow-up with Dr. Wells in wound clinic on 8/7.  Follow-up with primary care provider ARETHA Perrin 1 week.  Follow-up with Dr. Ho 8/11/2020.  Follow-up with infectious disease as needed.    Physical Exam on Discharge:  /66 (BP Location: Right arm, Patient Position: Lying)   Pulse 82   Temp 98.1 °F (36.7 °C) (Oral)   Resp 16   Ht 152.4 cm (60\")   Wt 81.7 kg (180 lb 3.2 oz)   SpO2 99%   BMI 35.19 kg/m²    Physical Exam   Constitutional: She is oriented to person, place, and time.   No distress.  Up walking in room.  No oxygen in use.  No family in room.   HENT:   Head: Normocephalic and atraumatic.   Eyes: Pupils are equal, round, and reactive to light. EOM are normal.   Neck: Normal range of motion. Neck supple.   Cardiovascular: Normal rate, regular rhythm, normal heart sounds and intact distal pulses. Exam reveals no gallop and no friction rub.   No murmur heard.  Normal sinus rhythm 73-85 on telemetry   Pulmonary/Chest:   No oxygen use.   Abdominal: Soft. Bowel sounds are normal.   Genitourinary:   Genitourinary Comments: Voiding.   Musculoskeletal: She exhibits edema (Left lower extremity) and tenderness (Left lower extremity).   Neurological: She is alert and oriented to person, place, and time.   Skin: There is erythema (Left lower extremity, improved).   Left lower extremity with 10 cm x 12 cm open wound.  Debrided 7/31 by Dr. Wells. Dressing in place   Psychiatric: She has a normal mood and affect. Her behavior is normal. Judgment and thought content normal.     Condition on Discharge: Stable    Discharge Disposition: Home with home health    Discharge Diet:   Diet Instructions     Advance Diet As Tolerated        As tolerated    Activity at Discharge:   Activity Instructions     Activity as Tolerated        As tolerated    Discharge Care Plan / Instructions:   1.  For worsening drainage, erythema wound left lower extremity seek " medical attention.  2.  Doxycycline 100 mg twice daily for 7 days.  3.  Cipro 500 mg twice daily for 7 days  4.  Follow-up with Dr. Wells wound clinic 8/7/20  5.  Home with home health with dressing changes per Dr. Wells  Continue twice daily dressing changes with Mupirocin, xeroform to wound bed, ABD pad, kerlix, and ACE wrap for compression.   6.  Follow-up with Dr. Higuera as needed  7.  Follow-up with Tena Hough 1 week.  8.  Follow-up with Dr. Victor 1 week, 8/11/2020    Discharge Medications:     Discharge Medications      New Medications      Instructions Start Date   ciprofloxacin 500 MG tablet  Commonly known as:  Cipro   500 mg, Oral, 2 Times Daily      doxycycline 100 MG capsule  Commonly known as:  MONODOX   100 mg, Oral, 2 Times Daily         Changes to Medications      Instructions Start Date   HYDROcodone-acetaminophen  MG per tablet  Commonly known as:  NORCO  What changed:  reasons to take this   1 tablet, Oral, Every 6 Hours PRN         Continue These Medications      Instructions Start Date   albuterol (2.5 MG/3ML) 0.083% nebulizer solution  Commonly known as:  PROVENTIL   As Needed      allopurinol 100 MG tablet  Commonly known as:  ZYLOPRIM   100 mg, Oral, Daily      ALPRAZolam 0.5 MG tablet  Commonly known as:  XANAX   0.5 mg, Oral, As Needed      atorvastatin 40 MG tablet  Commonly known as:  LIPITOR   40 mg, Oral, Nightly      bumetanide 1 MG tablet  Commonly known as:  BUMEX   1 mg, Oral, 2 Times Weekly      calcium carbonate 600 MG tablet  Commonly known as:  OS-KAYLEY   600 mg, Oral, Daily      carvedilol 6.25 MG tablet  Commonly known as:  COREG   6.25 mg, Oral, 2 Times Daily With Meals      cholecalciferol 25 MCG (1000 UT) tablet  Commonly known as:  VITAMIN D3   1,000 Units, Oral, Daily      DICLOFENAC PO   75 mg, Oral, 2 Times Daily      divalproex 500 MG 24 hr tablet  Commonly known as:  DEPAKOTE   1,000 mg, Oral, Nightly      DULoxetine 60 MG capsule  Commonly known as:   CYMBALTA   60 mg, Oral, 2 Times Daily      EQ Fiber Therapy 500 MG tablet tablet  Generic drug:  methylcellulose (Laxative)   1 tablet, Oral, Daily      IMODIUM PO   2 mg, Oral, As Needed      levothyroxine 125 MCG tablet  Commonly known as:  SYNTHROID, LEVOTHROID   125 mcg, Oral, Daily      melatonin 3 MG tablet   3 mg, Oral, Daily      Mucinex 600 MG 12 hr tablet  Generic drug:  guaiFENesin   1,200 mg, Oral, 2 Times Daily      MULTI VITAMIN PO   1 dose, Oral, Daily      mupirocin 2 % ointment  Commonly known as:  BACTROBAN   No dose, route, or frequency recorded.      Nebulizer/Tubing/Mouthpiece kit   1 kit, Does not apply      niacin 500 MG CR tablet  Commonly known as:  SLO-NIACIN   500 mg, Oral, Daily      omeprazole 20 MG capsule  Commonly known as:  priLOSEC   20 mg, Oral, Daily      pilocarpine 5 MG tablet  Commonly known as:  SALAGEN   5 mg, Oral, 3 Times Daily      pramipexole 1.5 MG tablet  Commonly known as:  MIRAPEX   1.5 mg, Oral, Nightly      QUEtiapine 50 MG tablet  Commonly known as:  SEROquel   50 mg, Oral, Nightly      spironolactone 25 MG tablet  Commonly known as:  ALDACTONE   25 mg, Oral, 2 Times Daily      tiZANidine 4 MG tablet  Commonly known as:  ZANAFLEX   4 mg, Oral, Nightly      traMADol 50 MG tablet  Commonly known as:  ULTRAM   50 mg, Oral, 2 Times Daily           Follow-up Appointments:   Follow-up Information     [facility] [place] Follow up.    Why:  Friday August 7 at 9:00  Contact information:  [address]  [address]  [facility] [address]           Rehan Flor MD Follow up.    Specialty:  Infectious Diseases  Why:  Follow up as needed  Contact information:  [address]  Infectious Disease  [facility] [id number] [phone]             Tena Hough, APRN Follow up.    Specialty:  Family Medicine  Contact information:  [address] [facility] [place] [address] [phone]             Tabitha Victor DO Follow up.       Specialties:  Family Medicine, Internal Medicine  Why:  Tuesday August 11 at 11:00  Contact information:  [place] [address] [phone]                 Future Appointments:  Future Appointments   Date Time Provider Department Center   8/7/2020  9:00 AM Roberto Wells DPM [facility]   8/11/2020 11:00 AM Tabitha Victor DO [facility]     Test Results Pending at Discharge: None    The above documentation resulted from a face-to-face encounter by me Rajwinder CARIAS, [facility].    Electronically signed by ARETHA Carvajal, 8/3/2020, 11:19.    Time: This discharge process required 35 minutes for completion.    Plan discussed with Dr. Roberts, Dr. Flor, Dr. Wells, and patient    Time spent in face-to-face evaluation, chart review, planning and education 35 minutes.          Electronically signed by Rajwinder Sandoval APRN at 08/03/20 1121

## 2020-08-03 NOTE — PAYOR COMM NOTE
"AUTH: [id number]    Robyn Dominguez (60 y.o. Female)     Date of Birth Social Security Number Address Home Phone MRN    1960  [id number] [facility] [phone] [id number]    [employer] Marital Status          Other        Admission Date Admission Type Admitting Provider Attending Provider Department, Room/Bed    7/28/20 Urgent Alon Roberts MD Truong, Khai C, MD [facility] 3C, 378/1    Discharge Date Discharge Disposition Discharge Destination                       Attending Provider:  Alon Roberts MD    Allergies:  Bactrim [Sulfamethoxazole-trimethoprim], Hctz [Hydrochlorothiazide], Januvia [Sitagliptin]    Isolation:  None   Infection:  MRSA (07/30/20)   Code Status:  CPR    Ht:  152.4 cm (60\")   Wt:  81.7 kg (180 lb 3.2 oz)    Admission Cmt:  None   Principal Problem:  Wound of left leg [S81.802A] More...                 Active Insurance as of 7/28/2020     Primary Coverage     Payor Plan Insurance Group Employer/Plan Group    [facility] [id number]     Payor Plan Address Payor Plan Phone Number Payor Plan Fax Number Effective Dates    [address] [phone]  1/1/2020 - None Entered    [facility] [id number]       Subscriber Name Subscriber Birth Date Member ID       ROBYN DOMINGUEZ 1960 [id number]                 Emergency Contacts      (Rel.) Home Phone Work Phone Mobile Phone    Rizwan Dominguez (Spouse) [phone] -- --              Facility-Administered Medications as of 8/3/2020   Medication Dose Route Frequency Provider Last Rate Last Dose   • albuterol (PROVENTIL) nebulizer solution 0.083% 2.5 mg/3mL  2.5 mg Nebulization Q4H PRN Roberto Wells A, DPM       • allopurinol (ZYLOPRIM) tablet 100 mg  100 mg Oral Daily Roberto Wells A, DPM   100 mg at 08/03/20 0810   • ALPRAZolam (XANAX) tablet 0.5 mg  0.5 mg Oral TID PRN RussellRoberto DPM   0.5 mg at 08/02/20 1547   • atorvastatin (LIPITOR) tablet 40 mg  40 mg " Oral Nightly Roberto Wells DPM   40 mg at 08/02/20 2001   • carvedilol (COREG) tablet 6.25 mg  6.25 mg Oral BID With Meals Roberto Wells DPRONI   6.25 mg at 08/03/20 0810   • [COMPLETED] ceFAZolin in 0.9% normal saline (ANCEF) IVPB solution 2 g  2 g Intravenous Once Roberto Wells DPM   2 g at 07/31/20 1640   • [COMPLETED] cefepime (MAXIPIME) 2 g/100 mL 0.9% NS (mbp)  2 g Intravenous Once Stacy Higuera  mL/hr at 07/30/20 2044 2 g at 07/30/20 2044   • cefepime (MAXIPIME) 2 g/100 mL 0.9% NS (mbp)  2 g Intravenous Q12H Roberto Wells DPRONI   2 g at 08/03/20 0814   • dextrose (D50W) 25 g/ 50mL Intravenous Solution 25 g  25 g Intravenous Q15 Min PRN Roberto Wells DPM       • dextrose (GLUTOSE) oral gel 15 g  15 g Oral Q15 Min PRN Roberto Wells DPM       • divalproex (DEPAKOTE) 24 hr tablet 1,000 mg  1,000 mg Oral Nightly Roberto Wells DPM   1,000 mg at 08/02/20 2002   • DULoxetine (CYMBALTA) DR capsule 60 mg  60 mg Oral Q12H Roberto Wells DPM   60 mg at 08/03/20 0810   • [COMPLETED] fentaNYL citrate (PF) (SUBLIMAZE) injection 25 mcg  25 mcg Intravenous Q5 Min PRN Melchor Akins MD   25 mcg at 07/31/20 1626   • gabapentin (NEURONTIN) capsule 100 mg  100 mg Oral TID Roberto Wells DPM   100 mg at 08/03/20 0814   • glucagon (human recombinant) (GLUCAGEN DIAGNOSTIC) injection 1 mg  1 mg Subcutaneous Q15 Min PRN Roberto Wells DPM       • guaiFENesin (MUCINEX) 12 hr tablet 1,200 mg  1,200 mg Oral BID Roberto Wells, DPM   1,200 mg at 08/03/20 0810   • HYDROcodone-acetaminophen (NORCO)  MG per tablet 1 tablet  1 tablet Oral Q4H PRN Roberto Wells, DPM   1 tablet at 08/03/20 0811   • [COMPLETED] HYDROmorphone (DILAUDID) injection 0.5 mg  0.5 mg Intravenous Once Shannon Mishra APRN   0.5 mg at 07/28/20 1707   • [COMPLETED] HYDROmorphone (DILAUDID) injection 0.5 mg  0.5 mg Intravenous Once Sania Witt DO   0.5 mg at 07/29/20 0124   •  lactated ringers infusion  9 mL/hr Intravenous Continuous Roberto Wells DPM 9 mL/hr at 07/31/20 1639     • levothyroxine (SYNTHROID, LEVOTHROID) tablet 125 mcg  125 mcg Oral Q AM Roberto Wells DPM   125 mcg at 08/03/20 0521   • [COMPLETED] LORazepam (ATIVAN) injection 0.5 mg  0.5 mg Intravenous Once Shannon Mishra APRN   0.5 mg at 07/28/20 1707   • melatonin tablet 3 mg  3 mg Oral Nightly Roberto Wells DPM   3 mg at 08/02/20 2002   • [COMPLETED] Morphine sulfate (PF) injection 2 mg  2 mg Intravenous Once Roberto Wells DPM   2 mg at 08/01/20 1131   • mupirocin (BACTROBAN) 2 % ointment   Topical Q12H Roberto Wells DPM       • nicotine (NICODERM CQ) 21 MG/24HR patch 1 patch  1 patch Transdermal Q24H Roberto Wells DPM   1 patch at 08/03/20 0810   • ondansetron (ZOFRAN) tablet 4 mg  4 mg Oral Q6H PRN Roberto Wells DPM        Or   • ondansetron (ZOFRAN) injection 4 mg  4 mg Intravenous Q6H PRN Roberto Wells DPRONI       • [COMPLETED] oxyCODONE-acetaminophen (PERCOCET) 7.5-325 MG per tablet 2 tablet  2 tablet Oral Once PRN Melchor Akins MD   2 tablet at 07/31/20 1736   • pantoprazole (PROTONIX) EC tablet 40 mg  40 mg Oral Q AM Roberto Wells DPM   40 mg at 08/03/20 0521   • pilocarpine (SALAGEN) tablet 5 mg  5 mg Oral TID AC Melchor Conway MD   5 mg at 08/03/20 0810   • pramipexole (MIRAPEX) tablet 1.5 mg  1.5 mg Oral Nightly Roberto Wells DPM   1.5 mg at 08/02/20 2001   • QUEtiapine (SEROquel) tablet 50 mg  50 mg Oral Nightly Roberto Wells DPM   50 mg at 08/02/20 2001   • sodium chloride 0.9 % flush 10 mL  10 mL Intravenous Q12H Roberto Wells DPM   10 mL at 08/03/20 0814   • sodium chloride 0.9 % flush 10 mL  10 mL Intravenous PRN Roberto Wells DPM       • spironolactone (ALDACTONE) tablet 25 mg  25 mg Oral BID Roberto Wells DPM   25 mg at 08/03/20 0810   • tiZANidine (ZANAFLEX) tablet 4 mg  4 mg Oral Nightly Roberto Wells DPM    4 mg at 08/02/20 2001   • vancomycin 750 mg/250 mL 0.9% NS IVPB ([facility])  750 mg Intravenous Q12H Roberto Wells DPM   750 mg at 08/03/20 0038     Orders (last 72 hrs)      Start     Ordered    08/03/20 0600  CBC (No Diff)  Morning Draw      08/02/20 1241    08/03/20 0600  Basic Metabolic Panel  Morning Draw      08/02/20 1241    08/02/20 1654  Verify Informed Consent for Blood Product Administration  Once      08/02/20 1654    08/02/20 1654  Prepare RBC, 1 Units  Blood - Once      08/02/20 1654    08/02/20 1653  Transfuse RBC, 1 Units Infuse Each Unit Over: 3.5H  Transfusion      08/02/20 1654    08/02/20 1244  Case Management  Consult  Once     Provider:  (Not yet assigned)    08/02/20 1243    08/02/20 0648  Extra Tubes  Once      08/02/20 0647    08/02/20 0648  Green Top (Gel)  PROCEDURE ONCE      08/02/20 0647    08/02/20 0600  CBC & Differential  Morning Draw      08/01/20 1408    08/02/20 0600  CBC Auto Differential  PROCEDURE ONCE      08/02/20 0001    08/01/20 1300  pilocarpine (SALAGEN) tablet 5 mg  3 Times Daily Before Meals      08/01/20 1209    08/01/20 1215  Morphine sulfate (PF) injection 2 mg  Once      08/01/20 1118    08/01/20 1200  Morphine 1mg/ml infusion 30ml  Once,   Status:  Discontinued      08/01/20 1112    08/01/20 0821  Diet Regular  Diet Effective Now      08/01/20 0820    08/01/20 0647  CBC & Differential  Once      08/01/20 0646    08/01/20 0647  CBC Auto Differential  PROCEDURE ONCE      08/01/20 0646    08/01/20 0000  Vancomycin, Trough Please call results to Pharmacy prior to administering next dose  Once     Comments:  Please call results to Pharmacy prior to administering next dose      07/31/20 1100    07/31/20 2100  sodium chloride 0.9 % flush 10 mL  Every 12 Hours Scheduled,   Status:  Discontinued      07/31/20 1601    07/31/20 1839  Oxygen Therapy- Blow by - Humidified; Titrate for SPO2: equal to or greater than, per policy, 92%  Continuous      07/31/20 6056       07/31/20 1839  Pulse Oximetry, Continuous  Continuous      07/31/20 1838 07/31/20 1839  Diet Regular; Consistent Carbohydrate  Diet Effective Now,   Status:  Canceled      07/31/20 1838    07/31/20 1727  Tissue / Bone Culture - Tissue, Leg, Left  Once      07/31/20 1726    07/31/20 1726  Transfer Patient  Once      07/31/20 1725    07/31/20 1722  Vital signs every 5 minutes for 15 minutes, every 15 minutes thereafter.  Once,   Status:  Canceled      07/31/20 1721    07/31/20 1722  Call Anesthesiologist for additional IV Fluid bolus for Hypotension/Tachycardia  Continuous,   Status:  Canceled      07/31/20 1721    07/31/20 1722  Notify Anesthesia of Any Acute Changes in Patient Condition  Until Discontinued,   Status:  Canceled      07/31/20 1721    07/31/20 1722  Notify Anesthesia for Unrelieved Pain  Until Discontinued,   Status:  Canceled      07/31/20 1721    07/31/20 1722  Once DC criteria to floor met, follow surgeon's orders.  Until Discontinued,   Status:  Canceled      07/31/20 1721    07/31/20 1722  Discharge patient from PACU when discharge criteria is met.  Until Discontinued,   Status:  Canceled      07/31/20 1721    07/31/20 1721  Apply warming blanket  As Needed,   Status:  Canceled     Comments:  For a recorded temp of <36.9 C    07/31/20 1721    07/31/20 1721  oxyCODONE-acetaminophen (PERCOCET)  MG per tablet 1 tablet  Once As Needed,   Status:  Discontinued      07/31/20 1721    07/31/20 1721  oxyCODONE-acetaminophen (PERCOCET) 7.5-325 MG per tablet 2 tablet  Once As Needed      07/31/20 1721    07/31/20 1721  ondansetron (ZOFRAN) injection 4 mg  As Needed,   Status:  Discontinued      07/31/20 1721    07/31/20 1721  Morphine sulfate (PF) injection 2 mg  Every 10 Minutes PRN,   Status:  Discontinued      07/31/20 1721    07/31/20 1721  fentaNYL citrate (PF) (SUBLIMAZE) injection 25 mcg  Every 5 Minutes PRN,   Status:  Discontinued      07/31/20 1721    07/31/20 1721  labetalol  (NORMODYNE,TRANDATE) injection 5 mg  Every 5 Minutes PRN,   Status:  Discontinued      07/31/20 1721    07/31/20 1721  atropine sulfate injection 0.5 mg  Once As Needed,   Status:  Discontinued      07/31/20 1721    07/31/20 1721  naloxone (NARCAN) injection 0.04 mg  As Needed,   Status:  Discontinued      07/31/20 1721    07/31/20 1721  flumazenil (ROMAZICON) injection 0.2 mg  As Needed,   Status:  Discontinued      07/31/20 1721    07/31/20 1703  mupirocin (BACTROBAN) 2 % ointment  As Needed,   Status:  Discontinued      07/31/20 1703    07/31/20 1702  bupivacaine-EPINEPHrine (MARCAINE w/EPI) injection  As Needed,   Status:  Discontinued      07/31/20 1703    07/31/20 1659  Wound Culture - Wound, Leg, Left      07/31/20 1659    07/31/20 1657  Tissue Pathology Exam      07/31/20 1657    07/31/20 1655  sodium chloride (NS) irrigation solution  As Needed,   Status:  Discontinued      07/31/20 1656    07/31/20 1603  lactated ringers infusion  Continuous      07/31/20 1601    07/31/20 1601  Oxygen Therapy- Nasal Cannula; Titrate for SPO2: equal to or greater than, 90%  Continuous,   Status:  Canceled      07/31/20 1601    07/31/20 1601  Pulse Oximetry, Continuous  Continuous,   Status:  Canceled      07/31/20 1601    07/31/20 1601  Insert Peripheral IV  Once,   Status:  Canceled      07/31/20 1601    07/31/20 1601  Saline Lock & Maintain IV Access  Continuous,   Status:  Canceled      07/31/20 1601    07/31/20 1600  Vital Signs - Per Anesthesia Protocol  As Needed,   Status:  Canceled      07/31/20 1601    07/31/20 1600  sodium chloride 0.9 % flush 10 mL  As Needed,   Status:  Discontinued      07/31/20 1601    07/31/20 1600  lidocaine PF 1% (XYLOCAINE) injection 0.5 mL  Once As Needed,   Status:  Discontinued      07/31/20 1601    07/31/20 1600  fentaNYL citrate (PF) (SUBLIMAZE) injection 25 mcg  Every 5 Minutes PRN      07/31/20 1601    07/31/20 1600  dextrose (D50W) 25 g/ 50mL Intravenous Solution 12.5 g  As Needed,    Status:  Discontinued      07/31/20 1601    07/31/20 1400  Instructions on coughing, deep breathing, and incentive spirometry.  Every 4 Hours While Awake,   Status:  Canceled      07/31/20 1014    07/31/20 1100  ceFAZolin in 0.9% normal saline (ANCEF) IVPB solution 2 g  Once      07/31/20 1014    07/31/20 1014  Follow Anesthesia Guidelines / Protocol  Once,   Status:  Canceled      07/31/20 1014    07/31/20 1014  Notify Physician - Standard  Until Discontinued,   Status:  Canceled      07/31/20 1014    07/31/20 0900  cefepime (MAXIPIME) 2 g/100 mL 0.9% NS (mbp)  Every 12 Hours      07/30/20 1817    07/31/20 0836  Creatinine, Serum  STAT     Comments:  Please use existing specimen collected at 03:52 this AM for CBC. Thanks.      07/31/20 0758    07/30/20 1200  vancomycin 750 mg/250 mL 0.9% NS IVPB ([facility])  Every 12 Hours      07/30/20 1115    07/29/20 1053  albuterol (PROVENTIL) nebulizer solution 0.083% 2.5 mg/3mL  Every 4 Hours PRN      07/29/20 1053    07/29/20 0900  gabapentin (NEURONTIN) capsule 100 mg  3 Times Daily      07/29/20 0750    07/29/20 0600  levothyroxine (SYNTHROID, LEVOTHROID) tablet 125 mcg  Every Early Morning      07/28/20 1626    07/29/20 0600  pantoprazole (PROTONIX) EC tablet 40 mg  Every Early Morning      07/28/20 1626    07/28/20 2100  atorvastatin (LIPITOR) tablet 40 mg  Nightly      07/28/20 1626 07/28/20 2100  divalproex (DEPAKOTE) 24 hr tablet 1,000 mg  Nightly      07/28/20 1626 07/28/20 2100  DULoxetine (CYMBALTA) DR capsule 60 mg  Every 12 Hours Scheduled      07/28/20 1626 07/28/20 2100  guaiFENesin (MUCINEX) 12 hr tablet 1,200 mg  2 Times Daily      07/28/20 1626 07/28/20 2100  melatonin tablet 3 mg  Nightly      07/28/20 1626 07/28/20 2100  pilocarpine (SALAGEN) tablet 5 mg  3 Times Daily,   Status:  Discontinued      07/28/20 1626 07/28/20 2100  pramipexole (MIRAPEX) tablet 1.5 mg  Nightly      07/28/20 1626 07/28/20 2100  QUEtiapine (SEROquel) tablet 50 mg   Nightly      07/28/20 1626 07/28/20 2100  tiZANidine (ZANAFLEX) tablet 4 mg  Nightly      07/28/20 1626    07/28/20 2100  sodium chloride 0.9 % flush 10 mL  Every 12 Hours Scheduled      07/28/20 1626 07/28/20 2100  mupirocin (BACTROBAN) 2 % ointment  Every 12 Hours Scheduled      07/28/20 1706    07/28/20 1800  carvedilol (COREG) tablet 6.25 mg  2 Times Daily With Meals      07/28/20 1626 07/28/20 1800  spironolactone (ALDACTONE) tablet 25 mg  2 Times Daily (Diuretics)      07/28/20 1626 07/28/20 1745  nicotine (NICODERM CQ) 21 MG/24HR patch 1 patch  Every 24 Hours Scheduled      07/28/20 1645    07/28/20 1715  allopurinol (ZYLOPRIM) tablet 100 mg  Daily      07/28/20 1626 07/28/20 1700  POC Glucose 4x Daily AC & at Bedtime  4 Times Daily Before Meals & at Bedtime,   Status:  Canceled      07/28/20 1626 07/28/20 1626  ondansetron (ZOFRAN) tablet 4 mg  Every 6 Hours PRN      07/28/20 1627    07/28/20 1626  ondansetron (ZOFRAN) injection 4 mg  Every 6 Hours PRN      07/28/20 1627    07/28/20 1620  sodium chloride 0.9 % flush 10 mL  As Needed      07/28/20 1626 07/28/20 1620  dextrose (GLUTOSE) oral gel 15 g  Every 15 Minutes PRN      07/28/20 1626 07/28/20 1620  dextrose (D50W) 25 g/ 50mL Intravenous Solution 25 g  Every 15 Minutes PRN      07/28/20 1626    07/28/20 1620  glucagon (human recombinant) (GLUCAGEN DIAGNOSTIC) injection 1 mg  Every 15 Minutes PRN      07/28/20 1626    07/28/20 1617  HYDROcodone-acetaminophen (NORCO)  MG per tablet 1 tablet  Every 4 Hours PRN      07/28/20 1626    07/28/20 1616  ALPRAZolam (XANAX) tablet 0.5 mg  3 Times Daily PRN      07/28/20 1626    Unscheduled  Up With Assistance  As Needed      07/28/20 1626    --  SCANNED - TELEMETRY        07/28/20 0000    --  SCANNED - TELEMETRY        07/28/20 0000    --  SCANNED - TELEMETRY        07/28/20 0000    --  SCANNED - TELEMETRY        07/28/20 0000                   Operative/Procedure Notes (last 72 hours)  (Notes from 07/31/20 0830 through 08/03/20 0830)      Roberto Wells DPM at 07/31/20 1553          LOWER EXTREMITY DEBRIDEMENT  Progress Note    Robyn Minaya  7/31/2020    Pre-op Diagnosis:   Wound of left lower extremity, subsequent encounter [S81.802D]       Post-Op Diagnosis Codes:     * Wound of left lower extremity, subsequent encounter [S81.802D]    Procedure/CPT® Codes:        Procedure(s):  1. LOWER POSTERIOR LEG WOUND DEBRIDEMENT - LEFT    Surgeon(s):  Roberto Wells DPM    Anesthesia: General    Staff:   Circulator: Babs Summers RN  Scrub Person: Sathish Bush; Nisha Isaac         Estimated Blood Loss: minimal    Urine Voided: * No values recorded between 7/31/2020  4:30 PM and 7/31/2020  5:11 PM *    Specimens:                Specimens     ID Source Type Tests Collected By Collected At Frozen?      1 Leg, Left Wound · WOUND CULTURE   Roberto Wells DPM 7/31/20 1658      Description: left lower posterior leg wound    A Leg, Left Tissue · TISSUE PATHOLOGY EXAM   Roberto Wells DPM 7/31/20 1657 No     Description: left lower posterior leg wound                Drains: * No LDAs found *    Findings: Consistent with pre-operative diagnosis.     Complications: None          Roberto Wells DPM     Date: 7/31/2020  Time: 17:17        Electronically signed by Roberto Wells DPM at 07/31/20 1719     Roberto Wells DPM at 07/31/20 1553          POST-OPERATIVE NOTE    Pre-Operative Diagnosis:  1. Chronic Non-healing Ulceration Posterior Lower Leg - Left    Post-Operative Diagnosis:  1. Chronic Non-healing Ulceration Posterior Lower Leg - Left    Operative Procedures:  1. Posterior Lower Leg Wound Debridement - Left    Surgeon: Cecilio Wells DPM    Anesthesia: General    Hemostasis: Anatomic Dissection, Epinepherine, Electric cauterization    Estimated Blood Loss: minimal    Pathology:   Specimens     ID Source Type Tests Collected By Collected At Frozen?      1 Leg, Left Wound  · WOUND CULTURE   Roberto Wells DPM 7/31/20 1658      Description: left lower posterior leg wound    A Leg, Left Tissue · TISSUE PATHOLOGY EXAM   Roberto Wells DPM 7/31/20 1657 No     Description: left lower posterior leg wound          Materials: Nothing was implanted during the procedure    Injectables: 40mL 0.5% Marcaine with Epinephrine    Fluids: See anesthesia log.    Drains: None    Complications: None    Post-Op Condition: Stable. Patient tolerated procedure and anesthesia well. Patient left the operating room with vital signs stable and vascular status intact.     Operative Findings: Consistent with pre-operative diagnosis.    Indications for Procedure: This 60 year old patient presents with an infected non-healing wound to the left posterior lower leg.  Patient states that she has failed conservative therapy and opts for surgical intervention at this time. The patient has been NPO for greater than 8 hours. The patient is ready for surgical intervention.    DESCRIPTION OF PROCEDURE  Under mild sedation, patient was brought into the operating room and place on the operating room table in prone position. Pre-operative antibiotic was given. The patient was placed under General anesthesia, then local block was performed at the surgical site using the above mentioned local anesthesia. The foot and ankle were then scrubbed, prepped and draped in the usual aseptic manner.    Attention was then directed left posterior distal leg where the large ulceration was visualized.  A circumferential incision was made about the entire ulceration removing 2 mm of skin at the edges.  The wound was then extensively debrided utilizing combination of sharp dissection and curettage.  A section of tissue was sent to pathology for analysis.  Tissue was also sent to microbiology for culture.  There is extensive necrotic tissue within the wound.  There is moderate liquefactive fatty necrosis within the wound.  The wound  extended to the superficial layer but not through.  The wound did not expose the Achilles tendon or muscle belly.  Electrocautery was utilized for hemostasis.  No vital neural or vascular structures were encountered.  The wound was flushed with copious amounts of sterile normal saline via pulse lavage.  The wound was inspected for any additional pathologic tissue and none was found.  The end resulting wound measured 11.0 cm x 10.0 cm x 0.5 cm.    A bandage consisting of mupirocin ointment, Xeroform, ABD, Kerlix, and Ace bandage was applied.    The patient tolerated the procedures and anesthesia well.  She is transferred to the recovery room vital signs stable and vascular status intact to the left lower extremity.  After period of postoperative monitoring, the patient will be transferred back to the floor under care of the hospitalist team.  She should remain on broad-spectrum antibiotic therapy.  She will need ongoing extensive wound care.    Electronically signed by Roberto Wells DPM at 07/31/20 1757          Physician Progress Notes (last 72 hours) (Notes from 07/31/20 0831 through 08/03/20 0831)      Rajwinder Sandoval APRN at 08/02/20 1227     Attestation signed by Melchor Conway MD at 08/02/20 1526    I personally evaluated and examined the patient in conjunction with ARETHA Keys and agree with the assessment, treatment plan, and disposition of the patient as recorded by her. My history, exam, and further recommendations are:     S:  Doing ok.  Leg Pain better.  Afebrile.      O:  CVS RRR    A:  LLE wound infection due to MRSA, Pseudomonas, Citrobacter    P:  continue IV abx, wound care  Appreciate Podiatry  Appreciate ID    Electronically signed by Melchor Conway MD, 8/2/2020, 15:24.                          [facility]  INPATIENT PROGRESS NOTE    Length of Stay: 5  Date of Admission: 7/28/2020  Primary Care Physician: Tabitha Victor  DO Ana    Subjective   Chief Complaint: Follow-up left leg pain wound  HPI   Patient was sitting on side of bed using coloring pencils.  She reports less edema left lower extremity.  She was pleased with the way the wound looked with Dr. Wells change the dressing yesterday.  She tells me she was able to change her wound dressings at home with the assistance of home health.  She hopes that she and her  together will be able to change the dressings after discharge.  She is expected be on antibiotics at the time of discharge.  Denies nausea, vomiting or abdominal pain.  Denies palpitations, shortness of breath or chest pain.  No oxygen in use.  Infectious disease managing antibiotics.  Surgical wound culture Citrobacter and Pseudomonas.  Previous culture MRSA.  On cefepime and vancomycin    Review of Systems     All pertinent negatives and positives are as above. All other systems have been reviewed and are negative unless otherwise stated.     Objective    Temp:  [97.3 °F (36.3 °C)-98.3 °F (36.8 °C)] 97.3 °F (36.3 °C)  Heart Rate:  [78-86] 84  Resp:  [16-18] 18  BP: (103)/(53-71) 103/58  Physical Exam  Constitutional: She is oriented to person, place, and time.   Lying in bed.  No oxygen in use.  No family in room.  No distress.   HENT:   Head: Normocephalic and atraumatic.   Eyes: Pupils are equal, round, and reactive to light. EOM are normal.   Neck: Normal range of motion. Neck supple.   Cardiovascular: Normal rate, regular rhythm, normal heart sounds and intact distal pulses. Exam reveals no gallop and no friction rub.   No murmur heard.  Normal sinus rhythm 66/100 on telemetry   Pulmonary/Chest: Effort normal and breath sounds normal. She has no wheezes. She has no rales.   No oxygen in use.   Abdominal: Soft. Bowel sounds are normal. She exhibits no distension. There is no tenderness.   Genitourinary:   Genitourinary Comments: Voiding.   Musculoskeletal: She exhibits edema (Left lower extremity,  improved).   Neurological: She is alert and oriented to person, place, and time.   Skin:   Left lower extremity with 10 cm x 12 cm open wound.  Debrided 7/31 by Dr. Wells. Dressing in place  Psychiatric: Normal affect, normal behavior, thought process normal, normal judgment.    Results Review:  I have reviewed the labs, radiology results, and diagnostic studies.    Laboratory Data:   Results from last 7 days   Lab Units 08/02/20  0503 08/01/20  0903 07/31/20  0352   WBC 10*3/mm3 8.60 8.89 9.01   HEMOGLOBIN g/dL 7.1* 7.4* 7.4*   HEMATOCRIT % 23.1* 24.1* 24.2*   PLATELETS 10*3/mm3 340 349 339        Results from last 7 days   Lab Units 07/31/20  0836 07/30/20  0431 07/29/20  0546 07/28/20  1707   SODIUM mmol/L  --  141 140 140   POTASSIUM mmol/L  --  4.3 4.3 4.3   CHLORIDE mmol/L  --  105 103 101   CO2 mmol/L  --  25.0 24.0 24.0   BUN mg/dL  --  13 23* 32*   CREATININE mg/dL 0.74 0.83 1.15* 1.78*   CALCIUM mg/dL  --  9.0 9.2 9.4   BILIRUBIN mg/dL  --   --  <0.2 <0.2   ALK PHOS U/L  --   --  99 98   ALT (SGPT) U/L  --   --  16 15   AST (SGOT) U/L  --   --  16 12   GLUCOSE mg/dL  --  119* 124* 112*     Blood Culture   Date Value Ref Range Status   07/28/2020 No growth at 4 days  Preliminary   07/28/2020 No growth at 4 days  Preliminary     Wound Culture   Date Value Ref Range Status   07/28/2020 Heavy growth (4+) Staphylococcus aureus, MRSA (A)  Final     Comment:       Methicillin resistant Staphylococcus aureus, Patient may be an isolation risk.   07/28/2020 Heavy growth (4+) Mixed Gram Negative Elizabeth (A)  Final     Staphylococcus aureus, MRSA       SHANNON     Clindamycin >=4  Resistant     Erythromycin >=8  Resistant     Inducible Clindamycin Resistance NEG  Negative     Oxacillin >=4  Resistant     Penicillin G >=0.5  Resistant     Rifampin <=0.5  Susceptible     Tetracycline 2  Susceptible     Trimethoprim + Sulfamethoxazole <=10  Susceptible     Vancomycin <=0.5  Susceptible      07/31/2020 1728 08/02/2020 0820  Tissue / Bone Culture - Tissue, Leg, Left [[id number]]    (Abnormal)   Tissue from Leg, Left    Preliminary result Component Value   Tissue Culture Moderate growth (3+) Citrobacter freundiiAbnormal  P    Scant growth (1+) Pseudomonas speciesAbnormal  P   Gram Stain Many (4+) WBCs seen P    Rare (1+) Gram positive cocci P       Susceptibility      Citrobacter freundii     SHANNON     Cefepime <=1  Susceptible     Ceftazidime <=1  Susceptible     Ceftriaxone <=1  Susceptible     Gentamicin <=1  Susceptible     Levofloxacin <=0.12  Susceptible     Piperacillin + Tazobactam <=4  Susceptible     Tetracycline <=1  Susceptible     Trimethoprim + Sulfamethoxazole <=20  Susceptible             Imaging Results (All)     Procedure Component Value Units Date/Time    US Venous Doppler Lower Extremity Left (duplex) [[id number]] Collected:  07/29/20 1508     Updated:  07/29/20 1512    Narrative:       History: Pain and swelling       Impression:       Impression: There is no evidence of deep venous thrombosis or  superficial thrombophlebitis of the left lower extremity.     Comments: Left lower extremity venous duplex exam was performed using  color Doppler flow, Doppler wave form analysis, and grayscale imaging,  with and without compression. There is no evidence of deep venous  thrombosis of the common femoral, superficial femoral, and popliteal  veins. The tibial veins were not visualized due to bandaging. There is  no thrombus identified in the saphenofemoral junction or the greater  saphenous vein. There is no evidence of clot in the right common femoral  vein.     This report was finalized on 07/29/2020 15:09 by Dr. Trace Hurd MD.    CT Lower Extremity Left Without Contrast [[id number]] Collected:  07/28/20 1917     Updated:  07/28/20 1927    Narrative:       EXAMINATION:  CT LOWER EXTREMITY LEFT WO CONTRAST-  7/28/2020 6:42 PM  CDT     HISTORY: Spider bite 3 months ago left calf region.      COMPARISON: No comparison  study.     TECHNIQUE: Spiral CT was performed of the left lower extremity from just  above the knee joint to just below the ankle joint. Sagittal and coronal  images were reconstructed. DLP: 227 mGy-cm.     FINDINGS: There is diffuse subcutaneous edema throughout the visualized  left leg below the knee. Posteriorly, there may be some ulceration  involving the skin and subcutaneous fat that extends back to the fascia  of the gastrocnemius muscle. I do not see a visible soft tissue fluid  collection to suggest a drainable abscess. The tibia and fibula are  intact without evidence of osteomyelitis.       Impression:       1. Diffuse subcutaneous edema throughout the visualized left leg below  the knee.  2. There appears to be skin ulceration and ulceration extending into the  subcutaneous fat posteriorly that extends to the fascia of the  gastrocnemius muscle. No drainable fluid collection is identified on the  CT images.  3. Tibia and fibula are intact without evidence of osteomyelitis.  This report was finalized on 07/28/2020 19:24 by Dr. Evin Elliott MD.        Intake/Output    Intake/Output Summary (Last 24 hours) at 8/2/2020 1227  Last data filed at 8/1/2020 1801  Gross per 24 hour   Intake 600 ml   Output --   Net 600 ml       Scheduled Meds    allopurinol 100 mg Oral Daily   atorvastatin 40 mg Oral Nightly   carvedilol 6.25 mg Oral BID With Meals   cefepime 2 g Intravenous Q12H   divalproex 1,000 mg Oral Nightly   DULoxetine 60 mg Oral Q12H   gabapentin 100 mg Oral TID   guaiFENesin 1,200 mg Oral BID   levothyroxine 125 mcg Oral Q AM   melatonin 3 mg Oral Nightly   mupirocin  Topical Q12H   nicotine 1 patch Transdermal Q24H   pantoprazole 40 mg Oral Q AM   pilocarpine 5 mg Oral TID AC   pramipexole 1.5 mg Oral Nightly   QUEtiapine 50 mg Oral Nightly   sodium chloride 10 mL Intravenous Q12H   spironolactone 25 mg Oral BID   tiZANidine 4 mg Oral Nightly   vancomycin 750 mg Intravenous Q12H       I have reviewed  the patient current medications.     Assessment/Plan     Active Hospital Problems    Diagnosis   • **Wound of left leg     MRSA culture 7/28/20; Citrobacter, Pseudomonas culture 7/31/2020     • Infection of wound due to methicillin resistant Staphylococcus aureus (MRSA)   • Tobacco abuse   • Cellulitis of left lower extremity   • Essential hypertension   • Chronic pain syndrome   • Acute pain   • Anxiety   • DOMINIC (acute kidney injury) (CMS/HCC)   • Prediabetes     Plan:  1.  To ER 7/28 with left leg pain and wound. 4/12/2020 suspected brown recluse spider bite.  Followed by wound care [facility], surgical debridement with wound VAC in May.  Patient felt wound worsened after wound VAC placed.  Followed by wound care clinic every 2 weeks.   2.  CT lower extremity diffuse subcutaneous edema throughout the left leg below the knee.  Appears to be skin ulceration and ulceration extending into subcutaneous fat posteriorly that extends to the fascia of the gastrocnemius muscle.  No drainage able fluid collection identified.  Tibia and fibula intact without evidence of osteomyelitis.  3.  Venous Dopplers negative for DVT  4.  Podiatry consulted.  Dr. Wells recommends elevation, twice daily dressing changes with mupirocin, Xeroform to wound bed, ABD pad, Kerlix and Ace wrap for compression.  Surgical debridement 7/31.  Pathology sent.  No results available.  Discussed with Dr. Wells today. Okay to ambulate.  Follow-up wound clinic  5.  Wound culture 7/28 heavy growth MRSA, gram negative  rain. Wound culture 7/31   Citrobacter freundi and Pseudomonas. Blood cultures no growth at 4 days  6.  Infectious disease consulted.  Dr. Musa suspects pyoderma gangrenosum.  Continue topical antibiotics, empiric antibiotics.  Elevate foot.  Continue vancomycin. Cefepime added 7/30  7.  Acute kidney injury. Creatinine 1.78 on admit down to 0.74.  8.  A1c 6.5.   9.  Discussed smoking cessation.  Nicotine patch.  10.  CRP 4.98.  WBC 11. 6  1 on admission.  8. 6 today.  H/H 9.3/29.3 on admit. 7.1/23.1 today. Asymptomatic CBC  in AM  11.  SCDs for deep vein thrombosis prophylaxis.    12.  Home medications reviewed and resumed if appropriate.  13.   Home health to assist with dressing changes at discharge.  Discussed with nurse Claudia for  to participate with dressing changes if possible.  Patient reports she changed her dressings at home prior to admission.  Nurse, Claudia to observe and instruct patient with next dressing change.     Her  will assist with decision-making if she is unable to make decisions for herself  The above documentation resulted from a face-to-face encounter by me Rajwinder CARIAS, [facility]     Discharge Planning: I expect patient to be discharged to be determined.     Electronically signed by ARETHA Carvajal, 8/2/2020, 12:27.        Electronically signed by Melchor Conway MD at 08/02/20 1526     Roberto Wells DPM at 08/02/20 1034              [facility] - PODIATRY    Today's Date: 08/02/20    Patient Name: Robyn Minaya  MRN: [id number]  CSN: [id number]  PCP: Taibtha Victor DO  Referring Provider: Melchor Conway, *  Attending Provider: Melchor Conway MD  Length of Stay: 5    SUBJECTIVE   Chief Complaint: Shooting pain in left leg    HPI: Robyn Minaya, a 60 y.o.female. 2 days s/p left wound debridement. Pain improved today. Says she is tired and does not want bandage removed during my visit. Nursing to change later.     Past Medical History:   Diagnosis Date   • Acid reflux     RESOLVED PT PT   • Allergic rhinitis    • Arthritis     BACK   • Bipolar 1 disorder (CMS/HCC)    • Chronic back pain    • Degenerative arthritis    • Depression    • Deviated nasal septum    • Eczema    • Gout    • History of colon polyps    • Hyperlipidemia    • Hypertension    • Hypertrophy of nasal turbinates    • Hypokalemia    • Hypothyroidism    • Incontinence in female    •  Insomnia    • Menopause    • Nasal septal deviation    • Nasal valve stenosis    • Overactive bladder    • Plantar fasciitis    • Prediabetes    • RLS (restless legs syndrome)    • Sleep apnea     cpap   • Wears glasses      Past Surgical History:   Procedure Laterality Date   • BLEPHAROPLASTY Bilateral 7/9/2019    Procedure: 1) bilateral upper blepharoplasty with excision of excess of tissue weighing down 2) nasal endoscopy with removal of nasal septal prosthesis;  Surgeon: Mark Anthony Anderson MD;  Location:  [facility] OR;  Service: ENT   • CARPAL TUNNEL RELEASE Bilateral 2004   • CHOLECYSTECTOMY  2013   • COLONOSCOPY  10/17/2014    One 5-6mm tubular adenomatous polyp in the ascending colon; Two less than 4mm hyperplastic polyps in the sigmoid colon; Three rectal hyperplastic polyps; Diverticulosis; Repeat 5 years    • COLONOSCOPY  07/14/2010    Internal hemorrhoids; Repeat 7 years    • COLONOSCOPY N/A 10/23/2019    Procedure: COLONOSCOPY WITH ANESTHESIA;  Surgeon: Robyn Frazier MD;  Location: [facility] ENDOSCOPY;  Service: Gastroenterology   • CYST REMOVAL  2016    neck   • ENDOSCOPIC FUNCTIONAL SINUS SURGERY (FESS) Bilateral 4/16/2019    Procedure: ENDOSCOPIC FUNCTIONAL SINUS SURGERY (bilareral maxillary antrostomy without image guidance);  Surgeon: Mark Anthony Anderson MD;  Location: [facility] OR;  Service: ENT   • ENDOSCOPY N/A 4/11/2019    Esophageal mucosal changes suggestive of eosinophilic esophagitis-biopsied; A few gastric polyps-resected and retrieved-biopsied; Normal examined duodenum-biopsied   • FOOT SURGERY Right 2010    X3   • HERNIA REPAIR     • INTERSTIM PLACEMENT N/A 6/5/2019    Procedure: INTERSTIM STAGES 1 AND 2 LEAD AND GENERATOR PLACEMENT;  Surgeon: Endy Guerrero MD;  Location: [facility] OR;  Service: Urology   • LASER ABLATION      right back   • LEG DEBRIDEMENT Left 7/31/2020    Procedure: LOWER POSTERIOR LEG WOUND DEBRIDEMENT - LEFT;  Surgeon: Roberto Wells DPM;  Location:  [facility] OR;  Service:  Podiatry;  Laterality: Left;   • NASAL ENDOSCOPY N/A 4/16/2019    Procedure:     1. Functional endoscopic sinus surgery with bilateral maxillary antrostomy    2. Bilateral nasal endoscopy with biopsy of nasal septum    3.  Nasal septal prosthesis placement  ;  Surgeon: Mark Anthony Anderson MD;  Location: [facility] OR;  Service: ENT   • NASAL VESTIBULE LESION/PAPILLOMA EXCISION N/A 8/1/2017    Procedure: Repair of nasal vestibular stenosis and septoplasty; cartilage graft from left ear;  Surgeon: Mark Anthony Anderson MD;  Location:  [facility] OR;  Service:    • SEPTOPLASTY N/A 4/16/2019    Procedure: PLACEMENT OF NASAL SEPTAL PROSTHESIS;  Surgeon: Mark Anthony Anderson MD;  Location:  [facility] OR;  Service: ENT   • SEPTOPLASTY Bilateral 1/14/2020    Procedure: Nasal endoscopy with septal debridement, and placement of Silastic stents;  Surgeon: Mark Anthony Anderson MD;  Location:  [facility] OR;  Service: ENT     Family History   Problem Relation Age of Onset   • Cancer Mother    • Diabetes Father    • Hypertension Father    • Cancer Father    • Colon cancer Neg Hx    • Colon polyps Neg Hx    • Esophageal cancer Neg Hx    • Liver cancer Neg Hx    • Liver disease Neg Hx    • Rectal cancer Neg Hx    • Stomach cancer Neg Hx      Social History     Socioeconomic History   • Marital status:      Spouse name: Not on file   • Number of children: Not on file   • Years of education: Not on file   • Highest education level: Not on file   Tobacco Use   • Smoking status: Current Every Day Smoker     Packs/day: 0.50     Years: 40.00     Pack years: 20.00     Types: Cigarettes   • Smokeless tobacco: Never Used   Substance and Sexual Activity   • Alcohol use: Yes     Frequency: Monthly or less     Comment: Rarely-maybe 3x/year   • Drug use: No   • Sexual activity: Defer     Allergies   Allergen Reactions   • Bactrim [Sulfamethoxazole-Trimethoprim] Rash     Itching   • Hctz [Hydrochlorothiazide] Other (See Comments)     Acid reflux   • Januvia [Sitagliptin]  Rash     Current Facility-Administered Medications   Medication Dose Route Frequency Provider Last Rate Last Dose   • albuterol (PROVENTIL) nebulizer solution 0.083% 2.5 mg/3mL  2.5 mg Nebulization Q4H PRN Roberto Wells DPM       • allopurinol (ZYLOPRIM) tablet 100 mg  100 mg Oral Daily Roberto Wells DPM   100 mg at 08/02/20 0820   • ALPRAZolam (XANAX) tablet 0.5 mg  0.5 mg Oral TID PRN Roberto Wells DPM   0.5 mg at 08/02/20 0747   • atorvastatin (LIPITOR) tablet 40 mg  40 mg Oral Nightly Roberto Wells DPM   40 mg at 08/01/20 2125   • carvedilol (COREG) tablet 6.25 mg  6.25 mg Oral BID With Meals Roberto Wells DPM   6.25 mg at 08/02/20 0748   • cefepime (MAXIPIME) 2 g/100 mL 0.9% NS (mbp)  2 g Intravenous Q12H Roberto Wells DPM   2 g at 08/02/20 0942   • dextrose (D50W) 25 g/ 50mL Intravenous Solution 25 g  25 g Intravenous Q15 Min PRN Roberto Wells DPM       • dextrose (GLUTOSE) oral gel 15 g  15 g Oral Q15 Min PRN Roberto Wells DPM       • divalproex (DEPAKOTE) 24 hr tablet 1,000 mg  1,000 mg Oral Nightly Roberto Wells DPM   1,000 mg at 08/01/20 2125   • DULoxetine (CYMBALTA) DR capsule 60 mg  60 mg Oral Q12H Roberto Wells DPM   60 mg at 08/02/20 0820   • gabapentin (NEURONTIN) capsule 100 mg  100 mg Oral TID Roberto Wells DPM   100 mg at 08/02/20 0820   • glucagon (human recombinant) (GLUCAGEN DIAGNOSTIC) injection 1 mg  1 mg Subcutaneous Q15 Min PRN Roberto Wells DPM       • guaiFENesin (MUCINEX) 12 hr tablet 1,200 mg  1,200 mg Oral BID Roberto Wells, DPM   1,200 mg at 08/02/20 0820   • HYDROcodone-acetaminophen (NORCO)  MG per tablet 1 tablet  1 tablet Oral Q4H PRN Roberto Wells, DPM   1 tablet at 08/02/20 0747   • lactated ringers infusion  9 mL/hr Intravenous Continuous Roberto Wells DPM 9 mL/hr at 07/31/20 1639     • levothyroxine (SYNTHROID, LEVOTHROID) tablet 125 mcg  125 mcg Oral Q AM Roberto Wells, DPM   125 mcg at  08/02/20 0523   • melatonin tablet 3 mg  3 mg Oral Nightly Roberto Wells DPM   3 mg at 08/01/20 2125   • mupirocin (BACTROBAN) 2 % ointment   Topical Q12H Roberto Wells DPM       • nicotine (NICODERM CQ) 21 MG/24HR patch 1 patch  1 patch Transdermal Q24H Roberto Wells DPM   1 patch at 08/02/20 0821   • ondansetron (ZOFRAN) tablet 4 mg  4 mg Oral Q6H PRN Roberto Wells DPM        Or   • ondansetron (ZOFRAN) injection 4 mg  4 mg Intravenous Q6H PRN Roberto Wells DPM       • pantoprazole (PROTONIX) EC tablet 40 mg  40 mg Oral Q AM Roberto Wells DPM   40 mg at 08/02/20 0523   • pilocarpine (SALAGEN) tablet 5 mg  5 mg Oral TID Melchor Llamas MD   5 mg at 08/02/20 0747   • pramipexole (MIRAPEX) tablet 1.5 mg  1.5 mg Oral Nightly Roberto Wells DPM   1.5 mg at 08/01/20 2124   • QUEtiapine (SEROquel) tablet 50 mg  50 mg Oral Nightly Roberto Wells DPM   50 mg at 08/01/20 2124   • sodium chloride 0.9 % flush 10 mL  10 mL Intravenous Q12H Roberto Wells DPM   10 mL at 08/02/20 0823   • sodium chloride 0.9 % flush 10 mL  10 mL Intravenous PRN Roberto Wells, DPM       • spironolactone (ALDACTONE) tablet 25 mg  25 mg Oral BID Roberto Wells DPM   25 mg at 08/02/20 0820   • tiZANidine (ZANAFLEX) tablet 4 mg  4 mg Oral Nightly Roberto Wells, DPM   4 mg at 08/01/20 2123   • vancomycin 750 mg/250 mL 0.9% NS IVPB ([facility])  750 mg Intravenous Q12H Roberto Wells DPM   750 mg at 08/02/20 0145     Review of Systems   Constitutional: Positive for activity change. Negative for chills and fever.   HENT: Negative for congestion.    Respiratory: Negative for shortness of breath.    Cardiovascular: Positive for leg swelling.   Gastrointestinal: Negative for nausea and vomiting.   Musculoskeletal: Positive for arthralgias and myalgias.   Skin: Positive for color change and wound.   Neurological: Positive for numbness.   Psychiatric/Behavioral: Positive for sleep disturbance.             OBJECTIVE     Vitals:    20 0746   BP: 103/71   Pulse: 86   Resp: 16   Temp: 98 °F (36.7 °C)   SpO2: 95%       PHYSICAL EXAM  GEN:   NAD, Somnolent today. Pt presents in hospital bed. Accompanied by none.    Foot/Ankle Exam:       General:   Appearance: appears stated age and healthy and obesity    Orientation: AAOx3    Affect: appropriate      VASCULAR      Right Foot Vascularity   Dorsalis pedis:  1+  Posterior tibial:  1+  Edema Gradin+ and pitting  CFT:  3  Pedal Hair Growth:  Present  Varicosities: mild varicosities       Left Foot Vascularity   Dorsalis pedis:  1+  Posterior tibial:  1+  Edema Gradin+, pitting and non-pitting (improving)  CFT:  3  Pedal Hair Growth:  Present  Varicosities: mild varicosities        NEUROLOGIC     Right Foot Neurologic   Normal sensation    Light touch sensation:  Normal  Vibratory sensation:  Normal  Hot/Cold sensation: normal       Left Foot Neurologic   Normal sensation    Light touch sensation:  Normal  Vibratory sensation:  Normal  Hot/cold sensation: normal       MUSCULOSKELETAL      Right Foot Musculoskeletal    Amputation   Right toes amputated: No    Ecchymosis:  None  Tenderness: none    Arch:  Normal     Left Foot Musculoskeletal    Amputation   Left toes amputated: No    Ecchymosis:  None  Tenderness comment:  Generalized to distal lower extremity, worse around wound  Arch:  Normal     MUSCLE STRENGTH     Right Foot Muscle Strength   Normal strength    Foot dorsiflexion:  5  Foot plantar flexion:  5  Foot inversion:  5  Foot eversion:  5     Left Foot Muscle Strength   Foot dorsiflexion:  5  Foot plantar flexion:  5  Foot inversion:  5  Foot eversion:  5     DERMATOLOGIC     Right Foot Dermatologic   Skin: skin intact       Left Foot Dermatologic   Skin: cellulitis (improving), drainage, erythema, maceration (minimal), skin changes and ulcer    Skin: no left foot blister        Left Foot Additional Comments: Dressing in place with ace wrap for  pressure/securement. Mild strikethrough to bandage.  Significant decrease in wound slough and necrotic tissue.  Noticeable improvement in granulation tissue.  No deep probing or tracking.  Improved periwound erythema.     Before:       After:         RADIOLOGY/NUCLEAR:  Us Venous Doppler Lower Extremity Left (duplex)    Result Date: 7/29/2020  Narrative: History: Pain and swelling      Impression: Impression: There is no evidence of deep venous thrombosis or superficial thrombophlebitis of the left lower extremity.  Comments: Left lower extremity venous duplex exam was performed using color Doppler flow, Doppler wave form analysis, and grayscale imaging, with and without compression. There is no evidence of deep venous thrombosis of the common femoral, superficial femoral, and popliteal veins. The tibial veins were not visualized due to bandaging. There is no thrombus identified in the saphenofemoral junction or the greater saphenous vein. There is no evidence of clot in the right common femoral vein.  This report was finalized on 07/29/2020 15:09 by Dr. Trace Hurd MD.    Ct Lower Extremity Left Without Contrast    Result Date: 7/28/2020  Narrative: EXAMINATION:  CT LOWER EXTREMITY LEFT WO CONTRAST-  7/28/2020 6:42 PM CDT  HISTORY: Spider bite 3 months ago left calf region.  COMPARISON: No comparison study.  TECHNIQUE: Spiral CT was performed of the left lower extremity from just above the knee joint to just below the ankle joint. Sagittal and coronal images were reconstructed. DLP: 227 mGy-cm.  FINDINGS: There is diffuse subcutaneous edema throughout the visualized left leg below the knee. Posteriorly, there may be some ulceration involving the skin and subcutaneous fat that extends back to the fascia of the gastrocnemius muscle. I do not see a visible soft tissue fluid collection to suggest a drainable abscess. The tibia and fibula are intact without evidence of osteomyelitis.      Impression: 1. Diffuse  subcutaneous edema throughout the visualized left leg below the knee. 2. There appears to be skin ulceration and ulceration extending into the subcutaneous fat posteriorly that extends to the fascia of the gastrocnemius muscle. No drainable fluid collection is identified on the CT images. 3. Tibia and fibula are intact without evidence of osteomyelitis. This report was finalized on 07/28/2020 19:24 by Dr. Evin Elliott MD.      LABORATORY/CULTURE RESULTS:  Results from last 7 days   Lab Units 08/02/20  0503 08/01/20  0903 07/31/20  0352   WBC 10*3/mm3 8.60 8.89 9.01   HEMOGLOBIN g/dL 7.1* 7.4* 7.4*   HEMATOCRIT % 23.1* 24.1* 24.2*   PLATELETS 10*3/mm3 340 349 339     Results from last 7 days   Lab Units 07/31/20  0836 07/30/20  0431 07/29/20  0546 07/28/20  1707   SODIUM mmol/L  --  141 140 140   POTASSIUM mmol/L  --  4.3 4.3 4.3   CHLORIDE mmol/L  --  105 103 101   CO2 mmol/L  --  25.0 24.0 24.0   BUN mg/dL  --  13 23* 32*   CREATININE mg/dL 0.74 0.83 1.15* 1.78*   CALCIUM mg/dL  --  9.0 9.2 9.4   BILIRUBIN mg/dL  --   --  <0.2 <0.2   ALK PHOS U/L  --   --  99 98   ALT (SGPT) U/L  --   --  16 15   AST (SGOT) U/L  --   --  16 12   GLUCOSE mg/dL  --  119* 124* 112*         Microbiology Results (last 10 days)     Procedure Component Value - Date/Time    Tissue / Bone Culture - Tissue, Leg, Left [[id number]]  (Abnormal)  (Susceptibility) Collected:  07/31/20 1728    Lab Status:  Preliminary result Specimen:  Tissue from Leg, Left Updated:  08/02/20 0820     Tissue Culture Moderate growth (3+) Citrobacter freundii      Scant growth (1+) Pseudomonas species     Gram Stain Many (4+) WBCs seen      Rare (1+) Gram positive cocci    Susceptibility      Citrobacter freundii     SHANNON     Cefepime Susceptible     Ceftazidime Susceptible     Ceftriaxone Susceptible     Gentamicin Susceptible     Levofloxacin Susceptible     Piperacillin + Tazobactam Susceptible     Tetracycline Susceptible     Trimethoprim + Sulfamethoxazole  Susceptible                Susceptibility Comments     Citrobacter freundii    Cefazolin sensitivity will not be reported for Enterobacteriaceae in non-urine isolates. If cefazolin is preferred, please call the microbiology lab to request an E-test.  With the exception of urinary-sourced infections, aminoglycosides should not be used as monotherapy.             COVID-19,CEPHEID,COR/FLORENCIO/[facility] IN-HOUSE(OR EMERGENT/ADD-ON),NP SWAB IN TRANSPORT MEDIA 3-4 HR TAT - Swab, Nasopharynx [[id number]]  (Normal) Collected:  07/30/20 1757    Lab Status:  Final result Specimen:  Swab from Nasopharynx Updated:  07/31/20 0916     COVID19 Not Detected    Narrative:       Fact sheet for providers: https://www.fda.gov/media/177654/download     Fact sheet for patients: https://www.fda.gov/media/649828/download    Wound Culture - Wound, Leg, Left [[id number]]  (Abnormal)  (Susceptibility) Collected:  07/28/20 1715    Lab Status:  Final result Specimen:  Wound from Leg, Left Updated:  07/31/20 0955     Wound Culture Heavy growth (4+) Staphylococcus aureus, MRSA     Comment:   Methicillin resistant Staphylococcus aureus, Patient may be an isolation risk.         Heavy growth (4+) Mixed Gram Negative Elizabeth     Gram Stain Few (2+) WBCs seen      Few (2+) Gram positive cocci      Many (4+) Gram negative bacilli    Susceptibility      Staphylococcus aureus, MRSA     SHANNON     Clindamycin Resistant     Erythromycin Resistant     Inducible Clindamycin Resistance Negative     Oxacillin Resistant     Penicillin G Resistant     Rifampin Susceptible     Tetracycline Susceptible     Trimethoprim + Sulfamethoxazole Susceptible     Vancomycin Susceptible                    Blood Culture - Blood, Arm, Right [[id number]] Collected:  07/28/20 1706    Lab Status:  Preliminary result Specimen:  Blood from Arm, Right Updated:  08/01/20 1745     Blood Culture No growth at 4 days    Blood Culture - Blood, Arm, Left [[id number]] Collected:  07/28/20 1706    Lab Status:   Preliminary result Specimen:  Blood from Arm, Left Updated:  08/01/20 4018     Blood Culture No growth at 4 days          PATHOLOGY RESULTS:       ASSESSMENT/PLAN   1.  Chronic wound left lower extremity - 2 days s/p surgical debridement  2.  Cellulitis of left lower extremity-MRSA positive   3.  Peripheral edema  4.  Chronic pain syndrome    Labs and cultures reviewed - ID following   Hospitalist progress notes reviewed   Continued to encourage leg elevation when in bed - okay to ambulate when needed.   Continue twice daily dressing changes with Mupirocin, xeroform to wound bed, ABD pad, kerlix, and ACE wrap for compression.      We will continue to follow throughout hospital course of stay. Upon discharge she will need follow-up in outpatient [facility].     Lab Frequency Next Occurrence   Follow Anesthesia Guidelines / Standing Orders Once 07/28/2017   Follow Anesthesia Guidelines / Standing Orders Once 04/27/2019   Provide NPO Instructions to Patient Once 04/01/2019   Follow Anesthesia Guidelines / Standing Orders Once 04/10/2019   Follow Anesthesia Guidelines / Standing Orders Once 05/10/2019   Provide NPO Instructions to Patient Once 05/14/2019   Follow Anesthesia Guidelines / Standing Orders Once 10/14/2019   Obtain Informed Consent Once 10/19/2019   Diet: Once 10/23/2019   ZEKE by IFA, Reflex 9-biomarkers profile Once 12/11/2020   Urinalysis With Microscopic - Urine, Clean Catch Once 03/09/2021   Angiotensin Converting Enzyme Once 03/09/2021   CBC & Differential Once 03/09/2021       This document has been electronically signed by Roberto Wells DPM on August 2, 2020 10:35           Electronically signed by Roberto Wells DPM at 08/02/20 1037     Rajwinder Sandoval APRN at 08/01/20 1342     Attestation signed by Melchor Conway MD at 08/02/20 1526    I personally evaluated and examined the patient in conjunction with ARETHA Keys and agree with the assessment, treatment plan, and disposition  of the patient as recorded by her. My history, exam, and further recommendations are:     S:  Doing ok.  Leg Pain better.  Afebrile.      O:  CVS RRR    A:  LLE wound infection with cellulitis due to MRSA and gram negatives    P:  continue IV abx, wound care  Appreciate Podiatry  Appreciate ID    Electronically signed by Melchor Conway MD, 08/01/20, 3:26 PM.                        [facility] Medicine Services  INPATIENT PROGRESS NOTE    Length of Stay: 4  Date of Admission: 7/28/2020  Primary Care Physician: Tabitha Victor DO    Subjective   Chief Complaint: Follow-up left leg pain, wound  HPI   Patient was seen with Dr. Wells earlier today during dressing change.  Patient was lying in bed and complains of shooting pain in surgical site.  Wound debrided yesterday by Dr. Wells and surgery with 12 cm x 10 cm open wound left posterior leg.  Wound bed much  today.  She denies nausea, vomiting or abdominal pain.  Denies chest pain, palpitations, shortness of breath.    Review of Systems     All pertinent negatives and positives are as above. All other systems have been reviewed and are negative unless otherwise stated.     Objective    Temp:  [97.5 °F (36.4 °C)-99.2 °F (37.3 °C)] 97.6 °F (36.4 °C)  Heart Rate:  [64-93] 75  Resp:  [14-20] 16  BP: ()/() 101/49  Physical Exam  Constitutional: She is oriented to person, place, and time.   Lying in bed.  No oxygen in use.  No family in room.  No distress.   HENT:   Head: Normocephalic and atraumatic.   Eyes: Pupils are equal, round, and reactive to light. EOM are normal.   Neck: Normal range of motion. Neck supple.   Cardiovascular: Normal rate, regular rhythm, normal heart sounds and intact distal pulses. Exam reveals no gallop and no friction rub.   No murmur heard.  Normal sinus rhythm    on telemetry   Pulmonary/Chest: Effort normal and breath sounds normal. She has no wheezes. She has no rales.   No oxygen  in use.   Abdominal: Soft. Bowel sounds are normal. She exhibits no distension. There is no tenderness.   Genitourinary:   Genitourinary Comments: Voiding.   Musculoskeletal: She exhibits edema (Left lower extremity, improved).   Neurological: She is alert and oriented to person, place, and time.   Skin:   Left lower extremity with 10 cm x 12 cm open wound.  Debrided 7/31 by Dr. Wells.  Wound bed much .  Minimal blistering of skin.  Psychiatric: Normal affect, normal behavior, thought process normal, normal judgment.    Results Review:  I have reviewed the labs, radiology results, and diagnostic studies.    Laboratory Data:   Results from last 7 days   Lab Units 08/01/20  0903 07/31/20  0352 07/30/20  0431   WBC 10*3/mm3 8.89 9.01 11.31*   HEMOGLOBIN g/dL 7.4* 7.4* 8.3*   HEMATOCRIT % 24.1* 24.2* 27.0*   PLATELETS 10*3/mm3 349 339 419        Results from last 7 days   Lab Units 07/31/20  0836 07/30/20  0431 07/29/20  0546 07/28/20  1707   SODIUM mmol/L  --  141 140 140   POTASSIUM mmol/L  --  4.3 4.3 4.3   CHLORIDE mmol/L  --  105 103 101   CO2 mmol/L  --  25.0 24.0 24.0   BUN mg/dL  --  13 23* 32*   CREATININE mg/dL 0.74 0.83 1.15* 1.78*   CALCIUM mg/dL  --  9.0 9.2 9.4   BILIRUBIN mg/dL  --   --  <0.2 <0.2   ALK PHOS U/L  --   --  99 98   ALT (SGPT) U/L  --   --  16 15   AST (SGOT) U/L  --   --  16 12   GLUCOSE mg/dL  --  119* 124* 112*     Blood Culture   Date Value Ref Range Status   07/28/2020 No growth at 3 days  Preliminary   07/28/2020 No growth at 3 days  Preliminary     Wound Culture   Date Value Ref Range Status   07/28/2020 Heavy growth (4+) Staphylococcus aureus, MRSA (A)  Final     Comment:       Methicillin resistant Staphylococcus aureus, Patient may be an isolation risk.   07/28/2020 Heavy growth (4+) Mixed Gram Negative Elizabeth (A)  Final     Staphylococcus aureus, MRSA       SHANNON     Clindamycin >=4  Resistant     Erythromycin >=8  Resistant     Inducible Clindamycin Resistance NEG  Negative       Oxacillin >=4  Resistant     Penicillin G >=0.5  Resistant     Rifampin <=0.5  Susceptible     Tetracycline 2  Susceptible     Trimethoprim + Sulfamethoxazole <=10  Susceptible     Vancomycin <=0.5  Susceptible      Imaging Results (All)     Procedure Component Value Units Date/Time    US Venous Doppler Lower Extremity Left (duplex) [[id number]] Collected:  07/29/20 1508     Updated:  07/29/20 1512    Narrative:       History: Pain and swelling       Impression:       Impression: There is no evidence of deep venous thrombosis or  superficial thrombophlebitis of the left lower extremity.     Comments: Left lower extremity venous duplex exam was performed using  color Doppler flow, Doppler wave form analysis, and grayscale imaging,  with and without compression. There is no evidence of deep venous  thrombosis of the common femoral, superficial femoral, and popliteal  veins. The tibial veins were not visualized due to bandaging. There is  no thrombus identified in the saphenofemoral junction or the greater  saphenous vein. There is no evidence of clot in the right common femoral  vein.     This report was finalized on 07/29/2020 15:09 by Dr. Trace Hurd MD.    CT Lower Extremity Left Without Contrast [[id number]] Collected:  07/28/20 1917     Updated:  07/28/20 1927    Narrative:       EXAMINATION:  CT LOWER EXTREMITY LEFT WO CONTRAST-  7/28/2020 6:42 PM  CDT     HISTORY: Spider bite 3 months ago left calf region.      COMPARISON: No comparison study.     TECHNIQUE: Spiral CT was performed of the left lower extremity from just  above the knee joint to just below the ankle joint. Sagittal and coronal  images were reconstructed. DLP: 227 mGy-cm.     FINDINGS: There is diffuse subcutaneous edema throughout the visualized  left leg below the knee. Posteriorly, there may be some ulceration  involving the skin and subcutaneous fat that extends back to the fascia  of the gastrocnemius muscle. I do not see a visible  soft tissue fluid  collection to suggest a drainable abscess. The tibia and fibula are  intact without evidence of osteomyelitis.       Impression:       1. Diffuse subcutaneous edema throughout the visualized left leg below  the knee.  2. There appears to be skin ulceration and ulceration extending into the  subcutaneous fat posteriorly that extends to the fascia of the  gastrocnemius muscle. No drainable fluid collection is identified on the  CT images.  3. Tibia and fibula are intact without evidence of osteomyelitis.  This report was finalized on 07/28/2020 19:24 by Dr. Evin Elliott MD.          Intake/Output    Intake/Output Summary (Last 24 hours) at 8/1/2020 1342  Last data filed at 8/1/2020 0834  Gross per 24 hour   Intake 710 ml   Output 250 ml   Net 460 ml       Scheduled Meds    allopurinol 100 mg Oral Daily   atorvastatin 40 mg Oral Nightly   carvedilol 6.25 mg Oral BID With Meals   cefepime 2 g Intravenous Q12H   divalproex 1,000 mg Oral Nightly   DULoxetine 60 mg Oral Q12H   gabapentin 100 mg Oral TID   guaiFENesin 1,200 mg Oral BID   levothyroxine 125 mcg Oral Q AM   melatonin 3 mg Oral Nightly   mupirocin  Topical Q12H   nicotine 1 patch Transdermal Q24H   pantoprazole 40 mg Oral Q AM   pilocarpine 5 mg Oral TID AC   pramipexole 1.5 mg Oral Nightly   QUEtiapine 50 mg Oral Nightly   sodium chloride 10 mL Intravenous Q12H   spironolactone 25 mg Oral BID   tiZANidine 4 mg Oral Nightly   vancomycin 750 mg Intravenous Q12H       I have reviewed the patient current medications.     Assessment/Plan     Active Hospital Problems    Diagnosis   • **Wound of left leg     MRSA culture 7/28/20     • Infection of wound due to methicillin resistant Staphylococcus aureus (MRSA)   • Tobacco abuse   • Cellulitis of left lower extremity   • Essential hypertension   • Chronic pain syndrome   • Acute pain   • Anxiety   • DOMINIC (acute kidney injury) (CMS/HCC)   • Prediabetes     Plan:  1.  To ER 7/28 with complaints of  left leg pain and wound. 4/12/2020 suspected brown recluse spider bite.  Followed by wound care [facility], surgical debridement with wound VAC in May.  Patient felt wound worsened after wound VAC placed.  Followed by wound care clinic every 2 weeks.   2.  CT lower extremity diffuse subcutaneous edema throughout the left leg below the knee.  Appears to be skin ulceration and ulceration extending into subcutaneous fat posteriorly that extends to the fascia of the gastrocnemius muscle.  No drainage able fluid collection identified.  Tibia and fibula intact without evidence of osteomyelitis.  3.  Venous Dopplers negative for DVT  4.  Podiatry consulted.  Dr. Wells recommends elevation, twice daily dressing changes with mupirocin, Xeroform to wound bed, ABD pad, Kerlix and Ace wrap for compression.  Surgical debridement 7/31.  Pathology sent.  Discussed with Dr. Wells today.  Observed wound during dressing change.  5.  Wound culture 7/28 heavy growth MRSA, gram negative rain. Wound culture 7/31 moderate gram negative bacilli.  Blood cultures no growth at 3 days  6.  Infectious disease consulted.  Dr. Musa suspects pyoderma gangrenosum.  Continue topical antibiotics, empiric antibiotics.  Elevate foot.  Continue vancomycin. Cefepime added 7/30  7.  Acute kidney injury. Creatinine 1.78 on admit down to 0.74 today.  8.  A1c 6.5.   9.  Discussed smoking cessation.  Nicotine patch.  10.  CRP 4.98.  WBC 11. 6 1 on admission.  8.89 today.  H/H 9.3/29.3 on admit.  7.4/24.1 today.  CBC tomorrow.  11.  SCDs for deep vein thrombosis prophylaxis.  Add Lovenox  12.  Home medications reviewed and resumed if appropriate.  13.  Will need home health to assist with dressing changes at discharge.  Discussed with nurse Claudia for  to participate with dressing changes if possible.     Her  will assist with decision-making if she is unable to make decisions for herself  The above documentation resulted from a face-to-face  encounter by me Rajwinder CARIAS, [facility]    Discharge Planning: I expect patient to be discharged to be determined.    Electronically signed by ARETHA Carvajal, 8/1/2020, 13:42.        Electronically signed by Melchor Conway MD at 08/02/20 1526     Roberto Wells DPM at 08/01/20 1115              [facility] - PODIATRY    Today's Date: 08/01/20    Patient Name: Robyn Minaya  MRN: [id number]  CSN: [id number]  PCP: Tabitha Victor DO  Referring Provider: Melchor Conway, *  Attending Provider: Melchor Conway MD  Length of Stay: 4    SUBJECTIVE   Chief Complaint: Shooting pain in left leg    HPI: Robyn Minaya, a 60 y.o.female. 1 day s/p left wound debridement. Pain improved today. She has been keeping leg elevated in bed. Says she plans to quit smoking at discharge.     Past Medical History:   Diagnosis Date   • Acid reflux     RESOLVED PT PT   • Allergic rhinitis    • Arthritis     BACK   • Bipolar 1 disorder (CMS/HCC)    • Chronic back pain    • Degenerative arthritis    • Depression    • Deviated nasal septum    • Eczema    • Gout    • History of colon polyps    • Hyperlipidemia    • Hypertension    • Hypertrophy of nasal turbinates    • Hypokalemia    • Hypothyroidism    • Incontinence in female    • Insomnia    • Menopause    • Nasal septal deviation    • Nasal valve stenosis    • Overactive bladder    • Plantar fasciitis    • Prediabetes    • RLS (restless legs syndrome)    • Sleep apnea     cpap   • Wears glasses      Past Surgical History:   Procedure Laterality Date   • BLEPHAROPLASTY Bilateral 7/9/2019    Procedure: 1) bilateral upper blepharoplasty with excision of excess of tissue weighing down 2) nasal endoscopy with removal of nasal septal prosthesis;  Surgeon: Mark Anthony Anderson MD;  Location: [facility] OR;  Service: ENT   • CARPAL TUNNEL RELEASE Bilateral 2004   • CHOLECYSTECTOMY  2013   • COLONOSCOPY  10/17/2014    One 5-6mm tubular  adenomatous polyp in the ascending colon; Two less than 4mm hyperplastic polyps in the sigmoid colon; Three rectal hyperplastic polyps; Diverticulosis; Repeat 5 years    • COLONOSCOPY  07/14/2010    Internal hemorrhoids; Repeat 7 years    • COLONOSCOPY N/A 10/23/2019    Procedure: COLONOSCOPY WITH ANESTHESIA;  Surgeon: Robyn Frazier MD;  Location: [facility] ENDOSCOPY;  Service: Gastroenterology   • CYST REMOVAL  2016    neck   • ENDOSCOPIC FUNCTIONAL SINUS SURGERY (FESS) Bilateral 4/16/2019    Procedure: ENDOSCOPIC FUNCTIONAL SINUS SURGERY (bilareral maxillary antrostomy without image guidance);  Surgeon: Mark Anthony Anderson MD;  Location: [facility] OR;  Service: ENT   • ENDOSCOPY N/A 4/11/2019    Esophageal mucosal changes suggestive of eosinophilic esophagitis-biopsied; A few gastric polyps-resected and retrieved-biopsied; Normal examined duodenum-biopsied   • FOOT SURGERY Right 2010    X3   • HERNIA REPAIR     • INTERSTIM PLACEMENT N/A 6/5/2019    Procedure: INTERSTIM STAGES 1 AND 2 LEAD AND GENERATOR PLACEMENT;  Surgeon: Endy Guerrero MD;  Location: [facility] OR;  Service: Urology   • LASER ABLATION      right back   • NASAL ENDOSCOPY N/A 4/16/2019    Procedure:     1. Functional endoscopic sinus surgery with bilateral maxillary antrostomy    2. Bilateral nasal endoscopy with biopsy of nasal septum    3.  Nasal septal prosthesis placement  ;  Surgeon: Mark Anthony Anderson MD;  Location: [facility] OR;  Service: ENT   • NASAL VESTIBULE LESION/PAPILLOMA EXCISION N/A 8/1/2017    Procedure: Repair of nasal vestibular stenosis and septoplasty; cartilage graft from left ear;  Surgeon: Mark Anthony Anderson MD;  Location: [facility] OR;  Service:    • SEPTOPLASTY N/A 4/16/2019    Procedure: PLACEMENT OF NASAL SEPTAL PROSTHESIS;  Surgeon: Mark Anthony Anderson MD;  Location: [facility] OR;  Service: ENT   • SEPTOPLASTY Bilateral 1/14/2020    Procedure: Nasal endoscopy with septal debridement, and placement of Silastic stents;  Surgeon: Mark Anthony Anderson  MD [place];  Location: [facility];  Service: ENT     Family History   Problem Relation Age of Onset   • Cancer Mother    • Diabetes Father    • Hypertension Father    • Cancer Father    • Colon cancer Neg Hx    • Colon polyps Neg Hx    • Esophageal cancer Neg Hx    • Liver cancer Neg Hx    • Liver disease Neg Hx    • Rectal cancer Neg Hx    • Stomach cancer Neg Hx      Social History     Socioeconomic History   • Marital status:      Spouse name: Not on file   • Number of children: Not on file   • Years of education: Not on file   • Highest education level: Not on file   Tobacco Use   • Smoking status: Current Every Day Smoker     Packs/day: 0.50     Years: 40.00     Pack years: 20.00     Types: Cigarettes   • Smokeless tobacco: Never Used   Substance and Sexual Activity   • Alcohol use: Yes     Frequency: Monthly or less     Comment: Rarely-maybe 3x/year   • Drug use: No   • Sexual activity: Defer     Allergies   Allergen Reactions   • Bactrim [Sulfamethoxazole-Trimethoprim] Rash     Itching   • Hctz [Hydrochlorothiazide] Other (See Comments)     Acid reflux   • Januvia [Sitagliptin] Rash     Current Facility-Administered Medications   Medication Dose Route Frequency Provider Last Rate Last Dose   • albuterol (PROVENTIL) nebulizer solution 0.083% 2.5 mg/3mL  2.5 mg Nebulization Q4H PRN Lavelle Wellsck A, DPM       • allopurinol (ZYLOPRIM) tablet 100 mg  100 mg Oral Daily Roberto Wells A, DPM   100 mg at 08/01/20 0815   • ALPRAZolam (XANAX) tablet 0.5 mg  0.5 mg Oral TID PRN Roberto Wells A, DPM   0.5 mg at 07/30/20 0351   • atorvastatin (LIPITOR) tablet 40 mg  40 mg Oral Nightly Lavelle Wellsck A, DPM   40 mg at 07/31/20 2051   • carvedilol (COREG) tablet 6.25 mg  6.25 mg Oral BID With Meals Roberto Wells A, DPM   6.25 mg at 08/01/20 0815   • cefepime (MAXIPIME) 2 g/100 mL 0.9% NS (mbp)  2 g Intravenous Q12H Roberto Wells A, DPM   2 g at 08/01/20 0815   • dextrose (D50W) 25 g/ 50mL Intravenous Solution 25  g  25 g Intravenous Q15 Min PRN Roberto Wells DPM       • dextrose (GLUTOSE) oral gel 15 g  15 g Oral Q15 Min PRN Roberto Wells DPM       • divalproex (DEPAKOTE) 24 hr tablet 1,000 mg  1,000 mg Oral Nightly Roberto Wells DPM   1,000 mg at 07/31/20 2051   • DULoxetine (CYMBALTA) DR capsule 60 mg  60 mg Oral Q12H Roberto Wells DPM   60 mg at 08/01/20 0815   • gabapentin (NEURONTIN) capsule 100 mg  100 mg Oral TID Roberto Wells DPM   100 mg at 08/01/20 0815   • glucagon (human recombinant) (GLUCAGEN DIAGNOSTIC) injection 1 mg  1 mg Subcutaneous Q15 Min PRN Roberto Wells DPM       • guaiFENesin (MUCINEX) 12 hr tablet 1,200 mg  1,200 mg Oral BID Roberto Wells DPM   1,200 mg at 08/01/20 0815   • HYDROcodone-acetaminophen (NORCO)  MG per tablet 1 tablet  1 tablet Oral Q4H PRN Roberto Wells DPM   1 tablet at 08/01/20 0904   • lactated ringers infusion  9 mL/hr Intravenous Continuous Roberto Wells DPM 9 mL/hr at 07/31/20 1639     • levothyroxine (SYNTHROID, LEVOTHROID) tablet 125 mcg  125 mcg Oral Q AM Roberto Wells DPM   125 mcg at 08/01/20 0508   • melatonin tablet 3 mg  3 mg Oral Nightly Roberto Wells DPM   3 mg at 07/31/20 2051   • Morphine 1mg/ml infusion 30ml  2 mg/hr Intravenous Once Roberto Wells DPM       • mupirocin (BACTROBAN) 2 % ointment   Topical Q12H Roberto Wells DPM       • nicotine (NICODERM CQ) 21 MG/24HR patch 1 patch  1 patch Transdermal Q24H Roberto Wells DPM   1 patch at 08/01/20 0816   • ondansetron (ZOFRAN) tablet 4 mg  4 mg Oral Q6H PRN Roberto Wells DPM        Or   • ondansetron (ZOFRAN) injection 4 mg  4 mg Intravenous Q6H PRN Roberto Wells DPM       • pantoprazole (PROTONIX) EC tablet 40 mg  40 mg Oral Q AM Roberto Wells DPM   40 mg at 08/01/20 0508   • pilocarpine (SALAGEN) tablet 5 mg  5 mg Oral TID Roberto Wells DPM   5 mg at 08/01/20 0815   • pramipexole (MIRAPEX) tablet 1.5 mg  1.5 mg Oral Nightly  Russell, Stanton A, DPM   1.5 mg at 20   • QUEtiapine (SEROquel) tablet 50 mg  50 mg Oral Nightly Russell, Roberto A, DPM   50 mg at 20   • sodium chloride 0.9 % flush 10 mL  10 mL Intravenous Q12H Russell, Roberto A, DPM   10 mL at 20 08   • sodium chloride 0.9 % flush 10 mL  10 mL Intravenous PRN Russell, Stanton A, DPM       • spironolactone (ALDACTONE) tablet 25 mg  25 mg Oral BID Russell, Roberto A, DPM   25 mg at 20 0815   • tiZANidine (ZANAFLEX) tablet 4 mg  4 mg Oral Nightly Russell, Roberto A, DPM   4 mg at 20   • vancomycin 750 mg/250 mL 0.9% NS IVPB ([facility])  750 mg Intravenous Q12H Russell, Stanton A, DPM   750 mg at 20 0037     Review of Systems   Constitutional: Positive for activity change. Negative for chills and fever.   HENT: Negative for congestion.    Respiratory: Negative for shortness of breath.    Cardiovascular: Positive for leg swelling.   Gastrointestinal: Negative for nausea and vomiting.   Musculoskeletal: Positive for arthralgias and myalgias.   Skin: Positive for color change and wound.   Neurological: Positive for numbness.   Psychiatric/Behavioral: Positive for sleep disturbance.       OBJECTIVE     Vitals:    20 1112   BP: 101/49   Pulse: 75   Resp: 16   Temp: 97.6 °F (36.4 °C)   SpO2: 95%       PHYSICAL EXAM  GEN:   A&Ox3, NAD. Pt presents in hospital bed. Accompanied by nursing staff.    Foot/Ankle Exam:       General:   Appearance: appears stated age and healthy and obesity    Orientation: AAOx3    Affect: appropriate      VASCULAR      Right Foot Vascularity   Dorsalis pedis:  1+  Posterior tibial:  1+  Edema Gradin+ and pitting  CFT:  3  Pedal Hair Growth:  Present  Varicosities: mild varicosities       Left Foot Vascularity   Dorsalis pedis:  1+  Posterior tibial:  1+  Edema Gradin+, pitting and non-pitting (improving)  CFT:  3  Pedal Hair Growth:  Present  Varicosities: mild varicosities        NEUROLOGIC     Right  Foot Neurologic   Normal sensation    Light touch sensation:  Normal  Vibratory sensation:  Normal  Hot/Cold sensation: normal       Left Foot Neurologic   Normal sensation    Light touch sensation:  Normal  Vibratory sensation:  Normal  Hot/cold sensation: normal       MUSCULOSKELETAL      Right Foot Musculoskeletal    Amputation   Right toes amputated: No    Ecchymosis:  None  Tenderness: none    Arch:  Normal     Left Foot Musculoskeletal    Amputation   Left toes amputated: No    Ecchymosis:  None  Tenderness comment:  Generalized to distal lower extremity, worse around wound  Arch:  Normal     MUSCLE STRENGTH     Right Foot Muscle Strength   Normal strength    Foot dorsiflexion:  5  Foot plantar flexion:  5  Foot inversion:  5  Foot eversion:  5     Left Foot Muscle Strength   Foot dorsiflexion:  5  Foot plantar flexion:  5  Foot inversion:  5  Foot eversion:  5     DERMATOLOGIC     Right Foot Dermatologic   Skin: skin intact       Left Foot Dermatologic   Skin: cellulitis (improving), drainage, erythema, maceration (minimal), skin changes and ulcer    Skin: no left foot blister        Left Foot Additional Comments: Dressing in place with ace wrap for pressure/securement. Mild strikethrough to bandage.  Significant decrease in wound slough and necrotic tissue.  Noticeable improvement in granulation tissue.  No deep probing or tracking.  Improved periwound erythema.     Before:       After:         RADIOLOGY/NUCLEAR:  Us Venous Doppler Lower Extremity Left (duplex)    Result Date: 7/29/2020  Narrative: History: Pain and swelling      Impression: Impression: There is no evidence of deep venous thrombosis or superficial thrombophlebitis of the left lower extremity.  Comments: Left lower extremity venous duplex exam was performed using color Doppler flow, Doppler wave form analysis, and grayscale imaging, with and without compression. There is no evidence of deep venous thrombosis of the common femoral, superficial  femoral, and popliteal veins. The tibial veins were not visualized due to bandaging. There is no thrombus identified in the saphenofemoral junction or the greater saphenous vein. There is no evidence of clot in the right common femoral vein.  This report was finalized on 07/29/2020 15:09 by Dr. Trace Hurd MD.    Ct Lower Extremity Left Without Contrast    Result Date: 7/28/2020  Narrative: EXAMINATION:  CT LOWER EXTREMITY LEFT WO CONTRAST-  7/28/2020 6:42 PM CDT  HISTORY: Spider bite 3 months ago left calf region.  COMPARISON: No comparison study.  TECHNIQUE: Spiral CT was performed of the left lower extremity from just above the knee joint to just below the ankle joint. Sagittal and coronal images were reconstructed. DLP: 227 mGy-cm.  FINDINGS: There is diffuse subcutaneous edema throughout the visualized left leg below the knee. Posteriorly, there may be some ulceration involving the skin and subcutaneous fat that extends back to the fascia of the gastrocnemius muscle. I do not see a visible soft tissue fluid collection to suggest a drainable abscess. The tibia and fibula are intact without evidence of osteomyelitis.      Impression: 1. Diffuse subcutaneous edema throughout the visualized left leg below the knee. 2. There appears to be skin ulceration and ulceration extending into the subcutaneous fat posteriorly that extends to the fascia of the gastrocnemius muscle. No drainable fluid collection is identified on the CT images. 3. Tibia and fibula are intact without evidence of osteomyelitis. This report was finalized on 07/28/2020 19:24 by Dr. Evin Elliott MD.      LABORATORY/CULTURE RESULTS:  Results from last 7 days   Lab Units 08/01/20  0903 07/31/20  0352 07/30/20  0431   WBC 10*3/mm3 8.89 9.01 11.31*   HEMOGLOBIN g/dL 7.4* 7.4* 8.3*   HEMATOCRIT % 24.1* 24.2* 27.0*   PLATELETS 10*3/mm3 349 339 419     Results from last 7 days   Lab Units 07/31/20  0836 07/30/20  0431 07/29/20  0546 07/28/20  1707      SODIUM mmol/L  --  141 140 140   POTASSIUM mmol/L  --  4.3 4.3 4.3   CHLORIDE mmol/L  --  105 103 101   CO2 mmol/L  --  25.0 24.0 24.0   BUN mg/dL  --  13 23* 32*   CREATININE mg/dL 0.74 0.83 1.15* 1.78*   CALCIUM mg/dL  --  9.0 9.2 9.4   BILIRUBIN mg/dL  --   --  <0.2 <0.2   ALK PHOS U/L  --   --  99 98   ALT (SGPT) U/L  --   --  16 15   AST (SGOT) U/L  --   --  16 12   GLUCOSE mg/dL  --  119* 124* 112*         Microbiology Results (last 10 days)     Procedure Component Value - Date/Time    Tissue / Bone Culture - Tissue, Leg, Left [[id number]] Collected:  07/31/20 1728    Lab Status:  Preliminary result Specimen:  Tissue from Leg, Left Updated:  07/31/20 1826     Gram Stain Many (4+) WBCs seen      Rare (1+) Gram positive cocci    COVID-19,CEPHEID,COR/FLORENCIO/PAD IN-HOUSE(OR EMERGENT/ADD-ON),NP SWAB IN TRANSPORT MEDIA 3-4 HR TAT - Swab, Nasopharynx [[id number]]  (Normal) Collected:  07/30/20 1757    Lab Status:  Final result Specimen:  Swab from Nasopharynx Updated:  07/31/20 0916     COVID19 Not Detected    Narrative:       Fact sheet for providers: https://www.fda.gov/media/288063/download     Fact sheet for patients: https://www.fda.gov/media/859085/download    Wound Culture - Wound, Leg, Left [[id number]]  (Abnormal)  (Susceptibility) Collected:  07/28/20 1715    Lab Status:  Final result Specimen:  Wound from Leg, Left Updated:  07/31/20 0955     Wound Culture Heavy growth (4+) Staphylococcus aureus, MRSA     Comment:   Methicillin resistant Staphylococcus aureus, Patient may be an isolation risk.         Heavy growth (4+) Mixed Gram Negative Elizabeth     Gram Stain Few (2+) WBCs seen      Few (2+) Gram positive cocci      Many (4+) Gram negative bacilli    Susceptibility      Staphylococcus aureus, MRSA     SHANNON     Clindamycin Resistant     Erythromycin Resistant     Inducible Clindamycin Resistance Negative     Oxacillin Resistant     Penicillin G Resistant     Rifampin Susceptible     Tetracycline Susceptible      Trimethoprim + Sulfamethoxazole Susceptible     Vancomycin Susceptible                    Blood Culture - Blood, Arm, Right [[id number]] Collected:  07/28/20 1706    Lab Status:  Preliminary result Specimen:  Blood from Arm, Right Updated:  07/31/20 1745     Blood Culture No growth at 3 days    Blood Culture - Blood, Arm, Left [[id number]] Collected:  07/28/20 1706    Lab Status:  Preliminary result Specimen:  Blood from Arm, Left Updated:  07/31/20 1745     Blood Culture No growth at 3 days          PATHOLOGY RESULTS:       ASSESSMENT/PLAN   1.  Chronic wound left lower extremity - 1 day s/p surgical debridement  2.  Cellulitis of left lower extremity-MRSA positive   3.  Peripheral edema  4.  Chronic pain syndrome    Labs and cultures reviewed-ID following   Hospitalist progress notes reviewed   Continued to encourage leg elevation when in bed-okay to ambulate when needed. Continue twice daily dressing changes with Mupirocin, xeroform to wound bed, ABD pad, kerlix, and ACE wrap for compression.    Dressing removed and wound visualized. Updated photos taken and uploaded to chart.     We will continue to follow throughout hospital course of stay. Upon discharge she will need follow-up in outpatient [facility].     Lab Frequency Next Occurrence   Follow Anesthesia Guidelines / Standing Orders Once 07/28/2017   Follow Anesthesia Guidelines / Standing Orders Once 04/27/2019   Provide NPO Instructions to Patient Once 04/01/2019   Follow Anesthesia Guidelines / Standing Orders Once 04/10/2019   Follow Anesthesia Guidelines / Standing Orders Once 05/10/2019   Provide NPO Instructions to Patient Once 05/14/2019   Follow Anesthesia Guidelines / Standing Orders Once 10/14/2019   Obtain Informed Consent Once 10/19/2019   Diet: Once 10/23/2019   ZEKE by IFA, Reflex 9-biomarkers profile Once 12/11/2020   Urinalysis With Microscopic - Urine, Clean Catch Once 03/09/2021   Angiotensin Converting Enzyme Once 03/09/2021   CBC & Differential  Once 03/09/2021       This document has been electronically signed by Roberto Wells DPM on August 1, 2020 11:15           Electronically signed by Roberto Wells DPM at 08/01/20 1123     Rehan Flor MD at 08/01/20 1021          Infectious Diseases Progress Note    Patient:  Robyn Minaya  YOB: 1960  MRN: [id number]   Admit date: 7/28/2020   Admitting Physician: Melchor Conway MD  Primary Care Physician: Tabitha Victor DO    Chief Complaint/Interval History: She had some surgical debridement of lower extremity wound yesterday.  She is without fever.  No new symptoms.  She is sitting up at bedside.    Intake/Output Summary (Last 24 hours) at 8/1/2020 1021  Last data filed at 8/1/2020 0834  Gross per 24 hour   Intake 710 ml   Output 250 ml   Net 460 ml     Allergies:   Allergies   Allergen Reactions   • Bactrim [Sulfamethoxazole-Trimethoprim] Rash     Itching   • Hctz [Hydrochlorothiazide] Other (See Comments)     Acid reflux   • Januvia [Sitagliptin] Rash     Current Scheduled Medications:     allopurinol 100 mg Oral Daily   atorvastatin 40 mg Oral Nightly   carvedilol 6.25 mg Oral BID With Meals   cefepime 2 g Intravenous Q12H   divalproex 1,000 mg Oral Nightly   DULoxetine 60 mg Oral Q12H   gabapentin 100 mg Oral TID   guaiFENesin 1,200 mg Oral BID   levothyroxine 125 mcg Oral Q AM   melatonin 3 mg Oral Nightly   mupirocin  Topical Q12H   nicotine 1 patch Transdermal Q24H   pantoprazole 40 mg Oral Q AM   pilocarpine 5 mg Oral TID   pramipexole 1.5 mg Oral Nightly   QUEtiapine 50 mg Oral Nightly   sodium chloride 10 mL Intravenous Q12H   spironolactone 25 mg Oral BID   tiZANidine 4 mg Oral Nightly   vancomycin 750 mg Intravenous Q12H     Current PRN Medications:  •  albuterol  •  ALPRAZolam  •  dextrose  •  dextrose  •  glucagon (human recombinant)  •  HYDROcodone-acetaminophen  •  ondansetron **OR** ondansetron  •  sodium chloride    Review of Systems no diarrhea or  "rash.    Vital Signs:  BP 95/55 (BP Location: Left arm, Patient Position: Sitting)   Pulse 85   Temp 98 °F (36.7 °C) (Oral)   Resp 16   Ht 152.4 cm (60\")   Wt 81.7 kg (180 lb 3.2 oz)   SpO2 100%   BMI 35.19 kg/m²      Physical Exam  Vital signs - reviewed.  Left lower extremity wrapped.  Clean and dry.    Lab Results:  CBC: Results from last 7 days   Lab Units 08/01/20  0903 07/31/20  0352 07/30/20  0431 07/29/20  0605 07/28/20  1707   WBC 10*3/mm3 8.89 9.01 11.31* 12.64* 11.61*   HEMOGLOBIN g/dL 7.4* 7.4* 8.3* 9.3* 9.3*   HEMATOCRIT % 24.1* 24.2* 27.0* 30.3* 29.3*   PLATELETS 10*3/mm3 349 339 419 505* 496*     BMP:  Results from last 7 days   Lab Units 07/31/20  0836 07/30/20  0431 07/29/20  0546 07/28/20  1707   SODIUM mmol/L  --  141 140 140   POTASSIUM mmol/L  --  4.3 4.3 4.3   CHLORIDE mmol/L  --  105 103 101   CO2 mmol/L  --  25.0 24.0 24.0   BUN mg/dL  --  13 23* 32*   CREATININE mg/dL 0.74 0.83 1.15* 1.78*   GLUCOSE mg/dL  --  119* 124* 112*   CALCIUM mg/dL  --  9.0 9.2 9.4   ALT (SGPT) U/L  --   --  16 15     Culture Results:   Blood Culture   Date Value Ref Range Status   07/28/2020 No growth at 3 days  Preliminary   07/28/2020 No growth at 3 days  Preliminary     Wound Culture   Date Value Ref Range Status   07/28/2020 Heavy growth (4+) Staphylococcus aureus, MRSA (A)  Final     Comment:       Methicillin resistant Staphylococcus aureus, Patient may be an isolation risk.   07/28/2020 Heavy growth (4+) Mixed Gram Negative Elizabeth (A)  Final   Tissue left leg collected yesterday:      Gram Stain  Many (4+) WBCs seen      Rare (1+) Gram positive cocci                 Radiology: None  Additional Studies Reviewed: None    Impression:   Superficial wounds left lower extremity.    Recommendations:   Continue vancomycin  Continue cefepime  Await most recent operative cultures  Continue local care    Rehan Guillermo MD    Electronically signed by Rehan Guillermo MD at 08/01/20 1023       "

## 2020-08-03 NOTE — PLAN OF CARE
Problem: Patient Care Overview  Goal: Plan of Care Review  Flowsheets (Taken 8/3/2020 1932)  Progress: no change  Plan of Care Reviewed With: patient  Note:   Pt getting 1 uprbc's at shift change;  Noted some hypotension  91/57 but later systolic was 106 and gave pt a pain pill;  pt elevating left leg;  IV Abx as ordered;  will continue to monitor

## 2020-08-03 NOTE — PROGRESS NOTES
"    [facility] - PODIATRY    Today's Date: 08/03/20    Patient Name: Robyn Minaya  MRN: [id number]  CSN: [id number]  PCP: Tena Hough APRN  Referring Provider: Melchor Conway, *  Attending Provider: Alon Roberts MD  Length of Stay: 6    SUBJECTIVE   Chief Complaint: \"I'm packing up\"    HPI: Robyn Minaya, a 60 y.o.female. 3 days s/p left wound debridement. Pain continues to be improved today. Notes that she is being discharged home today. States that she has had increased swelling to BLE. Received PRBC last night for HGB of 7.1; now at 8.7 today. No strikethrough on outer dressing. WBC stable. VSS.      Past Medical History:   Diagnosis Date   • Acid reflux     RESOLVED PT PT   • Allergic rhinitis    • Arthritis     BACK   • Bipolar 1 disorder (CMS/HCC)    • Chronic back pain    • Degenerative arthritis    • Depression    • Deviated nasal septum    • Eczema    • Gout    • History of colon polyps    • Hyperlipidemia    • Hypertension    • Hypertrophy of nasal turbinates    • Hypokalemia    • Hypothyroidism    • Incontinence in female    • Insomnia    • Menopause    • Nasal septal deviation    • Nasal valve stenosis    • Overactive bladder    • Plantar fasciitis    • Prediabetes    • RLS (restless legs syndrome)    • Sleep apnea     cpap   • Wears glasses      Past Surgical History:   Procedure Laterality Date   • BLEPHAROPLASTY Bilateral 7/9/2019    Procedure: 1) bilateral upper blepharoplasty with excision of excess of tissue weighing down 2) nasal endoscopy with removal of nasal septal prosthesis;  Surgeon: Mark Anthony Anderson MD;  Location: [facility];  Service: ENT   • CARPAL TUNNEL RELEASE Bilateral 2004   • CHOLECYSTECTOMY  2013   • COLONOSCOPY  10/17/2014    One 5-6mm tubular adenomatous polyp in the ascending colon; Two less than 4mm hyperplastic polyps in the sigmoid colon; Three rectal hyperplastic polyps; Diverticulosis; Repeat 5 years    • COLONOSCOPY  07/14/2010    Internal " hemorrhoids; Repeat 7 years    • COLONOSCOPY N/A 10/23/2019    Procedure: COLONOSCOPY WITH ANESTHESIA;  Surgeon: Robyn Frazier MD;  Location: [facility] ENDOSCOPY;  Service: Gastroenterology   • CYST REMOVAL  2016    neck   • ENDOSCOPIC FUNCTIONAL SINUS SURGERY (FESS) Bilateral 4/16/2019    Procedure: ENDOSCOPIC FUNCTIONAL SINUS SURGERY (bilareral maxillary antrostomy without image guidance);  Surgeon: Mark Anthony Anderson MD;  Location: [facility] OR;  Service: ENT   • ENDOSCOPY N/A 4/11/2019    Esophageal mucosal changes suggestive of eosinophilic esophagitis-biopsied; A few gastric polyps-resected and retrieved-biopsied; Normal examined duodenum-biopsied   • FOOT SURGERY Right 2010    X3   • HERNIA REPAIR     • INTERSTIM PLACEMENT N/A 6/5/2019    Procedure: INTERSTIM STAGES 1 AND 2 LEAD AND GENERATOR PLACEMENT;  Surgeon: Endy Guerrero MD;  Location: [facility] OR;  Service: Urology   • LASER ABLATION      right back   • LEG DEBRIDEMENT Left 7/31/2020    Procedure: LOWER POSTERIOR LEG WOUND DEBRIDEMENT - LEFT;  Surgeon: Roberto Wells DPM;  Location: [facility] OR;  Service: Podiatry;  Laterality: Left;   • NASAL ENDOSCOPY N/A 4/16/2019    Procedure:     1. Functional endoscopic sinus surgery with bilateral maxillary antrostomy    2. Bilateral nasal endoscopy with biopsy of nasal septum    3.  Nasal septal prosthesis placement  ;  Surgeon: Mark Anthony Anderson MD;  Location: [facility] OR;  Service: ENT   • NASAL VESTIBULE LESION/PAPILLOMA EXCISION N/A 8/1/2017    Procedure: Repair of nasal vestibular stenosis and septoplasty; cartilage graft from left ear;  Surgeon: Mark Anthony Anderson MD;  Location: [facility] OR;  Service:    • SEPTOPLASTY N/A 4/16/2019    Procedure: PLACEMENT OF NASAL SEPTAL PROSTHESIS;  Surgeon: Mark Anthony Anderson MD;  Location: [facility] OR;  Service: ENT   • SEPTOPLASTY Bilateral 1/14/2020    Procedure: Nasal endoscopy with septal debridement, and placement of Silastic stents;  Surgeon: Mark Anthony Anderson MD;  Location:   [facility] OR;  Service: ENT     Family History   Problem Relation Age of Onset   • Cancer Mother    • Diabetes Father    • Hypertension Father    • Cancer Father    • Colon cancer Neg Hx    • Colon polyps Neg Hx    • Esophageal cancer Neg Hx    • Liver cancer Neg Hx    • Liver disease Neg Hx    • Rectal cancer Neg Hx    • Stomach cancer Neg Hx      Social History     Socioeconomic History   • Marital status:      Spouse name: Not on file   • Number of children: Not on file   • Years of education: Not on file   • Highest education level: Not on file   Tobacco Use   • Smoking status: Current Every Day Smoker     Packs/day: 0.50     Years: 40.00     Pack years: 20.00     Types: Cigarettes   • Smokeless tobacco: Never Used   Substance and Sexual Activity   • Alcohol use: Yes     Frequency: Monthly or less     Comment: Rarely-maybe 3x/year   • Drug use: No   • Sexual activity: Defer     Allergies   Allergen Reactions   • Bactrim [Sulfamethoxazole-Trimethoprim] Rash     Itching   • Hctz [Hydrochlorothiazide] Other (See Comments)     Acid reflux   • Januvia [Sitagliptin] Rash     Current Facility-Administered Medications   Medication Dose Route Frequency Provider Last Rate Last Dose   • albuterol (PROVENTIL) nebulizer solution 0.083% 2.5 mg/3mL  2.5 mg Nebulization Q4H PRN Roberto Wells A, DPM       • allopurinol (ZYLOPRIM) tablet 100 mg  100 mg Oral Daily Roberto Wells A, DPM   100 mg at 08/03/20 0810   • ALPRAZolam (XANAX) tablet 0.5 mg  0.5 mg Oral TID PRN Roberto Wells A, DPM   0.5 mg at 08/02/20 1547   • atorvastatin (LIPITOR) tablet 40 mg  40 mg Oral Nightly Roberto Wells A, DPM   40 mg at 08/02/20 2001   • carvedilol (COREG) tablet 6.25 mg  6.25 mg Oral BID With Meals Roberto Wells A, DPM   6.25 mg at 08/03/20 0810   • cefepime (MAXIPIME) 2 g/100 mL 0.9% NS (mbp)  2 g Intravenous Q12H Roberto Wells A, DPM   2 g at 08/03/20 0814   • dextrose (D50W) 25 g/ 50mL Intravenous Solution 25 g  25 g  Intravenous Q15 Min PRN Roberto Wells DPM       • dextrose (GLUTOSE) oral gel 15 g  15 g Oral Q15 Min PRN Roberto Wells DPM       • divalproex (DEPAKOTE) 24 hr tablet 1,000 mg  1,000 mg Oral Nightly Roberto Wells DPM   1,000 mg at 08/02/20 2002   • DULoxetine (CYMBALTA) DR capsule 60 mg  60 mg Oral Q12H Roberto Wells DPM   60 mg at 08/03/20 0810   • gabapentin (NEURONTIN) capsule 100 mg  100 mg Oral TID Roberto Wells DPM   100 mg at 08/03/20 0814   • glucagon (human recombinant) (GLUCAGEN DIAGNOSTIC) injection 1 mg  1 mg Subcutaneous Q15 Min PRN Roberto Wells DPM       • guaiFENesin (MUCINEX) 12 hr tablet 1,200 mg  1,200 mg Oral BID Roberto Wells DPM   1,200 mg at 08/03/20 0810   • HYDROcodone-acetaminophen (NORCO)  MG per tablet 1 tablet  1 tablet Oral Q4H PRN Roberto Wells DPM   1 tablet at 08/03/20 0811   • lactated ringers infusion  9 mL/hr Intravenous Continuous Roberto Wells DPM 9 mL/hr at 07/31/20 1639     • levothyroxine (SYNTHROID, LEVOTHROID) tablet 125 mcg  125 mcg Oral Q AM Roberto Wells DPM   125 mcg at 08/03/20 0521   • melatonin tablet 3 mg  3 mg Oral Nightly Roberto Wells DPM   3 mg at 08/02/20 2002   • mupirocin (BACTROBAN) 2 % ointment   Topical Q12H Roberto Wells DPM       • nicotine (NICODERM CQ) 21 MG/24HR patch 1 patch  1 patch Transdermal Q24H Roberto Wells DPM   1 patch at 08/03/20 0810   • ondansetron (ZOFRAN) tablet 4 mg  4 mg Oral Q6H PRN Roberto Wells DPM        Or   • ondansetron (ZOFRAN) injection 4 mg  4 mg Intravenous Q6H PRN Roberto Wells DPM       • pantoprazole (PROTONIX) EC tablet 40 mg  40 mg Oral Q AM Roberto Wells DPM   40 mg at 08/03/20 0521   • pilocarpine (SALAGEN) tablet 5 mg  5 mg Oral TID AC Melchor Conway MD   5 mg at 08/03/20 0810   • pramipexole (MIRAPEX) tablet 1.5 mg  1.5 mg Oral Nightly Roberto Wells DPM   1.5 mg at 08/02/20 2001   • QUEtiapine (SEROquel) tablet 50 mg   50 mg Oral Nightly Russell, Ripley A, DPM   50 mg at 20   • sodium chloride 0.9 % flush 10 mL  10 mL Intravenous Q12H Russell, Ripley A, DPM   10 mL at 20 0814   • sodium chloride 0.9 % flush 10 mL  10 mL Intravenous PRN Russell, Roberto A, DPM       • spironolactone (ALDACTONE) tablet 25 mg  25 mg Oral BID Russell, Roberto A, DPM   25 mg at 20 0810   • tiZANidine (ZANAFLEX) tablet 4 mg  4 mg Oral Nightly Russell, Roberto A, DPM   4 mg at 20   • vancomycin 750 mg/250 mL 0.9% NS IVPB ([facility])  750 mg Intravenous Q12H Russell, Roberto A, DPM   750 mg at 20 0038     Review of Systems   Constitutional: Positive for activity change. Negative for chills and fever.   HENT: Negative for congestion.    Respiratory: Negative for shortness of breath.    Cardiovascular: Positive for leg swelling.   Gastrointestinal: Negative for nausea and vomiting.   Musculoskeletal: Positive for arthralgias and myalgias.   Skin: Positive for color change and wound.   Neurological: Positive for numbness.       OBJECTIVE     Vitals:    20 0715   BP: 140/66   Pulse: 82   Resp: 16   Temp: 98.1 °F (36.7 °C)   SpO2: 99%       PHYSICAL EXAM  GEN:   NAD, ambulating with walker. Accompanied by none.    Foot/Ankle Exam:       General:   Appearance: appears stated age and healthy and obesity    Orientation: AAOx3    Affect: appropriate    Assistance: walker    Shoe Gear:  Sandals    VASCULAR      Right Foot Vascularity   Dorsalis pedis:  1+  Posterior tibial:  1+  Edema Gradin+ and pitting  CFT:  3  Pedal Hair Growth:  Present  Varicosities: mild varicosities       Left Foot Vascularity   Dorsalis pedis:  1+  Posterior tibial:  1+  Edema Grading:  Pitting and 2+  CFT:  3  Pedal Hair Growth:  Present  Varicosities: mild varicosities        NEUROLOGIC     Right Foot Neurologic   Normal sensation    Light touch sensation:  Normal  Vibratory sensation:  Normal  Hot/Cold sensation: normal       Left Foot Neurologic    Normal sensation    Light touch sensation:  Normal  Vibratory sensation:  Normal  Hot/cold sensation: normal       MUSCULOSKELETAL      Right Foot Musculoskeletal    Amputation   Right toes amputated: No    Ecchymosis:  None  Tenderness: none    Arch:  Normal     Left Foot Musculoskeletal    Amputation   Left toes amputated: No    Ecchymosis:  None  Tenderness comment:  Generalized to distal lower extremity, worse around wound  Arch:  Normal     MUSCLE STRENGTH     Right Foot Muscle Strength   Normal strength    Foot dorsiflexion:  5  Foot plantar flexion:  5  Foot inversion:  5  Foot eversion:  5     Left Foot Muscle Strength   Foot dorsiflexion:  5  Foot plantar flexion:  5  Foot inversion:  5  Foot eversion:  5     DERMATOLOGIC     Right Foot Dermatologic   Skin: skin intact       Left Foot Dermatologic   Skin: cellulitis (improving), drainage, erythema, maceration (minimal), skin changes and ulcer    Skin: no left foot blister        Left Foot Additional Comments: Dressing in place with ace wrap for pressure/securement. No strikethrough to bandage.       Before:       After:         RADIOLOGY/NUCLEAR:  Us Venous Doppler Lower Extremity Left (duplex)    Result Date: 7/29/2020  Narrative: History: Pain and swelling      Impression: Impression: There is no evidence of deep venous thrombosis or superficial thrombophlebitis of the left lower extremity.  Comments: Left lower extremity venous duplex exam was performed using color Doppler flow, Doppler wave form analysis, and grayscale imaging, with and without compression. There is no evidence of deep venous thrombosis of the common femoral, superficial femoral, and popliteal veins. The tibial veins were not visualized due to bandaging. There is no thrombus identified in the saphenofemoral junction or the greater saphenous vein. There is no evidence of clot in the right common femoral vein.  This report was finalized on 07/29/2020 15:09 by Dr. Trace Hurd,  MD.    Ct Lower Extremity Left Without Contrast    Result Date: 7/28/2020  Narrative: EXAMINATION:  CT LOWER EXTREMITY LEFT WO CONTRAST-  7/28/2020 6:42 PM CDT  HISTORY: Spider bite 3 months ago left calf region.  COMPARISON: No comparison study.  TECHNIQUE: Spiral CT was performed of the left lower extremity from just above the knee joint to just below the ankle joint. Sagittal and coronal images were reconstructed. DLP: 227 mGy-cm.  FINDINGS: There is diffuse subcutaneous edema throughout the visualized left leg below the knee. Posteriorly, there may be some ulceration involving the skin and subcutaneous fat that extends back to the fascia of the gastrocnemius muscle. I do not see a visible soft tissue fluid collection to suggest a drainable abscess. The tibia and fibula are intact without evidence of osteomyelitis.      Impression: 1. Diffuse subcutaneous edema throughout the visualized left leg below the knee. 2. There appears to be skin ulceration and ulceration extending into the subcutaneous fat posteriorly that extends to the fascia of the gastrocnemius muscle. No drainable fluid collection is identified on the CT images. 3. Tibia and fibula are intact without evidence of osteomyelitis. This report was finalized on 07/28/2020 19:24 by Dr. Evin Elliott MD.      LABORATORY/CULTURE RESULTS:  Results from last 7 days   Lab Units 08/03/20  0515 08/02/20  0503 08/01/20  0903   WBC 10*3/mm3 9.03 8.60 8.89   HEMOGLOBIN g/dL 8.7* 7.1* 7.4*   HEMATOCRIT % 28.0* 23.1* 24.1*   PLATELETS 10*3/mm3 352 340 349     Results from last 7 days   Lab Units 08/03/20  0515 07/31/20  0836 07/30/20  0431 07/29/20  0546 07/28/20  1707   SODIUM mmol/L 142  --  141 140 140   POTASSIUM mmol/L 3.7  --  4.3 4.3 4.3   CHLORIDE mmol/L 105  --  105 103 101   CO2 mmol/L 27.0  --  25.0 24.0 24.0   BUN mg/dL 7  --  13 23* 32*   CREATININE mg/dL 0.63 0.74 0.83 1.15* 1.78*   CALCIUM mg/dL 8.8  --  9.0 9.2 9.4   BILIRUBIN mg/dL  --   --   --   <0.2 <0.2   ALK PHOS U/L  --   --   --  99 98   ALT (SGPT) U/L  --   --   --  16 15   AST (SGOT) U/L  --   --   --  16 12   GLUCOSE mg/dL 150*  --  119* 124* 112*         Microbiology Results (last 10 days)     Procedure Component Value - Date/Time    Tissue / Bone Culture - Tissue, Leg, Left [[id number]]  (Abnormal)  (Susceptibility) Collected:  07/31/20 1728    Lab Status:  Final result Specimen:  Tissue from Leg, Left Updated:  08/03/20 0531     Tissue Culture Moderate growth (3+) Citrobacter freundii      Scant growth (1+) Pseudomonas aeruginosa     Gram Stain Many (4+) WBCs seen      Rare (1+) Gram positive cocci    Susceptibility      Citrobacter freundii     SHANNON     Cefepime Susceptible     Ceftazidime Susceptible     Ceftriaxone Susceptible     Gentamicin Susceptible     Levofloxacin Susceptible     Piperacillin + Tazobactam Susceptible     Tetracycline Susceptible     Trimethoprim + Sulfamethoxazole Susceptible                Susceptibility      Pseudomonas aeruginosa     SHANNON     Cefepime Susceptible     Ceftazidime Susceptible     Ciprofloxacin Susceptible     Gentamicin Susceptible     Levofloxacin Susceptible     Piperacillin + Tazobactam Susceptible                Susceptibility Comments     Citrobacter freundii    Cefazolin sensitivity will not be reported for Enterobacteriaceae in non-urine isolates. If cefazolin is preferred, please call the microbiology lab to request an E-test.  With the exception of urinary-sourced infections, aminoglycosides should not be used as monotherapy.             COVID-19,CEPHEID,COR/FLORENCIO/[facility] IN-HOUSE(OR EMERGENT/ADD-ON),NP SWAB IN TRANSPORT MEDIA 3-4 HR TAT - Swab, Nasopharynx [[id number]]  (Normal) Collected:  07/30/20 1757    Lab Status:  Final result Specimen:  Swab from Nasopharynx Updated:  07/31/20 0916     COVID19 Not Detected    Narrative:       Fact sheet for providers: https://www.fda.gov/media/346919/download     Fact sheet for patients:  https://www.fda.gov/media/067725/download    Wound Culture - Wound, Leg, Left [[id number]]  (Abnormal)  (Susceptibility) Collected:  07/28/20 1715    Lab Status:  Final result Specimen:  Wound from Leg, Left Updated:  07/31/20 0955     Wound Culture Heavy growth (4+) Staphylococcus aureus, MRSA     Comment:   Methicillin resistant Staphylococcus aureus, Patient may be an isolation risk.         Heavy growth (4+) Mixed Gram Negative Elizabeth     Gram Stain Few (2+) WBCs seen      Few (2+) Gram positive cocci      Many (4+) Gram negative bacilli    Susceptibility      Staphylococcus aureus, MRSA     SHANNON     Clindamycin Resistant     Erythromycin Resistant     Inducible Clindamycin Resistance Negative     Oxacillin Resistant     Penicillin G Resistant     Rifampin Susceptible     Tetracycline Susceptible     Trimethoprim + Sulfamethoxazole Susceptible     Vancomycin Susceptible                    Blood Culture - Blood, Arm, Right [[id number]] Collected:  07/28/20 1706    Lab Status:  Final result Specimen:  Blood from Arm, Right Updated:  08/02/20 1745     Blood Culture No growth at 5 days    Blood Culture - Blood, Arm, Left [[id number]] Collected:  07/28/20 1706    Lab Status:  Final result Specimen:  Blood from Arm, Left Updated:  08/02/20 1745     Blood Culture No growth at 5 days          PATHOLOGY RESULTS:       ASSESSMENT/PLAN   1.  Chronic wound left lower extremity - 3 days s/p surgical debridement  2.  Cellulitis of left lower extremity-MRSA positive   3.  Peripheral edema  4.  Chronic pain syndrome    Labs and cultures reviewed - ID following   Hospitalist progress notes reviewed   Continued to encourage leg elevation when in bed - okay to ambulate when needed.   Continue twice daily dressing changes with Mupirocin, xeroform to wound bed, ABD pad, kerlix, and ACE wrap for compression.    Recommended patient apply compression stocking to RLE for edema.   Okay from podiatry standpoint for discharge home with  follow-up within 1 week in outpatient [facility].     We will continue to follow throughout hospital course of stay. Upon discharge she will need follow-up in outpatient [facility].     Lab Frequency Next Occurrence   Follow Anesthesia Guidelines / Standing Orders Once 07/28/2017   Follow Anesthesia Guidelines / Standing Orders Once 04/27/2019   Provide NPO Instructions to Patient Once 04/01/2019   Follow Anesthesia Guidelines / Standing Orders Once 04/10/2019   Follow Anesthesia Guidelines / Standing Orders Once 05/10/2019   Provide NPO Instructions to Patient Once 05/14/2019   Follow Anesthesia Guidelines / Standing Orders Once 10/14/2019   Obtain Informed Consent Once 10/19/2019   Diet: Once 10/23/2019   ZEKE by IFA, Reflex 9-biomarkers profile Once 12/11/2020   Urinalysis With Microscopic - Urine, Clean Catch Once 03/09/2021   Angiotensin Converting Enzyme Once 03/09/2021   CBC & Differential Once 03/09/2021       This document has been electronically signed by ARETHA Louis on August 3, 2020 09:32

## 2020-08-03 NOTE — OUTREACH NOTE
Prep Survey      Responses   [facility] patient discharged from?  [place]   Is LACE score < 7 ?  No   Eligibility  [facility]   Date of Admission  07/28/20   Date of Discharge  08/03/20   Discharge Disposition  Home-Health Care Sv   Discharge diagnosis  Left leg wound,  anemia, infeciton r/t MRSA, cellulites,  DOMINIC   COVID-19 Test Status  Negative   Does the patient have one of the following disease processes/diagnoses(primary or secondary)?  Other   Does the patient have Home health ordered?  Yes   What is the Home health agency?   [facility]   Is there a DME ordered?  No   Prep survey completed?  Yes          Tianna Ashley RN

## 2020-08-03 NOTE — PROGRESS NOTES
Continued Stay Note   [place]     Patient Name: Robyn Minaya  MRN: [id number]  Today's Date: 8/3/2020    Admit Date: 7/28/2020    Discharge Plan     Row Name 08/03/20 1319       Plan    Plan  Home with [facility]    Patient/Family in Agreement with Plan  yes    Final Discharge Disposition Code  06 - home with home health care    Final Note  Pt was d/c'ed home today. Notified [facility] [phone] and faxed the order and d/c summary to [phone].        Discharge Codes    No documentation.       Expected Discharge Date and Time     Expected Discharge Date Expected Discharge Time    Aug 3, 2020             MALU Roblse

## 2020-08-03 NOTE — PROGRESS NOTES
RT Nebulizer Protocol    Assessment tool to be used for patients with existing breathing treatments ordered by hospitalists                                                                  0  1  2  3  4      Respiratory History   No Smoking      Smoking History      1 Pack/Day   x   Pulmonary Disease      Exacerbation        Respiratory Rate   Normal   x   20-25      Dyspneic      Accessory Muscles      Severe Dyspnea        Breath Sounds   Clear   x   Crackles      Crackles/ Rhonchi      Wheezing      Absent/ Severe Wheezing        Chest   X-ray   Clear/Unav   x   1 Lobe Infiltration/ Consolidation/ PE      2 Lobe Same Lung Infiltration/ Consolidation/ PE       2 Lobe Infiltration/ Both Lungs/ Consolidation/ PE      Both Lungs/ More Than 1 Lobe/ Atelectasis/ Consolidation/  PE        Cough   Strong Non- Productive   x   Excessive Secretions/ Strong Cough      Excessive Secretions/ Weak Cough      Thick Bronchial Secretions/ Weak Cough      Thick Bronchial Secretions/ No Cough        Total Patient Score =  2  0-4=Q4 PRN  5-9=TID and Q4 PRN  10-14=QID and Q3 PRN  15-19=Q4 and Q2 PRN  20=Q3 and Q2 PRN    Bronchopulmonary Hygiene (CPT)   Q4 ATC Copious secretions, dyspnea, unable to sleep, mucus plug    QID & Q4 PRN Moderate secretions    TID Small amounts of secretions w/ poor cough and history of secretions    BID Unable to deep breathe and cough spontaneously       Lung Expansion Therapy (PEP)   Q4 & PRN at night Severe atelectasis, poor oxygenation    QID  High risk for persistent atelectasis, existence of same    TID At risk for developing atelectasis    BID Unable to deep breathe and cough spontaneously     Instruct, 1 follow up Patients able to perform well on their own    No chest xray in past 24 hours. Patient has been assessed and no changes required at this time.

## 2020-08-03 NOTE — TELEPHONE ENCOUNTER
causing her entire leg to hurt, but she had stopped it a week before, and she recalled Dr. Jaun Paz talked to the patient about stopping smoking and the lower extremity arterial study she wanted her to have prior to her next appointment, as she did not think the Santyl would cause her entire leg to hurt like that.

## 2020-08-03 NOTE — PROGRESS NOTES
RT Nebulizer Protocol    Assessment tool to be used for patients with existing breathing treatments ordered by hospitalists                                                                  0  1  2  3  4      Respiratory History   No Smoking      Smoking History   x   1 Pack/Day      Pulmonary Disease      Exacerbation        Respiratory Rate   Normal   x   20-25      Dyspneic      Accessory Muscles      Severe Dyspnea        Breath Sounds   Clear   x   Crackles      Crackles/ Rhonchi      Wheezing      Absent/ Severe Wheezing        Chest   X-ray   Clear   x   1 Lobe Infiltration/ Consolidation/ PE      2 Lobe Same Lung Infiltration/ Consolidation/ PE       2 Lobe Infiltration/ Both Lungs/ Consolidation/ PE      Both Lungs/ More Than 1 Lobe/ Atelectasis/ Consolidation/  PE        Cough   Strong Non- Productive   x   Excessive Secretions/ Strong Cough      Excessive Secretions/ Weak Cough      Thick Bronchial Secretions/ Weak Cough      Thick Bronchial Secretions/ No Cough        Total Patient Score =  1  0-4=Q4 PRN  5-9=TID and Q4 PRN  10-14=QID and Q3 PRN  15-19=Q4 and Q2 PRN  20=Q3 and Q2 PRN    Bronchopulmonary Hygiene (CPT)   Q4 ATC Copious secretions, dyspnea, unable to sleep, mucus plug    QID & Q4 PRN Moderate secretions    TID Small amounts of secretions w/ poor cough and history of secretions    BID Unable to deep breathe and cough spontaneously       Lung Expansion Therapy (PEP)   Q4 & PRN at night Severe atelectasis, poor oxygenation    QID  High risk for persistent atelectasis, existence of same    TID At risk for developing atelectasis    BID Unable to deep breathe and cough spontaneously     Instruct, 1 follow up Patients able to perform well on their own        Continue Q4 PRN nebs and reevaluate in 24 hours.

## 2020-08-04 ENCOUNTER — HOSPITAL ENCOUNTER (OUTPATIENT)
Dept: WOUND CARE | Age: 60
Discharge: HOME | End: 2020-08-04

## 2020-08-04 ENCOUNTER — TRANSITIONAL CARE MANAGEMENT TELEPHONE ENCOUNTER (OUTPATIENT)
Dept: CALL CENTER | Facility: HOSPITAL | Age: 60
End: 2020-08-04

## 2020-08-04 LAB
BH BB BLOOD EXPIRATION DATE: NORMAL
BH BB BLOOD TYPE BARCODE: 5100
BH BB DISPENSE STATUS: NORMAL
BH BB PRODUCT CODE: NORMAL
BH BB UNIT NUMBER: NORMAL
CROSSMATCH INTERPRETATION: NORMAL
CYTO UR: NORMAL
LAB AP CASE REPORT: NORMAL
PATH REPORT.FINAL DX SPEC: NORMAL
PATH REPORT.GROSS SPEC: NORMAL
UNIT  ABO: NORMAL
UNIT  RH: NORMAL

## 2020-08-04 NOTE — PAYOR COMM NOTE
"PR HOME 8-3-20  [id number]   [phone]    Robyn Dominguez (60 y.o. Female)     Date of Birth Social Security Number Address Home Phone MRN    [id number]  [id number] [facility] [address] [phone] [id number]    Amish Marital Status          Other        Admission Date Admission Type Admitting Provider Attending Provider Department, Room/Bed    7/28/20 Urgent Alon Roberts MD  [facility] 3C, 378/1    Discharge Date Discharge Disposition Discharge Destination        8/3/2020 Home or Self Care              Attending Provider:  (none)   Allergies:  Bactrim [Sulfamethoxazole-trimethoprim], Hctz [Hydrochlorothiazide], Januvia [Sitagliptin]    Isolation:  None   Infection:  MRSA (07/30/20)   Code Status:  Prior    Ht:  152.4 cm (60\")   Wt:  81.7 kg (180 lb 3.2 oz)    Admission Cmt:  None   Principal Problem:  Wound of left leg [S81.802A] More...                 Active Insurance as of 7/28/2020     Primary Coverage     Payor Plan Insurance Group Employer/Plan Group    [employer] [facility] [id number]     Payor Plan Address Payor Plan Phone Number Payor Plan Fax Number Effective Dates    [address] [phone]  1/1/2020 - None Entered    [facility] [id number]       Subscriber Name Subscriber Birth Date Member ID       ROBYN DOMINGUEZ 1960 [id number]                 Emergency Contacts      (Rel.) Home Phone Work Phone Mobile Phone    Rizwan Dominguez (Spouse) [phone] -- --               Discharge Summary      Alon Roberts MD at 08/03/20 0924              [facility]  DISCHARGE SUMMARY     Date of Admission: 7/28/2020  Date of Discharge:  8/3/2020  Primary Care Physician: Tena Hough, APRN    Presenting Problem/History of Present Illness:  Wound of left leg [S81.802A]     Discharge Diagnoses:  Active Hospital Problems    Diagnosis   • **Wound of left leg     MRSA culture 7/28/20; Citrobacter, " Pseudomonas culture 7/31/2020     • Acute blood loss anemia   • Infection of wound due to methicillin resistant Staphylococcus aureus (MRSA)   • Tobacco abuse   • Cellulitis of left lower extremity   • Essential hypertension   • Chronic pain syndrome   • Acute pain   • Anxiety   • DOMINIC (acute kidney injury) (CMS/[facility])   • Prediabetes     Chief Complaint on Day of Discharge: No complaints  History of Present Illness on Day of Discharge:   Up in room.  Left lower extremity dressing in place.  Denies chest pain, palpitations, or shortness of breath.  Feels better today.  Denies nausea, vomiting or abdominal pain.  Wants to get a shower today.  Antibiotics with transition to oral per infectious disease.    Consults:   1.  Dr. Roberto Wells, podiatry  2.  Dr. Micheal Harris, infectious disease    Procedures Performed:   Lower posterior leg wound debridement 7/31/2020 Dr. Wells    Pertinent Test Results:   Laboratory Data:    Results from last 7 days   Lab Units 08/03/20  0515 08/02/20  0503 08/01/20  0903   WBC 10*3/mm3 9.03 8.60 8.89   HEMOGLOBIN g/dL 8.7* 7.1* 7.4*   HEMATOCRIT % 28.0* 23.1* 24.1*   PLATELETS 10*3/mm3 352 340 349        Results from last 7 days   Lab Units 08/03/20  0515 07/31/20  0836 07/30/20  0431 07/29/20  0546 07/28/20  1707   SODIUM mmol/L 142  --  141 140 140   POTASSIUM mmol/L 3.7  --  4.3 4.3 4.3   CHLORIDE mmol/L 105  --  105 103 101   CO2 mmol/L 27.0  --  25.0 24.0 24.0   BUN mg/dL 7  --  13 23* 32*   CREATININE mg/dL 0.63 0.74 0.83 1.15* 1.78*   CALCIUM mg/dL 8.8  --  9.0 9.2 9.4   BILIRUBIN mg/dL  --   --   --  <0.2 <0.2   ALK PHOS U/L  --   --   --  99 98   ALT (SGPT) U/L  --   --   --  16 15   AST (SGOT) U/L  --   --   --  16 12   GLUCOSE mg/dL 150*  --  119* 124* 112*     Lab Results (all)     Procedure Component Value Units Date/Time    Tissue Pathology Exam [[id number]] Collected:  07/31/20 1652    Specimen:  Tissue from Leg, Left Updated:  08/03/20 0752    Basic Metabolic Panel  [[id number]]  (Abnormal) Collected:  08/03/20 0515    Specimen:  Blood Updated:  08/03/20 0552     Glucose 150 mg/dL      BUN 7 mg/dL      Creatinine 0.63 mg/dL      Sodium 142 mmol/L      Potassium 3.7 mmol/L      Chloride 105 mmol/L      CO2 27.0 mmol/L      Calcium 8.8 mg/dL      eGFR Non African Amer 97 mL/min/1.73      BUN/Creatinine Ratio 11.1     Anion Gap 10.0 mmol/L     Narrative:       GFR Normal >60  Chronic Kidney Disease <60  Kidney Failure <15      CBC (No Diff) [[id number]]  (Abnormal) Collected:  08/03/20 0515    Specimen:  Blood Updated:  08/03/20 0538     WBC 9.03 10*3/mm3      RBC 3.27 10*6/mm3      Hemoglobin 8.7 g/dL      Hematocrit 28.0 %      MCV 85.6 fL      MCH 26.6 pg      MCHC 31.1 g/dL      RDW 16.3 %      RDW-SD 49.7 fl      MPV 10.0 fL      Platelets 352 10*3/mm3     Tissue / Bone Culture - Tissue, Leg, Left [[id number]]  (Abnormal)  (Susceptibility) Collected:  07/31/20 1728    Specimen:  Tissue from Leg, Left Updated:  08/03/20 0531     Tissue Culture Moderate growth (3+) Citrobacter freundii      Scant growth (1+) Pseudomonas aeruginosa     Gram Stain Many (4+) WBCs seen      Rare (1+) Gram positive cocci    Susceptibility      Citrobacter freundii     SHANNON     Cefepime Susceptible     Ceftazidime Susceptible     Ceftriaxone Susceptible     Gentamicin Susceptible     Levofloxacin Susceptible     Piperacillin + Tazobactam Susceptible     Tetracycline Susceptible     Trimethoprim + Sulfamethoxazole Susceptible                Susceptibility      Pseudomonas aeruginosa     SHANNON     Cefepime Susceptible     Ceftazidime Susceptible     Ciprofloxacin Susceptible     Gentamicin Susceptible     Levofloxacin Susceptible     Piperacillin + Tazobactam Susceptible                Susceptibility Comments     Citrobacter freundii    Cefazolin sensitivity will not be reported for Enterobacteriaceae in non-urine isolates. If cefazolin is preferred, please call the microbiology lab to request an  E-test.  With the exception of urinary-sourced infections, aminoglycosides should not be used as monotherapy.             Blood Culture - Blood, Arm, Right [[id number]] Collected:  07/28/20 1706    Specimen:  Blood from Arm, Right Updated:  08/02/20 1745     Blood Culture No growth at 5 days    Blood Culture - Blood, Arm, Left [[id number]] Collected:  07/28/20 1706    Specimen:  Blood from Arm, Left Updated:  08/02/20 1745     Blood Culture No growth at 5 days    Extra Tubes [[id number]] Collected:  08/02/20 0503    Specimen:  Blood, Venous Line Updated:  08/02/20 0700    Narrative:       The following orders were created for panel order Extra Tubes.  Procedure                               Abnormality         Status                     ---------                               -----------         ------                     Green Top (Gel)[[id number]]                                  Final result                 Please view results for these tests on the individual orders.    Green Top (Gel) [[id number]] Collected:  08/02/20 0503    Specimen:  Blood Updated:  08/02/20 0700     Extra Tube Hold for add-ons.     Comment: Auto resulted.       CBC & Differential [[id number]] Collected:  08/02/20 0503    Specimen:  Blood Updated:  08/02/20 0524    Narrative:       The following orders were created for panel order CBC & Differential.  Procedure                               Abnormality         Status                     ---------                               -----------         ------                     CBC Auto Differential[[id number]]        Abnormal            Final result                 Please view results for these tests on the individual orders.    CBC Auto Differential [[id number]]  (Abnormal) Collected:  08/02/20 0503    Specimen:  Blood Updated:  08/02/20 0524     WBC 8.60 10*3/mm3      RBC 2.69 10*6/mm3      Hemoglobin 7.1 g/dL      Hematocrit 23.1 %      MCV 85.9 fL      MCH 26.4 pg      MCHC 30.7 g/dL      RDW 16.4 %       RDW-SD 50.9 fl      MPV 10.1 fL      Platelets 340 10*3/mm3      Neutrophil % 61.7 %      Lymphocyte % 27.7 %      Monocyte % 6.3 %      Eosinophil % 3.3 %      Basophil % 0.3 %      Immature Grans % 0.7 %      Neutrophils, Absolute 5.31 10*3/mm3      Lymphocytes, Absolute 2.38 10*3/mm3      Monocytes, Absolute 0.54 10*3/mm3      Eosinophils, Absolute 0.28 10*3/mm3      Basophils, Absolute 0.03 10*3/mm3      Immature Grans, Absolute 0.06 10*3/mm3      nRBC 0.0 /100 WBC     CBC & Differential [[id number]] Collected:  08/01/20 0903    Specimen:  Blood Updated:  08/01/20 0928    Narrative:       The following orders were created for panel order CBC & Differential.  Procedure                               Abnormality         Status                     ---------                               -----------         ------                     CBC Auto Differential[[id number]]        Abnormal            Final result                 Please view results for these tests on the individual orders.    CBC Auto Differential [[id number]]  (Abnormal) Collected:  08/01/20 0903    Specimen:  Blood Updated:  08/01/20 0928     WBC 8.89 10*3/mm3      RBC 2.81 10*6/mm3      Hemoglobin 7.4 g/dL      Hematocrit 24.1 %      MCV 85.8 fL      MCH 26.3 pg      MCHC 30.7 g/dL      RDW 16.3 %      RDW-SD 51.1 fl      MPV 10.1 fL      Platelets 349 10*3/mm3      Neutrophil % 66.0 %      Lymphocyte % 26.1 %      Monocyte % 4.4 %      Eosinophil % 2.4 %      Basophil % 0.3 %      Immature Grans % 0.8 %      Neutrophils, Absolute 5.87 10*3/mm3      Lymphocytes, Absolute 2.32 10*3/mm3      Monocytes, Absolute 0.39 10*3/mm3      Eosinophils, Absolute 0.21 10*3/mm3      Basophils, Absolute 0.03 10*3/mm3      Immature Grans, Absolute 0.07 10*3/mm3      nRBC 0.0 /100 WBC     Vancomycin, Trough Please call results to Pharmacy prior to administering next dose [[id number]]  (Normal) Collected:  07/31/20 9292    Specimen:  Blood Updated:  08/01/20 0014      Vancomycin Trough 16.50 mcg/mL     Wound Culture - Wound, Leg, Left [[id number]]  (Abnormal)  (Susceptibility) Collected:  07/28/20 1715    Specimen:  Wound from Leg, Left Updated:  07/31/20 0955     Wound Culture Heavy growth (4+) Staphylococcus aureus, MRSA     Comment:   Methicillin resistant Staphylococcus aureus, Patient may be an isolation risk.         Heavy growth (4+) Mixed Gram Negative Elizabeth     Gram Stain Few (2+) WBCs seen      Few (2+) Gram positive cocci      Many (4+) Gram negative bacilli    Susceptibility      Staphylococcus aureus, MRSA     SHANNON     Clindamycin Resistant     Erythromycin Resistant     Inducible Clindamycin Resistance Negative     Oxacillin Resistant     Penicillin G Resistant     Rifampin Susceptible     Tetracycline Susceptible     Trimethoprim + Sulfamethoxazole Susceptible     Vancomycin Susceptible                    COVID-19,CEPHEID,COR/FLORENCIO/PAD IN-HOUSE(OR EMERGENT/ADD-ON),NP SWAB IN TRANSPORT MEDIA 3-4 HR TAT - Swab, Nasopharynx [[id number]]  (Normal) Collected:  07/30/20 1757    Specimen:  Swab from Nasopharynx Updated:  07/31/20 0916     COVID19 Not Detected    Narrative:       Fact sheet for providers: https://www.fda.gov/media/475360/download     Fact sheet for patients: https://www.fda.gov/media/837226/download    Creatinine, Serum [[id number]]  (Normal) Collected:  07/31/20 0836    Specimen:  Blood Updated:  07/31/20 0900     Creatinine 0.74 mg/dL      eGFR Non African Amer 80 mL/min/1.73     Narrative:       GFR Normal >60  Chronic Kidney Disease <60  Kidney Failure <15      CBC & Differential [[id number]] Collected:  07/31/20 0352    Specimen:  Blood Updated:  07/31/20 0429    Narrative:       The following orders were created for panel order CBC & Differential.  Procedure                               Abnormality         Status                     ---------                               -----------         ------                     CBC Auto Differential[[id number]]         Abnormal            Final result                 Please view results for these tests on the individual orders.    CBC Auto Differential [[id number]]  (Abnormal) Collected:  07/31/20 0352    Specimen:  Blood Updated:  07/31/20 0429     WBC 9.01 10*3/mm3      RBC 2.86 10*6/mm3      Hemoglobin 7.4 g/dL      Hematocrit 24.2 %      MCV 84.6 fL      MCH 25.9 pg      MCHC 30.6 g/dL      RDW 16.3 %      RDW-SD 50.5 fl      MPV 9.9 fL      Platelets 339 10*3/mm3      Neutrophil % 55.5 %      Lymphocyte % 34.7 %      Monocyte % 5.4 %      Eosinophil % 3.2 %      Basophil % 0.4 %      Immature Grans % 0.8 %      Neutrophils, Absolute 4.99 10*3/mm3      Lymphocytes, Absolute 3.13 10*3/mm3      Monocytes, Absolute 0.49 10*3/mm3      Eosinophils, Absolute 0.29 10*3/mm3      Basophils, Absolute 0.04 10*3/mm3      Immature Grans, Absolute 0.07 10*3/mm3      nRBC 0.0 /100 WBC     POC Glucose Once [[id number]]  (Abnormal) Collected:  07/30/20 1157    Specimen:  Blood Updated:  07/30/20 1211     Glucose 191 mg/dL      Comment: : [id number] Freddie LaurenMeter ID: [id number]       Vancomycin, Random [[id number]]  (Normal) Collected:  07/30/20 0431    Specimen:  Blood Updated:  07/30/20 1026     Vancomycin Random 15.30 mcg/mL     C-reactive Protein [[id number]]  (Abnormal) Collected:  07/30/20 0431    Specimen:  Blood Updated:  07/30/20 0528     C-Reactive Protein 4.98 mg/dL     Basic Metabolic Panel [[id number]]  (Abnormal) Collected:  07/30/20 0431    Specimen:  Blood Updated:  07/30/20 0528     Glucose 119 mg/dL      BUN 13 mg/dL      Creatinine 0.83 mg/dL      Sodium 141 mmol/L      Potassium 4.3 mmol/L      Chloride 105 mmol/L      CO2 25.0 mmol/L      Calcium 9.0 mg/dL      eGFR Non African Amer 70 mL/min/1.73      BUN/Creatinine Ratio 15.7     Anion Gap 11.0 mmol/L     Narrative:       GFR Normal >60  Chronic Kidney Disease <60  Kidney Failure <15      CBC & Differential [[id number]] Collected:  07/30/20 0431    Specimen:  Blood  Updated:  07/30/20 0502    Narrative:       The following orders were created for panel order CBC & Differential.  Procedure                               Abnormality         Status                     ---------                               -----------         ------                     CBC Auto Differential[[id number]]        Abnormal            Final result                 Please view results for these tests on the individual orders.    CBC Auto Differential [[id number]]  (Abnormal) Collected:  07/30/20 0431    Specimen:  Blood Updated:  07/30/20 0502     WBC 11.31 10*3/mm3      RBC 3.17 10*6/mm3      Hemoglobin 8.3 g/dL      Hematocrit 27.0 %      MCV 85.2 fL      MCH 26.2 pg      MCHC 30.7 g/dL      RDW 16.0 %      RDW-SD 49.5 fl      MPV 9.8 fL      Platelets 419 10*3/mm3      Neutrophil % 68.7 %      Lymphocyte % 23.0 %      Monocyte % 4.4 %      Eosinophil % 2.7 %      Basophil % 0.4 %      Immature Grans % 0.8 %      Neutrophils, Absolute 7.76 10*3/mm3      Lymphocytes, Absolute 2.60 10*3/mm3      Monocytes, Absolute 0.50 10*3/mm3      Eosinophils, Absolute 0.31 10*3/mm3      Basophils, Absolute 0.05 10*3/mm3      Immature Grans, Absolute 0.09 10*3/mm3      nRBC 0.0 /100 WBC     POC Glucose Once [[id number]]  (Normal) Collected:  07/29/20 1216    Specimen:  Blood Updated:  07/29/20 1227     Glucose 103 mg/dL      Comment: : [id number] Freddie BrittaneyMeter ID: [id number]       POC Glucose Once [[id number]]  (Normal) Collected:  07/29/20 0849    Specimen:  Blood Updated:  07/29/20 0901     Glucose 109 mg/dL      Comment: : [id number] Freddie BrittaneyMeter ID: [id number]       Vancomycin, Random [[id number]]  (Normal) Collected:  07/29/20 0546    Specimen:  Blood Updated:  07/29/20 0848     Vancomycin Random 14.70 mcg/mL     Hemoglobin A1c [[id number]]  (Abnormal) Collected:  07/29/20 0546    Specimen:  Blood Updated:  07/29/20 0717     Hemoglobin A1C 6.50 %     Narrative:       Hemoglobin A1C  Ranges:    Increased Risk for Diabetes  5.7% to 6.4%  Diabetes                     >= 6.5%  Diabetic Goal                < 7.0%    Comprehensive Metabolic Panel [[id number]]  (Abnormal) Collected:  07/29/20 0546    Specimen:  Blood Updated:  07/29/20 0640     Glucose 124 mg/dL      BUN 23 mg/dL      Creatinine 1.15 mg/dL      Sodium 140 mmol/L      Potassium 4.3 mmol/L      Chloride 103 mmol/L      CO2 24.0 mmol/L      Calcium 9.2 mg/dL      Total Protein 6.8 g/dL      Albumin 3.60 g/dL      ALT (SGPT) 16 U/L      AST (SGOT) 16 U/L      Alkaline Phosphatase 99 U/L      Total Bilirubin <0.2 mg/dL      eGFR Non African Amer 48 mL/min/1.73      Globulin 3.2 gm/dL      A/G Ratio 1.1 g/dL      BUN/Creatinine Ratio 20.0     Anion Gap 13.0 mmol/L     Narrative:       GFR Normal >60  Chronic Kidney Disease <60  Kidney Failure <15      Lipid Panel [[id number]]  (Abnormal) Collected:  07/29/20 0546    Specimen:  Blood Updated:  07/29/20 0640     Total Cholesterol 101 mg/dL      Triglycerides 209 mg/dL      HDL Cholesterol 29 mg/dL      LDL Cholesterol  30 mg/dL      VLDL Cholesterol 41.8 mg/dL      LDL/HDL Ratio 1.04    Narrative:       Cholesterol Reference Ranges  (U.S. Department of Health and Human Services ATP III Classifications)    Desirable          <200 mg/dL  Borderline High    200-239 mg/dL  High Risk          >240 mg/dL      Triglyceride Reference Ranges  (U.S. Department of Health and Human Services ATP III Classifications)    Normal           <150 mg/dL  Borderline High  150-199 mg/dL  High             200-499 mg/dL  Very High        >500 mg/dL    HDL Reference Ranges  (U.S. Department of Health and Human Services ATP III Classifcations)    Low     <40 mg/dl (major risk factor for CHD)  High    >60 mg/dl ('negative' risk factor for CHD)        LDL Reference Ranges  (U.S. Department of Health and Human Services ATP III Classifcations)    Optimal          <100 mg/dL  Near Optimal     100-129 mg/dL  Borderline High   "130-159 mg/dL  High             160-189 mg/dL  Very High        >189 mg/dL    TSH [[id number]]  (Normal) Collected:  07/29/20 0546    Specimen:  Blood Updated:  07/29/20 0640     TSH 1.520 uIU/mL     CBC Auto Differential [[id number]]  (Abnormal) Collected:  07/29/20 0605    Specimen:  Blood Updated:  07/29/20 0617     WBC 12.64 10*3/mm3      RBC 3.59 10*6/mm3      Hemoglobin 9.3 g/dL      Hematocrit 30.3 %      MCV 84.4 fL      MCH 25.9 pg      MCHC 30.7 g/dL      RDW 16.2 %      RDW-SD 49.3 fl      MPV 10.0 fL      Platelets 505 10*3/mm3      Neutrophil % 64.9 %      Lymphocyte % 25.8 %      Monocyte % 4.0 %      Eosinophil % 4.0 %      Basophil % 0.4 %      Immature Grans % 0.9 %      Neutrophils, Absolute 8.21 10*3/mm3      Lymphocytes, Absolute 3.26 10*3/mm3      Monocytes, Absolute 0.50 10*3/mm3      Eosinophils, Absolute 0.50 10*3/mm3      Basophils, Absolute 0.05 10*3/mm3      Immature Grans, Absolute 0.12 10*3/mm3      nRBC 0.0 /100 WBC     POC Glucose Once [[id number]]  (Abnormal) Collected:  07/28/20 2029    Specimen:  Blood Updated:  07/28/20 2059     Glucose 174 mg/dL      Comment: : [id number] Marcela Lentzoctaviano ID: [id number]       Procalcitonin [[id number]]  (Normal) Collected:  07/28/20 1707    Specimen:  Blood Updated:  07/28/20 1738     Procalcitonin 0.14 ng/mL     Narrative:       As a Marker for Sepsis (Non-Neonates):   1. <0.5 ng/mL represents a low risk of severe sepsis and/or septic shock.  1. >2 ng/mL represents a high risk of severe sepsis and/or septic shock.    As a Marker for Lower Respiratory Tract Infections that require antibiotic therapy:  PCT on Admission     Antibiotic Therapy             6-12 Hrs later  > 0.5                Strongly Recommended            >0.25 - <0.5         Recommended  0.1 - 0.25           Discouraged                   Remeasure/reassess PCT  <0.1                 Strongly Discouraged          Remeasure/reassess PCT      As 28 day mortality risk marker: \"Change in " "Procalcitonin Result\" (> 80 % or <=80 %) if Day 0 (or Day 1) and Day 4 values are available. Refer to http://www.[email or web address]-pct-calculator.com/   Change in PCT <=80 %   A decrease of PCT levels below or equal to 80 % defines a positive change in PCT test result representing a higher risk for 28-day all-cause mortality of patients diagnosed with severe sepsis or septic shock.  Change in PCT > 80 %   A decrease of PCT levels of more than 80 % defines a negative change in PCT result representing a lower risk for 28-day all-cause mortality of patients diagnosed with severe sepsis or septic shock.    Results may be falsely decreased if patient taking Biotin.     Comprehensive Metabolic Panel [[id number]]  (Abnormal) Collected:  07/28/20 1707    Specimen:  Blood Updated:  07/28/20 1735     Glucose 112 mg/dL      BUN 32 mg/dL      Creatinine 1.78 mg/dL      Sodium 140 mmol/L      Potassium 4.3 mmol/L      Chloride 101 mmol/L      CO2 24.0 mmol/L      Calcium 9.4 mg/dL      Total Protein 7.0 g/dL      Albumin 3.80 g/dL      ALT (SGPT) 15 U/L      AST (SGOT) 12 U/L      Alkaline Phosphatase 98 U/L      Total Bilirubin <0.2 mg/dL      eGFR Non African Amer 29 mL/min/1.73      Globulin 3.2 gm/dL      A/G Ratio 1.2 g/dL      BUN/Creatinine Ratio 18.0     Anion Gap 15.0 mmol/L     Narrative:       GFR Normal >60  Chronic Kidney Disease <60  Kidney Failure <15      POC Glucose Once [[id number]]  (Normal) Collected:  07/28/20 1723    Specimen:  Blood Updated:  07/28/20 1735     Glucose 108 mg/dL      Comment: : [id number] Samra SifuentesMeter ID: [id number]       Phosphorus [[id number]]  (Normal) Collected:  07/28/20 1707    Specimen:  Blood Updated:  07/28/20 1733     Phosphorus 4.3 mg/dL     Magnesium [[id number]]  (Normal) Collected:  07/28/20 1707    Specimen:  Blood Updated:  07/28/20 1730     Magnesium 2.2 mg/dL     Lactic Acid, Plasma [[id number]]  (Normal) Collected:  07/28/20 1707    Specimen:  Blood Updated:  07/28/20 1729     " Lactate 1.1 mmol/L     CBC Auto Differential [[id number]]  (Abnormal) Collected:  07/28/20 1707    Specimen:  Blood Updated:  07/28/20 1715     WBC 11.61 10*3/mm3      RBC 3.53 10*6/mm3      Hemoglobin 9.3 g/dL      Hematocrit 29.3 %      MCV 83.0 fL      MCH 26.3 pg      MCHC 31.7 g/dL      RDW 16.2 %      RDW-SD 48.8 fl      MPV 9.5 fL      Platelets 496 10*3/mm3      Neutrophil % 64.1 %      Lymphocyte % 25.9 %      Monocyte % 4.8 %      Eosinophil % 3.9 %      Basophil % 0.4 %      Immature Grans % 0.9 %      Neutrophils, Absolute 7.43 10*3/mm3      Lymphocytes, Absolute 3.01 10*3/mm3      Monocytes, Absolute 0.56 10*3/mm3      Eosinophils, Absolute 0.45 10*3/mm3      Basophils, Absolute 0.05 10*3/mm3      Immature Grans, Absolute 0.11 10*3/mm3      nRBC 0.0 /100 WBC         Culture Data:   Blood Culture   Date Value Ref Range Status   07/28/2020 No growth at 5 days  Final   07/28/2020 No growth at 5 days  Final     Wound Culture   Date Value Ref Range Status   07/28/2020 Heavy growth (4+) Staphylococcus aureus, MRSA (A)  Final     Comment:       Methicillin resistant Staphylococcus aureus, Patient may be an isolation risk.   07/28/2020 Heavy growth (4+) Mixed Gram Negative Elizabeth (A)  Final     Staphylococcus aureus, MRSA       SHANNON     Clindamycin >=4  Resistant     Erythromycin >=8  Resistant     Inducible Clindamycin Resistance NEG  Negative     Oxacillin >=4  Resistant     Penicillin G >=0.5  Resistant     Rifampin <=0.5  Susceptible     Tetracycline 2  Susceptible     Trimethoprim + Sulfamethoxazole <=10  Susceptible     Vancomycin <=0.5  Susceptible            Linear View       07/31/2020 1728 08/03/2020 0531 Tissue / Bone Culture - Tissue, Leg, Left [[id number]]    (Abnormal)   Tissue from Leg, Left    Final result Component Value   Tissue Culture Moderate growth (3+) Citrobacter freundiiAbnormal     Scant growth (1+) Pseudomonas aeruginosaAbnormal    Gram Stain Many (4+) WBCs seen    Rare (1+) Gram positive  cocci       Susceptibility      Citrobacter freundii Pseudomonas aeruginosa     SHANNON SHANNON     Cefepime <=1  Susceptible <=1  Susceptible     Ceftazidime <=1  Susceptible 2  Susceptible     Ceftriaxone <=1  Susceptible       Ciprofloxacin   <=0.25  Susceptible     Gentamicin <=1  Susceptible <=1  Susceptible     Levofloxacin <=0.12  Susceptible 0.25  Susceptible     Piperacillin + Tazobactam <=4  Susceptible 8  Susceptible     Tetracycline <=1  Susceptible       Trimethoprim + Sulfamethoxazole <=20  Susceptible               Hospital Course  Patient is a 60 y.o. female presented [facility] emergency room 7/28/2020 with nonhealing left lower extremity wound.  On 4/12/2020 patient reported suspected brown recluse spider bite.  She was followed by wound care [facility] and had surgical debridement with wound VAC placement in May.  Patient felt that wound worsened after wound VAC placed.  She has been followed by wound care clinic every 2 weeks.  She saw Dr. Ho on 7/28 to establish care as new patient for nonhealing spider bite.  She was a direct admit to our facility.    She was admitted to the medical floor with cellulitis and nonhealing wound left lower extremity.  Vancomycin started on admission.  Hydration with IV fluids normal saline.  CT scan of the lower extremity showed diffuse subcutaneous edema throughout the visualized left leg below the knee.  Appears to be skin ulceration and ulceration extending into the subcutaneous fat posteriorly that extends to the fascia of the gastrocnemius muscle.  No drainable fluid collection identified on the CT imaging.  Tibia and fibula are intact without evidence of osteomyelitis.  Venous Dopplers left lower extremity no evidence of deep vein thrombosis or superficial thrombophlebitis of the left lower extremity.    Dr. Wells, podiatry consulted with recommendations to remain conservative with antibiotic ointment, daily dressing changes and light compression.   May eventually need surgical debridement.  Vancomycin continued.  Wound culture heavy growth staph aureus MRSA.  Twice daily dressing changes continued.  Dr. Wells elected for surgical debridement on 7/31.  Pathology sent.  Surgical wound culture positive for Citrobacter and Pseudomonas.  Blood cultures remain no growth at 4 days.  Dr. Wells recommends twice daily dressing changes with mupirocin, Xeroform to wound bed, ABD pad, Kerlix and Ace wraps for compression.  Dr. Wells will follow-up in wound clinic on 8/7/2020.    Infectious disease consulted she was seen by Dr. Burton with suspicion for pyoderma gangrenosum.  Recommendations to elevate foot, topical antibiotics continue empiric antibiotics.  Initial wound culture also had mixed gram-negative rain and cefepime added to antibiotic regimen.  Surgical wound culture from 7/31 reported Citrobacter freundi and Pseudomonas.  Discussed with Dr. Flor who recommended doxycycline 100 mg twice daily for 7 days and Cipro 500 mg twice daily for 7 days at discharge.  Discussed risk of tendon soreness, tendon weakening and tendon rupture with patient with use of Cipro.  Patient willing to accept risk.  Advised to avoid light while taking doxycycline.    Acute kidney injury with creatinine 1.78 on admission trended down to 0.74.  Hemoglobin A1c 6.5.    CRP 4.98, WBC 11.6 on admission trended down to 9.03 at discharge.  Hemoglobin 9.3 with hematocrit 30.3 on admission.  Hemoglobin trended down to 7.1 with hematocrit 23.1 postoperatively.  Transfuse 1 unit packed red blood cells on 8/2/2020.  Hemoglobin 8.7 with hematocrit 28 at discharge.    Patient was able to demonstrate dressing changes with nurse Claudia.  Patient had been changing her dressing at home with assistance of home health.  Social service consulted for discharge planning to arrange home health to assist with dressing changes and wound assessment after discharge.  Patient has [facility] health in the  "past.  Home health arranged at discharge.  Photos of wound before and after surgery noted in Dr. Wells's notes.  Patient also has photo left leg wound after surgical debridement by Dr. Wells on her phone.    On 8/3/2020 she is stable for discharge.  Surgical debridement left lower extremity 7/31/2020.  She has been transitioned to oral doxycycline and oral Cipro for 7 days after discharge.  Follow-up with Dr. Wells in wound clinic on 8/7.  Follow-up with primary care provider ARETHA Perrin 1 week.  Follow-up with Dr. Ho 8/11/2020.  Follow-up with infectious disease as needed.    Physical Exam on Discharge:  /66 (BP Location: Right arm, Patient Position: Lying)   Pulse 82   Temp 98.1 °F (36.7 °C) (Oral)   Resp 16   Ht 152.4 cm (60\")   Wt 81.7 kg (180 lb 3.2 oz)   SpO2 99%   BMI 35.19 kg/m²    Physical Exam   Constitutional: She is oriented to person, place, and time.   No distress.  Up walking in room.  No oxygen in use.  No family in room.   HENT:   Head: Normocephalic and atraumatic.   Eyes: Pupils are equal, round, and reactive to light. EOM are normal.   Neck: Normal range of motion. Neck supple.   Cardiovascular: Normal rate, regular rhythm, normal heart sounds and intact distal pulses. Exam reveals no gallop and no friction rub.   No murmur heard.  Normal sinus rhythm 73-85 on telemetry   Pulmonary/Chest:   No oxygen use.   Abdominal: Soft. Bowel sounds are normal.   Genitourinary:   Genitourinary Comments: Voiding.   Musculoskeletal: She exhibits edema (Left lower extremity) and tenderness (Left lower extremity).   Neurological: She is alert and oriented to person, place, and time.   Skin: There is erythema (Left lower extremity, improved).   Left lower extremity with 10 cm x 12 cm open wound.  Debrided 7/31 by Dr. Wells. Dressing in place   Psychiatric: She has a normal mood and affect. Her behavior is normal. Judgment and thought content normal.     Condition on Discharge: " Stable    Discharge Disposition: Home with home health    Discharge Diet:   Diet Instructions     Advance Diet As Tolerated        As tolerated    Activity at Discharge:   Activity Instructions     Activity as Tolerated        As tolerated    Discharge Care Plan / Instructions:   1.  For worsening drainage, erythema wound left lower extremity seek medical attention.  2.  Doxycycline 100 mg twice daily for 7 days.  3.  Cipro 500 mg twice daily for 7 days  4.  Follow-up with Dr. Wells wound clinic 8/7/20  5.  Home with home health with dressing changes per Dr. Wells  Continue twice daily dressing changes with Mupirocin, xeroform to wound bed, ABD pad, kerlix, and ACE wrap for compression.   6.  Follow-up with Dr. Higuera as needed  7.  Follow-up with Tena Hough 1 week.  8.  Follow-up with Dr. Victor 1 week, 8/11/2020    Discharge Medications:     Discharge Medications      New Medications      Instructions Start Date   ciprofloxacin 500 MG tablet  Commonly known as:  Cipro   500 mg, Oral, 2 Times Daily      doxycycline 100 MG capsule  Commonly known as:  MONODOX   100 mg, Oral, 2 Times Daily         Changes to Medications      Instructions Start Date   HYDROcodone-acetaminophen  MG per tablet  Commonly known as:  NORCO  What changed:  reasons to take this   1 tablet, Oral, Every 6 Hours PRN         Continue These Medications      Instructions Start Date   albuterol (2.5 MG/3ML) 0.083% nebulizer solution  Commonly known as:  PROVENTIL   As Needed      allopurinol 100 MG tablet  Commonly known as:  ZYLOPRIM   100 mg, Oral, Daily      ALPRAZolam 0.5 MG tablet  Commonly known as:  XANAX   0.5 mg, Oral, As Needed      atorvastatin 40 MG tablet  Commonly known as:  LIPITOR   40 mg, Oral, Nightly      bumetanide 1 MG tablet  Commonly known as:  BUMEX   1 mg, Oral, 2 Times Weekly      calcium carbonate 600 MG tablet  Commonly known as:  OS-KAYLEY   600 mg, Oral, Daily      carvedilol 6.25 MG tablet  Commonly known as:   COREG   6.25 mg, Oral, 2 Times Daily With Meals      cholecalciferol 25 MCG (1000 UT) tablet  Commonly known as:  VITAMIN D3   1,000 Units, Oral, Daily      DICLOFENAC PO   75 mg, Oral, 2 Times Daily      divalproex 500 MG 24 hr tablet  Commonly known as:  DEPAKOTE   1,000 mg, Oral, Nightly      DULoxetine 60 MG capsule  Commonly known as:  CYMBALTA   60 mg, Oral, 2 Times Daily      EQ Fiber Therapy 500 MG tablet tablet  Generic drug:  methylcellulose (Laxative)   1 tablet, Oral, Daily      IMODIUM PO   2 mg, Oral, As Needed      levothyroxine 125 MCG tablet  Commonly known as:  SYNTHROID, LEVOTHROID   125 mcg, Oral, Daily      melatonin 3 MG tablet   3 mg, Oral, Daily      Mucinex 600 MG 12 hr tablet  Generic drug:  guaiFENesin   1,200 mg, Oral, 2 Times Daily      MULTI VITAMIN PO   1 dose, Oral, Daily      mupirocin 2 % ointment  Commonly known as:  BACTROBAN   No dose, route, or frequency recorded.      Nebulizer/Tubing/Mouthpiece kit   1 kit, Does not apply      niacin 500 MG CR tablet  Commonly known as:  SLO-NIACIN   500 mg, Oral, Daily      omeprazole 20 MG capsule  Commonly known as:  priLOSEC   20 mg, Oral, Daily      pilocarpine 5 MG tablet  Commonly known as:  SALAGEN   5 mg, Oral, 3 Times Daily      pramipexole 1.5 MG tablet  Commonly known as:  MIRAPEX   1.5 mg, Oral, Nightly      QUEtiapine 50 MG tablet  Commonly known as:  SEROquel   50 mg, Oral, Nightly      spironolactone 25 MG tablet  Commonly known as:  ALDACTONE   25 mg, Oral, 2 Times Daily      tiZANidine 4 MG tablet  Commonly known as:  ZANAFLEX   4 mg, Oral, Nightly      traMADol 50 MG tablet  Commonly known as:  ULTRAM   50 mg, Oral, 2 Times Daily           Follow-up Appointments:   Follow-up Information     [facility] Follow up.    Why:  Friday August 7 at 9:00  Contact information:  [address] [place] [address]  [address] [place] [id number]           Rehan Flor MD Follow up.    Specialty:  Infectious  Diseases  Why:  Follow up as needed  Contact information:  [address]  Infectious Disease  [facility] [address]  [phone]             Tena Hough APRN Follow up.    Specialty:  Family Medicine  Contact information:  [address] [place]  [facility]  [place] [address]  [phone]             Tabitha Victor DO Follow up.    Specialties:  Family Medicine, Internal Medicine  Why:  Tuesday August 11 at 11:00  Contact information:  [address] [place] [address]  [phone]                 Future Appointments:  Future Appointments   Date Time Provider Department Center   8/7/2020  9:00 AM Roberto Wells DPM [facility]   8/11/2020 11:00 AM Tabitha Victor DO [facility]     Test Results Pending at Discharge: None    The above documentation resulted from a face-to-face encounter by me Rajwinder CARIAS, [facility].    Electronically signed by ARETHA Carvajal, 8/3/2020, 11:19.    Time: This discharge process required 35 minutes for completion.    Plan discussed with Dr. Roberts, Dr. Flor, Dr. Wells, and patient    Time spent in face-to-face evaluation, chart review, planning and education 35 minutes.      I personally evaluated and examined the patient in conjunction with ARETHA Keys and agree with the assessment, treatment plan, and disposition of the patient as recorded by her. My history, exam, and further recommendations are: I have reviewed and agree with the plans. Kt.     Electronically signed by Alon Roberts MD at 08/03/20 [id number]

## 2020-08-07 ENCOUNTER — OFFICE VISIT (OUTPATIENT)
Dept: WOUND CARE | Facility: HOSPITAL | Age: 60
End: 2020-08-07

## 2020-08-07 DIAGNOSIS — M79.605 PAIN OF LEFT LOWER EXTREMITY: Primary | ICD-10-CM

## 2020-08-07 PROCEDURE — G0463 HOSPITAL OUTPT CLINIC VISIT: HCPCS

## 2020-08-07 PROCEDURE — 11042 DBRDMT SUBQ TIS 1ST 20SQCM/<: CPT | Performed by: PODIATRIST

## 2020-08-07 PROCEDURE — 11045 DBRDMT SUBQ TISS EACH ADDL: CPT | Performed by: PODIATRIST

## 2020-08-07 PROCEDURE — 99213 OFFICE O/P EST LOW 20 MIN: CPT | Performed by: PODIATRIST

## 2020-08-07 RX ORDER — GABAPENTIN 100 MG/1
100 CAPSULE ORAL 3 TIMES DAILY
Qty: 90 CAPSULE | Refills: 0 | Status: SHIPPED | OUTPATIENT
Start: 2020-08-07 | End: 2020-09-06

## 2020-08-10 ENCOUNTER — TELEMEDICINE (OUTPATIENT)
Dept: FAMILY MEDICINE CLINIC | Age: 60
End: 2020-08-10
Payer: COMMERCIAL

## 2020-08-10 PROCEDURE — 3017F COLORECTAL CA SCREEN DOC REV: CPT | Performed by: NURSE PRACTITIONER

## 2020-08-10 PROCEDURE — 99214 OFFICE O/P EST MOD 30 MIN: CPT | Performed by: NURSE PRACTITIONER

## 2020-08-10 PROCEDURE — G8427 DOCREV CUR MEDS BY ELIG CLIN: HCPCS | Performed by: NURSE PRACTITIONER

## 2020-08-10 RX ORDER — NICOTINE 21 MG/24HR
1 PATCH, TRANSDERMAL 24 HOURS TRANSDERMAL EVERY 24 HOURS
Qty: 30 PATCH | Refills: 0 | Status: SHIPPED | OUTPATIENT
Start: 2020-08-10 | End: 2020-10-01 | Stop reason: ALTCHOICE

## 2020-08-10 RX ORDER — HYDROCODONE BITARTRATE AND ACETAMINOPHEN 7.5; 325 MG/1; MG/1
1 TABLET ORAL EVERY 6 HOURS PRN
Qty: 12 TABLET | Refills: 0 | Status: SHIPPED | OUTPATIENT
Start: 2020-08-10 | End: 2020-08-17

## 2020-08-10 RX ORDER — NICOTINE 21 MG/24HR
1 PATCH, TRANSDERMAL 24 HOURS TRANSDERMAL EVERY 24 HOURS
Qty: 30 PATCH | Refills: 0 | Status: SHIPPED | OUTPATIENT
Start: 2020-08-10 | End: 2020-10-01 | Stop reason: SDUPTHER

## 2020-08-10 ASSESSMENT — ENCOUNTER SYMPTOMS
SHORTNESS OF BREATH: 0
DIARRHEA: 0
CONSTIPATION: 0

## 2020-08-10 NOTE — PROGRESS NOTES
8/10/2020    TELEHEALTH EVALUATION -- Audio/Visual (During KWJKO-97 public health emergency)    Chief Complaint   Patient presents with    Other     wound review and pt reports need for refill on med related to her appt being changed    Nicotine Dependence           Gini Palacio (:  1960) has requested an audio/video evaluation for the following concern(s):  HPI:  Pt is participating in video visit today to discuss ways to stop smoking. Pt has not had cigarette since being admitted to [facility]--2wks. Pt is very proud and wants assistance to be successful. She does report using patches when she was in the hospital.      Ongoing leg wound   Pt states that she was given pain med for 3d and is trying to make the meds last and will f/u with provider tomorrow but the current provider was exposed to [place]  and she is now having to reschedule and appt is now 3d away instead of tomorrow. Pt states that she has been taking as little as possible. Pt is changing dressing on leg wound at this time so that I can view leg. Pt has last had debriding of wound 3d ago. Pt is continuing care with Dr. Kendrick Connell, wound care at [facility].      Review of Systems   Constitutional: Negative for unexpected weight change. Respiratory: Negative for shortness of breath. Gastrointestinal: Negative for constipation and diarrhea. Musculoskeletal:        Leg pain   Skin: Positive for wound (leg). Psychiatric/Behavioral: The patient is nervous/anxious. Prior to Visit Medications    Medication Sig Taking? Authorizing Provider   HYDROcodone-acetaminophen (NORCO) 7.5-325 MG per tablet Take 1 tablet by mouth every 6 hours as needed for Pain for up to 7 days. Intended supply: 7 days.  Take lowest dose possible to manage pain Yes MAEVE Cuevas   nicotine (NICODERM CQ) 21 MG/24HR Place 1 patch onto the skin every 24 hours Yes MAEVE Cuevas   nicotine (NICODERM CQ) 14 MG/24HR Place 1 patch onto the skin every 24 hours Yes Tea (ZANAFLEX) 4 MG tablet Take 4 mg by mouth 3 times daily as needed   Historical Provider, MD   Vitamin D (CHOLECALCIFEROL) 1000 UNITS CAPS capsule Take 1,000 Units by mouth daily  Historical Provider, MD   Multiple Vitamins-Minerals (MULTIVITAMIN PO) Take by mouth daily  Historical Provider, MD   calcium carbonate 600 MG TABS tablet Take 1 tablet by mouth daily. Historical Provider, MD       Social History     Tobacco Use    Smoking status: Current Every Day Smoker     Packs/day: 1.00     Years: 30.00     Pack years: 30.00     Types: Cigarettes     Last attempt to quit: 2003     Years since quittin.0    Smokeless tobacco: Never Used    Tobacco comment: Pt not interested at this time in stopping. Substance Use Topics    Alcohol use: Yes     Comment: rarely    Drug use: No     Comment: pt admits \"may have misused in past\"--Pt was vague with details.         Allergies   Allergen Reactions    Bactrim [Sulfamethoxazole-Trimethoprim]     Hctz [Hydrochlorothiazide] Other (See Comments)     Acid reflux      Sitagliptin Rash   ,   Past Medical History:   Diagnosis Date    Arthritis     Bipolar I disorder, most recent episode (or current) mixed, unspecified     Chronic back pain     Depression     Dry mouth     Hyperlipidemia     Hypertension     Hypokalemia     Hypothyroidism     Menopause     Overactive bladder     Plantar fasciitis     RLS (restless legs syndrome)     Sleep apnea     Thyroid disease    ,   Past Surgical History:   Procedure Laterality Date    CARPAL TUNNEL RELEASE Bilateral     CHOLECYSTECTOMY  10/2014    COLON SURGERY      COLON SURGERY      biopsy     COLONOSCOPY      CYST REMOVAL  2017    Under left ear    HARDWARE REMOVAL Right 2011    Foot     HERNIA REPAIR  10/2014    NOSE SURGERY  2017; 2019    Dr Chintan NASCIMENTO/S,1 LEVEL Bilateral 2017    INJECTION MEDICATION FACET JOINT performed by Freda Ovalle at [facility] ,   Social History     Tobacco Use    Smoking status: Current Every Day Smoker     Packs/day: 1.00     Years: 30.00     Pack years: 30.00     Types: Cigarettes     Last attempt to quit: 2003     Years since quittin.0    Smokeless tobacco: Never Used    Tobacco comment: Pt not interested at this time in stopping. Substance Use Topics    Alcohol use: Yes     Comment: rarely    Drug use: No     Comment: pt admits \"may have misused in past\"--Pt was vague with details. ,   Family History   Problem Relation Age of Onset    Cancer Mother         lung    Heart Disease Mother     Hypertension Mother     Bipolar Disorder Mother     Heart Disease Father     Hypertension Father        PHYSICAL EXAMINATION:  [ INSTRUCTIONS:  \"[x]\" Indicates a positive item  \"[]\" Indicates a negative item  -- DELETE ALL ITEMS NOT EXAMINED]  Vital Signs: There were no vitals taken for this visit. See notes below regarding vitals. Constitutional: [x] Appears well-developed and well-nourished [x] No apparent distress      [] Abnormal-   Mental status  [x] Alert and awake  [x] Oriented to person/place/time [x]Able to follow commands      Eyes:  EOM    [x]  Normal  [] Abnormal-  Sclera  [x]  Normal  [] Abnormal -         Discharge [x]  None visible  [] Abnormal -    HENT:   [x] Normocephalic, atraumatic.   [] Abnormal   [x] Mouth/Throat: Mucous membranes are moist.     External Ears [x] Normal  [] Abnormal-     Neck: [x] No visualized mass     Pulmonary/Chest: [x] Respiratory effort normal.  [x] No visualized signs of difficulty breathing or respiratory distress        [] Abnormal-      Musculoskeletal:   [x] Normal gait with no signs of ataxia         [x] Normal range of motion of neck        [] Abnormal-       Neurological:        [x] No Facial Asymmetry (Cranial nerve 7 motor function) (limited exam to video visit)          [x] No gaze palsy        [] Abnormal-         Skin:        [x] No significant exanthematous lesions or discoloration noted on facial skin         [] Abnormal-            Psychiatric:       [x] Normal Affect [x] No Hallucinations        [] Abnormal-     Other pertinent observable physical exam findings-     ASSESSMENT/PLAN:  Patient-Reported Vitals 8/10/2020   Patient-Reported Weight 180.6   Patient-Reported Systolic 144   Patient-Reported Diastolic 75   Patient-Reported Pulse 83   Patient-Reported Temperature 96.5   Patient-Reported SpO2 99        1. Open wound of left lower leg, subsequent encounter  -keep rescheduled f/u with surgeon/wound care THIS WEEK on Thursday.  - HYDROcodone-acetaminophen ([address]) 7.5-325 MG per tablet; Take 1 tablet by mouth every 6 hours as needed for Pain for up to 7 days. Intended supply: 7 days. Take lowest dose possible to manage pain  Dispense: 12 tablet; Refill: 0    2. Cigarette smoker motivated to quit    - nicotine (NICODERM CQ) 21 MG/24HR; Place 1 patch onto the skin every 24 hours  Dispense: 30 patch; Refill: 0  - nicotine (NICODERM CQ) 14 MG/24HR; Place 1 patch onto the skin every 24 hours  Dispense: 30 patch; Refill: 0  - nicotine (NICODERM CQ) 7 MG/24HR; Place 1 patch onto the skin every 24 hours  Dispense: 30 patch; Refill: 0    Pt aware to never mix pain meds and never take anxiety med within 4-6hrs of pain med. Continue using the least amount of Norco possible. Rhodia Pile has been done in last 3mo and is utd. This should be the last pain med script pt has to obtain with PCP. [facility] regarding patches and how to wean down. F/u prn. No follow-ups on file. Keeley Gonzalez is a 62 y.o. female being evaluated by a Virtual Visit (video visit) encounter to address concerns as mentioned above. A caregiver was present when appropriate. Due to this being a TeleHealth encounter (During VWFIR-60 public health emergency), evaluation of the following organ systems was limited: Vitals/Constitutional/EENT/Resp/CV/GI//MS/Neuro/Skin/Heme-Lymph-Imm.   Pursuant to the emergency declaration under the [facility], [address] [place] [address] [facility] authority and the [employer] and [employer] Act, this Virtual Visit was conducted with patient's (and/or legal guardian's) consent, to reduce the patient's risk of exposure to COVID-19 and provide necessary medical care. The patient (and/or legal guardian) has also been advised to contact this office for worsening conditions or problems, and seek emergency medical treatment and/or call 911 if deemed necessary. Services were provided through a video synchronous discussion virtually to substitute for in-person clinic visit. Patient and provider were located at their individual homes. --MAEVE Medina on 8/10/2020 at 12:17 PM    An electronic signature was used to authenticate this note.

## 2020-08-12 ENCOUNTER — TELEPHONE (OUTPATIENT)
Dept: FAMILY MEDICINE CLINIC | Age: 60
End: 2020-08-12

## 2020-08-12 NOTE — TELEPHONE ENCOUNTER
----- Message from MAEVE Logan sent at 8/10/2020 10:14 AM CDT -----  TEAGAN. Please call pharmacy and verify date of last Norco.  I am sending a limited supply.

## 2020-08-12 NOTE — TELEPHONE ENCOUNTER
TEAGAN was reviewed today per office protocol. Report shows Some discrepancies. Fill pattern is not consistent from multiple provider(s) at single pharmacy(s). Please advise. Scan was routed to you.

## 2020-08-12 NOTE — TELEPHONE ENCOUNTER
Thank you. There is discrepancy, however this is consistent with hospital stays, change of wound care, and the #12 supplies that were given. No action needed.

## 2020-08-13 ENCOUNTER — OFFICE VISIT (OUTPATIENT)
Dept: INTERNAL MEDICINE | Facility: CLINIC | Age: 60
End: 2020-08-13

## 2020-08-13 VITALS
HEART RATE: 98 BPM | SYSTOLIC BLOOD PRESSURE: 129 MMHG | HEIGHT: 60 IN | WEIGHT: 180 LBS | OXYGEN SATURATION: 98 % | TEMPERATURE: 98.3 F | RESPIRATION RATE: 18 BRPM | DIASTOLIC BLOOD PRESSURE: 82 MMHG | BODY MASS INDEX: 35.34 KG/M2

## 2020-08-13 DIAGNOSIS — M79.605 PAIN OF LEFT LOWER EXTREMITY: ICD-10-CM

## 2020-08-13 DIAGNOSIS — S81.802D WOUND OF LEFT LOWER EXTREMITY, SUBSEQUENT ENCOUNTER: Primary | ICD-10-CM

## 2020-08-13 PROCEDURE — 99213 OFFICE O/P EST LOW 20 MIN: CPT | Performed by: INTERNAL MEDICINE

## 2020-08-13 RX ORDER — NICOTINE 21 MG/24HR
1 PATCH, TRANSDERMAL 24 HOURS TRANSDERMAL EVERY 24 HOURS
COMMUNITY
Start: 2020-08-10 | End: 2020-12-07

## 2020-08-13 RX ORDER — HYDROCODONE BITARTRATE AND ACETAMINOPHEN 10; 325 MG/1; MG/1
1 TABLET ORAL EVERY 6 HOURS PRN
Qty: 60 TABLET | Refills: 0 | Status: SHIPPED | OUTPATIENT
Start: 2020-08-13 | End: 2020-09-21 | Stop reason: SDUPTHER

## 2020-08-13 NOTE — PROGRESS NOTES
Subjective     Chief Complaint   Patient presents with   • Wound Check     Patient has a wound on left lower leg. This is post hospital follow up.       History of Present Illness  12x12 deep tissue wound to the LLE.   Ongoing pain in the left leg. Worse in the am.   [facility].     Patient's PMR from outside medical facility reviewed and noted.    Review of Systems   Constitutional: Negative for chills and fever.   HENT: Negative for congestion and rhinorrhea.    Respiratory: Negative for cough and shortness of breath.    Gastrointestinal: Positive for diarrhea. Negative for constipation.        Diarrhea from antibiotics using Imodium    Genitourinary: Negative for dysuria and hematuria.   Skin: Positive for color change and wound.      Otherwise complete ROS reviewed and negative except as mentioned in the HPI.    Past Medical History:   Past Medical History:   Diagnosis Date   • Acid reflux     RESOLVED PT PT   • Allergic rhinitis    • Arthritis     BACK   • Bipolar 1 disorder (CMS/HCC)    • Chronic back pain    • Degenerative arthritis    • Depression    • Deviated nasal septum    • Eczema    • Gout    • History of colon polyps    • Hyperlipidemia    • Hypertension    • Hypertrophy of nasal turbinates    • Hypokalemia    • Hypothyroidism    • Incontinence in female    • Insomnia    • Menopause    • Nasal septal deviation    • Nasal valve stenosis    • Overactive bladder    • Plantar fasciitis    • Prediabetes    • RLS (restless legs syndrome)    • Sleep apnea     cpap   • Wears glasses      Past Surgical History:  Past Surgical History:   Procedure Laterality Date   • BLEPHAROPLASTY Bilateral 7/9/2019    Procedure: 1) bilateral upper blepharoplasty with excision of excess of tissue weighing down 2) nasal endoscopy with removal of nasal septal prosthesis;  Surgeon: Mark Anthony Anderson MD;  Location: [facility] OR;  Service: ENT   • CARPAL TUNNEL RELEASE Bilateral 2004   • CHOLECYSTECTOMY  2013   • COLONOSCOPY   10/17/2014    One 5-6mm tubular adenomatous polyp in the ascending colon; Two less than 4mm hyperplastic polyps in the sigmoid colon; Three rectal hyperplastic polyps; Diverticulosis; Repeat 5 years    • COLONOSCOPY  07/14/2010    Internal hemorrhoids; Repeat 7 years    • COLONOSCOPY N/A 10/23/2019    Procedure: COLONOSCOPY WITH ANESTHESIA;  Surgeon: Robyn Frazier MD;  Location: [facility] ENDOSCOPY;  Service: Gastroenterology   • CYST REMOVAL  2016    neck   • ENDOSCOPIC FUNCTIONAL SINUS SURGERY (FESS) Bilateral 4/16/2019    Procedure: ENDOSCOPIC FUNCTIONAL SINUS SURGERY (bilareral maxillary antrostomy without image guidance);  Surgeon: Mark Anthony Anderson MD;  Location: [facility] OR;  Service: ENT   • ENDOSCOPY N/A 4/11/2019    Esophageal mucosal changes suggestive of eosinophilic esophagitis-biopsied; A few gastric polyps-resected and retrieved-biopsied; Normal examined duodenum-biopsied   • FOOT SURGERY Right 2010    X3   • HERNIA REPAIR     • INTERSTIM PLACEMENT N/A 6/5/2019    Procedure: INTERSTIM STAGES 1 AND 2 LEAD AND GENERATOR PLACEMENT;  Surgeon: Endy Guerrero MD;  Location:  [facility] OR;  Service: Urology   • LASER ABLATION      right back   • LEG DEBRIDEMENT Left 7/31/2020    Procedure: LOWER POSTERIOR LEG WOUND DEBRIDEMENT - LEFT;  Surgeon: Roberto Wells DPM;  Location: [facility] OR;  Service: Podiatry;  Laterality: Left;   • NASAL ENDOSCOPY N/A 4/16/2019    Procedure:     1. Functional endoscopic sinus surgery with bilateral maxillary antrostomy    2. Bilateral nasal endoscopy with biopsy of nasal septum    3.  Nasal septal prosthesis placement  ;  Surgeon: Mark Anthony Anderson MD;  Location: [facility] OR;  Service: ENT   • NASAL VESTIBULE LESION/PAPILLOMA EXCISION N/A 8/1/2017    Procedure: Repair of nasal vestibular stenosis and septoplasty; cartilage graft from left ear;  Surgeon: Mark Anthony Anderson MD;  Location:  [facility] OR;  Service:    • SEPTOPLASTY N/A 4/16/2019    Procedure: PLACEMENT OF NASAL SEPTAL  PROSTHESIS;  Surgeon: Mark Anthony Anderson MD;  Location: [facility] OR;  Service: ENT   • SEPTOPLASTY Bilateral 1/14/2020    Procedure: Nasal endoscopy with septal debridement, and placement of Silastic stents;  Surgeon: Mark Anthony Anderson MD;  Location:  [facility] OR;  Service: ENT   • WOUND DEBRIDEMENT       Social History:  reports that she has been smoking cigarettes. She has a 20.00 pack-year smoking history. She has never used smokeless tobacco. She reports that she drinks alcohol. She reports that she does not use drugs.    Family History: family history includes Cancer in her father and mother; Diabetes in her father; Hypertension in her father.       Allergies:  Allergies   Allergen Reactions   • Bactrim [Sulfamethoxazole-Trimethoprim] Rash     Itching   • Hctz [Hydrochlorothiazide] Other (See Comments)     Acid reflux   • Januvia [Sitagliptin] Rash     Medications:  Prior to Admission medications    Medication Sig Start Date End Date Taking? Authorizing Provider   albuterol (PROVENTIL) (2.5 MG/3ML) 0.083% nebulizer solution As Needed. 8/28/19  Yes Brenda Mcnair MD   allopurinol (ZYLOPRIM) 100 MG tablet Take 100 mg by mouth Daily. 5/8/17  Yes Brenda Mncair MD   ALPRAZolam (XANAX) 0.5 MG tablet Take 0.5 mg by mouth As Needed. 7/9/19  Yes Brenda Mcnair MD   atorvastatin (LIPITOR) 40 MG tablet Take 40 mg by mouth Every Night. 9/13/17  Yes Brenda Mcnair MD   bumetanide (BUMEX) 1 MG tablet Take 1 mg by mouth 2 (Two) Times a Week.   Yes Brenda Mcnair MD   calcium carbonate (OS-KAYLEY) 600 MG tablet Take 600 mg by mouth Daily.   Yes Brenda Mcnair MD   carvedilol (COREG) 6.25 MG tablet Take 6.25 mg by mouth 2 (Two) Times a Day With Meals.   Yes Brenda Mcnair MD   cholecalciferol (VITAMIN D3) 1000 UNITS tablet Take 1,000 Units by mouth Daily.   Yes Brenda Mcnair MD   DICLOFENAC PO Take 75 mg by mouth 2 (Two) Times a Day.   Yes Brenda Mcnair MD   gabapentin  (NEURONTIN) 100 MG capsule Take 1 capsule by mouth 3 (Three) Times a Day for 30 days. 8/7/20 9/6/20 Yes Roberto Wells DPM   HYDROcodone-acetaminophen (NORCO)  MG per tablet Take 1 tablet by mouth Every 6 (Six) Hours As Needed for Moderate Pain  for up to 18 days. 8/3/20 8/21/20 Yes Rajwinder Sandoval APRN   levothyroxine (SYNTHROID, LEVOTHROID) 125 MCG tablet Take 125 mcg by mouth Daily. 3/13/19  Yes Brenda Mcnair MD   Loperamide HCl (IMODIUM PO) Take 2 mg by mouth As Needed.   Yes ProviderBrenda MD   Multiple Vitamin (MULTI VITAMIN PO) Take 1 dose by mouth Daily.   Yes Brenda Mcnair MD   mupirocin (BACTROBAN) 2 % ointment  10/16/19  Yes Brenda Mcnair MD   niacin (SLO-NIACIN) 500 MG CR tablet Take 500 mg by mouth Daily.   Yes Brenda Mcnair MD   nicotine (NICODERM CQ) 21 MG/24HR patch Place 1 patch on the skin as directed by provider. 8/10/20 8/10/21 Yes Brenda Mcnair MD   omeprazole (priLOSEC) 20 MG capsule Take 20 mg by mouth Daily. 3/27/19  Yes Brenda Mcnair MD   pilocarpine (SALAGEN) 5 MG tablet Take 5 mg by mouth 3 (Three) Times a Day.   Yes Brenda Mcnair MD   pramipexole (MIRAPEX) 1.5 MG tablet Take 1.5 mg by mouth Every Night.   Yes ProviderBrenda MD   QUEtiapine (SEROquel) 50 MG tablet Take 50 mg by mouth Every Night.   Yes ProviderBrenda MD   Respiratory Therapy Supplies (NEBULIZER/TUBING/MOUTHPIECE) kit 1 kit. 8/28/19  Yes Brenda Mcnair MD   spironolactone (ALDACTONE) 25 MG tablet Take 25 mg by mouth 2 (Two) Times a Day.   Yes Brenda Mcnair MD   tiZANidine (ZANAFLEX) 4 MG tablet Take 4 mg by mouth Every Night.   Yes Brenda Mcnair MD   traMADol (ULTRAM) 50 MG tablet Take 50 mg by mouth 2 (Two) Times a Day.   Yes Brenda Mcnair MD   divalproex (DEPAKOTE) 500 MG 24 hr tablet Take 1,000 mg by mouth Every Night.    Provider, MD Brenda   DULoxetine (CYMBALTA) 60 MG capsule Take 60 mg by  "mouth 2 (Two) Times a Day.    Brenda Mcnair MD   guaiFENesin (MUCINEX) 600 MG 12 hr tablet Take 1,200 mg by mouth 2 (Two) Times a Day. 10/19/18   Brenda Mcnair MD   melatonin 3 MG tablet Take 3 mg by mouth Daily. 7/24/20 8/23/20  Brenda Mcnair MD   methylcellulose, Laxative, (EQ FIBER THERAPY) 500 MG tablet tablet Take 1 tablet by mouth Daily.    Brenda Mcnair MD       Objective     Vital Signs: /82 (BP Location: Right arm, Patient Position: Sitting, Cuff Size: Adult)   Pulse 98   Temp 98.3 °F (36.8 °C) (Infrared)   Resp 18   Ht 152.4 cm (60\")   Wt 81.6 kg (180 lb)   SpO2 98%   BMI 35.15 kg/m²   Physical Exam   Constitutional: She appears well-developed and well-nourished.   HENT:   Head: Normocephalic and atraumatic.   Nose: Nose normal.   Mouth/Throat: Oropharynx is clear and moist.   Eyes: Conjunctivae and EOM are normal.   Neck: Normal range of motion. Neck supple.   Pulmonary/Chest: Effort normal. No respiratory distress.   Musculoskeletal: She exhibits no edema or tenderness.   Neurological: She is alert. No cranial nerve deficit.   Skin: Skin is warm and dry. Rash noted.   LLE 12X12 Oval lesion with redness at the posterior pole.    Psychiatric: She has a normal mood and affect.   Vitals reviewed.      Patient's Body mass index is 35.15 kg/m². BMI is above normal parameters. Recommendations include: none (medical contraindication).      Results Reviewed:  Glucose   Date Value Ref Range Status   08/03/2020 150 (H) 65 - 99 mg/dL Final   08/28/2019 105 74 - 109 mg/dL Final     BUN   Date Value Ref Range Status   08/03/2020 7 6 - 20 mg/dL Final   08/28/2019 19 6 - 20 mg/dL Final     Creatinine   Date Value Ref Range Status   08/03/2020 0.63 0.57 - 1.00 mg/dL Final   11/13/2019 0.90 0.60 - 1.30 mg/dL Final     Comment:     Serial Number: [id number]:  [id number]   08/28/2019 0.6 0.5 - 0.9 mg/dL Final     Sodium   Date Value Ref Range Status   08/03/2020 142 136 - 145 " mmol/L Final   08/28/2019 139 136 - 145 mmol/L Final     Potassium   Date Value Ref Range Status   08/03/2020 3.7 3.5 - 5.2 mmol/L Final   08/28/2019 4.0 3.5 - 5.0 mmol/L Final     Chloride   Date Value Ref Range Status   08/03/2020 105 98 - 107 mmol/L Final   08/28/2019 94 (L) 98 - 111 mmol/L Final     CO2   Date Value Ref Range Status   08/03/2020 27.0 22.0 - 29.0 mmol/L Final   08/28/2019 31 (H) 22 - 29 mmol/L Final     Calcium   Date Value Ref Range Status   08/03/2020 8.8 8.6 - 10.5 mg/dL Final   08/28/2019 9.7 8.6 - 10.0 mg/dL Final     ALT (SGPT)   Date Value Ref Range Status   07/29/2020 16 1 - 33 U/L Final   04/05/2019 32 5 - 33 U/L Final     AST (SGOT)   Date Value Ref Range Status   07/29/2020 16 1 - 32 U/L Final   04/05/2019 22 5 - 32 U/L Final     WBC   Date Value Ref Range Status   08/03/2020 9.03 3.40 - 10.80 10*3/mm3 Final   07/05/2020 9.9 4.8 - 10.8 K/uL Final     Hematocrit   Date Value Ref Range Status   08/03/2020 28.0 (L) 34.0 - 46.6 % Final   07/05/2020 28.8 (L) 37.0 - 47.0 % Final     Platelets   Date Value Ref Range Status   08/03/2020 352 140 - 450 10*3/mm3 Final   07/05/2020 394 130 - 400 K/uL Final     Total Cholesterol   Date Value Ref Range Status   07/29/2020 101 0 - 200 mg/dL Final     Triglycerides   Date Value Ref Range Status   07/29/2020 209 (H) 0 - 150 mg/dL Final   04/05/2019 117 0 - 149 mg/dL Final     HDL Cholesterol   Date Value Ref Range Status   07/29/2020 29 (L) 40 - 60 mg/dL Final   04/05/2019 35 (L) 65 - 121 mg/dL Final     Comment:     VALUES>60 MG/DL ARE ASSOCIATED WITH A DECREASED RISK OF  ATHEROSCLEROTIC CARDIOVASCULAR DISEASE     LDL Cholesterol    Date Value Ref Range Status   07/29/2020 30 0 - 100 mg/dL Final   04/05/2019 29 <100 mg/dL Final     Comment:     <100 MG/DL=OPITIMAL    100-129 MG/DL=DESIRABLE    130-159 MG/DL BORDERLINE=INCREASED RISK OF ATHEROSCLEROTIC  CARDIOVASCULAR DISEASE    > OR = 160 MG/DL=ASSOCIATED WITH AN INCREASE RISK OF  ATHEROSCLEROTIC  CARDIOVASCULAR DISEASE     LDL/HDL Ratio   Date Value Ref Range Status   07/29/2020 1.04  Final     Hemoglobin A1C   Date Value Ref Range Status   07/29/2020 6.50 (H) 4.80 - 5.60 % Final   07/04/2020 6.1 (H) 4.0 - 6.0 % Final     Comment:     HbA1c levels >6% are an indication of hyperglycemia during the preceding 2  to 3 months or longer.    HbA1c levels may reach 20% or higher in poorly controlled diabetes.  Therapeutic action is suggested at levels above 8%.    Diabetes patients with HbA1c levels below 7% meet the goal of the American  Diabetes Association.    HbA1c levels below the established reference range may indicate recent  episodes of hypoglycemia, the presence of Hb variants, or shortened lifetime  of erythrocytes.          Assessment / Plan     Assessment/Plan:  1. LLE wound subsequent  - Wound culture X2  Patient just stopped antibiotics on Monday  Post hospital follow up      2. LLE pain  - Norco 10 #60    Continue [facility] home health and current dressing changes.       Return in about 2 weeks (around 8/27/2020) for Recheck, Next scheduled follow up. unless patient needs to be seen sooner or acute issues arise.    Code Status: Full    I have discussed the patient results/orders and and plan/recommendation with them at today's visit.      Sania Witt, DO   08/13/2020

## 2020-08-15 ENCOUNTER — TELEPHONE (OUTPATIENT)
Dept: INTERNAL MEDICINE | Facility: CLINIC | Age: 60
End: 2020-08-15

## 2020-08-15 NOTE — TELEPHONE ENCOUNTER
Called pt to inform that we did not get c & s results back yet,  But hopefully have it Monday,  She states its little better,  Using more cream and seems to be helping removing of the the sloth?  And even her  states it looks better.    She will call if she has any problems.

## 2020-08-17 ENCOUNTER — OFFICE VISIT (OUTPATIENT)
Dept: WOUND CARE | Facility: HOSPITAL | Age: 60
End: 2020-08-17

## 2020-08-17 PROCEDURE — 11045 DBRDMT SUBQ TISS EACH ADDL: CPT | Performed by: PODIATRIST

## 2020-08-17 PROCEDURE — 11042 DBRDMT SUBQ TIS 1ST 20SQCM/<: CPT | Performed by: PODIATRIST

## 2020-08-18 DIAGNOSIS — T14.8XXA WOUND INFECTION: Primary | ICD-10-CM

## 2020-08-18 DIAGNOSIS — L08.9 WOUND INFECTION: Primary | ICD-10-CM

## 2020-08-18 LAB
BACTERIA SPEC AEROBE CULT: ABNORMAL
BACTERIA SPEC ANAEROBE CULT: ABNORMAL
BACTERIA SPEC CULT: ABNORMAL
BACTERIA SPEC CULT: ABNORMAL
OTHER ANTIBIOTIC SUSC ISLT: ABNORMAL

## 2020-08-18 RX ORDER — DOXYCYCLINE 100 MG/1
100 CAPSULE ORAL 2 TIMES DAILY
Qty: 20 CAPSULE | Refills: 0 | Status: SHIPPED | OUTPATIENT
Start: 2020-08-18 | End: 2020-11-20

## 2020-08-24 ENCOUNTER — OFFICE VISIT (OUTPATIENT)
Dept: WOUND CARE | Facility: HOSPITAL | Age: 60
End: 2020-08-24

## 2020-08-24 PROCEDURE — 11045 DBRDMT SUBQ TISS EACH ADDL: CPT | Performed by: PODIATRIST

## 2020-08-24 PROCEDURE — 11042 DBRDMT SUBQ TIS 1ST 20SQCM/<: CPT | Performed by: PODIATRIST

## 2020-08-26 ENCOUNTER — TELEMEDICINE (OUTPATIENT)
Dept: FAMILY MEDICINE CLINIC | Age: 60
End: 2020-08-26
Payer: COMMERCIAL

## 2020-08-26 PROCEDURE — 99214 OFFICE O/P EST MOD 30 MIN: CPT | Performed by: NURSE PRACTITIONER

## 2020-08-26 PROCEDURE — 3017F COLORECTAL CA SCREEN DOC REV: CPT | Performed by: NURSE PRACTITIONER

## 2020-08-26 PROCEDURE — G8427 DOCREV CUR MEDS BY ELIG CLIN: HCPCS | Performed by: NURSE PRACTITIONER

## 2020-08-26 NOTE — PROGRESS NOTES
2020    TELEHEALTH EVALUATION -- Audio/Visual (During RHJAK-40 public health emergency)    HPI:    Celine Menjivar (:  1960) has requested an audio/video evaluation for the following concern(s):    Pt reports that her home bp range has been trending \"all over the place\"140/80s noted at home occasionally. Previously she reports 102/60s at office appts. Pt was concerned that she may need to decrease bp med dose and then today has higher bp reading. Pt does report \"once in a while, I do get readings in 140s\"  Pt states that since her last wound care eval with [facility] (after having been at [facility]) and states that she is finally doing better--tearfully as she speaks of the pain she had with a procedure with wound care \"worst experience of my life\". \"she wouldn't stop\". Pt goes on speaking of pt rights and the pain that she was in during the procedure stating \"it obviously needed to be debrided but needed surgery\". Pt is going on speaking of how amputation was mentioned. Pt states that she is going in weekly to see Dr. Sheri Jean and \"I don't feel it\"    Pt continues to hold on smoking. She states that she is thrilled with the smoking cessation. Pt is using nicoderm patch. Wound to left leg   Pt is changing dressing currently. Pt states that she is using pain med as little as possible. Bipolar   Pt has been speaking with counselor weekly. Pt states that she is continuing to see counselor. No ideas of self harm. Review of Systems   Constitutional: Negative for fever. HENT: Positive for congestion. Musculoskeletal: Positive for arthralgias. Skin: Positive for wound. Neurological: Negative for weakness. Psychiatric/Behavioral: Positive for sleep disturbance. The patient is nervous/anxious. Prior to Visit Medications    Medication Sig Taking?  Authorizing Provider   nicotine (NICODERM CQ) 21 MG/24HR Place 1 patch onto the skin every 24 hours  MAEVE Cuevas   nicotine MEDICATION FACET JOINT performed by [place] [facility] OR   ,   Social History     Tobacco Use    Smoking status: Current Every Day Smoker     Packs/day: 1.00     Years: 30.00     Pack years: 30.00     Types: Cigarettes     Last attempt to quit: 2003     Years since quittin.1    Smokeless tobacco: Never Used    Tobacco comment: Pt not interested at this time in stopping. Substance Use Topics    Alcohol use: Yes     Comment: rarely    Drug use: No     Comment: pt admits \"may have misused in past\"--Pt was vague with details.    ,   Family History   Problem Relation Age of Onset    Cancer Mother         lung    Heart Disease Mother     Hypertension Mother     Bipolar Disorder Mother     Heart Disease Father     Hypertension Father    ,   Health Maintenance   Topic Date Due    Hepatitis C screen  1960    HIV screen  1976    DTaP/Tdap/Td vaccine (1 - Tdap) 1980    Shingles Vaccine (1 of 2) 2011    Cervical cancer screen  2017    Breast cancer screen  2020    Annual Wellness Visit (AWV)  2020    Flu vaccine (1) 2020    Low dose CT lung screening  2020    Lipid screen  2021    A1C test (Diabetic or Prediabetic)  2021    Potassium monitoring  2021    Creatinine monitoring  2021    Colon cancer screen colonoscopy  2024    Pneumococcal 0-64 years Vaccine  Completed    Hepatitis A vaccine  Aged Out    Hepatitis B vaccine  Aged Out    Hib vaccine  Aged Out    Meningococcal (ACWY) vaccine  Aged Out       PHYSICAL EXAMINATION:  [ INSTRUCTIONS:  \"[x]\" Indicates a positive item  \"[]\" Indicates a negative item  -- DELETE ALL ITEMS NOT EXAMINED]  Vital Signs: (As obtained by patient/caregiver or practitioner observation)    Patient-Reported Vitals 2020   Patient-Reported Weight -   Patient-Reported Height -   Patient-Reported Systolic -   Patient-Reported Diastolic -   Patient-Reported Pulse - Patient-Reported Temperature -   Patient-Reported SpO2 95        Constitutional: [x] Appears well-developed and well-nourished [x] No apparent distress      [] Abnormal-   Mental status  [x] Alert and awake  [x] Oriented to person/place/time []Able to follow commands      Eyes:  EOM    [x]  Normal  [] Abnormal-  Sclera  [x]  Normal  [] Abnormal -         Discharge [x]  None visible  [] Abnormal -    HENT:   [x] Normocephalic, atraumatic. [] Abnormal   [x] Mouth/Throat: Mucous membranes are moist.     External Ears [x] Normal  [] Abnormal-     Neck: [x] No visualized mass     Pulmonary/Chest: [x] Respiratory effort normal.  [x] No visualized signs of difficulty breathing or respiratory distress        [] Abnormal-      Musculoskeletal:   [x] Normal gait with no signs of ataxia         [x] Normal range of motion of neck        [] Abnormal-       Neurological:        [x] No Facial Asymmetry (Cranial nerve 7 motor function) (limited exam to video visit)          [x] No gaze palsy        [] Abnormal-         Skin:        [x] No significant exanthematous lesions or discoloration noted on facial skin         [x] Abnormal- left lower leg with approx 4x4in wound with pink tissue noted. Psychiatric:       [x] Normal Affect [x] No Hallucinations        [] Abnormal-     Other pertinent observable physical exam findings-     ASSESSMENT/PLAN:  1. Open wound of left lower leg, subsequent encounter      2. Essential hypertension    - Urinalysis Reflex to Culture; Future  - CBC Auto Differential; Future  - Comprehensive Metabolic Panel w/ Reflex to MG; Future    3. Mixed hyperlipidemia    - CBC Auto Differential; Future  - Comprehensive Metabolic Panel w/ Reflex to MG; Future  - Lipid Panel; Future  - TSH without Reflex; Future  - T4, Free; Future    4. Anemia, unspecified type    - Folate; Future  - Iron; Future  - Iron Binding Capacity; Future  - Vitamin B12; Future  - Reticulocytes; Future    5.  IFG (impaired fasting glucose)    - Hemoglobin A1C; Future    6. Bipolar 1 disorder, mixed (HCC)      7. Elevated uric acid in blood    - Uric Acid; Future    Fasting lab entered  Pt to keep f/u with wound care. No change to bp med at this time. Once pain is further improved, we may be able to decrease bp med. F/u in approx 1mo r/t bp and lab. No follow-ups on file. Randall Cannon is a 62 y.o. female being evaluated by a Virtual Visit (video visit) encounter to address concerns as mentioned above. A caregiver was present when appropriate. Due to this being a TeleHealth encounter (During SQEPS-86 public health emergency), evaluation of the following organ systems was limited: Vitals/Constitutional/EENT/Resp/CV/GI//MS/Neuro/Skin/Heme-Lymph-Imm. Pursuant to the emergency declaration under the [address] [place] [address], [address] [place] [address] authority and the [employer] and [employer] Act, this Virtual Visit was conducted with patient's (and/or legal guardian's) consent, to reduce the patient's risk of exposure to COVID-19 and provide necessary medical care. The patient (and/or legal guardian) has also been advised to contact this office for worsening conditions or problems, and seek emergency medical treatment and/or call 911 if deemed necessary. Patient identification was verified at the start of the visit: Yes    Total time spent on this encounter: 31 mins    Services were provided through a video synchronous discussion virtually to substitute for in-person clinic visit. Patient and provider were located at their individual homes. --MAEVE Sen on 8/26/2020 at 10:11 AM    An electronic signature was used to authenticate this note.

## 2020-08-28 ENCOUNTER — OFFICE VISIT (OUTPATIENT)
Dept: INTERNAL MEDICINE | Facility: CLINIC | Age: 60
End: 2020-08-28

## 2020-08-28 VITALS
OXYGEN SATURATION: 98 % | SYSTOLIC BLOOD PRESSURE: 131 MMHG | TEMPERATURE: 98 F | WEIGHT: 181.13 LBS | HEART RATE: 96 BPM | RESPIRATION RATE: 18 BRPM | BODY MASS INDEX: 35.56 KG/M2 | DIASTOLIC BLOOD PRESSURE: 85 MMHG | HEIGHT: 60 IN

## 2020-08-28 DIAGNOSIS — R53.83 FATIGUE, UNSPECIFIED TYPE: ICD-10-CM

## 2020-08-28 DIAGNOSIS — D64.9 ANEMIA, UNSPECIFIED TYPE: Primary | ICD-10-CM

## 2020-08-28 DIAGNOSIS — S81.802D WOUND OF LEFT LOWER EXTREMITY, SUBSEQUENT ENCOUNTER: ICD-10-CM

## 2020-08-28 DIAGNOSIS — E78.2 MIXED HYPERLIPIDEMIA: ICD-10-CM

## 2020-08-28 DIAGNOSIS — G89.4 CHRONIC PAIN SYNDROME: ICD-10-CM

## 2020-08-28 DIAGNOSIS — I10 ESSENTIAL HYPERTENSION: ICD-10-CM

## 2020-08-28 DIAGNOSIS — E03.9 ACQUIRED HYPOTHYROIDISM: ICD-10-CM

## 2020-08-28 PROCEDURE — 99214 OFFICE O/P EST MOD 30 MIN: CPT | Performed by: FAMILY MEDICINE

## 2020-08-28 RX ORDER — HYDROCODONE BITARTRATE AND ACETAMINOPHEN 7.5; 325 MG/1; MG/1
TABLET ORAL
COMMUNITY
Start: 2020-08-10 | End: 2020-08-28 | Stop reason: ALTCHOICE

## 2020-08-28 RX ORDER — DICLOFENAC SODIUM 75 MG/1
75 TABLET, DELAYED RELEASE ORAL 2 TIMES DAILY
COMMUNITY
Start: 2020-06-04

## 2020-08-28 RX ORDER — OXYCODONE AND ACETAMINOPHEN 7.5; 325 MG/1; MG/1
TABLET ORAL
COMMUNITY
Start: 2020-06-01 | End: 2020-08-28 | Stop reason: ALTCHOICE

## 2020-08-28 RX ORDER — KETOROLAC TROMETHAMINE 10 MG/1
TABLET, FILM COATED ORAL
COMMUNITY
Start: 2020-07-08 | End: 2020-08-28

## 2020-08-28 RX ORDER — OXYCODONE HYDROCHLORIDE AND ACETAMINOPHEN 5; 325 MG/1; MG/1
TABLET ORAL
COMMUNITY
Start: 2020-07-06 | End: 2020-08-28 | Stop reason: ALTCHOICE

## 2020-08-28 NOTE — PROGRESS NOTES
Subjective     Chief Complaint   Patient presents with   • Cellulitis     Left Leg is in pain   • Fatigue       History of Present Illness  Patient presents today for follow-up of her wound in her leg.  She has been in the hospital.  Treated with antibiotics.  And subsequently discharged.  The leg was debrided while in the hospital.  She has been following with Dr. Cecilio Wells for continued wound care.  Patient also is having some complaints of fatigue.  She notes that she has having some issues with pain in her leg.  She is getting sensation back where the wound is.  She notes that she did have to have a transfusion while in the hospital.  She has not had lab work checked since she was discharged.    Patient's PMR from outside medical facility reviewed and noted.    Review of Systems     Otherwise complete ROS reviewed and negative except as mentioned in the HPI.    Past Medical History:   Past Medical History:   Diagnosis Date   • Acid reflux     RESOLVED PT PT   • Allergic rhinitis    • Arthritis     BACK   • Bipolar 1 disorder (CMS/HCC)    • Chronic back pain    • Degenerative arthritis    • Depression    • Deviated nasal septum    • Eczema    • Gout    • History of colon polyps    • Hyperlipidemia    • Hypertension    • Hypertrophy of nasal turbinates    • Hypokalemia    • Hypothyroidism    • Incontinence in female    • Insomnia    • Menopause    • Nasal septal deviation    • Nasal valve stenosis    • Overactive bladder    • Plantar fasciitis    • Prediabetes    • RLS (restless legs syndrome)    • Sleep apnea     cpap   • Wears glasses      Past Surgical History:  Past Surgical History:   Procedure Laterality Date   • BLEPHAROPLASTY Bilateral 7/9/2019    Procedure: 1) bilateral upper blepharoplasty with excision of excess of tissue weighing down 2) nasal endoscopy with removal of nasal septal prosthesis;  Surgeon: Mark Anthony Anderson MD;  Location: [facility] OR;  Service: ENT   • CARPAL TUNNEL RELEASE  Bilateral 2004   • CHOLECYSTECTOMY  2013   • COLONOSCOPY  10/17/2014    One 5-6mm tubular adenomatous polyp in the ascending colon; Two less than 4mm hyperplastic polyps in the sigmoid colon; Three rectal hyperplastic polyps; Diverticulosis; Repeat 5 years    • COLONOSCOPY  07/14/2010    Internal hemorrhoids; Repeat 7 years    • COLONOSCOPY N/A 10/23/2019    Procedure: COLONOSCOPY WITH ANESTHESIA;  Surgeon: Robyn Frazier MD;  Location: [facility] ENDOSCOPY;  Service: Gastroenterology   • CYST REMOVAL  2016    neck   • ENDOSCOPIC FUNCTIONAL SINUS SURGERY (FESS) Bilateral 4/16/2019    Procedure: ENDOSCOPIC FUNCTIONAL SINUS SURGERY (bilareral maxillary antrostomy without image guidance);  Surgeon: Mark Anthony Anderson MD;  Location: [facility] OR;  Service: ENT   • ENDOSCOPY N/A 4/11/2019    Esophageal mucosal changes suggestive of eosinophilic esophagitis-biopsied; A few gastric polyps-resected and retrieved-biopsied; Normal examined duodenum-biopsied   • FOOT SURGERY Right 2010    X3   • HERNIA REPAIR     • INTERSTIM PLACEMENT N/A 6/5/2019    Procedure: INTERSTIM STAGES 1 AND 2 LEAD AND GENERATOR PLACEMENT;  Surgeon: Endy Guerrero MD;  Location:  [facility] OR;  Service: Urology   • LASER ABLATION      right back   • LEG DEBRIDEMENT Left 7/31/2020    Procedure: LOWER POSTERIOR LEG WOUND DEBRIDEMENT - LEFT;  Surgeon: Roberto Wells DPM;  Location: [facility] OR;  Service: Podiatry;  Laterality: Left;   • NASAL ENDOSCOPY N/A 4/16/2019    Procedure:     1. Functional endoscopic sinus surgery with bilateral maxillary antrostomy    2. Bilateral nasal endoscopy with biopsy of nasal septum    3.  Nasal septal prosthesis placement  ;  Surgeon: Mark Anthony Anderson MD;  Location: [facility] OR;  Service: ENT   • NASAL VESTIBULE LESION/PAPILLOMA EXCISION N/A 8/1/2017    Procedure: Repair of nasal vestibular stenosis and septoplasty; cartilage graft from left ear;  Surgeon: Mark Anthony Anderson MD;  Location: [facility] OR;  Service:    • SEPTOPLASTY  N/A 4/16/2019    Procedure: PLACEMENT OF NASAL SEPTAL PROSTHESIS;  Surgeon: Mark Anthony Anderson MD;  Location:  [facility] OR;  Service: ENT   • SEPTOPLASTY Bilateral 1/14/2020    Procedure: Nasal endoscopy with septal debridement, and placement of Silastic stents;  Surgeon: Mark Anthony Anderson MD;  Location:  [facility] OR;  Service: ENT   • WOUND DEBRIDEMENT       Social History:  reports that she quit smoking about 5 weeks ago. Her smoking use included cigarettes. She has a 20.00 pack-year smoking history. She has never used smokeless tobacco. She reports that she drinks alcohol. She reports that she does not use drugs.    Family History: family history includes Cancer in her father and mother; Diabetes in her father; Hypertension in her father.       Allergies:  Allergies   Allergen Reactions   • Bactrim [Sulfamethoxazole-Trimethoprim] Rash     Itching   • Hctz [Hydrochlorothiazide] Other (See Comments)     Acid reflux   • Januvia [Sitagliptin] Rash     Medications:  Prior to Admission medications    Medication Sig Start Date End Date Taking? Authorizing Provider   albuterol (PROVENTIL) (2.5 MG/3ML) 0.083% nebulizer solution As Needed. 8/28/19   Brenda Mcnair MD   allopurinol (ZYLOPRIM) 100 MG tablet Take 100 mg by mouth Daily. 5/8/17   Brenda Mcnair MD   ALPRAZolam (XANAX) 0.5 MG tablet Take 0.5 mg by mouth As Needed. 7/9/19   Brenda Mcnair MD   atorvastatin (LIPITOR) 40 MG tablet Take 40 mg by mouth Every Night. 9/13/17   Brenda Mcnair MD   bumetanide (BUMEX) 1 MG tablet Take 1 mg by mouth 2 (Two) Times a Week.    Brenda Mcnair MD   calcium carbonate (OS-KAYLEY) 600 MG tablet Take 600 mg by mouth Daily.    Brenda Mcnair MD   carvedilol (COREG) 6.25 MG tablet Take 6.25 mg by mouth 2 (Two) Times a Day With Meals.    Brenda Mcnair MD   cholecalciferol (VITAMIN D3) 1000 UNITS tablet Take 1,000 Units by mouth Daily.    Brenda Mcnair MD   collagenase 250 UNIT/GM ointment  APPLY 1GM TO INFECTION DAILY WITH DRESSING CHANGES 8/26/20   Brenda Mcnair MD   diclofenac (VOLTAREN) 75 MG EC tablet  6/4/20   Brenda Mcnair MD   divalproex (DEPAKOTE) 500 MG 24 hr tablet Take 1,000 mg by mouth Every Night.    Brenda Mcnair MD   doxycycline (MONODOX) 100 MG capsule Take 1 capsule by mouth 2 (Two) Times a Day. 8/18/20   Sania Witt DO   DULoxetine (CYMBALTA) 60 MG capsule Take 60 mg by mouth 2 (Two) Times a Day.    Brenda Mcnair MD   gabapentin (NEURONTIN) 100 MG capsule Take 1 capsule by mouth 3 (Three) Times a Day for 30 days. 8/7/20 9/6/20  Roberto Wells DPM   guaiFENesin (MUCINEX) 600 MG 12 hr tablet Take 1,200 mg by mouth 2 (Two) Times a Day. 10/19/18   rBenda Mcnair MD   HYDROcodone-acetaminophen (NORCO)  MG per tablet Take 1 tablet by mouth Every 6 (Six) Hours As Needed for Moderate Pain . 8/13/20   Sania Witt,    levothyroxine (SYNTHROID, LEVOTHROID) 125 MCG tablet Take 125 mcg by mouth Daily. 3/13/19   Brenda Mcnair MD   Loperamide HCl (IMODIUM PO) Take 2 mg by mouth As Needed.    Brenda Mcnair MD   methylcellulose, Laxative, (EQ FIBER THERAPY) 500 MG tablet tablet Take 1 tablet by mouth Daily.    Brenda Mcnair MD   Multiple Vitamin (MULTI VITAMIN PO) Take 1 dose by mouth Daily.    Brenda Mcnair MD   mupirocin (BACTROBAN) 2 % ointment  10/16/19   Brenda Mcnair MD   niacin (SLO-NIACIN) 500 MG CR tablet Take 500 mg by mouth Daily.    Brenda Mcnair MD   nicotine (NICODERM CQ) 21 MG/24HR patch Place 1 patch on the skin as directed by provider. 8/10/20 8/10/21  Brenda Mcnair MD   omeprazole (priLOSEC) 20 MG capsule Take 20 mg by mouth Daily. 3/27/19   Brenda Mcnair MD   pilocarpine (SALAGEN) 5 MG tablet Take 5 mg by mouth 3 (Three) Times a Day.    Brenda Mcnair MD   pramipexole (MIRAPEX) 1.5 MG tablet Take 1.5 mg by mouth Every Night.    Provider,  "MD Brenda   QUEtiapine (SEROquel) 50 MG tablet Take 50 mg by mouth Every Night.    Brenda Mcnair MD   Respiratory Therapy Supplies (NEBULIZER/TUBING/MOUTHPIECE) kit 1 kit. 8/28/19   Brenda Mcnair MD   spironolactone (ALDACTONE) 25 MG tablet Take 25 mg by mouth 2 (Two) Times a Day.    Brenda Mcnair MD   tiZANidine (ZANAFLEX) 4 MG tablet Take 4 mg by mouth Every Night.    Brenda Mcnair MD   traMADol (ULTRAM) 50 MG tablet Take 50 mg by mouth 2 (Two) Times a Day.    Brenda Mcnair MD   DICLOFENAC PO Take 75 mg by mouth 2 (Two) Times a Day.  8/28/20  Brenda Mcnair MD   HYDROcodone-acetaminophen (NORCO) 7.5-325 MG per tablet  8/10/20 8/28/20  Brenda Mcnair MD   ketorolac (TORADOL) 10 MG tablet  7/8/20 8/28/20  Brenda Mcnair MD   oxyCODONE-acetaminophen (PERCOCET) 5-325 MG per tablet  7/6/20 8/28/20  Brenda Mcnair MD   oxyCODONE-acetaminophen (PERCOCET) 7.5-325 MG per tablet  6/1/20 8/28/20  Brenda Mcnair MD       Objective     Vital Signs: /85 (BP Location: Left arm, Patient Position: Sitting, Cuff Size: Adult)   Pulse 96   Temp 98 °F (36.7 °C) (Skin)   Resp 18   Ht 152.4 cm (60\")   Wt 82.2 kg (181 lb 2 oz)   SpO2 98%   Breastfeeding No   BMI 35.37 kg/m²   Physical Exam   Constitutional: She is oriented to person, place, and time. She appears well-developed and well-nourished.   HENT:   Head: Normocephalic and atraumatic.   Right Ear: External ear normal.   Left Ear: External ear normal.   Nose: Nose normal.   Mouth/Throat: Oropharynx is clear and moist.   Eyes: Pupils are equal, round, and reactive to light. Conjunctivae and EOM are normal.   Neck: Normal range of motion. Neck supple. No JVD present.   Cardiovascular: Normal rate, regular rhythm, normal heart sounds and intact distal pulses. Exam reveals no gallop and no friction rub.   No murmur heard.  Pulmonary/Chest: Effort normal and breath sounds normal.   Abdominal: " Soft. Bowel sounds are normal.   Musculoskeletal: She exhibits no edema.   Moves all extremities.  Is walking with a cane.   Neurological: She is alert and oriented to person, place, and time. No cranial nerve deficit.   Skin: Skin is warm and dry. Capillary refill takes less than 2 seconds.   The left lower extremity in the calf region continues to have a large open wound with the tissue bed being pink in color there is no eschar present she has granulation tissue at the periphery.  This is much improved over the initial evaluation of this wound in the office.   Psychiatric: She has a normal mood and affect. Her behavior is normal. Judgment and thought content normal.   Nursing note and vitals reviewed.      Patient's Body mass index is 35.37 kg/m².      Results Reviewed:  Glucose   Date Value Ref Range Status   08/03/2020 150 (H) 65 - 99 mg/dL Final   08/28/2019 105 74 - 109 mg/dL Final     BUN   Date Value Ref Range Status   08/03/2020 7 6 - 20 mg/dL Final   08/28/2019 19 6 - 20 mg/dL Final     Creatinine   Date Value Ref Range Status   08/03/2020 0.63 0.57 - 1.00 mg/dL Final   11/13/2019 0.90 0.60 - 1.30 mg/dL Final     Comment:     Serial Number: [id number]:  [id number]   08/28/2019 0.6 0.5 - 0.9 mg/dL Final     Sodium   Date Value Ref Range Status   08/03/2020 142 136 - 145 mmol/L Final   08/28/2019 139 136 - 145 mmol/L Final     Potassium   Date Value Ref Range Status   08/03/2020 3.7 3.5 - 5.2 mmol/L Final   08/28/2019 4.0 3.5 - 5.0 mmol/L Final     Chloride   Date Value Ref Range Status   08/03/2020 105 98 - 107 mmol/L Final   08/28/2019 94 (L) 98 - 111 mmol/L Final     CO2   Date Value Ref Range Status   08/03/2020 27.0 22.0 - 29.0 mmol/L Final   08/28/2019 31 (H) 22 - 29 mmol/L Final     Calcium   Date Value Ref Range Status   08/03/2020 8.8 8.6 - 10.5 mg/dL Final   08/28/2019 9.7 8.6 - 10.0 mg/dL Final     ALT (SGPT)   Date Value Ref Range Status   07/29/2020 16 1 - 33 U/L Final   04/05/2019 32 5 - 33  U/L Final     AST (SGOT)   Date Value Ref Range Status   07/29/2020 16 1 - 32 U/L Final   04/05/2019 22 5 - 32 U/L Final     WBC   Date Value Ref Range Status   08/03/2020 9.03 3.40 - 10.80 10*3/mm3 Final   07/05/2020 9.9 4.8 - 10.8 K/uL Final     Hematocrit   Date Value Ref Range Status   08/03/2020 28.0 (L) 34.0 - 46.6 % Final   07/05/2020 28.8 (L) 37.0 - 47.0 % Final     Platelets   Date Value Ref Range Status   08/03/2020 352 140 - 450 10*3/mm3 Final   07/05/2020 394 130 - 400 K/uL Final     Total Cholesterol   Date Value Ref Range Status   07/29/2020 101 0 - 200 mg/dL Final     Triglycerides   Date Value Ref Range Status   07/29/2020 209 (H) 0 - 150 mg/dL Final   04/05/2019 117 0 - 149 mg/dL Final     HDL Cholesterol   Date Value Ref Range Status   07/29/2020 29 (L) 40 - 60 mg/dL Final   04/05/2019 35 (L) 65 - 121 mg/dL Final     Comment:     VALUES>60 MG/DL ARE ASSOCIATED WITH A DECREASED RISK OF  ATHEROSCLEROTIC CARDIOVASCULAR DISEASE     LDL Cholesterol    Date Value Ref Range Status   07/29/2020 30 0 - 100 mg/dL Final   04/05/2019 29 <100 mg/dL Final     Comment:     <100 MG/DL=OPITIMAL    100-129 MG/DL=DESIRABLE    130-159 MG/DL BORDERLINE=INCREASED RISK OF ATHEROSCLEROTIC  CARDIOVASCULAR DISEASE    > OR = 160 MG/DL=ASSOCIATED WITH AN INCREASE RISK OF  ATHEROSCLEROTIC CARDIOVASCULAR DISEASE     LDL/HDL Ratio   Date Value Ref Range Status   07/29/2020 1.04  Final     Hemoglobin A1C   Date Value Ref Range Status   07/29/2020 6.50 (H) 4.80 - 5.60 % Final   07/04/2020 6.1 (H) 4.0 - 6.0 % Final     Comment:     HbA1c levels >6% are an indication of hyperglycemia during the preceding 2  to 3 months or longer.    HbA1c levels may reach 20% or higher in poorly controlled diabetes.  Therapeutic action is suggested at levels above 8%.    Diabetes patients with HbA1c levels below 7% meet the goal of the American  Diabetes Association.    HbA1c levels below the established reference range may indicate recent  episodes  of hypoglycemia, the presence of Hb variants, or shortened lifetime  of erythrocytes.          Assessment / Plan     Assessment/Plan:  1. Anemia, unspecified type    - CBC & Differential; Future  - Iron and TIBC; Future  - Ferritin; Future  - Vitamin B12; Future    2. Fatigue, unspecified type  Lab work as above    3. Wound of left lower extremity, subsequent encounter  Continue with wound care and follow-up with Dr. Wells as ordered    4. Essential hypertension  Continue current medications     5. Mixed hyperlipidemia  Continue current medications    6. Acquired hypothyroidism  Continue current medications    7. Chronic pain syndrome  Continue current medications follow-up with Dr. BAYLEE Cedeño as ordered        Return in about 4 weeks (around 9/25/2020). unless patient needs to be seen sooner or acute issues arise.        I have discussed the patient results/orders and and plan/recommendation with them at today's visit.      Tabitha Victor, DO   08/28/2020

## 2020-08-31 ENCOUNTER — OFFICE VISIT (OUTPATIENT)
Dept: WOUND CARE | Facility: HOSPITAL | Age: 60
End: 2020-08-31

## 2020-08-31 PROCEDURE — 11045 DBRDMT SUBQ TISS EACH ADDL: CPT | Performed by: PODIATRIST

## 2020-08-31 PROCEDURE — 11042 DBRDMT SUBQ TIS 1ST 20SQCM/<: CPT | Performed by: PODIATRIST

## 2020-08-31 RX ORDER — LEVOTHYROXINE SODIUM 0.12 MG/1
125 TABLET ORAL DAILY
Qty: 90 TABLET | Refills: 0 | Status: SHIPPED
Start: 2020-08-31 | End: 2020-10-29 | Stop reason: DRUGHIGH

## 2020-09-01 LAB
AVERAGE GLUCOSE: NORMAL
CHOLESTEROL, TOTAL: 131 MG/DL
CHOLESTEROL/HDL RATIO: ABNORMAL
HBA1C MFR BLD: 6.2 %
HDLC SERPL-MCNC: 31 MG/DL (ref 35–70)
LDL CHOLESTEROL CALCULATED: 46 MG/DL (ref 0–160)
NONHDLC SERPL-MCNC: ABNORMAL MG/DL
TRIGL SERPL-MCNC: 361 MG/DL
VLDLC SERPL CALC-MCNC: 64 MG/DL

## 2020-09-02 ENCOUNTER — RESULTS ENCOUNTER (OUTPATIENT)
Dept: INTERNAL MEDICINE | Facility: CLINIC | Age: 60
End: 2020-09-02

## 2020-09-02 ENCOUNTER — TELEPHONE (OUTPATIENT)
Dept: INTERNAL MEDICINE | Facility: CLINIC | Age: 60
End: 2020-09-02

## 2020-09-02 DIAGNOSIS — D64.9 ANEMIA, UNSPECIFIED TYPE: ICD-10-CM

## 2020-09-02 LAB — FERRITIN SERPL-MCNC: 53 NG/ML (ref 15–150)

## 2020-09-02 NOTE — TELEPHONE ENCOUNTER
----- Message from Tabitha Victor DO sent at 9/2/2020  8:01 AM CDT -----  Laboratory data is reviewed for this patient.  The laboratory data is normal with the exception of the BUN and creatinine are 25 and 1.62.  Her discharge creatinine from the hospital was normal.  Is she taking any nonsteroidal anti-inflammatories.  If so she must stop.  Her laboratory data for BUN and creatinine must be rechecked in 5 to 7 days.  Her hemoglobin A1c is 6.2 this puts her in the prediabetes range.  She needs to monitor her diet for carbohydrates and follow a carbohydrate consistent diet.  Triglycerides are elevated at 361.  I would recommend following the carbohydrate consistent diet and rechecking the triglycerides in 3 months.

## 2020-09-02 NOTE — TELEPHONE ENCOUNTER
Called patient. No answer. Left message with lab results and recommendations per Dr. Victor. Advised patient to call our office with any further questions.

## 2020-09-03 NOTE — ED PROVIDER NOTES
[facility]  eMERGENCY dEPARTMENT eNCOUnter      Pt Name: Rafa Meraz  MRN: [id number]  [place] 1960  Date of evaluation: 4/23/2020  Provider: Thomas Silva MD    [address] [place] [address]       Chief Complaint   Patient presents with    Allergic Reaction         HISTORY OF PRESENT ILLNESS   (Location/Symptom, Timing/Onset,Context/Setting, Quality, Duration, Modifying Factors, Severity)  Note limiting factors. Rafa Meraz is a 62 y.o. female who presents to the emergency department      The history is provided by the patient. Allergic Reaction   Presenting symptoms: itching, rash and swelling    Presenting symptoms: no difficulty breathing, no difficulty swallowing and no wheezing    Severity:  Moderate  Duration: started last pm after taking Bactrim for cellulitis - resolved. Occurred again this am.  Relieved by:  None tried  Worsened by:  Nothing  Ineffective treatments:  None tried      NursingNotes were reviewed. REVIEW OF SYSTEMS    (2-9 systems for level 4, 10 or more for level 5)     Review of Systems   HENT: Negative for trouble swallowing. Respiratory: Negative for wheezing. Skin: Positive for itching and rash. All other systems reviewed and are negative. Except as noted above the remainder of the review of systems was reviewed and negative.        PAST MEDICAL HISTORY     Past Medical History:   Diagnosis Date    Arthritis     Bipolar I disorder, most recent episode (or current) mixed, unspecified     Chronic back pain     Depression     Dry mouth     Hyperlipidemia     Hypertension     Hypokalemia     Hypothyroidism     Menopause     Overactive bladder     Plantar fasciitis     RLS (restless legs syndrome)     Sleep apnea     Thyroid disease          SURGICALHISTORY       Past Surgical History:   Procedure Laterality Date    CARPAL TUNNEL RELEASE Bilateral 2002    CHOLECYSTECTOMY  10/2014    COLON SURGERY      COLON SURGERY      biopsy     COLONOSCOPY DVT ppx: HSQ    10 . Depression: continue Paroxetine EXAM    (up to 7 for level 4, 8 or more for level 5)     ED Triage Vitals [04/23/20 1202]   BP Temp Temp Source Pulse Resp SpO2 Height Weight   (!) 140/79 98.7 °F (37.1 °C) Oral 102 20 95 % -- 200 lb (90.7 kg)       Physical Exam  Vitals signs and nursing note reviewed. Constitutional:       General: She is not in acute distress. Appearance: She is obese. She is not ill-appearing, toxic-appearing or diaphoretic. HENT:      Head: Normocephalic and atraumatic. Comments: ? Mild periorbital swelling bilaterally     Nose: Nose normal. No congestion or rhinorrhea. Mouth/Throat:      Mouth: Mucous membranes are moist.      Pharynx: Oropharynx is clear. Comments: No angioedema  Eyes:      Conjunctiva/sclera: Conjunctivae normal.   Neck:      Musculoskeletal: Normal range of motion and neck supple. Cardiovascular:      Rate and Rhythm: Normal rate and regular rhythm. Heart sounds: Normal heart sounds. Pulmonary:      Effort: Pulmonary effort is normal.      Breath sounds: Normal breath sounds. No stridor. Musculoskeletal:      Comments: Warmth and erythema BILATERAL lower legs, much more impressive left,  small necrotic ulcer left lower leg ~ 1.5 cm2. Skin:     General: Skin is warm and dry. Findings: Rash (confluent trunk rash, no urticaria) present. Neurological:      General: No focal deficit present. Mental Status: She is alert and oriented to person, place, and time.    Psychiatric:         Mood and Affect: Mood normal.         Behavior: Behavior normal.         DIAGNOSTIC RESULTS     EKG: All EKG's are interpreted by the Emergency Department Physician who either signs or Co-signsthis chart in the absence of a cardiologist.        RADIOLOGY:   Non-plain filmimages such as CT, Ultrasound and MRI are read by the radiologist. Plain radiographic images are visualized and preliminarily interpreted by the emergency physician with the below findings:        Interpretation per the Radiologist below, if available at the time ofthis note:    XR CHEST PORTABLE   Final Result   1. No radiographic evidence of acute cardiopulmonary process. Signed by Dr Jose Cornejo on 4/23/2020 12:56 PM            ED BEDSIDE ULTRASOUND:   Performed by ED Physician - none    LABS:  Labs Reviewed   CBC WITH AUTO DIFFERENTIAL - Abnormal; Notable for the following components:       Result Value    WBC 13.7 (*)     MCH 26.5 (*)     MCHC 31.0 (*)     RDW 15.1 (*)     Platelets 943 (*)     Neutrophils % 91.5 (*)     Lymphocytes % 5.1 (*)     Neutrophils Absolute 12.5 (*)     Lymphocytes Absolute 0.7 (*)     All other components within normal limits   COMPREHENSIVE METABOLIC PANEL W/ REFLEX TO MG FOR LOW K - Abnormal; Notable for the following components:    Sodium 134 (*)     Chloride 95 (*)     Glucose 120 (*)     BUN 22 (*)     All other components within normal limits   URINE RT REFLEX TO CULTURE - Abnormal; Notable for the following components:    Glucose, Ur 100 (*)     All other components within normal limits    Narrative:     CALL  [facility]  [facility] tel. ,  Results called to [address] and David Lo RN ER, 04/23/2020  13:38, by [facility]  Result called to Quinn Jansen and David Lo, 04/23/2020 13:29, by  Huang Collazo   CULTURE, BLOOD 1   CULTURE, BLOOD 2   LACTIC ACID, PLASMA   COVID-19       All other labs were within normal range or not returned as of this dictation.     EMERGENCY DEPARTMENT COURSE and DIFFERENTIAL DIAGNOSIS/MDM:   Vitals:    Vitals:    04/23/20 1202 04/23/20 1532 04/23/20 1546   BP: (!) 140/79 121/66    Pulse: 102  97   Resp: 20     Temp: 98.7 °F (37.1 °C)     TempSrc: Oral     SpO2: 95% 94%    Weight: 200 lb (90.7 kg)             MDM  Number of Diagnoses or Management Options  Allergic reaction, initial encounter:   Cellulitis of left lower extremity:   Ulcer of left lower extremity, unspecified ulcer stage [facility]):   Diagnosis management comments: Pt did not mention dry cough, dyspnea

## 2020-09-04 ENCOUNTER — TELEPHONE (OUTPATIENT)
Dept: PODIATRY | Facility: CLINIC | Age: 60
End: 2020-09-04

## 2020-09-04 ENCOUNTER — OFFICE VISIT (OUTPATIENT)
Dept: FAMILY MEDICINE CLINIC | Age: 60
End: 2020-09-04
Payer: COMMERCIAL

## 2020-09-04 VITALS
SYSTOLIC BLOOD PRESSURE: 138 MMHG | DIASTOLIC BLOOD PRESSURE: 78 MMHG | BODY MASS INDEX: 36.33 KG/M2 | WEIGHT: 186 LBS | HEART RATE: 80 BPM | RESPIRATION RATE: 16 BRPM | OXYGEN SATURATION: 98 % | TEMPERATURE: 98.1 F

## 2020-09-04 PROCEDURE — 99213 OFFICE O/P EST LOW 20 MIN: CPT | Performed by: NURSE PRACTITIONER

## 2020-09-04 RX ORDER — PRAMIPEXOLE DIHYDROCHLORIDE 1.5 MG/1
TABLET ORAL
Qty: 90 TABLET | Refills: 0 | Status: SHIPPED | OUTPATIENT
Start: 2020-09-04 | End: 2020-12-16 | Stop reason: SDUPTHER

## 2020-09-04 RX ORDER — GABAPENTIN 100 MG/1
100 CAPSULE ORAL 3 TIMES DAILY
Qty: 270 CAPSULE | Refills: 5 | Status: SHIPPED | OUTPATIENT
Start: 2020-09-04 | End: 2020-11-23 | Stop reason: SDUPTHER

## 2020-09-04 NOTE — LETTER
MEDICATION AGREEMENT     Stew Ward  [birth year]      For certain conditions, multiple classes of medications may be used to help better manage your symptoms, and to improve your ability to function at home, work and in social settings. However, these medications do have risks, which will be discussed with you, including addiction and dependency. The following prescribed medications need frequent monitoring and will require you to partner and assist in your healthcare. Medication  Dose, instructions and quantity as indicated on current prescription bottle Diagnosis/Reason(s) for Taking Category     gabapentin                             Benefits and goals of Controlled Substance Medications: There are two potential goals for your treatment: (1) decreased pain and suffering (2) improved daily life functions. There are many possible treatments for your chronic condition(s), and, in addition to controlled substance medications, we will try alternatives such as physical therapy, yoga, massage, home daily exercise, meditation, relaxation techniques, injections, chiropractic manipulations, surgery, cognitive therapy, hypnosis and many medications that are not habit-forming. Use of controlled substance medications may be helpful, but they are unlikely to resolve all of your symptoms or restore all function. Risks of Controlled Substance Medications:    Opioid pain medications: These medications can lead to problems such as addiction/dependence, sedation, lightheadedness/dizziness, memory issues, falls, constipation, nausea, or vomiting. They may also impair the ability to drive or operate machinery. Additionally, these medications may lower testosterone levels, leading to loss of bone strength, stamina and sex drive.   They may cause problems with breathing, sleep apnea and reduced coughing, which are especially dangerous for patients with lung disease. Overdose or dangerous interactions with alcohol and other medications may occur, leading to death. Hyperalgesia may develop, in which patients receiving opioids for the treatment of pain may actually become more sensitive to certain painful stimuli, and in some cases, experience pain from ordinarily non-painful stimuli. Women between the ages of 14-53 who could become pregnant should carefully weigh the risks and benefits of opioids with their physicians, as these medications increase the risk of pregnancy complications, including miscarriage,  delivery and stillbirth. It is also possible for babies to be born addicted to opioids. Opioid dependence withdrawal symptoms may include; feelings of uneasiness, increased pain, irritability, belly pain, diarrhea, sweats and goose-flesh. Benzodiazepines and non-benzodiazepine sleep medications: These medications can lead to problems such as addiction/dependence, sedation, fatigue, lightheadedness, dizziness, incoordination, falls, depression, hallucinations, and impaired judgment, memory and concentration. The ability to drive and operate machinery may also be affected. Abnormal sleep-related behaviors have been reported, including sleep walking, driving, making telephone calls, eating, or having sex while not fully awake. These medications can suppress breathing and worsen sleep apnea, particularly when combined with alcohol or other sedating medications, potentially leading to death. Dependence withdrawal symptoms may include tremors, anxiety, hallucinations and seizures. Stimulants:  Common adverse effects include addiction/dependence, increased blood pressure and heart rate, decreased appetite, nausea, involuntary weight loss, insomnia, irritability, and headaches.   These risks may increase when these medications are combined with other stimulants, such as caffeine pills or energy drinks, certain weight loss supplements and oral decongestants. Dependence withdrawal symptoms may include depressed mood, loss of interest, suicidal thoughts, anxiety, fatigue, appetite changes and agitation. Testosterone replacement therapy:  Potential side effects include increased risk of stroke and heart attack, blood clots, increased blood pressure, increased cholesterol, enlarged prostate, sleep apnea, irritability/aggression and other mood disorders, and decreased fertility. Other:     1. I understand that I have the following responsibilities:  · I will take medications at the dose and frequency prescribed. · I will not increase or change how I take my medications without the approval of the health care provider who signs this Medication Agreement. · I will arrange for refills at the prescribed interval ONLY during regular office hours. I will not ask for refills earlier than agreed, after-hours, on holidays or on weekends. · I will obtain all refills for these medications at  ·  ____________________________________  pharmacy (phone number  ·  ________________________), with full consent for my provider and pharmacist to exchange information in writing or verbally. · I will not request any pain medications or controlled substances from other providers and will inform this provider of all other medications I am taking. · I will inform my other health care providers that I am taking these medications and of the existence of this [facility]. In the event of an emergency, I will provide the same information to the emergency department providers. · I will protect my prescriptions and medications. I understand that lost or misplaced prescriptions will not be replaced. · I will keep medications only for my own use and will not share them with others. I will keep all medications away from children. · I agree to participate in any medical, psychological or psychiatric assessments recommended by my provider. · I will actively participate in any program designed to improve function, including social, physical, psychological and daily or work activities. 2. I will not use illegal or street drugs or another person's prescription. If I have an addiction problem with drugs or alcohol and my provider asks me to enter a program to address this issue, I agree to follow through. Such programs may include:  · 12-Step program and securing a sponsor  · Individual counseling   · Inpatient or outpatient treatment  · Other:_____________________________________________________________________________________________________________________________________________    If in treatment, I will request that a copy of the programs initial evaluation and treatment recommendations be sent to this provider and will not expect refills until that is received. I will also request written monthly updates be sent to this provider to verify my continuing treatment. 3. I will consent to drug screening upon my providers request to assure I am only taking the prescribed drugs, described in this MEDICATION AGREEMENT. I understand that a drug screen is a laboratory test in which a sample of my urine, blood or saliva is checked to see what drugs I have been taking. 4. I agree that I will treat the providers and staff at this office with respect at all times. I will keep all of my scheduled appointments, but if I need to cancel my appointment, I will do so a minimum of 24 hours before it is scheduled. 5. I understand that this provider may stop prescribing the medications listed if:  · I do not show any improvement in pain, or my activity has not improved. · I develop rapid tolerance or loss of improvement, as described in my treatment plan. · I develop significant side effects from the medication.   · My behavior is inconsistent with the responsibilities outlined above, which may also result in my being prevented from receiving further care from this office. · Other:____________________________________________________________________    AGREEMENT:    I have read the above and have had all of my questions answered. For chronic disease management, I know that my symptoms can be managed with many types of treatments. A chronic medication trial may be part of my treatment, but I must be an active participant in my care. Medication therapy is only one part of my symptom management plan. In some cases, there may be limited scientific evidence to support the chronic use of certain medications to improve symptoms and daily function. Furthermore, in certain circumstances, there may be scientific information that suggests that use of chronic controlled substances may actually worsen my symptoms and increase my risk of unintentional death directly related to this medication therapy. I know that if my provider feels my risk from controlled medications is greater than my benefit, I will have my controlled substance medication(s) compassionately lowered or removed altogether. I agree to a controlled substance medication trial.      I further agree to allow this office to contact my HIPAA contact on file if there are concerns about my safety and use of controlled medications. I have agreed to use the following medications above as instructed by my physician and as stated in this [facility] [address].      Patient Signature:  ______________________  Date:9/4/2020 or _____________    Provider Signature:______________________  Date:9/4/2020 or _____________

## 2020-09-04 NOTE — TELEPHONE ENCOUNTER
Julia Hough APRMIKE office called asking if Dr. Wells was going to continue filling Gabapentin. Pt was there for her refill. I explained we usually have pt continue therapy with PCP. She voiced understanding DN

## 2020-09-04 NOTE — PROGRESS NOTES
SUBJECTIVE:    Patient ID: Tika Wisdom is a65 y.o. female. Tika Wisdom is here today for Medication Refill (wants refill on gabapentin); Results (lab); and Fatigue  . HPI:   HPI     Pt is here today for refill on gabapentin. Pt has been given gabapentin from wound care at hospital.  She is requesting to have a 3mo supply of tx. Left lower leg wound remains needing specialized care once weekly and pt is doing dressings at home twice daily. No fevers. Pt does report feeling fatigued. Lab has been drawn approx 1wk ago but results are not yet available. Call has been placed today to verify that this med is not being continued elsewhere. Ok to refill here today. Pt does report that she has f/u with Dr. Mario Alberto Ozuna related to thyroid and was told that renal function is down slightly and has been decreased on Bumex. Past Medical History:   Diagnosis Date    Arthritis     Bipolar I disorder, most recent episode (or current) mixed, unspecified     Chronic back pain     Depression     Dry mouth     Hyperlipidemia     Hypertension     Hypokalemia     Hypothyroidism     Menopause     Overactive bladder     Plantar fasciitis     RLS (restless legs syndrome)     Sleep apnea     Thyroid disease      Prior to Visit Medications    Medication Sig Taking? Authorizing Provider   gabapentin (NEURONTIN) 100 MG capsule Take 1 capsule by mouth 3 times daily for 90 days.  Yes MAEVE Cuevas   levothyroxine (SYNTHROID) 125 MCG tablet TAKE 1 TABLET BY MOUTH DAILY  Patient taking differently: Take 100 mcg by mouth Daily  Yes MAEVE Cuevas   bumetanide (BUMEX) 1 MG tablet Take 1 tablet by mouth 2 times daily  Patient taking differently: Take 1 mg by mouth daily  Yes MAEVE Cuevas   allopurinol (ZYLOPRIM) 100 MG tablet TAKE ONE TABLET BY MOUTH DAILY  MAEVE Cuevas   carvedilol (COREG) 6.25 MG tablet TAKE 1 TABLET BY MOUTH 2 TIMES DAILY (WITH MEALS)  MAEVE Cuevas   pramipexole (MIRAPEX) 1.5 MG tablet TAKE ONE TABLET BY MOUTH NIGHTLY **MAY MAKE DROWSY**  MAEVE Cuevas   nicotine (NICODERM CQ) 21 MG/24HR Place 1 patch onto the skin every 24 hours  MAEVE Cuevas   nicotine (NICODERM CQ) 14 MG/24HR Place 1 patch onto the skin every 24 hours  MAEVE Cuevas   nicotine (NICODERM CQ) 7 MG/24HR Place 1 patch onto the skin every 24 hours  MAEVE Cuevas   SANTYL 250 UNIT/GM ointment APPLY TO WOUND ON LEFT LEG DAILY  Patient not taking: Reported on 9/4/2020  MAEVE Ordaz - CNP   melatonin (RA MELATONIN) 3 MG TABS tablet Take 1 tablet by mouth nightly as needed (for sleep)  Ros Graham MD   aspirin 81 MG EC tablet Take 81 mg by mouth daily  Historical Provider, MD   DICLOFENAC PO Take 75 mg by mouth 2 times daily  Historical Provider, MD   pilocarpine (SALAGEN) 5 MG tablet Take 1 tablet by mouth 3 times daily  MAEVE Cuevas   atorvastatin (LIPITOR) 40 MG tablet 1 po nightly for cholesterol  MAEVE Cuevas   Respiratory Therapy Supplies (NEBULIZER/TUBING/MOUTHPIECE) KIT 1 kit by Does not apply route daily as needed (for qid prn use  DX-bronchitis)  MAEVE Cuevas   omeprazole (PRILOSEC) 20 MG delayed release capsule Take 1 capsule by mouth 2 times daily (before meals)  MAEVE Cuevas   spironolactone (ALDACTONE) 25 MG tablet Take 1 tablet by mouth daily  MAEVE Cuevas   ALPRAZolam (XANAX) 0.5 MG tablet Take 0.5 mg by mouth 2 times daily.    Historical Provider, MD   guaiFENesin (MUCINEX) 600 MG extended release tablet Take 2 tablets by mouth 2 times daily  MAEVE Cuevas   QUEtiapine (SEROQUEL) 50 MG tablet Take 25 mg by mouth nightly   Historical Provider, MD   niacin (SLO-NIACIN) 500 MG extended release tablet Take 500 mg by mouth daily   Historical Provider, MD   tiZANidine (ZANAFLEX) 4 MG tablet Take 4 mg by mouth 3 times daily as needed   Historical Provider, MD   Vitamin D (CHOLECALCIFEROL) 1000 UNITS CAPS capsule Take 1,000 Units by mouth daily  Historical Provider, MD   Multiple Vitamins-Minerals (MULTIVITAMIN PO) Take by mouth daily  Historical Provider, MD   calcium carbonate 600 MG TABS tablet Take 1 tablet by mouth daily. Historical Provider, MD     Allergies   Allergen Reactions    Bactrim [Sulfamethoxazole-Trimethoprim]     Hctz [Hydrochlorothiazide] Other (See Comments)     Acid reflux      Sitagliptin Rash     Past Surgical History:   Procedure Laterality Date    CARPAL TUNNEL RELEASE Bilateral 2002    CHOLECYSTECTOMY  10/2014    COLON SURGERY      COLON SURGERY      biopsy     COLONOSCOPY      CYST REMOVAL  2017    Under left ear    HARDWARE REMOVAL Right 2011    Foot     HERNIA REPAIR  10/2014    LEG DEBRIDEMENT Left     NOSE SURGERY  2017; 2019    Dr Tony Baer L/S,1 LEVEL Bilateral 2017    INJECTION MEDICATION FACET JOINT performed by Mamie [facility] at [facility]     Family History   Problem Relation Age of Onset    Cancer Mother         lung    Heart Disease Mother     Hypertension Mother     Bipolar Disorder Mother     Heart Disease Father     Hypertension Father      Social History     Socioeconomic History    Marital status:      Spouse name: Gogo Jarrell Number of children: 2    Years of education: some college    Highest education level: Not on file   Occupational History     Employer: [employer]   Social Needs    Financial resource strain: Not on file    Food insecurity     Worry: Not on file     Inability: Not on file   [employer] needs     Medical: Not on file     Non-medical: Not on file   Tobacco Use    Smoking status: Former Smoker     Packs/day: 1.00     Years: 30.00     Pack years: 30.00     Types: Cigarettes     Last attempt to quit: 2003     Years since quittin.1    Smokeless tobacco: Never Used    Tobacco comment: Pt not interested at this time in stopping. Substance and Sexual Activity    Alcohol use: Yes     Comment: rarely    Drug use:  No Comment: pt admits \"may have misused in past\"--Pt was vague with details.  Sexual activity: Not on file   Lifestyle    Physical activity     Days per week: Not on file     Minutes per session: Not on file    Stress: Not on file   Relationships    Social connections     Talks on phone: Not on file     Gets together: Not on file     Attends Adventism service: Not on file     Active member of club or organization: Not on file     Attends meetings of clubs or organizations: Not on file     Relationship status: Not on file    Intimate partner violence     Fear of current or ex partner: Not on file     Emotionally abused: Not on file     Physically abused: Not on file     Forced sexual activity: Not on file   Other Topics Concern    Not on file   Social History Narrative    Not on file       Review of Systems   Respiratory: Negative for shortness of breath. Cardiovascular: Positive for leg swelling. Musculoskeletal: Positive for arthralgias. Psychiatric/Behavioral: The patient is nervous/anxious (regarding leg wound). OBJECTIVE:    Physical Exam  Vitals signs and nursing note reviewed. Constitutional:       Appearance: Normal appearance. She is well-developed. She is not ill-appearing. HENT:      Head: Normocephalic and atraumatic. Eyes:      Extraocular Movements: Extraocular movements intact. Conjunctiva/sclera: Conjunctivae normal.      Pupils: Pupils are equal, round, and reactive to light. Neck:      Musculoskeletal: Neck supple. Trachea: No tracheal deviation. Cardiovascular:      Rate and Rhythm: Normal rate and regular rhythm. Heart sounds: Normal heart sounds. Pulmonary:      Effort: Pulmonary effort is normal. No respiratory distress. Breath sounds: Normal breath sounds. No wheezing. Musculoskeletal:      Right lower leg: Edema present. Left lower leg: Edema present. Skin:     General: Skin is warm and dry.       Capillary Refill: Capillary refill takes less than 2 seconds. Findings: Wound (LLE pink tissue noted during pt's self wound change,) present. Neurological:      General: No focal deficit present. Mental Status: She is alert and oriented to person, place, and time. Mental status is at baseline. Psychiatric:         Mood and Affect: Mood normal. Mood is not anxious or depressed. Affect is not angry. Speech: Speech normal.         Behavior: Behavior normal.         Thought Content: Thought content normal.         Judgment: Judgment normal.         /78 (Site: Right Upper Arm, Position: Sitting, Cuff Size: Medium Adult)   Pulse 80   Temp 98.1 °F (36.7 °C) (Temporal)   Resp 16   Wt 186 lb (84.4 kg)   SpO2 98%   BMI 36.33 kg/m²      ASSESSMENT:      ICD-10-CM    1. Open wound of left lower leg, subsequent encounter  S81.802D gabapentin (NEURONTIN) 100 MG capsule   2. Elevated serum creatinine  K19.04 Basic Metabolic Panel   3. Leukocytosis, unspecified type  D72.829 CBC Auto Differential       PLAN:    Delong Anis: Medication Refill (wants refill on gabapentin); Results (lab); and Fatigue  Pt went to [facility] for ordered lab but I do not yet have this. Please obtain for review today. [facility] for gabapentin. Drug screen is on file. Diagnosis and orders for this visit are above. Please note that this chart was generated using dragon dictationsoftware. Although every effort was made to ensure the accuracy of this automated transcription, some errors in transcription may have occurred.

## 2020-09-08 ENCOUNTER — OFFICE VISIT (OUTPATIENT)
Dept: WOUND CARE | Facility: HOSPITAL | Age: 60
End: 2020-09-08

## 2020-09-08 PROCEDURE — 11042 DBRDMT SUBQ TIS 1ST 20SQCM/<: CPT | Performed by: NURSE PRACTITIONER

## 2020-09-08 PROCEDURE — 11045 DBRDMT SUBQ TISS EACH ADDL: CPT | Performed by: NURSE PRACTITIONER

## 2020-09-09 RX ORDER — CARVEDILOL 6.25 MG/1
6.25 TABLET ORAL 2 TIMES DAILY WITH MEALS
Qty: 180 TABLET | Refills: 1 | Status: SHIPPED | OUTPATIENT
Start: 2020-09-09 | End: 2021-02-25 | Stop reason: SDUPTHER

## 2020-09-09 RX ORDER — ALLOPURINOL 100 MG/1
TABLET ORAL
Qty: 90 TABLET | Refills: 1 | Status: SHIPPED | OUTPATIENT
Start: 2020-09-09 | End: 2021-06-25

## 2020-09-14 ENCOUNTER — OFFICE VISIT (OUTPATIENT)
Dept: WOUND CARE | Facility: HOSPITAL | Age: 60
End: 2020-09-14

## 2020-09-14 PROCEDURE — 11045 DBRDMT SUBQ TISS EACH ADDL: CPT | Performed by: PODIATRIST

## 2020-09-14 PROCEDURE — 11042 DBRDMT SUBQ TIS 1ST 20SQCM/<: CPT | Performed by: PODIATRIST

## 2020-09-15 ENCOUNTER — VIRTUAL VISIT (OUTPATIENT)
Dept: FAMILY MEDICINE CLINIC | Age: 60
End: 2020-09-15
Payer: COMMERCIAL

## 2020-09-15 VITALS
DIASTOLIC BLOOD PRESSURE: 72 MMHG | BODY MASS INDEX: 35.77 KG/M2 | WEIGHT: 182.2 LBS | SYSTOLIC BLOOD PRESSURE: 118 MMHG | HEIGHT: 60 IN

## 2020-09-15 PROCEDURE — G0438 PPPS, INITIAL VISIT: HCPCS | Performed by: FAMILY MEDICINE

## 2020-09-15 PROCEDURE — G8431 POS CLIN DEPRES SCRN F/U DOC: HCPCS | Performed by: FAMILY MEDICINE

## 2020-09-15 ASSESSMENT — PATIENT HEALTH QUESTIONNAIRE - PHQ9
SUM OF ALL RESPONSES TO PHQ9 QUESTIONS 1 & 2: 6
3. TROUBLE FALLING OR STAYING ASLEEP: 3
SUM OF ALL RESPONSES TO PHQ QUESTIONS 1-9: 17
9. THOUGHTS THAT YOU WOULD BE BETTER OFF DEAD, OR OF HURTING YOURSELF: 0
5. POOR APPETITE OR OVEREATING: 3
8. MOVING OR SPEAKING SO SLOWLY THAT OTHER PEOPLE COULD HAVE NOTICED. OR THE OPPOSITE, BEING SO FIGETY OR RESTLESS THAT YOU HAVE BEEN MOVING AROUND A LOT MORE THAN USUAL: 0
SUM OF ALL RESPONSES TO PHQ QUESTIONS 1-9: 17
4. FEELING TIRED OR HAVING LITTLE ENERGY: 3
2. FEELING DOWN, DEPRESSED OR HOPELESS: 3
1. LITTLE INTEREST OR PLEASURE IN DOING THINGS: 3
10. IF YOU CHECKED OFF ANY PROBLEMS, HOW DIFFICULT HAVE THESE PROBLEMS MADE IT FOR YOU TO DO YOUR WORK, TAKE CARE OF THINGS AT HOME, OR GET ALONG WITH OTHER PEOPLE: 3
7. TROUBLE CONCENTRATING ON THINGS, SUCH AS READING THE NEWSPAPER OR WATCHING TELEVISION: 1
6. FEELING BAD ABOUT YOURSELF - OR THAT YOU ARE A FAILURE OR HAVE LET YOURSELF OR YOUR FAMILY DOWN: 1

## 2020-09-15 ASSESSMENT — LIFESTYLE VARIABLES
HOW OFTEN DURING THE LAST YEAR HAVE YOU FOUND THAT YOU WERE NOT ABLE TO STOP DRINKING ONCE YOU HAD STARTED: 0
HOW OFTEN DURING THE LAST YEAR HAVE YOU HAD A FEELING OF GUILT OR REMORSE AFTER DRINKING: 0
HOW OFTEN DURING THE LAST YEAR HAVE YOU NEEDED AN ALCOHOLIC DRINK FIRST THING IN THE MORNING TO GET YOURSELF GOING AFTER A NIGHT OF HEAVY DRINKING: 0
HOW OFTEN DO YOU HAVE SIX OR MORE DRINKS ON ONE OCCASION: 0
HAVE YOU OR SOMEONE ELSE BEEN INJURED AS A RESULT OF YOUR DRINKING: 0
HOW MANY STANDARD DRINKS CONTAINING ALCOHOL DO YOU HAVE ON A TYPICAL DAY: 0
HOW OFTEN DO YOU HAVE A DRINK CONTAINING ALCOHOL: 1
AUDIT-C TOTAL SCORE: 1
HOW OFTEN DURING THE LAST YEAR HAVE YOU BEEN UNABLE TO REMEMBER WHAT HAPPENED THE NIGHT BEFORE BECAUSE YOU HAD BEEN DRINKING: 0
AUDIT TOTAL SCORE: 1
HOW OFTEN DURING THE LAST YEAR HAVE YOU FAILED TO DO WHAT WAS NORMALLY EXPECTED FROM YOU BECAUSE OF DRINKING: 0
HAS A RELATIVE, FRIEND, DOCTOR, OR ANOTHER HEALTH PROFESSIONAL EXPRESSED CONCERN ABOUT YOUR DRINKING OR SUGGESTED YOU CUT DOWN: 0

## 2020-09-15 ASSESSMENT — COLUMBIA-SUICIDE SEVERITY RATING SCALE - C-SSRS
2. HAVE YOU ACTUALLY HAD ANY THOUGHTS OF KILLING YOURSELF?: NO
1. WITHIN THE PAST MONTH, HAVE YOU WISHED YOU WERE DEAD OR WISHED YOU COULD GO TO SLEEP AND NOT WAKE UP?: NO
6. HAVE YOU EVER DONE ANYTHING, STARTED TO DO ANYTHING, OR PREPARED TO DO ANYTHING TO END YOUR LIFE?: NO

## 2020-09-15 NOTE — PATIENT INSTRUCTIONS
Personalized Preventive Plan for Brooke Darling - 9/15/2020  Medicare offers a range of preventive health benefits. Some of the tests and screenings are paid in full while other may be subject to a deductible, co-insurance, and/or copay. Some of these benefits include a comprehensive review of your medical history including lifestyle, illnesses that may run in your family, and various assessments and screenings as appropriate. After reviewing your medical record and screening and assessments performed today your provider may have ordered immunizations, labs, imaging, and/or referrals for you. A list of these orders (if applicable) as well as your Preventive Care list are included within your After Visit Summary for your review. Other Preventive Recommendations:    · A preventive eye exam performed by an eye specialist is recommended every 1-2 years to screen for glaucoma; cataracts, macular degeneration, and other eye disorders. · A preventive dental visit is recommended every 6 months. · Try to get at least 150 minutes of exercise per week or 10,000 steps per day on a pedometer . · Order or download the FREE \"Exercise & Physical Activity: Your Everyday Guide\" from [employer]. Call 3-451.999.1194 or search [employer] online. · You need 9723-2740 mg of calcium and 5992-1692 IU of vitamin D per day. It is possible to meet your calcium requirement with diet alone, but a vitamin D supplement is usually necessary to meet this goal.  · When exposed to the sun, use a sunscreen that protects against both UVA and UVB radiation with an SPF of 30 or greater. Reapply every 2 to 3 hours or after sweating, drying off with a towel, or swimming. · Always wear a seat belt when traveling in a car. Always wear a helmet when riding a bicycle or motorcycle.

## 2020-09-15 NOTE — PROGRESS NOTES
Medicare Annual Wellness Visit  Name: Mani Ramos Date: 9/15/2020   MRN: [id number] Sex: Female   Age: 62 y.o. Ethnicity: Non-/Non    : 1960 Race: Primo Giles is here for Medicare AWV    Screenings for behavioral, psychosocial and functional/safety risks, and cognitive dysfunction are all negative except as indicated below. These results, as well as other patient data from the [address] form, are documented in Flowsheets linked to this Encounter. Allergies   Allergen Reactions    Bactrim [Sulfamethoxazole-Trimethoprim]     Hctz [Hydrochlorothiazide] Other (See Comments)     Acid reflux      Sitagliptin Rash       Prior to Visit Medications    Medication Sig Taking? Authorizing Provider   allopurinol (ZYLOPRIM) 100 MG tablet TAKE ONE TABLET BY MOUTH DAILY Yes MAEVE Cuevas   carvedilol (COREG) 6.25 MG tablet TAKE 1 TABLET BY MOUTH 2 TIMES DAILY (WITH MEALS) Yes MAEVE Cuevas   pramipexole (MIRAPEX) 1.5 MG tablet TAKE ONE TABLET BY MOUTH NIGHTLY **MAY MAKE DROWSY** Yes MAEVE Cuevas   gabapentin (NEURONTIN) 100 MG capsule Take 1 capsule by mouth 3 times daily for 90 days.  Yes MAEVE Cuevas   levothyroxine (SYNTHROID) 125 MCG tablet TAKE 1 TABLET BY MOUTH DAILY  Patient taking differently: Take 100 mcg by mouth Daily  Yes MAEVE Cuevas   nicotine (NICODERM CQ) 21 MG/24HR Place 1 patch onto the skin every 24 hours Yes MAEVE Cuevas   nicotine (NICODERM CQ) 14 MG/24HR Place 1 patch onto the skin every 24 hours Yes MAEVE Cuevas   nicotine (NICODERM CQ) 7 MG/24HR Place 1 patch onto the skin every 24 hours Yes MAEVE Cuevas   aspirin 81 MG EC tablet Take 81 mg by mouth daily Yes Historical Provider, MD   DICLOFENAC PO Take 75 mg by mouth 2 times daily Yes Historical Provider, MD   pilocarpine (SALAGEN) 5 MG tablet Take 1 tablet by mouth 3 times daily Yes MAEVE Cuevas   bumetanide (BUMEX) 1 MG tablet Take 1 tablet by mouth 2 times daily  Patient taking differently: Take 1 mg by mouth daily  Yes MAEVE Cuevas   atorvastatin (LIPITOR) 40 MG tablet 1 po nightly for cholesterol Yes MEAVE Cuevas   Respiratory Therapy Supplies (NEBULIZER/TUBING/MOUTHPIECE) KIT 1 kit by Does not apply route daily as needed (for qid prn use  DX-bronchitis) Yes MAEVE Cuevas   omeprazole (PRILOSEC) 20 MG delayed release capsule Take 1 capsule by mouth 2 times daily (before meals) Yes MAEVE Cuevas   spironolactone (ALDACTONE) 25 MG tablet Take 1 tablet by mouth daily Yes MAEVE Cuevas   ALPRAZolam (XANAX) 0.5 MG tablet Take 0.5 mg by mouth 2 times daily. Yes Historical Provider, MD   guaiFENesin (MUCINEX) 600 MG extended release tablet Take 2 tablets by mouth 2 times daily Yes MAEVE Cuevas   QUEtiapine (SEROQUEL) 50 MG tablet Take 25 mg by mouth nightly  Yes Historical Provider, MD   niacin (SLO-NIACIN) 500 MG extended release tablet Take 500 mg by mouth daily  Yes Historical Provider, MD   tiZANidine (ZANAFLEX) 4 MG tablet Take 4 mg by mouth 3 times daily as needed  Yes Historical Provider, MD   Vitamin D (CHOLECALCIFEROL) 1000 UNITS CAPS capsule Take 1,000 Units by mouth daily Yes Historical Provider, MD   Multiple Vitamins-Minerals (MULTIVITAMIN PO) Take by mouth daily Yes Historical Provider, MD   calcium carbonate 600 MG TABS tablet Take 1 tablet by mouth daily.  Yes Historical Provider, MD   SANTYL 250 UNIT/GM ointment APPLY TO WOUND ON LEFT LEG DAILY  Patient not taking: Reported on 9/4/2020  MAEVE Rowan - CNP   melatonin (RA MELATONIN) 3 MG TABS tablet Take 1 tablet by mouth nightly as needed (for sleep)  Yane Loya MD       Past Medical History:   Diagnosis Date    Arthritis     Bipolar I disorder, most recent episode (or current) mixed, unspecified     Chronic back pain     Depression     Dry mouth     Hyperlipidemia     Hypertension     Hypokalemia     Hypothyroidism     Menopause     Overactive bladder     Plantar fasciitis     RLS (restless legs syndrome)     Sleep apnea     Thyroid disease        Past Surgical History:   Procedure Laterality Date    CARPAL TUNNEL RELEASE Bilateral 2002    CHOLECYSTECTOMY  10/2014    COLON SURGERY      COLON SURGERY      biopsy     COLONOSCOPY      CYST REMOVAL  05/2017    Under left ear    HARDWARE REMOVAL Right 2011    Foot     HERNIA REPAIR  10/2014    LEG DEBRIDEMENT Left     NOSE SURGERY  08/01/2017; 04/16/2019    Dr Racheal Luke L/S,1 LEVEL Bilateral 2/7/2017    INJECTION MEDICATION FACET JOINT performed by Casey Maharaj at [facility]       Family History   Problem Relation Age of Onset    Cancer Mother         lung    Heart Disease Mother     Hypertension Mother     Bipolar Disorder Mother     Heart Disease Father     Hypertension Father        CareTeam (Including outside providers/suppliers regularly involved in providing care):   Patient Care Team:  MAEVE Valenzuela as PCP - General (Nurse Practitioner Primary Care)  MAEV[facility]uel[facility]  Sonya Bearden MD as Neurologist (Neurology)  Nakul Nino MD as Consulting Physician (Otolaryngology)    Wt Readings from Last 3 Encounters:   09/15/20 182 lb 3.2 oz (82.6 kg)   09/04/20 186 lb (84.4 kg)   07/21/20 171 lb (77.6 kg)     Vitals:    09/15/20 1258   BP: 118/72   Weight: 182 lb 3.2 oz (82.6 kg)   Height: 5' (1.524 m)     Body mass index is 35.58 kg/m². Based upon direct observation of the patient, evaluation of cognition reveals recent and remote memory intact. Patient's complete Health Risk Assessment and screening values have been reviewed and are found in Flowsheets. The following problems were reviewed today and where indicated follow up appointments were made and/or referrals ordered. Positive Risk Factor Screenings with Interventions:     Fall Risk:  Timed Up and Go Test > 12 seconds?  (Complete if either Fall Risk answers are Yes): no  2 or more falls in past year?: (!) yes(Has had falls related to leg wound and weakness. Patient is going wound care.)  Fall with injury in past year?: no  Fall Risk Interventions:    · Home safety tips provided  · Home exercises provided to promote strength and balance    Depression:  PHQ-2 Score: 6  PHQ-9 Total Score: 17    Severity:1-4 = minimal depression, 5-9 = mild depression, 10-14 = moderate depression, 15-19 = moderately severe depression, 20-27 = severe depression  Depression Interventions:  · LPN INTERVENTION GUIDE: SCORE 15 OR ABOVE = MODERATE TO SEVERE DEPRESSION: PCP CONSULTED DURING VISIT  · Patient is being followed by a psychologist and a therapist.     General Health:  General  In general, how would you say your health is?: (!) Poor(Poor health due to back issues, wound to leg that prevents her from doing much of anything.)  In the past 7 days, have you experienced any of the following? New or Increased Pain, New or Increased Fatigue, Loneliness, Social Isolation, Stress or Anger?: (!) Social Isolation, Stress, Anger(Patient reports she socially isolates.  Stress and anger related to issues with a friend.)  Do you get the social and emotional support that you need?: Yes  Do you have a Living Will?: (!) No  General Health Risk Interventions:  · Poor self-assessment of health status: patient declines any further evaluation/treatment for this issue  · Social isolation: patient declines any further intervention for this issue  · Stress: patient declines any further evaluation/treatment for this issue  · Anger: patient declines any further evaluation/treatment for this issue  · No Living Will: provided the state-specific advance directive document to the patient    Health Habits/Nutrition:  Health Habits/Nutrition  Do you exercise for at least 20 minutes 2-3 times per week?: (!) No(Unable to excercise related to wound to leg.)  Have you lost any weight without trying in the past 3 months?: (!) Yes(Reports she spent 2 weeks in bed and lost 7lbs.)  Do you eat fewer than 2 meals per day?: No  Have you seen a dentist within the past year?: (!) No(Has upper and lower dentures.)  Body mass index is 35.58 kg/m².   Health Habits/Nutrition Interventions:  · Inadequate physical activity:  educational materials provided to promote increased physical activity  · Nutritional issues:  educational materials for healthy, well-balanced diet provided  · Dental exam overdue:  patient declines dental evaluation    Hearing/Vision:  No exam data present  Hearing/Vision  Do you or your family notice any trouble with your hearing?: No  Do you have difficulty driving, watching TV, or doing any of your daily activities because of your eyesight?: (!) Yes(Reports she has double vision and it is worsening, states she is going to the eye doctor tomorrow.)  Have you had an eye exam within the past year?: Yes  Hearing/Vision Interventions:  · Vision concerns:  patient encouraged to make appointment with his/her eye specialist    Safety:  Safety  Do you have working smoke detectors?: Yes  Have all throw rugs been removed or fastened?: Yes  Do you have non-slip mats or surfaces in all bathtubs/showers?: (!) No  Do all of your stairways have a railing or banister?: Yes  Are your doorways, halls and stairs free of clutter?: Yes  Do you always fasten your seatbelt when you are in a car?: Yes  Safety Interventions:  · Home safety tips provided    Personalized Preventive Plan   Current Health Maintenance Status  Immunization History   Administered Date(s) Administered    Pneumococcal Polysaccharide ([id number]) 03/13/2017        Health Maintenance   Topic Date Due    Hepatitis C screen  1960    HIV screen  01/17/1976    DTaP/Tdap/Td vaccine (1 - Tdap) 01/17/1980    Shingles Vaccine (1 of 2) 01/17/2011    Cervical cancer screen  09/22/2017    Breast cancer screen  01/11/2020    Annual Wellness Visit (AWV)  07/07/2020    Flu vaccine (1) 09/01/2020    Potassium monitoring  07/05/2021    Creatinine monitoring  07/05/2021    A1C test (Diabetic or Prediabetic)  09/01/2021    Lipid screen  09/01/2021    Colon cancer screen colonoscopy  04/18/2024    Hepatitis A vaccine  Aged Out    Hepatitis B vaccine  Aged Out    Hib vaccine  Aged Out    Meningococcal (ACWY) vaccine  Aged Out    Pneumococcal 0-64 years Vaccine  Aged Out     Recommendations for Front Row Due: see orders and patient instructions/AVS.  Health Maintenance Plan reviewed with patient. Breast Cancer Screen ordered per PCP. Giuliana Ch Recommended screening schedule for the next 5-10 years is provided to the patient in written form: see Patient Instructions/AVS.    Linsey CREWS LPN, [id number], performed the documented evaluation under the direct supervision of the attending physician. Carolina Guzman is a 62 y.o. female evaluated via telephone on 9/15/2020. Consent:  She and/or health care decision maker is aware that that she may receive a bill for this telephone service, depending on her insurance coverage, and has provided verbal consent to proceed: Yes      Documentation:  I communicated with the patient and/or health care decision maker about AWV. Details of this discussion including any medical advice provided. I affirm this is a Patient Initiated Episode with a Patient who has not had a related appointment within my department in the past 7 days or scheduled within the next 24 hours.     Patient identification was verified at the start of the visit: Yes    Total Time: minutes: 21-30 minutes    Note: not billable if this call serves to triage the patient into an appointment for the relevant concern      Linsey Philippe

## 2020-09-17 ASSESSMENT — ENCOUNTER SYMPTOMS: SHORTNESS OF BREATH: 0

## 2020-09-18 ENCOUNTER — OFFICE VISIT (OUTPATIENT)
Dept: INTERNAL MEDICINE | Facility: CLINIC | Age: 60
End: 2020-09-18

## 2020-09-18 VITALS
WEIGHT: 181.6 LBS | OXYGEN SATURATION: 98 % | DIASTOLIC BLOOD PRESSURE: 78 MMHG | HEART RATE: 80 BPM | TEMPERATURE: 98.5 F | HEIGHT: 60 IN | BODY MASS INDEX: 35.65 KG/M2 | RESPIRATION RATE: 18 BRPM | SYSTOLIC BLOOD PRESSURE: 115 MMHG

## 2020-09-18 DIAGNOSIS — S81.802D WOUND OF LEFT LOWER EXTREMITY, SUBSEQUENT ENCOUNTER: Primary | ICD-10-CM

## 2020-09-18 PROCEDURE — 99213 OFFICE O/P EST LOW 20 MIN: CPT | Performed by: FAMILY MEDICINE

## 2020-09-18 RX ORDER — GABAPENTIN 100 MG/1
200 CAPSULE ORAL 4 TIMES DAILY
COMMUNITY
Start: 2020-09-04 | End: 2020-12-03

## 2020-09-18 NOTE — PROGRESS NOTES
Subjective     Chief Complaint   Patient presents with   • Insect Bite     Follow up.        History of Present Illness  Patient presents for reevaluation of wound.  She has been seeing the wound management people at [place].  The wound is looking much better.  She has been changed from Santyl to Silvercel.  This is much better as the Santyl was causing significant burning.  Patient's PMR from outside medical facility reviewed and noted.    Review of Systems     Otherwise complete ROS reviewed and negative except as mentioned in the HPI.    Past Medical History:   Past Medical History:   Diagnosis Date   • Acid reflux     RESOLVED PT PT   • Allergic rhinitis    • Arthritis     BACK   • Bipolar 1 disorder (CMS/HCC)    • Chronic back pain    • Degenerative arthritis    • Depression    • Deviated nasal septum    • Eczema    • Gout    • History of colon polyps    • Hyperlipidemia    • Hypertension    • Hypertrophy of nasal turbinates    • Hypokalemia    • Hypothyroidism    • Incontinence in female    • Insomnia    • Menopause    • Nasal septal deviation    • Nasal valve stenosis    • Overactive bladder    • Plantar fasciitis    • Prediabetes    • RLS (restless legs syndrome)    • Sleep apnea     cpap   • Wears glasses      Past Surgical History:  Past Surgical History:   Procedure Laterality Date   • BLEPHAROPLASTY Bilateral 7/9/2019    Procedure: 1) bilateral upper blepharoplasty with excision of excess of tissue weighing down 2) nasal endoscopy with removal of nasal septal prosthesis;  Surgeon: Mark Anthony Anderson MD;  Location: [facility] OR;  Service: ENT   • CARPAL TUNNEL RELEASE Bilateral 2004   • CHOLECYSTECTOMY  2013   • COLONOSCOPY  10/17/2014    One 5-6mm tubular adenomatous polyp in the ascending colon; Two less than 4mm hyperplastic polyps in the sigmoid colon; Three rectal hyperplastic polyps; Diverticulosis; Repeat 5 years    • COLONOSCOPY  07/14/2010    Internal hemorrhoids; Repeat 7 years    •  COLONOSCOPY N/A 10/23/2019    Procedure: COLONOSCOPY WITH ANESTHESIA;  Surgeon: Robyn Frazier MD;  Location: [facility] ENDOSCOPY;  Service: Gastroenterology   • CYST REMOVAL  2016    neck   • ENDOSCOPIC FUNCTIONAL SINUS SURGERY (FESS) Bilateral 4/16/2019    Procedure: ENDOSCOPIC FUNCTIONAL SINUS SURGERY (bilareral maxillary antrostomy without image guidance);  Surgeon: Mark Anthony Anderson MD;  Location:  [facility] OR;  Service: ENT   • ENDOSCOPY N/A 4/11/2019    Esophageal mucosal changes suggestive of eosinophilic esophagitis-biopsied; A few gastric polyps-resected and retrieved-biopsied; Normal examined duodenum-biopsied   • FOOT SURGERY Right 2010    X3   • HERNIA REPAIR     • INTERSTIM PLACEMENT N/A 6/5/2019    Procedure: INTERSTIM STAGES 1 AND 2 LEAD AND GENERATOR PLACEMENT;  Surgeon: Endy Guerrero MD;  Location:  [facility] OR;  Service: Urology   • LASER ABLATION      right back   • LEG DEBRIDEMENT Left 7/31/2020    Procedure: LOWER POSTERIOR LEG WOUND DEBRIDEMENT - LEFT;  Surgeon: Roberto Wells DPM;  Location:  [facility] OR;  Service: Podiatry;  Laterality: Left;   • NASAL ENDOSCOPY N/A 4/16/2019    Procedure:     1. Functional endoscopic sinus surgery with bilateral maxillary antrostomy    2. Bilateral nasal endoscopy with biopsy of nasal septum    3.  Nasal septal prosthesis placement  ;  Surgeon: Mark Anthony Anderson MD;  Location:  [facility] OR;  Service: ENT   • NASAL VESTIBULE LESION/PAPILLOMA EXCISION N/A 8/1/2017    Procedure: Repair of nasal vestibular stenosis and septoplasty; cartilage graft from left ear;  Surgeon: Mark Anthony Anderson MD;  Location:  [facility] OR;  Service:    • SEPTOPLASTY N/A 4/16/2019    Procedure: PLACEMENT OF NASAL SEPTAL PROSTHESIS;  Surgeon: Mark Anthony Anderson MD;  Location:  [facility] OR;  Service: ENT   • SEPTOPLASTY Bilateral 1/14/2020    Procedure: Nasal endoscopy with septal debridement, and placement of Silastic stents;  Surgeon: Mark Anthony Anderson MD;  Location:  [facility] OR;  Service: ENT   •  WOUND DEBRIDEMENT       Social History:  reports that she quit smoking about 8 weeks ago. Her smoking use included cigarettes. She has a 20.00 pack-year smoking history. She has never used smokeless tobacco. She reports current alcohol use. She reports that she does not use drugs.    Family History: family history includes Cancer in her father and mother; Diabetes in her father; Hypertension in her father.       Allergies:  Allergies   Allergen Reactions   • Bactrim [Sulfamethoxazole-Trimethoprim] Rash     Itching   • Hctz [Hydrochlorothiazide] Other (See Comments)     Acid reflux   • Januvia [Sitagliptin] Rash     Medications:  Prior to Admission medications    Medication Sig Start Date End Date Taking? Authorizing Provider   albuterol (PROVENTIL) (2.5 MG/3ML) 0.083% nebulizer solution As Needed. 8/28/19  Yes Brenda Mcnair MD   allopurinol (ZYLOPRIM) 100 MG tablet Take 100 mg by mouth Daily. 5/8/17  Yes Brenda Mcnair MD   ALPRAZolam (XANAX) 0.5 MG tablet Take 0.5 mg by mouth As Needed. 7/9/19  Yes Brenda Mcnair MD   atorvastatin (LIPITOR) 40 MG tablet Take 40 mg by mouth Every Night. 9/13/17  Yes Brenda Mcnair MD   bumetanide (BUMEX) 1 MG tablet Take 1 mg by mouth Daily.   Yes Brenda Mcnair MD   calcium carbonate (OS-KAYLEY) 600 MG tablet Take 600 mg by mouth Daily.   Yes Brenda Mcnair MD   carvedilol (COREG) 6.25 MG tablet Take 6.25 mg by mouth 2 (Two) Times a Day With Meals.   Yes Brenda Mcnair MD   cholecalciferol (VITAMIN D3) 1000 UNITS tablet Take 1,000 Units by mouth Daily.   Yes Brenda Mcnair MD   collagenase 250 UNIT/GM ointment APPLY 1GM TO INFECTION DAILY WITH DRESSING CHANGES 8/26/20  Yes Brenda Mcnair MD   diclofenac (VOLTAREN) 75 MG EC tablet  6/4/20  Yes Brenda Mcnair MD   divalproex (DEPAKOTE) 500 MG 24 hr tablet Take 1,000 mg by mouth Every Night.   Yes Brenda Mcnair MD   doxycycline (MONODOX) 100 MG capsule Take 1  capsule by mouth 2 (Two) Times a Day. 8/18/20  Yes Sania Witt DO   DULoxetine (CYMBALTA) 60 MG capsule Take 60 mg by mouth 2 (Two) Times a Day.   Yes Brenda Mcnair MD   gabapentin (NEURONTIN) 100 MG capsule Take 100 mg by mouth 3 (Three) Times a Day. 9/4/20 12/3/20 Yes Brenda Mcnair MD   guaiFENesin (MUCINEX) 600 MG 12 hr tablet Take 1,200 mg by mouth 2 (Two) Times a Day. 10/19/18  Yes Brenda Mcnair MD   HYDROcodone-acetaminophen (NORCO)  MG per tablet Take 1 tablet by mouth Every 6 (Six) Hours As Needed for Moderate Pain . 8/13/20  Yes Sania Witt DO   levothyroxine (SYNTHROID, LEVOTHROID) 125 MCG tablet Take 100 mcg by mouth Daily. 3/13/19  Yes Brenda Mcnair MD   Loperamide HCl (IMODIUM PO) Take 2 mg by mouth As Needed.   Yes Brenda Mcnair MD   methylcellulose, Laxative, (EQ FIBER THERAPY) 500 MG tablet tablet Take 1 tablet by mouth Daily.   Yes Brenda Mcnair MD   Multiple Vitamin (MULTI VITAMIN PO) Take 1 dose by mouth Daily.   Yes Brenda Mcnair MD   mupirocin (BACTROBAN) 2 % ointment  10/16/19  Yes Brenda Mcnair MD   niacin (SLO-NIACIN) 500 MG CR tablet Take 500 mg by mouth Daily.   Yes Brenda Mcnair MD   nicotine (NICODERM CQ) 21 MG/24HR patch Place 1 patch on the skin as directed by provider. 8/10/20 8/10/21 Yes Brenda Mcnair MD   omeprazole (priLOSEC) 20 MG capsule Take 20 mg by mouth Daily. 3/27/19  Yes Brenda Mcnair MD   pilocarpine (SALAGEN) 5 MG tablet Take 5 mg by mouth 3 (Three) Times a Day.   Yes Brenda Mcnair MD   pramipexole (MIRAPEX) 1.5 MG tablet Take 1.5 mg by mouth Every Night.   Yes Brenda Mcnair MD   QUEtiapine (SEROquel) 50 MG tablet Take 25 mg by mouth Every Night.   Yes Brenda Mcnair MD   Respiratory Therapy Supplies (NEBULIZER/TUBING/MOUTHPIECE) kit 1 kit. 8/28/19  Yes Provider, MD Brenda   spironolactone (ALDACTONE) 25 MG tablet Take 25 mg by mouth 2  "(Two) Times a Day.   Yes Provider, MD Brenda   tiZANidine (ZANAFLEX) 4 MG tablet Take 4 mg by mouth Every Night.   Yes Provider, MD Brenda   traMADol (ULTRAM) 50 MG tablet Take 50 mg by mouth 2 (Two) Times a Day.   Yes Provider, MD Brenda       Objective     Vital Signs: /78 (BP Location: Left arm, Patient Position: Sitting, Cuff Size: Adult)   Pulse 80   Temp 98.5 °F (36.9 °C) (Skin)   Resp 18   Ht 152.4 cm (60\")   Wt 82.4 kg (181 lb 9.6 oz)   SpO2 98%   BMI 35.47 kg/m²   Physical Exam  Vitals signs and nursing note reviewed.   Constitutional:       General: She is not in acute distress.     Appearance: Normal appearance.   HENT:      Head: Normocephalic and atraumatic.   Cardiovascular:      Rate and Rhythm: Normal rate and regular rhythm.      Pulses: Normal pulses.      Heart sounds: Normal heart sounds.   Pulmonary:      Effort: Pulmonary effort is normal.      Breath sounds: Normal breath sounds.   Abdominal:      General: Bowel sounds are normal.      Palpations: Abdomen is soft.   Musculoskeletal: Normal range of motion.      Left lower leg: Edema ( Trace) present.   Skin:     General: Skin is warm and dry.      Capillary Refill: Capillary refill takes less than 2 seconds.      Comments: Wound posterior left calf with good granulation tissue improving significantly.  Measurements per last wound note were down to 10 cm.  This is a marked improvement.   Neurological:      General: No focal deficit present.      Mental Status: She is alert and oriented to person, place, and time.   Psychiatric:         Mood and Affect: Mood normal.         Behavior: Behavior normal.         Thought Content: Thought content normal.         Judgment: Judgment normal.         Patient's Body mass index is 35.47 kg/m².     Results Reviewed:  Glucose   Date Value Ref Range Status   08/03/2020 150 (H) 65 - 99 mg/dL Final   08/28/2019 105 74 - 109 mg/dL Final     BUN   Date Value Ref Range Status "   08/03/2020 7 6 - 20 mg/dL Final   08/28/2019 19 6 - 20 mg/dL Final     Creatinine   Date Value Ref Range Status   08/03/2020 0.63 0.57 - 1.00 mg/dL Final   11/13/2019 0.90 0.60 - 1.30 mg/dL Final     Comment:     Serial Number: [id number]:  [id number]   08/28/2019 0.6 0.5 - 0.9 mg/dL Final     Sodium   Date Value Ref Range Status   08/03/2020 142 136 - 145 mmol/L Final   08/28/2019 139 136 - 145 mmol/L Final     Potassium   Date Value Ref Range Status   08/03/2020 3.7 3.5 - 5.2 mmol/L Final   08/28/2019 4.0 3.5 - 5.0 mmol/L Final     Chloride   Date Value Ref Range Status   08/03/2020 105 98 - 107 mmol/L Final   08/28/2019 94 (L) 98 - 111 mmol/L Final     CO2   Date Value Ref Range Status   08/03/2020 27.0 22.0 - 29.0 mmol/L Final   08/28/2019 31 (H) 22 - 29 mmol/L Final     Calcium   Date Value Ref Range Status   08/03/2020 8.8 8.6 - 10.5 mg/dL Final   08/28/2019 9.7 8.6 - 10.0 mg/dL Final     ALT (SGPT)   Date Value Ref Range Status   07/29/2020 16 1 - 33 U/L Final   04/05/2019 32 5 - 33 U/L Final     AST (SGOT)   Date Value Ref Range Status   07/29/2020 16 1 - 32 U/L Final   04/05/2019 22 5 - 32 U/L Final     WBC   Date Value Ref Range Status   08/03/2020 9.03 3.40 - 10.80 10*3/mm3 Final   07/05/2020 9.9 4.8 - 10.8 K/uL Final     Hematocrit   Date Value Ref Range Status   08/03/2020 28.0 (L) 34.0 - 46.6 % Final   07/05/2020 28.8 (L) 37.0 - 47.0 % Final     Platelets   Date Value Ref Range Status   08/03/2020 352 140 - 450 10*3/mm3 Final   07/05/2020 394 130 - 400 K/uL Final     Total Cholesterol   Date Value Ref Range Status   07/29/2020 101 0 - 200 mg/dL Final     Triglycerides   Date Value Ref Range Status   07/29/2020 209 (H) 0 - 150 mg/dL Final   04/05/2019 117 0 - 149 mg/dL Final     HDL Cholesterol   Date Value Ref Range Status   07/29/2020 29 (L) 40 - 60 mg/dL Final   04/05/2019 35 (L) 65 - 121 mg/dL Final     Comment:     VALUES>60 MG/DL ARE ASSOCIATED WITH A DECREASED RISK OF  ATHEROSCLEROTIC  CARDIOVASCULAR DISEASE     LDL Cholesterol    Date Value Ref Range Status   07/29/2020 30 0 - 100 mg/dL Final   04/05/2019 29 <100 mg/dL Final     Comment:     <100 MG/DL=OPITIMAL    100-129 MG/DL=DESIRABLE    130-159 MG/DL BORDERLINE=INCREASED RISK OF ATHEROSCLEROTIC  CARDIOVASCULAR DISEASE    > OR = 160 MG/DL=ASSOCIATED WITH AN INCREASE RISK OF  ATHEROSCLEROTIC CARDIOVASCULAR DISEASE     LDL/HDL Ratio   Date Value Ref Range Status   07/29/2020 1.04  Final     Hemoglobin A1C   Date Value Ref Range Status   07/29/2020 6.50 (H) 4.80 - 5.60 % Final   07/04/2020 6.1 (H) 4.0 - 6.0 % Final     Comment:     HbA1c levels >6% are an indication of hyperglycemia during the preceding 2  to 3 months or longer.    HbA1c levels may reach 20% or higher in poorly controlled diabetes.  Therapeutic action is suggested at levels above 8%.    Diabetes patients with HbA1c levels below 7% meet the goal of the American  Diabetes Association.    HbA1c levels below the established reference range may indicate recent  episodes of hypoglycemia, the presence of Hb variants, or shortened lifetime  of erythrocytes.          Assessment / Plan     Assessment/Plan:  1. Wound of left lower extremity, subsequent encounter  Continue current regimen.  Follow-up with wound care routinely.  monitor for a reaction to the silver cell      Return if symptoms worsen or fail to improve. unless patient needs to be seen sooner or acute issues arise.    I have discussed the patient results/orders and and plan/recommendation with them at today's visit.      Tabitha Victor,    09/18/2020

## 2020-09-21 ENCOUNTER — OFFICE VISIT (OUTPATIENT)
Dept: WOUND CARE | Facility: HOSPITAL | Age: 60
End: 2020-09-21

## 2020-09-21 DIAGNOSIS — M79.605 PAIN OF LEFT LOWER EXTREMITY: ICD-10-CM

## 2020-09-21 PROCEDURE — 11045 DBRDMT SUBQ TISS EACH ADDL: CPT | Performed by: PODIATRIST

## 2020-09-21 PROCEDURE — 11042 DBRDMT SUBQ TIS 1ST 20SQCM/<: CPT | Performed by: PODIATRIST

## 2020-09-21 RX ORDER — HYDROCODONE BITARTRATE AND ACETAMINOPHEN 10; 325 MG/1; MG/1
1 TABLET ORAL EVERY 6 HOURS PRN
Qty: 60 TABLET | Refills: 0 | Status: SHIPPED | OUTPATIENT
Start: 2020-09-21 | End: 2021-01-02 | Stop reason: HOSPADM

## 2020-09-21 NOTE — TELEPHONE ENCOUNTER
Patient had office visit with Dr Wells today. She found out that she ill be doing the Silver Cell for several weeks. She will need more than the 7 days she originally asked for.

## 2020-09-21 NOTE — TELEPHONE ENCOUNTER
PT called requesting a refill of NORCO.  PT states applying the Silver Cell - Patch Burns and is painful, has to apply for 7 days and doesn't think she can handle it without taking NORCO.  I told PT Dr. Victor is out all this week and would check with other providers to see if they can call in.  We would get back with her within 48 hours.  Pt understood.

## 2020-09-29 ENCOUNTER — OFFICE VISIT (OUTPATIENT)
Dept: WOUND CARE | Facility: HOSPITAL | Age: 60
End: 2020-09-29

## 2020-09-29 PROCEDURE — 11042 DBRDMT SUBQ TIS 1ST 20SQCM/<: CPT | Performed by: NURSE PRACTITIONER

## 2020-09-29 PROCEDURE — 11045 DBRDMT SUBQ TISS EACH ADDL: CPT | Performed by: NURSE PRACTITIONER

## 2020-09-30 ENCOUNTER — PATIENT MESSAGE (OUTPATIENT)
Dept: FAMILY MEDICINE CLINIC | Age: 60
End: 2020-09-30

## 2020-09-30 NOTE — TELEPHONE ENCOUNTER
From: Stephanie Sousa  To: MAEVE Ibarra  Sent: 9/29/2020 11:42 PM CDT  Subject: Prescription Question    It keeps getting better. The Santyl I was using burned too much, so Dr. Gayla Schmidt changed me to using Silvercel. That burns too but at least I can sleep. I was also able to get some pain meds from Dr. Guillermo Nance office. I usually take them on Mondays when the doc debrides the wound. Today they have me on collagen pads. I'm told it shouldn't hurt as bad but right now I'm in a lot of pain. Probably because it was debrided today. Overall, it's doing really well. I needed to ask you about the nicotine patches. I thought you had written the prescription to gradually go from Step 1 through to Step 3. I just realized today that the pharmacy has been giving me the Step 1 patches this whole time. Shouldn't they have started me on Step 2 a couple of weeks ago. I have no more refills so I think something isn't right. This wound has had my whole attention and I just only thought of it today. Do I need a new prescription for each Step or should the pharmacy have done it automatically?

## 2020-10-01 RX ORDER — NICOTINE 21 MG/24HR
1 PATCH, TRANSDERMAL 24 HOURS TRANSDERMAL EVERY 24 HOURS
Qty: 30 PATCH | Refills: 1 | Status: SHIPPED | OUTPATIENT
Start: 2020-10-01 | End: 2020-12-07 | Stop reason: ALTCHOICE

## 2020-10-06 ENCOUNTER — OFFICE VISIT (OUTPATIENT)
Dept: WOUND CARE | Facility: HOSPITAL | Age: 60
End: 2020-10-06

## 2020-10-06 ENCOUNTER — LAB REQUISITION (OUTPATIENT)
Dept: LAB | Facility: HOSPITAL | Age: 60
End: 2020-10-06

## 2020-10-06 DIAGNOSIS — Z00.00 ENCOUNTER FOR GENERAL ADULT MEDICAL EXAMINATION WITHOUT ABNORMAL FINDINGS: ICD-10-CM

## 2020-10-06 PROCEDURE — 87147 CULTURE TYPE IMMUNOLOGIC: CPT | Performed by: NURSE PRACTITIONER

## 2020-10-06 PROCEDURE — 11045 DBRDMT SUBQ TISS EACH ADDL: CPT | Performed by: NURSE PRACTITIONER

## 2020-10-06 PROCEDURE — 11042 DBRDMT SUBQ TIS 1ST 20SQCM/<: CPT | Performed by: NURSE PRACTITIONER

## 2020-10-06 PROCEDURE — 87186 SC STD MICRODIL/AGAR DIL: CPT | Performed by: NURSE PRACTITIONER

## 2020-10-06 PROCEDURE — 87205 SMEAR GRAM STAIN: CPT | Performed by: NURSE PRACTITIONER

## 2020-10-06 PROCEDURE — 87070 CULTURE OTHR SPECIMN AEROBIC: CPT | Performed by: NURSE PRACTITIONER

## 2020-10-06 PROCEDURE — 99213 OFFICE O/P EST LOW 20 MIN: CPT | Performed by: NURSE PRACTITIONER

## 2020-10-06 PROCEDURE — 87075 CULTR BACTERIA EXCEPT BLOOD: CPT | Performed by: NURSE PRACTITIONER

## 2020-10-06 PROCEDURE — 87176 TISSUE HOMOGENIZATION CULTR: CPT | Performed by: NURSE PRACTITIONER

## 2020-10-07 ENCOUNTER — TRANSCRIBE ORDERS (OUTPATIENT)
Dept: ADMINISTRATIVE | Facility: HOSPITAL | Age: 60
End: 2020-10-07

## 2020-10-07 DIAGNOSIS — Z12.31 SCREENING MAMMOGRAM, ENCOUNTER FOR: Primary | ICD-10-CM

## 2020-10-08 LAB
BACTERIA SPEC AEROBE CULT: ABNORMAL
GRAM STN SPEC: ABNORMAL
GRAM STN SPEC: ABNORMAL

## 2020-10-11 LAB — BACTERIA SPEC ANAEROBE CULT: NORMAL

## 2020-10-12 ENCOUNTER — OFFICE VISIT (OUTPATIENT)
Dept: WOUND CARE | Facility: HOSPITAL | Age: 60
End: 2020-10-12

## 2020-10-12 PROCEDURE — 11042 DBRDMT SUBQ TIS 1ST 20SQCM/<: CPT | Performed by: PODIATRIST

## 2020-10-12 PROCEDURE — 11045 DBRDMT SUBQ TISS EACH ADDL: CPT | Performed by: PODIATRIST

## 2020-10-19 ENCOUNTER — OFFICE VISIT (OUTPATIENT)
Dept: WOUND CARE | Facility: HOSPITAL | Age: 60
End: 2020-10-19

## 2020-10-19 PROCEDURE — 11042 DBRDMT SUBQ TIS 1ST 20SQCM/<: CPT | Performed by: PODIATRIST

## 2020-10-19 PROCEDURE — 11045 DBRDMT SUBQ TISS EACH ADDL: CPT | Performed by: PODIATRIST

## 2020-10-20 ENCOUNTER — TELEPHONE (OUTPATIENT)
Dept: VASCULAR SURGERY | Facility: CLINIC | Age: 60
End: 2020-10-20

## 2020-10-22 ENCOUNTER — OFFICE VISIT (OUTPATIENT)
Dept: VASCULAR SURGERY | Facility: CLINIC | Age: 60
End: 2020-10-22

## 2020-10-22 ENCOUNTER — APPOINTMENT (OUTPATIENT)
Dept: MAMMOGRAPHY | Facility: HOSPITAL | Age: 60
End: 2020-10-22

## 2020-10-22 VITALS
HEIGHT: 60 IN | DIASTOLIC BLOOD PRESSURE: 84 MMHG | SYSTOLIC BLOOD PRESSURE: 124 MMHG | OXYGEN SATURATION: 95 % | BODY MASS INDEX: 35.47 KG/M2 | HEART RATE: 84 BPM

## 2020-10-22 DIAGNOSIS — I10 ESSENTIAL HYPERTENSION: ICD-10-CM

## 2020-10-22 DIAGNOSIS — S81.802A WOUND OF LEFT LOWER EXTREMITY, INITIAL ENCOUNTER: Primary | ICD-10-CM

## 2020-10-22 DIAGNOSIS — E78.2 MIXED HYPERLIPIDEMIA: ICD-10-CM

## 2020-10-22 PROCEDURE — 99204 OFFICE O/P NEW MOD 45 MIN: CPT | Performed by: SURGERY

## 2020-10-22 NOTE — PROGRESS NOTES
10/22/2020      Roberto Wells DPM  [address]  [place] [address]    Robyn Minaya  1960    Chief Complaint   Patient presents with   • Establish Care     Large wound on leg.  Pt referred by Dr. Wells for skin graft.       Dear Roberto Wells DPM    HPI  I had the pleasure of seeing your patient Robyn Minaya in the office today.  Thank you kindly for this consultation.  As you recall, Robyn Minaya is a 60 y.o.  female who you are currently following for a wound to her left lower extremity.  The wound was originally caused by a brown recluse.  She was treated at [facility] wound [facility] and the wound continued to worsen.  She has been followed by Dr. Wells here in our wound care center and she is improved significantly.  She still has a large wound however is clean without drainage. She is currently taking doxycycline.      Past Medical History:   Diagnosis Date   • Acid reflux     RESOLVED PT PT   • Allergic rhinitis    • Arthritis     BACK   • Bipolar 1 disorder (CMS/HCC)    • Chronic back pain    • Degenerative arthritis    • Depression    • Deviated nasal septum    • Eczema    • Gout    • History of colon polyps    • Hyperlipidemia    • Hypertension    • Hypertrophy of nasal turbinates    • Hypokalemia    • Hypothyroidism    • Incontinence in female    • Insomnia    • Menopause    • Nasal septal deviation    • Nasal valve stenosis    • Overactive bladder    • Plantar fasciitis    • Prediabetes    • RLS (restless legs syndrome)    • Sleep apnea     cpap   • Wears glasses        Past Surgical History:   Procedure Laterality Date   • BLEPHAROPLASTY Bilateral 7/9/2019    Procedure: 1) bilateral upper blepharoplasty with excision of excess of tissue weighing down 2) nasal endoscopy with removal of nasal septal prosthesis;  Surgeon: Mark Anthony Anderson MD;  Location: [facility];  Service: ENT   • CARPAL TUNNEL RELEASE Bilateral 2004   • CHOLECYSTECTOMY  2013   • COLONOSCOPY  10/17/2014     One 5-6mm tubular adenomatous polyp in the ascending colon; Two less than 4mm hyperplastic polyps in the sigmoid colon; Three rectal hyperplastic polyps; Diverticulosis; Repeat 5 years    • COLONOSCOPY  07/14/2010    Internal hemorrhoids; Repeat 7 years    • COLONOSCOPY N/A 10/23/2019    Procedure: COLONOSCOPY WITH ANESTHESIA;  Surgeon: Robyn Frazier MD;  Location: [facility] ENDOSCOPY;  Service: Gastroenterology   • CYST REMOVAL  2016    neck   • ENDOSCOPIC FUNCTIONAL SINUS SURGERY (FESS) Bilateral 4/16/2019    Procedure: ENDOSCOPIC FUNCTIONAL SINUS SURGERY (bilareral maxillary antrostomy without image guidance);  Surgeon: Mark Anthony Anderson MD;  Location: [facility] OR;  Service: ENT   • ENDOSCOPY N/A 4/11/2019    Esophageal mucosal changes suggestive of eosinophilic esophagitis-biopsied; A few gastric polyps-resected and retrieved-biopsied; Normal examined duodenum-biopsied   • FOOT SURGERY Right 2010    X3   • HERNIA REPAIR     • INTERSTIM PLACEMENT N/A 6/5/2019    Procedure: INTERSTIM STAGES 1 AND 2 LEAD AND GENERATOR PLACEMENT;  Surgeon: Endy Guerrero MD;  Location: [facility] OR;  Service: Urology   • LASER ABLATION      right back   • LEG DEBRIDEMENT Left 7/31/2020    Procedure: LOWER POSTERIOR LEG WOUND DEBRIDEMENT - LEFT;  Surgeon: Roberto Wells DPM;  Location: [facility] OR;  Service: Podiatry;  Laterality: Left;   • NASAL ENDOSCOPY N/A 4/16/2019    Procedure:     1. Functional endoscopic sinus surgery with bilateral maxillary antrostomy    2. Bilateral nasal endoscopy with biopsy of nasal septum    3.  Nasal septal prosthesis placement  ;  Surgeon: Mark Anthony Anderson MD;  Location: [facility] OR;  Service: ENT   • NASAL VESTIBULE LESION/PAPILLOMA EXCISION N/A 8/1/2017    Procedure: Repair of nasal vestibular stenosis and septoplasty; cartilage graft from left ear;  Surgeon: Mark Anthony Anderson MD;  Location: [facility] OR;  Service:    • SEPTOPLASTY N/A 4/16/2019    Procedure: PLACEMENT OF NASAL SEPTAL PROSTHESIS;   Surgeon: Mark Anthony Anderson MD;  Location:  [facility] OR;  Service: ENT   • SEPTOPLASTY Bilateral 2020    Procedure: Nasal endoscopy with septal debridement, and placement of Silastic stents;  Surgeon: Mark Anthony Anderson MD;  Location:  [facility] OR;  Service: ENT   • WOUND DEBRIDEMENT         Family History   Problem Relation Age of Onset   • Cancer Mother    • Diabetes Father    • Hypertension Father    • Cancer Father    • Colon cancer Neg Hx    • Colon polyps Neg Hx    • Esophageal cancer Neg Hx    • Liver cancer Neg Hx    • Liver disease Neg Hx    • Rectal cancer Neg Hx    • Stomach cancer Neg Hx        Social History     Socioeconomic History   • Marital status:      Spouse name: Not on file   • Number of children: Not on file   • Years of education: Not on file   • Highest education level: Not on file   Tobacco Use   • Smoking status: Former Smoker     Packs/day: 0.50     Years: 40.00     Pack years: 20.00     Types: Cigarettes     Quit date: 2020     Years since quittin.2   • Smokeless tobacco: Never Used   Substance and Sexual Activity   • Alcohol use: Yes     Frequency: Monthly or less     Comment: Rarely-maybe 3x/year   • Drug use: No   • Sexual activity: Defer       Allergies   Allergen Reactions   • Bactrim [Sulfamethoxazole-Trimethoprim] Rash     Itching   • Hctz [Hydrochlorothiazide] Other (See Comments)     Acid reflux   • Januvia [Sitagliptin] Rash         Current Outpatient Medications:   •  allopurinol (ZYLOPRIM) 100 MG tablet, Take 100 mg by mouth Daily., Disp: , Rfl:   •  ALPRAZolam (XANAX) 0.5 MG tablet, Take 0.5 mg by mouth As Needed., Disp: , Rfl:   •  atorvastatin (LIPITOR) 40 MG tablet, Take 40 mg by mouth Every Night., Disp: , Rfl:   •  bumetanide (BUMEX) 1 MG tablet, Take 1 mg by mouth Daily., Disp: , Rfl:   •  calcium carbonate (OS-KAYLEY) 600 MG tablet, Take 600 mg by mouth Daily., Disp: , Rfl:   •  carvedilol (COREG) 6.25 MG tablet, Take 6.25 mg by mouth 2 (Two) Times a Day With  Meals., Disp: , Rfl:   •  cholecalciferol (VITAMIN D3) 1000 UNITS tablet, Take 1,000 Units by mouth Daily., Disp: , Rfl:   •  collagenase 250 UNIT/GM ointment, APPLY 1GM TO INFECTION DAILY WITH DRESSING CHANGES, Disp: , Rfl:   •  diclofenac (VOLTAREN) 75 MG EC tablet, , Disp: , Rfl:   •  doxycycline (MONODOX) 100 MG capsule, Take 1 capsule by mouth 2 (Two) Times a Day., Disp: 20 capsule, Rfl: 0  •  gabapentin (NEURONTIN) 100 MG capsule, Take 100 mg by mouth 3 (Three) Times a Day., Disp: , Rfl:   •  guaiFENesin (MUCINEX) 600 MG 12 hr tablet, Take 1,200 mg by mouth 2 (Two) Times a Day., Disp: , Rfl:   •  HYDROcodone-acetaminophen (NORCO)  MG per tablet, Take 1 tablet by mouth Every 6 (Six) Hours As Needed for Moderate Pain ., Disp: 60 tablet, Rfl: 0  •  levothyroxine (SYNTHROID, LEVOTHROID) 125 MCG tablet, Take 100 mcg by mouth Daily., Disp: , Rfl:   •  Loperamide HCl (IMODIUM PO), Take 2 mg by mouth As Needed., Disp: , Rfl:   •  methylcellulose, Laxative, (EQ FIBER THERAPY) 500 MG tablet tablet, Take 1 tablet by mouth Daily., Disp: , Rfl:   •  Multiple Vitamin (MULTI VITAMIN PO), Take 1 dose by mouth Daily., Disp: , Rfl:   •  niacin (SLO-NIACIN) 500 MG CR tablet, Take 500 mg by mouth Daily., Disp: , Rfl:   •  nicotine (NICODERM CQ) 21 MG/24HR patch, Place 1 patch on the skin as directed by provider., Disp: , Rfl:   •  omeprazole (priLOSEC) 20 MG capsule, Take 20 mg by mouth Daily., Disp: , Rfl:   •  pilocarpine (SALAGEN) 5 MG tablet, Take 5 mg by mouth 3 (Three) Times a Day., Disp: , Rfl:   •  pramipexole (MIRAPEX) 1.5 MG tablet, Take 1.5 mg by mouth Every Night., Disp: , Rfl:   •  spironolactone (ALDACTONE) 25 MG tablet, Take 25 mg by mouth 2 (Two) Times a Day., Disp: , Rfl:   •  tiZANidine (ZANAFLEX) 4 MG tablet, Take 4 mg by mouth Every Night., Disp: , Rfl:   •  traMADol (ULTRAM) 50 MG tablet, Take 50 mg by mouth 2 (Two) Times a Day., Disp: , Rfl:     Review of Systems   Constitutional: Negative.    HENT:  "Negative.    Eyes: Negative.    Respiratory: Negative.    Cardiovascular: Positive for leg swelling.   Gastrointestinal: Negative.    Endocrine: Negative.    Genitourinary: Negative.    Musculoskeletal: Negative.    Skin: Positive for wound.   Allergic/Immunologic: Negative.    Neurological: Negative.    Hematological: Negative.    Psychiatric/Behavioral: Negative.    All other systems reviewed and are negative.    /84   Pulse 84   Ht 152.4 cm (60\")   SpO2 95%   BMI 35.47 kg/m²     Physical Exam  Vitals signs and nursing note reviewed.   Constitutional:       Appearance: She is well-developed.   HENT:      Head: Normocephalic and atraumatic.   Eyes:      General: No scleral icterus.     Pupils: Pupils are equal, round, and reactive to light.   Neck:      Musculoskeletal: Neck supple.      Thyroid: No thyromegaly.      Vascular: No carotid bruit or JVD.   Cardiovascular:      Rate and Rhythm: Normal rate and regular rhythm.      Pulses:           Carotid pulses are 2+ on the right side and 2+ on the left side.       Femoral pulses are 2+ on the right side and 2+ on the left side.       Popliteal pulses are 2+ on the right side and 2+ on the left side.        Dorsalis pedis pulses are 2+ on the right side and 2+ on the left side.        Posterior tibial pulses are 2+ on the right side and 2+ on the left side.      Heart sounds: Normal heart sounds.   Pulmonary:      Effort: Pulmonary effort is normal.      Breath sounds: Normal breath sounds.   Abdominal:      General: Bowel sounds are normal. There is no distension or abdominal bruit.      Palpations: Abdomen is soft. There is no mass.      Tenderness: There is no abdominal tenderness.   Musculoskeletal: Normal range of motion.        Legs:    Lymphadenopathy:      Cervical: No cervical adenopathy.   Skin:     General: Skin is warm and dry.   Neurological:      Mental Status: She is alert and oriented to person, place, and time.      Cranial Nerves: No " cranial nerve deficit.      Sensory: No sensory deficit.         Patient Active Problem List   Diagnosis   • Spondylosis with myelopathy, lumbar region   • Nasal septal perforation   • Chronic maxillary sinusitis   • Nausea   • Epigastric pain   • Urinary incontinence   • Dermatochalasis of both upper eyelids   • Visual field defect   • Hx of colonic polyps   • Other constipation   • Lower abdominal pain   • Nasal cavity mass   • Abnormal nasal septum   • Essential hypertension   • Acquired hypothyroidism   • Mixed hyperlipidemia   • Chronic back pain greater than 3 months duration   • Prediabetes   • Cellulitis of left lower extremity   • Wound of left leg   • Chronic pain syndrome   • Acute pain   • Anxiety   • DOMINIC (acute kidney injury) (CMS/HCC)   • Tobacco abuse   • Infection of wound due to methicillin resistant Staphylococcus aureus (MRSA)   • Acute blood loss anemia   • Anemia   • Fatigue        Diagnosis Plan   1. Wound of left lower extremity, initial encounter     2. Essential hypertension     3. Mixed hyperlipidemia         Plan: After thoroughly evaluating Robyn Minaya, I believe the best course of action is to proceed with a left lower extremity split thickness skin graft.  The patient has a very large calf wound which I believe should respond nicely to a skin graft.  The wound edges are pink and granulation tissue is present.  There is no signs of active infection.  She is on suppressive doxycycline.  Risks/benefits were explained at great length to the patient which include but are not limited to bleeding, infection, failure of skin graft to take, and loss of limb.  The patient understands the risks and wishes for me to proceed.  She will be scheduled over the next 2 weeks.  She will need wound VAC placement for 3 to 5 days while she is in the hospital before discharge.I did discuss vascular risk factors as they pertain to the progression of vascular disease including controlling hypertension and  hyperlipidemia.  These risk factors are currently stable. The patient is to continue taking their medications as previously discussed.   This was all discussed in full with complete understanding.  Thank you for allowing me to participate in the care of your patient.  Please do not hesitate to call with any questions or concerns.  We will keep you aware of any further encounters with Robyn Minaya.        Sincerely yours,         Trace Hurd, Tabitha Nix, DO

## 2020-10-23 ENCOUNTER — PATIENT MESSAGE (OUTPATIENT)
Dept: FAMILY MEDICINE CLINIC | Age: 60
End: 2020-10-23

## 2020-10-26 ENCOUNTER — OFFICE VISIT (OUTPATIENT)
Dept: WOUND CARE | Facility: HOSPITAL | Age: 60
End: 2020-10-26

## 2020-10-26 PROCEDURE — 11042 DBRDMT SUBQ TIS 1ST 20SQCM/<: CPT | Performed by: PODIATRIST

## 2020-10-26 PROCEDURE — 11045 DBRDMT SUBQ TISS EACH ADDL: CPT | Performed by: PODIATRIST

## 2020-10-28 ENCOUNTER — VIRTUAL VISIT (OUTPATIENT)
Dept: FAMILY MEDICINE CLINIC | Age: 60
End: 2020-10-28
Payer: COMMERCIAL

## 2020-10-28 PROBLEM — E66.01 MORBIDLY OBESE (HCC): Status: ACTIVE | Noted: 2020-10-28

## 2020-10-28 PROCEDURE — 99213 OFFICE O/P EST LOW 20 MIN: CPT | Performed by: NURSE PRACTITIONER

## 2020-10-28 PROCEDURE — G8417 CALC BMI ABV UP PARAM F/U: HCPCS | Performed by: NURSE PRACTITIONER

## 2020-10-28 PROCEDURE — 1036F TOBACCO NON-USER: CPT | Performed by: NURSE PRACTITIONER

## 2020-10-28 PROCEDURE — 3017F COLORECTAL CA SCREEN DOC REV: CPT | Performed by: NURSE PRACTITIONER

## 2020-10-28 PROCEDURE — G8484 FLU IMMUNIZE NO ADMIN: HCPCS | Performed by: NURSE PRACTITIONER

## 2020-10-28 PROCEDURE — G8427 DOCREV CUR MEDS BY ELIG CLIN: HCPCS | Performed by: NURSE PRACTITIONER

## 2020-10-28 RX ORDER — FLUOXETINE 10 MG/1
CAPSULE ORAL
Qty: 60 CAPSULE | Refills: 1 | Status: SHIPPED
Start: 2020-10-28 | End: 2020-10-29

## 2020-10-28 NOTE — PROGRESS NOTES
10/28/2020    TELEHEALTH EVALUATION -- Audio/Visual (During RMSFI-45 public health emergency)    Chief Complaint   Patient presents with    Depression           Luz Abad (:  1960) has requested an audio/video evaluation for the following concern(s):  HPI:  Pt states that she was told that as long as pt is still on xanax the psych would not give her any further medication. Pt states that \"I am deeply depressed and have been off of my depression medication\". Pt states that she feels disconnect between herself and the psych. Pt continues to cleaning the anxiety from health issues that she has had. Pt is expecting to be having skin grafting again in the next 2wks. Pt is currently seeing flakita Johnson. Pt also talks with therapist once weekly. Pt states that her psychiatrist has told her she is not bipolar. Pt states that she \"hurts myself as punishment\" or \"keep myself awake\"      Review of Systems   Constitutional: Positive for fatigue. Negative for unexpected weight change. Musculoskeletal: Positive for arthralgias. Neurological: Negative for weakness. Psychiatric/Behavioral: Positive for decreased concentration, self-injury and sleep disturbance. Negative for agitation and suicidal ideas. The patient is nervous/anxious. Prior to Visit Medications    Medication Sig Taking? Authorizing Provider   FLUoxetine (PROZAC) 10 MG capsule 1 po daily for 1wk then increase to 2 po daily TAKE WITH FOOD, NEVER ABRUPTLY STOP Yes MAEVE Cuevas   atorvastatin (LIPITOR) 40 MG tablet TAKE ONE TABLET BY MOUTH NIGHTLY FOR CHOLESTEROL.   MAEVE Cuevas   nicotine (NICODERM CQ) 14 MG/24HR Place 1 patch onto the skin every 24 hours  MAEVE Cuevas   allopurinol (ZYLOPRIM) 100 MG tablet TAKE ONE TABLET BY MOUTH DAILY  MAEVE Cuevas   carvedilol (COREG) 6.25 MG tablet TAKE 1 TABLET BY MOUTH 2 TIMES DAILY (WITH MEALS)  MAEVE Cuevas   pramipexole (MIRAPEX) 1.5 MG tablet TAKE ONE TABLET BY MOUTH NIGHTLY **MAY MAKE DROWSY**  MAEVE Cuevas   gabapentin (NEURONTIN) 100 MG capsule Take 1 capsule by mouth 3 times daily for 90 days.   MAEVE Cuevas   levothyroxine (SYNTHROID) 125 MCG tablet TAKE 1 TABLET BY MOUTH DAILY  Patient taking differently: Take 100 mcg by mouth Daily   MAEVE Cuevas   nicotine (NICODERM CQ) 7 MG/24HR Place 1 patch onto the skin every 24 hours  MAEVE Cuevas   SANTYL 250 UNIT/GM ointment APPLY TO WOUND ON LEFT LEG DAILY  Patient not taking: Reported on 9/4/2020  MAEVE Stafford - CNP   melatonin (RA MELATONIN) 3 MG TABS tablet Take 1 tablet by mouth nightly as needed (for sleep)  Yessi Santiago MD   aspirin 81 MG EC tablet Take 81 mg by mouth daily  Historical Provider, MD   DICLOFENAC PO Take 75 mg by mouth 2 times daily  Historical Provider, MD   pilocarpine (SALAGEN) 5 MG tablet Take 1 tablet by mouth 3 times daily  MAEVE Cuevas   bumetanide (BUMEX) 1 MG tablet Take 1 tablet by mouth 2 times daily  Patient taking differently: Take 1 mg by mouth daily   MAEVE Cuevas   Respiratory Therapy Supplies (NEBULIZER/TUBING/MOUTHPIECE) KIT 1 kit by Does not apply route daily as needed (for qid prn use  DX-bronchitis)  MAEVE Cuevas   omeprazole (PRILOSEC) 20 MG delayed release capsule Take 1 capsule by mouth 2 times daily (before meals)  MAEVE Cuevas   spironolactone (ALDACTONE) 25 MG tablet Take 1 tablet by mouth daily  MAEVE Cuevas   guaiFENesin (MUCINEX) 600 MG extended release tablet Take 2 tablets by mouth 2 times daily  MAEVE Cuevas   niacin (SLO-NIACIN) 500 MG extended release tablet Take 500 mg by mouth daily   Historical Provider, MD   tiZANidine (ZANAFLEX) 4 MG tablet Take 4 mg by mouth 3 times daily as needed   Historical Provider, MD   Vitamin D (CHOLECALCIFEROL) 1000 UNITS CAPS capsule Take 1,000 Units by mouth daily  Historical Provider, MD   Multiple Vitamins-Minerals (MULTIVITAMIN PO) Take by mouth daily  Historical Provider, MD calcium carbonate 600 MG TABS tablet Take 1 tablet by mouth daily. Historical Provider, MD       Social History     Tobacco Use    Smoking status: Former Smoker     Packs/day: 1.00     Years: 30.00     Pack years: 30.00     Types: Cigarettes     Last attempt to quit: 2003     Years since quittin.2    Smokeless tobacco: Never Used    Tobacco comment: Pt not interested at this time in stopping. Substance Use Topics    Alcohol use: Yes     Comment: rarely    Drug use: No     Comment: pt admits \"may have misused in past\"--Pt was vague with details.         Allergies   Allergen Reactions    Bactrim [Sulfamethoxazole-Trimethoprim]     Hctz [Hydrochlorothiazide] Other (See Comments)     Acid reflux      Sitagliptin Rash   ,   Past Medical History:   Diagnosis Date    Arthritis     Bipolar I disorder, most recent episode (or current) mixed, unspecified     Chronic back pain     Depression     Dry mouth     Hyperlipidemia     Hypertension     Hypokalemia     Hypothyroidism     Menopause     Morbidly obese ([facility] [address] 10/28/2020    Overactive bladder     Plantar fasciitis     RLS (restless legs syndrome)     Sleep apnea     Spondylosis with myelopathy, lumbar region     Thyroid disease    ,   Past Surgical History:   Procedure Laterality Date    CARPAL TUNNEL RELEASE Bilateral 2002    CHOLECYSTECTOMY  10/2014    COLON SURGERY      COLON SURGERY      biopsy     COLONOSCOPY      CYST REMOVAL  2017    Under left ear    HARDWARE REMOVAL Right     Foot     HERNIA REPAIR  10/2014    LEG DEBRIDEMENT Left     NOSE SURGERY  2017; 2019    Dr Jeet NASCIMENTO/S,1 LEVEL Bilateral 2017    INJECTION MEDICATION FACET JOINT performed by Brianda Shane at [facility]   ,   Social History     Tobacco Use    Smoking status: Former Smoker     Packs/day: 1.00     Years: 30.00     Pack years: 30.00     Types: Cigarettes     Last attempt to quit: 2003     Years since quittin.2    Smokeless tobacco: Never Used    Tobacco comment: Pt not interested at this time in stopping. Substance Use Topics    Alcohol use: Yes     Comment: rarely    Drug use: No     Comment: pt admits \"may have misused in past\"--Pt was vague with details. ,   Family History   Problem Relation Age of Onset    Cancer Mother         lung    Heart Disease Mother     Hypertension Mother     Bipolar Disorder Mother     Heart Disease Father     Hypertension Father        PHYSICAL EXAMINATION:  [ INSTRUCTIONS:  \"[x]\" Indicates a positive item  \"[]\" Indicates a negative item  -- DELETE ALL ITEMS NOT EXAMINED]  Vital Signs: There were no vitals taken for this visit. Patient-Reported Vitals 2020   Patient-Reported Weight -   Patient-Reported Height -   Patient-Reported Systolic -   Patient-Reported Diastolic -   Patient-Reported Pulse -   Patient-Reported Temperature -   Patient-Reported SpO2 95     Constitutional: [x] Appears well-developed and well-nourished [x] No apparent distress      [] Abnormal-   Mental status  [x] Alert and awake  [x] Oriented to person/place/time [x]Able to follow commands      Eyes:  EOM    [x]  Normal  [] Abnormal-  Sclera  [x]  Normal  [] Abnormal -         Discharge [x]  None visible  [] Abnormal   HENT:   [x] Normocephalic, atraumatic.   [] Abnormal   [x] Mouth/Throat: Mucous membranes are moist.     External Ears [x] Normal  [] Abnormal-     Neck: [x] No visualized mass     Pulmonary/Chest: [x] Respiratory effort normal.  [] No visualized signs of difficulty breathing or respiratory distress        [] Abnormal-      Musculoskeletal:   [x] Normal gait with no signs of ataxia         [x] Normal range of motion of neck        [] Abnormal-       Neurological:        [x] No Facial Asymmetry (Cranial nerve 7 motor function) (limited exam to video visit)          [x] No gaze palsy        [] Abnormal-         Skin:        [x] No significant exanthematous lesions or discoloration noted on facial skin         [] Abnormal-            Psychiatric:       [x] Normal Affect [x] No Hallucinations        [] Abnormal-     Other pertinent observable physical exam findings-     ASSESSMENT/PLAN:  Patient-Reported Vitals 8/26/2020   Patient-Reported Weight -   Patient-Reported Height -   Patient-Reported Systolic -   Patient-Reported Diastolic -   Patient-Reported Pulse -   Patient-Reported Temperature -   Patient-Reported SpO2 95        1. Moderate episode of recurrent major depressive disorder ([facility] [address]    - External Referral To Psychiatry  - FLUoxetine (PROZAC) 10 MG capsule; 1 po daily for 1wk then increase to 2 po daily TAKE WITH FOOD, NEVER ABRUPTLY STOP  Dispense: 60 capsule; Refill: 1    2. Morbid obesity  Increase activity as tolerated     Side effects of new treatment discussed. Pt will f/u asap if any increase in negativity. Never stop medication without consulting healthcare provider. F/u in approx 10days  Continue weekly f/u with counselor and referral to psych      No follow-ups on file. Pedro Sepulveda is a 62 y.o. female being evaluated by a Virtual Visit (video visit) encounter to address concerns as mentioned above. A caregiver was present when appropriate. Due to this being a TeleHealth encounter (During BEHRB- public health emergency), evaluation of the following organ systems was limited: Vitals/Constitutional/EENT/Resp/CV/GI//MS/Neuro/Skin/Heme-Lymph-Imm. Pursuant to the emergency declaration under the [address] [place] [address], [address] [place] [address] authority and the [employer] Act, this Virtual Visit was conducted with patient's (and/or legal guardian's) consent, to reduce the patient's risk of exposure to COVID-19 and provide necessary medical care.   The patient (and/or legal guardian) has also been advised to contact this office for worsening conditions or problems, and seek emergency medical treatment and/or call 911 if deemed necessary. Services were provided through a video synchronous discussion virtually to substitute for in-person clinic visit. Patient and provider were located at their individual homes. --MAEVE Rader on 10/28/2020 at 3:10 PM    An electronic signature was used to authenticate this note.

## 2020-10-29 ENCOUNTER — TELEPHONE (OUTPATIENT)
Dept: FAMILY MEDICINE CLINIC | Age: 60
End: 2020-10-29

## 2020-10-29 RX ORDER — ALPRAZOLAM 0.5 MG/1
TABLET ORAL
COMMUNITY
Start: 2020-06-01 | End: 2020-10-29 | Stop reason: SDUPTHER

## 2020-10-29 RX ORDER — TRAMADOL HYDROCHLORIDE 100 MG/1
100 TABLET, COATED ORAL 2 TIMES DAILY
COMMUNITY
Start: 2020-09-25 | End: 2021-02-25 | Stop reason: DRUGHIGH

## 2020-10-29 RX ORDER — LOPERAMIDE HYDROCHLORIDE 2 MG/1
TABLET ORAL
COMMUNITY
Start: 2020-06-01 | End: 2022-07-05

## 2020-10-29 RX ORDER — LEVOTHYROXINE SODIUM 0.07 MG/1
75 TABLET ORAL DAILY
COMMUNITY
Start: 2020-09-03 | End: 2022-02-07 | Stop reason: SDUPTHER

## 2020-10-29 RX ORDER — VITAMIN A 2400 MCG
CAPSULE ORAL
COMMUNITY
Start: 2020-06-01

## 2020-10-29 RX ORDER — ALPRAZOLAM 0.5 MG/1
TABLET ORAL
Qty: 30 TABLET | Refills: 0 | Status: SHIPPED | OUTPATIENT
Start: 2020-10-29 | End: 2021-01-08

## 2020-10-29 RX ORDER — HYDROCODONE BITARTRATE AND ACETAMINOPHEN 10; 325 MG/1; MG/1
TABLET ORAL
COMMUNITY
Start: 2020-09-21 | End: 2020-12-16 | Stop reason: DRUGHIGH

## 2020-10-29 NOTE — TELEPHONE ENCOUNTER
Med sent. Pt has previously been made aware of risk with benzo (such as xanax) and pain med. She needs to be reminded to never take this within 4hrs of pain med and even with that precaution, respiratory distress/respiratory depression can occur and lead to death. She has clearly voiced understanding of this in the previous exam and wished to proceed with tx. Psych eval pending.

## 2020-10-29 NOTE — TELEPHONE ENCOUNTER
TEAGAN was reviewed today per office protocol. Report shows No discrepancies. Fill pattern is consistent from single provider(s) at single pharmacy(s).

## 2020-10-29 NOTE — TELEPHONE ENCOUNTER
Pt is no longer under the care of psych. I will send a short term refill (Please do TEAGAN and scan to media. Notify me and pharmacy if discrepancy is noted.) and once psych eval appt is made, the consideration of refills needs to come from that office.

## 2020-10-29 NOTE — TELEPHONE ENCOUNTER
Patient was called to clarify the medication list that was sent in a [employer] message. Patient added that she forgot to ask about a refill for Xanax.  Patient was reminded of providers response on last [employer] message but she asked that Tea be asked about the medication since she no longer sees a psychiatrist.

## 2020-11-02 ENCOUNTER — PREP FOR SURGERY (OUTPATIENT)
Dept: OTHER | Facility: HOSPITAL | Age: 60
End: 2020-11-02

## 2020-11-02 ENCOUNTER — OFFICE VISIT (OUTPATIENT)
Dept: WOUND CARE | Facility: HOSPITAL | Age: 60
End: 2020-11-02

## 2020-11-02 DIAGNOSIS — Z79.02 ENCOUNTER FOR MONITORING ANTIPLATELET THERAPY: ICD-10-CM

## 2020-11-02 DIAGNOSIS — S81.802A WOUND OF LEFT LOWER EXTREMITY, INITIAL ENCOUNTER: Primary | ICD-10-CM

## 2020-11-02 DIAGNOSIS — Z01.818 PREOP TESTING: ICD-10-CM

## 2020-11-02 DIAGNOSIS — Z51.81 ENCOUNTER FOR MONITORING ANTIPLATELET THERAPY: ICD-10-CM

## 2020-11-02 PROCEDURE — 11042 DBRDMT SUBQ TIS 1ST 20SQCM/<: CPT | Performed by: PODIATRIST

## 2020-11-02 PROCEDURE — 11045 DBRDMT SUBQ TISS EACH ADDL: CPT | Performed by: PODIATRIST

## 2020-11-03 RX ORDER — BUPIVACAINE HCL/0.9 % NACL/PF 0.1 %
2 PLASTIC BAG, INJECTION (ML) EPIDURAL ONCE
Status: CANCELLED | OUTPATIENT
Start: 2020-11-03 | End: 2020-11-03

## 2020-11-09 ENCOUNTER — OFFICE VISIT (OUTPATIENT)
Dept: WOUND CARE | Facility: HOSPITAL | Age: 60
End: 2020-11-09

## 2020-11-09 PROCEDURE — 11045 DBRDMT SUBQ TISS EACH ADDL: CPT | Performed by: PODIATRIST

## 2020-11-09 PROCEDURE — 11042 DBRDMT SUBQ TIS 1ST 20SQCM/<: CPT | Performed by: PODIATRIST

## 2020-11-11 ENCOUNTER — VIRTUAL VISIT (OUTPATIENT)
Dept: FAMILY MEDICINE CLINIC | Age: 60
End: 2020-11-11
Payer: COMMERCIAL

## 2020-11-11 PROCEDURE — 3017F COLORECTAL CA SCREEN DOC REV: CPT | Performed by: NURSE PRACTITIONER

## 2020-11-11 PROCEDURE — 99213 OFFICE O/P EST LOW 20 MIN: CPT | Performed by: NURSE PRACTITIONER

## 2020-11-11 PROCEDURE — G8427 DOCREV CUR MEDS BY ELIG CLIN: HCPCS | Performed by: NURSE PRACTITIONER

## 2020-11-11 RX ORDER — FLUOXETINE HYDROCHLORIDE 20 MG/1
20 CAPSULE ORAL DAILY
Qty: 90 CAPSULE | Refills: 1 | Status: SHIPPED | OUTPATIENT
Start: 2020-11-11 | End: 2021-01-21 | Stop reason: SDUPTHER

## 2020-11-11 NOTE — PROGRESS NOTES
2020    TELEHEALTH EVALUATION -- Audio/Visual (During JMEQW-22 public health emergency)    Chief Complaint   Patient presents with    Anxiety    Depression    Other     discuss start of fluoxetine           Qi Butler (:  1960) has requested an audio/video evaluation for the following concern(s):  HPI:    Pt has started fluoxetine 10mg daily and already feels improvement. Pt has increased dose to 20mg and has noted some nausea and GI sx. Pt wishes to continue this treatment plan and will notify me if GI sx continue. Pt feels that the benefits are outweighing any negatives that she is experiencing. Pt continues to have frustration related to wound care she received at [facility]. Pt states that she is still seeing Ghassan Jasso, her counselor, every week. Pt has been under the counselors care for 2.5yrs since her daughter, Ivy Chambers, passed away. Pt does have a psych eval in progress. Review of Systems   Skin: Positive for wound. Psychiatric/Behavioral: The patient is nervous/anxious (improving). Prior to Visit Medications    Medication Sig Taking? Authorizing Provider   FLUoxetine (PROZAC) 20 MG capsule Take 1 capsule by mouth daily For anxiety/depression Yes MAEVE Cuevas   collagenase 250 UNIT/GM ointment APPLY 1GM TO INFECTION DAILY WITH DRESSING CHANGES  Historical Provider, MD   methylcellulose ([employer] SOLUBLE FIBER THERAPY) 500 MG TABS   Historical Provider, MD   HYDROcodone-acetaminophen ([facility] [employer])  MG per tablet   Historical Provider, MD   loperamide (IMODIUM A-D) 2 MG tablet TAKE 2  MG ORAL AS NEEDED EVERY 12 HOURS  Historical Provider, MD   levothyroxine (SYNTHROID) 100 MCG tablet Take 100 mcg by mouth Daily   Historical Provider, MD   traMADol (ULTRAM) 50 MG tablet   Historical Provider, MD   ALPRAZolam (XANAX) 0.5 MG tablet TAKE 1/2 -1 po bid PRN severe anxiety  NEVER TAKE WITHIN 4 hours of any pain med.   MAEVE Cuevas   atorvastatin (LIPITOR) 40 MG tablet TAKE ONE TABLET BY MOUTH NIGHTLY FOR CHOLESTEROL. MAEVE Cuevas   nicotine (NICODERM CQ) 14 MG/24HR Place 1 patch onto the skin every 24 hours  MAEVE Cuevas   allopurinol (ZYLOPRIM) 100 MG tablet TAKE ONE TABLET BY MOUTH DAILY  MAEVE Cuevas   carvedilol (COREG) 6.25 MG tablet TAKE 1 TABLET BY MOUTH 2 TIMES DAILY (WITH MEALS)  MAEVE Cuevas   pramipexole (MIRAPEX) 1.5 MG tablet TAKE ONE TABLET BY MOUTH NIGHTLY **MAY MAKE DROWSY**  MAEVE Cuevas   gabapentin (NEURONTIN) 100 MG capsule Take 1 capsule by mouth 3 times daily for 90 days. MAEVE Cuevas   nicotine (NICODERM CQ) 7 MG/24HR Place 1 patch onto the skin every 24 hours  Patient not taking: Reported on 10/29/2020  MAEVE Cuevas   aspirin 81 MG EC tablet Take 81 mg by mouth daily  Historical Provider, MD   DICLOFENAC PO Take 75 mg by mouth 2 times daily  Historical Provider, MD   pilocarpine (SALAGEN) 5 MG tablet Take 1 tablet by mouth 3 times daily  MAEVE Cuevas   bumetanide (BUMEX) 1 MG tablet Take 1 tablet by mouth 2 times daily  Patient taking differently: Take 1 mg by mouth daily   MAEVE Cuevas   omeprazole (PRILOSEC) 20 MG delayed release capsule Take 1 capsule by mouth 2 times daily (before meals)  MAEVE Cuevas   spironolactone (ALDACTONE) 25 MG tablet Take 1 tablet by mouth daily  MAEVE Cuevas   guaiFENesin (MUCINEX) 600 MG extended release tablet Take 2 tablets by mouth 2 times daily  MAEVE Cuevas   niacin (SLO-NIACIN) 500 MG extended release tablet Take 500 mg by mouth daily   Historical Provider, MD   tiZANidine (ZANAFLEX) 4 MG tablet Take 4 mg by mouth 3 times daily as needed   Historical Provider, MD   Vitamin D (CHOLECALCIFEROL) 1000 UNITS CAPS capsule Take 1,000 Units by mouth daily  Historical Provider, MD   Multiple Vitamins-Minerals (MULTIVITAMIN PO) Take by mouth daily  Historical Provider, MD   calcium carbonate 600 MG TABS tablet Take 1 tablet by mouth daily.   Historical Provider, MD       Social History Tobacco Use    Smoking status: Former Smoker     Packs/day: 1.00     Years: 30.00     Pack years: 30.00     Types: Cigarettes     Last attempt to quit: 2003     Years since quittin.2    Smokeless tobacco: Never Used    Tobacco comment: Pt not interested at this time in stopping. Substance Use Topics    Alcohol use: Yes     Comment: rarely    Drug use: No     Comment: pt admits \"may have misused in past\"--Pt was vague with details.         Allergies   Allergen Reactions    Bactrim [Sulfamethoxazole-Trimethoprim]     Hctz [Hydrochlorothiazide] Other (See Comments)     Acid reflux      Sitagliptin Rash   ,   Past Medical History:   Diagnosis Date    Arthritis     Bipolar I disorder, most recent episode (or current) mixed, unspecified     Chronic back pain     Depression     Dry mouth     Hyperlipidemia     Hypertension     Hypokalemia     Hypothyroidism     Menopause     Morbidly obese ([address] 10/28/2020    Overactive bladder     Plantar fasciitis     RLS (restless legs syndrome)     Sleep apnea     Spondylosis with myelopathy, lumbar region     Thyroid disease    ,   Past Surgical History:   Procedure Laterality Date    CARPAL TUNNEL RELEASE Bilateral     CHOLECYSTECTOMY  10/2014    COLON SURGERY      COLON SURGERY      biopsy     COLONOSCOPY      CYST REMOVAL  2017    Under left ear    HARDWARE REMOVAL Right     Foot     HERNIA REPAIR  10/2014    LEG DEBRIDEMENT Left     NOSE SURGERY  2017; 2019    Dr Lourdes Barriga L/S,1 LEVEL Bilateral 2017    INJECTION MEDICATION FACET JOINT performed by Aline Zhang at [facility]   ,   Social History     Tobacco Use    Smoking status: Former Smoker     Packs/day: 1.00     Years: 30.00     Pack years: 30.00     Types: Cigarettes     Last attempt to quit: 2003     Years since quittin.2    Smokeless tobacco: Never Used    Tobacco comment: Pt not interested at this time in stopping. Substance Use Topics    Alcohol use: Yes     Comment: rarely    Drug use: No     Comment: pt admits \"may have misused in past\"--Pt was vague with details. ,   Family History   Problem Relation Age of Onset    Cancer Mother         lung    Heart Disease Mother     Hypertension Mother     Bipolar Disorder Mother     Heart Disease Father     Hypertension Father        PHYSICAL EXAMINATION:  [ INSTRUCTIONS:  \"[x]\" Indicates a positive item  \"[]\" Indicates a negative item  -- DELETE ALL ITEMS NOT EXAMINED]  Vital Signs: There were no vitals taken for this visit. Patient-Reported Vitals 8/26/2020   Patient-Reported Weight -   Patient-Reported Height -   Patient-Reported Systolic -   Patient-Reported Diastolic -   Patient-Reported Pulse -   Patient-Reported Temperature -   Patient-Reported SpO2 95     Constitutional: [x] Appears well-developed and well-nourished [x] No apparent distress      [] Abnormal-   Mental status  [x] Alert and awake  [x] Oriented to person/place/time [x]Able to follow commands      Eyes:  EOM    [x]  Normal  [] Abnormal-  Sclera  [x]  Normal  [] Abnormal -         Discharge [x]  None visible  [] Abnormal -    HENT:   [x] Normocephalic, atraumatic.   [] Abnormal   [x] Mouth/Throat: Mucous membranes are moist.     External Ears [x] Normal  [] Abnormal-     Neck: [x] No visualized mass     Pulmonary/Chest: [x] Respiratory effort normal.  [x] No visualized signs of difficulty breathing or respiratory distress        [] Abnormal-      Musculoskeletal:   [x] Normal gait with no signs of ataxia         [x] Normal range of motion of neck        [] Abnormal-       Neurological:        [x] No Facial Asymmetry (Cranial nerve 7 motor function) (limited exam to video visit)          [x] No gaze palsy        [] Abnormal-         Skin:        [x] No significant exanthematous lesions or discoloration noted on facial skin         [] Abnormal-            Psychiatric:       [x] Normal Affect [x] No Hallucinations        [] Abnormal-     Other pertinent observable physical exam findings-     ASSESSMENT/PLAN:  Patient-Reported Vitals 8/26/2020   Patient-Reported Weight -   Patient-Reported Height -   Patient-Reported Systolic -   Patient-Reported Diastolic -   Patient-Reported Pulse -   Patient-Reported Temperature -   Patient-Reported SpO2 95        1. Moderate episode of recurrent major depressive disorder (HCC)    - FLUoxetine (PROZAC) 20 MG capsule; Take 1 capsule by mouth daily For anxiety/depression  Dispense: 90 capsule; Refill: 1    2. Anxiety    - FLUoxetine (PROZAC) 20 MG capsule; Take 1 capsule by mouth daily For anxiety/depression  Dispense: 90 capsule; Refill: 1    Continue current tx plan as above  Keep weekly f/u with counselor  Continue with psych eval  F/u in approx 6wks unless seeing psych then. No follow-ups on file. Rosaura Jiang is a 62 y.o. female being evaluated by a Virtual Visit (video visit) encounter to address concerns as mentioned above. A caregiver was present when appropriate. Due to this being a TeleHealth encounter (During [facility]- public health emergency), evaluation of the following organ systems was limited: Vitals/Constitutional/EENT/Resp/CV/GI//MS/Neuro/Skin/Heme-Lymph-Imm. Pursuant to the emergency declaration under the [address] [place] [address] authority and the [employer] Act, this Virtual Visit was conducted with patient's (and/or legal guardian's) consent, to reduce the patient's risk of exposure to COVID-19 and provide necessary medical care. The patient (and/or legal guardian) has also been advised to contact this office for worsening conditions or problems, and seek emergency medical treatment and/or call 911 if deemed necessary. Services were provided through a video synchronous discussion virtually to substitute for in-person clinic visit.  Patient and provider were located at their individual homes. --MAEVE Weber on 11/11/2020 at 10:03 AM    An electronic signature was used to authenticate this note.

## 2020-11-16 ENCOUNTER — OFFICE VISIT (OUTPATIENT)
Dept: WOUND CARE | Facility: HOSPITAL | Age: 60
End: 2020-11-16

## 2020-11-16 PROCEDURE — 11042 DBRDMT SUBQ TIS 1ST 20SQCM/<: CPT | Performed by: PODIATRIST

## 2020-11-18 ENCOUNTER — TELEPHONE (OUTPATIENT)
Dept: VASCULAR SURGERY | Facility: CLINIC | Age: 60
End: 2020-11-18

## 2020-11-18 PROBLEM — Z01.818 PREOP TESTING: Status: ACTIVE | Noted: 2020-11-18

## 2020-11-18 NOTE — TELEPHONE ENCOUNTER
Spoke with patient and advised of upcoming procedure.  Patient pre work is scheduled for 11/20/2020 at 1245 pm.  Patient procedure is scheduled for 11/30/2020 at 500 am.  Patient advised of Covid 19 test, masking and visitation, including no overnight guests.   Patient advised of location time and prep.  Patient expressed understanding for all that was discussed.

## 2020-11-19 ENCOUNTER — TRANSCRIBE ORDERS (OUTPATIENT)
Dept: ADMINISTRATIVE | Facility: HOSPITAL | Age: 60
End: 2020-11-19

## 2020-11-19 DIAGNOSIS — Z01.818 PRE-OP TESTING: Primary | ICD-10-CM

## 2020-11-20 ENCOUNTER — HOSPITAL ENCOUNTER (OUTPATIENT)
Dept: GENERAL RADIOLOGY | Facility: HOSPITAL | Age: 60
Discharge: HOME | End: 2020-11-20

## 2020-11-20 ENCOUNTER — APPOINTMENT (OUTPATIENT)
Dept: PREADMISSION TESTING | Facility: HOSPITAL | Age: 60
End: 2020-11-20

## 2020-11-20 VITALS
OXYGEN SATURATION: 95 % | WEIGHT: 185.41 LBS | SYSTOLIC BLOOD PRESSURE: 136 MMHG | RESPIRATION RATE: 16 BRPM | BODY MASS INDEX: 37.38 KG/M2 | DIASTOLIC BLOOD PRESSURE: 79 MMHG | HEART RATE: 91 BPM | HEIGHT: 59 IN

## 2020-11-20 DIAGNOSIS — Z01.818 PREOP TESTING: ICD-10-CM

## 2020-11-20 DIAGNOSIS — Z51.81 ENCOUNTER FOR MONITORING ANTIPLATELET THERAPY: ICD-10-CM

## 2020-11-20 DIAGNOSIS — Z79.02 ENCOUNTER FOR MONITORING ANTIPLATELET THERAPY: ICD-10-CM

## 2020-11-20 DIAGNOSIS — S81.802A WOUND OF LEFT LOWER EXTREMITY, INITIAL ENCOUNTER: ICD-10-CM

## 2020-11-20 LAB
ANION GAP SERPL CALCULATED.3IONS-SCNC: 12 MMOL/L (ref 5–15)
APTT PPP: 29.7 SECONDS (ref 24.1–35)
BASOPHILS # BLD AUTO: 0.05 10*3/MM3 (ref 0–0.2)
BASOPHILS NFR BLD AUTO: 0.5 % (ref 0–1.5)
BUN SERPL-MCNC: 22 MG/DL (ref 6–20)
BUN/CREAT SERPL: 25.3 (ref 7–25)
CALCIUM SPEC-SCNC: 9.8 MG/DL (ref 8.6–10.5)
CHLORIDE SERPL-SCNC: 98 MMOL/L (ref 98–107)
CO2 SERPL-SCNC: 29 MMOL/L (ref 22–29)
CREAT SERPL-MCNC: 0.87 MG/DL (ref 0.57–1)
DEPRECATED RDW RBC AUTO: 41.9 FL (ref 37–54)
EOSINOPHIL # BLD AUTO: 0.21 10*3/MM3 (ref 0–0.4)
EOSINOPHIL NFR BLD AUTO: 2.3 % (ref 0.3–6.2)
ERYTHROCYTE [DISTWIDTH] IN BLOOD BY AUTOMATED COUNT: 14.2 % (ref 12.3–15.4)
GFR SERPL CREATININE-BSD FRML MDRD: 67 ML/MIN/1.73
GLUCOSE SERPL-MCNC: 105 MG/DL (ref 65–99)
HCT VFR BLD AUTO: 38.7 % (ref 34–46.6)
HGB BLD-MCNC: 12.2 G/DL (ref 12–15.9)
IMM GRANULOCYTES # BLD AUTO: 0.04 10*3/MM3 (ref 0–0.05)
IMM GRANULOCYTES NFR BLD AUTO: 0.4 % (ref 0–0.5)
INR PPP: 1.03 (ref 0.91–1.09)
LYMPHOCYTES # BLD AUTO: 2.56 10*3/MM3 (ref 0.7–3.1)
LYMPHOCYTES NFR BLD AUTO: 27.6 % (ref 19.6–45.3)
MCH RBC QN AUTO: 26 PG (ref 26.6–33)
MCHC RBC AUTO-ENTMCNC: 31.5 G/DL (ref 31.5–35.7)
MCV RBC AUTO: 82.5 FL (ref 79–97)
MONOCYTES # BLD AUTO: 0.54 10*3/MM3 (ref 0.1–0.9)
MONOCYTES NFR BLD AUTO: 5.8 % (ref 5–12)
NEUTROPHILS NFR BLD AUTO: 5.86 10*3/MM3 (ref 1.7–7)
NEUTROPHILS NFR BLD AUTO: 63.4 % (ref 42.7–76)
NRBC BLD AUTO-RTO: 0 /100 WBC (ref 0–0.2)
PLATELET # BLD AUTO: 394 10*3/MM3 (ref 140–450)
PMV BLD AUTO: 10.3 FL (ref 6–12)
POTASSIUM SERPL-SCNC: 4.1 MMOL/L (ref 3.5–5.2)
PROTHROMBIN TIME: 13.1 SECONDS (ref 11.9–14.6)
RBC # BLD AUTO: 4.69 10*6/MM3 (ref 3.77–5.28)
SODIUM SERPL-SCNC: 139 MMOL/L (ref 136–145)
WBC # BLD AUTO: 9.26 10*3/MM3 (ref 3.4–10.8)

## 2020-11-20 PROCEDURE — 85730 THROMBOPLASTIN TIME PARTIAL: CPT

## 2020-11-20 PROCEDURE — 85025 COMPLETE CBC W/AUTO DIFF WBC: CPT

## 2020-11-20 PROCEDURE — 93010 ELECTROCARDIOGRAM REPORT: CPT | Performed by: INTERNAL MEDICINE

## 2020-11-20 PROCEDURE — 85610 PROTHROMBIN TIME: CPT

## 2020-11-20 PROCEDURE — 71046 X-RAY EXAM CHEST 2 VIEWS: CPT

## 2020-11-20 PROCEDURE — 80048 BASIC METABOLIC PNL TOTAL CA: CPT

## 2020-11-20 PROCEDURE — 36415 COLL VENOUS BLD VENIPUNCTURE: CPT

## 2020-11-20 PROCEDURE — 93005 ELECTROCARDIOGRAM TRACING: CPT

## 2020-11-20 RX ORDER — FLUOXETINE HYDROCHLORIDE 20 MG/1
40 CAPSULE ORAL DAILY
COMMUNITY
End: 2021-05-25 | Stop reason: DRUGHIGH

## 2020-11-20 RX ORDER — LEVOTHYROXINE SODIUM 0.07 MG/1
75 TABLET ORAL
COMMUNITY

## 2020-11-20 NOTE — DISCHARGE INSTRUCTIONS
DAY OF SURGERY INSTRUCTIONS        YOUR SURGEON: ABHINAV AKIN    PROCEDURE: SPLIT THICKNESS SKIN GRAFT LEFT LOWER EXTREMITY    DATE OF SURGERY: November 30, 2020    ARRIVAL TIME: AS DIRECTED BY OFFICE    YOU MAY TAKE THE FOLLOWING MEDICATION(S) THE MORNING OF SURGERY WITH A SIP OF WATER: CARVEDILOL, GABAPENTIN, TRAMADOL    ALL OTHER HOME MEDICATIONS CHECK WITH YOUR DOCTOR    DO NOT TAKE ANY ERECTILE DYSFUNCTION MEDICATIONS (EX:  CIALIS, VIAGRA) 24 HOURS PRIOR TO SURGERY              MANAGING PAIN AFTER SURGERY    We know you are probably wondering what your pain will be like after surgery.  Following surgery it is unrealistic to expect you will not have pain.   Pain is how our bodies let us know that something is wrong or cautions us to be careful.  That said, our goal is to make your pain tolerable.    Methods we may use to treat your pain include (oral or IV medications, PCAs, epidurals, nerve blocks, etc.)   While some procedures require IV pain medications for a short time after surgery, transitioning to pain medications by mouth allows for better management of pain.   Your nurse will encourage you to take oral pain medications whenever possible.  IV medications work almost immediately, but only last a short while.  Taking medications by mouth allows for a more constant level of medication in your blood stream for a longer period of time.      Once your pain is out of control it is harder to get back under control.  It is important you are aware when your next dose of pain medication is due.  If you are admitted, your nurse may write the time of your next dose on the white board in your room to help you remember.      We are interested in your pain and encourage you to inform us about aggravating factors during your visit.   Many times a simple repositioning every few hours can make a big difference.    If your physician says it is okay, do not let your pain prevent you from getting out of bed. Be sure to  call your nurse for assistance prior to getting up so you do not fall.      Before surgery, please decide your tolerable pain goal.  These faces help describe the pain ratings we use on a 0-10 scale.   Be prepared to tell us your goal and whether or not you take pain or anxiety medications at home.      BEFORE YOU COME TO THE HOSPITAL  (Pre-op instructions)  • Do not eat, drink, smoke or chew gum after midnight the night before surgery.  This also includes no mints.  • Morning of surgery take only the medicines you have been instructed with a sip of water unless otherwise instructed  by your physician.  • Do not shave, wear makeup or dark nail polish.  • Remove all jewelry including rings.  • Leave anything you consider valuable at home.  • Leave your suitcase in the car until after your surgery.  • Bring the following with you if applicable:  o Picture ID and insurance, Medicare or Medicaid cards  o Co-pay/deductible required by insurance (cash, check, credit card)  o Copy of advance directive, living will or power-of- documents if not brought to PAT  o CPAP or BIPAP mask and tubing  o Relaxation aids ( book, magazine), etc.  o Hearing aids                                 ON THE DAY OF SURGERY  · On the day of surgery check in at registration located at the main entrance of the hospital.   ? You will be registered and given a beeper with instructions where to wait in the main lobby.  ? When your beeper lights up and vibrates a member of the Outpatient Surgery staff will meet you at the double doors under the stair steps and escort you to your preoperative room.   · You may have cloth compression devices placed on your legs. These help to prevent blood clots and reduce swelling in your legs.  · An IV may be inserted into one of your veins.  · In the operating room, you may be given one or more of the following:  ? A medicine to help you relax (sedative).  ? A medicine to numb the area (local  "anesthetic).  ? A medicine to make you fall asleep (general anesthetic).  ? A medicine that is injected into an area of your body to numb everything below the injection site (regional anesthetic).  · Your surgical site will be marked or identified.  · You may be given an antibiotic through your IV to help prevent infection.  Contact a health care provider if you:  · Develop a fever of more than 100.4°F (38°C) or other feelings of illness during the 48 hours before your surgery.  · Have symptoms that get worse.  Have questions or concerns about your surgery    General Anesthesia/Surgery, Adult  General anesthesia is the use of medicines to make a person \"go to sleep\" (unconscious) for a medical procedure. General anesthesia must be used for certain procedures, and is often recommended for procedures that:  · Last a long time.  · Require you to be still or in an unusual position.  · Are major and can cause blood loss.  The medicines used for general anesthesia are called general anesthetics. As well as making you unconscious for a certain amount of time, these medicines:  · Prevent pain.  · Control your blood pressure.  · Relax your muscles.  Tell a health care provider about:  · Any allergies you have.  · All medicines you are taking, including vitamins, herbs, eye drops, creams, and over-the-counter medicines.  · Any problems you or family members have had with anesthetic medicines.  · Types of anesthetics you have had in the past.  · Any blood disorders you have.  · Any surgeries you have had.  · Any medical conditions you have.  · Any recent upper respiratory, chest, or ear infections.  · Any history of:  ? Heart or lung conditions, such as heart failure, sleep apnea, asthma, or chronic obstructive pulmonary disease (COPD).  ?  service.  ? Depression or anxiety.  · Any tobacco or drug use, including marijuana or alcohol use.  · Whether you are pregnant or may be pregnant.  What are the risks?  Generally, " this is a safe procedure. However, problems may occur, including:  · Allergic reaction.  · Lung and heart problems.  · Inhaling food or liquid from the stomach into the lungs (aspiration).  · Nerve injury.  · Air in the bloodstream, which can lead to stroke.  · Extreme agitation or confusion (delirium) when you wake up from the anesthetic.  · Waking up during your procedure and being unable to move. This is rare.  These problems are more likely to develop if you are having a major surgery or if you have an advanced or serious medical condition. You can prevent some of these complications by answering all of your health care provider's questions thoroughly and by following all instructions before your procedure.  General anesthesia can cause side effects, including:  · Nausea or vomiting.  · A sore throat from the breathing tube.  · Hoarseness.  · Wheezing or coughing.  · Shaking chills.  · Tiredness.  · Body aches.  · Anxiety.  · Sleepiness or drowsiness.  · Confusion or agitation.  RISKS AND COMPLICATIONS OF SURGERY  Your health care provider will discuss possible risks and complications with you before surgery. Common risks and complications include:    · Problems due to the use of anesthetics.  · Blood loss and replacement (does not apply to minor surgical procedures).  · Temporary increase in pain due to surgery.  · Uncorrected pain or problems that the surgery was meant to correct.  · Infection.  · New damage.    What happens before the procedure?    Medicines  Ask your health care provider about:  · Changing or stopping your regular medicines. This is especially important if you are taking diabetes medicines or blood thinners.  · Taking medicines such as aspirin and ibuprofen. These medicines can thin your blood. Do not take these medicines unless your health care provider tells you to take them.  · Taking over-the-counter medicines, vitamins, herbs, and supplements. Do not take these during the week before  your procedure unless your health care provider approves them.  General instructions  · Starting 3-6 weeks before the procedure, do not use any products that contain nicotine or tobacco, such as cigarettes and e-cigarettes. If you need help quitting, ask your health care provider.  · If you brush your teeth on the morning of the procedure, make sure to spit out all of the toothpaste.  · Tell your health care provider if you become ill or develop a cold, cough, or fever.  · If instructed by your health care provider, bring your sleep apnea device with you on the day of your surgery (if applicable).  · Ask your health care provider if you will be going home the same day, the following day, or after a longer hospital stay.  ? Plan to have someone take you home from the hospital or clinic.  ? Plan to have a responsible adult care for you for at least 24 hours after you leave the hospital or clinic. This is important.  What happens during the procedure?  · You will be given anesthetics through both of the following:  ? A mask placed over your nose and mouth.  ? An IV in one of your veins.  · You may receive a medicine to help you relax (sedative).  · After you are unconscious, a breathing tube may be inserted down your throat to help you breathe. This will be removed before you wake up.  · An anesthesia specialist will stay with you throughout your procedure. He or she will:  ? Keep you comfortable and safe by continuing to give you medicines and adjusting the amount of medicine that you get.  ? Monitor your blood pressure, pulse, and oxygen levels to make sure that the anesthetics do not cause any problems.  The procedure may vary among health care providers and hospitals.  What happens after the procedure?  · Your blood pressure, temperature, heart rate, breathing rate, and blood oxygen level will be monitored until the medicines you were given have worn off.  · You will wake up in a recovery area. You may wake up  slowly.  · If you feel anxious or agitated, you may be given medicine to help you calm down.  · If you will be going home the same day, your health care provider may check to make sure you can walk, drink, and urinate.  · Your health care provider will treat any pain or side effects you have before you go home.  · Do not drive for 24 hours if you were given a sedative.  Summary  · General anesthesia is used to keep you still and prevent pain during a procedure.  · It is important to tell your healthcare provider about your medical history and any surgeries you have had, and previous experience with anesthesia.  · Follow your healthcare provider’s instructions about when to stop eating, drinking, or taking certain medicines before your procedure.  · Plan to have someone take you home from the hospital or clinic.  This information is not intended to replace advice given to you by your health care provider. Make sure you discuss any questions you have with your health care provider.  Document Released: 03/26/2009 Document Revised: 08/03/2018 Document Reviewed: 08/03/2018  [employer] Interactive Patient Education © 2019 [employer]      Fall Prevention in Hospitals, Adult  As a hospital patient, your condition and the treatments you receive can increase your risk for falls. Some additional risk factors for falls in a hospital include:  · Being in an unfamiliar environment.  · Being on bed rest.  · Your surgery.  · Taking certain medicines.  · Your tubing requirements, such as intravenous (IV) therapy or catheters.  It is important that you learn how to decrease fall risks while at the hospital. Below are important tips that can help prevent falls.  SAFETY TIPS FOR PREVENTING FALLS  Talk about your risk of falling.  · Ask your health care provider why you are at risk for falling. Is it your medicine, illness, tubing placement, or something else?  · Make a plan with your health care provider to keep you safe from  falls.  · Ask your health care provider or pharmacist about side effects of your medicines. Some medicines can make you dizzy or affect your coordination.  Ask for help.  · Ask for help before getting out of bed. You may need to press your call button.  · Ask for assistance in getting safely to the toilet.  · Ask for a walker or cane to be put at your bedside. Ask that most of the side rails on your bed be placed up before your health care provider leaves the room.  · Ask family or friends to sit with you.  · Ask for things that are out of your reach, such as your glasses, hearing aids, telephone, bedside table, or call button.  Follow these tips to avoid falling:  · Stay lying or seated, rather than standing, while waiting for help.  · Wear rubber-soled slippers or shoes whenever you walk in the hospital.  · Avoid quick, sudden movements.  ¨ Change positions slowly.  ¨ Sit on the side of your bed before standing.  ¨ Stand up slowly and wait before you start to walk.  · Let your health care provider know if there is a spill on the floor.  · Pay careful attention to the medical equipment, electrical cords, and tubes around you.  · When you need help, use your call button by your bed or in the bathroom. Wait for one of your health care providers to help you.  · If you feel dizzy or unsure of your footing, return to bed and wait for assistance.  · Avoid being distracted by the TV, telephone, or another person in your room.  · Do not lean or support yourself on rolling objects, such as IV poles or bedside tables.     This information is not intended to replace advice given to you by your health care provider. Make sure you discuss any questions you have with your health care provider.     Document Released: 12/15/2001 Document Revised: 01/08/2016 Document Reviewed: 08/25/2013  [employer] Interactive Patient Education ©2016 [employer]            PATIENT/FAMILY/RESPONSIBLE PARTY VERBALIZES UNDERSTANDING OF ABOVE  EDUCATION.  COPY OF PAIN SCALE GIVEN AND REVIEWED WITH VERBALIZED UNDERSTANDING.

## 2020-11-21 LAB
QT INTERVAL: 368 MS
QTC INTERVAL: 432 MS

## 2020-11-23 ENCOUNTER — OFFICE VISIT (OUTPATIENT)
Dept: WOUND CARE | Facility: HOSPITAL | Age: 60
End: 2020-11-23

## 2020-11-23 PROCEDURE — 11045 DBRDMT SUBQ TISS EACH ADDL: CPT | Performed by: PODIATRIST

## 2020-11-23 PROCEDURE — 11042 DBRDMT SUBQ TIS 1ST 20SQCM/<: CPT | Performed by: PODIATRIST

## 2020-11-23 RX ORDER — GABAPENTIN 100 MG/1
CAPSULE ORAL
Qty: 180 CAPSULE | Refills: 2 | Status: SHIPPED | OUTPATIENT
Start: 2020-11-23 | End: 2020-12-16 | Stop reason: SDUPTHER

## 2020-11-25 ENCOUNTER — TELEPHONE (OUTPATIENT)
Dept: VASCULAR SURGERY | Facility: CLINIC | Age: 60
End: 2020-11-25

## 2020-11-25 NOTE — TELEPHONE ENCOUNTER
Called the patient to let her know that there are no visitors allowed beginning on Monday.  She asked about the time of her appt for Monday.  She said that she had been told to be here at 5, however, said that someone told her that we might call her back to change the time.  I got the book from Marjorie and it stated that she should be here at 6 (Marjorie verified).  I gave her this information.

## 2020-11-27 ENCOUNTER — LAB (OUTPATIENT)
Dept: LAB | Facility: HOSPITAL | Age: 60
End: 2020-11-27

## 2020-11-27 PROCEDURE — C9803 HOPD COVID-19 SPEC COLLECT: HCPCS | Performed by: SURGERY

## 2020-11-27 PROCEDURE — U0003 INFECTIOUS AGENT DETECTION BY NUCLEIC ACID (DNA OR RNA); SEVERE ACUTE RESPIRATORY SYNDROME CORONAVIRUS 2 (SARS-COV-2) (CORONAVIRUS DISEASE [COVID-19]), AMPLIFIED PROBE TECHNIQUE, MAKING USE OF HIGH THROUGHPUT TECHNOLOGIES AS DESCRIBED BY CMS-2020-01-R: HCPCS | Performed by: SURGERY

## 2020-11-28 LAB
COVID LABCORP PRIORITY: NORMAL
SARS-COV-2 RNA RESP QL NAA+PROBE: NOT DETECTED

## 2020-11-30 ENCOUNTER — HOSPITAL ENCOUNTER (INPATIENT)
Facility: HOSPITAL | Age: 60
LOS: 1 days | Discharge: HOME HEALTH | End: 2020-12-01
Attending: SURGERY | Admitting: SURGERY

## 2020-11-30 ENCOUNTER — ANESTHESIA EVENT (OUTPATIENT)
Dept: PERIOP | Facility: HOSPITAL | Age: 60
End: 2020-11-30

## 2020-11-30 ENCOUNTER — ANESTHESIA (OUTPATIENT)
Dept: PERIOP | Facility: HOSPITAL | Age: 60
End: 2020-11-30

## 2020-11-30 DIAGNOSIS — Z01.818 PREOP TESTING: ICD-10-CM

## 2020-11-30 DIAGNOSIS — T14.8XXA OPEN WOUND: ICD-10-CM

## 2020-11-30 DIAGNOSIS — S81.802A WOUND OF LEFT LOWER EXTREMITY, INITIAL ENCOUNTER: ICD-10-CM

## 2020-11-30 LAB
ABO GROUP BLD: NORMAL
BLD GP AB SCN SERPL QL: NEGATIVE
RH BLD: POSITIVE
T&S EXPIRATION DATE: NORMAL

## 2020-11-30 PROCEDURE — 86901 BLOOD TYPING SEROLOGIC RH(D): CPT | Performed by: NURSE PRACTITIONER

## 2020-11-30 PROCEDURE — 0HRLX74 REPLACEMENT OF LEFT LOWER LEG SKIN WITH AUTOLOGOUS TISSUE SUBSTITUTE, PARTIAL THICKNESS, EXTERNAL APPROACH: ICD-10-PCS | Performed by: SURGERY

## 2020-11-30 PROCEDURE — 25010000002 PROPOFOL 10 MG/ML EMULSION: Performed by: NURSE ANESTHETIST, CERTIFIED REGISTERED

## 2020-11-30 PROCEDURE — 25010000002 HYDROMORPHONE PER 4 MG: Performed by: ANESTHESIOLOGY

## 2020-11-30 PROCEDURE — 25010000002 FENTANYL CITRATE (PF) 100 MCG/2ML SOLUTION: Performed by: ANESTHESIOLOGY

## 2020-11-30 PROCEDURE — 15100 SPLT AGRFT T/A/L 1ST 100SQCM: CPT | Performed by: SURGERY

## 2020-11-30 PROCEDURE — 86900 BLOOD TYPING SEROLOGIC ABO: CPT | Performed by: NURSE PRACTITIONER

## 2020-11-30 PROCEDURE — 25010000002 FENTANYL CITRATE (PF) 250 MCG/5ML SOLUTION: Performed by: NURSE ANESTHETIST, CERTIFIED REGISTERED

## 2020-11-30 PROCEDURE — 86850 RBC ANTIBODY SCREEN: CPT | Performed by: NURSE PRACTITIONER

## 2020-11-30 PROCEDURE — 94799 UNLISTED PULMONARY SVC/PX: CPT

## 2020-11-30 PROCEDURE — 15101 SPLT AGRFT T/A/L EA ADDL 100: CPT | Performed by: SURGERY

## 2020-11-30 RX ORDER — SODIUM CHLORIDE 0.9 % (FLUSH) 0.9 %
3 SYRINGE (ML) INJECTION AS NEEDED
Status: DISCONTINUED | OUTPATIENT
Start: 2020-11-30 | End: 2020-11-30 | Stop reason: HOSPADM

## 2020-11-30 RX ORDER — NICOTINE 21 MG/24HR
1 PATCH, TRANSDERMAL 24 HOURS TRANSDERMAL EVERY 24 HOURS
Status: DISCONTINUED | OUTPATIENT
Start: 2020-12-01 | End: 2020-12-01 | Stop reason: HOSPADM

## 2020-11-30 RX ORDER — LOPERAMIDE HYDROCHLORIDE 2 MG/1
2 CAPSULE ORAL 3 TIMES DAILY PRN
Status: DISCONTINUED | OUTPATIENT
Start: 2020-11-30 | End: 2020-12-01 | Stop reason: HOSPADM

## 2020-11-30 RX ORDER — FLUOXETINE HYDROCHLORIDE 20 MG/1
20 CAPSULE ORAL DAILY
Status: DISCONTINUED | OUTPATIENT
Start: 2020-11-30 | End: 2020-12-01 | Stop reason: HOSPADM

## 2020-11-30 RX ORDER — ONDANSETRON 2 MG/ML
4 INJECTION INTRAMUSCULAR; INTRAVENOUS ONCE AS NEEDED
Status: DISCONTINUED | OUTPATIENT
Start: 2020-11-30 | End: 2020-11-30 | Stop reason: HOSPADM

## 2020-11-30 RX ORDER — FLUMAZENIL 0.1 MG/ML
0.2 INJECTION INTRAVENOUS AS NEEDED
Status: DISCONTINUED | OUTPATIENT
Start: 2020-11-30 | End: 2020-11-30 | Stop reason: HOSPADM

## 2020-11-30 RX ORDER — SODIUM CHLORIDE 9 MG/ML
50 INJECTION, SOLUTION INTRAVENOUS CONTINUOUS
Status: DISCONTINUED | OUTPATIENT
Start: 2020-11-30 | End: 2020-12-01 | Stop reason: HOSPADM

## 2020-11-30 RX ORDER — NAPROXEN 500 MG/1
500 TABLET ORAL 2 TIMES DAILY WITH MEALS
Status: DISCONTINUED | OUTPATIENT
Start: 2020-11-30 | End: 2020-12-01 | Stop reason: HOSPADM

## 2020-11-30 RX ORDER — GABAPENTIN 100 MG/1
200 CAPSULE ORAL 4 TIMES DAILY
Status: DISCONTINUED | OUTPATIENT
Start: 2020-11-30 | End: 2020-12-01 | Stop reason: HOSPADM

## 2020-11-30 RX ORDER — FENTANYL CITRATE 50 UG/ML
INJECTION, SOLUTION INTRAMUSCULAR; INTRAVENOUS AS NEEDED
Status: DISCONTINUED | OUTPATIENT
Start: 2020-11-30 | End: 2020-11-30 | Stop reason: SURG

## 2020-11-30 RX ORDER — NALOXONE HCL 0.4 MG/ML
0.4 VIAL (ML) INJECTION AS NEEDED
Status: DISCONTINUED | OUTPATIENT
Start: 2020-11-30 | End: 2020-11-30 | Stop reason: HOSPADM

## 2020-11-30 RX ORDER — ONDANSETRON 2 MG/ML
4 INJECTION INTRAMUSCULAR; INTRAVENOUS ONCE AS NEEDED
Status: DISCONTINUED | OUTPATIENT
Start: 2020-11-30 | End: 2020-11-30

## 2020-11-30 RX ORDER — OXYCODONE AND ACETAMINOPHEN 7.5; 325 MG/1; MG/1
2 TABLET ORAL ONCE AS NEEDED
Status: DISCONTINUED | OUTPATIENT
Start: 2020-11-30 | End: 2020-11-30

## 2020-11-30 RX ORDER — PANTOPRAZOLE SODIUM 40 MG/1
40 TABLET, DELAYED RELEASE ORAL
Status: DISCONTINUED | OUTPATIENT
Start: 2020-11-30 | End: 2020-12-01 | Stop reason: HOSPADM

## 2020-11-30 RX ORDER — ACETAMINOPHEN 325 MG/1
650 TABLET ORAL EVERY 4 HOURS PRN
Status: DISCONTINUED | OUTPATIENT
Start: 2020-11-30 | End: 2020-12-01 | Stop reason: HOSPADM

## 2020-11-30 RX ORDER — TRAMADOL HYDROCHLORIDE 50 MG/1
50 TABLET ORAL 2 TIMES DAILY
Status: DISCONTINUED | OUTPATIENT
Start: 2020-11-30 | End: 2020-12-01 | Stop reason: HOSPADM

## 2020-11-30 RX ORDER — SODIUM CHLORIDE 0.9 % (FLUSH) 0.9 %
10 SYRINGE (ML) INJECTION EVERY 12 HOURS SCHEDULED
Status: DISCONTINUED | OUTPATIENT
Start: 2020-11-30 | End: 2020-11-30 | Stop reason: HOSPADM

## 2020-11-30 RX ORDER — PROPOFOL 10 MG/ML
VIAL (ML) INTRAVENOUS AS NEEDED
Status: DISCONTINUED | OUTPATIENT
Start: 2020-11-30 | End: 2020-11-30 | Stop reason: SURG

## 2020-11-30 RX ORDER — MINERAL OIL
OIL (ML) MISCELLANEOUS AS NEEDED
Status: DISCONTINUED | OUTPATIENT
Start: 2020-11-30 | End: 2020-11-30 | Stop reason: HOSPADM

## 2020-11-30 RX ORDER — DEXTROSE MONOHYDRATE 25 G/50ML
12.5 INJECTION, SOLUTION INTRAVENOUS AS NEEDED
Status: DISCONTINUED | OUTPATIENT
Start: 2020-11-30 | End: 2020-11-30 | Stop reason: HOSPADM

## 2020-11-30 RX ORDER — ONDANSETRON 4 MG/1
4 TABLET, FILM COATED ORAL EVERY 6 HOURS PRN
Status: DISCONTINUED | OUTPATIENT
Start: 2020-11-30 | End: 2020-12-01 | Stop reason: HOSPADM

## 2020-11-30 RX ORDER — LABETALOL HYDROCHLORIDE 5 MG/ML
5 INJECTION, SOLUTION INTRAVENOUS
Status: DISCONTINUED | OUTPATIENT
Start: 2020-11-30 | End: 2020-11-30 | Stop reason: HOSPADM

## 2020-11-30 RX ORDER — BUMETANIDE 1 MG/1
1 TABLET ORAL DAILY
Status: DISCONTINUED | OUTPATIENT
Start: 2020-11-30 | End: 2020-12-01 | Stop reason: HOSPADM

## 2020-11-30 RX ORDER — TIZANIDINE 4 MG/1
4 TABLET ORAL NIGHTLY
Status: DISCONTINUED | OUTPATIENT
Start: 2020-11-30 | End: 2020-12-01 | Stop reason: HOSPADM

## 2020-11-30 RX ORDER — LIDOCAINE HYDROCHLORIDE AND EPINEPHRINE 10; 10 MG/ML; UG/ML
INJECTION, SOLUTION INFILTRATION; PERINEURAL AS NEEDED
Status: DISCONTINUED | OUTPATIENT
Start: 2020-11-30 | End: 2020-11-30 | Stop reason: HOSPADM

## 2020-11-30 RX ORDER — SPIRONOLACTONE 25 MG/1
25 TABLET ORAL
Status: DISCONTINUED | OUTPATIENT
Start: 2020-11-30 | End: 2020-12-01 | Stop reason: HOSPADM

## 2020-11-30 RX ORDER — FENTANYL CITRATE 50 UG/ML
25 INJECTION, SOLUTION INTRAMUSCULAR; INTRAVENOUS
Status: DISCONTINUED | OUTPATIENT
Start: 2020-11-30 | End: 2020-11-30 | Stop reason: HOSPADM

## 2020-11-30 RX ORDER — MIDAZOLAM HYDROCHLORIDE 1 MG/ML
1 INJECTION INTRAMUSCULAR; INTRAVENOUS
Status: DISCONTINUED | OUTPATIENT
Start: 2020-11-30 | End: 2020-11-30 | Stop reason: HOSPADM

## 2020-11-30 RX ORDER — PILOCARPINE HYDROCHLORIDE 5 MG/1
5 TABLET, FILM COATED ORAL 3 TIMES DAILY
Status: DISCONTINUED | OUTPATIENT
Start: 2020-11-30 | End: 2020-12-01 | Stop reason: HOSPADM

## 2020-11-30 RX ORDER — FENTANYL CITRATE 50 UG/ML
25 INJECTION, SOLUTION INTRAMUSCULAR; INTRAVENOUS
Status: COMPLETED | OUTPATIENT
Start: 2020-11-30 | End: 2020-11-30

## 2020-11-30 RX ORDER — LEVOTHYROXINE SODIUM 0.1 MG/1
100 TABLET ORAL
Status: DISCONTINUED | OUTPATIENT
Start: 2020-11-30 | End: 2020-12-01 | Stop reason: HOSPADM

## 2020-11-30 RX ORDER — BUPIVACAINE HCL/0.9 % NACL/PF 0.1 %
2 PLASTIC BAG, INJECTION (ML) EPIDURAL ONCE
Status: COMPLETED | OUTPATIENT
Start: 2020-11-30 | End: 2020-11-30

## 2020-11-30 RX ORDER — SODIUM CHLORIDE, SODIUM LACTATE, POTASSIUM CHLORIDE, CALCIUM CHLORIDE 600; 310; 30; 20 MG/100ML; MG/100ML; MG/100ML; MG/100ML
1000 INJECTION, SOLUTION INTRAVENOUS CONTINUOUS
Status: DISCONTINUED | OUTPATIENT
Start: 2020-11-30 | End: 2020-11-30 | Stop reason: HOSPADM

## 2020-11-30 RX ORDER — MULTIPLE VITAMINS W/ MINERALS TAB 9MG-400MCG
1 TAB ORAL DAILY
Status: DISCONTINUED | OUTPATIENT
Start: 2020-11-30 | End: 2020-12-01 | Stop reason: HOSPADM

## 2020-11-30 RX ORDER — SODIUM CHLORIDE 0.9 % (FLUSH) 0.9 %
3 SYRINGE (ML) INJECTION EVERY 12 HOURS SCHEDULED
Status: DISCONTINUED | OUTPATIENT
Start: 2020-11-30 | End: 2020-12-01 | Stop reason: HOSPADM

## 2020-11-30 RX ORDER — DIPHENOXYLATE HYDROCHLORIDE AND ATROPINE SULFATE 2.5; .025 MG/1; MG/1
1 TABLET ORAL DAILY
Status: DISCONTINUED | OUTPATIENT
Start: 2020-11-30 | End: 2020-11-30

## 2020-11-30 RX ORDER — OXYCODONE AND ACETAMINOPHEN 10; 325 MG/1; MG/1
1 TABLET ORAL ONCE AS NEEDED
Status: DISCONTINUED | OUTPATIENT
Start: 2020-11-30 | End: 2020-11-30

## 2020-11-30 RX ORDER — OXYCODONE AND ACETAMINOPHEN 10; 325 MG/1; MG/1
1 TABLET ORAL ONCE AS NEEDED
Status: DISCONTINUED | OUTPATIENT
Start: 2020-11-30 | End: 2020-11-30 | Stop reason: HOSPADM

## 2020-11-30 RX ORDER — HYDROMORPHONE HYDROCHLORIDE 1 MG/ML
0.5 INJECTION, SOLUTION INTRAMUSCULAR; INTRAVENOUS; SUBCUTANEOUS
Status: COMPLETED | OUTPATIENT
Start: 2020-11-30 | End: 2020-11-30

## 2020-11-30 RX ORDER — IBUPROFEN 600 MG/1
600 TABLET ORAL ONCE AS NEEDED
Status: DISCONTINUED | OUTPATIENT
Start: 2020-11-30 | End: 2020-11-30

## 2020-11-30 RX ORDER — LIDOCAINE HYDROCHLORIDE 10 MG/ML
0.5 INJECTION, SOLUTION EPIDURAL; INFILTRATION; INTRACAUDAL; PERINEURAL ONCE AS NEEDED
Status: DISCONTINUED | OUTPATIENT
Start: 2020-11-30 | End: 2020-11-30 | Stop reason: SDUPTHER

## 2020-11-30 RX ORDER — SODIUM CHLORIDE 0.9 % (FLUSH) 0.9 %
10 SYRINGE (ML) INJECTION AS NEEDED
Status: DISCONTINUED | OUTPATIENT
Start: 2020-11-30 | End: 2020-11-30 | Stop reason: HOSPADM

## 2020-11-30 RX ORDER — HYDROCODONE BITARTRATE AND ACETAMINOPHEN 10; 325 MG/1; MG/1
1 TABLET ORAL EVERY 6 HOURS PRN
Status: DISCONTINUED | OUTPATIENT
Start: 2020-11-30 | End: 2020-12-01 | Stop reason: HOSPADM

## 2020-11-30 RX ORDER — ATORVASTATIN CALCIUM 40 MG/1
40 TABLET, FILM COATED ORAL NIGHTLY
Status: DISCONTINUED | OUTPATIENT
Start: 2020-11-30 | End: 2020-12-01 | Stop reason: HOSPADM

## 2020-11-30 RX ORDER — SODIUM CHLORIDE 0.9 % (FLUSH) 0.9 %
10 SYRINGE (ML) INJECTION AS NEEDED
Status: DISCONTINUED | OUTPATIENT
Start: 2020-11-30 | End: 2020-12-01 | Stop reason: HOSPADM

## 2020-11-30 RX ORDER — IBUPROFEN 600 MG/1
600 TABLET ORAL ONCE AS NEEDED
Status: DISCONTINUED | OUTPATIENT
Start: 2020-11-30 | End: 2020-11-30 | Stop reason: HOSPADM

## 2020-11-30 RX ORDER — SODIUM CHLORIDE, SODIUM LACTATE, POTASSIUM CHLORIDE, CALCIUM CHLORIDE 600; 310; 30; 20 MG/100ML; MG/100ML; MG/100ML; MG/100ML
9 INJECTION, SOLUTION INTRAVENOUS CONTINUOUS
Status: DISCONTINUED | OUTPATIENT
Start: 2020-11-30 | End: 2020-11-30 | Stop reason: HOSPADM

## 2020-11-30 RX ORDER — LIDOCAINE HYDROCHLORIDE 10 MG/ML
0.5 INJECTION, SOLUTION EPIDURAL; INFILTRATION; INTRACAUDAL; PERINEURAL ONCE AS NEEDED
Status: DISCONTINUED | OUTPATIENT
Start: 2020-11-30 | End: 2020-11-30 | Stop reason: HOSPADM

## 2020-11-30 RX ORDER — NALOXONE HCL 0.4 MG/ML
0.4 VIAL (ML) INJECTION
Status: DISCONTINUED | OUTPATIENT
Start: 2020-11-30 | End: 2020-12-01 | Stop reason: HOSPADM

## 2020-11-30 RX ORDER — ONDANSETRON 2 MG/ML
4 INJECTION INTRAMUSCULAR; INTRAVENOUS EVERY 6 HOURS PRN
Status: DISCONTINUED | OUTPATIENT
Start: 2020-11-30 | End: 2020-12-01 | Stop reason: HOSPADM

## 2020-11-30 RX ORDER — OXYCODONE AND ACETAMINOPHEN 7.5; 325 MG/1; MG/1
2 TABLET ORAL EVERY 4 HOURS PRN
Status: DISCONTINUED | OUTPATIENT
Start: 2020-11-30 | End: 2020-11-30 | Stop reason: HOSPADM

## 2020-11-30 RX ORDER — CARVEDILOL 6.25 MG/1
6.25 TABLET ORAL 2 TIMES DAILY WITH MEALS
Status: DISCONTINUED | OUTPATIENT
Start: 2020-11-30 | End: 2020-12-01 | Stop reason: HOSPADM

## 2020-11-30 RX ORDER — ALLOPURINOL 100 MG/1
100 TABLET ORAL DAILY
Status: DISCONTINUED | OUTPATIENT
Start: 2020-11-30 | End: 2020-12-01 | Stop reason: HOSPADM

## 2020-11-30 RX ADMIN — TIZANIDINE 4 MG: 4 TABLET ORAL at 20:20

## 2020-11-30 RX ADMIN — PRAMIPEXOLE DIHYDROCHLORIDE 1.5 MG: 0.25 TABLET ORAL at 20:20

## 2020-11-30 RX ADMIN — PANTOPRAZOLE SODIUM 40 MG: 40 TABLET, DELAYED RELEASE ORAL at 14:58

## 2020-11-30 RX ADMIN — SODIUM CHLORIDE, POTASSIUM CHLORIDE, SODIUM LACTATE AND CALCIUM CHLORIDE 1000 ML: 600; 310; 30; 20 INJECTION, SOLUTION INTRAVENOUS at 07:17

## 2020-11-30 RX ADMIN — LEVOTHYROXINE SODIUM 100 MCG: 100 TABLET ORAL at 15:00

## 2020-11-30 RX ADMIN — Medication 1 TABLET: at 15:01

## 2020-11-30 RX ADMIN — ALLOPURINOL 100 MG: 100 TABLET ORAL at 15:00

## 2020-11-30 RX ADMIN — FENTANYL CITRATE 25 MCG: 50 INJECTION INTRAMUSCULAR; INTRAVENOUS at 11:18

## 2020-11-30 RX ADMIN — FENTANYL CITRATE 50 MCG: 50 INJECTION INTRAMUSCULAR; INTRAVENOUS at 10:05

## 2020-11-30 RX ADMIN — PROPOFOL 200 MG: 10 INJECTION, EMULSION INTRAVENOUS at 09:44

## 2020-11-30 RX ADMIN — HYDROMORPHONE HYDROCHLORIDE 0.5 MG: 1 INJECTION, SOLUTION INTRAMUSCULAR; INTRAVENOUS; SUBCUTANEOUS at 12:35

## 2020-11-30 RX ADMIN — PILOCARPINE HYDROCHLORIDE 5 MG: 5 TABLET, FILM COATED ORAL at 20:20

## 2020-11-30 RX ADMIN — FENTANYL CITRATE 50 MCG: 50 INJECTION INTRAMUSCULAR; INTRAVENOUS at 10:23

## 2020-11-30 RX ADMIN — Medication 2 G: at 09:54

## 2020-11-30 RX ADMIN — HYDROMORPHONE HYDROCHLORIDE 0.5 MG: 1 INJECTION, SOLUTION INTRAMUSCULAR; INTRAVENOUS; SUBCUTANEOUS at 12:21

## 2020-11-30 RX ADMIN — METHYLCELLULOSE 500 MG: 500 TABLET ORAL at 15:01

## 2020-11-30 RX ADMIN — PILOCARPINE HYDROCHLORIDE 5 MG: 5 TABLET, FILM COATED ORAL at 14:59

## 2020-11-30 RX ADMIN — HYDROCODONE BITARTRATE AND ACETAMINOPHEN 1 TABLET: 10; 325 TABLET ORAL at 20:53

## 2020-11-30 RX ADMIN — FENTANYL CITRATE 25 MCG: 50 INJECTION INTRAMUSCULAR; INTRAVENOUS at 11:23

## 2020-11-30 RX ADMIN — FENTANYL CITRATE 25 MCG: 50 INJECTION INTRAMUSCULAR; INTRAVENOUS at 11:33

## 2020-11-30 RX ADMIN — BUMETANIDE 1 MG: 1 TABLET ORAL at 14:59

## 2020-11-30 RX ADMIN — HYDROMORPHONE HYDROCHLORIDE 0.5 MG: 1 INJECTION, SOLUTION INTRAMUSCULAR; INTRAVENOUS; SUBCUTANEOUS at 12:01

## 2020-11-30 RX ADMIN — TRAMADOL HYDROCHLORIDE 50 MG: 50 TABLET, FILM COATED ORAL at 20:20

## 2020-11-30 RX ADMIN — SODIUM CHLORIDE 50 ML/HR: 9 INJECTION, SOLUTION INTRAVENOUS at 15:08

## 2020-11-30 RX ADMIN — FLUOXETINE HYDROCHLORIDE 20 MG: 20 CAPSULE ORAL at 15:01

## 2020-11-30 RX ADMIN — GABAPENTIN 200 MG: 100 CAPSULE ORAL at 14:58

## 2020-11-30 RX ADMIN — ATORVASTATIN CALCIUM 40 MG: 40 TABLET, FILM COATED ORAL at 20:20

## 2020-11-30 RX ADMIN — FENTANYL CITRATE 150 MCG: 50 INJECTION INTRAMUSCULAR; INTRAVENOUS at 09:44

## 2020-11-30 RX ADMIN — LIDOCAINE HYDROCHLORIDE 100 MG: 20 INJECTION, SOLUTION INTRAVENOUS at 09:44

## 2020-11-30 RX ADMIN — CARVEDILOL 6.25 MG: 6.25 TABLET, FILM COATED ORAL at 17:08

## 2020-11-30 RX ADMIN — GABAPENTIN 200 MG: 100 CAPSULE ORAL at 20:20

## 2020-11-30 RX ADMIN — SPIRONOLACTONE 25 MG: 25 TABLET ORAL at 15:00

## 2020-11-30 RX ADMIN — NAPROXEN 500 MG: 500 TABLET ORAL at 15:00

## 2020-11-30 RX ADMIN — FENTANYL CITRATE 25 MCG: 50 INJECTION INTRAMUSCULAR; INTRAVENOUS at 11:28

## 2020-11-30 RX ADMIN — HYDROMORPHONE HYDROCHLORIDE 0.5 MG: 1 INJECTION, SOLUTION INTRAMUSCULAR; INTRAVENOUS; SUBCUTANEOUS at 12:11

## 2020-11-30 RX ADMIN — HYDROCODONE BITARTRATE AND ACETAMINOPHEN 1 TABLET: 10; 325 TABLET ORAL at 14:58

## 2020-11-30 NOTE — ANESTHESIA POSTPROCEDURE EVALUATION
Patient: Robyn Minaya    Procedure Summary     Date: 11/30/20 Room / Location: [facility] OR  / [facility] HYBRID OR 12    Anesthesia Start: 0940 Anesthesia Stop: 1039    Procedure: SPLIT THICKNESS SKIN GRAFT LEFT LOWER EXTREMTIY WITH WOUND VAC PLACEMENT (Left ) Diagnosis:       Wound of left lower extremity, initial encounter      Preop testing      (Wound of left lower extremity, initial encounter [S81.555J])      (Preop testing [Z01.818])    Surgeon: Trace Hurd DO Provider: Mark Anthony Johnson CRNA    Anesthesia Type: general ASA Status: 3          Anesthesia Type: general    Vitals  Vitals Value Taken Time   /67 11/30/20 1136   Temp 97.2 °F (36.2 °C) 11/30/20 1034   Pulse 85 11/30/20 1135   Resp 16 11/30/20 1135   SpO2 96 % 11/30/20 1135           Post Anesthesia Care and Evaluation    Patient location during evaluation: PACU  Patient participation: complete - patient participated  Level of consciousness: awake and alert  Pain management: adequate  Airway patency: patent  Anesthetic complications: No anesthetic complications    Cardiovascular status: acceptable  Respiratory status: acceptable  Hydration status: acceptable    Comments: Blood pressure 128/67, pulse 85, temperature 97.2 °F (36.2 °C), temperature source Temporal, resp. rate 16, SpO2 96 %, not currently breastfeeding.    Pt discharged from PACU based on migdalia score >8  No anesthesia care post op

## 2020-11-30 NOTE — ANESTHESIA PREPROCEDURE EVALUATION
Anesthesia Evaluation     Patient summary reviewed   no history of anesthetic complications:  NPO Solid Status: > 8 hours  NPO Liquid Status: > 2 hours           Airway   Mallampati: II  TM distance: >3 FB  Neck ROM: full  Dental    (+) upper dentures and lower dentures    Pulmonary    (+) a smoker Former, sleep apnea,   (-) asthma  Cardiovascular   Exercise tolerance: good (4-7 METS)    Patient on routine beta blocker and Beta blocker given within 24 hours of surgery    (+) hypertension, hyperlipidemia,   (-) past MI, CAD, cardiac stents      Neuro/Psych  (+) psychiatric history Bipolar,     (-) seizures, TIA, CVA  GI/Hepatic/Renal/Endo    (+) obesity,  GERD,  thyroid problem hypothyroidism  (-) liver disease, no renal disease, diabetes    Musculoskeletal     Abdominal    Substance History      OB/GYN          Other   arthritis, blood dyscrasia anemia,                       Anesthesia Plan    ASA 3     general     intravenous induction     Anesthetic plan, all risks, benefits, and alternatives have been provided, discussed and informed consent has been obtained with: patient.

## 2020-11-30 NOTE — ANESTHESIA PROCEDURE NOTES
Airway  Urgency: elective    Date/Time: 11/30/2020 9:44 AM  Airway not difficult    General Information and Staff    Patient location during procedure: OR  CRNA: Mark Anthony Johnson CRNA    Indications and Patient Condition    Preoxygenated: yes  Mask difficulty assessment: 0 - not attempted    Final Airway Details  Final airway type: supraglottic airway      Successful airway: I-gel and classic  Size 3    Number of attempts at approach: 1  Assessment: lips, teeth, and gum same as pre-op

## 2020-12-01 VITALS
SYSTOLIC BLOOD PRESSURE: 120 MMHG | RESPIRATION RATE: 18 BRPM | DIASTOLIC BLOOD PRESSURE: 56 MMHG | HEART RATE: 76 BPM | OXYGEN SATURATION: 94 % | BODY MASS INDEX: 37.38 KG/M2 | TEMPERATURE: 97.7 F | WEIGHT: 185.41 LBS | HEIGHT: 59 IN

## 2020-12-01 PROCEDURE — 97161 PT EVAL LOW COMPLEX 20 MIN: CPT | Performed by: PHYSICAL THERAPIST

## 2020-12-01 PROCEDURE — 99024 POSTOP FOLLOW-UP VISIT: CPT | Performed by: SURGERY

## 2020-12-01 PROCEDURE — 97606 NEG PRS WND THER DME>50 SQCM: CPT | Performed by: PHYSICAL THERAPIST

## 2020-12-01 RX ADMIN — CARVEDILOL 6.25 MG: 6.25 TABLET, FILM COATED ORAL at 09:16

## 2020-12-01 RX ADMIN — SPIRONOLACTONE 25 MG: 25 TABLET ORAL at 09:17

## 2020-12-01 RX ADMIN — NAPROXEN 500 MG: 500 TABLET ORAL at 09:14

## 2020-12-01 RX ADMIN — GABAPENTIN 200 MG: 100 CAPSULE ORAL at 09:15

## 2020-12-01 RX ADMIN — Medication 1 TABLET: at 09:16

## 2020-12-01 RX ADMIN — PANTOPRAZOLE SODIUM 40 MG: 40 TABLET, DELAYED RELEASE ORAL at 05:53

## 2020-12-01 RX ADMIN — Medication 1 TABLET: at 09:15

## 2020-12-01 RX ADMIN — HYDROCODONE BITARTRATE AND ACETAMINOPHEN 1 TABLET: 10; 325 TABLET ORAL at 02:55

## 2020-12-01 RX ADMIN — TRAMADOL HYDROCHLORIDE 50 MG: 50 TABLET, FILM COATED ORAL at 09:17

## 2020-12-01 RX ADMIN — FLUOXETINE HYDROCHLORIDE 20 MG: 20 CAPSULE ORAL at 09:17

## 2020-12-01 RX ADMIN — METHYLCELLULOSE 500 MG: 500 TABLET ORAL at 09:16

## 2020-12-01 RX ADMIN — PILOCARPINE HYDROCHLORIDE 5 MG: 5 TABLET, FILM COATED ORAL at 09:15

## 2020-12-01 RX ADMIN — BUMETANIDE 1 MG: 1 TABLET ORAL at 09:17

## 2020-12-01 RX ADMIN — ALLOPURINOL 100 MG: 100 TABLET ORAL at 09:16

## 2020-12-01 RX ADMIN — LEVOTHYROXINE SODIUM 100 MCG: 100 TABLET ORAL at 05:53

## 2020-12-01 NOTE — DISCHARGE SUMMARY
Date of Discharge:  12/1/2020    Discharge Diagnosis: Wound of left lower extremity, initial encounter [S81.802A]    Presenting Problem/History of Present Illness  Wound of left lower extremity, initial encounter [S81.802A]  Preop testing [Z01.818]  Wound of left lower extremity, initial encounter [S81.802A]       Hospital Course  Patient is a 60 y.o. female who has been followed in wound care for a wound to her left lower extremity which was originally caused by a brown recluse.  She was previously treated at wound care with worsening of her wound.  She is now following with Dr. Wells and her wound has significantly improved.  She did undergo split thickness skin graft to her left lower extremity leg wound and placement of wound VAC to the split-thickness skin graft.  Overnight, she has done well however her wound VAC was removed at 5:00 this morning.  We did have physical therapy replaced wound VAC as soon as found to be off.  Her vitals have remained stable.  She is stable and ready for discharge.  Her donor site is clean and nursing will change dressing prior to discharge.  Her home wound VAC is here and will be switched prior to discharge.  We will see her back on Monday to evaluate her wound.  Written and verbal instructions were given to the patient.  She is not to remove the wound VAC for any reason.  She does verbalize understanding.    Procedures Performed  Procedure(s):  SPLIT THICKNESS SKIN GRAFT LEFT LOWER EXTREMTIY WITH WOUND VAC PLACEMENT       Consults:   Consults     No orders found for last 30 day(s).            Condition on Discharge: Stable    Discharge Medications     Discharge Medications      Continue These Medications      Instructions Start Date   allopurinol 100 MG tablet  Commonly known as: ZYLOPRIM   100 mg, Oral, Daily      atorvastatin 40 MG tablet  Commonly known as: LIPITOR   40 mg, Oral, Nightly      bumetanide 1 MG tablet  Commonly known as: BUMEX   1 mg, Oral, Daily      calcium  carbonate 600 MG tablet  Commonly known as: OS-KAYLEY   600 mg, Oral, Daily      carvedilol 6.25 MG tablet  Commonly known as: COREG   6.25 mg, Oral, 2 Times Daily With Meals      diclofenac 75 MG EC tablet  Commonly known as: VOLTAREN   75 mg, Oral, 2 Times Daily      EQ Fiber Therapy 500 MG tablet tablet  Generic drug: methylcellulose (Laxative)   1 tablet, Oral, Daily      FLUoxetine 20 MG capsule  Commonly known as: PROzac   20 mg, Oral, Daily      gabapentin 100 MG capsule  Commonly known as: NEURONTIN   200 mg, Oral, 4 Times Daily      HYDROcodone-acetaminophen  MG per tablet  Commonly known as: NORCO   1 tablet, Oral, Every 6 Hours PRN      IMODIUM PO   2 mg, Oral, As Needed      levothyroxine 100 MCG tablet  Commonly known as: SYNTHROID, LEVOTHROID   100 mcg, Oral, Daily      multivitamin tablet tablet  Commonly known as: THERAGRAN   1 dose, Oral, Daily      nicotine 21 MG/24HR patch  Commonly known as: NICODERM CQ   1 patch, Transdermal, Every 24 Hours, 7 mg      omeprazole 20 MG capsule  Commonly known as: priLOSEC   20 mg, Oral, Daily      pilocarpine 5 MG tablet  Commonly known as: SALAGEN   5 mg, Oral, 3 Times Daily      pramipexole 1.5 MG tablet  Commonly known as: MIRAPEX   1.5 mg, Oral, Nightly      spironolactone 25 MG tablet  Commonly known as: ALDACTONE   25 mg, Oral, 2 Times Daily      tiZANidine 4 MG tablet  Commonly known as: ZANAFLEX   4 mg, Oral, Nightly      traMADol 50 MG tablet  Commonly known as: ULTRAM   50 mg, Oral, 2 Times Daily             Discharge Diet:   Diet Instructions     Diet: Regular; Thin      Discharge Diet: Regular    Fluid Consistency: Thin          Activity at Discharge:   Activity Instructions     Driving Restrictions      Type of Restriction: Driving    Driving Restrictions: No Driving (Time Limited)    Length: 1 Week    Lifting Restrictions      Type of Restriction: Lifting    Lifting Restrictions: Lifting Restriction (Indicate Limit)    Weight Limit (Pounds): 10     Length of Lifting Restriction: 1 week    Work Restrictions      Type of Restriction: Work    May Return to Work: After Next Appointment    With / Without Restrictions: Without Restrictions          Follow-up Appointments  No future appointments.  Additional Instructions for the Follow-ups that You Need to Schedule     Discharge Follow-up with Specialty: Dr. Hurd   As directed      Specialty: Dr. Hurd    Follow Up Details: Monday December 7 @9:00                I did spend more than 30 minutes reviewing the chart, face to face encounter, and organizing discharge.    Vivi Perez, ARETHA  12/01/20  08:38 CST

## 2020-12-01 NOTE — PLAN OF CARE
Goal Outcome Evaluation:  Plan of Care Reviewed With: patient  Progress: no change  Outcome Summary: Patient has c/o pain this shift. PRN PO pain medication given per orders. See MAR. Tegaderm at donor site reinforced. Wound vac in place. IVF per order. VSS. Safety maintained. Will continue to monitor.

## 2020-12-01 NOTE — THERAPY DISCHARGE NOTE
Acute Care - Physical Therapy Discharge Summary  [facility]       Patient Name: Robyn Minaya  : 1960  MRN: [id number]    Today's Date: 2020                 Admit Date: 2020      PT Recommendation and Plan    Visit Dx:    ICD-10-CM ICD-9-CM   1. Wound of left lower extremity, initial encounter  S81.802A 894.0   2. Preop testing  Z01.818 V72.84   3. Open wound  T14.8XXA 879.8           PT Charges     Row Name 20 0928             Time Calculation    Start Time  0740  -SB      Stop Time  0850  -SB      Time Calculation (min)  70 min  -SB      PT Received On  20  -SB      PT Goal Re-Cert Due Date  20  -SB        User Key  (r) = Recorded By, (t) = Taken By, (c) = Cosigned By    Initials Name Provider Type    Laquita Gage, PT DPT Physical Therapist          Rehab Goal Summary     Row Name 20 1502 20 0740          Physical Therapy Goals    Wound Care Goal Selection (PT)  wound care, PT goal 1  -AB  wound care, PT goal 1  -SB        Wound Care Goal 1 (PT)    Wound Care Goal 1 (PT)  Wound vac will remain intact for next 5 days until follow up with surgeon  -AB  Wound vac will remain intact for next 5 days until follow up with surgeon  -SB     Time Frame (Wound Care Goal 1, PT)  by discharge  -AB  by discharge  -SB     Progress/Outcome (Wound Care Goal 1, PT)  goal not met  -AB  goal ongoing  -SB       User Key  (r) = Recorded By, (t) = Taken By, (c) = Cosigned By    Initials Name Provider Type Discipline    Leonila Bradley PTA Physical Therapy Assistant PT    Laquita Gage, PT DPT Physical Therapist PT              PT Discharge Summary  Anticipated Discharge Disposition (PT): home with assist  Reason for Discharge: Discharge from facility  Outcomes Achieved: Refer to plan of care for updates on goals achieved  Discharge Destination: Home with assist      Lenoila Maxwell PTA   2020

## 2020-12-01 NOTE — PAYOR COMM NOTE
"Navjot Dominguez (60 y.o. Female) [id number]     Attn  [facility] phone   [phone]    Fax         Date of Birth Social Security Number Address Home Phone MRN    1960  [id number] [place] [address] [facility] [address] [phone] [id number]    [facility] [employer] Status          Other        Admission Date Admission Type Admitting Provider Attending Provider Department, Room/Bed    11/30/20 Elective Trace Hurd DO Bicking, Griffin K, DO [facility] 3C, 365/1    Discharge Date Discharge Disposition Discharge Destination         Home or Self Care              Attending Provider: Trace Hurd DO    Allergies: Bactrim [Sulfamethoxazole-trimethoprim], Hctz [Hydrochlorothiazide], Januvia [Sitagliptin]    Isolation: None   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                 Active Insurance as of 11/30/2020     Primary Coverage     Payor Plan Insurance Group Employer/Plan Group    [facility] [id number]     Payor Plan Address Payor Plan Phone Number Payor Plan Fax Number Effective Dates    [address] [phone]  1/1/2020 - None Entered    [facility] [id number]       Subscriber Name Subscriber Birth Date Member ID       NAVJOT DOMINGUEZ 1960 [id number]                 Emergency Contacts      (Rel.) Home Phone Work Phone Mobile Phone    Rizwan Dominguez (Spouse) [phone] -- --              Current Facility-Administered Medications   Medication Dose Route Frequency Provider Last Rate Last Admin   • acetaminophen (TYLENOL) tablet 650 mg  650 mg Oral Q4H PRN Trace Hurd DO       • allopurinol (ZYLOPRIM) tablet 100 mg  100 mg Oral Daily Trace Hurd DO   100 mg at 12/01/20 0916   • atorvastatin (LIPITOR) tablet 40 mg  40 mg Oral Nightly Bicking, " Trace RAMOS DO   40 mg at 11/30/20 2020   • bumetanide (BUMEX) tablet 1 mg  1 mg Oral Daily BicTrace garcia DO   1 mg at 12/01/20 0917   • calcium carb-cholecalciferol 600-800 MG-UNIT tablet 1 tablet  1 tablet Oral Daily BickingTrace DO   1 tablet at 12/01/20 0915   • carvedilol (COREG) tablet 6.25 mg  6.25 mg Oral BID With Meals Trace Hurd DO   6.25 mg at 12/01/20 0916   • FLUoxetine (PROzac) capsule 20 mg  20 mg Oral Daily BicTrace garcia DO   20 mg at 12/01/20 0917   • gabapentin (NEURONTIN) capsule 200 mg  200 mg Oral 4x Daily BicTrace garcia DO   200 mg at 12/01/20 0915   • HYDROcodone-acetaminophen (NORCO)  MG per tablet 1 tablet  1 tablet Oral Q6H PRN Trace Hurd DO   1 tablet at 12/01/20 0255   • levothyroxine (SYNTHROID, LEVOTHROID) tablet 100 mcg  100 mcg Oral Q AM BicTrace garcia DO   100 mcg at 12/01/20 0553   • loperamide (IMODIUM) capsule 2 mg  2 mg Oral TID PRN Trace Hurd DO       • methylcellulose (Laxative) (CITRUCEL) tablet 500 mg  1 tablet Oral Daily Trace Hurd DO   500 mg at 12/01/20 0916   • morphine injection 4 mg  4 mg Intravenous Q2H PRN Trace Hurd DO        And   • naloxone (NARCAN) injection 0.4 mg  0.4 mg Intravenous Q5 Min PRN Trace Hurd DO       • multivitamin with minerals 1 tablet  1 tablet Oral Daily Trace Hurd DO   1 tablet at 12/01/20 0916   • naproxen (NAPROSYN) tablet 500 mg  500 mg Oral BID With Meals Trace Hurd DO   500 mg at 12/01/20 0914   • nicotine (NICODERM CQ) 21 MG/24HR patch 1 patch  1 patch Transdermal Q24H Trace Hurd DO       • ondansetron (ZOFRAN) tablet 4 mg  4 mg Oral Q6H PRN Trace Hurd,         Or   • ondansetron (ZOFRAN) injection 4 mg  4 mg Intravenous Q6H PRN Trace Hurd DO       • pantoprazole (PROTONIX) EC tablet 40 mg  40 mg Oral Q AM Trace Hurd DO   40 mg at 12/01/20 0553   • pilocarpine (SALAGEN) tablet 5 mg  5 mg Oral TID  Trace Hurd, DO   5 mg at 12/01/20 0915   • pramipexole (MIRAPEX) tablet 1.5 mg  1.5 mg Oral Nightly Trace Hurd, DO   1.5 mg at 11/30/20 2020   • sodium chloride 0.9 % flush 10 mL  10 mL Intravenous PRN Trace Hurd, DO       • sodium chloride 0.9 % flush 3 mL  3 mL Intravenous Q12H Trace Hurd, DO       • sodium chloride 0.9 % infusion  50 mL/hr Intravenous Continuous Trace Hurd, DO 50 mL/hr at 11/30/20 1508 50 mL/hr at 11/30/20 1508   • spironolactone (ALDACTONE) tablet 25 mg  25 mg Oral BID Trace Hurd, DO   25 mg at 12/01/20 0917   • tiZANidine (ZANAFLEX) tablet 4 mg  4 mg Oral Nightly Trace Hurd, DO   4 mg at 11/30/20 2020   • traMADol (ULTRAM) tablet 50 mg  50 mg Oral BID Trace Hurd, DO   50 mg at 12/01/20 0917        Operative/Procedure Notes (last 48 hours) (Notes from 11/29/20 1336 through 12/01/20 1336)      Trace Hurd DO at 11/30/20 0900        Robyn Minaya  11/30/2020     PREOPERATIVE DIAGNOSIS: Wound of left lower extremity, initial encounter [S81.802A]  Preop testing [Z01.818]     POSTOPERATIVE DIAGNOSIS: Post-Op Diagnosis Codes:     * Wound of left lower extremity, initial encounter [S81.802A]     * Preop testing [Z01.818]     PROCEDURE PERFORMED:   1.  Split thickness skin graft to left lower extremity leg wound.  The wound on the left calf measured 12 x 10 cm, the thigh donor site measured 18 x 5 cm  2.  Placement of a wound VAC to the split-thickness skin graft     SURGEON: Trace Hurd DO      ANESTHESIA: General    PREPARATION: Routine.    STAFF: Circulator: Katt Yadav RN  Scrub Person: Og De La Cruz  Assistant: Meredith Atkinson    Estimated Blood Loss: minimal    SPECIMENS: None    COMPLICATIONS: None    INDICATIONS: Robyn Minaya is a 60 y.o. female who you are currently following for a wound to her left lower extremity.  The wound was originally caused by a brown recluse.  She was treated at  [facility] and the wound continued to worsen.  She has been followed by Dr. Wells here in our wound care center and she is improved significantly.  She still has a large wound however is clean without drainage. She is currently taking doxycycline. The indications, risks, and possible complications of the procedure were explained to the patient, who voiced understanding and wished to proceed with surgery.     PROCEDURE IN DETAIL: The patient was taken to the operating room and placed on the operating table in a supine position. After general anesthesia was obtained, the left lower extremity was prepped and draped in a sterile manner.  Mineral oil was placed on the left thigh.  The dermatome was set for 0.25 mm.  The dermatome was then used distal to proximal retrieving a large skin graft.  The skin graft was then placed through the meshing device successfully.  It was then carefully laid on the wound and stapled appropriately.  The wound VAC black sponge was then cut appropriately to fit.  Xeroform was placed over the split-thickness skin graft and the wound VAC was placed successfully.  A 4 x 4 soaked in lidocaine with epi was placed over the donor site for hemostasis.  A Tegaderm was placed over the donor site after hemostasis was observed.  Sterile dressings were applied. The patient tolerated the procedure well. Sponge and needle counts were correct. The patient was then awakened and extubated in the operating room and taken to the recovery room in good condition.    Trace Hurd DO  Date: 11/30/2020 Time: 10:36 CST     CC:Roberto Wells DPM    Electronically signed by Trace Hurd DO at 11/30/20 1042       Discharge Order (From admission, onward)     Start     Ordered    12/01/20 0830  Discharge patient  Once     Expected Discharge Date: 12/01/20    Expected Discharge Time: Afternoon    Discharge Disposition: Home or Self Care    Physician of Record for Attribution - Please select from  Treatment Team: ABHINAV GERARDO [[id number]]    Review needed by CMO to determine Physician of Record: No       Question Answer Comment   Physician of Record for Attribution - Please select from Treatment Team ABHINAV GERARDO    Review needed by CMO to determine Physician of Record No        12/01/20 0843

## 2020-12-01 NOTE — NURSING NOTE
Wound vac d/c'd per patient. WTD dsg placed until PT can come place new wound vac.    Vinny Dietz RN

## 2020-12-01 NOTE — PLAN OF CARE
Problem: Adult Inpatient Plan of Care  Goal: Plan of Care Review  Recent Flowsheet Documentation  Taken 12/1/2020 0740 by Laquita Myles PT DPT  Progress: no change  Plan of Care Reviewed With: patient  Outcome Summary: PT wound eval completed. Upon speaking with nsg, pt had pulled off track pad while putting on pants early this AM. Nsg removed wound vac dressing and placed 4x4 dressing on skin graft site. MD arrived while PT was in room assessing situation and requested vac dressing to be replaced. Xeroform was used on skin graft site and black foam placed on top. NPWT initiated on 100 mmHg. Good seal obtained. Per MD, wound vac is to remain on and intact until follow up at MD office. Pt educated on not unhooking tubing for any reason and to not disrupt the dressing. Therapy will check dressing daily. Recommend d/c home with assist and home vac.

## 2020-12-01 NOTE — THERAPY EVALUATION
Patient Name: Robyn Minaya  : 1960    MRN: [id number]                              Today's Date: 2020       Admit Date: 2020    Visit Dx:     ICD-10-CM ICD-9-CM   1. Wound of left lower extremity, initial encounter  S81.802A 894.0   2. Preop testing  Z01.818 V72.84   3. Open wound  T14.8XXA 879.8     Patient Active Problem List   Diagnosis   • Spondylosis with myelopathy, lumbar region   • Nasal septal perforation   • Chronic maxillary sinusitis   • Nausea   • Epigastric pain   • Urinary incontinence   • Dermatochalasis of both upper eyelids   • Visual field defect   • Hx of colonic polyps   • Other constipation   • Lower abdominal pain   • Nasal cavity mass   • Abnormal nasal septum   • Essential hypertension   • Acquired hypothyroidism   • Mixed hyperlipidemia   • Chronic back pain greater than 3 months duration   • Prediabetes   • Cellulitis of left lower extremity   • Wound of left leg   • Chronic pain syndrome   • Acute pain   • Anxiety   • DOMINIC (acute kidney injury) (CMS/HCC)   • Tobacco abuse   • Infection of wound due to methicillin resistant Staphylococcus aureus (MRSA)   • Acute blood loss anemia   • Anemia   • Fatigue   • Preop testing     Past Medical History:   Diagnosis Date   • Allergic rhinitis    • Arthritis     BACK   • Chronic back pain    • Degenerative arthritis    • Depression    • Deviated nasal septum    • Gout    • History of colon polyps    • Hyperlipidemia    • Hypertension    • Hypertrophy of nasal turbinates    • Incontinence in female    • Insomnia    • Menopause    • Nasal septal deviation    • Nasal valve stenosis    • Overactive bladder    • Plantar fasciitis    • Prediabetes    • RLS (restless legs syndrome)    • Sleep apnea     pt states has a cpap but hasn't been using it   • Spider bite wound     left lower leg. previously infected with mrsa but currently not infected.   • Wears glasses      Past Surgical History:   Procedure Laterality Date   •  BLEPHAROPLASTY Bilateral 7/9/2019    Procedure: 1) bilateral upper blepharoplasty with excision of excess of tissue weighing down 2) nasal endoscopy with removal of nasal septal prosthesis;  Surgeon: Mark Anthony Anderson MD;  Location: [facility] OR;  Service: ENT   • CARPAL TUNNEL RELEASE Bilateral 2004   • CHOLECYSTECTOMY  2013   • COLONOSCOPY  10/17/2014    One 5-6mm tubular adenomatous polyp in the ascending colon; Two less than 4mm hyperplastic polyps in the sigmoid colon; Three rectal hyperplastic polyps; Diverticulosis; Repeat 5 years    • COLONOSCOPY  07/14/2010    Internal hemorrhoids; Repeat 7 years    • COLONOSCOPY N/A 10/23/2019    Procedure: COLONOSCOPY WITH ANESTHESIA;  Surgeon: Robyn Frazier MD;  Location: [facility] ENDOSCOPY;  Service: Gastroenterology   • CYST REMOVAL  2016    neck   • ENDOSCOPIC FUNCTIONAL SINUS SURGERY (FESS) Bilateral 4/16/2019    Procedure: ENDOSCOPIC FUNCTIONAL SINUS SURGERY (bilareral maxillary antrostomy without image guidance);  Surgeon: Mark Anthony Anderson MD;  Location: [facility] OR;  Service: ENT   • ENDOSCOPY N/A 4/11/2019    Esophageal mucosal changes suggestive of eosinophilic esophagitis-biopsied; A few gastric polyps-resected and retrieved-biopsied; Normal examined duodenum-biopsied   • FOOT SURGERY Right 2010    X3   • HERNIA REPAIR     • INTERSTIM PLACEMENT N/A 6/5/2019    Procedure: INTERSTIM STAGES 1 AND 2 LEAD AND GENERATOR PLACEMENT;  Surgeon: Endy Guerrero MD;  Location: [facility] OR;  Service: Urology   • LEG DEBRIDEMENT Left 7/31/2020    Procedure: LOWER POSTERIOR LEG WOUND DEBRIDEMENT - LEFT;  Surgeon: Roberto Wells DPM;  Location: [facility] OR;  Service: Podiatry;  Laterality: Left;   • LEG DEBRIDEMENT Left 11/30/2020    Procedure: SPLIT THICKNESS SKIN GRAFT LEFT LOWER EXTREMTIY WITH WOUND VAC PLACEMENT;  Surgeon: Trace Hurd DO;  Location:  [facility] HYBRID OR 12;  Service: Vascular;  Laterality: Left;   • NASAL ENDOSCOPY N/A 4/16/2019    Procedure:     1.  Functional endoscopic sinus surgery with bilateral maxillary antrostomy    2. Bilateral nasal endoscopy with biopsy of nasal septum    3.  Nasal septal prosthesis placement  ;  Surgeon: Mark Anthony Anderson MD;  Location: [facility] OR;  Service: ENT   • NASAL VESTIBULE LESION/PAPILLOMA EXCISION N/A 8/1/2017    Procedure: Repair of nasal vestibular stenosis and septoplasty; cartilage graft from left ear;  Surgeon: Mark Anthony Anderson MD;  Location:  [facility] OR;  Service:    • SEPTOPLASTY N/A 4/16/2019    Procedure: PLACEMENT OF NASAL SEPTAL PROSTHESIS;  Surgeon: Mark Anthony Anderson MD;  Location:  [facility] OR;  Service: ENT   • SEPTOPLASTY Bilateral 1/14/2020    Procedure: Nasal endoscopy with septal debridement, and placement of Silastic stents;  Surgeon: Mark Anthony Anderson MD;  Location: [facility] OR;  Service: ENT   • WOUND DEBRIDEMENT  06/2019    spider bite wound initial surgery     General Information     Row Name 12/01/20 40          Physical Therapy Time and Intention    Document Type  evaluation;wound care s/p Split thickness skin graft to left lower extremity leg wound; placement of wound vac 11/30  -SB     Mode of Treatment  physical therapy  -SB     Row Name 12/01/20 [facility]          General Information    Patient Profile Reviewed  yes  -SB     Existing Precautions/Restrictions  other (see comments) wound vac to LLE; DO NOT REMOVE OR UNHOOK W/V  -SB     Barriers to Rehab  medically complex  -SB     Row Name 12/01/20 40          Living Environment    Lives With  spouse  -SB     Row Name 12/01/20 40          Cognition    Orientation Status (Cognition)  oriented x 4  -SB     Row Name 12/01/20 [facility]          Safety Issues, Functional Mobility    Impairments Affecting Function (Mobility)  pain  -SB       User Key  (r) = Recorded By, (t) = Taken By, (c) = Cosigned By    Initials Name Provider Type    Laquita Gage PT DPT Physical Therapist        Mobility    No documentation.       Obj/Interventions    No documentation.        Goals/Plan    No documentation.           PT Assessment (last 12 hours)      PT Evaluation and Treatment     Row Name 12/01/20 0740          Wound 11/30/20 1019 Left distal thigh Graft    Wound - Properties Group Placement Date: 11/30/20  - Placement Time: 1019  -AC Present on Hospital Admission: N  -AC Side: Left  -AC Orientation: distal  -AC Location: thigh  -AC Primary Wound Type: Graft  -AC Additional Comments: tegaderm  -AC    Dressing Appearance  other (see comments) wound vac dressing removed by nsg; 4x4 intact  -SB     Base  other (see comments) skin graft  -SB     Periwound  intact  -SB     Periwound Temperature  warm  -SB     Periwound Skin Turgor  soft  -SB     Wound Length (cm)  12 cm  -SB     Wound Width (cm)  10 cm  -SB     Wound Depth (cm)  0 cm  -SB     Drainage Characteristics/Odor  serosanguineous  -SB     Drainage Amount  scant  -SB     Care, Wound  negative pressure wound therapy  -SB     Dressing Care  -- xeroform to wound base, black foam   -SB     Periwound Care  barrier ointment applied  -SB     Wound Output (mL)  0  -SB     Retired Wound - Properties Group Date first assessed: 11/30/20  - Time first assessed: 1019  -AC Present on Hospital Admission: N  -AC Side: Left  -AC Location: thigh  -AC Primary Wound Type: Graft  -AC Additional Comments: tegaderm  -AC    Row Name 12/01/20 0740          NPWT (Negative Pressure Wound Therapy) 11/30/20 1030 left lower leg    NPWT (Negative Pressure Wound Therapy) - Properties Group Placement Date: 11/30/20  -AC Placement Time: 1030  -AC Location: left lower leg  -AC    Therapy Setting  continuous therapy  -SB     Dressing  foam, black xeroform to skin graft  -SB     Pressure Setting  100 mmHg  -SB     Sponges Inserted  1  -SB     Finger sweep complete  Yes  -SB     Retired NPWT (Negative Pressure Wound Therapy) - Properties Group Placement Date: 11/30/20  -AC Placement Time: 1030  -AC Location: left lower leg  -AC    Row Name 12/01/20 0740           Physical Therapy Goals    Wound Care Goal Selection (PT)  wound care, PT goal 1  -SB     Row Name 12/01/20 0740          Wound Care Goal 1 (PT)    Wound Care Goal 1 (PT)  Wound vac will remain intact for next 5 days until follow up with surgeon  -SB     Time Frame (Wound Care Goal 1, PT)  by discharge  -SB     Progress/Outcome (Wound Care Goal 1, PT)  goal ongoing  -SB       User Key  (r) = Recorded By, (t) = Taken By, (c) = Cosigned By    Initials Name Provider Type    Katt Saldana RN Registered Nurse    Laquita Gage, PT DPT Physical Therapist          Clinical Impression     Row Name 12/01/20 0740          Pain    Additional Documentation  Pain Scale: Numbers Pre/Post-Treatment (Group)  -SB     Row Name 12/01/20 0740          Pain Scale: Numbers Pre/Post-Treatment    Pretreatment Pain Rating  5/10  -SB     Pain Location - Side  Left  -SB     Pain Location - Orientation  lower  -SB     Pain Location  extremity  -SB     Pain Intervention(s)  Medication (See MAR);Repositioned  -SB     Row Name 12/01/20 0740          Plan of Care Review    Plan of Care Reviewed With  patient  -SB     Progress  no change  -SB     Outcome Summary  PT wound eval completed. Upon speaking with nsg, pt had pulled off track pad while putting on pants early this AM. Nsg removed wound vac dressing and placed 4x4 dressing on skin graft site. MD arrived while PT was in room assessing situation and requested vac dressing to be replaced. Xeroform was used on skin graft site and black foam placed on top. NPWT initiated on 100 mmHg. Good seal obtained. Per MD, wound vac is to remain on and intact until follow up at MD office. Pt educated on not unhooking tubing for any reason and to not disrupt the dressing. Therapy will check dressing daily. Recommend d/c home with assist and home vac.  -SB     Row Name 12/01/20 0740          Therapy Assessment/Plan (PT)    Patient/Family Therapy Goals Statement (PT)  go home, wound healing  -SB      Rehab Potential (PT)  good, to achieve stated therapy goals  -SB     Criteria for Skilled Interventions Met (PT)  yes;meets criteria;skilled treatment is necessary  -SB     Predicted Duration of Therapy Intervention (PT)  until discharge  -SB     Row Name 12/01/20 0740          Positioning and Restraints    Pre-Treatment Position  in bed  -SB     Post Treatment Position  bed  -SB     In Bed  encouraged to call for assist;call light within reach;fowlers  -SB       User Key  (r) = Recorded By, (t) = Taken By, (c) = Cosigned By    Initials Name Provider Type    SB Laquita Myles, PT DPT Physical Therapist        Outcome Measures    No documentation.       Physical Therapy Education                 Title: PT OT SLP Therapies (In Progress)     Topic: Physical Therapy (In Progress)     Point: Mobility training (Not Started)     Learner Progress:  Not documented in this visit.          Point: Home exercise program (Not Started)     Learner Progress:  Not documented in this visit.          Point: Body mechanics (Not Started)     Learner Progress:  Not documented in this visit.          Point: Precautions (Done)     Learning Progress Summary           Patient Acceptance, E, VU by DOMINICK at 12/1/2020 0926    Comment: pt edu on wound vac purpose, not getting the dressing wet, to not unhook the tubing, the alarm system on the vac                               User Key     Initials Effective Dates Name Provider Type Discipline    SB 10/31/19 -  Laquita Myles PT DPT Physical Therapist PT              PT Recommendation and Plan     Plan of Care Reviewed With: patient  Progress: no change  Outcome Summary: PT wound eval completed. Upon speaking with nsg, pt had pulled off track pad while putting on pants early this AM. Nsg removed wound vac dressing and placed 4x4 dressing on skin graft site. MD arrived while PT was in room assessing situation and requested vac dressing to be replaced. Xeroform was used on skin graft site and  black foam placed on top. NPWT initiated on 100 mmHg. Good seal obtained. Per MD, wound vac is to remain on and intact until follow up at MD office. Pt educated on not unhooking tubing for any reason and to not disrupt the dressing. Therapy will check dressing daily. Recommend d/c home with assist and home vac.     Time Calculation:   PT Charges     Row Name 12/01/20 0928             Time Calculation    Start Time  0740  -SB      Stop Time  0850  -SB      Time Calculation (min)  70 min  -SB      PT Received On  12/01/20  -SB      PT Goal Re-Cert Due Date  12/11/20  -        User Key  (r) = Recorded By, (t) = Taken By, (c) = Cosigned By    Initials Name Provider Type     Laquita Myles, PT DPT Physical Therapist        Therapy Charges for Today     Code Description Service Date Service Provider Modifiers Qty    21133384456 HC PT NEG PRESS WOUND OVER 50SQCM DME4 12/1/2020 Laquita Myles, PT DPT GP 1    28708322149 HC PT EVAL LOW COMPLEXITY 1 12/1/2020 Laquita Myles, PT DPT GP 1               Laquita Myles PT DPT  12/1/2020

## 2020-12-01 NOTE — PAYOR COMM NOTE
"REF: [id number]    [facility]  [place],  [phone]  OR  FAX  [phone]     Robyn Dominguez (60 y.o. Female)     Date of Birth Social Security Number Address Home Phone MRN    1960  [id number] [facility] [address] [phone] [id number]    Mandaeism Marital Status          Other        Admission Date Admission Type Admitting Provider Attending Provider Department, Room/Bed    11/30/20 Elective Trace Hurd,   [facility] 3C, 365/1    Discharge Date Discharge Disposition Discharge Destination        12/1/2020 Home or Self Care              Attending Provider: (none)   Allergies: Bactrim [Sulfamethoxazole-trimethoprim], Hctz [Hydrochlorothiazide], Januvia [Sitagliptin]    Isolation: None   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                 Active Insurance as of 11/30/2020     Primary Coverage     Payor Plan Insurance Group Employer/Plan Group    [facility] [id number]     Payor Plan Address Payor Plan Phone Number Payor Plan Fax Number Effective Dates    [address] [phone]  1/1/2020 - None Entered    [facility] [id number]       Subscriber Name Subscriber Birth Date Member ID       ROBYN DOMINGUEZ 1960 [id number]                 Emergency Contacts      (Rel.) Home Phone Work Phone Mobile Phone    Rizwan Dominguez (Spouse) [phone] -- --            Discharge Summary    No notes of this type exist for this encounter.         Discharge Order (From admission, onward)     Start     Ordered    12/01/20 0830  Discharge patient  Once     Expected Discharge Date: 12/01/20    Expected Discharge Time: Afternoon    Discharge Disposition: Home or Self Care    Physician of Record for Attribution - Please select from Treatment Team: TRACE HURD [[id number]]    Review needed by CMO to " determine Physician of Record: No       Question Answer Comment   Physician of Record for Attribution - Please select from Treatment Team ABHINAV GERARDO    Review needed by CMO to determine Physician of Record No        12/01/20 0800

## 2020-12-01 NOTE — PROGRESS NOTES
Continued Stay Note   [facility]     Patient Name: Robyn Minaya  MRN: [id number]  Today's Date: 12/1/2020    Admit Date: 11/30/2020    Discharge Plan     Row Name 12/01/20 1226       Plan    Plan  Home with [facility]    Patient/Family in Agreement with Plan  yes    Plan Comments  Woound vac has been approved and delivered to pt's room. Proof of delivery is signed and being faxed to [facility] at [phone]. Pt says she has [facility] so new orders faxed to them at [phone].    Update: Spoke with Essie at [facility] [phone] to confirm they did receive the referral.         Discharge Codes    No documentation.       Expected Discharge Date and Time     Expected Discharge Date Expected Discharge Time    Dec 1, 2020             MALU Robles

## 2020-12-04 ENCOUNTER — TELEPHONE (OUTPATIENT)
Dept: VASCULAR SURGERY | Facility: CLINIC | Age: 60
End: 2020-12-04

## 2020-12-04 NOTE — TELEPHONE ENCOUNTER
Patient called and stated that when she had removed her bandage yesterday that there was some bleeding and she wanted to make sure that was ok and that the pain was normal.  Spoke with Vivi CARIAS who advised that was ok and likely occurred due to it being the first change.  Also advised that pain should be expected.  Patient expressed understanding for all that was discussed.

## 2020-12-04 NOTE — TELEPHONE ENCOUNTER
Spoke with Mrs Minaya reminding her of her appointment for Monday, December 7th, 2020 at 1045 am with Vivi CARIAS. Mrs Minaya confirmed she would be here.

## 2020-12-07 ENCOUNTER — OFFICE VISIT (OUTPATIENT)
Dept: VASCULAR SURGERY | Facility: CLINIC | Age: 60
End: 2020-12-07

## 2020-12-07 ENCOUNTER — TELEMEDICINE (OUTPATIENT)
Dept: FAMILY MEDICINE CLINIC | Age: 60
End: 2020-12-07
Payer: COMMERCIAL

## 2020-12-07 VITALS
DIASTOLIC BLOOD PRESSURE: 76 MMHG | BODY MASS INDEX: 37.29 KG/M2 | HEIGHT: 59 IN | WEIGHT: 185 LBS | SYSTOLIC BLOOD PRESSURE: 126 MMHG

## 2020-12-07 DIAGNOSIS — S81.802A WOUND OF LEFT LOWER EXTREMITY, INITIAL ENCOUNTER: Primary | ICD-10-CM

## 2020-12-07 PROCEDURE — 99443 PR PHYS/QHP TELEPHONE EVALUATION 21-30 MIN: CPT | Performed by: NURSE PRACTITIONER

## 2020-12-07 PROCEDURE — 99024 POSTOP FOLLOW-UP VISIT: CPT | Performed by: NURSE PRACTITIONER

## 2020-12-07 RX ORDER — TRAZODONE HYDROCHLORIDE 50 MG/1
TABLET ORAL
Qty: 90 TABLET | Refills: 0 | Status: SHIPPED | OUTPATIENT
Start: 2020-12-07 | End: 2021-01-08 | Stop reason: SDUPTHER

## 2020-12-07 NOTE — PROGRESS NOTES
Jamie Martines is a 62 y.o. female evaluated via telephone on 12/7/2020. Chief Complaint   Patient presents with    Anxiety    Depression           Consent:  She and/or health care decision maker is aware that that she may receive a bill for this telephone service, depending on her insurance coverage, and has provided verbal consent to proceed: Yes      Documentation:  I communicated with the patient and/or health care decision maker about medication. Details of this discussion including any medical advice provided: see notes. HPI  Pt continues to see therapist every week but with holiday last week, it was a 2wk interval.  Pt feels she is doing well with anxiety/depression. She has now stopped Xanax and is using fluoxetine 20mg daily. Pt reports that she has had wound vac for the last week and it was removed today. Pt is continuing f/u with wound care and has been told that grafting \"took well\". Pt states she will upload pics for me tomorrow. She does report that the donor site is very tender but surgeon felt it is healing well. Pt continues to have nerve pain at wound and reports the recent changes of dosing of gabapentin has helped. She is tolerating med well. Pt states that she is not sleeping well. She states at the very most she is sleeping 3hrs/night. She reads at bedtime, dimmed Brendon for reading, camomille tea, melatonin, going to sleep ok but staying asleep is a problem. She cannot go back to sleep once awake. No naps. Onset of problems was decades ago but has increased since stopping seroquel (along with several other meds that were stopped). Patient-Reported Vitals 8/26/2020   Patient-Reported Weight -   Patient-Reported Height -   Patient-Reported Systolic -   Patient-Reported Diastolic -   Patient-Reported Pulse -   Patient-Reported Temperature -   Patient-Reported SpO2 95       Diagnosis Orders   1. Insomnia, unspecified type  traZODone (DESYREL) 50 MG tablet   2. Moderate episode of recurrent major depressive disorder (HCC)  Continue fluoxetine 20   3. Anxiety  Continue fluoxetine 20     Pt to update me on sleep med in approx 1-2wks. Will send in 90d if needed. F/u prn    I affirm this is a Patient Initiated Episode with an Established Patient who has not had a related appointment within my department in the past 7 days or scheduled within the next 24 hours.     Total Time: minutes: 21-30 minutes    Note: not billable if this call serves to triage the patient into an appointment for the relevant concern      Tea Lu

## 2020-12-08 ENCOUNTER — TELEPHONE (OUTPATIENT)
Dept: VASCULAR SURGERY | Facility: CLINIC | Age: 60
End: 2020-12-08

## 2020-12-08 ENCOUNTER — PATIENT MESSAGE (OUTPATIENT)
Dept: FAMILY MEDICINE CLINIC | Age: 60
End: 2020-12-08

## 2020-12-08 NOTE — TELEPHONE ENCOUNTER
From: Shalonda Bowens  To: MAEVE Zamora  Sent: 12/8/2020 2:47 PM CST  Subject: Visit Follow-Up Question    Here are pics of my wound with the skin graft on it. The wound vac leaked again and did some damage to my leg. There are some pustule type things and they weep. I pat my leg dry of it and watch it come out and dribble down my leg some more. I really dont know what to do about it or for it. Anyway, the doc was happy with how it looks. I just need to be careful with it because it's still fragile.

## 2020-12-08 NOTE — TELEPHONE ENCOUNTER
Mrs Minaya called in regards to showering with her skin graft.     Spoke with Dr Hurd in regards to Mrs Minaya showering with her skin graft. Dr Hurd advised she could shower but she had to be Very Careful with showering as not to scrub the skin graft area and to just let the water run over it lightly.     Spoke with  Rei letting her know that Dr Hurd advised she could shower she just had to be extremely careful with the skin graft area not to scrub it but to just let the water run over it lightly. I stressed to Mrs Minaya that she was Not to scrub or bother the skin graft area just to let the water lightly run over it and be very careful. Mrs Minaya verbalized understanding.

## 2020-12-11 NOTE — PROGRESS NOTES
"12/07/2020       Roberto Wells DPM  [address] [facility] [address]  [place] [address]    Robyn Minaya  1960    Chief Complaint   Patient presents with   • Follow-up     2 Week Post-Op Follow Up For SPLIT THICKNESS SKIN GRAFT LEFT LOWER EXTREMTIY WITH WOUND VAC PLACEMENT. Patient denies any stroke like symptoms.    • Former Smoker     Patient is a Former Smoker - Quit 07/2020   • Med Management     Verbally verified medications with patient.        Dear Roberto Wells DPM    HPI  I had the pleasure of seeing your patient Robyn Minaya in the office today.  Thank you kindly for this consultation.  As you recall, Robyn Minaya is a 60 y.o.  female who you are currently following for a wound to her left lower extremity.  The wound was originally caused by a brown recluse.  She was treated at [facility] wound care and the wound continued to worsen.  She has been followed by Dr. Wells here in our wound care center and she is improved significantly.  She did still have a large wound and underwent split-thickness skin graft to her left lower extremity leg wound with donor site of the left thigh.  She is now back for follow-up and has had a wound VAC in place since discharge.        Review of Systems   Constitutional: Negative.    HENT: Negative.    Eyes: Negative.    Respiratory: Negative.    Cardiovascular: Positive for leg swelling.   Gastrointestinal: Negative.    Endocrine: Negative.    Genitourinary: Negative.    Musculoskeletal: Negative.    Skin: Positive for wound.   Allergic/Immunologic: Negative.    Neurological: Negative.    Hematological: Negative.    Psychiatric/Behavioral: Negative.    All other systems reviewed and are negative.    /76 (BP Location: Right arm, Patient Position: Sitting, Cuff Size: Adult)   Ht 149.9 cm (59\")   Wt 83.9 kg (185 lb)   BMI 37.37 kg/m²     Physical Exam  Vitals signs and nursing note reviewed.   Constitutional:       Appearance: Normal appearance. She is " well-developed.   HENT:      Head: Normocephalic and atraumatic.   Eyes:      General: No scleral icterus.     Pupils: Pupils are equal, round, and reactive to light.   Neck:      Musculoskeletal: Neck supple.      Thyroid: No thyromegaly.      Vascular: No carotid bruit or JVD.   Cardiovascular:      Rate and Rhythm: Normal rate and regular rhythm.      Pulses:           Carotid pulses are 2+ on the right side and 2+ on the left side.       Femoral pulses are 2+ on the right side and 2+ on the left side.       Popliteal pulses are 2+ on the right side and 2+ on the left side.        Dorsalis pedis pulses are 2+ on the right side and 2+ on the left side.        Posterior tibial pulses are 2+ on the right side and 2+ on the left side.      Heart sounds: Normal heart sounds.   Pulmonary:      Effort: Pulmonary effort is normal.      Breath sounds: Normal breath sounds.   Abdominal:      General: Bowel sounds are normal. There is no distension or abdominal bruit.      Palpations: Abdomen is soft. There is no mass.      Tenderness: There is no abdominal tenderness.   Musculoskeletal: Normal range of motion.        Legs:    Lymphadenopathy:      Cervical: No cervical adenopathy.   Skin:     General: Skin is warm and dry.      Comments: Staples removed to wound and wound VAC removed.  Vaseline gauze replaced over wound and wrapped with Kerlix.   Neurological:      Mental Status: She is alert and oriented to person, place, and time.      Cranial Nerves: No cranial nerve deficit.      Sensory: No sensory deficit.   Psychiatric:         Mood and Affect: Mood normal.         Behavior: Behavior normal.         Thought Content: Thought content normal.         Judgment: Judgment normal.         Patient Active Problem List   Diagnosis   • Spondylosis with myelopathy, lumbar region   • Nasal septal perforation   • Chronic maxillary sinusitis   • Nausea   • Epigastric pain   • Urinary incontinence   • Dermatochalasis of both upper  eyelids   • Visual field defect   • Hx of colonic polyps   • Other constipation   • Lower abdominal pain   • Nasal cavity mass   • Abnormal nasal septum   • Essential hypertension   • Acquired hypothyroidism   • Mixed hyperlipidemia   • Chronic back pain greater than 3 months duration   • Prediabetes   • Cellulitis of left lower extremity   • Wound of left leg   • Chronic pain syndrome   • Acute pain   • Anxiety   • DOMINIC (acute kidney injury) (CMS/HCC)   • Tobacco abuse   • Infection of wound due to methicillin resistant Staphylococcus aureus (MRSA)   • Acute blood loss anemia   • Anemia   • Fatigue   • Preop testing        Diagnosis Plan   1. Wound of left lower extremity, initial encounter         Plan: After thoroughly evaluating Robyn Minaya, I am pleased to report she is doing well status post split thickness skin graft.  Her donor site appears to be clean to the left thigh.  I would like her to continue using Vaseline gauze and ABD pad to this area.  Her wound VAC was removed to her skin graft and staples were removed.  Skin graft is still fragile but does appear to be adhering to the wound.  She will continue to follow with wound care going forward.  She can follow with our office as needed.  I did discuss vascular risk factors as they pertain to the progression of vascular disease including controlling hypertension and hyperlipidemia.  These risk factors are currently stable. The patient is to continue taking their medications as previously discussed.   This was all discussed in full with complete understanding.  Thank you for allowing me to participate in the care of your patient.  Please do not hesitate to call with any questions or concerns.  We will keep you aware of any further encounters with Robyn Minaya.        Sincerely yours,         ARETHA Martinez Staci W, APRN

## 2020-12-14 ENCOUNTER — OFFICE VISIT (OUTPATIENT)
Dept: WOUND CARE | Facility: HOSPITAL | Age: 60
End: 2020-12-14

## 2020-12-14 PROCEDURE — 99213 OFFICE O/P EST LOW 20 MIN: CPT | Performed by: NURSE PRACTITIONER

## 2020-12-14 PROCEDURE — G0463 HOSPITAL OUTPT CLINIC VISIT: HCPCS

## 2020-12-16 ENCOUNTER — TELEMEDICINE (OUTPATIENT)
Dept: FAMILY MEDICINE CLINIC | Age: 60
End: 2020-12-16
Payer: COMMERCIAL

## 2020-12-16 PROCEDURE — 99442 PR PHYS/QHP TELEPHONE EVALUATION 11-20 MIN: CPT | Performed by: NURSE PRACTITIONER

## 2020-12-16 RX ORDER — DICLOFENAC SODIUM 75 MG/1
75 TABLET, DELAYED RELEASE ORAL 2 TIMES DAILY
COMMUNITY
Start: 2020-11-10

## 2020-12-16 RX ORDER — PRAMIPEXOLE DIHYDROCHLORIDE 1.5 MG/1
TABLET ORAL
Qty: 90 TABLET | Refills: 1 | Status: SHIPPED | OUTPATIENT
Start: 2020-12-16 | End: 2021-04-19 | Stop reason: SDUPTHER

## 2020-12-16 RX ORDER — GABAPENTIN 300 MG/1
CAPSULE ORAL
Qty: 90 CAPSULE | Refills: 1 | Status: SHIPPED | OUTPATIENT
Start: 2020-12-16 | End: 2021-01-15

## 2020-12-16 RX ORDER — HYDROCODONE BITARTRATE AND ACETAMINOPHEN 7.5; 325 MG/1; MG/1
1 TABLET ORAL 2 TIMES DAILY PRN
COMMUNITY
Start: 2020-11-10 | End: 2022-09-14 | Stop reason: ALTCHOICE

## 2020-12-16 NOTE — PROGRESS NOTES
affirm this is a Patient Initiated Episode with an Established Patient who has not had a related appointment within my department in the past 7 days or scheduled within the next 24 hours. [address] [facility] [address] as discussed with pt.     Total Time: minutes: 11-20 minutes    Note: not billable if this call serves to triage the patient into an appointment for the relevant concern      Tea Lu

## 2020-12-22 ENCOUNTER — OFFICE VISIT (OUTPATIENT)
Dept: WOUND CARE | Facility: HOSPITAL | Age: 60
End: 2020-12-22

## 2020-12-22 PROCEDURE — 11045 DBRDMT SUBQ TISS EACH ADDL: CPT | Performed by: SURGERY

## 2020-12-22 PROCEDURE — 11042 DBRDMT SUBQ TIS 1ST 20SQCM/<: CPT | Performed by: SURGERY

## 2020-12-30 ENCOUNTER — OFFICE VISIT (OUTPATIENT)
Dept: WOUND CARE | Facility: HOSPITAL | Age: 60
End: 2020-12-30

## 2020-12-30 ENCOUNTER — APPOINTMENT (OUTPATIENT)
Dept: GENERAL RADIOLOGY | Facility: HOSPITAL | Age: 60
End: 2020-12-30

## 2020-12-30 ENCOUNTER — HOSPITAL ENCOUNTER (OUTPATIENT)
Facility: HOSPITAL | Age: 60
Setting detail: OBSERVATION
Discharge: HOME | End: 2021-01-02
Attending: EMERGENCY MEDICINE | Admitting: INTERNAL MEDICINE

## 2020-12-30 DIAGNOSIS — L03.116 CELLULITIS OF LEFT LOWER EXTREMITY: Primary | ICD-10-CM

## 2020-12-30 PROBLEM — S81.809S: Status: ACTIVE | Noted: 2020-12-30

## 2020-12-30 LAB
ALBUMIN SERPL-MCNC: 3.8 G/DL (ref 3.5–5.2)
ALBUMIN/GLOB SERPL: 1.1 G/DL
ALP SERPL-CCNC: 134 U/L (ref 39–117)
ALT SERPL W P-5'-P-CCNC: 20 U/L (ref 1–33)
ANION GAP SERPL CALCULATED.3IONS-SCNC: 10 MMOL/L (ref 5–15)
AST SERPL-CCNC: 13 U/L (ref 1–32)
BASOPHILS # BLD AUTO: 0.09 10*3/MM3 (ref 0–0.2)
BASOPHILS NFR BLD AUTO: 0.9 % (ref 0–1.5)
BILIRUB SERPL-MCNC: <0.2 MG/DL (ref 0–1.2)
BUN SERPL-MCNC: 19 MG/DL (ref 6–20)
BUN/CREAT SERPL: 20 (ref 7–25)
CALCIUM SPEC-SCNC: 9.2 MG/DL (ref 8.6–10.5)
CHLORIDE SERPL-SCNC: 97 MMOL/L (ref 98–107)
CK SERPL-CCNC: 55 U/L (ref 20–180)
CO2 SERPL-SCNC: 31 MMOL/L (ref 22–29)
CREAT SERPL-MCNC: 0.95 MG/DL (ref 0.57–1)
D-LACTATE SERPL-SCNC: 1.4 MMOL/L (ref 0.5–2)
DEPRECATED RDW RBC AUTO: 44.3 FL (ref 37–54)
EOSINOPHIL # BLD AUTO: 0.33 10*3/MM3 (ref 0–0.4)
EOSINOPHIL NFR BLD AUTO: 3.2 % (ref 0.3–6.2)
ERYTHROCYTE [DISTWIDTH] IN BLOOD BY AUTOMATED COUNT: 14.5 % (ref 12.3–15.4)
GFR SERPL CREATININE-BSD FRML MDRD: 60 ML/MIN/1.73
GLOBULIN UR ELPH-MCNC: 3.5 GM/DL
GLUCOSE SERPL-MCNC: 135 MG/DL (ref 65–99)
HCT VFR BLD AUTO: 33.8 % (ref 34–46.6)
HGB BLD-MCNC: 11 G/DL (ref 12–15.9)
HOLD SPECIMEN: NORMAL
IMM GRANULOCYTES # BLD AUTO: 0.19 10*3/MM3 (ref 0–0.05)
IMM GRANULOCYTES NFR BLD AUTO: 1.8 % (ref 0–0.5)
LYMPHOCYTES # BLD AUTO: 3.41 10*3/MM3 (ref 0.7–3.1)
LYMPHOCYTES NFR BLD AUTO: 32.9 % (ref 19.6–45.3)
MCH RBC QN AUTO: 27.2 PG (ref 26.6–33)
MCHC RBC AUTO-ENTMCNC: 32.5 G/DL (ref 31.5–35.7)
MCV RBC AUTO: 83.7 FL (ref 79–97)
MONOCYTES # BLD AUTO: 0.78 10*3/MM3 (ref 0.1–0.9)
MONOCYTES NFR BLD AUTO: 7.5 % (ref 5–12)
NEUTROPHILS NFR BLD AUTO: 5.58 10*3/MM3 (ref 1.7–7)
NEUTROPHILS NFR BLD AUTO: 53.7 % (ref 42.7–76)
NRBC BLD AUTO-RTO: 0 /100 WBC (ref 0–0.2)
PLATELET # BLD AUTO: 495 10*3/MM3 (ref 140–450)
PMV BLD AUTO: 9.6 FL (ref 6–12)
POTASSIUM SERPL-SCNC: 4 MMOL/L (ref 3.5–5.2)
PROCALCITONIN SERPL-MCNC: 0.11 NG/ML (ref 0–0.25)
PROT SERPL-MCNC: 7.3 G/DL (ref 6–8.5)
RBC # BLD AUTO: 4.04 10*6/MM3 (ref 3.77–5.28)
SARS-COV-2 RNA PNL SPEC NAA+PROBE: NOT DETECTED
SODIUM SERPL-SCNC: 138 MMOL/L (ref 136–145)
WBC # BLD AUTO: 10.38 10*3/MM3 (ref 3.4–10.8)
WHOLE BLOOD HOLD SPECIMEN: NORMAL
WHOLE BLOOD HOLD SPECIMEN: NORMAL

## 2020-12-30 PROCEDURE — 25010000002 MORPHINE PER 10 MG: Performed by: EMERGENCY MEDICINE

## 2020-12-30 PROCEDURE — 25010000002 PIPERACILLIN SOD-TAZOBACTAM PER 1 G: Performed by: INTERNAL MEDICINE

## 2020-12-30 PROCEDURE — 25010000002 VANCOMYCIN PER 500 MG: Performed by: EMERGENCY MEDICINE

## 2020-12-30 PROCEDURE — 85025 COMPLETE CBC W/AUTO DIFF WBC: CPT | Performed by: EMERGENCY MEDICINE

## 2020-12-30 PROCEDURE — 99284 EMERGENCY DEPT VISIT MOD MDM: CPT

## 2020-12-30 PROCEDURE — 87040 BLOOD CULTURE FOR BACTERIA: CPT | Performed by: EMERGENCY MEDICINE

## 2020-12-30 PROCEDURE — C9803 HOPD COVID-19 SPEC COLLECT: HCPCS

## 2020-12-30 PROCEDURE — 96375 TX/PRO/DX INJ NEW DRUG ADDON: CPT

## 2020-12-30 PROCEDURE — 25010000002 VANCOMYCIN 10 G RECONSTITUTED SOLUTION: Performed by: INTERNAL MEDICINE

## 2020-12-30 PROCEDURE — G0378 HOSPITAL OBSERVATION PER HR: HCPCS

## 2020-12-30 PROCEDURE — 96368 THER/DIAG CONCURRENT INF: CPT

## 2020-12-30 PROCEDURE — 82550 ASSAY OF CK (CPK): CPT | Performed by: EMERGENCY MEDICINE

## 2020-12-30 PROCEDURE — 99214 OFFICE O/P EST MOD 30 MIN: CPT | Performed by: NURSE PRACTITIONER

## 2020-12-30 PROCEDURE — 84145 PROCALCITONIN (PCT): CPT | Performed by: EMERGENCY MEDICINE

## 2020-12-30 PROCEDURE — G0463 HOSPITAL OUTPT CLINIC VISIT: HCPCS

## 2020-12-30 PROCEDURE — 96365 THER/PROPH/DIAG IV INF INIT: CPT

## 2020-12-30 PROCEDURE — 73590 X-RAY EXAM OF LOWER LEG: CPT

## 2020-12-30 PROCEDURE — 83605 ASSAY OF LACTIC ACID: CPT | Performed by: EMERGENCY MEDICINE

## 2020-12-30 PROCEDURE — 25010000002 ONDANSETRON PER 1 MG: Performed by: EMERGENCY MEDICINE

## 2020-12-30 PROCEDURE — 25010000002 PIPERACILLIN SOD-TAZOBACTAM PER 1 G: Performed by: EMERGENCY MEDICINE

## 2020-12-30 PROCEDURE — 96376 TX/PRO/DX INJ SAME DRUG ADON: CPT

## 2020-12-30 PROCEDURE — 80053 COMPREHEN METABOLIC PANEL: CPT | Performed by: EMERGENCY MEDICINE

## 2020-12-30 PROCEDURE — 25010000003 HYDROMORPHONE 1 MG/ML SOLUTION: Performed by: INTERNAL MEDICINE

## 2020-12-30 PROCEDURE — 96366 THER/PROPH/DIAG IV INF ADDON: CPT

## 2020-12-30 PROCEDURE — 87635 SARS-COV-2 COVID-19 AMP PRB: CPT | Performed by: EMERGENCY MEDICINE

## 2020-12-30 RX ORDER — ONDANSETRON 2 MG/ML
4 INJECTION INTRAMUSCULAR; INTRAVENOUS ONCE
Status: COMPLETED | OUTPATIENT
Start: 2020-12-30 | End: 2020-12-30

## 2020-12-30 RX ORDER — GABAPENTIN 400 MG/1
300 CAPSULE ORAL EVERY 6 HOURS
COMMUNITY
End: 2022-03-02

## 2020-12-30 RX ORDER — SPIRONOLACTONE 25 MG/1
25 TABLET ORAL 2 TIMES DAILY
Status: DISCONTINUED | OUTPATIENT
Start: 2020-12-30 | End: 2020-12-30

## 2020-12-30 RX ORDER — CARVEDILOL 6.25 MG/1
6.25 TABLET ORAL 2 TIMES DAILY WITH MEALS
Status: DISCONTINUED | OUTPATIENT
Start: 2020-12-30 | End: 2021-01-02 | Stop reason: HOSPADM

## 2020-12-30 RX ORDER — OXYCODONE HYDROCHLORIDE AND ACETAMINOPHEN 5; 325 MG/1; MG/1
1 TABLET ORAL EVERY 4 HOURS PRN
Status: DISCONTINUED | OUTPATIENT
Start: 2020-12-30 | End: 2021-01-02 | Stop reason: HOSPADM

## 2020-12-30 RX ORDER — NAPROXEN 500 MG/1
250 TABLET ORAL 2 TIMES DAILY WITH MEALS
Status: DISCONTINUED | OUTPATIENT
Start: 2020-12-30 | End: 2021-01-02 | Stop reason: HOSPADM

## 2020-12-30 RX ORDER — ATORVASTATIN CALCIUM 40 MG/1
40 TABLET, FILM COATED ORAL NIGHTLY
Status: DISCONTINUED | OUTPATIENT
Start: 2020-12-30 | End: 2021-01-02 | Stop reason: HOSPADM

## 2020-12-30 RX ORDER — BUMETANIDE 1 MG/1
1 TABLET ORAL DAILY
Status: DISCONTINUED | OUTPATIENT
Start: 2020-12-30 | End: 2020-12-30

## 2020-12-30 RX ORDER — VANCOMYCIN HYDROCHLORIDE 1 G/200ML
1000 INJECTION, SOLUTION INTRAVENOUS ONCE
Status: COMPLETED | OUTPATIENT
Start: 2020-12-30 | End: 2020-12-30

## 2020-12-30 RX ORDER — ACETAMINOPHEN 325 MG/1
650 TABLET ORAL EVERY 4 HOURS PRN
Status: DISCONTINUED | OUTPATIENT
Start: 2020-12-30 | End: 2021-01-02 | Stop reason: HOSPADM

## 2020-12-30 RX ORDER — PILOCARPINE HYDROCHLORIDE 5 MG/1
5 TABLET, FILM COATED ORAL 3 TIMES DAILY
Status: DISCONTINUED | OUTPATIENT
Start: 2020-12-30 | End: 2021-01-02 | Stop reason: HOSPADM

## 2020-12-30 RX ORDER — ACETAMINOPHEN 650 MG/1
650 SUPPOSITORY RECTAL EVERY 4 HOURS PRN
Status: DISCONTINUED | OUTPATIENT
Start: 2020-12-30 | End: 2021-01-02 | Stop reason: HOSPADM

## 2020-12-30 RX ORDER — GABAPENTIN 300 MG/1
300 CAPSULE ORAL EVERY 8 HOURS SCHEDULED
Status: DISCONTINUED | OUTPATIENT
Start: 2020-12-30 | End: 2020-12-31

## 2020-12-30 RX ORDER — DIPHENHYDRAMINE HCL 25 MG
25 CAPSULE ORAL EVERY 6 HOURS PRN
Status: DISCONTINUED | OUTPATIENT
Start: 2020-12-30 | End: 2021-01-02 | Stop reason: HOSPADM

## 2020-12-30 RX ORDER — LEVOTHYROXINE SODIUM 0.1 MG/1
100 TABLET ORAL
Status: DISCONTINUED | OUTPATIENT
Start: 2020-12-31 | End: 2021-01-02 | Stop reason: HOSPADM

## 2020-12-30 RX ORDER — SODIUM CHLORIDE 9 MG/ML
75 INJECTION, SOLUTION INTRAVENOUS CONTINUOUS
Status: DISCONTINUED | OUTPATIENT
Start: 2020-12-30 | End: 2021-01-01

## 2020-12-30 RX ORDER — SODIUM CHLORIDE 0.9 % (FLUSH) 0.9 %
10 SYRINGE (ML) INJECTION EVERY 12 HOURS SCHEDULED
Status: DISCONTINUED | OUTPATIENT
Start: 2020-12-30 | End: 2021-01-02 | Stop reason: HOSPADM

## 2020-12-30 RX ORDER — SODIUM CHLORIDE 0.9 % (FLUSH) 0.9 %
10 SYRINGE (ML) INJECTION AS NEEDED
Status: DISCONTINUED | OUTPATIENT
Start: 2020-12-30 | End: 2021-01-02 | Stop reason: HOSPADM

## 2020-12-30 RX ORDER — ACETAMINOPHEN 160 MG/5ML
650 SOLUTION ORAL EVERY 4 HOURS PRN
Status: DISCONTINUED | OUTPATIENT
Start: 2020-12-30 | End: 2021-01-02 | Stop reason: HOSPADM

## 2020-12-30 RX ORDER — ALLOPURINOL 100 MG/1
100 TABLET ORAL DAILY
Status: DISCONTINUED | OUTPATIENT
Start: 2020-12-30 | End: 2021-01-02 | Stop reason: HOSPADM

## 2020-12-30 RX ORDER — PANTOPRAZOLE SODIUM 40 MG/1
40 TABLET, DELAYED RELEASE ORAL EVERY MORNING
Status: DISCONTINUED | OUTPATIENT
Start: 2020-12-31 | End: 2021-01-02 | Stop reason: HOSPADM

## 2020-12-30 RX ORDER — FLUOXETINE HYDROCHLORIDE 20 MG/1
20 CAPSULE ORAL DAILY
Status: DISCONTINUED | OUTPATIENT
Start: 2020-12-30 | End: 2021-01-02 | Stop reason: HOSPADM

## 2020-12-30 RX ORDER — ONDANSETRON 2 MG/ML
4 INJECTION INTRAMUSCULAR; INTRAVENOUS EVERY 6 HOURS PRN
Status: DISCONTINUED | OUTPATIENT
Start: 2020-12-30 | End: 2021-01-02 | Stop reason: HOSPADM

## 2020-12-30 RX ORDER — TIZANIDINE 4 MG/1
4 TABLET ORAL NIGHTLY
Status: DISCONTINUED | OUTPATIENT
Start: 2020-12-30 | End: 2021-01-02 | Stop reason: HOSPADM

## 2020-12-30 RX ORDER — OXYCODONE AND ACETAMINOPHEN 10; 325 MG/1; MG/1
1 TABLET ORAL EVERY 4 HOURS PRN
Status: DISCONTINUED | OUTPATIENT
Start: 2020-12-30 | End: 2021-01-02 | Stop reason: HOSPADM

## 2020-12-30 RX ORDER — NALOXONE HCL 0.4 MG/ML
0.4 VIAL (ML) INJECTION
Status: DISCONTINUED | OUTPATIENT
Start: 2020-12-30 | End: 2021-01-02 | Stop reason: HOSPADM

## 2020-12-30 RX ORDER — GABAPENTIN 100 MG/1
100 CAPSULE ORAL EVERY 8 HOURS SCHEDULED
Status: DISCONTINUED | OUTPATIENT
Start: 2020-12-30 | End: 2020-12-30

## 2020-12-30 RX ADMIN — VANCOMYCIN HYDROCHLORIDE 1250 MG: 10 INJECTION, POWDER, LYOPHILIZED, FOR SOLUTION INTRAVENOUS at 21:14

## 2020-12-30 RX ADMIN — ONDANSETRON HYDROCHLORIDE 4 MG: 2 SOLUTION INTRAMUSCULAR; INTRAVENOUS at 13:05

## 2020-12-30 RX ADMIN — GABAPENTIN 300 MG: 300 CAPSULE ORAL at 21:15

## 2020-12-30 RX ADMIN — TIZANIDINE 4 MG: 4 TABLET ORAL at 21:15

## 2020-12-30 RX ADMIN — PIPERACILLIN SODIUM AND TAZOBACTAM SODIUM 4.5 G: 4; .5 INJECTION, POWDER, LYOPHILIZED, FOR SOLUTION INTRAVENOUS at 13:05

## 2020-12-30 RX ADMIN — SODIUM CHLORIDE 75 ML/HR: 9 INJECTION, SOLUTION INTRAVENOUS at 17:02

## 2020-12-30 RX ADMIN — VANCOMYCIN HYDROCHLORIDE 1000 MG: 1 INJECTION, SOLUTION INTRAVENOUS at 13:18

## 2020-12-30 RX ADMIN — ATORVASTATIN CALCIUM 40 MG: 40 TABLET, FILM COATED ORAL at 21:16

## 2020-12-30 RX ADMIN — MORPHINE SULFATE 4 MG: 4 INJECTION, SOLUTION INTRAMUSCULAR; INTRAVENOUS at 15:04

## 2020-12-30 RX ADMIN — OXYCODONE HYDROCHLORIDE AND ACETAMINOPHEN 1 TABLET: 10; 325 TABLET ORAL at 21:15

## 2020-12-30 RX ADMIN — TAZOBACTAM SODIUM AND PIPERACILLIN SODIUM 3.38 G: 375; 3 INJECTION, SOLUTION INTRAVENOUS at 17:53

## 2020-12-30 RX ADMIN — PILOCARPINE HYDROCHLORIDE 5 MG: 5 TABLET, FILM COATED ORAL at 17:52

## 2020-12-30 RX ADMIN — MORPHINE SULFATE 4 MG: 4 INJECTION, SOLUTION INTRAMUSCULAR; INTRAVENOUS at 13:05

## 2020-12-30 RX ADMIN — HYDROMORPHONE HYDROCHLORIDE 1 MG: 1 INJECTION, SOLUTION INTRAMUSCULAR; INTRAVENOUS; SUBCUTANEOUS at 18:02

## 2020-12-30 RX ADMIN — PILOCARPINE HYDROCHLORIDE 5 MG: 5 TABLET, FILM COATED ORAL at 21:16

## 2020-12-30 RX ADMIN — PRAMIPEXOLE DIHYDROCHLORIDE 1.5 MG: 1 TABLET ORAL at 21:16

## 2020-12-31 ENCOUNTER — APPOINTMENT (OUTPATIENT)
Dept: ULTRASOUND IMAGING | Facility: HOSPITAL | Age: 60
End: 2020-12-31

## 2020-12-31 LAB
ANION GAP SERPL CALCULATED.3IONS-SCNC: 12 MMOL/L (ref 5–15)
BUN SERPL-MCNC: 21 MG/DL (ref 6–20)
BUN/CREAT SERPL: 19.1 (ref 7–25)
CALCIUM SPEC-SCNC: 8.7 MG/DL (ref 8.6–10.5)
CHLORIDE SERPL-SCNC: 96 MMOL/L (ref 98–107)
CO2 SERPL-SCNC: 29 MMOL/L (ref 22–29)
CREAT SERPL-MCNC: 1.1 MG/DL (ref 0.57–1)
DEPRECATED RDW RBC AUTO: 44.1 FL (ref 37–54)
ERYTHROCYTE [DISTWIDTH] IN BLOOD BY AUTOMATED COUNT: 14.6 % (ref 12.3–15.4)
GFR SERPL CREATININE-BSD FRML MDRD: 51 ML/MIN/1.73
GLUCOSE SERPL-MCNC: 142 MG/DL (ref 65–99)
HCT VFR BLD AUTO: 30.5 % (ref 34–46.6)
HGB BLD-MCNC: 9.7 G/DL (ref 12–15.9)
MCH RBC QN AUTO: 26.9 PG (ref 26.6–33)
MCHC RBC AUTO-ENTMCNC: 31.8 G/DL (ref 31.5–35.7)
MCV RBC AUTO: 84.5 FL (ref 79–97)
PLATELET # BLD AUTO: 393 10*3/MM3 (ref 140–450)
PMV BLD AUTO: 9.6 FL (ref 6–12)
POTASSIUM SERPL-SCNC: 4.3 MMOL/L (ref 3.5–5.2)
RBC # BLD AUTO: 3.61 10*6/MM3 (ref 3.77–5.28)
SODIUM SERPL-SCNC: 137 MMOL/L (ref 136–145)
WBC # BLD AUTO: 9.19 10*3/MM3 (ref 3.4–10.8)

## 2020-12-31 PROCEDURE — 93971 EXTREMITY STUDY: CPT

## 2020-12-31 PROCEDURE — 94799 UNLISTED PULMONARY SVC/PX: CPT

## 2020-12-31 PROCEDURE — 96376 TX/PRO/DX INJ SAME DRUG ADON: CPT

## 2020-12-31 PROCEDURE — 25010000002 VANCOMYCIN 10 G RECONSTITUTED SOLUTION: Performed by: INTERNAL MEDICINE

## 2020-12-31 PROCEDURE — 25010000003 HYDROMORPHONE 1 MG/ML SOLUTION: Performed by: INTERNAL MEDICINE

## 2020-12-31 PROCEDURE — G0378 HOSPITAL OBSERVATION PER HR: HCPCS

## 2020-12-31 PROCEDURE — 25010000002 PIPERACILLIN SOD-TAZOBACTAM PER 1 G: Performed by: INTERNAL MEDICINE

## 2020-12-31 PROCEDURE — 80048 BASIC METABOLIC PNL TOTAL CA: CPT | Performed by: INTERNAL MEDICINE

## 2020-12-31 PROCEDURE — 99213 OFFICE O/P EST LOW 20 MIN: CPT | Performed by: SURGERY

## 2020-12-31 PROCEDURE — 85027 COMPLETE CBC AUTOMATED: CPT | Performed by: INTERNAL MEDICINE

## 2020-12-31 PROCEDURE — 93971 EXTREMITY STUDY: CPT | Performed by: SURGERY

## 2020-12-31 RX ORDER — TRAZODONE HYDROCHLORIDE 50 MG/1
50 TABLET ORAL NIGHTLY
Status: DISCONTINUED | OUTPATIENT
Start: 2020-12-31 | End: 2021-01-02 | Stop reason: HOSPADM

## 2020-12-31 RX ORDER — TRAZODONE HYDROCHLORIDE 100 MG/1
400 TABLET ORAL NIGHTLY
COMMUNITY

## 2020-12-31 RX ORDER — GABAPENTIN 400 MG/1
400 CAPSULE ORAL 3 TIMES DAILY
Status: DISCONTINUED | OUTPATIENT
Start: 2020-12-31 | End: 2021-01-02 | Stop reason: HOSPADM

## 2020-12-31 RX ADMIN — PILOCARPINE HYDROCHLORIDE 5 MG: 5 TABLET, FILM COATED ORAL at 09:23

## 2020-12-31 RX ADMIN — GABAPENTIN 300 MG: 300 CAPSULE ORAL at 07:01

## 2020-12-31 RX ADMIN — SODIUM CHLORIDE, PRESERVATIVE FREE 10 ML: 5 INJECTION INTRAVENOUS at 09:25

## 2020-12-31 RX ADMIN — GABAPENTIN 400 MG: 400 CAPSULE ORAL at 21:39

## 2020-12-31 RX ADMIN — HYDROMORPHONE HYDROCHLORIDE 1 MG: 1 INJECTION, SOLUTION INTRAMUSCULAR; INTRAVENOUS; SUBCUTANEOUS at 21:39

## 2020-12-31 RX ADMIN — PILOCARPINE HYDROCHLORIDE 5 MG: 5 TABLET, FILM COATED ORAL at 21:40

## 2020-12-31 RX ADMIN — HYDROMORPHONE HYDROCHLORIDE 1 MG: 1 INJECTION, SOLUTION INTRAMUSCULAR; INTRAVENOUS; SUBCUTANEOUS at 04:01

## 2020-12-31 RX ADMIN — VANCOMYCIN HYDROCHLORIDE 1250 MG: 10 INJECTION, POWDER, LYOPHILIZED, FOR SOLUTION INTRAVENOUS at 21:48

## 2020-12-31 RX ADMIN — PILOCARPINE HYDROCHLORIDE 5 MG: 5 TABLET, FILM COATED ORAL at 15:27

## 2020-12-31 RX ADMIN — FLUOXETINE HYDROCHLORIDE 20 MG: 20 CAPSULE ORAL at 09:22

## 2020-12-31 RX ADMIN — PANTOPRAZOLE SODIUM 40 MG: 40 TABLET, DELAYED RELEASE ORAL at 07:01

## 2020-12-31 RX ADMIN — PRAMIPEXOLE DIHYDROCHLORIDE 1.5 MG: 1 TABLET ORAL at 21:40

## 2020-12-31 RX ADMIN — OXYCODONE HYDROCHLORIDE AND ACETAMINOPHEN 1 TABLET: 10; 325 TABLET ORAL at 02:39

## 2020-12-31 RX ADMIN — HYDROMORPHONE HYDROCHLORIDE 1 MG: 1 INJECTION, SOLUTION INTRAMUSCULAR; INTRAVENOUS; SUBCUTANEOUS at 15:27

## 2020-12-31 RX ADMIN — TAZOBACTAM SODIUM AND PIPERACILLIN SODIUM 3.38 G: 375; 3 INJECTION, SOLUTION INTRAVENOUS at 18:46

## 2020-12-31 RX ADMIN — ATORVASTATIN CALCIUM 40 MG: 40 TABLET, FILM COATED ORAL at 21:40

## 2020-12-31 RX ADMIN — SODIUM CHLORIDE, PRESERVATIVE FREE 10 ML: 5 INJECTION INTRAVENOUS at 21:41

## 2020-12-31 RX ADMIN — CARVEDILOL 6.25 MG: 6.25 TABLET, FILM COATED ORAL at 17:18

## 2020-12-31 RX ADMIN — TIZANIDINE 4 MG: 4 TABLET ORAL at 21:40

## 2020-12-31 RX ADMIN — TAZOBACTAM SODIUM AND PIPERACILLIN SODIUM 3.38 G: 375; 3 INJECTION, SOLUTION INTRAVENOUS at 12:00

## 2020-12-31 RX ADMIN — TAZOBACTAM SODIUM AND PIPERACILLIN SODIUM 3.38 G: 375; 3 INJECTION, SOLUTION INTRAVENOUS at 02:39

## 2020-12-31 RX ADMIN — SODIUM CHLORIDE 75 ML/HR: 9 INJECTION, SOLUTION INTRAVENOUS at 17:34

## 2020-12-31 RX ADMIN — TRAZODONE HYDROCHLORIDE 50 MG: 50 TABLET ORAL at 21:39

## 2020-12-31 RX ADMIN — ALLOPURINOL 100 MG: 100 TABLET ORAL at 09:23

## 2020-12-31 RX ADMIN — LEVOTHYROXINE SODIUM 100 MCG: 100 TABLET ORAL at 07:01

## 2020-12-31 RX ADMIN — CARVEDILOL 6.25 MG: 6.25 TABLET, FILM COATED ORAL at 09:24

## 2020-12-31 RX ADMIN — OXYCODONE HYDROCHLORIDE AND ACETAMINOPHEN 1 TABLET: 10; 325 TABLET ORAL at 12:01

## 2020-12-31 RX ADMIN — OXYCODONE HYDROCHLORIDE AND ACETAMINOPHEN 1 TABLET: 10; 325 TABLET ORAL at 17:19

## 2020-12-31 RX ADMIN — Medication 1 TABLET: at 09:23

## 2020-12-31 RX ADMIN — GABAPENTIN 400 MG: 400 CAPSULE ORAL at 15:27

## 2020-12-31 RX ADMIN — HYDROMORPHONE HYDROCHLORIDE 1 MG: 1 INJECTION, SOLUTION INTRAMUSCULAR; INTRAVENOUS; SUBCUTANEOUS at 09:24

## 2021-01-01 LAB
ANION GAP SERPL CALCULATED.3IONS-SCNC: 8 MMOL/L (ref 5–15)
BUN SERPL-MCNC: 15 MG/DL (ref 6–20)
BUN/CREAT SERPL: 16.5 (ref 7–25)
CALCIUM SPEC-SCNC: 9.1 MG/DL (ref 8.6–10.5)
CHLORIDE SERPL-SCNC: 98 MMOL/L (ref 98–107)
CO2 SERPL-SCNC: 32 MMOL/L (ref 22–29)
CREAT SERPL-MCNC: 0.91 MG/DL (ref 0.57–1)
DEPRECATED RDW RBC AUTO: 44.8 FL (ref 37–54)
ERYTHROCYTE [DISTWIDTH] IN BLOOD BY AUTOMATED COUNT: 14.3 % (ref 12.3–15.4)
GFR SERPL CREATININE-BSD FRML MDRD: 63 ML/MIN/1.73
GLUCOSE SERPL-MCNC: 117 MG/DL (ref 65–99)
HCT VFR BLD AUTO: 32.9 % (ref 34–46.6)
HGB BLD-MCNC: 9.9 G/DL (ref 12–15.9)
MCH RBC QN AUTO: 26.2 PG (ref 26.6–33)
MCHC RBC AUTO-ENTMCNC: 30.1 G/DL (ref 31.5–35.7)
MCV RBC AUTO: 87 FL (ref 79–97)
PLATELET # BLD AUTO: 428 10*3/MM3 (ref 140–450)
PMV BLD AUTO: 9.7 FL (ref 6–12)
POTASSIUM SERPL-SCNC: 4.3 MMOL/L (ref 3.5–5.2)
RBC # BLD AUTO: 3.78 10*6/MM3 (ref 3.77–5.28)
SODIUM SERPL-SCNC: 138 MMOL/L (ref 136–145)
VANCOMYCIN TROUGH SERPL-MCNC: 9.2 MCG/ML (ref 5–20)
WBC # BLD AUTO: 11.83 10*3/MM3 (ref 3.4–10.8)

## 2021-01-01 PROCEDURE — 80202 ASSAY OF VANCOMYCIN: CPT | Performed by: INTERNAL MEDICINE

## 2021-01-01 PROCEDURE — 25010000002 VANCOMYCIN 10 G RECONSTITUTED SOLUTION: Performed by: INTERNAL MEDICINE

## 2021-01-01 PROCEDURE — 85027 COMPLETE CBC AUTOMATED: CPT | Performed by: INTERNAL MEDICINE

## 2021-01-01 PROCEDURE — 63710000001 DIPHENHYDRAMINE PER 50 MG: Performed by: INTERNAL MEDICINE

## 2021-01-01 PROCEDURE — 96376 TX/PRO/DX INJ SAME DRUG ADON: CPT

## 2021-01-01 PROCEDURE — 25010000003 HYDROMORPHONE 1 MG/ML SOLUTION: Performed by: INTERNAL MEDICINE

## 2021-01-01 PROCEDURE — G0378 HOSPITAL OBSERVATION PER HR: HCPCS

## 2021-01-01 PROCEDURE — 25010000002 PIPERACILLIN SOD-TAZOBACTAM PER 1 G: Performed by: INTERNAL MEDICINE

## 2021-01-01 PROCEDURE — 80048 BASIC METABOLIC PNL TOTAL CA: CPT | Performed by: INTERNAL MEDICINE

## 2021-01-01 RX ORDER — BUMETANIDE 1 MG/1
1 TABLET ORAL DAILY
Status: DISCONTINUED | OUTPATIENT
Start: 2021-01-01 | End: 2021-01-02 | Stop reason: HOSPADM

## 2021-01-01 RX ADMIN — TAZOBACTAM SODIUM AND PIPERACILLIN SODIUM 3.38 G: 375; 3 INJECTION, SOLUTION INTRAVENOUS at 04:05

## 2021-01-01 RX ADMIN — PANTOPRAZOLE SODIUM 40 MG: 40 TABLET, DELAYED RELEASE ORAL at 06:10

## 2021-01-01 RX ADMIN — LEVOTHYROXINE SODIUM 100 MCG: 100 TABLET ORAL at 06:10

## 2021-01-01 RX ADMIN — GABAPENTIN 400 MG: 400 CAPSULE ORAL at 15:33

## 2021-01-01 RX ADMIN — ATORVASTATIN CALCIUM 40 MG: 40 TABLET, FILM COATED ORAL at 20:34

## 2021-01-01 RX ADMIN — PILOCARPINE HYDROCHLORIDE 5 MG: 5 TABLET, FILM COATED ORAL at 15:32

## 2021-01-01 RX ADMIN — TIZANIDINE 4 MG: 4 TABLET ORAL at 20:34

## 2021-01-01 RX ADMIN — NAPROXEN 250 MG: 500 TABLET ORAL at 17:06

## 2021-01-01 RX ADMIN — DIPHENHYDRAMINE HYDROCHLORIDE 25 MG: 25 CAPSULE ORAL at 21:41

## 2021-01-01 RX ADMIN — TAZOBACTAM SODIUM AND PIPERACILLIN SODIUM 3.38 G: 375; 3 INJECTION, SOLUTION INTRAVENOUS at 18:04

## 2021-01-01 RX ADMIN — HYDROMORPHONE HYDROCHLORIDE 1 MG: 1 INJECTION, SOLUTION INTRAMUSCULAR; INTRAVENOUS; SUBCUTANEOUS at 04:05

## 2021-01-01 RX ADMIN — TAZOBACTAM SODIUM AND PIPERACILLIN SODIUM 3.38 G: 375; 3 INJECTION, SOLUTION INTRAVENOUS at 10:19

## 2021-01-01 RX ADMIN — ALLOPURINOL 100 MG: 100 TABLET ORAL at 08:24

## 2021-01-01 RX ADMIN — FLUOXETINE HYDROCHLORIDE 20 MG: 20 CAPSULE ORAL at 08:24

## 2021-01-01 RX ADMIN — HYDROMORPHONE HYDROCHLORIDE 1 MG: 1 INJECTION, SOLUTION INTRAMUSCULAR; INTRAVENOUS; SUBCUTANEOUS at 20:34

## 2021-01-01 RX ADMIN — BUMETANIDE 1 MG: 1 TABLET ORAL at 18:04

## 2021-01-01 RX ADMIN — CARVEDILOL 6.25 MG: 6.25 TABLET, FILM COATED ORAL at 08:24

## 2021-01-01 RX ADMIN — PILOCARPINE HYDROCHLORIDE 5 MG: 5 TABLET, FILM COATED ORAL at 08:24

## 2021-01-01 RX ADMIN — SODIUM CHLORIDE, PRESERVATIVE FREE 10 ML: 5 INJECTION INTRAVENOUS at 20:34

## 2021-01-01 RX ADMIN — SODIUM CHLORIDE, PRESERVATIVE FREE 10 ML: 5 INJECTION INTRAVENOUS at 08:24

## 2021-01-01 RX ADMIN — HYDROMORPHONE HYDROCHLORIDE 1 MG: 1 INJECTION, SOLUTION INTRAMUSCULAR; INTRAVENOUS; SUBCUTANEOUS at 13:33

## 2021-01-01 RX ADMIN — PRAMIPEXOLE DIHYDROCHLORIDE 1.5 MG: 1 TABLET ORAL at 20:33

## 2021-01-01 RX ADMIN — OXYCODONE HYDROCHLORIDE AND ACETAMINOPHEN 1 TABLET: 10; 325 TABLET ORAL at 06:13

## 2021-01-01 RX ADMIN — TRAZODONE HYDROCHLORIDE 50 MG: 50 TABLET ORAL at 20:34

## 2021-01-01 RX ADMIN — OXYCODONE HYDROCHLORIDE AND ACETAMINOPHEN 1 TABLET: 10; 325 TABLET ORAL at 17:06

## 2021-01-01 RX ADMIN — NAPROXEN 250 MG: 500 TABLET ORAL at 08:23

## 2021-01-01 RX ADMIN — VANCOMYCIN HYDROCHLORIDE 1250 MG: 10 INJECTION, POWDER, LYOPHILIZED, FOR SOLUTION INTRAVENOUS at 21:41

## 2021-01-01 RX ADMIN — CARVEDILOL 6.25 MG: 6.25 TABLET, FILM COATED ORAL at 18:04

## 2021-01-01 RX ADMIN — PILOCARPINE HYDROCHLORIDE 5 MG: 5 TABLET, FILM COATED ORAL at 20:33

## 2021-01-01 RX ADMIN — Medication 1 TABLET: at 08:24

## 2021-01-01 RX ADMIN — GABAPENTIN 400 MG: 400 CAPSULE ORAL at 20:34

## 2021-01-01 RX ADMIN — GABAPENTIN 400 MG: 400 CAPSULE ORAL at 08:24

## 2021-01-01 NOTE — PLAN OF CARE
Goal Outcome Evaluation:  Plan of Care Reviewed With: patient  Progress: no change  Pt A&Ox4. C/O moderate to severe pain, IV and PO meds given w/ good relief noted. Drsg to LLE moderately wet with drainage, changed this afternoon. IV Abx given per order. Pt sitting edge of bed all shift. VSS. Safety maintained.

## 2021-01-01 NOTE — PROGRESS NOTES
[facility] Medicine Services  INPATIENT PROGRESS NOTE    Patient Name: Robyn Minaya  Date of Admission: 12/30/2020  Today's Date: 01/01/21  Length of Stay: 0  Primary Care Physician: Tena Hough APRN    Subjective   Chief Complaint: Cellulitis.   HPI   Seen and discussed with her .  He is frustrated that her skin graft is not doing well and that she is not doing well.  He has some frustrations about her recent wound care and the fact that some of the providers that she has seen recently have not been available due to the holidays.  I talked through his frustrations with him.  She was tearful.  She has pain, but this seems to be improving.    He wanted me to contact the patient representative about his frustrations.  I called and spoke with Monique Guerrero.  She will be talking to others about how to best communicate with him and the patient.    She does have some worsening edema of her lower extremity.  Stop fluids this evening and place her back on her home Bumex.  I did take her dressing down and take new pictures.  Dressing to be reapplied by her nurse, David.    Review of Systems   All pertinent negatives and positives are as above. All other systems have been reviewed and are negative unless otherwise stated.     Objective    Temp:  [97.6 °F (36.4 °C)-98 °F (36.7 °C)] 97.9 °F (36.6 °C)  Heart Rate:  [72-90] 80  Resp:  [16-18] 16  BP: (109-130)/(65-70) 128/70  Physical Exam  Constitutional:       Appearance: She is well-developed.      Comments: Up on the side of the bed. Doing adult coloring books.  No distress.  Nontoxic.    Seen and discussed with her . Discussed with her nurse, David.   HENT:      Head: Normocephalic and atraumatic.   Eyes:      Conjunctiva/sclera: Conjunctivae normal.      Pupils: Pupils are equal, round, and reactive to light.   Neck:      Musculoskeletal: Neck supple.      Vascular: No JVD.   Cardiovascular:      Rate and Rhythm:  Normal rate and regular rhythm.   Pulmonary:      Effort: Pulmonary effort is normal. No respiratory distress.   Musculoskeletal: Normal range of motion.         General: No tenderness or deformity. Edema.  Skin:     General: Skin is warm and dry.      Findings: Erythema present. No rash.      Comments: I took down her dressing and took new pictures below.   Neurological:      Mental Status: She is alert and oriented to person, place, and time.      Cranial Nerves: No cranial nerve deficit.      Motor: No abnormal muscle tone.      Deep Tendon Reflexes: Reflexes normal.   Psychiatric:         Behavior: Behavior normal.         Thought Content: Thought content normal.         Judgment: Judgment normal.             Results Review:  I have reviewed the labs, radiology results, and diagnostic studies.    Laboratory Data:   Results from last 7 days   Lab Units 01/01/21  0653 12/31/20  0533 12/30/20  1219   WBC 10*3/mm3 11.83* 9.19 10.38   HEMOGLOBIN g/dL 9.9* 9.7* 11.0*   HEMATOCRIT % 32.9* 30.5* 33.8*   PLATELETS 10*3/mm3 428 393 495*     Results from last 7 days   Lab Units 01/01/21  0653 12/31/20  0533 12/30/20  1219   SODIUM mmol/L 138 137 138   POTASSIUM mmol/L 4.3 4.3 4.0   CHLORIDE mmol/L 98 96* 97*   CO2 mmol/L 32.0* 29.0 31.0*   BUN mg/dL 15 21* 19   CREATININE mg/dL 0.91 1.10* 0.95   CALCIUM mg/dL 9.1 8.7 9.2   BILIRUBIN mg/dL  --   --  <0.2   ALK PHOS U/L  --   --  134*   ALT (SGPT) U/L  --   --  20   AST (SGOT) U/L  --   --  13   GLUCOSE mg/dL 117* 142* 135*     I have reviewed the patient's current medications.     Assessment/Plan     Active Hospital Problems    Diagnosis   • **Cellulitis of left lower extremity   • Non-healing wound of left lower extremity, sequela   • Chronic pain syndrome   • Essential hypertension   • Acquired hypothyroidism     Plan:   The patient was admitted to my service here at [facility] on 12/30.  She presented to the emergency department with discomfort, redness, and  swelling of the left lower extremity.  She has a protracted history of a nonhealing wound of the left lower extremity.  She was initially hospitalized in July of this year.  She was felt to have been envenomated by a brown recluse spider prior to the hospitalization.  She presented with a wound on her left leg.  Her culture in July revealed MRSA, Citrobacter, and Pseudomonas.  She was seen by Dr. Wells with wound care as well as Dr. Higuera with infectious disease at that time.  She required debridement at that time.  She was discharged home on doxycycline and ciprofloxacin.  She has been followed extensively by wound care since that point in time.  She has been cared for by Dr. Wells and Dr. Hurd/Dr. Scruggs.  She has previously had a wound VAC and has also had a skin graft.  She was seen in wound care on 12/30 and sent to the emergency department for cellulitis and potential graft failure.     She was given vancomycin and Zosyn in the emergency department, which I have continued.  Her most recent wound culture was in October and showed MRSA.  Wound culture in July showed MRSA and pan susceptible Pseudomonas as well as pan susceptible Citrobacter.      Dr. Wells is currently out of town.  Consulted Dr. Scruggs with vascular/wound care to assess her left lower extremity.  Dr. Hurd assisted with her split thickness skin graft and wound VAC placement at the end of November.  They plan to continue antibiotics and local care for now.     Stop IV fluids and resume oral Bumex.    Reconciled and resumed home medications as appropriate.     SCD to the right lower extremity.    Discharge Planning: I expect the patient to be discharged to home in 1-3 days.    Electronically signed by Mark Anthony Mckeon DO, 01/01/21, 17:00 CST.

## 2021-01-01 NOTE — PLAN OF CARE
Problem: Adult Inpatient Plan of Care  Goal: Plan of Care Review  1/1/2021 0800 by Dorinda Borja, RN  Outcome: Ongoing, Progressing  Flowsheets (Taken 1/1/2021 0650)  Progress: no change  Plan of Care Reviewed With: patient  Outcome Summary: A/Ox4. C/O severe pain most of the shift, prn meds given and relief noted. Drsg to LLE was drenched, changed drsg per orders. PPP. Upx1 with walker. IVF and abx infusing as orderd.  with 2L O2. VSS. Safety maintained.

## 2021-01-02 VITALS
BODY MASS INDEX: 37.7 KG/M2 | OXYGEN SATURATION: 97 % | RESPIRATION RATE: 16 BRPM | DIASTOLIC BLOOD PRESSURE: 70 MMHG | SYSTOLIC BLOOD PRESSURE: 122 MMHG | HEART RATE: 84 BPM | WEIGHT: 187 LBS | TEMPERATURE: 98.5 F | HEIGHT: 59 IN

## 2021-01-02 PROCEDURE — 25010000002 PIPERACILLIN SOD-TAZOBACTAM PER 1 G: Performed by: INTERNAL MEDICINE

## 2021-01-02 PROCEDURE — 96376 TX/PRO/DX INJ SAME DRUG ADON: CPT

## 2021-01-02 PROCEDURE — G0378 HOSPITAL OBSERVATION PER HR: HCPCS

## 2021-01-02 PROCEDURE — 25010000003 HYDROMORPHONE 1 MG/ML SOLUTION: Performed by: INTERNAL MEDICINE

## 2021-01-02 RX ORDER — LOPERAMIDE HYDROCHLORIDE 2 MG/1
2 CAPSULE ORAL 3 TIMES DAILY PRN
Status: DISCONTINUED | OUTPATIENT
Start: 2021-01-02 | End: 2021-01-02 | Stop reason: HOSPADM

## 2021-01-02 RX ORDER — OXYCODONE AND ACETAMINOPHEN 10; 325 MG/1; MG/1
1 TABLET ORAL EVERY 6 HOURS PRN
Qty: 30 TABLET | Refills: 0 | Status: SHIPPED | OUTPATIENT
Start: 2021-01-02 | End: 2021-07-23

## 2021-01-02 RX ORDER — DOXYCYCLINE HYCLATE 100 MG/1
100 TABLET, DELAYED RELEASE ORAL 2 TIMES DAILY
Qty: 12 TABLET | Refills: 0 | Status: SHIPPED | OUTPATIENT
Start: 2021-01-02 | End: 2021-02-03 | Stop reason: HOSPADM

## 2021-01-02 RX ORDER — LOPERAMIDE HYDROCHLORIDE 2 MG/1
2 CAPSULE ORAL ONCE
Status: COMPLETED | OUTPATIENT
Start: 2021-01-02 | End: 2021-01-02

## 2021-01-02 RX ORDER — AMOXICILLIN AND CLAVULANATE POTASSIUM 875; 125 MG/1; MG/1
1 TABLET, FILM COATED ORAL 2 TIMES DAILY
Qty: 12 TABLET | Refills: 0 | Status: ON HOLD | OUTPATIENT
Start: 2021-01-02 | End: 2021-02-03

## 2021-01-02 RX ADMIN — GABAPENTIN 400 MG: 400 CAPSULE ORAL at 17:12

## 2021-01-02 RX ADMIN — OXYCODONE HYDROCHLORIDE AND ACETAMINOPHEN 1 TABLET: 10; 325 TABLET ORAL at 13:01

## 2021-01-02 RX ADMIN — NAPROXEN 250 MG: 500 TABLET ORAL at 08:30

## 2021-01-02 RX ADMIN — HYDROMORPHONE HYDROCHLORIDE 1 MG: 1 INJECTION, SOLUTION INTRAMUSCULAR; INTRAVENOUS; SUBCUTANEOUS at 14:00

## 2021-01-02 RX ADMIN — HYDROMORPHONE HYDROCHLORIDE 1 MG: 1 INJECTION, SOLUTION INTRAMUSCULAR; INTRAVENOUS; SUBCUTANEOUS at 09:26

## 2021-01-02 RX ADMIN — LOPERAMIDE HYDROCHLORIDE 2 MG: 2 CAPSULE ORAL at 05:12

## 2021-01-02 RX ADMIN — SODIUM CHLORIDE, PRESERVATIVE FREE 10 ML: 5 INJECTION INTRAVENOUS at 08:30

## 2021-01-02 RX ADMIN — OXYCODONE HYDROCHLORIDE AND ACETAMINOPHEN 1 TABLET: 10; 325 TABLET ORAL at 17:12

## 2021-01-02 RX ADMIN — ALLOPURINOL 100 MG: 100 TABLET ORAL at 08:30

## 2021-01-02 RX ADMIN — PANTOPRAZOLE SODIUM 40 MG: 40 TABLET, DELAYED RELEASE ORAL at 06:03

## 2021-01-02 RX ADMIN — CARVEDILOL 6.25 MG: 6.25 TABLET, FILM COATED ORAL at 17:12

## 2021-01-02 RX ADMIN — PILOCARPINE HYDROCHLORIDE 5 MG: 5 TABLET, FILM COATED ORAL at 08:30

## 2021-01-02 RX ADMIN — NAPROXEN 250 MG: 500 TABLET ORAL at 17:11

## 2021-01-02 RX ADMIN — TAZOBACTAM SODIUM AND PIPERACILLIN SODIUM 3.38 G: 375; 3 INJECTION, SOLUTION INTRAVENOUS at 11:24

## 2021-01-02 RX ADMIN — Medication 1 TABLET: at 08:31

## 2021-01-02 RX ADMIN — BUMETANIDE 1 MG: 1 TABLET ORAL at 08:30

## 2021-01-02 RX ADMIN — TAZOBACTAM SODIUM AND PIPERACILLIN SODIUM 3.38 G: 375; 3 INJECTION, SOLUTION INTRAVENOUS at 03:01

## 2021-01-02 RX ADMIN — HYDROMORPHONE HYDROCHLORIDE 1 MG: 1 INJECTION, SOLUTION INTRAMUSCULAR; INTRAVENOUS; SUBCUTANEOUS at 04:53

## 2021-01-02 RX ADMIN — GABAPENTIN 400 MG: 400 CAPSULE ORAL at 08:30

## 2021-01-02 RX ADMIN — OXYCODONE HYDROCHLORIDE AND ACETAMINOPHEN 1 TABLET: 10; 325 TABLET ORAL at 08:30

## 2021-01-02 RX ADMIN — LOPERAMIDE HYDROCHLORIDE 2 MG: 2 CAPSULE ORAL at 09:26

## 2021-01-02 RX ADMIN — CARVEDILOL 6.25 MG: 6.25 TABLET, FILM COATED ORAL at 08:30

## 2021-01-02 RX ADMIN — PILOCARPINE HYDROCHLORIDE 5 MG: 5 TABLET, FILM COATED ORAL at 17:11

## 2021-01-02 RX ADMIN — HYDROMORPHONE HYDROCHLORIDE 1 MG: 1 INJECTION, SOLUTION INTRAMUSCULAR; INTRAVENOUS; SUBCUTANEOUS at 18:18

## 2021-01-02 RX ADMIN — FLUOXETINE HYDROCHLORIDE 20 MG: 20 CAPSULE ORAL at 08:30

## 2021-01-02 RX ADMIN — LEVOTHYROXINE SODIUM 100 MCG: 100 TABLET ORAL at 06:03

## 2021-01-02 NOTE — PROGRESS NOTES
"Pharmacy Dosing Service  Pharmacokinetics  Vancomycin Follow-up Evaluation    Assessment/Action/Plan:  Current Order: Vancomycin 1250 mg IVPB every 24 hours  Current end date:1/3/21   Levels: trough ordered for tonight prior to 2200 dose  Additional antimicrobial agent(s): Zosyn    Follow up trough ordered for tonight prior to 2200 dose. Pharmacy will continue to follow daily and adjust dose accordingly.     Subjective:  Robyn Minaya is a 60 y.o. female currently on Vancomycin 1250 mg IV every 24 hours for the treatment of SSTI, day 3 of 5 of treatment.    AUC Model Data:  Regimen: 1250 mg every 24 hours  Exposure target: AUC24 (range)400-600 mg/L.hr  AUC24,ss: 519 mg/L.hr  PAUC*: 70 %  Ctrough,ss: 13.7 mg/L  Pconc*: 32 %  Tox.: 9 %      Objective:  Ht: 149.9 cm (59\"); Wt: 84.8 kg (187 lb)  Estimated Creatinine Clearance: 66.4 mL/min (by C-G formula based on SCr of 0.91 mg/dL).   Creatinine   Date Value Ref Range Status   01/01/2021 0.91 0.57 - 1.00 mg/dL Final   12/31/2020 1.10 (H) 0.57 - 1.00 mg/dL Final   12/30/2020 0.95 0.57 - 1.00 mg/dL Final   11/13/2019 0.90 0.60 - 1.30 mg/dL Final     Comment:     Serial Number: [id number]:  [id number]   08/28/2019 0.6 0.5 - 0.9 mg/dL Final   04/15/2019 1.00 0.60 - 1.30 mg/dL Final     Comment:     Serial Number: [id number]:  [id number]   04/05/2019 0.9 0.5 - 0.9 mg/dL Final   12/20/2018 1 (H) 0.5 - 0.9 mg/dL Final      Lab Results   Component Value Date    WBC 11.83 (H) 01/01/2021    WBC 9.19 12/31/2020    WBC 10.38 12/30/2020             Culture Results:  Microbiology Results (last 10 days)       Procedure Component Value - Date/Time    COVID PRE-OP / PRE-PROCEDURE SCREENING ORDER (NO ISOLATION) - Swab, Nasal Cavity [[id number]]  (Normal) Collected: 12/30/20 1258    Lab Status: Final result Specimen: Swab from Nasal Cavity Updated: 12/30/20 6345    Narrative:      The following orders were created for panel order COVID PRE-OP / PRE-PROCEDURE SCREENING ORDER (NO " ISOLATION) - Swab, Nasal Cavity.  Procedure                               Abnormality         Status                     ---------                               -----------         ------                     COVID-19,[employer] IN-HARPREET...[[id number]]  Normal              Final result                 Please view results for these tests on the individual orders.    COVID-19,[employer] IN-HOUSE,Nasal Swab No Transport Media 3-4 HR TAT - Swab, Nasal Cavity [[id number]]  (Normal) Collected: 12/30/20 1258    Lab Status: Final result Specimen: Swab from Nasal Cavity Updated: 12/30/20 1556     COVID19 Not Detected    Narrative:      Fact sheet for providers: https://www.fda.gov/media/170967/download     Fact sheet for patients: https://www.fda.gov/media/299420/download    Test performed by PCR.    Blood Culture - Blood, Wrist, Right [[id number]] Collected: 12/30/20 1257    Lab Status: Preliminary result Specimen: Blood from Wrist, Right Updated: 01/01/21 1315     Blood Culture No growth at 2 days    Blood Culture - Blood, [[id number]] Collected: 12/30/20 1219    Lab Status: Preliminary result Specimen: Blood Updated: 01/01/21 1645     Blood Culture No growth at 2 days            Omega Vazquez, PharmD   01/01/21 18:58 CST

## 2021-01-02 NOTE — PROGRESS NOTES
Discharge Planning Assessment   [place]     Patient Name: Robyn Minaya  MRN: [id number]  Today's Date: 1/2/2021    Admit Date: 12/30/2020    Discharge Needs Assessment     Row Name 01/02/21 0753       Living Environment    Lives With  spouse    Current Living Arrangements  home/apartment/condo    Primary Care Provided by  self    Provides Primary Care For  no one    Family Caregiver if Needed  spouse    Quality of Family Relationships  helpful;involved;supportive    Able to Return to Prior Arrangements  yes       Resource/Environmental Concerns    Resource/Environmental Concerns  none    Transportation Concerns  car, none       Transition Planning    Patient/Family Anticipates Transition to  home with family    Patient/Family Anticipated Services at Transition  none    Transportation Anticipated  family or friend will provide       Discharge Needs Assessment    Readmission Within the Last 30 Days  no previous admission in last 30 days    Equipment Currently Used at Home  walker, rolling    Concerns to be Addressed  no discharge needs identified;denies needs/concerns at this time    Anticipated Changes Related to Illness  none    Equipment Needed After Discharge  none    Discharge Coordination/Progress  Pt has RX coverage and a PCP. Pt lived at home with her  prior to admission and plans on returning at discharge. No needs identified at this time. SW will follow for possible discharge needs to arise.        Discharge Plan    No documentation.       Continued Care and Services - Admitted Since 12/30/2020    Coordination has not been started for this encounter.     Selected Continued Care - Prior Encounters Includes selections from prior encounters from 10/1/2020 to 1/2/2021    Discharged on 12/1/2020 Admission date: 11/30/2020 - [place] [facility] [place]     Service Provider Selected Services Address Phone Fax Patient Preferred    [facility]  [facility] [address] [place] [address] [phone] --                      Demographic Summary    No documentation.       Functional Status    No documentation.       Psychosocial    No documentation.       Abuse/Neglect    No documentation.       Legal    No documentation.       Substance Abuse    No documentation.       Patient Forms    No documentation.           Jaimie Isaac

## 2021-01-02 NOTE — DISCHARGE SUMMARY
[facility] Medicine Services  DISCHARGE SUMMARY       Date of Admission: 12/30/2020  Date of Discharge:  1/2/2021  Primary Care Physician: Tena Hough APRN    Presenting Problem/History of Present Illness:  Leg discomfort, redness, swelling.    Final Discharge Diagnoses:  Active Hospital Problems    Diagnosis   • **Cellulitis of left lower extremity   • Non-healing wound of left lower extremity, sequela   • Chronic pain syndrome   • Essential hypertension   • Acquired hypothyroidism     Consults: Dr. Scruggs covering for wound care.     Procedures Performed: None.     Pertinent Test Results:   Imaging Results (Last 7 Days)     Procedure Component Value Units Date/Time    US Venous Doppler Lower Extremity Left (duplex) [[id number]] Collected: 01/02/21 1200     Updated: 01/02/21 1203    Narrative:      History: Swelling       Impression:      Impression: There is no evidence of deep venous thrombosis or  superficial thrombophlebitis of the left lower extremity.     Comments: Left lower extremity venous duplex exam was performed using  color Doppler flow, Doppler wave form analysis, and grayscale imaging,  with and without compression. There is no evidence of deep venous  thrombosis of the common femoral, superficial femoral, popliteal,  posterior tibial, and peroneal veins. There is no thrombus identified in  the saphenofemoral junction or the greater saphenous vein. There is no  evidence of clot in the right common femoral vein.     This report was finalized on 01/02/2021 12:00 by Dr. Trace Hurd MD.    XR Tibia Fibula 2 View Left [[id number]] Collected: 12/30/20 1332     Updated: 12/30/20 1339    Narrative:      XR TIBIA FIBULA 2 VW LEFT- 12/30/2020 12:56 PM CST     HISTORY: Soft tissue gas     COMPARISON: NONE     FINDINGS:  Frontal and lateral radiographs of the left lower leg were obtained.     There appears to be a large soft tissue defect overlying the calf,  along  the more medial aspect of the lower leg. This measures up to 11 cm in  craniocaudal dimension. I do not see any soft tissue gas deep to this  defect. No acute bony abnormality is seen. Knee and ankle joint are  unremarkable.       Impression:      1. Elongated skin defect along the medial aspect of the calf, with no  soft tissue gas identified deep to this fairly large skin defect.  This report was finalized on 12/30/2020 13:36 by Dr Markos Bean, .        Lab Results (last 7 days)     Procedure Component Value Units Date/Time    Blood Culture - Blood, Wrist, Right [[id number]] Collected: 12/30/20 1257    Specimen: Blood from Wrist, Right Updated: 01/02/21 1315     Blood Culture No growth at 3 days    Vancomycin, Trough Please collect 30-60 minutes prior to dose due at 2200 [[id number]]  (Normal) Collected: 01/01/21 2020    Specimen: Blood Updated: 01/01/21 2049     Vancomycin Trough 9.20 mcg/mL     Blood Culture - Blood, [[id number]] Collected: 12/30/20 1219    Specimen: Blood Updated: 01/01/21 1645     Blood Culture No growth at 2 days    Basic Metabolic Panel [[id number]]  (Abnormal) Collected: 01/01/21 0653    Specimen: Blood Updated: 01/01/21 0736     Glucose 117 mg/dL      BUN 15 mg/dL      Creatinine 0.91 mg/dL      Sodium 138 mmol/L      Potassium 4.3 mmol/L      Chloride 98 mmol/L      CO2 32.0 mmol/L      Calcium 9.1 mg/dL      eGFR Non African Amer 63 mL/min/1.73      BUN/Creatinine Ratio 16.5     Anion Gap 8.0 mmol/L     Narrative:      GFR Normal >60  Chronic Kidney Disease <60  Kidney Failure <15      CBC (No Diff) [[id number]]  (Abnormal) Collected: 01/01/21 0653    Specimen: Blood Updated: 01/01/21 0714     WBC 11.83 10*3/mm3      RBC 3.78 10*6/mm3      Hemoglobin 9.9 g/dL      Hematocrit 32.9 %      MCV 87.0 fL      MCH 26.2 pg      MCHC 30.1 g/dL      RDW 14.3 %      RDW-SD 44.8 fl      MPV 9.7 fL      Platelets 428 10*3/mm3     Basic Metabolic Panel [[id number]]  (Abnormal) Collected: 12/31/20  0533    Specimen: Blood Updated: 12/31/20 0618     Glucose 142 mg/dL      BUN 21 mg/dL      Creatinine 1.10 mg/dL      Sodium 137 mmol/L      Potassium 4.3 mmol/L      Comment: Slight hemolysis detected by analyzer. Results may be affected.        Chloride 96 mmol/L      CO2 29.0 mmol/L      Calcium 8.7 mg/dL      eGFR Non African Amer 51 mL/min/1.73      BUN/Creatinine Ratio 19.1     Anion Gap 12.0 mmol/L     Narrative:      GFR Normal >60  Chronic Kidney Disease <60  Kidney Failure <15      CBC (No Diff) [[id number]]  (Abnormal) Collected: 12/31/20 0533    Specimen: Blood Updated: 12/31/20 0606     WBC 9.19 10*3/mm3      RBC 3.61 10*6/mm3      Hemoglobin 9.7 g/dL      Hematocrit 30.5 %      MCV 84.5 fL      MCH 26.9 pg      MCHC 31.8 g/dL      RDW 14.6 %      RDW-SD 44.1 fl      MPV 9.6 fL      Platelets 393 10*3/mm3     COVID PRE-OP / PRE-PROCEDURE SCREENING ORDER (NO ISOLATION) - Swab, Nasal Cavity [[id number]]  (Normal) Collected: 12/30/20 1258    Specimen: Swab from Nasal Cavity Updated: 12/30/20 1556    Narrative:      The following orders were created for panel order COVID PRE-OP / PRE-PROCEDURE SCREENING ORDER (NO ISOLATION) - Swab, Nasal Cavity.  Procedure                               Abnormality         Status                     ---------                               -----------         ------                     COVID-19,[employer] IN-[facility]AR[facility]...[[id number]]  Normal              Final result                 Please view results for these tests on the individual orders.    COVID-19,[employer] IN-HOUSE,Nasal Swab No Transport Media 3-4 HR TAT - Swab, Nasal Cavity [[id number]]  (Normal) Collected: 12/30/20 1258    Specimen: Swab from Nasal Cavity Updated: 12/30/20 1556     COVID19 Not Detected    Narrative:      Fact sheet for providers: https://www.fda.gov/media/631695/download     Fact sheet for patients: https://www.fda.gov/media/188417/download    Test performed by PCR.    Procalcitonin [[id number]]  (Normal)  "Collected: 12/30/20 1219    Specimen: Blood Updated: 12/30/20 1318     Procalcitonin 0.11 ng/mL     Narrative:      As a Marker for Sepsis (Non-Neonates):   1. <0.5 ng/mL represents a low risk of severe sepsis and/or septic shock.  1. >2 ng/mL represents a high risk of severe sepsis and/or septic shock.    As a Marker for Lower Respiratory Tract Infections that require antibiotic therapy:  PCT on Admission     Antibiotic Therapy             6-12 Hrs later  > 0.5                Strongly Recommended            >0.25 - <0.5         Recommended  0.1 - 0.25           Discouraged                   Remeasure/reassess PCT  <0.1                 Strongly Discouraged          Remeasure/reassess PCT      As 28 day mortality risk marker: \"Change in Procalcitonin Result\" (> 80 % or <=80 %) if Day 0 (or Day 1) and Day 4 values are available. Refer to http://www.AdexLinkpct-calculator.com/   Change in PCT <=80 %   A decrease of PCT levels below or equal to 80 % defines a positive change in PCT test result representing a higher risk for 28-day all-cause mortality of patients diagnosed with severe sepsis or septic shock.  Change in PCT > 80 %   A decrease of PCT levels of more than 80 % defines a negative change in PCT result representing a lower risk for 28-day all-cause mortality of patients diagnosed with severe sepsis or septic shock.                Results may be falsely decreased if patient taking Biotin.     Comprehensive Metabolic Panel [[id number]]  (Abnormal) Collected: 12/30/20 1219    Specimen: Blood Updated: 12/30/20 1315     Glucose 135 mg/dL      BUN 19 mg/dL      Creatinine 0.95 mg/dL      Sodium 138 mmol/L      Potassium 4.0 mmol/L      Chloride 97 mmol/L      CO2 31.0 mmol/L      Calcium 9.2 mg/dL      Total Protein 7.3 g/dL      Albumin 3.80 g/dL      ALT (SGPT) 20 U/L      AST (SGOT) 13 U/L      Alkaline Phosphatase 134 U/L      Total Bilirubin <0.2 mg/dL      eGFR Non African Amer 60 mL/min/1.73      Globulin 3.5 " gm/dL      A/G Ratio 1.1 g/dL      BUN/Creatinine Ratio 20.0     Anion Gap 10.0 mmol/L     Narrative:      GFR Normal >60  Chronic Kidney Disease <60  Kidney Failure <15      CK [[id number]]  (Normal) Collected: 12/30/20 1219    Specimen: Blood Updated: 12/30/20 1314     Creatine Kinase 55 U/L     Lactic Acid, Plasma [[id number]]  (Normal) Collected: 12/30/20 1219    Specimen: Blood Updated: 12/30/20 1308     Lactate 1.4 mmol/L     CBC Auto Differential [[id number]]  (Abnormal) Collected: 12/30/20 1219    Specimen: Blood Updated: 12/30/20 1257     WBC 10.38 10*3/mm3      RBC 4.04 10*6/mm3      Hemoglobin 11.0 g/dL      Hematocrit 33.8 %      MCV 83.7 fL      MCH 27.2 pg      MCHC 32.5 g/dL      RDW 14.5 %      RDW-SD 44.3 fl      MPV 9.6 fL      Platelets 495 10*3/mm3      Neutrophil % 53.7 %      Lymphocyte % 32.9 %      Monocyte % 7.5 %      Eosinophil % 3.2 %      Basophil % 0.9 %      Immature Grans % 1.8 %      Neutrophils, Absolute 5.58 10*3/mm3      Lymphocytes, Absolute 3.41 10*3/mm3      Monocytes, Absolute 0.78 10*3/mm3      Eosinophils, Absolute 0.33 10*3/mm3      Basophils, Absolute 0.09 10*3/mm3      Immature Grans, Absolute 0.19 10*3/mm3      nRBC 0.0 /100 WBC         Hospital Course:  The patient was admitted to my service here at [facility] on 12/30.  She presented to the emergency department with discomfort, redness, and swelling of the left lower extremity.  She has a protracted history of a nonhealing wound of the left lower extremity.  She was initially hospitalized in July of this year.  She was felt to have been envenomated by a brown recluse spider prior to the hospitalization.  She presented with a wound on her left leg.  Her culture in July revealed MRSA, Citrobacter, and Pseudomonas.  She was seen by Dr. Wells with wound care as well as Dr. Higuera with infectious disease at that time.  She required debridement at that time.  She was discharged home on doxycycline and  "ciprofloxacin.  She has been followed extensively by wound care since that point in time.  She has been cared for by Dr. Wells and Dr. Hurd/Dr. Scruggs.  She has previously had a wound VAC and has also had a skin graft.  She was seen in wound care on 12/30 and sent to the emergency department for cellulitis and potential graft failure.     She was given vancomycin and Zosyn in the emergency department, which was continued.  Her most recent wound culture was in October and showed MRSA.  Wound culture in July showed MRSA and pan susceptible Pseudomonas as well as pan susceptible Citrobacter. We will plan to treat with Omnicef and doxycycline for another 6 days as an outpatient.      Dr. Wells is currently out of town.  Consulted Dr. Scruggs with vascular/wound care to assess her left lower extremity.  Dr. Hurd assisted with her split thickness skin graft and wound VAC placement at the end of November.  They plan to continue antibiotics and local care for now.     Stopped IV fluids and resumed oral Bumex on 1/1.     Reconciled and resumed home medications as appropriate.     SCD was placed to the right lower extremity for DVT prophylaxis.    Dr. Scruggs saw her this afternoon and reexamined her leg.  He thinks it looks much better than presentation.  He redressed her leg.  He would like to continue with local care and outpatient oral antibiotics for now.  She has an appointment to see wound care this coming Wednesday, but he wants her to come in on Monday.  He feels that she can go home tonight.  She is feeling up to this.  He sent a prescription for Percocet to [facility] at [facility] and I sent prescriptions for her antibiotics there as well.    Physical Exam on Discharge:  /70 (BP Location: Right arm, Patient Position: Lying)   Pulse 84   Temp 98.5 °F (36.9 °C) (Oral)   Resp 16   Ht 149.9 cm (59\")   Wt 84.8 kg (187 lb)   SpO2 97%   BMI 37.77 kg/m²   Physical Exam  Constitutional:       Appearance: She " is well-developed.      Comments: Up and ambulatory in the room with her quad cane. Already in street clothes as Dr. Scruggs has cleared her for discharge. No distress.  Nontoxic. No family present. Discussed with her nurse, Silvestre GALEANA:      Head: Normocephalic and atraumatic.   Eyes:      Conjunctiva/sclera: Conjunctivae normal.      Pupils: Pupils are equal, round, and reactive to light.   Neck:      Musculoskeletal: Neck supple.      Vascular: No JVD.   Pulmonary:      Effort: Pulmonary effort is normal. No respiratory distress.   Musculoskeletal: Normal range of motion.         General: No tenderness or deformity. Edema.  Skin:     General: Skin is warm and dry.      Comments: New dressing recently applied by Dr. Scruggs left in place.    Neurological:      Mental Status: She is alert and oriented to person, place, and time.      Cranial Nerves: No cranial nerve deficit.      Motor: No abnormal muscle tone.      Deep Tendon Reflexes: Reflexes normal.   Psychiatric:         Behavior: Behavior normal.         Thought Content: Thought content normal.         Judgment: Judgment normal.      Condition on Discharge: Good.     Discharge Disposition:  Home or Self Care    Discharge Medications:     Discharge Medications      New Medications      Instructions Start Date   amoxicillin-clavulanate 875-125 MG per tablet  Commonly known as: Augmentin   1 tablet, Oral, 2 Times Daily      doxycycline 100 MG enteric coated tablet  Commonly known as: DORYX   100 mg, Oral, 2 Times Daily      influenza vac split quad 0.5 ML suspension prefilled syringe injection  Commonly known as: FLUZONE,FLUARIX,AFLURIA,FLULAVAL   0.5 mL, Intramuscular, Once      oxyCODONE-acetaminophen  MG per tablet  Commonly known as: Percocet   1 tablet, Oral, Every 6 Hours PRN         Continue These Medications      Instructions Start Date   allopurinol 100 MG tablet  Commonly known as: ZYLOPRIM   100 mg, Oral, Daily      atorvastatin 40 MG  tablet  Commonly known as: LIPITOR   40 mg, Oral, Nightly      bumetanide 1 MG tablet  Commonly known as: BUMEX   1 mg, Oral, Daily      calcium carbonate 600 MG tablet  Commonly known as: OS-KAYLEY   600 mg, Oral, Daily      carvedilol 6.25 MG tablet  Commonly known as: COREG   6.25 mg, Oral, 2 Times Daily With Meals      diclofenac 75 MG EC tablet  Commonly known as: VOLTAREN   75 mg, Oral, 2 Times Daily      EQ Fiber Therapy 500 MG tablet tablet  Generic drug: methylcellulose (Laxative)   1 tablet, Oral, Daily      FLUoxetine 20 MG capsule  Commonly known as: PROzac   20 mg, Oral, Daily      gabapentin 400 MG capsule  Commonly known as: NEURONTIN   400 mg, Oral, 3 Times Daily      IMODIUM PO   2 mg, Oral, As Needed      levothyroxine 100 MCG tablet  Commonly known as: SYNTHROID, LEVOTHROID   100 mcg, Oral, Daily      multivitamin tablet tablet  Commonly known as: THERAGRAN   1 dose, Oral, Daily      omeprazole 20 MG capsule  Commonly known as: priLOSEC   20 mg, Oral, Daily      pilocarpine 5 MG tablet  Commonly known as: SALAGEN   5 mg, Oral, 3 Times Daily      pramipexole 1.5 MG tablet  Commonly known as: MIRAPEX   1.5 mg, Oral, Nightly      spironolactone 25 MG tablet  Commonly known as: ALDACTONE   25 mg, Oral, 2 Times Daily      tiZANidine 4 MG tablet  Commonly known as: ZANAFLEX   4 mg, Oral, Nightly      traMADol 50 MG tablet  Commonly known as: ULTRAM   100 mg, Oral, 2 Times Daily      traZODone 50 MG tablet  Commonly known as: DESYREL   50 mg, Oral, Nightly, Take 1-3 tablets at night as needed for sleep.          Stop These Medications    HYDROcodone-acetaminophen  MG per tablet  Commonly known as: NORCO          Discharge Diet:   Diet Instructions     Diet: Regular; Thin      Discharge Diet: Regular    Fluid Consistency: Thin        Activity at Discharge:   Activity Instructions     Activity as Tolerated          Follow-up Appointments:   1. PCP in 1 week.   2. Wound care on Monday.     Test Results  Pending at Discharge: None.    Electronically signed by Mark Anthony Mckeon DO, 01/02/21, 16:25 CST.    Time: 35 minutes.

## 2021-01-02 NOTE — PLAN OF CARE
Goal Outcome Evaluation:  Plan of Care Reviewed With: patient  Progress: improving  Outcome Summary: Patient to be discharged home at this time.  Safety maintained, up ad sebastián with quad cane, baseline.  Medicated prn for pain 6-9/10.  Patient to discharge home with doxycycline and augmentin for treatment.  VSS.

## 2021-01-02 NOTE — PLAN OF CARE
"Goal Outcome Evaluation:  Plan of Care Reviewed With: patient  Progress: no change  A&Ox4, VSS. C/o severe pain, PRN meds given as ordered with some relief noted. Dressing to LLE changed once this shift due to large amount of serous drainage. IV antibiotics infused as ordered. Pt can be demanding at times, this morning telling this nurse, \"you better get my Imodium ordered or I'll find some to take myself, I don't care if I'm not suppose to!\" Nurses provided education on importance of not taking any medications while here without her providers knowing. MD was contacted and Imodium ordered once. Safety maintained, will continue to monitor.   "

## 2021-01-04 ENCOUNTER — OFFICE VISIT (OUTPATIENT)
Dept: WOUND CARE | Facility: HOSPITAL | Age: 61
End: 2021-01-04

## 2021-01-04 LAB
BACTERIA SPEC AEROBE CULT: NORMAL
BACTERIA SPEC AEROBE CULT: NORMAL

## 2021-01-04 PROCEDURE — 97597 DBRDMT OPN WND 1ST 20 CM/<: CPT | Performed by: PODIATRIST

## 2021-01-04 NOTE — PAYOR COMM NOTE
"[place] 1-2-21  [id number]   [phone]    Robyn Dominguez (60 y.o. Female)     Date of Birth Social Security Number Address Home Phone MRN    1960  [id number] [facility] [address] [phone] [id number]    Scientologist Marital Status          Other        Admission Date Admission Type Admitting Provider Attending Provider Department, Room/Bed    12/30/20 Emergency Mark Anthony Mckeon DO  [facility] 3A, 334/1    Discharge Date Discharge Disposition Discharge Destination        1/2/2021 Home or Self Care              Attending Provider: (none)   Allergies: Bactrim [Sulfamethoxazole-trimethoprim], Hctz [Hydrochlorothiazide], Januvia [Sitagliptin]    Isolation: None   Infection: MRSA (07/30/20)   Code Status: Prior    Ht: 149.9 cm (59\")   Wt: 84.8 kg (187 lb)    Admission Cmt: None   Principal Problem: Cellulitis of left lower extremity [L03.116]                 Active Insurance as of 12/30/2020     Primary Coverage     Payor Plan Insurance Group Employer/Plan Group    [employer] [facility] [id number]     Payor Plan Address Payor Plan Phone Number Payor Plan Fax Number Effective Dates    [address] [phone]  1/1/2020 - None Entered    [facility] [id number]       Subscriber Name Subscriber Birth Date Member ID       ROBYN DOMINGUEZ 1960 [id number]                 Emergency Contacts      (Rel.) Home Phone Work Phone Mobile Phone    ReiRizwan fierro (Spouse) [phone] -- --               Discharge Summary      Mark Anthony Mckeon DO at 01/02/21 1624                [facility] Medicine Services  DISCHARGE SUMMARY       Date of Admission: 12/30/2020  Date of Discharge:  1/2/2021  Primary Care Physician: Tena Hough APRN    Presenting Problem/History of Present Illness:  Leg discomfort, redness, swelling.    Final Discharge Diagnoses:  Active Hospital Problems    Diagnosis   • **Cellulitis of left lower extremity   • " Non-healing wound of left lower extremity, sequela   • Chronic pain syndrome   • Essential hypertension   • Acquired hypothyroidism     Consults: Dr. Scruggs covering for wound care.     Procedures Performed: None.     Pertinent Test Results:   Imaging Results (Last 7 Days)     Procedure Component Value Units Date/Time    US Venous Doppler Lower Extremity Left (duplex) [[id number]] Collected: 01/02/21 1200     Updated: 01/02/21 1203    Narrative:      History: Swelling       Impression:      Impression: There is no evidence of deep venous thrombosis or  superficial thrombophlebitis of the left lower extremity.     Comments: Left lower extremity venous duplex exam was performed using  color Doppler flow, Doppler wave form analysis, and grayscale imaging,  with and without compression. There is no evidence of deep venous  thrombosis of the common femoral, superficial femoral, popliteal,  posterior tibial, and peroneal veins. There is no thrombus identified in  the saphenofemoral junction or the greater saphenous vein. There is no  evidence of clot in the right common femoral vein.     This report was finalized on 01/02/2021 12:00 by Dr. Trace Hurd MD.    XR Tibia Fibula 2 View Left [[id number]] Collected: 12/30/20 1332     Updated: 12/30/20 1339    Narrative:      XR TIBIA FIBULA 2 VW LEFT- 12/30/2020 12:56 PM CST     HISTORY: Soft tissue gas     COMPARISON: NONE     FINDINGS:  Frontal and lateral radiographs of the left lower leg were obtained.     There appears to be a large soft tissue defect overlying the calf, along  the more medial aspect of the lower leg. This measures up to 11 cm in  craniocaudal dimension. I do not see any soft tissue gas deep to this  defect. No acute bony abnormality is seen. Knee and ankle joint are  unremarkable.       Impression:      1. Elongated skin defect along the medial aspect of the calf, with no  soft tissue gas identified deep to this fairly large skin defect.  This report  was finalized on 12/30/2020 13:36 by Dr Markos Bean, .        Lab Results (last 7 days)     Procedure Component Value Units Date/Time    Blood Culture - Blood, Wrist, Right [[id number]] Collected: 12/30/20 1257    Specimen: Blood from Wrist, Right Updated: 01/02/21 1315     Blood Culture No growth at 3 days    Vancomycin, Trough Please collect 30-60 minutes prior to dose due at 2200 [[id number]]  (Normal) Collected: 01/01/21 2020    Specimen: Blood Updated: 01/01/21 2049     Vancomycin Trough 9.20 mcg/mL     Blood Culture - Blood, [[id number]] Collected: 12/30/20 1219    Specimen: Blood Updated: 01/01/21 1645     Blood Culture No growth at 2 days    Basic Metabolic Panel [[id number]]  (Abnormal) Collected: 01/01/21 0653    Specimen: Blood Updated: 01/01/21 0736     Glucose 117 mg/dL      BUN 15 mg/dL      Creatinine 0.91 mg/dL      Sodium 138 mmol/L      Potassium 4.3 mmol/L      Chloride 98 mmol/L      CO2 32.0 mmol/L      Calcium 9.1 mg/dL      eGFR Non African Amer 63 mL/min/1.73      BUN/Creatinine Ratio 16.5     Anion Gap 8.0 mmol/L     Narrative:      GFR Normal >60  Chronic Kidney Disease <60  Kidney Failure <15      CBC (No Diff) [[id number]]  (Abnormal) Collected: 01/01/21 0653    Specimen: Blood Updated: 01/01/21 0714     WBC 11.83 10*3/mm3      RBC 3.78 10*6/mm3      Hemoglobin 9.9 g/dL      Hematocrit 32.9 %      MCV 87.0 fL      MCH 26.2 pg      MCHC 30.1 g/dL      RDW 14.3 %      RDW-SD 44.8 fl      MPV 9.7 fL      Platelets 428 10*3/mm3     Basic Metabolic Panel [[id number]]  (Abnormal) Collected: 12/31/20 0533    Specimen: Blood Updated: 12/31/20 0618     Glucose 142 mg/dL      BUN 21 mg/dL      Creatinine 1.10 mg/dL      Sodium 137 mmol/L      Potassium 4.3 mmol/L      Comment: Slight hemolysis detected by analyzer. Results may be affected.        Chloride 96 mmol/L      CO2 29.0 mmol/L      Calcium 8.7 mg/dL      eGFR Non African Amer 51 mL/min/1.73      BUN/Creatinine Ratio 19.1     Anion Gap 12.0  mmol/L     Narrative:      GFR Normal >60  Chronic Kidney Disease <60  Kidney Failure <15      CBC (No Diff) [[id number]]  (Abnormal) Collected: 12/31/20 0533    Specimen: Blood Updated: 12/31/20 0606     WBC 9.19 10*3/mm3      RBC 3.61 10*6/mm3      Hemoglobin 9.7 g/dL      Hematocrit 30.5 %      MCV 84.5 fL      MCH 26.9 pg      MCHC 31.8 g/dL      RDW 14.6 %      RDW-SD 44.1 fl      MPV 9.6 fL      Platelets 393 10*3/mm3     COVID PRE-OP / PRE-PROCEDURE SCREENING ORDER (NO ISOLATION) - Swab, Nasal Cavity [[id number]]  (Normal) Collected: 12/30/20 1258    Specimen: Swab from Nasal Cavity Updated: 12/30/20 1556    Narrative:      The following orders were created for panel order COVID PRE-OP / PRE-PROCEDURE SCREENING ORDER (NO ISOLATION) - Swab, Nasal Cavity.  Procedure                               Abnormality         Status                     ---------                               -----------         ------                     COVID-19,[employer] IN-HARPREET...[[id number]]  Normal              Final result                 Please view results for these tests on the individual orders.    COVID-19,[employer] IN-HOUSE,Nasal Swab No Transport Media 3-4 HR TAT - Swab, Nasal Cavity [[id number]]  (Normal) Collected: 12/30/20 1258    Specimen: Swab from Nasal Cavity Updated: 12/30/20 1556     COVID19 Not Detected    Narrative:      Fact sheet for providers: https://www.fda.gov/media/923978/download     Fact sheet for patients: https://www.fda.gov/media/798433/download    Test performed by PCR.    Procalcitonin [[id number]]  (Normal) Collected: 12/30/20 1219    Specimen: Blood Updated: 12/30/20 1318     Procalcitonin 0.11 ng/mL     Narrative:      As a Marker for Sepsis (Non-Neonates):   1. <0.5 ng/mL represents a low risk of severe sepsis and/or septic shock.  1. >2 ng/mL represents a high risk of severe sepsis and/or septic shock.    As a Marker for Lower Respiratory Tract Infections that require antibiotic therapy:  PCT on  "Admission     Antibiotic Therapy             6-12 Hrs later  > 0.5                Strongly Recommended            >0.25 - <0.5         Recommended  0.1 - 0.25           Discouraged                   Remeasure/reassess PCT  <0.1                 Strongly Discouraged          Remeasure/reassess PCT      As 28 day mortality risk marker: \"Change in Procalcitonin Result\" (> 80 % or <=80 %) if Day 0 (or Day 1) and Day 4 values are available. Refer to http://www.[facility] [place] [facility]-pct-calculator.com/   Change in PCT <=80 %   A decrease of PCT levels below or equal to 80 % defines a positive change in PCT test result representing a higher risk for 28-day all-cause mortality of patients diagnosed with severe sepsis or septic shock.  Change in PCT > 80 %   A decrease of PCT levels of more than 80 % defines a negative change in PCT result representing a lower risk for 28-day all-cause mortality of patients diagnosed with severe sepsis or septic shock.                Results may be falsely decreased if patient taking Biotin.     Comprehensive Metabolic Panel [[id number]]  (Abnormal) Collected: 12/30/20 1219    Specimen: Blood Updated: 12/30/20 1315     Glucose 135 mg/dL      BUN 19 mg/dL      Creatinine 0.95 mg/dL      Sodium 138 mmol/L      Potassium 4.0 mmol/L      Chloride 97 mmol/L      CO2 31.0 mmol/L      Calcium 9.2 mg/dL      Total Protein 7.3 g/dL      Albumin 3.80 g/dL      ALT (SGPT) 20 U/L      AST (SGOT) 13 U/L      Alkaline Phosphatase 134 U/L      Total Bilirubin <0.2 mg/dL      eGFR Non African Amer 60 mL/min/1.73      Globulin 3.5 gm/dL      A/G Ratio 1.1 g/dL      BUN/Creatinine Ratio 20.0     Anion Gap 10.0 mmol/L     Narrative:      GFR Normal >60  Chronic Kidney Disease <60  Kidney Failure <15      CK [[id number]]  (Normal) Collected: 12/30/20 1219    Specimen: Blood Updated: 12/30/20 1314     Creatine Kinase 55 U/L     Lactic Acid, Plasma [[id number]]  (Normal) Collected: 12/30/20 1219    Specimen: Blood Updated: 12/30/20 " 1308     Lactate 1.4 mmol/L     CBC Auto Differential [[id number]]  (Abnormal) Collected: 12/30/20 1219    Specimen: Blood Updated: 12/30/20 1257     WBC 10.38 10*3/mm3      RBC 4.04 10*6/mm3      Hemoglobin 11.0 g/dL      Hematocrit 33.8 %      MCV 83.7 fL      MCH 27.2 pg      MCHC 32.5 g/dL      RDW 14.5 %      RDW-SD 44.3 fl      MPV 9.6 fL      Platelets 495 10*3/mm3      Neutrophil % 53.7 %      Lymphocyte % 32.9 %      Monocyte % 7.5 %      Eosinophil % 3.2 %      Basophil % 0.9 %      Immature Grans % 1.8 %      Neutrophils, Absolute 5.58 10*3/mm3      Lymphocytes, Absolute 3.41 10*3/mm3      Monocytes, Absolute 0.78 10*3/mm3      Eosinophils, Absolute 0.33 10*3/mm3      Basophils, Absolute 0.09 10*3/mm3      Immature Grans, Absolute 0.19 10*3/mm3      nRBC 0.0 /100 WBC         Hospital Course:  The patient was admitted to my service here at [facility] on 12/30.  She presented to the emergency department with discomfort, redness, and swelling of the left lower extremity.  She has a protracted history of a nonhealing wound of the left lower extremity.  She was initially hospitalized in July of this year.  She was felt to have been envenomated by a brown recluse spider prior to the hospitalization.  She presented with a wound on her left leg.  Her culture in July revealed MRSA, Citrobacter, and Pseudomonas.  She was seen by Dr. Wells with wound care as well as Dr. Higuera with infectious disease at that time.  She required debridement at that time.  She was discharged home on doxycycline and ciprofloxacin.  She has been followed extensively by wound care since that point in time.  She has been cared for by Dr. Wells and Dr. Hurd/Dr. Scruggs.  She has previously had a wound VAC and has also had a skin graft.  She was seen in wound care on 12/30 and sent to the emergency department for cellulitis and potential graft failure.     She was given vancomycin and Zosyn in the emergency department,  "which was continued.  Her most recent wound culture was in October and showed MRSA.  Wound culture in July showed MRSA and pan susceptible Pseudomonas as well as pan susceptible Citrobacter. We will plan to treat with Omnicef and doxycycline for another 6 days as an outpatient.      Dr. Wells is currently out of town.  Consulted Dr. Scruggs with vascular/wound care to assess her left lower extremity.  Dr. Hurd assisted with her split thickness skin graft and wound VAC placement at the end of November.  They plan to continue antibiotics and local care for now.     Stopped IV fluids and resumed oral Bumex on 1/1.     Reconciled and resumed home medications as appropriate.     SCD was placed to the right lower extremity for DVT prophylaxis.    Dr. Scruggs saw her this afternoon and reexamined her leg.  He thinks it looks much better than presentation.  He redressed her leg.  He would like to continue with local care and outpatient oral antibiotics for now.  She has an appointment to see wound care this coming Wednesday, but he wants her to come in on Monday.  He feels that she can go home tonight.  She is feeling up to this.  He sent a prescription for Percocet to [facility] and I sent prescriptions for her antibiotics there as well.    Physical Exam on Discharge:  /70 (BP Location: Right arm, Patient Position: Lying)   Pulse 84   Temp 98.5 °F (36.9 °C) (Oral)   Resp 16   Ht 149.9 cm (59\")   Wt 84.8 kg (187 lb)   SpO2 97%   BMI 37.77 kg/m²   Physical Exam  Constitutional:       Appearance: She is well-developed.      Comments: Up and ambulatory in the room with her quad cane. Already in street clothes as Dr. Scruggs has cleared her for discharge. No distress.  Nontoxic. No family present. Discussed with her nurse, Silvestre   HENT:      Head: Normocephalic and atraumatic.   Eyes:      Conjunctiva/sclera: Conjunctivae normal.      Pupils: Pupils are equal, round, and reactive to light.   Neck:   "      Musculoskeletal: Neck supple.      Vascular: No JVD.   Pulmonary:      Effort: Pulmonary effort is normal. No respiratory distress.   Musculoskeletal: Normal range of motion.         General: No tenderness or deformity. Edema.  Skin:     General: Skin is warm and dry.      Comments: New dressing recently applied by Dr. Scruggs left in place.    Neurological:      Mental Status: She is alert and oriented to person, place, and time.      Cranial Nerves: No cranial nerve deficit.      Motor: No abnormal muscle tone.      Deep Tendon Reflexes: Reflexes normal.   Psychiatric:         Behavior: Behavior normal.         Thought Content: Thought content normal.         Judgment: Judgment normal.      Condition on Discharge: Good.     Discharge Disposition:  Home or Self Care    Discharge Medications:     Discharge Medications      New Medications      Instructions Start Date   amoxicillin-clavulanate 875-125 MG per tablet  Commonly known as: Augmentin   1 tablet, Oral, 2 Times Daily      doxycycline 100 MG enteric coated tablet  Commonly known as: DORYX   100 mg, Oral, 2 Times Daily      influenza vac split quad 0.5 ML suspension prefilled syringe injection  Commonly known as: FLUZONE,FLUARIX,AFLURIA,FLULAVAL   0.5 mL, Intramuscular, Once      oxyCODONE-acetaminophen  MG per tablet  Commonly known as: Percocet   1 tablet, Oral, Every 6 Hours PRN         Continue These Medications      Instructions Start Date   allopurinol 100 MG tablet  Commonly known as: ZYLOPRIM   100 mg, Oral, Daily      atorvastatin 40 MG tablet  Commonly known as: LIPITOR   40 mg, Oral, Nightly      bumetanide 1 MG tablet  Commonly known as: BUMEX   1 mg, Oral, Daily      calcium carbonate 600 MG tablet  Commonly known as: OS-KAYLYE   600 mg, Oral, Daily      carvedilol 6.25 MG tablet  Commonly known as: COREG   6.25 mg, Oral, 2 Times Daily With Meals      diclofenac 75 MG EC tablet  Commonly known as: VOLTAREN   75 mg, Oral, 2 Times Daily       EQ Fiber Therapy 500 MG tablet tablet  Generic drug: methylcellulose (Laxative)   1 tablet, Oral, Daily      FLUoxetine 20 MG capsule  Commonly known as: PROzac   20 mg, Oral, Daily      gabapentin 400 MG capsule  Commonly known as: NEURONTIN   400 mg, Oral, 3 Times Daily      IMODIUM PO   2 mg, Oral, As Needed      levothyroxine 100 MCG tablet  Commonly known as: SYNTHROID, LEVOTHROID   100 mcg, Oral, Daily      multivitamin tablet tablet  Commonly known as: THERAGRAN   1 dose, Oral, Daily      omeprazole 20 MG capsule  Commonly known as: priLOSEC   20 mg, Oral, Daily      pilocarpine 5 MG tablet  Commonly known as: SALAGEN   5 mg, Oral, 3 Times Daily      pramipexole 1.5 MG tablet  Commonly known as: MIRAPEX   1.5 mg, Oral, Nightly      spironolactone 25 MG tablet  Commonly known as: ALDACTONE   25 mg, Oral, 2 Times Daily      tiZANidine 4 MG tablet  Commonly known as: ZANAFLEX   4 mg, Oral, Nightly      traMADol 50 MG tablet  Commonly known as: ULTRAM   100 mg, Oral, 2 Times Daily      traZODone 50 MG tablet  Commonly known as: DESYREL   50 mg, Oral, Nightly, Take 1-3 tablets at night as needed for sleep.          Stop These Medications    HYDROcodone-acetaminophen  MG per tablet  Commonly known as: NORCO          Discharge Diet:   Diet Instructions     Diet: Regular; Thin      Discharge Diet: Regular    Fluid Consistency: Thin        Activity at Discharge:   Activity Instructions     Activity as Tolerated          Follow-up Appointments:   1. PCP in 1 week.   2. Wound care on Monday.     Test Results Pending at Discharge: None.    Electronically signed by Mark Anthony Mckeon DO, 01/02/21, 16:25 CST.    Time: 35 minutes.           Electronically signed by Mark Anthony Mckeon DO at 01/02/21 [id number]

## 2021-01-06 ENCOUNTER — OFFICE VISIT (OUTPATIENT)
Dept: WOUND CARE | Facility: HOSPITAL | Age: 61
End: 2021-01-06

## 2021-01-08 ENCOUNTER — OFFICE VISIT (OUTPATIENT)
Dept: FAMILY MEDICINE CLINIC | Age: 61
End: 2021-01-08
Payer: COMMERCIAL

## 2021-01-08 VITALS
HEIGHT: 60 IN | WEIGHT: 200 LBS | DIASTOLIC BLOOD PRESSURE: 72 MMHG | RESPIRATION RATE: 18 BRPM | BODY MASS INDEX: 39.27 KG/M2 | OXYGEN SATURATION: 96 % | HEART RATE: 84 BPM | SYSTOLIC BLOOD PRESSURE: 122 MMHG | TEMPERATURE: 97.1 F

## 2021-01-08 DIAGNOSIS — S81.802D OPEN WOUND OF LEFT LOWER LEG, SUBSEQUENT ENCOUNTER: Primary | ICD-10-CM

## 2021-01-08 DIAGNOSIS — M79.89 SWELLING OF BOTH LOWER EXTREMITIES: ICD-10-CM

## 2021-01-08 DIAGNOSIS — G47.00 INSOMNIA, UNSPECIFIED TYPE: ICD-10-CM

## 2021-01-08 PROCEDURE — 99213 OFFICE O/P EST LOW 20 MIN: CPT | Performed by: NURSE PRACTITIONER

## 2021-01-08 RX ORDER — TRAZODONE HYDROCHLORIDE 50 MG/1
TABLET ORAL
Qty: 90 TABLET | Refills: 1 | Status: SHIPPED | OUTPATIENT
Start: 2021-01-08 | End: 2021-01-13 | Stop reason: SDUPTHER

## 2021-01-08 RX ORDER — AMOXICILLIN AND CLAVULANATE POTASSIUM 875; 125 MG/1; MG/1
1 TABLET, FILM COATED ORAL 2 TIMES DAILY
COMMUNITY
Start: 2021-01-02 | End: 2021-01-12

## 2021-01-08 RX ORDER — OXYCODONE AND ACETAMINOPHEN 10; 325 MG/1; MG/1
1 TABLET ORAL EVERY 6 HOURS PRN
COMMUNITY
Start: 2021-01-02 | End: 2021-01-21

## 2021-01-08 RX ORDER — NALOXONE HYDROCHLORIDE 4 MG/.1ML
SPRAY NASAL
COMMUNITY
Start: 2020-11-24

## 2021-01-08 RX ORDER — SPIRONOLACTONE 25 MG/1
TABLET ORAL
Qty: 90 TABLET | Refills: 1 | Status: SHIPPED | OUTPATIENT
Start: 2021-01-08 | End: 2021-01-13 | Stop reason: SDUPTHER

## 2021-01-08 RX ORDER — DOXYCYCLINE HYCLATE 100 MG/1
100 TABLET, DELAYED RELEASE ORAL 2 TIMES DAILY
COMMUNITY
Start: 2021-01-02 | End: 2021-01-12

## 2021-01-08 NOTE — PROGRESS NOTES
SUBJECTIVE:    Patient ID: Irvin Munoz is a61 y.o. female. Irvin Munoz is here today for Follow-Up from Hospital (Patient presents for follow up from [facility] for wound infection on left leg. (calf) Patient wants to talk about referral to [address] [place] [address].) and Discuss Medications (patient stopped the niacin and the vitamin on her own and wants to discuss this with PCP.)  . HPI:   HPI     Pt wearing Claudia boot at this time. She has been again inpatient after 10mo of the initial spider bite. Spider bite 10mo ago and pt has had numerous inpatient stays, multiple antibiotics, wound care appts, and recent wound vac and then skin grafting. She since had to be evaluated inpatient again for infection and this time found to be ? MRSA (per pt). I cannot find wound results in chart. It was MRSA in July 2020. Pt has stopped niacin and vitamin D. I have encouraged pt to restart. Pt is having increase in lower leg swelling. She is taking bumex 1mg 1 daily; aldactone 50mg. Past Medical History:   Diagnosis Date    Arthritis     Bipolar I disorder, most recent episode (or current) mixed, unspecified     Chronic back pain     Depression     Dry mouth     Hyperlipidemia     Hypertension     Hypokalemia     Hypothyroidism     Menopause     Morbidly obese ([address] 10/28/2020    Overactive bladder     Plantar fasciitis     RLS (restless legs syndrome)     Sleep apnea     Spondylosis with myelopathy, lumbar region     Thyroid disease      Prior to Visit Medications    Medication Sig Taking? Authorizing Provider   oxyCODONE-acetaminophen (PERCOCET)  MG per tablet Take 1 tablet by mouth every 6 hours as needed.   Yes Historical Provider, MD   diclofenac (VOLTAREN) 75 MG EC tablet Take 75 mg by mouth 2 times daily  Yes Historical Provider, MD   pramipexole (MIRAPEX) 1.5 MG tablet TAKE ONE TABLET BY MOUTH NIGHTLY **MAY MAKE DROWSY** Yes Gwen Beck, APRN Vitamin D (CHOLECALCIFEROL) 1000 UNITS CAPS capsule Take 1,000 Units by mouth daily  Historical Provider, MD     Allergies   Allergen Reactions    Bactrim [Sulfamethoxazole-Trimethoprim]     Hctz [Hydrochlorothiazide] Other (See Comments)     Acid reflux      Sitagliptin Rash     Past Surgical History:   Procedure Laterality Date    CARPAL TUNNEL RELEASE Bilateral 2002    CHOLECYSTECTOMY  10/2014    COLON SURGERY      COLON SURGERY      biopsy     COLONOSCOPY      CYST REMOVAL  2017    Under left ear    HARDWARE REMOVAL Right 2011    Foot     HERNIA REPAIR  10/2014    LEG DEBRIDEMENT Left     NOSE SURGERY  2017; 2019    Dr Red Mt L/S,1 LEVEL Bilateral 2017    INJECTION MEDICATION FACET JOINT performed by Andres Lutz at [facility]     Family History   Problem Relation Age of Onset    Cancer Mother         lung    Heart Disease Mother     Hypertension Mother     Bipolar Disorder Mother     Heart Disease Father     Hypertension Father      Social History     Socioeconomic History    Marital status:      Spouse name: Jhonatan Modi Number of children: 2    Years of education: some college    Highest education level: Not on file   Occupational History     Employer: [employer]   Social Needs    Financial resource strain: Not on file    Food insecurity     Worry: Not on file     Inability: Not on file   [email or web address] needs     Medical: Not on file     Non-medical: Not on file   Tobacco Use    Smoking status: Former Smoker     Packs/day: 1.00     Years: 30.00     Pack years: 30.00     Types: Cigarettes     Quit date: 2003     Years since quittin.4    Smokeless tobacco: Never Used    Tobacco comment: Pt not interested at this time in stopping. Substance and Sexual Activity    Alcohol use: Yes     Comment: rarely    Drug use: No     Comment: pt admits \"may have misused in past\"--Pt was vague with details.  Sexual activity: Not on file   Lifestyle    Physical activity     Days per week: Not on file     Minutes per session: Not on file    Stress: Not on file   Relationships    Social connections     Talks on phone: Not on file     Gets together: Not on file     Attends Congregation service: Not on file     Active member of club or organization: Not on file     Attends meetings of clubs or organizations: Not on file     Relationship status: Not on file    Intimate partner violence     Fear of current or ex partner: Not on file     Emotionally abused: Not on file     Physically abused: Not on file     Forced sexual activity: Not on file   Other Topics Concern    Not on file   Social History Narrative    Not on file       Review of Systems   Constitutional: Positive for fatigue. Negative for fever. Skin: Positive for wound. Psychiatric/Behavioral: The patient is nervous/anxious. OBJECTIVE:    Physical Exam  Vitals signs and nursing note reviewed. Constitutional:       General: She is not in acute distress. Appearance: Normal appearance. She is well-developed. She is obese. She is not ill-appearing or toxic-appearing. HENT:      Head: Normocephalic and atraumatic. Eyes:      Extraocular Movements: Extraocular movements intact. Conjunctiva/sclera: Conjunctivae normal.      Pupils: Pupils are equal, round, and reactive to light. Neck:      Musculoskeletal: Neck supple. Trachea: No tracheal deviation. Cardiovascular:      Rate and Rhythm: Normal rate and regular rhythm. Heart sounds: Normal heart sounds. Pulmonary:      Effort: Pulmonary effort is normal.      Breath sounds: Normal breath sounds. Musculoskeletal:      Right lower leg: Edema present. Left lower leg: Edema present. Skin:     General: Skin is warm and dry. Capillary Refill: Capillary refill takes less than 2 seconds. Neurological:      General: No focal deficit present. Mental Status: She is alert and oriented to person, place, and time. Mental status is at baseline. Psychiatric:         Mood and Affect: Mood normal. Mood is not anxious or depressed. Affect is not angry. Speech: Speech normal.         Behavior: Behavior normal.         Thought Content: Thought content normal.         Judgment: Judgment normal.         /72 (Site: Left Upper Arm, Position: Sitting, Cuff Size: Large Adult)   Pulse 84   Temp 97.1 °F (36.2 °C) (Temporal)   Resp 18   Ht 5' (1.524 m)   Wt 200 lb (90.7 kg)   SpO2 96%   BMI 39.06 kg/m²      ASSESSMENT:      ICD-10-CM    1. Open wound of left lower leg, subsequent encounter  S81.802D External Referral To Wound Clinic     External Referral To Infectious Disease   2. Swelling of both lower extremities  M79.89 Increase aldactone by 25mg daily       PLAN:    Sondra Vo: Follow-Up from Hospital (Patient presents for follow up from [facility] for wound infection on left leg. (calf) Patient wants to talk about referral to [facility].) and Discuss Medications (patient stopped the niacin and the vitamin on her own and wants to discuss this with PCP.)  Contact [facility] and refer pt to wound care--she will likely need ID also. Please inquire if separate referral should be started now or if wound care prefers to consult ID if needed. Diagnosis and orders for this visit are above. Please note that this chart was generated using dragon dictationsoftware. Although every effort was made to ensure the accuracy of this automated transcription, some errors in transcription may have occurred.

## 2021-01-11 ENCOUNTER — OFFICE VISIT (OUTPATIENT)
Dept: WOUND CARE | Facility: HOSPITAL | Age: 61
End: 2021-01-11

## 2021-01-11 PROCEDURE — 11042 DBRDMT SUBQ TIS 1ST 20SQCM/<: CPT | Performed by: PODIATRIST

## 2021-01-11 PROCEDURE — 11045 DBRDMT SUBQ TISS EACH ADDL: CPT | Performed by: PODIATRIST

## 2021-01-12 ENCOUNTER — PATIENT MESSAGE (OUTPATIENT)
Dept: FAMILY MEDICINE CLINIC | Age: 61
End: 2021-01-12

## 2021-01-12 ENCOUNTER — OFFICE VISIT (OUTPATIENT)
Dept: NEUROLOGY | Age: 61
End: 2021-01-12
Payer: COMMERCIAL

## 2021-01-12 VITALS
WEIGHT: 200 LBS | SYSTOLIC BLOOD PRESSURE: 116 MMHG | HEIGHT: 60 IN | HEART RATE: 65 BPM | BODY MASS INDEX: 39.27 KG/M2 | TEMPERATURE: 97.5 F | DIASTOLIC BLOOD PRESSURE: 72 MMHG

## 2021-01-12 DIAGNOSIS — G25.81 RLS (RESTLESS LEGS SYNDROME): ICD-10-CM

## 2021-01-12 DIAGNOSIS — G25.0 TREMOR, ESSENTIAL: ICD-10-CM

## 2021-01-12 DIAGNOSIS — G47.00 INSOMNIA, UNSPECIFIED TYPE: ICD-10-CM

## 2021-01-12 DIAGNOSIS — G47.33 SLEEP APNEA, OBSTRUCTIVE: Primary | ICD-10-CM

## 2021-01-12 PROCEDURE — 99213 OFFICE O/P EST LOW 20 MIN: CPT | Performed by: PSYCHIATRY & NEUROLOGY

## 2021-01-12 NOTE — PROGRESS NOTES
[address] [facility]  [address] [place] [address] Drive, [address] [place] [address],[address]  Elena Perry  Phone [phone]  Fax [phone]     [address] Follow Up Encounter  21 10:29 AM CST    Information:   Patient Name: Paulo Marrufo  :   1960  Age:   62 y.o. MRN:   [id number]  Account #:  [de-identified]  Today:  21    Provider: Nhi Edmond M.D. Chief Complaint:   Chief Complaint   Patient presents with    1 Year Follow Up     Sleep apnea, obstructive       Subjective:   Paulo Marrufo is a 62 y.o. woman with a history of sleep apnea, tremor, insomnia, and restless legs who is following up. She cannot breath through her nose well. She is not using her CPAP much due to this. Her tremor resolved with medication changes (reduced gabapentin). She takes Mirapex for her RLS and that helps fairly well. She had a left lower leg brown recluse bite and it developed into a severe nonhealing wound. She is still going for wound care. She has been admitted to the hospital several times. She is in pain. She says she that she is not sleeping due to pain. She is sleeping sitting up in a recliner.       Objective:     Past Medical History:  Past Medical History:   Diagnosis Date    Arthritis     Bipolar I disorder, most recent episode (or current) mixed, unspecified     Chronic back pain     Depression     Dry mouth     Hyperlipidemia     Hypertension     Hypokalemia     Hypothyroidism     Menopause     Morbidly obese ([address] 10/28/2020    Overactive bladder     Plantar fasciitis     RLS (restless legs syndrome)     Sleep apnea     Spondylosis with myelopathy, lumbar region     Thyroid disease        Past Surgical History:   Procedure Laterality Date    CARPAL TUNNEL RELEASE Bilateral 2002    CHOLECYSTECTOMY  10/2014    COLON SURGERY      COLON SURGERY      biopsy     COLONOSCOPY      CYST REMOVAL  2017    Under left ear    HARDWARE REMOVAL Right     Foot  HERNIA REPAIR  10/2014    LEG DEBRIDEMENT Left     NOSE SURGERY  08/01/2017; 04/16/2019    Dr Anne Millan L/S,1 LEVEL Bilateral 2/7/2017    INJECTION MEDICATION FACET JOINT performed by Mili Whitten at [facility] 3  · None    Significant Injuries  · None    Habits  Pa Gutiérrez reports that she quit smoking about 17 years ago. Her smoking use included cigarettes. She has a 30.00 pack-year smoking history. She has never used smokeless tobacco. She reports current alcohol use. She reports that she does not use drugs. Family History   Problem Relation Age of Onset    Cancer Mother         lung    Heart Disease Mother     Hypertension Mother     Bipolar Disorder Mother     Heart Disease Father     Hypertension Father        Social History  Danial Beckham is , lives in [place], [place], and is unemployed. Medications:  Current Outpatient Medications   Medication Sig Dispense Refill    NARCAN 4 MG/0.1ML LIQD nasal spray       oxyCODONE-acetaminophen (PERCOCET)  MG per tablet Take 1 tablet by mouth every 6 hours as needed.        spironolactone (ALDACTONE) 25 MG tablet 2 po in am and 1 po in evening for fluid 90 tablet 1    traZODone (DESYREL) 50 MG tablet 1-3 po as needed for sleep 90 tablet 1    diclofenac (VOLTAREN) 75 MG EC tablet Take 75 mg by mouth 2 times daily       HYDROcodone-acetaminophen (NORCO) 7.5-325 MG per tablet       gabapentin (NEURONTIN) 300 MG capsule 1 po tid prn for nerve pain can add extra 100mg capsule at bedtime 90 capsule 1    pramipexole (MIRAPEX) 1.5 MG tablet TAKE ONE TABLET BY MOUTH NIGHTLY **MAY MAKE DROWSY** 90 tablet 1    FLUoxetine (PROZAC) 20 MG capsule Take 1 capsule by mouth daily For anxiety/depression 90 capsule 1    methylcellulose ([employer] SOLUBLE FIBER THERAPY) 500 MG TABS       loperamide (IMODIUM A-D) 2 MG tablet TAKE 2  MG ORAL AS NEEDED EVERY 12 HOURS  levothyroxine (SYNTHROID) 100 MCG tablet Take 100 mcg by mouth Daily       traMADol HCl 100 MG TABS Take 100 mg by mouth 2 times daily       atorvastatin (LIPITOR) 40 MG tablet TAKE ONE TABLET BY MOUTH NIGHTLY FOR CHOLESTEROL. 90 tablet 1    allopurinol (ZYLOPRIM) 100 MG tablet TAKE ONE TABLET BY MOUTH DAILY 90 tablet 1    carvedilol (COREG) 6.25 MG tablet TAKE 1 TABLET BY MOUTH 2 TIMES DAILY (WITH MEALS) 180 tablet 1    pilocarpine (SALAGEN) 5 MG tablet Take 1 tablet by mouth 3 times daily 270 tablet 0    bumetanide (BUMEX) 1 MG tablet Take 1 tablet by mouth 2 times daily (Patient taking differently: Take 1 mg by mouth daily ) 180 tablet 0    omeprazole (PRILOSEC) 20 MG delayed release capsule Take 1 capsule by mouth 2 times daily (before meals) 20 capsule 0    tiZANidine (ZANAFLEX) 4 MG tablet Take 4 mg by mouth 3 times daily as needed       Vitamin D (CHOLECALCIFEROL) 1000 UNITS CAPS capsule Take 1,000 Units by mouth daily      Multiple Vitamins-Minerals (MULTIVITAMIN PO) Take by mouth daily      calcium carbonate 600 MG TABS tablet Take 1 tablet by mouth daily. No current facility-administered medications for this visit. Allergies:   Allergies as of 01/12/2021 - Review Complete 01/12/2021   Allergen Reaction Noted    Bactrim [sulfamethoxazole-trimethoprim]  04/23/2020    Hctz [hydrochlorothiazide] Other (See Comments) 06/03/2016    Sitagliptin Rash 07/09/2019       Review of Systems:  Constitutional: negative for - chills and fever  Eyes:  negative for - visual disturbance and photophobia  HENMT: negative for - headaches and sinus pain  Respiratory: negative for - cough, hemoptysis, and shortness of breath  Cardiovascular: negative for - chest pain and palpitations  Gastrointestinal: negative for - blood in stools, constipation, diarrhea, nausea, and vomiting  Genito-Urinary: negative for - hematuria, urinary frequency, urinary urgency, and urinary retention Musculoskeletal: positive for - joint pain, joint stiffness, and joint swelling  Hematological and Lymphatic: negative for - bleeding problems, abnormal bruising, and swollen lymph nodes  Endocrine:  negative for - polydipsia and polyphagia  Allergy/Immunology:  negative for - rhinorrhea, sinus congestion, hives  Integument:  negative for - negative for - rash, change in moles, new or changing lesions  Psychological: negative for - anxiety and depression  Neurological: positive for - memory loss, numbness/tingling, and weakness     PHYSICAL EXAMINATION:  Vitals:  /72 (Site: Right Upper Arm, Position: Sitting, Cuff Size: Medium Adult)   Pulse 65   Temp 97.5 °F (36.4 °C)   Ht 5' (1.524 m)   Wt 200 lb (90.7 kg)   Breastfeeding No   BMI 39.06 kg/m²   General appearance:  Alert, well developed, well nourished, in no distress  HEENT:  normocephalic, atraumatic, sclera appear normal, no nasal abnormalities, no rhinorrhea, Ears appear normal, oral mucous membranes are moist without erythema, trachea midline, thyroid is normal, no lymphadenopathy or neck mass. III (soft palate, base of uvula visible). Cardiovascular:  Regular rate and rhythm without murmer. No peripheral edema, No cyanosis or clubbing. No carotid bruits. Pulmonary:  Lungs are clear to auscultation. Breathing appears normal, good expansion, normal effort without use of accessory muscles  Musculoskeletal:  Joints are arthritic. Integument:  She has a L lower leg ulcerated area  Psychiatric:  Mood, affect, and behavior appear normal      NEUROLOGIC EXAMINATION:  Mental Status:  alert, oriented to person, place, and time.   Speech:  Clear without dysarthria or dysphonia  Language:  Fluent without aphasia  Cranial Nerves:   II Visual fields are full to confrontation   III,IV, VI Extraocular movements are full   VII Facial movements are symmetrical without weakness   VIII Hearing is intact XII No tongue atrophy or fasciculations. Normal tongue protrusion. No tongue weakness  Motor:  Normal strength in both upper and lower extremities. Normal muscle tone and bulk. Deep tendon reflexes are intact and symmetrical in both upper and lower extremities. Varghese's signs are absent bilaterally. There is no ankle clonus on either side. Sensation:  Sensation is intact to light touch and vibration in all extremities. Coordination:  Rapid alternating movements are normal in both upper and lower extremities. Finger to nose testing is unimpaired bilaterally. Gait:  Normal station and gait. Pertinent Diagnostic Studies:  No BiPAP download available. PSG 1/2018 showed severe RADHA with an AHI of 149.7 and low sat of 77%. Assessment:       ICD-10-CM    1. Sleep apnea, obstructive  G47.33    2. Tremor, essential  G25.0    3. RLS (restless legs syndrome)  G25.81      I discussed the diagnosis of obstructive sleep apnea with Ashlie Juares including the pathophysiology (namely the mechanism of breathing and obstruction of upper airway, interruptions of sleep, hypoxemia, hypercapnia, and results of repetitive sympathetic activation), risks, evaluation, and treatment options. I discussed the risks of driving when drowsy and advised that Ashlie Juares not drive when drowsy and avoid sedating medications and respiratory suppressants. Plan:   1. I advised that she use her BiPAP during sleep  2. Increase the Trazodone to 150 mg or 200 mg q HS. If it does not work, can change to amitriptyline.   3. FU in a year    Electronically signed by Maxine Nation MD on 1/12/21

## 2021-01-13 RX ORDER — SPIRONOLACTONE 25 MG/1
TABLET ORAL
Qty: 270 TABLET | Refills: 1 | Status: SHIPPED | OUTPATIENT
Start: 2021-01-13 | End: 2022-02-07 | Stop reason: SDUPTHER

## 2021-01-13 RX ORDER — TRAZODONE HYDROCHLORIDE 50 MG/1
TABLET ORAL
Qty: 270 TABLET | Refills: 1 | Status: SHIPPED | OUTPATIENT
Start: 2021-01-13 | End: 2021-08-02 | Stop reason: SDUPTHER

## 2021-01-18 ENCOUNTER — LAB REQUISITION (OUTPATIENT)
Dept: LAB | Facility: HOSPITAL | Age: 61
End: 2021-01-18

## 2021-01-18 ENCOUNTER — OFFICE VISIT (OUTPATIENT)
Dept: WOUND CARE | Facility: HOSPITAL | Age: 61
End: 2021-01-18

## 2021-01-18 DIAGNOSIS — Z00.00 ENCOUNTER FOR GENERAL ADULT MEDICAL EXAMINATION WITHOUT ABNORMAL FINDINGS: ICD-10-CM

## 2021-01-18 PROCEDURE — 87070 CULTURE OTHR SPECIMN AEROBIC: CPT | Performed by: PODIATRIST

## 2021-01-18 PROCEDURE — 87077 CULTURE AEROBIC IDENTIFY: CPT | Performed by: PODIATRIST

## 2021-01-18 PROCEDURE — 87186 SC STD MICRODIL/AGAR DIL: CPT | Performed by: PODIATRIST

## 2021-01-18 PROCEDURE — 11042 DBRDMT SUBQ TIS 1ST 20SQCM/<: CPT | Performed by: PODIATRIST

## 2021-01-18 PROCEDURE — 87205 SMEAR GRAM STAIN: CPT | Performed by: PODIATRIST

## 2021-01-18 PROCEDURE — 87176 TISSUE HOMOGENIZATION CULTR: CPT | Performed by: PODIATRIST

## 2021-01-18 PROCEDURE — 87075 CULTR BACTERIA EXCEPT BLOOD: CPT | Performed by: PODIATRIST

## 2021-01-18 PROCEDURE — 11045 DBRDMT SUBQ TISS EACH ADDL: CPT | Performed by: PODIATRIST

## 2021-01-21 ENCOUNTER — OFFICE VISIT (OUTPATIENT)
Dept: FAMILY MEDICINE CLINIC | Age: 61
End: 2021-01-21
Payer: COMMERCIAL

## 2021-01-21 VITALS
RESPIRATION RATE: 18 BRPM | BODY MASS INDEX: 39.07 KG/M2 | HEART RATE: 89 BPM | OXYGEN SATURATION: 98 % | TEMPERATURE: 97.1 F | WEIGHT: 199 LBS | HEIGHT: 60 IN | SYSTOLIC BLOOD PRESSURE: 112 MMHG | DIASTOLIC BLOOD PRESSURE: 72 MMHG

## 2021-01-21 DIAGNOSIS — F33.1 MODERATE EPISODE OF RECURRENT MAJOR DEPRESSIVE DISORDER (HCC): ICD-10-CM

## 2021-01-21 DIAGNOSIS — F41.9 ANXIETY: ICD-10-CM

## 2021-01-21 PROCEDURE — 99213 OFFICE O/P EST LOW 20 MIN: CPT | Performed by: NURSE PRACTITIONER

## 2021-01-21 RX ORDER — CALCIUM POLYCARBOPHIL 625 MG 625 MG/1
625 TABLET ORAL DAILY
COMMUNITY
End: 2021-02-25 | Stop reason: SDUPTHER

## 2021-01-21 RX ORDER — LANOLIN ALCOHOL/MO/W.PET/CERES
500 CREAM (GRAM) TOPICAL DAILY
COMMUNITY

## 2021-01-21 RX ORDER — DOXYCYCLINE HYCLATE 100 MG/1
100 CAPSULE ORAL 2 TIMES DAILY
COMMUNITY
Start: 2021-01-14 | End: 2021-02-25 | Stop reason: ALTCHOICE

## 2021-01-21 RX ORDER — FLUOXETINE HYDROCHLORIDE 40 MG/1
40 CAPSULE ORAL DAILY
Qty: 90 CAPSULE | Refills: 1 | Status: SHIPPED | OUTPATIENT
Start: 2021-01-21 | End: 2021-08-02 | Stop reason: SDUPTHER

## 2021-01-21 NOTE — PROGRESS NOTES
SUBJECTIVE:    Patient ID: Pa Gutiérrez is a61 y.o. female. Pa Gutiérrez is here today for Discuss Medications (Patient presents to discuss prozac.)  . HPI:   HPI     Pt has been started on doxycycline by ID in [place]. Pt is also reporting that she was instructed to continue f/u with local wound care and has been dx with pseudomonas and is tx with levaquin that she is picking up today. Pt has reached out to ID in [place] to see her opinion of the antibiotic. Pt has wound care and ID appts in March 2, 2021---states she will be having cardiac testing and f/u with ID and then on March 11 will see vascular. Pt continues to have \"shooting pain\". Pt is actually here to f/u on anxiety and depression. She has been using Prozac 20mg daily. She feels she needs a higher dose of medication. \"I am so disgusted with my leg\" (slow healing). Pt has had no negative effects from fluoxetine. Past Medical History:   Diagnosis Date    Arthritis     Bipolar I disorder, most recent episode (or current) mixed, unspecified     Chronic back pain     Depression     Dry mouth     Hyperlipidemia     Hypertension     Hypokalemia     Hypothyroidism     Menopause     Morbidly obese ([facility] [address] 10/28/2020    Overactive bladder     Plantar fasciitis     RLS (restless legs syndrome)     Sleep apnea     Spondylosis with myelopathy, lumbar region     Thyroid disease      Prior to Visit Medications    Medication Sig Taking?  Authorizing Provider   doxycycline hyclate (VIBRAMYCIN) 100 MG capsule Take 100 mg by mouth 2 times daily Yes Historical Provider, MD   polycarbophil (FIBERCON) 625 MG tablet Take 625 mg by mouth daily Yes Historical Provider, MD   niacin (SLO-NIACIN) 500 MG extended release tablet Take 500 mg by mouth daily Yes Historical Provider, MD   FLUoxetine (PROZAC) 40 MG capsule Take 1 capsule by mouth daily For anxiety/depression Yes Adriano Silverio APRN gabapentin (NEURONTIN) 300 MG capsule TAKE ONE CAPSULE BY MOUTH THREE TIMES DAILY AS NEEDED FOR NERVE PAIN Yes MAEVE Cuevas   pilocarpine (SALAGEN) 5 MG tablet Take 1 tablet by mouth 3 times daily Yes MAEVE Cuevas   spironolactone (ALDACTONE) 25 MG tablet 2 po in am and 1 po in evening for fluid Yes MAEVE Cuevas   traZODone (DESYREL) 50 MG tablet 1-3 po as needed for sleep Yes MAEVE Cuevas   NARCAN 4 MG/0.1ML LIQD nasal spray  Yes Historical Provider, MD   diclofenac (VOLTAREN) 75 MG EC tablet Take 75 mg by mouth 2 times daily  Yes Historical Provider, MD   HYDROcodone-acetaminophen ([facility]) 7.5-325 MG per tablet  Yes Historical Provider, MD   pramipexole (MIRAPEX) 1.5 MG tablet TAKE ONE TABLET BY MOUTH NIGHTLY **MAY MAKE DROWSY** Yes MAEVE Cuevas   methylcellulose ([facility] SOLUBLE FIBER THERAPY) 500 MG TABS  Yes Historical Provider, MD   loperamide (IMODIUM A-D) 2 MG tablet TAKE 2  MG ORAL AS NEEDED EVERY 12 HOURS Yes Historical Provider, MD   levothyroxine (SYNTHROID) 100 MCG tablet Take 100 mcg by mouth Daily  Yes Historical Provider, MD   traMADol HCl 100 MG TABS Take 100 mg by mouth 2 times daily  Yes Historical Provider, MD   atorvastatin (LIPITOR) 40 MG tablet TAKE ONE TABLET BY MOUTH NIGHTLY FOR CHOLESTEROL.  Yes MAEVE Cuevas   allopurinol (ZYLOPRIM) 100 MG tablet TAKE ONE TABLET BY MOUTH DAILY Yes MAEVE Cuevas   carvedilol (COREG) 6.25 MG tablet TAKE 1 TABLET BY MOUTH 2 TIMES DAILY (WITH MEALS) Yes MAEVE Cuevas   bumetanide (BUMEX) 1 MG tablet Take 1 tablet by mouth 2 times daily  Patient taking differently: Take 1 mg by mouth daily  Yes MAEVE Cuevas   omeprazole (PRILOSEC) 20 MG delayed release capsule Take 1 capsule by mouth 2 times daily (before meals) Yes MAEVE Cuevas   tiZANidine (ZANAFLEX) 4 MG tablet Take 4 mg by mouth 3 times daily as needed  Yes Historical Provider, MD Vitamin D (CHOLECALCIFEROL) 1000 UNITS CAPS capsule Take 1,000 Units by mouth daily Yes Historical Provider, MD   Multiple Vitamins-Minerals (MULTIVITAMIN PO) Take by mouth daily Yes Historical Provider, MD   calcium carbonate 600 MG TABS tablet Take 1 tablet by mouth daily. Yes Historical Provider, MD     Allergies   Allergen Reactions    Bactrim [Sulfamethoxazole-Trimethoprim]     Hctz [Hydrochlorothiazide] Other (See Comments)     Acid reflux      Sitagliptin Rash     Past Surgical History:   Procedure Laterality Date    CARPAL TUNNEL RELEASE Bilateral 2002    CHOLECYSTECTOMY  10/2014    COLON SURGERY      COLON SURGERY      biopsy     COLONOSCOPY      CYST REMOVAL  2017    Under left ear    HARDWARE REMOVAL Right     Foot     HERNIA REPAIR  10/2014    LEG DEBRIDEMENT Left     NOSE SURGERY  2017; 2019    Dr Chelsea Lucas L/S,1 LEVEL Bilateral 2017    INJECTION MEDICATION FACET JOINT performed by Melissa Romo at [facility]     Family History   Problem Relation Age of Onset    Cancer Mother         lung    Heart Disease Mother     Hypertension Mother     Bipolar Disorder Mother     Heart Disease Father     Hypertension Father      Social History     Socioeconomic History    Marital status:      Spouse name: Ambika Rodriguez Number of children: 2    Years of education: some college    Highest education level: Not on file   Occupational History     Employer: [employer]   Social Needs    Financial resource strain: Not on file    Food insecurity     Worry: Not on file     Inability: Not on file   [employer] needs     Medical: Not on file     Non-medical: Not on file   Tobacco Use    Smoking status: Former Smoker     Packs/day: 1.00     Years: 30.00     Pack years: 30.00     Types: Cigarettes     Quit date: 2003     Years since quittin.4    Smokeless tobacco: Never Used    Tobacco comment: Pt not interested at this time in stopping. Substance and Sexual Activity    Alcohol use: Yes     Comment: rarely    Drug use: No     Comment: pt admits \"may have misused in past\"--Pt was vague with details.  Sexual activity: Not on file   Lifestyle    Physical activity     Days per week: Not on file     Minutes per session: Not on file    Stress: Not on file   Relationships    Social connections     Talks on phone: Not on file     Gets together: Not on file     Attends Jain service: Not on file     Active member of club or organization: Not on file     Attends meetings of clubs or organizations: Not on file     Relationship status: Not on file    Intimate partner violence     Fear of current or ex partner: Not on file     Emotionally abused: Not on file     Physically abused: Not on file     Forced sexual activity: Not on file   Other Topics Concern    Not on file   Social History Narrative    Not on file       Review of Systems   Constitutional: Negative for fever and unexpected weight change. Skin: Positive for wound. Psychiatric/Behavioral: The patient is nervous/anxious. OBJECTIVE:    Physical Exam  Vitals signs and nursing note reviewed. Constitutional:       General: She is not in acute distress. Appearance: Normal appearance. She is well-developed. She is obese. She is not ill-appearing or toxic-appearing. HENT:      Head: Normocephalic and atraumatic. Eyes:      Extraocular Movements: Extraocular movements intact. Conjunctiva/sclera: Conjunctivae normal.      Pupils: Pupils are equal, round, and reactive to light. Neck:      Musculoskeletal: Neck supple. Trachea: No tracheal deviation. Cardiovascular:      Rate and Rhythm: Normal rate and regular rhythm. Heart sounds: Normal heart sounds. Pulmonary:      Effort: Pulmonary effort is normal.      Breath sounds: Normal breath sounds. Musculoskeletal:      Right lower leg: No edema. Left lower leg: No edema (frank boot in place).    Skin: General: Skin is warm and dry. Capillary Refill: Capillary refill takes less than 2 seconds. Neurological:      General: No focal deficit present. Mental Status: She is alert and oriented to person, place, and time. Mental status is at baseline. Psychiatric:         Mood and Affect: Mood normal. Mood is not anxious or depressed. Affect is not angry. Speech: Speech normal.         Behavior: Behavior normal.         Thought Content: Thought content normal.         Judgment: Judgment normal.         /72 (Site: Right Upper Arm, Position: Sitting, Cuff Size: Large Adult)   Pulse 89   Temp 97.1 °F (36.2 °C) (Temporal)   Resp 18   Ht 5' (1.524 m)   Wt 199 lb (90.3 kg)   SpO2 98%   BMI 38.86 kg/m²      ASSESSMENT:      ICD-10-CM    1. Moderate episode of recurrent major depressive disorder (HCC)  F33.1 FLUoxetine (PROZAC) 40 MG capsule   2. Anxiety  F41.9 FLUoxetine (PROZAC) 40 MG capsule       PLAN:    Leydi Eugene: Discuss Medications (Patient presents to discuss prozac.)  Plan as above  F/u in approx 2wks with [email or web address] message or visit  Diagnosis and orders for this visit are above. Please note that this chart was generated using dragon dictationsoftware. Although every effort was made to ensure the accuracy of this automated transcription, some errors in transcription may have occurred.

## 2021-01-22 LAB
BACTERIA SPEC AEROBE CULT: ABNORMAL
BACTERIA SPEC AEROBE CULT: ABNORMAL
GRAM STN SPEC: ABNORMAL
GRAM STN SPEC: ABNORMAL

## 2021-01-23 LAB — BACTERIA SPEC ANAEROBE CULT: NORMAL

## 2021-01-25 ENCOUNTER — PREP FOR SURGERY (OUTPATIENT)
Dept: OTHER | Facility: HOSPITAL | Age: 61
End: 2021-01-25

## 2021-01-25 ENCOUNTER — OFFICE VISIT (OUTPATIENT)
Dept: WOUND CARE | Facility: HOSPITAL | Age: 61
End: 2021-01-25

## 2021-01-25 DIAGNOSIS — S81.802D WOUND OF LEFT LOWER EXTREMITY, SUBSEQUENT ENCOUNTER: Primary | ICD-10-CM

## 2021-01-25 PROCEDURE — 11042 DBRDMT SUBQ TIS 1ST 20SQCM/<: CPT | Performed by: PODIATRIST

## 2021-01-25 PROCEDURE — 11045 DBRDMT SUBQ TISS EACH ADDL: CPT | Performed by: PODIATRIST

## 2021-01-25 PROCEDURE — 99214 OFFICE O/P EST MOD 30 MIN: CPT | Performed by: PODIATRIST

## 2021-01-25 RX ORDER — BUPIVACAINE HCL/0.9 % NACL/PF 0.1 %
2 PLASTIC BAG, INJECTION (ML) EPIDURAL ONCE
Status: CANCELLED | OUTPATIENT
Start: 2021-01-25 | End: 2021-01-25

## 2021-01-25 NOTE — H&P
[facility] - PODIATRY    Today's Date: 01/25/21    Patient Name: Robyn Minaya  MRN: [id number]  CSN: [id number]  PCP: Tena Hough APRN  Referring Provider: No ref. provider found    SUBJECTIVE   CC: Non-healing wound    HPI: Robyn Minaya, a 61 y.o.female, as a(n) established patient complaining of non-healing lower leg wound. Patient has h/o arthritis, back pain, depression, gout, HLD, HTN, Insomnia, Overactive bladder, Prediabetes, RLS, Sleep Apnea. Patient's wound first appeared April 12, 2020 as a spider bite.  It was initially treated by [facility] wound care at that wound care center.  Wound continues to worsen and eventually patient ended up in [facility].  During that admission, she underwent a surgical debridement of the wound and wound VAC application.  Since then she has been undergoing outpatient wound care at [facility] wound care center.  The wound has progressed well overall.  She did attempt a split-thickness skin graft which only partially took.  Since that time, the wound has somewhat stalled.  She is interested in a surgical debridement due to pain with outpatient debridements. Admits pain at 6/10 level and described as burning, aching and throbbing. Relates previous treatment(s) including debridement, compression, abx, wound vac, STSG. Denies any constitutional symptoms. No other pedal complaints at this time.    Past Medical History:   Diagnosis Date   • Allergic rhinitis    • Arthritis     BACK   • Chronic back pain    • Degenerative arthritis    • Depression    • Deviated nasal septum    • Gout    • History of colon polyps    • Hyperlipidemia    • Hypertension    • Hypertrophy of nasal turbinates    • Incontinence in female    • Insomnia    • Menopause    • Nasal septal deviation    • Nasal valve stenosis    • Overactive bladder    • Plantar fasciitis    • Prediabetes    • RLS (restless legs syndrome)    • Sleep apnea     pt states has a cpap but hasn't been using it   •  Spider bite wound     left lower leg. previously infected with mrsa but currently not infected.   • Wears glasses      Past Surgical History:   Procedure Laterality Date   • BLEPHAROPLASTY Bilateral 7/9/2019    Procedure: 1) bilateral upper blepharoplasty with excision of excess of tissue weighing down 2) nasal endoscopy with removal of nasal septal prosthesis;  Surgeon: Mark Anthony Anderson MD;  Location: [facility] OR;  Service: ENT   • CARPAL TUNNEL RELEASE Bilateral 2004   • CHOLECYSTECTOMY  2013   • COLONOSCOPY  10/17/2014    One 5-6mm tubular adenomatous polyp in the ascending colon; Two less than 4mm hyperplastic polyps in the sigmoid colon; Three rectal hyperplastic polyps; Diverticulosis; Repeat 5 years    • COLONOSCOPY  07/14/2010    Internal hemorrhoids; Repeat 7 years    • COLONOSCOPY N/A 10/23/2019    Procedure: COLONOSCOPY WITH ANESTHESIA;  Surgeon: Robyn Frazier MD;  Location: [facility] ENDOSCOPY;  Service: Gastroenterology   • CYST REMOVAL  2016    neck   • ENDOSCOPIC FUNCTIONAL SINUS SURGERY (FESS) Bilateral 4/16/2019    Procedure: ENDOSCOPIC FUNCTIONAL SINUS SURGERY (bilareral maxillary antrostomy without image guidance);  Surgeon: Mark Anthony Anderson MD;  Location: [facility] OR;  Service: ENT   • ENDOSCOPY N/A 4/11/2019    Esophageal mucosal changes suggestive of eosinophilic esophagitis-biopsied; A few gastric polyps-resected and retrieved-biopsied; Normal examined duodenum-biopsied   • FOOT SURGERY Right 2010    X3   • HERNIA REPAIR     • INTERSTIM PLACEMENT N/A 6/5/2019    Procedure: INTERSTIM STAGES 1 AND 2 LEAD AND GENERATOR PLACEMENT;  Surgeon: Endy Guerrero MD;  Location: [facility] OR;  Service: Urology   • LEG DEBRIDEMENT Left 7/31/2020    Procedure: LOWER POSTERIOR LEG WOUND DEBRIDEMENT - LEFT;  Surgeon: Roberto Wells DPM;  Location: [facility] OR;  Service: Podiatry;  Laterality: Left;   • LEG DEBRIDEMENT Left 11/30/2020    Procedure: SPLIT THICKNESS SKIN GRAFT LEFT LOWER EXTREMTIY WITH WOUND VAC  PLACEMENT;  Surgeon: Trace Hurd DO;  Location:  [facility] HYBRID OR 12;  Service: Vascular;  Laterality: Left;   • NASAL ENDOSCOPY N/A 2019    Procedure:     1. Functional endoscopic sinus surgery with bilateral maxillary antrostomy    2. Bilateral nasal endoscopy with biopsy of nasal septum    3.  Nasal septal prosthesis placement  ;  Surgeon: Mark Anthony Anderson MD;  Location:  [facility] OR;  Service: ENT   • NASAL VESTIBULE LESION/PAPILLOMA EXCISION N/A 2017    Procedure: Repair of nasal vestibular stenosis and septoplasty; cartilage graft from left ear;  Surgeon: Mark Anthony Anderson MD;  Location:  [facility] OR;  Service:    • SEPTOPLASTY N/A 2019    Procedure: PLACEMENT OF NASAL SEPTAL PROSTHESIS;  Surgeon: Mark Anthony Anderson MD;  Location:  [facility] OR;  Service: ENT   • SEPTOPLASTY Bilateral 2020    Procedure: Nasal endoscopy with septal debridement, and placement of Silastic stents;  Surgeon: Mark Anthony Anderson MD;  Location:  [facility] OR;  Service: ENT   • WOUND DEBRIDEMENT  2019    spider bite wound initial surgery     Family History   Problem Relation Age of Onset   • Cancer Mother    • Diabetes Father    • Hypertension Father    • Cancer Father    • Colon cancer Neg Hx    • Colon polyps Neg Hx    • Esophageal cancer Neg Hx    • Liver cancer Neg Hx    • Liver disease Neg Hx    • Rectal cancer Neg Hx    • Stomach cancer Neg Hx      Social History     Socioeconomic History   • Marital status:      Spouse name: Not on file   • Number of children: Not on file   • Years of education: Not on file   • Highest education level: Not on file   Tobacco Use   • Smoking status: Former Smoker     Packs/day: 0.50     Years: 40.00     Pack years: 20.00     Types: Cigarettes     Quit date: 2020     Years since quittin.4   • Smokeless tobacco: Never Used   Substance and Sexual Activity   • Alcohol use: Yes     Frequency: Monthly or less     Comment: Rarely-maybe 3x/year   • Drug use: No   • Sexual  activity: Defer     Allergies   Allergen Reactions   • Bactrim [Sulfamethoxazole-Trimethoprim] Rash     Itching   • Hctz [Hydrochlorothiazide] Other (See Comments)     Acid reflux   • Januvia [Sitagliptin] Rash     Current Outpatient Medications   Medication Sig Dispense Refill   • allopurinol (ZYLOPRIM) 100 MG tablet Take 100 mg by mouth Daily.     • amoxicillin-clavulanate (Augmentin) 875-125 MG per tablet Take 1 tablet by mouth 2 (Two) Times a Day. 12 tablet 0   • atorvastatin (LIPITOR) 40 MG tablet Take 40 mg by mouth Every Night.     • bumetanide (BUMEX) 1 MG tablet Take 1 mg by mouth Daily.     • calcium carbonate (OS-KAYLEY) 600 MG tablet Take 600 mg by mouth Daily.     • carvedilol (COREG) 6.25 MG tablet Take 6.25 mg by mouth 2 (Two) Times a Day With Meals.     • diclofenac (VOLTAREN) 75 MG EC tablet Take 75 mg by mouth 2 (Two) Times a Day.     • doxycycline (DORYX) 100 MG enteric coated tablet Take 1 tablet by mouth 2 (Two) Times a Day. 12 tablet 0   • FLUoxetine (PROzac) 20 MG capsule Take 20 mg by mouth Daily.     • gabapentin (NEURONTIN) 400 MG capsule Take 400 mg by mouth 3 (Three) Times a Day.     • levothyroxine (SYNTHROID, LEVOTHROID) 100 MCG tablet Take 100 mcg by mouth Daily.     • Loperamide HCl (IMODIUM PO) Take 2 mg by mouth As Needed.     • methylcellulose, Laxative, (EQ FIBER THERAPY) 500 MG tablet tablet Take 1 tablet by mouth Daily.     • Multiple Vitamin (MULTI VITAMIN PO) Take 1 dose by mouth Daily.     • omeprazole (priLOSEC) 20 MG capsule Take 20 mg by mouth Daily.     • oxyCODONE-acetaminophen (Percocet)  MG per tablet Take 1 tablet by mouth Every 6 (Six) Hours As Needed for Moderate Pain . 30 tablet 0   • pilocarpine (SALAGEN) 5 MG tablet Take 5 mg by mouth 3 (Three) Times a Day.     • pramipexole (MIRAPEX) 1.5 MG tablet Take 1.5 mg by mouth Every Night.     • spironolactone (ALDACTONE) 25 MG tablet Take 25 mg by mouth 2 (Two) Times a Day.     • tiZANidine (ZANAFLEX) 4 MG tablet  Take 4 mg by mouth Every Night.     • traMADol (ULTRAM) 50 MG tablet Take 100 mg by mouth 2 (Two) Times a Day.     • traZODone (DESYREL) 50 MG tablet Take 50 mg by mouth Every Night. Take 1-3 tablets at night as needed for sleep.       No current facility-administered medications for this visit.      Review of Systems   Constitutional: Positive for activity change. Negative for chills and fever.   HENT: Negative for congestion.    Respiratory: Negative for shortness of breath.    Cardiovascular: Positive for leg swelling.   Gastrointestinal: Negative for nausea and vomiting.   Musculoskeletal: Positive for arthralgias and myalgias.   Skin: Positive for color change and wound.   Neurological: Positive for numbness.   Psychiatric/Behavioral: Positive for sleep disturbance.       OBJECTIVE   There were no vitals filed for this visit.    PHYSICAL EXAM  GEN:   Accompanied by sister.     Physical Exam  Constitutional:       Appearance: She is obese.   HENT:      Head: Normocephalic and atraumatic.      Right Ear: Tympanic membrane normal.      Left Ear: Tympanic membrane normal.      Nose: Nose normal.      Mouth/Throat:      Mouth: Mucous membranes are moist.      Pharynx: Oropharynx is clear.   Eyes:      Extraocular Movements: Extraocular movements intact.      Pupils: Pupils are equal, round, and reactive to light.   Cardiovascular:      Rate and Rhythm: Normal rate and regular rhythm.      Pulses:           Dorsalis pedis pulses are 1+ on the right side and 1+ on the left side.        Posterior tibial pulses are 1+ on the right side and 1+ on the left side.      Heart sounds: Normal heart sounds.   Pulmonary:      Effort: Pulmonary effort is normal.      Breath sounds: Normal breath sounds.   Abdominal:      General: Abdomen is flat.      Palpations: Abdomen is soft.   Feet:      Left foot:      Skin integrity: Ulcer, skin breakdown and erythema present.   Neurological:      General: No focal deficit present.       Mental Status: She is alert and oriented to person, place, and time.   Psychiatric:         Behavior: Behavior normal.         Thought Content: Thought content normal.         Judgment: Judgment normal.         Foot/Ankle Exam:       General:   Appearance: appears stated age and healthy and obesity    Orientation: AAOx3    Affect: appropriate    Affect comment:  Anxious  Gait: antalgic    Assistance: walker    Shoe Gear:  Casual shoes    VASCULAR      Right Foot Vascularity   Dorsalis pedis:  1+  Posterior tibial:  1+  Edema Gradin+ and pitting  CFT:  3  Pedal Hair Growth:  Present  Varicosities: mild varicosities       Left Foot Vascularity   Dorsalis pedis:  1+  Posterior tibial:  1+  Edema Gradin+ and pitting  CFT:  3  Pedal Hair Growth:  Present  Varicosities: mild varicosities        NEUROLOGIC     Right Foot Neurologic   Normal sensation    Light touch sensation:  Normal  Vibratory sensation:  Normal  Hot/Cold sensation: normal       Left Foot Neurologic   Normal sensation    Light touch sensation:  Normal  Vibratory sensation:  Normal  Hot/cold sensation: normal       MUSCULOSKELETAL      Right Foot Musculoskeletal    Amputation   Right toes amputated: No    Ecchymosis:  None  Tenderness: none    Arch:  Normal     Left Foot Musculoskeletal    Amputation   Left toes amputated: No    Ecchymosis:  None  Tenderness comment:  Generalized to distal lower extremity, worse around wound  Arch:  Normal     MUSCLE STRENGTH     Right Foot Muscle Strength   Normal strength    Foot dorsiflexion:  5  Foot plantar flexion:  5  Foot inversion:  5  Foot eversion:  5     Left Foot Muscle Strength   Foot dorsiflexion:  5  Foot plantar flexion:  5  Foot inversion:  5  Foot eversion:  5     DERMATOLOGIC     Right Foot Dermatologic   Skin: skin intact       Left Foot Dermatologic   Skin: drainage, erythema, maceration, skin changes and ulcer    Skin: no left foot cellulitis        Left Foot Additional Comments: Large  wound to left posterior leg with fibrogranular wound base. Periwound erythema. Mild serous drainage. No purulence. Measures: 11.9 cm x 12.5 cm x 0.2cm.       RADIOLOGY/NUCLEAR:  Xr Tibia Fibula 2 View Left    Result Date: 12/30/2020  Narrative: XR TIBIA FIBULA 2 VW LEFT- 12/30/2020 12:56 PM CST  HISTORY: Soft tissue gas  COMPARISON: NONE  FINDINGS: Frontal and lateral radiographs of the left lower leg were obtained.  There appears to be a large soft tissue defect overlying the calf, along the more medial aspect of the lower leg. This measures up to 11 cm in craniocaudal dimension. I do not see any soft tissue gas deep to this defect. No acute bony abnormality is seen. Knee and ankle joint are unremarkable.      Impression: 1. Elongated skin defect along the medial aspect of the calf, with no soft tissue gas identified deep to this fairly large skin defect. This report was finalized on 12/30/2020 13:36 by Dr Markos Bean, .    Us Venous Doppler Lower Extremity Left (duplex)    Result Date: 1/2/2021  Narrative: History: Swelling      Impression: Impression: There is no evidence of deep venous thrombosis or superficial thrombophlebitis of the left lower extremity.  Comments: Left lower extremity venous duplex exam was performed using color Doppler flow, Doppler wave form analysis, and grayscale imaging, with and without compression. There is no evidence of deep venous thrombosis of the common femoral, superficial femoral, popliteal, posterior tibial, and peroneal veins. There is no thrombus identified in the saphenofemoral junction or the greater saphenous vein. There is no evidence of clot in the right common femoral vein.  This report was finalized on 01/02/2021 12:00 by Dr. Trace Hurd MD.      LABORATORY/CULTURE RESULTS:      PATHOLOGY RESULTS:       ASSESSMENT/PLAN     Diagnoses and all orders for this visit:    1. Wound of left lower extremity, subsequent encounter (Primary)  -     Case Request; Standing  -      Instructions on coughing, deep breathing, and incentive spirometry.; Future  -     CBC & Differential; Future  -     Comprehensive Metabolic Panel; Future  -     ECG 12 Lead; Future  -     ceFAZolin in 0.9% normal saline (ANCEF) IVPB solution 2 g  -     Case Request    Other orders  -     Follow Anesthesia Guidelines / Protocol; Future  -     Obtain Informed Consent; Future  -     Follow Anesthesia Guidelines / Protocol; Standing  -     Provide Instructions to Patient Regarding NPO Status; Future  -     Chlorhexidine Skin Prep - Educate and Review With Patient; Future  -     Verify NPO Status; Standing  -     Obtain Informed Consent (If Not Done Inpatient or PAT); Standing  -     Instructions on coughing, deep breathing, and incentive spirometry.; Standing  -     Notify Physician - Standard; Standing  -     Provide NPO Instructions to Patient      Comprehensive lower extremity examination and evaluation was performed.  Discussed findings and treatment plan including risks, benefits, and treatment options with patient in detail. Patient agreed with treatment plan.  Continue antibiotics as prescribed.     Continue silver alginate dressing with Unna boot for compression.  After discussion with patient, I believe the best course of action would be to proceed with a surgical debridement of the ulceration due to the size of the wound and patient's pain during current debridements. Patient is in agreement.  All options, benefits, nurse associate with surgery discussed with the patient including but not limited to: Standard risk of anesthesia, pain, bleeding, infection, nonhealing, loss of limb or life.  Pre and postoperative courses were discussed in detail. Patient will follow up in outpatient wound care center 2 days after to the debridement.  No guarantees were inferred.      All questions were answered to patient/family satisfaction. Should symptoms fail to improve or worsen they agree to call or return to clinic  or to go to the Emergency Department. Discussed the importance of following up with any needed screening tests/labs/specialist appointments and any requested follow-up recommended by me today. Importance of maintaining follow-up discussed and patient accepts that missed appointments can delay diagnosis and potentially lead to worsening of conditions.  No follow-ups on file., or sooner if acute issues arise.    Lab Frequency Next Occurrence   Follow Anesthesia Guidelines / Standing Orders Once 07/28/2017   Follow Anesthesia Guidelines / Standing Orders Once 04/27/2019   Provide NPO Instructions to Patient Once 04/01/2019   Follow Anesthesia Guidelines / Standing Orders Once 04/10/2019   Follow Anesthesia Guidelines / Standing Orders Once 05/10/2019   Provide NPO Instructions to Patient Once 05/14/2019   Follow Anesthesia Guidelines / Standing Orders Once 10/14/2019   Obtain Informed Consent Once 10/19/2019   Urinalysis With Microscopic - Urine, Clean Catch Once 03/09/2021   Angiotensin Converting Enzyme Once 03/09/2021   CBC & Differential Once 03/09/2021   Follow Anesthesia Guidelines / Protocol Once 11/03/2020   Provide NPO Instructions to Patient Once 11/08/2020   Chlorhexidine Skin Prep Once 11/08/2020   Follow Anesthesia Guidelines / Protocol Once 01/25/2021   Obtain Informed Consent Once 01/30/2021   Provide Instructions to Patient Regarding NPO Status Once 01/30/2021   Chlorhexidine Skin Prep - Educate and Review With Patient Once 01/30/2021   Instructions on coughing, deep breathing, and incentive spirometry. Once 01/30/2021   CBC & Differential Once 01/30/2021   Comprehensive Metabolic Panel Once 01/30/2021   ECG 12 Lead Once 01/30/2021       This document has been electronically signed by Roberto Wells DPM on January 25, 2021 12:25 CST

## 2021-01-27 ENCOUNTER — TRANSCRIBE ORDERS (OUTPATIENT)
Dept: ADMINISTRATIVE | Facility: HOSPITAL | Age: 61
End: 2021-01-27

## 2021-01-27 DIAGNOSIS — Z11.59 SCREENING FOR VIRAL DISEASE: Primary | ICD-10-CM

## 2021-02-01 ENCOUNTER — APPOINTMENT (OUTPATIENT)
Dept: PREADMISSION TESTING | Facility: HOSPITAL | Age: 61
End: 2021-02-01

## 2021-02-01 ENCOUNTER — OFFICE VISIT (OUTPATIENT)
Dept: WOUND CARE | Facility: HOSPITAL | Age: 61
End: 2021-02-01

## 2021-02-01 ENCOUNTER — LAB (OUTPATIENT)
Dept: LAB | Facility: HOSPITAL | Age: 61
End: 2021-02-01

## 2021-02-01 VITALS
DIASTOLIC BLOOD PRESSURE: 83 MMHG | OXYGEN SATURATION: 94 % | BODY MASS INDEX: 38.53 KG/M2 | HEART RATE: 92 BPM | SYSTOLIC BLOOD PRESSURE: 142 MMHG | WEIGHT: 191.14 LBS | HEIGHT: 59 IN | RESPIRATION RATE: 16 BRPM

## 2021-02-01 DIAGNOSIS — S81.802D WOUND OF LEFT LOWER EXTREMITY, SUBSEQUENT ENCOUNTER: ICD-10-CM

## 2021-02-01 LAB
ALBUMIN SERPL-MCNC: 3.9 G/DL (ref 3.5–5.2)
ALBUMIN/GLOB SERPL: 1.2 G/DL
ALP SERPL-CCNC: 138 U/L (ref 39–117)
ALT SERPL W P-5'-P-CCNC: 24 U/L (ref 1–33)
ANION GAP SERPL CALCULATED.3IONS-SCNC: 11 MMOL/L (ref 5–15)
AST SERPL-CCNC: 18 U/L (ref 1–32)
BASOPHILS # BLD AUTO: 0.07 10*3/MM3 (ref 0–0.2)
BASOPHILS NFR BLD AUTO: 0.6 % (ref 0–1.5)
BILIRUB SERPL-MCNC: 0.2 MG/DL (ref 0–1.2)
BUN SERPL-MCNC: 25 MG/DL (ref 8–23)
BUN/CREAT SERPL: 25.5 (ref 7–25)
CALCIUM SPEC-SCNC: 9.5 MG/DL (ref 8.6–10.5)
CHLORIDE SERPL-SCNC: 100 MMOL/L (ref 98–107)
CO2 SERPL-SCNC: 28 MMOL/L (ref 22–29)
CREAT SERPL-MCNC: 0.98 MG/DL (ref 0.57–1)
DEPRECATED RDW RBC AUTO: 46.4 FL (ref 37–54)
EOSINOPHIL # BLD AUTO: 0.3 10*3/MM3 (ref 0–0.4)
EOSINOPHIL NFR BLD AUTO: 2.5 % (ref 0.3–6.2)
ERYTHROCYTE [DISTWIDTH] IN BLOOD BY AUTOMATED COUNT: 15.1 % (ref 12.3–15.4)
GFR SERPL CREATININE-BSD FRML MDRD: 58 ML/MIN/1.73
GLOBULIN UR ELPH-MCNC: 3.2 GM/DL
GLUCOSE SERPL-MCNC: 136 MG/DL (ref 65–99)
HCT VFR BLD AUTO: 37 % (ref 34–46.6)
HGB BLD-MCNC: 11.2 G/DL (ref 12–15.9)
IMM GRANULOCYTES # BLD AUTO: 0.09 10*3/MM3 (ref 0–0.05)
IMM GRANULOCYTES NFR BLD AUTO: 0.8 % (ref 0–0.5)
LYMPHOCYTES # BLD AUTO: 3.39 10*3/MM3 (ref 0.7–3.1)
LYMPHOCYTES NFR BLD AUTO: 28.6 % (ref 19.6–45.3)
MCH RBC QN AUTO: 25.3 PG (ref 26.6–33)
MCHC RBC AUTO-ENTMCNC: 30.3 G/DL (ref 31.5–35.7)
MCV RBC AUTO: 83.5 FL (ref 79–97)
MONOCYTES # BLD AUTO: 0.64 10*3/MM3 (ref 0.1–0.9)
MONOCYTES NFR BLD AUTO: 5.4 % (ref 5–12)
NEUTROPHILS NFR BLD AUTO: 62.1 % (ref 42.7–76)
NEUTROPHILS NFR BLD AUTO: 7.37 10*3/MM3 (ref 1.7–7)
NRBC BLD AUTO-RTO: 0 /100 WBC (ref 0–0.2)
PLATELET # BLD AUTO: 374 10*3/MM3 (ref 140–450)
PMV BLD AUTO: 10.5 FL (ref 6–12)
POTASSIUM SERPL-SCNC: 3.7 MMOL/L (ref 3.5–5.2)
PROT SERPL-MCNC: 7.1 G/DL (ref 6–8.5)
RBC # BLD AUTO: 4.43 10*6/MM3 (ref 3.77–5.28)
SARS-COV-2 ORF1AB RESP QL NAA+PROBE: NOT DETECTED
SODIUM SERPL-SCNC: 139 MMOL/L (ref 136–145)
WBC # BLD AUTO: 11.86 10*3/MM3 (ref 3.4–10.8)

## 2021-02-01 PROCEDURE — 97598 DBRDMT OPN WND ADDL 20CM/<: CPT | Performed by: PODIATRIST

## 2021-02-01 PROCEDURE — 97597 DBRDMT OPN WND 1ST 20 CM/<: CPT | Performed by: PODIATRIST

## 2021-02-01 PROCEDURE — 80053 COMPREHEN METABOLIC PANEL: CPT

## 2021-02-01 PROCEDURE — U0004 COV-19 TEST NON-CDC HGH THRU: HCPCS | Performed by: PODIATRIST

## 2021-02-01 PROCEDURE — C9803 HOPD COVID-19 SPEC COLLECT: HCPCS | Performed by: PODIATRIST

## 2021-02-01 PROCEDURE — 85025 COMPLETE CBC W/AUTO DIFF WBC: CPT

## 2021-02-01 PROCEDURE — 93010 ELECTROCARDIOGRAM REPORT: CPT | Performed by: INTERNAL MEDICINE

## 2021-02-01 PROCEDURE — 93005 ELECTROCARDIOGRAM TRACING: CPT

## 2021-02-01 PROCEDURE — 36415 COLL VENOUS BLD VENIPUNCTURE: CPT

## 2021-02-01 RX ORDER — LEVOFLOXACIN 750 MG/1
750 TABLET ORAL DAILY
COMMUNITY
End: 2021-04-28

## 2021-02-01 RX ORDER — MELATONIN
400 DAILY
COMMUNITY

## 2021-02-01 RX ORDER — NIACIN 500 MG
500 TABLET ORAL
COMMUNITY

## 2021-02-01 RX ORDER — PIOGLITAZONEHYDROCHLORIDE 30 MG/1
30 TABLET ORAL DAILY
COMMUNITY
End: 2022-03-02

## 2021-02-01 NOTE — DISCHARGE INSTRUCTIONS
DAY OF SURGERY INSTRUCTIONS        YOUR SURGEON: ***Dr Cecilio Wells    PROCEDURE: *** lower extremity wound debridement  - left  DATE OF SURGERY: ***02/03/2021    ARRIVAL TIME: AS DIRECTED BY OFFICE    YOU MAY TAKE THE FOLLOWING MEDICATION(S) THE MORNING OF SURGERY WITH A SIP OF WATER: ***carvedilol, gabapentin, percocet    ALL OTHER HOME MEDICATIONS CHECK WITH YOUR DOCTOR    DO NOT TAKE ANY ERECTILE DYSFUNCTION MEDICATIONS (EX:  CIALIS, VIAGRA) 24 HOURS PRIOR TO SURGERY              MANAGING PAIN AFTER SURGERY    We know you are probably wondering what your pain will be like after surgery.  Following surgery it is unrealistic to expect you will not have pain.   Pain is how our bodies let us know that something is wrong or cautions us to be careful.  That said, our goal is to make your pain tolerable.    Methods we may use to treat your pain include (oral or IV medications, PCAs, epidurals, nerve blocks, etc.)   While some procedures require IV pain medications for a short time after surgery, transitioning to pain medications by mouth allows for better management of pain.   Your nurse will encourage you to take oral pain medications whenever possible.  IV medications work almost immediately, but only last a short while.  Taking medications by mouth allows for a more constant level of medication in your blood stream for a longer period of time.      Once your pain is out of control it is harder to get back under control.  It is important you are aware when your next dose of pain medication is due.  If you are admitted, your nurse may write the time of your next dose on the white board in your room to help you remember.      We are interested in your pain and encourage you to inform us about aggravating factors during your visit.   Many times a simple repositioning every few hours can make a big difference.    If your physician says it is okay, do not let your pain prevent you from getting out of bed. Be sure to call  your nurse for assistance prior to getting up so you do not fall.      Before surgery, please decide your tolerable pain goal.  These faces help describe the pain ratings we use on a 0-10 scale.   Be prepared to tell us your goal and whether or not you take pain or anxiety medications at home.      BEFORE YOU COME TO THE HOSPITAL  (Pre-op instructions)  • Do not eat, drink, smoke or chew gum after midnight the night before surgery.  This also includes no mints.  • Morning of surgery take only the medicines you have been instructed with a sip of water unless otherwise instructed  by your physician.  • Do not shave, wear makeup or dark nail polish.  • Remove all jewelry including rings.  • Leave anything you consider valuable at home.  • Leave your suitcase in the car until after your surgery.  • Bring the following with you if applicable:  o Picture ID and insurance, Medicare or Medicaid cards  o Co-pay/deductible required by insurance (cash, check, credit card)  o Copy of advance directive, living will or power-of- documents if not brought to PAT  o CPAP or BIPAP mask and tubing  o Relaxation aids ( book, magazine), etc.  o Hearing aids                                 ON THE DAY OF SURGERY  · On the day of surgery check in at registration located at the main entrance of the hospital.   ? You will be registered and given a beeper with instructions where to wait in the main lobby.  ? When your beeper lights up and vibrates a member of the Outpatient Surgery staff will meet you at the double doors under the stair steps and escort you to your preoperative room.   · You may have cloth compression devices placed on your legs. These help to prevent blood clots and reduce swelling in your legs.  · An IV may be inserted into one of your veins.  · In the operating room, you may be given one or more of the following:  ? A medicine to help you relax (sedative).  ? A medicine to numb the area (local anesthetic).  ? A  "medicine to make you fall asleep (general anesthetic).  ? A medicine that is injected into an area of your body to numb everything below the injection site (regional anesthetic).  · Your surgical site will be marked or identified.  · You may be given an antibiotic through your IV to help prevent infection.  Contact a health care provider if you:  · Develop a fever of more than 100.4°F (38°C) or other feelings of illness during the 48 hours before your surgery.  · Have symptoms that get worse.  Have questions or concerns about your surgery    General Anesthesia/Surgery, Adult  General anesthesia is the use of medicines to make a person \"go to sleep\" (unconscious) for a medical procedure. General anesthesia must be used for certain procedures, and is often recommended for procedures that:  · Last a long time.  · Require you to be still or in an unusual position.  · Are major and can cause blood loss.  The medicines used for general anesthesia are called general anesthetics. As well as making you unconscious for a certain amount of time, these medicines:  · Prevent pain.  · Control your blood pressure.  · Relax your muscles.  Tell a health care provider about:  · Any allergies you have.  · All medicines you are taking, including vitamins, herbs, eye drops, creams, and over-the-counter medicines.  · Any problems you or family members have had with anesthetic medicines.  · Types of anesthetics you have had in the past.  · Any blood disorders you have.  · Any surgeries you have had.  · Any medical conditions you have.  · Any recent upper respiratory, chest, or ear infections.  · Any history of:  ? Heart or lung conditions, such as heart failure, sleep apnea, asthma, or chronic obstructive pulmonary disease (COPD).  ?  service.  ? Depression or anxiety.  · Any tobacco or drug use, including marijuana or alcohol use.  · Whether you are pregnant or may be pregnant.  What are the risks?  Generally, this is a safe " procedure. However, problems may occur, including:  · Allergic reaction.  · Lung and heart problems.  · Inhaling food or liquid from the stomach into the lungs (aspiration).  · Nerve injury.  · Air in the bloodstream, which can lead to stroke.  · Extreme agitation or confusion (delirium) when you wake up from the anesthetic.  · Waking up during your procedure and being unable to move. This is rare.  These problems are more likely to develop if you are having a major surgery or if you have an advanced or serious medical condition. You can prevent some of these complications by answering all of your health care provider's questions thoroughly and by following all instructions before your procedure.  General anesthesia can cause side effects, including:  · Nausea or vomiting.  · A sore throat from the breathing tube.  · Hoarseness.  · Wheezing or coughing.  · Shaking chills.  · Tiredness.  · Body aches.  · Anxiety.  · Sleepiness or drowsiness.  · Confusion or agitation.  RISKS AND COMPLICATIONS OF SURGERY  Your health care provider will discuss possible risks and complications with you before surgery. Common risks and complications include:    · Problems due to the use of anesthetics.  · Blood loss and replacement (does not apply to minor surgical procedures).  · Temporary increase in pain due to surgery.  · Uncorrected pain or problems that the surgery was meant to correct.  · Infection.  · New damage.    What happens before the procedure?    Medicines  Ask your health care provider about:  · Changing or stopping your regular medicines. This is especially important if you are taking diabetes medicines or blood thinners.  · Taking medicines such as aspirin and ibuprofen. These medicines can thin your blood. Do not take these medicines unless your health care provider tells you to take them.  · Taking over-the-counter medicines, vitamins, herbs, and supplements. Do not take these during the week before your procedure  unless your health care provider approves them.  General instructions  · Starting 3-6 weeks before the procedure, do not use any products that contain nicotine or tobacco, such as cigarettes and e-cigarettes. If you need help quitting, ask your health care provider.  · If you brush your teeth on the morning of the procedure, make sure to spit out all of the toothpaste.  · Tell your health care provider if you become ill or develop a cold, cough, or fever.  · If instructed by your health care provider, bring your sleep apnea device with you on the day of your surgery (if applicable).  · Ask your health care provider if you will be going home the same day, the following day, or after a longer hospital stay.  ? Plan to have someone take you home from the hospital or clinic.  ? Plan to have a responsible adult care for you for at least 24 hours after you leave the hospital or clinic. This is important.  What happens during the procedure?  · You will be given anesthetics through both of the following:  ? A mask placed over your nose and mouth.  ? An IV in one of your veins.  · You may receive a medicine to help you relax (sedative).  · After you are unconscious, a breathing tube may be inserted down your throat to help you breathe. This will be removed before you wake up.  · An anesthesia specialist will stay with you throughout your procedure. He or she will:  ? Keep you comfortable and safe by continuing to give you medicines and adjusting the amount of medicine that you get.  ? Monitor your blood pressure, pulse, and oxygen levels to make sure that the anesthetics do not cause any problems.  The procedure may vary among health care providers and hospitals.  What happens after the procedure?  · Your blood pressure, temperature, heart rate, breathing rate, and blood oxygen level will be monitored until the medicines you were given have worn off.  · You will wake up in a recovery area. You may wake up slowly.  · If you  feel anxious or agitated, you may be given medicine to help you calm down.  · If you will be going home the same day, your health care provider may check to make sure you can walk, drink, and urinate.  · Your health care provider will treat any pain or side effects you have before you go home.  · Do not drive for 24 hours if you were given a sedative.  Summary  · General anesthesia is used to keep you still and prevent pain during a procedure.  · It is important to tell your healthcare provider about your medical history and any surgeries you have had, and previous experience with anesthesia.  · Follow your healthcare provider’s instructions about when to stop eating, drinking, or taking certain medicines before your procedure.  · Plan to have someone take you home from the hospital or clinic.  This information is not intended to replace advice given to you by your health care provider. Make sure you discuss any questions you have with your health care provider.  Document Released: 03/26/2009 Document Revised: 08/03/2018 Document Reviewed: 08/03/2018  [employer] Interactive Patient Education © 2019 [employer]      Fall Prevention in Hospitals, Adult  As a hospital patient, your condition and the treatments you receive can increase your risk for falls. Some additional risk factors for falls in a hospital include:  · Being in an unfamiliar environment.  · Being on bed rest.  · Your surgery.  · Taking certain medicines.  · Your tubing requirements, such as intravenous (IV) therapy or catheters.  It is important that you learn how to decrease fall risks while at the hospital. Below are important tips that can help prevent falls.  SAFETY TIPS FOR PREVENTING FALLS  Talk about your risk of falling.  · Ask your health care provider why you are at risk for falling. Is it your medicine, illness, tubing placement, or something else?  · Make a plan with your health care provider to keep you safe from falls.  · Ask your health  care provider or pharmacist about side effects of your medicines. Some medicines can make you dizzy or affect your coordination.  Ask for help.  · Ask for help before getting out of bed. You may need to press your call button.  · Ask for assistance in getting safely to the toilet.  · Ask for a walker or cane to be put at your bedside. Ask that most of the side rails on your bed be placed up before your health care provider leaves the room.  · Ask family or friends to sit with you.  · Ask for things that are out of your reach, such as your glasses, hearing aids, telephone, bedside table, or call button.  Follow these tips to avoid falling:  · Stay lying or seated, rather than standing, while waiting for help.  · Wear rubber-soled slippers or shoes whenever you walk in the hospital.  · Avoid quick, sudden movements.  ¨ Change positions slowly.  ¨ Sit on the side of your bed before standing.  ¨ Stand up slowly and wait before you start to walk.  · Let your health care provider know if there is a spill on the floor.  · Pay careful attention to the medical equipment, electrical cords, and tubes around you.  · When you need help, use your call button by your bed or in the bathroom. Wait for one of your health care providers to help you.  · If you feel dizzy or unsure of your footing, return to bed and wait for assistance.  · Avoid being distracted by the TV, telephone, or another person in your room.  · Do not lean or support yourself on rolling objects, such as IV poles or bedside tables.     This information is not intended to replace advice given to you by your health care provider. Make sure you discuss any questions you have with your health care provider.     Document Released: 12/15/2001 Document Revised: 01/08/2016 Document Reviewed: 08/25/2013  [employer] Interactive Patient Education ©2016 [employer]            PATIENT/FAMILY/RESPONSIBLE PARTY VERBALIZES UNDERSTANDING OF ABOVE EDUCATION.  COPY OF PAIN SCALE  GIVEN AND REVIEWED WITH VERBALIZED UNDERSTANDING.

## 2021-02-02 ENCOUNTER — TELEPHONE (OUTPATIENT)
Dept: VASCULAR SURGERY | Facility: CLINIC | Age: 61
End: 2021-02-02

## 2021-02-02 NOTE — TELEPHONE ENCOUNTER
Spoke with  Rei reminding her of her 0800 am arrival time for Wednesday, February 3rd, 2021 for a procedure with Dr Wells. Mrs Minaya confirmed she would be here.

## 2021-02-03 ENCOUNTER — ANESTHESIA EVENT (OUTPATIENT)
Dept: PERIOP | Facility: HOSPITAL | Age: 61
End: 2021-02-03

## 2021-02-03 ENCOUNTER — HOSPITAL ENCOUNTER (OUTPATIENT)
Facility: HOSPITAL | Age: 61
Setting detail: HOSPITAL OUTPATIENT SURGERY
Discharge: HOME | End: 2021-02-03
Attending: PODIATRIST | Admitting: PODIATRIST

## 2021-02-03 ENCOUNTER — ANESTHESIA (OUTPATIENT)
Dept: PERIOP | Facility: HOSPITAL | Age: 61
End: 2021-02-03

## 2021-02-03 VITALS
HEART RATE: 76 BPM | SYSTOLIC BLOOD PRESSURE: 107 MMHG | OXYGEN SATURATION: 95 % | DIASTOLIC BLOOD PRESSURE: 57 MMHG | TEMPERATURE: 98 F | RESPIRATION RATE: 16 BRPM

## 2021-02-03 DIAGNOSIS — S81.802D WOUND OF LEFT LOWER EXTREMITY, SUBSEQUENT ENCOUNTER: ICD-10-CM

## 2021-02-03 LAB
GLUCOSE BLDC GLUCOMTR-MCNC: 109 MG/DL (ref 70–130)
GLUCOSE BLDC GLUCOMTR-MCNC: 96 MG/DL (ref 70–130)
QT INTERVAL: 364 MS
QTC INTERVAL: 414 MS

## 2021-02-03 PROCEDURE — 94799 UNLISTED PULMONARY SVC/PX: CPT

## 2021-02-03 PROCEDURE — 25010000002 ONDANSETRON PER 1 MG: Performed by: NURSE ANESTHETIST, CERTIFIED REGISTERED

## 2021-02-03 PROCEDURE — 11045 DBRDMT SUBQ TISS EACH ADDL: CPT | Performed by: PODIATRIST

## 2021-02-03 PROCEDURE — 25010000002 PROPOFOL 10 MG/ML EMULSION: Performed by: NURSE ANESTHETIST, CERTIFIED REGISTERED

## 2021-02-03 PROCEDURE — 82962 GLUCOSE BLOOD TEST: CPT

## 2021-02-03 PROCEDURE — 25010000002 CEFAZOLIN PER 500 MG: Performed by: PODIATRIST

## 2021-02-03 PROCEDURE — 25010000002 FENTANYL CITRATE (PF) 100 MCG/2ML SOLUTION: Performed by: ANESTHESIOLOGY

## 2021-02-03 PROCEDURE — 25010000002 SUCCINYLCHOLINE PER 20 MG: Performed by: NURSE ANESTHETIST, CERTIFIED REGISTERED

## 2021-02-03 PROCEDURE — 25010000002 FENTANYL CITRATE (PF) 100 MCG/2ML SOLUTION: Performed by: NURSE ANESTHETIST, CERTIFIED REGISTERED

## 2021-02-03 PROCEDURE — 25010000002 ONDANSETRON PER 1 MG: Performed by: ANESTHESIOLOGY

## 2021-02-03 PROCEDURE — 11042 DBRDMT SUBQ TIS 1ST 20SQCM/<: CPT | Performed by: PODIATRIST

## 2021-02-03 PROCEDURE — 25010000002 PHENYLEPHRINE HCL 0.8 MG/10ML SOLUTION PREFILLED SYRINGE: Performed by: NURSE ANESTHETIST, CERTIFIED REGISTERED

## 2021-02-03 PROCEDURE — 88304 TISSUE EXAM BY PATHOLOGIST: CPT | Performed by: PODIATRIST

## 2021-02-03 RX ORDER — MIDAZOLAM HYDROCHLORIDE 1 MG/ML
1 INJECTION INTRAMUSCULAR; INTRAVENOUS
Status: DISCONTINUED | OUTPATIENT
Start: 2021-02-03 | End: 2021-02-03 | Stop reason: HOSPADM

## 2021-02-03 RX ORDER — LABETALOL HYDROCHLORIDE 5 MG/ML
5 INJECTION, SOLUTION INTRAVENOUS
Status: DISCONTINUED | OUTPATIENT
Start: 2021-02-03 | End: 2021-02-03 | Stop reason: HOSPADM

## 2021-02-03 RX ORDER — FLUMAZENIL 0.1 MG/ML
0.2 INJECTION INTRAVENOUS AS NEEDED
Status: DISCONTINUED | OUTPATIENT
Start: 2021-02-03 | End: 2021-02-03 | Stop reason: HOSPADM

## 2021-02-03 RX ORDER — NALOXONE HCL 0.4 MG/ML
0.4 VIAL (ML) INJECTION AS NEEDED
Status: DISCONTINUED | OUTPATIENT
Start: 2021-02-03 | End: 2021-02-03 | Stop reason: HOSPADM

## 2021-02-03 RX ORDER — LIDOCAINE HYDROCHLORIDE 40 MG/ML
SOLUTION TOPICAL AS NEEDED
Status: DISCONTINUED | OUTPATIENT
Start: 2021-02-03 | End: 2021-02-03 | Stop reason: SURG

## 2021-02-03 RX ORDER — FENTANYL CITRATE 50 UG/ML
25 INJECTION, SOLUTION INTRAMUSCULAR; INTRAVENOUS
Status: COMPLETED | OUTPATIENT
Start: 2021-02-03 | End: 2021-02-03

## 2021-02-03 RX ORDER — MAGNESIUM HYDROXIDE 1200 MG/15ML
LIQUID ORAL AS NEEDED
Status: DISCONTINUED | OUTPATIENT
Start: 2021-02-03 | End: 2021-02-03 | Stop reason: HOSPADM

## 2021-02-03 RX ORDER — FENTANYL CITRATE 50 UG/ML
INJECTION, SOLUTION INTRAMUSCULAR; INTRAVENOUS AS NEEDED
Status: DISCONTINUED | OUTPATIENT
Start: 2021-02-03 | End: 2021-02-03 | Stop reason: SURG

## 2021-02-03 RX ORDER — PROPOFOL 10 MG/ML
VIAL (ML) INTRAVENOUS AS NEEDED
Status: DISCONTINUED | OUTPATIENT
Start: 2021-02-03 | End: 2021-02-03 | Stop reason: SURG

## 2021-02-03 RX ORDER — ROCURONIUM BROMIDE 10 MG/ML
INJECTION, SOLUTION INTRAVENOUS AS NEEDED
Status: DISCONTINUED | OUTPATIENT
Start: 2021-02-03 | End: 2021-02-03 | Stop reason: SURG

## 2021-02-03 RX ORDER — BUPIVACAINE HCL/0.9 % NACL/PF 0.1 %
2 PLASTIC BAG, INJECTION (ML) EPIDURAL ONCE
Status: COMPLETED | OUTPATIENT
Start: 2021-02-03 | End: 2021-02-03

## 2021-02-03 RX ORDER — ONDANSETRON 2 MG/ML
INJECTION INTRAMUSCULAR; INTRAVENOUS AS NEEDED
Status: DISCONTINUED | OUTPATIENT
Start: 2021-02-03 | End: 2021-02-03 | Stop reason: SURG

## 2021-02-03 RX ORDER — SODIUM CHLORIDE 0.9 % (FLUSH) 0.9 %
3 SYRINGE (ML) INJECTION AS NEEDED
Status: DISCONTINUED | OUTPATIENT
Start: 2021-02-03 | End: 2021-02-03 | Stop reason: HOSPADM

## 2021-02-03 RX ORDER — ONDANSETRON 2 MG/ML
4 INJECTION INTRAMUSCULAR; INTRAVENOUS ONCE AS NEEDED
Status: COMPLETED | OUTPATIENT
Start: 2021-02-03 | End: 2021-02-03

## 2021-02-03 RX ORDER — SUCCINYLCHOLINE CHLORIDE 20 MG/ML
INJECTION INTRAMUSCULAR; INTRAVENOUS AS NEEDED
Status: DISCONTINUED | OUTPATIENT
Start: 2021-02-03 | End: 2021-02-03 | Stop reason: SURG

## 2021-02-03 RX ORDER — SODIUM CHLORIDE, SODIUM LACTATE, POTASSIUM CHLORIDE, CALCIUM CHLORIDE 600; 310; 30; 20 MG/100ML; MG/100ML; MG/100ML; MG/100ML
1000 INJECTION, SOLUTION INTRAVENOUS CONTINUOUS
Status: DISCONTINUED | OUTPATIENT
Start: 2021-02-03 | End: 2021-02-03 | Stop reason: HOSPADM

## 2021-02-03 RX ORDER — OXYCODONE AND ACETAMINOPHEN 10; 325 MG/1; MG/1
1 TABLET ORAL ONCE AS NEEDED
Status: DISCONTINUED | OUTPATIENT
Start: 2021-02-03 | End: 2021-02-03 | Stop reason: HOSPADM

## 2021-02-03 RX ORDER — SODIUM CHLORIDE 0.9 % (FLUSH) 0.9 %
3-10 SYRINGE (ML) INJECTION AS NEEDED
Status: DISCONTINUED | OUTPATIENT
Start: 2021-02-03 | End: 2021-02-03 | Stop reason: HOSPADM

## 2021-02-03 RX ORDER — OXYCODONE AND ACETAMINOPHEN 10; 325 MG/1; MG/1
1 TABLET ORAL EVERY 6 HOURS PRN
Qty: 28 TABLET | Refills: 0 | Status: SHIPPED | OUTPATIENT
Start: 2021-02-03 | End: 2021-07-23

## 2021-02-03 RX ORDER — MIDAZOLAM HYDROCHLORIDE 1 MG/ML
2 INJECTION INTRAMUSCULAR; INTRAVENOUS
Status: DISCONTINUED | OUTPATIENT
Start: 2021-02-03 | End: 2021-02-03 | Stop reason: HOSPADM

## 2021-02-03 RX ORDER — LIDOCAINE HYDROCHLORIDE 20 MG/ML
INJECTION, SOLUTION EPIDURAL; INFILTRATION; INTRACAUDAL; PERINEURAL AS NEEDED
Status: DISCONTINUED | OUTPATIENT
Start: 2021-02-03 | End: 2021-02-03 | Stop reason: SURG

## 2021-02-03 RX ORDER — LIDOCAINE HYDROCHLORIDE 10 MG/ML
0.5 INJECTION, SOLUTION EPIDURAL; INFILTRATION; INTRACAUDAL; PERINEURAL ONCE AS NEEDED
Status: DISCONTINUED | OUTPATIENT
Start: 2021-02-03 | End: 2021-02-03 | Stop reason: HOSPADM

## 2021-02-03 RX ORDER — PROMETHAZINE HYDROCHLORIDE 12.5 MG/1
12.5 TABLET ORAL EVERY 8 HOURS PRN
Qty: 21 TABLET | Refills: 0 | Status: SHIPPED | OUTPATIENT
Start: 2021-02-03 | End: 2021-02-10

## 2021-02-03 RX ORDER — DOCUSATE SODIUM 100 MG/1
100 CAPSULE, LIQUID FILLED ORAL DAILY
Qty: 7 CAPSULE | Refills: 0 | Status: SHIPPED | OUTPATIENT
Start: 2021-02-03 | End: 2021-02-10

## 2021-02-03 RX ORDER — SODIUM CHLORIDE 0.9 % (FLUSH) 0.9 %
3 SYRINGE (ML) INJECTION EVERY 12 HOURS SCHEDULED
Status: DISCONTINUED | OUTPATIENT
Start: 2021-02-03 | End: 2021-02-03 | Stop reason: HOSPADM

## 2021-02-03 RX ORDER — PHENYLEPHRINE HCL IN 0.9% NACL 0.8MG/10ML
SYRINGE (ML) INTRAVENOUS AS NEEDED
Status: DISCONTINUED | OUTPATIENT
Start: 2021-02-03 | End: 2021-02-03 | Stop reason: SURG

## 2021-02-03 RX ORDER — SODIUM CHLORIDE, SODIUM LACTATE, POTASSIUM CHLORIDE, CALCIUM CHLORIDE 600; 310; 30; 20 MG/100ML; MG/100ML; MG/100ML; MG/100ML
100 INJECTION, SOLUTION INTRAVENOUS CONTINUOUS
Status: DISCONTINUED | OUTPATIENT
Start: 2021-02-03 | End: 2021-02-03 | Stop reason: HOSPADM

## 2021-02-03 RX ORDER — OXYCODONE AND ACETAMINOPHEN 7.5; 325 MG/1; MG/1
2 TABLET ORAL EVERY 4 HOURS PRN
Status: DISCONTINUED | OUTPATIENT
Start: 2021-02-03 | End: 2021-02-03 | Stop reason: HOSPADM

## 2021-02-03 RX ORDER — ACETAMINOPHEN 500 MG
1000 TABLET ORAL ONCE
Status: COMPLETED | OUTPATIENT
Start: 2021-02-03 | End: 2021-02-03

## 2021-02-03 RX ADMIN — LIDOCAINE HYDROCHLORIDE 80 MG: 20 INJECTION, SOLUTION EPIDURAL; INFILTRATION; INTRACAUDAL; PERINEURAL at 12:31

## 2021-02-03 RX ADMIN — SUCCINYLCHOLINE CHLORIDE 160 MG: 20 INJECTION, SOLUTION INTRAMUSCULAR; INTRAVENOUS at 12:31

## 2021-02-03 RX ADMIN — PROPOFOL 150 MG: 10 INJECTION, EMULSION INTRAVENOUS at 12:31

## 2021-02-03 RX ADMIN — ACETAMINOPHEN 1000 MG: 500 TABLET, FILM COATED ORAL at 10:56

## 2021-02-03 RX ADMIN — Medication 160 MCG: at 12:50

## 2021-02-03 RX ADMIN — FENTANYL CITRATE 25 MCG: 50 INJECTION, SOLUTION INTRAMUSCULAR; INTRAVENOUS at 14:04

## 2021-02-03 RX ADMIN — FENTANYL CITRATE 25 MCG: 50 INJECTION, SOLUTION INTRAMUSCULAR; INTRAVENOUS at 13:54

## 2021-02-03 RX ADMIN — Medication 160 MCG: at 13:06

## 2021-02-03 RX ADMIN — LIDOCAINE HYDROCHLORIDE 1 EACH: 40 SOLUTION TOPICAL at 12:31

## 2021-02-03 RX ADMIN — FENTANYL CITRATE 25 MCG: 50 INJECTION, SOLUTION INTRAMUSCULAR; INTRAVENOUS at 13:59

## 2021-02-03 RX ADMIN — FENTANYL CITRATE 25 MCG: 50 INJECTION, SOLUTION INTRAMUSCULAR; INTRAVENOUS at 14:09

## 2021-02-03 RX ADMIN — ONDANSETRON HYDROCHLORIDE 4 MG: 2 SOLUTION INTRAMUSCULAR; INTRAVENOUS at 12:51

## 2021-02-03 RX ADMIN — Medication 2 G: at 12:40

## 2021-02-03 RX ADMIN — ONDANSETRON HYDROCHLORIDE 4 MG: 2 SOLUTION INTRAMUSCULAR; INTRAVENOUS at 13:54

## 2021-02-03 RX ADMIN — SODIUM CHLORIDE, POTASSIUM CHLORIDE, SODIUM LACTATE AND CALCIUM CHLORIDE 1000 ML: 600; 310; 30; 20 INJECTION, SOLUTION INTRAVENOUS at 08:48

## 2021-02-03 RX ADMIN — OXYCODONE HYDROCHLORIDE AND ACETAMINOPHEN 2 TABLET: 7.5; 325 TABLET ORAL at 13:57

## 2021-02-03 RX ADMIN — ROCURONIUM BROMIDE 5 MG: 10 INJECTION INTRAVENOUS at 12:31

## 2021-02-03 RX ADMIN — FENTANYL CITRATE 100 MCG: 50 INJECTION, SOLUTION INTRAMUSCULAR; INTRAVENOUS at 12:31

## 2021-02-03 NOTE — OP NOTE
POST-OPERATIVE NOTE    Pre-Operative Diagnosis:  1. Non-healing Wound to Lower Leg - Left    Post-Operative Diagnosis:  1. Non-healing Wound to Lower Leg - Left    Operative Procedures:  1. Surgical Excisional Debridement of Wound - Left Lower Leg    Surgeon: Cecilio Wells DPM    Assistant: ARETHA Pascal  was responsible for performing the following activities: Retraction, Suction and Irrigation and their skilled assistance was necessary for the success of this case.    Anesthesia: General    Hemostasis: Anatomic Dissection, Epinepherine, Electric cauterization    Estimated Blood Loss: minimal    Pathology:   Specimens     ID Source Type Tests Collected By Collected At Frozen?      A Leg, Left Tissue · TISSUE PATHOLOGY EXAM   Roberto Wells DPM 2/3/21 1634      Description: Posterior calf          Materials: Nothing was implanted during the procedure    Injectables: 30mL 0.5% Marcaine with Epinephrine    Fluids: See anesthesia log.    Drains: None    Complications: None    Post-Op Condition: Stable. Patient tolerated procedure and anesthesia well. Patient left the operating room with vital signs stable and vascular status intact.     Operative Findings: Consistent with pre-operative diagnosis.    Indications for Procedure: This 61 year old patient presents with chronic non-healing wound to the left posterior calf.  Patient states that she has failed conservative therapy and opts for surgical correction at this time. The patient has been NPO for greater than 8 hours. The patient is ready for surgical intervention.    DESCRIPTION OF PROCEDURE  Under mild sedation, patient was brought into the operating room and place on the operating room table in prone position. Pre-operative antibiotic was given. The patient was placed under General anesthesia, then local block was performed at the surgical site using the above mentioned local anesthesia. The foot and ankle were then scrubbed, prepped and draped in the usual  aseptic manner.    Attention was then directed to the left posterior distal leg where the large ulceration was visualized.  A circumferential incision was made about the entire ulceration removing 2 mm of skin at the edges.  The wound was then extensively debrided utilizing combination of sharp dissection and curettage to remove skin, slough, necrotic tissue, and subcutaneous tissue.   A section of tissue was sent to microbiology for culture.  There was moderate fibrosis within the deeper layers of the wound. The wound did not expose the Achilles tendon or muscle belly.  Electrocautery was utilized for hemostasis.  No vital neural or vascular structures were encountered.  The wound was flushed with copious amounts of sterile normal saline via pulse lavage.  The wound was inspected for any additional pathologic tissue and none was found.  The end resulting wound measured 13.0 cm x 13.0 cm x 0.4 cm. Total area debrided was 169 square centimeters.      A bandage consisting of mupirocin ointment, adaptic, ABD, Kerlix, and Ace bandage was applied.    The patient tolerated the procedures and anesthesia well.  She is transferred to the recovery room vital signs stable and vascular status intact to the left lower extremity.  After period of postoperative monitoring, the patient will be discharged to home with oral and written post-operative instructions. She will follow-up in outpatient wound care center in 2 days.

## 2021-02-03 NOTE — ANESTHESIA POSTPROCEDURE EVALUATION
Patient: Robyn Minaya    Procedure Summary     Date: 02/03/21 Room / Location:  [facility] OR 08 /  [facility]    Anesthesia Start: 1224 Anesthesia Stop: 1320    Procedure: LOWER EXTREMITY WOUND DEBRIDEMENT - LEFT (Left ) Diagnosis:       Wound of left lower extremity, subsequent encounter      (Wound of left lower extremity, subsequent encounter [R56.383F])    Surgeon: Roberto Wells DPM Provider: Rehan Kiser CRNA    Anesthesia Type: general ASA Status: 3          Anesthesia Type: general    Vitals  Vitals Value Taken Time   /59 02/03/21 1445   Temp 98 °F (36.7 °C) 02/03/21 1445   Pulse 74 02/03/21 1445   Resp 14 02/03/21 1445   SpO2 92 % 02/03/21 1445           Post Anesthesia Care and Evaluation    PONV Status: none  Comments: Patient d/c from PACU prior to anes eval based on Sherman score.  Please see RN notes for details of d/c criteria.    Blood pressure 107/57, pulse 76, temperature 98 °F (36.7 °C), temperature source Temporal, resp. rate 16, SpO2 95 %, not currently breastfeeding.

## 2021-02-03 NOTE — ANESTHESIA PROCEDURE NOTES
Airway  Date/Time: 2/3/2021 12:33 PM  Airway not difficult    General Information and Staff    Patient location during procedure: OR  CRNA: Rehan Kiser CRNA    Indications and Patient Condition  Indications for airway management: airway protection    Preoxygenated: yes  Mask difficulty assessment: 1 - vent by mask    Final Airway Details  Final airway type: endotracheal airway      Successful airway: ETT  Cuffed: yes   Successful intubation technique: direct laryngoscopy  Endotracheal tube insertion site: oral  Blade: Marni  Blade size: 3.5.  ETT size (mm): 7.5  Cormack-Lehane Classification: grade I - full view of glottis  Placement verified by: chest auscultation, capnometry and palpation of cuff   Cuff volume (mL): 6  Measured from: lips  ETT/EBT  to lips (cm): 19  Number of attempts at approach: 1  Assessment: lips, teeth, and gum same as pre-op and atraumatic intubation

## 2021-02-03 NOTE — ANESTHESIA PREPROCEDURE EVALUATION
Anesthesia Evaluation     Patient summary reviewed   no history of anesthetic complications:  NPO Solid Status: > 8 hours  NPO Liquid Status: > 8 hours           Airway   Mallampati: II  TM distance: >3 FB  Neck ROM: full  Dental    (+) upper dentures and lower dentures    Pulmonary    (+) a smoker Former, sleep apnea,   (-) asthma  Cardiovascular   Exercise tolerance: good (4-7 METS)    Patient on routine beta blocker and Beta blocker given within 24 hours of surgery    (+) hypertension, hyperlipidemia,   (-) past MI, CAD, cardiac stents      Neuro/Psych  (+) psychiatric history Bipolar,     (-) seizures, TIA, CVA  GI/Hepatic/Renal/Endo    (+) obesity,  GERD,  liver disease, diabetes mellitus, thyroid problem hypothyroidism  (-) no renal disease    Musculoskeletal     Abdominal    Substance History      OB/GYN          Other   arthritis, blood dyscrasia anemia,                       Anesthesia Plan    ASA 3     general     intravenous induction     Anesthetic plan, all risks, benefits, and alternatives have been provided, discussed and informed consent has been obtained with: patient.

## 2021-02-03 NOTE — BRIEF OP NOTE
LOWER EXTREMITY DEBRIDEMENT  Progress Note    Robyn Minaya  2/3/2021    Pre-op Diagnosis:   Wound of left lower extremity, subsequent encounter [S81.802D]       Post-Op Diagnosis Codes:     * Wound of left lower extremity, subsequent encounter [S81.802D]    Procedure/CPT® Codes:        Procedure(s):  1. LOWER EXTREMITY WOUND DEBRIDEMENT - LEFT    Surgeon(s):  Roberto Wells DPM    Assistant: ARETHA Pascal  was responsible for performing the following activities: Retraction, Suction and Irrigation and their skilled assistance was necessary for the success of this case.    Anesthesia: General    Staff:   Circulator: Sebastien Mishra RN  Scrub Person: Maddie Nicholson; Juan Carlos Knox         Estimated Blood Loss: minimal    Urine Voided: * No values recorded between 2/3/2021 12:24 PM and 2/3/2021  1:08 PM *    Specimens:                Specimens     ID Source Type Tests Collected By Collected At Frozen?      A Leg, Left Tissue · TISSUE PATHOLOGY EXAM   Roberto Wells DPM 2/3/21 1308      Description: Posterior calf    This specimen was not marked as sent.                Drains: * No LDAs found *    Findings: Consistent with pre-operative diagnoses.     Complications: None          Roberto Wells DPM     Date: 2/3/2021  Time: 13:19 CST

## 2021-02-03 NOTE — DISCHARGE INSTRUCTIONS

## 2021-02-04 ENCOUNTER — TELEPHONE (OUTPATIENT)
Dept: PODIATRY | Facility: CLINIC | Age: 61
End: 2021-02-04

## 2021-02-04 NOTE — TELEPHONE ENCOUNTER
Spoke with pt regarding post op surgery. Patient stated that he received the pain medications, pain level at 7/10. Pt did not  the colcae and or nausea pills.

## 2021-02-05 ENCOUNTER — OFFICE VISIT (OUTPATIENT)
Dept: WOUND CARE | Facility: HOSPITAL | Age: 61
End: 2021-02-05

## 2021-02-05 LAB
LAB AP CASE REPORT: NORMAL
PATH REPORT.FINAL DX SPEC: NORMAL
PATH REPORT.GROSS SPEC: NORMAL

## 2021-02-05 PROCEDURE — 99212 OFFICE O/P EST SF 10 MIN: CPT | Performed by: PODIATRIST

## 2021-02-09 ENCOUNTER — OFFICE VISIT (OUTPATIENT)
Dept: WOUND CARE | Facility: HOSPITAL | Age: 61
End: 2021-02-09

## 2021-02-12 ENCOUNTER — LAB REQUISITION (OUTPATIENT)
Dept: LAB | Facility: HOSPITAL | Age: 61
End: 2021-02-12

## 2021-02-12 ENCOUNTER — OFFICE VISIT (OUTPATIENT)
Dept: WOUND CARE | Facility: HOSPITAL | Age: 61
End: 2021-02-12

## 2021-02-12 DIAGNOSIS — Z00.00 ENCOUNTER FOR GENERAL ADULT MEDICAL EXAMINATION WITHOUT ABNORMAL FINDINGS: ICD-10-CM

## 2021-02-12 PROCEDURE — 87205 SMEAR GRAM STAIN: CPT | Performed by: PODIATRIST

## 2021-02-12 PROCEDURE — 87176 TISSUE HOMOGENIZATION CULTR: CPT | Performed by: PODIATRIST

## 2021-02-12 PROCEDURE — 87147 CULTURE TYPE IMMUNOLOGIC: CPT | Performed by: PODIATRIST

## 2021-02-12 PROCEDURE — 87070 CULTURE OTHR SPECIMN AEROBIC: CPT | Performed by: PODIATRIST

## 2021-02-12 PROCEDURE — 87186 SC STD MICRODIL/AGAR DIL: CPT | Performed by: PODIATRIST

## 2021-02-12 PROCEDURE — 11042 DBRDMT SUBQ TIS 1ST 20SQCM/<: CPT | Performed by: PODIATRIST

## 2021-02-12 PROCEDURE — 87075 CULTR BACTERIA EXCEPT BLOOD: CPT | Performed by: PODIATRIST

## 2021-02-15 LAB
BACTERIA SPEC AEROBE CULT: ABNORMAL
GRAM STN SPEC: ABNORMAL
GRAM STN SPEC: ABNORMAL

## 2021-02-17 LAB — BACTERIA SPEC ANAEROBE CULT: NORMAL

## 2021-02-17 RX ORDER — DEXTROSE MONOHYDRATE 50 MG/ML
250 INJECTION, SOLUTION INTRAVENOUS ONCE
Status: CANCELLED
Start: 2021-02-19

## 2021-02-19 ENCOUNTER — OFFICE VISIT (OUTPATIENT)
Dept: WOUND CARE | Facility: HOSPITAL | Age: 61
End: 2021-02-19

## 2021-02-19 ENCOUNTER — INFUSION (OUTPATIENT)
Dept: ONCOLOGY | Facility: HOSPITAL | Age: 61
End: 2021-02-19

## 2021-02-19 VITALS
HEART RATE: 72 BPM | DIASTOLIC BLOOD PRESSURE: 47 MMHG | WEIGHT: 200 LBS | TEMPERATURE: 97.4 F | RESPIRATION RATE: 18 BRPM | HEIGHT: 59 IN | SYSTOLIC BLOOD PRESSURE: 104 MMHG | OXYGEN SATURATION: 100 % | BODY MASS INDEX: 40.32 KG/M2

## 2021-02-19 DIAGNOSIS — S81.809S NON-HEALING WOUND OF LOWER EXTREMITY, SEQUELA: Primary | ICD-10-CM

## 2021-02-19 DIAGNOSIS — A49.02 INFECTION OF WOUND DUE TO METHICILLIN RESISTANT STAPHYLOCOCCUS AUREUS (MRSA): ICD-10-CM

## 2021-02-19 PROCEDURE — 11045 DBRDMT SUBQ TISS EACH ADDL: CPT | Performed by: PODIATRIST

## 2021-02-19 PROCEDURE — 96367 TX/PROPH/DG ADDL SEQ IV INF: CPT

## 2021-02-19 PROCEDURE — 11042 DBRDMT SUBQ TIS 1ST 20SQCM/<: CPT | Performed by: PODIATRIST

## 2021-02-19 PROCEDURE — 96365 THER/PROPH/DIAG IV INF INIT: CPT

## 2021-02-19 PROCEDURE — 25010000002 DALBAVANCIN 500 MG RECONSTITUTED SOLUTION 1 EACH VIAL: Performed by: PODIATRIST

## 2021-02-19 RX ORDER — DEXTROSE MONOHYDRATE 50 MG/ML
250 INJECTION, SOLUTION INTRAVENOUS ONCE
Status: COMPLETED | OUTPATIENT
Start: 2021-02-19 | End: 2021-02-19

## 2021-02-19 RX ORDER — DEXTROSE MONOHYDRATE 50 MG/ML
250 INJECTION, SOLUTION INTRAVENOUS ONCE
Status: CANCELLED
Start: 2021-02-19

## 2021-02-19 RX ADMIN — DEXTROSE MONOHYDRATE 250 ML: 50 INJECTION, SOLUTION INTRAVENOUS at 11:10

## 2021-02-19 RX ADMIN — DALBAVANCIN 1500 MG: 500 INJECTION, POWDER, FOR SOLUTION INTRAVENOUS at 11:20

## 2021-02-25 ENCOUNTER — OFFICE VISIT (OUTPATIENT)
Dept: FAMILY MEDICINE CLINIC | Age: 61
End: 2021-02-25
Payer: COMMERCIAL

## 2021-02-25 VITALS
BODY MASS INDEX: 38.68 KG/M2 | TEMPERATURE: 97.5 F | OXYGEN SATURATION: 98 % | RESPIRATION RATE: 18 BRPM | DIASTOLIC BLOOD PRESSURE: 68 MMHG | SYSTOLIC BLOOD PRESSURE: 108 MMHG | HEART RATE: 85 BPM | WEIGHT: 197 LBS | HEIGHT: 60 IN

## 2021-02-25 DIAGNOSIS — I10 ESSENTIAL HYPERTENSION: Primary | ICD-10-CM

## 2021-02-25 PROCEDURE — 99213 OFFICE O/P EST LOW 20 MIN: CPT | Performed by: NURSE PRACTITIONER

## 2021-02-25 RX ORDER — TRAMADOL HYDROCHLORIDE 50 MG/1
50 TABLET ORAL 3 TIMES DAILY PRN
COMMUNITY
Start: 2021-01-05 | End: 2022-10-17

## 2021-02-25 RX ORDER — PIOGLITAZONEHYDROCHLORIDE 30 MG/1
30 TABLET ORAL DAILY
COMMUNITY
Start: 2021-02-02 | End: 2021-08-04 | Stop reason: ALTCHOICE

## 2021-02-25 RX ORDER — CARVEDILOL 3.12 MG/1
3.12 TABLET ORAL 2 TIMES DAILY WITH MEALS
Qty: 180 TABLET | Refills: 1 | Status: SHIPPED | OUTPATIENT
Start: 2021-02-25 | End: 2021-08-02

## 2021-02-25 ASSESSMENT — ENCOUNTER SYMPTOMS: SHORTNESS OF BREATH: 0

## 2021-02-25 NOTE — PROGRESS NOTES
SUBJECTIVE:    Patient ID: Irvin Munoz is a61 y.o. female. Irvin Munoz is here today for Hypertension (Patient presents for follow up on blood pressure.)  . HPI:   HPI     Home log at once pt reports 90s/48 and she was no feeling much energy and states that was this week. She reports that she prefers [id number] systolic. Pt has began increasing activity somewhat as her left leg wound has begun improving. Pt is reporting a1c 6.4 and we will have to obtain records. Pt states that she does have some dizziness when going from lying to standing since she has been having to lie down for so long with leg wound. Has been taking carvedilol twice daily and has recently had increase in aldactone r/t swelling. Past Medical History:   Diagnosis Date    Arthritis     Bipolar I disorder, most recent episode (or current) mixed, unspecified     Chronic back pain     Depression     Dry mouth     Hyperlipidemia     Hypertension     Hypokalemia     Hypothyroidism     Menopause     Morbidly obese ([facility] [address] 10/28/2020    Overactive bladder     Plantar fasciitis     RLS (restless legs syndrome)     Sleep apnea     Spondylosis with myelopathy, lumbar region     Thyroid disease      Prior to Visit Medications    Medication Sig Taking? Authorizing Provider   pioglitazone (ACTOS) 30 MG tablet Take 30 mg by mouth daily Yes Historical Provider, MD   traMADol (ULTRAM) 50 MG tablet Take 50 mg by mouth 3 times daily as needed for Pain.  Yes Historical Provider, MD   carvedilol (COREG) 3.125 MG tablet Take 1 tablet by mouth 2 times daily (with meals) Yes MAEVE Cuevas   niacin (SLO-NIACIN) 500 MG extended release tablet Take 500 mg by mouth daily Yes Historical Provider, MD   FLUoxetine (PROZAC) 40 MG capsule Take 1 capsule by mouth daily For anxiety/depression Yes MAEVE Cuevas   gabapentin (NEURONTIN) 300 MG capsule TAKE ONE CAPSULE BY MOUTH THREE TIMES DAILY AS NEEDED FOR NERVE PAIN Yes Tea Lu MAEVE   pilocarpine (SALAGEN) 5 MG tablet Take 1 tablet by mouth 3 times daily Yes MAEVE Cuevas   spironolactone (ALDACTONE) 25 MG tablet 2 po in am and 1 po in evening for fluid Yes MAEVE Cuevas   traZODone (DESYREL) 50 MG tablet 1-3 po as needed for sleep Yes MAEVE Cuevas   NARCAN 4 MG/0.1ML LIQD nasal spray  Yes Historical Provider, MD   diclofenac (VOLTAREN) 75 MG EC tablet Take 75 mg by mouth 2 times daily  Yes Historical Provider, MD   HYDROcodone-acetaminophen (6273 [employer]) 7.5-325 MG per tablet  Yes Historical Provider, MD   pramipexole (MIRAPEX) 1.5 MG tablet TAKE ONE TABLET BY MOUTH NIGHTLY **MAY MAKE DROWSY** Yes MAEVE Cuevas   methylcellulose ([employer] SOLUBLE FIBER THERAPY) 500 MG TABS  Yes Historical Provider, MD   loperamide (IMODIUM A-D) 2 MG tablet TAKE 2  MG ORAL AS NEEDED EVERY 12 HOURS Yes Historical Provider, MD   levothyroxine (SYNTHROID) 75 MCG tablet Take 75 mcg by mouth Daily  Yes Historical Provider, MD   atorvastatin (LIPITOR) 40 MG tablet TAKE ONE TABLET BY MOUTH NIGHTLY FOR CHOLESTEROL. Yes MAEVE Cuevas   allopurinol (ZYLOPRIM) 100 MG tablet TAKE ONE TABLET BY MOUTH DAILY Yes MAEVE Cuevas   bumetanide (BUMEX) 1 MG tablet Take 1 tablet by mouth 2 times daily  Patient taking differently: Take 1 mg by mouth daily  Yes MAEVE Cuevas   omeprazole (PRILOSEC) 20 MG delayed release capsule Take 1 capsule by mouth 2 times daily (before meals) Yes MAEVE Cuevas   tiZANidine (ZANAFLEX) 4 MG tablet Take 4 mg by mouth 3 times daily as needed  Yes Historical Provider, MD   Vitamin D (CHOLECALCIFEROL) 1000 UNITS CAPS capsule Take 1,000 Units by mouth daily Yes Historical Provider, MD   Multiple Vitamins-Minerals (MULTIVITAMIN PO) Take by mouth daily Yes Historical Provider, MD   calcium carbonate 600 MG TABS tablet Take 1 tablet by mouth daily.  Yes Historical Provider, MD     Allergies   Allergen Reactions    Bactrim [Sulfamethoxazole-Trimethoprim]     Hctz [Hydrochlorothiazide] Other (See Comments)     Acid reflux      Metformin And Related Diarrhea    Sitagliptin Rash     Past Surgical History:   Procedure Laterality Date    CARPAL TUNNEL RELEASE Bilateral 2002    CHOLECYSTECTOMY  10/2014    COLON SURGERY      COLON SURGERY      biopsy     COLONOSCOPY      CYST REMOVAL  2017    Under left ear    HARDWARE REMOVAL Right     Foot     HERNIA REPAIR  10/2014    LEG DEBRIDEMENT Left     NOSE SURGERY  2017; 2019    Dr Marley Navarro L/S,1 LEVEL Bilateral 2017    INJECTION MEDICATION FACET JOINT [facility] [email or web address] [facility]     Family History   Problem Relation Age of Onset    Cancer Mother         lung    Heart Disease Mother     Hypertension Mother     Bipolar Disorder Mother     Heart Disease Father     Hypertension Father      Social History     Socioeconomic History    Marital status:      Spouse name: Gabriela Berg Number of children: 2    Years of education: some college    Highest education level: Not on file   Occupational History     Employer: [employer]   Social Needs    Financial resource strain: Not on file    Food insecurity     Worry: Not on file     Inability: Not on file   [employer] needs     Medical: Not on file     Non-medical: Not on file   Tobacco Use    Smoking status: Former Smoker     Packs/day: 1.00     Years: 30.00     Pack years: 30.00     Types: Cigarettes     Quit date: 2003     Years since quittin.5    Smokeless tobacco: Never Used    Tobacco comment: Pt not interested at this time in stopping. Substance and Sexual Activity    Alcohol use: Yes     Comment: rarely    Drug use: No     Comment: pt admits \"may have misused in past\"--Pt was vague with details.     Sexual activity: Not on file   Lifestyle    Physical activity     Days per week: Not on file     Minutes per session: Not on file    Stress: Not on file   Relationships    Social connections     Talks on phone: Not on file     Gets together: Not on file     Attends Rastafari service: Not on file     Active member of club or organization: Not on file     Attends meetings of clubs or organizations: Not on file     Relationship status: Not on file    Intimate partner violence     Fear of current or ex partner: Not on file     Emotionally abused: Not on file     Physically abused: Not on file     Forced sexual activity: Not on file   Other Topics Concern    Not on file   Social History Narrative    Not on file       Review of Systems   Constitutional: Negative for unexpected weight change. Respiratory: Negative for shortness of breath. Cardiovascular: Positive for leg swelling. OBJECTIVE:    Physical Exam  Vitals signs and nursing note reviewed. Constitutional:       Appearance: Normal appearance. She is well-developed. She is obese. She is not ill-appearing. HENT:      Head: Normocephalic. Eyes:      Conjunctiva/sclera: Conjunctivae normal.      Pupils: Pupils are equal, round, and reactive to light. Neck:      Musculoskeletal: Normal range of motion and neck supple. Cardiovascular:      Rate and Rhythm: Normal rate and regular rhythm. Pulmonary:      Effort: Pulmonary effort is normal. No respiratory distress. Breath sounds: Normal breath sounds. Musculoskeletal:      Right lower leg: No edema. Left lower leg: Edema present. Skin:     General: Skin is warm and dry. Capillary Refill: Capillary refill takes less than 2 seconds. Neurological:      General: No focal deficit present. Mental Status: She is alert and oriented to person, place, and time. Psychiatric:         Mood and Affect: Mood normal.         Behavior: Behavior normal.         Thought Content:  Thought content normal.         Judgment: Judgment normal.         /68 (Site: Left Upper Arm, Position: Sitting, Cuff Size: Large Adult)   Pulse 85   Temp 97.5 °F (36.4 °C) (Temporal)   Resp 18   Ht 5' (1.524 m)   Wt 197 lb (89.4 kg)   SpO2 98%   BMI 38.47 kg/m²      ASSESSMENT:      ICD-10-CM    1. Essential hypertension  I10 carvedilol (COREG) 3.125 MG tablet       PLAN:    Pallavi Roa: Hypertension (Patient presents for follow up on blood pressure.)  Records needed from Dr. Angi Lopez from last Jan lab (a1c)  Decrease carvedilol by 1/2 twice daily  Call bp log in 2wks (or my chart message)  Med sent. Diagnosis and orders for this visit are above. Please note that this chart was generated using dragon dictationsoftware. Although every effort was made to ensure the accuracy of this automated transcription, some errors in transcription may have occurred.

## 2021-02-26 ENCOUNTER — OFFICE VISIT (OUTPATIENT)
Dept: WOUND CARE | Facility: HOSPITAL | Age: 61
End: 2021-02-26

## 2021-02-26 PROCEDURE — 11042 DBRDMT SUBQ TIS 1ST 20SQCM/<: CPT | Performed by: PODIATRIST

## 2021-02-26 PROCEDURE — 11045 DBRDMT SUBQ TISS EACH ADDL: CPT | Performed by: PODIATRIST

## 2021-03-05 ENCOUNTER — TRANSCRIBE ORDERS (OUTPATIENT)
Dept: ADMINISTRATIVE | Facility: HOSPITAL | Age: 61
End: 2021-03-05

## 2021-03-05 ENCOUNTER — OFFICE VISIT (OUTPATIENT)
Dept: WOUND CARE | Facility: HOSPITAL | Age: 61
End: 2021-03-05

## 2021-03-05 DIAGNOSIS — L97.822 NON-PRESSURE CHRONIC ULCER OF OTHER PART OF LEFT LOWER LEG WITH FAT LAYER EXPOSED (HCC): ICD-10-CM

## 2021-03-05 DIAGNOSIS — Z48.817 ENCOUNTER FOR SURGICAL AFTERCARE FOLLOWING SURGERY ON THE SKIN AND SUBCUTANEOUS TISSUE: ICD-10-CM

## 2021-03-05 DIAGNOSIS — R60.0 LOCALIZED EDEMA: ICD-10-CM

## 2021-03-05 DIAGNOSIS — R60.0 LOCALIZED EDEMA: Primary | ICD-10-CM

## 2021-03-05 PROCEDURE — 11042 DBRDMT SUBQ TIS 1ST 20SQCM/<: CPT | Performed by: PODIATRIST

## 2021-03-05 PROCEDURE — 11045 DBRDMT SUBQ TISS EACH ADDL: CPT | Performed by: PODIATRIST

## 2021-03-09 ENCOUNTER — HOSPITAL ENCOUNTER (OUTPATIENT)
Dept: ULTRASOUND IMAGING | Facility: HOSPITAL | Age: 61
Discharge: HOME | End: 2021-03-09
Admitting: PODIATRIST

## 2021-03-09 PROCEDURE — 93970 EXTREMITY STUDY: CPT

## 2021-03-12 ENCOUNTER — OFFICE VISIT (OUTPATIENT)
Dept: WOUND CARE | Facility: HOSPITAL | Age: 61
End: 2021-03-12

## 2021-03-12 DIAGNOSIS — Z48.817 ENCOUNTER FOR SURGICAL AFTERCARE FOLLOWING SURGERY ON THE SKIN AND SUBCUTANEOUS TISSUE: ICD-10-CM

## 2021-03-12 DIAGNOSIS — L97.822 NON-PRESSURE CHRONIC ULCER OF OTHER PART OF LEFT LOWER LEG WITH FAT LAYER EXPOSED (HCC): ICD-10-CM

## 2021-03-12 DIAGNOSIS — R60.0 LOCALIZED EDEMA: ICD-10-CM

## 2021-03-12 PROCEDURE — 11042 DBRDMT SUBQ TIS 1ST 20SQCM/<: CPT | Performed by: PODIATRIST

## 2021-03-12 PROCEDURE — 11045 DBRDMT SUBQ TISS EACH ADDL: CPT | Performed by: PODIATRIST

## 2021-03-18 ENCOUNTER — OFFICE VISIT (OUTPATIENT)
Dept: WOUND CARE | Facility: HOSPITAL | Age: 61
End: 2021-03-18

## 2021-03-18 DIAGNOSIS — Z48.817 ENCOUNTER FOR SURGICAL AFTERCARE FOLLOWING SURGERY ON THE SKIN AND SUBCUTANEOUS TISSUE: ICD-10-CM

## 2021-03-18 DIAGNOSIS — L97.822 NON-PRESSURE CHRONIC ULCER OF OTHER PART OF LEFT LOWER LEG WITH FAT LAYER EXPOSED (HCC): ICD-10-CM

## 2021-03-18 DIAGNOSIS — A49.9 BACTERIAL INFECTION, UNSPECIFIED: ICD-10-CM

## 2021-03-18 DIAGNOSIS — R60.0 LOCALIZED EDEMA: ICD-10-CM

## 2021-03-18 PROCEDURE — 11045 DBRDMT SUBQ TISS EACH ADDL: CPT | Performed by: NURSE PRACTITIONER

## 2021-03-18 PROCEDURE — 11042 DBRDMT SUBQ TIS 1ST 20SQCM/<: CPT | Performed by: NURSE PRACTITIONER

## 2021-03-26 ENCOUNTER — OFFICE VISIT (OUTPATIENT)
Dept: WOUND CARE | Facility: HOSPITAL | Age: 61
End: 2021-03-26

## 2021-03-26 DIAGNOSIS — Z48.817 ENCOUNTER FOR SURGICAL AFTERCARE FOLLOWING SURGERY ON THE SKIN AND SUBCUTANEOUS TISSUE: ICD-10-CM

## 2021-03-26 DIAGNOSIS — R60.0 LOCALIZED EDEMA: ICD-10-CM

## 2021-03-26 DIAGNOSIS — L97.822 NON-PRESSURE CHRONIC ULCER OF OTHER PART OF LEFT LOWER LEG WITH FAT LAYER EXPOSED (HCC): ICD-10-CM

## 2021-03-26 PROCEDURE — 11042 DBRDMT SUBQ TIS 1ST 20SQCM/<: CPT | Performed by: PODIATRIST

## 2021-03-26 PROCEDURE — 11045 DBRDMT SUBQ TISS EACH ADDL: CPT | Performed by: PODIATRIST

## 2021-03-26 PROCEDURE — 99212 OFFICE O/P EST SF 10 MIN: CPT | Performed by: PODIATRIST

## 2021-04-02 ENCOUNTER — OFFICE VISIT (OUTPATIENT)
Dept: WOUND CARE | Facility: HOSPITAL | Age: 61
End: 2021-04-02

## 2021-04-02 ENCOUNTER — LAB REQUISITION (OUTPATIENT)
Dept: LAB | Facility: HOSPITAL | Age: 61
End: 2021-04-02

## 2021-04-02 DIAGNOSIS — L97.822 NON-PRESSURE CHRONIC ULCER OF OTHER PART OF LEFT LOWER LEG WITH FAT LAYER EXPOSED (HCC): ICD-10-CM

## 2021-04-02 DIAGNOSIS — R60.0 LOCALIZED EDEMA: ICD-10-CM

## 2021-04-02 DIAGNOSIS — Z00.00 ENCOUNTER FOR GENERAL ADULT MEDICAL EXAMINATION WITHOUT ABNORMAL FINDINGS: ICD-10-CM

## 2021-04-02 DIAGNOSIS — Z48.817 ENCOUNTER FOR SURGICAL AFTERCARE FOLLOWING SURGERY ON THE SKIN AND SUBCUTANEOUS TISSUE: ICD-10-CM

## 2021-04-02 PROCEDURE — 87186 SC STD MICRODIL/AGAR DIL: CPT | Performed by: PODIATRIST

## 2021-04-02 PROCEDURE — 87070 CULTURE OTHR SPECIMN AEROBIC: CPT | Performed by: PODIATRIST

## 2021-04-02 PROCEDURE — 87176 TISSUE HOMOGENIZATION CULTR: CPT | Performed by: PODIATRIST

## 2021-04-02 PROCEDURE — 99212 OFFICE O/P EST SF 10 MIN: CPT | Performed by: PODIATRIST

## 2021-04-02 PROCEDURE — 11045 DBRDMT SUBQ TISS EACH ADDL: CPT | Performed by: PODIATRIST

## 2021-04-02 PROCEDURE — 11042 DBRDMT SUBQ TIS 1ST 20SQCM/<: CPT | Performed by: PODIATRIST

## 2021-04-02 PROCEDURE — 87075 CULTR BACTERIA EXCEPT BLOOD: CPT | Performed by: PODIATRIST

## 2021-04-02 PROCEDURE — 87147 CULTURE TYPE IMMUNOLOGIC: CPT | Performed by: PODIATRIST

## 2021-04-02 PROCEDURE — 87205 SMEAR GRAM STAIN: CPT | Performed by: PODIATRIST

## 2021-04-05 LAB
BACTERIA SPEC AEROBE CULT: ABNORMAL
GRAM STN SPEC: ABNORMAL
GRAM STN SPEC: ABNORMAL

## 2021-04-07 LAB — BACTERIA SPEC ANAEROBE CULT: NORMAL

## 2021-04-07 RX ORDER — DEXTROSE MONOHYDRATE 50 MG/ML
250 INJECTION, SOLUTION INTRAVENOUS ONCE
Status: CANCELLED
Start: 2021-04-08 | End: 2021-04-08

## 2021-04-08 ENCOUNTER — INFUSION (OUTPATIENT)
Dept: ONCOLOGY | Facility: HOSPITAL | Age: 61
End: 2021-04-08

## 2021-04-08 VITALS
OXYGEN SATURATION: 98 % | WEIGHT: 206 LBS | BODY MASS INDEX: 41.53 KG/M2 | SYSTOLIC BLOOD PRESSURE: 95 MMHG | RESPIRATION RATE: 16 BRPM | DIASTOLIC BLOOD PRESSURE: 52 MMHG | TEMPERATURE: 96.9 F | HEART RATE: 64 BPM | HEIGHT: 59 IN

## 2021-04-08 DIAGNOSIS — S81.809S NON-HEALING WOUND OF LOWER EXTREMITY, SEQUELA: Primary | ICD-10-CM

## 2021-04-08 DIAGNOSIS — A49.02 INFECTION OF WOUND DUE TO METHICILLIN RESISTANT STAPHYLOCOCCUS AUREUS (MRSA): ICD-10-CM

## 2021-04-08 PROCEDURE — 96365 THER/PROPH/DIAG IV INF INIT: CPT

## 2021-04-08 PROCEDURE — 25010000002 DALBAVANCIN 500 MG RECONSTITUTED SOLUTION 1 EACH VIAL: Performed by: PODIATRIST

## 2021-04-08 RX ORDER — DEXTROSE MONOHYDRATE 50 MG/ML
250 INJECTION, SOLUTION INTRAVENOUS ONCE
Status: COMPLETED | OUTPATIENT
Start: 2021-04-08 | End: 2021-04-08

## 2021-04-08 RX ORDER — DEXTROSE MONOHYDRATE 50 MG/ML
250 INJECTION, SOLUTION INTRAVENOUS ONCE
Status: CANCELLED
Start: 2021-04-08 | End: 2021-04-08

## 2021-04-08 RX ADMIN — DALBAVANCIN 1500 MG: 500 INJECTION, POWDER, FOR SOLUTION INTRAVENOUS at 09:23

## 2021-04-08 RX ADMIN — DEXTROSE MONOHYDRATE 250 ML: 50 INJECTION, SOLUTION INTRAVENOUS at 09:21

## 2021-04-09 ENCOUNTER — OFFICE VISIT (OUTPATIENT)
Dept: WOUND CARE | Facility: HOSPITAL | Age: 61
End: 2021-04-09

## 2021-04-09 DIAGNOSIS — L97.822 NON-PRESSURE CHRONIC ULCER OF OTHER PART OF LEFT LOWER LEG WITH FAT LAYER EXPOSED (HCC): ICD-10-CM

## 2021-04-09 DIAGNOSIS — Z48.817 ENCOUNTER FOR SURGICAL AFTERCARE FOLLOWING SURGERY ON THE SKIN AND SUBCUTANEOUS TISSUE: ICD-10-CM

## 2021-04-09 DIAGNOSIS — R60.0 LOCALIZED EDEMA: ICD-10-CM

## 2021-04-09 PROCEDURE — 11045 DBRDMT SUBQ TISS EACH ADDL: CPT | Performed by: PODIATRIST

## 2021-04-09 PROCEDURE — 11042 DBRDMT SUBQ TIS 1ST 20SQCM/<: CPT | Performed by: PODIATRIST

## 2021-04-16 ENCOUNTER — OFFICE VISIT (OUTPATIENT)
Dept: WOUND CARE | Facility: HOSPITAL | Age: 61
End: 2021-04-16

## 2021-04-16 DIAGNOSIS — R60.0 LOCALIZED EDEMA: ICD-10-CM

## 2021-04-16 DIAGNOSIS — L97.822 NON-PRESSURE CHRONIC ULCER OF OTHER PART OF LEFT LOWER LEG WITH FAT LAYER EXPOSED (HCC): ICD-10-CM

## 2021-04-16 DIAGNOSIS — A49.9 BACTERIAL INFECTION, UNSPECIFIED: ICD-10-CM

## 2021-04-16 DIAGNOSIS — Z48.817 ENCOUNTER FOR SURGICAL AFTERCARE FOLLOWING SURGERY ON THE SKIN AND SUBCUTANEOUS TISSUE: ICD-10-CM

## 2021-04-16 PROCEDURE — 11042 DBRDMT SUBQ TIS 1ST 20SQCM/<: CPT | Performed by: PODIATRIST

## 2021-04-16 PROCEDURE — 11045 DBRDMT SUBQ TISS EACH ADDL: CPT | Performed by: PODIATRIST

## 2021-04-19 ENCOUNTER — OFFICE VISIT (OUTPATIENT)
Dept: FAMILY MEDICINE CLINIC | Age: 61
End: 2021-04-19
Payer: COMMERCIAL

## 2021-04-19 VITALS
OXYGEN SATURATION: 96 % | HEIGHT: 60 IN | TEMPERATURE: 97.2 F | DIASTOLIC BLOOD PRESSURE: 60 MMHG | BODY MASS INDEX: 39.85 KG/M2 | SYSTOLIC BLOOD PRESSURE: 112 MMHG | HEART RATE: 95 BPM | WEIGHT: 203 LBS | RESPIRATION RATE: 20 BRPM

## 2021-04-19 DIAGNOSIS — G89.29 CHRONIC PAIN OF LEFT HAND: Primary | ICD-10-CM

## 2021-04-19 DIAGNOSIS — Z45.9 ENCOUNTER FOR MANAGEMENT OF IMPLANTED DEVICE: ICD-10-CM

## 2021-04-19 DIAGNOSIS — M79.642 CHRONIC PAIN OF LEFT HAND: Primary | ICD-10-CM

## 2021-04-19 DIAGNOSIS — G25.81 RLS (RESTLESS LEGS SYNDROME): ICD-10-CM

## 2021-04-19 PROCEDURE — 99214 OFFICE O/P EST MOD 30 MIN: CPT | Performed by: NURSE PRACTITIONER

## 2021-04-19 RX ORDER — PRAMIPEXOLE DIHYDROCHLORIDE 1.5 MG/1
TABLET ORAL
Qty: 135 TABLET | Refills: 1 | Status: SHIPPED | OUTPATIENT
Start: 2021-04-19 | End: 2021-10-27

## 2021-04-19 RX ORDER — OXYCODONE AND ACETAMINOPHEN 7.5; 325 MG/1; MG/1
TABLET ORAL
COMMUNITY
Start: 2021-04-02 | End: 2022-02-07 | Stop reason: ALTCHOICE

## 2021-04-19 ASSESSMENT — ENCOUNTER SYMPTOMS: SHORTNESS OF BREATH: 0

## 2021-04-19 NOTE — PROGRESS NOTES
SUBJECTIVE:    Patient ID: Clayton Stokes is a61 y.o. female. Clayton Stokes is here today for Finger Injury (Patient presents c/o 5th finger injury on left hand 4 months ago. Patient states that she \"jammed it into her cast iron grate on her stove\"), Hand Pain (\"at times the whole hand will hurt\"), Referral - General (wants a referral to a surgeon to discuss pain device \"medtronic\" in back that causes her pain), Dizziness (patient reports blood pressure being low at times at home.), and Discuss Medications (RLS medication changes didn't help her ([employer] messages))  . HPI:   HPI       Finger pain and hand pain  approx 4mo ago hit on stove and has some radiation  \"still hurting\"  Also states that since the jam, she has pain and locking of finger     Blood pressure   Pt concerned that she has bp lows at times. However, she reports she is unsure this may also be linked to when infection is worse in left leg. Pt will see Dr. Tessy Coker next week for a1c. No hypoglycemia reported. She does not check glucose regularly. Pt does have home bp monitor. RLS  Has recently had change ---pt is taking mirapax 1.5mg1/2 po bid and this bid changing did not work and pt went back to taking at bedtime only  Morning-ok as pt is moving around more  Afternoon and evening-pt is sitting more and noting worsening at that time. Another culture of left leg this Friday with wound care. Pt is battling spider bite x 1yr the 12th of this month. Pt continues to have pain that is intermittently. Pt continues to have to cleaning swelling. Pt is frustrated with wt from sitting. Pt does have medtronic pain pump and wants this removed. She is wanting Religion surgeon.     Past Medical History:   Diagnosis Date    Arthritis     Bipolar I disorder, most recent episode (or current) mixed, unspecified     Chronic back pain     Depression     Dry mouth     Hyperlipidemia     Hypertension     Hypokalemia     Hypothyroidism     Menopause     Morbidly obese ([facility] [address] 10/28/2020    Overactive bladder     Plantar fasciitis     RLS (restless legs syndrome)     Sleep apnea     Spondylosis with myelopathy, lumbar region     Thyroid disease      Prior to Visit Medications    Medication Sig Taking? Authorizing Provider   pramipexole (MIRAPEX) 1.5 MG tablet TAKE 1/2 po every am and  ONE TABLET BY MOUTH NIGHTLY **MAY MAKE DROWSY** Yes MAEVE Cuevas   pioglitazone (ACTOS) 30 MG tablet Take 30 mg by mouth daily Yes Historical Provider, MD   traMADol (ULTRAM) 50 MG tablet Take 50 mg by mouth 3 times daily as needed for Pain. Yes Historical Provider, MD   carvedilol (COREG) 3.125 MG tablet Take 1 tablet by mouth 2 times daily (with meals) Yes MAEVE Cuevas   niacin (SLO-NIACIN) 500 MG extended release tablet Take 500 mg by mouth daily Yes Historical Provider, MD   FLUoxetine (PROZAC) 40 MG capsule Take 1 capsule by mouth daily For anxiety/depression Yes MAEVE Cuevas   pilocarpine (SALAGEN) 5 MG tablet Take 1 tablet by mouth 3 times daily Yes MAEVE Cuevas   spironolactone (ALDACTONE) 25 MG tablet 2 po in am and 1 po in evening for fluid Yes MAEVE Cuevas   traZODone (DESYREL) 50 MG tablet 1-3 po as needed for sleep Yes MAEVE Cuevas   NARCAN 4 MG/0.1ML LIQD nasal spray  Yes Historical Provider, MD   diclofenac (VOLTAREN) 75 MG EC tablet Take 75 mg by mouth 2 times daily  Yes Historical Provider, MD   HYDROcodone-acetaminophen ([address]) 7.5-325 MG per tablet  Yes Historical Provider, MD   methylcellulose ([employer] SOLUBLE FIBER THERAPY) 500 MG TABS  Yes Historical Provider, MD   loperamide (IMODIUM A-D) 2 MG tablet TAKE 2  MG ORAL AS NEEDED EVERY 12 HOURS Yes Historical Provider, MD   levothyroxine (SYNTHROID) 75 MCG tablet Take 75 mcg by mouth Daily  Yes Historical Provider, MD   atorvastatin (LIPITOR) 40 MG tablet TAKE ONE TABLET BY MOUTH NIGHTLY FOR CHOLESTEROL.  Yes MAEVE Cuevas   allopurinol (ZYLOPRIM) 100 MG tablet TAKE ONE TABLET BY MOUTH DAILY Yes MAEVE Cuevas   bumetanide (BUMEX) 1 MG tablet Take 1 tablet by mouth 2 times daily  Patient taking differently: Take 1 mg by mouth daily  Yes MAEVE Cuevas   omeprazole (PRILOSEC) 20 MG delayed release capsule Take 1 capsule by mouth 2 times daily (before meals) Yes MAEVE Cuevas   tiZANidine (ZANAFLEX) 4 MG tablet Take 4 mg by mouth 3 times daily as needed  Yes Historical Provider, MD   Vitamin D (CHOLECALCIFEROL) 1000 UNITS CAPS capsule Take 1,000 Units by mouth daily Yes Historical Provider, MD   Multiple Vitamins-Minerals (MULTIVITAMIN PO) Take by mouth daily Yes Historical Provider, MD   calcium carbonate 600 MG TABS tablet Take 1 tablet by mouth daily.  Yes Historical Provider, MD   oxyCODONE-acetaminophen (PERCOCET) 7.5-325 MG per tablet   Historical Provider, MD   gabapentin (NEURONTIN) 300 MG capsule TAKE ONE CAPSULE BY MOUTH THREE TIMES DAILY AS NEEDED FOR NERVE PAIN  MAEVE Cuevas     Allergies   Allergen Reactions    Bactrim [Sulfamethoxazole-Trimethoprim]     Hctz [Hydrochlorothiazide] Other (See Comments)     Acid reflux      Metformin And Related Diarrhea    Sitagliptin Rash     Past Surgical History:   Procedure Laterality Date    CARPAL TUNNEL RELEASE Bilateral 2002    CHOLECYSTECTOMY  10/2014    COLON SURGERY      COLON SURGERY      biopsy     COLONOSCOPY      CYST REMOVAL  05/2017    Under left ear    HARDWARE REMOVAL Right 2011    Foot     HERNIA REPAIR  10/2014    LEG DEBRIDEMENT Left     NOSE SURGERY  08/01/2017; 04/16/2019    Dr Ivon Vivar L/S,1 LEVEL Bilateral 2/7/2017    INJECTION MEDICATION FACET JOINT [facility] by [facility]     Family History   Problem Relation Age of Onset    Cancer Mother         lung    Heart Disease Mother     Hypertension Mother     Bipolar Disorder Mother     Heart Disease Father     Hypertension Father      Social History     Socioeconomic History    Marital status:      Spouse name: Zainab Unger Number of children: 2    Years of education: some college    Highest education level: Not on file   Occupational History     Employer: [employer] Financial resource strain: Not on file    Food insecurity     Worry: Not on file     Inability: Not on file   [employer] needs     Medical: Not on file     Non-medical: Not on file   Tobacco Use    Smoking status: Former Smoker     Packs/day: 1.00     Years: 30.00     Pack years: 30.00     Types: Cigarettes     Quit date: 2003     Years since quittin.7    Smokeless tobacco: Never Used    Tobacco comment: Pt not interested at this time in stopping. Substance and Sexual Activity    Alcohol use: Yes     Comment: rarely    Drug use: No     Comment: pt admits \"may have misused in past\"--Pt was vague with details.  Sexual activity: Not on file   Lifestyle    Physical activity     Days per week: Not on file     Minutes per session: Not on file    Stress: Not on file   Relationships    Social connections     Talks on phone: Not on file     Gets together: Not on file     Attends Yazidi service: Not on file     Active member of club or organization: Not on file     Attends meetings of clubs or organizations: Not on file     Relationship status: Not on file    Intimate partner violence     Fear of current or ex partner: Not on file     Emotionally abused: Not on file     Physically abused: Not on file     Forced sexual activity: Not on file   Other Topics Concern    Not on file   Social History Narrative    Not on file       Review of Systems   Constitutional: Negative for unexpected weight change. Respiratory: Negative for shortness of breath. Cardiovascular: Positive for leg swelling (at times). Musculoskeletal: Positive for arthralgias. Skin: Positive for wound (x 1year). Psychiatric/Behavioral: Positive for sleep disturbance. Self-injury: in relation to health.  The patient is nervous/anxious. OBJECTIVE:    Physical Exam  Vitals signs and nursing note reviewed. Constitutional:       General: She is not in acute distress. Appearance: Normal appearance. She is well-developed. She is obese. She is not ill-appearing or toxic-appearing. HENT:      Head: Normocephalic and atraumatic. Eyes:      Extraocular Movements: Extraocular movements intact. Conjunctiva/sclera: Conjunctivae normal.      Pupils: Pupils are equal, round, and reactive to light. Neck:      Musculoskeletal: Normal range of motion and neck supple. No neck rigidity. Trachea: No tracheal deviation. Cardiovascular:      Rate and Rhythm: Normal rate and regular rhythm. Heart sounds: Normal heart sounds. Pulmonary:      Effort: Pulmonary effort is normal.      Breath sounds: Normal breath sounds. Musculoskeletal:      Left hand: She exhibits tenderness. Hands:       Right lower leg: No edema. Left lower leg: Edema (wrap is present for leg wound; swelling is noted to distal leg/foot ) present. Skin:     General: Skin is warm and dry. Capillary Refill: Capillary refill takes less than 2 seconds. Neurological:      General: No focal deficit present. Mental Status: She is alert and oriented to person, place, and time. Mental status is at baseline. Psychiatric:         Mood and Affect: Mood normal. Mood is not anxious or depressed. Affect is not angry. Speech: Speech normal.         Behavior: Behavior normal.         Thought Content: Thought content normal.         Judgment: Judgment normal.         /60 (Site: Right Upper Arm, Position: Sitting, Cuff Size: Large Adult)   Pulse 95   Temp 97.2 °F (36.2 °C) (Temporal)   Resp 20   Ht 5' (1.524 m)   Wt 203 lb (92.1 kg)   SpO2 96%   BMI 39.65 kg/m²      ASSESSMENT:      ICD-10-CM    1. Chronic pain of left hand  M79.642 XR HAND LEFT (MIN 3 VIEWS)    G89.29    2.  Encounter for management of implanted device  Z45.9 External Referral To General Surgery   3. RLS (restless legs syndrome)  G25.81 pramipexole (MIRAPEX) 1.5 MG tablet       PLAN:    Aiden Slade: Finger Injury (Patient presents c/o 5th finger injury on left hand 4 months ago. Patient states that she \"jammed it into her cast iron grate on her stove\"), Hand Pain (\"at times the whole hand will hurt\"), Referral - General (wants a referral to a surgeon to discuss pain device \"medtronic\" in back that causes her pain), Dizziness (patient reports blood pressure being low at times at home.), and Discuss Medications (RLS medication changes didn't help her ([employer] messages))  pt will need PT for strengthening r/t deconditioned status with left leg x 1yr==pt to call when ready. Check glucose as well as bp when feeling that bp is low---call report if concerns. Xray and pt to call and notify office once she has this test done. Diagnosis and orders for this visit are above. Please note that this chart was generated using dragon dictationsoftware. Although every effort was made to ensure the accuracy of this automated transcription, some errors in transcription may have occurred.

## 2021-04-21 ENCOUNTER — PATIENT MESSAGE (OUTPATIENT)
Dept: FAMILY MEDICINE CLINIC | Age: 61
End: 2021-04-21

## 2021-04-21 DIAGNOSIS — M79.642 CHRONIC PAIN OF LEFT HAND: ICD-10-CM

## 2021-04-21 DIAGNOSIS — G89.29 CHRONIC PAIN OF LEFT HAND: ICD-10-CM

## 2021-04-21 NOTE — TELEPHONE ENCOUNTER
Antonietta, see my previous note. However, thought I should pass along, this is not in my \"results\".

## 2021-04-21 NOTE — TELEPHONE ENCOUNTER
From: Marlene Tobias  To: MAEVE Alejandro  Sent: 4/21/2021 8:52 AM CDT  Subject: Test Results Question    Tea Machuca. I had the x-ray done on Monday. They said you would have the results by yesterday afternoon. Did you get them?

## 2021-04-23 ENCOUNTER — OFFICE VISIT (OUTPATIENT)
Dept: WOUND CARE | Facility: HOSPITAL | Age: 61
End: 2021-04-23

## 2021-04-23 DIAGNOSIS — L97.822 NON-PRESSURE CHRONIC ULCER OF OTHER PART OF LEFT LOWER LEG WITH FAT LAYER EXPOSED (HCC): ICD-10-CM

## 2021-04-23 DIAGNOSIS — Z48.817 ENCOUNTER FOR SURGICAL AFTERCARE FOLLOWING SURGERY ON THE SKIN AND SUBCUTANEOUS TISSUE: ICD-10-CM

## 2021-04-23 DIAGNOSIS — R60.0 LOCALIZED EDEMA: ICD-10-CM

## 2021-04-23 PROCEDURE — 11042 DBRDMT SUBQ TIS 1ST 20SQCM/<: CPT | Performed by: PODIATRIST

## 2021-04-23 PROCEDURE — 11045 DBRDMT SUBQ TISS EACH ADDL: CPT | Performed by: PODIATRIST

## 2021-04-27 ENCOUNTER — OFFICE VISIT (OUTPATIENT)
Dept: INTERNAL MEDICINE | Facility: CLINIC | Age: 61
End: 2021-04-27

## 2021-04-27 VITALS
BODY MASS INDEX: 41.39 KG/M2 | RESPIRATION RATE: 18 BRPM | TEMPERATURE: 97.8 F | OXYGEN SATURATION: 95 % | HEART RATE: 89 BPM | DIASTOLIC BLOOD PRESSURE: 68 MMHG | WEIGHT: 205.3 LBS | SYSTOLIC BLOOD PRESSURE: 110 MMHG | HEIGHT: 59 IN

## 2021-04-27 DIAGNOSIS — S81.802D WOUND OF LEFT LOWER EXTREMITY, SUBSEQUENT ENCOUNTER: Primary | ICD-10-CM

## 2021-04-27 DIAGNOSIS — R73.03 PREDIABETES: ICD-10-CM

## 2021-04-27 PROCEDURE — 99213 OFFICE O/P EST LOW 20 MIN: CPT | Performed by: FAMILY MEDICINE

## 2021-04-27 NOTE — PROGRESS NOTES
Subjective     Chief Complaint   Patient presents with   • Wound Infection     Follow up. Left leg draining.        History of Present Illness  Patient is here today for a follow-up evaluation of her left lower extremity wound.  She feels like there is a little bit more erythema and that the drainage has increased.  The pain is about the same.  She feels the wound is actually healing pretty well.  She does note that she is quite tired of having this wound.  It has been well over a year since it started.  She continues to follow with wound management.  Dr. Wells is following this.  She did have a skin graft at one time that did fail.  She follows with [facility] for her diabetes management.    Patient's PMR from outside medical facility reviewed and noted.    Review of Systems     Otherwise complete ROS reviewed and negative except as mentioned in the HPI.    Past Medical History:   Past Medical History:   Diagnosis Date   • Allergic rhinitis    • Arthritis     BACK   • Chronic back pain    • Degenerative arthritis    • Depression    • Deviated nasal septum    • Diabetes mellitus (CMS/HCC)    • GERD (gastroesophageal reflux disease)    • Gout    • History of colon polyps    • Hyperlipidemia    • Hypertension    • Hypertrophy of nasal turbinates    • Incontinence in female    • Insomnia    • Menopause    • Nasal septal deviation    • Nasal valve stenosis    • Overactive bladder    • Plantar fasciitis    • Prediabetes    • RLS (restless legs syndrome)    • Sleep apnea     pt states has a cpap but hasn't been using it   • Spider bite wound     left lower leg. previously infected with mrsa but currently not infected.   • Wears glasses      Past Surgical History:  Past Surgical History:   Procedure Laterality Date   • BLEPHAROPLASTY Bilateral 7/9/2019    Procedure: 1) bilateral upper blepharoplasty with excision of excess of tissue weighing down 2) nasal endoscopy with removal of nasal septal prosthesis;  Surgeon:  Mark Anthony Anderson MD;  Location:  [facility];  Service: ENT   • CARPAL TUNNEL RELEASE Bilateral 2004   • CHOLECYSTECTOMY  2013   • COLONOSCOPY  10/17/2014    One 5-6mm tubular adenomatous polyp in the ascending colon; Two less than 4mm hyperplastic polyps in the sigmoid colon; Three rectal hyperplastic polyps; Diverticulosis; Repeat 5 years    • COLONOSCOPY  07/14/2010    Internal hemorrhoids; Repeat 7 years    • COLONOSCOPY N/A 10/23/2019    Procedure: COLONOSCOPY WITH ANESTHESIA;  Surgeon: Robyn Frazier MD;  Location:  [facility];  Service: Gastroenterology   • CYST REMOVAL  2016    neck   • ENDOSCOPIC FUNCTIONAL SINUS SURGERY (FESS) Bilateral 4/16/2019    Procedure: ENDOSCOPIC FUNCTIONAL SINUS SURGERY (bilareral maxillary antrostomy without image guidance);  Surgeon: Mark Anthony Anderson MD;  Location:  [facility];  Service: ENT   • ENDOSCOPY N/A 4/11/2019    Esophageal mucosal changes suggestive of eosinophilic esophagitis-biopsied; A few gastric polyps-resected and retrieved-biopsied; Normal examined duodenum-biopsied   • FOOT SURGERY Right 2010    X3   • HERNIA REPAIR     • INTERSTIM PLACEMENT N/A 6/5/2019    Procedure: INTERSTIM STAGES 1 AND 2 LEAD AND GENERATOR PLACEMENT;  Surgeon: Endy Guerrero MD;  Location:  [facility];  Service: Urology   • LEG DEBRIDEMENT Left 7/31/2020    Procedure: LOWER POSTERIOR LEG WOUND DEBRIDEMENT - LEFT;  Surgeon: Roberto Wells DPM;  Location:  [facility];  Service: Podiatry;  Laterality: Left;   • LEG DEBRIDEMENT Left 11/30/2020    Procedure: SPLIT THICKNESS SKIN GRAFT LEFT LOWER EXTREMTIY WITH WOUND VAC PLACEMENT;  Surgeon: Trace Hurd DO;  Location:  [facility];  Service: Vascular;  Laterality: Left;   • LEG DEBRIDEMENT Left 2/3/2021    Procedure: LOWER EXTREMITY WOUND DEBRIDEMENT - LEFT;  Surgeon: Roberto Wells DPM;  Location:  [facility];  Service: Podiatry;  Laterality: Left;   • NASAL ENDOSCOPY N/A 4/16/2019    Procedure:     1. Functional  endoscopic sinus surgery with bilateral maxillary antrostomy    2. Bilateral nasal endoscopy with biopsy of nasal septum    3.  Nasal septal prosthesis placement  ;  Surgeon: Mark Atnhony Anderson MD;  Location: [facility] OR;  Service: ENT   • NASAL VESTIBULE LESION/PAPILLOMA EXCISION N/A 8/1/2017    Procedure: Repair of nasal vestibular stenosis and septoplasty; cartilage graft from left ear;  Surgeon: Mark Anthony Anderson MD;  Location:  [facility];  Service:    • SEPTOPLASTY N/A 4/16/2019    Procedure: PLACEMENT OF NASAL SEPTAL PROSTHESIS;  Surgeon: Mark Anthony Anderson MD;  Location: [facility] OR;  Service: ENT   • SEPTOPLASTY Bilateral 1/14/2020    Procedure: Nasal endoscopy with septal debridement, and placement of Silastic stents;  Surgeon: Mark Anthony Anderson MD;  Location: [facility] OR;  Service: ENT   • WOUND DEBRIDEMENT  06/2019    spider bite wound initial surgery     Social History:  reports that she quit smoking about 9 months ago. Her smoking use included cigarettes. She has a 20.00 pack-year smoking history. She has never used smokeless tobacco. She reports current alcohol use. She reports that she does not use drugs.    Family History: family history includes Cancer in her father and mother; Diabetes in her father; Hypertension in her father.       Allergies:  Allergies   Allergen Reactions   • Bactrim [Sulfamethoxazole-Trimethoprim] Anaphylaxis   • Hctz [Hydrochlorothiazide] Other (See Comments)     Acid reflux   • Januvia [Sitagliptin] Rash     Medications:  Prior to Admission medications    Medication Sig Start Date End Date Taking? Authorizing Provider   allopurinol (ZYLOPRIM) 100 MG tablet Take 100 mg by mouth Daily. 5/8/17  Yes ProviderBrenda MD   atorvastatin (LIPITOR) 40 MG tablet Take 40 mg by mouth Every Night. 9/13/17  Yes ProviderBrenda MD   bumetanide (BUMEX) 1 MG tablet Take 1 mg by mouth Daily.   Yes ProviderBrenda MD   calcium carbonate (OS-KAYLEY) 600 MG tablet Take 600 mg by mouth Daily.    Yes Brenda Mcnair MD   carvedilol (COREG) 6.25 MG tablet Take 6.25 mg by mouth 2 (Two) Times a Day With Meals.   Yes Brenda Mcnair MD   cholecalciferol (VITAMIN D3) 25 MCG (1000 UT) tablet Take 400 Units by mouth Daily.   Yes Brenda Mcnair MD   diclofenac (VOLTAREN) 75 MG EC tablet Take 75 mg by mouth 2 (Two) Times a Day. 6/4/20  Yes Brenda Mcnair MD   FLUoxetine (PROzac) 20 MG capsule Take 40 mg by mouth Daily.   Yes Brenda Mcnair MD   gabapentin (NEURONTIN) 400 MG capsule Take 400 mg by mouth 3 (Three) Times a Day.   Yes Brenda Mcnair MD   levothyroxine (SYNTHROID, LEVOTHROID) 75 MCG tablet Take 75 mcg by mouth Daily.   Yes Brenda Mcnair MD   Loperamide HCl (IMODIUM PO) Take 2 mg by mouth As Needed.   Yes Brenda Mcnair MD   methylcellulose, Laxative, (EQ FIBER THERAPY) 500 MG tablet tablet Take 1 tablet by mouth Daily.   Yes Brenda Mcnair MD   Multiple Vitamin (MULTI VITAMIN PO) Take 1 dose by mouth Daily.   Yes Brenda Mcnair MD   niacin 250 MG tablet Take 500 mg by mouth Daily With Breakfast.   Yes Brenda Mcnair MD   omeprazole (priLOSEC) 20 MG capsule Take 20 mg by mouth Daily. 3/27/19  Yes Brenda Mcnair MD   oxyCODONE-acetaminophen (Percocet)  MG per tablet Take 1 tablet by mouth Every 6 (Six) Hours As Needed for Moderate Pain . 1/2/21  Yes Rizwan Scruggs MD   oxyCODONE-acetaminophen (Percocet)  MG per tablet Take 1 tablet by mouth Every 6 (Six) Hours As Needed for Moderate Pain  (Pain). 2/3/21  Yes Roberto Wells DPM   pilocarpine (SALAGEN) 5 MG tablet Take 5 mg by mouth 3 (Three) Times a Day.   Yes Brenda Mcnair MD   pioglitazone (ACTOS) 30 MG tablet Take 30 mg by mouth Daily. NEW MED-HAS NOT TAKEN YET   Yes Brenda Mcnair MD   pramipexole (MIRAPEX) 1.5 MG tablet Take 1.5 mg by mouth Every Night.   Yes Brenda Mcnair MD   spironolactone (ALDACTONE) 25 MG tablet Take 25  "mg by mouth 2 (Two) Times a Day.   Yes Brenda Mcnair MD   tiZANidine (ZANAFLEX) 4 MG tablet Take 4 mg by mouth Every Night. HAS NOT TAKEN WHILE ON LEVAQUIN   Yes Brenda Mcnair MD   traMADol (ULTRAM) 50 MG tablet Take 100 mg by mouth 2 (Two) Times a Day.   Yes Brenda Mcnair MD   traZODone (DESYREL) 50 MG tablet Take 50 mg by mouth Every Night. Take 1-3 tablets at night as needed for sleep.   Yes Brenda Mcnair MD   levoFLOXacin (LEVAQUIN) 750 MG tablet Take 750 mg by mouth Daily. HAS ONE DOSE LEFT    Brenda Mcnair MD       Objective     Vital Signs: /68 (BP Location: Left arm, Patient Position: Sitting, Cuff Size: Adult)   Pulse 89   Temp 97.8 °F (36.6 °C) (Skin)   Resp 18   Ht 149.9 cm (59\")   Wt 93.1 kg (205 lb 4.8 oz)   SpO2 95%   BMI 41.47 kg/m²   Physical Exam  Vitals and nursing note reviewed.   Constitutional:       Appearance: Normal appearance. She is obese.   HENT:      Head: Normocephalic and atraumatic.      Right Ear: External ear normal.      Left Ear: External ear normal.      Nose: Nose normal.      Mouth/Throat:      Mouth: Mucous membranes are moist.      Pharynx: Oropharynx is clear.   Eyes:      Extraocular Movements: Extraocular movements intact.      Conjunctiva/sclera: Conjunctivae normal.      Pupils: Pupils are equal, round, and reactive to light.   Cardiovascular:      Rate and Rhythm: Normal rate and regular rhythm.      Pulses: Normal pulses.      Heart sounds: Normal heart sounds.   Pulmonary:      Effort: Pulmonary effort is normal.      Breath sounds: Normal breath sounds.   Abdominal:      General: Bowel sounds are normal.      Palpations: Abdomen is soft.   Musculoskeletal:      Cervical back: Normal range of motion.      Comments: Moves all extremities   Skin:     General: Skin is warm and dry.      Capillary Refill: Capillary refill takes less than 2 seconds.      Comments: Left lower extremity with a healing wound left posterior " calf.  This is much improved since the last time I saw it.  There is no eschar.  The wound edges are pink.  There is a significant amount of drainage on her dressing.  It is gray and slightly malodorous there is erythema in the anterior portion of the lower extremity with some increased heat there is tenderness palpation in the area.  There is very minimal edema here.   Neurological:      General: No focal deficit present.      Mental Status: She is alert and oriented to person, place, and time.   Psychiatric:         Mood and Affect: Mood normal.         Thought Content: Thought content normal.         Patient's Body mass index is 41.47 kg/m².       Results Reviewed:  Glucose   Date Value Ref Range Status   02/01/2021 136 (H) 65 - 99 mg/dL Final   08/28/2019 105 74 - 109 mg/dL Final     BUN   Date Value Ref Range Status   02/01/2021 25 (H) 8 - 23 mg/dL Final   08/28/2019 19 6 - 20 mg/dL Final     Creatinine   Date Value Ref Range Status   02/01/2021 0.98 0.57 - 1.00 mg/dL Final   11/13/2019 0.90 0.60 - 1.30 mg/dL Final     Comment:     Serial Number: [id number]:  [id number]   08/28/2019 0.6 0.5 - 0.9 mg/dL Final     Sodium   Date Value Ref Range Status   02/01/2021 139 136 - 145 mmol/L Final   08/28/2019 139 136 - 145 mmol/L Final     Potassium   Date Value Ref Range Status   02/01/2021 3.7 3.5 - 5.2 mmol/L Final   08/28/2019 4.0 3.5 - 5.0 mmol/L Final     Chloride   Date Value Ref Range Status   02/01/2021 100 98 - 107 mmol/L Final   08/28/2019 94 (L) 98 - 111 mmol/L Final     CO2   Date Value Ref Range Status   02/01/2021 28.0 22.0 - 29.0 mmol/L Final   08/28/2019 31 (H) 22 - 29 mmol/L Final     Calcium   Date Value Ref Range Status   02/01/2021 9.5 8.6 - 10.5 mg/dL Final   08/28/2019 9.7 8.6 - 10.0 mg/dL Final     ALT (SGPT)   Date Value Ref Range Status   02/01/2021 24 1 - 33 U/L Final   04/05/2019 32 5 - 33 U/L Final     AST (SGOT)   Date Value Ref Range Status   02/01/2021 18 1 - 32 U/L Final   04/05/2019 22  5 - 32 U/L Final     WBC   Date Value Ref Range Status   02/01/2021 11.86 (H) 3.40 - 10.80 10*3/mm3 Final   07/05/2020 9.9 4.8 - 10.8 K/uL Final     Hematocrit   Date Value Ref Range Status   02/01/2021 37.0 34.0 - 46.6 % Final   07/05/2020 28.8 (L) 37.0 - 47.0 % Final     Platelets   Date Value Ref Range Status   02/01/2021 374 140 - 450 10*3/mm3 Final   07/05/2020 394 130 - 400 K/uL Final     Total Cholesterol   Date Value Ref Range Status   07/29/2020 101 0 - 200 mg/dL Final     Triglycerides   Date Value Ref Range Status   07/29/2020 209 (H) 0 - 150 mg/dL Final   04/05/2019 117 0 - 149 mg/dL Final     HDL Cholesterol   Date Value Ref Range Status   07/29/2020 29 (L) 40 - 60 mg/dL Final   04/05/2019 35 (L) 65 - 121 mg/dL Final     Comment:     VALUES>60 MG/DL ARE ASSOCIATED WITH A DECREASED RISK OF  ATHEROSCLEROTIC CARDIOVASCULAR DISEASE     LDL Cholesterol    Date Value Ref Range Status   07/29/2020 30 0 - 100 mg/dL Final   04/05/2019 29 <100 mg/dL Final     Comment:     <100 MG/DL=OPITIMAL    100-129 MG/DL=DESIRABLE    130-159 MG/DL BORDERLINE=INCREASED RISK OF ATHEROSCLEROTIC  CARDIOVASCULAR DISEASE    > OR = 160 MG/DL=ASSOCIATED WITH AN INCREASE RISK OF  ATHEROSCLEROTIC CARDIOVASCULAR DISEASE     LDL/HDL Ratio   Date Value Ref Range Status   07/29/2020 1.04  Final     Hemoglobin A1C   Date Value Ref Range Status   07/29/2020 6.50 (H) 4.80 - 5.60 % Final   07/04/2020 6.1 (H) 4.0 - 6.0 % Final     Comment:     HbA1c levels >6% are an indication of hyperglycemia during the preceding 2  to 3 months or longer.    HbA1c levels may reach 20% or higher in poorly controlled diabetes.  Therapeutic action is suggested at levels above 8%.    Diabetes patients with HbA1c levels below 7% meet the goal of the American  Diabetes Association.    HbA1c levels below the established reference range may indicate recent  episodes of hypoglycemia, the presence of Hb variants, or shortened lifetime  of erythrocytes.           Assessment / Plan     Assessment/Plan:  1. Wound of left lower extremity, subsequent encounter    - Anaerobic & Aerobic Culture ([facility] Only) - Swab, Leg, Left  Patient has a history of MRSA infections in this wound.    Therapy will be instituted on results of culture are available.  Patient is agreeable to this.  2. Prediabetes  Keep follow-up appointments with Dr. Kemp      Return if symptoms worsen or fail to improve. unless patient needs to be seen sooner or acute issues arise.        I have discussed the patient results/orders and and plan/recommendation with them at today's visit.      Tabitha Victor,    04/27/2021

## 2021-04-28 PROBLEM — Z72.0 TOBACCO ABUSE: Status: RESOLVED | Noted: 2020-07-29 | Resolved: 2021-04-28

## 2021-04-29 ENCOUNTER — OFFICE VISIT (OUTPATIENT)
Dept: WOUND CARE | Facility: HOSPITAL | Age: 61
End: 2021-04-29

## 2021-04-29 DIAGNOSIS — Q82.0 HEREDITARY LYMPHEDEMA: ICD-10-CM

## 2021-04-29 DIAGNOSIS — L97.822 NON-PRESSURE CHRONIC ULCER OF OTHER PART OF LEFT LOWER LEG WITH FAT LAYER EXPOSED (HCC): ICD-10-CM

## 2021-04-29 DIAGNOSIS — M79.662 PAIN IN LEFT LOWER LEG: ICD-10-CM

## 2021-04-29 DIAGNOSIS — R60.0 LOCALIZED EDEMA: ICD-10-CM

## 2021-04-29 PROCEDURE — 11042 DBRDMT SUBQ TIS 1ST 20SQCM/<: CPT | Performed by: NURSE PRACTITIONER

## 2021-04-29 PROCEDURE — 99212 OFFICE O/P EST SF 10 MIN: CPT | Performed by: NURSE PRACTITIONER

## 2021-04-29 PROCEDURE — 11045 DBRDMT SUBQ TISS EACH ADDL: CPT | Performed by: NURSE PRACTITIONER

## 2021-05-03 LAB
BACTERIA SPEC AEROBE CULT: ABNORMAL
BACTERIA SPEC ANAEROBE CULT: ABNORMAL
BACTERIA SPEC CULT: ABNORMAL
OTHER ANTIBIOTIC SUSC ISLT: ABNORMAL

## 2021-05-04 ENCOUNTER — TELEPHONE (OUTPATIENT)
Dept: UROLOGY | Facility: CLINIC | Age: 61
End: 2021-05-04

## 2021-05-04 DIAGNOSIS — Z45.9 ENCOUNTER FOR MANAGEMENT OF IMPLANTED DEVICE: Primary | ICD-10-CM

## 2021-05-05 ENCOUNTER — TELEPHONE (OUTPATIENT)
Dept: INTERNAL MEDICINE | Facility: CLINIC | Age: 61
End: 2021-05-05

## 2021-05-05 RX ORDER — TETRACYCLINE HYDROCHLORIDE 500 MG/1
500 CAPSULE ORAL 4 TIMES DAILY
Qty: 40 CAPSULE | Refills: 0 | Status: SHIPPED | OUTPATIENT
Start: 2021-05-05 | End: 2021-07-23

## 2021-05-05 NOTE — TELEPHONE ENCOUNTER
Call patient to inform her of culture results. Patient voiced understanding and will  her medication at the pharmacy.

## 2021-05-05 NOTE — TELEPHONE ENCOUNTER
----- Message from Tabitha Victor DO sent at 5/5/2021  9:20 AM CDT -----  MRSA on culture as well a Pantoea  Tetracycline sent in

## 2021-05-07 ENCOUNTER — TELEPHONE (OUTPATIENT)
Dept: UROLOGY | Facility: CLINIC | Age: 61
End: 2021-05-07

## 2021-05-07 ENCOUNTER — OFFICE VISIT (OUTPATIENT)
Dept: WOUND CARE | Facility: HOSPITAL | Age: 61
End: 2021-05-07

## 2021-05-07 DIAGNOSIS — R60.0 LOCALIZED EDEMA: ICD-10-CM

## 2021-05-07 DIAGNOSIS — L97.822 NON-PRESSURE CHRONIC ULCER OF OTHER PART OF LEFT LOWER LEG WITH FAT LAYER EXPOSED (HCC): ICD-10-CM

## 2021-05-07 PROCEDURE — 11042 DBRDMT SUBQ TIS 1ST 20SQCM/<: CPT | Performed by: PODIATRIST

## 2021-05-07 PROCEDURE — 11045 DBRDMT SUBQ TISS EACH ADDL: CPT | Performed by: PODIATRIST

## 2021-05-10 ENCOUNTER — PATIENT MESSAGE (OUTPATIENT)
Dept: FAMILY MEDICINE CLINIC | Age: 61
End: 2021-05-10

## 2021-05-10 DIAGNOSIS — M24.849 LOCKING FINGER JOINT: Primary | ICD-10-CM

## 2021-05-10 NOTE — TELEPHONE ENCOUNTER
From: Arlington Duverney  To: MAEVE Srinivasan  Sent: 5/10/2021 1:09 PM CDT  Subject: Non-Urgent Medical Question    Hi, Tea. My finger has become painful all the time. I put a splint on it so it would quit curling up. Several health care professionals have asked what was wrong. When I told then they said it was called 'trigger finger.' I agree after looking it up. Could we take it from there and get this taken care of please?  Thanks

## 2021-05-14 ENCOUNTER — OFFICE VISIT (OUTPATIENT)
Dept: WOUND CARE | Facility: HOSPITAL | Age: 61
End: 2021-05-14

## 2021-05-14 DIAGNOSIS — R60.0 LOCALIZED EDEMA: ICD-10-CM

## 2021-05-14 DIAGNOSIS — L97.822 NON-PRESSURE CHRONIC ULCER OF OTHER PART OF LEFT LOWER LEG WITH FAT LAYER EXPOSED (HCC): ICD-10-CM

## 2021-05-14 PROCEDURE — 11045 DBRDMT SUBQ TISS EACH ADDL: CPT | Performed by: PODIATRIST

## 2021-05-14 PROCEDURE — 11042 DBRDMT SUBQ TIS 1ST 20SQCM/<: CPT | Performed by: PODIATRIST

## 2021-05-20 ENCOUNTER — OFFICE VISIT (OUTPATIENT)
Dept: WOUND CARE | Facility: HOSPITAL | Age: 61
End: 2021-05-20

## 2021-05-20 DIAGNOSIS — R60.0 LOCALIZED EDEMA: ICD-10-CM

## 2021-05-20 DIAGNOSIS — Q82.0 HEREDITARY LYMPHEDEMA: ICD-10-CM

## 2021-05-20 DIAGNOSIS — M79.662 PAIN IN LEFT LOWER LEG: ICD-10-CM

## 2021-05-20 DIAGNOSIS — L97.822 NON-PRESSURE CHRONIC ULCER OF OTHER PART OF LEFT LOWER LEG WITH FAT LAYER EXPOSED (HCC): ICD-10-CM

## 2021-05-20 PROCEDURE — 11045 DBRDMT SUBQ TISS EACH ADDL: CPT | Performed by: NURSE PRACTITIONER

## 2021-05-20 PROCEDURE — 11042 DBRDMT SUBQ TIS 1ST 20SQCM/<: CPT | Performed by: NURSE PRACTITIONER

## 2021-05-25 ENCOUNTER — OFFICE VISIT (OUTPATIENT)
Dept: UROLOGY | Facility: CLINIC | Age: 61
End: 2021-05-25

## 2021-05-25 VITALS — HEIGHT: 59 IN | WEIGHT: 205.25 LBS | TEMPERATURE: 97.8 F | BODY MASS INDEX: 41.38 KG/M2

## 2021-05-25 DIAGNOSIS — R32 URINARY INCONTINENCE, UNSPECIFIED TYPE: Primary | ICD-10-CM

## 2021-05-25 PROCEDURE — 99213 OFFICE O/P EST LOW 20 MIN: CPT | Performed by: PHYSICIAN ASSISTANT

## 2021-05-25 RX ORDER — HYDROCODONE BITARTRATE AND ACETAMINOPHEN 7.5; 325 MG/1; MG/1
1 TABLET ORAL AS NEEDED
COMMUNITY
Start: 2021-01-08 | End: 2021-08-19

## 2021-05-25 RX ORDER — FLUOXETINE HYDROCHLORIDE 40 MG/1
40 CAPSULE ORAL DAILY
COMMUNITY
Start: 2021-05-05 | End: 2022-09-08

## 2021-05-28 ENCOUNTER — OFFICE VISIT (OUTPATIENT)
Dept: WOUND CARE | Facility: HOSPITAL | Age: 61
End: 2021-05-28

## 2021-05-28 DIAGNOSIS — Q82.0 HEREDITARY LYMPHEDEMA: ICD-10-CM

## 2021-05-28 DIAGNOSIS — M79.662 PAIN IN LEFT LOWER LEG: ICD-10-CM

## 2021-05-28 DIAGNOSIS — L97.822 NON-PRESSURE CHRONIC ULCER OF OTHER PART OF LEFT LOWER LEG WITH FAT LAYER EXPOSED (HCC): ICD-10-CM

## 2021-05-28 DIAGNOSIS — R60.0 LOCALIZED EDEMA: ICD-10-CM

## 2021-05-28 PROCEDURE — 11042 DBRDMT SUBQ TIS 1ST 20SQCM/<: CPT | Performed by: PODIATRIST

## 2021-05-28 PROCEDURE — 11045 DBRDMT SUBQ TISS EACH ADDL: CPT | Performed by: PODIATRIST

## 2021-06-03 ENCOUNTER — PATIENT MESSAGE (OUTPATIENT)
Dept: FAMILY MEDICINE CLINIC | Age: 61
End: 2021-06-03

## 2021-06-03 DIAGNOSIS — S81.802D OPEN WOUND OF LEFT LOWER LEG, SUBSEQUENT ENCOUNTER: ICD-10-CM

## 2021-06-03 NOTE — TELEPHONE ENCOUNTER
From: Davin Christianson  To: MAEVE Stephen  Sent: 6/3/2021 1:10 PM CDT  Subject: Prescription Question    Tea, I am out of Gabapentin, 300 mg caps, 270 quantity with no refills. Could you send a prescription to [facility] in [facility]? Thank you.

## 2021-06-04 ENCOUNTER — LAB REQUISITION (OUTPATIENT)
Dept: LAB | Facility: HOSPITAL | Age: 61
End: 2021-06-04

## 2021-06-04 ENCOUNTER — OFFICE VISIT (OUTPATIENT)
Dept: WOUND CARE | Facility: HOSPITAL | Age: 61
End: 2021-06-04

## 2021-06-04 DIAGNOSIS — Z00.00 ENCOUNTER FOR GENERAL ADULT MEDICAL EXAMINATION WITHOUT ABNORMAL FINDINGS: ICD-10-CM

## 2021-06-04 DIAGNOSIS — R60.0 LOCALIZED EDEMA: ICD-10-CM

## 2021-06-04 DIAGNOSIS — L97.822 NON-PRESSURE CHRONIC ULCER OF OTHER PART OF LEFT LOWER LEG WITH FAT LAYER EXPOSED (HCC): ICD-10-CM

## 2021-06-04 PROCEDURE — 87186 SC STD MICRODIL/AGAR DIL: CPT | Performed by: PODIATRIST

## 2021-06-04 PROCEDURE — 87147 CULTURE TYPE IMMUNOLOGIC: CPT | Performed by: PODIATRIST

## 2021-06-04 PROCEDURE — 87205 SMEAR GRAM STAIN: CPT | Performed by: PODIATRIST

## 2021-06-04 PROCEDURE — 11045 DBRDMT SUBQ TISS EACH ADDL: CPT | Performed by: PODIATRIST

## 2021-06-04 PROCEDURE — 87075 CULTR BACTERIA EXCEPT BLOOD: CPT | Performed by: PODIATRIST

## 2021-06-04 PROCEDURE — 87070 CULTURE OTHR SPECIMN AEROBIC: CPT | Performed by: PODIATRIST

## 2021-06-04 PROCEDURE — 87176 TISSUE HOMOGENIZATION CULTR: CPT | Performed by: PODIATRIST

## 2021-06-04 PROCEDURE — 11042 DBRDMT SUBQ TIS 1ST 20SQCM/<: CPT | Performed by: PODIATRIST

## 2021-06-04 RX ORDER — GABAPENTIN 300 MG/1
CAPSULE ORAL
Qty: 270 CAPSULE | Refills: 1 | Status: SHIPPED | OUTPATIENT
Start: 2021-06-04 | End: 2021-12-10

## 2021-06-07 LAB
BACTERIA SPEC AEROBE CULT: ABNORMAL
GRAM STN SPEC: ABNORMAL
GRAM STN SPEC: ABNORMAL

## 2021-06-09 LAB — BACTERIA SPEC ANAEROBE CULT: NORMAL

## 2021-06-11 ENCOUNTER — OFFICE VISIT (OUTPATIENT)
Dept: WOUND CARE | Facility: HOSPITAL | Age: 61
End: 2021-06-11

## 2021-06-11 DIAGNOSIS — L97.822 NON-PRESSURE CHRONIC ULCER OF OTHER PART OF LEFT LOWER LEG WITH FAT LAYER EXPOSED (HCC): ICD-10-CM

## 2021-06-11 DIAGNOSIS — R60.0 LOCALIZED EDEMA: ICD-10-CM

## 2021-06-11 PROCEDURE — 11045 DBRDMT SUBQ TISS EACH ADDL: CPT | Performed by: PODIATRIST

## 2021-06-11 PROCEDURE — 11042 DBRDMT SUBQ TIS 1ST 20SQCM/<: CPT | Performed by: PODIATRIST

## 2021-06-17 ENCOUNTER — INFUSION (OUTPATIENT)
Dept: ONCOLOGY | Facility: HOSPITAL | Age: 61
End: 2021-06-17

## 2021-06-17 ENCOUNTER — OFFICE VISIT (OUTPATIENT)
Dept: WOUND CARE | Facility: HOSPITAL | Age: 61
End: 2021-06-17

## 2021-06-17 VITALS
HEIGHT: 59 IN | WEIGHT: 211 LBS | BODY MASS INDEX: 42.54 KG/M2 | HEART RATE: 65 BPM | RESPIRATION RATE: 16 BRPM | TEMPERATURE: 96.5 F | OXYGEN SATURATION: 95 % | SYSTOLIC BLOOD PRESSURE: 116 MMHG | DIASTOLIC BLOOD PRESSURE: 64 MMHG

## 2021-06-17 DIAGNOSIS — A49.02 INFECTION OF WOUND DUE TO METHICILLIN RESISTANT STAPHYLOCOCCUS AUREUS (MRSA): ICD-10-CM

## 2021-06-17 DIAGNOSIS — S81.809S NON-HEALING WOUND OF LOWER EXTREMITY, SEQUELA: Primary | ICD-10-CM

## 2021-06-17 PROCEDURE — 25010000002 DALBAVANCIN 500 MG RECONSTITUTED SOLUTION 1 EACH VIAL: Performed by: PODIATRIST

## 2021-06-17 PROCEDURE — G0463 HOSPITAL OUTPT CLINIC VISIT: HCPCS

## 2021-06-17 PROCEDURE — 96365 THER/PROPH/DIAG IV INF INIT: CPT

## 2021-06-17 RX ADMIN — DALBAVANCIN 1500 MG: 500 INJECTION, POWDER, FOR SOLUTION INTRAVENOUS at 11:04

## 2021-06-22 NOTE — PROGRESS NOTES
Subjective    Ms. Minaya is 61 y.o. female    Chief Complaint: [facility]     History of Present Illness  Patient who has InterStim which was placed with Dr. Guerrero June 2019.  She had improvement in symptoms however she has had persistent pain at the generator site or pocket pain.  She returns today for follow-up also to see Jameel from [employer].  She has continued pain in the same location.  She was 20% improved from previous visit her amplitude was changed to 0.7 and program was changed to P3.    The following portions of the patient's history were reviewed and updated as appropriate: allergies, current medications, past family history, past medical history, past social history, past surgical history and problem list.    Review of Systems      Current Outpatient Medications:   •  allopurinol (ZYLOPRIM) 100 MG tablet, Take 100 mg by mouth Daily., Disp: , Rfl:   •  atorvastatin (LIPITOR) 40 MG tablet, Take 40 mg by mouth Every Night., Disp: , Rfl:   •  bumetanide (BUMEX) 1 MG tablet, Take 1 mg by mouth Daily., Disp: , Rfl:   •  calcium carbonate (OS-KAYLEY) 600 MG tablet, Take 600 mg by mouth Daily., Disp: , Rfl:   •  carvedilol (COREG) 6.25 MG tablet, Take 6.25 mg by mouth 2 (Two) Times a Day With Meals., Disp: , Rfl:   •  cholecalciferol (VITAMIN D3) 25 MCG (1000 UT) tablet, Take 400 Units by mouth Daily., Disp: , Rfl:   •  diclofenac (VOLTAREN) 75 MG EC tablet, Take 75 mg by mouth 2 (Two) Times a Day., Disp: , Rfl:   •  FLUoxetine (PROzac) 40 MG capsule, Take 40 mg by mouth Daily., Disp: , Rfl:   •  gabapentin (NEURONTIN) 400 MG capsule, Take 400 mg by mouth 3 (Three) Times a Day., Disp: , Rfl:   •  HYDROcodone-acetaminophen (NORCO) 7.5-325 MG per tablet, , Disp: , Rfl:   •  levothyroxine (SYNTHROID, LEVOTHROID) 75 MCG tablet, Take 75 mcg by mouth Daily., Disp: , Rfl:   •  Loperamide HCl (IMODIUM PO), Take 2 mg by mouth As Needed., Disp: , Rfl:   •  methylcellulose, Laxative, (EQ FIBER THERAPY) 500 MG tablet tablet,  Take 1 tablet by mouth Daily., Disp: , Rfl:   •  Multiple Vitamin (MULTI VITAMIN PO), Take 1 dose by mouth Daily., Disp: , Rfl:   •  niacin 250 MG tablet, Take 500 mg by mouth Daily With Breakfast., Disp: , Rfl:   •  omeprazole (priLOSEC) 20 MG capsule, Take 20 mg by mouth Daily., Disp: , Rfl:   •  oxyCODONE-acetaminophen (Percocet)  MG per tablet, Take 1 tablet by mouth Every 6 (Six) Hours As Needed for Moderate Pain ., Disp: 30 tablet, Rfl: 0  •  oxyCODONE-acetaminophen (Percocet)  MG per tablet, Take 1 tablet by mouth Every 6 (Six) Hours As Needed for Moderate Pain  (Pain)., Disp: 28 tablet, Rfl: 0  •  pilocarpine (SALAGEN) 5 MG tablet, Take 5 mg by mouth 3 (Three) Times a Day., Disp: , Rfl:   •  pioglitazone (ACTOS) 30 MG tablet, Take 30 mg by mouth Daily. NEW MED-HAS NOT TAKEN YET, Disp: , Rfl:   •  pramipexole (MIRAPEX) 1.5 MG tablet, Take 1.5 mg by mouth Every Night., Disp: , Rfl:   •  spironolactone (ALDACTONE) 25 MG tablet, Take 25 mg by mouth 2 (Two) Times a Day., Disp: , Rfl:   •  tetracycline (ACHROMYCIN,SUMYCIN) 500 MG capsule, Take 1 capsule by mouth 4 (Four) Times a Day., Disp: 40 capsule, Rfl: 0  •  tiZANidine (ZANAFLEX) 4 MG tablet, Take 4 mg by mouth Every Night. HAS NOT TAKEN WHILE ON LEVAQUIN, Disp: , Rfl:   •  traMADol (ULTRAM) 50 MG tablet, Take 100 mg by mouth 2 (Two) Times a Day., Disp: , Rfl:   •  traZODone (DESYREL) 50 MG tablet, Take 50 mg by mouth Every Night. Take 1-3 tablets at night as needed for sleep., Disp: , Rfl:     Past Medical History:   Diagnosis Date   • Allergic rhinitis    • Arthritis     BACK   • Chronic back pain    • Degenerative arthritis    • Depression    • Deviated nasal septum    • Diabetes mellitus (CMS/HCC)    • GERD (gastroesophageal reflux disease)    • Gout    • History of colon polyps    • Hyperlipidemia    • Hypertension    • Hypertrophy of nasal turbinates    • Incontinence in female    • Insomnia    • Menopause    • Nasal septal deviation    •  Nasal valve stenosis    • Overactive bladder    • Plantar fasciitis    • Prediabetes    • RLS (restless legs syndrome)    • Sleep apnea     pt states has a cpap but hasn't been using it   • Spider bite wound     left lower leg. previously infected with mrsa but currently not infected.   • Wears glasses        Past Surgical History:   Procedure Laterality Date   • BLEPHAROPLASTY Bilateral 7/9/2019    Procedure: 1) bilateral upper blepharoplasty with excision of excess of tissue weighing down 2) nasal endoscopy with removal of nasal septal prosthesis;  Surgeon: Mark Anthony Anderson MD;  Location:  [facility];  Service: ENT   • CARPAL TUNNEL RELEASE Bilateral 2004   • CHOLECYSTECTOMY  2013   • COLONOSCOPY  10/17/2014    One 5-6mm tubular adenomatous polyp in the ascending colon; Two less than 4mm hyperplastic polyps in the sigmoid colon; Three rectal hyperplastic polyps; Diverticulosis; Repeat 5 years    • COLONOSCOPY  07/14/2010    Internal hemorrhoids; Repeat 7 years    • COLONOSCOPY N/A 10/23/2019    Procedure: COLONOSCOPY WITH ANESTHESIA;  Surgeon: Robyn Frazier MD;  Location:  [facility];  Service: Gastroenterology   • CYST REMOVAL  2016    neck   • ENDOSCOPIC FUNCTIONAL SINUS SURGERY (FESS) Bilateral 4/16/2019    Procedure: ENDOSCOPIC FUNCTIONAL SINUS SURGERY (bilareral maxillary antrostomy without image guidance);  Surgeon: Mark Anthony Anderson MD;  Location:  [facility];  Service: ENT   • ENDOSCOPY N/A 4/11/2019    Esophageal mucosal changes suggestive of eosinophilic esophagitis-biopsied; A few gastric polyps-resected and retrieved-biopsied; Normal examined duodenum-biopsied   • FOOT SURGERY Right 2010    X3   • HERNIA REPAIR     • INTERSTIM PLACEMENT N/A 6/5/2019    Procedure: INTERSTIM STAGES 1 AND 2 LEAD AND GENERATOR PLACEMENT;  Surgeon: Endy Guerrero MD;  Location:  [facility];  Service: Urology   • LEG DEBRIDEMENT Left 7/31/2020    Procedure: LOWER POSTERIOR LEG WOUND DEBRIDEMENT - LEFT;  Surgeon: Russell  Roberto ELAINE DPM;  Location:  [facility];  Service: Podiatry;  Laterality: Left;   • LEG DEBRIDEMENT Left 2020    Procedure: SPLIT THICKNESS SKIN GRAFT LEFT LOWER EXTREMTIY WITH WOUND VAC PLACEMENT;  Surgeon: Trace Hurd DO;  Location:  [facility];  Service: Vascular;  Laterality: Left;   • LEG DEBRIDEMENT Left 2/3/2021    Procedure: LOWER EXTREMITY WOUND DEBRIDEMENT - LEFT;  Surgeon: Roberto Wells DPM;  Location:  [facility];  Service: Podiatry;  Laterality: Left;   • NASAL ENDOSCOPY N/A 2019    Procedure:     1. Functional endoscopic sinus surgery with bilateral maxillary antrostomy    2. Bilateral nasal endoscopy with biopsy of nasal septum    3.  Nasal septal prosthesis placement  ;  Surgeon: Mark Anthony Anderson MD;  Location:  [facility];  Service: ENT   • NASAL VESTIBULE LESION/PAPILLOMA EXCISION N/A 2017    Procedure: Repair of nasal vestibular stenosis and septoplasty; cartilage graft from left ear;  Surgeon: Mark Anthony Anderson MD;  Location:  [facility];  Service:    • SEPTOPLASTY N/A 2019    Procedure: PLACEMENT OF NASAL SEPTAL PROSTHESIS;  Surgeon: Mark Anthony Anderson MD;  Location:  [facility];  Service: ENT   • SEPTOPLASTY Bilateral 2020    Procedure: Nasal endoscopy with septal debridement, and placement of Silastic stents;  Surgeon: Mark Anthony Anderson MD;  Location:  [facility];  Service: ENT   • WOUND DEBRIDEMENT  2019    spider bite wound initial surgery       Social History     Socioeconomic History   • Marital status:      Spouse name: Not on file   • Number of children: Not on file   • Years of education: Not on file   • Highest education level: Not on file   Tobacco Use   • Smoking status: Former Smoker     Packs/day: 0.50     Years: 40.00     Pack years: 20.00     Types: Cigarettes     Quit date: 2020     Years since quittin.9   • Smokeless tobacco: Never Used   Vaping Use   • Vaping Use: Never used   Substance and Sexual Activity   • Alcohol use: Yes      Comment: Rarely-maybe 3x/year   • Drug use: No   • Sexual activity: Defer       Family History   Problem Relation Age of Onset   • Cancer Mother    • Diabetes Father    • Hypertension Father    • Cancer Father    • Colon cancer Neg Hx    • Colon polyps Neg Hx    • Esophageal cancer Neg Hx    • Liver cancer Neg Hx    • Liver disease Neg Hx    • Rectal cancer Neg Hx    • Stomach cancer Neg Hx        Objective    There were no vitals taken for this visit.    Physical Exam  Vitals reviewed.   Constitutional:       Appearance: Normal appearance.   HENT:      Head: Normocephalic and atraumatic.      Left Ear: External ear normal.      Nose: No congestion.   Eyes:      Conjunctiva/sclera: Conjunctivae normal.               Assessment and Plan    Diagnoses and all orders for this visit:    1. Urinary incontinence, unspecified type (Primary)    Patient with pocket pain which continues she has had some improvement in symptoms since adjustment she was adjusted to get again today by Luroberto from [employer] program was changed to P3 amplitude was changed to 0.7.  Regarding her painful pocket will send message to Dr. Asencio for scheduling patient prepped possible pocket revision.

## 2021-06-25 ENCOUNTER — OFFICE VISIT (OUTPATIENT)
Dept: WOUND CARE | Facility: HOSPITAL | Age: 61
End: 2021-06-25

## 2021-06-25 DIAGNOSIS — R60.0 LOCALIZED EDEMA: ICD-10-CM

## 2021-06-25 DIAGNOSIS — L97.822 NON-PRESSURE CHRONIC ULCER OF OTHER PART OF LEFT LOWER LEG WITH FAT LAYER EXPOSED (HCC): ICD-10-CM

## 2021-06-25 PROCEDURE — 99999 PR OFFICE/OUTPT VISIT,PROCEDURE ONLY: CPT | Performed by: NURSE PRACTITIONER

## 2021-06-25 PROCEDURE — 11042 DBRDMT SUBQ TIS 1ST 20SQCM/<: CPT | Performed by: NURSE PRACTITIONER

## 2021-06-25 PROCEDURE — 11045 DBRDMT SUBQ TISS EACH ADDL: CPT | Performed by: NURSE PRACTITIONER

## 2021-06-28 ENCOUNTER — OFFICE VISIT (OUTPATIENT)
Dept: UROLOGY | Facility: CLINIC | Age: 61
End: 2021-06-28

## 2021-06-28 DIAGNOSIS — R32 URINARY INCONTINENCE, UNSPECIFIED TYPE: Primary | ICD-10-CM

## 2021-06-28 PROCEDURE — 99213 OFFICE O/P EST LOW 20 MIN: CPT | Performed by: PHYSICIAN ASSISTANT

## 2021-07-01 ENCOUNTER — OFFICE VISIT (OUTPATIENT)
Dept: UROLOGY | Facility: CLINIC | Age: 61
End: 2021-07-01

## 2021-07-01 DIAGNOSIS — N39.41 URGE INCONTINENCE: Primary | ICD-10-CM

## 2021-07-01 PROCEDURE — 99213 OFFICE O/P EST LOW 20 MIN: CPT | Performed by: UROLOGY

## 2021-07-01 NOTE — PROGRESS NOTES
61-year-old female established patient seen on Monday by Garret Abad in the urology clinic for chronic generator site pain with indwelling InterStim placed in 2019 by Dr. Guerrero.  We reviewed her options and patient would like to have a full system replacement with an MRI safe device given her chronic generator site pain which is debilitating and daily and keeps her from doing what she needs to do.  She does not want to keep her current InterStim as it is causing her pain.  She has noted greater than 50% improvement in her urge incontinence and pad usage since placement of the InterStim in 2019.  Examination today shows well-healed generator pocket site on the right, no fluctuance or erythema concerning for infection.  There is tenderness to palpation over her generator.  She would like to proceed as scheduled on 7/26/2021 for removal of her indwelling InterStim and replacement with an MRI safe small rechargeable InterStim sacral neuromodulation system stage I and stage II.  We discussed the risk of the procedure including but not limited to infection, bleeding, need for additional procedures, possibility of continued pain, injury to nerves and/or adjacent structures, complications of anesthesia.  She voices understanding and provided informed consent to proceed to the operating room 7/26/2021 for removal and placement of rechargeable stage I stage II InterStim.

## 2021-07-01 NOTE — PATIENT INSTRUCTIONS
"BMI for Adults  What is BMI?  Body mass index (BMI) is a number that is calculated from a person's weight and height. BMI can help estimate how much of a person's weight is composed of fat. BMI does not measure body fat directly. Rather, it is an alternative to procedures that directly measure body fat, which can be difficult and expensive.  BMI can help identify people who may be at higher risk for certain medical problems.  What are BMI measurements used for?  BMI is used as a screening tool to identify possible weight problems. It helps determine whether a person is obese, overweight, a healthy weight, or underweight.  BMI is useful for:  · Identifying a weight problem that may be related to a medical condition or may increase the risk for medical problems.  · Promoting changes, such as changes in diet and exercise, to help reach a healthy weight. BMI screening can be repeated to see if these changes are working.  How is BMI calculated?  BMI involves measuring your weight in relation to your height. Both height and weight are measured, and the BMI is calculated from those numbers. This can be done either in English ([place] or metric measurements. Note that charts and online BMI calculators are available to help you find your BMI quickly and easily without having to do these calculations yourself.  To calculate your BMI in English ([place] measurements:    1. Measure your weight in pounds (lb).  2. Multiply the number of pounds by 703.  ? For example, for a person who weighs 180 lb, multiply that number by 703, which equals 126,540.  3. Measure your height in inches. Then multiply that number by itself to get a measurement called \"inches squared.\"  ? For example, for a person who is 70 inches tall, the \"inches squared\" measurement is 70 inches x 70 inches, which equals 4,900 inches squared.  4. Divide the total from step 2 (number of lb x 703) by the total from step 3 (inches squared): 126,540 ÷ 4,900 = 25.8. This is " "your BMI.  To calculate your BMI in metric measurements:  1. Measure your weight in kilograms (kg).  2. Measure your height in meters (m). Then multiply that number by itself to get a measurement called \"meters squared.\"  ? For example, for a person who is 1.75 m tall, the \"meters squared\" measurement is 1.75 m x 1.75 m, which is equal to 3.1 meters squared.  3. Divide the number of kilograms (your weight) by the meters squared number. In this example: 70 ÷ 3.1 = 22.6. This is your BMI.  What do the results mean?  BMI charts are used to identify whether you are underweight, normal weight, overweight, or obese. The following guidelines will be used:  · Underweight: BMI less than 18.5.  · Normal weight: BMI between 18.5 and 24.9.  · Overweight: BMI between 25 and 29.9.  · Obese: BMI of 30 or above.  Keep these notes in mind:  · Weight includes both fat and muscle, so someone with a muscular build, such as an athlete, may have a BMI that is higher than 24.9. In cases like these, BMI is not an accurate measure of body fat.  · To determine if excess body fat is the cause of a BMI of 25 or higher, further assessments may need to be done by a health care provider.  · BMI is usually interpreted in the same way for men and women.  Where to find more information  For more information about BMI, including tools to quickly calculate your BMI, go to these websites:  · Centers for Disease Control and Prevention: www.cdc.gov  · American Heart Association: www.heart.org  · National Heart, Lung, and Blood [place]: www.nhlbi.nih.gov  Summary  · Body mass index (BMI) is a number that is calculated from a person's weight and height.  · BMI may help estimate how much of a person's weight is composed of fat. BMI can help identify those who may be at higher risk for certain medical problems.  · BMI can be measured using English measurements or metric measurements.  · BMI charts are used to identify whether you are underweight, normal " weight, overweight, or obese.  This information is not intended to replace advice given to you by your health care provider. Make sure you discuss any questions you have with your health care provider.  Document Revised: 09/09/2020 Document Reviewed: 07/17/2020  [employer]vier Patient Education © 2021 [employer]

## 2021-07-02 ENCOUNTER — OFFICE VISIT (OUTPATIENT)
Dept: WOUND CARE | Facility: HOSPITAL | Age: 61
End: 2021-07-02

## 2021-07-02 DIAGNOSIS — R60.0 LOCALIZED EDEMA: ICD-10-CM

## 2021-07-02 DIAGNOSIS — L97.822 NON-PRESSURE CHRONIC ULCER OF OTHER PART OF LEFT LOWER LEG WITH FAT LAYER EXPOSED (HCC): ICD-10-CM

## 2021-07-02 PROCEDURE — 11042 DBRDMT SUBQ TIS 1ST 20SQCM/<: CPT | Performed by: PODIATRIST

## 2021-07-02 PROCEDURE — 11045 DBRDMT SUBQ TISS EACH ADDL: CPT | Performed by: PODIATRIST

## 2021-07-09 ENCOUNTER — OFFICE VISIT (OUTPATIENT)
Dept: WOUND CARE | Facility: HOSPITAL | Age: 61
End: 2021-07-09

## 2021-07-09 DIAGNOSIS — L97.822 NON-PRESSURE CHRONIC ULCER OF OTHER PART OF LEFT LOWER LEG WITH FAT LAYER EXPOSED (HCC): ICD-10-CM

## 2021-07-09 DIAGNOSIS — R60.0 LOCALIZED EDEMA: ICD-10-CM

## 2021-07-09 PROCEDURE — 11045 DBRDMT SUBQ TISS EACH ADDL: CPT | Performed by: NURSE PRACTITIONER

## 2021-07-09 PROCEDURE — 11042 DBRDMT SUBQ TIS 1ST 20SQCM/<: CPT | Performed by: NURSE PRACTITIONER

## 2021-07-19 ENCOUNTER — TRANSCRIBE ORDERS (OUTPATIENT)
Dept: ADMINISTRATIVE | Facility: HOSPITAL | Age: 61
End: 2021-07-19

## 2021-07-19 DIAGNOSIS — Z11.59 SCREENING FOR VIRAL DISEASE: Primary | ICD-10-CM

## 2021-07-22 ENCOUNTER — TELEPHONE (OUTPATIENT)
Dept: UROLOGY | Facility: CLINIC | Age: 61
End: 2021-07-22

## 2021-07-23 ENCOUNTER — LAB (OUTPATIENT)
Dept: LAB | Facility: HOSPITAL | Age: 61
End: 2021-07-23

## 2021-07-23 ENCOUNTER — PRE-ADMISSION TESTING (OUTPATIENT)
Dept: PREADMISSION TESTING | Facility: HOSPITAL | Age: 61
End: 2021-07-23

## 2021-07-23 ENCOUNTER — OFFICE VISIT (OUTPATIENT)
Dept: WOUND CARE | Facility: HOSPITAL | Age: 61
End: 2021-07-23

## 2021-07-23 VITALS
BODY MASS INDEX: 43.98 KG/M2 | OXYGEN SATURATION: 93 % | RESPIRATION RATE: 24 BRPM | SYSTOLIC BLOOD PRESSURE: 126 MMHG | WEIGHT: 223.99 LBS | DIASTOLIC BLOOD PRESSURE: 60 MMHG | HEART RATE: 91 BPM | HEIGHT: 60 IN

## 2021-07-23 DIAGNOSIS — L97.822 NON-PRESSURE CHRONIC ULCER OF OTHER PART OF LEFT LOWER LEG WITH FAT LAYER EXPOSED (HCC): ICD-10-CM

## 2021-07-23 DIAGNOSIS — R60.0 LOCALIZED EDEMA: ICD-10-CM

## 2021-07-23 LAB — SARS-COV-2 ORF1AB RESP QL NAA+PROBE: NOT DETECTED

## 2021-07-23 PROCEDURE — 85025 COMPLETE CBC W/AUTO DIFF WBC: CPT | Performed by: UROLOGY

## 2021-07-23 PROCEDURE — 11042 DBRDMT SUBQ TIS 1ST 20SQCM/<: CPT | Performed by: PODIATRIST

## 2021-07-23 PROCEDURE — C9803 HOPD COVID-19 SPEC COLLECT: HCPCS | Performed by: UROLOGY

## 2021-07-23 PROCEDURE — 11045 DBRDMT SUBQ TISS EACH ADDL: CPT | Performed by: PODIATRIST

## 2021-07-23 PROCEDURE — 80048 BASIC METABOLIC PNL TOTAL CA: CPT | Performed by: UROLOGY

## 2021-07-23 PROCEDURE — 93005 ELECTROCARDIOGRAM TRACING: CPT | Performed by: UROLOGY

## 2021-07-23 PROCEDURE — 93010 ELECTROCARDIOGRAM REPORT: CPT | Performed by: INTERNAL MEDICINE

## 2021-07-23 PROCEDURE — U0004 COV-19 TEST NON-CDC HGH THRU: HCPCS | Performed by: UROLOGY

## 2021-07-23 RX ORDER — OXYCODONE AND ACETAMINOPHEN 7.5; 325 MG/1; MG/1
1 TABLET ORAL EVERY 6 HOURS PRN
COMMUNITY
End: 2021-08-19

## 2021-07-23 NOTE — DISCHARGE INSTRUCTIONS
DAY OF SURGERY INSTRUCTIONS        YOUR SURGEON: ***Dr. Asencio    PROCEDURE: ***interstim removal--interstim stages 1 and 2 and generator placement--rechargeable     DATE OF SURGERY: ***07/26/2021    ARRIVAL TIME: AS DIRECTED BY OFFICE    YOU MAY TAKE THE FOLLOWING MEDICATION(S) THE MORNING OF SURGERY WITH A SIP OF WATER: ***carvedilol, gabapentin, norco, percocet, ultram       ALL OTHER HOME MEDICATION CHECK WITH YOUR PHYSICIAN      DO NOT TAKE ANY ERECTILE DYSFUNCTION MEDICATIONS (EX: CIALIS, VIAGRA) 24 HOURS PRIOR TO SURGERY                      MANAGING PAIN AFTER SURGERY    We know you are probably wondering what your pain will be like after surgery.  Following surgery it is unrealistic to expect you will not have pain.   Pain is how our bodies let us know that something is wrong or cautions us to be careful.  That said, our goal is to make your pain tolerable.    Methods we may use to treat your pain include (oral or IV medications, PCAs, epidurals, nerve blocks, etc.)   While some procedures require IV pain medications for a short time after surgery, transitioning to pain medications by mouth allows for better management of pain.   Your nurse will encourage you to take oral pain medications whenever possible.  IV medications work almost immediately, but only last a short while.  Taking medications by mouth allows for a more constant level of medication in your blood stream for a longer period of time.      Once your pain is out of control it is harder to get back under control.  It is important you are aware when your next dose of pain medication is due.  If you are admitted, your nurse may write the time of your next dose on the white board in your room to help you remember.      We are interested in your pain and encourage you to inform us about aggravating factors during your visit.   Many times a simple repositioning every few hours can make a big difference.    If your physician says it is okay, do not  let your pain prevent you from getting out of bed. Be sure to call your nurse for assistance prior to getting up so you do not fall.      Before surgery, please decide your tolerable pain goal.  These faces help describe the pain ratings we use on a 0-10 scale.   Be prepared to tell us your goal and whether or not you take pain or anxiety medications at home.          BEFORE YOU COME TO THE HOSPITAL  (Pre-op instructions)  • Do not eat, drink, smoke or chew gum after midnight the night before surgery.  This also includes no mints.  • Morning of surgery take only the medicines you have been instructed with a sip of water unless otherwise instructed  by your physician.  • Do not shave, wear makeup or dark nail polish.  • Remove all jewelry including rings.  • Leave anything you consider valuable at home.  • Leave your suitcase in the car until after your surgery.  • Bring the following with you if applicable:  o Picture ID and insurance, Medicare or Medicaid cards  o Co-pay/deductible required by insurance (cash, check, credit card)  o Copy of advance directive, living will or power-of- documents if not brought to PAT  o CPAP or BIPAP mask and tubing  o Relaxation aids ( book, magazine), etc.  o Hearing aids                        ON THE DAY OF SURGERY  · On the day of surgery check in at registration located at the main entrance of the hospital.   ? You will be registered and given a beeper with instructions where to wait in the main lobby.  ? When your beeper lights up and vibrates a member of the Outpatient Surgery staff will meet you at the double doors under the stair steps and escort you to your preoperative room.   · You may have cloth compression devices placed on your legs. These help to prevent blood clots and reduce swelling in your legs.  · An IV may be inserted into one of your veins.  · In the operating room, you may be given one or more of the following:  ? A medicine to help you relax  "(sedative).  ? A medicine to numb the area (local anesthetic).  ? A medicine to make you fall asleep (general anesthetic).  ? A medicine that is injected into an area of your body to numb everything below the injection site (regional anesthetic).  · Your surgical site will be marked or identified.  · You may be given an antibiotic through your IV to help prevent infection.  Contact a health care provider if you:  · Develop a fever of more than 100.4°F (38°C) or other feelings of illness during the 48 hours before your surgery.  · Have symptoms that get worse.  Have questions or concerns about your surgery    General Anesthesia/Surgery, Adult  General anesthesia is the use of medicines to make a person \"go to sleep\" (unconscious) for a medical procedure. General anesthesia must be used for certain procedures, and is often recommended for procedures that:  · Last a long time.  · Require you to be still or in an unusual position.  · Are major and can cause blood loss.  The medicines used for general anesthesia are called general anesthetics. As well as making you unconscious for a certain amount of time, these medicines:  · Prevent pain.  · Control your blood pressure.  · Relax your muscles.  Tell a health care provider about:  · Any allergies you have.  · All medicines you are taking, including vitamins, herbs, eye drops, creams, and over-the-counter medicines.  · Any problems you or family members have had with anesthetic medicines.  · Types of anesthetics you have had in the past.  · Any blood disorders you have.  · Any surgeries you have had.  · Any medical conditions you have.  · Any recent upper respiratory, chest, or ear infections.  · Any history of:  ? Heart or lung conditions, such as heart failure, sleep apnea, asthma, or chronic obstructive pulmonary disease (COPD).  ?  service.  ? Depression or anxiety.  · Any tobacco or drug use, including marijuana or alcohol use.  · Whether you are pregnant or " may be pregnant.  What are the risks?  Generally, this is a safe procedure. However, problems may occur, including:  · Allergic reaction.  · Lung and heart problems.  · Inhaling food or liquid from the stomach into the lungs (aspiration).  · Nerve injury.  · Air in the bloodstream, which can lead to stroke.  · Extreme agitation or confusion (delirium) when you wake up from the anesthetic.  · Waking up during your procedure and being unable to move. This is rare.  These problems are more likely to develop if you are having a major surgery or if you have an advanced or serious medical condition. You can prevent some of these complications by answering all of your health care provider's questions thoroughly and by following all instructions before your procedure.  General anesthesia can cause side effects, including:  · Nausea or vomiting.  · A sore throat from the breathing tube.  · Hoarseness.  · Wheezing or coughing.  · Shaking chills.  · Tiredness.  · Body aches.  · Anxiety.  · Sleepiness or drowsiness.  · Confusion or agitation.  RISKS AND COMPLICATIONS OF SURGERY  Your health care provider will discuss possible risks and complications with you before surgery. Common risks and complications include:    · Problems due to the use of anesthetics.  · Blood loss and replacement (does not apply to minor surgical procedures).  · Temporary increase in pain due to surgery.  · Uncorrected pain or problems that the surgery was meant to correct.  · Infection.  · New damage.    What happens before the procedure?    Medicines  Ask your health care provider about:  · Changing or stopping your regular medicines. This is especially important if you are taking diabetes medicines or blood thinners.  · Taking medicines such as aspirin and ibuprofen. These medicines can thin your blood. Do not take these medicines unless your health care provider tells you to take them.  · Taking over-the-counter medicines, vitamins, herbs, and  supplements. Do not take these during the week before your procedure unless your health care provider approves them.  General instructions  · Starting 3-6 weeks before the procedure, do not use any products that contain nicotine or tobacco, such as cigarettes and e-cigarettes. If you need help quitting, ask your health care provider.  · If you brush your teeth on the morning of the procedure, make sure to spit out all of the toothpaste.  · Tell your health care provider if you become ill or develop a cold, cough, or fever.  · If instructed by your health care provider, bring your sleep apnea device with you on the day of your surgery (if applicable).  · Ask your health care provider if you will be going home the same day, the following day, or after a longer hospital stay.  ? Plan to have someone take you home from the hospital or clinic.  ? Plan to have a responsible adult care for you for at least 24 hours after you leave the hospital or clinic. This is important.  What happens during the procedure?  · You will be given anesthetics through both of the following:  ? A mask placed over your nose and mouth.  ? An IV in one of your veins.  · You may receive a medicine to help you relax (sedative).  · After you are unconscious, a breathing tube may be inserted down your throat to help you breathe. This will be removed before you wake up.  · An anesthesia specialist will stay with you throughout your procedure. He or she will:  ? Keep you comfortable and safe by continuing to give you medicines and adjusting the amount of medicine that you get.  ? Monitor your blood pressure, pulse, and oxygen levels to make sure that the anesthetics do not cause any problems.  The procedure may vary among health care providers and hospitals.  What happens after the procedure?  · Your blood pressure, temperature, heart rate, breathing rate, and blood oxygen level will be monitored until the medicines you were given have worn off.  · You  will wake up in a recovery area. You may wake up slowly.  · If you feel anxious or agitated, you may be given medicine to help you calm down.  · If you will be going home the same day, your health care provider may check to make sure you can walk, drink, and urinate.  · Your health care provider will treat any pain or side effects you have before you go home.  · Do not drive for 24 hours if you were given a sedative.  Summary  · General anesthesia is used to keep you still and prevent pain during a procedure.  · It is important to tell your healthcare provider about your medical history and any surgeries you have had, and previous experience with anesthesia.  · Follow your healthcare provider’s instructions about when to stop eating, drinking, or taking certain medicines before your procedure.  · Plan to have someone take you home from the hospital or clinic.  This information is not intended to replace advice given to you by your health care provider. Make sure you discuss any questions you have with your health care provider.  Document Released: 03/26/2009 Document Revised: 08/03/2018 Document Reviewed: 08/03/2018  [employer] Interactive Patient Education © 2019 [employer]       Fall Prevention in Hospitals, Adult  As a hospital patient, your condition and the treatments you receive can increase your risk for falls. Some additional risk factors for falls in a hospital include:  · Being in an unfamiliar environment.  · Being on bed rest.  · Your surgery.  · Taking certain medicines.  · Your tubing requirements, such as intravenous (IV) therapy or catheters.  It is important that you learn how to decrease fall risks while at the hospital. Below are important tips that can help prevent falls.  SAFETY TIPS FOR PREVENTING FALLS  Talk about your risk of falling.  · Ask your health care provider why you are at risk for falling. Is it your medicine, illness, tubing placement, or something else?  · Make a plan with your  health care provider to keep you safe from falls.  · Ask your health care provider or pharmacist about side effects of your medicines. Some medicines can make you dizzy or affect your coordination.  Ask for help.  · Ask for help before getting out of bed. You may need to press your call button.  · Ask for assistance in getting safely to the toilet.  · Ask for a walker or cane to be put at your bedside. Ask that most of the side rails on your bed be placed up before your health care provider leaves the room.  · Ask family or friends to sit with you.  · Ask for things that are out of your reach, such as your glasses, hearing aids, telephone, bedside table, or call button.  Follow these tips to avoid falling:  · Stay lying or seated, rather than standing, while waiting for help.  · Wear rubber-soled slippers or shoes whenever you walk in the hospital.  · Avoid quick, sudden movements.  ¨ Change positions slowly.  ¨ Sit on the side of your bed before standing.  ¨ Stand up slowly and wait before you start to walk.  · Let your health care provider know if there is a spill on the floor.  · Pay careful attention to the medical equipment, electrical cords, and tubes around you.  · When you need help, use your call button by your bed or in the bathroom. Wait for one of your health care providers to help you.  · If you feel dizzy or unsure of your footing, return to bed and wait for assistance.  · Avoid being distracted by the TV, telephone, or another person in your room.  · Do not lean or support yourself on rolling objects, such as IV poles or bedside tables.     This information is not intended to replace advice given to you by your health care provider. Make sure you discuss any questions you have with your health care provider.     Document Released: 12/15/2001 Document Revised: 01/08/2016 Document Reviewed: 08/25/2013  [employer] Interactive Patient Education ©2016 [employer]       [facility] 4%  Patient Instruction Sheet    Chlorhexidine Before Surgery  Chlorhexidine gluconate (CHG) is a germ-killing (antiseptic) solution that is used to clean the skin. It gets rid of the bacteria that normally live on the skin. Cleaning your skin with CHG before surgery helps lower the risk for infection after surgery.    How to use CHG solution  · You will take 2 showers, one shower the night before surgery, the second shower the morning of surgery before coming to the hospital.  · Use CHG only as told by your health care provider, and follow the instructions on the label.  · Use CHG solution while taking a shower. Follow these steps when using CHG solution (unless your health care provider gives you different instructions):  1. Start the shower.  2. Use your normal soap and shampoo to wash your face and hair.  3. Turn off the shower or move out of the shower stream.  4. Pour the CHG onto a clean washcloth. Do not use any type of brush or rough-edged sponge.  5. Starting at your neck, lather your body down to your toes. Make sure you:  6. Pay special attention to the part of your body where you will be having surgery. Scrub this area for at least 1 minute.  7. Use the full amount of CHG as directed. Usually, this is one half bottle for each shower.  8. Do not use CHG on your head or face. If the solution gets into your ears or eyes, rinse them well with water.  9. Avoid your genital area.  10. Avoid any areas of skin that have broken skin, cuts, or scrapes.  11. Scrub your back and under your arms. Make sure to wash skin folds.  12. Let the lather sit on your skin for 1-2 minutes or as long as told by your health care  provider.  13. Thoroughly rinse your entire body in the shower. Make sure that all body creases and crevices are rinsed well.  14. Dry off with a clean towel. Do not put any substances on your body afterward, such as powder, lotion, or perfume.  15. Put on clean clothes or pajamas.  16. If it is the night  before your surgery, sleep in clean sheets.    What are the risks?  Risks of using CHG include:  · A skin reaction.  · Hearing loss, if CHG gets in your ears.  · Eye injury, if CHG gets in your eyes and is not rinsed out.  · The CHG product catching fire.  Make sure that you avoid smoking and flames after applying CHG to your skin.  Do not use CHG:  · If you have a chlorhexidine allergy or have previously reacted to chlorhexidine.  · On babies younger than 2 months of age.      On the day of surgery, when you are taken to your room in Outpatient Surgery you will be given a CHG prepackaged cloth to wipe the site for your surgery.  How to use CHG prepackaged cloths  · Follow the instructions on the label.  · Use the CHG cloth on clean, dry skin. Follow these steps when using a CHG cloth (unless your health care provider gives you different instructions):  1. Using the CHG cloth, vigorously scrub the part of your body where you will be having surgery. Scrub using a back-and-forth motion for 3 minutes. The area on your body should be completely wet with CHG when you are finished scrubbing.  2. Do not rinse. Discard the cloth and let the area air-dry for 1 minute. Do not put any substances on your body afterward, such as powder, lotion, or perfume.  Contact a health care provider if:  · Your skin gets irritated after scrubbing.  · You have questions about using your solution or cloth.  Get help right away if:  · Your eyes become very red or swollen.  · Your eyes itch badly.  · Your skin itches badly and is red or swollen.  · Your hearing changes.  · You have trouble seeing.  · You have swelling or tingling in your mouth or throat.  · You have trouble breathing.  · You swallow any chlorhexidine.  Summary  · Chlorhexidine gluconate (CHG) is a germ-killing (antiseptic) solution that is used to clean the skin. Cleaning your skin with CHG before surgery helps lower the risk for infection after surgery.  · You may be given CHG  to use at home. It may be in a bottle or in a prepackaged cloth to use on your skin. Carefully follow your health care provider's instructions and the instructions on the product label.  · Do not use CHG if you have a chlorhexidine allergy.  · Contact your health care provider if your skin gets irritated after scrubbing.  This information is not intended to replace advice given to you by your health care provider. Make sure you discuss any questions you have with your health care provider.  Document Released: 09/11/2013 Document Revised: 11/15/2018 Document Reviewed: 11/15/2018  [employer] Interactive Patient Education © 2019 [employer]          PATIENT/FAMILY/RESPONSIBLE PARTY VERBALIZES UNDERSTANDING OF ABOVE EDUCATION.  COPY OF PAIN SCALE GIVEN AND REVIEWED WITH VERBALIZED UNDERSTANDING.

## 2021-07-25 LAB
QT INTERVAL: 388 MS
QTC INTERVAL: 421 MS

## 2021-07-26 ENCOUNTER — APPOINTMENT (OUTPATIENT)
Dept: GENERAL RADIOLOGY | Facility: HOSPITAL | Age: 61
End: 2021-07-26

## 2021-07-26 ENCOUNTER — ANESTHESIA (OUTPATIENT)
Dept: PERIOP | Facility: HOSPITAL | Age: 61
End: 2021-07-26

## 2021-07-26 ENCOUNTER — ANESTHESIA EVENT (OUTPATIENT)
Dept: PERIOP | Facility: HOSPITAL | Age: 61
End: 2021-07-26

## 2021-07-26 ENCOUNTER — HOSPITAL ENCOUNTER (OUTPATIENT)
Facility: HOSPITAL | Age: 61
Setting detail: HOSPITAL OUTPATIENT SURGERY
Discharge: HOME | End: 2021-07-26
Attending: UROLOGY | Admitting: UROLOGY

## 2021-07-26 VITALS
OXYGEN SATURATION: 92 % | DIASTOLIC BLOOD PRESSURE: 64 MMHG | RESPIRATION RATE: 16 BRPM | HEART RATE: 79 BPM | TEMPERATURE: 98.8 F | SYSTOLIC BLOOD PRESSURE: 119 MMHG

## 2021-07-26 DIAGNOSIS — N39.41 URGE INCONTINENCE: ICD-10-CM

## 2021-07-26 LAB
GLUCOSE BLDC GLUCOMTR-MCNC: 101 MG/DL (ref 70–130)
GLUCOSE BLDC GLUCOMTR-MCNC: 107 MG/DL (ref 70–130)

## 2021-07-26 PROCEDURE — 25010000002 GENTAMICIN PER 80 MG: Performed by: UROLOGY

## 2021-07-26 PROCEDURE — 25010000002 DEXAMETHASONE PER 1 MG: Performed by: NURSE ANESTHETIST, CERTIFIED REGISTERED

## 2021-07-26 PROCEDURE — 72220 X-RAY EXAM SACRUM TAILBONE: CPT

## 2021-07-26 PROCEDURE — 25010000002 PROPOFOL 10 MG/ML EMULSION: Performed by: NURSE ANESTHETIST, CERTIFIED REGISTERED

## 2021-07-26 PROCEDURE — 82962 GLUCOSE BLOOD TEST: CPT

## 2021-07-26 PROCEDURE — 25010000002 VANCOMYCIN 1 G RECONSTITUTED SOLUTION 1 EACH VIAL: Performed by: UROLOGY

## 2021-07-26 PROCEDURE — 76000 FLUOROSCOPY <1 HR PHYS/QHP: CPT

## 2021-07-26 PROCEDURE — L8689 EXTERNAL RECHARG SYS INTERN: HCPCS | Performed by: UROLOGY

## 2021-07-26 PROCEDURE — C1787 PATIENT PROGR, NEUROSTIM: HCPCS | Performed by: UROLOGY

## 2021-07-26 PROCEDURE — 25010000002 FENTANYL CITRATE (PF) 100 MCG/2ML SOLUTION: Performed by: NURSE ANESTHETIST, CERTIFIED REGISTERED

## 2021-07-26 PROCEDURE — C1820 GENERATOR NEURO RECHG BAT SY: HCPCS | Performed by: UROLOGY

## 2021-07-26 PROCEDURE — 25010000002 VANCOMYCIN 1 G RECONSTITUTED SOLUTION: Performed by: UROLOGY

## 2021-07-26 PROCEDURE — 25010000002 ONDANSETRON PER 1 MG: Performed by: NURSE ANESTHETIST, CERTIFIED REGISTERED

## 2021-07-26 PROCEDURE — C1778 LEAD, NEUROSTIMULATOR: HCPCS | Performed by: UROLOGY

## 2021-07-26 PROCEDURE — 64590 INS/RPL PRPH SAC/GSTR NPG/R: CPT | Performed by: UROLOGY

## 2021-07-26 DEVICE — GEN IPG NEUROSTM INTERSTIM/MIC RECHG 2.8CM: Type: IMPLANTABLE DEVICE | Site: SPINE LUMBAR | Status: FUNCTIONAL

## 2021-07-26 DEVICE — KT LD INTERSTIM SURESCAN MRI .085IN 28CM: Type: IMPLANTABLE DEVICE | Site: SPINE LUMBAR | Status: FUNCTIONAL

## 2021-07-26 RX ORDER — ONDANSETRON 2 MG/ML
4 INJECTION INTRAMUSCULAR; INTRAVENOUS ONCE AS NEEDED
Status: DISCONTINUED | OUTPATIENT
Start: 2021-07-26 | End: 2021-07-26 | Stop reason: HOSPADM

## 2021-07-26 RX ORDER — LIDOCAINE HYDROCHLORIDE 10 MG/ML
0.5 INJECTION, SOLUTION EPIDURAL; INFILTRATION; INTRACAUDAL; PERINEURAL ONCE AS NEEDED
Status: DISCONTINUED | OUTPATIENT
Start: 2021-07-26 | End: 2021-07-26 | Stop reason: HOSPADM

## 2021-07-26 RX ORDER — FENTANYL CITRATE 50 UG/ML
INJECTION, SOLUTION INTRAMUSCULAR; INTRAVENOUS AS NEEDED
Status: DISCONTINUED | OUTPATIENT
Start: 2021-07-26 | End: 2021-07-26 | Stop reason: SURG

## 2021-07-26 RX ORDER — ONDANSETRON 2 MG/ML
INJECTION INTRAMUSCULAR; INTRAVENOUS AS NEEDED
Status: DISCONTINUED | OUTPATIENT
Start: 2021-07-26 | End: 2021-07-26 | Stop reason: SURG

## 2021-07-26 RX ORDER — HYDROCODONE BITARTRATE AND ACETAMINOPHEN 7.5; 325 MG/1; MG/1
1 TABLET ORAL ONCE AS NEEDED
Status: DISCONTINUED | OUTPATIENT
Start: 2021-07-26 | End: 2021-07-26 | Stop reason: SDUPTHER

## 2021-07-26 RX ORDER — ONDANSETRON 2 MG/ML
4 INJECTION INTRAMUSCULAR; INTRAVENOUS ONCE AS NEEDED
Status: DISCONTINUED | OUTPATIENT
Start: 2021-07-26 | End: 2021-07-26 | Stop reason: SDUPTHER

## 2021-07-26 RX ORDER — LABETALOL HYDROCHLORIDE 5 MG/ML
5 INJECTION, SOLUTION INTRAVENOUS
Status: DISCONTINUED | OUTPATIENT
Start: 2021-07-26 | End: 2021-07-26 | Stop reason: HOSPADM

## 2021-07-26 RX ORDER — MAGNESIUM HYDROXIDE 1200 MG/15ML
LIQUID ORAL AS NEEDED
Status: DISCONTINUED | OUTPATIENT
Start: 2021-07-26 | End: 2021-07-26 | Stop reason: HOSPADM

## 2021-07-26 RX ORDER — SODIUM CHLORIDE 0.9 % (FLUSH) 0.9 %
3-10 SYRINGE (ML) INJECTION AS NEEDED
Status: DISCONTINUED | OUTPATIENT
Start: 2021-07-26 | End: 2021-07-26 | Stop reason: HOSPADM

## 2021-07-26 RX ORDER — SODIUM CHLORIDE, SODIUM LACTATE, POTASSIUM CHLORIDE, CALCIUM CHLORIDE 600; 310; 30; 20 MG/100ML; MG/100ML; MG/100ML; MG/100ML
100 INJECTION, SOLUTION INTRAVENOUS CONTINUOUS
Status: DISCONTINUED | OUTPATIENT
Start: 2021-07-26 | End: 2021-07-26 | Stop reason: HOSPADM

## 2021-07-26 RX ORDER — ACETAMINOPHEN 500 MG
1000 TABLET ORAL ONCE
Status: COMPLETED | OUTPATIENT
Start: 2021-07-26 | End: 2021-07-26

## 2021-07-26 RX ORDER — PROPOFOL 10 MG/ML
VIAL (ML) INTRAVENOUS AS NEEDED
Status: DISCONTINUED | OUTPATIENT
Start: 2021-07-26 | End: 2021-07-26 | Stop reason: SURG

## 2021-07-26 RX ORDER — NALOXONE HCL 0.4 MG/ML
0.4 VIAL (ML) INJECTION AS NEEDED
Status: DISCONTINUED | OUTPATIENT
Start: 2021-07-26 | End: 2021-07-26 | Stop reason: HOSPADM

## 2021-07-26 RX ORDER — SODIUM CHLORIDE, SODIUM LACTATE, POTASSIUM CHLORIDE, CALCIUM CHLORIDE 600; 310; 30; 20 MG/100ML; MG/100ML; MG/100ML; MG/100ML
1000 INJECTION, SOLUTION INTRAVENOUS CONTINUOUS
Status: DISCONTINUED | OUTPATIENT
Start: 2021-07-26 | End: 2021-07-26 | Stop reason: HOSPADM

## 2021-07-26 RX ORDER — BUPIVACAINE HYDROCHLORIDE 5 MG/ML
INJECTION, SOLUTION PERINEURAL AS NEEDED
Status: DISCONTINUED | OUTPATIENT
Start: 2021-07-26 | End: 2021-07-26 | Stop reason: HOSPADM

## 2021-07-26 RX ORDER — DEXAMETHASONE SODIUM PHOSPHATE 4 MG/ML
INJECTION, SOLUTION INTRA-ARTICULAR; INTRALESIONAL; INTRAMUSCULAR; INTRAVENOUS; SOFT TISSUE AS NEEDED
Status: DISCONTINUED | OUTPATIENT
Start: 2021-07-26 | End: 2021-07-26 | Stop reason: SURG

## 2021-07-26 RX ORDER — SUCCINYLCHOLINE/SOD CL,ISO/PF 200MG/10ML
SYRINGE (ML) INTRAVENOUS AS NEEDED
Status: DISCONTINUED | OUTPATIENT
Start: 2021-07-26 | End: 2021-07-26 | Stop reason: SURG

## 2021-07-26 RX ORDER — OXYCODONE HYDROCHLORIDE AND ACETAMINOPHEN 5; 325 MG/1; MG/1
1 TABLET ORAL ONCE AS NEEDED
Status: DISCONTINUED | OUTPATIENT
Start: 2021-07-26 | End: 2021-07-26 | Stop reason: HOSPADM

## 2021-07-26 RX ORDER — IBUPROFEN 600 MG/1
600 TABLET ORAL ONCE AS NEEDED
Status: DISCONTINUED | OUTPATIENT
Start: 2021-07-26 | End: 2021-07-26 | Stop reason: HOSPADM

## 2021-07-26 RX ORDER — FENTANYL CITRATE 50 UG/ML
25 INJECTION, SOLUTION INTRAMUSCULAR; INTRAVENOUS
Status: DISCONTINUED | OUTPATIENT
Start: 2021-07-26 | End: 2021-07-26 | Stop reason: HOSPADM

## 2021-07-26 RX ORDER — LIDOCAINE HYDROCHLORIDE 40 MG/ML
SOLUTION TOPICAL AS NEEDED
Status: DISCONTINUED | OUTPATIENT
Start: 2021-07-26 | End: 2021-07-26 | Stop reason: SURG

## 2021-07-26 RX ORDER — ONDANSETRON 4 MG/1
4 TABLET, ORALLY DISINTEGRATING ORAL EVERY 6 HOURS PRN
Qty: 6 TABLET | Refills: 1 | Status: SHIPPED | OUTPATIENT
Start: 2021-07-26 | End: 2021-08-19

## 2021-07-26 RX ORDER — HYDROCODONE BITARTRATE AND ACETAMINOPHEN 7.5; 325 MG/1; MG/1
1 TABLET ORAL EVERY 6 HOURS PRN
Qty: 12 TABLET | Refills: 0 | Status: SHIPPED | OUTPATIENT
Start: 2021-07-26

## 2021-07-26 RX ORDER — CLINDAMYCIN HYDROCHLORIDE 300 MG/1
300 CAPSULE ORAL 3 TIMES DAILY
Qty: 9 CAPSULE | Refills: 0 | Status: SHIPPED | OUTPATIENT
Start: 2021-07-26 | End: 2021-07-29

## 2021-07-26 RX ORDER — LIDOCAINE HYDROCHLORIDE 20 MG/ML
INJECTION, SOLUTION EPIDURAL; INFILTRATION; INTRACAUDAL; PERINEURAL AS NEEDED
Status: DISCONTINUED | OUTPATIENT
Start: 2021-07-26 | End: 2021-07-26 | Stop reason: SURG

## 2021-07-26 RX ORDER — SODIUM CHLORIDE 0.9 % (FLUSH) 0.9 %
3 SYRINGE (ML) INJECTION EVERY 12 HOURS SCHEDULED
Status: DISCONTINUED | OUTPATIENT
Start: 2021-07-26 | End: 2021-07-26 | Stop reason: HOSPADM

## 2021-07-26 RX ORDER — SODIUM CHLORIDE 0.9 % (FLUSH) 0.9 %
3 SYRINGE (ML) INJECTION AS NEEDED
Status: DISCONTINUED | OUTPATIENT
Start: 2021-07-26 | End: 2021-07-26 | Stop reason: HOSPADM

## 2021-07-26 RX ORDER — FLUMAZENIL 0.1 MG/ML
0.2 INJECTION INTRAVENOUS AS NEEDED
Status: DISCONTINUED | OUTPATIENT
Start: 2021-07-26 | End: 2021-07-26 | Stop reason: HOSPADM

## 2021-07-26 RX ADMIN — DEXAMETHASONE SODIUM PHOSPHATE 4 MG: 4 INJECTION, SOLUTION INTRA-ARTICULAR; INTRALESIONAL; INTRAMUSCULAR; INTRAVENOUS; SOFT TISSUE at 13:58

## 2021-07-26 RX ADMIN — VANCOMYCIN HYDROCHLORIDE 1000 MG: 1 INJECTION, POWDER, LYOPHILIZED, FOR SOLUTION INTRAVENOUS at 12:16

## 2021-07-26 RX ADMIN — LIDOCAINE HYDROCHLORIDE 50 MG: 20 INJECTION, SOLUTION EPIDURAL; INFILTRATION; INTRACAUDAL; PERINEURAL at 12:52

## 2021-07-26 RX ADMIN — FENTANYL CITRATE 100 MCG: 50 INJECTION, SOLUTION INTRAMUSCULAR; INTRAVENOUS at 12:52

## 2021-07-26 RX ADMIN — Medication 150 MG: at 12:52

## 2021-07-26 RX ADMIN — GENTAMICIN SULFATE 340 MG: 40 INJECTION, SOLUTION INTRAMUSCULAR; INTRAVENOUS at 12:16

## 2021-07-26 RX ADMIN — ACETAMINOPHEN 1000 MG: 500 TABLET, FILM COATED ORAL at 12:13

## 2021-07-26 RX ADMIN — PROPOFOL 150 MG: 10 INJECTION, EMULSION INTRAVENOUS at 12:52

## 2021-07-26 RX ADMIN — SODIUM CHLORIDE, POTASSIUM CHLORIDE, SODIUM LACTATE AND CALCIUM CHLORIDE 1000 ML: 600; 310; 30; 20 INJECTION, SOLUTION INTRAVENOUS at 11:06

## 2021-07-26 RX ADMIN — LIDOCAINE HYDROCHLORIDE 1 EACH: 40 SOLUTION TOPICAL at 12:52

## 2021-07-26 RX ADMIN — ONDANSETRON 4 MG: 2 INJECTION INTRAMUSCULAR; INTRAVENOUS at 13:58

## 2021-07-26 NOTE — ANESTHESIA PROCEDURE NOTES
Airway  Urgency: elective    Date/Time: 7/26/2021 12:52 PM  Airway not difficult    General Information and Staff    Patient location during procedure: OR  CRNA: Mark Anthony Johnson CRNA    Indications and Patient Condition  Indications for airway management: airway protection    Preoxygenated: yes  MILS maintained throughout  Mask difficulty assessment: 1 - vent by mask    Final Airway Details  Final airway type: endotracheal airway      Successful airway: ETT  Cuffed: yes   Successful intubation technique: direct laryngoscopy  Facilitating devices/methods: intubating stylet  Endotracheal tube insertion site: oral  Blade: Marni  Blade size: 4  ETT size (mm): 7.0  Cormack-Lehane Classification: grade I - full view of glottis  Placement verified by: chest auscultation and capnometry   Cuff volume (mL): 8  Measured from: lips  ETT/EBT  to lips (cm): 21  Number of attempts at approach: 1  Assessment: lips, teeth, and gum same as pre-op and atraumatic intubation

## 2021-07-26 NOTE — ANESTHESIA POSTPROCEDURE EVALUATION
Patient: Robyn Minaya    Procedure Summary     Date: 07/26/21 Room / Location:  [facility] OR 09 /  [facility]    Anesthesia Start: 1248 Anesthesia Stop: 1430    Procedures:       INTERSTIM REMOVAL (N/A )      INTERSTIM STAGES 1 AND 2 LEAD AND GENERATOR PLACEMENT - RECHARGEABLE. (N/A ) Diagnosis:       Urge incontinence      (Urge incontinence [N39.41])    Surgeons: Mikey Asencio MD Provider: Mark Anthony Johnson CRNA    Anesthesia Type: general ASA Status: 3          Anesthesia Type: general    Vitals  Vitals Value Taken Time   /77 07/26/21 1600   Temp 98.8 °F (37.1 °C) 07/26/21 1600   Pulse 78 07/26/21 1602   Resp 14 07/26/21 1600   SpO2 91 % 07/26/21 1602   Vitals shown include unvalidated device data.        Post Anesthesia Care and Evaluation    Patient location during evaluation: PACU  Patient participation: complete - patient participated  Level of consciousness: awake and alert  Pain management: adequate  Airway patency: patent  Anesthetic complications: No anesthetic complications    Cardiovascular status: acceptable  Respiratory status: acceptable  Hydration status: acceptable    Comments: Blood pressure 132/74, pulse 77, temperature 98.8 °F (37.1 °C), temperature source Temporal, resp. rate 16, SpO2 90 %, not currently breastfeeding.    Pt discharged from PACU based on migdalia score >8

## 2021-07-26 NOTE — OP NOTE
INTERSTIM REMOVAL, INTERSTIM STAGES 1 AND 2 LEAD AND GENERATOR PLACEMENT  Procedure Note    Robyn Minaya  7/26/2021    Pre-op Diagnosis:   Urge incontinence [N39.41]    Post-op Diagnosis:     Post-Op Diagnosis Codes:     * Urge incontinence [N39.41]    Procedure/CPT® Codes:      Procedure(s):  INTERSTIM REMOVAL  INTERSTIM STAGES 1 AND 2 LEAD AND GENERATOR PLACEMENT - RECHARGEABLE.    Surgeon(s):  Mikey Asencio MD    Anesthesia: General    Staff:   Circulator: Ashley Castillo RN  Scrub Person: Shira Martinez; Dorian Ryan      Indications:  61-year-old female with chronic generator site pain with her indwelling InterStim placed in 2019 by Dr. Guerrero for which she wished to have her InterStim removed and replaced with the smaller rechargeable InterStim device.    Findings:  Old generator removed from patient's right pocket site.  Old lead located on left side, would not come out despite firm constant pressure and the distal lead fragmented upon retrieval.  New rechargeable micro lead with excellent responses in S3 on the right.  Micro rechargeable generator placed on left.       Procedure details:  Patient was properly identified and placed in prone position per or protocol.  General anesthesia was administered.  Pillows were placed under lower abdomen to flatten the sacrum and under the shins to allow the toes to dangle freely.  A ground pad was placed on the bottom of the patient's foot and the long test stimulation cable was connected to the ground pad and the external test stimulator.  Patient was prepped and draped in the usual sterile fashion.  A final timeout was performed.    Examination under anesthesia revealed her old indwelling generator in the pocket on the right.  I placed an incision over this pocket site and the generator was delivered into the wound.  I cut the lead and applied gentle pressure to the lead to identify the site for skin incision over the lead.  I placed a  "counterincision over the lead site and located the lead with a right angle clamp.  The lead was brought through to the counterincision.  I applied steady constant gentle pressure cephalad on the lead in order to try to remove the tined lead.  Despite constant steady gentle pressure, the lead fragmented and the distal portion of the lead would not come out.  The part of the lead broke off was discarded along with the old generator.  All wounds were copiously irrigated with antibiotic solution.  I then turned my attention to placement of the new rechargeable InterStim device.    The C-arm was moved into AP position to provide fluoroscopic guidance of the sacrum.  The medial edges of the foramen were identified and marked.  The C-arm was then moved into position to identify the S3 foramen.  Once the needle entry point was determined local injection of 1% lidocaine was administered.  A 5.0\" inch foramen needle was placed into the superior medial aspect of the S3 foramen and appropriate needle depth was visualized using fluoroscopy.  Proper S3 needle location was also confirmed by direct observation of the lifting of the perineum or bellowing and plantar flexion of the great toe utilizing the J-hook patient cable and the external test stimulator. This was done bilaterally with greatest results on the patient's right side     The foramen needle stylette was removed and a directional guide was placed through the needle using markers on the guide to assure appropriate depth.  The foramen needle was removed by sliding over the directional guide.  A small incision was made peripherally to the directional guide through the skin.  The lead introducer with dilator was placed over the directional guide and utilizing fluoroscopic guidance the lead introducer was advanced until the radiopaque gay was senior living through the foramen.  The dilator was removed along with the directional guide.  Using fluoroscopic dense, the tined lead " with bent stylette was placed through the introducer until electrodes 2 and 3 straddled the anterior surface of the sacrum.  All 4 electrodes were tested observing cruz and plantar flexion of the great toe utilizing the J-hook patient cable and the external test stimulator.  After satisfactory lead placement was confirmed, the introducer was retracted over the lead under fluoroscopy, deploying the tines into the presacral tissue.  Retesting of all 4 electrodes confirmed appropriate responses was completed.    The internal neurostimulator pocket site was identified below the iliac crest and lateral to the sacrum on the patient's left side.  Local was administered and an incision was made into the subcutaneous tissue.  Blunt dissection was used to create a pocket with hemostasis achieved.    A tunneling tool with sheath was placed from the lead exit site subcutaneously to the incised pocket site.  The tunneling total was removed and the lead was fed through the sheath exiting at the pocket site.  The sheath was removed.  The lead was cleaned and dried.    The lead was inserted into the header of the InterStim rechargeable neurostimulator, and the single setscrew was tightened using the torque wrench until a audible click was heard.    The rechargeable neurostimulator was placed into the pocket with the estimated identification side placed upwards and any excessive lead was placed around the neurostimulator.  The clinician  telemetry had, covered in a sterile sleeve, was placed over the implanted neurostimulator to ensure proper lead connection and at the parameters were within normal range.  Impedances were confirmed to be within normal limits, greater than 50 and less than 400 ohms.    The wounds were irrigated with sterile water and closed with 2-0 followed by 3-0 Vicryl subcutaneous sutures and 4-0 Monocryl subcuticular skin sutures.  All sponge and needle counts were correct.  Wounds were then covered  with Mastisol, Steri-Strips, gauze, and Tegaderm dressings.      Estimated Blood Loss: minimal    Specimens:                None      Complications: None

## 2021-07-26 NOTE — BRIEF OP NOTE
INTERSTIM REMOVAL, INTERSTIM STAGES 1 AND 2 LEAD AND GENERATOR PLACEMENT  Progress Note    Robyn Minaya  7/26/2021    Pre-op Diagnosis:   Urge incontinence [N39.41]       Post-Op Diagnosis Codes:     * Urge incontinence [N39.41]    Procedure/CPT® Codes:        Procedure(s):  INTERSTIM REMOVAL  INTERSTIM STAGES 1 AND 2 LEAD AND GENERATOR PLACEMENT - RECHARGEABLE    Surgeon(s):  Mikey Asencio MD    Anesthesia: General    Staff:   Circulator: Ashley Castillo RN  Scrub Person: Shira Martinez Stephen W         Estimated Blood Loss: minimal    Urine Voided: * No values recorded between 7/26/2021 12:49 PM and 7/26/2021  2:24 PM *    Specimens:                None          Drains: * No LDAs found *    Findings: Old generator remove from patient's right pocket site.  Old lead located on left side, would not come out despite firm constant pressure and lead fragmented upon retrieval.  New rechargeable micro lead with excellent responses in S3 on the right.  Micro rechargeable generator placed on left.     Complications: None          Mikey Asencio MD     Date: 7/26/2021  Time: 14:35 CDT

## 2021-07-26 NOTE — DISCHARGE INSTRUCTIONS

## 2021-07-26 NOTE — ANESTHESIA PREPROCEDURE EVALUATION
Anesthesia Evaluation     Patient summary reviewed   no history of anesthetic complications:  NPO Solid Status: > 8 hours  NPO Liquid Status: > 8 hours           Airway   Mallampati: II  TM distance: >3 FB  Neck ROM: full  Dental    (+) upper dentures and lower dentures    Pulmonary    (+) a smoker Former, sleep apnea on CPAP,   (-) asthma  Cardiovascular   Exercise tolerance: good (4-7 METS)    Patient on routine beta blocker and Beta blocker given within 24 hours of surgery    (+) hypertension, hyperlipidemia,   (-) past MI, CAD, cardiac stents      Neuro/Psych  (+) psychiatric history Bipolar,     (-) seizures, TIA, CVA  GI/Hepatic/Renal/Endo    (+) obesity,  GERD,  liver disease, diabetes mellitus type 2, thyroid problem hypothyroidism  (-) no renal disease    Musculoskeletal     Abdominal    Substance History      OB/GYN          Other   arthritis, blood dyscrasia anemia,                       Anesthesia Plan    ASA 3     general   (Pt falling asleep during preop exam. Minimize sedating medications, avoid benzodiazepines)  intravenous induction     Anesthetic plan, all risks, benefits, and alternatives have been provided, discussed and informed consent has been obtained with: patient.

## 2021-07-26 NOTE — INTERVAL H&P NOTE
61-year-old female established patient seen on Monday by Garret Abad in the urology clinic for chronic generator site pain with indwelling InterStim placed in 2019 by Dr. Guerrero.  We reviewed her options and patient would like to have a full system replacement with an MRI safe device given her chronic generator site pain which is debilitating and daily and keeps her from doing what she needs to do.  She does not want to keep her current InterStim as it is causing her pain.  She has noted greater than 50% improvement in her urge incontinence and pad usage since placement of the InterStim in 2019.  Examination today shows well-healed generator pocket site on the right, no fluctuance or erythema concerning for infection.  There is tenderness to palpation over her generator.  She would like to proceed as scheduled on 7/26/2021 for removal of her indwelling InterStim and replacement with an MRI safe small rechargeable InterStim sacral neuromodulation system stage I and stage II.  We discussed the risk of the procedure including but not limited to infection, bleeding, need for additional procedures, possibility of continued pain, injury to nerves and/or adjacent structures, complications of anesthesia.  She voices understanding and provided informed consent to proceed to the operating room 7/26/2021 for removal and placement of rechargeable stage I and stage II InterStim.

## 2021-07-29 ENCOUNTER — TELEPHONE (OUTPATIENT)
Dept: UROLOGY | Facility: CLINIC | Age: 61
End: 2021-07-29

## 2021-07-29 NOTE — TELEPHONE ENCOUNTER
Patient called and stated her antibiotics are making her very sick and she is wanting to be put on a different antibiotic post of from Monday please call and advise.

## 2021-07-30 ENCOUNTER — OFFICE VISIT (OUTPATIENT)
Dept: WOUND CARE | Facility: HOSPITAL | Age: 61
End: 2021-07-30

## 2021-07-30 DIAGNOSIS — L97.822 NON-PRESSURE CHRONIC ULCER OF OTHER PART OF LEFT LOWER LEG WITH FAT LAYER EXPOSED (HCC): ICD-10-CM

## 2021-07-30 DIAGNOSIS — R60.0 LOCALIZED EDEMA: ICD-10-CM

## 2021-07-30 PROCEDURE — 11045 DBRDMT SUBQ TISS EACH ADDL: CPT | Performed by: NURSE PRACTITIONER

## 2021-07-30 PROCEDURE — 11042 DBRDMT SUBQ TIS 1ST 20SQCM/<: CPT | Performed by: NURSE PRACTITIONER

## 2021-08-02 ENCOUNTER — OFFICE VISIT (OUTPATIENT)
Dept: FAMILY MEDICINE CLINIC | Age: 61
End: 2021-08-02
Payer: COMMERCIAL

## 2021-08-02 VITALS
RESPIRATION RATE: 20 BRPM | OXYGEN SATURATION: 98 % | TEMPERATURE: 97.4 F | HEART RATE: 91 BPM | SYSTOLIC BLOOD PRESSURE: 112 MMHG | HEIGHT: 60 IN | DIASTOLIC BLOOD PRESSURE: 72 MMHG | WEIGHT: 221 LBS | BODY MASS INDEX: 43.39 KG/M2

## 2021-08-02 DIAGNOSIS — F41.9 ANXIETY: ICD-10-CM

## 2021-08-02 DIAGNOSIS — G47.00 INSOMNIA, UNSPECIFIED TYPE: ICD-10-CM

## 2021-08-02 DIAGNOSIS — F33.1 MODERATE EPISODE OF RECURRENT MAJOR DEPRESSIVE DISORDER (HCC): ICD-10-CM

## 2021-08-02 PROCEDURE — 99214 OFFICE O/P EST MOD 30 MIN: CPT | Performed by: NURSE PRACTITIONER

## 2021-08-02 RX ORDER — FLUOXETINE HYDROCHLORIDE 20 MG/1
20 CAPSULE ORAL DAILY
Qty: 90 CAPSULE | Refills: 1 | Status: SHIPPED | OUTPATIENT
Start: 2021-08-02 | End: 2021-11-10

## 2021-08-02 RX ORDER — TRAZODONE HYDROCHLORIDE 100 MG/1
TABLET ORAL
Qty: 180 TABLET | Refills: 2 | Status: SHIPPED | OUTPATIENT
Start: 2021-08-02

## 2021-08-02 RX ORDER — FLUOXETINE HYDROCHLORIDE 40 MG/1
40 CAPSULE ORAL DAILY
Qty: 90 CAPSULE | Refills: 1 | Status: SHIPPED | OUTPATIENT
Start: 2021-08-02 | End: 2021-12-03

## 2021-08-02 SDOH — ECONOMIC STABILITY: FOOD INSECURITY: WITHIN THE PAST 12 MONTHS, YOU WORRIED THAT YOUR FOOD WOULD RUN OUT BEFORE YOU GOT MONEY TO BUY MORE.: NEVER TRUE

## 2021-08-02 SDOH — ECONOMIC STABILITY: FOOD INSECURITY: WITHIN THE PAST 12 MONTHS, THE FOOD YOU BOUGHT JUST DIDN'T LAST AND YOU DIDN'T HAVE MONEY TO GET MORE.: NEVER TRUE

## 2021-08-02 SDOH — ECONOMIC STABILITY: TRANSPORTATION INSECURITY
IN THE PAST 12 MONTHS, HAS THE LACK OF TRANSPORTATION KEPT YOU FROM MEDICAL APPOINTMENTS OR FROM GETTING MEDICATIONS?: NO

## 2021-08-02 SDOH — ECONOMIC STABILITY: TRANSPORTATION INSECURITY
IN THE PAST 12 MONTHS, HAS LACK OF TRANSPORTATION KEPT YOU FROM MEETINGS, WORK, OR FROM GETTING THINGS NEEDED FOR DAILY LIVING?: NO

## 2021-08-02 ASSESSMENT — SOCIAL DETERMINANTS OF HEALTH (SDOH): HOW HARD IS IT FOR YOU TO PAY FOR THE VERY BASICS LIKE FOOD, HOUSING, MEDICAL CARE, AND HEATING?: NOT HARD AT ALL

## 2021-08-02 ASSESSMENT — ENCOUNTER SYMPTOMS: RESPIRATORY NEGATIVE: 1

## 2021-08-02 NOTE — PROGRESS NOTES
SUBJECTIVE:    Patient ID: Eve Hough is a61 y.o. female. Eve Hough is here today for Follow Up After Procedure (Patient had Medtronic device removed from right hip 1 week ago and is still healing. Patient had new smaller medtronic device for urinary incontinence placed on the left side.)  . HPI:   HPI     Pt states that she is here today to update me on current health status. Pt is frustrated with wt that seems to be directly related to left leg wound that has been present over 1yr. Home care Tuesdays and ov on Fridays. 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 She has had 3 iv infusions for infection thus far. \"shooting pain\" continues at times. 1wk today was surgery for interstim insertion for urinary incontinence. Pt states that she is now in adjustment phase with this new device. BMP and CBC reviewed from 7/23/21 lab at [facility]. Continuing to see therapist weekly. Pt also has insurance behavioral health group that she speaks with weekly also. Pt feels she is in survival mode after family and health changes. Pt is awaiting psychiatry eval.  She is currently taking fluoxetine 40mg daily and doesn't feel her depression is well controlled. Pt also states that trazodone 50mg and is takin 3 at a time. She is asking for approval of 200mg nightly. We have discussed this today r/t her waking up in middle of night. Past Medical History:   Diagnosis Date    Arthritis     Bipolar I disorder, most recent episode (or current) mixed, unspecified     Chronic back pain     Depression     Dry mouth     Hyperlipidemia     Hypertension     Hypokalemia     Hypothyroidism     Menopause     Morbidly obese ([facility] [address] 10/28/2020    Overactive bladder     Plantar fasciitis     RLS (restless legs syndrome)     Sleep apnea     Spondylosis with myelopathy, lumbar region     Thyroid disease      Prior to Visit Medications    Medication Sig Taking? Authorizing Provider   FLUoxetine (PROZAC) 40 MG capsule Take 1 capsule by mouth daily For anxiety/depression Yes MAEVE Cuevas   FLUoxetine (PROZAC) 20 MG capsule Take 1 capsule by mouth daily (take with 40mg dose) Yes MAEVE Cuevas   traZODone (DESYREL) 100 MG tablet 1-2 po as needed for sleep Yes MAEVE Cuevas   allopurinol (ZYLOPRIM) 100 MG tablet TAKE ONE TABLET BY MOUTH DAILY Yes MAEVE Cuevas   gabapentin (NEURONTIN) 300 MG capsule TAKE ONE CAPSULE BY MOUTH THREE TIMES DAILY AS NEEDED FOR NERVE PAIN Yes MAEVE Cuevas   oxyCODONE-acetaminophen (PERCOCET) 7.5-325 MG per tablet  Yes Historical Provider, MD   pramipexole (MIRAPEX) 1.5 MG tablet TAKE 1/2 po every am and  ONE TABLET BY MOUTH NIGHTLY **MAY MAKE DROWSY** Yes MAEVE Cuevas   pioglitazone (ACTOS) 30 MG tablet Take 30 mg by mouth daily Yes Historical Provider, MD   traMADol (ULTRAM) 50 MG tablet Take 50 mg by mouth 3 times daily as needed for Pain. Yes Historical Provider, MD   carvedilol (COREG) 3.125 MG tablet Take 1 tablet by mouth 2 times daily (with meals) Yes MAEVE Cuevas   niacin (SLO-NIACIN) 500 MG extended release tablet Take 500 mg by mouth daily Yes Historical Provider, MD   pilocarpine (SALAGEN) 5 MG tablet Take 1 tablet by mouth 3 times daily Yes MAEVE Cuevas   spironolactone (ALDACTONE) 25 MG tablet 2 po in am and 1 po in evening for fluid Yes MAEVE Cuevas   NARCAN 4 MG/0.1ML LIQD nasal spray  Yes Historical Provider, MD   diclofenac (VOLTAREN) 75 MG EC tablet Take 75 mg by mouth 2 times daily  Yes Historical Provider, MD   HYDROcodone-acetaminophen (NORCO) 7.5-325 MG per tablet Take 1 tablet by mouth 2 times daily as needed for Pain.   Yes Historical Provider, MD   methylcellulose ([employer] SOLUBLE FIBER THERAPY) 500 MG TABS  Yes Historical Provider, MD   loperamide (IMODIUM A-D) 2 MG tablet TAKE 2  MG ORAL AS NEEDED EVERY 12 HOURS Yes Historical Provider, MD   levothyroxine (SYNTHROID) 75 MCG tablet Take 75 mcg by mouth Daily  Yes Historical Provider, MD   atorvastatin (LIPITOR) 40 MG tablet TAKE ONE TABLET BY MOUTH NIGHTLY FOR CHOLESTEROL. Yes MAEVE Cuevas   bumetanide (BUMEX) 1 MG tablet Take 1 tablet by mouth 2 times daily  Patient taking differently: Take 1 mg by mouth daily  Yes MAEVE Cuevas   omeprazole (PRILOSEC) 20 MG delayed release capsule Take 1 capsule by mouth 2 times daily (before meals) Yes MAEVE Cuevas   tiZANidine (ZANAFLEX) 4 MG tablet Take 4 mg by mouth daily  Yes Historical Provider, MD   Vitamin D (CHOLECALCIFEROL) 1000 UNITS CAPS capsule Take 1,000 Units by mouth daily Yes Historical Provider, MD   Multiple Vitamins-Minerals (MULTIVITAMIN PO) Take by mouth daily Yes Historical Provider, MD   calcium carbonate 600 MG TABS tablet Take 1 tablet by mouth daily.  Yes Historical Provider, MD     Allergies   Allergen Reactions    Clindamycin/Lincomycin Other (See Comments)     Heartburn, upset stomach     Bactrim [Sulfamethoxazole-Trimethoprim]     Hctz [Hydrochlorothiazide] Other (See Comments)     Acid reflux      Sulfamethoxazole     Trimethoprim     Metformin And Related Diarrhea    Sitagliptin Rash     Past Surgical History:   Procedure Laterality Date    CARPAL TUNNEL RELEASE Bilateral 2002    CHOLECYSTECTOMY  10/2014    COLON SURGERY      COLON SURGERY      biopsy     COLONOSCOPY      CYST REMOVAL  05/2017    Under left ear    HARDWARE REMOVAL Right 2011    Foot     HERNIA REPAIR  10/2014    LEG DEBRIDEMENT Left     NOSE SURGERY  08/01/2017; 04/16/2019    Dr Lourdes Barriga L/S,1 LEVEL Bilateral 2/7/2017    INJECTION MEDICATION FACET JOINT performed by Aline Zhang at [facility]     Family History   Problem Relation Age of Onset    Cancer Mother         lung    Heart Disease Mother     Hypertension Mother     Bipolar Disorder Mother     Heart Disease Father     Hypertension Father      Social History     Socioeconomic History    Marital status: negativity  mammo is overdue, reprinted. F/u in 3-4mo and prn  32mins total time. Diagnosis and orders for this visit are above. Please note that this chart was generated using dragon dictationsoftware. Although every effort was made to ensure the accuracy of this automated transcription, some errors in transcription may have occurred.

## 2021-08-04 RX ORDER — SEMAGLUTIDE 1.34 MG/ML
INJECTION, SOLUTION SUBCUTANEOUS
COMMUNITY
Start: 2021-08-04 | End: 2021-11-10 | Stop reason: ALTCHOICE

## 2021-08-06 ENCOUNTER — OFFICE VISIT (OUTPATIENT)
Dept: WOUND CARE | Facility: HOSPITAL | Age: 61
End: 2021-08-06

## 2021-08-06 DIAGNOSIS — R60.0 LOCALIZED EDEMA: ICD-10-CM

## 2021-08-06 DIAGNOSIS — L97.822 NON-PRESSURE CHRONIC ULCER OF OTHER PART OF LEFT LOWER LEG WITH FAT LAYER EXPOSED (HCC): ICD-10-CM

## 2021-08-06 PROCEDURE — 11042 DBRDMT SUBQ TIS 1ST 20SQCM/<: CPT | Performed by: PODIATRIST

## 2021-08-06 PROCEDURE — 11045 DBRDMT SUBQ TISS EACH ADDL: CPT | Performed by: PODIATRIST

## 2021-08-11 NOTE — PROGRESS NOTES
Subjective    Ms. Minaya is 61 y.o. female    Chief Complaint: Postoperative visit    History of Present Illness  61-year-old female established patient follow-up status post removal of old InterStim generator for chronic pain followed by replacement of new rechargeable InterStim device on the contralateral side on 7/26/2021.  Her old lead was unable to be removed and fragmented upon attempt at removal intraoperatively, so she was made aware that she is still unable to have an MRI due to the retained old lead from her previous InterStim.  She is overall happy with her stimulation.  She denies uncomfortable pain from the stimulation.    The following portions of the patient's history were reviewed and updated as appropriate: allergies, current medications, past family history, past medical history, past social history, past surgical history and problem list.    Review of Systems      Current Outpatient Medications:   •  allopurinol (ZYLOPRIM) 100 MG tablet, Take 100 mg by mouth Daily., Disp: , Rfl:   •  atorvastatin (LIPITOR) 40 MG tablet, Take 40 mg by mouth Every Night., Disp: , Rfl:   •  bumetanide (BUMEX) 1 MG tablet, Take 1 mg by mouth Daily., Disp: , Rfl:   •  calcium carbonate (OS-KAYLEY) 600 MG tablet, Take 600 mg by mouth Daily., Disp: , Rfl:   •  carvedilol (COREG) 6.25 MG tablet, Take 3.125 mg by mouth 2 (Two) Times a Day With Meals., Disp: , Rfl:   •  cholecalciferol (VITAMIN D3) 25 MCG (1000 UT) tablet, Take 400 Units by mouth Daily., Disp: , Rfl:   •  diclofenac (VOLTAREN) 75 MG EC tablet, Take 75 mg by mouth 2 (Two) Times a Day., Disp: , Rfl:   •  FLUoxetine (PROzac) 40 MG capsule, Take 40 mg by mouth Daily., Disp: , Rfl:   •  gabapentin (NEURONTIN) 400 MG capsule, Take 300 mg by mouth Every 6 (Six) Hours., Disp: , Rfl:   •  HYDROcodone-acetaminophen (NORCO) 7.5-325 MG per tablet, Take 1 tablet by mouth As Needed., Disp: , Rfl:   •  HYDROcodone-acetaminophen (NORCO) 7.5-325 MG per tablet, Take 1 tablet by  mouth Every 6 (Six) Hours As Needed for Moderate Pain  (postop pain)., Disp: 12 tablet, Rfl: 0  •  levothyroxine (SYNTHROID, LEVOTHROID) 75 MCG tablet, Take 75 mcg by mouth Daily., Disp: , Rfl:   •  Loperamide HCl (IMODIUM PO), Take 2 mg by mouth As Needed., Disp: , Rfl:   •  methylcellulose, Laxative, (EQ FIBER THERAPY) 500 MG tablet tablet, Take 1 tablet by mouth Daily., Disp: , Rfl:   •  Multiple Vitamin (MULTI VITAMIN PO), Take 1 dose by mouth Daily., Disp: , Rfl:   •  niacin 250 MG tablet, Take 500 mg by mouth Daily With Breakfast., Disp: , Rfl:   •  omeprazole (priLOSEC) 20 MG capsule, Take 20 mg by mouth Daily., Disp: , Rfl:   •  ondansetron ODT (Zofran ODT) 4 MG disintegrating tablet, Place 1 tablet on the tongue Every 6 (Six) Hours As Needed for Nausea., Disp: 6 tablet, Rfl: 1  •  oxyCODONE-acetaminophen (PERCOCET) 7.5-325 MG per tablet, Take 1 tablet by mouth Every 6 (Six) Hours As Needed., Disp: , Rfl:   •  pilocarpine (SALAGEN) 5 MG tablet, Take 5 mg by mouth 3 (Three) Times a Day., Disp: , Rfl:   •  pioglitazone (ACTOS) 30 MG tablet, Take 30 mg by mouth Daily. NEW MED-HAS NOT TAKEN YET, Disp: , Rfl:   •  pramipexole (MIRAPEX) 1.5 MG tablet, Take 1.5 mg by mouth Every Night., Disp: , Rfl:   •  spironolactone (ALDACTONE) 25 MG tablet, Take 25 mg by mouth 2 (Two) Times a Day., Disp: , Rfl:   •  tiZANidine (ZANAFLEX) 4 MG tablet, Take 4 mg by mouth Every Night. HAS NOT TAKEN WHILE ON LEVAQUIN, Disp: , Rfl:   •  traMADol (ULTRAM) 50 MG tablet, Take 100 mg by mouth 2 (Two) Times a Day., Disp: , Rfl:   •  traZODone (DESYREL) 50 MG tablet, Take 50 mg by mouth Every Night. Take 1-3 tablets at night as needed for sleep., Disp: , Rfl:     Past Medical History:   Diagnosis Date   • Allergic rhinitis    • Arthritis     BACK   • Chronic back pain    • Degenerative arthritis    • Depression    • Deviated nasal septum    • Diabetes mellitus (CMS/HCC)    • Edema    • Gout    • History of colon polyps    • History of  transfusion    • Hyperlipidemia    • Hypertension    • Hypertrophy of nasal turbinates    • Incontinence in female    • Insomnia    • Menopause    • Nasal septal deviation    • Nasal valve stenosis    • Overactive bladder    • Plantar fasciitis    • Prediabetes    • RLS (restless legs syndrome)    • Sleep apnea     pt states has a cpap but hasn't been using it   • Spider bite wound     left lower leg. previously infected with mrsa but currently not infected.   • Wears glasses        Past Surgical History:   Procedure Laterality Date   • BLEPHAROPLASTY Bilateral 7/9/2019    Procedure: 1) bilateral upper blepharoplasty with excision of excess of tissue weighing down 2) nasal endoscopy with removal of nasal septal prosthesis;  Surgeon: Mark Anthony Anderson MD;  Location: [facility] OR;  Service: ENT   • CARPAL TUNNEL RELEASE Bilateral 2004   • CHOLECYSTECTOMY  2013   • COLONOSCOPY  10/17/2014    One 5-6mm tubular adenomatous polyp in the ascending colon; Two less than 4mm hyperplastic polyps in the sigmoid colon; Three rectal hyperplastic polyps; Diverticulosis; Repeat 5 years    • COLONOSCOPY  07/14/2010    Internal hemorrhoids; Repeat 7 years    • COLONOSCOPY N/A 10/23/2019    Procedure: COLONOSCOPY WITH ANESTHESIA;  Surgeon: Robyn Frazier MD;  Location: [facility] ENDOSCOPY;  Service: Gastroenterology   • CYST REMOVAL  2016    neck   • ENDOSCOPIC FUNCTIONAL SINUS SURGERY (FESS) Bilateral 4/16/2019    Procedure: ENDOSCOPIC FUNCTIONAL SINUS SURGERY (bilareral maxillary antrostomy without image guidance);  Surgeon: Mark Anthony Anderson MD;  Location: [facility] OR;  Service: ENT   • ENDOSCOPY N/A 4/11/2019    Esophageal mucosal changes suggestive of eosinophilic esophagitis-biopsied; A few gastric polyps-resected and retrieved-biopsied; Normal examined duodenum-biopsied   • FOOT SURGERY Right 2010    X3   • HERNIA REPAIR     • INTERSTIM PLACEMENT N/A 6/5/2019    Procedure: INTERSTIM STAGES 1 AND 2 LEAD AND GENERATOR PLACEMENT;  Surgeon:  Endy Guerrero MD;  Location:  [facility] OR;  Service: Urology   • INTERSTIM PLACEMENT N/A 7/26/2021    Procedure: INTERSTIM STAGES 1 AND 2 LEAD AND GENERATOR PLACEMENT - RECHARGEABLE.;  Surgeon: Mikey Asencio MD;  Location:  [facility] OR;  Service: Urology;  Laterality: N/A;   • INTERSTIM REMOVAL N/A 7/26/2021    Procedure: INTERSTIM REMOVAL;  Surgeon: Mikey Asencio MD;  Location:  [facility] OR;  Service: Urology;  Laterality: N/A;   • LEG DEBRIDEMENT Left 7/31/2020    Procedure: LOWER POSTERIOR LEG WOUND DEBRIDEMENT - LEFT;  Surgeon: Roberto Wells DPM;  Location:  [facility] OR;  Service: Podiatry;  Laterality: Left;   • LEG DEBRIDEMENT Left 11/30/2020    Procedure: SPLIT THICKNESS SKIN GRAFT LEFT LOWER EXTREMTIY WITH WOUND VAC PLACEMENT;  Surgeon: Trace Hurd DO;  Location:  [facility] HYBRID OR 12;  Service: Vascular;  Laterality: Left;   • LEG DEBRIDEMENT Left 2/3/2021    Procedure: LOWER EXTREMITY WOUND DEBRIDEMENT - LEFT;  Surgeon: Roberto Wells DPM;  Location:  [facility] OR;  Service: Podiatry;  Laterality: Left;   • NASAL ENDOSCOPY N/A 4/16/2019    Procedure:     1. Functional endoscopic sinus surgery with bilateral maxillary antrostomy    2. Bilateral nasal endoscopy with biopsy of nasal septum    3.  Nasal septal prosthesis placement  ;  Surgeon: Mark Anthony Anderson MD;  Location:  [facility] OR;  Service: ENT   • NASAL VESTIBULE LESION/PAPILLOMA EXCISION N/A 8/1/2017    Procedure: Repair of nasal vestibular stenosis and septoplasty; cartilage graft from left ear;  Surgeon: Mark Anthony Anderson MD;  Location:  [facility] OR;  Service:    • SEPTOPLASTY N/A 4/16/2019    Procedure: PLACEMENT OF NASAL SEPTAL PROSTHESIS;  Surgeon: Mark Anthony Anderson MD;  Location:  [facility] OR;  Service: ENT   • SEPTOPLASTY Bilateral 1/14/2020    Procedure: Nasal endoscopy with septal debridement, and placement of Silastic stents;  Surgeon: Mark Anthony Anderson MD;  Location:  [facility] OR;  Service: ENT   • WOUND DEBRIDEMENT  06/2019    spider bite  wound initial surgery       Social History     Socioeconomic History   • Marital status:      Spouse name: Not on file   • Number of children: Not on file   • Years of education: Not on file   • Highest education level: Not on file   Tobacco Use   • Smoking status: Former Smoker     Packs/day: 0.50     Years: 40.00     Pack years: 20.00     Types: Cigarettes     Quit date: 2020     Years since quittin.0   • Smokeless tobacco: Never Used   Vaping Use   • Vaping Use: Never used   Substance and Sexual Activity   • Alcohol use: Yes     Comment: Rarely-maybe 3x/year   • Drug use: No   • Sexual activity: Defer       Family History   Problem Relation Age of Onset   • Cancer Mother    • Diabetes Father    • Hypertension Father    • Cancer Father    • Colon cancer Neg Hx    • Colon polyps Neg Hx    • Esophageal cancer Neg Hx    • Liver cancer Neg Hx    • Liver disease Neg Hx    • Rectal cancer Neg Hx    • Stomach cancer Neg Hx        Objective    There were no vitals taken for this visit.    Physical Exam  Both skin incisions well-healed, no signs of infection.      Results for orders placed or performed during the hospital encounter of 21   POC Glucose Once    Specimen: Blood   Result Value Ref Range    Glucose 101 70 - 130 mg/dL   POC Glucose Once    Specimen: Blood   Result Value Ref Range    Glucose 107 70 - 130 mg/dL     Assessment and Plan    Diagnoses and all orders for this visit:    1. Postoperative visit (Primary)  -     POC Urinalysis Dipstick, Multipro  -     US Bladder Volume      Healing well after InterStim revision with placement of new rechargeable InterStim.  She is aware she is unable to have an MRI due to old retained lead.  She met with the [employer] rep today and was instructed on her rechargeable device.  She will follow-up in 6 months or sooner as needed.          This document has been signed by DONATO Asencio MD on 2021 15:53 CDT

## 2021-08-13 ENCOUNTER — OFFICE VISIT (OUTPATIENT)
Dept: WOUND CARE | Facility: HOSPITAL | Age: 61
End: 2021-08-13

## 2021-08-13 DIAGNOSIS — L97.822 NON-PRESSURE CHRONIC ULCER OF OTHER PART OF LEFT LOWER LEG WITH FAT LAYER EXPOSED (HCC): ICD-10-CM

## 2021-08-13 DIAGNOSIS — R60.0 LOCALIZED EDEMA: ICD-10-CM

## 2021-08-13 PROCEDURE — 11045 DBRDMT SUBQ TISS EACH ADDL: CPT | Performed by: PODIATRIST

## 2021-08-13 PROCEDURE — 11042 DBRDMT SUBQ TIS 1ST 20SQCM/<: CPT | Performed by: PODIATRIST

## 2021-08-19 ENCOUNTER — OFFICE VISIT (OUTPATIENT)
Dept: UROLOGY | Facility: CLINIC | Age: 61
End: 2021-08-19

## 2021-08-19 VITALS — HEIGHT: 60 IN | WEIGHT: 215 LBS | TEMPERATURE: 97.2 F | BODY MASS INDEX: 42.21 KG/M2

## 2021-08-19 DIAGNOSIS — Z48.89 POSTOPERATIVE VISIT: Primary | ICD-10-CM

## 2021-08-19 LAB
BILIRUB BLD-MCNC: NEGATIVE MG/DL
CLARITY, POC: CLEAR
COLOR UR: YELLOW
GLUCOSE UR STRIP-MCNC: NEGATIVE MG/DL
KETONES UR QL: NEGATIVE
LEUKOCYTE EST, POC: NEGATIVE
NITRITE UR-MCNC: NEGATIVE MG/ML
PH UR: 5.5 [PH] (ref 5–8)
PROT UR STRIP-MCNC: NEGATIVE MG/DL
RBC # UR STRIP: NEGATIVE /UL
SP GR UR: 1.03 (ref 1–1.03)
UROBILINOGEN UR QL: NORMAL

## 2021-08-19 PROCEDURE — 99024 POSTOP FOLLOW-UP VISIT: CPT | Performed by: UROLOGY

## 2021-08-19 PROCEDURE — 81001 URINALYSIS AUTO W/SCOPE: CPT | Performed by: UROLOGY

## 2021-08-19 NOTE — PATIENT INSTRUCTIONS
"BMI for Adults  What is BMI?  Body mass index (BMI) is a number that is calculated from a person's weight and height. BMI can help estimate how much of a person's weight is composed of fat. BMI does not measure body fat directly. Rather, it is an alternative to procedures that directly measure body fat, which can be difficult and expensive.  BMI can help identify people who may be at higher risk for certain medical problems.  What are BMI measurements used for?  BMI is used as a screening tool to identify possible weight problems. It helps determine whether a person is obese, overweight, a healthy weight, or underweight.  BMI is useful for:  · Identifying a weight problem that may be related to a medical condition or may increase the risk for medical problems.  · Promoting changes, such as changes in diet and exercise, to help reach a healthy weight. BMI screening can be repeated to see if these changes are working.  How is BMI calculated?  BMI involves measuring your weight in relation to your height. Both height and weight are measured, and the BMI is calculated from those numbers. This can be done either in English ([place] or metric measurements. Note that charts and online BMI calculators are available to help you find your BMI quickly and easily without having to do these calculations yourself.  To calculate your BMI in English ([place] measurements:    1. Measure your weight in pounds (lb).  2. Multiply the number of pounds by 703.  ? For example, for a person who weighs 180 lb, multiply that number by 703, which equals 126,540.  3. Measure your height in inches. Then multiply that number by itself to get a measurement called \"inches squared.\"  ? For example, for a person who is 70 inches tall, the \"inches squared\" measurement is 70 inches x 70 inches, which equals 4,900 inches squared.  4. Divide the total from step 2 (number of lb x 703) by the total from step 3 (inches squared): 126,540 ÷ 4,900 = 25.8. This is " "your BMI.  To calculate your BMI in metric measurements:  1. Measure your weight in kilograms (kg).  2. Measure your height in meters (m). Then multiply that number by itself to get a measurement called \"meters squared.\"  ? For example, for a person who is 1.75 m tall, the \"meters squared\" measurement is 1.75 m x 1.75 m, which is equal to 3.1 meters squared.  3. Divide the number of kilograms (your weight) by the meters squared number. In this example: 70 ÷ 3.1 = 22.6. This is your BMI.  What do the results mean?  BMI charts are used to identify whether you are underweight, normal weight, overweight, or obese. The following guidelines will be used:  · Underweight: BMI less than 18.5.  · Normal weight: BMI between 18.5 and 24.9.  · Overweight: BMI between 25 and 29.9.  · Obese: BMI of 30 or above.  Keep these notes in mind:  · Weight includes both fat and muscle, so someone with a muscular build, such as an athlete, may have a BMI that is higher than 24.9. In cases like these, BMI is not an accurate measure of body fat.  · To determine if excess body fat is the cause of a BMI of 25 or higher, further assessments may need to be done by a health care provider.  · BMI is usually interpreted in the same way for men and women.  Where to find more information  For more information about BMI, including tools to quickly calculate your BMI, go to these websites:  · Centers for Disease Control and Prevention: www.cdc.gov  · American Heart Association: www.heart.org  · National Heart, Lung, and Blood [place]: www.nhlbi.nih.gov  Summary  · Body mass index (BMI) is a number that is calculated from a person's weight and height.  · BMI may help estimate how much of a person's weight is composed of fat. BMI can help identify those who may be at higher risk for certain medical problems.  · BMI can be measured using English measurements or metric measurements.  · BMI charts are used to identify whether you are underweight, normal " weight, overweight, or obese.  This information is not intended to replace advice given to you by your health care provider. Make sure you discuss any questions you have with your health care provider.  Document Revised: 09/09/2020 Document Reviewed: 07/17/2020  Elsevier Patient Education © 2021 [employer]

## 2021-08-20 ENCOUNTER — OFFICE VISIT (OUTPATIENT)
Dept: WOUND CARE | Facility: HOSPITAL | Age: 61
End: 2021-08-20

## 2021-08-20 DIAGNOSIS — L97.822 NON-PRESSURE CHRONIC ULCER OF OTHER PART OF LEFT LOWER LEG WITH FAT LAYER EXPOSED (HCC): ICD-10-CM

## 2021-08-20 DIAGNOSIS — R60.0 LOCALIZED EDEMA: ICD-10-CM

## 2021-08-20 PROCEDURE — 11042 DBRDMT SUBQ TIS 1ST 20SQCM/<: CPT | Performed by: PODIATRIST

## 2021-08-20 PROCEDURE — 11045 DBRDMT SUBQ TISS EACH ADDL: CPT | Performed by: PODIATRIST

## 2021-08-27 ENCOUNTER — OFFICE VISIT (OUTPATIENT)
Dept: WOUND CARE | Facility: HOSPITAL | Age: 61
End: 2021-08-27

## 2021-08-27 DIAGNOSIS — L97.822 NON-PRESSURE CHRONIC ULCER OF OTHER PART OF LEFT LOWER LEG WITH FAT LAYER EXPOSED (HCC): ICD-10-CM

## 2021-08-27 PROCEDURE — 11042 DBRDMT SUBQ TIS 1ST 20SQCM/<: CPT | Performed by: PODIATRIST

## 2021-08-27 PROCEDURE — 11045 DBRDMT SUBQ TISS EACH ADDL: CPT | Performed by: PODIATRIST

## 2021-09-03 ENCOUNTER — TELEPHONE (OUTPATIENT)
Dept: UROLOGY | Facility: CLINIC | Age: 61
End: 2021-09-03

## 2021-09-03 ENCOUNTER — OFFICE VISIT (OUTPATIENT)
Dept: WOUND CARE | Facility: HOSPITAL | Age: 61
End: 2021-09-03

## 2021-09-03 DIAGNOSIS — R60.0 LOCALIZED EDEMA: ICD-10-CM

## 2021-09-03 DIAGNOSIS — L97.822 NON-PRESSURE CHRONIC ULCER OF OTHER PART OF LEFT LOWER LEG WITH FAT LAYER EXPOSED (HCC): ICD-10-CM

## 2021-09-03 PROCEDURE — 11042 DBRDMT SUBQ TIS 1ST 20SQCM/<: CPT | Performed by: PODIATRIST

## 2021-09-03 PROCEDURE — 11045 DBRDMT SUBQ TISS EACH ADDL: CPT | Performed by: PODIATRIST

## 2021-09-03 NOTE — TELEPHONE ENCOUNTER
Patient stopped in the office and said she would like Jameel from [employer] to give her a call about her device.

## 2021-09-03 NOTE — TELEPHONE ENCOUNTER
I called Jameel from [employer] and he advised me to have her call the 1800 number. I called patient and gave her the 800 number for [employer]. Patient said she would contact them.

## 2021-09-09 ENCOUNTER — PATIENT MESSAGE (OUTPATIENT)
Dept: FAMILY MEDICINE CLINIC | Age: 61
End: 2021-09-09

## 2021-09-09 NOTE — TELEPHONE ENCOUNTER
From: Eris Trejo  To: MAEVE Chand  Sent: 9/9/2021 3:23 PM CDT  Subject: Prescription Question    Tea, could you call me in a refill for my Gabapentin please. 300 mg, 3 times a day. My leg is healing super fast. So much in fact, the nerve endings have really kicked in growing and of course that hurts. It is now 5x5.9 cm.

## 2021-09-09 NOTE — TELEPHONE ENCOUNTER
OV - 08/02/2021    TEAGAN was reviewed today per office protocol. Report shows No discrepancies. Fill pattern is consistent from single provider(s) at single pharmacy(s).

## 2021-09-10 ENCOUNTER — OFFICE VISIT (OUTPATIENT)
Dept: WOUND CARE | Facility: HOSPITAL | Age: 61
End: 2021-09-10

## 2021-09-10 DIAGNOSIS — M79.662 PAIN IN LEFT LOWER LEG: ICD-10-CM

## 2021-09-10 DIAGNOSIS — A49.9 BACTERIAL INFECTION, UNSPECIFIED: ICD-10-CM

## 2021-09-10 DIAGNOSIS — L97.822 NON-PRESSURE CHRONIC ULCER OF OTHER PART OF LEFT LOWER LEG WITH FAT LAYER EXPOSED (HCC): ICD-10-CM

## 2021-09-10 DIAGNOSIS — R60.0 LOCALIZED EDEMA: ICD-10-CM

## 2021-09-10 PROCEDURE — 11045 DBRDMT SUBQ TISS EACH ADDL: CPT | Performed by: PODIATRIST

## 2021-09-10 PROCEDURE — 11042 DBRDMT SUBQ TIS 1ST 20SQCM/<: CPT | Performed by: PODIATRIST

## 2021-09-21 ENCOUNTER — LAB REQUISITION (OUTPATIENT)
Dept: LAB | Facility: HOSPITAL | Age: 61
End: 2021-09-21

## 2021-09-21 ENCOUNTER — OFFICE VISIT (OUTPATIENT)
Dept: WOUND CARE | Facility: HOSPITAL | Age: 61
End: 2021-09-21

## 2021-09-21 DIAGNOSIS — M79.662 PAIN IN LEFT LOWER LEG: ICD-10-CM

## 2021-09-21 DIAGNOSIS — R60.0 LOCALIZED EDEMA: ICD-10-CM

## 2021-09-21 DIAGNOSIS — Z00.00 ENCOUNTER FOR GENERAL ADULT MEDICAL EXAMINATION WITHOUT ABNORMAL FINDINGS: ICD-10-CM

## 2021-09-21 DIAGNOSIS — L97.822 NON-PRESSURE CHRONIC ULCER OF OTHER PART OF LEFT LOWER LEG WITH FAT LAYER EXPOSED (HCC): ICD-10-CM

## 2021-09-21 DIAGNOSIS — Q82.0 HEREDITARY LYMPHEDEMA: ICD-10-CM

## 2021-09-21 PROCEDURE — 87176 TISSUE HOMOGENIZATION CULTR: CPT | Performed by: NURSE PRACTITIONER

## 2021-09-21 PROCEDURE — 11042 DBRDMT SUBQ TIS 1ST 20SQCM/<: CPT | Performed by: NURSE PRACTITIONER

## 2021-09-21 PROCEDURE — 87070 CULTURE OTHR SPECIMN AEROBIC: CPT | Performed by: NURSE PRACTITIONER

## 2021-09-21 PROCEDURE — 87075 CULTR BACTERIA EXCEPT BLOOD: CPT | Performed by: NURSE PRACTITIONER

## 2021-09-21 PROCEDURE — 87186 SC STD MICRODIL/AGAR DIL: CPT | Performed by: NURSE PRACTITIONER

## 2021-09-21 PROCEDURE — 87205 SMEAR GRAM STAIN: CPT | Performed by: NURSE PRACTITIONER

## 2021-09-21 PROCEDURE — 11045 DBRDMT SUBQ TISS EACH ADDL: CPT | Performed by: NURSE PRACTITIONER

## 2021-09-21 PROCEDURE — 87147 CULTURE TYPE IMMUNOLOGIC: CPT | Performed by: NURSE PRACTITIONER

## 2021-09-24 ENCOUNTER — OFFICE VISIT (OUTPATIENT)
Dept: WOUND CARE | Facility: HOSPITAL | Age: 61
End: 2021-09-24

## 2021-09-24 DIAGNOSIS — M79.662 PAIN IN LEFT LOWER LEG: ICD-10-CM

## 2021-09-24 DIAGNOSIS — R60.0 LOCALIZED EDEMA: ICD-10-CM

## 2021-09-24 DIAGNOSIS — L97.822 NON-PRESSURE CHRONIC ULCER OF OTHER PART OF LEFT LOWER LEG WITH FAT LAYER EXPOSED (HCC): ICD-10-CM

## 2021-09-24 DIAGNOSIS — Q82.0 HEREDITARY LYMPHEDEMA: ICD-10-CM

## 2021-09-24 LAB
BACTERIA SPEC AEROBE CULT: ABNORMAL
GRAM STN SPEC: ABNORMAL
GRAM STN SPEC: ABNORMAL

## 2021-09-24 PROCEDURE — 11045 DBRDMT SUBQ TISS EACH ADDL: CPT | Performed by: NURSE PRACTITIONER

## 2021-09-24 PROCEDURE — 11042 DBRDMT SUBQ TIS 1ST 20SQCM/<: CPT | Performed by: NURSE PRACTITIONER

## 2021-09-27 LAB — BACTERIA SPEC ANAEROBE CULT: NORMAL

## 2021-09-30 ENCOUNTER — INFUSION (OUTPATIENT)
Dept: ONCOLOGY | Facility: HOSPITAL | Age: 61
End: 2021-09-30

## 2021-09-30 VITALS
DIASTOLIC BLOOD PRESSURE: 82 MMHG | HEIGHT: 59 IN | RESPIRATION RATE: 16 BRPM | BODY MASS INDEX: 42.33 KG/M2 | SYSTOLIC BLOOD PRESSURE: 107 MMHG | OXYGEN SATURATION: 94 % | WEIGHT: 210 LBS | HEART RATE: 73 BPM | TEMPERATURE: 97.3 F

## 2021-09-30 DIAGNOSIS — A49.02 INFECTION OF WOUND DUE TO METHICILLIN RESISTANT STAPHYLOCOCCUS AUREUS (MRSA): ICD-10-CM

## 2021-09-30 DIAGNOSIS — S81.809S NON-HEALING WOUND OF LOWER EXTREMITY, SEQUELA: Primary | ICD-10-CM

## 2021-09-30 PROCEDURE — 25010000002 DALBAVANCIN 500 MG RECONSTITUTED SOLUTION 1 EACH VIAL: Performed by: NURSE PRACTITIONER

## 2021-09-30 PROCEDURE — 96365 THER/PROPH/DIAG IV INF INIT: CPT

## 2021-09-30 RX ADMIN — DALBAVANCIN 1500 MG: 500 INJECTION, POWDER, FOR SOLUTION INTRAVENOUS at 11:21

## 2021-10-01 ENCOUNTER — OFFICE VISIT (OUTPATIENT)
Dept: WOUND CARE | Facility: HOSPITAL | Age: 61
End: 2021-10-01

## 2021-10-01 DIAGNOSIS — L97.822 NON-PRESSURE CHRONIC ULCER OF OTHER PART OF LEFT LOWER LEG WITH FAT LAYER EXPOSED (HCC): ICD-10-CM

## 2021-10-01 DIAGNOSIS — A49.9 BACTERIAL INFECTION, UNSPECIFIED: ICD-10-CM

## 2021-10-01 DIAGNOSIS — M79.662 PAIN IN LEFT LOWER LEG: ICD-10-CM

## 2021-10-01 DIAGNOSIS — R60.0 LOCALIZED EDEMA: ICD-10-CM

## 2021-10-01 PROCEDURE — 11045 DBRDMT SUBQ TISS EACH ADDL: CPT | Performed by: NURSE PRACTITIONER

## 2021-10-01 PROCEDURE — 11042 DBRDMT SUBQ TIS 1ST 20SQCM/<: CPT | Performed by: NURSE PRACTITIONER

## 2021-10-08 ENCOUNTER — OFFICE VISIT (OUTPATIENT)
Dept: WOUND CARE | Facility: HOSPITAL | Age: 61
End: 2021-10-08

## 2021-10-08 DIAGNOSIS — A49.9 BACTERIAL INFECTION, UNSPECIFIED: ICD-10-CM

## 2021-10-08 DIAGNOSIS — L97.822 NON-PRESSURE CHRONIC ULCER OF OTHER PART OF LEFT LOWER LEG WITH FAT LAYER EXPOSED (HCC): ICD-10-CM

## 2021-10-08 DIAGNOSIS — M79.662 PAIN IN LEFT LOWER LEG: ICD-10-CM

## 2021-10-08 DIAGNOSIS — R60.0 LOCALIZED EDEMA: ICD-10-CM

## 2021-10-08 PROCEDURE — 11042 DBRDMT SUBQ TIS 1ST 20SQCM/<: CPT | Performed by: PODIATRIST

## 2021-10-15 ENCOUNTER — OFFICE VISIT (OUTPATIENT)
Dept: WOUND CARE | Facility: HOSPITAL | Age: 61
End: 2021-10-15

## 2021-10-15 DIAGNOSIS — R60.0 LOCALIZED EDEMA: ICD-10-CM

## 2021-10-15 DIAGNOSIS — A49.9 BACTERIAL INFECTION, UNSPECIFIED: ICD-10-CM

## 2021-10-15 DIAGNOSIS — M79.662 PAIN IN LEFT LOWER LEG: ICD-10-CM

## 2021-10-15 DIAGNOSIS — L97.822 NON-PRESSURE CHRONIC ULCER OF OTHER PART OF LEFT LOWER LEG WITH FAT LAYER EXPOSED (HCC): ICD-10-CM

## 2021-10-15 PROCEDURE — 11042 DBRDMT SUBQ TIS 1ST 20SQCM/<: CPT | Performed by: PODIATRIST

## 2021-10-22 ENCOUNTER — OFFICE VISIT (OUTPATIENT)
Dept: WOUND CARE | Facility: HOSPITAL | Age: 61
End: 2021-10-22

## 2021-10-22 DIAGNOSIS — M79.662 PAIN IN LEFT LOWER LEG: ICD-10-CM

## 2021-10-22 DIAGNOSIS — L97.822 NON-PRESSURE CHRONIC ULCER OF OTHER PART OF LEFT LOWER LEG WITH FAT LAYER EXPOSED (HCC): ICD-10-CM

## 2021-10-22 DIAGNOSIS — R60.0 LOCALIZED EDEMA: ICD-10-CM

## 2021-10-22 DIAGNOSIS — A49.9 BACTERIAL INFECTION, UNSPECIFIED: ICD-10-CM

## 2021-10-22 PROCEDURE — 11042 DBRDMT SUBQ TIS 1ST 20SQCM/<: CPT | Performed by: PODIATRIST

## 2021-10-26 DIAGNOSIS — G25.81 RLS (RESTLESS LEGS SYNDROME): ICD-10-CM

## 2021-10-26 DIAGNOSIS — E79.0 ELEVATED URIC ACID IN BLOOD: ICD-10-CM

## 2021-10-27 RX ORDER — ALLOPURINOL 100 MG/1
TABLET ORAL
Qty: 90 TABLET | Refills: 1 | Status: SHIPPED | OUTPATIENT
Start: 2021-10-27 | End: 2022-02-25

## 2021-10-27 RX ORDER — PRAMIPEXOLE DIHYDROCHLORIDE 1.5 MG/1
TABLET ORAL
Qty: 135 TABLET | Refills: 1 | Status: SHIPPED | OUTPATIENT
Start: 2021-10-27 | End: 2022-02-25

## 2021-10-29 ENCOUNTER — OFFICE VISIT (OUTPATIENT)
Dept: WOUND CARE | Facility: HOSPITAL | Age: 61
End: 2021-10-29

## 2021-10-29 DIAGNOSIS — L97.822 NON-PRESSURE CHRONIC ULCER OF OTHER PART OF LEFT LOWER LEG WITH FAT LAYER EXPOSED (HCC): ICD-10-CM

## 2021-10-29 DIAGNOSIS — M79.662 PAIN IN LEFT LOWER LEG: ICD-10-CM

## 2021-10-29 DIAGNOSIS — A49.9 BACTERIAL INFECTION, UNSPECIFIED: ICD-10-CM

## 2021-10-29 PROCEDURE — 11042 DBRDMT SUBQ TIS 1ST 20SQCM/<: CPT | Performed by: PODIATRIST

## 2021-11-05 ENCOUNTER — OFFICE VISIT (OUTPATIENT)
Dept: WOUND CARE | Facility: HOSPITAL | Age: 61
End: 2021-11-05

## 2021-11-05 DIAGNOSIS — Q82.0 HEREDITARY LYMPHEDEMA: ICD-10-CM

## 2021-11-05 DIAGNOSIS — R60.0 LOCALIZED EDEMA: ICD-10-CM

## 2021-11-05 DIAGNOSIS — M79.662 PAIN IN LEFT LOWER LEG: ICD-10-CM

## 2021-11-05 DIAGNOSIS — L97.822 NON-PRESSURE CHRONIC ULCER OF OTHER PART OF LEFT LOWER LEG WITH FAT LAYER EXPOSED (HCC): ICD-10-CM

## 2021-11-05 PROCEDURE — 11042 DBRDMT SUBQ TIS 1ST 20SQCM/<: CPT | Performed by: NURSE PRACTITIONER

## 2021-11-10 ENCOUNTER — OFFICE VISIT (OUTPATIENT)
Dept: FAMILY MEDICINE CLINIC | Age: 61
End: 2021-11-10
Payer: COMMERCIAL

## 2021-11-10 VITALS
SYSTOLIC BLOOD PRESSURE: 122 MMHG | HEART RATE: 75 BPM | TEMPERATURE: 97.6 F | HEIGHT: 60 IN | BODY MASS INDEX: 39.85 KG/M2 | OXYGEN SATURATION: 96 % | RESPIRATION RATE: 18 BRPM | DIASTOLIC BLOOD PRESSURE: 72 MMHG | WEIGHT: 203 LBS

## 2021-11-10 DIAGNOSIS — I10 ESSENTIAL HYPERTENSION: ICD-10-CM

## 2021-11-10 DIAGNOSIS — R29.898 DECONDITIONED LOW BACK: Primary | ICD-10-CM

## 2021-11-10 DIAGNOSIS — Z12.31 SCREENING MAMMOGRAM, ENCOUNTER FOR: ICD-10-CM

## 2021-11-10 DIAGNOSIS — R29.898 BILATERAL LEG WEAKNESS: ICD-10-CM

## 2021-11-10 PROCEDURE — 99214 OFFICE O/P EST MOD 30 MIN: CPT | Performed by: NURSE PRACTITIONER

## 2021-11-10 RX ORDER — LURASIDONE HYDROCHLORIDE 60 MG/1
60 TABLET, FILM COATED ORAL DAILY
COMMUNITY
Start: 2021-11-02 | End: 2022-07-05

## 2021-11-10 RX ORDER — SEMAGLUTIDE 2.4 MG/.75ML
INJECTION, SOLUTION SUBCUTANEOUS
COMMUNITY
Start: 2021-10-27 | End: 2022-02-07 | Stop reason: SDUPTHER

## 2021-11-10 NOTE — PROGRESS NOTES
SUBJECTIVE:    Patient ID: Kee Eagle is a61 y.o. female. Kee Eagle is here today for Discuss Medications (Patient wants to discuss the medication changes that Dr. Vidhi Sesay made to spironolactone 25 mg. She is now only taking 1 in the AM and 1 in the PM.) and Medication Refill ([facility] in [facility] was trying to get all the medications to be due at the same time and the qty of the meds \"got messed up\". We can call the pharmacy and see what refills are needed.)  . HPI:   HPI     Dr. Jose Aleman did ablation to lumbar spine, right side. She states it did ok. She is requesting PT for lumbar spine pain. Will have ablation to left side Nov22. Pt is seeing psych, Suzan Chowdary. She has now been started on Latuda and weaning from fluoxetine. Htn  Has had some lower end bps. Dr. Vidhi Sesay decreased aldactone tid to bid. She has noted that bp is starting to increase and she prefers to go back to tid. Hypothyroid  Pt is taking MWFS-taking 1/2 and then SunTTH taking whole tablet. Last lab was with Dr. Vidhi Sesay. We will have to obtain this as it is not yet on file. Past Medical History:   Diagnosis Date    Arthritis     Bipolar I disorder, most recent episode (or current) mixed, unspecified     Chronic back pain     Depression     Dry mouth     Hyperlipidemia     Hypertension     Hypokalemia     Hypothyroidism     Menopause     Morbidly obese ([facility] [address] 10/28/2020    Overactive bladder     Plantar fasciitis     RLS (restless legs syndrome)     Sleep apnea     Spondylosis with myelopathy, lumbar region     Thyroid disease      Prior to Visit Medications    Medication Sig Taking?  Authorizing Provider   LATUDA 60 MG TABS tablet Take 60 mg by mouth daily Yes Historical Provider, MD   WEGOVY 2.4 MG/0.75ML SOAJ SC injection INJECT 2.4mg (0.75ML) UNDER THE SKIN EVERY WEEK Yes Historical Provider, MD   UNABLE TO FIND Please adjust medication refills to limit pharmacy visits--I will authorize most quantities to allow this to happen Yes MAEVE Cuevas   allopurinol (ZYLOPRIM) 100 MG tablet TAKE ONE TABLET BY MOUTH DAILY Yes MAEVE Cuevas   pramipexole (MIRAPEX) 1.5 MG tablet TAKE 1/2 TABLET BY MOUTH EVERY MORNING AND ONE TABLET NIGHTLY **MAY MAKE DROWSY Yes MAEVE Cuevas   FLUoxetine (PROZAC) 40 MG capsule Take 1 capsule by mouth daily For anxiety/depression Yes MAEVE Cuevas   traZODone (DESYREL) 100 MG tablet 1-2 po as needed for sleep Yes MAEVE Cuevas   carvedilol (COREG) 3.125 MG tablet TAKE ONE TABLET BY MOUTH TWICE DAILY WITH MEALS  Patient taking differently: Take 3.125 mg by mouth 2 times daily  Yes MAEVE Cuevas   atorvastatin (LIPITOR) 40 MG tablet TAKE ONE TABLET BY MOUTH EVERY NIGHT FOR cholesterol Yes MAEVE Cuevas   oxyCODONE-acetaminophen (PERCOCET) 7.5-325 MG per tablet  Yes Historical Provider, MD   traMADol (ULTRAM) 50 MG tablet Take 50 mg by mouth 3 times daily as needed for Pain. Yes Historical Provider, MD   niacin (SLO-NIACIN) 500 MG extended release tablet Take 500 mg by mouth daily Yes Historical Provider, MD   pilocarpine (SALAGEN) 5 MG tablet Take 1 tablet by mouth 3 times daily Yes MAEVE Cuevas   spironolactone (ALDACTONE) 25 MG tablet 2 po in am and 1 po in evening for fluid  Patient taking differently: 1 po in am and 1 po in evening for fluid Yes MAEVE Cuevas   NARCAN 4 MG/0.1ML LIQD nasal spray  Yes Historical Provider, MD   diclofenac (VOLTAREN) 75 MG EC tablet Take 75 mg by mouth 2 times daily  Yes Historical Provider, MD   HYDROcodone-acetaminophen (NORCO) 7.5-325 MG per tablet Take 1 tablet by mouth 2 times daily as needed for Pain.   Yes Historical Provider, MD   methylcellulose ([employer] SOLUBLE FIBER THERAPY) 500 MG TABS  Yes Historical Provider, MD   loperamide (IMODIUM A-D) 2 MG tablet TAKE 2  MG ORAL AS NEEDED EVERY 12 HOURS Yes Historical Provider, MD   levothyroxine (SYNTHROID) 75 MCG tablet Take 75 mcg by mouth Daily  Yes Historical Provider, MD   omeprazole (PRILOSEC) 20 MG delayed release capsule Take 1 capsule by mouth 2 times daily (before meals) Yes MAEVE Cuevas   tiZANidine (ZANAFLEX) 4 MG tablet Take 4 mg by mouth daily  Yes Historical Provider, MD   Vitamin D (CHOLECALCIFEROL) 1000 UNITS CAPS capsule Take 1,000 Units by mouth daily Yes Historical Provider, MD   Multiple Vitamins-Minerals (MULTIVITAMIN PO) Take by mouth daily Yes Historical Provider, MD   calcium carbonate 600 MG TABS tablet Take 1 tablet by mouth daily.  Yes Historical Provider, MD   gabapentin (NEURONTIN) 300 MG capsule TAKE ONE CAPSULE BY MOUTH THREE TIMES DAILY AS NEEDED FOR NERVE PAIN  MAEVE Cuevas     Allergies   Allergen Reactions    Clindamycin/Lincomycin Other (See Comments)     Heartburn, upset stomach     Bactrim [Sulfamethoxazole-Trimethoprim]     Hctz [Hydrochlorothiazide] Other (See Comments)     Acid reflux      Sulfamethoxazole     Trimethoprim     Metformin And Related Diarrhea    Sitagliptin Rash     Past Surgical History:   Procedure Laterality Date    CARPAL TUNNEL RELEASE Bilateral 2002    CHOLECYSTECTOMY  10/2014    COLON SURGERY      COLON SURGERY      biopsy     COLONOSCOPY      CYST REMOVAL  05/2017    Under left ear    HARDWARE REMOVAL Right 2011    Foot     HERNIA REPAIR  10/2014    LEG DEBRIDEMENT Left     NOSE SURGERY  08/01/2017; 04/16/2019    Dr Cabrera Getting L/S,1 LEVEL Bilateral 2/7/2017    INJECTION MEDICATION FACET JOINT performed by Terry Olivarez at [facility]     Family History   Problem Relation Age of Onset    Cancer Mother         lung    Heart Disease Mother     Hypertension Mother     Bipolar Disorder Mother     Heart Disease Father     Hypertension Father      Social History     Socioeconomic History    Marital status:      Spouse name: Jimmy Brandt Number of children: 2    Years of education: some college    Highest education level: Not on file   Occupational History     Employer: [employer]   Tobacco Use    Smoking status: Former Smoker     Packs/day: 1.00     Years: 30.00     Pack years: 30.00     Types: Cigarettes     Quit date: 2003     Years since quittin.3    Smokeless tobacco: Never Used    Tobacco comment: Pt not interested at this time in stopping. Substance and Sexual Activity    Alcohol use: Yes     Comment: rarely    Drug use: No     Comment: pt admits \"may have misused in past\"--Pt was vague with details.  Sexual activity: Not on file   Other Topics Concern    Not on file   Social History Narrative    Not on file     Social Determinants of Health     Financial Resource Strain: Low Risk     Difficulty of Paying Living Expenses: Not hard at all   Food Insecurity: No Food Insecurity    Worried About Running Out of Food in the Last Year: Never true    [address] in the Last Year: Never true   Transportation Needs: No Transportation Needs    Lack of Transportation (Medical): No    Lack of Transportation (Non-Medical):  No   Physical Activity:     Days of Exercise per Week: Not on file    Minutes of Exercise per Session: Not on file   Stress:     Feeling of Stress : Not on file   Social Connections:     Frequency of Communication with Friends and Family: Not on file    Frequency of Social Gatherings with Friends and Family: Not on file    Attends Gnosticist Services: Not on file    Active Member of [address] [place] [address] or Organizations: Not on file    Attends Club or Organization Meetings: Not on file    Marital Status: Not on file   Intimate Partner Violence:     Fear of Current or Ex-Partner: Not on file    Emotionally Abused: Not on file    Physically Abused: Not on file    Sexually Abused: Not on file   Housing Stability:     Unable to Pay for Housing in the Last Year: Not on file    Number of [place] in the Last Year: Not on file    Unstable Housing in the Last Year: Not on file       Review of Systems   Constitutional: Negative. Cardiovascular: Positive for leg swelling. Musculoskeletal: Positive for back pain. Neurological: Positive for weakness (since becoming more ambulatory notes decreased endurance). Psychiatric/Behavioral: The patient is nervous/anxious. OBJECTIVE:    Physical Exam  Vitals and nursing note reviewed. Constitutional:       Appearance: She is well-developed. HENT:      Head: Normocephalic. Eyes:      Conjunctiva/sclera: Conjunctivae normal.      Pupils: Pupils are equal, round, and reactive to light. Cardiovascular:      Rate and Rhythm: Normal rate and regular rhythm. Pulmonary:      Effort: Pulmonary effort is normal. No respiratory distress. Breath sounds: Normal breath sounds. Musculoskeletal:      Cervical back: Normal range of motion and neck supple. Right lower leg: No edema. Left lower leg: No edema. Skin:     General: Skin is warm and dry. Capillary Refill: Capillary refill takes less than 2 seconds. Neurological:      Mental Status: She is alert and oriented to person, place, and time. Psychiatric:         Mood and Affect: Mood normal.         Behavior: Behavior normal.         Thought Content: Thought content normal.         Judgment: Judgment normal.         /72 (Site: Left Upper Arm, Position: Sitting, Cuff Size: Small Adult)   Pulse 75   Temp 97.6 °F (36.4 °C) (Temporal)   Resp 18   Ht 5' (1.524 m)   Wt 203 lb (92.1 kg)   SpO2 96%   BMI 39.65 kg/m²      ASSESSMENT:      ICD-10-CM    1. Deconditioned low back  R29.898 External Referral To Physical Therapy   2. Bilateral leg weakness  R29.898 External Referral To Physical Therapy   3. Screening mammogram, encounter for  Z12.31 Mammography screening bilateral     Mammography screening bilateral   4.  Essential hypertension  I10 Increase aldactone back to 25mg 1 po tid       PLAN:    Pastorata Galt: Discuss Medications (Patient wants to discuss the medication changes that Dr. Devin Menchaca made to spironolactone 25 mg. She is now only taking 1 in the AM and 1 in the PM.) and Medication Refill ([facility] in [facility] was trying to get all the medications to be due at the same time and the qty of the meds \"got messed up\". We can call the pharmacy and see what refills are needed.)  last lab needed from [facility] as above. F/u in 6mo and prn. Diagnosis and orders for this visit are above. Please note that this chart was generated using dragon dictationsoftware. Although every effort was made to ensure the accuracy of this automated transcription, some errors in transcription may have occurred.

## 2021-11-12 ENCOUNTER — OFFICE VISIT (OUTPATIENT)
Dept: WOUND CARE | Facility: HOSPITAL | Age: 61
End: 2021-11-12

## 2021-11-12 DIAGNOSIS — R60.0 LOCALIZED EDEMA: ICD-10-CM

## 2021-11-12 DIAGNOSIS — Q82.0 HEREDITARY LYMPHEDEMA: ICD-10-CM

## 2021-11-12 DIAGNOSIS — L97.822 NON-PRESSURE CHRONIC ULCER OF OTHER PART OF LEFT LOWER LEG WITH FAT LAYER EXPOSED (HCC): ICD-10-CM

## 2021-11-12 DIAGNOSIS — M79.662 PAIN IN LEFT LOWER LEG: ICD-10-CM

## 2021-11-12 PROCEDURE — 11042 DBRDMT SUBQ TIS 1ST 20SQCM/<: CPT | Performed by: PODIATRIST

## 2021-11-21 ASSESSMENT — ENCOUNTER SYMPTOMS: BACK PAIN: 1

## 2021-11-24 ENCOUNTER — OFFICE VISIT (OUTPATIENT)
Dept: WOUND CARE | Facility: HOSPITAL | Age: 61
End: 2021-11-24

## 2021-11-24 ENCOUNTER — LAB REQUISITION (OUTPATIENT)
Dept: LAB | Facility: HOSPITAL | Age: 61
End: 2021-11-24

## 2021-11-24 DIAGNOSIS — R60.0 LOCALIZED EDEMA: ICD-10-CM

## 2021-11-24 DIAGNOSIS — L97.822 NON-PRESSURE CHRONIC ULCER OF OTHER PART OF LEFT LOWER LEG WITH FAT LAYER EXPOSED (HCC): ICD-10-CM

## 2021-11-24 DIAGNOSIS — L03.116 CELLULITIS OF LEFT LOWER LIMB: ICD-10-CM

## 2021-11-24 DIAGNOSIS — Z00.00 ENCOUNTER FOR GENERAL ADULT MEDICAL EXAMINATION WITHOUT ABNORMAL FINDINGS: ICD-10-CM

## 2021-11-24 DIAGNOSIS — M79.662 PAIN IN LEFT LOWER LEG: ICD-10-CM

## 2021-11-24 DIAGNOSIS — Q82.0 HEREDITARY LYMPHEDEMA: ICD-10-CM

## 2021-11-24 PROCEDURE — 87070 CULTURE OTHR SPECIMN AEROBIC: CPT | Performed by: PODIATRIST

## 2021-11-24 PROCEDURE — 87205 SMEAR GRAM STAIN: CPT | Performed by: PODIATRIST

## 2021-11-24 PROCEDURE — 11042 DBRDMT SUBQ TIS 1ST 20SQCM/<: CPT | Performed by: PODIATRIST

## 2021-11-24 PROCEDURE — 87176 TISSUE HOMOGENIZATION CULTR: CPT | Performed by: PODIATRIST

## 2021-11-24 PROCEDURE — 99213 OFFICE O/P EST LOW 20 MIN: CPT | Performed by: PODIATRIST

## 2021-11-24 PROCEDURE — 87147 CULTURE TYPE IMMUNOLOGIC: CPT | Performed by: PODIATRIST

## 2021-11-24 PROCEDURE — 87186 SC STD MICRODIL/AGAR DIL: CPT | Performed by: PODIATRIST

## 2021-11-24 PROCEDURE — 87075 CULTR BACTERIA EXCEPT BLOOD: CPT | Performed by: PODIATRIST

## 2021-11-27 LAB
BACTERIA SPEC AEROBE CULT: ABNORMAL
GRAM STN SPEC: ABNORMAL

## 2021-11-30 LAB — BACTERIA SPEC ANAEROBE CULT: NORMAL

## 2021-12-01 DIAGNOSIS — F33.1 MODERATE EPISODE OF RECURRENT MAJOR DEPRESSIVE DISORDER (HCC): ICD-10-CM

## 2021-12-01 DIAGNOSIS — F41.9 ANXIETY: ICD-10-CM

## 2021-12-01 RX ORDER — PILOCARPINE HYDROCHLORIDE 5 MG/1
TABLET, FILM COATED ORAL
Qty: 180 TABLET | Refills: 0 | Status: SHIPPED | OUTPATIENT
Start: 2021-12-01 | End: 2022-05-17

## 2021-12-03 ENCOUNTER — OFFICE VISIT (OUTPATIENT)
Dept: WOUND CARE | Facility: HOSPITAL | Age: 61
End: 2021-12-03

## 2021-12-03 DIAGNOSIS — L03.116 CELLULITIS OF LEFT LOWER LIMB: ICD-10-CM

## 2021-12-03 DIAGNOSIS — L97.822 NON-PRESSURE CHRONIC ULCER OF OTHER PART OF LEFT LOWER LEG WITH FAT LAYER EXPOSED (HCC): ICD-10-CM

## 2021-12-03 DIAGNOSIS — R60.0 LOCALIZED EDEMA: ICD-10-CM

## 2021-12-03 DIAGNOSIS — Q82.0 HEREDITARY LYMPHEDEMA: ICD-10-CM

## 2021-12-03 PROCEDURE — 11042 DBRDMT SUBQ TIS 1ST 20SQCM/<: CPT | Performed by: PODIATRIST

## 2021-12-03 RX ORDER — FLUOXETINE HYDROCHLORIDE 40 MG/1
CAPSULE ORAL
Qty: 30 CAPSULE | Refills: 1 | Status: SHIPPED | OUTPATIENT
Start: 2021-12-03

## 2021-12-09 DIAGNOSIS — S81.802D OPEN WOUND OF LEFT LOWER LEG, SUBSEQUENT ENCOUNTER: ICD-10-CM

## 2021-12-09 NOTE — TELEPHONE ENCOUNTER
OV - 11/10/2021    TEAGAN was reviewed today per office protocol. Report shows No discrepancies. Fill pattern is consistent from single provider(s) at single pharmacy(s).

## 2021-12-10 ENCOUNTER — OFFICE VISIT (OUTPATIENT)
Dept: WOUND CARE | Facility: HOSPITAL | Age: 61
End: 2021-12-10

## 2021-12-10 DIAGNOSIS — R60.0 LOCALIZED EDEMA: ICD-10-CM

## 2021-12-10 DIAGNOSIS — Q82.0 HEREDITARY LYMPHEDEMA: ICD-10-CM

## 2021-12-10 DIAGNOSIS — L97.822 NON-PRESSURE CHRONIC ULCER OF OTHER PART OF LEFT LOWER LEG WITH FAT LAYER EXPOSED (HCC): ICD-10-CM

## 2021-12-10 PROCEDURE — 11042 DBRDMT SUBQ TIS 1ST 20SQCM/<: CPT | Performed by: PODIATRIST

## 2021-12-10 RX ORDER — GABAPENTIN 300 MG/1
CAPSULE ORAL
Qty: 270 CAPSULE | Refills: 1 | Status: SHIPPED | OUTPATIENT
Start: 2021-12-10 | End: 2022-02-07

## 2021-12-14 ENCOUNTER — OFFICE VISIT (OUTPATIENT)
Dept: WOUND CARE | Facility: HOSPITAL | Age: 61
End: 2021-12-14

## 2021-12-14 ENCOUNTER — LAB REQUISITION (OUTPATIENT)
Dept: LAB | Facility: HOSPITAL | Age: 61
End: 2021-12-14

## 2021-12-14 DIAGNOSIS — R60.0 LOCALIZED EDEMA: ICD-10-CM

## 2021-12-14 DIAGNOSIS — Z00.00 ENCOUNTER FOR GENERAL ADULT MEDICAL EXAMINATION WITHOUT ABNORMAL FINDINGS: ICD-10-CM

## 2021-12-14 DIAGNOSIS — Q82.0 HEREDITARY LYMPHEDEMA: ICD-10-CM

## 2021-12-14 DIAGNOSIS — M79.662 PAIN IN LEFT LOWER LEG: ICD-10-CM

## 2021-12-14 DIAGNOSIS — L97.822 NON-PRESSURE CHRONIC ULCER OF OTHER PART OF LEFT LOWER LEG WITH FAT LAYER EXPOSED (HCC): ICD-10-CM

## 2021-12-14 DIAGNOSIS — A49.9 BACTERIAL INFECTION, UNSPECIFIED: ICD-10-CM

## 2021-12-14 PROCEDURE — 11042 DBRDMT SUBQ TIS 1ST 20SQCM/<: CPT | Performed by: NURSE PRACTITIONER

## 2021-12-14 PROCEDURE — 87070 CULTURE OTHR SPECIMN AEROBIC: CPT | Performed by: NURSE PRACTITIONER

## 2021-12-14 PROCEDURE — 87176 TISSUE HOMOGENIZATION CULTR: CPT | Performed by: NURSE PRACTITIONER

## 2021-12-14 PROCEDURE — 87075 CULTR BACTERIA EXCEPT BLOOD: CPT | Performed by: NURSE PRACTITIONER

## 2021-12-14 PROCEDURE — 87205 SMEAR GRAM STAIN: CPT | Performed by: NURSE PRACTITIONER

## 2021-12-14 PROCEDURE — 99213 OFFICE O/P EST LOW 20 MIN: CPT | Performed by: NURSE PRACTITIONER

## 2021-12-16 LAB
BACTERIA SPEC AEROBE CULT: NORMAL
GRAM STN SPEC: NORMAL
GRAM STN SPEC: NORMAL

## 2021-12-17 ENCOUNTER — OFFICE VISIT (OUTPATIENT)
Dept: WOUND CARE | Facility: HOSPITAL | Age: 61
End: 2021-12-17

## 2021-12-17 DIAGNOSIS — Q82.0 HEREDITARY LYMPHEDEMA: ICD-10-CM

## 2021-12-17 DIAGNOSIS — R60.0 LOCALIZED EDEMA: ICD-10-CM

## 2021-12-17 DIAGNOSIS — A49.9 BACTERIAL INFECTION, UNSPECIFIED: ICD-10-CM

## 2021-12-17 DIAGNOSIS — L97.822 NON-PRESSURE CHRONIC ULCER OF OTHER PART OF LEFT LOWER LEG WITH FAT LAYER EXPOSED (HCC): ICD-10-CM

## 2021-12-17 PROCEDURE — 11042 DBRDMT SUBQ TIS 1ST 20SQCM/<: CPT | Performed by: PODIATRIST

## 2021-12-20 LAB — BACTERIA SPEC ANAEROBE CULT: NORMAL

## 2021-12-23 ENCOUNTER — TELEPHONE (OUTPATIENT)
Dept: FAMILY MEDICINE CLINIC | Age: 61
End: 2021-12-23

## 2021-12-23 DIAGNOSIS — I10 ESSENTIAL HYPERTENSION: ICD-10-CM

## 2021-12-23 RX ORDER — CARVEDILOL 3.12 MG/1
TABLET ORAL
Qty: 180 TABLET | Refills: 1 | Status: SHIPPED | OUTPATIENT
Start: 2021-12-23 | End: 2022-05-27

## 2021-12-27 ENCOUNTER — OFFICE VISIT (OUTPATIENT)
Dept: WOUND CARE | Facility: HOSPITAL | Age: 61
End: 2021-12-27

## 2021-12-27 DIAGNOSIS — L97.822 NON-PRESSURE CHRONIC ULCER OF OTHER PART OF LEFT LOWER LEG WITH FAT LAYER EXPOSED (HCC): ICD-10-CM

## 2021-12-27 DIAGNOSIS — R60.0 LOCALIZED EDEMA: ICD-10-CM

## 2021-12-27 DIAGNOSIS — Q82.0 HEREDITARY LYMPHEDEMA: ICD-10-CM

## 2021-12-27 DIAGNOSIS — A49.9 BACTERIAL INFECTION, UNSPECIFIED: ICD-10-CM

## 2021-12-27 PROCEDURE — 11042 DBRDMT SUBQ TIS 1ST 20SQCM/<: CPT | Performed by: NURSE PRACTITIONER

## 2022-01-07 ENCOUNTER — OFFICE VISIT (OUTPATIENT)
Dept: WOUND CARE | Facility: HOSPITAL | Age: 62
End: 2022-01-07

## 2022-01-07 DIAGNOSIS — Q82.0 HEREDITARY LYMPHEDEMA: ICD-10-CM

## 2022-01-07 DIAGNOSIS — A49.9 BACTERIAL INFECTION, UNSPECIFIED: ICD-10-CM

## 2022-01-07 DIAGNOSIS — L97.822 NON-PRESSURE CHRONIC ULCER OF OTHER PART OF LEFT LOWER LEG WITH FAT LAYER EXPOSED: ICD-10-CM

## 2022-01-07 DIAGNOSIS — R60.0 LOCALIZED EDEMA: ICD-10-CM

## 2022-01-07 PROCEDURE — 11042 DBRDMT SUBQ TIS 1ST 20SQCM/<: CPT | Performed by: PODIATRIST

## 2022-01-14 ENCOUNTER — OFFICE VISIT (OUTPATIENT)
Dept: FAMILY MEDICINE CLINIC | Age: 62
End: 2022-01-14
Payer: COMMERCIAL

## 2022-01-14 ENCOUNTER — OFFICE VISIT (OUTPATIENT)
Dept: WOUND CARE | Facility: HOSPITAL | Age: 62
End: 2022-01-14

## 2022-01-14 ENCOUNTER — HOSPITAL ENCOUNTER (OUTPATIENT)
Dept: GENERAL RADIOLOGY | Age: 62
Discharge: HOME | End: 2022-01-14
Payer: COMMERCIAL

## 2022-01-14 VITALS
OXYGEN SATURATION: 92 % | BODY MASS INDEX: 38.08 KG/M2 | HEART RATE: 81 BPM | TEMPERATURE: 97.1 F | SYSTOLIC BLOOD PRESSURE: 108 MMHG | WEIGHT: 195 LBS | DIASTOLIC BLOOD PRESSURE: 68 MMHG

## 2022-01-14 DIAGNOSIS — A49.9 BACTERIAL INFECTION, UNSPECIFIED: ICD-10-CM

## 2022-01-14 DIAGNOSIS — I10 ESSENTIAL HYPERTENSION: ICD-10-CM

## 2022-01-14 DIAGNOSIS — L97.822 NON-PRESSURE CHRONIC ULCER OF OTHER PART OF LEFT LOWER LEG WITH FAT LAYER EXPOSED: ICD-10-CM

## 2022-01-14 DIAGNOSIS — R09.02 OXYGEN DESATURATION: Primary | ICD-10-CM

## 2022-01-14 DIAGNOSIS — F41.9 ANXIETY: ICD-10-CM

## 2022-01-14 DIAGNOSIS — Q82.0 HEREDITARY LYMPHEDEMA: ICD-10-CM

## 2022-01-14 DIAGNOSIS — R60.0 LOCALIZED EDEMA: ICD-10-CM

## 2022-01-14 DIAGNOSIS — R09.02 OXYGEN DESATURATION: ICD-10-CM

## 2022-01-14 DIAGNOSIS — F33.1 MODERATE EPISODE OF RECURRENT MAJOR DEPRESSIVE DISORDER (HCC): ICD-10-CM

## 2022-01-14 PROCEDURE — 99214 OFFICE O/P EST MOD 30 MIN: CPT | Performed by: NURSE PRACTITIONER

## 2022-01-14 PROCEDURE — 71046 X-RAY EXAM CHEST 2 VIEWS: CPT

## 2022-01-14 PROCEDURE — 11042 DBRDMT SUBQ TIS 1ST 20SQCM/<: CPT | Performed by: PODIATRIST

## 2022-01-14 NOTE — PROGRESS NOTES
Pt O2 was checked and was 95 to start. Pt was walked in office and O2 dropped to 86, after being put on oxygen at 2 liters the O2 went back up to 98.

## 2022-01-14 NOTE — PROGRESS NOTES
cxr        SUBJECTIVE:    Patient ID: Cathy Bullock is a60 y.o. female. Cathy Bullock is here today for Hypertension (med refill) and Follow-up (discuss oxygen level)  . HPI:   HPI     Pt is concerned of bp. Today she is normotensive but states that she has it checked twice weekly with wound care and wound care nurse and systolic she is [phone]. She is taking spironolactone 2 in am and 1 in pm.    Machine checked bp at wound care. Pt continues to have oxygen therapy at home but has not been using it regularly. She states home spo2 88-94 at home. States having some onset of increases fatigue 3-4wks ago. States speaking with therapist weekly. She feels her wound has really \"put me in a funk\". Sees psych Jan 27, 2022. Last lab with Dr. Grayson Coles was 2mo ago. Due again this month. Past Medical History:   Diagnosis Date    Arthritis     Bipolar I disorder, most recent episode (or current) mixed, unspecified     Chronic back pain     Depression     Dry mouth     Hyperlipidemia     Hypertension     Hypokalemia     Hypothyroidism     Menopause     Morbidly obese ([facility] [address] 10/28/2020    Overactive bladder     Plantar fasciitis     RLS (restless legs syndrome)     Sleep apnea     Spondylosis with myelopathy, lumbar region     Thyroid disease      Prior to Visit Medications    Medication Sig Taking?  Authorizing Provider   carvedilol (COREG) 3.125 MG tablet TAKE ONE TABLET BY MOUTH TWICE DAILY WITH MEALS Yes MAEVE Cuevas   gabapentin (NEURONTIN) 300 MG capsule TAKE ONE CAPSULE BY MOUTH THREE TIMES DAILY AS NEEDED FOR NERVE PAIN Yes MAEVE Cuevas   FLUoxetine (PROZAC) 40 MG capsule TAKE ONE CAPSULE BY MOUTH EVERY MORNING AS DIRECTED FOR DEPRESSION/ANXIETY Yes MAEVE Cuevas   pilocarpine (SALAGEN) 5 MG tablet TAKE ONE TABLET BY MOUTH THREE TIMES DAILY Yes MAEVE Cuevas   LATUDA 60 MG TABS tablet Take 60 mg by mouth daily Yes Historical Provider, MD   WEGOVY 2.4 MG/0.75ML SOAJ SC injection INJECT 2.4mg (0.75ML) UNDER THE SKIN EVERY WEEK Yes Historical Provider, MD   UNABLE TO FIND Please adjust medication refills to limit pharmacy visits--I will authorize most quantities to allow this to happen  Patient not taking: Reported on 1/18/2022 Yes MAEVE Cuevas   allopurinol (ZYLOPRIM) 100 MG tablet TAKE ONE TABLET BY MOUTH DAILY Yes MAEVE Cuevas   pramipexole (MIRAPEX) 1.5 MG tablet TAKE 1/2 TABLET BY MOUTH EVERY MORNING AND ONE TABLET NIGHTLY **MAY MAKE DROWSY Yes MAEVE Cuevas   traZODone (DESYREL) 100 MG tablet 1-2 po as needed for sleep Yes MAEVE Cuevas   atorvastatin (LIPITOR) 40 MG tablet TAKE ONE TABLET BY MOUTH EVERY NIGHT FOR cholesterol Yes MAEVE Cuevas   traMADol (ULTRAM) 50 MG tablet Take 50 mg by mouth 3 times daily as needed for Pain. Yes Historical Provider, MD   niacin (SLO-NIACIN) 500 MG extended release tablet Take 500 mg by mouth daily Yes Historical Provider, MD   spironolactone (ALDACTONE) 25 MG tablet 2 po in am and 1 po in evening for fluid  Patient taking differently: 1 po in am and 1 po in evening for fluid Yes MAEVE Cuevas   NARCAN 4 MG/0.1ML LIQD nasal spray  Yes Historical Provider, MD   diclofenac (VOLTAREN) 75 MG EC tablet Take 75 mg by mouth 2 times daily  Yes Historical Provider, MD   HYDROcodone-acetaminophen (NORCO) 7.5-325 MG per tablet Take 1 tablet by mouth 2 times daily as needed for Pain.   Yes Historical Provider, MD   methylcellulose ([employer] SOLUBLE FIBER THERAPY) 500 MG TABS  Yes Historical Provider, MD   loperamide (IMODIUM A-D) 2 MG tablet TAKE 2  MG ORAL AS NEEDED EVERY 12 HOURS Yes Historical Provider, MD   levothyroxine (SYNTHROID) 75 MCG tablet Take 75 mcg by mouth Daily  Yes Historical Provider, MD   omeprazole (PRILOSEC) 20 MG delayed release capsule Take 1 capsule by mouth 2 times daily (before meals) Yes MAEVE Cuevas   tiZANidine (ZANAFLEX) 4 MG tablet Take 4 mg by mouth daily  Yes Historical Provider, MD   Vitamin D time in stopping. Substance and Sexual Activity    Alcohol use: Yes     Comment: rarely    Drug use: No     Comment: pt admits \"may have misused in past\"--Pt was vague with details.  Sexual activity: Not on file   Other Topics Concern    Not on file   Social History Narrative    Not on file     Social Determinants of Health     Financial Resource Strain: Low Risk     Difficulty of Paying Living Expenses: Not hard at all   Food Insecurity: No Food Insecurity    Worried About Running Out of Food in the Last Year: Never true    [address] in the Last Year: Never true   Transportation Needs: No Transportation Needs    Lack of Transportation (Medical): No    Lack of Transportation (Non-Medical): No   Physical Activity:     Days of Exercise per Week: Not on file    Minutes of Exercise per Session: Not on file   Stress:     Feeling of Stress : Not on file   Social Connections:     Frequency of Communication with Friends and Family: Not on file    Frequency of Social Gatherings with Friends and Family: Not on file    Attends Yazdanism Services: Not on file    Active Member of [address] [place] [address] or Organizations: Not on file    Attends Club or Organization Meetings: Not on file    Marital Status: Not on file   Intimate Partner Violence:     Fear of Current or Ex-Partner: Not on file    Emotionally Abused: Not on file    Physically Abused: Not on file    Sexually Abused: Not on file   Housing Stability:     Unable to Pay for Housing in the Last Year: Not on file    Number of [place] in the Last Year: Not on file    Unstable Housing in the Last Year: Not on file       Review of Systems   Constitutional: Positive for fatigue. Respiratory: Positive for shortness of breath. Cardiovascular: Negative for leg swelling. Musculoskeletal: Positive for arthralgias. Skin: Positive for wound. Psychiatric/Behavioral: The patient is nervous/anxious.         OBJECTIVE:    Physical Exam  Vitals and nursing note reviewed. Constitutional:       General: She is not in acute distress. Appearance: Normal appearance. She is well-developed. She is obese. She is not ill-appearing or toxic-appearing. HENT:      Head: Normocephalic and atraumatic. Eyes:      Extraocular Movements: Extraocular movements intact. Conjunctiva/sclera: Conjunctivae normal.      Pupils: Pupils are equal, round, and reactive to light. Neck:      Vascular: No carotid bruit. Trachea: No tracheal deviation. Cardiovascular:      Rate and Rhythm: Normal rate and regular rhythm. Heart sounds: Normal heart sounds. Pulmonary:      Effort: Pulmonary effort is normal. No respiratory distress. Breath sounds: Normal breath sounds. Musculoskeletal:      Cervical back: Normal range of motion and neck supple. No rigidity. Right lower leg: No edema. Left lower leg: No edema. Lymphadenopathy:      Cervical: No cervical adenopathy. Skin:     General: Skin is warm and dry. Capillary Refill: Capillary refill takes less than 2 seconds. Neurological:      General: No focal deficit present. Mental Status: She is alert and oriented to person, place, and time. Mental status is at baseline. Psychiatric:         Mood and Affect: Mood normal. Mood is not anxious or depressed. Affect is not angry. Speech: Speech normal.         Behavior: Behavior normal.         Thought Content: Thought content normal.         Judgment: Judgment normal.         /68   Pulse 81   Temp 97.1 °F (36.2 °C) (Temporal)   Wt 195 lb (88.5 kg)   SpO2 92%   BMI 38.08 kg/m²      ASSESSMENT:      ICD-10-CM    1. Oxygen desaturation  R09.02 XR CHEST STANDARD (2 VW)     External Referral To Pulmonology     DME Order for Home Oxygen as OP   2. Essential hypertension  I10 No change to bp meds; pt is aware of my concern with increasing meds based on home readings with today's being great.    3. Moderate episode of recurrent major depressive disorder ([facility] [address]  F33.1 Keep f/u with psych   4. Anxiety  F41.9 Keep f/u with psych       PLAN:    Cameron Folds: Hypertension (med refill) and Follow-up (discuss oxygen level)  plan as above  cxr  Walking spo2 needed--see MA note. Pt to wear o2 when ambulating but can remove when sitting. Referral.    Diagnosis and orders for this visit are above. Please note that this chart was generated using dragon dictationsoftware. Although every effort was made to ensure the accuracy of this automated transcription, some errors in transcription may have occurred.

## 2022-01-18 ENCOUNTER — OFFICE VISIT (OUTPATIENT)
Dept: NEUROLOGY | Age: 62
End: 2022-01-18
Payer: COMMERCIAL

## 2022-01-18 VITALS
DIASTOLIC BLOOD PRESSURE: 73 MMHG | RESPIRATION RATE: 16 BRPM | HEIGHT: 60 IN | SYSTOLIC BLOOD PRESSURE: 117 MMHG | BODY MASS INDEX: 37.5 KG/M2 | WEIGHT: 191 LBS | HEART RATE: 75 BPM

## 2022-01-18 DIAGNOSIS — G47.33 SLEEP APNEA, OBSTRUCTIVE: Primary | ICD-10-CM

## 2022-01-18 DIAGNOSIS — G25.0 TREMOR, ESSENTIAL: ICD-10-CM

## 2022-01-18 DIAGNOSIS — G25.81 RLS (RESTLESS LEGS SYNDROME): ICD-10-CM

## 2022-01-18 PROCEDURE — 99213 OFFICE O/P EST LOW 20 MIN: CPT | Performed by: PSYCHIATRY & NEUROLOGY

## 2022-01-18 NOTE — PROGRESS NOTES
INJ,PARAVERTEBRAL L/S,1 LEVEL Bilateral 2/7/2017    INJECTION MEDICATION FACET JOINT performed by Krystyna Reis at [facility] 3  · None    Significant Injuries  · None    Habits  Rosa Law reports that she quit smoking about 18 years ago. Her smoking use included cigarettes. She has a 30.00 pack-year smoking history. She has never used smokeless tobacco. She reports current alcohol use. She reports that she does not use drugs. Family History   Problem Relation Age of Onset    Cancer Mother         lung    Heart Disease Mother     Hypertension Mother     Bipolar Disorder Mother     Heart Disease Father     Hypertension Father        Social History  Kyara Beckham is , lives in [place], [place], and is unemployed.     Medications:  Current Outpatient Medications   Medication Sig Dispense Refill    carvedilol (COREG) 3.125 MG tablet TAKE ONE TABLET BY MOUTH TWICE DAILY WITH MEALS 180 tablet 1    gabapentin (NEURONTIN) 300 MG capsule TAKE ONE CAPSULE BY MOUTH THREE TIMES DAILY AS NEEDED FOR NERVE PAIN 270 capsule 1    FLUoxetine (PROZAC) 40 MG capsule TAKE ONE CAPSULE BY MOUTH EVERY MORNING AS DIRECTED FOR DEPRESSION/ANXIETY 30 capsule 1    pilocarpine (SALAGEN) 5 MG tablet TAKE ONE TABLET BY MOUTH THREE TIMES DAILY 180 tablet 0    LATUDA 60 MG TABS tablet Take 60 mg by mouth daily      WEGOVY 2.4 MG/0.75ML SOAJ SC injection INJECT 2.4mg (0.75ML) UNDER THE SKIN EVERY WEEK      allopurinol (ZYLOPRIM) 100 MG tablet TAKE ONE TABLET BY MOUTH DAILY 90 tablet 1    pramipexole (MIRAPEX) 1.5 MG tablet TAKE 1/2 TABLET BY MOUTH EVERY MORNING AND ONE TABLET NIGHTLY **MAY MAKE DROWSY 135 tablet 1    traZODone (DESYREL) 100 MG tablet 1-2 po as needed for sleep 180 tablet 2    atorvastatin (LIPITOR) 40 MG tablet TAKE ONE TABLET BY MOUTH EVERY NIGHT FOR cholesterol 90 tablet 1    oxyCODONE-acetaminophen (PERCOCET) 7.5-325 MG per tablet       traMADol (ULTRAM) 50 MG tablet Take 50 mg by mouth 3 times daily as needed for Pain.  niacin (SLO-NIACIN) 500 MG extended release tablet Take 500 mg by mouth daily      spironolactone (ALDACTONE) 25 MG tablet 2 po in am and 1 po in evening for fluid (Patient taking differently: 1 po in am and 1 po in evening for fluid) 270 tablet 1    NARCAN 4 MG/0.1ML LIQD nasal spray       diclofenac (VOLTAREN) 75 MG EC tablet Take 75 mg by mouth 2 times daily       HYDROcodone-acetaminophen (NORCO) 7.5-325 MG per tablet Take 1 tablet by mouth 2 times daily as needed for Pain.  methylcellulose ([employer] SOLUBLE FIBER THERAPY) 500 MG TABS       loperamide (IMODIUM A-D) 2 MG tablet TAKE 2  MG ORAL AS NEEDED EVERY 12 HOURS      levothyroxine (SYNTHROID) 75 MCG tablet Take 75 mcg by mouth Daily       omeprazole (PRILOSEC) 20 MG delayed release capsule Take 1 capsule by mouth 2 times daily (before meals) 20 capsule 0    tiZANidine (ZANAFLEX) 4 MG tablet Take 4 mg by mouth daily       Vitamin D (CHOLECALCIFEROL) 1000 UNITS CAPS capsule Take 1,000 Units by mouth daily      Multiple Vitamins-Minerals (MULTIVITAMIN PO) Take by mouth daily      calcium carbonate 600 MG TABS tablet Take 1 tablet by mouth daily.  UNABLE TO FIND Please adjust medication refills to limit pharmacy visits--I will authorize most quantities to allow this to happen (Patient not taking: Reported on 1/18/2022) 1 Device 0     No current facility-administered medications for this visit. Allergies:   Allergies as of 01/18/2022 - Fully Reviewed 01/18/2022   Allergen Reaction Noted    Clindamycin/lincomycin Other (See Comments) 08/02/2021    Bactrim [sulfamethoxazole-trimethoprim]  04/23/2020    Hctz [hydrochlorothiazide] Other (See Comments) 06/03/2016    Sulfamethoxazole  05/27/2021    Trimethoprim  05/27/2021    Metformin and related Diarrhea 02/25/2021    Sitagliptin Rash 07/09/2019       Review of Systems:  Constitutional: negative for - chills and fever  Eyes: negative for - visual disturbance and photophobia  HENMT: negative for - headaches and sinus pain  Respiratory: positive for - cough, shortness of breath  Cardiovascular: negative for - chest pain and palpitations  Gastrointestinal: negative for - blood in stools, constipation, diarrhea, nausea, and vomiting  Genito-Urinary: negative for - hematuria, urinary frequency, urinary urgency, and urinary retention  Musculoskeletal: positive for - joint pain, joint stiffness, and joint swelling  Hematological and Lymphatic: negative for - bleeding problems, abnormal bruising, and swollen lymph nodes  Endocrine:  negative for - polydipsia and polyphagia  Allergy/Immunology:  negative for - rhinorrhea, sinus congestion, hives  Integument:  negative for - negative for - rash, change in moles, new or changing lesions  Psychological: negative for - anxiety and depression  Neurological: negative for - memory loss, numbness/tingling, and weakness     PHYSICAL EXAMINATION:  Vitals:  /73   Pulse 75   Resp 16   Ht 5' (1.524 m)   Wt 191 lb (86.6 kg)   BMI 37.30 kg/m²   General appearance:  Alert, well developed, well nourished, in no distress  HEENT:  normocephalic, atraumatic, sclera appear normal, no nasal abnormalities, no rhinorrhea, Ears appear normal, oral mucous membranes are moist without erythema, trachea midline, thyroid is normal, no lymphadenopathy or neck mass. Cardiovascular:  Regular rate and rhythm without murmer. No peripheral edema, No cyanosis or clubbing. No carotid bruits. Pulmonary:  Lungs are clear to auscultation. Breathing appears normal, good expansion, normal effort without use of accessory muscles  Musculoskeletal:  Joints are arthritic. Integument:  No rash, erythema, or pallor  Psychiatric:  Mood, affect, and behavior appear normal      NEUROLOGIC EXAMINATION:  Mental Status:  alert, oriented to person, place, and time.   Speech:  Clear without dysarthria or dysphonia  Language:  Fluent without aphasia  Cranial Nerves:   II Visual fields are full to confrontation   III,IV, VI Extraocular movements are full   VII Facial movements are symmetrical without weakness   VIII Hearing is intact   IX,X Shoulder shrug and head rotation strength are intact   XII No tongue atrophy or fasciculations. Normal tongue protrusion. No tongue weakness  Motor:  Normal strength in both upper and lower extremities. Normal muscle tone and bulk. Deep tendon reflexes are intact and symmetrical in both upper and lower extremities. Varghese's signs are absent bilaterally. There is no ankle clonus on either side. Sensation:  Sensation is intact to light touch, temperature, and vibration in all extremities. Coordination:  Rapid alternating movements are normal in both upper and lower extremities. Finger to nose testing is unimpaired bilaterally. Gait:  Normal station and gait. Pertinent Diagnostic Studies:  BiPAP download shows that she is 47% compliant with auto BiPAP use at 10cm to 22cm with PS or 5cm with residual AHI of 5.4. Assessment:       ICD-10-CM    1. Sleep apnea, obstructive  G47.33    2. Tremor, essential  G25.0    3. RLS (restless legs syndrome)  G25.81    I discussed the diagnosis of obstructive sleep apnea with Ifeoma Millan including the pathophysiology (namely the mechanism of breathing and obstruction of upper airway, interruptions of sleep, hypoxemia, hypercapnia, and results of repetitive sympathetic activation), risks, evaluation, and treatment options. I discussed the risks of driving when drowsy and advised that Ifeoma Millan not drive when drowsy and avoid sedating medications and respiratory suppressants. Plan:   1.  Use BiPAP more  2. FU in a year    Electronically signed by Stefan Stephenson MD on 1/18/22

## 2022-01-21 ENCOUNTER — OFFICE VISIT (OUTPATIENT)
Dept: WOUND CARE | Facility: HOSPITAL | Age: 62
End: 2022-01-21

## 2022-01-21 DIAGNOSIS — R60.0 LOCALIZED EDEMA: ICD-10-CM

## 2022-01-21 DIAGNOSIS — Q82.0 HEREDITARY LYMPHEDEMA: ICD-10-CM

## 2022-01-21 DIAGNOSIS — A49.9 BACTERIAL INFECTION, UNSPECIFIED: ICD-10-CM

## 2022-01-21 DIAGNOSIS — L97.822 NON-PRESSURE CHRONIC ULCER OF OTHER PART OF LEFT LOWER LEG WITH FAT LAYER EXPOSED: ICD-10-CM

## 2022-01-21 PROCEDURE — 11042 DBRDMT SUBQ TIS 1ST 20SQCM/<: CPT | Performed by: PODIATRIST

## 2022-01-24 ASSESSMENT — ENCOUNTER SYMPTOMS: SHORTNESS OF BREATH: 1

## 2022-01-28 ENCOUNTER — OFFICE VISIT (OUTPATIENT)
Dept: WOUND CARE | Facility: HOSPITAL | Age: 62
End: 2022-01-28

## 2022-01-28 DIAGNOSIS — L97.822 NON-PRESSURE CHRONIC ULCER OF OTHER PART OF LEFT LOWER LEG WITH FAT LAYER EXPOSED: ICD-10-CM

## 2022-01-28 DIAGNOSIS — E11.9 TYPE 2 DIABETES MELLITUS TREATED WITHOUT INSULIN (HCC): ICD-10-CM

## 2022-01-28 DIAGNOSIS — G47.00 INSOMNIA, UNSPECIFIED TYPE: ICD-10-CM

## 2022-01-28 DIAGNOSIS — R60.0 LOCALIZED EDEMA: ICD-10-CM

## 2022-01-28 DIAGNOSIS — I10 ESSENTIAL HYPERTENSION: ICD-10-CM

## 2022-01-28 DIAGNOSIS — A49.9 BACTERIAL INFECTION, UNSPECIFIED: ICD-10-CM

## 2022-01-28 DIAGNOSIS — Q82.0 HEREDITARY LYMPHEDEMA: ICD-10-CM

## 2022-01-28 DIAGNOSIS — E78.2 MIXED HYPERLIPIDEMIA: Primary | ICD-10-CM

## 2022-01-28 PROCEDURE — 11042 DBRDMT SUBQ TIS 1ST 20SQCM/<: CPT | Performed by: PODIATRIST

## 2022-02-07 ENCOUNTER — OFFICE VISIT (OUTPATIENT)
Dept: FAMILY MEDICINE CLINIC | Age: 62
End: 2022-02-07
Payer: COMMERCIAL

## 2022-02-07 VITALS
HEART RATE: 75 BPM | OXYGEN SATURATION: 94 % | DIASTOLIC BLOOD PRESSURE: 70 MMHG | TEMPERATURE: 97 F | SYSTOLIC BLOOD PRESSURE: 116 MMHG | WEIGHT: 192 LBS | BODY MASS INDEX: 37.5 KG/M2

## 2022-02-07 DIAGNOSIS — E03.9 ACQUIRED HYPOTHYROIDISM: ICD-10-CM

## 2022-02-07 DIAGNOSIS — E66.01 SEVERE OBESITY (BMI 35.0-39.9) WITH COMORBIDITY (HCC): ICD-10-CM

## 2022-02-07 DIAGNOSIS — E78.2 MIXED HYPERLIPIDEMIA: ICD-10-CM

## 2022-02-07 DIAGNOSIS — I10 ESSENTIAL HYPERTENSION: ICD-10-CM

## 2022-02-07 DIAGNOSIS — S81.802D OPEN WOUND OF LEFT LOWER LEG, SUBSEQUENT ENCOUNTER: ICD-10-CM

## 2022-02-07 DIAGNOSIS — E11.9 TYPE 2 DIABETES MELLITUS TREATED WITHOUT INSULIN (HCC): Primary | ICD-10-CM

## 2022-02-07 PROCEDURE — 99214 OFFICE O/P EST MOD 30 MIN: CPT | Performed by: NURSE PRACTITIONER

## 2022-02-07 RX ORDER — ATORVASTATIN CALCIUM 40 MG/1
TABLET, FILM COATED ORAL
Qty: 90 TABLET | Refills: 1 | Status: SHIPPED | OUTPATIENT
Start: 2022-02-07 | End: 2022-08-24

## 2022-02-07 RX ORDER — LEVOTHYROXINE SODIUM 0.07 MG/1
TABLET ORAL
Qty: 90 TABLET | Refills: 1 | Status: SHIPPED | OUTPATIENT
Start: 2022-02-07 | End: 2022-08-24

## 2022-02-07 RX ORDER — SEMAGLUTIDE 2.4 MG/.75ML
INJECTION, SOLUTION SUBCUTANEOUS
Qty: 9 ML | Refills: 1 | Status: SHIPPED | OUTPATIENT
Start: 2022-02-07 | End: 2022-08-18

## 2022-02-07 RX ORDER — GABAPENTIN 100 MG/1
CAPSULE ORAL
Qty: 180 CAPSULE | Refills: 1 | Status: SHIPPED | OUTPATIENT
Start: 2022-02-07 | End: 2022-07-05

## 2022-02-07 RX ORDER — SPIRONOLACTONE 25 MG/1
TABLET ORAL
Qty: 270 TABLET | Refills: 1 | Status: SHIPPED | OUTPATIENT
Start: 2022-02-07 | End: 2022-08-24

## 2022-02-07 ASSESSMENT — ENCOUNTER SYMPTOMS
BACK PAIN: 1
RESPIRATORY NEGATIVE: 1

## 2022-02-07 NOTE — LETTER
CONTROLLED SUBSTANCE MEDICATION AGREEMENT     Patient Name: Suzanna Kraft  Patient YOB: 1960   I understand, that controlled substance medications may be used to help better manage my symptoms and to improve my ability to function at home, work and in social settings. However, I also understand that these medications do have risks, which have been discussed with me, including possible development of physical or psychological dependence. I understand that successful treatment requires mutual trust and honesty between me and my provider. I understand and agree that following this Medication Agreement is necessary in continuing my provider-patient relationship and the success of my treatment plan. Explanation from my Provider: Benefits and Goals of Controlled Substance Medications: There are two potential goals for your treatment: (1) decreased pain and suffering (2) improved daily life functions. There are many possible treatments for your chronic condition(s). Alternatives such as physical therapy, yoga, massage, home daily exercise, meditation, relaxation techniques, injections, chiropractic manipulations, surgery, cognitive therapy, hypnosis and many medications that are not habit-forming may be used. Use of controlled substance medications may be helpful, but they are unlikely to resolve all symptoms or restore all function. Explanation from my Provider: Risks of Controlled Substance Medications:  Opioid pain medications: These medications can lead to problems such as addiction/dependence, sedation, lightheadedness/dizziness, memory issues, falls, constipation, nausea, or vomiting. They may also impair the ability to drive or operate machinery. Additionally, these medications may lower testosterone levels, leading to loss of bone strength, stamina and sex drive.   They may cause problems with breathing, sleep apnea and reduced coughing, which is especially dangerous for patients with lung disease. Overdose or dangerous interactions with alcohol and other medications may occur, leading to death. Hyperalgesia may develop, which means patients receiving opioids for the treatment of pain may become more sensitive to certain painful stimuli, and in some cases, experience pain from ordinarily non-painful stimuli. Women between the ages of 14-53 who could become pregnant should carefully weigh the risks and benefits of opioids with their physicians, as these medications increase the risk of pregnancy complications, including miscarriage,  delivery and stillbirth. It is also possible for babies to be born addicted to opioids. Opioid dependence withdrawal symptoms may include; feelings of uneasiness, increased pain, irritability, belly pain, diarrhea, sweats and goose-flesh. Benzodiazepines and non-benzodiazepine sleep medications: These medications can lead to problems such as addiction/dependence, sedation, fatigue, lightheadedness, dizziness, incoordination, falls, depression, hallucinations, and impaired judgment, memory and concentration. The ability to drive and operate machinery may also be affected. Abnormal sleep-related behaviors have been reported, including sleepwalking, driving, making telephone calls, eating, or having sex while not fully awake. These medications can suppress breathing and worsen sleep apnea, particularly when combined with alcohol or other sedating medications, potentially leading to death. Dependence withdrawal symptoms may include tremors, anxiety, hallucinations and seizures. Stimulants:  Common adverse effects include addiction/dependence, increased blood  pressure and heart rate, decreased appetite, nausea, involuntary weight loss, insomnia,                                                                                                                     Initials:_______   irritability, and headaches.   These risks may increase when these medications are combined with other stimulants, such as caffeine pills or energy drinks, certain weight loss supplements and oral decongestants. Dependence withdrawal symptoms may include depressed mood, loss of interest, suicidal thoughts, anxiety, fatigue, appetite changes and agitation. Testosterone replacement therapy:  Potential side effects include increased risk of stroke and heart attack, blood clots, increased blood pressure, increased cholesterol, enlarged prostate, sleep apnea, irritability/aggression and other mood disorders, and decreased fertility. I agree and understand that I and my prescriber have the following rights and responsibilities regarding my treatment plan:     1. MY RIGHTS:  To be informed of my treatment and medication plan. To be an active participant in my health and wellbeing. 2. MY RESPONSIBILITY AND UNDERSTANDING FOR USE OF MEDICATIONS   I will take medications at the dose and frequency as directed. For my safety, I will not increase or change how I take my medications without the recommendation of my healthcare provider.  I will actively participate in any program recommended by my provider which may improve function, including social, physical, psychological programs.  I will not take my medications with alcohol or other drugs not prescribed to me. I understand that drinking alcohol with my medications increases the chances of side effects, including reduced breathing rate and could lead to personal injury when operating machinery.  I understand that if I have a history of substance use disorders, including alcohol or other illicit drugs, that I may be at increased risk of addiction to my medications.  I agree to notify my provider immediately if I should become pregnant so that my treatment plan can be adjusted.    I agree and understand that I shall only receive controlled substance medications from the prescriber that signed this agreement unless there is written agreement among other prescribers of controlled substances outlining the responsibility of the medications being prescribed.  I understand that the if the controlled medication is not helping to achieve goals, the dosage may be tapered and no longer prescribed. 3. MY RESPONSIBILITY FOR COMMUNICATION / PRESCRIPTION RENEWALS   I agree that all controlled substance medications that I take will be prescribed only by my provider. If another healthcare provider prescribes me medication in an emergency, I will notify my provider within seventy-two (72) hours.  I will arrange for refills at the prescribed interval ONLY during regular office hours. I will not ask for refills earlier than agreed, after-hours, on holidays or weekends. Refills may take up to 72 hours for processing and prescriptions to reach the pharmacy.  I will inform my other health care providers that I am taking these medications and of the existence of this [id number]. In the event of an emergency, I will provide the same information to the emergency department prescribers.  I will keep my provider updated on the pharmacy I am using for controlled medication prescription filling. Initials:_______  4. MY RESPONSIBILITY FOR PROTECTING MEDICATIONS   I will protect my prescriptions and medications. I understand that lost or misplaced prescriptions will not be replaced.  I will keep medications only for my own use and will not share them with others. I will keep all medications away from children.  I agree that if my medications are adjusted or discontinued, I will properly dispose of any remaining medications. I understand that I will be required to dispose of any remaining controlled medications as, directed by my prescriber, prior to being provided with any prescriptions for other controlled medications.   Medication drop box locations can be found at: [email or web address]    5. MY RESPONSIBILITY WITH ILLEGAL DRUGS    I will not use illegal or street drugs or another person's prescription medications not prescribed to me.  If there are identified addiction type symptoms, then referral to a program may be provided by my provider and I agree to follow through with this recommendation. 6. MY RESPONSIBILITY FOR COOPERATION WITH INVESTIGATIONS   I understand that my provider will comply with any applicable law and may discuss my use and/or possible misuse/abuse of controlled substances and alcohol, as appropriate, with any health care provider involved in my care, pharmacist, or legal authority.  I authorize my provider and pharmacy to cooperate fully with law enforcement agencies (as permitted by law) in the investigation of any possible misuse, sale, or other diversion of my controlled substances.  I agree to waive any applicable privilege or right of privacy or confidentiality with respect to these authorizations. 7. PROVIDERS RIGHT TO MONITOR FOR SAFETY: PRESCRIPTION MONITORING / DRUG TESTING   I consent to drug/toxicology screening and will submit to a drug screen upon my providers request to assure I am only taking the prescribed drugs for my safety monitoring. I understand that a drug screen is a laboratory test in which a sample of my urine, blood or saliva is checked to see what drugs I have been taking. This may entail an observed urine specimen, which means that a nurse or other health care provider may watch me provide urine, and I will cooperate if I am asked to provide an observed specimen.  I understand that my provider will check a copy of my State Prescription Monitoring Program () Report in order to safely prescribe medications.  Pill Counts: I consent to pill counts when requested.   I may be asked to bring all my prescribed controlled substance medications, in their original bottles, to all of my scheduled appointments. In addition, my provider may ask me to come to the practice at any time for a random pill count. 8. TERMINATION OF THIS AGREEMENT  For my safety, my prescriber has the right to stop prescribing controlled substance medications and may end this agreement. Initials:_______   Conditions that may result in termination of this agreement:  a. I do not show any improvement in pain, or my activity has not improved. b. I develop rapid tolerance or loss of improvement, as described in my treatment plan.  c. I develop significant side effects from the medication. d. My behavior is not consistent with the responsibilities outlined above, thereby causing safety concerns to continue prescribing controlled substance medications. e. I fail to follow the terms of this agreement. f. Other:____________________________       UNDERSTANDING THIS MEDICATION AGREEMENT:    I have read the above and have had all my questions answered. For chronic disease management, I know that my symptoms can be managed with many types of treatments. A chronic medication trial may be part of my treatment, but I must be an active participant in my care. Medication therapy is only one part of my symptom management plan. In some cases, there may be limited scientific evidence to support the chronic use of certain medications to improve symptoms and daily function. Furthermore, in certain circumstances, there may be scientific information that suggests that the use of chronic controlled substances may worsen my symptoms and increase my risk of unintentional death directly related to this medication therapy. I know that if my provider feels my risk from controlled medications is greater than my benefit, I will have my controlled substance medication(s) compassionately lowered or removed altogether.      I further agree to allow this office to contact my HIPAA contact if there are concerns about my safety and use of the controlled medications. I have agreed to use the prescribed controlled substance medications to me as instructed by my provider and as stated in this Medication Agreement. My initial on each page and my signature below shows that I have read each page and I have had the opportunity to ask questions with answers provided by my provider.     Patient Name (Printed): _____________________________________  Patient Signature:  ______________________   Date: _____________    Prescriber Name (Printed): ___________________________________  Prescriber Signature: _____________________  Date: _____________

## 2022-02-07 NOTE — PROGRESS NOTES
SUBJECTIVE:    Patient ID: Ifeoma Millan is a60 y.o. female. Ifeoma Millan is here today for Diabetes and Discuss Medications (discuss gabapentin dosage)  . HPI:   HPI     Patient is here today following up for diabetes and her previous healthcare provider has passed away so she is requesting to start obtaining medications here. Pt is taking gabapentin 400mg and is interested in decreasing dose. She states that left leg wound is \"so much better\"  Pt continues to see wound care once weekly and has nurse care once weekly. States that she uses tramadol for fear of using hydrocodone too often, uses tramadol if needed instead. Anxiety/depression  Pt has been decreasing Latuda. States that she is doing well \"I am not depressed anymore\"  Decreasing dose pt states that she feels better. Continues to see therapist and will this week; weekly basis. Thyroid  M/W/F/Sat take 1 po and on Sun/T/Th take 1/2 po daily 30mins before breakfast  Patient states that the initial goal was that she would be stopping levothyroxine but she does state that as the medication has been reduced she and her previous provider for thyroid have discussed this would not be possible and she is now staying at this decreased dose as mentioned above. She does continue to lose wt since becoming more active. Still, while here today, she is requesting a handicap license plate. Patient does have multiple health history to support this and I will be signing the form today.     Past Medical History:   Diagnosis Date    Arthritis     Bipolar I disorder, most recent episode (or current) mixed, unspecified     Chronic back pain     Depression     Dry mouth     Hyperlipidemia     Hypertension     Hypokalemia     Hypothyroidism     Menopause     Morbidly obese ([facility] [address] 10/28/2020    Overactive bladder     Plantar fasciitis     RLS (restless legs syndrome)     Sleep apnea     Spondylosis with myelopathy, lumbar region     Thyroid disease      Prior to Visit Medications    Medication Sig Taking? Authorizing Provider   atorvastatin (LIPITOR) 40 MG tablet TAKE ONE TABLET BY MOUTH EVERY NIGHT FOR cholesterol Yes MAEVE Cuevas   spironolactone (ALDACTONE) 25 MG tablet 2 po in am and 1 po in evening for fluid Yes MAEVE Cuevas   WEGOVY 2.4 MG/0.75ML SOAJ SC injection INJECT 2.4mg (0.75ML) UNDER THE SKIN EVERY WEEK Yes MAEVE Cuevas   gabapentin (NEURONTIN) 100 MG capsule 1-2 po tid for nerve pain Yes MAEVE Cuevas   levothyroxine (SYNTHROID) 75 MCG tablet 1 po daily as directed Yes MAEVE Cuevas   carvedilol (COREG) 3.125 MG tablet TAKE ONE TABLET BY MOUTH TWICE DAILY WITH MEALS Yes MAEVE Cuevas   FLUoxetine (PROZAC) 40 MG capsule TAKE ONE CAPSULE BY MOUTH EVERY MORNING AS DIRECTED FOR DEPRESSION/ANXIETY Yes MAEVE Cuevas   pilocarpine (SALAGEN) 5 MG tablet TAKE ONE TABLET BY MOUTH THREE TIMES DAILY Yes MAEVE Cuevas   LATUDA 60 MG TABS tablet Take 60 mg by mouth daily Yes Historical Provider, MD   UNABLE TO FIND Please adjust medication refills to limit pharmacy visits--I will authorize most quantities to allow this to happen Yes MAEVE Cuevas   allopurinol (ZYLOPRIM) 100 MG tablet TAKE ONE TABLET BY MOUTH DAILY Yes MAEVE Cuevas   pramipexole (MIRAPEX) 1.5 MG tablet TAKE 1/2 TABLET BY MOUTH EVERY MORNING AND ONE TABLET NIGHTLY **MAY MAKE DROWSY Yes MAEVE Cuevas   traZODone (DESYREL) 100 MG tablet 1-2 po as needed for sleep Yes MAEVE Cuevas   traMADol (ULTRAM) 50 MG tablet Take 50 mg by mouth 3 times daily as needed for Pain.  Yes Historical Provider, MD   niacin (SLO-NIACIN) 500 MG extended release tablet Take 500 mg by mouth daily Yes Historical Provider, MD   NARCAN 4 MG/0.1ML LIQD nasal spray  Yes Historical Provider, MD   diclofenac (VOLTAREN) 75 MG EC tablet Take 75 mg by mouth 2 times daily  Yes Historical Provider, MD   HYDROcodone-acetaminophen (NORCO) 7.5-325 MG per tablet Take 1 tablet by Number of children: 2    Years of education: some college    Highest education level: Not on file   Occupational History     Employer: [employer]   Tobacco Use    Smoking status: Former Smoker     Packs/day: 1.00     Years: 30.00     Pack years: 30.00     Types: Cigarettes     Quit date: 2003     Years since quittin.5    Smokeless tobacco: Never Used    Tobacco comment: Pt not interested at this time in stopping. Substance and Sexual Activity    Alcohol use: Yes     Comment: rarely    Drug use: No     Comment: pt admits \"may have misused in past\"--Pt was vague with details.  Sexual activity: Not on file   Other Topics Concern    Not on file   Social History Narrative    Not on file     Social Determinants of Health     Financial Resource Strain: Low Risk     Difficulty of Paying Living Expenses: Not hard at all   Food Insecurity: No Food Insecurity    Worried About Running Out of Food in the Last Year: Never true    [address] in the Last Year: Never true   Transportation Needs: No Transportation Needs    Lack of Transportation (Medical): No    Lack of Transportation (Non-Medical):  No   Physical Activity:     Days of Exercise per Week: Not on file    Minutes of Exercise per Session: Not on file   Stress:     Feeling of Stress : Not on file   Social Connections:     Frequency of Communication with Friends and Family: Not on file    Frequency of Social Gatherings with Friends and Family: Not on file    Attends Caodaism Services: Not on file    Active Member of Clubs or Organizations: Not on file    Attends Club or Organization Meetings: Not on file    Marital Status: Not on file   Intimate Partner Violence:     Fear of Current or Ex-Partner: Not on file    Emotionally Abused: Not on file    Physically Abused: Not on file    Sexually Abused: Not on file   Housing Stability:     Unable to Pay for Housing in the Last Year: Not on file    Number of [place] in the Last Year: Not on file    Unstable Housing in the Last Year: Not on file       Review of Systems   Constitutional: Negative. Respiratory: Negative. Cardiovascular: Negative. Musculoskeletal: Positive for back pain. Skin: Positive for wound (improving). Psychiatric/Behavioral: The patient is nervous/anxious. OBJECTIVE:    Physical Exam  Vitals and nursing note reviewed. Constitutional:       Appearance: Normal appearance. She is well-developed. She is obese. She is not ill-appearing or diaphoretic. HENT:      Head: Normocephalic and atraumatic. Eyes:      Conjunctiva/sclera: Conjunctivae normal.      Pupils: Pupils are equal, round, and reactive to light. Neck:      Vascular: No carotid bruit. Cardiovascular:      Rate and Rhythm: Normal rate and regular rhythm. Pulmonary:      Effort: Pulmonary effort is normal. No respiratory distress. Breath sounds: Normal breath sounds. Abdominal:      General: There is no distension. Musculoskeletal:      Cervical back: Normal range of motion and neck supple. No rigidity. Right lower leg: No edema. Left lower leg: No edema (more difficult to assess as she has wound care wrap in place). Skin:     General: Skin is warm and dry. Capillary Refill: Capillary refill takes less than 2 seconds. Neurological:      General: No focal deficit present. Mental Status: She is alert and oriented to person, place, and time. Mental status is at baseline. Psychiatric:         Mood and Affect: Mood normal.         Behavior: Behavior normal.         Thought Content: Thought content normal.         Judgment: Judgment normal.         /70 (Site: Left Upper Arm, Position: Sitting, Cuff Size: Large Adult)   Pulse 75   Temp 97 °F (36.1 °C) (Temporal)   Wt 192 lb (87.1 kg)   SpO2 94%   BMI 37.50 kg/m²      ASSESSMENT:      ICD-10-CM    1.  Type 2 diabetes mellitus treated without insulin (HCC)  E11.9 WEGOVY 2.4 MG/0.75ML SOAJ SC injection     Hemoglobin A1C     Comprehensive Metabolic Panel   2. Mixed hyperlipidemia  E78.2 atorvastatin (LIPITOR) 40 MG tablet   3. Open wound of left lower leg, subsequent encounter  S81.802D gabapentin (NEURONTIN) 100 MG capsule   4. Severe obesity (BMI 35.0-39. 9) with comorbidity ([address]  E66.01 Continue to increase activity to facilitate wt loss   5. Acquired hypothyroidism  E03.9 levothyroxine (SYNTHROID) 75 MCG tablet   6. Essential hypertension  I10 spironolactone (ALDACTONE) 25 MG tablet       PLAN:    Bettie Cota: Diabetes and Discuss Medications (discuss gabapentin dosage)  Contract update for gabapentin  TEAGAN  Records from Dr. Rajan Rey needed  Last drug screen from pain management needed for records. Plan as above. Lab due in May. Pt wants orders. Handicap tag signed. Diagnosis and orders for this visit are above. Please note that this chart was generated using dragon dictationsoftware. Although every effort was made to ensure the accuracy of this automated transcription, some errors in transcription may have occurred.

## 2022-02-11 ENCOUNTER — OFFICE VISIT (OUTPATIENT)
Dept: WOUND CARE | Facility: HOSPITAL | Age: 62
End: 2022-02-11

## 2022-02-11 DIAGNOSIS — A49.9 BACTERIAL INFECTION, UNSPECIFIED: ICD-10-CM

## 2022-02-11 DIAGNOSIS — Q82.0 HEREDITARY LYMPHEDEMA: ICD-10-CM

## 2022-02-11 DIAGNOSIS — L97.822 NON-PRESSURE CHRONIC ULCER OF OTHER PART OF LEFT LOWER LEG WITH FAT LAYER EXPOSED: ICD-10-CM

## 2022-02-11 DIAGNOSIS — R60.0 LOCALIZED EDEMA: ICD-10-CM

## 2022-02-11 PROCEDURE — 11042 DBRDMT SUBQ TIS 1ST 20SQCM/<: CPT | Performed by: PODIATRIST

## 2022-02-18 ENCOUNTER — OFFICE VISIT (OUTPATIENT)
Dept: WOUND CARE | Facility: HOSPITAL | Age: 62
End: 2022-02-18

## 2022-02-18 DIAGNOSIS — Q82.0 HEREDITARY LYMPHEDEMA: ICD-10-CM

## 2022-02-18 DIAGNOSIS — A49.9 BACTERIAL INFECTION, UNSPECIFIED: ICD-10-CM

## 2022-02-18 DIAGNOSIS — L97.822 NON-PRESSURE CHRONIC ULCER OF OTHER PART OF LEFT LOWER LEG WITH FAT LAYER EXPOSED: ICD-10-CM

## 2022-02-18 DIAGNOSIS — R60.0 LOCALIZED EDEMA: ICD-10-CM

## 2022-02-18 PROCEDURE — 11042 DBRDMT SUBQ TIS 1ST 20SQCM/<: CPT | Performed by: PODIATRIST

## 2022-02-23 DIAGNOSIS — E79.0 ELEVATED URIC ACID IN BLOOD: ICD-10-CM

## 2022-02-23 DIAGNOSIS — G25.81 RLS (RESTLESS LEGS SYNDROME): ICD-10-CM

## 2022-02-23 NOTE — TELEPHONE ENCOUNTER
Odd quantity noted. I will hold off on signing until this is clarified. If pt states that she will be out before I return to office in 48hrs, please approve.

## 2022-02-24 NOTE — TELEPHONE ENCOUNTER
The pharmacy is trying to catch the patient's medications up so that they are all refilled at the same time for 90 days supply. The patient confirmed that this qty was correct and that she had prompted the pharmacy to fix this.     She will not be out of medication before your return to the clinic

## 2022-02-25 ENCOUNTER — OFFICE VISIT (OUTPATIENT)
Dept: WOUND CARE | Facility: HOSPITAL | Age: 62
End: 2022-02-25

## 2022-02-25 DIAGNOSIS — Q82.0 HEREDITARY LYMPHEDEMA: ICD-10-CM

## 2022-02-25 DIAGNOSIS — R60.0 LOCALIZED EDEMA: ICD-10-CM

## 2022-02-25 DIAGNOSIS — M79.662 PAIN IN LEFT LOWER LEG: ICD-10-CM

## 2022-02-25 DIAGNOSIS — L97.822 NON-PRESSURE CHRONIC ULCER OF OTHER PART OF LEFT LOWER LEG WITH FAT LAYER EXPOSED: ICD-10-CM

## 2022-02-25 PROCEDURE — 11042 DBRDMT SUBQ TIS 1ST 20SQCM/<: CPT | Performed by: NURSE PRACTITIONER

## 2022-02-25 RX ORDER — ALLOPURINOL 100 MG/1
TABLET ORAL
Qty: 150 TABLET | Refills: 0 | Status: SHIPPED | OUTPATIENT
Start: 2022-02-25 | End: 2022-05-27

## 2022-02-25 RX ORDER — PRAMIPEXOLE DIHYDROCHLORIDE 1.5 MG/1
TABLET ORAL
Qty: 225 TABLET | Refills: 0 | Status: SHIPPED | OUTPATIENT
Start: 2022-02-25 | End: 2022-05-27

## 2022-03-01 PROBLEM — Z99.81 DEPENDENCE ON SUPPLEMENTAL OXYGEN: Chronic | Status: ACTIVE | Noted: 2022-03-01

## 2022-03-01 PROBLEM — J96.11 CHRONIC RESPIRATORY FAILURE WITH HYPOXIA (HCC): Chronic | Status: ACTIVE | Noted: 2022-03-01

## 2022-03-01 NOTE — PROGRESS NOTES
Chief Complaint  Wheezing (denies still happening but a few weeks ago she had wheezing and desaturation ; second time this has happened since about two years ago; denies covid hx)    Subjective    History of Present Illness     Robyn Minaya presents to [facility]   Ms. Minaya is a pleasant 62 year old female referred by her PCP for hypoxia on supplemental exertional oxygen at 2 lpm supplied from unsure who her DME. She states it has been a couple of weeks since she last used the oxygen. She was using it nightly prior and with exertion. She keeps a check on her O2 level. She states she has not need it. She has known SWATI and is non- compliant with CPAP. Last use of CPAP was about 2 weeks ago as well. She states the seal is not good and the noise wakes her up. She has not notified the DME yet. She also has known hypertension, hypothyroidism, diabetes mellitus, HLD, hx of blood transfusion, gout, depression, anxiety, Bipolar, arthritis,chronic back pain on chronic pain medications, allergic rhinitis, and former smoker. She has smoked 1-1.5 PPD x 40 years quitting in 7/28/2020. She did vape for a few months about 4 years ago with nicotine in the vape. She is morbidly obese and also has restless leg syndrome. Family history includes mother with lung cancer,heart disease and Bipolar. Father with heart disease and hypertension. She had a cxr at [facility] in January that showed no acute process. She has never had PFTs. She has been employed in the past as a  at [employer] for 9 years. She has not worked in the last 3-4 years secondary to disability related to her chronic back pain. She has no known exposures. She also has had nasal surgeries with history of nasal septal deviation. She denies fever or chills but has night sweats which are not new. She denies shortness of breath. She has some coughing that is some dry and some productive. It occurs  "intermittently. She gets lightheaded every once in a awhile if she stands too quickly. She ambulates with a cane. She does have some edema of the LLE related to the bug bite for which she is seeing wound care. She has a compression bandage on today.        Objective   Vital Signs:   /70   Pulse 80   Ht 149.9 cm (59.02\")   Wt 83.6 kg (184 lb 3.2 oz)   SpO2 96%   BMI 37.18 kg/m²     Physical Exam  Vitals reviewed.   Constitutional:       Appearance: Normal appearance. She is obese.      Interventions: Face mask in place.   Cardiovascular:      Rate and Rhythm: Normal rate and regular rhythm.   Pulmonary:      Effort: Pulmonary effort is normal.      Breath sounds: Normal breath sounds.   Skin:     Comments: Ace wrap on left lower extremity/ foot    Neurological:      General: No focal deficit present.      Mental Status: She is alert and oriented to person, place, and time.   Psychiatric:         Mood and Affect: Mood normal.         Behavior: Behavior normal.          Result Review :  The following data was reviewed by: ARETHA Donovan on 03/02/2022:    Data reviewed: Radiologic studies CXR-Jan 4, 2021   My interpretation of imaging:  No acute process per report,unalbe to access images   My interpretation of labs: none      My interpretation of the PFT: none    No results found for this or any previous visit.      Patient's BMI 37.18. BMI is above normal parameters. Recommendations include: referral to primary care.    Assessment and Plan   Diagnoses and all orders for this visit:    1. Chronic respiratory failure with hypoxia (HCC) (Primary)    2. Dependence on supplemental oxygen    3. Former smoker      She quit using her CPAP a couple of weeks ago secondary to mask fit. Advised to contact DME and resume use.   She quit using her supplemental oxygen secondary to stating she does not need it and she monitors her O2 sat. She states however this is the second time this has happened. She is " ambulated in the office today and is found to not drop below 89%. She is agreeable to have a 6 minute walk done. We discussed the reasons for needing CPAP and/ or supplemental oxygen. She is a former smoker with a 40+ pack year history. She is offered a LDCT for which she does not want to have done at this time. She is advised to have a PFT study done and she is agreeable. Will plan for complete at the hospital and she is advised she will need covid tested prior.     Alpha 1: none    Health maintenance:   Pneumococcal March 2017   Covid-19 Moderna March/ April 2021, Booster Feb 2022     Follow Up   Return in about 6 weeks (around 4/13/2022) for PFT-complete in the hospital .  Patient was given instructions and counseling regarding her condition or for health maintenance advice. Please see specific information pulled into the AVS if appropriate.     Lisa Tinsley, APRN  3/2/2022  10:51 CST

## 2022-03-02 ENCOUNTER — OFFICE VISIT (OUTPATIENT)
Dept: PULMONOLOGY | Facility: CLINIC | Age: 62
End: 2022-03-02

## 2022-03-02 VITALS
HEIGHT: 59 IN | SYSTOLIC BLOOD PRESSURE: 126 MMHG | BODY MASS INDEX: 37.13 KG/M2 | OXYGEN SATURATION: 96 % | HEART RATE: 80 BPM | WEIGHT: 184.2 LBS | DIASTOLIC BLOOD PRESSURE: 70 MMHG

## 2022-03-02 DIAGNOSIS — J96.11 CHRONIC RESPIRATORY FAILURE WITH HYPOXIA: Primary | Chronic | ICD-10-CM

## 2022-03-02 DIAGNOSIS — Z87.891 FORMER SMOKER: ICD-10-CM

## 2022-03-02 DIAGNOSIS — Z99.81 DEPENDENCE ON SUPPLEMENTAL OXYGEN: Chronic | ICD-10-CM

## 2022-03-02 PROCEDURE — 99214 OFFICE O/P EST MOD 30 MIN: CPT | Performed by: NURSE PRACTITIONER

## 2022-03-02 RX ORDER — SEMAGLUTIDE 2.4 MG/.75ML
INJECTION, SOLUTION SUBCUTANEOUS
COMMUNITY
Start: 2022-02-07

## 2022-03-02 RX ORDER — GABAPENTIN 300 MG/1
CAPSULE ORAL
COMMUNITY
Start: 2021-12-13 | End: 2022-07-29 | Stop reason: ALTCHOICE

## 2022-03-02 NOTE — PROCEDURES
Walking Oximetry  Performed by: Lisa Tinsley APRN  Authorized by: Lisa Tinsley APRN     Supplemental Oxygen: None  Rest room air SAT %:  95  Exercise room air SAT %:  89

## 2022-03-03 ENCOUNTER — OFFICE VISIT (OUTPATIENT)
Dept: PULMONOLOGY | Facility: CLINIC | Age: 62
End: 2022-03-03

## 2022-03-03 DIAGNOSIS — J96.11 CHRONIC RESPIRATORY FAILURE WITH HYPOXIA: Chronic | ICD-10-CM

## 2022-03-03 PROCEDURE — 94618 PULMONARY STRESS TESTING: CPT | Performed by: NURSE PRACTITIONER

## 2022-03-03 NOTE — PROCEDURES
6 Minute Walk Test  Performed by: Karen Gamez RRT  Authorized by: Lisa Tinsley APRN     General:     - Medical History Checked      - Medical clearance provided for the patient to participate in exercise testing      Contraindications:    - No contraindications identified       Exercise Details     Supplemental oxygen:  2L     Mobility aid:  Cane    Speed:  1.9    Miles:  0.15    Rest, Exercise, Recovery Readings    Rest     BP: 112/78    SAT: 97    O2: RA    HR: 83    RPE: 0   Finish     BP: 138/84    SAT: 97    HR: 94    RPE: 3   Recovery 1     BP: 132/78    SAT: 98    HR: 82    RPE:  2   Recovery 2     BP:  128/84    SAT:  95    HR:  79    RPE: 0    Minute by Minute Recordings    1st Minute     SAT: 91    O2: RA    HR: 93    RPE:  0   2nd minute     SAT: 94    HR: 99    RPE: 1   3rd minute     SAT: 95    HR: 84    RPE: 1   4th minute     SAT: 96    HR: 102    RPE: 3   5th minute     SAT: 96    HR: 87    RPE: 3   6th minute     SAT: 95    HR: 98    RPE: 3    Was test terminated?: No        Technician:  ADONAY Gamez RRT       Overall notes:  Pt states she was as SOA as gets normally.  Pt is aware she did not need portable O2 and it will be d/c'd.  Pt is aware she should continue to wear her noc O2.  Pt has [facility] for a DME.

## 2022-03-04 ENCOUNTER — OFFICE VISIT (OUTPATIENT)
Dept: WOUND CARE | Facility: HOSPITAL | Age: 62
End: 2022-03-04

## 2022-03-04 DIAGNOSIS — L97.822 NON-PRESSURE CHRONIC ULCER OF OTHER PART OF LEFT LOWER LEG WITH FAT LAYER EXPOSED: ICD-10-CM

## 2022-03-04 DIAGNOSIS — Q82.0 HEREDITARY LYMPHEDEMA: ICD-10-CM

## 2022-03-04 DIAGNOSIS — M79.662 PAIN IN LEFT LOWER LEG: ICD-10-CM

## 2022-03-04 DIAGNOSIS — R60.0 LOCALIZED EDEMA: ICD-10-CM

## 2022-03-04 PROCEDURE — 11042 DBRDMT SUBQ TIS 1ST 20SQCM/<: CPT | Performed by: PODIATRIST

## 2022-03-10 ENCOUNTER — PATIENT ROUNDING (BHMG ONLY) (OUTPATIENT)
Dept: PULMONOLOGY | Facility: CLINIC | Age: 62
End: 2022-03-10

## 2022-03-10 NOTE — PROGRESS NOTES
March 10, 2022    Hello, may I speak with Robyn Minaya?    My name is Chelsea Mishra     I am  with [facility]  [address]  [facility] [address] [phone].    Before we get started may I verify your date of birth? 1960    I am calling to officially welcome you to our practice and ask about your recent visit. Is this a good time to talk? yes    Tell me about your visit with us. What things went well?  All things went well.  Staff was pleasant and was notified of wait time.       We're always looking for ways to make our patients' experiences even better. Do you have recommendations on ways we may improve?  no    Overall were you satisfied with your first visit to our practice? yes       I appreciate you taking the time to speak with me today. Is there anything else I can do for you? no      Thank you, and have a great day.

## 2022-03-11 ENCOUNTER — OFFICE VISIT (OUTPATIENT)
Dept: WOUND CARE | Facility: HOSPITAL | Age: 62
End: 2022-03-11

## 2022-03-11 DIAGNOSIS — M79.662 PAIN IN LEFT LOWER LEG: ICD-10-CM

## 2022-03-11 DIAGNOSIS — L97.822 NON-PRESSURE CHRONIC ULCER OF OTHER PART OF LEFT LOWER LEG WITH FAT LAYER EXPOSED: ICD-10-CM

## 2022-03-11 DIAGNOSIS — R60.0 LOCALIZED EDEMA: ICD-10-CM

## 2022-03-11 DIAGNOSIS — Q82.0 HEREDITARY LYMPHEDEMA: ICD-10-CM

## 2022-03-11 PROCEDURE — 11042 DBRDMT SUBQ TIS 1ST 20SQCM/<: CPT | Performed by: PODIATRIST

## 2022-03-18 ENCOUNTER — OFFICE VISIT (OUTPATIENT)
Dept: WOUND CARE | Facility: HOSPITAL | Age: 62
End: 2022-03-18

## 2022-03-18 DIAGNOSIS — R60.0 LOCALIZED EDEMA: ICD-10-CM

## 2022-03-18 DIAGNOSIS — Q82.0 HEREDITARY LYMPHEDEMA: ICD-10-CM

## 2022-03-18 DIAGNOSIS — L97.822 NON-PRESSURE CHRONIC ULCER OF OTHER PART OF LEFT LOWER LEG WITH FAT LAYER EXPOSED: ICD-10-CM

## 2022-03-18 DIAGNOSIS — M79.662 PAIN IN LEFT LOWER LEG: ICD-10-CM

## 2022-03-18 PROCEDURE — 11042 DBRDMT SUBQ TIS 1ST 20SQCM/<: CPT | Performed by: PODIATRIST

## 2022-03-25 ENCOUNTER — LAB REQUISITION (OUTPATIENT)
Dept: LAB | Facility: HOSPITAL | Age: 62
End: 2022-03-25

## 2022-03-25 ENCOUNTER — OFFICE VISIT (OUTPATIENT)
Dept: WOUND CARE | Facility: HOSPITAL | Age: 62
End: 2022-03-25

## 2022-03-25 DIAGNOSIS — Z00.00 ENCOUNTER FOR GENERAL ADULT MEDICAL EXAMINATION WITHOUT ABNORMAL FINDINGS: ICD-10-CM

## 2022-03-25 DIAGNOSIS — Q82.0 HEREDITARY LYMPHEDEMA: ICD-10-CM

## 2022-03-25 DIAGNOSIS — L03.116 CELLULITIS OF LEFT LOWER LIMB: ICD-10-CM

## 2022-03-25 DIAGNOSIS — R60.0 LOCALIZED EDEMA: ICD-10-CM

## 2022-03-25 DIAGNOSIS — L97.822 NON-PRESSURE CHRONIC ULCER OF OTHER PART OF LEFT LOWER LEG WITH FAT LAYER EXPOSED: ICD-10-CM

## 2022-03-25 DIAGNOSIS — M79.662 PAIN IN LEFT LOWER LEG: ICD-10-CM

## 2022-03-25 PROCEDURE — 99213 OFFICE O/P EST LOW 20 MIN: CPT | Performed by: PODIATRIST

## 2022-03-25 PROCEDURE — 87070 CULTURE OTHR SPECIMN AEROBIC: CPT | Performed by: PODIATRIST

## 2022-03-25 PROCEDURE — 87186 SC STD MICRODIL/AGAR DIL: CPT | Performed by: PODIATRIST

## 2022-03-25 PROCEDURE — 87205 SMEAR GRAM STAIN: CPT | Performed by: PODIATRIST

## 2022-03-25 PROCEDURE — 87147 CULTURE TYPE IMMUNOLOGIC: CPT | Performed by: PODIATRIST

## 2022-03-25 PROCEDURE — 87075 CULTR BACTERIA EXCEPT BLOOD: CPT | Performed by: PODIATRIST

## 2022-03-25 PROCEDURE — 87176 TISSUE HOMOGENIZATION CULTR: CPT | Performed by: PODIATRIST

## 2022-03-25 PROCEDURE — 11042 DBRDMT SUBQ TIS 1ST 20SQCM/<: CPT | Performed by: PODIATRIST

## 2022-03-28 DIAGNOSIS — Z01.818 PREOP TESTING: Primary | ICD-10-CM

## 2022-03-28 LAB
BACTERIA SPEC AEROBE CULT: ABNORMAL
GRAM STN SPEC: ABNORMAL
GRAM STN SPEC: ABNORMAL

## 2022-03-30 LAB — BACTERIA SPEC ANAEROBE CULT: NORMAL

## 2022-03-31 ENCOUNTER — INFUSION (OUTPATIENT)
Dept: ONCOLOGY | Facility: HOSPITAL | Age: 62
End: 2022-03-31

## 2022-03-31 VITALS
HEART RATE: 67 BPM | RESPIRATION RATE: 16 BRPM | OXYGEN SATURATION: 96 % | HEIGHT: 59 IN | SYSTOLIC BLOOD PRESSURE: 118 MMHG | TEMPERATURE: 96.6 F | DIASTOLIC BLOOD PRESSURE: 66 MMHG | WEIGHT: 183 LBS | BODY MASS INDEX: 36.89 KG/M2

## 2022-03-31 DIAGNOSIS — A49.02 INFECTION OF WOUND DUE TO METHICILLIN RESISTANT STAPHYLOCOCCUS AUREUS (MRSA): ICD-10-CM

## 2022-03-31 DIAGNOSIS — S81.809S NON-HEALING WOUND OF LOWER EXTREMITY, SEQUELA: Primary | ICD-10-CM

## 2022-03-31 PROCEDURE — 25010000002 DALBAVANCIN 500 MG RECONSTITUTED SOLUTION 1 EACH VIAL: Performed by: PODIATRIST

## 2022-03-31 PROCEDURE — 96365 THER/PROPH/DIAG IV INF INIT: CPT

## 2022-03-31 RX ADMIN — DALBAVANCIN 1500 MG: 500 INJECTION, POWDER, FOR SOLUTION INTRAVENOUS at 09:36

## 2022-04-01 ENCOUNTER — OFFICE VISIT (OUTPATIENT)
Dept: WOUND CARE | Facility: HOSPITAL | Age: 62
End: 2022-04-01

## 2022-04-01 DIAGNOSIS — L97.822 NON-PRESSURE CHRONIC ULCER OF OTHER PART OF LEFT LOWER LEG WITH FAT LAYER EXPOSED: ICD-10-CM

## 2022-04-01 DIAGNOSIS — M79.662 PAIN IN LEFT LOWER LEG: ICD-10-CM

## 2022-04-01 DIAGNOSIS — Q82.0 HEREDITARY LYMPHEDEMA: ICD-10-CM

## 2022-04-01 DIAGNOSIS — R60.0 LOCALIZED EDEMA: ICD-10-CM

## 2022-04-01 PROCEDURE — 11042 DBRDMT SUBQ TIS 1ST 20SQCM/<: CPT | Performed by: PODIATRIST

## 2022-04-05 ENCOUNTER — HOSPITAL ENCOUNTER (OUTPATIENT)
Dept: PULMONOLOGY | Facility: HOSPITAL | Age: 62
Discharge: HOME | End: 2022-04-05

## 2022-04-05 ENCOUNTER — LAB (OUTPATIENT)
Dept: LAB | Facility: HOSPITAL | Age: 62
End: 2022-04-05

## 2022-04-05 DIAGNOSIS — J96.11 CHRONIC RESPIRATORY FAILURE WITH HYPOXIA: ICD-10-CM

## 2022-04-05 DIAGNOSIS — Z99.81 DEPENDENCE ON SUPPLEMENTAL OXYGEN: ICD-10-CM

## 2022-04-05 DIAGNOSIS — Z87.891 FORMER SMOKER: ICD-10-CM

## 2022-04-05 DIAGNOSIS — J96.11 CHRONIC HYPOXEMIC RESPIRATORY FAILURE: Primary | ICD-10-CM

## 2022-04-05 LAB — SARS-COV-2 RNA PNL SPEC NAA+PROBE: NOT DETECTED

## 2022-04-05 PROCEDURE — 87635 SARS-COV-2 COVID-19 AMP PRB: CPT

## 2022-04-05 PROCEDURE — 94729 DIFFUSING CAPACITY: CPT

## 2022-04-05 PROCEDURE — 94726 PLETHYSMOGRAPHY LUNG VOLUMES: CPT

## 2022-04-05 PROCEDURE — 94729 DIFFUSING CAPACITY: CPT | Performed by: INTERNAL MEDICINE

## 2022-04-05 PROCEDURE — C9803 HOPD COVID-19 SPEC COLLECT: HCPCS

## 2022-04-05 PROCEDURE — 94060 EVALUATION OF WHEEZING: CPT

## 2022-04-05 PROCEDURE — 94060 EVALUATION OF WHEEZING: CPT | Performed by: INTERNAL MEDICINE

## 2022-04-05 PROCEDURE — 94726 PLETHYSMOGRAPHY LUNG VOLUMES: CPT | Performed by: INTERNAL MEDICINE

## 2022-04-05 RX ORDER — ALBUTEROL SULFATE 2.5 MG/3ML
2.5 SOLUTION RESPIRATORY (INHALATION) ONCE
Status: COMPLETED | OUTPATIENT
Start: 2022-04-05 | End: 2022-04-05

## 2022-04-05 RX ADMIN — ALBUTEROL SULFATE 2.5 MG: 2.5 SOLUTION RESPIRATORY (INHALATION) at 10:56

## 2022-04-07 ENCOUNTER — APPOINTMENT (OUTPATIENT)
Dept: PULMONOLOGY | Facility: HOSPITAL | Age: 62
End: 2022-04-07

## 2022-04-08 ENCOUNTER — OFFICE VISIT (OUTPATIENT)
Dept: WOUND CARE | Facility: HOSPITAL | Age: 62
End: 2022-04-08

## 2022-04-08 DIAGNOSIS — M79.662 PAIN IN LEFT LOWER LEG: ICD-10-CM

## 2022-04-08 DIAGNOSIS — Q82.0 HEREDITARY LYMPHEDEMA: ICD-10-CM

## 2022-04-08 DIAGNOSIS — L97.822 NON-PRESSURE CHRONIC ULCER OF OTHER PART OF LEFT LOWER LEG WITH FAT LAYER EXPOSED: ICD-10-CM

## 2022-04-08 DIAGNOSIS — R60.0 LOCALIZED EDEMA: ICD-10-CM

## 2022-04-08 PROCEDURE — 11042 DBRDMT SUBQ TIS 1ST 20SQCM/<: CPT | Performed by: PODIATRIST

## 2022-04-11 NOTE — PROGRESS NOTES
" ARETHA Donovan  [facility]   Pulmonary and Critical Care  [address] [place] [address]  Phone: [phone]  Fax: [phone]           Chief Complaint  Chronic respiratory failure with hypoxia (Doing well per patient no new issues )    Subjective    History of Present Illness     Robyn Minaya presents to [facility]   Ms. Minaya is a pleasant 62 year old female referred by her PCP at last visit for hypoxia and was on supplemental oxygen. She was ambulated at last visit and did not qualify for oxygen. She then agreed to have a 6 minute walk which showed she did not qualify for daytime exertional oxygen and was discontinued. She was advised to continue her nightly oxygen. She also had quit using her CPAP and was advised to contact her DME and resume. Today she reports she is using the CPAP. She was offered a LDCT at last visit as she has a 40+ pack year history however she declined. She declines again today.  She has known family history of lung cancer in her mother. She also has had nasal surgeries with history of nasal septal deviation. She denies fever or chills but has night sweats which are not new.  She denies shortness of breath. Able to do daily activities. She reports today that her cough is better and not sure when last time was she coughed.  She ambulates with a cane. PFts showed mild decrease in mid flows otherwise normal, lung volumes normal, and mild diffusion impairment.          Objective   Vital Signs:   /70   Pulse 77   Ht 149.9 cm (59.02\")   Wt 81.2 kg (179 lb)   SpO2 93%   BMI 36.13 kg/m²     Physical Exam  Vitals reviewed.   Constitutional:       Appearance: Normal appearance.   Cardiovascular:      Rate and Rhythm: Normal rate and regular rhythm.   Pulmonary:      Effort: Pulmonary effort is normal.      Breath sounds: Normal breath sounds.   Neurological:      General: No focal deficit " present.      Mental Status: She is alert and oriented to person, place, and time.   Psychiatric:         Mood and Affect: Mood normal.         Behavior: Behavior normal.          Result Review :  The following data was reviewed by: ARETHA Donovan on 04/13/2022:    My interpretation of imaging:  No new   My interpretation of labs: No new       My interpretation of the PFT: no new     Results for orders placed during the hospital encounter of 04/05/22    Pulmonary Function Test    [facility] - Pulmonary Function Test    [address]  [place]  [place]  [address]  [phone]    Patient : Robyn Minaya  MRN : [id number]  CSN : [id number]  Pulmonologist : Franklin Lovelace MD  Date : 4/6/2022    ______________________________________________________________________    Interpretation :  1.  Spirometry reveals a mild decrease in mid flows, and otherwise is within normal limits.  2.  There is no significant change in spirometry postbronchodilator.  3.  Lung volumes are within normal limits.  4.  There is a mild diffusion impairment even when corrected for alveolar volume.      Franklin Lovelace MD    Rest/Exercise Pulse Ox Values        Some values may be hidden. Unless noted otherwise, only the newest values recorded on each date are displayed.         Rest/Exercise Pulse Ox Results 3/2/22   Rest room air SAT % 95   Exercise room air SAT % 89             Patient's BMI 36.13. BMI is above normal parameters. Recommendations include: referral to primary care.    Assessment and Plan   Diagnoses and all orders for this visit:    1. Decreased diffusion capacity (Primary)    2. SWATI on CPAP    3. Nocturnal hypoxemia    4. Former smoker      Reviewed pulmonary function test with patient. She is doing well from a pulmonary standpoint today. She is compliant with her CPAP and nightly oxygen. I have asked her to return in one year with in office FVL with DLCO.     Alpha 1: none     Health  maintenance:   Pneumococcal March 2017   Covid-19 Moderna March/ April 2021, Booster Feb 2022     Follow Up   No follow-ups on file.  Patient was given instructions and counseling regarding her condition or for health maintenance advice. Please see specific information pulled into the AVS if appropriate.     Lisa Tinsley, APRN  4/13/2022  11:41 CDT

## 2022-04-13 ENCOUNTER — OFFICE VISIT (OUTPATIENT)
Dept: PULMONOLOGY | Facility: CLINIC | Age: 62
End: 2022-04-13

## 2022-04-13 VITALS
BODY MASS INDEX: 36.08 KG/M2 | HEART RATE: 77 BPM | HEIGHT: 59 IN | WEIGHT: 179 LBS | OXYGEN SATURATION: 93 % | DIASTOLIC BLOOD PRESSURE: 70 MMHG | SYSTOLIC BLOOD PRESSURE: 118 MMHG

## 2022-04-13 DIAGNOSIS — Z87.891 FORMER SMOKER: ICD-10-CM

## 2022-04-13 DIAGNOSIS — R94.2 DECREASED DIFFUSION CAPACITY: Primary | ICD-10-CM

## 2022-04-13 DIAGNOSIS — Z99.89 OSA ON CPAP: ICD-10-CM

## 2022-04-13 DIAGNOSIS — G47.34 NOCTURNAL HYPOXEMIA: ICD-10-CM

## 2022-04-13 DIAGNOSIS — G47.33 OSA ON CPAP: ICD-10-CM

## 2022-04-13 PROCEDURE — 99214 OFFICE O/P EST MOD 30 MIN: CPT | Performed by: NURSE PRACTITIONER

## 2022-04-15 ENCOUNTER — OFFICE VISIT (OUTPATIENT)
Dept: WOUND CARE | Facility: HOSPITAL | Age: 62
End: 2022-04-15

## 2022-04-15 DIAGNOSIS — R60.0 LOCALIZED EDEMA: ICD-10-CM

## 2022-04-15 DIAGNOSIS — L97.822 NON-PRESSURE CHRONIC ULCER OF OTHER PART OF LEFT LOWER LEG WITH FAT LAYER EXPOSED: ICD-10-CM

## 2022-04-15 DIAGNOSIS — M79.662 PAIN IN LEFT LOWER LEG: ICD-10-CM

## 2022-04-15 DIAGNOSIS — Q82.0 HEREDITARY LYMPHEDEMA: ICD-10-CM

## 2022-04-15 PROCEDURE — 11042 DBRDMT SUBQ TIS 1ST 20SQCM/<: CPT | Performed by: PODIATRIST

## 2022-04-22 ENCOUNTER — OFFICE VISIT (OUTPATIENT)
Dept: WOUND CARE | Facility: HOSPITAL | Age: 62
End: 2022-04-22

## 2022-04-22 DIAGNOSIS — Q82.0 HEREDITARY LYMPHEDEMA: ICD-10-CM

## 2022-04-22 DIAGNOSIS — M79.662 PAIN IN LEFT LOWER LEG: ICD-10-CM

## 2022-04-22 DIAGNOSIS — R60.0 LOCALIZED EDEMA: ICD-10-CM

## 2022-04-22 DIAGNOSIS — L97.822 NON-PRESSURE CHRONIC ULCER OF OTHER PART OF LEFT LOWER LEG WITH FAT LAYER EXPOSED: ICD-10-CM

## 2022-04-22 PROCEDURE — 11042 DBRDMT SUBQ TIS 1ST 20SQCM/<: CPT | Performed by: PODIATRIST

## 2022-04-29 ENCOUNTER — OFFICE VISIT (OUTPATIENT)
Dept: WOUND CARE | Facility: HOSPITAL | Age: 62
End: 2022-04-29

## 2022-04-29 DIAGNOSIS — L97.822 NON-PRESSURE CHRONIC ULCER OF OTHER PART OF LEFT LOWER LEG WITH FAT LAYER EXPOSED: ICD-10-CM

## 2022-04-29 DIAGNOSIS — R60.0 LOCALIZED EDEMA: ICD-10-CM

## 2022-04-29 DIAGNOSIS — M79.662 PAIN IN LEFT LOWER LEG: ICD-10-CM

## 2022-04-29 DIAGNOSIS — Q82.0 HEREDITARY LYMPHEDEMA: ICD-10-CM

## 2022-04-29 PROCEDURE — 11042 DBRDMT SUBQ TIS 1ST 20SQCM/<: CPT | Performed by: PODIATRIST

## 2022-05-06 ENCOUNTER — OFFICE VISIT (OUTPATIENT)
Dept: WOUND CARE | Facility: HOSPITAL | Age: 62
End: 2022-05-06

## 2022-05-06 DIAGNOSIS — M79.662 PAIN IN LEFT LOWER LEG: ICD-10-CM

## 2022-05-06 DIAGNOSIS — L97.822 NON-PRESSURE CHRONIC ULCER OF OTHER PART OF LEFT LOWER LEG WITH FAT LAYER EXPOSED: ICD-10-CM

## 2022-05-06 DIAGNOSIS — Q82.0 HEREDITARY LYMPHEDEMA: ICD-10-CM

## 2022-05-06 DIAGNOSIS — R60.0 LOCALIZED EDEMA: ICD-10-CM

## 2022-05-06 PROCEDURE — 11042 DBRDMT SUBQ TIS 1ST 20SQCM/<: CPT | Performed by: PODIATRIST

## 2022-05-13 ENCOUNTER — OFFICE VISIT (OUTPATIENT)
Dept: WOUND CARE | Facility: HOSPITAL | Age: 62
End: 2022-05-13

## 2022-05-13 DIAGNOSIS — L97.822 NON-PRESSURE CHRONIC ULCER OF OTHER PART OF LEFT LOWER LEG WITH FAT LAYER EXPOSED: ICD-10-CM

## 2022-05-13 DIAGNOSIS — M79.662 PAIN IN LEFT LOWER LEG: ICD-10-CM

## 2022-05-13 DIAGNOSIS — Q82.0 HEREDITARY LYMPHEDEMA: ICD-10-CM

## 2022-05-13 DIAGNOSIS — R60.0 LOCALIZED EDEMA: ICD-10-CM

## 2022-05-13 PROCEDURE — 11042 DBRDMT SUBQ TIS 1ST 20SQCM/<: CPT | Performed by: PODIATRIST

## 2022-05-17 RX ORDER — PILOCARPINE HYDROCHLORIDE 5 MG/1
TABLET, FILM COATED ORAL
Qty: 270 TABLET | Refills: 0 | Status: SHIPPED | OUTPATIENT
Start: 2022-05-17 | End: 2022-08-17 | Stop reason: SDUPTHER

## 2022-05-20 ENCOUNTER — OFFICE VISIT (OUTPATIENT)
Dept: WOUND CARE | Facility: HOSPITAL | Age: 62
End: 2022-05-20

## 2022-05-20 DIAGNOSIS — R60.0 LOCALIZED EDEMA: ICD-10-CM

## 2022-05-20 DIAGNOSIS — L97.822 NON-PRESSURE CHRONIC ULCER OF OTHER PART OF LEFT LOWER LEG WITH FAT LAYER EXPOSED: ICD-10-CM

## 2022-05-20 DIAGNOSIS — Q82.0 HEREDITARY LYMPHEDEMA: ICD-10-CM

## 2022-05-20 DIAGNOSIS — M79.662 PAIN IN LEFT LOWER LEG: ICD-10-CM

## 2022-05-20 PROCEDURE — 11042 DBRDMT SUBQ TIS 1ST 20SQCM/<: CPT | Performed by: PODIATRIST

## 2022-05-27 ENCOUNTER — OFFICE VISIT (OUTPATIENT)
Dept: WOUND CARE | Facility: HOSPITAL | Age: 62
End: 2022-05-27

## 2022-05-27 DIAGNOSIS — Q82.0 HEREDITARY LYMPHEDEMA: ICD-10-CM

## 2022-05-27 DIAGNOSIS — L97.822 NON-PRESSURE CHRONIC ULCER OF OTHER PART OF LEFT LOWER LEG WITH FAT LAYER EXPOSED: ICD-10-CM

## 2022-05-27 DIAGNOSIS — E79.0 ELEVATED URIC ACID IN BLOOD: ICD-10-CM

## 2022-05-27 DIAGNOSIS — G25.81 RLS (RESTLESS LEGS SYNDROME): ICD-10-CM

## 2022-05-27 DIAGNOSIS — R60.0 LOCALIZED EDEMA: ICD-10-CM

## 2022-05-27 DIAGNOSIS — I10 ESSENTIAL HYPERTENSION: ICD-10-CM

## 2022-05-27 PROCEDURE — 11042 DBRDMT SUBQ TIS 1ST 20SQCM/<: CPT | Performed by: NURSE PRACTITIONER

## 2022-05-27 RX ORDER — ALLOPURINOL 100 MG/1
TABLET ORAL
Qty: 150 TABLET | Refills: 0 | Status: SHIPPED | OUTPATIENT
Start: 2022-05-27 | End: 2022-09-14

## 2022-05-27 RX ORDER — CARVEDILOL 3.12 MG/1
TABLET ORAL
Qty: 180 TABLET | Refills: 1 | Status: SHIPPED | OUTPATIENT
Start: 2022-05-27

## 2022-05-27 RX ORDER — PRAMIPEXOLE DIHYDROCHLORIDE 1.5 MG/1
TABLET ORAL
Qty: 225 TABLET | Refills: 0 | Status: SHIPPED | OUTPATIENT
Start: 2022-05-27 | End: 2022-10-11

## 2022-06-10 ENCOUNTER — OFFICE VISIT (OUTPATIENT)
Dept: FAMILY MEDICINE CLINIC | Age: 62
End: 2022-06-10
Payer: COMMERCIAL

## 2022-06-10 ENCOUNTER — APPOINTMENT (OUTPATIENT)
Dept: WOUND CARE | Facility: HOSPITAL | Age: 62
End: 2022-06-10

## 2022-06-10 VITALS
OXYGEN SATURATION: 96 % | WEIGHT: 175 LBS | TEMPERATURE: 97.7 F | DIASTOLIC BLOOD PRESSURE: 82 MMHG | HEIGHT: 60 IN | RESPIRATION RATE: 16 BRPM | HEART RATE: 79 BPM | BODY MASS INDEX: 34.36 KG/M2 | SYSTOLIC BLOOD PRESSURE: 130 MMHG

## 2022-06-10 DIAGNOSIS — G47.00 INSOMNIA, UNSPECIFIED TYPE: ICD-10-CM

## 2022-06-10 DIAGNOSIS — E78.2 MIXED HYPERLIPIDEMIA: ICD-10-CM

## 2022-06-10 DIAGNOSIS — E11.9 TYPE 2 DIABETES MELLITUS TREATED WITHOUT INSULIN (HCC): ICD-10-CM

## 2022-06-10 DIAGNOSIS — R11.0 NAUSEA: ICD-10-CM

## 2022-06-10 DIAGNOSIS — I10 ESSENTIAL HYPERTENSION: ICD-10-CM

## 2022-06-10 DIAGNOSIS — R19.7 DIARRHEA OF PRESUMED INFECTIOUS ORIGIN: Primary | ICD-10-CM

## 2022-06-10 PROCEDURE — G8427 DOCREV CUR MEDS BY ELIG CLIN: HCPCS | Performed by: CLINICAL NURSE SPECIALIST

## 2022-06-10 PROCEDURE — 1036F TOBACCO NON-USER: CPT | Performed by: CLINICAL NURSE SPECIALIST

## 2022-06-10 PROCEDURE — G8417 CALC BMI ABV UP PARAM F/U: HCPCS | Performed by: CLINICAL NURSE SPECIALIST

## 2022-06-10 PROCEDURE — 3017F COLORECTAL CA SCREEN DOC REV: CPT | Performed by: CLINICAL NURSE SPECIALIST

## 2022-06-10 PROCEDURE — 99213 OFFICE O/P EST LOW 20 MIN: CPT | Performed by: CLINICAL NURSE SPECIALIST

## 2022-06-10 RX ORDER — ONDANSETRON 4 MG/1
4 TABLET, ORALLY DISINTEGRATING ORAL 3 TIMES DAILY PRN
Qty: 30 TABLET | Refills: 0 | Status: SHIPPED | OUTPATIENT
Start: 2022-06-10 | End: 2022-07-05

## 2022-06-10 ASSESSMENT — ENCOUNTER SYMPTOMS
VOMITING: 0
FACIAL SWELLING: 0
DIARRHEA: 1
BACK PAIN: 1
SINUS PRESSURE: 0
COLOR CHANGE: 0
ABDOMINAL DISTENTION: 0
WHEEZING: 0
ABDOMINAL PAIN: 0
CONSTIPATION: 0
NAUSEA: 1
COUGH: 0
SHORTNESS OF BREATH: 0
SORE THROAT: 0
CHEST TIGHTNESS: 0
TROUBLE SWALLOWING: 0

## 2022-06-10 ASSESSMENT — PATIENT HEALTH QUESTIONNAIRE - PHQ9
8. MOVING OR SPEAKING SO SLOWLY THAT OTHER PEOPLE COULD HAVE NOTICED. OR THE OPPOSITE, BEING SO FIGETY OR RESTLESS THAT YOU HAVE BEEN MOVING AROUND A LOT MORE THAN USUAL: 0
5. POOR APPETITE OR OVEREATING: 1
3. TROUBLE FALLING OR STAYING ASLEEP: 1
2. FEELING DOWN, DEPRESSED OR HOPELESS: 1
SUM OF ALL RESPONSES TO PHQ QUESTIONS 1-9: 5
6. FEELING BAD ABOUT YOURSELF - OR THAT YOU ARE A FAILURE OR HAVE LET YOURSELF OR YOUR FAMILY DOWN: 0
SUM OF ALL RESPONSES TO PHQ QUESTIONS 1-9: 5
7. TROUBLE CONCENTRATING ON THINGS, SUCH AS READING THE NEWSPAPER OR WATCHING TELEVISION: 0
4. FEELING TIRED OR HAVING LITTLE ENERGY: 1
1. LITTLE INTEREST OR PLEASURE IN DOING THINGS: 1
SUM OF ALL RESPONSES TO PHQ9 QUESTIONS 1 & 2: 2
9. THOUGHTS THAT YOU WOULD BE BETTER OFF DEAD, OR OF HURTING YOURSELF: 0
10. IF YOU CHECKED OFF ANY PROBLEMS, HOW DIFFICULT HAVE THESE PROBLEMS MADE IT FOR YOU TO DO YOUR WORK, TAKE CARE OF THINGS AT HOME, OR GET ALONG WITH OTHER PEOPLE: 0

## 2022-06-10 NOTE — PROGRESS NOTES
SUBJECTIVE:  Eris Trejo is a 65 y.o. who presents today for Nausea & Vomiting (thought it was food poisoning) and Diarrhea      HPI    Ms Antonia Spaulding presents today for an acute visit. She and her  were out of town last week for a wedding. While there, they both developed nausea. Patient now with diarrhea. No vomiting. Her symptoms have continued one week. She has multiple, loose watery stools per day. No fever or chills. Po intake is less, but tolerating. Relates 4lb weight loss. No treatment.        Past Medical History:   Diagnosis Date    Arthritis     Bipolar I disorder, most recent episode (or current) mixed, unspecified     Chronic back pain     Depression     Dry mouth     Hyperlipidemia     Hypertension     Hypokalemia     Hypothyroidism     Menopause     Morbidly obese ([address] 10/28/2020    Overactive bladder     Plantar fasciitis     RLS (restless legs syndrome)     Sleep apnea     Spondylosis with myelopathy, lumbar region     Thyroid disease      Past Surgical History:   Procedure Laterality Date    CARPAL TUNNEL RELEASE Bilateral     CHOLECYSTECTOMY  10/2014    COLON SURGERY      COLON SURGERY      biopsy     COLONOSCOPY      CYST REMOVAL  2017    Under left ear    HARDWARE REMOVAL Right 2011    Foot     HERNIA REPAIR  10/2014    LEG DEBRIDEMENT Left     NOSE SURGERY  2017; 2019    Dr Gissell Kevin L/S,1 LEVEL Bilateral 2017    INJECTION MEDICATION FACET JOINT performed by Chris [facility] at [facility]     Family History   Problem Relation Age of Onset    Cancer Mother         lung    Heart Disease Mother     Hypertension Mother     Bipolar Disorder Mother     Heart Disease Father     Hypertension Father      Social History     Tobacco Use    Smoking status: Former Smoker     Packs/day: 1.00     Years: 30.00     Pack years: 30.00     Types: Cigarettes     Quit date: 2003     Years since quittin.8     HYDROcodone-acetaminophen (NORCO) 7.5-325 MG per tablet Take 1 tablet by mouth 2 times daily as needed for Pain.  methylcellulose ([employer] SOLUBLE FIBER THERAPY) 500 MG TABS       loperamide (IMODIUM A-D) 2 MG tablet TAKE 2  MG ORAL AS NEEDED EVERY 12 HOURS      omeprazole (PRILOSEC) 20 MG delayed release capsule Take 1 capsule by mouth 2 times daily (before meals) 20 capsule 0    tiZANidine (ZANAFLEX) 4 MG tablet Take 4 mg by mouth daily       Vitamin D (CHOLECALCIFEROL) 1000 UNITS CAPS capsule Take 1,000 Units by mouth daily      Multiple Vitamins-Minerals (MULTIVITAMIN PO) Take by mouth daily      calcium carbonate 600 MG TABS tablet Take 1 tablet by mouth daily. No current facility-administered medications for this visit. Allergies   Allergen Reactions    Clindamycin/Lincomycin Other (See Comments)     Heartburn, upset stomach     Bactrim [Sulfamethoxazole-Trimethoprim]     Hctz [Hydrochlorothiazide] Other (See Comments)     Acid reflux      Sulfamethoxazole     Trimethoprim     Metformin And Related Diarrhea    Sitagliptin Rash       Review of Systems   Constitutional: Positive for appetite change and fatigue. Negative for chills, diaphoresis, fever and unexpected weight change. HENT: Negative for congestion, ear pain, facial swelling, hearing loss, postnasal drip, sinus pressure, sore throat and trouble swallowing. Eyes: Negative for visual disturbance. Respiratory: Negative for cough, chest tightness, shortness of breath and wheezing. Cardiovascular: Negative for chest pain and palpitations. Gastrointestinal: Positive for diarrhea and nausea. Negative for abdominal distention, abdominal pain, constipation and vomiting. Genitourinary: Negative for difficulty urinating, dysuria, flank pain, frequency, hematuria and urgency. Musculoskeletal: Positive for arthralgias and back pain. Negative for joint swelling and neck pain. Skin: Negative for color change and rash. Neurological: Positive for headaches. Negative for dizziness, syncope, weakness and light-headedness. Hematological: Negative for adenopathy. Does not bruise/bleed easily. Psychiatric/Behavioral: Negative for confusion and dysphoric mood. The patient is not nervous/anxious. OBJECTIVE:  /82   Pulse 79   Temp 97.7 °F (36.5 °C) (Temporal)   Resp 16   Ht 5' (1.524 m)   Wt 175 lb (79.4 kg)   SpO2 96%   BMI 34.18 kg/m²    Physical Exam  Vitals reviewed. Constitutional:       General: She is not in acute distress. Appearance: She is well-developed. She is not ill-appearing or toxic-appearing. HENT:      Head: Normocephalic and atraumatic. Eyes:      General:         Right eye: No discharge. Left eye: No discharge. Conjunctiva/sclera: Conjunctivae normal.      Pupils: Pupils are equal, round, and reactive to light. Neck:      Thyroid: No thyromegaly. Trachea: No tracheal deviation. Cardiovascular:      Rate and Rhythm: Normal rate and regular rhythm. Heart sounds: No murmur heard. Pulmonary:      Effort: Pulmonary effort is normal. No respiratory distress. Breath sounds: Normal breath sounds. No wheezing or rales. Abdominal:      General: Bowel sounds are normal. There is no distension. Palpations: Abdomen is soft. Tenderness: There is generalized abdominal tenderness. There is no rebound. Genitourinary:     Vagina: Normal.   Musculoskeletal:         General: No deformity. Normal range of motion. Cervical back: Normal range of motion and neck supple. Lymphadenopathy:      Cervical: No cervical adenopathy. Skin:     General: Skin is warm and dry. Findings: No erythema or rash. Neurological:      General: No focal deficit present. Mental Status: She is alert and oriented to person, place, and time. Psychiatric:         Mood and Affect: Mood normal.         Behavior: Behavior normal.         Thought Content:  Thought content normal.         Judgment: Judgment normal.         ASSESSMENT/PLAN:  1. Diarrhea of presumed infectious origin  Persistent  Suspect viral gastro, but r/o other with stool specimen  Specific tx based on results  For now, [facility]  - Gastrointestinal Panel, Molecular; Future    2. Nausea  - ondansetron (ZOFRAN-ODT) 4 MG disintegrating tablet; Take 1 tablet by mouth 3 times daily as needed for Nausea or Vomiting  Dispense: 30 tablet; Refill: 0          Return if symptoms worsen or fail to improve.

## 2022-06-13 ENCOUNTER — OFFICE VISIT (OUTPATIENT)
Dept: WOUND CARE | Facility: HOSPITAL | Age: 62
End: 2022-06-13

## 2022-06-13 DIAGNOSIS — L97.822 NON-PRESSURE CHRONIC ULCER OF OTHER PART OF LEFT LOWER LEG WITH FAT LAYER EXPOSED: ICD-10-CM

## 2022-06-13 DIAGNOSIS — R60.0 LOCALIZED EDEMA: ICD-10-CM

## 2022-06-13 DIAGNOSIS — Q82.0 HEREDITARY LYMPHEDEMA: ICD-10-CM

## 2022-06-13 PROCEDURE — 11042 DBRDMT SUBQ TIS 1ST 20SQCM/<: CPT | Performed by: PODIATRIST

## 2022-06-20 ENCOUNTER — OFFICE VISIT (OUTPATIENT)
Dept: WOUND CARE | Facility: HOSPITAL | Age: 62
End: 2022-06-20

## 2022-06-20 DIAGNOSIS — L97.822 NON-PRESSURE CHRONIC ULCER OF OTHER PART OF LEFT LOWER LEG WITH FAT LAYER EXPOSED: ICD-10-CM

## 2022-06-20 DIAGNOSIS — R60.0 LOCALIZED EDEMA: ICD-10-CM

## 2022-06-20 DIAGNOSIS — Q82.0 HEREDITARY LYMPHEDEMA: ICD-10-CM

## 2022-06-20 PROCEDURE — 11042 DBRDMT SUBQ TIS 1ST 20SQCM/<: CPT | Performed by: PODIATRIST

## 2022-07-01 ENCOUNTER — LAB REQUISITION (OUTPATIENT)
Dept: LAB | Facility: HOSPITAL | Age: 62
End: 2022-07-01

## 2022-07-01 ENCOUNTER — OFFICE VISIT (OUTPATIENT)
Dept: WOUND CARE | Facility: HOSPITAL | Age: 62
End: 2022-07-01

## 2022-07-01 DIAGNOSIS — Q82.0 HEREDITARY LYMPHEDEMA: ICD-10-CM

## 2022-07-01 DIAGNOSIS — R60.0 LOCALIZED EDEMA: ICD-10-CM

## 2022-07-01 DIAGNOSIS — Z00.00 ENCOUNTER FOR GENERAL ADULT MEDICAL EXAMINATION WITHOUT ABNORMAL FINDINGS: ICD-10-CM

## 2022-07-01 DIAGNOSIS — L97.822 NON-PRESSURE CHRONIC ULCER OF OTHER PART OF LEFT LOWER LEG WITH FAT LAYER EXPOSED: ICD-10-CM

## 2022-07-01 PROCEDURE — 11042 DBRDMT SUBQ TIS 1ST 20SQCM/<: CPT | Performed by: PODIATRIST

## 2022-07-01 PROCEDURE — 87070 CULTURE OTHR SPECIMN AEROBIC: CPT | Performed by: PODIATRIST

## 2022-07-01 PROCEDURE — 87075 CULTR BACTERIA EXCEPT BLOOD: CPT | Performed by: PODIATRIST

## 2022-07-01 PROCEDURE — 87205 SMEAR GRAM STAIN: CPT | Performed by: PODIATRIST

## 2022-07-01 PROCEDURE — 87176 TISSUE HOMOGENIZATION CULTR: CPT | Performed by: PODIATRIST

## 2022-07-04 LAB
BACTERIA SPEC AEROBE CULT: NORMAL
GRAM STN SPEC: NORMAL
GRAM STN SPEC: NORMAL

## 2022-07-05 ENCOUNTER — OFFICE VISIT (OUTPATIENT)
Dept: FAMILY MEDICINE CLINIC | Age: 62
End: 2022-07-05
Payer: COMMERCIAL

## 2022-07-05 VITALS
HEART RATE: 76 BPM | BODY MASS INDEX: 34.18 KG/M2 | OXYGEN SATURATION: 97 % | TEMPERATURE: 98.3 F | SYSTOLIC BLOOD PRESSURE: 116 MMHG | WEIGHT: 175 LBS | DIASTOLIC BLOOD PRESSURE: 78 MMHG

## 2022-07-05 DIAGNOSIS — N63.20 LEFT BREAST MASS: Primary | ICD-10-CM

## 2022-07-05 PROCEDURE — G8427 DOCREV CUR MEDS BY ELIG CLIN: HCPCS | Performed by: NURSE PRACTITIONER

## 2022-07-05 PROCEDURE — 3017F COLORECTAL CA SCREEN DOC REV: CPT | Performed by: NURSE PRACTITIONER

## 2022-07-05 PROCEDURE — 99204 OFFICE O/P NEW MOD 45 MIN: CPT | Performed by: NURSE PRACTITIONER

## 2022-07-05 PROCEDURE — G8417 CALC BMI ABV UP PARAM F/U: HCPCS | Performed by: NURSE PRACTITIONER

## 2022-07-05 PROCEDURE — 1036F TOBACCO NON-USER: CPT | Performed by: NURSE PRACTITIONER

## 2022-07-05 ASSESSMENT — ENCOUNTER SYMPTOMS
EYES NEGATIVE: 1
GASTROINTESTINAL NEGATIVE: 1
RESPIRATORY NEGATIVE: 1

## 2022-07-05 NOTE — PROGRESS NOTES
Tobacco comment: Pt not interested at this time in stopping. Substance Use Topics    Alcohol use: Yes     Comment: rarely        Current Outpatient Medications   Medication Sig Dispense Refill    carvedilol (COREG) 3.125 MG tablet TAKE ONE TABLET BY MOUTH TWICE DAILY WITH MEALS 180 tablet 1    allopurinol (ZYLOPRIM) 100 MG tablet TAKE ONE TABLET BY MOUTH DAILY 150 tablet 0    pramipexole (MIRAPEX) 1.5 MG tablet TAKE ONE-HALF TABLET BY MOUTH EVERY MORNING AND ONE TABLET NIGHTLY **MAY MAKE DROWSY 225 tablet 0    pilocarpine (SALAGEN) 5 MG tablet TAKE ONE TABLET BY MOUTH THREE TIMES DAILY 270 tablet 0    atorvastatin (LIPITOR) 40 MG tablet TAKE ONE TABLET BY MOUTH EVERY NIGHT FOR cholesterol 90 tablet 1    spironolactone (ALDACTONE) 25 MG tablet 2 po in am and 1 po in evening for fluid 270 tablet 1    WEGOVY 2.4 MG/0.75ML SOAJ SC injection INJECT 2.4mg (0.75ML) UNDER THE SKIN EVERY WEEK 9 mL 1    levothyroxine (SYNTHROID) 75 MCG tablet 1 po daily as directed 90 tablet 1    FLUoxetine (PROZAC) 40 MG capsule TAKE ONE CAPSULE BY MOUTH EVERY MORNING AS DIRECTED FOR DEPRESSION/ANXIETY 30 capsule 1    UNABLE TO FIND Please adjust medication refills to limit pharmacy visits--I will authorize most quantities to allow this to happen 1 Device 0    traZODone (DESYREL) 100 MG tablet 1-2 po as needed for sleep 180 tablet 2    traMADol (ULTRAM) 50 MG tablet Take 50 mg by mouth 3 times daily as needed for Pain.  niacin (SLO-NIACIN) 500 MG extended release tablet Take 500 mg by mouth daily      diclofenac (VOLTAREN) 75 MG EC tablet Take 75 mg by mouth 2 times daily       HYDROcodone-acetaminophen (NORCO) 7.5-325 MG per tablet Take 1 tablet by mouth 2 times daily as needed for Pain.        methylcellulose ([employer] SOLUBLE FIBER THERAPY) 500 MG TABS       omeprazole (PRILOSEC) 20 MG delayed release capsule Take 1 capsule by mouth 2 times daily (before meals) 20 capsule 0    tiZANidine (ZANAFLEX) 4 MG tablet Take 4 mg by mouth daily       Vitamin D (CHOLECALCIFEROL) 1000 UNITS CAPS capsule Take 1,000 Units by mouth daily      Multiple Vitamins-Minerals (MULTIVITAMIN PO) Take by mouth daily      calcium carbonate 600 MG TABS tablet Take 1 tablet by mouth daily.  NARCAN 4 MG/0.1ML LIQD nasal spray        No current facility-administered medications for this visit. Allergies   Allergen Reactions    Clindamycin/Lincomycin Other (See Comments)     Heartburn, upset stomach     Bactrim [Sulfamethoxazole-Trimethoprim]     Hctz [Hydrochlorothiazide] Other (See Comments)     Acid reflux      Sulfamethoxazole     Trimethoprim     Metformin And Related Diarrhea    Sitagliptin Rash       Family History   Problem Relation Age of Onset    Cancer Mother         lung    Heart Disease Mother     Hypertension Mother     Bipolar Disorder Mother     Heart Disease Father     Hypertension Father                Subjective:      Review of Systems   Constitutional: Negative. HENT: Negative. Eyes: Negative. Respiratory: Negative. Cardiovascular: Negative. Gastrointestinal: Negative. Endocrine: Negative. Genitourinary:        Lump in left breast   Musculoskeletal: Negative. Skin: Negative. Neurological: Negative. Hematological: Negative. Psychiatric/Behavioral: Negative. Objective:     Physical Exam  Vitals and nursing note reviewed. Constitutional:       Appearance: Normal appearance. HENT:      Head: Normocephalic and atraumatic. Jaw: There is normal jaw occlusion. Right Ear: Hearing, tympanic membrane, ear canal and external ear normal.      Left Ear: Hearing, tympanic membrane, ear canal and external ear normal.      Nose: Nose normal.      Mouth/Throat:      Lips: Pink. Mouth: Mucous membranes are moist.      Pharynx: Oropharynx is clear. Eyes:      General: Lids are normal.      Extraocular Movements: Extraocular movements intact.       Conjunctiva/sclera: Conjunctivae normal.      Pupils: Pupils are equal, round, and reactive to light. Neck:      Thyroid: No thyromegaly. Trachea: Trachea normal.   Cardiovascular:      Rate and Rhythm: Normal rate and regular rhythm. Pulses: Normal pulses. Dorsalis pedis pulses are 2+ on the right side and 2+ on the left side. Posterior tibial pulses are 2+ on the right side and 2+ on the left side. Heart sounds: Normal heart sounds. No murmur heard. Pulmonary:      Effort: Pulmonary effort is normal.      Breath sounds: Normal breath sounds and air entry. Chest:       Abdominal:      General: Bowel sounds are normal.      Palpations: Abdomen is soft. Musculoskeletal:      Cervical back: Full passive range of motion without pain, normal range of motion and neck supple. Thoracic back: Normal range of motion. Lumbar back: Normal range of motion. Right lower leg: No edema. Left lower leg: No edema. Lymphadenopathy:      Cervical: No cervical adenopathy. Skin:     General: Skin is warm and dry. Capillary Refill: Capillary refill takes less than 2 seconds. Neurological:      General: No focal deficit present. Mental Status: She is alert and oriented to person, place, and time. Mental status is at baseline. Psychiatric:         Attention and Perception: Attention normal.         Mood and Affect: Mood normal.         Speech: Speech normal.         Behavior: Behavior normal.         Thought Content: Thought content normal.         Cognition and Memory: Cognition normal.         Judgment: Judgment normal.         /78 (Site: Left Upper Arm)   Pulse 76   Temp 98.3 °F (36.8 °C)   Wt 175 lb (79.4 kg)   SpO2 97%   BMI 34.18 kg/m²     Assessment:      Diagnosis Orders   1. Left breast mass  MISTY DIGITAL DIAGNOSTIC W OR WO CAD BILATERAL    US BREAST LIMITED LEFT       No results found for this visit on 07/05/22. Plan:     1.  Left breast mass - uncertain outcome  Patient is going to [facility] for diagnostic mammogram.  She has appointment later today to have this completed. We will call once results have been returned and will likely need referral to general surgery. - MISTY DIGITAL DIAGNOSTIC W OR WO CAD BILATERAL; Future  - US BREAST LIMITED LEFT; Future       No follow-ups on file. Orders Placed This Encounter   Procedures    MISTY DIGITAL DIAGNOSTIC W OR WO CAD BILATERAL     Standing Status:   Future     Standing Expiration Date:   9/5/2023    US BREAST LIMITED LEFT     Standing Status:   Future     Standing Expiration Date:   7/5/2023       No orders of the defined types were placed in this encounter. Patient offered educational handouts and has had all questions answered. Patient voices understanding and agrees to plans along with risks and benefits of plan. Patient is instructed to continue prior meds, diet, and exercise plans as instructed. Patient agrees to follow up as instructed and sooner if needed. Patient agrees to go to ER if condition becomes emergent. EMR Dragon/transcription disclaimer: Some of this encounter note is an electronic transcription/translation of spoken language to printed text. The electronic translation of spoken language may permit erroneous, or at times, nonsensical words or phrases to be inadvertently transcribed.  Although I have reviewed the note for such errors, some may still exist.    Electronically signed by MAEVE Stinson on 7/5/2022 at 9:43 AM

## 2022-07-06 LAB — BACTERIA SPEC ANAEROBE CULT: NORMAL

## 2022-07-08 ENCOUNTER — OFFICE VISIT (OUTPATIENT)
Dept: WOUND CARE | Facility: HOSPITAL | Age: 62
End: 2022-07-08

## 2022-07-08 DIAGNOSIS — N63.20 LEFT BREAST MASS: ICD-10-CM

## 2022-07-08 DIAGNOSIS — R60.0 LOCALIZED EDEMA: ICD-10-CM

## 2022-07-08 DIAGNOSIS — Q82.0 HEREDITARY LYMPHEDEMA: ICD-10-CM

## 2022-07-08 DIAGNOSIS — L97.822 NON-PRESSURE CHRONIC ULCER OF OTHER PART OF LEFT LOWER LEG WITH FAT LAYER EXPOSED: ICD-10-CM

## 2022-07-08 PROCEDURE — 97597 DBRDMT OPN WND 1ST 20 CM/<: CPT | Performed by: PODIATRIST

## 2022-07-11 ENCOUNTER — TELEPHONE (OUTPATIENT)
Dept: FAMILY MEDICINE CLINIC | Age: 62
End: 2022-07-11

## 2022-07-11 ENCOUNTER — OFFICE VISIT (OUTPATIENT)
Dept: SURGERY | Facility: CLINIC | Age: 62
End: 2022-07-11

## 2022-07-11 VITALS — HEIGHT: 59 IN | BODY MASS INDEX: 36.08 KG/M2 | WEIGHT: 179 LBS

## 2022-07-11 DIAGNOSIS — E66.09 CLASS 2 OBESITY DUE TO EXCESS CALORIES WITH BODY MASS INDEX (BMI) OF 36.0 TO 36.9 IN ADULT, UNSPECIFIED WHETHER SERIOUS COMORBIDITY PRESENT: ICD-10-CM

## 2022-07-11 DIAGNOSIS — R92.8 ABNORMAL MAMMOGRAM: ICD-10-CM

## 2022-07-11 DIAGNOSIS — N63.20 LEFT BREAST MASS: Primary | ICD-10-CM

## 2022-07-11 DIAGNOSIS — N63.21 MASS OF UPPER OUTER QUADRANT OF LEFT BREAST: Primary | ICD-10-CM

## 2022-07-11 PROCEDURE — 99204 OFFICE O/P NEW MOD 45 MIN: CPT | Performed by: STUDENT IN AN ORGANIZED HEALTH CARE EDUCATION/TRAINING PROGRAM

## 2022-07-11 PROCEDURE — 88305 TISSUE EXAM BY PATHOLOGIST: CPT | Performed by: STUDENT IN AN ORGANIZED HEALTH CARE EDUCATION/TRAINING PROGRAM

## 2022-07-11 PROCEDURE — 11104 PUNCH BX SKIN SINGLE LESION: CPT | Performed by: STUDENT IN AN ORGANIZED HEALTH CARE EDUCATION/TRAINING PROGRAM

## 2022-07-11 NOTE — PROGRESS NOTES
"Office New Patient History and Physical:     Referring Provider: No ref. provider found    Chief Complaint   Patient presents with   • Mass     Left breast        Subjective .     History of present illness:  Robyn Minaya is a 62 y.o. female who presents with a left breast mass that she first felt two weeks ago. She endorses discomfort at the area of the mass. She endorses a change of the color of her skin to a red color on her left breast. Her last mammogram before this was \"several\" years ago.     Breast History information:   Prior abnormal mammograms: yes she has fibrous tissue   Prior breast biopsies: no   Palpable breast masses: yes see above   Nipple discharge: none   Age at first menses: [age]  Age at menopause: 51  Number of biological children: 2  Age at first birth: 21  Years on birth control: 30 years   Years on HRT: none   Family history of breast cancer: none   Smoking History: former    Alcohol use: none   BMI: 36     History  Past Medical History:   Diagnosis Date   • Allergic rhinitis    • Arthritis     BACK   • Chronic back pain    • Degenerative arthritis    • Depression    • Deviated nasal septum    • Diabetes mellitus (HCC)    • Edema    • Gout    • History of colon polyps    • History of transfusion    • Hyperlipidemia    • Hypertension    • Hypertrophy of nasal turbinates    • Incontinence in female    • Insomnia    • Menopause    • Nasal septal deviation    • Nasal valve stenosis    • Overactive bladder    • Plantar fasciitis    • Prediabetes    • RLS (restless legs syndrome)    • Sleep apnea     pt states has a cpap but hasn't been using it   • Spider bite wound     left lower leg. previously infected with mrsa but currently not infected.   • Wears glasses    ,   Past Surgical History:   Procedure Laterality Date   • BLEPHAROPLASTY Bilateral 7/9/2019    Procedure: 1) bilateral upper blepharoplasty with excision of excess of tissue weighing down 2) nasal endoscopy with removal of nasal " septal prosthesis;  Surgeon: Mark Anthony Anderson MD;  Location: [facility] OR;  Service: ENT   • CARPAL TUNNEL RELEASE Bilateral 2004   • CHOLECYSTECTOMY  2013   • COLONOSCOPY  10/17/2014    One 5-6mm tubular adenomatous polyp in the ascending colon; Two less than 4mm hyperplastic polyps in the sigmoid colon; Three rectal hyperplastic polyps; Diverticulosis; Repeat 5 years    • COLONOSCOPY  07/14/2010    Internal hemorrhoids; Repeat 7 years    • COLONOSCOPY N/A 10/23/2019    Procedure: COLONOSCOPY WITH ANESTHESIA;  Surgeon: Robyn Frazier MD;  Location: [facility] ENDOSCOPY;  Service: Gastroenterology   • CYST REMOVAL  2016    neck   • ENDOSCOPIC FUNCTIONAL SINUS SURGERY (FESS) Bilateral 4/16/2019    Procedure: ENDOSCOPIC FUNCTIONAL SINUS SURGERY (bilareral maxillary antrostomy without image guidance);  Surgeon: Mark Anthony Anderson MD;  Location:  [facility] OR;  Service: ENT   • ENDOSCOPY N/A 4/11/2019    Esophageal mucosal changes suggestive of eosinophilic esophagitis-biopsied; A few gastric polyps-resected and retrieved-biopsied; Normal examined duodenum-biopsied   • FOOT SURGERY Right 2010    X3   • HERNIA REPAIR     • INTERSTIM PLACEMENT N/A 6/5/2019    Procedure: INTERSTIM STAGES 1 AND 2 LEAD AND GENERATOR PLACEMENT;  Surgeon: Endy Guerrero MD;  Location:  [facility] OR;  Service: Urology   • INTERSTIM PLACEMENT N/A 7/26/2021    Procedure: INTERSTIM STAGES 1 AND 2 LEAD AND GENERATOR PLACEMENT - RECHARGEABLE.;  Surgeon: Mikey Asencio MD;  Location:  [facility] OR;  Service: Urology;  Laterality: N/A;   • INTERSTIM REMOVAL N/A 7/26/2021    Procedure: INTERSTIM REMOVAL;  Surgeon: Mikey Asencio MD;  Location:  [facility] OR;  Service: Urology;  Laterality: N/A;   • LEG DEBRIDEMENT Left 7/31/2020    Procedure: LOWER POSTERIOR LEG WOUND DEBRIDEMENT - LEFT;  Surgeon: Roberto Wells DPM;  Location:  [facility] OR;  Service: Podiatry;  Laterality: Left;   • LEG DEBRIDEMENT Left 11/30/2020    Procedure: SPLIT THICKNESS SKIN GRAFT LEFT  LOWER EXTREMTIY WITH WOUND VAC PLACEMENT;  Surgeon: Trace Hurd DO;  Location:  [facility] HYBRID OR 12;  Service: Vascular;  Laterality: Left;   • LEG DEBRIDEMENT Left 2/3/2021    Procedure: LOWER EXTREMITY WOUND DEBRIDEMENT - LEFT;  Surgeon: Roberto Wells DPM;  Location:  [facility] OR;  Service: Podiatry;  Laterality: Left;   • NASAL ENDOSCOPY N/A 2019    Procedure:     1. Functional endoscopic sinus surgery with bilateral maxillary antrostomy    2. Bilateral nasal endoscopy with biopsy of nasal septum    3.  Nasal septal prosthesis placement  ;  Surgeon: Mark Anthony Anderson MD;  Location:  [facility] OR;  Service: ENT   • NASAL VESTIBULE LESION/PAPILLOMA EXCISION N/A 2017    Procedure: Repair of nasal vestibular stenosis and septoplasty; cartilage graft from left ear;  Surgeon: Mark Anthony Anderson MD;  Location:  [facility] OR;  Service:    • SEPTOPLASTY N/A 2019    Procedure: PLACEMENT OF NASAL SEPTAL PROSTHESIS;  Surgeon: Mark Anthony Anderson MD;  Location:  [facility] OR;  Service: ENT   • SEPTOPLASTY Bilateral 2020    Procedure: Nasal endoscopy with septal debridement, and placement of Silastic stents;  Surgeon: Mark Anthony Anderson MD;  Location:  [facility] OR;  Service: ENT   • WOUND DEBRIDEMENT  2019    spider bite wound initial surgery   ,   Family History   Problem Relation Age of Onset   • Cancer Mother    • Diabetes Father    • Hypertension Father    • Cancer Father    • Colon cancer Neg Hx    • Colon polyps Neg Hx    • Esophageal cancer Neg Hx    • Liver cancer Neg Hx    • Liver disease Neg Hx    • Rectal cancer Neg Hx    • Stomach cancer Neg Hx    ,   Social History     Tobacco Use   • Smoking status: Former Smoker     Packs/day: 1.00     Years: 40.00     Pack years: 40.00     Types: Cigarettes     Start date:      Quit date: 2020     Years since quittin.9   • Smokeless tobacco: Never Used   • Tobacco comment: sometimes 1.5 when smoking more heavy    Vaping Use   • Vaping Use: Former   •  Substances: Nicotine   Substance Use Topics   • Alcohol use: Yes     Comment: Rarely-maybe 3x/year   • Drug use: No   , (Not in a hospital admission)   and Allergies:  Bactrim [sulfamethoxazole-trimethoprim], Hctz [hydrochlorothiazide], Januvia [sitagliptin], and Metformin and related    Current Outpatient Medications:   •  allopurinol (ZYLOPRIM) 100 MG tablet, Take 100 mg by mouth Daily., Disp: , Rfl:   •  atorvastatin (LIPITOR) 40 MG tablet, Take 40 mg by mouth Every Night., Disp: , Rfl:   •  calcium carbonate (OS-KAYLEY) 600 MG tablet, Take 600 mg by mouth Daily., Disp: , Rfl:   •  carvedilol (COREG) 6.25 MG tablet, Take 3.125 mg by mouth 2 (Two) Times a Day With Meals., Disp: , Rfl:   •  cholecalciferol (VITAMIN D3) 25 MCG (1000 UT) tablet, Take 400 Units by mouth Daily., Disp: , Rfl:   •  diclofenac (VOLTAREN) 75 MG EC tablet, Take 75 mg by mouth 2 (Two) Times a Day., Disp: , Rfl:   •  FLUoxetine (PROzac) 40 MG capsule, Take 40 mg by mouth Daily., Disp: , Rfl:   •  gabapentin (NEURONTIN) 300 MG capsule, TAKE ONE CAPSULE BY MOUTH THREE TIMES DAILY AS NEEDED FOR NERVE PAIN, Disp: , Rfl:   •  HYDROcodone-acetaminophen (NORCO) 7.5-325 MG per tablet, Take 1 tablet by mouth Every 6 (Six) Hours As Needed for Moderate Pain  (postop pain)., Disp: 12 tablet, Rfl: 0  •  levothyroxine (SYNTHROID, LEVOTHROID) 75 MCG tablet, Take 75 mcg by mouth Daily., Disp: , Rfl:   •  Loperamide HCl (IMODIUM PO), Take 2 mg by mouth As Needed., Disp: , Rfl:   •  methylcellulose, Laxative, (CITRUCEL) 500 MG tablet tablet, Take 1 tablet by mouth Daily., Disp: , Rfl:   •  Multiple Vitamin (MULTI VITAMIN PO), Take 1 dose by mouth Daily., Disp: , Rfl:   •  niacin 250 MG tablet, Take 500 mg by mouth Daily With Breakfast., Disp: , Rfl:   •  omeprazole (priLOSEC) 20 MG capsule, Take 20 mg by mouth Daily., Disp: , Rfl:   •  pilocarpine (SALAGEN) 5 MG tablet, Take 5 mg by mouth 3 (Three) Times a Day., Disp: , Rfl:   •  pramipexole (MIRAPEX) 1.5 MG  "tablet, Take 1.5 mg by mouth Every Night., Disp: , Rfl:   •  Semaglutide-Weight Management (Wegovy) 2.4 MG/0.75ML solution auto-injector, INJECT 2.4mg (0.75ML) UNDER THE SKIN EVERY WEEK, Disp: , Rfl:   •  spironolactone (ALDACTONE) 25 MG tablet, Take 25 mg by mouth 2 (Two) Times a Day., Disp: , Rfl:   •  tiZANidine (ZANAFLEX) 4 MG tablet, Take 4 mg by mouth Every Night. HAS NOT TAKEN WHILE ON LEVAQUIN, Disp: , Rfl:   •  traMADol (ULTRAM) 50 MG tablet, Take 100 mg by mouth 2 (Two) Times a Day., Disp: , Rfl:   •  traZODone (DESYREL) 50 MG tablet, Take 50 mg by mouth Every Night. Take 1-3 tablets at night as needed for sleep., Disp: , Rfl:     Objective     Vital Signs   Ht 149.9 cm (59\")   Wt 81.2 kg (179 lb)   LMP  (LMP Unknown)   BMI 36.15 kg/m²      Physical Exam:  General appearance - alert, well appearing, and in no distress  Mental status - alert, oriented to person, place, and time  Eyes - sclera anicteric  Neck - supple, no significant adenopathy  Chest - no tachypnea, retractions or cyanosis  Heart - normal rate and regular rhythm  Abdomen - soft, nontender, nondistended, no masses or organomegaly  Neurological - alert, oriented, normal speech, no focal findings or movement disorder noted  Musculoskeletal - no joint tenderness, deformity or swelling  Breast Exam: Bilateral breasts without obvious deformities. Bilateral nipples everted. Patient examined in the supine position with the ipsilateral arm above the head. There is a 3.5cm left breast mass in the upper outer quadrant that is hard. There are no other palpable masses bilaterally. No nipple discharge bilaterally. There is an enlarged hard left axillary lymph node concerning for lymphadenopathy. No enlarged right axillary nor bilateral supraclavicular lymphadenopathy. The lower left breast has redness and thickening of the skin concerning for an inflammatory breast cancer.     Results Review:    The following data was reviewed by: Jaclyn Pedraza, " MD on 07/11/2022:  SCANNED - IMAGING (07/05/2022 00:00)  Bilateral diagnostic mammogram 7/5/22: in the upper outer quadrant of the left breast corresponding to the fiduciary marker is a large lobulated stellate mass. Spot compression views demonstrate surrounding architectural distortion. Mammographic image of the right breast is benign. Recommend percutaneous US guided biopsy of the large lobulated stellate mass in the upper outer quadrant of the left breast. BIRADS 5 highly suggestive of malignancy.     US L breast with axilla 7/5/22: At the 2:00 position left breast is a large lobulated hypoechoic mass with blood flow and acoustic shadowing measuring 3.3 x 2.4 x 1.7 cm. This corresponds to the palpable lump in the mass on mammogram. BIRADS 5 highly suspicious for malignancy. US guided percutaneous biopsy is recommended.     Assessment & Plan       Diagnoses and all orders for this visit:    1. Mass of upper outer quadrant of left breast (Primary)  -     US Axilla Left; Future  -     US Guided Breast Biopsy With & Without Device initial; Future  -     US Guided Breast Biopsy With & Without Device Each Additional; Future  -     Punch Biopsy Assorted Sizes  -     Tissue Pathology Exam; Future  -     Tissue Pathology Exam    2. Class 2 obesity due to excess calories with body mass index (BMI) of 36.0 to 36.9 in adult, unspecified whether serious comorbidity present       Ms. Minaya is a 62 year old female with a new left breast mass as well as palpable axillary adenopathy. She has skin thickening with color change on the inferior breast. I performed two 5mm punch biopsies left breast lower inner and lower central - see below. I have ordered a left breast US guided biopsy as well as a left axillary lymph node biopsy US guided. She will follow up with me in office when these are back. If her lymph node is positive she will need a metastatic work up.     Class 2 Severe Obesity (BMI >=35 and <=39.9). Obesity-related  health conditions include the following: hypertension, dyslipidemias and gout. Obesity is unchanged. BMI is is above average; BMI management plan is completed. We discussed portion control and increasing exercise.      Punch Biopsy Procedure:     Indication: Skin changes with likely breast cancer   Anesthesia: 3mL 1% lidocaine with epinephrine     The area was prepped with betadine and draped in sterile fashion. 1% lidocaine was infiltrated for pain control. A 5mm punch was taken in the left medial inferior breast as well as in the left central inferior breast. They were sent for permanent pathology. The incisions were closed with a 3-0 nylon interrupted suture each. Dressings were placed. The patient tolerated the procedure well. We will discuss the pathology in her follow up.     Specimens: Breast punch biopsy x 2, rule out inflammatory breast cancer   Complications: none apparent     Jaclyn Pedraza MD  07/12/22  13:18 CDT

## 2022-07-11 NOTE — TELEPHONE ENCOUNTER
Patient was advised of test results and referral was ordered by MAEVE Schilling Referral has been faxed to general surgery at [facility] as patient requested. Ordering provider was out of the office and the patient was very anxious to get test results and get the referral in motion.

## 2022-07-12 NOTE — TELEPHONE ENCOUNTER
Please make sure referral has been sent to [facility] per patient request.  She needs this appointment ASAP

## 2022-07-13 LAB
CYTO UR: NORMAL
LAB AP CASE REPORT: NORMAL
LAB AP CLINICAL INFORMATION: NORMAL
LAB AP DIAGNOSIS COMMENT: NORMAL
Lab: NORMAL
PATH REPORT.FINAL DX SPEC: NORMAL
PATH REPORT.GROSS SPEC: NORMAL

## 2022-07-15 ENCOUNTER — HOSPITAL ENCOUNTER (OUTPATIENT)
Dept: ULTRASOUND IMAGING | Facility: HOSPITAL | Age: 62
Discharge: HOME | End: 2022-07-15

## 2022-07-15 ENCOUNTER — OFFICE VISIT (OUTPATIENT)
Dept: WOUND CARE | Facility: HOSPITAL | Age: 62
End: 2022-07-15

## 2022-07-15 ENCOUNTER — HOSPITAL ENCOUNTER (OUTPATIENT)
Dept: MAMMOGRAPHY | Facility: HOSPITAL | Age: 62
Discharge: HOME | End: 2022-07-15

## 2022-07-15 DIAGNOSIS — Q82.0 HEREDITARY LYMPHEDEMA: ICD-10-CM

## 2022-07-15 DIAGNOSIS — N63.21 MASS OF UPPER OUTER QUADRANT OF LEFT BREAST: ICD-10-CM

## 2022-07-15 DIAGNOSIS — R60.0 LOCALIZED EDEMA: ICD-10-CM

## 2022-07-15 DIAGNOSIS — L97.822 NON-PRESSURE CHRONIC ULCER OF OTHER PART OF LEFT LOWER LEG WITH FAT LAYER EXPOSED: ICD-10-CM

## 2022-07-15 DIAGNOSIS — R92.8 ABNORMAL MAMMOGRAM: ICD-10-CM

## 2022-07-15 PROCEDURE — 88172 CYTP DX EVAL FNA 1ST EA SITE: CPT | Performed by: STUDENT IN AN ORGANIZED HEALTH CARE EDUCATION/TRAINING PROGRAM

## 2022-07-15 PROCEDURE — 88305 TISSUE EXAM BY PATHOLOGIST: CPT | Performed by: STUDENT IN AN ORGANIZED HEALTH CARE EDUCATION/TRAINING PROGRAM

## 2022-07-15 PROCEDURE — 76882 US LMTD JT/FCL EVL NVASC XTR: CPT

## 2022-07-15 PROCEDURE — 88342 IMHCHEM/IMCYTCHM 1ST ANTB: CPT | Performed by: STUDENT IN AN ORGANIZED HEALTH CARE EDUCATION/TRAINING PROGRAM

## 2022-07-15 PROCEDURE — A4648 IMPLANTABLE TISSUE MARKER: HCPCS

## 2022-07-15 PROCEDURE — 76942 ECHO GUIDE FOR BIOPSY: CPT

## 2022-07-15 PROCEDURE — 97597 DBRDMT OPN WND 1ST 20 CM/<: CPT | Performed by: PODIATRIST

## 2022-07-15 RX ORDER — LIDOCAINE HYDROCHLORIDE AND EPINEPHRINE 10; 10 MG/ML; UG/ML
10 INJECTION, SOLUTION INFILTRATION; PERINEURAL ONCE
Status: DISCONTINUED | OUTPATIENT
Start: 2022-07-15 | End: 2022-08-04

## 2022-07-15 RX ORDER — LIDOCAINE HYDROCHLORIDE 10 MG/ML
10 INJECTION, SOLUTION INFILTRATION; PERINEURAL ONCE
Status: DISCONTINUED | OUTPATIENT
Start: 2022-07-15 | End: 2022-08-04

## 2022-07-18 LAB
BEAKER LAB AP INTRAOPERATIVE CONSULTATION: NORMAL
CYTO UR: NORMAL
LAB AP CASE REPORT: NORMAL
LAB AP CLINICAL INFORMATION: NORMAL
Lab: NORMAL
PATH REPORT.FINAL DX SPEC: NORMAL
PATH REPORT.GROSS SPEC: NORMAL

## 2022-07-19 DIAGNOSIS — N63.21 MASS OF UPPER OUTER QUADRANT OF LEFT BREAST: Primary | ICD-10-CM

## 2022-07-21 ENCOUNTER — OFFICE VISIT (OUTPATIENT)
Dept: SURGERY | Facility: CLINIC | Age: 62
End: 2022-07-21

## 2022-07-21 VITALS
WEIGHT: 171 LBS | SYSTOLIC BLOOD PRESSURE: 121 MMHG | DIASTOLIC BLOOD PRESSURE: 81 MMHG | HEART RATE: 78 BPM | HEIGHT: 59 IN | BODY MASS INDEX: 34.47 KG/M2

## 2022-07-21 DIAGNOSIS — E66.09 CLASS 2 OBESITY DUE TO EXCESS CALORIES WITH BODY MASS INDEX (BMI) OF 36.0 TO 36.9 IN ADULT, UNSPECIFIED WHETHER SERIOUS COMORBIDITY PRESENT: ICD-10-CM

## 2022-07-21 DIAGNOSIS — C50.912 INFLAMMATORY BREAST CANCER, LEFT: Primary | ICD-10-CM

## 2022-07-21 PROCEDURE — 99214 OFFICE O/P EST MOD 30 MIN: CPT | Performed by: STUDENT IN AN ORGANIZED HEALTH CARE EDUCATION/TRAINING PROGRAM

## 2022-07-22 ENCOUNTER — OFFICE VISIT (OUTPATIENT)
Dept: WOUND CARE | Facility: HOSPITAL | Age: 62
End: 2022-07-22

## 2022-07-22 DIAGNOSIS — R60.0 LOCALIZED EDEMA: ICD-10-CM

## 2022-07-22 DIAGNOSIS — L97.822 NON-PRESSURE CHRONIC ULCER OF OTHER PART OF LEFT LOWER LEG WITH FAT LAYER EXPOSED: ICD-10-CM

## 2022-07-22 DIAGNOSIS — Q82.0 HEREDITARY LYMPHEDEMA: ICD-10-CM

## 2022-07-22 PROCEDURE — 97597 DBRDMT OPN WND 1ST 20 CM/<: CPT | Performed by: PODIATRIST

## 2022-07-22 NOTE — PATIENT INSTRUCTIONS

## 2022-07-23 LAB
ALBUMIN SERPL-MCNC: 4.3 G/DL (ref 3.8–4.8)
ALBUMIN/GLOB SERPL: 1.9 {RATIO} (ref 1.2–2.2)
ALP SERPL-CCNC: 92 IU/L (ref 44–121)
ALT SERPL-CCNC: 23 IU/L (ref 0–32)
AMBIG ABBREV CMP14 DEFAULT: NORMAL
AST SERPL-CCNC: 18 IU/L (ref 0–40)
BASOPHILS # BLD AUTO: 0.1 X10E3/UL (ref 0–0.2)
BASOPHILS NFR BLD AUTO: 1 %
BILIRUB SERPL-MCNC: 0.3 MG/DL (ref 0–1.2)
BUN SERPL-MCNC: 16 MG/DL (ref 8–27)
BUN/CREAT SERPL: 15 (ref 12–28)
CALCIUM SERPL-MCNC: 10 MG/DL (ref 8.7–10.3)
CHLORIDE SERPL-SCNC: 98 MMOL/L (ref 96–106)
CO2 SERPL-SCNC: 27 MMOL/L (ref 20–29)
CREAT SERPL-MCNC: 1.04 MG/DL (ref 0.57–1)
EGFRCR SERPLBLD CKD-EPI 2021: 61 ML/MIN/1.73
EOSINOPHIL # BLD AUTO: 0.1 X10E3/UL (ref 0–0.4)
EOSINOPHIL NFR BLD AUTO: 1 %
ERYTHROCYTE [DISTWIDTH] IN BLOOD BY AUTOMATED COUNT: 13.1 % (ref 11.7–15.4)
GLOBULIN SER CALC-MCNC: 2.3 G/DL (ref 1.5–4.5)
GLUCOSE SERPL-MCNC: 97 MG/DL (ref 65–99)
HCT VFR BLD AUTO: 41.8 % (ref 34–46.6)
HGB BLD-MCNC: 13.5 G/DL (ref 11.1–15.9)
IMM GRANULOCYTES # BLD AUTO: 0.1 X10E3/UL (ref 0–0.1)
IMM GRANULOCYTES NFR BLD AUTO: 1 %
LYMPHOCYTES # BLD AUTO: 2.3 X10E3/UL (ref 0.7–3.1)
LYMPHOCYTES NFR BLD AUTO: 23 %
MCH RBC QN AUTO: 28.5 PG (ref 26.6–33)
MCHC RBC AUTO-ENTMCNC: 32.3 G/DL (ref 31.5–35.7)
MCV RBC AUTO: 88 FL (ref 79–97)
MONOCYTES # BLD AUTO: 0.5 X10E3/UL (ref 0.1–0.9)
MONOCYTES NFR BLD AUTO: 5 %
NEUTROPHILS # BLD AUTO: 6.7 X10E3/UL (ref 1.4–7)
NEUTROPHILS NFR BLD AUTO: 69 %
PLATELET # BLD AUTO: 374 X10E3/UL (ref 150–450)
POTASSIUM SERPL-SCNC: 5.1 MMOL/L (ref 3.5–5.2)
PROT SERPL-MCNC: 6.6 G/DL (ref 6–8.5)
RBC # BLD AUTO: 4.73 X10E6/UL (ref 3.77–5.28)
SODIUM SERPL-SCNC: 140 MMOL/L (ref 134–144)
WBC # BLD AUTO: 9.7 X10E3/UL (ref 3.4–10.8)

## 2022-07-25 ENCOUNTER — TELEPHONE (OUTPATIENT)
Dept: ONCOLOGY | Facility: CLINIC | Age: 62
End: 2022-07-25

## 2022-07-25 ENCOUNTER — HOSPITAL ENCOUNTER (OUTPATIENT)
Dept: CT IMAGING | Facility: HOSPITAL | Age: 62
Discharge: HOME | End: 2022-07-25
Admitting: STUDENT IN AN ORGANIZED HEALTH CARE EDUCATION/TRAINING PROGRAM

## 2022-07-25 ENCOUNTER — OFFICE VISIT (OUTPATIENT)
Dept: FAMILY MEDICINE CLINIC | Age: 62
End: 2022-07-25
Payer: COMMERCIAL

## 2022-07-25 VITALS
HEIGHT: 60 IN | HEART RATE: 72 BPM | DIASTOLIC BLOOD PRESSURE: 68 MMHG | SYSTOLIC BLOOD PRESSURE: 112 MMHG | TEMPERATURE: 97.1 F | WEIGHT: 177.38 LBS | BODY MASS INDEX: 34.83 KG/M2 | OXYGEN SATURATION: 96 %

## 2022-07-25 DIAGNOSIS — C50.912 INFLAMMATORY CARCINOMA OF LEFT BREAST (HCC): ICD-10-CM

## 2022-07-25 DIAGNOSIS — G47.00 INSOMNIA, UNSPECIFIED TYPE: Primary | ICD-10-CM

## 2022-07-25 DIAGNOSIS — N63.21 MASS OF UPPER OUTER QUADRANT OF LEFT BREAST: ICD-10-CM

## 2022-07-25 PROCEDURE — 99213 OFFICE O/P EST LOW 20 MIN: CPT | Performed by: NURSE PRACTITIONER

## 2022-07-25 PROCEDURE — 71270 CT THORAX DX C-/C+: CPT

## 2022-07-25 PROCEDURE — 74178 CT ABD&PLV WO CNTR FLWD CNTR: CPT

## 2022-07-25 PROCEDURE — 25010000002 IOPAMIDOL 61 % SOLUTION: Performed by: STUDENT IN AN ORGANIZED HEALTH CARE EDUCATION/TRAINING PROGRAM

## 2022-07-25 RX ORDER — ZOLPIDEM TARTRATE 12.5 MG/1
12.5 TABLET, FILM COATED, EXTENDED RELEASE ORAL NIGHTLY PRN
Qty: 15 TABLET | Refills: 0 | Status: SHIPPED | OUTPATIENT
Start: 2022-07-25 | End: 2022-08-08

## 2022-07-25 RX ADMIN — IOPAMIDOL 100 ML: 612 INJECTION, SOLUTION INTRAVENOUS at 08:18

## 2022-07-25 NOTE — TELEPHONE ENCOUNTER
Received call from Adelita in PET Scan, she calls to report that she has a patient [facility] for tomorrow 7/26/22 for Pet Scan. Patient then has constualtion visit Critical access hospital on 7/27/22  Currently PET Machine is down and will not be back up in time for the scan and to have the imaging read prior to her apt on Wed. She questions if patient apts need to be carter.     Relayed all information to Katt and Dr Radha Garcia says that he does need to PET Scan results prior to her consulation due to staging and txt recommendations.     PET Scan was carter to: Wed 7/26/22 @ [facility]   Arrive @ 11:45 am Scan @ 2:30 pm  And   Consultation visit carter to Friday 7/29/22 @ 11:30 (per Dr Garcia)  Patient v/u of time and dates of apt changes

## 2022-07-25 NOTE — PROGRESS NOTES
SUBJECTIVE:    Patient ID: Niko Cheatham is a60 y.o. female. Niko Cheatham is here today for Discuss Labs (Want to discuss her lab results )  . HPI:   HPI     Dx with breast CA. Port scheduled for next Monday for planned chemo and possible radiation. Pt will see hematology this week after PET scan. Pt has already rescheduled counseling for weekly. Tired. Not sleeping a whole lot. ---is taking trazodone 300mg. Patient has used Ambien CR in the past with success. She and I have discussed possibility of restarting this treatment only for occasional use. She is open to this and is understanding that she must only take 100 mg of trazodone in the evening if she is taking Ambien that night. Past Medical History:   Diagnosis Date    Arthritis     Bipolar I disorder, most recent episode (or current) mixed, unspecified     Chronic back pain     Depression     Dry mouth     Hyperlipidemia     Hypertension     Hypokalemia     Hypothyroidism     Menopause     Morbidly obese ([facility] [address] 10/28/2020    Overactive bladder     Plantar fasciitis     RLS (restless legs syndrome)     Sleep apnea     Spondylosis with myelopathy, lumbar region     Thyroid disease      Prior to Visit Medications    Medication Sig Taking? Authorizing Provider   zolpidem (AMBIEN CR) 12.5 MG extended release tablet Take 1 tablet by mouth nightly as needed for Sleep for up to 14 days.  Yes MAEVE Cuevas   carvedilol (COREG) 3.125 MG tablet TAKE ONE TABLET BY MOUTH TWICE DAILY WITH MEALS Yes MAEVE Cuevas   allopurinol (ZYLOPRIM) 100 MG tablet TAKE ONE TABLET BY MOUTH DAILY Yes MAEVE Cuevas   pramipexole (MIRAPEX) 1.5 MG tablet TAKE ONE-HALF TABLET BY MOUTH EVERY MORNING AND ONE TABLET NIGHTLY **MAY MAKE DROWSY Yes MAEVE Cuevas   pilocarpine (SALAGEN) 5 MG tablet TAKE ONE TABLET BY MOUTH THREE TIMES DAILY Yes MAEVE Cuevas   atorvastatin (LIPITOR) 40 MG tablet TAKE ONE TABLET BY MOUTH EVERY NIGHT FOR cholesterol Yes MAEVE Cuevas   spironolactone (ALDACTONE) 25 MG tablet 2 po in am and 1 po in evening for fluid Yes MAEVE Cuevas   WEGOVY 2.4 MG/0.75ML SOAJ SC injection INJECT 2.4mg (0.75ML) UNDER THE SKIN EVERY WEEK Yes MAEVE Cuevas   levothyroxine (SYNTHROID) 75 MCG tablet 1 po daily as directed Yes MAEVE Cuevas   FLUoxetine (PROZAC) 40 MG capsule TAKE ONE CAPSULE BY MOUTH EVERY MORNING AS DIRECTED FOR DEPRESSION/ANXIETY Yes MAEVE Cuevas   UNABLE TO FIND Please adjust medication refills to limit pharmacy visits--I will authorize most quantities to allow this to happen Yes MAEVE Cuevas   traZODone (DESYREL) 100 MG tablet 1-2 po as needed for sleep Yes MAEVE Cuevas   traMADol (ULTRAM) 50 MG tablet Take 50 mg by mouth 3 times daily as needed for Pain. Yes Historical Provider, MD   niacin (SLO-NIACIN) 500 MG extended release tablet Take 500 mg by mouth daily Yes Historical Provider, MD   NARCAN 4 MG/0.1ML LIQD nasal spray  Yes Historical Provider, MD   diclofenac (VOLTAREN) 75 MG EC tablet Take 75 mg by mouth 2 times daily  Yes Historical Provider, MD   HYDROcodone-acetaminophen (NORCO) 7.5-325 MG per tablet Take 1 tablet by mouth 2 times daily as needed for Pain. Yes Historical Provider, MD   methylcellulose ([employer] SOLU[employer] FIBER THERAPY) 500 MG TABS  Yes Historical Provider, MD   omeprazole (PRILOSEC) 20 MG delayed release capsule Take 1 capsule by mouth 2 times daily (before meals) Yes MAEVE Cuevas   tiZANidine (ZANAFLEX) 4 MG tablet Take 4 mg by mouth daily  Yes Historical Provider, MD   Vitamin D (CHOLECALCIFEROL) 1000 UNITS CAPS capsule Take 1,000 Units by mouth daily Yes Historical Provider, MD   Multiple Vitamins-Minerals (MULTIVITAMIN PO) Take by mouth daily Yes Historical Provider, MD   calcium carbonate 600 MG TABS tablet Take 1 tablet by mouth daily.  Yes Historical Provider, MD     Allergies   Allergen Reactions    Clindamycin/Lincomycin Other (See Comments)     Heartburn, upset stomach Bactrim [Sulfamethoxazole-Trimethoprim]     Hctz [Hydrochlorothiazide] Other (See Comments)     Acid reflux      Sulfamethoxazole     Trimethoprim     Metformin And Related Diarrhea    Sitagliptin Rash     Past Surgical History:   Procedure Laterality Date    CARPAL TUNNEL RELEASE Bilateral 2002    CHOLECYSTECTOMY  10/2014    COLON SURGERY      COLON SURGERY      biopsy     COLONOSCOPY      CYST REMOVAL  2017    Under left ear    HARDWARE REMOVAL Right     Foot     HERNIA REPAIR  10/2014    LEG DEBRIDEMENT Left     NOSE SURGERY  2017; 2019    Dr Linda Coker L/S,1 LEVEL Bilateral 2017    INJECTION MEDICATION FACET JOINT performed by Shai Pagan at [facility]     Family History   Problem Relation Age of Onset    Cancer Mother         lung    Heart Disease Mother     Hypertension Mother     Bipolar Disorder Mother     Heart Disease Father     Hypertension Father      Social History     Socioeconomic History    Marital status:      Spouse name: Yanet De Luna    Number of children: 2    Years of education: some college    Highest education level: Not on file   Occupational History     Employer: [employer]   Tobacco Use    Smoking status: Former     Packs/day: 1.00     Years: 30.00     Pack years: 30.00     Types: Cigarettes     Quit date: 2003     Years since quittin.9    Smokeless tobacco: Never    Tobacco comments:     Pt not interested at this time in stopping. Substance and Sexual Activity    Alcohol use: Yes     Comment: rarely    Drug use: No     Comment: pt admits \"may have misused in past\"--Pt was vague with details.     Sexual activity: Not on file   Other Topics Concern    Not on file   Social History Narrative    Not on file     Social Determinants of Health     Financial Resource Strain: Low Risk     Difficulty of Paying Living Expenses: Not hard at all   Food Insecurity: No Food Insecurity    Worried About [address] in the Last Year: Never true Ran Out of Food in the Last Year: Never true   Transportation Needs: No Transportation Needs    Lack of Transportation (Medical): No    Lack of Transportation (Non-Medical): No   Physical Activity: Not on file   Stress: Not on file   Social Connections: Not on file   Intimate Partner Violence: Not on file   Housing Stability: Not on file       Review of Systems   Constitutional:  Positive for fatigue. Negative for unexpected weight change. Psychiatric/Behavioral:  Positive for sleep disturbance. OBJECTIVE:    Physical Exam  Vitals and nursing note reviewed. Constitutional:       General: She is not in acute distress. Appearance: Normal appearance. She is well-developed. She is obese. She is not ill-appearing. HENT:      Head: Normocephalic and atraumatic. Eyes:      Extraocular Movements: Extraocular movements intact. Conjunctiva/sclera: Conjunctivae normal.      Pupils: Pupils are equal, round, and reactive to light. Cardiovascular:      Rate and Rhythm: Normal rate and regular rhythm. Heart sounds: Normal heart sounds. No murmur heard. Pulmonary:      Effort: Pulmonary effort is normal. No respiratory distress. Breath sounds: Normal breath sounds. No wheezing. Musculoskeletal:      Cervical back: Neck supple. No rigidity. Skin:     General: Skin is warm and dry. Capillary Refill: Capillary refill takes less than 2 seconds. Neurological:      General: No focal deficit present. Mental Status: She is alert and oriented to person, place, and time. Psychiatric:         Behavior: Behavior normal.         Thought Content: Thought content normal.         Judgment: Judgment normal.      Comments: Sad affect       /68 (Site: Left Upper Arm, Position: Sitting, Cuff Size: Large Adult)   Pulse 72   Temp 97.1 °F (36.2 °C) (Temporal)   Ht 5' (1.524 m)   Wt 177 lb 6 oz (80.5 kg)   SpO2 96%   BMI 34.64 kg/m²      ASSESSMENT:      ICD-10-CM    1.  Insomnia, unspecified type  G47.00 zolpidem (AMBIEN CR) 12.5 MG extended release tablet      2. Inflammatory carcinoma of left breast [facility])  C50.912 Keep f/u with oncology          PLAN:    Ruby Pollack: Discuss Labs (Want to discuss her lab results )  Reduce trazodone to 100mg if taking ambien. Use it only prn. TEAGAN. Plan as above  F/u with oncology this week. Diagnosis and orders for this visit are above. Please note that this chart was generated using dragon dictationsoftware. Although every effort was made to ensure the accuracy of this automated transcription, some errors in transcription may have occurred.

## 2022-07-26 ENCOUNTER — APPOINTMENT (OUTPATIENT)
Dept: CT IMAGING | Facility: HOSPITAL | Age: 62
End: 2022-07-26

## 2022-07-27 ENCOUNTER — HOSPITAL ENCOUNTER (OUTPATIENT)
Dept: CT IMAGING | Facility: HOSPITAL | Age: 62
Discharge: HOME | End: 2022-07-27

## 2022-07-27 ENCOUNTER — APPOINTMENT (OUTPATIENT)
Dept: LAB | Facility: HOSPITAL | Age: 62
End: 2022-07-27

## 2022-07-27 DIAGNOSIS — C50.912 INFLAMMATORY BREAST CANCER, LEFT: ICD-10-CM

## 2022-07-27 PROCEDURE — 0 FLUDEOXYGLUCOSE F18 SOLUTION: Performed by: STUDENT IN AN ORGANIZED HEALTH CARE EDUCATION/TRAINING PROGRAM

## 2022-07-27 PROCEDURE — 78815 PET IMAGE W/CT SKULL-THIGH: CPT

## 2022-07-27 PROCEDURE — A9552 F18 FDG: HCPCS | Performed by: STUDENT IN AN ORGANIZED HEALTH CARE EDUCATION/TRAINING PROGRAM

## 2022-07-27 RX ADMIN — FLUDEOXYGLUCOSE F18 1 DOSE: 300 INJECTION INTRAVENOUS at 13:46

## 2022-07-28 LAB
CYTO UR: NORMAL
LAB AP CASE REPORT: NORMAL
LAB AP CLINICAL INFORMATION: NORMAL
Lab: NORMAL
PATH REPORT.FINAL DX SPEC: NORMAL
PATH REPORT.GROSS SPEC: NORMAL

## 2022-07-28 NOTE — PROGRESS NOTES
[facility] ON[facility] GROUP HEMATOLOGY & ONCOLOGY  [address]  [place] [address] [phone]    Patient Name: Robyn Minaya  Encounter Date: 2022  YOB: 1960  Patient Number: [id number]    REASON FOR CONSULTATION: Inflammatory left breast carcinoma with metastases    HISTORY OF PRESENT ILLNESS: Robyn Minaya is a 62-year-old female, menarche age 15, menopause age 51, age of first birth 21, , whose history includes degenerative arthritis, diabetes, depression, anemia, obesity, and newly diagnosed left breast mass with axillary adenopathy.    --2022-Bilateral diagnostic mammogram: in the upper outer quadrant of the left breast corresponding to the fiduciary marker is a large lobulated stellate mass. Spot compression views demonstrate surrounding architectural distortion. Mammographic image of the right breast is benign. Recommend percutaneous US guided biopsy of the large lobulated stellate mass in the upper outer quadrant of the left breast. BIRADS 5 highly suggestive of malignancy.      -- 2022-US L breast with axilla: At the 2:00 position left breast is a large lobulated hypoechoic mass with blood flow and acoustic shadowing measuring 3.3 x 2.4 x 1.7 cm. This corresponds to the palpable lump in the mass on mammogram. BIRADS 5 highly suspicious for malignancy. US guided percutaneous biopsy is recommended.      --2022- office consult with Dr. Pedraza.  Patient presented with a left breast mass that she had self 2 weeks prior.  Patient describes discomfort in the area as well as red color change of the overlying skin.  She has not had a mammogram in several years.  Exam: 3.5 cm hard left breast mass in the upper outer quadrant.  No other palpable masses bilaterally.  No nipple discharge.  Enlarged hard left axillary lymph node concerning for lymphadenopathy.  No enlarged right axillary node bilateral  supraclavicular adenopathy.  Lower left breast has redness and thickening of the skin concerning for inflammatory breast cancer.  Punch biopsy obtained from the left medial inferior breast as well as the left central inferior breast.    Final diagnosis:  Left Breast, skin excisions:       - Atypical epithelial elements in dermal lymphatics and focal (less than 1 mm), atypical ductal proliferation; please see comment  Comment:  In the appropriate clinical context, these findings are compatible with ductal carcinoma with dermal lymphatic involvement    -- 07/15/2022- ultrasound-guided biopsy of mass at 2:00 in the left breast and abnormal lymph node in the left axilla.  Final diagnosis:    -Lymph node, left axilla, core biopsies:  Metastatic carcinoma consistent with breast primary.  -Left breast, 2:00, core biopsies:  Invasive mammary carcinoma of no special type (ductal), grade 2.  Lymphovascular space invasion identified.  Largest contiguous area of tumor measures 10 mm.  ER 90% (+); DE 40% (+); HER2 1+ (-)    -- 07/22/2022- CMP normal.  Hemoglobin 13.5, MCV 88, platelets 374,000, WBC 9.7.    -- 07/25/2022- CT chest.  Known left breast cancer identified with left breast skin thickening concerning for dermal invasion.  Pathologic left axillary lymph nodes (2.5 x 1.7 cm).  No intrathoracic metastasis identified.  Heterogenous appearance to the regional bony marrow with scattered lucent foci involving sternum as well as thoracic and lumbar vertebra.  Correlation with nuclear bone scan recommended.    -- 07/25/2022-CT abdomen/pelvis.  No evidence of intra-abdominal or pelvic metastasis.  Nonspecific lucency left L1 vertebra.    -- 07/27/2022-PET scan- widespread metastasis (left breast, left axillary lymphadenopathy, upper sternum, multiple thoracic vertebral body lesions, multiple lumbar vertebral body lesions, mild asymmetric enlargement and mild FDG uptake within the left flank musculature which is indeterminate  with SUV 2.99, multiple pelvic bony lesions, right quadratus for Vimal muscle metastasis, possible right sciatic nerve symptoms, proximal right femur diaphysis).  Left axillary lymph node metastatic disease and a few sites of asymmetric muscular uptake compatible with small muscular metastasis.    -- The patient is seen for management considerations    I have reviewed the HPI and verified with the patient the accuracy of it. No changes to interval history since the information was documented. Scott Garcia MD 07/29/22     LABS    Lab Results - Last 18 Months   Lab Units 07/22/22  1038 07/23/21  1123 02/01/21  1402   HEMOGLOBIN g/dL 13.5 10.0* 11.2*   HEMATOCRIT % 41.8 33.0* 37.0   MCV fL 88 85.1 83.5   WBC x10E3/uL 9.7 6.75 11.86*   RDW % 13.1 15.0 15.1   MPV fL  --  10.7 10.5   PLATELETS x10E3/uL 374 299 374   IMM GRAN % %  --  0.7* 0.8*   NEUTROS ABS x10E3/uL 6.7 3.92 7.37*   LYMPHS ABS x10E3/uL 2.3 1.97 3.39*   MONOS ABS x10E3/uL 0.5 0.51 0.64   EOS ABS x10E3/uL 0.1 0.26 0.30   BASOS ABS x10E3/uL 0.1 0.04 0.07   IMMATURE GRANS (ABS) 10*3/mm3  --  0.05 0.09*   NRBC /100 WBC  --  0.0 0.0       Lab Results - Last 18 Months   Lab Units 07/22/22  1038 07/23/21  1123 02/01/21  1402   GLUCOSE mg/dL 97 111* 136*   SODIUM mmol/L 140 138 139   POTASSIUM mmol/L 5.1 4.5 3.7   TOTAL CO2 mmol/L 27  --   --    CO2 mmol/L  --  29.0 28.0   CHLORIDE mmol/L 98 103 100   ANION GAP mmol/L  --  6.0 11.0   CREATININE mg/dL 1.04* 0.77 0.98   BUN mg/dL 16 14 25*   BUN / CREAT RATIO  15 18.2 25.5*   CALCIUM mg/dL 10.0 9.4 9.5   EGFR IF NONAFRICN AM mL/min/1.73  --  76 58*   ALK PHOS IU/L 92  --  138*   TOTAL PROTEIN g/dL  --   --  7.1   ALT (SGPT) IU/L 23  --  24   AST (SGOT) IU/L 18  --  18   BILIRUBIN mg/dL 0.3  --  0.2   ALBUMIN g/dL 4.3  --  3.90   GLOBULIN gm/dL  --   --  3.2       PAST MEDICAL HISTORY:  ALLERGIES:  Allergies   Allergen Reactions   • Bactrim [Sulfamethoxazole-Trimethoprim] Anaphylaxis   • Hctz  [Hydrochlorothiazide] Other (See Comments)     Acid reflux   • Januvia [Sitagliptin] Rash   • Metformin And Related Diarrhea     CURRENT MEDICATIONS:  Outpatient Encounter Medications as of 7/29/2022   Medication Sig Dispense Refill   • allopurinol (ZYLOPRIM) 100 MG tablet Take 100 mg by mouth Daily.     • atorvastatin (LIPITOR) 40 MG tablet Take 40 mg by mouth Every Night.     • calcium carbonate (OS-KAYLEY) 600 MG tablet Take 600 mg by mouth Daily.     • carvedilol (COREG) 6.25 MG tablet Take 3.125 mg by mouth 2 (Two) Times a Day With Meals.     • cholecalciferol (VITAMIN D3) 25 MCG (1000 UT) tablet Take 400 Units by mouth Daily.     • diclofenac (VOLTAREN) 75 MG EC tablet Take 75 mg by mouth 2 (Two) Times a Day.     • FLUoxetine (PROzac) 40 MG capsule Take 40 mg by mouth Daily.     • HYDROcodone-acetaminophen (NORCO) 7.5-325 MG per tablet Take 1 tablet by mouth Every 6 (Six) Hours As Needed for Moderate Pain  (postop pain). 12 tablet 0   • levothyroxine (SYNTHROID, LEVOTHROID) 75 MCG tablet Take 75 mcg by mouth Daily.     • methylcellulose, Laxative, (CITRUCEL) 500 MG tablet tablet Take 1 tablet by mouth Daily.     • Multiple Vitamin (MULTI VITAMIN PO) Take 1 dose by mouth Daily.     • niacin 250 MG tablet Take 500 mg by mouth Daily With Breakfast.     • omeprazole (priLOSEC) 20 MG capsule Take 20 mg by mouth Daily.     • pilocarpine (SALAGEN) 5 MG tablet Take 5 mg by mouth 3 (Three) Times a Day.     • pramipexole (MIRAPEX) 1.5 MG tablet Take 1.5 mg by mouth Every Night.     • Semaglutide-Weight Management (Wegovy) 2.4 MG/0.75ML solution auto-injector INJECT 2.4mg (0.75ML) UNDER THE SKIN EVERY WEEK     • spironolactone (ALDACTONE) 25 MG tablet Take 25 mg by mouth 2 (Two) Times a Day.     • tiZANidine (ZANAFLEX) 4 MG tablet Take 4 mg by mouth Every Night. HAS NOT TAKEN WHILE ON LEVAQUIN     • traMADol (ULTRAM) 50 MG tablet Take 100 mg by mouth 2 (Two) Times a Day.     • traZODone (DESYREL) 50 MG tablet Take 50 mg by  mouth Every Night. Take 1-3 tablets at night as needed for sleep.     • [DISCONTINUED] gabapentin (NEURONTIN) 300 MG capsule TAKE ONE CAPSULE BY MOUTH THREE TIMES DAILY AS NEEDED FOR NERVE PAIN     • [DISCONTINUED] Loperamide HCl (IMODIUM PO) Take 2 mg by mouth As Needed.       Facility-Administered Encounter Medications as of 7/29/2022   Medication Dose Route Frequency Provider Last Rate Last Admin   • lidocaine (XYLOCAINE) 1 % injection 10 mL  10 mL Subcutaneous Once Evin Elliott MD       • lidocaine (XYLOCAINE) 1 % injection 10 mL  10 mL Subcutaneous Once Evin Elliott MD       • lidocaine 1% - EPINEPHrine 1:[id number] (XYLOCAINE W/EPI) 1 %-1:185139 injection 10 mL  10 mL Injection Once Evin Elliott MD         Adult illnesses:  Allergic rhinitis  Arthritis of the back with chronic back pain  Degenerative arthritis  Depression  Deviated nasal septum  Diabetes mellitus  Edema  Gout  History of colon polyps  History of transfusion  Hyperlipidemia  Hypertension  Hypertrophy of nasal turbinates  Hypothyroidism  Female incontinence  Insomnia  Menopause  Nasal septal deviation  Nasal valve stenosis  Overactive bladder  Plantar fasciitis  Restless leg syndrome  Sleep apnea-has CPAP that she has not been using  Spider bite wound left lower leg previously MRSA infected.    Past surgeries:  Bilateral upper blepharoplasty  Nasal endoscopy with removal of nasal septal prosthesis  Bilateral carpal tunnel release, 2004  Cholecystectomy, 2013  Colonoscopy, 10/17/2014, 07/14/2010, 10/23/2019.  Endoscopy, 4/10/2019 and 10/23/2019  Neck cyst removal, 2016  Functional endoscopic sinus surgery with bilateral, 4/16/2019  Hernia repair  InterStim placement, 6/5/2019  InterStim placement with generator placement, 07/26/2021  InterStim removal, 07/26/2021  Left leg debridement 07/31/2020 and 11/30/2020  Nasal endoscopy, 04/16/2019  Nasal vestibule lesion/papilloma excision, 8/1/2017  Septoplasty, 4/16/2019  Bilateral  septoplasty, 1/14/2020  Spider wound debridement, 06/2019          ADULT ILLNESSES:  Patient Active Problem List   Diagnosis Code   • Spondylosis with myelopathy, lumbar region M47.16   • Nasal septal perforation J34.89   • Chronic maxillary sinusitis J32.0   • Nausea R11.0   • Epigastric pain R10.13   • Urinary incontinence R32   • Dermatochalasis of both upper eyelids H02.831, H02.834   • Visual field defect H53.40   • Hx of colonic polyps Z86.010   • Other constipation K59.09   • Lower abdominal pain R10.30   • Nasal cavity mass J34.89   • Abnormal nasal septum Q30.9   • Essential hypertension I10   • Acquired hypothyroidism E03.9   • Mixed hyperlipidemia E78.2   • Chronic back pain greater than 3 months duration M54.9, G89.29   • Prediabetes R73.03   • Cellulitis of left lower extremity L03.116   • Wound of left leg S81.802A   • Chronic pain syndrome G89.4   • Acute pain R52   • Anxiety F41.9   • DOMINIC (acute kidney injury) ([facility]) N17.9   • Infection of wound due to methicillin resistant Staphylococcus aureus (MRSA) A49.02   • Acute blood loss anemia D62   • Anemia D64.9   • Fatigue R53.83   • Preop testing Z01.818   • Non-healing wound of left lower extremity, sequela S81.809S   • Urge incontinence N39.41   • Chronic respiratory failure with hypoxia ([facility]) J96.11   • Dependence on supplemental oxygen Z99.81     SURGERIES:  Past Surgical History:   Procedure Laterality Date   • BLEPHAROPLASTY Bilateral 7/9/2019    Procedure: 1) bilateral upper blepharoplasty with excision of excess of tissue weighing down 2) nasal endoscopy with removal of nasal septal prosthesis;  Surgeon: Mark Anthony Anderson MD;  Location: [facility] OR;  Service: ENT   • CARPAL TUNNEL RELEASE Bilateral 2004   • CHOLECYSTECTOMY  2013   • COLONOSCOPY  10/17/2014    One 5-6mm tubular adenomatous polyp in the ascending colon; Two less than 4mm hyperplastic polyps in the sigmoid colon; Three rectal hyperplastic polyps; Diverticulosis; Repeat 5 years    •  COLONOSCOPY  07/14/2010    Internal hemorrhoids; Repeat 7 years    • COLONOSCOPY N/A 10/23/2019    Procedure: COLONOSCOPY WITH ANESTHESIA;  Surgeon: Robyn Frazier MD;  Location: [facility] ENDOSCOPY;  Service: Gastroenterology   • CYST REMOVAL  2016    neck   • ENDOSCOPIC FUNCTIONAL SINUS SURGERY (FESS) Bilateral 4/16/2019    Procedure: ENDOSCOPIC FUNCTIONAL SINUS SURGERY (bilareral maxillary antrostomy without image guidance);  Surgeon: Mark Anthony Anderson MD;  Location: [facility] OR;  Service: ENT   • ENDOSCOPY N/A 4/11/2019    Esophageal mucosal changes suggestive of eosinophilic esophagitis-biopsied; A few gastric polyps-resected and retrieved-biopsied; Normal examined duodenum-biopsied   • FOOT SURGERY Right 2010    X3   • HERNIA REPAIR     • INTERSTIM PLACEMENT N/A 6/5/2019    Procedure: INTERSTIM STAGES 1 AND 2 LEAD AND GENERATOR PLACEMENT;  Surgeon: Endy Guerrero MD;  Location:  [facility] OR;  Service: Urology   • INTERSTIM PLACEMENT N/A 7/26/2021    Procedure: INTERSTIM STAGES 1 AND 2 LEAD AND GENERATOR PLACEMENT - RECHARGEABLE.;  Surgeon: Mikey Asencio MD;  Location:  [facility] OR;  Service: Urology;  Laterality: N/A;   • INTERSTIM REMOVAL N/A 7/26/2021    Procedure: INTERSTIM REMOVAL;  Surgeon: Mikey Asencio MD;  Location:  [facility] OR;  Service: Urology;  Laterality: N/A;   • LEG DEBRIDEMENT Left 7/31/2020    Procedure: LOWER POSTERIOR LEG WOUND DEBRIDEMENT - LEFT;  Surgeon: Roberto Wells DPM;  Location:  [facility] OR;  Service: Podiatry;  Laterality: Left;   • LEG DEBRIDEMENT Left 11/30/2020    Procedure: SPLIT THICKNESS SKIN GRAFT LEFT LOWER EXTREMTIY WITH WOUND VAC PLACEMENT;  Surgeon: Trace Hurd DO;  Location:  [facility] HYBRID OR 12;  Service: Vascular;  Laterality: Left;   • LEG DEBRIDEMENT Left 2/3/2021    Procedure: LOWER EXTREMITY WOUND DEBRIDEMENT - LEFT;  Surgeon: Roberto Wells DPM;  Location:  [facility] OR;  Service: Podiatry;  Laterality: Left;   • NASAL ENDOSCOPY N/A 4/16/2019     Procedure:     1. Functional endoscopic sinus surgery with bilateral maxillary antrostomy    2. Bilateral nasal endoscopy with biopsy of nasal septum    3.  Nasal septal prosthesis placement  ;  Surgeon: Mark Anthony Anderson MD;  Location: [facility] OR;  Service: ENT   • NASAL VESTIBULE LESION/PAPILLOMA EXCISION N/A 8/1/2017    Procedure: Repair of nasal vestibular stenosis and septoplasty; cartilage graft from left ear;  Surgeon: Mark Anthony Anderson MD;  Location:  [facility] OR;  Service:    • SEPTOPLASTY N/A 4/16/2019    Procedure: PLACEMENT OF NASAL SEPTAL PROSTHESIS;  Surgeon: Mark Anthony Anderson MD;  Location:  [facility] OR;  Service: ENT   • SEPTOPLASTY Bilateral 1/14/2020    Procedure: Nasal endoscopy with septal debridement, and placement of Silastic stents;  Surgeon: Mark Anthony Anderson MD;  Location:  [facility] OR;  Service: ENT   • US GUIDED LYMPH NODE BIOPSY  7/15/2022   • WOUND DEBRIDEMENT  06/2019    spider bite wound initial surgery     HEALTH MAINTENANCE ITEMS:  Health Maintenance Due   Topic Date Due   • MAMMOGRAM  Never done   • ANNUAL PHYSICAL  Never done   • TDAP/TD VACCINES (1 - Tdap) Never done   • ZOSTER VACCINE (1 of 2) Never done   • HEPATITIS C SCREENING  Never done   • DIABETIC FOOT EXAM  Never done   • PAP SMEAR  Never done   • DIABETIC EYE EXAM  Never done   • Pneumococcal Vaccine 0-64 (2 - PCV) 03/13/2018   • URINE MICROALBUMIN  04/05/2020   • COVID-19 Vaccine (4 - Booster for Moderna series) 06/16/2022       <no information>  Last Completed Colonoscopy          COLORECTAL CANCER SCREENING (COLONOSCOPY - Every 10 Years) Next due on 10/23/2029    10/23/2019  Surgical Procedure: COLONOSCOPY    10/23/2019  COLONOSCOPY    03/11/2019  POC OCCULT BLOOD, STOOL-DIAGNOSTIC    10/17/2014  SCANNED - COLONOSCOPY    07/14/2010  SCANNED - COLONOSCOPY              Immunization History   Administered Date(s) Administered   • COVID-19 (MODERNA) 1st, 2nd, 3rd Dose Only 03/16/2021, 04/16/2021, 02/16/2022   • Pneumococcal  "Polysaccharide (PPSV23) 2017     Last Completed Mammogram     This patient has no relevant Health Maintenance data.            FAMILY HISTORY:  Family History   Problem Relation Age of Onset   • Cancer Mother    • Diabetes Father    • Hypertension Father    • Cancer Father    • Colon cancer Neg Hx    • Colon polyps Neg Hx    • Esophageal cancer Neg Hx    • Liver cancer Neg Hx    • Liver disease Neg Hx    • Rectal cancer Neg Hx    • Stomach cancer Neg Hx      SOCIAL HISTORY:  Social History     Socioeconomic History   • Marital status:    Tobacco Use   • Smoking status: Former Smoker     Packs/day: 1.00     Years: 40.00     Pack years: 40.00     Types: Cigarettes     Start date:      Quit date: 2020     Years since quittin.0   • Smokeless tobacco: Never Used   • Tobacco comment: sometimes 1.5 when smoking more heavy    Vaping Use   • Vaping Use: Former   • Substances: Nicotine   Substance and Sexual Activity   • Alcohol use: Yes     Comment: Rarely-maybe 3x/year   • Drug use: No   • Sexual activity: Defer       REVIEW OF SYSTEMS:  Review of Systems   Constitutional: Positive for activity change and fatigue. Negative for unexpected weight loss (53 pounds in the past 8 months.  Says she has intentionally cut back on portions to control weight and her diabetes.  Aims to lose another 50 lbs.  \"My high school weight\").   HENT: Positive for postnasal drip.    Eyes: Negative.    Respiratory: Negative.         Former cigarette smoker.  Age 16--61.  Up to 1.5 packs/day.   Cardiovascular: Negative.    Gastrointestinal: Positive for constipation (when she takes narcotics for her back pains). Negative for diarrhea.   Endocrine: Positive for heat intolerance (\"hot flashes\").   Genitourinary: Positive for urinary incontinence.        G2, P2, A0   Musculoskeletal: Positive for arthralgias (Chronic.  Arthritis.) and back pain (Chronic.  Degenerative disc disease.).   Skin: Negative.    Allergic/Immunologic: " "       Drug allergies   Neurological: Negative.    Hematological: Negative.    Psychiatric/Behavioral: Positive for depressed mood (\"Bipolar\".  Modulated by fluoxetine). The patient is nervous/anxious (bipolar disorder).        /82   Pulse 83   Temp 97.1 °F (36.2 °C)   Resp 18   Ht 149.9 cm (59\")   Wt 79.2 kg (174 lb 9.6 oz)   LMP  (LMP Unknown)   SpO2 95%   Breastfeeding No   BMI 35.26 kg/m²  Body surface area is 1.74 meters squared.  Pain Score    07/29/22 1013   PainSc:   2       Physical Exam:  Physical Exam  Vitals reviewed.   Constitutional:       Appearance: She is obese.      Comments: Pleasant, cooperative, obese, modestly kept female.  Ambulatory with a cane.  ECOG 1.  She is accompanied by her spouse, Rizwan GALEANA:      Head: Normocephalic.      Mouth/Throat:      Comments: Is wearing a surgical mask today.  Eyes:      General: No scleral icterus.     Extraocular Movements: Extraocular movements intact.      Conjunctiva/sclera: Conjunctivae normal.      Pupils: Pupils are equal, round, and reactive to light.   Cardiovascular:      Rate and Rhythm: Normal rate.   Pulmonary:      Effort: Pulmonary effort is normal.   Chest:   Breasts:      Right: Normal. No axillary adenopathy or supraclavicular adenopathy.      Left: Mass and axillary adenopathy present. No supraclavicular adenopathy.        Comments: Chaperone exam: Katt Ruiz MA    Right breast without masses skin changes no nipple discharge.  No axillary/supraclavicular adenopathy.    Left breast with skin thickening and mild hyperemia and underlying mass-effect left lower quadrant.  Matted adenopathy left axilla.  No supraclavicular adenopathy.  Abdominal:      Palpations: Abdomen is soft.      Tenderness: There is no abdominal tenderness.   Musculoskeletal:         General: Normal range of motion.      Cervical back: Normal range of motion.   Lymphadenopathy:      Cervical: No cervical adenopathy.      Upper Body:      Right upper " body: No supraclavicular or axillary adenopathy.      Left upper body: Axillary adenopathy present. No supraclavicular adenopathy.   Skin:     General: Skin is warm.   Neurological:      General: No focal deficit present.      Mental Status: She is alert and oriented to person, place, and time.   Psychiatric:         Mood and Affect: Mood normal.         Thought Content: Thought content normal.         Assessment:  1.   Invasive ductal carcinoma, left breast  --Tumor stage: TNM/AJCC stage IV (cT4b, cN2a, M1,G?) ER 90% (+); CA 40% (+); HER2/jaja-1+ (negative).  --Original tumor burden:    -- 07/05/2022-US L breast with axilla: At the 2:00 position left breast is a large lobulated hypoechoic mass with blood flow and acoustic shadowing measuring 3.3 x 2.4 x 1.7 cm. This corresponds to the palpable lump in the mass on mammogram. BIRADS 5 highly suspicious for malignancy. US guided percutaneous biopsy is recommended.   -- 07/15/2022- ultrasound-guided biopsy of mass at 2:00 in the left breast and abnormal lymph node in the left axilla.  Final diagnosis:    -Lymph node, left axilla, core biopsies:  Metastatic carcinoma consistent with breast primary.  -Left breast, 2:00, core biopsies:  Invasive mammary carcinoma of no special type (ductal), grade 2.  Lymphovascular space invasion identified.  Largest contiguous area of tumor measures 10 mm.  ER 90% (+); CA 40% (+); HER2/jaja-1+ (negative).  -- 07/25/2022- CT chest.  Known left breast cancer identified with left breast skin thickening concerning for dermal invasion.  Pathologic left axillary lymph nodes (2.5 x 1.7 cm).  No intrathoracic metastasis identified.  Heterogenous appearance to the regional bony marrow with scattered lucent foci involving sternum as well as thoracic and lumbar vertebra.  Correlation with nuclear bone scan recommended.  -- 07/25/2022-CT abdomen/pelvis.  No evidence of intra-abdominal or pelvic metastasis.  Nonspecific lucency left L1 vertebra.  --  07/27/2022-PET scan- widespread metastasis (left breast, left axillary lymphadenopathy, upper sternum, multiple thoracic vertebral body lesions, multiple lumbar vertebral body lesions, mild asymmetric enlargement and mild FDG uptake within the left flank musculature which is indeterminate with SUV 2.99, multiple pelvic bony lesions, right quadratus for Vimal muscle metastasis, possible right sciatic nerve symptoms, proximal right femur diaphysis).  Left axillary lymph node metastatic disease and a few sites of asymmetric muscular uptake compatible with small muscular metastasis.    2.   Back pain related to the spinal metastasis.  3.   Sleep apnea.  CPAP     Plan:  1.   Apprised of the available diagnostic information, including the pathology, and imaging findings (CT scans and PET scan) showing widespread osseous metastasis.  No visceral organ metastasis.  2.   Draw CBC with diff, CMP, CEA, CA 27-29  3.   Schedule staging brain MRI  4.   Review NCCN guidelines version 4.2022 invasive breast cancer-stage IV (M1) disease-work-up: Brain MRI, bone scan or PET scan, comprehensive germline and somatic profiling to identify additional targeted therapies, genetic counseling-bone disease present- abdomen denosumab, zoledronic acid or pamidronate- ER/LA positive, HER2 negative- no visceral crisis and no prior endocrine therapy within 1 year-postmenopausal-systemic therapy until progression or unacceptable toxicity-preferred regimens for HER2 negative and postmenopausal (first-line therapy)- aromatase inhibitor+ CD4/6 inhibitor (abemaciclib, palbociclib or ribociclib-category 1), selective ER down regulator (fulvestrant-category 1)+ nonsteroidal aromatase inhibitor (anastrozole, letrozole-category 1), fulvestrant+ CD/K inhibitor (abemaciclib, palbociclib or ribociclib-category 1)    5.   Teaching sheets for palbociclib (Ibrance) and anastrozole (Arimidex).  6.   Potential toxicities of palbociclib discussed (to include but  not limited to: Myelosuppression, febrile neutropenia, serious infection, interstitial lung disease, pneumonitis, noninfectious, fatigue, nausea, stomatitis, alopecia, diarrhea, rash, vomiting, decreased appetite, asthenia, fever, xeroderma, liver enzyme increase, epistaxis, blurred vision).  Questions answered.  Patient agrees to trial of therapy.  7.   Potential toxicities of anastrozole discussed (to include but not limited to: Osteoporosis, fractures, endometrial carcinoma, thromboembolism, MI, stroke, angina, hypertension, cataracts, anemia, leukopenia, erythema multiforme, Rivas-Ruslan syndrome, hot flashes, asthenia, pain, arthralgia, arthritis, nausea, vomiting, headache, pharyngitis, depression, rash, hypertension, lymphedema, insomnia, edema, weight gain, dyspnea, abdominal pain, constipation, hypercholesterolemia, cough, abdominal pain).  Questions answered.  Patient agrees to trial of therapy.    8.  Rx:   · Arimidex 1 mg po daily # 30 x 11 RF  · palbociclib 125 mg on days 1-21 of 28 day cycle  · Schedule Xgeva 120 mg sc q 3 months    9.  Refer to Dr. Rivera Re: Consideration of palliative radiation to the symptomatic spinal/pelvic metastatic lesions.  10.  Care discussed with Dr. Pedraza.  Patient has an appointment to be seen at her office on 08/01/2022.  We will discuss the option of toilet mastectomy vs palliative radiation for control of pain.  11.  Upon initiation of palbociclib, draw CBC with diff q week- Procrit 40,000 units sc if Hgb < 10 and Hct < 30, Neupogen 480 mcg sc daily x 3 if ANC < 1 at [facility]    12.  Discussed the option of tertiary care referral Re: management opinion and potential clinical trials.  They will think it over and let us know.  13.  Return to the office in 6 weeks with pre-office CBC with differential, CMP.        I spent 125 minutes caring for Robyn on this date of service. This time includes time spent by me in the following activities: preparing for the visit,  reviewing tests, performing a medically appropriate examination and/or evaluation, counseling and educating the patient/family/caregiver, ordering medications, tests, or procedures and documenting information in the medical record.

## 2022-07-29 ENCOUNTER — LAB (OUTPATIENT)
Dept: LAB | Facility: HOSPITAL | Age: 62
End: 2022-07-29

## 2022-07-29 ENCOUNTER — CONSULT (OUTPATIENT)
Dept: ONCOLOGY | Facility: CLINIC | Age: 62
End: 2022-07-29

## 2022-07-29 ENCOUNTER — TELEPHONE (OUTPATIENT)
Dept: ONCOLOGY | Facility: CLINIC | Age: 62
End: 2022-07-29

## 2022-07-29 ENCOUNTER — OFFICE VISIT (OUTPATIENT)
Dept: WOUND CARE | Facility: HOSPITAL | Age: 62
End: 2022-07-29

## 2022-07-29 ENCOUNTER — SPECIALTY PHARMACY (OUTPATIENT)
Dept: ONCOLOGY | Facility: HOSPITAL | Age: 62
End: 2022-07-29

## 2022-07-29 VITALS
WEIGHT: 174.6 LBS | TEMPERATURE: 97.1 F | BODY MASS INDEX: 35.2 KG/M2 | RESPIRATION RATE: 18 BRPM | HEART RATE: 83 BPM | SYSTOLIC BLOOD PRESSURE: 146 MMHG | DIASTOLIC BLOOD PRESSURE: 82 MMHG | OXYGEN SATURATION: 95 % | HEIGHT: 59 IN

## 2022-07-29 DIAGNOSIS — C50.812 MALIGNANT NEOPLASM OF OVERLAPPING SITES OF LEFT BREAST IN FEMALE, ESTROGEN RECEPTOR POSITIVE: Primary | ICD-10-CM

## 2022-07-29 DIAGNOSIS — Z17.0 MALIGNANT NEOPLASM OF OVERLAPPING SITES OF LEFT BREAST IN FEMALE, ESTROGEN RECEPTOR POSITIVE: Primary | ICD-10-CM

## 2022-07-29 DIAGNOSIS — Q82.0 HEREDITARY LYMPHEDEMA: ICD-10-CM

## 2022-07-29 DIAGNOSIS — L97.822 NON-PRESSURE CHRONIC ULCER OF OTHER PART OF LEFT LOWER LEG WITH FAT LAYER EXPOSED: ICD-10-CM

## 2022-07-29 DIAGNOSIS — R60.0 LOCALIZED EDEMA: ICD-10-CM

## 2022-07-29 PROCEDURE — 99205 OFFICE O/P NEW HI 60 MIN: CPT | Performed by: INTERNAL MEDICINE

## 2022-07-29 PROCEDURE — G0463 HOSPITAL OUTPT CLINIC VISIT: HCPCS

## 2022-07-29 PROCEDURE — G2212 PROLONG OUTPT/OFFICE VIS: HCPCS | Performed by: INTERNAL MEDICINE

## 2022-07-29 PROCEDURE — 99212 OFFICE O/P EST SF 10 MIN: CPT | Performed by: PODIATRIST

## 2022-07-29 RX ORDER — ANASTROZOLE 1 MG/1
1 TABLET ORAL DAILY
Qty: 30 TABLET | Refills: 11 | Status: SHIPPED | OUTPATIENT
Start: 2022-07-29

## 2022-08-01 ENCOUNTER — PATIENT MESSAGE (OUTPATIENT)
Dept: ONCOLOGY | Facility: CLINIC | Age: 62
End: 2022-08-01

## 2022-08-01 ENCOUNTER — OFFICE VISIT (OUTPATIENT)
Dept: SURGERY | Facility: CLINIC | Age: 62
End: 2022-08-01

## 2022-08-01 VITALS
SYSTOLIC BLOOD PRESSURE: 130 MMHG | OXYGEN SATURATION: 96 % | BODY MASS INDEX: 35.08 KG/M2 | DIASTOLIC BLOOD PRESSURE: 84 MMHG | HEIGHT: 59 IN | WEIGHT: 174 LBS | HEART RATE: 84 BPM

## 2022-08-01 DIAGNOSIS — N64.4 PAIN OF LEFT BREAST: ICD-10-CM

## 2022-08-01 DIAGNOSIS — E66.09 CLASS 2 OBESITY DUE TO EXCESS CALORIES WITH BODY MASS INDEX (BMI) OF 36.0 TO 36.9 IN ADULT, UNSPECIFIED WHETHER SERIOUS COMORBIDITY PRESENT: ICD-10-CM

## 2022-08-01 DIAGNOSIS — Z01.818 PREOP TESTING: ICD-10-CM

## 2022-08-01 DIAGNOSIS — C50.919 METASTATIC BREAST CANCER: Primary | ICD-10-CM

## 2022-08-01 PROCEDURE — 99214 OFFICE O/P EST MOD 30 MIN: CPT | Performed by: STUDENT IN AN ORGANIZED HEALTH CARE EDUCATION/TRAINING PROGRAM

## 2022-08-01 NOTE — PROGRESS NOTES
"Office Established Patient Note:     Referring Provider: No ref. provider found    Chief Complaint   Patient presents with   • Follow-up     2 week f/u        Subjective .     History of present illness:  Robyn Minaya is a 62 y.o. female with known metastatic breast cancer, ER+VT+. She saw Dr. Garcia on Friday and is very upset with how her appointment was managed. She felt that he was rushed and that she was not a \"priority\" in the office. She was upset that a brain MRI was ordered and scheduled when she cannot have MRIs. She has requested a referral to [facility]. She also returns to discuss her left breast pain 2/2 her known cancer. She wants to discuss a salvage mastectomy for pain control.    Review of Systems   Constitutional: Negative for activity change, appetite change and diaphoresis.   Eyes: Negative for discharge and itching.   Respiratory: Negative for apnea, cough and shortness of breath.    Cardiovascular: Negative for chest pain and palpitations.   Gastrointestinal: Negative for abdominal distention and abdominal pain.   Genitourinary: Negative for difficulty urinating and dysuria.   Musculoskeletal: Positive for back pain. Negative for arthralgias.   Neurological: Negative for dizziness, numbness and headaches.   Hematological: Positive for adenopathy.   Psychiatric/Behavioral: Negative for agitation and confusion.       History  Past Medical History:   Diagnosis Date   • Allergic rhinitis    • Arthritis     BACK   • Chronic back pain    • Degenerative arthritis    • Depression    • Deviated nasal septum    • Diabetes mellitus (HCC)    • Edema    • Gout    • History of colon polyps    • History of transfusion    • Hyperlipidemia    • Hypertension    • Hypertrophy of nasal turbinates    • Incontinence in female    • Insomnia    • Menopause    • Nasal septal deviation    • Nasal valve stenosis    • Overactive bladder    • Plantar fasciitis    • Prediabetes    • RLS (restless " legs syndrome)    • Sleep apnea     pt states has a cpap but hasn't been using it   • Spider bite wound     left lower leg. previously infected with mrsa but currently not infected.   • Wears glasses    ,   Past Surgical History:   Procedure Laterality Date   • BLEPHAROPLASTY Bilateral 7/9/2019    Procedure: 1) bilateral upper blepharoplasty with excision of excess of tissue weighing down 2) nasal endoscopy with removal of nasal septal prosthesis;  Surgeon: Mark Anthony Anderson MD;  Location: [facility] OR;  Service: ENT   • CARPAL TUNNEL RELEASE Bilateral 2004   • CHOLECYSTECTOMY  2013   • COLONOSCOPY  10/17/2014    One 5-6mm tubular adenomatous polyp in the ascending colon; Two less than 4mm hyperplastic polyps in the sigmoid colon; Three rectal hyperplastic polyps; Diverticulosis; Repeat 5 years    • COLONOSCOPY  07/14/2010    Internal hemorrhoids; Repeat 7 years    • COLONOSCOPY N/A 10/23/2019    Procedure: COLONOSCOPY WITH ANESTHESIA;  Surgeon: Robyn Frazier MD;  Location: [facility] ENDOSCOPY;  Service: Gastroenterology   • CYST REMOVAL  2016    neck   • ENDOSCOPIC FUNCTIONAL SINUS SURGERY (FESS) Bilateral 4/16/2019    Procedure: ENDOSCOPIC FUNCTIONAL SINUS SURGERY (bilareral maxillary antrostomy without image guidance);  Surgeon: Mark Anthony Anderson MD;  Location: [facility] OR;  Service: ENT   • ENDOSCOPY N/A 4/11/2019    Esophageal mucosal changes suggestive of eosinophilic esophagitis-biopsied; A few gastric polyps-resected and retrieved-biopsied; Normal examined duodenum-biopsied   • FOOT SURGERY Right 2010    X3   • HERNIA REPAIR     • INTERSTIM PLACEMENT N/A 6/5/2019    Procedure: INTERSTIM STAGES 1 AND 2 LEAD AND GENERATOR PLACEMENT;  Surgeon: Endy Guerrero MD;  Location: [facility] OR;  Service: Urology   • INTERSTIM PLACEMENT N/A 7/26/2021    Procedure: INTERSTIM STAGES 1 AND 2 LEAD AND GENERATOR PLACEMENT - RECHARGEABLE.;  Surgeon: Mikey Asencio MD;  Location:  [facility] OR;  Service: Urology;  Laterality: N/A;    • INTERSTIM REMOVAL N/A 7/26/2021    Procedure: INTERSTIM REMOVAL;  Surgeon: Mikey Asencio MD;  Location:  [facility] OR;  Service: Urology;  Laterality: N/A;   • LEG DEBRIDEMENT Left 7/31/2020    Procedure: LOWER POSTERIOR LEG WOUND DEBRIDEMENT - LEFT;  Surgeon: Roberto Wells DPM;  Location:  [facility] OR;  Service: Podiatry;  Laterality: Left;   • LEG DEBRIDEMENT Left 11/30/2020    Procedure: SPLIT THICKNESS SKIN GRAFT LEFT LOWER EXTREMTIY WITH WOUND VAC PLACEMENT;  Surgeon: Trace Hurd DO;  Location:  [facility] HYBRID OR 12;  Service: Vascular;  Laterality: Left;   • LEG DEBRIDEMENT Left 2/3/2021    Procedure: LOWER EXTREMITY WOUND DEBRIDEMENT - LEFT;  Surgeon: Roberto Wells DPM;  Location:  [facility] OR;  Service: Podiatry;  Laterality: Left;   • NASAL ENDOSCOPY N/A 4/16/2019    Procedure:     1. Functional endoscopic sinus surgery with bilateral maxillary antrostomy    2. Bilateral nasal endoscopy with biopsy of nasal septum    3.  Nasal septal prosthesis placement  ;  Surgeon: Mark Anthony Anderson MD;  Location:  [facility] OR;  Service: ENT   • NASAL VESTIBULE LESION/PAPILLOMA EXCISION N/A 8/1/2017    Procedure: Repair of nasal vestibular stenosis and septoplasty; cartilage graft from left ear;  Surgeon: Mark Anthony Anderson MD;  Location:  [facility] OR;  Service:    • SEPTOPLASTY N/A 4/16/2019    Procedure: PLACEMENT OF NASAL SEPTAL PROSTHESIS;  Surgeon: Mark Anthony Anderson MD;  Location:  [facility] OR;  Service: ENT   • SEPTOPLASTY Bilateral 1/14/2020    Procedure: Nasal endoscopy with septal debridement, and placement of Silastic stents;  Surgeon: Mark Anthony Anderson MD;  Location:  [facility] OR;  Service: ENT   • US GUIDED LYMPH NODE BIOPSY  7/15/2022   • WOUND DEBRIDEMENT  06/2019    spider bite wound initial surgery   ,   Family History   Problem Relation Age of Onset   • Cancer Mother    • Diabetes Father    • Hypertension Father    • Cancer Father    • Colon cancer Neg Hx    • Colon polyps Neg Hx    • Esophageal cancer Neg  Hx    • Liver cancer Neg Hx    • Liver disease Neg Hx    • Rectal cancer Neg Hx    • Stomach cancer Neg Hx    ,   Social History     Tobacco Use   • Smoking status: Former Smoker     Packs/day: 1.00     Years: 40.00     Pack years: 40.00     Types: Cigarettes     Start date:      Quit date: 2020     Years since quittin.0   • Smokeless tobacco: Never Used   • Tobacco comment: sometimes 1.5 when smoking more heavy    Vaping Use   • Vaping Use: Former   • Substances: Nicotine   Substance Use Topics   • Alcohol use: Yes     Comment: Rarely-maybe 3x/year   • Drug use: No   , (Not in a hospital admission)   and Allergies:  Bactrim [sulfamethoxazole-trimethoprim], Hctz [hydrochlorothiazide], Januvia [sitagliptin], and Metformin and related    Current Outpatient Medications:   •  allopurinol (ZYLOPRIM) 100 MG tablet, Take 100 mg by mouth Daily., Disp: , Rfl:   •  anastrozole (ARIMIDEX) 1 MG tablet, Take 1 tablet by mouth Daily., Disp: 30 tablet, Rfl: 11  •  atorvastatin (LIPITOR) 40 MG tablet, Take 40 mg by mouth Every Night., Disp: , Rfl:   •  calcium carbonate (OS-KAYLEY) 600 MG tablet, Take 600 mg by mouth Daily., Disp: , Rfl:   •  carvedilol (COREG) 6.25 MG tablet, Take 3.125 mg by mouth 2 (Two) Times a Day With Meals., Disp: , Rfl:   •  cholecalciferol (VITAMIN D3) 25 MCG (1000 UT) tablet, Take 400 Units by mouth Daily., Disp: , Rfl:   •  diclofenac (VOLTAREN) 75 MG EC tablet, Take 75 mg by mouth 2 (Two) Times a Day., Disp: , Rfl:   •  FLUoxetine (PROzac) 40 MG capsule, Take 40 mg by mouth Daily., Disp: , Rfl:   •  HYDROcodone-acetaminophen (NORCO) 7.5-325 MG per tablet, Take 1 tablet by mouth Every 6 (Six) Hours As Needed for Moderate Pain  (postop pain)., Disp: 12 tablet, Rfl: 0  •  levothyroxine (SYNTHROID, LEVOTHROID) 75 MCG tablet, Take 75 mcg by mouth Daily., Disp: , Rfl:   •  methylcellulose, Laxative, (CITRUCEL) 500 MG tablet tablet, Take 1 tablet by mouth Daily., Disp: , Rfl:   •  Multiple Vitamin  "(MULTI VITAMIN PO), Take 1 dose by mouth Daily., Disp: , Rfl:   •  niacin 250 MG tablet, Take 500 mg by mouth Daily With Breakfast., Disp: , Rfl:   •  omeprazole (priLOSEC) 20 MG capsule, Take 20 mg by mouth Daily., Disp: , Rfl:   •  pilocarpine (SALAGEN) 5 MG tablet, Take 5 mg by mouth 3 (Three) Times a Day., Disp: , Rfl:   •  pramipexole (MIRAPEX) 1.5 MG tablet, Take 1.5 mg by mouth Every Night., Disp: , Rfl:   •  Semaglutide-Weight Management (Wegovy) 2.4 MG/0.75ML solution auto-injector, INJECT 2.4mg (0.75ML) UNDER THE SKIN EVERY WEEK, Disp: , Rfl:   •  spironolactone (ALDACTONE) 25 MG tablet, Take 25 mg by mouth 2 (Two) Times a Day., Disp: , Rfl:   •  tiZANidine (ZANAFLEX) 4 MG tablet, Take 4 mg by mouth Every Night. HAS NOT TAKEN WHILE ON LEVAQUIN, Disp: , Rfl:   •  traMADol (ULTRAM) 50 MG tablet, Take 100 mg by mouth 2 (Two) Times a Day., Disp: , Rfl:   •  traZODone (DESYREL) 50 MG tablet, Take 50 mg by mouth Every Night. Take 1-3 tablets at night as needed for sleep., Disp: , Rfl:     Current Facility-Administered Medications:   •  lidocaine (XYLOCAINE) 1 % injection 10 mL, 10 mL, Subcutaneous, Once, Evin Elliott MD  •  lidocaine (XYLOCAINE) 1 % injection 10 mL, 10 mL, Subcutaneous, Once, Evin Elliott MD  •  lidocaine 1% - EPINEPHrine 1:[id number] (XYLOCAINE W/EPI) 1 %-1:628085 injection 10 mL, 10 mL, Injection, Once, Evin Elliott MD    Objective     Vital Signs   /84 (BP Location: Left arm, Patient Position: Sitting, Cuff Size: Adult)   Pulse 84   Ht 149.9 cm (59\")   Wt 78.9 kg (174 lb)   LMP  (LMP Unknown)   SpO2 96%   BMI 35.14 kg/m²      Physical Exam:  General appearance - alert, well appearing, and in no distress  Mental status - alert, oriented to person, place, and time  Eyes - sclera anicteric  Chest - no tachypnea, retractions or cyanosis  Heart - regular rate   Neurological - alert, oriented, normal speech, no focal findings or movement disorder noted    Results " Review:    The following data was reviewed by: Jaclyn Pedraza MD on 08/01/2022:  CT Abdomen Pelvis With & Without Contrast (07/25/2022 08:23)  1. No evidence of intra-abdominal or pelvic metastasis.  2. Renal cysts. No hydronephrosis. InterStim device/presacral leads.  3. Nonspecific lucency of the left L1 vertebra. Correlation with nuclear  medicine bone scan recommended.  This report was finalized on 07/25/2022 08:42 by Dr. Sarika Winkler MD.  CT Chest With & Without Contrast Diagnostic (07/25/2022 08:23)  1. Patient's known left breast cancer identified with left breast skin  thickening concerning for dermal invasion. Pathologic left axillary  lymph nodes. No intrathoracic metastasis identified.  2. Heterogeneous appearance to the regional bony marrow with scattered  lucent foci involving sternum as well as thoracic and lumbar vertebra.  Correlation with nuclear medicine bone scan recommended.  NM PET/CT Skull Base to Mid Thigh (07/27/2022 15:02)  1. Widespread metastases are mostly bony. There is left axillary lymph  node metastatic disease and a few sites of asymmetric muscular uptake  compatible with small muscular metastases.  Progress Notes by Scott Garcia MD (07/29/2022 10:00)  I personally discussed the patient with Dr. Garcia on 7/29/22.     Assessment & Plan       Diagnoses and all orders for this visit:    1. Metastatic breast cancer (HCC) (Primary)  -     Ambulatory Referral to Oncology    2. Class 2 obesity due to excess calories with body mass index (BMI) of 36.0 to 36.9 in adult, unspecified whether serious comorbidity present    3. Pain of left breast    Ms. Minaya is a 62 year old female with known metastatic left breast cancer, ER+HI+Her2-. She has been recommended to proceed with oral anti-endocrine therapy. She has started her Arimidex.  She would like a referral to [facility] onc[facility] as she does not want to go back to [facility] oncology; I have placed this referral. We discussed  salvage mastectomy for pain control. She is interested in this option. I explained that this does not treat her cancer and would be solely for pain control. She understands this. She does see pain management for chronic pain. She asked about reconstruction. I explained that I am happy to refer her to Dr. Amaro, a plastic surgeon, however I do not recommend reconstruction. The goal of this operation would be palliation for pain control. Reconstruction would increase her risk of complications or interruption in medical therapy. She understands this and would like to think about a possible salvage mastectomy. I will see her back the first week of September after her vacation.    Class 2 Severe Obesity (BMI >=35 and <=39.9). Obesity-related health conditions include the following: none. Obesity is unchanged.       Jaclyn Pedraza MD  08/01/22  20:53 CDT

## 2022-08-02 ENCOUNTER — TELEPHONE (OUTPATIENT)
Dept: ONCOLOGY | Facility: CLINIC | Age: 62
End: 2022-08-02

## 2022-08-02 DIAGNOSIS — C50.812 MALIGNANT NEOPLASM OF OVERLAPPING SITES OF LEFT BREAST IN FEMALE, ESTROGEN RECEPTOR POSITIVE: Primary | ICD-10-CM

## 2022-08-02 DIAGNOSIS — Z17.0 MALIGNANT NEOPLASM OF OVERLAPPING SITES OF LEFT BREAST IN FEMALE, ESTROGEN RECEPTOR POSITIVE: Primary | ICD-10-CM

## 2022-08-02 NOTE — PATIENT INSTRUCTIONS

## 2022-08-02 NOTE — TELEPHONE ENCOUNTER
----- Message from Scott Garcia MD sent at 8/2/2022  1:07 PM CDT -----  Yes.   ----- Message -----  From: Katt Ruiz MA  Sent: 8/2/2022   1:06 PM CDT  To: Scott Garcia MD    Okay to change to CT head. Pt has metal in her body.

## 2022-08-03 ENCOUNTER — PATIENT ROUNDING (BHMG ONLY) (OUTPATIENT)
Dept: ONCOLOGY | Facility: CLINIC | Age: 62
End: 2022-08-03

## 2022-08-03 NOTE — PROGRESS NOTES
August 3, 2022    Hello, may I speak with Robyn Dominguez?    My name is VIVI      I am  with [facility]  [address]  [place] [address] [phone].    Before we get started may I verify your date of birth? 1960       Mercedes DOMINGUEZ. My name is Vivi and I am calling from [facility]      Dr. LEI office. I wanted to welcome you to our practice and ensure that your first visit went smoothly. If you have any questions or concerns, feel free to reach out to our practice manager Crystal directly at [phone]. Thank you, and have a great day!

## 2022-08-04 ENCOUNTER — SPECIALTY PHARMACY (OUTPATIENT)
Dept: ONCOLOGY | Facility: HOSPITAL | Age: 62
End: 2022-08-04

## 2022-08-04 ENCOUNTER — APPOINTMENT (OUTPATIENT)
Dept: LAB | Facility: HOSPITAL | Age: 62
End: 2022-08-04

## 2022-08-04 DIAGNOSIS — C50.812 MALIGNANT NEOPLASM OF OVERLAPPING SITES OF LEFT BREAST IN FEMALE, ESTROGEN RECEPTOR POSITIVE: Primary | ICD-10-CM

## 2022-08-04 DIAGNOSIS — C50.919 METASTATIC BREAST CANCER: Primary | ICD-10-CM

## 2022-08-04 DIAGNOSIS — Z17.0 MALIGNANT NEOPLASM OF OVERLAPPING SITES OF LEFT BREAST IN FEMALE, ESTROGEN RECEPTOR POSITIVE: Primary | ICD-10-CM

## 2022-08-04 NOTE — PROGRESS NOTES
[facility]    Oral Oncology Initial Consult      Consult Details  Patient Name (): Robyn Minaya  (1960)   Consulting Provider:   Scott Garcia MD ([facility])   Medication and Regimen:  palbocicib [Ibrance] 125 mg PO daily for days 1-21 and then off for 7 days.     Script Review and Evaluation:  Dosing & Interactions:   Reviewed, appropriate and no significant interaction noted at this time..   Oral Treatment Plan Signed?: Yes   Oral Treatment Plan Released?: Yes, released on 22.   Specialty Pharmacy Selected:  [facility], [place], [place] (#[id number])   Prior Authorization Status: Approved   Projected Counseling Date: 22   Predicted Pick-Up Date: 22   Predicted Start Date: 22        Robyn is a 62 y.o. female, who is referred to the specialty pharmacy service for the consultation of administration, side effects, etc. of Ibrance.      Education and information provided to patient.    Counseling Points  Administration Instructions:  · Dosing regimen.  · Medication can be taken with food.  · Proper storage and handling.  · Dosing may be changed based on side effects and lab results.    Side Effects & Management:  · Side effects are often individualized and are usually able to be managed with medication or non-pharmacologic strategies.  · Importance of managing side effects preemptively and alerting provider if side effects are unable to be managed at home.    · Highlighted common side effects and management strategies  Fatigue, Weakness Stay active   Aches/pains, headache OTC acetaminophen or NSAIDs unless instructed otherwise by a provider   Diarrhea Loperamide (Immodium) - take 2 tablets after each loose stool   Nausea, vomiting, loss of appetite Take previously prescribed ondansetron.   Decreased Blood Levels (WBC, PLT, Hgb) Monitor for fever and signs of infection, abnormal bleeding or bruising, keep lab appointments   Hair  loss/thinning Monitor   Mouth Sores Office Recommended Mouthwash: 1 tsp Salt + 1 Tbsp Baking Soda in a glass of water - Swish and spit as needed.   Clotting (rare) Monitor for sudden chest pain, shortness of breath (seek Emergency care) or leg or arm swelling with redness/pain/warmth.      · Do not eat grapefruit or drink grapefruit juice.  · Current medications acceptable from an interaction standpoint, please alert of any changes as they can sometimes affect the medication.  Generally herbal products are not recommended.  · Barrier birth-control methods are recommended.    Contact Provider If:  • Any of the above side effects are not able to be managed at home, increase dramatically, or become intolerable.  • Signs and symptoms of infection (fever, chills, etc.), unusual bleeding or bruising, or confusion.  • Any unusual symptoms since starting this medication.    Materials Provided to Patient: Patient requested no additional information to be sent  • Handout from [employer] concerning side effect management, proper use, safety hazards, and infection control recommendations.  • Oral Oncology Clinic Business Card with office/fax number.    Questions/Comments from Patient:  Patient only had questions about her pain medication and whether she could take that and this interaction was already discussed with oncologist per patient. Patient did not request any other additional information on the medication and wrote down our number so she did not need a business card. She stated she only filled other maintenance medications with [facility] in [place], [place]. The medication list in the patient's chart was updated based on the medication list from the pharmacy. The patient also takes other OTC medications and these were added to the medication list as well (ie. Multivitamin, Vit D3, Niacin, Calcium). Patient had no other questions or concerns about the medication. I encouraged the patient to reach out anytime about the  medication, side effects, or any concerns about her oral chemotherapy - she expressed understanding.      Consent Status:  Consent reviewed and signed via telephone on: 8/4/22     Follow-up  Will follow-up next month regarding the patient's oral chemotherapy with a routine telephone consultation to monitor for side effects. The next routine follow-up will be scheduled approximately 28-30 days from today.        Electronically signed 08/04/2022 15:06 CDT by:  Saadia Mcqueen PharmD  Specialty Pharmacist - Hematology & Oncology  [facility]  [phone]    UPDATE:  Informed by [facility] no access to [facility] at present. Will look into it for the future. Sent to [facility] [id number], patient aware and agreeable.   Omega Vazquez PharmD  08/05/22  08:59 CDT

## 2022-08-05 DIAGNOSIS — Z17.0 MALIGNANT NEOPLASM OF OVERLAPPING SITES OF LEFT BREAST IN FEMALE, ESTROGEN RECEPTOR POSITIVE: ICD-10-CM

## 2022-08-05 DIAGNOSIS — C50.812 MALIGNANT NEOPLASM OF OVERLAPPING SITES OF LEFT BREAST IN FEMALE, ESTROGEN RECEPTOR POSITIVE: ICD-10-CM

## 2022-08-10 DIAGNOSIS — Z17.0 MALIGNANT NEOPLASM OF OVERLAPPING SITES OF LEFT BREAST IN FEMALE, ESTROGEN RECEPTOR POSITIVE: Primary | ICD-10-CM

## 2022-08-10 DIAGNOSIS — C50.812 MALIGNANT NEOPLASM OF OVERLAPPING SITES OF LEFT BREAST IN FEMALE, ESTROGEN RECEPTOR POSITIVE: Primary | ICD-10-CM

## 2022-08-11 ENCOUNTER — APPOINTMENT (OUTPATIENT)
Dept: CT IMAGING | Facility: HOSPITAL | Age: 62
End: 2022-08-11

## 2022-08-12 ENCOUNTER — APPOINTMENT (OUTPATIENT)
Dept: CT IMAGING | Facility: HOSPITAL | Age: 62
End: 2022-08-12

## 2022-08-17 ENCOUNTER — TELEPHONE (OUTPATIENT)
Dept: HEMATOLOGY | Age: 62
End: 2022-08-17

## 2022-08-17 ENCOUNTER — TELEPHONE (OUTPATIENT)
Dept: ONCOLOGY | Facility: CLINIC | Age: 62
End: 2022-08-17

## 2022-08-17 RX ORDER — PILOCARPINE HYDROCHLORIDE 5 MG/1
TABLET, FILM COATED ORAL
Qty: 270 TABLET | Refills: 0 | Status: SHIPPED | OUTPATIENT
Start: 2022-08-17

## 2022-08-17 NOTE — TELEPHONE ENCOUNTER
DEVYN WITH [facility] HEMATOLOGY ONCOLOGY CALLING TO SEE WHY THEY RECVD REF FROM DR LEI - W/T TO [facility] TO FURTHER ASSIST.

## 2022-08-17 NOTE — TELEPHONE ENCOUNTER
Last OV 7/25/2022  Next OV Visit date not found      Requested Prescriptions     Pending Prescriptions Disp Refills    pilocarpine (SALAGEN) 5 MG tablet 270 tablet 0     Sig: TAKE ONE TABLET BY MOUTH THREE TIMES DAILY

## 2022-08-17 NOTE — TELEPHONE ENCOUNTER
We received referral from [facility] oncology- called patient to move appointment up she advised she would like to cancel appointment as she has seen [facility] for a second opinion and going to stay with [facility]. Advised patient if she needs anything to let us know- we have referral if she decides she wants somewhere closer to home.  She voiced she was very upset with [employer].

## 2022-08-18 ENCOUNTER — APPOINTMENT (OUTPATIENT)
Dept: MRI IMAGING | Facility: HOSPITAL | Age: 62
End: 2022-08-18

## 2022-08-18 DIAGNOSIS — E11.9 TYPE 2 DIABETES MELLITUS TREATED WITHOUT INSULIN (HCC): ICD-10-CM

## 2022-08-18 RX ORDER — SEMAGLUTIDE 2.4 MG/.75ML
INJECTION, SOLUTION SUBCUTANEOUS
Qty: 9 ML | Refills: 0 | Status: SHIPPED | OUTPATIENT
Start: 2022-08-18 | End: 2022-09-07 | Stop reason: SDUPTHER

## 2022-08-18 NOTE — TELEPHONE ENCOUNTER
Last OV 7/25/2022  Next OV Visit date not found      Requested Prescriptions     Pending Prescriptions Disp Refills    WEGOVY 2.4 MG/0.75ML SOAJ SC injection [Pharmacy Med Name: Wegovy 2.4 mg/0.75 mL subcutaneous pen injector] 9 mL 0     Sig: INJECT 2.4MG UNDER THE SKIN ONCE A WEEK

## 2022-08-22 ENCOUNTER — APPOINTMENT (OUTPATIENT)
Dept: MRI IMAGING | Facility: HOSPITAL | Age: 62
End: 2022-08-22

## 2022-08-24 DIAGNOSIS — E78.2 MIXED HYPERLIPIDEMIA: ICD-10-CM

## 2022-08-24 DIAGNOSIS — E03.9 ACQUIRED HYPOTHYROIDISM: ICD-10-CM

## 2022-08-24 DIAGNOSIS — I10 ESSENTIAL HYPERTENSION: ICD-10-CM

## 2022-08-24 RX ORDER — LEVOTHYROXINE SODIUM 0.07 MG/1
TABLET ORAL
Qty: 90 TABLET | Refills: 1 | Status: SHIPPED | OUTPATIENT
Start: 2022-08-24

## 2022-08-24 RX ORDER — SPIRONOLACTONE 25 MG/1
TABLET ORAL
Qty: 270 TABLET | Refills: 1 | Status: SHIPPED | OUTPATIENT
Start: 2022-08-24

## 2022-08-24 RX ORDER — ATORVASTATIN CALCIUM 40 MG/1
TABLET, FILM COATED ORAL
Qty: 90 TABLET | Refills: 1 | Status: SHIPPED | OUTPATIENT
Start: 2022-08-24

## 2022-08-24 NOTE — TELEPHONE ENCOUNTER
Last OV 7/25/2022  Next OV Visit date not found      Requested Prescriptions     Pending Prescriptions Disp Refills    spironolactone (ALDACTONE) 25 MG tablet [Pharmacy Med Name: spironolactone 25 mg tablet] 270 tablet 1     Sig: TAKE TWO TABLETS BY MOUTH IN THE MORNING AND ONE AT BEDTIME FOR FLUID    levothyroxine (SYNTHROID) 75 MCG tablet [Pharmacy Med Name: levothyroxine 75 mcg tablet] 90 tablet 1     Sig: TAKE ONE TABLET BY MOUTH DAILY AS DIRECTED    atorvastatin (LIPITOR) 40 MG tablet [Pharmacy Med Name: atorvastatin 40 mg tablet] 90 tablet 1     Sig: TAKE ONE TABLET BY MOUTH NIGHTLY

## 2022-08-30 ENCOUNTER — SPECIALTY PHARMACY (OUTPATIENT)
Dept: ONCOLOGY | Facility: HOSPITAL | Age: 62
End: 2022-08-30

## 2022-08-30 ENCOUNTER — TELEPHONE (OUTPATIENT)
Dept: ONCOLOGY | Facility: CLINIC | Age: 62
End: 2022-08-30

## 2022-08-30 DIAGNOSIS — Z17.0 MALIGNANT NEOPLASM OF OVERLAPPING SITES OF LEFT BREAST IN FEMALE, ESTROGEN RECEPTOR POSITIVE: Primary | ICD-10-CM

## 2022-08-30 DIAGNOSIS — C50.812 MALIGNANT NEOPLASM OF OVERLAPPING SITES OF LEFT BREAST IN FEMALE, ESTROGEN RECEPTOR POSITIVE: Primary | ICD-10-CM

## 2022-08-30 NOTE — PROGRESS NOTES
"Specialty Pharmacy Refill Coordination Note     Robyn is a 62 y.o. female contacted today regarding refills of  Ibrance specialty medication(s).    Reviewed and verified with patient:  I spoke with Robyn for a monthly routine telephone follow-up regarding her Imbruvica herapy. She stated that she has been tolerating the medication well and reports no side effects from the medication. She has not missed any doses while on the medication. The patient is receiving refills through [facility] and has not had any delays in getting those refills. The patient also stated that there has been no changes to her maintenance medications and no new allergies that she is aware of. Finally, the patient stated she has not had any recent stays in the hospital and has not visited the ER in the last month. I encouraged the patient to reach out anytime with any questions or concerns they might have regarding their oral chemotherapy.       Specialty medication(s) and dose(s) confirmed: yes    Refill Questions    Flowsheet Row Most Recent Value   Changes to allergies? No   Changes to medications? No   New conditions since last clinic visit No   Unplanned office visit, urgent care, ED, or hospital admission in the last 4 weeks  No   How does patient/caregiver feel medication is working? Good   Financial problems or insurance changes  No   Since the previous refill, were any specialty medication doses or scheduled injections missed or delayed?  No   Does this patient require a clinical escalation to a pharmacist? No                     Follow-up: 4 week(s)   Jordyn Alonzo CPhT  Specialty Pharmacy Technician and Care Coordinator  Pharmacy Patient   [facility]  [phone]- Office       Jordyn Alonzo  Specialty Pharmacy Technician     Update:  Received message from office clinical pool regarding refills. Note reviewed. \"palbociclib 125 mg on days 1-21 of 28 day cycle\" - submitted to " pharmacy [facility] [id number], Rx + 2 refills  Omega Vazquez, PharmD  09/14/22  16:48 CDT

## 2022-09-06 ENCOUNTER — PATIENT MESSAGE (OUTPATIENT)
Dept: FAMILY MEDICINE CLINIC | Age: 62
End: 2022-09-06

## 2022-09-06 DIAGNOSIS — E11.9 TYPE 2 DIABETES MELLITUS TREATED WITHOUT INSULIN (HCC): ICD-10-CM

## 2022-09-06 NOTE — TELEPHONE ENCOUNTER
Felipe Manson called to request a refill on her medication.       Last office visit : 7/25/2022   Next office visit : Visit date not found     Requested Prescriptions     Pending Prescriptions Disp Refills    WEGOVY 2.4 MG/0.75ML SOAJ SC injection 9 mL 1     Sig: INJECT 2.4MG UNDER THE SKIN ONCE A WEEK            Rita Jain, CMA

## 2022-09-06 NOTE — TELEPHONE ENCOUNTER
From: Ruby Pollack  To: Niranjan Barboza  Sent: 9/6/2022 9:08 AM CDT  Subject: Appt    Hi, Tea. I plan on making an appt with you next week. I need a refill on my Wegovy. I am having blood work done today abf will have a copy sent to you.   Thanks,   Soila

## 2022-09-07 RX ORDER — SEMAGLUTIDE 2.4 MG/.75ML
INJECTION, SOLUTION SUBCUTANEOUS
Qty: 9 ML | Refills: 1 | Status: SHIPPED | OUTPATIENT
Start: 2022-09-07

## 2022-09-07 NOTE — TELEPHONE ENCOUNTER
PATIENT CALLED BACK SHE HAS NOT HEARD FROM ANYONE ABOUT SCHEDULING HER XGEVA INJECTION   PLEASE CALL PATIENT -[phone]

## 2022-09-08 ENCOUNTER — OFFICE VISIT (OUTPATIENT)
Dept: SURGERY | Facility: CLINIC | Age: 62
End: 2022-09-08

## 2022-09-08 VITALS
WEIGHT: 182.2 LBS | OXYGEN SATURATION: 97 % | BODY MASS INDEX: 36.73 KG/M2 | TEMPERATURE: 97.9 F | RESPIRATION RATE: 16 BRPM | HEIGHT: 59 IN | SYSTOLIC BLOOD PRESSURE: 143 MMHG | HEART RATE: 81 BPM | DIASTOLIC BLOOD PRESSURE: 84 MMHG

## 2022-09-08 DIAGNOSIS — C50.919 METASTATIC BREAST CANCER: Primary | ICD-10-CM

## 2022-09-08 DIAGNOSIS — E66.09 CLASS 2 OBESITY DUE TO EXCESS CALORIES WITH BODY MASS INDEX (BMI) OF 36.0 TO 36.9 IN ADULT, UNSPECIFIED WHETHER SERIOUS COMORBIDITY PRESENT: ICD-10-CM

## 2022-09-08 PROCEDURE — 99213 OFFICE O/P EST LOW 20 MIN: CPT | Performed by: STUDENT IN AN ORGANIZED HEALTH CARE EDUCATION/TRAINING PROGRAM

## 2022-09-08 RX ORDER — OXYCODONE AND ACETAMINOPHEN 7.5; 325 MG/1; MG/1
TABLET ORAL AS NEEDED
COMMUNITY
End: 2022-09-13 | Stop reason: ALTCHOICE

## 2022-09-08 RX ORDER — GABAPENTIN 100 MG/1
1 CAPSULE ORAL EVERY 6 HOURS
COMMUNITY

## 2022-09-08 RX ORDER — PIOGLITAZONEHYDROCHLORIDE 30 MG/1
1 TABLET ORAL DAILY
COMMUNITY
End: 2022-09-13 | Stop reason: ALTCHOICE

## 2022-09-08 RX ORDER — FLUOXETINE HYDROCHLORIDE 40 MG/1
1 CAPSULE ORAL
COMMUNITY

## 2022-09-08 NOTE — PROGRESS NOTES
Office Established Patient Note:     Referring Provider: No ref. provider found    Chief Complaint   Patient presents with   • Breast Cancer     Patient states that she is here today for a FU       Subjective .     History of present illness:  Robyn Minaya is a 62 y.o. female with Stage IV breast cancer, ER+ MD+ Her2 negative (Left breast primary with bone involvement). She is on Arimidex and Ibrance. Last time I saw her she had significant left breast pain. Today she reports that her breast pain is intermittent in nature and tolerable. She is following with [place] [facility]ology but is getting Xgeva shots here; she has not been able to get this set up yet.     History  Past Medical History:   Diagnosis Date   • Allergic rhinitis    • Arthritis     BACK   • Chronic back pain    • Degenerative arthritis    • Depression    • Deviated nasal septum    • Diabetes mellitus (HCC)    • Edema    • Gout    • History of colon polyps    • History of transfusion    • Hyperlipidemia    • Hypertension    • Hypertrophy of nasal turbinates    • Incontinence in female    • Insomnia    • Menopause    • Nasal septal deviation    • Nasal valve stenosis    • Overactive bladder    • Plantar fasciitis    • Prediabetes    • RLS (restless legs syndrome)    • Sleep apnea     pt states has a cpap but hasn't been using it   • Spider bite wound     left lower leg. previously infected with mrsa but currently not infected.   • Wears glasses    ,   Past Surgical History:   Procedure Laterality Date   • BLEPHAROPLASTY Bilateral 7/9/2019    Procedure: 1) bilateral upper blepharoplasty with excision of excess of tissue weighing down 2) nasal endoscopy with removal of nasal septal prosthesis;  Surgeon: Mark Anthony Anderson MD;  Location: [facility];  Service: ENT   • CARPAL TUNNEL RELEASE Bilateral 2004   • CHOLECYSTECTOMY  2013   • COLONOSCOPY  10/17/2014    One 5-6mm tubular adenomatous polyp in the ascending colon; Two less than 4mm hyperplastic  polyps in the sigmoid colon; Three rectal hyperplastic polyps; Diverticulosis; Repeat 5 years    • COLONOSCOPY  07/14/2010    Internal hemorrhoids; Repeat 7 years    • COLONOSCOPY N/A 10/23/2019    Procedure: COLONOSCOPY WITH ANESTHESIA;  Surgeon: Robyn Frazier MD;  Location: [facility] ENDOSCOPY;  Service: Gastroenterology   • CYST REMOVAL  2016    neck   • ENDOSCOPIC FUNCTIONAL SINUS SURGERY (FESS) Bilateral 4/16/2019    Procedure: ENDOSCOPIC FUNCTIONAL SINUS SURGERY (bilareral maxillary antrostomy without image guidance);  Surgeon: Mark Anthony Anderson MD;  Location: [facility] OR;  Service: ENT   • ENDOSCOPY N/A 4/11/2019    Esophageal mucosal changes suggestive of eosinophilic esophagitis-biopsied; A few gastric polyps-resected and retrieved-biopsied; Normal examined duodenum-biopsied   • FOOT SURGERY Right 2010    X3   • HERNIA REPAIR     • INTERSTIM PLACEMENT N/A 6/5/2019    Procedure: INTERSTIM STAGES 1 AND 2 LEAD AND GENERATOR PLACEMENT;  Surgeon: Endy Guerrero MD;  Location:  [facility] OR;  Service: Urology   • INTERSTIM PLACEMENT N/A 7/26/2021    Procedure: INTERSTIM STAGES 1 AND 2 LEAD AND GENERATOR PLACEMENT - RECHARGEABLE.;  Surgeon: Mikey Asencio MD;  Location:  [facility] OR;  Service: Urology;  Laterality: N/A;   • INTERSTIM REMOVAL N/A 7/26/2021    Procedure: INTERSTIM REMOVAL;  Surgeon: Mikey Asencio MD;  Location:  [facility] OR;  Service: Urology;  Laterality: N/A;   • LEG DEBRIDEMENT Left 7/31/2020    Procedure: LOWER POSTERIOR LEG WOUND DEBRIDEMENT - LEFT;  Surgeon: Roberto Wells DPM;  Location:  [facility] OR;  Service: Podiatry;  Laterality: Left;   • LEG DEBRIDEMENT Left 11/30/2020    Procedure: SPLIT THICKNESS SKIN GRAFT LEFT LOWER EXTREMTIY WITH WOUND VAC PLACEMENT;  Surgeon: Trace Hurd DO;  Location:  [facility] HYBRID OR 12;  Service: Vascular;  Laterality: Left;   • LEG DEBRIDEMENT Left 2/3/2021    Procedure: LOWER EXTREMITY WOUND DEBRIDEMENT - LEFT;  Surgeon: Roberto Wells DPM;   Location:  [facility] OR;  Service: Podiatry;  Laterality: Left;   • NASAL ENDOSCOPY N/A 2019    Procedure:     1. Functional endoscopic sinus surgery with bilateral maxillary antrostomy    2. Bilateral nasal endoscopy with biopsy of nasal septum    3.  Nasal septal prosthesis placement  ;  Surgeon: Mark Anthony Anderson MD;  Location:  [facility] OR;  Service: ENT   • NASAL VESTIBULE LESION/PAPILLOMA EXCISION N/A 2017    Procedure: Repair of nasal vestibular stenosis and septoplasty; cartilage graft from left ear;  Surgeon: Mark Anthony Anderson MD;  Location:  [facility] OR;  Service:    • SEPTOPLASTY N/A 2019    Procedure: PLACEMENT OF NASAL SEPTAL PROSTHESIS;  Surgeon: Mark Anthony Anderson MD;  Location:  [facility] OR;  Service: ENT   • SEPTOPLASTY Bilateral 2020    Procedure: Nasal endoscopy with septal debridement, and placement of Silastic stents;  Surgeon: Mark Anthony Anderson MD;  Location:  [facility] OR;  Service: ENT   • US GUIDED LYMPH NODE BIOPSY  7/15/2022   • WOUND DEBRIDEMENT  2019    spider bite wound initial surgery   ,   Family History   Problem Relation Age of Onset   • Cancer Mother    • Diabetes Father    • Hypertension Father    • Cancer Father    • Colon cancer Neg Hx    • Colon polyps Neg Hx    • Esophageal cancer Neg Hx    • Liver cancer Neg Hx    • Liver disease Neg Hx    • Rectal cancer Neg Hx    • Stomach cancer Neg Hx    ,   Social History     Tobacco Use   • Smoking status: Former Smoker     Packs/day: 1.00     Years: 40.00     Pack years: 40.00     Types: Cigarettes     Start date:      Quit date: 2020     Years since quittin.1   • Smokeless tobacco: Never Used   • Tobacco comment: sometimes 1.5 when smoking more heavy    Vaping Use   • Vaping Use: Former   • Substances: Nicotine   Substance Use Topics   • Alcohol use: Yes     Comment: Rarely-maybe 3x/year   • Drug use: No   , (Not in a hospital admission)   and Allergies:  Bactrim [sulfamethoxazole-trimethoprim], Sulfamethoxazole,  Trimethoprim, Hctz [hydrochlorothiazide], Januvia [sitagliptin], and Metformin and related    Current Outpatient Medications:   •  allopurinol (ZYLOPRIM) 100 MG tablet, Take 100 mg by mouth Daily., Disp: , Rfl:   •  anastrozole (ARIMIDEX) 1 MG tablet, Take 1 tablet by mouth Daily., Disp: 30 tablet, Rfl: 11  •  atorvastatin (LIPITOR) 40 MG tablet, Take 40 mg by mouth Every Night., Disp: , Rfl:   •  calcium carbonate (OS-KAYLEY) 600 MG tablet, Take 600 mg by mouth Daily., Disp: , Rfl:   •  carvedilol (COREG) 6.25 MG tablet, Take 3.125 mg by mouth 2 (Two) Times a Day With Meals., Disp: , Rfl:   •  cholecalciferol (VITAMIN D3) 25 MCG (1000 UT) tablet, Take 400 Units by mouth Daily., Disp: , Rfl:   •  diclofenac (VOLTAREN) 75 MG EC tablet, Take 75 mg by mouth 2 (Two) Times a Day., Disp: , Rfl:   •  FLUoxetine (PROzac) 40 MG capsule, Take 1 capsule by mouth., Disp: , Rfl:   •  gabapentin (NEURONTIN) 300 MG capsule, Take 1 capsule by mouth Every 6 (Six) Hours., Disp: , Rfl:   •  HYDROcodone-acetaminophen (NORCO) 7.5-325 MG per tablet, Take 1 tablet by mouth Every 6 (Six) Hours As Needed for Moderate Pain  (postop pain)., Disp: 12 tablet, Rfl: 0  •  levothyroxine (SYNTHROID, LEVOTHROID) 75 MCG tablet, Take 75 mcg by mouth Every Morning., Disp: , Rfl:   •  Multiple Vitamin (MULTI VITAMIN PO), Take 1 dose by mouth Daily., Disp: , Rfl:   •  niacin 250 MG tablet, Take 500 mg by mouth Daily With Breakfast., Disp: , Rfl:   •  omeprazole (priLOSEC) 20 MG capsule, Take 20 mg by mouth Daily., Disp: , Rfl:   •  Palbociclib 125 MG tablet, , Disp: , Rfl:   •  pilocarpine (SALAGEN) 5 MG tablet, Take 5 mg by mouth 3 (Three) Times a Day., Disp: , Rfl:   •  pioglitazone (ACTOS) 30 MG tablet, Take 1 tablet by mouth Daily., Disp: , Rfl:   •  pramipexole (MIRAPEX) 1.5 MG tablet, Take 1.5 mg by mouth Every Night., Disp: , Rfl:   •  Semaglutide-Weight Management (Wegovy) 2.4 MG/0.75ML solution auto-injector, INJECT 2.4mg (0.75ML) UNDER THE SKIN  "EVERY WEEK, Disp: , Rfl:   •  spironolactone (ALDACTONE) 25 MG tablet, Take 25 mg by mouth 3 (Three) Times a Day., Disp: , Rfl:   •  tiZANidine (ZANAFLEX) 4 MG tablet, Take 4 mg by mouth Every Night. HAS NOT TAKEN WHILE ON LEVAQUIN, Disp: , Rfl:   •  traMADol (ULTRAM) 50 MG tablet, Take 50 mg by mouth Every 8 (Eight) Hours As Needed., Disp: , Rfl:   •  traZODone (DESYREL) 100 MG tablet, Take 100 mg by mouth Every Night. Take 1-3 tablets at night as needed for sleep., Disp: , Rfl:   •  oxyCODONE-acetaminophen (PERCOCET) 7.5-325 MG per tablet, As Needed., Disp: , Rfl:     Objective     Vital Signs   /84 (BP Location: Right arm, Patient Position: Sitting, Cuff Size: Adult)   Pulse 81   Temp 97.9 °F (36.6 °C) (Temporal)   Resp 16   Ht 149.9 cm (59\")   Wt 82.6 kg (182 lb 3.2 oz)   LMP  (LMP Unknown)   SpO2 97%   Breastfeeding No   BMI 36.80 kg/m²      Physical Exam:  General appearance - alert, well appearing, and in no distress  Mental status - alert, oriented to person, place, and time  Eyes - sclera anicteric  Neck - supple, no significant adenopathy  Chest - no tachypnea, retractions or cyanosis  Heart - regular rate   Neurological - alert, oriented, normal speech, no focal findings or movement disorder noted  Musculoskeletal - no joint tenderness, deformity or swelling    Results Review:    The following data was reviewed by: Jaclyn Pedraza MD on 09/08/2022:  GENERAL SURGERY - SCAN - RECORDS FROM [facility] 8/15/22 (08/15/2022)  RADIOLOGY - SCAN (08/11/2022)      Assessment & Plan       Diagnoses and all orders for this visit:    1. Metastatic breast cancer (HCC) (Primary)    2. Class 2 obesity due to excess calories with body mass index (BMI) of 36.0 to 36.9 in adult, unspecified whether serious comorbidity present       She goes back to [place] in 2 months for follow up. They are planning for follow up scans. She will come back to see me in 3 months. She would like me to continue to be her local " physician to coordinate her breast cancer care.      Class 2 Severe Obesity (BMI >=35 and <=39.9). Obesity-related health conditions include the following: obstructive sleep apnea and hypertension. Obesity is unchanged. BMI is is above average; BMI management plan is completed. We discussed portion control and increasing exercise.      Jaclyn Pedraza MD  09/08/22  13:20 CDT

## 2022-09-08 NOTE — PATIENT INSTRUCTIONS

## 2022-09-09 NOTE — PROGRESS NOTES
[facility] HEMATOLOGY & ONCOLOGY  [address]  [facility] [address] [phone]    Patient Name: Robyn Minaya  Encounter Date: 2022  YOB: 1960  Patient Number: [id number]       REASON FOR VISIT: Robyn Minaya is a 62-year-old female who returns in follow-up of metastatic invasive ductal carcinoma of the left breast -TNM/AJCC stage IV (cT4b, cN2a, M1,G?) ER 90% (+); TX 40% (+); HER2/jaja-1+ (negative).  She has been on palliative anastrozole+ palbociclib beginning 2022.  She is here alone (previously accompanied by her spouse, Rizwan).      I have reviewed the HPI and verified with the patient the accuracy of it. No changes to interval history since the information was documented. Scott Garcia MD 22     Diagnostic abnormalities:  --menarche age 15, menopause age 51, age of first birth 21, , whose history includes degenerative arthritis, diabetes, depression, anemia, obesity, and newly diagnosed left breast mass with axillary adenopathy.  --2022-Bilateral diagnostic mammogram: in the upper outer quadrant of the left breast corresponding to the fiduciary marker is a large lobulated stellate mass. Spot compression views demonstrate surrounding architectural distortion. Mammographic image of the right breast is benign. Recommend percutaneous US guided biopsy of the large lobulated stellate mass in the upper outer quadrant of the left breast. BIRADS 5 highly suggestive of malignancy.   -- 2022-US L breast with axilla: At the 2:00 position left breast is a large lobulated hypoechoic mass with blood flow and acoustic shadowing measuring 3.3 x 2.4 x 1.7 cm. This corresponds to the palpable lump in the mass on mammogram. BIRADS 5 highly suspicious for malignancy. US guided percutaneous biopsy is recommended.   --2022- office consult with Dr. Pedraza.  Patient presented with a left breast  mass that she had self 2 weeks prior.  Patient describes discomfort in the area as well as red color change of the overlying skin.  She has not had a mammogram in several years.  Exam: 3.5 cm hard left breast mass in the upper outer quadrant.  No other palpable masses bilaterally.  No nipple discharge.  Enlarged hard left axillary lymph node concerning for lymphadenopathy.  No enlarged right axillary node bilateral supraclavicular adenopathy.  Lower left breast has redness and thickening of the skin concerning for inflammatory breast cancer.  Punch biopsy obtained from the left medial inferior breast as well as the left central inferior breast.    Final diagnosis:  Left Breast, skin excisions:       - Atypical epithelial elements in dermal lymphatics and focal (less than 1 mm), atypical ductal proliferation; please see comment  Comment:  In the appropriate clinical context, these findings are compatible with ductal carcinoma with dermal lymphatic involvement    -- 07/15/2022- ultrasound-guided biopsy of mass at 2:00 in the left breast and abnormal lymph node in the left axilla.  Final diagnosis:    -Lymph node, left axilla, core biopsies:  Metastatic carcinoma consistent with breast primary.  -Left breast, 2:00, core biopsies:  Invasive mammary carcinoma of no special type (ductal), grade 2.  Lymphovascular space invasion identified.  Largest contiguous area of tumor measures 10 mm.  ER 90% (+); MA 40% (+); HER2 1+ (-)    -- 07/22/2022- CMP normal (GFR 58 mL/min).  Hemoglobin 13.5, MCV 88, platelets 374,000, WBC 9.7.  -- 07/25/2022- CT chest.  Known left breast cancer identified with left breast skin thickening concerning for dermal invasion.  Pathologic left axillary lymph nodes (2.5 x 1.7 cm).  No intrathoracic metastasis identified.  Heterogenous appearance to the regional bony marrow with scattered lucent foci involving sternum as well as thoracic and lumbar vertebra.  Correlation with nuclear bone scan  recommended.  -- 07/25/2022-CT abdomen/pelvis.  No evidence of intra-abdominal or pelvic metastasis.  Nonspecific lucency left L1 vertebra.  -- 07/27/2022-PET scan- widespread metastasis (left breast, left axillary lymphadenopathy, upper sternum, multiple thoracic vertebral body lesions, multiple lumbar vertebral body lesions, mild asymmetric enlargement and mild FDG uptake within the left flank musculature which is indeterminate with SUV 2.99, multiple pelvic bony lesions, right quadratus for Vimal muscle metastasis, possible right sciatic nerve symptoms, proximal right femur diaphysis).  Left axillary lymph node metastatic disease and a few sites of asymmetric muscular uptake compatible with small muscular metastasis.       Previous interventions:  --Palliative Arimidex, 08/04/2022-present  -- Palliative palbociclib- C1, 08/04/2022-08/24/2022; C2, 09/02/2022-09/22/2022    LABS    Lab Results - Last 18 Months   Lab Units 07/22/22  1038 07/23/21  1123   HEMOGLOBIN g/dL 13.5 10.0*   HEMATOCRIT % 41.8 33.0*   MCV fL 88 85.1   WBC x10E3/uL 9.7 6.75   RDW % 13.1 15.0   MPV fL  --  10.7   PLATELETS x10E3/uL 374 299   IMM GRAN % %  --  0.7*   NEUTROS ABS x10E3/uL 6.7 3.92   LYMPHS ABS x10E3/uL 2.3 1.97   MONOS ABS x10E3/uL 0.5 0.51   EOS ABS x10E3/uL 0.1 0.26   BASOS ABS x10E3/uL 0.1 0.04   IMMATURE GRANS (ABS) 10*3/mm3  --  0.05   NRBC /100 WBC  --  0.0       Lab Results - Last 18 Months   Lab Units 07/22/22  1038 07/23/21  1123   GLUCOSE mg/dL 97 111*   SODIUM mmol/L 140 138   POTASSIUM mmol/L 5.1 4.5   TOTAL CO2 mmol/L 27  --    CO2 mmol/L  --  29.0   CHLORIDE mmol/L 98 103   ANION GAP mmol/L  --  6.0   CREATININE mg/dL 1.04* 0.77   BUN mg/dL 16 14   BUN / CREAT RATIO  15 18.2   CALCIUM mg/dL 10.0 9.4   EGFR IF NONAFRICN AM mL/min/1.73  --  76   ALK PHOS IU/L 92  --    ALT (SGPT) IU/L 23  --    AST (SGOT) IU/L 18  --    BILIRUBIN mg/dL 0.3  --    ALBUMIN g/dL 4.3  --        PAST MEDICAL HISTORY:  ALLERGIES:  Allergies    Allergen Reactions   • Bactrim [Sulfamethoxazole-Trimethoprim] Anaphylaxis   • Sulfamethoxazole Other (See Comments)   • Trimethoprim Other (See Comments)   • Hctz [Hydrochlorothiazide] Other (See Comments)     Acid reflux   • Januvia [Sitagliptin] Rash   • Metformin And Related Diarrhea     CURRENT MEDICATIONS:  Outpatient Encounter Medications as of 9/13/2022   Medication Sig Dispense Refill   • allopurinol (ZYLOPRIM) 100 MG tablet Take 100 mg by mouth Daily.     • anastrozole (ARIMIDEX) 1 MG tablet Take 1 tablet by mouth Daily. 30 tablet 11   • atorvastatin (LIPITOR) 40 MG tablet Take 40 mg by mouth Every Night.     • calcium carbonate (OS-KAYLEY) 600 MG tablet Take 600 mg by mouth Daily.     • carvedilol (COREG) 3.125 MG tablet Take 3.125 mg by mouth 2 (Two) Times a Day With Meals.     • cholecalciferol (VITAMIN D3) 25 MCG (1000 UT) tablet Take 400 Units by mouth Daily.     • diclofenac (VOLTAREN) 75 MG EC tablet Take 75 mg by mouth 2 (Two) Times a Day.     • FIBER PO Take 500 mg by mouth.     • FLUoxetine (PROzac) 40 MG capsule Take 1 capsule by mouth.     • gabapentin (NEURONTIN) 100 MG capsule Take 1 capsule by mouth Every 6 (Six) Hours.     • HYDROcodone-acetaminophen (NORCO) 7.5-325 MG per tablet Take 1 tablet by mouth Every 6 (Six) Hours As Needed for Moderate Pain  (postop pain). 12 tablet 0   • levothyroxine (SYNTHROID, LEVOTHROID) 75 MCG tablet Take 75 mcg by mouth Every Morning.     • Multiple Vitamin (MULTI VITAMIN PO) Take 1 dose by mouth Daily.     • multivitamin with minerals (HAIR SKIN AND NAILS FORMULA PO) Take 1 tablet by mouth Daily.     • niacin 500 MG tablet Take 500 mg by mouth Daily With Breakfast.     • omeprazole (priLOSEC) 20 MG capsule Take 20 mg by mouth Daily.     • Palbociclib 125 MG tablet      • pilocarpine (SALAGEN) 5 MG tablet Take 5 mg by mouth 3 (Three) Times a Day.     • pramipexole (MIRAPEX) 1.5 MG tablet Take 1.5 mg by mouth Every Night.     • Semaglutide-Weight Management  (Wegovy) 2.4 MG/0.75ML solution auto-injector INJECT 2.4mg (0.75ML) UNDER THE SKIN EVERY WEEK     • spironolactone (ALDACTONE) 25 MG tablet Take 25 mg by mouth 3 (Three) Times a Day.     • tiZANidine (ZANAFLEX) 4 MG tablet Take 4 mg by mouth Every Night. HAS NOT TAKEN WHILE ON LEVAQUIN     • traMADol (ULTRAM) 50 MG tablet Take 50 mg by mouth Every 8 (Eight) Hours As Needed.     • traZODone (DESYREL) 100 MG tablet Take 400 mg by mouth Every Night. Take 1-3 tablets at night as needed for sleep.     • [DISCONTINUED] FLUoxetine (PROzac) 40 MG capsule Take 40 mg by mouth Daily.     • [DISCONTINUED] oxyCODONE-acetaminophen (PERCOCET) 7.5-325 MG per tablet As Needed.     • [DISCONTINUED] pioglitazone (ACTOS) 30 MG tablet Take 1 tablet by mouth Daily.       No facility-administered encounter medications on file as of 9/13/2022.     Adult illnesses:  Allergic rhinitis  Arthritis of the back with chronic back pain  Degenerative arthritis  Depression  Deviated nasal septum  Diabetes mellitus  Edema  Gout  History of colon polyps  History of transfusion  Hyperlipidemia  Hypertension  Hypertrophy of nasal turbinates  Hypothyroidism  Female incontinence  Insomnia  Menopause  Nasal septal deviation  Nasal valve stenosis  Overactive bladder  Plantar fasciitis  Restless leg syndrome  Sleep apnea-has CPAP that she has not been using  Spider bite wound left lower leg previously MRSA infected.    Past surgeries:  Bilateral upper blepharoplasty  Nasal endoscopy with removal of nasal septal prosthesis  Bilateral carpal tunnel release, 2004  Cholecystectomy, 2013  Colonoscopy, 10/17/2014, 07/14/2010, 10/23/2019.  Endoscopy, 4/10/2019 and 10/23/2019  Neck cyst removal, 2016  Functional endoscopic sinus surgery with bilateral, 4/16/2019  Hernia repair  InterStim placement, 6/5/2019  InterStim placement with generator placement, 07/26/2021  InterStim removal, 07/26/2021  Left leg debridement 07/31/2020 and 11/30/2020  Nasal endoscopy,  04/16/2019  Nasal vestibule lesion/papilloma excision, 8/1/2017  Septoplasty, 4/16/2019  Bilateral septoplasty, 1/14/2020  Spider wound debridement, 06/2019          ADULT ILLNESSES:  Patient Active Problem List   Diagnosis Code   • Spondylosis with myelopathy, lumbar region M47.16   • Nasal septal perforation J34.89   • Chronic maxillary sinusitis J32.0   • Nausea R11.0   • Epigastric pain R10.13   • Urinary incontinence R32   • Dermatochalasis of both upper eyelids H02.831, H02.834   • Visual field defect H53.40   • Hx of colonic polyps Z86.010   • Other constipation K59.09   • Lower abdominal pain R10.30   • Nasal cavity mass J34.89   • Abnormal nasal septum Q30.9   • Essential hypertension I10   • Acquired hypothyroidism E03.9   • Mixed hyperlipidemia E78.2   • Chronic back pain greater than 3 months duration M54.9, G89.29   • Prediabetes R73.03   • Cellulitis of left lower extremity L03.116   • Wound of left leg S81.802A   • Chronic pain syndrome G89.4   • Acute pain R52   • Anxiety F41.9   • DOMINIC (acute kidney injury) ([facility]) N17.9   • Infection of wound due to methicillin resistant Staphylococcus aureus (MRSA) A49.02   • Acute blood loss anemia D62   • Anemia D64.9   • Fatigue R53.83   • Preop testing Z01.818   • Non-healing wound of left lower extremity, sequela S81.809S   • Urge incontinence N39.41   • Chronic respiratory failure with hypoxia ([facility]) J96.11   • Dependence on supplemental oxygen Z99.81   • Malignant neoplasm of overlapping sites of left breast in female, estrogen receptor positive ([facility]) C50.812, Z17.0     SURGERIES:  Past Surgical History:   Procedure Laterality Date   • BLEPHAROPLASTY Bilateral 7/9/2019    Procedure: 1) bilateral upper blepharoplasty with excision of excess of tissue weighing down 2) nasal endoscopy with removal of nasal septal prosthesis;  Surgeon: Mark Atnhony Anderson MD;  Location: [facility] OR;  Service: ENT   • CARPAL TUNNEL RELEASE Bilateral 2004   • CHOLECYSTECTOMY  2013   •  COLONOSCOPY  10/17/2014    One 5-6mm tubular adenomatous polyp in the ascending colon; Two less than 4mm hyperplastic polyps in the sigmoid colon; Three rectal hyperplastic polyps; Diverticulosis; Repeat 5 years    • COLONOSCOPY  07/14/2010    Internal hemorrhoids; Repeat 7 years    • COLONOSCOPY N/A 10/23/2019    Procedure: COLONOSCOPY WITH ANESTHESIA;  Surgeon: Robyn Frazier MD;  Location: [facility] ENDOSCOPY;  Service: Gastroenterology   • CYST REMOVAL  2016    neck   • ENDOSCOPIC FUNCTIONAL SINUS SURGERY (FESS) Bilateral 4/16/2019    Procedure: ENDOSCOPIC FUNCTIONAL SINUS SURGERY (bilareral maxillary antrostomy without image guidance);  Surgeon: Mark Anthony Anderson MD;  Location: [facility] OR;  Service: ENT   • ENDOSCOPY N/A 4/11/2019    Esophageal mucosal changes suggestive of eosinophilic esophagitis-biopsied; A few gastric polyps-resected and retrieved-biopsied; Normal examined duodenum-biopsied   • FOOT SURGERY Right 2010    X3   • HERNIA REPAIR     • INTERSTIM PLACEMENT N/A 6/5/2019    Procedure: INTERSTIM STAGES 1 AND 2 LEAD AND GENERATOR PLACEMENT;  Surgeon: Endy Guerrero MD;  Location:  [facility] OR;  Service: Urology   • INTERSTIM PLACEMENT N/A 7/26/2021    Procedure: INTERSTIM STAGES 1 AND 2 LEAD AND GENERATOR PLACEMENT - RECHARGEABLE.;  Surgeon: Mikey Asencio MD;  Location:  [facility] OR;  Service: Urology;  Laterality: N/A;   • INTERSTIM REMOVAL N/A 7/26/2021    Procedure: INTERSTIM REMOVAL;  Surgeon: Mikey Asencio MD;  Location:  [facility] OR;  Service: Urology;  Laterality: N/A;   • LEG DEBRIDEMENT Left 7/31/2020    Procedure: LOWER POSTERIOR LEG WOUND DEBRIDEMENT - LEFT;  Surgeon: Roberto Wells DPM;  Location:  [facility] OR;  Service: Podiatry;  Laterality: Left;   • LEG DEBRIDEMENT Left 11/30/2020    Procedure: SPLIT THICKNESS SKIN GRAFT LEFT LOWER EXTREMTIY WITH WOUND VAC PLACEMENT;  Surgeon: Trace Hurd DO;  Location:  [facility] HYBRID OR 12;  Service: Vascular;  Laterality: Left;   • LEG  DEBRIDEMENT Left 2/3/2021    Procedure: LOWER EXTREMITY WOUND DEBRIDEMENT - LEFT;  Surgeon: Roberto Wells DPM;  Location:  [facility] OR;  Service: Podiatry;  Laterality: Left;   • NASAL ENDOSCOPY N/A 4/16/2019    Procedure:     1. Functional endoscopic sinus surgery with bilateral maxillary antrostomy    2. Bilateral nasal endoscopy with biopsy of nasal septum    3.  Nasal septal prosthesis placement  ;  Surgeon: Mark Anthony Anderson MD;  Location:  [facility] OR;  Service: ENT   • NASAL VESTIBULE LESION/PAPILLOMA EXCISION N/A 8/1/2017    Procedure: Repair of nasal vestibular stenosis and septoplasty; cartilage graft from left ear;  Surgeon: Mark Anthony Anderson MD;  Location:  [facility] OR;  Service:    • SEPTOPLASTY N/A 4/16/2019    Procedure: PLACEMENT OF NASAL SEPTAL PROSTHESIS;  Surgeon: Mark Anthony Anderson MD;  Location:  [facility] OR;  Service: ENT   • SEPTOPLASTY Bilateral 1/14/2020    Procedure: Nasal endoscopy with septal debridement, and placement of Silastic stents;  Surgeon: Mark Anthony Anderson MD;  Location:  [facility] OR;  Service: ENT   • US GUIDED LYMPH NODE BIOPSY  7/15/2022   • WOUND DEBRIDEMENT  06/2019    spider bite wound initial surgery     HEALTH MAINTENANCE ITEMS:  Health Maintenance Due   Topic Date Due   • MAMMOGRAM  Never done   • ANNUAL PHYSICAL  Never done   • TDAP/TD VACCINES (1 - Tdap) Never done   • ZOSTER VACCINE (1 of 2) Never done   • HEPATITIS C SCREENING  Never done   • DIABETIC FOOT EXAM  Never done   • PAP SMEAR  Never done   • DIABETIC EYE EXAM  Never done   • Pneumococcal Vaccine 0-64 (2 - PCV) 03/13/2018   • URINE MICROALBUMIN  04/05/2020   • COVID-19 Vaccine (4 - Booster for Moderna series) 06/16/2022   • HEMOGLOBIN A1C  08/01/2022       <no information>  Last Completed Colonoscopy          COLORECTAL CANCER SCREENING (COLONOSCOPY - Every 10 Years) Next due on 10/23/2029    10/23/2019  Surgical Procedure: COLONOSCOPY    10/23/2019  COLONOSCOPY    03/11/2019  POC OCCULT BLOOD, STOOL-DIAGNOSTIC     "10/17/2014  SCANNED - COLONOSCOPY    2010  SCANNED - COLONOSCOPY              Immunization History   Administered Date(s) Administered   • COVID-19 (MODERNA) 1st, 2nd, 3rd Dose Only 2021, 2021, 2022   • Pneumococcal Polysaccharide (PPSV23) 2017     Last Completed Mammogram     This patient has no relevant Health Maintenance data.            FAMILY HISTORY:  Family History   Problem Relation Age of Onset   • Cancer Mother    • Diabetes Father    • Hypertension Father    • Cancer Father    • Colon cancer Neg Hx    • Colon polyps Neg Hx    • Esophageal cancer Neg Hx    • Liver cancer Neg Hx    • Liver disease Neg Hx    • Rectal cancer Neg Hx    • Stomach cancer Neg Hx      SOCIAL HISTORY:  Social History     Socioeconomic History   • Marital status:    Tobacco Use   • Smoking status: Former Smoker     Packs/day: 1.00     Years: 40.00     Pack years: 40.00     Types: Cigarettes     Start date:      Quit date: 2020     Years since quittin.1   • Smokeless tobacco: Never Used   • Tobacco comment: sometimes 1.5 when smoking more heavy    Vaping Use   • Vaping Use: Former   • Substances: Nicotine   Substance and Sexual Activity   • Alcohol use: Yes     Comment: Rarely-maybe 3x/year   • Drug use: No   • Sexual activity: Defer       REVIEW OF SYSTEMS:  Review of Systems   Constitutional: Positive for activity change and fatigue. Negative for unexpected weight loss (53 pounds in the past 8 months.  Says she has intentionally cut back on portions to control weight and her diabetes.  Aims to lose another 50 lbs.  \"Try to get back to my high school weight\").        Manages her ADLs, to include some chores, errands and driving.  Is up up and about \"at least 50%.\"    Palbociclib tolerance:  No cough (interstitial lung disease, pneumonitis), some fatigue and hair loss, no nausea, stomatitis, diarrhea, rash, vomiting, decreased appetite, asthenia, fever, epistaxis, blurred " "vision.    Anastrozole tolerance:  Hot flashes, and fatigue.  No worsening arthralgias.  No stroke, angina, hypertension, cataracts, rash (erythema multiforme, Rivas-Ruslan syndrome), nausea, vomiting, headache, pharyngitis, depression, rash, lymphedema, insomnia, edema, weight gain, dyspnea, abdominal pain, constipation, cough, abdominal pain     HENT: Positive for postnasal drip.    Eyes: Negative.    Respiratory: Negative.         Former cigarette smoker.  Age 16--61.  Up to 1.5 packs/day.   Cardiovascular: Negative.    Gastrointestinal: Positive for constipation (when she takes narcotics for her back pains). Negative for diarrhea.   Endocrine: Positive for heat intolerance (\"hot flashes\").   Genitourinary: Positive for urinary incontinence.        G2, P2, A0   Musculoskeletal: Positive for arthralgias (Chronic.  Arthritis.) and back pain (Chronic.  Degenerative disc disease..  Says she receives epidural injections every 3 months from Dr. Cedeño).   Skin: Negative.    Allergic/Immunologic:        Drug allergies   Neurological: Negative.    Hematological: Negative.    Psychiatric/Behavioral: Positive for depressed mood (\"Bipolar\".  Modulated by fluoxetine). The patient is nervous/anxious (bipolar disorder).        /88   Pulse 82   Temp 97.8 °F (36.6 °C)   Resp 18   Ht 149.9 cm (59\")   Wt 81.7 kg (180 lb 3.2 oz)   LMP  (LMP Unknown)   SpO2 96%   Breastfeeding No   BMI 36.40 kg/m²  Body surface area is 1.76 meters squared.  Pain Score    09/13/22 1024   PainSc:   2       Physical Exam:  Physical Exam  Vitals reviewed.   Constitutional:       Appearance: She is obese.      Comments: Pleasant, cooperative, obese, anxious, modestly kept female.  Ambulatory with a cane.  ECOG 1.      She has gained 6 pounds since the initial visit   HENT:      Head: Normocephalic.      Mouth/Throat:      Comments: Is wearing a surgical mask today.  Eyes:      General: No scleral icterus.     Extraocular Movements: " Extraocular movements intact.      Conjunctiva/sclera: Conjunctivae normal.      Pupils: Pupils are equal, round, and reactive to light.   Cardiovascular:      Rate and Rhythm: Normal rate.   Pulmonary:      Effort: Pulmonary effort is normal.   Chest:   Breasts:      Right: Normal. No axillary adenopathy or supraclavicular adenopathy.      Left: Mass and axillary adenopathy present. No supraclavicular adenopathy.        Comments: Chaperoned exam: Sharyn De La Rosa, JHONATHANN    Right breast again without masses skin changes no nipple discharge.  No axillary/supraclavicular adenopathy.    Left breast with skin thickening and mild hyperemia and underlying mass-effect left lower quadrant.  Matted adenopathy left axilla less evident today.  No supraclavicular adenopathy.  Abdominal:      Palpations: Abdomen is soft.      Tenderness: There is no abdominal tenderness.   Musculoskeletal:         General: Normal range of motion.      Cervical back: Normal range of motion.   Lymphadenopathy:      Cervical: No cervical adenopathy.      Upper Body:      Right upper body: No supraclavicular or axillary adenopathy.      Left upper body: Axillary adenopathy present. No supraclavicular adenopathy.   Skin:     General: Skin is warm.   Neurological:      General: No focal deficit present.      Mental Status: She is alert and oriented to person, place, and time.   Psychiatric:         Mood and Affect: Mood normal.         Thought Content: Thought content normal.         Assessment:  1.   Invasive ductal carcinoma, left breast  --Tumor stage: TNM/AJCC stage IV (cT4b, cN2a, M1,G?) ER 90% (+); AK 40% (+); HER2/jaja-1+ (negative).  --Original tumor burden:    -- 07/05/2022-US L breast with axilla: At the 2:00 position left breast is a large lobulated hypoechoic mass with blood flow and acoustic shadowing measuring 3.3 x 2.4 x 1.7 cm. This corresponds to the palpable lump in the mass on mammogram. BIRADS 5 highly suspicious for malignancy. US  guided percutaneous biopsy is recommended.   -- 07/15/2022- ultrasound-guided biopsy of mass at 2:00 in the left breast and abnormal lymph node in the left axilla.  Final diagnosis:    -Lymph node, left axilla, core biopsies:  Metastatic carcinoma consistent with breast primary.  -Left breast, 2:00, core biopsies:  Invasive mammary carcinoma of no special type (ductal), grade 2.  Lymphovascular space invasion identified.  Largest contiguous area of tumor measures 10 mm.  ER 90% (+); MO 40% (+); HER2/jaja-1+ (negative).  -- 07/25/2022- CT chest.  Known left breast cancer identified with left breast skin thickening concerning for dermal invasion.  Pathologic left axillary lymph nodes (2.5 x 1.7 cm).  No intrathoracic metastasis identified.  Heterogenous appearance to the regional bony marrow with scattered lucent foci involving sternum as well as thoracic and lumbar vertebra.  Correlation with nuclear bone scan recommended.  -- 07/25/2022-CT abdomen/pelvis.  No evidence of intra-abdominal or pelvic metastasis.  Nonspecific lucency left L1 vertebra.  -- 07/27/2022-PET scan- widespread metastasis (left breast, left axillary lymphadenopathy, upper sternum, multiple thoracic vertebral body lesions, multiple lumbar vertebral body lesions, mild asymmetric enlargement and mild FDG uptake within the left flank musculature which is indeterminate with SUV 2.99, multiple pelvic bony lesions, right quadratus for Vimal muscle metastasis, possible right sciatic nerve symptoms, proximal right femur diaphysis).  Left axillary lymph node metastatic disease and a few sites of asymmetric muscular uptake compatible with small muscular metastasis.  -- 08/11/2022-CT head ([facility])- negative for metastasis.  -- Tumor status:  -- Palliative anastrozole, 08/04/2022-present  -- Palliative abemaciclib-C1, 08/04/2022- 08/24/2022; C2, 09/02/2022- 09/22/2022    2.   Back pain apparently chronic, now with possible contribution from the spinal  "metastases.  3.   Sleep apnea.  CPAP  4.   Chronic kidney disease, stage 3  --GFR 58 mL/min, 09/06/2022     Plan:  1.   Again apprised of the available diagnostic information, including the pathology, and imaging findings (CT scans and PET scan) showing widespread osseous metastasis.  No visceral organ metastasis.  No brain metastasis  2.   Apprised of baseline labs, 07/22/2022 and most recent labs in 09/06/2022.  GFR 58 otherwise normal CMP.  Normal CBC.  3.   Anastrozole and palbociclib tolerance.  Some fatigue and arthralgias (baseline) otherwise tolerating well.  Exclaims, \"I am pleased at the lack of side effects.\"  4.   Review NCCN guidelines version 4.2022 invasive breast cancer-stage IV (M1) disease-work-up: Brain MRI, bone scan or PET scan, comprehensive germline and somatic profiling to identify additional targeted therapies, genetic counseling-bone disease present- abdomen denosumab, zoledronic acid or pamidronate- ER/NV positive, HER2 negative- no visceral crisis and no prior endocrine therapy within 1 year-postmenopausal-systemic therapy until progression or unacceptable toxicity-preferred regimens for HER2 negative and postmenopausal (first-line therapy)- aromatase inhibitor+ CD4/6 inhibitor (abemaciclib, palbociclib or ribociclib-category 1), selective ER down regulator (fulvestrant-category 1)+ nonsteroidal aromatase inhibitor (anastrozole, letrozole-category 1), fulvestrant+ CD/K inhibitor (abemaciclib, palbociclib or ribociclib-category 1)    5.   Potential toxicities of palbociclib discussed (to include but not limited to: Myelosuppression, febrile neutropenia, serious infection, interstitial lung disease, pneumonitis, noninfectious, fatigue, nausea, stomatitis, alopecia, diarrhea, rash, vomiting, decreased appetite, asthenia, fever, xeroderma, liver enzyme increase, epistaxis, blurred vision).  Questions answered.  Patient agrees to press on with therapy.  6.   Potential toxicities of anastrozole " discussed (to include but not limited to: Osteoporosis, fractures, endometrial carcinoma, thromboembolism, MI, stroke, angina, hypertension, cataracts, anemia, leukopenia, erythema multiforme, Rivas-Ruslan syndrome, hot flashes, asthenia, pain, arthralgia, arthritis, nausea, vomiting, headache, pharyngitis, depression, rash, hypertension, lymphedema, insomnia, edema, weight gain, dyspnea, abdominal pain, constipation, hypercholesterolemia, cough, abdominal pain).  Questions answered.  Patient agrees to press on with therapy.    7.  Rx:   · Arimidex 1 mg po daily # 30 x 11 RF  · palbociclib 125 mg on days 1-21 of 28 day cycle  · Schedule Xgeva 120 mg sc q month x12 then every 3 months    8.  Refer to Dr. Rivera Re: Consideration of palliative radiation to the symptomatic spinal/pelvic metastatic lesions-second request.  9.  Upon initiation of palbociclib, draw CBC with diff q 4 weeks- Procrit 40,000 units sc if Hgb < 10 and Hct < 30, Neupogen 480 mcg sc daily x 3 if ANC < 1 at [facility]  10.  Office visit, 09/08/2022 with Dr. Pedraza.  Improving left breast pain.  Follow-up [facility] in 2 months.  Follow-up to see me in 3 months.    11.  Office consult, 08/12/2022 with Dr. Ketan Jeffries, breast medical oncology at [facility]..  Assessment/plan: Stage IV breast cancer, left breast with bone involvement, ER positive.  Reviewed treatment goals are palliative, but since metastatic disease appears limited to bone may have prognosis of several years depending on response to treatment.  Since her breast cancer is ER positive, endocrine based therapies in combination with CDK inhibitors best first-line treatment.  No clinical trials in the first line.  Plan: Continue Arimidex and Ibrance.  Xgeva at home (every 2 monthly x12 months then would space out to every 3 months).  Will let me know if we need to set up Zometa here.  Labs locally in about 3-4 weeks (during week off Ibrance).  Monitor for menopausal symptoms, diarrhea,  fatigue.  Will review labs next month and if she tolerating treatment plan clinic visit in 3 months with PET scan but arranged next month after we review labs and plan cycle 2.    12.  Return to the office in 8 weeks with pre-office CBC with differential, CMP, CEA, CA27-29.  Patient states that this probably her last visit with our office before she transfers all her care to [place].    I spent 76 minutes caring for Robyn on this date of service. This time includes time spent by me in the following activities: preparing for the visit, reviewing tests, performing a medically appropriate examination and/or evaluation, counseling and educating the patient/family/caregiver, ordering medications, tests, or procedures and documenting information in the medical record.

## 2022-09-12 DIAGNOSIS — S81.802D OPEN WOUND OF LEFT LOWER LEG, SUBSEQUENT ENCOUNTER: ICD-10-CM

## 2022-09-12 DIAGNOSIS — E79.0 ELEVATED URIC ACID IN BLOOD: ICD-10-CM

## 2022-09-13 ENCOUNTER — LAB (OUTPATIENT)
Dept: LAB | Facility: HOSPITAL | Age: 62
End: 2022-09-13

## 2022-09-13 ENCOUNTER — INFUSION (OUTPATIENT)
Dept: ONCOLOGY | Facility: HOSPITAL | Age: 62
End: 2022-09-13

## 2022-09-13 ENCOUNTER — OFFICE VISIT (OUTPATIENT)
Dept: ONCOLOGY | Facility: CLINIC | Age: 62
End: 2022-09-13

## 2022-09-13 VITALS
OXYGEN SATURATION: 96 % | DIASTOLIC BLOOD PRESSURE: 88 MMHG | RESPIRATION RATE: 18 BRPM | SYSTOLIC BLOOD PRESSURE: 130 MMHG | WEIGHT: 180.2 LBS | TEMPERATURE: 97.8 F | BODY MASS INDEX: 36.33 KG/M2 | HEART RATE: 82 BPM | HEIGHT: 59 IN

## 2022-09-13 VITALS
OXYGEN SATURATION: 96 % | RESPIRATION RATE: 16 BRPM | DIASTOLIC BLOOD PRESSURE: 80 MMHG | HEIGHT: 60 IN | HEART RATE: 76 BPM | WEIGHT: 179.8 LBS | BODY MASS INDEX: 35.3 KG/M2 | TEMPERATURE: 96.9 F | SYSTOLIC BLOOD PRESSURE: 135 MMHG

## 2022-09-13 DIAGNOSIS — C50.812 MALIGNANT NEOPLASM OF OVERLAPPING SITES OF LEFT BREAST IN FEMALE, ESTROGEN RECEPTOR POSITIVE: Primary | ICD-10-CM

## 2022-09-13 DIAGNOSIS — Z17.0 MALIGNANT NEOPLASM OF OVERLAPPING SITES OF LEFT BREAST IN FEMALE, ESTROGEN RECEPTOR POSITIVE: Primary | ICD-10-CM

## 2022-09-13 PROCEDURE — 96372 THER/PROPH/DIAG INJ SC/IM: CPT

## 2022-09-13 PROCEDURE — G2212 PROLONG OUTPT/OFFICE VIS: HCPCS | Performed by: INTERNAL MEDICINE

## 2022-09-13 PROCEDURE — 25010000002 DENOSUMAB 120 MG/1.7ML SOLUTION: Performed by: INTERNAL MEDICINE

## 2022-09-13 PROCEDURE — 99215 OFFICE O/P EST HI 40 MIN: CPT | Performed by: INTERNAL MEDICINE

## 2022-09-13 RX ORDER — MULTIPLE VITAMINS W/ MINERALS TAB 9MG-400MCG
1 TAB ORAL DAILY
COMMUNITY
End: 2022-11-01 | Stop reason: SDUPTHER

## 2022-09-13 RX ADMIN — DENOSUMAB 120 MG: 120 INJECTION SUBCUTANEOUS at 12:04

## 2022-09-14 ENCOUNTER — OFFICE VISIT (OUTPATIENT)
Dept: FAMILY MEDICINE CLINIC | Age: 62
End: 2022-09-14
Payer: COMMERCIAL

## 2022-09-14 VITALS
BODY MASS INDEX: 35.73 KG/M2 | RESPIRATION RATE: 20 BRPM | TEMPERATURE: 97.7 F | HEIGHT: 60 IN | HEART RATE: 81 BPM | WEIGHT: 182 LBS | SYSTOLIC BLOOD PRESSURE: 128 MMHG | DIASTOLIC BLOOD PRESSURE: 72 MMHG | OXYGEN SATURATION: 99 %

## 2022-09-14 DIAGNOSIS — E11.9 TYPE 2 DIABETES MELLITUS TREATED WITHOUT INSULIN (HCC): Primary | ICD-10-CM

## 2022-09-14 DIAGNOSIS — G47.00 INSOMNIA, UNSPECIFIED TYPE: ICD-10-CM

## 2022-09-14 DIAGNOSIS — E03.9 ACQUIRED HYPOTHYROIDISM: ICD-10-CM

## 2022-09-14 DIAGNOSIS — E78.2 MIXED HYPERLIPIDEMIA: ICD-10-CM

## 2022-09-14 DIAGNOSIS — M25.559 HIP PAIN: ICD-10-CM

## 2022-09-14 PROCEDURE — 99214 OFFICE O/P EST MOD 30 MIN: CPT | Performed by: NURSE PRACTITIONER

## 2022-09-14 PROCEDURE — 3044F HG A1C LEVEL LT 7.0%: CPT | Performed by: NURSE PRACTITIONER

## 2022-09-14 RX ORDER — GABAPENTIN 100 MG/1
CAPSULE ORAL
Qty: 180 CAPSULE | Refills: 2 | Status: SHIPPED | OUTPATIENT
Start: 2022-09-14 | End: 2022-09-16 | Stop reason: SDUPTHER

## 2022-09-14 RX ORDER — GABAPENTIN 300 MG/1
CAPSULE ORAL
Qty: 270 CAPSULE | Refills: 1 | OUTPATIENT
Start: 2022-09-14

## 2022-09-14 RX ORDER — HYDROCODONE BITARTRATE AND ACETAMINOPHEN 5; 325 MG/1; MG/1
TABLET ORAL
COMMUNITY
Start: 2022-09-03

## 2022-09-14 RX ORDER — ALLOPURINOL 100 MG/1
TABLET ORAL
Qty: 150 TABLET | Refills: 1 | Status: SHIPPED | OUTPATIENT
Start: 2022-09-14

## 2022-09-14 SDOH — ECONOMIC STABILITY: FOOD INSECURITY: WITHIN THE PAST 12 MONTHS, YOU WORRIED THAT YOUR FOOD WOULD RUN OUT BEFORE YOU GOT MONEY TO BUY MORE.: NEVER TRUE

## 2022-09-14 SDOH — ECONOMIC STABILITY: FOOD INSECURITY: WITHIN THE PAST 12 MONTHS, THE FOOD YOU BOUGHT JUST DIDN'T LAST AND YOU DIDN'T HAVE MONEY TO GET MORE.: NEVER TRUE

## 2022-09-14 ASSESSMENT — ENCOUNTER SYMPTOMS: RESPIRATORY NEGATIVE: 1

## 2022-09-14 ASSESSMENT — SOCIAL DETERMINANTS OF HEALTH (SDOH): HOW HARD IS IT FOR YOU TO PAY FOR THE VERY BASICS LIKE FOOD, HOUSING, MEDICAL CARE, AND HEATING?: NOT HARD AT ALL

## 2022-09-14 NOTE — TELEPHONE ENCOUNTER
Boo Tay called to request a refill on her medication.       Last office visit : 7/25/2022   Next office visit : 9/14/2022     Requested Prescriptions     Pending Prescriptions Disp Refills               allopurinol (ZYLOPRIM) 100 MG tablet [Pharmacy Med Name: allopurinol 100 mg tablet] 150 tablet 1     Sig: TAKE ONE TABLET BY Myles 45, CMA

## 2022-09-14 NOTE — PROGRESS NOTES
SUBJECTIVE:    Patient ID: Graham Mohr is a60 y.o. female. Graham Mohr is here today for Discuss Medications (Patient presents to discuss blood pressure medications and pharmacy will let us know about refills.), Medication Refill (Patient has been using left over Gabapentin since 08/6/2022 and wants to discuss getting a new Rx ), and Other (Patient wanted to do a physical wellness exam but hasn't had the needed labs.)  . HPI:   HPI     Pt has restarted gabapentin for back and hip pain. She is taking a total of 300mg daily but concerned she may need more as it only helps a bit. Pt did have \"shakes\" from 600mg bid in years past.  Is going to see radiation oncologist for possible hip radiation. Insomnia  Takes trazodone up to 300mg on days that she doesn't have to go anywhere the next morning. States that she usually takes 2 at bedtime. Has failed ambien in past.   I have discussed the potential of changing trazodone to perhaps Remeron but with the up adjusting of gabapentin, we will hold off at this time. Past Medical History:   Diagnosis Date    Arthritis     Bipolar I disorder, most recent episode (or current) mixed, unspecified     Chronic back pain     Depression     Dry mouth     Hyperlipidemia     Hypertension     Hypokalemia     Hypothyroidism     Menopause     Morbidly obese ([facility] [address] 10/28/2020    Overactive bladder     Plantar fasciitis     RLS (restless legs syndrome)     Sleep apnea     Spondylosis with myelopathy, lumbar region     Thyroid disease      Prior to Visit Medications    Medication Sig Taking?  Authorizing Provider   HYDROcodone-acetaminophen (NORCO) 5-325 MG per tablet TAKE ONE TABLET BY MOUTH EVERY 8 HOURS AS NEEDED FILL 9/3 Yes Historical Provider, MD   gabapentin (NEURONTIN) 100 MG capsule 1-2 po up to tid for nerve pain Yes MAEVE Cuevas   WEGOVY 2.4 MG/0.75ML SOAJ SC injection INJECT 2.4MG UNDER THE SKIN ONCE A WEEK Yes MAEVE Cuevas   spironolactone (ALDACTONE) 25 MG tablet TAKE TWO TABLETS BY MOUTH IN THE MORNING AND ONE AT BEDTIME FOR FLUID Yes MAEVE Cuevas   levothyroxine (SYNTHROID) 75 MCG tablet TAKE ONE TABLET BY MOUTH DAILY AS DIRECTED Yes MAEVE Cuevas   atorvastatin (LIPITOR) 40 MG tablet TAKE ONE TABLET BY MOUTH NIGHTLY Yes MAEVE Cuevas   pilocarpine (SALAGEN) 5 MG tablet TAKE ONE TABLET BY MOUTH THREE TIMES DAILY Yes Nicole Harris MD   carvedilol (COREG) 3.125 MG tablet TAKE ONE TABLET BY MOUTH TWICE DAILY WITH MEALS Yes MAEVE Cuevas   allopurinol (ZYLOPRIM) 100 MG tablet TAKE ONE TABLET BY MOUTH DAILY Yes MAEVE Cuevas   pramipexole (MIRAPEX) 1.5 MG tablet TAKE ONE-HALF TABLET BY MOUTH EVERY MORNING AND ONE TABLET NIGHTLY **MAY MAKE DROWSY Yes MAEVE Cuevas   FLUoxetine (PROZAC) 40 MG capsule TAKE ONE CAPSULE BY MOUTH EVERY MORNING AS DIRECTED FOR DEPRESSION/ANXIETY Yes MAEVE Cuevas   UNABLE TO FIND Please adjust medication refills to limit pharmacy visits--I will authorize most quantities to allow this to happen Yes MAEVE Cuevas   traZODone (DESYREL) 100 MG tablet 1-2 po as needed for sleep Yes MAEVE Cuevas   traMADol (ULTRAM) 50 MG tablet Take 50 mg by mouth 3 times daily as needed for Pain.  Yes Historical Provider, MD   niacin (SLO-NIACIN) 500 MG extended release tablet Take 500 mg by mouth daily Yes Historical Provider, MD   NARCAN 4 MG/0.1ML LIQD nasal spray  Yes Historical Provider, MD   diclofenac (VOLTAREN) 75 MG EC tablet Take 75 mg by mouth 2 times daily  Yes Historical Provider, MD   methylcellulose ([employer] SOLUBLE FIBER THERAPY) 500 MG TABS  Yes Historical Provider, MD   omeprazole (PRILOSEC) 20 MG delayed release capsule Take 1 capsule by mouth 2 times daily (before meals) Yes MAEVE Cuevas   tiZANidine (ZANAFLEX) 4 MG tablet Take 4 mg by mouth daily  Yes Historical Provider, MD   Vitamin D (CHOLECALCIFEROL) 1000 UNITS CAPS capsule Take 1,000 Units by mouth daily Yes Historical Provider, MD   Multiple Vitamins-Minerals (MULTIVITAMIN PO) Take by mouth daily Yes Historical Provider, MD   calcium carbonate 600 MG TABS tablet Take 1 tablet by mouth daily. Yes Historical Provider, MD     Allergies   Allergen Reactions    Clindamycin/Lincomycin Other (See Comments)     Heartburn, upset stomach     Bactrim [Sulfamethoxazole-Trimethoprim]     Hctz [Hydrochlorothiazide] Other (See Comments)     Acid reflux      Sulfamethoxazole     Trimethoprim     Metformin And Related Diarrhea    Sitagliptin Rash     Past Surgical History:   Procedure Laterality Date    CARPAL TUNNEL RELEASE Bilateral 2002    CHOLECYSTECTOMY  10/2014    COLON SURGERY      COLON SURGERY      biopsy     COLONOSCOPY      CYST REMOVAL  2017    Under left ear    HARDWARE REMOVAL Right 2011    Foot     HERNIA REPAIR  10/2014    LEG DEBRIDEMENT Left     NOSE SURGERY  2017; 2019    Dr Sulma Peraza L/S,1 LEVEL Bilateral 2017    INJECTION MEDICATION FACET JOINT performed by [place] at [facility]     Family History   Problem Relation Age of Onset    Cancer Mother         lung    Heart Disease Mother     Hypertension Mother     Bipolar Disorder Mother     Heart Disease Father     Hypertension Father      Social History     Socioeconomic History    Marital status:      Spouse name: Xi Canchola    Number of children: 2    Years of education: some college    Highest education level: Not on file   Occupational History     Employer: [employer]   Tobacco Use    Smoking status: Former     Packs/day: 1.00     Years: 30.00     Pack years: 30.00     Types: Cigarettes     Quit date: 2003     Years since quittin.1    Smokeless tobacco: Never    Tobacco comments:     Pt not interested at this time in stopping. Substance and Sexual Activity    Alcohol use: Yes     Comment: rarely    Drug use: No     Comment: pt admits \"may have misused in past\"--Pt was vague with details.     Sexual activity: Not on file   Other Topics Concern    Not on file   Social History Narrative    Not on file     Social Determinants of Health     Financial Resource Strain: Low Risk     Difficulty of Paying Living Expenses: Not hard at all   Food Insecurity: No Food Insecurity    Worried About Running Out of Food in the Last Year: Never true    Ran Out of Food in the Last Year: Never true   Transportation Needs: Not on file   Physical Activity: Not on file   Stress: Not on file   Social Connections: Not on file   Intimate Partner Violence: Not on file   Housing Stability: Not on file       Review of Systems   Constitutional:  Negative for unexpected weight change. Respiratory: Negative. Cardiovascular: Negative. Musculoskeletal:  Positive for arthralgias. Psychiatric/Behavioral:  Positive for sleep disturbance. OBJECTIVE:    Physical Exam  Vitals and nursing note reviewed. Constitutional:       General: She is not in acute distress. Appearance: Normal appearance. She is well-developed. She is obese. She is not ill-appearing. HENT:      Head: Normocephalic and atraumatic. Eyes:      Extraocular Movements: Extraocular movements intact. Conjunctiva/sclera: Conjunctivae normal.      Pupils: Pupils are equal, round, and reactive to light. Cardiovascular:      Rate and Rhythm: Normal rate and regular rhythm. Heart sounds: Normal heart sounds. No murmur heard. Pulmonary:      Effort: Pulmonary effort is normal. No respiratory distress. Breath sounds: Normal breath sounds. Abdominal:      Palpations: Abdomen is soft. Musculoskeletal:      Cervical back: Neck supple. Skin:     General: Skin is warm and dry. Capillary Refill: Capillary refill takes less than 2 seconds. Neurological:      General: No focal deficit present. Mental Status: She is alert and oriented to person, place, and time.    Psychiatric:         Mood and Affect: Mood normal.         Behavior: Behavior normal.         Thought Content: Thought content normal.         Judgment: Judgment normal.       /72 (Site: Left Upper Arm, Position: Sitting, Cuff Size: Small Adult)   Pulse 81   Temp 97.7 °F (36.5 °C) (Temporal)   Resp 20   Ht 5' (1.524 m)   Wt 182 lb (82.6 kg)   SpO2 99%   BMI 35.54 kg/m²      ASSESSMENT:      ICD-10-CM    1. Type 2 diabetes mellitus treated without insulin (HCC)  E11.9 Lipid Panel     Hemoglobin A1C     Comprehensive Metabolic Panel      2. Hip pain  M25.559 gabapentin (NEURONTIN) 100 MG capsule      3. Insomnia, unspecified type  G47.00 Continue trazodone 100mg 1-2po nightly as needed      4. Mixed hyperlipidemia  E78.2 Lipid Panel     Comprehensive Metabolic Panel      5. Acquired hypothyroidism  E03.9 TSH with Reflex to FT4          PLAN:    Cliff Mckinney: Discuss Medications (Patient presents to discuss blood pressure medications and pharmacy will let us know about refills.), Medication Refill (Patient has been using left over Gabapentin since 08/6/2022 and wants to discuss getting a new Rx ), and Other (Patient wanted to do a physical wellness exam but hasn't had the needed labs. )  If increase of gabapentin and continuing 200mg of trazodone at HS is not helpful for sleep then will decrease trazodone and change to Remeron. Increase gabapentin as above  [place]  Med agreement is on file  Fasting lab--increase water daily. Call after having lab. F/u prn pending lab review. Diagnosis and orders for this visit are above. Please note that this chart was generated using dragon dictationsoftware. Although every effort was made to ensure the accuracy of this automated transcription, some errors in transcription may have occurred.

## 2022-09-15 ENCOUNTER — TELEPHONE (OUTPATIENT)
Dept: FAMILY MEDICINE CLINIC | Age: 62
End: 2022-09-15

## 2022-09-15 DIAGNOSIS — M25.559 HIP PAIN: ICD-10-CM

## 2022-09-16 RX ORDER — GABAPENTIN 100 MG/1
CAPSULE ORAL
Qty: 540 CAPSULE | Refills: 0 | Status: SHIPPED | OUTPATIENT
Start: 2022-09-16 | End: 2022-10-17 | Stop reason: SDUPTHER

## 2022-09-19 ENCOUNTER — HOSPITAL ENCOUNTER (OUTPATIENT)
Dept: RADIATION ONCOLOGY | Facility: HOSPITAL | Age: 62
Setting detail: RADIATION/ONCOLOGY SERIES
End: 2022-09-19

## 2022-09-19 PROBLEM — Z87.891 FORMER SMOKER: Status: ACTIVE | Noted: 2022-09-19

## 2022-09-20 ENCOUNTER — CONSULT (OUTPATIENT)
Dept: RADIATION ONCOLOGY | Facility: HOSPITAL | Age: 62
End: 2022-09-20

## 2022-09-20 VITALS
WEIGHT: 180 LBS | HEIGHT: 59 IN | SYSTOLIC BLOOD PRESSURE: 139 MMHG | BODY MASS INDEX: 36.29 KG/M2 | DIASTOLIC BLOOD PRESSURE: 80 MMHG

## 2022-09-20 DIAGNOSIS — C50.812 MALIGNANT NEOPLASM OF OVERLAPPING SITES OF LEFT BREAST IN FEMALE, ESTROGEN RECEPTOR POSITIVE: Primary | ICD-10-CM

## 2022-09-20 DIAGNOSIS — Z87.891 FORMER SMOKER: ICD-10-CM

## 2022-09-20 DIAGNOSIS — Z17.0 MALIGNANT NEOPLASM OF OVERLAPPING SITES OF LEFT BREAST IN FEMALE, ESTROGEN RECEPTOR POSITIVE: Primary | ICD-10-CM

## 2022-09-20 PROCEDURE — G0463 HOSPITAL OUTPT CLINIC VISIT: HCPCS | Performed by: RADIOLOGY

## 2022-09-20 NOTE — PROGRESS NOTES
[facility]      ARETHA Serna  ________________________________________  [facility]  Department of Radiation Oncology  [address] [place] [id number]  Office:  [phone]  Fax: [phone]    DATE:  09/20/2022  PATIENT:  Robyn Minaya 1960                 MEDICAL RECORD #:  [id number]                                                       REASON FOR VISIT  Chief Complaint   Patient presents with   • Breast Cancer     Bone metastasis     Robyn Minaya is a very pleasant female that has been referred to our office for radiotherapy considerations regarding possible palliative radiotherapy for presumed symptomatic bone metastasis in the setting of newly diagnosed metastatic breast cancer.  Patient reports chronic back and pelvic/hip pain for many years without clear change in pain distribution or intensity.  Patient reports previous consistent use of pain medications with tramadol, switched to [place] through pain clinic but has now transitioned to only sporadic use of pain medication (only uses pain medication every several days at this time).    History of Present Illness:  07/05/2022 - Bilateral diagnostic mammogram:   • Upper outer quadrant of the left breast corresponding to the fiduciary marker is a large lobulated stellate mass. Spot compression views demonstrate surrounding architectural distortion.   • Mammographic image of the right breast is benign.  • Recommend percutaneous US guided biopsy of the large lobulated stellate mass in the upper outer quadrant of the left breast.   • BIRADS 5 highly suggestive of malignancy.     07/05/2022 - US Left breast with axilla:   • At the 2:00 position left breast is a large lobulated hypoechoic mass with blood flow and acoustic shadowing measuring 3.3 x 2.4 x 1.7 cm. This corresponds to the palpable lump in the mass on mammogram. B  • IRADS 5 highly suspicious for malignancy. US guided  percutaneous biopsy is recommended.     07/11/2022 - Appointment with Dr. Pedraza:  • Patient presented with a left breast mass that she had self 2 weeks prior.    • Patient describes discomfort in the area as well as red color change of the overlying skin.  She has not had a mammogram in several years.    • Exam: 3.5 cm hard left breast mass in the upper outer quadrant.  No other palpable masses bilaterally.  No nipple discharge. Enlarged hard left axillary lymph node concerning for lymphadenopathy.  No enlarged right axillary node bilateral supraclavicular adenopathy. Lower left breast has redness and thickening of the skin concerning for inflammatory breast cancer.    • Punch biopsy obtained from the left medial inferior breast as well as the left central inferior breast.    Pathology:  • Left Breast, skin excisions:  o Atypical epithelial elements in dermal lymphatics and focal (less than 1 mm), atypical ductal proliferation; please see comment  Comment: In the appropriate clinical context, these findings are compatible with ductal carcinoma with dermal lymphatic involvement     07/15/2022 - Ultrasound-guided biopsy of mass at 2:00 in the left breast and abnormal lymph node in the left axilla.  • Lymph node, left axilla, core biopsies:  o Metastatic carcinoma consistent with breast primary.  • Left breast, 2:00, core biopsies:  o Invasive mammary carcinoma of no special type (ductal), grade 2.  o Lymphovascular space invasion identified.  o Largest contiguous area of tumor measures 10 mm.  • ER 90% (+); AZ 40% (+); HER2 1+ (-)    07/25/2022 - CT Chest with and without contrast:  • Known left breast cancer identified with left breast skin thickening concerning for dermal invasion.  Pathologic left axillary lymph nodes (2.5 x 1.7 cm).    • No intrathoracic metastasis identified.    • Heterogenous appearance to the regional bony marrow with scattered lucent foci involving sternum as well as thoracic and lumbar  vertebra. Correlation with nuclear bone scan recommended.    07/25/2022 - CT Abdomen/Pelvis with and without contrast:  • No evidence of intra-abdominal or pelvic metastasis.    • Nonspecific lucency left L1 vertebra.    07/27/2022 - PET Scan:  • Background right hepatic lobe FDG uptake. SUV max = 2.97  • Widespread metastatic disease.   • FDG avid neoplastic disease seen within the:  o Left breast - SUV max = 5.04  o Left axillary lymphadenopathy - SUV max = 3.79  o Upper sternum - SUV max = 5.39  o Multiple thoracic vertebral body lesions - SUV max = 5.09  o Multiple lumbar vertebral body lesions - SUV max = 5.29  o Mild asymmetric enlargement and mild FDG uptake within the left flank musculature - SUV max = 2.99 (this is indeterminate as it is only slightly above background FDG uptake though there is slight muscle asymmetry associated with this FDG uptake)  o Multiple pelvic bone lesions - SUV max = 8.15  o Right quadratus femoris muscle metastasis - SUV max = 4.53 (This muscle metastasis could produce right sciatic nerve symptoms)  o Proximal right femur diaphysis - SUV max = 4.04  Impression:  • Widespread metastasis (left breast, left axillary lymphadenopathy, upper sternum, multiple thoracic vertebral body lesions, multiple lumbar vertebral body lesions, mild asymmetric enlargement and mild FDG uptake within the left flank musculature which is indeterminate with SUV 2.99, multiple pelvic bony lesions, right quadratus for Vimal muscle metastasis, possible right sciatic nerve symptoms, proximal right femur diaphysis).    • Left axillary lymph node metastatic disease and a few sites of asymmetric muscular uptake compatible with small muscular metastasis.      08/04/2022 - Chemotherapy course:  • Palbociclib    09/13/2022 - Appointment with :  Assessment:  • Invasive ductal carcinoma, left breast  • --Tumor stage: TNM/AJCC stage IV (cT4b, cN2a, M1,G?) ER 90% (+); WA 40% (+); HER2/jaja-1+  (negative).  • --Original tumor burden:    ? 07/05/2022-US L breast with axilla: At the 2:00 position left breast is a large lobulated hypoechoic mass with blood flow and acoustic shadowing measuring 3.3 x 2.4 x 1.7 cm. This corresponds to the palpable lump in the mass on mammogram. BIRADS 5 highly suspicious for malignancy. US guided percutaneous biopsy is recommended.   ? 07/15/2022- ultrasound-guided biopsy of mass at 2:00 in the left breast and abnormal lymph node in the left axilla.  • Final diagnosis:    • -Lymph node, left axilla, core biopsies:  Metastatic carcinoma consistent with breast primary.  • -Left breast, 2:00, core biopsies:  Invasive mammary carcinoma of no special type (ductal), grade 2.  Lymphovascular space invasion identified.  Largest contiguous area of tumor measures 10 mm.  • ER 90% (+); MT 40% (+); HER2/jaja-1+ (negative).  ? 07/25/2022- CT chest.  Known left breast cancer identified with left breast skin thickening concerning for dermal invasion.  Pathologic left axillary lymph nodes (2.5 x 1.7 cm).  No intrathoracic metastasis identified.  Heterogenous appearance to the regional bony marrow with scattered lucent foci involving sternum as well as thoracic and lumbar vertebra.  Correlation with nuclear bone scan recommended.  ? 07/25/2022-CT abdomen/pelvis.  No evidence of intra-abdominal or pelvic metastasis.  Nonspecific lucency left L1 vertebra.  ? 07/27/2022-PET scan- widespread metastasis (left breast, left axillary lymphadenopathy, upper sternum, multiple thoracic vertebral body lesions, multiple lumbar vertebral body lesions, mild asymmetric enlargement and mild FDG uptake within the left flank musculature which is indeterminate with SUV 2.99, multiple pelvic bony lesions, right quadratus for Vimal muscle metastasis, possible right sciatic nerve symptoms, proximal right femur diaphysis).  Left axillary lymph node metastatic disease and a few sites of asymmetric muscular uptake  "compatible with small muscular metastasis.  ? 08/11/2022-CT head ([facility])- negative for metastasis.  ? Tumor status:  ? Palliative anastrozole, 08/04/2022-present  ? Palliative abemaciclib-C1, 08/04/2022- 08/24/2022; C2, 09/02/2022- 09/22/2022  • Back pain apparently chronic, now with possible contribution from the spinal metastases.  • Sleep apnea.  CPAP  • Chronic kidney disease, stage 3  • --GFR 58 mL/min, 09/06/2022  Plan:  • Again apprised of the available diagnostic information, including the pathology, and imaging findings (CT scans and PET scan) showing widespread osseous metastasis.  No visceral organ metastasis.  No brain metastasis  • Apprised of baseline labs, 07/22/2022 and most recent labs in 09/06/2022.  GFR 58 otherwise normal CMP.  Normal CBC.  • Anastrozole and palbociclib tolerance.  Some fatigue and arthralgias (baseline) otherwise tolerating well.  Exclaims, \"I am pleased at the lack of side effects.\"  • Review NCCN guidelines version 4.2022 invasive breast cancer-stage IV (M1) disease-work-up: Brain MRI, bone scan or PET scan, comprehensive germline and somatic profiling to identify additional targeted therapies, genetic counseling-bone disease present- abdomen denosumab, zoledronic acid or pamidronate- ER/NV positive, HER2 negative- no visceral crisis and no prior endocrine therapy within 1 year-postmenopausal-systemic therapy until progression or unacceptable toxicity-preferred regimens for HER2 negative and postmenopausal (first-line therapy)- aromatase inhibitor+ CD4/6 inhibitor (abemaciclib, palbociclib or ribociclib-category 1), selective ER down regulator (fulvestrant-category 1)+ nonsteroidal aromatase inhibitor (anastrozole, letrozole-category 1), fulvestrant+ CD/K inhibitor (abemaciclib, palbociclib or ribociclib-category 1)  • Potential toxicities of palbociclib discussed (to include but not limited to: Myelosuppression, febrile neutropenia, serious infection, interstitial lung " disease, pneumonitis, noninfectious, fatigue, nausea, stomatitis, alopecia, diarrhea, rash, vomiting, decreased appetite, asthenia, fever, xeroderma, liver enzyme increase, epistaxis, blurred vision).  Questions answered.  Patient agrees to press on with therapy.  • Potential toxicities of anastrozole discussed (to include but not limited to: Osteoporosis, fractures, endometrial carcinoma, thromboembolism, MI, stroke, angina, hypertension, cataracts, anemia, leukopenia, erythema multiforme, Rivas-Ruslan syndrome, hot flashes, asthenia, pain, arthralgia, arthritis, nausea, vomiting, headache, pharyngitis, depression, rash, hypertension, lymphedema, insomnia, edema, weight gain, dyspnea, abdominal pain, constipation, hypercholesterolemia, cough, abdominal pain).  Questions answered.  Patient agrees to press on with therapy.  • Rx:   o Arimidex 1 mg po daily # 30 x 11 RF  o palbociclib 125 mg on days 1-21 of 28 day cycle  o Schedule Xgeva 120 mg sc q month x12 then every 3 months  • Refer to Dr. Rivera Re: Consideration of palliative radiation to the symptomatic spinal/pelvic metastatic lesions-second request.  • Upon initiation of palbociclib, draw CBC with diff q 4 weeks- Procrit 40,000 units sc if Hgb < 10 and Hct < 30, Neupogen 480 mcg sc daily x 3 if ANC < 1 at [facility]  • Office visit, 09/08/2022 with Dr. Pedraza.  Improving left breast pain.  Follow-up [facility] in 2 months.  Follow-up to see me in 3 months.  • Office consult, 08/12/2022 with Dr. Ketan Jeffries, breast medical oncology at [facility]..  Assessment/plan: Stage IV breast cancer, left breast with bone involvement, ER positive.  Reviewed treatment goals are palliative, but since metastatic disease appears limited to bone may have prognosis of several years depending on response to treatment.  Since her breast cancer is ER positive, endocrine based therapies in combination with CDK inhibitors best first-line treatment.  No clinical trials in the first  line.  Plan: Continue Arimidex and Ibrance.  Xgeva at home (every 2 monthly x12 months then would space out to every 3 months).  Will let me know if we need to set up Zometa here.  Labs locally in about 3-4 weeks (during week off Ibrance).  Monitor for menopausal symptoms, diarrhea, fatigue.  Will review labs next month and if she tolerating treatment plan clinic visit in 3 months with PET scan but arranged next month after we review labs and plan cycle 2.  • Return to the office in 8 weeks with pre-office CBC with differential, CMP, CEA, CA27-29.  Patient states that this probably her last visit with our office before she transfers all her care to [place].    09/13/2022 - Chemotherapy course:  • Denosumab (Xgeva) Q28D    History obtained from  PATIENT    PAST MEDICAL HISTORY  Past Medical History:   Diagnosis Date   • Allergic rhinitis    • Arthritis     BACK   • Chronic back pain    • Degenerative arthritis    • Depression    • Deviated nasal septum    • Diabetes mellitus (HCC)    • Edema    • Gout    • History of colon polyps    • History of transfusion    • Hyperlipidemia    • Hypertension    • Hypertrophy of nasal turbinates    • Incontinence in female    • Insomnia    • Menopause    • Nasal septal deviation    • Nasal valve stenosis    • Overactive bladder    • Plantar fasciitis    • Prediabetes    • RLS (restless legs syndrome)    • Sleep apnea     pt states has a cpap but hasn't been using it   • Spider bite wound     left lower leg. previously infected with mrsa but currently not infected.   • Wears glasses       PAST SURGICAL HISTORY  Past Surgical History:   Procedure Laterality Date   • BLEPHAROPLASTY Bilateral 7/9/2019    Procedure: 1) bilateral upper blepharoplasty with excision of excess of tissue weighing down 2) nasal endoscopy with removal of nasal septal prosthesis;  Surgeon: Mark Anthony Anderson MD;  Location: [facility] OR;  Service: ENT   • CARPAL TUNNEL RELEASE Bilateral 2004   • CHOLECYSTECTOMY   2013   • COLONOSCOPY  10/17/2014    One 5-6mm tubular adenomatous polyp in the ascending colon; Two less than 4mm hyperplastic polyps in the sigmoid colon; Three rectal hyperplastic polyps; Diverticulosis; Repeat 5 years    • COLONOSCOPY  07/14/2010    Internal hemorrhoids; Repeat 7 years    • COLONOSCOPY N/A 10/23/2019    Procedure: COLONOSCOPY WITH ANESTHESIA;  Surgeon: Robyn Frazier MD;  Location: [facility] ENDOSCOPY;  Service: Gastroenterology   • CYST REMOVAL  2016    neck   • ENDOSCOPIC FUNCTIONAL SINUS SURGERY (FESS) Bilateral 4/16/2019    Procedure: ENDOSCOPIC FUNCTIONAL SINUS SURGERY (bilareral maxillary antrostomy without image guidance);  Surgeon: Mark Anthony Anderson MD;  Location: [facility] OR;  Service: ENT   • ENDOSCOPY N/A 4/11/2019    Esophageal mucosal changes suggestive of eosinophilic esophagitis-biopsied; A few gastric polyps-resected and retrieved-biopsied; Normal examined duodenum-biopsied   • FOOT SURGERY Right 2010    X3   • HERNIA REPAIR     • INTERSTIM PLACEMENT N/A 6/5/2019    Procedure: INTERSTIM STAGES 1 AND 2 LEAD AND GENERATOR PLACEMENT;  Surgeon: Endy Guerrero MD;  Location: [facility] OR;  Service: Urology   • INTERSTIM PLACEMENT N/A 7/26/2021    Procedure: INTERSTIM STAGES 1 AND 2 LEAD AND GENERATOR PLACEMENT - RECHARGEABLE.;  Surgeon: Mikey Asencio MD;  Location: [facility] OR;  Service: Urology;  Laterality: N/A;   • INTERSTIM REMOVAL N/A 7/26/2021    Procedure: INTERSTIM REMOVAL;  Surgeon: Mikey Asencio MD;  Location:  [facility] OR;  Service: Urology;  Laterality: N/A;   • LEG DEBRIDEMENT Left 7/31/2020    Procedure: LOWER POSTERIOR LEG WOUND DEBRIDEMENT - LEFT;  Surgeon: Roberto Wells DPM;  Location:  [facility] OR;  Service: Podiatry;  Laterality: Left;   • LEG DEBRIDEMENT Left 11/30/2020    Procedure: SPLIT THICKNESS SKIN GRAFT LEFT LOWER EXTREMTIY WITH WOUND VAC PLACEMENT;  Surgeon: Trace Hurd DO;  Location:  [facility] HYBRID OR 12;  Service: Vascular;  Laterality: Left;    • LEG DEBRIDEMENT Left 2/3/2021    Procedure: LOWER EXTREMITY WOUND DEBRIDEMENT - LEFT;  Surgeon: Roberto Wells DPM;  Location:  [facility] OR;  Service: Podiatry;  Laterality: Left;   • NASAL ENDOSCOPY N/A 2019    Procedure:     1. Functional endoscopic sinus surgery with bilateral maxillary antrostomy    2. Bilateral nasal endoscopy with biopsy of nasal septum    3.  Nasal septal prosthesis placement  ;  Surgeon: Mark Anthony Anderson MD;  Location:  [facility] OR;  Service: ENT   • NASAL VESTIBULE LESION/PAPILLOMA EXCISION N/A 2017    Procedure: Repair of nasal vestibular stenosis and septoplasty; cartilage graft from left ear;  Surgeon: Mark Anthony Anderson MD;  Location:  [facility] OR;  Service:    • SEPTOPLASTY N/A 2019    Procedure: PLACEMENT OF NASAL SEPTAL PROSTHESIS;  Surgeon: Mark Anthony Anderson MD;  Location:  [facility] OR;  Service: ENT   • SEPTOPLASTY Bilateral 2020    Procedure: Nasal endoscopy with septal debridement, and placement of Silastic stents;  Surgeon: Mark Anthony Anderson MD;  Location:  [facility] OR;  Service: ENT   • US GUIDED LYMPH NODE BIOPSY  7/15/2022   • WOUND DEBRIDEMENT  2019    spider bite wound initial surgery      FAMILY HISTORY  family history includes Cancer in her father and mother; Diabetes in her father; Hypertension in her father.     SOCIAL HISTORY  Social History     Tobacco Use   • Smoking status: Former Smoker     Packs/day: 1.00     Years: 40.00     Pack years: 40.00     Types: Cigarettes     Start date:      Quit date: 2020     Years since quittin.1   • Smokeless tobacco: Never Used   • Tobacco comment: sometimes 1.5 when smoking more heavy    Vaping Use   • Vaping Use: Former   • Substances: Nicotine   Substance Use Topics   • Alcohol use: Yes     Comment: Rarely-maybe 3x/year   • Drug use: No     ALLERGIES  Bactrim [sulfamethoxazole-trimethoprim], Sulfamethoxazole, Trimethoprim, Hctz [hydrochlorothiazide], Januvia [sitagliptin], and Metformin and related      MEDICATIONS    Current Outpatient Medications:   •  allopurinol (ZYLOPRIM) 100 MG tablet, Take 100 mg by mouth Daily., Disp: , Rfl:   •  anastrozole (ARIMIDEX) 1 MG tablet, Take 1 tablet by mouth Daily., Disp: 30 tablet, Rfl: 11  •  atorvastatin (LIPITOR) 40 MG tablet, Take 40 mg by mouth Every Night., Disp: , Rfl:   •  calcium carbonate (OS-KAYLEY) 600 MG tablet, Take 600 mg by mouth Daily., Disp: , Rfl:   •  carvedilol (COREG) 3.125 MG tablet, Take 3.125 mg by mouth 2 (Two) Times a Day With Meals., Disp: , Rfl:   •  cholecalciferol (VITAMIN D3) 25 MCG (1000 UT) tablet, Take 400 Units by mouth Daily., Disp: , Rfl:   •  diclofenac (VOLTAREN) 75 MG EC tablet, Take 75 mg by mouth 2 (Two) Times a Day., Disp: , Rfl:   •  FIBER PO, Take 500 mg by mouth., Disp: , Rfl:   •  FLUoxetine (PROzac) 40 MG capsule, Take 1 capsule by mouth., Disp: , Rfl:   •  gabapentin (NEURONTIN) 100 MG capsule, Take 1 capsule by mouth Every 6 (Six) Hours., Disp: , Rfl:   •  HYDROcodone-acetaminophen (NORCO) 7.5-325 MG per tablet, Take 1 tablet by mouth Every 6 (Six) Hours As Needed for Moderate Pain  (postop pain)., Disp: 12 tablet, Rfl: 0  •  levothyroxine (SYNTHROID, LEVOTHROID) 75 MCG tablet, Take 75 mcg by mouth Every Morning., Disp: , Rfl:   •  Multiple Vitamin (MULTI VITAMIN PO), Take 1 dose by mouth Daily., Disp: , Rfl:   •  multivitamin with minerals tablet tablet, Take 1 tablet by mouth Daily., Disp: , Rfl:   •  niacin 500 MG tablet, Take 500 mg by mouth Daily With Breakfast., Disp: , Rfl:   •  omeprazole (priLOSEC) 20 MG capsule, Take 20 mg by mouth Daily., Disp: , Rfl:   •  Palbociclib 125 MG tablet, Take 1 tablet by mouth Daily for 21 days. Take with food on days 1-21, then off for 7 days., Disp: 21 tablet, Rfl: 2  •  pilocarpine (SALAGEN) 5 MG tablet, Take 5 mg by mouth 3 (Three) Times a Day., Disp: , Rfl:   •  pramipexole (MIRAPEX) 1.5 MG tablet, Take 1.5 mg by mouth Every Night., Disp: , Rfl:   •  Semaglutide-Weight Management  "(Wegovy) 2.4 MG/0.75ML solution auto-injector, INJECT 2.4mg (0.75ML) UNDER THE SKIN EVERY WEEK, Disp: , Rfl:   •  spironolactone (ALDACTONE) 25 MG tablet, Take 25 mg by mouth 3 (Three) Times a Day., Disp: , Rfl:   •  tiZANidine (ZANAFLEX) 4 MG tablet, Take 4 mg by mouth Every Night. HAS NOT TAKEN WHILE ON LEVAQUIN, Disp: , Rfl:   •  traMADol (ULTRAM) 50 MG tablet, Take 50 mg by mouth Every 8 (Eight) Hours As Needed., Disp: , Rfl:   •  traZODone (DESYREL) 100 MG tablet, Take 400 mg by mouth Every Night. Take 1-3 tablets at night as needed for sleep., Disp: , Rfl:     Current outpatient and discharge medications have been reconciled for the patient.  Reviewed by: Benji Patrick MD    The following portions of the patient's history were reviewed and updated as appropriate: allergies, current medications, past family history, past medical history, past social history, past surgical history and problem list.    REVIEW OF SYSTEMS  Review of Systems   Constitutional: Negative.    HENT:  Negative.    Eyes: Negative.    Respiratory: Negative.    Cardiovascular: Negative.    Gastrointestinal: Negative.    Genitourinary: Negative.     Musculoskeletal: Positive for arthralgias and back pain.        Reports chronic back pain, chronic bilateral hip pain that has not changed in intensity or character for many years.   Skin: Negative.    Neurological: Negative.    Hematological: Negative.    Psychiatric/Behavioral: Negative.        I have reviewed and confirmed the accuracy of the ROS as documented by the MA/LPN/RN Benji Patrick MD     PHYSICAL EXAM  Vital Signs:   Vitals:    09/20/22 1432   BP: 139/80   Weight: 81.6 kg (180 lb)   Height: 149.9 cm (59\")   PainSc: 1  Comment: 8/10 with ambulation   PainLoc: Back  Comment: low; bilateral hips, R>L        Physical Exam  Constitutional:       Appearance: Normal appearance. She is obese.   HENT:      Head: Normocephalic and atraumatic.      Mouth/Throat:      Mouth: Mucous " membranes are moist.      Pharynx: Oropharynx is clear.   Eyes:      Extraocular Movements: Extraocular movements intact.      Conjunctiva/sclera: Conjunctivae normal.      Pupils: Pupils are equal, round, and reactive to light.   Cardiovascular:      Rate and Rhythm: Normal rate and regular rhythm.      Pulses: Normal pulses.      Heart sounds: Normal heart sounds.   Pulmonary:      Effort: Pulmonary effort is normal.      Breath sounds: Normal breath sounds.   Abdominal:      General: Bowel sounds are normal.      Palpations: Abdomen is soft.   Musculoskeletal:         General: Normal range of motion.      Cervical back: Normal range of motion and neck supple.      Comments: No significant point tenderness elicited to palpation/percussion of spinous processes or bony prominences of the pelvic girdle.   Skin:     General: Skin is warm and dry.   Neurological:      General: No focal deficit present.      Mental Status: She is alert and oriented to person, place, and time. Mental status is at baseline.   Psychiatric:         Mood and Affect: Mood normal.         Behavior: Behavior normal.         Thought Content: Thought content normal.           Performance Status: ECOG (1) Restricted in physically strenuous activity, ambulatory and able to do work of light nature    Clinical Quality Measures  -Pain Documented by Standardized Tool, FPS  Robyn Minaya reports a pain score of .  Given her pain assessment as noted, treatment options were discussed and the following options were decided upon as a follow-up plan to address the patient's pain: continuation of current treatment plan for pain.   Pain Medications             diclofenac (VOLTAREN) 75 MG EC tablet Take 75 mg by mouth 2 (Two) Times a Day.    FLUoxetine (PROzac) 40 MG capsule Take 1 capsule by mouth.    gabapentin (NEURONTIN) 100 MG capsule Take 1 capsule by mouth Every 6 (Six) Hours.    HYDROcodone-acetaminophen (NORCO) 7.5-325 MG per tablet Take 1 tablet  by mouth Every 6 (Six) Hours As Needed for Moderate Pain  (postop pain).    tiZANidine (ZANAFLEX) 4 MG tablet Take 4 mg by mouth Every Night. HAS NOT TAKEN WHILE ON LEVAQUIN    traMADol (ULTRAM) 50 MG tablet Take 50 mg by mouth Every 8 (Eight) Hours As Needed.    traZODone (DESYREL) 100 MG tablet Take 400 mg by mouth Every Night. Take 1-3 tablets at night as needed for sleep.        -Advanced Care Planning Advance Care Planning   ACP discussion was held with the patient during this visit. Patient does not have an advance directive, information provided.     -Body Mass Index Screening and Follow-Up Plan Class 2 Severe Obesity (BMI >=35 and <=39.9). Obesity-related health conditions include the following: . Obesity is unchanged. BMI is is above average; no BMI management plan is appropriate. We discussed low calorie, low carb based diet program and portion control.    -Tobacco Use: Screening and Cessation Intervention Social History    Tobacco Use      Smoking status: Former Smoker        Packs/day: 1.00        Years: 40.00        Pack years: 40        Types: Cigarettes        Start date:         Quit date: 2020        Years since quittin.1      Smokeless tobacco: Never Used      Tobacco comment: sometimes 1.5 when smoking more heavy     ASSESSMENT AND PLAN  1. Malignant neoplasm of overlapping sites of left breast in female, estrogen receptor positive (HCC)    2. Former smoker      No orders of the defined types were placed in this encounter.      RECOMMENDATIONS: Robyn Merna Minaya has been referred to our office today to discuss radiotherapy recommendations for presumed symptomatic bone metastasis in the setting of newly diagnosed stage IV (cT4b cN2 aM1) estrogen receptor positive/progesterone receptor positive/HER2/jaja negative left breast invasive mammary carcinoma, grade 2 diagnosed via left breast/left axillary lymph node ultrasound-guided the biopsies on 07/15/2022.  Extent of disease burden  includes initial presentation consistent with inflammatory breast disease, left axillary lymph node metastasis and diffuse axial and appendicular bone metastasis including upper sternum/multiple thoracic vertebral bodies/multiple lumbar vertebral bodies/left flank musculature/notable pelvic bones/right quadratus femoris muscle metastasis/proximal right femur diaphysis.  Patient is status post initiation of palliative Arimidex/palbociclib/Xgeva under the care of of Dr. Yip on a 08/04/2022.  Patient has noted resolution of left breast skin changes and decrease in breast mass and tenderness associated with it.    Patient is unsure if her chronic back pain and bilateral hip pain has improved since initiating systemic therapy, however she is certain at that it has not increased in character or intensity.  She attributes the majority of her pain to difficulties with implantable device that was initially placed in her right buttock area for treatment of incontinence.  This device subsequently had to be changed to the contralateral buttock.  At this time she notes that her most symptomatic region is the right hip area with pain mostly tolerable but can increase to a level of 8 out of 10 with weightbearing and significant exertion, however decreases to 0 or 1 at rest and nonweightbearing positioning.  She only takes pain medication sporadically and can go without it for up to 7-10 days at a time.    We have discussed the indications and rationale of palliative radiation therapy for symptomatic bone metastasis according to the NCCN Guidelines. I have extensively reviewed the risks, benefits and alternatives of therapy and progression of disease in spite of therapy with either local or systemic failure.  I have seen, examined and reviewed her medication list, appropriate labs and imaging studies as well as other physician notes. We discussed the goals/plans of care and answered all questions.     I discussed palliative  radiotherapy to the right hip region including the proximal femur and adjacent quadratus femoris muscle infiltration, however patient declined radiotherapy efforts citing that her current level of pain does not warrant her wanting to proceed with radiotherapy at this time.  In addition, she believes that there has been no change in the character, intensity, or body site distribution of her pain for many years which she believes is related to chronic conditions.  As no additional need for palliative radiotherapy is identified at this time, patient can return for evaluation on an as-needed basis.    She is to continue routine medical care and follow-up with her other healthcare providers as per their scheduling.  Systemic therapy as per Dr. Yip's expertise.        Thank you for allowing me to assist in this patients care.    Time Spent: I spent 45 minutes caring for Robyn on this date of service. This time includes time spent by me in the following activities: independently interpreting results and communicating that information with the patient/family/caregiver.   Benji Patrick MD   09/20/2022

## 2022-09-27 ENCOUNTER — SPECIALTY PHARMACY (OUTPATIENT)
Dept: ONCOLOGY | Facility: HOSPITAL | Age: 62
End: 2022-09-27

## 2022-09-27 NOTE — PROGRESS NOTES
"[facility]    Oral Oncology Patient Follow-Up      Consult Details  Consulting Provider:   Scott Garcia MD ([facility])   Medication and Regimen:  palbocicib [Ibrance] 125 mg PO daily for days 1-21 and then off for 7 days.     Prescription Review  Dosing & Interactions:   Reviewed, appropriate and no significant interaction noted at this time..   Current Specialty Pharmacy [facility]/pharmacy #[id number] - [place], [place] - [address] [facility] [phone] [facility] [phone]         Intervention(s):                  Spoke with Ms. Minaya for her monthly routine telephone consultation follow-up regarding the oral oncology medication. She stated that she has been tolerating the Ibrance fairly well - \"I do have a little bit of a cough.\" However, she has not missed any doses of the medication in the last month. The patient still receives refills through [facility] (patient stated [facility] however RX was just sent to [facility] on 9/14/22 - want to follow-up about this next month), and has not had any delays in getting those refills. Also, no new refills are needed at this time according to the patient. The patient also stated that there has been no changes to her maintenance medications and no new allergies that she is aware of.   Finally, the patient stated she has not had any recent stays in the hospital and has not visited the ER in the last month due to the Ibrance. I encouraged the patient to reach out anytime with any questions or concerns they might have regarding their oral chemotherapy.     Summary:    No needs expressed by the patient or otherwise identified. Will follow-up next month regarding the patient’s oral chemotherapy with a routine telephone consultation. The next routine follow-up will be scheduled approximately 28-30 days from today.       Electronically signed 09/27/2022 16:57 CDT by:  Saadia Mcqueen PharmD  Specialty Pharmacist - Hematology & " Oncology  [facility]  [phone]

## 2022-10-04 DIAGNOSIS — C50.812 MALIGNANT NEOPLASM OF OVERLAPPING SITES OF LEFT BREAST IN FEMALE, ESTROGEN RECEPTOR POSITIVE: Primary | ICD-10-CM

## 2022-10-04 DIAGNOSIS — Z17.0 MALIGNANT NEOPLASM OF OVERLAPPING SITES OF LEFT BREAST IN FEMALE, ESTROGEN RECEPTOR POSITIVE: Primary | ICD-10-CM

## 2022-10-07 LAB
AMBIGUOUS ABBREVIATION: NORMAL
CHOLESTEROL, TOTAL: 122 MG/DL (ref 100–199)
COMMENT: NORMAL
HBA1C MFR BLD: 6 % (ref 4.8–5.6)
HDLC SERPL-MCNC: 60 MG/DL
LDL CHOLESTEROL CALCULATED: 46 MG/DL (ref 0–99)
TRIGL SERPL-MCNC: 79 MG/DL (ref 0–149)
TSH SERPL DL<=0.05 MIU/L-ACNC: 2.43 UIU/ML (ref 0.45–4.5)
VLDLC SERPL CALC-MCNC: 16 MG/DL (ref 5–40)

## 2022-10-10 ENCOUNTER — TELEPHONE (OUTPATIENT)
Dept: ONCOLOGY | Facility: CLINIC | Age: 62
End: 2022-10-10

## 2022-10-10 NOTE — TELEPHONE ENCOUNTER
Faxed lab results to [facility] cu[facility]a pcp and Ketan Jeffries at [place]     ----- Message from Scott Garcia MD sent at 10/10/2022  2:35 PM CDT -----  Pls fax to pcp and her [place] oncology- at her last visit she stated that she was transferring all her care to [place]  ----- Message -----  From: Interface, Scans Incoming  Sent: 10/7/2022  12:01 PM CDT  To: Scott Garcia MD

## 2022-10-11 DIAGNOSIS — G25.81 RLS (RESTLESS LEGS SYNDROME): ICD-10-CM

## 2022-10-11 RX ORDER — PRAMIPEXOLE DIHYDROCHLORIDE 1.5 MG/1
TABLET ORAL
Qty: 225 TABLET | Refills: 0 | Status: SHIPPED | OUTPATIENT
Start: 2022-10-11

## 2022-10-13 ENCOUNTER — APPOINTMENT (OUTPATIENT)
Dept: LAB | Facility: HOSPITAL | Age: 62
End: 2022-10-13

## 2022-10-13 ENCOUNTER — INFUSION (OUTPATIENT)
Dept: ONCOLOGY | Facility: HOSPITAL | Age: 62
End: 2022-10-13

## 2022-10-13 VITALS
TEMPERATURE: 97.5 F | SYSTOLIC BLOOD PRESSURE: 120 MMHG | BODY MASS INDEX: 37.82 KG/M2 | DIASTOLIC BLOOD PRESSURE: 63 MMHG | RESPIRATION RATE: 18 BRPM | HEIGHT: 59 IN | OXYGEN SATURATION: 90 % | WEIGHT: 187.6 LBS | HEART RATE: 81 BPM

## 2022-10-13 DIAGNOSIS — C50.812 MALIGNANT NEOPLASM OF OVERLAPPING SITES OF LEFT BREAST IN FEMALE, ESTROGEN RECEPTOR POSITIVE: Primary | ICD-10-CM

## 2022-10-13 DIAGNOSIS — Z17.0 MALIGNANT NEOPLASM OF OVERLAPPING SITES OF LEFT BREAST IN FEMALE, ESTROGEN RECEPTOR POSITIVE: Primary | ICD-10-CM

## 2022-10-13 PROCEDURE — 25010000002 DENOSUMAB 120 MG/1.7ML SOLUTION: Performed by: INTERNAL MEDICINE

## 2022-10-13 PROCEDURE — 96372 THER/PROPH/DIAG INJ SC/IM: CPT

## 2022-10-13 RX ADMIN — DENOSUMAB 120 MG: 120 INJECTION SUBCUTANEOUS at 10:40

## 2022-10-17 ENCOUNTER — OFFICE VISIT (OUTPATIENT)
Dept: FAMILY MEDICINE CLINIC | Age: 62
End: 2022-10-17
Payer: COMMERCIAL

## 2022-10-17 VITALS
RESPIRATION RATE: 20 BRPM | HEIGHT: 60 IN | TEMPERATURE: 97.3 F | SYSTOLIC BLOOD PRESSURE: 122 MMHG | HEART RATE: 83 BPM | OXYGEN SATURATION: 97 % | DIASTOLIC BLOOD PRESSURE: 72 MMHG | WEIGHT: 188 LBS | BODY MASS INDEX: 36.91 KG/M2

## 2022-10-17 DIAGNOSIS — M25.559 HIP PAIN: ICD-10-CM

## 2022-10-17 DIAGNOSIS — E11.9 TYPE 2 DIABETES MELLITUS TREATED WITHOUT INSULIN (HCC): ICD-10-CM

## 2022-10-17 DIAGNOSIS — G25.81 RLS (RESTLESS LEGS SYNDROME): ICD-10-CM

## 2022-10-17 DIAGNOSIS — G47.00 INSOMNIA, UNSPECIFIED TYPE: ICD-10-CM

## 2022-10-17 DIAGNOSIS — Z79.899 MEDICATION MANAGEMENT: ICD-10-CM

## 2022-10-17 DIAGNOSIS — R23.8 CHANGE OF SKIN COLOR: Primary | ICD-10-CM

## 2022-10-17 LAB
ALCOHOL URINE: ABNORMAL
AMPHETAMINE SCREEN, URINE: NEGATIVE
BARBITURATE SCREEN, URINE: NEGATIVE
BENZODIAZEPINE SCREEN, URINE: NEGATIVE
BUPRENORPHINE URINE: NEGATIVE
COCAINE METABOLITE SCREEN URINE: NEGATIVE
FENTANYL SCREEN, URINE: ABNORMAL
GABAPENTIN SCREEN, URINE: ABNORMAL
MDMA URINE: NEGATIVE
METHADONE SCREEN, URINE: NEGATIVE
METHAMPHETAMINE, URINE: NEGATIVE
OPIATE SCREEN URINE: POSITIVE
OXYCODONE SCREEN URINE: NEGATIVE
PHENCYCLIDINE SCREEN URINE: NEGATIVE
PROPOXYPHENE SCREEN, URINE: ABNORMAL
SYNTHETIC CANNABINOIDS(K2) SCREEN, URINE: ABNORMAL
THC SCREEN, URINE: NEGATIVE
TRAMADOL SCREEN URINE: ABNORMAL
TRICYCLIC ANTIDEPRESSANTS, UR: ABNORMAL

## 2022-10-17 PROCEDURE — 80305 DRUG TEST PRSMV DIR OPT OBS: CPT | Performed by: NURSE PRACTITIONER

## 2022-10-17 PROCEDURE — 99214 OFFICE O/P EST MOD 30 MIN: CPT | Performed by: NURSE PRACTITIONER

## 2022-10-17 PROCEDURE — 3044F HG A1C LEVEL LT 7.0%: CPT | Performed by: NURSE PRACTITIONER

## 2022-10-17 RX ORDER — GABAPENTIN 100 MG/1
CAPSULE ORAL
Qty: 540 CAPSULE | Refills: 0 | Status: SHIPPED | OUTPATIENT
Start: 2022-10-17 | End: 2022-11-17

## 2022-10-17 RX ORDER — ANASTROZOLE 1 MG/1
TABLET ORAL
COMMUNITY
Start: 2022-09-12

## 2022-10-17 RX ORDER — PALBOCICLIB 125 MG/1
125 TABLET, FILM COATED ORAL DAILY
COMMUNITY
Start: 2022-09-27

## 2022-10-17 ASSESSMENT — ENCOUNTER SYMPTOMS
RESPIRATORY NEGATIVE: 1
COLOR CHANGE: 1

## 2022-10-17 NOTE — PROGRESS NOTES
SUBJECTIVE:    Patient ID: Jodie Smith is a60 y.o. female. Jodie Smith is here today for Annual Exam (Patient presents for yearly wellness screening form for [employer] to be completed. Patient had labs on 10/6/2022)  . HPI:   HPI     Pt is here today for wellness screening form to be completed with the most recent lab to also be reviewed today. Doing monthly injection locally with oncology and going to [facility] oncology in Nov again. Taking gabapenin 100mg 1 in am 1 at noon and 2 at night. Does continue to help. Stiff left ankle. This is lower extremity that has undergone wound care after spider bite--over 1yr of treatment. Onset of color change 3wks ago  \"Tightness\" in ankle is noted. No pain at this time. Past Medical History:   Diagnosis Date    Arthritis     Bipolar I disorder, most recent episode (or current) mixed, unspecified     Chronic back pain     Depression     Dry mouth     Hyperlipidemia     Hypertension     Hypokalemia     Hypothyroidism     Menopause     Morbidly obese ([facility] [address] 10/28/2020    Overactive bladder     Plantar fasciitis     RLS (restless legs syndrome)     Sleep apnea     Spondylosis with myelopathy, lumbar region     Thyroid disease      Prior to Visit Medications    Medication Sig Taking?  Authorizing Provider   IBRANCE 125 MG tablet Take 125 mg by mouth daily Yes Historical Provider, MD   anastrozole (ARIMIDEX) 1 MG tablet TAKE ONE TABLET BY MOUTH DAILY Yes Historical Provider, MD   FIBER PO Take 500 mg by mouth Yes Historical Provider, MD   gabapentin (NEURONTIN) 100 MG capsule 1-2 po up to tid for nerve pain Yes MAEVE Cuevas   pramipexole (MIRAPEX) 1.5 MG tablet TAKE ONE-HALF TABLET BY MOUTH EVERY MORNING AND ONE TABLET NIGHTLY **MAY MAKE DROWSY Yes MAEVE Cuevas   allopurinol (ZYLOPRIM) 100 MG tablet TAKE ONE TABLET BY MOUTH DAILY Yes MAEVE Cuevas   HYDROcodone-acetaminophen (NORCO) 5-325 MG per tablet TAKE ONE TABLET BY MOUTH EVERY 8 HOURS AS NEEDED FILL 9/3 Yes Historical Provider, MD   WEGOVY 2.4 MG/0.75ML SOAJ SC injection INJECT 2.4MG UNDER THE SKIN ONCE A WEEK Yes MAEVE Cuevas   spironolactone (ALDACTONE) 25 MG tablet TAKE TWO TABLETS BY MOUTH IN THE MORNING AND ONE AT BEDTIME FOR FLUID Yes MAEVE Cuevas   levothyroxine (SYNTHROID) 75 MCG tablet TAKE ONE TABLET BY MOUTH DAILY AS DIRECTED Yes MAEVE Cuevas   atorvastatin (LIPITOR) 40 MG tablet TAKE ONE TABLET BY MOUTH NIGHTLY Yes MAEVE Cuevas   pilocarpine (SALAGEN) 5 MG tablet TAKE ONE TABLET BY MOUTH THREE TIMES DAILY Yes Shana Lee MD   carvedilol (COREG) 3.125 MG tablet TAKE ONE TABLET BY MOUTH TWICE DAILY WITH MEALS Yes MAEVE Cuevas   FLUoxetine (PROZAC) 40 MG capsule TAKE ONE CAPSULE BY MOUTH EVERY MORNING AS DIRECTED FOR DEPRESSION/ANXIETY Yes MAEVE Cuevas   UNABLE TO FIND Please adjust medication refills to limit pharmacy visits--I will authorize most quantities to allow this to happen Yes MAEVE Cuevas   traZODone (DESYREL) 100 MG tablet 1-2 po as needed for sleep Yes MAEVE Cuevas   niacin (SLO-NIACIN) 500 MG extended release tablet Take 500 mg by mouth daily Yes Historical Provider, MD   NARCAN 4 MG/0.1ML LIQD nasal spray  Yes Historical Provider, MD   diclofenac (VOLTAREN) 75 MG EC tablet Take 75 mg by mouth 2 times daily  Yes Historical Provider, MD   methylcellulose ([employer] SOLUBLE FIBER THERAPY) 500 MG TABS  Yes Historical Provider, MD   omeprazole (PRILOSEC) 20 MG delayed release capsule Take 1 capsule by mouth 2 times daily (before meals) Yes MAEVE Cuevas   tiZANidine (ZANAFLEX) 4 MG tablet Take 4 mg by mouth daily  Yes Historical Provider, MD   Vitamin D (CHOLECALCIFEROL) 1000 UNITS CAPS capsule Take 1,000 Units by mouth daily Yes Historical Provider, MD   Multiple Vitamins-Minerals (MULTIVITAMIN PO) Take by mouth daily Yes Historical Provider, MD   calcium carbonate 600 MG TABS tablet Take 1 tablet by mouth daily.  Yes Historical Provider, MD Allergies   Allergen Reactions    Clindamycin/Lincomycin Other (See Comments)     Heartburn, upset stomach     Bactrim [Sulfamethoxazole-Trimethoprim]     Hctz [Hydrochlorothiazide] Other (See Comments)     Acid reflux      Sulfamethoxazole     Trimethoprim     Metformin And Related Diarrhea    Sitagliptin Rash     Past Surgical History:   Procedure Laterality Date    CARPAL TUNNEL RELEASE Bilateral 2002    CHOLECYSTECTOMY  10/2014    COLON SURGERY      COLON SURGERY      biopsy     COLONOSCOPY      CYST REMOVAL  2017    Under left ear    HARDWARE REMOVAL Right 2011    Foot     HERNIA REPAIR  10/2014    LEG DEBRIDEMENT Left     NOSE SURGERY  2017; 2019    Dr Amber Cortez L/S,1 LEVEL Bilateral 2017    INJECTION MEDICATION FACET JOINT performed by Gustavo Coker at [facility]     Family History   Problem Relation Age of Onset    Cancer Mother         lung    Heart Disease Mother     Hypertension Mother     Bipolar Disorder Mother     Heart Disease Father     Hypertension Father      Social History     Socioeconomic History    Marital status:      Spouse name: Peggy Torres    Number of children: 2    Years of education: some college    Highest education level: Not on file   Occupational History     Employer: [employer]   Tobacco Use    Smoking status: Former     Packs/day: 1.00     Years: 30.00     Pack years: 30.00     Types: Cigarettes     Quit date: 2003     Years since quittin.2    Smokeless tobacco: Never    Tobacco comments:     Pt not interested at this time in stopping. Substance and Sexual Activity    Alcohol use: Yes     Comment: rarely    Drug use: No     Comment: pt admits \"may have misused in past\"--Pt was vague with details.     Sexual activity: Not on file   Other Topics Concern    Not on file   Social History Narrative    Not on file     Social Determinants of Health     Financial Resource Strain: Low Risk     Difficulty of Paying Living Expenses: Not hard at all   Food Insecurity: No Food Insecurity    Worried About Running Out of Food in the Last Year: Never true    Ran Out of Food in the Last Year: Never true   Transportation Needs: Not on file   Physical Activity: Not on file   Stress: Not on file   Social Connections: Not on file   Intimate Partner Violence: Not on file   Housing Stability: Not on file       Review of Systems   Constitutional:  Negative for unexpected weight change. Respiratory: Negative. Cardiovascular: Negative. Musculoskeletal:  Positive for arthralgias. Skin:  Positive for color change. OBJECTIVE:    Physical Exam  Vitals and nursing note reviewed. Constitutional:       General: She is not in acute distress. Appearance: Normal appearance. She is well-developed. HENT:      Head: Normocephalic and atraumatic. Eyes:      Extraocular Movements: Extraocular movements intact. Conjunctiva/sclera: Conjunctivae normal.      Pupils: Pupils are equal, round, and reactive to light. Neck:      Vascular: No carotid bruit. Cardiovascular:      Rate and Rhythm: Normal rate and regular rhythm. Heart sounds: Normal heart sounds. No murmur heard. Pulmonary:      Effort: Pulmonary effort is normal. No respiratory distress. Breath sounds: Normal breath sounds. No wheezing. Musculoskeletal:      Cervical back: Normal range of motion and neck supple. No rigidity or tenderness. Right lower leg: No edema. Left lower leg: No edema. Left ankle: Decreased range of motion (limited ROM compared to right; color change/blanching of skin with flexion and extension). Legs:    Feet:      Right foot:      Protective Sensation: 5 sites tested. 5 sites sensed. Left foot:      Protective Sensation: 5 sites tested. 5 sites sensed. Lymphadenopathy:      Cervical: No cervical adenopathy. Skin:     General: Skin is warm and dry. Capillary Refill: Capillary refill takes less than 2 seconds. Neurological:      General: No focal deficit present. Mental Status: She is alert and oriented to person, place, and time. Psychiatric:         Mood and Affect: Mood normal.         Behavior: Behavior normal.         Thought Content: Thought content normal.         Judgment: Judgment normal.       /72 (Site: Left Upper Arm, Position: Sitting, Cuff Size: Small Adult)   Pulse 83   Temp 97.3 °F (36.3 °C) (Temporal)   Resp 20   Ht 5' (1.524 m)   Wt 188 lb (85.3 kg)   SpO2 97%   BMI 36.72 kg/m²      ASSESSMENT:      ICD-10-CM    1. Change of skin color  R23.8 External Referral To Vascular Surgery      2. Hip pain  M25.559 gabapentin (NEURONTIN) 100 MG capsule      3. RLS (restless legs syndrome)  G25.81 gabapentin (NEURONTIN) 100 MG capsule      4. Insomnia, unspecified type  G47.00 gabapentin (NEURONTIN) 100 MG capsule      5. Type 2 diabetes mellitus treated without insulin (HCC)  E11.9 Diabetic Foot Exam      6. Medication management  Z79.899 POCT Rapid Drug Screen          PLAN:    Kimberly Candelario: Annual Exam (Patient presents for yearly wellness screening form for [employer] to be completed. Patient had labs on 10/6/2022)  [facility] yearly  UDS  Form completed for fax  Referral asap. Diagnosis and orders for this visit are above. Please note that this chart was generated using dragon dictationsoftware. Although every effort was made to ensure the accuracy of this automated transcription, some errors in transcription may have occurred.

## 2022-10-25 ENCOUNTER — SPECIALTY PHARMACY (OUTPATIENT)
Dept: ONCOLOGY | Facility: HOSPITAL | Age: 62
End: 2022-10-25

## 2022-10-25 NOTE — PROGRESS NOTES
[facility]    Oral Oncology Patient Follow-Up      Consult Details  Consulting Provider:   Scott Garcia MD ([facility])   Medication and Regimen:  palbocicib [Ibrance] 125 mg PO daily for days 1-21 and then off for 7 days         Intervention(s):                  Telephone encounter on 10/10/22 from Caitlin Arriola CMA stated:         Summary:    No specialty services needed at this time. Will dis-enroll patient from specialty pharmacy consultation since patient is transferring to [place].       Electronically signed 10/25/2022 10:04 CDT by:  Saadia Mcqueen, PharmD  Specialty Pharmacist - Hematology & Oncology  [facility]  [phone]

## 2022-10-27 DIAGNOSIS — I73.9 PAD (PERIPHERAL ARTERY DISEASE): Primary | ICD-10-CM

## 2022-10-28 ENCOUNTER — TRANSCRIBE ORDERS (OUTPATIENT)
Dept: ADMINISTRATIVE | Facility: HOSPITAL | Age: 62
End: 2022-10-28

## 2022-10-28 DIAGNOSIS — C50.812 MALIGNANT NEOPLASM OF OVERLAPPING SITES OF LEFT FEMALE BREAST, UNSPECIFIED ESTROGEN RECEPTOR STATUS: Primary | ICD-10-CM

## 2022-10-28 DIAGNOSIS — Z17.0 ESTROGEN RECEPTOR POSITIVE: ICD-10-CM

## 2022-10-31 ENCOUNTER — TELEPHONE (OUTPATIENT)
Dept: VASCULAR SURGERY | Facility: CLINIC | Age: 62
End: 2022-10-31

## 2022-11-01 ENCOUNTER — HOSPITAL ENCOUNTER (OUTPATIENT)
Dept: ULTRASOUND IMAGING | Facility: HOSPITAL | Age: 62
Discharge: HOME | End: 2022-11-01
Admitting: SURGERY

## 2022-11-01 ENCOUNTER — OFFICE VISIT (OUTPATIENT)
Dept: VASCULAR SURGERY | Facility: CLINIC | Age: 62
End: 2022-11-01

## 2022-11-01 VITALS
BODY MASS INDEX: 35.45 KG/M2 | DIASTOLIC BLOOD PRESSURE: 72 MMHG | HEART RATE: 82 BPM | HEIGHT: 61 IN | OXYGEN SATURATION: 95 % | SYSTOLIC BLOOD PRESSURE: 116 MMHG

## 2022-11-01 DIAGNOSIS — E78.2 MIXED HYPERLIPIDEMIA: ICD-10-CM

## 2022-11-01 DIAGNOSIS — I73.9 PAD (PERIPHERAL ARTERY DISEASE): ICD-10-CM

## 2022-11-01 DIAGNOSIS — M79.89 LEG SWELLING: Primary | ICD-10-CM

## 2022-11-01 DIAGNOSIS — I10 ESSENTIAL HYPERTENSION: ICD-10-CM

## 2022-11-01 PROCEDURE — 93923 UPR/LXTR ART STDY 3+ LVLS: CPT | Performed by: SURGERY

## 2022-11-01 PROCEDURE — 99213 OFFICE O/P EST LOW 20 MIN: CPT | Performed by: SURGERY

## 2022-11-01 PROCEDURE — 93923 UPR/LXTR ART STDY 3+ LVLS: CPT

## 2022-11-01 NOTE — PROGRESS NOTES
"11/1/2022       Tena Hough, APRN  [address] [facility] [place] [address]    Robyn Minaya  1960    Chief Complaint   Patient presents with   • Skin Discoloration     Pt has dusky appearance to the skin of the left lower leg that blanches with flexion.  She states that this originally began 2 years ago after a spider bite.  The bite required WC and skin graft.  She c/o stiffness of the left ankle that has been getting worse in the past 2 years.         Dear Tena Hough, APRN   HPI  I had the pleasure of seeing your patient Robyn Minaya in the office today.  As you recall, Robyn Minaya is a 62 y.o.  female who had a wound to her left lower extremity caused by a spider bite.  She ultimately underwent debridement with skin graft to her left lower extremity leg wound.  She is now healed.  She does have compression stockings at home as well as home lymphedema pumps however has not been using those.  The wound is completely healed but she reports her left ankle is stiff and there is a color change when she flexes her foot.    Review of Systems   Constitutional: Negative.    HENT: Negative.    Eyes: Negative.    Respiratory: Negative.    Cardiovascular: Positive for leg swelling.   Gastrointestinal: Negative.    Endocrine: Negative.    Genitourinary: Negative.    Musculoskeletal: Negative.    Skin: Negative.    Allergic/Immunologic: Negative.    Neurological: Negative.    Hematological: Negative.    Psychiatric/Behavioral: Negative.    All other systems reviewed and are negative.       /72   Pulse 82   Ht 154.9 cm (61\")   LMP  (LMP Unknown)   SpO2 95%   BMI 35.45 kg/m²   Physical Exam  Vitals and nursing note reviewed.   Constitutional:       Appearance: Normal appearance. She is well-developed.   HENT:      Head: Normocephalic and atraumatic.   Eyes:      General: No scleral icterus.     Pupils: Pupils are equal, round, and reactive to light.   Neck:      Thyroid: No thyromegaly.      " Vascular: No carotid bruit or JVD.   Cardiovascular:      Rate and Rhythm: Normal rate and regular rhythm.      Pulses: Normal pulses.           Carotid pulses are 2+ on the right side and 2+ on the left side.       Femoral pulses are 2+ on the right side and 2+ on the left side.       Popliteal pulses are 2+ on the right side and 2+ on the left side.        Dorsalis pedis pulses are 2+ on the right side and 2+ on the left side.        Posterior tibial pulses are 2+ on the right side and 2+ on the left side.      Heart sounds: Normal heart sounds.   Pulmonary:      Effort: Pulmonary effort is normal.      Breath sounds: Normal breath sounds.   Abdominal:      General: Bowel sounds are normal. There is no distension or abdominal bruit.      Palpations: Abdomen is soft. There is no mass.      Tenderness: There is no abdominal tenderness.   Musculoskeletal:         General: Swelling present. Normal range of motion.      Cervical back: Neck supple.      Comments: Using cane   Lymphadenopathy:      Cervical: No cervical adenopathy.   Skin:     General: Skin is warm and dry.   Neurological:      Mental Status: She is alert and oriented to person, place, and time.      Cranial Nerves: No cranial nerve deficit.      Sensory: No sensory deficit.   Psychiatric:         Mood and Affect: Mood normal.         Behavior: Behavior normal.         Thought Content: Thought content normal.         Judgment: Judgment normal.           Patient Active Problem List   Diagnosis   • Spondylosis with myelopathy, lumbar region   • Nasal septal perforation   • Chronic maxillary sinusitis   • Nausea   • Epigastric pain   • Urinary incontinence   • Dermatochalasis of both upper eyelids   • Visual field defect   • Hx of colonic polyps   • Other constipation   • Lower abdominal pain   • Nasal cavity mass   • Abnormal nasal septum   • Essential hypertension   • Acquired hypothyroidism   • Mixed hyperlipidemia   • Chronic back pain greater than 3  months duration   • Prediabetes   • Cellulitis of left lower extremity   • Wound of left leg   • Chronic pain syndrome   • Acute pain   • Anxiety   • DOMINIC (acute kidney injury) (HCC)   • Infection of wound due to methicillin resistant Staphylococcus aureus (MRSA)   • Acute blood loss anemia   • Anemia   • Fatigue   • Preop testing   • Non-healing wound of left lower extremity, sequela   • Urge incontinence   • Chronic respiratory failure with hypoxia (HCC)   • Dependence on supplemental oxygen   • Malignant neoplasm of overlapping sites of left breast in female, estrogen receptor positive (HCC)   • Former smoker        Diagnosis Plan   1. Leg swelling        2. Essential hypertension        3. Mixed hyperlipidemia            Plan: After thoroughly evaluating Robyn Minaya, I believe the best course of action is to remain conservative from vascular surgery standpoint.  Her foot is nice and warm with palpable pulses.  She does have some tightness due to previous skin grafting.  There is no open wounds.  She has not been wearing compression or using home lymphedema pumps.  I would like her to begin using this on a daily basis.  She has been recently diagnosed with terminal metastatic breast cancer.  She can follow-up in our office as needed.  I did discuss vascular risk factors as they pertain to the progression of vascular disease including controlling hypertension and hyperlipidemia.  These risk factors are currently stable. The patient is to continue taking their medications as previously discussed.   This was all discussed in full with complete understanding.  Thank you for allowing me to participate in the care of your patient.  Please do not hesitate to call with any questions or concerns.  We will keep you aware of any further encounters with Robyn Minaya.        Sincerely yours,         Trace Hurd, DO Hough, Tena DELATORRE, APRN

## 2022-11-02 NOTE — TELEPHONE ENCOUNTER
Deferred/Refused Prescriptions     Palbociclib (Ibrance) 125 MG tablet         Sig: TAKE 1 TABLET DAILY FOR 21 DAYS ON, THEN 7 DAYS OFF    Disp:  Not specified    Refills:  12    Start: 11/2/2022    Class: Normal    Refused by: Omega Vazquez, PharmDANTE    Refusal reason: Patient no longer under prescriber care (PT NOW SEES KETAN JEFFRIES MD AT [facility] PLEASE FWD THERE)        Fill requested from: [address] [phone] [facility] [phone] FX        Spoke with patient. She will be seeing Ketan Jeffries [facility]. She is on her off week now, and has supply to do her next cycle then she should be seeing Dr. Jeffries. Replied to pharmacy to please forward prescription there. Asked patient to call office back if there is an issue transferring care.     Omega Vazquez, Erick  11/02/22  13:22 CDT

## 2022-11-07 DIAGNOSIS — C50.812 MALIGNANT NEOPLASM OF OVERLAPPING SITES OF LEFT BREAST IN FEMALE, ESTROGEN RECEPTOR POSITIVE: Primary | ICD-10-CM

## 2022-11-07 DIAGNOSIS — Z17.0 MALIGNANT NEOPLASM OF OVERLAPPING SITES OF LEFT BREAST IN FEMALE, ESTROGEN RECEPTOR POSITIVE: Primary | ICD-10-CM

## 2022-11-16 ENCOUNTER — HOSPITAL ENCOUNTER (OUTPATIENT)
Dept: CT IMAGING | Facility: HOSPITAL | Age: 62
Discharge: HOME | End: 2022-11-16

## 2022-11-16 DIAGNOSIS — Z17.0 ESTROGEN RECEPTOR POSITIVE: ICD-10-CM

## 2022-11-16 DIAGNOSIS — C50.812 MALIGNANT NEOPLASM OF OVERLAPPING SITES OF LEFT FEMALE BREAST, UNSPECIFIED ESTROGEN RECEPTOR STATUS: ICD-10-CM

## 2022-11-16 PROCEDURE — 78815 PET IMAGE W/CT SKULL-THIGH: CPT

## 2022-11-16 PROCEDURE — 0 FLUDEOXYGLUCOSE F18 SOLUTION: Performed by: INTERNAL MEDICINE

## 2022-11-16 PROCEDURE — A9552 F18 FDG: HCPCS | Performed by: INTERNAL MEDICINE

## 2022-11-16 RX ADMIN — FLUDEOXYGLUCOSE F18 1 DOSE: 300 INJECTION INTRAVENOUS at 08:24

## 2022-12-01 DIAGNOSIS — I10 ESSENTIAL HYPERTENSION: ICD-10-CM

## 2022-12-01 NOTE — TELEPHONE ENCOUNTER
Kimberly Katos called to request a refill on her medication.       Last office visit : 10/17/2022   Next office visit : Visit date not found     Requested Prescriptions     Pending Prescriptions Disp Refills    spironolactone (ALDACTONE) 25 MG tablet [Pharmacy Med Name: spironolactone 25 mg tablet] 270 tablet 1     Sig: TAKE TWO TABLETS BY MOUTH EVERY MORNING AND ONE AT BEDTIME            Tianna Muñoz, [address] [place], [place] [address]cesar

## 2022-12-02 RX ORDER — SPIRONOLACTONE 25 MG/1
TABLET ORAL
Qty: 270 TABLET | Refills: 1 | Status: SHIPPED | OUTPATIENT
Start: 2022-12-02

## 2022-12-06 DIAGNOSIS — I10 ESSENTIAL HYPERTENSION: ICD-10-CM

## 2022-12-07 RX ORDER — CARVEDILOL 3.12 MG/1
TABLET ORAL
Qty: 180 TABLET | Refills: 1 | Status: SHIPPED | OUTPATIENT
Start: 2022-12-07

## 2022-12-07 NOTE — TELEPHONE ENCOUNTER
Rosaura Horta called to request a refill on her medication.       Last office visit : 10/17/2022   Next office visit : Visit date not found     Requested Prescriptions     Pending Prescriptions Disp Refills    carvedilol (COREG) 3.125 MG tablet [Pharmacy Med Name: carvedilol 3.125 mg tablet] 180 tablet 1     Sig: TAKE ONE TABLET BY MOUTH TWICE DAILY WITH MEALS            Eliot Todd

## 2022-12-19 ENCOUNTER — OFFICE VISIT (OUTPATIENT)
Dept: SURGERY | Facility: CLINIC | Age: 62
End: 2022-12-19

## 2022-12-19 VITALS
DIASTOLIC BLOOD PRESSURE: 49 MMHG | TEMPERATURE: 97.5 F | HEART RATE: 87 BPM | HEIGHT: 61 IN | OXYGEN SATURATION: 96 % | BODY MASS INDEX: 35.42 KG/M2 | SYSTOLIC BLOOD PRESSURE: 118 MMHG | WEIGHT: 187.61 LBS

## 2022-12-19 DIAGNOSIS — C50.919 METASTATIC BREAST CANCER: Primary | ICD-10-CM

## 2022-12-19 DIAGNOSIS — E66.09 CLASS 2 OBESITY DUE TO EXCESS CALORIES WITH BODY MASS INDEX (BMI) OF 36.0 TO 36.9 IN ADULT, UNSPECIFIED WHETHER SERIOUS COMORBIDITY PRESENT: ICD-10-CM

## 2022-12-19 PROCEDURE — 99214 OFFICE O/P EST MOD 30 MIN: CPT | Performed by: STUDENT IN AN ORGANIZED HEALTH CARE EDUCATION/TRAINING PROGRAM

## 2022-12-19 NOTE — PATIENT INSTRUCTIONS

## 2022-12-19 NOTE — PROGRESS NOTES
Office Established Patient Note:     Referring Provider: No ref. provider found    Chief Complaint   Patient presents with   • Follow-up       Subjective .     History of present illness:  Robyn Minaya is a 62 y.o. female   with Stage IV breast cancer, ER+ [place]+ Her2 negative (Left breast primary with bone involvement). She is on Anastrozole and Palbociclib as well as Xgeva. She is tolerating her therapy well and she reports her breast mass and her lymph nodes are smaller. Her breast pain is significantly improved. She denies nipple discharge.     She just got back from [place] last week and had a wonderful time.     History  Past Medical History:   Diagnosis Date   • Allergic rhinitis    • Arthritis     BACK   • Chronic back pain    • Degenerative arthritis    • Depression    • Deviated nasal septum    • Diabetes mellitus (HCC)    • Edema    • Gout    • History of colon polyps    • History of transfusion    • Hyperlipidemia    • Hypertension    • Hypertrophy of nasal turbinates    • Incontinence in female    • Insomnia    • Menopause    • Nasal septal deviation    • Nasal valve stenosis    • Overactive bladder    • Plantar fasciitis    • Prediabetes    • RLS (restless legs syndrome)    • Sleep apnea     pt states has a cpap but hasn't been using it   • Spider bite wound     left lower leg. previously infected with mrsa but currently not infected.   • Wears glasses    ,   Past Surgical History:   Procedure Laterality Date   • BLEPHAROPLASTY Bilateral 7/9/2019    Procedure: 1) bilateral upper blepharoplasty with excision of excess of tissue weighing down 2) nasal endoscopy with removal of nasal septal prosthesis;  Surgeon: Mark Anthony Anderson MD;  Location: [facility];  Service: ENT   • CARPAL TUNNEL RELEASE Bilateral 2004   • CHOLECYSTECTOMY  2013   • COLONOSCOPY  10/17/2014    One 5-6mm tubular adenomatous polyp in the ascending colon; Two less than 4mm hyperplastic polyps in the sigmoid colon; Three rectal  hyperplastic polyps; Diverticulosis; Repeat 5 years    • COLONOSCOPY  07/14/2010    Internal hemorrhoids; Repeat 7 years    • COLONOSCOPY N/A 10/23/2019    Procedure: COLONOSCOPY WITH ANESTHESIA;  Surgeon: Robyn Frazier MD;  Location: [facility] ENDOSCOPY;  Service: Gastroenterology   • CYST REMOVAL  2016    neck   • ENDOSCOPIC FUNCTIONAL SINUS SURGERY (FESS) Bilateral 4/16/2019    Procedure: ENDOSCOPIC FUNCTIONAL SINUS SURGERY (bilareral maxillary antrostomy without image guidance);  Surgeon: Mark Anthony Anderson MD;  Location:  [facility] OR;  Service: ENT   • ENDOSCOPY N/A 4/11/2019    Esophageal mucosal changes suggestive of eosinophilic esophagitis-biopsied; A few gastric polyps-resected and retrieved-biopsied; Normal examined duodenum-biopsied   • FOOT SURGERY Right 2010    X3   • HERNIA REPAIR     • INTERSTIM PLACEMENT N/A 6/5/2019    Procedure: INTERSTIM STAGES 1 AND 2 LEAD AND GENERATOR PLACEMENT;  Surgeon: Endy Guerrero MD;  Location:  [facility] OR;  Service: Urology   • INTERSTIM PLACEMENT N/A 7/26/2021    Procedure: INTERSTIM STAGES 1 AND 2 LEAD AND GENERATOR PLACEMENT - RECHARGEABLE.;  Surgeon: Mikey Asencio MD;  Location:  [facility] OR;  Service: Urology;  Laterality: N/A;   • INTERSTIM REMOVAL N/A 7/26/2021    Procedure: INTERSTIM REMOVAL;  Surgeon: Mikey Asencio MD;  Location:  [facility] OR;  Service: Urology;  Laterality: N/A;   • LEG DEBRIDEMENT Left 7/31/2020    Procedure: LOWER POSTERIOR LEG WOUND DEBRIDEMENT - LEFT;  Surgeon: Roberto Wells DPM;  Location:  [facility] OR;  Service: Podiatry;  Laterality: Left;   • LEG DEBRIDEMENT Left 11/30/2020    Procedure: SPLIT THICKNESS SKIN GRAFT LEFT LOWER EXTREMTIY WITH WOUND VAC PLACEMENT;  Surgeon: Trace Hurd DO;  Location:  [facility] HYBRID OR 12;  Service: Vascular;  Laterality: Left;   • LEG DEBRIDEMENT Left 2/3/2021    Procedure: LOWER EXTREMITY WOUND DEBRIDEMENT - LEFT;  Surgeon: Roberto Wells DPM;  Location:  [facility] OR;  Service: Podiatry;   Laterality: Left;   • NASAL ENDOSCOPY N/A 2019    Procedure:     1. Functional endoscopic sinus surgery with bilateral maxillary antrostomy    2. Bilateral nasal endoscopy with biopsy of nasal septum    3.  Nasal septal prosthesis placement  ;  Surgeon: Mark Anthony Anderson MD;  Location: [facility] OR;  Service: ENT   • NASAL VESTIBULE LESION/PAPILLOMA EXCISION N/A 2017    Procedure: Repair of nasal vestibular stenosis and septoplasty; cartilage graft from left ear;  Surgeon: Mark Anthony Anderson MD;  Location: [facility] OR;  Service:    • SEPTOPLASTY N/A 2019    Procedure: PLACEMENT OF NASAL SEPTAL PROSTHESIS;  Surgeon: Mark Anthony Anderson MD;  Location: [facility] OR;  Service: ENT   • SEPTOPLASTY Bilateral 2020    Procedure: Nasal endoscopy with septal debridement, and placement of Silastic stents;  Surgeon: Mark Anthony Anderson MD;  Location: [facility] OR;  Service: ENT   • US GUIDED LYMPH NODE BIOPSY  7/15/2022   • WOUND DEBRIDEMENT  2019    spider bite wound initial surgery   ,   Family History   Problem Relation Age of Onset   • Cancer Mother    • Diabetes Father    • Hypertension Father    • Cancer Father    • Colon cancer Neg Hx    • Colon polyps Neg Hx    • Esophageal cancer Neg Hx    • Liver cancer Neg Hx    • Liver disease Neg Hx    • Rectal cancer Neg Hx    • Stomach cancer Neg Hx    ,   Social History     Tobacco Use   • Smoking status: Former     Packs/day: 1.00     Years: 40.00     Pack years: 40.00     Types: Cigarettes     Start date:      Quit date: 2020     Years since quittin.3   • Smokeless tobacco: Never   • Tobacco comments:     sometimes 1.5 when smoking more heavy    Vaping Use   • Vaping Use: Former   • Substances: Nicotine   Substance Use Topics   • Alcohol use: Yes     Comment: Rarely-maybe 3x/year   • Drug use: No   , (Not in a hospital admission)   and Allergies:  Bactrim [sulfamethoxazole-trimethoprim], Sulfamethoxazole, Hctz [hydrochlorothiazide], Januvia [sitagliptin], and  Metformin and related    Current Outpatient Medications:   •  allopurinol (ZYLOPRIM) 100 MG tablet, Take 100 mg by mouth Daily., Disp: , Rfl:   •  anastrozole (ARIMIDEX) 1 MG tablet, Take 1 tablet by mouth Daily., Disp: 30 tablet, Rfl: 11  •  atorvastatin (LIPITOR) 40 MG tablet, Take 40 mg by mouth Every Night., Disp: , Rfl:   •  calcium carbonate (OS-KAYLEY) 600 MG tablet, Take 600 mg by mouth Daily., Disp: , Rfl:   •  carvedilol (COREG) 3.125 MG tablet, Take 3.125 mg by mouth 2 (Two) Times a Day With Meals., Disp: , Rfl:   •  cholecalciferol (VITAMIN D3) 25 MCG (1000 UT) tablet, Take 400 Units by mouth Daily., Disp: , Rfl:   •  diclofenac (VOLTAREN) 75 MG EC tablet, Take 75 mg by mouth 2 (Two) Times a Day., Disp: , Rfl:   •  FIBER PO, Take 500 mg by mouth., Disp: , Rfl:   •  FLUoxetine (PROzac) 40 MG capsule, Take 1 capsule by mouth., Disp: , Rfl:   •  gabapentin (NEURONTIN) 100 MG capsule, Take 1 capsule by mouth Every 6 (Six) Hours., Disp: , Rfl:   •  HYDROcodone-acetaminophen (NORCO) 7.5-325 MG per tablet, Take 1 tablet by mouth Every 6 (Six) Hours As Needed for Moderate Pain  (postop pain)., Disp: 12 tablet, Rfl: 0  •  levothyroxine (SYNTHROID, LEVOTHROID) 75 MCG tablet, Take 75 mcg by mouth Every Morning., Disp: , Rfl:   •  Multiple Vitamin (MULTI VITAMIN PO), Take 1 dose by mouth Daily., Disp: , Rfl:   •  niacin 500 MG tablet, Take 500 mg by mouth Daily With Breakfast., Disp: , Rfl:   •  omeprazole (priLOSEC) 20 MG capsule, Take 20 mg by mouth Daily., Disp: , Rfl:   •  pilocarpine (SALAGEN) 5 MG tablet, Take 5 mg by mouth 3 (Three) Times a Day., Disp: , Rfl:   •  pramipexole (MIRAPEX) 1.5 MG tablet, Take 1.5 mg by mouth Every Night., Disp: , Rfl:   •  Semaglutide-Weight Management (Wegovy) 2.4 MG/0.75ML solution auto-injector, INJECT 2.4mg (0.75ML) UNDER THE SKIN EVERY WEEK, Disp: , Rfl:   •  spironolactone (ALDACTONE) 25 MG tablet, Take 25 mg by mouth 3 (Three) Times a Day., Disp: , Rfl:   •  tiZANidine  "(ZANAFLEX) 4 MG tablet, Take 4 mg by mouth Every Night. HAS NOT TAKEN WHILE ON LEVAQUIN, Disp: , Rfl:   •  traZODone (DESYREL) 100 MG tablet, Take 400 mg by mouth Every Night. Take 1-3 tablets at night as needed for sleep., Disp: , Rfl:     Objective     Vital Signs   /49 (BP Location: Left arm, Patient Position: Sitting, Cuff Size: Adult)   Pulse 87   Temp 97.5 °F (36.4 °C)   Ht 154.9 cm (60.98\")   Wt 85.1 kg (187 lb 9.8 oz)   LMP  (LMP Unknown)   SpO2 96%   BMI 35.47 kg/m²      Physical Exam:  General appearance - alert, well appearing, and in no distress  Mental status - alert, oriented to person, place, and time  Eyes - pupils equal and reactive, extraocular eye movements intact  Neck - supple, no significant adenopathy  Chest - no tachypnea, retractions or cyanosis  Heart - normal rate and regular rhythm  Abdomen - soft, nontender, nondistended, no masses or organomegaly  Musculoskeletal - no joint tenderness, deformity or swelling  Extremities - peripheral pulses normal, no pedal edema, no clubbing or cyanosis    Results Review:    The following data was reviewed by: Jaclyn Pedraza MD on 12/19/2022:    Progress Notes by Scott Garcia MD (09/13/2022 10:15)  Progress Notes by Benji Patrick MD (09/20/2022 14:00)  NM PET/CT Skull Base to Mid Thigh (11/16/2022 09:30)  1.  Positive treatment response with marked decrease in size and  metabolic activity of LEFT breast mass and LEFT axillary lymph node. Max SUV of these lesions are now below the liver background level.   2.  Redemonstrated diffuse osseous metastatic disease. Hypermetabolic activity associated with these lesions has resolved.   3.  No new metastasis identified. No residual abnormal hypermetabolic    SCANNED - LABS (10/06/2022 00:00)    Assessment & Plan       Diagnoses and all orders for this visit:    1. Metastatic breast cancer (HCC) (Primary)    2. Class 2 obesity due to excess calories with body mass index (BMI) of " 36.0 to 36.9 in adult, unspecified whether serious comorbidity present     Robyn Minaya is a 62 y.o. female with Stage IV breast cancer, ER+ [place]+ Her2 negative (Left breast primary with bone involvement). She is on Anastrozole and Palbociclib as well as Xgeva. She is doing very well and has had a good treatment response. I have reviewed the notes from oncology and rad onc above as well as her most recent PET scan. Follow up in 6 months. Continue following with oncology. Call with any questions or concerns.     Class 2 Severe Obesity (BMI >=35 and <=39.9). Obesity-related health conditions include the following: dyslipidemias. Obesity is unchanged. BMI is is above average; BMI management plan is completed. We discussed portion control and increasing exercise.      Jaclyn Pedraza MD  12/20/22  15:18 CST

## 2023-01-04 RX ORDER — PILOCARPINE HYDROCHLORIDE 5 MG/1
TABLET, FILM COATED ORAL
Qty: 270 TABLET | Refills: 0 | Status: SHIPPED | OUTPATIENT
Start: 2023-01-04

## 2023-01-04 NOTE — TELEPHONE ENCOUNTER
Luzma Engle called to request a refill on her medication.       Last office visit : 10/17/2022   Next office visit : Visit date not found     Requested Prescriptions     Pending Prescriptions Disp Refills    pilocarpine (SALAGEN) 5 MG tablet [Pharmacy Med Name: pilocarpine 5 mg tablet] 270 tablet 0     Sig: TAKE ONE TABLET BY MOUTH THREE TIMES DAILY            Navin Lala MA

## 2023-01-24 ENCOUNTER — PATIENT MESSAGE (OUTPATIENT)
Dept: FAMILY MEDICINE CLINIC | Age: 63
End: 2023-01-24

## 2023-01-24 DIAGNOSIS — E11.9 TYPE 2 DIABETES MELLITUS TREATED WITHOUT INSULIN (HCC): ICD-10-CM

## 2023-01-25 RX ORDER — SEMAGLUTIDE 2.4 MG/.75ML
INJECTION, SOLUTION SUBCUTANEOUS
Qty: 9 ML | Refills: 1 | Status: SHIPPED | OUTPATIENT
Start: 2023-01-25

## 2023-01-25 NOTE — TELEPHONE ENCOUNTER
I will need new orders for the [facility]TRAVIS ALLEN in order to do a prior auth. Does she need to make an appointment to come in for this?

## 2023-01-25 NOTE — TELEPHONE ENCOUNTER
From: Aide Boyd  To: Yvrose Bhakta  Sent: 1/24/2023 10:52 AM CST  Subject: Tereasa Sicard! I am needing my Wegovy to be preauthorized since I have a new insurance carrier. The pharmacy said they contacted you last week. I was supposed to start it yesterday. Would you please see what is required for me to get the medication? Thanks!

## 2023-02-09 ENCOUNTER — OFFICE VISIT (OUTPATIENT)
Dept: FAMILY MEDICINE CLINIC | Age: 63
End: 2023-02-09
Payer: COMMERCIAL

## 2023-02-09 VITALS
BODY MASS INDEX: 40.84 KG/M2 | TEMPERATURE: 97.9 F | DIASTOLIC BLOOD PRESSURE: 68 MMHG | OXYGEN SATURATION: 95 % | HEART RATE: 75 BPM | WEIGHT: 208 LBS | RESPIRATION RATE: 20 BRPM | HEIGHT: 60 IN | SYSTOLIC BLOOD PRESSURE: 108 MMHG

## 2023-02-09 DIAGNOSIS — I10 ESSENTIAL HYPERTENSION: ICD-10-CM

## 2023-02-09 DIAGNOSIS — E78.2 MIXED HYPERLIPIDEMIA: ICD-10-CM

## 2023-02-09 DIAGNOSIS — E11.9 TYPE 2 DIABETES MELLITUS TREATED WITHOUT INSULIN (HCC): Primary | ICD-10-CM

## 2023-02-09 LAB
MAGNESIUM: 1.9 MG/DL (ref 1.6–2.4)
PHOSPHORUS: 3.5 MG/DL (ref 2.5–4.5)

## 2023-02-09 PROCEDURE — 99214 OFFICE O/P EST MOD 30 MIN: CPT | Performed by: NURSE PRACTITIONER

## 2023-02-09 PROCEDURE — 3074F SYST BP LT 130 MM HG: CPT | Performed by: NURSE PRACTITIONER

## 2023-02-09 PROCEDURE — 3078F DIAST BP <80 MM HG: CPT | Performed by: NURSE PRACTITIONER

## 2023-02-09 RX ORDER — HYDROCODONE BITARTRATE AND ACETAMINOPHEN 10; 325 MG/1; MG/1
TABLET ORAL
COMMUNITY
Start: 2023-01-30

## 2023-02-09 RX ORDER — SEMAGLUTIDE 1.34 MG/ML
INJECTION, SOLUTION SUBCUTANEOUS
Qty: 1.5 ML | Refills: 1 | Status: SHIPPED | OUTPATIENT
Start: 2023-02-09

## 2023-02-09 SDOH — ECONOMIC STABILITY: FOOD INSECURITY: WITHIN THE PAST 12 MONTHS, THE FOOD YOU BOUGHT JUST DIDN'T LAST AND YOU DIDN'T HAVE MONEY TO GET MORE.: NEVER TRUE

## 2023-02-09 SDOH — ECONOMIC STABILITY: INCOME INSECURITY: HOW HARD IS IT FOR YOU TO PAY FOR THE VERY BASICS LIKE FOOD, HOUSING, MEDICAL CARE, AND HEATING?: NOT HARD AT ALL

## 2023-02-09 SDOH — ECONOMIC STABILITY: HOUSING INSECURITY
IN THE LAST 12 MONTHS, WAS THERE A TIME WHEN YOU DID NOT HAVE A STEADY PLACE TO SLEEP OR SLEPT IN A SHELTER (INCLUDING NOW)?: NO

## 2023-02-09 SDOH — ECONOMIC STABILITY: FOOD INSECURITY: WITHIN THE PAST 12 MONTHS, YOU WORRIED THAT YOUR FOOD WOULD RUN OUT BEFORE YOU GOT MONEY TO BUY MORE.: NEVER TRUE

## 2023-02-09 ASSESSMENT — PATIENT HEALTH QUESTIONNAIRE - PHQ9
9. THOUGHTS THAT YOU WOULD BE BETTER OFF DEAD, OR OF HURTING YOURSELF: 0
SUM OF ALL RESPONSES TO PHQ QUESTIONS 1-9: 13
6. FEELING BAD ABOUT YOURSELF - OR THAT YOU ARE A FAILURE OR HAVE LET YOURSELF OR YOUR FAMILY DOWN: 1
SUM OF ALL RESPONSES TO PHQ QUESTIONS 1-9: 13
7. TROUBLE CONCENTRATING ON THINGS, SUCH AS READING THE NEWSPAPER OR WATCHING TELEVISION: 1
2. FEELING DOWN, DEPRESSED OR HOPELESS: 2
10. IF YOU CHECKED OFF ANY PROBLEMS, HOW DIFFICULT HAVE THESE PROBLEMS MADE IT FOR YOU TO DO YOUR WORK, TAKE CARE OF THINGS AT HOME, OR GET ALONG WITH OTHER PEOPLE: 1
SUM OF ALL RESPONSES TO PHQ9 QUESTIONS 1 & 2: 5
4. FEELING TIRED OR HAVING LITTLE ENERGY: 3
3. TROUBLE FALLING OR STAYING ASLEEP: 0
1. LITTLE INTEREST OR PLEASURE IN DOING THINGS: 3
5. POOR APPETITE OR OVEREATING: 2
8. MOVING OR SPEAKING SO SLOWLY THAT OTHER PEOPLE COULD HAVE NOTICED. OR THE OPPOSITE, BEING SO FIGETY OR RESTLESS THAT YOU HAVE BEEN MOVING AROUND A LOT MORE THAN USUAL: 1
SUM OF ALL RESPONSES TO PHQ QUESTIONS 1-9: 13
SUM OF ALL RESPONSES TO PHQ QUESTIONS 1-9: 13

## 2023-02-09 ASSESSMENT — ENCOUNTER SYMPTOMS: RESPIRATORY NEGATIVE: 1

## 2023-02-09 NOTE — PROGRESS NOTES
SUBJECTIVE:    Patient ID: Ana Gonzalez is a59 y.o. female. Ana Gonzalez is here today for Fatigue (Patient presents for fatigue and for A1c check.)  . HPI:   HPI       Pt is fasting for any needed lab due today. Mouth has been dry and drinking more water. Has gained wt. Fatigue more the last wk \"simply worn out\"  Pt concerned that her glucose may be higher than it needs to be---127, 135, 150 (fasting)  Has been out of Wegovy x 4wks related to insurance. Insurance denied and PA was started. We have just now seeing notification that Orpha Buoy continues to not be covered. I will go ahead and change patient over to the Ozempic version as she is diabetic based on previous A1c's done with her previous provider Dr. Ana ayers. Patient does have breast cancer and is continuing close follow-ups with her hematologist/oncologist.    Mental health -she is continuing her psych/therapy evaluations. Past Medical History:   Diagnosis Date    Arthritis     Bipolar I disorder, most recent episode (or current) mixed, unspecified     Chronic back pain     Depression     Dry mouth     Hyperlipidemia     Hypertension     Hypokalemia     Hypothyroidism     Menopause     Morbidly obese ([facility] [address] 10/28/2020    Overactive bladder     Plantar fasciitis     RLS (restless legs syndrome)     Sleep apnea     Spondylosis with myelopathy, lumbar region     Thyroid disease      Prior to Visit Medications    Medication Sig Taking?  Authorizing Provider   Semaglutide,0.25 or 0.5MG/DOS, (OZEMPIC, 0.25 OR 0.5 MG/DOSE,) 2 MG/1.5ML SOPN 0.25mg subq weekly Yes MAEVE Cuevas   pilocarpine (SALAGEN) 5 MG tablet TAKE ONE TABLET BY MOUTH THREE TIMES DAILY Yes MAEVE Cuevas   carvedilol (COREG) 3.125 MG tablet TAKE ONE TABLET BY MOUTH TWICE DAILY WITH MEALS Yes MAEVE Cuevas   spironolactone (ALDACTONE) 25 MG tablet TAKE TWO TABLETS BY MOUTH EVERY MORNING AND ONE AT BEDTIME Yes MAEVE Cuevas   IBRANCE 125 MG tablet Take 125 mg by mouth daily Yes Historical Provider, MD   anastrozole (ARIMIDEX) 1 MG tablet TAKE ONE TABLET BY MOUTH DAILY Yes Historical Provider, MD   FIBER PO Take 500 mg by mouth Yes Historical Provider, MD   gabapentin (NEURONTIN) 100 MG capsule 1-2 po up to tid for nerve pain Yes MAVEE Cuevas   pramipexole (MIRAPEX) 1.5 MG tablet TAKE ONE-HALF TABLET BY MOUTH EVERY MORNING AND ONE TABLET NIGHTLY **MAY MAKE DROWSY Yes MAEVE Cuevas   allopurinol (ZYLOPRIM) 100 MG tablet TAKE ONE TABLET BY MOUTH DAILY Yes MAEVE Cuevas   levothyroxine (SYNTHROID) 75 MCG tablet TAKE ONE TABLET BY MOUTH DAILY AS DIRECTED Yes MAEVE Cuevas   atorvastatin (LIPITOR) 40 MG tablet TAKE ONE TABLET BY MOUTH NIGHTLY Yes MAEVE Cuevas   FLUoxetine (PROZAC) 40 MG capsule TAKE ONE CAPSULE BY MOUTH EVERY MORNING AS DIRECTED FOR DEPRESSION/ANXIETY Yes MAEVE Cuevas   UNABLE TO FIND Please adjust medication refills to limit pharmacy visits--I will authorize most quantities to allow this to happen Yes MAEVE Cuevas   traZODone (DESYREL) 100 MG tablet 1-2 po as needed for sleep Yes MAEVE Cuevas   niacin (SLO-NIACIN) 500 MG extended release tablet Take 500 mg by mouth daily Yes Historical Provider, MD   NARCAN 4 MG/0.1ML LIQD nasal spray  Yes Historical Provider, MD   diclofenac (VOLTAREN) 75 MG EC tablet Take 75 mg by mouth 2 times daily  Yes Historical Provider, MD   methylcellulose ([employer] SOLUBLE FIBER THERAPY) 500 MG TABS  Yes Historical Provider, MD   omeprazole (PRILOSEC) 20 MG delayed release capsule Take 1 capsule by mouth 2 times daily (before meals) Yes MAEVE Cuevas   tiZANidine (ZANAFLEX) 4 MG tablet Take 4 mg by mouth daily  Yes Historical Provider, MD   Vitamin D (CHOLECALCIFEROL) 1000 UNITS CAPS capsule Take 1,000 Units by mouth daily Yes Historical Provider, MD   Multiple Vitamins-Minerals (MULTIVITAMIN PO) Take by mouth daily Yes Historical Provider, MD   calcium carbonate 600 MG TABS tablet Take 1 tablet by mouth daily. Yes Historical Provider, MD   HYDROcodone-acetaminophen ([facility])  MG per tablet TAKE ONE TABLET BY MOUTH EVERY 8 HOURS AS NEEDED   Historical Provider, MD     Allergies   Allergen Reactions    Clindamycin/Lincomycin Other (See Comments)     Heartburn, upset stomach     Bactrim [Sulfamethoxazole-Trimethoprim]     Hctz [Hydrochlorothiazide] Other (See Comments)     Acid reflux      Sulfamethoxazole     Trimethoprim     Metformin And Related Diarrhea    Sitagliptin Rash     Past Surgical History:   Procedure Laterality Date    CARPAL TUNNEL RELEASE Bilateral 2002    CHOLECYSTECTOMY  10/2014    COLON SURGERY      COLON SURGERY      biopsy     COLONOSCOPY      CYST REMOVAL  2017    Under left ear    HARDWARE REMOVAL Right     Foot     HERNIA REPAIR  10/2014    LEG DEBRIDEMENT Left     NOSE SURGERY  2017; 2019    Dr Britney Frias L/S,1 LEVEL Bilateral 2017    INJECTION MEDICATION FACET JOINT performed by Uvaldo Garza at [facility]     Family History   Problem Relation Age of Onset    Cancer Mother         lung    Heart Disease Mother     Hypertension Mother     Bipolar Disorder Mother     Heart Disease Father     Hypertension Father      Social History     Socioeconomic History    Marital status:      Spouse name: Teodora Ryan    Number of children: 2    Years of education: some college    Highest education level: Not on file   Occupational History     Employer: [employer]   Tobacco Use    Smoking status: Former     Packs/day: 1.00     Years: 30.00     Pack years: 30.00     Types: Cigarettes     Quit date: 2003     Years since quittin.5    Smokeless tobacco: Never    Tobacco comments:     Pt not interested at this time in stopping. Substance and Sexual Activity    Alcohol use: Yes     Comment: rarely    Drug use: No     Comment: pt admits \"may have misused in past\"--Pt was vague with details.     Sexual activity: Not on file   Other Topics Concern    Not on file   Social History Narrative    Not on file     Social Determinants of Health     Financial Resource Strain: Low Risk     Difficulty of Paying Living Expenses: Not hard at all   Food Insecurity: No Food Insecurity    Worried About [address] in the Last Year: Never true    [address] in the Last Year: Never true   Transportation Needs: Unknown    Lack of Transportation (Medical): Not on file    Lack of Transportation (Non-Medical): No   Physical Activity: Not on file   Stress: Not on file   Social Connections: Not on file   Intimate Partner Violence: Not on file   Housing Stability: Unknown    Unable to Pay for Housing in the Last Year: Not on file    Number of Places Lived in the Last Year: Not on file    Unstable Housing in the Last Year: No       Review of Systems   Constitutional:  Positive for unexpected weight change. Respiratory: Negative. Cardiovascular: Negative. Endocrine: Positive for polydipsia. Musculoskeletal:  Positive for arthralgias. Psychiatric/Behavioral:  The patient is nervous/anxious. OBJECTIVE:    Physical Exam  Vitals and nursing note reviewed. Constitutional:       General: She is not in acute distress. Appearance: Normal appearance. She is well-developed. She is obese. She is not ill-appearing or toxic-appearing. HENT:      Head: Normocephalic and atraumatic. Eyes:      Extraocular Movements: Extraocular movements intact. Conjunctiva/sclera: Conjunctivae normal.      Pupils: Pupils are equal, round, and reactive to light. Neck:      Trachea: No tracheal deviation. Cardiovascular:      Rate and Rhythm: Normal rate and regular rhythm. Heart sounds: Normal heart sounds. No murmur heard. Pulmonary:      Effort: Pulmonary effort is normal.      Breath sounds: Normal breath sounds. Musculoskeletal:      Cervical back: Neck supple. No rigidity. Right lower leg: No edema. Left lower leg: No edema.    Lymphadenopathy: Cervical: No cervical adenopathy. Skin:     General: Skin is warm and dry. Capillary Refill: Capillary refill takes less than 2 seconds. Neurological:      General: No focal deficit present. Mental Status: She is alert and oriented to person, place, and time. Mental status is at baseline. Psychiatric:         Mood and Affect: Mood normal. Mood is not anxious or depressed. Affect is not angry. Speech: Speech normal.         Behavior: Behavior normal.         Thought Content: Thought content normal.         Judgment: Judgment normal.       /68 (Site: Left Upper Arm, Position: Sitting, Cuff Size: Large Adult)   Pulse 75   Temp 97.9 °F (36.6 °C) (Infrared)   Resp 20   Ht 5' (1.524 m)   Wt 208 lb (94.3 kg)   SpO2 95%   BMI 40.62 kg/m²      ASSESSMENT:      ICD-10-CM    1. Type 2 diabetes mellitus treated without insulin (HCC)  E11.9 Urinalysis with Reflex to Culture     CBC with Auto Differential     Comprehensive Metabolic Panel w/ Reflex to MG     Hemoglobin A1C     Vitamin D 25 Hydroxy     Microalbumin / Creatinine Urine Ratio     Vitamin B12     Semaglutide,0.25 or 0.5MG/DOS, (OZEMPIC, 0.25 OR 0.5 MG/DOSE,) 2 MG/1.5ML SOPN      2. Mixed hyperlipidemia  E78.2 Comprehensive Metabolic Panel w/ Reflex to MG     Lipid Panel     Vitamin D 25 Hydroxy     TSH with Reflex to FT4      3. Essential hypertension  I10 Vitamin D 25 Hydroxy          PLAN:    Helio García: Fatigue (Patient presents for fatigue and for A1c check.)  Wegovy status needed  BP  Keep follow ups with psych/therapist  Change wegovy to ozempic  Lab today  Further f/u pending lab review  Diagnosis and orders for this visit are above. Please note that this chart was generated using dragon dictationsoftware. Although every effort was made to ensure the accuracy of this automated transcription, some errors in transcription may have occurred.

## 2023-02-10 ENCOUNTER — TRANSCRIBE ORDERS (OUTPATIENT)
Dept: ADMINISTRATIVE | Facility: HOSPITAL | Age: 63
End: 2023-02-10
Payer: COMMERCIAL

## 2023-02-10 DIAGNOSIS — Z17.0 ESTROGEN RECEPTOR POSITIVE: ICD-10-CM

## 2023-02-10 DIAGNOSIS — C50.812 MALIGNANT NEOPLASM OF OVERLAPPING SITES OF LEFT BREAST IN FEMALE, ESTROGEN RECEPTOR POSITIVE: Primary | ICD-10-CM

## 2023-02-10 DIAGNOSIS — Z17.0 MALIGNANT NEOPLASM OF OVERLAPPING SITES OF LEFT BREAST IN FEMALE, ESTROGEN RECEPTOR POSITIVE: Primary | ICD-10-CM

## 2023-02-14 DIAGNOSIS — E78.2 MIXED HYPERLIPIDEMIA: ICD-10-CM

## 2023-02-14 NOTE — TELEPHONE ENCOUNTER
Melinda Oquendojohanne called to request a refill on her medication.       Last office visit : 2/9/2023   Next office visit : Visit date not found     Requested Prescriptions     Pending Prescriptions Disp Refills    atorvastatin (LIPITOR) 40 MG tablet [Pharmacy Med Name: atorvastatin 40 mg tablet] 90 tablet 1     Sig: TAKE ONE TABLET BY MOUTH NIGHTLY            John Herron CMA

## 2023-02-15 RX ORDER — ATORVASTATIN CALCIUM 40 MG/1
TABLET, FILM COATED ORAL
Qty: 90 TABLET | Refills: 1 | Status: SHIPPED | OUTPATIENT
Start: 2023-02-15

## 2023-02-20 ENCOUNTER — HOSPITAL ENCOUNTER (OUTPATIENT)
Dept: CT IMAGING | Facility: HOSPITAL | Age: 63
Discharge: HOME | End: 2023-02-20
Payer: COMMERCIAL

## 2023-02-20 PROCEDURE — 78815 PET IMAGE W/CT SKULL-THIGH: CPT

## 2023-02-20 PROCEDURE — A9552 F18 FDG: HCPCS | Performed by: INTERNAL MEDICINE

## 2023-02-20 PROCEDURE — 0 FLUDEOXYGLUCOSE F18 SOLUTION: Performed by: INTERNAL MEDICINE

## 2023-02-20 RX ADMIN — FLUDEOXYGLUCOSE F18 1 DOSE: 300 INJECTION INTRAVENOUS at 09:00

## 2023-02-28 ENCOUNTER — TELEPHONE (OUTPATIENT)
Dept: FAMILY MEDICINE CLINIC | Age: 63
End: 2023-02-28

## 2023-02-28 NOTE — TELEPHONE ENCOUNTER
----- Message from MAEVE Urbina sent at 2/23/2023 11:38 AM CST -----  Cholesterol remains outstanding. Electrolyte panel is healthy. Kidney functions and liver functions are healthy. Thyroid level is great. Vitamin B12 and vitamin D are both in the healthy range. Despite frustrations with obtaining diabetic medication timely, 3-month blood sugar average is great at 5.8%! General blood counts are also stable. Microscopic protein test for urine is healthy. Overall, lab is looking good! I do recommend lab work in approximately 6 months.

## 2023-03-06 ENCOUNTER — OFFICE VISIT (OUTPATIENT)
Dept: NEUROLOGY | Age: 63
End: 2023-03-06
Payer: COMMERCIAL

## 2023-03-06 VITALS
BODY MASS INDEX: 41.23 KG/M2 | HEART RATE: 75 BPM | HEIGHT: 60 IN | DIASTOLIC BLOOD PRESSURE: 66 MMHG | WEIGHT: 210 LBS | SYSTOLIC BLOOD PRESSURE: 108 MMHG | RESPIRATION RATE: 18 BRPM

## 2023-03-06 DIAGNOSIS — G47.33 SLEEP APNEA, OBSTRUCTIVE: Primary | ICD-10-CM

## 2023-03-06 DIAGNOSIS — G25.0 TREMOR, ESSENTIAL: ICD-10-CM

## 2023-03-06 DIAGNOSIS — G25.81 RLS (RESTLESS LEGS SYNDROME): ICD-10-CM

## 2023-03-06 PROCEDURE — 3078F DIAST BP <80 MM HG: CPT | Performed by: PSYCHIATRY & NEUROLOGY

## 2023-03-06 PROCEDURE — 99213 OFFICE O/P EST LOW 20 MIN: CPT | Performed by: PSYCHIATRY & NEUROLOGY

## 2023-03-06 PROCEDURE — 3074F SYST BP LT 130 MM HG: CPT | Performed by: PSYCHIATRY & NEUROLOGY

## 2023-03-06 RX ORDER — LURASIDONE HYDROCHLORIDE 20 MG/1
TABLET, FILM COATED ORAL
COMMUNITY
Start: 2023-02-22

## 2023-03-06 NOTE — PROGRESS NOTES
[facility]  [address] [place] [address] Drive, [address]  [place], [place] Esqueda  Phone [phone]  Fax [phone]     [facility] Follow Up Encounter  3/6/23 3:19 PM CST    Information:   Patient Name: Rick Cifuentes  :   1960  Age:   59 y.o. MRN:   [id number]  Account #:  [de-identified]  Today:  3/6/23    Provider: Carlos Bauer M.D. Chief Complaint:   Chief Complaint   Patient presents with    Follow-up    Sleep Apnea       Subjective:   Rick Cifuentes is a 59 y.o. woman with a history of obstructive sleep apnea, RLS, and tremor who is following up. I last saw her a year ago. Her left leg spider bite wounds finally healed. Unfortunately, she was diagnosed with stage 4 breast cancer in 2022. She started palliative chemotherapy in August.  She uses her BiPAP some nights. She is using oxygen some in the daytime and at night. She is using the oxygen through the BiPAP. She says it is a hassle to use the device. She often will sleep in a recliner and her machine is in the bedroom. She will not move the machine back and forth. She has had no tremor. Her RLS is fairly well controlled on the Mirapex. Her tremor is doing well.         Objective:     Past Medical History:  Past Medical History:   Diagnosis Date    Arthritis     Bipolar I disorder, most recent episode (or current) mixed, unspecified     Chronic back pain     Depression     Dry mouth     Hyperlipidemia     Hypertension     Hypokalemia     Hypothyroidism     Menopause     Morbidly obese ([address] 10/28/2020    Overactive bladder     Plantar fasciitis     RLS (restless legs syndrome)     Sleep apnea     Spondylosis with myelopathy, lumbar region     Thyroid disease        Past Surgical History:   Procedure Laterality Date    CARPAL TUNNEL RELEASE Bilateral     CHOLECYSTECTOMY  10/2014    COLON SURGERY      COLON SURGERY      biopsy     COLONOSCOPY      CYST REMOVAL  2017    Under left ear    HARDWARE REMOVAL Right     Foot HERNIA REPAIR  10/2014    LEG DEBRIDEMENT Left     NOSE SURGERY  08/01/2017; 04/16/2019    Dr Duane Garcia L/S,1 LEVEL Bilateral 2/7/2017    INJECTION MEDICATION FACET JOINT performed by Alek Aleman at [address] OR       Recent Hospitalizations  None    Significant Injuries  None    Habits  Rosaura Horta reports that she quit smoking about 19 years ago. Her smoking use included cigarettes. She has a 30.00 pack-year smoking history. She has never used smokeless tobacco. She reports current alcohol use. She reports that she does not use drugs. Family History   Problem Relation Age of Onset    Cancer Mother         lung    Heart Disease Mother     Hypertension Mother     Bipolar Disorder Mother     Heart Disease Father     Hypertension Father        Social History  Rosaura Horta is , lives in [place], [place], and is unemployed.     Medications:  Current Outpatient Medications   Medication Sig Dispense Refill    LATUDA 20 MG TABS tablet TAKE ONE TABLET BY MOUTH DAILY      atorvastatin (LIPITOR) 40 MG tablet TAKE ONE TABLET BY MOUTH NIGHTLY 90 tablet 1    HYDROcodone-acetaminophen (NORCO)  MG per tablet TAKE ONE TABLET BY MOUTH EVERY 8 HOURS AS NEEDED 01/30      Semaglutide,0.25 or 0.5MG/DOS, (OZEMPIC, 0.25 OR 0.5 MG/DOSE,) 2 MG/1.5ML SOPN 0.25mg subq weekly 1.5 mL 1    pilocarpine (SALAGEN) 5 MG tablet TAKE ONE TABLET BY MOUTH THREE TIMES DAILY 270 tablet 0    carvedilol (COREG) 3.125 MG tablet TAKE ONE TABLET BY MOUTH TWICE DAILY WITH MEALS 180 tablet 1    spironolactone (ALDACTONE) 25 MG tablet TAKE TWO TABLETS BY MOUTH EVERY MORNING AND ONE AT BEDTIME 270 tablet 1    IBRANCE 125 MG tablet Take 125 mg by mouth daily      anastrozole (ARIMIDEX) 1 MG tablet TAKE ONE TABLET BY MOUTH DAILY      FIBER PO Take 500 mg by mouth      gabapentin (NEURONTIN) 100 MG capsule 1-2 po up to tid for nerve pain (Patient taking differently: 300 mg. 1-2 po up to tid for nerve pain) 540 capsule 0 pramipexole (MIRAPEX) 1.5 MG tablet TAKE ONE-HALF TABLET BY MOUTH EVERY MORNING AND ONE TABLET NIGHTLY **MAY MAKE DROWSY 225 tablet 0    allopurinol (ZYLOPRIM) 100 MG tablet TAKE ONE TABLET BY MOUTH DAILY 150 tablet 1    levothyroxine (SYNTHROID) 75 MCG tablet TAKE ONE TABLET BY MOUTH DAILY AS DIRECTED 90 tablet 1    FLUoxetine (PROZAC) 40 MG capsule TAKE ONE CAPSULE BY MOUTH EVERY MORNING AS DIRECTED FOR DEPRESSION/ANXIETY 30 capsule 1    traZODone (DESYREL) 100 MG tablet 1-2 po as needed for sleep 180 tablet 2    niacin (SLO-NIACIN) 500 MG extended release tablet Take 500 mg by mouth daily      NARCAN 4 MG/0.1ML LIQD nasal spray       diclofenac (VOLTAREN) 75 MG EC tablet Take 75 mg by mouth 2 times daily       methylcellulose ([employer] SOLUBLE FIBER THERAPY) 500 MG TABS       omeprazole (PRILOSEC) 20 MG delayed release capsule Take 1 capsule by mouth 2 times daily (before meals) 20 capsule 0    tiZANidine (ZANAFLEX) 4 MG tablet Take 4 mg by mouth daily       Vitamin D (CHOLECALCIFEROL) 1000 UNITS CAPS capsule Take 1,000 Units by mouth daily      Multiple Vitamins-Minerals (MULTIVITAMIN PO) Take by mouth daily      calcium carbonate 600 MG TABS tablet Take 1 tablet by mouth daily. UNABLE TO FIND Please adjust medication refills to limit pharmacy visits--I will authorize most quantities to allow this to happen 1 Device 0     No current facility-administered medications for this visit. Allergies:   Allergies as of 03/06/2023 - Fully Reviewed 03/06/2023   Allergen Reaction Noted    Clindamycin/lincomycin Other (See Comments) 08/02/2021    Bactrim [sulfamethoxazole-trimethoprim]  04/23/2020    Hctz [hydrochlorothiazide] Other (See Comments) 06/03/2016    Sulfamethoxazole  05/27/2021    Trimethoprim  05/27/2021    Metformin and related Diarrhea 02/25/2021    Sitagliptin Rash 07/09/2019       Review of Systems:  Constitutional: negative for - chills and fever  Eyes:  negative for - visual disturbance and photophobia  HENMT: negative for - headaches and sinus pain  Respiratory: positive for - cough, shortness of breath  Cardiovascular: negative for - chest pain and palpitations  Gastrointestinal: negative for - blood in stools, constipation, diarrhea, nausea, and vomiting  Genito-Urinary: negative for - hematuria, urinary frequency, urinary urgency, and urinary retention  Musculoskeletal: positive for - joint pain, joint stiffness  Hematological and Lymphatic: negative for - bleeding problems, abnormal bruising, and swollen lymph nodes  Endocrine:  negative for - polydipsia and polyphagia  Allergy/Immunology:  negative for - rhinorrhea, sinus congestion, hives  Integument:  negative for - negative for - rash, change in moles, new or changing lesions  Psychological: negative for - anxiety and depression  Neurological: negative for - memory loss, numbness/tingling, and weakness     PHYSICAL EXAMINATION:  Vitals:  /66   Pulse 75   Resp 18   Ht 5' (1.524 m)   Wt 210 lb (95.3 kg)   BMI 41.01 kg/m²   General appearance:  Alert, well developed, well nourished, in no distress  HEENT:  normocephalic, atraumatic, sclera appear normal, no nasal abnormalities, no rhinorrhea, Ears appear normal, oral mucous membranes are moist without erythema, trachea midline, thyroid is normal, no lymphadenopathy or neck mass. Cardiovascular:  Regular rate and rhythm without murmer. No peripheral edema, No cyanosis or clubbing. No carotid bruits. Pulmonary:  Lungs are clear to auscultation. Breathing appears normal, good expansion, normal effort without use of accessory muscles  Musculoskeletal:  Joints are osteoarthritic  Integument:  No rash, erythema, or pallor  Psychiatric:  Mood, affect, and behavior appear normal      NEUROLOGIC EXAMINATION:  Mental Status:  alert, oriented to person, place, and time.   Speech:  Clear without dysarthria or dysphonia  Language:  Fluent without aphasia  Cranial Nerves:   II Visual fields are full to confrontation   III,IV, VI Extraocular movements are full   VII Facial movements are symmetrical without weakness   VIII Hearing is intact   IX,X Shoulder shrug and head rotation strength are intact   XII No tongue atrophy or fasciculations. Normal tongue protrusion. No tongue weakness  Motor:  Normal strength in both upper and lower extremities. Normal muscle tone and bulk. Deep tendon reflexes are intact and symmetrical in both upper and lower extremities. Varghese's signs are absent bilaterally. There is no ankle clonus on either side. Sensation:  Sensation is intact to light touch, temperature, and vibration in all extremities. Coordination:  Rapid alternating movements are normal in both upper and lower extremities. Finger to nose testing is unimpaired bilaterally. Gait:  Normal station and gait. Pertinent Diagnostic Studies:  Dr. Pablo Calero notes    Assessment:       ICD-10-CM    1. Sleep apnea, obstructive  G47.33       2. Tremor, essential  G25.0       3. RLS (restless legs syndrome)  G25.81       Under the circumstances, she can use the BiPAP if she wants and if it helps her feel better. Plan:   1. Use supplemental oxygen during sleep. That might help sleep quality. 2. Continue pramipexole 1.5 mg TID for the RLS  3. Continue gabapentin as needed for the RLS  4.  Follow up here PRN    Electronically signed by Wanda Cote MD on 3/6/23

## 2023-03-16 ENCOUNTER — PATIENT MESSAGE (OUTPATIENT)
Dept: FAMILY MEDICINE CLINIC | Age: 63
End: 2023-03-16

## 2023-03-16 DIAGNOSIS — E78.2 MIXED HYPERLIPIDEMIA: ICD-10-CM

## 2023-03-16 NOTE — TELEPHONE ENCOUNTER
From: Ac Bro  To: Rodney Guevara  Sent: 3/16/2023 7:44 AM CDT  Subject: Meds    Hi, guys. My  has been on the phone with the insurance company and his HR rep for a couple of weeks. We've had issues with this new insurance. Anyway, the upshot of it is that the [facility] has been approved. However, I'm fine staying with the Ozempic to see what it can do. Also, I will be calling in a refill on my Atorvastatin, 40 MG, 1 a day for 90 days. I have no refills. I'm going to call it in today.     Thanks  Soila

## 2023-03-17 DIAGNOSIS — I10 ESSENTIAL HYPERTENSION: ICD-10-CM

## 2023-03-20 RX ORDER — SPIRONOLACTONE 25 MG/1
TABLET ORAL
Qty: 270 TABLET | Refills: 1 | Status: SHIPPED | OUTPATIENT
Start: 2023-03-20

## 2023-03-20 RX ORDER — ATORVASTATIN CALCIUM 40 MG/1
TABLET, FILM COATED ORAL
Qty: 90 TABLET | Refills: 1 | Status: SHIPPED | OUTPATIENT
Start: 2023-03-20

## 2023-03-20 NOTE — TELEPHONE ENCOUNTER
Grant Everett called to request a refill on her medication.       Last office visit : 2/9/2023   Next office visit : Visit date not found     Requested Prescriptions     Pending Prescriptions Disp Refills    spironolactone (ALDACTONE) 25 MG tablet [Pharmacy Med Name: spironolactone 25 mg tablet] 270 tablet 1     Sig: TAKE TWO TABLETS BY MOUTH EVERY MORNING AND ONE AT BEDTIME            Brett Armando MA

## 2023-03-24 ENCOUNTER — PATIENT MESSAGE (OUTPATIENT)
Dept: FAMILY MEDICINE CLINIC | Age: 63
End: 2023-03-24

## 2023-03-24 DIAGNOSIS — G47.00 INSOMNIA, UNSPECIFIED TYPE: ICD-10-CM

## 2023-03-24 DIAGNOSIS — G25.81 RLS (RESTLESS LEGS SYNDROME): ICD-10-CM

## 2023-03-24 RX ORDER — TRAZODONE HYDROCHLORIDE 100 MG/1
TABLET ORAL
Qty: 270 TABLET | Refills: 0 | Status: SHIPPED | OUTPATIENT
Start: 2023-03-24

## 2023-03-24 RX ORDER — PRAMIPEXOLE DIHYDROCHLORIDE 1.5 MG/1
TABLET ORAL
Qty: 135 TABLET | Refills: 1 | Status: SHIPPED | OUTPATIENT
Start: 2023-03-24

## 2023-04-03 RX ORDER — PILOCARPINE HYDROCHLORIDE 5 MG/1
TABLET, FILM COATED ORAL
Qty: 270 TABLET | Refills: 0 | Status: SHIPPED | OUTPATIENT
Start: 2023-04-03

## 2023-04-19 DIAGNOSIS — E11.9 TYPE 2 DIABETES MELLITUS TREATED WITHOUT INSULIN (HCC): ICD-10-CM

## 2023-04-19 RX ORDER — SEMAGLUTIDE 1.34 MG/ML
INJECTION, SOLUTION SUBCUTANEOUS
Qty: 1.5 ML | Refills: 1 | Status: SHIPPED | OUTPATIENT
Start: 2023-04-19

## 2023-04-19 NOTE — TELEPHONE ENCOUNTER
Asya Talbot called to request a refill on her medication.       Last office visit : 2/9/2023   Next office visit : Visit date not found     Requested Prescriptions     Pending Prescriptions Disp Refills    Semaglutide,0.25 or 0.5MG/DOS, (OZEMPIC, 0.25 OR 0.5 MG/DOSE,) 2 MG/1.5ML SOPN [Pharmacy Med Name: Ozempic 0.25 mg or 0.5 mg (2 mg/1.5 mL) subcutaneous pen injector] 1.5 mL 1     Sig: INJECT 0.25MG UNDER THE SKIN EVERY WEEK            Juli Vasquez

## 2023-04-20 DIAGNOSIS — G47.00 INSOMNIA, UNSPECIFIED TYPE: ICD-10-CM

## 2023-04-20 DIAGNOSIS — M25.559 HIP PAIN: ICD-10-CM

## 2023-04-20 DIAGNOSIS — G25.81 RLS (RESTLESS LEGS SYNDROME): ICD-10-CM

## 2023-04-20 NOTE — TELEPHONE ENCOUNTER
Lorelei Hirsch called to request a refill on her medication. Last office visit : 2/9/2023   Next office visit : Visit date not found     Last UDS:   Amphetamine Screen, Urine   Date Value Ref Range Status   10/17/2022 negative  Final     Barbiturate Screen, Urine   Date Value Ref Range Status   10/17/2022 negative  Final     Benzodiazepine Screen, Urine   Date Value Ref Range Status   10/17/2022 negative  Final     Buprenorphine Urine   Date Value Ref Range Status   10/17/2022 negative  Final     Cocaine Metabolite Screen, Urine   Date Value Ref Range Status   10/17/2022 negative  Final     Gabapentin Screen, Urine   Date Value Ref Range Status   10/17/2022 not tested  Final     MDMA, Urine   Date Value Ref Range Status   10/17/2022 negative  Final     Methamphetamine, Urine   Date Value Ref Range Status   10/17/2022 negative  Final     Opiate Scrn, Ur   Date Value Ref Range Status   10/17/2022 positive (A)  Final     Comment:     reported on medlist     Oxycodone Screen, Ur   Date Value Ref Range Status   10/17/2022 negative  Final     PCP Screen, Urine   Date Value Ref Range Status   10/17/2022 negative  Final     Propoxyphene Screen, Urine   Date Value Ref Range Status   10/17/2022 not tested  Final     THC Screen, Urine   Date Value Ref Range Status   10/17/2022 negative  Final     Tricyclic Antidepressants, Urine   Date Value Ref Range Status   10/17/2022 not tested  Final       Last Efrain Quarry: 10/17/22  Medication Contract: 02/05/22   Last Fill: 10/17/22    Requested Prescriptions     Pending Prescriptions Disp Refills    gabapentin (NEURONTIN) 100 MG capsule [Pharmacy Med Name: gabapentin 100 mg capsule] 540 capsule 0     Sig: TAKE 1 TO 2 CAPSULES BY MOUTH UP TO THREE TIMES DAILY         Please approve or refuse this medication.    Lubna Montemayor MA

## 2023-04-21 RX ORDER — GABAPENTIN 100 MG/1
CAPSULE ORAL
Qty: 540 CAPSULE | Refills: 0 | Status: SHIPPED | OUTPATIENT
Start: 2023-04-21 | End: 2023-05-20

## 2023-05-31 ENCOUNTER — TRANSCRIBE ORDERS (OUTPATIENT)
Dept: ADMINISTRATIVE | Facility: HOSPITAL | Age: 63
End: 2023-05-31

## 2023-05-31 DIAGNOSIS — Z17.0 ESTROGEN RECEPTOR POSITIVE: ICD-10-CM

## 2023-05-31 DIAGNOSIS — C50.812 MALIGNANT NEOPLASM OF OVERLAPPING SITES OF LEFT BREAST IN FEMALE, ESTROGEN RECEPTOR POSITIVE: Primary | ICD-10-CM

## 2023-05-31 DIAGNOSIS — Z17.0 MALIGNANT NEOPLASM OF OVERLAPPING SITES OF LEFT BREAST IN FEMALE, ESTROGEN RECEPTOR POSITIVE: Primary | ICD-10-CM

## 2023-06-08 ENCOUNTER — HOSPITAL ENCOUNTER (OUTPATIENT)
Dept: CT IMAGING | Facility: HOSPITAL | Age: 63
Discharge: HOME | End: 2023-06-08
Payer: COMMERCIAL

## 2023-06-08 DIAGNOSIS — Z17.0 MALIGNANT NEOPLASM OF OVERLAPPING SITES OF LEFT BREAST IN FEMALE, ESTROGEN RECEPTOR POSITIVE: ICD-10-CM

## 2023-06-08 DIAGNOSIS — C50.812 MALIGNANT NEOPLASM OF OVERLAPPING SITES OF LEFT BREAST IN FEMALE, ESTROGEN RECEPTOR POSITIVE: ICD-10-CM

## 2023-06-08 DIAGNOSIS — Z17.0 ESTROGEN RECEPTOR POSITIVE: ICD-10-CM

## 2023-06-08 PROCEDURE — 0 FLUDEOXYGLUCOSE F18 SOLUTION: Performed by: INTERNAL MEDICINE

## 2023-06-08 PROCEDURE — 78815 PET IMAGE W/CT SKULL-THIGH: CPT

## 2023-06-08 PROCEDURE — A9552 F18 FDG: HCPCS | Performed by: INTERNAL MEDICINE

## 2023-06-08 RX ADMIN — FLUDEOXYGLUCOSE F18 1 DOSE: 300 INJECTION INTRAVENOUS at 10:08

## 2023-06-20 DIAGNOSIS — E03.9 ACQUIRED HYPOTHYROIDISM: ICD-10-CM

## 2023-06-20 DIAGNOSIS — E79.0 ELEVATED URIC ACID IN BLOOD: ICD-10-CM

## 2023-06-20 NOTE — TELEPHONE ENCOUNTER
Bing Rowland called to request a refill on her medication.       Last office visit : 2/9/2023   Next office visit : Visit date not found     Requested Prescriptions     Pending Prescriptions Disp Refills    levothyroxine (SYNTHROID) 75 MCG tablet [Pharmacy Med Name: levothyroxine 75 mcg tablet] 90 tablet 1     Sig: TAKE ONE TABLET BY MOUTH DAILY    allopurinol (ZYLOPRIM) 100 MG tablet [Pharmacy Med Name: allopurinol 100 mg tablet] 150 tablet 1     Sig: TAKE ONE TABLET BY MOUTH DAILY    pilocarpine (SALAGEN) 5 MG tablet [Pharmacy Med Name: pilocarpine 5 mg tablet] 270 tablet 1     Sig: [address]

## 2023-06-21 RX ORDER — ALLOPURINOL 100 MG/1
TABLET ORAL
Qty: 150 TABLET | Refills: 1 | Status: SHIPPED | OUTPATIENT
Start: 2023-06-21

## 2023-06-21 RX ORDER — LEVOTHYROXINE SODIUM 0.07 MG/1
TABLET ORAL
Qty: 90 TABLET | Refills: 1 | Status: SHIPPED | OUTPATIENT
Start: 2023-06-21

## 2023-06-21 RX ORDER — PILOCARPINE HYDROCHLORIDE 5 MG/1
TABLET, FILM COATED ORAL
Qty: 270 TABLET | Refills: 1 | Status: SHIPPED | OUTPATIENT
Start: 2023-06-21

## 2023-06-23 ENCOUNTER — OFFICE VISIT (OUTPATIENT)
Dept: FAMILY MEDICINE CLINIC | Age: 63
End: 2023-06-23
Payer: COMMERCIAL

## 2023-06-23 VITALS
TEMPERATURE: 97.9 F | BODY MASS INDEX: 40.62 KG/M2 | HEART RATE: 71 BPM | OXYGEN SATURATION: 95 % | DIASTOLIC BLOOD PRESSURE: 60 MMHG | SYSTOLIC BLOOD PRESSURE: 100 MMHG | WEIGHT: 208 LBS

## 2023-06-23 DIAGNOSIS — G47.00 INSOMNIA, UNSPECIFIED TYPE: ICD-10-CM

## 2023-06-23 DIAGNOSIS — R29.898 LEG WEAKNESS, BILATERAL: Primary | ICD-10-CM

## 2023-06-23 DIAGNOSIS — R53.83 FATIGUE, UNSPECIFIED TYPE: ICD-10-CM

## 2023-06-23 DIAGNOSIS — C50.912 INFLAMMATORY CARCINOMA OF LEFT BREAST (HCC): ICD-10-CM

## 2023-06-23 DIAGNOSIS — F33.1 MODERATE EPISODE OF RECURRENT MAJOR DEPRESSIVE DISORDER (HCC): ICD-10-CM

## 2023-06-23 DIAGNOSIS — E11.9 TYPE 2 DIABETES MELLITUS TREATED WITHOUT INSULIN (HCC): ICD-10-CM

## 2023-06-23 DIAGNOSIS — E03.9 ACQUIRED HYPOTHYROIDISM: ICD-10-CM

## 2023-06-23 DIAGNOSIS — E66.01 OBESITY, CLASS III, BMI 40-49.9 (MORBID OBESITY) (HCC): ICD-10-CM

## 2023-06-23 DIAGNOSIS — R29.898 LEG WEAKNESS, BILATERAL: ICD-10-CM

## 2023-06-23 DIAGNOSIS — G25.81 RLS (RESTLESS LEGS SYNDROME): ICD-10-CM

## 2023-06-23 DIAGNOSIS — E79.0 ELEVATED URIC ACID IN BLOOD: ICD-10-CM

## 2023-06-23 PROBLEM — L97.922 SKIN ULCER OF LEFT LOWER LEG WITH FAT LAYER EXPOSED (HCC): Status: RESOLVED | Noted: 2020-07-07 | Resolved: 2023-06-23

## 2023-06-23 LAB
ALBUMIN SERPL-MCNC: 4.2 G/DL (ref 3.5–5.2)
ALP SERPL-CCNC: 71 U/L (ref 35–104)
ALT SERPL-CCNC: 27 U/L (ref 5–33)
ANION GAP SERPL CALCULATED.3IONS-SCNC: 10 MMOL/L (ref 7–19)
AST SERPL-CCNC: 20 U/L (ref 5–32)
BASOPHILS # BLD: 0.1 K/UL (ref 0–0.2)
BASOPHILS NFR BLD: 1.8 % (ref 0–1)
BILIRUB SERPL-MCNC: <0.2 MG/DL (ref 0.2–1.2)
BUN SERPL-MCNC: 20 MG/DL (ref 8–23)
CALCIUM SERPL-MCNC: 9.4 MG/DL (ref 8.8–10.2)
CHLORIDE SERPL-SCNC: 100 MMOL/L (ref 98–111)
CK SERPL-CCNC: 94 U/L (ref 26–192)
CO2 SERPL-SCNC: 32 MMOL/L (ref 22–29)
CREAT SERPL-MCNC: 1.3 MG/DL (ref 0.5–0.9)
EOSINOPHIL # BLD: 0.2 K/UL (ref 0–0.6)
EOSINOPHIL NFR BLD: 3.6 % (ref 0–5)
ERYTHROCYTE [DISTWIDTH] IN BLOOD BY AUTOMATED COUNT: 14.1 % (ref 11.5–14.5)
GLUCOSE SERPL-MCNC: 119 MG/DL (ref 74–109)
HBA1C MFR BLD: 5.6 % (ref 4–6)
HCT VFR BLD AUTO: 38.3 % (ref 37–47)
HGB BLD-MCNC: 12.4 G/DL (ref 12–16)
IMM GRANULOCYTES # BLD: 0 K/UL
LYMPHOCYTES # BLD: 1.4 K/UL (ref 1.1–4.5)
LYMPHOCYTES NFR BLD: 32.1 % (ref 20–40)
MCH RBC QN AUTO: 32.8 PG (ref 27–31)
MCHC RBC AUTO-ENTMCNC: 32.4 G/DL (ref 33–37)
MCV RBC AUTO: 101.3 FL (ref 81–99)
MONOCYTES # BLD: 0.3 K/UL (ref 0–0.9)
MONOCYTES NFR BLD: 6.7 % (ref 0–10)
NEUTROPHILS # BLD: 2.5 K/UL (ref 1.5–7.5)
NEUTS SEG NFR BLD: 55.6 % (ref 50–65)
PLATELET # BLD AUTO: 282 K/UL (ref 130–400)
PMV BLD AUTO: 10 FL (ref 9.4–12.3)
POTASSIUM SERPL-SCNC: 4.9 MMOL/L (ref 3.5–5)
PROT SERPL-MCNC: 6.5 G/DL (ref 6.6–8.7)
RBC # BLD AUTO: 3.78 M/UL (ref 4.2–5.4)
SODIUM SERPL-SCNC: 142 MMOL/L (ref 136–145)
TSH SERPL DL<=0.005 MIU/L-ACNC: 2.5 UIU/ML (ref 0.35–5.5)
URATE SERPL-MCNC: 4.1 MG/DL (ref 2.4–5.7)
VIT B12 SERPL-MCNC: 479 PG/ML (ref 211–946)
WBC # BLD AUTO: 4.5 K/UL (ref 4.8–10.8)

## 2023-06-23 PROCEDURE — 3044F HG A1C LEVEL LT 7.0%: CPT | Performed by: NURSE PRACTITIONER

## 2023-06-23 PROCEDURE — 3074F SYST BP LT 130 MM HG: CPT | Performed by: NURSE PRACTITIONER

## 2023-06-23 PROCEDURE — 99214 OFFICE O/P EST MOD 30 MIN: CPT | Performed by: NURSE PRACTITIONER

## 2023-06-23 PROCEDURE — 3078F DIAST BP <80 MM HG: CPT | Performed by: NURSE PRACTITIONER

## 2023-06-23 RX ORDER — GABAPENTIN 300 MG/1
CAPSULE ORAL
Qty: 270 CAPSULE | Refills: 0 | Status: SHIPPED | OUTPATIENT
Start: 2023-06-23 | End: 2023-08-25

## 2023-06-23 RX ORDER — ORPHENADRINE CITRATE 100 MG/1
100 TABLET, EXTENDED RELEASE ORAL EVERY 8 HOURS
COMMUNITY
Start: 2023-03-28

## 2023-06-23 ASSESSMENT — ENCOUNTER SYMPTOMS: RESPIRATORY NEGATIVE: 1

## 2023-06-23 NOTE — PROGRESS NOTES
4 MG tablet Take 1 tablet by mouth daily Yes Historical Provider, MD   Vitamin D (CHOLECALCIFEROL) 1000 UNITS CAPS capsule Take 1 capsule by mouth daily Yes Historical Provider, MD   Multiple Vitamins-Minerals (MULTIVITAMIN PO) Take by mouth daily Yes Historical Provider, MD   calcium carbonate 600 MG TABS tablet Take 1 tablet by mouth daily Yes Historical Provider, MD     Allergies   Allergen Reactions    Clindamycin/Lincomycin Other (See Comments)     Heartburn, upset stomach     Bactrim [Sulfamethoxazole-Trimethoprim]     Hctz [Hydrochlorothiazide] Other (See Comments)     Acid reflux      Sulfamethoxazole     Trimethoprim     Metformin And Related Diarrhea    Sitagliptin Rash     Past Surgical History:   Procedure Laterality Date    CARPAL TUNNEL RELEASE Bilateral 2002    CHOLECYSTECTOMY  10/2014    COLON SURGERY      COLON SURGERY      biopsy     COLONOSCOPY      CYST REMOVAL  2017    Under left ear    HARDWARE REMOVAL Right 2011    Foot     HERNIA REPAIR  10/2014    LEG DEBRIDEMENT Left     NOSE SURGERY  2017; 2019    Dr Ashlee Zepeda L/S,1 LEVEL Bilateral 2017    INJECTION MEDICATION FACET JOINT performed by Jean Paul Sorenson at [facility]     Family History   Problem Relation Age of Onset    Cancer Mother         lung    Heart Disease Mother     Hypertension Mother     Bipolar Disorder Mother     Heart Disease Father     Hypertension Father      Social History     Socioeconomic History    Marital status:      Spouse name: Fausto Daugherty    Number of children: 2    Years of education: some college    Highest education level: Not on file   Occupational History     Employer: [employer]   Tobacco Use    Smoking status: Former     Packs/day: 1.00     Years: 30.00     Pack years: 30.00     Types: Cigarettes     Quit date: 2003     Years since quittin.8    Smokeless tobacco: Never    Tobacco comments:     Pt not interested at this time in stopping.    Substance and Sexual

## 2023-06-25 ENCOUNTER — PATIENT MESSAGE (OUTPATIENT)
Dept: FAMILY MEDICINE CLINIC | Age: 63
End: 2023-06-25

## 2023-06-25 DIAGNOSIS — R29.898 LEG WEAKNESS, BILATERAL: Primary | ICD-10-CM

## 2023-06-26 RX ORDER — DICLOFENAC SODIUM 75 MG/1
75 TABLET, DELAYED RELEASE ORAL 2 TIMES DAILY
Qty: 60 TABLET | Refills: 0 | OUTPATIENT
Start: 2023-06-26

## 2023-07-12 ENCOUNTER — OFFICE VISIT (OUTPATIENT)
Dept: FAMILY MEDICINE CLINIC | Age: 63
End: 2023-07-12
Payer: COMMERCIAL

## 2023-07-12 VITALS
HEART RATE: 78 BPM | RESPIRATION RATE: 20 BRPM | OXYGEN SATURATION: 97 % | HEIGHT: 60 IN | DIASTOLIC BLOOD PRESSURE: 82 MMHG | WEIGHT: 209 LBS | BODY MASS INDEX: 41.03 KG/M2 | TEMPERATURE: 97.3 F | SYSTOLIC BLOOD PRESSURE: 132 MMHG

## 2023-07-12 DIAGNOSIS — M51.36 DDD (DEGENERATIVE DISC DISEASE), LUMBAR: ICD-10-CM

## 2023-07-12 DIAGNOSIS — R29.898 LEG WEAKNESS, BILATERAL: Primary | ICD-10-CM

## 2023-07-12 PROBLEM — E11.9 TYPE 2 DIABETES MELLITUS (HCC): Status: ACTIVE | Noted: 2023-07-12

## 2023-07-12 PROCEDURE — 3075F SYST BP GE 130 - 139MM HG: CPT | Performed by: NURSE PRACTITIONER

## 2023-07-12 PROCEDURE — 99214 OFFICE O/P EST MOD 30 MIN: CPT | Performed by: NURSE PRACTITIONER

## 2023-07-12 PROCEDURE — 3079F DIAST BP 80-89 MM HG: CPT | Performed by: NURSE PRACTITIONER

## 2023-07-12 ASSESSMENT — ENCOUNTER SYMPTOMS
BACK PAIN: 1
RESPIRATORY NEGATIVE: 1

## 2023-07-12 NOTE — PROGRESS NOTES
Living Expenses: Not hard at all   Food Insecurity: No Food Insecurity    Worried About [place] in the Last Year: Never true    Ran Out of Food in the Last Year: Never true   Transportation Needs: Unknown    Lack of Transportation (Medical): Not on file    Lack of Transportation (Non-Medical): No   Physical Activity: Not on file   Stress: Not on file   Social Connections: Not on file   Intimate Partner Violence: Not on file   Housing Stability: Unknown    Unable to Pay for Housing in the Last Year: Not on file    Number of Places Lived in the Last Year: Not on file    Unstable Housing in the Last Year: No       Review of Systems   Constitutional: Negative. Respiratory: Negative. Cardiovascular: Negative. Musculoskeletal:  Positive for back pain. Neurological:  Positive for weakness (legs). OBJECTIVE:    Physical Exam  Vitals and nursing note reviewed. Constitutional:       General: She is not in acute distress. Appearance: Normal appearance. She is well-developed. HENT:      Head: Normocephalic and atraumatic. Eyes:      Extraocular Movements: Extraocular movements intact. Conjunctiva/sclera: Conjunctivae normal.      Pupils: Pupils are equal, round, and reactive to light. Cardiovascular:      Rate and Rhythm: Normal rate and regular rhythm. Heart sounds: Normal heart sounds. No murmur heard. Pulmonary:      Effort: Pulmonary effort is normal. No respiratory distress. Breath sounds: Normal breath sounds. Musculoskeletal:      Cervical back: Neck supple. Right lower leg: No edema. Left lower leg: No edema. Skin:     General: Skin is warm and dry. Capillary Refill: Capillary refill takes less than 2 seconds. Neurological:      General: No focal deficit present. Mental Status: She is alert and oriented to person, place, and time. Mental status is at baseline.       Comments: Cane use   Psychiatric:         Mood and Affect: Mood normal.

## 2023-07-19 ENCOUNTER — HOSPITAL ENCOUNTER (OUTPATIENT)
Dept: GENERAL RADIOLOGY | Age: 63
Discharge: HOME | End: 2023-07-19
Payer: COMMERCIAL

## 2023-07-19 DIAGNOSIS — M51.36 DDD (DEGENERATIVE DISC DISEASE), LUMBAR: ICD-10-CM

## 2023-07-19 DIAGNOSIS — R29.898 LEG WEAKNESS, BILATERAL: ICD-10-CM

## 2023-07-19 PROCEDURE — 72131 CT LUMBAR SPINE W/O DYE: CPT

## 2023-09-06 DIAGNOSIS — E11.9 TYPE 2 DIABETES MELLITUS TREATED WITHOUT INSULIN (HCC): ICD-10-CM

## 2023-09-06 RX ORDER — SEMAGLUTIDE 0.68 MG/ML
INJECTION, SOLUTION SUBCUTANEOUS
Qty: 3 ML | Refills: 1 | Status: SHIPPED | OUTPATIENT
Start: 2023-09-06

## 2023-09-06 NOTE — TELEPHONE ENCOUNTER
Wally Gamboa called to request a refill on her medication.       Last office visit : 7/12/2023   Next office visit : Visit date not found     Requested Prescriptions     Pending Prescriptions Disp Refills    Semaglutide,0.25 or 0.5MG/DOS, (OZEMPIC, 0.25 OR 0.5 MG/DOSE,) 2 MG/3ML SOPN [Pharmacy Med Name: Ozempic 0.25 mg or 0.5 mg (2 mg/3 mL) subcutaneous pen injector] 3 mL 1     Sig: INJECT 0.25MG UNDER THE SKIN EVERY WEEK            Diantha Morning

## 2023-09-15 DIAGNOSIS — I10 ESSENTIAL HYPERTENSION: ICD-10-CM

## 2023-09-15 DIAGNOSIS — E78.2 MIXED HYPERLIPIDEMIA: ICD-10-CM

## 2023-09-15 DIAGNOSIS — G25.81 RLS (RESTLESS LEGS SYNDROME): ICD-10-CM

## 2023-09-15 RX ORDER — SPIRONOLACTONE 25 MG/1
TABLET ORAL
Qty: 270 TABLET | Refills: 1 | Status: SHIPPED | OUTPATIENT
Start: 2023-09-15

## 2023-09-15 RX ORDER — PRAMIPEXOLE DIHYDROCHLORIDE 1.5 MG/1
TABLET ORAL
Qty: 135 TABLET | Refills: 1 | Status: SHIPPED | OUTPATIENT
Start: 2023-09-15

## 2023-09-15 RX ORDER — ATORVASTATIN CALCIUM 40 MG/1
TABLET, FILM COATED ORAL
Qty: 90 TABLET | Refills: 2 | Status: SHIPPED | OUTPATIENT
Start: 2023-09-15

## 2023-09-15 NOTE — TELEPHONE ENCOUNTER
Emil Melo called to request a refill on her medication.       Last office visit : 7/12/2023   Next office visit : Visit date not found     Requested Prescriptions     Pending Prescriptions Disp Refills    atorvastatin (LIPITOR) 40 MG tablet [Pharmacy Med Name: atorvastatin 40 mg tablet] 90 tablet 2     Sig: TAKE ONE TABLET BY MOUTH NIGHTLY    pramipexole (MIRAPEX) 1.5 MG tablet [Pharmacy Med Name: pramipexole 1.5 mg tablet] 135 tablet 1     Sig: TAKE 1 AND 1/2 TABLETS BY MOUTH DAILY    spironolactone (ALDACTONE) 25 MG tablet [Pharmacy Med Name: spironolactone 25 mg tablet] 270 tablet 1     Sig: TAKE TWO TABLETS BY MOUTH EVERY MORNING AND TAKE ONE TABLET EVERY NIGHT AT BEDTIME            Demian Johnson, [address] [facility]

## 2023-10-09 DIAGNOSIS — G25.81 RLS (RESTLESS LEGS SYNDROME): ICD-10-CM

## 2023-10-09 DIAGNOSIS — G47.00 INSOMNIA, UNSPECIFIED TYPE: ICD-10-CM

## 2023-10-09 NOTE — TELEPHONE ENCOUNTER
Wally Gamboa called to request a refill on her medication. Last office visit : 7/12/2023   Next office visit : Visit date not found     Last UDS:   Amphetamine Screen, Urine   Date Value Ref Range Status   10/17/2022 negative  Final     Barbiturate Screen, Urine   Date Value Ref Range Status   10/17/2022 negative  Final     Benzodiazepine Screen, Urine   Date Value Ref Range Status   10/17/2022 negative  Final     Buprenorphine Urine   Date Value Ref Range Status   10/17/2022 negative  Final     Cocaine Metabolite Screen, Urine   Date Value Ref Range Status   10/17/2022 negative  Final     Gabapentin Screen, Urine   Date Value Ref Range Status   10/17/2022 not tested  Final     MDMA, Urine   Date Value Ref Range Status   10/17/2022 negative  Final     Methamphetamine, Urine   Date Value Ref Range Status   10/17/2022 negative  Final     Opiate Scrn, Ur   Date Value Ref Range Status   10/17/2022 positive (A)  Final     Comment:     reported on medlist     Oxycodone Screen, Ur   Date Value Ref Range Status   10/17/2022 negative  Final     PCP Screen, Urine   Date Value Ref Range Status   10/17/2022 negative  Final     Propoxyphene Screen, Urine   Date Value Ref Range Status   10/17/2022 not tested  Final     THC Screen, Urine   Date Value Ref Range Status   10/17/2022 negative  Final     Tricyclic Antidepressants, Urine   Date Value Ref Range Status   10/17/2022 not tested  Final       Last Irl Sole: 10/09/2023  Medication Contract: 05/03/2023   Last Fill: 06/23/2023    Requested Prescriptions     Pending Prescriptions Disp Refills    gabapentin (NEURONTIN) 300 MG capsule [Pharmacy Med Name: gabapentin 300 mg capsule] 270 capsule 0     Sig: TAKE ONE CAPSULE BY MOUTH THREE TIMES DAILY         Please approve or refuse this medication.    Diantha Morning

## 2023-10-11 RX ORDER — GABAPENTIN 300 MG/1
CAPSULE ORAL
Qty: 270 CAPSULE | Refills: 0 | Status: SHIPPED | OUTPATIENT
Start: 2023-10-11 | End: 2024-01-09

## 2023-10-20 ENCOUNTER — OFFICE VISIT (OUTPATIENT)
Dept: FAMILY MEDICINE CLINIC | Age: 63
End: 2023-10-20

## 2023-10-20 VITALS
OXYGEN SATURATION: 97 % | BODY MASS INDEX: 39.66 KG/M2 | HEART RATE: 76 BPM | WEIGHT: 202 LBS | DIASTOLIC BLOOD PRESSURE: 80 MMHG | SYSTOLIC BLOOD PRESSURE: 112 MMHG | TEMPERATURE: 97 F | RESPIRATION RATE: 20 BRPM | HEIGHT: 60 IN

## 2023-10-20 DIAGNOSIS — Z00.00 ANNUAL PHYSICAL EXAM: ICD-10-CM

## 2023-10-20 DIAGNOSIS — H91.93 BILATERAL CHANGE IN HEARING: ICD-10-CM

## 2023-10-20 DIAGNOSIS — F33.1 MODERATE EPISODE OF RECURRENT MAJOR DEPRESSIVE DISORDER (HCC): ICD-10-CM

## 2023-10-20 DIAGNOSIS — Z23 NEED FOR VACCINATION WITH 20-POLYVALENT PNEUMOCOCCAL CONJUGATE VACCINE: ICD-10-CM

## 2023-10-20 DIAGNOSIS — I10 ESSENTIAL HYPERTENSION: ICD-10-CM

## 2023-10-20 DIAGNOSIS — B07.9 VIRAL WARTS, UNSPECIFIED TYPE: ICD-10-CM

## 2023-10-20 DIAGNOSIS — E11.9 TYPE 2 DIABETES MELLITUS TREATED WITHOUT INSULIN (HCC): Primary | ICD-10-CM

## 2023-10-20 DIAGNOSIS — E78.2 MIXED HYPERLIPIDEMIA: ICD-10-CM

## 2023-10-20 RX ORDER — FLUOXETINE 10 MG/1
CAPSULE ORAL
COMMUNITY
Start: 2023-09-19

## 2023-10-20 RX ORDER — LURASIDONE HYDROCHLORIDE 60 MG/1
TABLET, FILM COATED ORAL
COMMUNITY
Start: 2023-09-19

## 2023-10-20 RX ORDER — TRAZODONE HYDROCHLORIDE 150 MG/1
TABLET ORAL
COMMUNITY
Start: 2023-09-19

## 2023-10-20 NOTE — PROGRESS NOTES
SUBJECTIVE:    Patient ID: Dahiana Barajsa is a59 y.o. female. Dahiana Barajas is here today for Annual Exam (Patient presents for wellness exam for insurance and has a form. Patient is not fasting but will come back for labs.) and Referral - General (Patient needs a referral to Derm to warts on left hand and a referral to audiology for hearing test.)  . HPI:   HPI   Pt is here today for [place]" for insurance and has a form. She will come back for lab. She is also wanting to see derm and have an audiology eval related to decreased hearing. [place] travel upcoming in Feb 2024. [facility] [place] regarding travel guidance for this location. Diabetes  Good glucose at home. 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  Authorizing Provider   lurasidone (LATUDA) 60 MG TABS tablet TAKE ONE TABLET BY MOUTH ONCE A DAY AS NEEDED WITH 350 CHEWABLE CALORIES Yes Provider, MD Angel   traZODone (DESYREL) 150 MG tablet TAKE 1 TO 2 TABLETS BY MOUTH EVERY NIGHT AS NEEDED FOR SLEEP Yes Provider, MD Angel   FLUoxetine (PROZAC) 10 MG capsule TAKE ONE CAPSULE BY MOUTH EVERY MORNING TAKE WITH 40MG CAPSULE FOR TOTAL OF 50MG DAILY Yes Provider, MD Angel   gabapentin (NEURONTIN) 300 MG capsule TAKE ONE CAPSULE BY

## 2023-10-20 NOTE — PROGRESS NOTES
Waist circumference - 48\"    After obtaining consent, and per orders of MAEVE Cuevas, injection of [id number] given in Right deltoid by Candice Whaley CMA. Patient instructed to remain in clinic for 20 minutes afterwards, and to report any adverse reaction to me immediately.

## 2023-10-27 ASSESSMENT — ENCOUNTER SYMPTOMS: RESPIRATORY NEGATIVE: 1

## 2023-10-27 NOTE — TELEPHONE ENCOUNTER
George Travis called to request a refill on her medication.       Last office visit : 10/20/2023   Next office visit : Visit date not found     Requested Prescriptions     Pending Prescriptions Disp Refills    anastrozole (ARIMIDEX) 1 MG tablet 30 tablet 1     Sig: Take 1 tablet by mouth daily            Teofilo Winn CMA

## 2023-10-30 DIAGNOSIS — E11.9 TYPE 2 DIABETES MELLITUS TREATED WITHOUT INSULIN (HCC): ICD-10-CM

## 2023-10-30 DIAGNOSIS — Z00.00 ANNUAL PHYSICAL EXAM: ICD-10-CM

## 2023-10-30 DIAGNOSIS — E78.2 MIXED HYPERLIPIDEMIA: ICD-10-CM

## 2023-10-30 LAB
ALBUMIN SERPL-MCNC: 3.8 G/DL (ref 3.5–5.2)
ALP SERPL-CCNC: 77 U/L (ref 35–104)
ALT SERPL-CCNC: 28 U/L (ref 5–33)
ANION GAP SERPL CALCULATED.3IONS-SCNC: 18 MMOL/L (ref 7–19)
AST SERPL-CCNC: 20 U/L (ref 5–32)
BASOPHILS # BLD: 0.1 K/UL (ref 0–0.2)
BASOPHILS NFR BLD: 1.7 % (ref 0–1)
BILIRUB SERPL-MCNC: <0.2 MG/DL (ref 0.2–1.2)
BUN SERPL-MCNC: 17 MG/DL (ref 8–23)
CALCIUM SERPL-MCNC: 9.2 MG/DL (ref 8.8–10.2)
CHLORIDE SERPL-SCNC: 101 MMOL/L (ref 98–111)
CHOLEST SERPL-MCNC: 97 MG/DL (ref 160–199)
CO2 SERPL-SCNC: 24 MMOL/L (ref 22–29)
CREAT SERPL-MCNC: 1 MG/DL (ref 0.5–0.9)
EOSINOPHIL # BLD: 0.1 K/UL (ref 0–0.6)
EOSINOPHIL NFR BLD: 2.1 % (ref 0–5)
ERYTHROCYTE [DISTWIDTH] IN BLOOD BY AUTOMATED COUNT: 12.9 % (ref 11.5–14.5)
GLUCOSE SERPL-MCNC: 105 MG/DL (ref 74–109)
HBA1C MFR BLD: 5.8 % (ref 4–6)
HCT VFR BLD AUTO: 39.1 % (ref 37–47)
HDLC SERPL-MCNC: 31 MG/DL (ref 65–121)
HGB BLD-MCNC: 12.7 G/DL (ref 12–16)
IMM GRANULOCYTES # BLD: 0 K/UL
LDLC SERPL CALC-MCNC: 35 MG/DL
LYMPHOCYTES # BLD: 1.5 K/UL (ref 1.1–4.5)
LYMPHOCYTES NFR BLD: 30.7 % (ref 20–40)
MCH RBC QN AUTO: 35.1 PG (ref 27–31)
MCHC RBC AUTO-ENTMCNC: 32.5 G/DL (ref 33–37)
MCV RBC AUTO: 108 FL (ref 81–99)
MONOCYTES # BLD: 0.3 K/UL (ref 0–0.9)
MONOCYTES NFR BLD: 6.3 % (ref 0–10)
NEUTROPHILS # BLD: 2.8 K/UL (ref 1.5–7.5)
NEUTS SEG NFR BLD: 58.4 % (ref 50–65)
PLATELET # BLD AUTO: 333 K/UL (ref 130–400)
PMV BLD AUTO: 9.3 FL (ref 9.4–12.3)
POTASSIUM SERPL-SCNC: 4.1 MMOL/L (ref 3.5–5)
PROT SERPL-MCNC: 7 G/DL (ref 6.6–8.7)
RBC # BLD AUTO: 3.62 M/UL (ref 4.2–5.4)
SODIUM SERPL-SCNC: 143 MMOL/L (ref 136–145)
TRIGL SERPL-MCNC: 155 MG/DL (ref 0–149)
TSH SERPL DL<=0.005 MIU/L-ACNC: 1.5 UIU/ML (ref 0.35–5.5)
WBC # BLD AUTO: 4.8 K/UL (ref 4.8–10.8)

## 2023-10-30 RX ORDER — ANASTROZOLE 1 MG/1
1 TABLET ORAL DAILY
Qty: 30 TABLET | Refills: 1 | Status: SHIPPED | OUTPATIENT
Start: 2023-10-30

## 2023-11-09 ENCOUNTER — TELEPHONE (OUTPATIENT)
Dept: FAMILY MEDICINE CLINIC | Age: 63
End: 2023-11-09

## 2023-11-09 NOTE — TELEPHONE ENCOUNTER
----- Message from MAEVE Horton sent at 11/9/2023 12:55 PM CST -----  Cholesterol levels are even better, continue current plan. Electrolyte panel, kidney function, and liver functions are healthy. General blood counts are overall stable at this point. Thyroid is stable. 3-month blood sugar average is healthy at 5.8%. I do recommend a follow-up here every 3-6 months ideally.

## 2023-11-09 NOTE — TELEPHONE ENCOUNTER
Unable to reach patient, no answer. LVM with results. Encouraged patient to reach out to us if there are any questions or concerns.

## 2023-11-15 ENCOUNTER — TRANSCRIBE ORDERS (OUTPATIENT)
Dept: ADMINISTRATIVE | Facility: HOSPITAL | Age: 63
End: 2023-11-15
Payer: COMMERCIAL

## 2023-11-15 DIAGNOSIS — Z17.0 MALIGNANT NEOPLASM OF OVERLAPPING SITES OF LEFT BREAST IN FEMALE, ESTROGEN RECEPTOR POSITIVE: Primary | ICD-10-CM

## 2023-11-15 DIAGNOSIS — C50.812 MALIGNANT NEOPLASM OF OVERLAPPING SITES OF LEFT BREAST IN FEMALE, ESTROGEN RECEPTOR POSITIVE: Primary | ICD-10-CM

## 2023-12-06 ENCOUNTER — HOSPITAL ENCOUNTER (OUTPATIENT)
Dept: CT IMAGING | Facility: HOSPITAL | Age: 63
Discharge: HOME | End: 2023-12-06
Payer: COMMERCIAL

## 2023-12-06 DIAGNOSIS — C50.812 MALIGNANT NEOPLASM OF OVERLAPPING SITES OF LEFT BREAST IN FEMALE, ESTROGEN RECEPTOR POSITIVE: ICD-10-CM

## 2023-12-06 DIAGNOSIS — Z17.0 MALIGNANT NEOPLASM OF OVERLAPPING SITES OF LEFT BREAST IN FEMALE, ESTROGEN RECEPTOR POSITIVE: ICD-10-CM

## 2023-12-06 PROCEDURE — 78815 PET IMAGE W/CT SKULL-THIGH: CPT

## 2023-12-06 PROCEDURE — 0 FLUDEOXYGLUCOSE F18 SOLUTION: Performed by: INTERNAL MEDICINE

## 2023-12-06 PROCEDURE — A9552 F18 FDG: HCPCS | Performed by: INTERNAL MEDICINE

## 2023-12-06 RX ADMIN — FLUDEOXYGLUCOSE F 18 1 DOSE: 200 INJECTION, SOLUTION INTRAVENOUS at 13:37

## 2023-12-13 DIAGNOSIS — E03.9 ACQUIRED HYPOTHYROIDISM: ICD-10-CM

## 2023-12-13 DIAGNOSIS — I10 ESSENTIAL HYPERTENSION: ICD-10-CM

## 2023-12-13 RX ORDER — LEVOTHYROXINE SODIUM 0.07 MG/1
TABLET ORAL
Qty: 90 TABLET | Refills: 1 | Status: SHIPPED | OUTPATIENT
Start: 2023-12-13

## 2023-12-13 RX ORDER — CARVEDILOL 3.12 MG/1
TABLET ORAL
Qty: 180 TABLET | Refills: 1 | Status: SHIPPED | OUTPATIENT
Start: 2023-12-13

## 2023-12-13 RX ORDER — PILOCARPINE HYDROCHLORIDE 5 MG/1
TABLET, FILM COATED ORAL
Qty: 270 TABLET | Refills: 1 | Status: SHIPPED | OUTPATIENT
Start: 2023-12-13

## 2023-12-13 NOTE — TELEPHONE ENCOUNTER
Xiomara Young called to request a refill on her medication.       Last office visit : 10/20/2023   Next office visit : Visit date not found     Requested Prescriptions     Pending Prescriptions Disp Refills    carvedilol (COREG) 3.125 MG tablet [Pharmacy Med Name: carvedilol 3.125 mg tablet] 180 tablet 1     Sig: TAKE ONE TABLET BY MOUTH TWICE DAILY WITH MEALS    levothyroxine (SYNTHROID) 75 MCG tablet [Pharmacy Med Name: levothyroxine 75 mcg tablet] 90 tablet 1     Sig: TAKE ONE TABLET BY MOUTH DAILY    pilocarpine (SALAGEN) 5 MG tablet [Pharmacy Med Name: pilocarpine 5 mg tablet] 270 tablet 1     Sig: TAKE ONE TABLET BY GARFIELD Carreon

## 2023-12-14 NOTE — TELEPHONE ENCOUNTER
Aggie Beckham called to request a refill on her medication.      Last office visit : 10/20/2023   Next office visit : Visit date not found     Requested Prescriptions     Pending Prescriptions Disp Refills    anastrozole (ARIMIDEX) 1 MG tablet [Pharmacy Med Name: anastrozole 1 mg tablet] 30 tablet 1     Sig: TAKE ONE TABLET BY MOUTH DAILY            Saadia Pang MA

## 2023-12-15 RX ORDER — ANASTROZOLE 1 MG/1
1 TABLET ORAL DAILY
Qty: 30 TABLET | Refills: 1 | Status: SHIPPED | OUTPATIENT
Start: 2023-12-15 | End: 2023-12-20

## 2023-12-24 DIAGNOSIS — E11.9 TYPE 2 DIABETES MELLITUS TREATED WITHOUT INSULIN (HCC): ICD-10-CM

## 2023-12-27 RX ORDER — SEMAGLUTIDE 0.68 MG/ML
INJECTION, SOLUTION SUBCUTANEOUS
Qty: 3 ML | Refills: 1 | Status: SHIPPED | OUTPATIENT
Start: 2023-12-27

## 2023-12-27 NOTE — TELEPHONE ENCOUNTER
Natalee Montoya called to request a refill on her medication.       Last office visit : 10/20/2023   Next office visit : Visit date not found     Requested Prescriptions     Pending Prescriptions Disp Refills    OZEMPIC, 0.25 OR 0.5 MG/DOSE, 2 MG/3ML SOPN [Pharmacy Med Name: Ozempic 0.25 mg or 0.5 mg (2 mg/3 mL) subcutaneous pen injector] 3 mL 1     Sig: INJECT 0.25MG [address]            [facility], [place]

## 2024-01-12 ENCOUNTER — OFFICE VISIT (OUTPATIENT)
Dept: FAMILY MEDICINE CLINIC | Age: 64
End: 2024-01-12
Payer: COMMERCIAL

## 2024-01-12 VITALS
HEIGHT: 60 IN | BODY MASS INDEX: 39.07 KG/M2 | TEMPERATURE: 97.6 F | HEART RATE: 76 BPM | WEIGHT: 199 LBS | DIASTOLIC BLOOD PRESSURE: 84 MMHG | RESPIRATION RATE: 20 BRPM | SYSTOLIC BLOOD PRESSURE: 114 MMHG | OXYGEN SATURATION: 95 %

## 2024-01-12 DIAGNOSIS — C79.81: ICD-10-CM

## 2024-01-12 DIAGNOSIS — R05.3 PERSISTENT COUGH FOR 3 WEEKS OR LONGER: Primary | ICD-10-CM

## 2024-01-12 PROCEDURE — 3079F DIAST BP 80-89 MM HG: CPT | Performed by: NURSE PRACTITIONER

## 2024-01-12 PROCEDURE — 3074F SYST BP LT 130 MM HG: CPT | Performed by: NURSE PRACTITIONER

## 2024-01-12 PROCEDURE — 99214 OFFICE O/P EST MOD 30 MIN: CPT | Performed by: NURSE PRACTITIONER

## 2024-01-12 RX ORDER — DEXTROMETHORPHAN HYDROBROMIDE AND PROMETHAZINE HYDROCHLORIDE 15; 6.25 MG/5ML; MG/5ML
5 SYRUP ORAL 4 TIMES DAILY PRN
Qty: 160 ML | Refills: 0 | Status: SHIPPED | OUTPATIENT
Start: 2024-01-12 | End: 2024-01-19

## 2024-01-12 RX ORDER — AMOXICILLIN AND CLAVULANATE POTASSIUM 875; 125 MG/1; MG/1
1 TABLET, FILM COATED ORAL 2 TIMES DAILY
Qty: 20 TABLET | Refills: 0 | Status: SHIPPED | OUTPATIENT
Start: 2024-01-12 | End: 2024-01-22

## 2024-01-12 RX ORDER — BREXPIPRAZOLE 0.5 MG/1
0.5 TABLET ORAL DAILY
COMMUNITY
Start: 2023-12-19

## 2024-01-12 RX ORDER — CELECOXIB 200 MG/1
200 CAPSULE ORAL DAILY
COMMUNITY
Start: 2023-12-19

## 2024-01-12 ASSESSMENT — PATIENT HEALTH QUESTIONNAIRE - PHQ9
8. MOVING OR SPEAKING SO SLOWLY THAT OTHER PEOPLE COULD HAVE NOTICED. OR THE OPPOSITE, BEING SO FIGETY OR RESTLESS THAT YOU HAVE BEEN MOVING AROUND A LOT MORE THAN USUAL: 0
4. FEELING TIRED OR HAVING LITTLE ENERGY: 0
SUM OF ALL RESPONSES TO PHQ QUESTIONS 1-9: 0
SUM OF ALL RESPONSES TO PHQ9 QUESTIONS 1 & 2: 0
SUM OF ALL RESPONSES TO PHQ QUESTIONS 1-9: 0
3. TROUBLE FALLING OR STAYING ASLEEP: 0
SUM OF ALL RESPONSES TO PHQ QUESTIONS 1-9: 0
5. POOR APPETITE OR OVEREATING: 0
9. THOUGHTS THAT YOU WOULD BE BETTER OFF DEAD, OR OF HURTING YOURSELF: 0
6. FEELING BAD ABOUT YOURSELF - OR THAT YOU ARE A FAILURE OR HAVE LET YOURSELF OR YOUR FAMILY DOWN: 0
10. IF YOU CHECKED OFF ANY PROBLEMS, HOW DIFFICULT HAVE THESE PROBLEMS MADE IT FOR YOU TO DO YOUR WORK, TAKE CARE OF THINGS AT HOME, OR GET ALONG WITH OTHER PEOPLE: 0
SUM OF ALL RESPONSES TO PHQ QUESTIONS 1-9: 0
1. LITTLE INTEREST OR PLEASURE IN DOING THINGS: 0
7. TROUBLE CONCENTRATING ON THINGS, SUCH AS READING THE NEWSPAPER OR WATCHING TELEVISION: 0
2. FEELING DOWN, DEPRESSED OR HOPELESS: 0

## 2024-01-12 ASSESSMENT — ENCOUNTER SYMPTOMS
SHORTNESS OF BREATH: 0
COUGH: 1

## 2024-01-12 NOTE — PROGRESS NOTES
SUBJECTIVE:    Patient ID: Aggie Beckham is a63 y.o. female.  Aggie Beckham is here today for Cough (Patient presents c/o chronic productive cough and she doesn't think its medication related. )  .    HPI:   HPI     Cough  Onset was 2 months ago  Pt concerned this is not linked to medications.  No new medications were started at this timeframe.  Does report that she did have immunizations ---td, hep A and B (goes for second B next week), yellow fever and \"something else\" done about the same time as cough began.  States cough began within 1wk of immunizations.  Does have loose sound but states that nothing is coming up/out.  States sometimes cough so intense near vomiting.  Cough is unchanged at this point.  Cough is interrupting sleep.    Last imaging was PET scan 12/2023 showing suspicious bone metastases with uptake changes at right proximal femur.  Chest showed no pulm nodules or lymphadenopathy. Left axillay lymph node.    Is continuing f/u with oncology.            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 Authorizing Provider   REXULTI 0.5 MG TABS tablet Take 1 tablet by mouth daily Yes Provider, MD Angel   celecoxib (CELEBREX) 200 MG capsule Take 1 capsule by mouth daily Yes Provider, MD Angel   amoxicillin-clavulanate (AUGMENTIN) 875-125 MG per tablet Take 1 tablet by mouth 2 times daily for 10 days Yes Tea Lu, APRN

## 2024-01-25 ENCOUNTER — HOSPITAL ENCOUNTER (OUTPATIENT)
Dept: GENERAL RADIOLOGY | Age: 64
Discharge: HOME | End: 2024-01-25
Payer: COMMERCIAL

## 2024-01-25 DIAGNOSIS — R05.3 PERSISTENT COUGH FOR 3 WEEKS OR LONGER: ICD-10-CM

## 2024-01-25 DIAGNOSIS — C79.81: ICD-10-CM

## 2024-01-25 PROCEDURE — 71250 CT THORAX DX C-: CPT

## 2024-01-30 DIAGNOSIS — G25.81 RLS (RESTLESS LEGS SYNDROME): ICD-10-CM

## 2024-01-30 DIAGNOSIS — G47.00 INSOMNIA, UNSPECIFIED TYPE: ICD-10-CM

## 2024-02-01 ENCOUNTER — PATIENT MESSAGE (OUTPATIENT)
Dept: FAMILY MEDICINE CLINIC | Age: 64
End: 2024-02-01

## 2024-02-01 DIAGNOSIS — E87.5 HYPERKALEMIA: Primary | ICD-10-CM

## 2024-02-01 NOTE — TELEPHONE ENCOUNTER
Aggie Beckham called to request a refill on her medication.      Last office visit : 1/12/2024   Next office visit : Visit date not found     Last UDS:   Amphetamine Screen, Urine   Date Value Ref Range Status   10/17/2022 negative  Final     Barbiturate Screen, Urine   Date Value Ref Range Status   10/17/2022 negative  Final     Benzodiazepine Screen, Urine   Date Value Ref Range Status   10/17/2022 negative  Final     Buprenorphine Urine   Date Value Ref Range Status   10/17/2022 negative  Final     Cocaine Metabolite Screen, Urine   Date Value Ref Range Status   10/17/2022 negative  Final     Gabapentin Screen, Urine   Date Value Ref Range Status   10/17/2022 not tested  Final     MDMA, Urine   Date Value Ref Range Status   10/17/2022 negative  Final     Methamphetamine, Urine   Date Value Ref Range Status   10/17/2022 negative  Final     Opiate Scrn, Ur   Date Value Ref Range Status   10/17/2022 positive (A)  Final     Comment:     reported on medlist     Oxycodone Screen, Ur   Date Value Ref Range Status   10/17/2022 negative  Final     PCP Screen, Urine   Date Value Ref Range Status   10/17/2022 negative  Final     Propoxyphene Screen, Urine   Date Value Ref Range Status   10/17/2022 not tested  Final     THC Screen, Urine   Date Value Ref Range Status   10/17/2022 negative  Final     Tricyclic Antidepressants, Urine   Date Value Ref Range Status   10/17/2022 not tested  Final       Last Gerardo: 02/01/2024    GERARDO was reviewed today per office protocol. Report shows No discrepancies.  Fill pattern is consistent from single provider(s) at single pharmacy(s).    Medication Contract: 05/03/2023   Last Fill: 10/11/2023    Requested Prescriptions     Pending Prescriptions Disp Refills    gabapentin (NEURONTIN) 300 MG capsule [Pharmacy Med Name: gabapentin 300 mg capsule] 270 capsule 0     Sig: TAKE ONE CAPSULE BY MOUTH THREE TIMES DAILY         Please approve or refuse this medication.   Lisa Nagy MA

## 2024-02-02 RX ORDER — GABAPENTIN 300 MG/1
CAPSULE ORAL
Qty: 270 CAPSULE | Refills: 0 | Status: SHIPPED | OUTPATIENT
Start: 2024-02-02 | End: 2024-05-02

## 2024-02-02 NOTE — TELEPHONE ENCOUNTER
From: Aggie Beckham  To: Tea Lu  Sent: 2/1/2024 2:55 PM CST  Subject: Blood test    Tea Machuca. I saw my oncologist today. He says my potassium is a little high. He would like me to get another blood test before we leave for the [facility]. Can we get that accomplished within the week? I leave on Feb 14.

## 2024-02-12 DIAGNOSIS — E87.5 HYPERKALEMIA: ICD-10-CM

## 2024-02-12 LAB
ANION GAP SERPL CALCULATED.3IONS-SCNC: 11 MMOL/L (ref 7–19)
BUN SERPL-MCNC: 20 MG/DL (ref 8–23)
CALCIUM SERPL-MCNC: 9.8 MG/DL (ref 8.8–10.2)
CHLORIDE SERPL-SCNC: 101 MMOL/L (ref 98–111)
CO2 SERPL-SCNC: 28 MMOL/L (ref 22–29)
CREAT SERPL-MCNC: 1 MG/DL (ref 0.5–0.9)
GLUCOSE SERPL-MCNC: 126 MG/DL (ref 74–109)
POTASSIUM SERPL-SCNC: 3.8 MMOL/L (ref 3.5–5)
SODIUM SERPL-SCNC: 140 MMOL/L (ref 136–145)

## 2024-02-19 DIAGNOSIS — Z79.899 MEDICATION MANAGEMENT: Primary | ICD-10-CM

## 2024-03-13 DIAGNOSIS — G25.81 RLS (RESTLESS LEGS SYNDROME): ICD-10-CM

## 2024-03-13 DIAGNOSIS — E79.0 ELEVATED URIC ACID IN BLOOD: ICD-10-CM

## 2024-03-13 DIAGNOSIS — I10 ESSENTIAL HYPERTENSION: ICD-10-CM

## 2024-03-13 RX ORDER — PRAMIPEXOLE DIHYDROCHLORIDE 1.5 MG/1
TABLET ORAL
Qty: 135 TABLET | Refills: 1 | Status: SHIPPED | OUTPATIENT
Start: 2024-03-13

## 2024-03-13 RX ORDER — SPIRONOLACTONE 25 MG/1
TABLET ORAL
Qty: 270 TABLET | Refills: 1 | Status: SHIPPED | OUTPATIENT
Start: 2024-03-13

## 2024-03-13 RX ORDER — ALLOPURINOL 100 MG/1
TABLET ORAL
Qty: 150 TABLET | Refills: 1 | Status: SHIPPED | OUTPATIENT
Start: 2024-03-13

## 2024-03-13 NOTE — TELEPHONE ENCOUNTER
Aggie LYNSEY Beckham called to request a refill on her medication.      Last office visit : 1/12/2024   Next office visit : Visit date not found     Requested Prescriptions     Pending Prescriptions Disp Refills    allopurinol (ZYLOPRIM) 100 MG tablet [Pharmacy Med Name: allopurinol 100 mg tablet] 150 tablet 1     Sig: TAKE ONE TABLET BY MOUTH DAILY    spironolactone (ALDACTONE) 25 MG tablet [Pharmacy Med Name: spironolactone 25 mg tablet] 270 tablet 1     Sig: TAKE TWO TABLETS BY MOUTH EVERY MORNING AND TAKE ONE TABLET AT BEDTIME    pramipexole (MIRAPEX) 1.5 MG tablet [Pharmacy Med Name: pramipexole 1.5 mg tablet] 135 tablet 1     Sig: TAKE 1 AND 1/2 TABLETS BY MOUTH DAILY            Judy Madison MA

## 2024-03-18 RX ORDER — ANASTROZOLE 1 MG/1
1 TABLET ORAL DAILY
Qty: 90 TABLET | Refills: 0 | Status: SHIPPED | OUTPATIENT
Start: 2024-03-18 | End: 2024-03-20

## 2024-03-18 NOTE — TELEPHONE ENCOUNTER
Aggie Beckham called to request a refill on her medication.      Last office visit : 1/12/2024   Next office visit : Visit date not found     Requested Prescriptions     Pending Prescriptions Disp Refills    anastrozole (ARIMIDEX) 1 MG tablet [Pharmacy Med Name: anastrozole 1 mg tablet] 90 tablet 0     Sig: TAKE ONE TABLET BY MOUTH EVERY DAY            Lisa Nagy MA   (4) no apparent problem

## 2024-03-20 ENCOUNTER — OFFICE VISIT (OUTPATIENT)
Dept: PULMONOLOGY | Age: 64
End: 2024-03-20
Payer: COMMERCIAL

## 2024-03-20 VITALS
SYSTOLIC BLOOD PRESSURE: 121 MMHG | BODY MASS INDEX: 38.79 KG/M2 | HEART RATE: 74 BPM | HEIGHT: 60 IN | OXYGEN SATURATION: 95 % | WEIGHT: 197.6 LBS | DIASTOLIC BLOOD PRESSURE: 77 MMHG | TEMPERATURE: 96.8 F

## 2024-03-20 DIAGNOSIS — R05.3 CHRONIC COUGH: Primary | ICD-10-CM

## 2024-03-20 DIAGNOSIS — K21.9 GASTROESOPHAGEAL REFLUX DISEASE WITHOUT ESOPHAGITIS: ICD-10-CM

## 2024-03-20 DIAGNOSIS — C50.912 INFLAMMATORY CARCINOMA OF LEFT BREAST (HCC): ICD-10-CM

## 2024-03-20 DIAGNOSIS — Z87.891 HISTORY OF SMOKING: ICD-10-CM

## 2024-03-20 DIAGNOSIS — G47.33 SLEEP APNEA, OBSTRUCTIVE: ICD-10-CM

## 2024-03-20 DIAGNOSIS — K22.89 PRESBYESOPHAGUS: ICD-10-CM

## 2024-03-20 PROCEDURE — 3074F SYST BP LT 130 MM HG: CPT | Performed by: INTERNAL MEDICINE

## 2024-03-20 PROCEDURE — 3078F DIAST BP <80 MM HG: CPT | Performed by: INTERNAL MEDICINE

## 2024-03-20 PROCEDURE — 99204 OFFICE O/P NEW MOD 45 MIN: CPT | Performed by: INTERNAL MEDICINE

## 2024-03-20 RX ORDER — FLUTICASONE FUROATE AND VILANTEROL 100; 25 UG/1; UG/1
1 POWDER RESPIRATORY (INHALATION) DAILY
Qty: 1 EACH | Refills: 11 | Status: SHIPPED | OUTPATIENT
Start: 2024-03-20

## 2024-03-20 RX ORDER — OMEPRAZOLE 40 MG/1
40 CAPSULE, DELAYED RELEASE ORAL
Qty: 30 CAPSULE | Refills: 11 | Status: SHIPPED | OUTPATIENT
Start: 2024-03-20

## 2024-03-20 RX ORDER — ALBUTEROL SULFATE 90 UG/1
2 AEROSOL, METERED RESPIRATORY (INHALATION) 4 TIMES DAILY PRN
Qty: 18 G | Refills: 5 | Status: SHIPPED | OUTPATIENT
Start: 2024-03-20

## 2024-03-20 ASSESSMENT — ENCOUNTER SYMPTOMS
BACK PAIN: 0
ABDOMINAL DISTENTION: 0
CHEST TIGHTNESS: 0
WHEEZING: 0
COUGH: 0
ABDOMINAL PAIN: 0
ANAL BLEEDING: 0
RHINORRHEA: 0
SHORTNESS OF BREATH: 0
APNEA: 0

## 2024-03-20 NOTE — PROGRESS NOTES
visual disturbance.   Respiratory:  Negative for apnea, cough, chest tightness, shortness of breath and wheezing.    Gastrointestinal:  Negative for abdominal distention, abdominal pain and anal bleeding.   Endocrine: Negative for cold intolerance, heat intolerance and polydipsia.   Genitourinary:  Negative for difficulty urinating, dysuria, enuresis and flank pain.   Musculoskeletal:  Negative for arthralgias, back pain and gait problem.   Allergic/Immunologic: Negative for environmental allergies.   Neurological:  Negative for dizziness, facial asymmetry, light-headedness and headaches.       Vitals:    03/20/24 1203   BP: 121/77   Pulse: 74   Temp: 96.8 °F (36 °C)   SpO2: 95%     BMI Readings from Last 1 Encounters:   03/20/24 38.59 kg/m²         Physical Exam  Vitals reviewed.   Constitutional:       Appearance: Normal appearance. She is obese.   HENT:      Head: Normocephalic and atraumatic.      Nose: Nose normal.   Eyes:      Extraocular Movements: Extraocular movements intact.      Conjunctiva/sclera: Conjunctivae normal.   Cardiovascular:      Rate and Rhythm: Normal rate and regular rhythm.      Heart sounds: No murmur heard.     No friction rub.   Pulmonary:      Effort: Pulmonary effort is normal. No respiratory distress.      Breath sounds: Normal breath sounds. No stridor. No wheezing, rhonchi or rales.   Abdominal:      General: There is no distension.      Palpations: There is no mass.      Tenderness: There is no abdominal tenderness. There is no guarding or rebound.   Musculoskeletal:      Cervical back: Normal range of motion and neck supple.   Neurological:      Mental Status: She is alert and oriented to person, place, and time.             This note was generated using a voice recognition software. Errors in voice recognition may have occurred.      An electronic signature was used to authenticate this note.    --Lionel Goldstein MD

## 2024-05-02 ENCOUNTER — OFFICE VISIT (OUTPATIENT)
Dept: PULMONOLOGY | Age: 64
End: 2024-05-02

## 2024-05-02 VITALS
BODY MASS INDEX: 38.28 KG/M2 | WEIGHT: 195 LBS | HEIGHT: 60 IN | DIASTOLIC BLOOD PRESSURE: 86 MMHG | SYSTOLIC BLOOD PRESSURE: 137 MMHG | HEART RATE: 72 BPM | RESPIRATION RATE: 18 BRPM | OXYGEN SATURATION: 94 %

## 2024-05-02 DIAGNOSIS — G47.33 SLEEP APNEA, OBSTRUCTIVE: ICD-10-CM

## 2024-05-02 DIAGNOSIS — K21.9 GASTROESOPHAGEAL REFLUX DISEASE WITHOUT ESOPHAGITIS: ICD-10-CM

## 2024-05-02 DIAGNOSIS — R05.3 CHRONIC COUGH: Primary | ICD-10-CM

## 2024-05-02 DIAGNOSIS — K22.89 PRESBYESOPHAGUS: ICD-10-CM

## 2024-05-02 DIAGNOSIS — Z87.891 HISTORY OF SMOKING: ICD-10-CM

## 2024-05-02 DIAGNOSIS — C50.912 INFLAMMATORY CARCINOMA OF LEFT BREAST (HCC): ICD-10-CM

## 2024-05-02 RX ORDER — ANASTROZOLE 1 MG/1
1 TABLET ORAL DAILY
COMMUNITY

## 2024-05-02 ASSESSMENT — ENCOUNTER SYMPTOMS
APNEA: 0
BACK PAIN: 0
COUGH: 1
CHEST TIGHTNESS: 0
ANAL BLEEDING: 0
ABDOMINAL DISTENTION: 0
ABDOMINAL PAIN: 0
WHEEZING: 1
RHINORRHEA: 0
SHORTNESS OF BREATH: 0

## 2024-05-02 NOTE — PROGRESS NOTES
Pulmonary and Sleep Medicine    Aggie Beckham (:  1960) is a 64 y.o. female,Established patient, here for evaluation of the following chief complaint(s):  Follow-up (Follow up for cough, pt advised improvement )      Referring physician:  No referring provider defined for this encounter.     ASSESSMENT/PLAN:  1. Chronic cough  2. Presbyesophagus  3. Inflammatory carcinoma of left breast (HCC)  4. Sleep apnea, obstructive, not on CPAP  5. Gastroesophageal reflux disease without esophagitis  6. History of smoking        Continue current management with the Breo inhaler.  Continue current management with omeprazole for treatment of GERD which I feel is an exacerbating factor in her symptoms.  Continue albuterol as needed.       Lionel Goldstein MD, [facility], [facility], [place]    Return in about 6 months (around 2024).    SUBJECTIVE/OBJECTIVE:        Patient is here for follow-up on chronic cough and reflux.  She says her cough has improved she still coughs occasionally.  She is on antireflux medicines.  Overall she feels okay with her symptoms.        Prior to Visit Medications    Medication Sig Taking? Authorizing Provider   anastrozole (ARIMIDEX) 1 MG tablet Take 1 tablet by mouth daily Yes Provider, MD Angel   fluticasone furoate-vilanterol (BREO ELLIPTA) 100-25 MCG/ACT inhaler Inhale 1 puff into the lungs daily Yes Lionel Goldstein MD   albuterol sulfate HFA (VENTOLIN HFA) 108 (90 Base) MCG/ACT inhaler Inhale 2 puffs into the lungs 4 times daily as needed for Wheezing Yes Lionel Goldstein MD   omeprazole (PRILOSEC) 40 MG delayed release capsule Take 1 capsule by mouth every morning (before breakfast) Yes Lionel Goldstein MD   allopurinol (ZYLOPRIM) 100 MG tablet TAKE ONE TABLET BY MOUTH DAILY Yes Tea Lu APRN   spironolactone (ALDACTONE) 25 MG tablet TAKE TWO TABLETS BY MOUTH EVERY MORNING AND TAKE ONE TABLET AT BEDTIME Yes Tea Lu APRN   pramipexole (MIRAPEX) 1.5

## 2024-05-07 ENCOUNTER — TRANSCRIBE ORDERS (OUTPATIENT)
Dept: ADMINISTRATIVE | Facility: HOSPITAL | Age: 64
End: 2024-05-07
Payer: COMMERCIAL

## 2024-05-07 DIAGNOSIS — C50.812 MALIGNANT NEOPLASM OF OVERLAPPING SITES OF LEFT BREAST IN FEMALE, ESTROGEN RECEPTOR POSITIVE: Primary | ICD-10-CM

## 2024-05-07 DIAGNOSIS — Z17.0 MALIGNANT NEOPLASM OF OVERLAPPING SITES OF LEFT BREAST IN FEMALE, ESTROGEN RECEPTOR POSITIVE: Primary | ICD-10-CM

## 2024-05-22 ENCOUNTER — HOSPITAL ENCOUNTER (OUTPATIENT)
Dept: CT IMAGING | Facility: HOSPITAL | Age: 64
Discharge: HOME | End: 2024-05-22
Payer: COMMERCIAL

## 2024-05-22 DIAGNOSIS — Z17.0 MALIGNANT NEOPLASM OF OVERLAPPING SITES OF LEFT BREAST IN FEMALE, ESTROGEN RECEPTOR POSITIVE: ICD-10-CM

## 2024-05-22 DIAGNOSIS — C50.812 MALIGNANT NEOPLASM OF OVERLAPPING SITES OF LEFT BREAST IN FEMALE, ESTROGEN RECEPTOR POSITIVE: ICD-10-CM

## 2024-05-22 PROCEDURE — 0 FLUDEOXYGLUCOSE F18 SOLUTION: Performed by: NURSE PRACTITIONER

## 2024-05-22 PROCEDURE — A9552 F18 FDG: HCPCS | Performed by: NURSE PRACTITIONER

## 2024-05-22 PROCEDURE — 78815 PET IMAGE W/CT SKULL-THIGH: CPT

## 2024-05-22 RX ADMIN — FLUDEOXYGLUCOSE F 18 1 DOSE: 200 INJECTION, SOLUTION INTRAVENOUS at 10:45

## 2024-05-23 DIAGNOSIS — E11.9 TYPE 2 DIABETES MELLITUS TREATED WITHOUT INSULIN (HCC): ICD-10-CM

## 2024-05-23 RX ORDER — SEMAGLUTIDE 0.68 MG/ML
INJECTION, SOLUTION SUBCUTANEOUS
Qty: 3 ML | Refills: 1 | Status: SHIPPED | OUTPATIENT
Start: 2024-05-23

## 2024-05-23 NOTE — TELEPHONE ENCOUNTER
PCP is out of the office.     PHARMACY called to request a refill on her medication.      Last office visit : 1/12/2024   Next office visit : Visit date not found     Requested Prescriptions     Pending Prescriptions Disp Refills    OZEMPIC, 0.25 OR 0.5 MG/DOSE, 2 MG/3ML SOPN [Pharmacy Med Name: Ozempic 0.25 mg or 0.5 mg (2 mg/3 mL) subcutaneous pen injector] 3 mL 1     Sig: INJECT 0.25MG UNDER THE SKIN EVERY WEEK            L[facility] Nagy [facility], [facility]

## 2024-05-28 DIAGNOSIS — G25.81 RLS (RESTLESS LEGS SYNDROME): ICD-10-CM

## 2024-05-28 DIAGNOSIS — G47.00 INSOMNIA, UNSPECIFIED TYPE: ICD-10-CM

## 2024-05-28 RX ORDER — GABAPENTIN 300 MG/1
CAPSULE ORAL
Qty: 270 CAPSULE | Refills: 0 | Status: SHIPPED | OUTPATIENT
Start: 2024-05-28 | End: 2024-08-26

## 2024-05-28 NOTE — TELEPHONE ENCOUNTER
PHARMACY called to request a refill on her medication.      Last office visit : 1/12/2024   Next office visit : Visit date not found     Requested Prescriptions     Pending Prescriptions Disp Refills    gabapentin (NEURONTIN) 300 MG capsule [Pharmacy Med Name: gabapentin 300 mg capsule] 270 capsule 0     Sig: TAKE ONE CAPSULE BY MOUTH THREE TIMES DAILY            Lisa Nagy MA, CCMA

## 2024-06-10 DIAGNOSIS — I10 ESSENTIAL HYPERTENSION: ICD-10-CM

## 2024-06-10 DIAGNOSIS — E03.9 ACQUIRED HYPOTHYROIDISM: ICD-10-CM

## 2024-06-10 DIAGNOSIS — E78.2 MIXED HYPERLIPIDEMIA: ICD-10-CM

## 2024-06-10 RX ORDER — LEVOTHYROXINE SODIUM 0.07 MG/1
TABLET ORAL
Qty: 90 TABLET | Refills: 1 | Status: SHIPPED | OUTPATIENT
Start: 2024-06-10

## 2024-06-10 RX ORDER — CARVEDILOL 3.12 MG/1
TABLET ORAL
Qty: 180 TABLET | Refills: 1 | Status: SHIPPED | OUTPATIENT
Start: 2024-06-10

## 2024-06-10 RX ORDER — ATORVASTATIN CALCIUM 40 MG/1
TABLET, FILM COATED ORAL
Qty: 90 TABLET | Refills: 2 | Status: SHIPPED | OUTPATIENT
Start: 2024-06-10

## 2024-06-10 RX ORDER — PILOCARPINE HYDROCHLORIDE 5 MG/1
TABLET, FILM COATED ORAL
Qty: 270 TABLET | Refills: 1 | Status: SHIPPED | OUTPATIENT
Start: 2024-06-10

## 2024-06-10 RX ORDER — ALBUTEROL SULFATE 2.5 MG/3ML
SOLUTION RESPIRATORY (INHALATION)
Qty: 360 ML | Refills: 3 | Status: SHIPPED | OUTPATIENT
Start: 2024-06-10

## 2024-06-10 NOTE — TELEPHONE ENCOUNTER
PHARMACY called to request a refill on her medication.      Last office visit : 1/12/2024   Next office visit : Visit date not found     Requested Prescriptions     Pending Prescriptions Disp Refills    carvedilol (COREG) 3.125 MG tablet [Pharmacy Med Name: carvedilol 3.125 mg tablet] 180 tablet 1     Sig: TAKE ONE TABLET BY MOUTH TWICE DAILY WITH MEALS    albuterol (PROVENTIL) (2.5 MG/3ML) 0.083% nebulizer solution [Pharmacy Med Name: albuterol sulfate 2.5 mg/3 mL (0.083 %) solution for nebulization] 360 mL 3     Sig: INHALE 3MLS FOUR TIMES DAILY AS NEEDED FOR WHEEZING    atorvastatin (LIPITOR) 40 MG tablet [Pharmacy Med Name: atorvastatin 40 mg tablet] 90 tablet 2     Sig: TAKE ONE TABLET BY MOUTH NIGHTLY    pilocarpine (SALAGEN) 5 MG tablet [Pharmacy Med Name: pilocarpine 5 mg tablet] 270 tablet 1     Sig: TAKE ONE TABLET BY MOUTH THREE TIMES DAILY    levothyroxine (SYNTHROID) 75 MCG tablet [Pharmacy Med Name: levothyroxine 75 mcg tablet] 90 tablet 1     Sig: TAKE ONE TABLET BY MOUTH EVERY DAY            Lisa Nagy MA, [facility]

## 2024-06-20 NOTE — TELEPHONE ENCOUNTER
PHARMACY called to request a refill on her medication.      Last office visit : 1/12/2024   Next office visit : Visit date not found     Requested Prescriptions     Pending Prescriptions Disp Refills    anastrozole (ARIMIDEX) 1 MG tablet [Pharmacy Med Name: anastrozole 1 mg tablet] 90 tablet 0     Sig: TAKE ONE TABLET BY MOUTH EVERY DAY            Lisa Nagy MA, CCMA

## 2024-06-24 ENCOUNTER — TRANSCRIBE ORDERS (OUTPATIENT)
Dept: ADMINISTRATIVE | Facility: HOSPITAL | Age: 64
End: 2024-06-24
Payer: COMMERCIAL

## 2024-06-24 DIAGNOSIS — Z17.0 MALIGNANT NEOPLASM OF OVERLAPPING SITES OF LEFT BREAST IN FEMALE, ESTROGEN RECEPTOR POSITIVE: Primary | ICD-10-CM

## 2024-06-24 DIAGNOSIS — C50.812 MALIGNANT NEOPLASM OF OVERLAPPING SITES OF LEFT BREAST IN FEMALE, ESTROGEN RECEPTOR POSITIVE: Primary | ICD-10-CM

## 2024-06-24 RX ORDER — ANASTROZOLE 1 MG/1
1 TABLET ORAL DAILY
Qty: 90 TABLET | Refills: 0 | Status: SHIPPED | OUTPATIENT
Start: 2024-06-24

## 2024-06-26 ENCOUNTER — TRANSCRIBE ORDERS (OUTPATIENT)
Dept: ADMINISTRATIVE | Facility: HOSPITAL | Age: 64
End: 2024-06-26
Payer: COMMERCIAL

## 2024-06-26 DIAGNOSIS — C50.812 MALIGNANT NEOPLASM OF OVERLAPPING SITES OF LEFT BREAST IN FEMALE, ESTROGEN RECEPTOR POSITIVE: Primary | ICD-10-CM

## 2024-06-26 DIAGNOSIS — Z17.0 MALIGNANT NEOPLASM OF OVERLAPPING SITES OF LEFT BREAST IN FEMALE, ESTROGEN RECEPTOR POSITIVE: Primary | ICD-10-CM

## 2024-06-26 DIAGNOSIS — N63.20 MASS OF LEFT BREAST, UNSPECIFIED QUADRANT: ICD-10-CM

## 2024-06-26 DIAGNOSIS — Z17.0 ESTROGEN RECEPTOR POSITIVE: ICD-10-CM

## 2024-07-01 LAB — NCCN CRITERIA FLAG: NORMAL

## 2024-07-11 ENCOUNTER — HOSPITAL ENCOUNTER (OUTPATIENT)
Dept: MAMMOGRAPHY | Facility: HOSPITAL | Age: 64
Discharge: HOME | End: 2024-07-11
Payer: COMMERCIAL

## 2024-07-11 ENCOUNTER — HOSPITAL ENCOUNTER (OUTPATIENT)
Dept: ULTRASOUND IMAGING | Facility: HOSPITAL | Age: 64
Discharge: HOME | End: 2024-07-11
Payer: COMMERCIAL

## 2024-07-11 DIAGNOSIS — C50.812 MALIGNANT NEOPLASM OF OVERLAPPING SITES OF LEFT BREAST IN FEMALE, ESTROGEN RECEPTOR POSITIVE: ICD-10-CM

## 2024-07-11 DIAGNOSIS — N63.20 MASS OF LEFT BREAST, UNSPECIFIED QUADRANT: ICD-10-CM

## 2024-07-11 DIAGNOSIS — Z17.0 MALIGNANT NEOPLASM OF OVERLAPPING SITES OF LEFT BREAST IN FEMALE, ESTROGEN RECEPTOR POSITIVE: ICD-10-CM

## 2024-07-11 DIAGNOSIS — Z17.0 ESTROGEN RECEPTOR POSITIVE: ICD-10-CM

## 2024-07-11 PROCEDURE — G0279 TOMOSYNTHESIS, MAMMO: HCPCS

## 2024-07-11 PROCEDURE — 77066 DX MAMMO INCL CAD BI: CPT

## 2024-08-11 DIAGNOSIS — E11.9 TYPE 2 DIABETES MELLITUS TREATED WITHOUT INSULIN (HCC): ICD-10-CM

## 2024-08-12 RX ORDER — SEMAGLUTIDE 0.68 MG/ML
INJECTION, SOLUTION SUBCUTANEOUS
Qty: 3 ML | Refills: 1 | Status: SHIPPED | OUTPATIENT
Start: 2024-08-12

## 2024-08-23 RX ORDER — ANASTROZOLE 1 MG/1
1 TABLET ORAL DAILY
Qty: 90 TABLET | Refills: 0 | Status: SHIPPED | OUTPATIENT
Start: 2024-08-23

## 2024-08-23 NOTE — TELEPHONE ENCOUNTER
Aggie Beckham called to request a refill on her medication.      Last office visit : 1/12/2024   Next office visit : 9/13/2024     Requested Prescriptions     Pending Prescriptions Disp Refills    anastrozole (ARIMIDEX) 1 MG tablet [Pharmacy Med Name: anastrozole 1 mg tablet] 90 tablet 0     Sig: TAKE ONE TABLET BY MOUTH DAILY            Mali Whittaker LPN

## 2024-09-13 ENCOUNTER — OFFICE VISIT (OUTPATIENT)
Dept: FAMILY MEDICINE CLINIC | Age: 64
End: 2024-09-13
Payer: COMMERCIAL

## 2024-09-13 VITALS
TEMPERATURE: 97.3 F | DIASTOLIC BLOOD PRESSURE: 72 MMHG | HEART RATE: 76 BPM | OXYGEN SATURATION: 97 % | SYSTOLIC BLOOD PRESSURE: 128 MMHG | BODY MASS INDEX: 38.08 KG/M2 | WEIGHT: 195 LBS

## 2024-09-13 DIAGNOSIS — R53.83 FATIGUE, UNSPECIFIED TYPE: Primary | ICD-10-CM

## 2024-09-13 DIAGNOSIS — E79.0 ELEVATED URIC ACID IN BLOOD: ICD-10-CM

## 2024-09-13 DIAGNOSIS — E11.9 TYPE 2 DIABETES MELLITUS TREATED WITHOUT INSULIN (HCC): ICD-10-CM

## 2024-09-13 DIAGNOSIS — G47.00 INSOMNIA, UNSPECIFIED TYPE: ICD-10-CM

## 2024-09-13 DIAGNOSIS — Z79.899 MEDICATION MANAGEMENT: ICD-10-CM

## 2024-09-13 DIAGNOSIS — G25.81 RLS (RESTLESS LEGS SYNDROME): ICD-10-CM

## 2024-09-13 DIAGNOSIS — I10 ESSENTIAL HYPERTENSION: ICD-10-CM

## 2024-09-13 DIAGNOSIS — E78.2 MIXED HYPERLIPIDEMIA: ICD-10-CM

## 2024-09-13 DIAGNOSIS — Z79.899 DRUG THERAPY CONTINUED: ICD-10-CM

## 2024-09-13 LAB
ALCOHOL URINE: NORMAL
AMPHETAMINE SCREEN URINE: NORMAL
BACTERIA #/AREA URNS HPF: ABNORMAL /HPF
BARBITURATE SCREEN URINE: NORMAL
BENZODIAZEPINE SCREEN, URINE: NORMAL
BILIRUB UR QL STRIP: NEGATIVE
BUPRENORPHINE URINE: NORMAL
CLARITY UR: CLEAR
COCAINE METABOLITE SCREEN URINE: NORMAL
COLOR UR: ABNORMAL
CREAT UR-MCNC: 265 MG/DL (ref 28–217)
CRYSTALS URNS MICRO: ABNORMAL /HPF
FENTANYL SCREEN, URINE: NORMAL
GABAPENTIN SCREEN, URINE: NORMAL
GLUCOSE UR STRIP.AUTO-MCNC: NEGATIVE MG/DL
HGB UR STRIP.AUTO-MCNC: NEGATIVE MG/L
KETONES UR STRIP.AUTO-MCNC: ABNORMAL MG/DL
LEUKOCYTE ESTERASE UR QL STRIP.AUTO: ABNORMAL
MDMA, URINE: NORMAL
METHADONE SCREEN, URINE: NORMAL
METHAMPHETAMINE, URINE: NORMAL
MICROALBUMIN UR-MCNC: <1.2 MG/DL (ref 0–1.99)
MICROALBUMIN/CREAT UR-RTO: ABNORMAL MG/G
NITRITE UR QL STRIP.AUTO: NEGATIVE
OPIATE SCREEN URINE: NORMAL
OXYCODONE SCREEN URINE: NORMAL
PH UR STRIP.AUTO: 5.5 [PH] (ref 5–8)
PHENCYCLIDINE SCREEN URINE: NORMAL
PROPOXYPHENE SCREEN, URINE: NORMAL
PROT UR STRIP.AUTO-MCNC: ABNORMAL MG/DL
RBC #/AREA URNS HPF: ABNORMAL /HPF (ref 0–2)
SP GR UR STRIP.AUTO: 1.03 (ref 1–1.03)
SQUAMOUS #/AREA URNS HPF: ABNORMAL /HPF
SYNTHETIC CANNABINOIDS(K2) SCREEN, URINE: NORMAL
THC SCREEN, URINE: NORMAL
TRAMADOL SCREEN URINE: NORMAL
TRICYCLIC ANTIDEPRESSANTS, UR: NORMAL
UROBILINOGEN UR STRIP.AUTO-MCNC: 0.2 E.U./DL
WBC #/AREA URNS HPF: ABNORMAL /HPF (ref 0–5)

## 2024-09-13 PROCEDURE — 3074F SYST BP LT 130 MM HG: CPT | Performed by: NURSE PRACTITIONER

## 2024-09-13 PROCEDURE — 80305 DRUG TEST PRSMV DIR OPT OBS: CPT | Performed by: NURSE PRACTITIONER

## 2024-09-13 PROCEDURE — 3078F DIAST BP <80 MM HG: CPT | Performed by: NURSE PRACTITIONER

## 2024-09-13 PROCEDURE — 99214 OFFICE O/P EST MOD 30 MIN: CPT | Performed by: NURSE PRACTITIONER

## 2024-09-13 RX ORDER — GABAPENTIN 300 MG/1
CAPSULE ORAL
Qty: 270 CAPSULE | Refills: 0 | Status: SHIPPED | OUTPATIENT
Start: 2024-09-13 | End: 2024-12-12

## 2024-09-13 RX ORDER — BUPRENORPHINE HYDROCHLORIDE 150 UG/1
FILM, SOLUBLE BUCCAL
COMMUNITY
Start: 2024-06-25

## 2024-09-13 SDOH — ECONOMIC STABILITY: FOOD INSECURITY: WITHIN THE PAST 12 MONTHS, YOU WORRIED THAT YOUR FOOD WOULD RUN OUT BEFORE YOU GOT MONEY TO BUY MORE.: NEVER TRUE

## 2024-09-13 SDOH — ECONOMIC STABILITY: FOOD INSECURITY: WITHIN THE PAST 12 MONTHS, THE FOOD YOU BOUGHT JUST DIDN'T LAST AND YOU DIDN'T HAVE MONEY TO GET MORE.: NEVER TRUE

## 2024-09-13 SDOH — ECONOMIC STABILITY: INCOME INSECURITY: HOW HARD IS IT FOR YOU TO PAY FOR THE VERY BASICS LIKE FOOD, HOUSING, MEDICAL CARE, AND HEATING?: NOT HARD AT ALL

## 2024-09-13 ASSESSMENT — ENCOUNTER SYMPTOMS: RESPIRATORY NEGATIVE: 1

## 2024-09-16 DIAGNOSIS — R53.83 FATIGUE, UNSPECIFIED TYPE: ICD-10-CM

## 2024-09-16 DIAGNOSIS — E79.0 ELEVATED URIC ACID IN BLOOD: ICD-10-CM

## 2024-09-16 DIAGNOSIS — E11.9 TYPE 2 DIABETES MELLITUS TREATED WITHOUT INSULIN (HCC): ICD-10-CM

## 2024-09-16 DIAGNOSIS — E78.2 MIXED HYPERLIPIDEMIA: ICD-10-CM

## 2024-09-16 DIAGNOSIS — I10 ESSENTIAL HYPERTENSION: ICD-10-CM

## 2024-09-16 LAB
25(OH)D3 SERPL-MCNC: 82.7 NG/ML
ALBUMIN SERPL-MCNC: 4.5 G/DL (ref 3.5–5.2)
ALP SERPL-CCNC: 71 U/L (ref 35–104)
ALT SERPL-CCNC: 20 U/L (ref 5–33)
ANION GAP SERPL CALCULATED.3IONS-SCNC: 12 MMOL/L (ref 7–19)
AST SERPL-CCNC: 21 U/L (ref 5–32)
BASOPHILS # BLD: 0 K/UL (ref 0–0.2)
BASOPHILS NFR BLD: 0 % (ref 0–1)
BILIRUB SERPL-MCNC: 0.4 MG/DL (ref 0.2–1.2)
BUN SERPL-MCNC: 21 MG/DL (ref 8–23)
CALCIUM SERPL-MCNC: 9.5 MG/DL (ref 8.8–10.2)
CHLORIDE SERPL-SCNC: 98 MMOL/L (ref 98–111)
CHOLEST SERPL-MCNC: 111 MG/DL (ref 0–199)
CO2 SERPL-SCNC: 30 MMOL/L (ref 22–29)
CREAT SERPL-MCNC: 1.2 MG/DL (ref 0.5–0.9)
EOSINOPHIL # BLD: 0.08 K/UL (ref 0–0.6)
EOSINOPHIL NFR BLD: 2 % (ref 0–5)
ERYTHROCYTE [DISTWIDTH] IN BLOOD BY AUTOMATED COUNT: 13.5 % (ref 11.5–14.5)
FOLATE SERPL-MCNC: >20 NG/ML (ref 4.8–37.3)
GLUCOSE SERPL-MCNC: 97 MG/DL (ref 70–99)
HBA1C MFR BLD: 5.7 % (ref 4–5.6)
HCT VFR BLD AUTO: 41.4 % (ref 37–47)
HDLC SERPL-MCNC: 55 MG/DL (ref 40–60)
HGB BLD-MCNC: 13.8 G/DL (ref 12–16)
IMM GRANULOCYTES # BLD: 0 K/UL
LDLC SERPL CALC-MCNC: 29 MG/DL
LYMPHOCYTES # BLD: 1.8 K/UL (ref 1.1–4.5)
LYMPHOCYTES NFR BLD: 44 % (ref 20–40)
MACROCYTES BLD QL SMEAR: ABNORMAL
MCH RBC QN AUTO: 36.1 PG (ref 27–31)
MCHC RBC AUTO-ENTMCNC: 33.3 G/DL (ref 33–37)
MCV RBC AUTO: 108.4 FL (ref 81–99)
MONOCYTES # BLD: 0.3 K/UL (ref 0–0.9)
MONOCYTES NFR BLD: 8 % (ref 0–10)
NEUTROPHILS # BLD: 1.8 K/UL (ref 1.5–7.5)
NEUTS SEG NFR BLD: 45 % (ref 50–65)
PLATELET # BLD AUTO: 277 K/UL (ref 130–400)
PLATELET SLIDE REVIEW: ADEQUATE
PMV BLD AUTO: 9.3 FL (ref 9.4–12.3)
POTASSIUM SERPL-SCNC: 4.7 MMOL/L (ref 3.5–5)
PROT SERPL-MCNC: 7.1 G/DL (ref 6.4–8.3)
RBC # BLD AUTO: 3.82 M/UL (ref 4.2–5.4)
SODIUM SERPL-SCNC: 140 MMOL/L (ref 136–145)
TRIGL SERPL-MCNC: 137 MG/DL (ref 0–149)
TSH SERPL DL<=0.005 MIU/L-ACNC: 2.86 UIU/ML (ref 0.27–4.2)
URATE SERPL-MCNC: 4.4 MG/DL (ref 2.4–5.7)
VARIANT LYMPHS NFR BLD: 1 % (ref 0–8)
VIT B12 SERPL-MCNC: 438 PG/ML (ref 232–1245)
WBC # BLD AUTO: 4.1 K/UL (ref 4.8–10.8)

## 2024-09-17 DIAGNOSIS — R05.3 CHRONIC COUGH: ICD-10-CM

## 2024-09-17 DIAGNOSIS — I10 ESSENTIAL HYPERTENSION: ICD-10-CM

## 2024-09-17 DIAGNOSIS — G25.81 RLS (RESTLESS LEGS SYNDROME): ICD-10-CM

## 2024-09-17 RX ORDER — SPIRONOLACTONE 25 MG/1
TABLET ORAL
Qty: 270 TABLET | Refills: 1 | Status: SHIPPED | OUTPATIENT
Start: 2024-09-17

## 2024-09-17 RX ORDER — ALBUTEROL SULFATE 90 UG/1
2 INHALANT RESPIRATORY (INHALATION) 4 TIMES DAILY PRN
Qty: 18 G | Refills: 5 | Status: SHIPPED | OUTPATIENT
Start: 2024-09-17

## 2024-09-17 RX ORDER — PRAMIPEXOLE DIHYDROCHLORIDE 1.5 MG/1
TABLET ORAL
Qty: 135 TABLET | Refills: 1 | Status: SHIPPED | OUTPATIENT
Start: 2024-09-17

## 2024-09-23 ENCOUNTER — TELEPHONE (OUTPATIENT)
Dept: FAMILY MEDICINE CLINIC | Age: 64
End: 2024-09-23

## 2024-09-24 ENCOUNTER — TRANSCRIBE ORDERS (OUTPATIENT)
Dept: ADMINISTRATIVE | Facility: HOSPITAL | Age: 64
End: 2024-09-24
Payer: COMMERCIAL

## 2024-09-24 DIAGNOSIS — Z17.0 MALIGNANT NEOPLASM OF OVERLAPPING SITES OF LEFT BREAST IN FEMALE, ESTROGEN RECEPTOR POSITIVE: Primary | ICD-10-CM

## 2024-09-24 DIAGNOSIS — C50.812 MALIGNANT NEOPLASM OF OVERLAPPING SITES OF LEFT BREAST IN FEMALE, ESTROGEN RECEPTOR POSITIVE: Primary | ICD-10-CM

## 2024-10-08 ENCOUNTER — HOSPITAL ENCOUNTER (OUTPATIENT)
Dept: CT IMAGING | Facility: HOSPITAL | Age: 64
Discharge: HOME | End: 2024-10-08
Payer: COMMERCIAL

## 2024-10-08 DIAGNOSIS — Z17.0 MALIGNANT NEOPLASM OF OVERLAPPING SITES OF LEFT BREAST IN FEMALE, ESTROGEN RECEPTOR POSITIVE: ICD-10-CM

## 2024-10-08 DIAGNOSIS — C50.812 MALIGNANT NEOPLASM OF OVERLAPPING SITES OF LEFT BREAST IN FEMALE, ESTROGEN RECEPTOR POSITIVE: ICD-10-CM

## 2024-10-08 PROCEDURE — 0 FLUDEOXYGLUCOSE F18 SOLUTION: Performed by: INTERNAL MEDICINE

## 2024-10-08 PROCEDURE — A9552 F18 FDG: HCPCS | Performed by: INTERNAL MEDICINE

## 2024-10-08 PROCEDURE — 78815 PET IMAGE W/CT SKULL-THIGH: CPT

## 2024-10-08 RX ADMIN — FLUDEOXYGLUCOSE F18 1 DOSE: 300 INJECTION INTRAVENOUS at 10:24

## 2024-10-22 NOTE — TELEPHONE ENCOUNTER
Attempted to call patient. No answer. Left message with normal lab result. Advised patient to call the clinic with any further questions.    TOP PILL 2019  TOP 2016 with D/C    ft girl English

## 2024-11-21 RX ORDER — ANASTROZOLE 1 MG/1
1 TABLET ORAL DAILY
Qty: 90 TABLET | Refills: 0 | Status: SHIPPED | OUTPATIENT
Start: 2024-11-21

## 2024-11-21 NOTE — TELEPHONE ENCOUNTER
Aggie Beckham called to request a refill on her medication.      Last office visit : 9/13/2024   Next office visit : Visit date not found     Requested Prescriptions     Pending Prescriptions Disp Refills    anastrozole (ARIMIDEX) 1 MG tablet [Pharmacy Med Name: anastrozole 1 mg tablet] 90 tablet 0     Sig: TAKE ONE TABLET BY MOUTH DAILY            Mali Whittaker LPN

## 2024-12-12 ENCOUNTER — OFFICE VISIT (OUTPATIENT)
Dept: PULMONOLOGY | Age: 64
End: 2024-12-12

## 2024-12-12 VITALS
WEIGHT: 200 LBS | HEART RATE: 74 BPM | TEMPERATURE: 97.7 F | RESPIRATION RATE: 20 BRPM | SYSTOLIC BLOOD PRESSURE: 122 MMHG | BODY MASS INDEX: 39.27 KG/M2 | DIASTOLIC BLOOD PRESSURE: 80 MMHG | HEIGHT: 60 IN | OXYGEN SATURATION: 91 %

## 2024-12-12 DIAGNOSIS — J45.30 MILD PERSISTENT REACTIVE AIRWAY DISEASE WITHOUT COMPLICATION: ICD-10-CM

## 2024-12-12 DIAGNOSIS — R49.0 HOARSENESS: ICD-10-CM

## 2024-12-12 DIAGNOSIS — C50.912 INFLAMMATORY CARCINOMA OF LEFT BREAST (HCC): ICD-10-CM

## 2024-12-12 DIAGNOSIS — K21.9 GASTROESOPHAGEAL REFLUX DISEASE WITHOUT ESOPHAGITIS: ICD-10-CM

## 2024-12-12 DIAGNOSIS — R05.3 CHRONIC COUGH: Primary | ICD-10-CM

## 2024-12-12 DIAGNOSIS — F17.210 CIGARETTE NICOTINE DEPENDENCE WITHOUT COMPLICATION: ICD-10-CM

## 2024-12-12 DIAGNOSIS — G47.33 SLEEP APNEA, OBSTRUCTIVE: ICD-10-CM

## 2024-12-12 DIAGNOSIS — K22.89 PRESBYESOPHAGUS: ICD-10-CM

## 2024-12-12 PROBLEM — J45.909 REACTIVE AIRWAY DISEASE: Status: ACTIVE | Noted: 2024-12-12

## 2024-12-12 RX ORDER — OMEPRAZOLE 40 MG/1
40 CAPSULE, DELAYED RELEASE ORAL
Qty: 30 CAPSULE | Refills: 11 | Status: SHIPPED | OUTPATIENT
Start: 2024-12-12

## 2024-12-12 ASSESSMENT — ENCOUNTER SYMPTOMS
CHEST TIGHTNESS: 0
WHEEZING: 1
BACK PAIN: 0
APNEA: 0
RHINORRHEA: 0
COUGH: 1
ANAL BLEEDING: 0
ABDOMINAL PAIN: 0
ABDOMINAL DISTENTION: 0
SHORTNESS OF BREATH: 0

## 2024-12-12 NOTE — PROGRESS NOTES
PO) Take by mouth daily Yes Provider, MD Angel   calcium carbonate 600 MG TABS tablet Take 1 tablet by mouth daily Yes Provider, Historical, MD        Review of Systems   Constitutional:  Negative for activity change, appetite change, chills, diaphoresis and fatigue.   HENT:  Negative for congestion, dental problem, drooling, ear discharge, postnasal drip and rhinorrhea.    Eyes:  Negative for visual disturbance.   Respiratory:  Positive for cough and wheezing. Negative for apnea, chest tightness and shortness of breath.    Gastrointestinal:  Negative for abdominal distention, abdominal pain and anal bleeding.   Endocrine: Negative for cold intolerance, heat intolerance and polydipsia.   Genitourinary:  Negative for difficulty urinating, dysuria, enuresis and flank pain.   Musculoskeletal:  Negative for arthralgias, back pain and gait problem.   Allergic/Immunologic: Negative for environmental allergies.   Neurological:  Negative for dizziness, facial asymmetry, light-headedness and headaches.       Vitals:    12/12/24 1057   BP: 122/80   Pulse: 74   Resp: 20   Temp: 97.7 °F (36.5 °C)   SpO2: 91%   Weight: 90.7 kg (200 lb)   Height: 1.524 m (5')      BMI Readings from Last 1 Encounters:   12/12/24 39.06 kg/m²         Physical Exam  Vitals reviewed.   Constitutional:       Appearance: Normal appearance.   HENT:      Head: Normocephalic and atraumatic.      Nose: Nose normal.   Eyes:      Extraocular Movements: Extraocular movements intact.      Conjunctiva/sclera: Conjunctivae normal.   Cardiovascular:      Rate and Rhythm: Normal rate and regular rhythm.      Heart sounds: No murmur heard.     No friction rub.   Pulmonary:      Effort: Pulmonary effort is normal. No respiratory distress.      Breath sounds: Normal breath sounds. No stridor. No wheezing, rhonchi or rales.   Abdominal:      General: There is no distension.      Palpations: There is no mass.      Tenderness: There is no abdominal tenderness.

## 2024-12-18 DIAGNOSIS — E11.9 TYPE 2 DIABETES MELLITUS TREATED WITHOUT INSULIN (HCC): ICD-10-CM

## 2024-12-18 RX ORDER — SEMAGLUTIDE 0.68 MG/ML
INJECTION, SOLUTION SUBCUTANEOUS
Qty: 3 ML | Refills: 1 | Status: SHIPPED | OUTPATIENT
Start: 2024-12-18

## 2024-12-18 NOTE — TELEPHONE ENCOUNTER
Aggie LYNSEY Beckham called to request a refill on her medication.      Last office visit : 9/13/2024   Next office visit : Visit date not found     Requested Prescriptions     Pending Prescriptions Disp Refills    OZEMPIC, 0.25 OR 0.5 MG/DOSE, 2 MG/3ML SOPN [Pharmacy Med Name: Ozempic 0.25 mg or 0.5 mg (2 mg/3 mL) subcutaneous pen injector] 3 mL 1     Sig: INJECT 0.25MG UNDER THE SKIN EVERY WEEK            Mali Whittaker LPN

## 2024-12-20 DIAGNOSIS — I10 ESSENTIAL HYPERTENSION: ICD-10-CM

## 2024-12-20 DIAGNOSIS — E03.9 ACQUIRED HYPOTHYROIDISM: ICD-10-CM

## 2024-12-20 RX ORDER — PILOCARPINE HYDROCHLORIDE 5 MG/1
TABLET, FILM COATED ORAL
Qty: 270 TABLET | Refills: 1 | Status: SHIPPED | OUTPATIENT
Start: 2024-12-20

## 2024-12-20 RX ORDER — LEVOTHYROXINE SODIUM 75 UG/1
TABLET ORAL
Qty: 90 TABLET | Refills: 1 | Status: SHIPPED | OUTPATIENT
Start: 2024-12-20

## 2024-12-20 RX ORDER — CARVEDILOL 3.12 MG/1
TABLET ORAL
Qty: 180 TABLET | Refills: 1 | Status: SHIPPED | OUTPATIENT
Start: 2024-12-20

## 2024-12-20 NOTE — TELEPHONE ENCOUNTER
Aggie LYNSEY Beckham called to request a refill on her medication.      Last office visit : 9/13/2024   Next office visit : Visit date not found     Requested Prescriptions     Pending Prescriptions Disp Refills    carvedilol (COREG) 3.125 MG tablet [Pharmacy Med Name: carvedilol 3.125 mg tablet] 180 tablet 1     Sig: TAKE ONE TABLET BY MOUTH TWICE DAILY WITH MEALS    levothyroxine (SYNTHROID) 75 MCG tablet [Pharmacy Med Name: levothyroxine 75 mcg tablet] 90 tablet 1     Sig: TAKE ONE TABLET BY MOUTH EVERY DAY    pilocarpine (SALAGEN) 5 MG tablet [Pharmacy Med Name: pilocarpine 5 mg tablet] 270 tablet 1     Sig: TAKE ONE TABLET BY MOUTH THREE TIMES DAILY            Mali Whittaker LPN

## 2024-12-23 DIAGNOSIS — G25.81 RLS (RESTLESS LEGS SYNDROME): ICD-10-CM

## 2024-12-23 DIAGNOSIS — M25.559 HIP PAIN: ICD-10-CM

## 2024-12-23 DIAGNOSIS — G47.00 INSOMNIA, UNSPECIFIED TYPE: ICD-10-CM

## 2024-12-23 RX ORDER — GABAPENTIN 100 MG/1
CAPSULE ORAL
Qty: 540 CAPSULE | Refills: 0 | Status: SHIPPED | OUTPATIENT
Start: 2024-12-23 | End: 2025-03-21

## 2024-12-23 NOTE — TELEPHONE ENCOUNTER
Aggie MENON Chapincito called to request a refill on her medication.      Last office visit : 9/13/2024   Next office visit : Visit date not found     Last UDS:   Amphetamines   Date Value Ref Range Status   06/18/2014 Negative NEGATIVE Final     Comment:     URINE AMPHETAMINE RTOTRA=7256 NG/ML     Benzodiazepines   Date Value Ref Range Status   06/18/2014 Positive NEGATIVE Final     Comment:     URINE BENZODIAZEPINE VGPUEW=440 NG/ML     Benzodiazepine Screen, Urine   Date Value Ref Range Status   09/13/2024 neg  Final     Buprenorphine Urine   Date Value Ref Range Status   09/13/2024 neg  Final     Cocaine Metabolite Screen, Urine   Date Value Ref Range Status   09/13/2024 neg  Final     Gabapentin Screen, Urine   Date Value Ref Range Status   10/17/2022 not tested  Final     Oxycodone Screen, Ur   Date Value Ref Range Status   09/13/2024 neg  Final     Propoxyphene Screen, Urine   Date Value Ref Range Status   10/17/2022 not tested  Final     THC Screen, Urine   Date Value Ref Range Status   09/13/2024 neg  Final     Tricyclic Antidepressants, Urine   Date Value Ref Range Status   09/13/2024 neg  Final       Last Gerardo: 12/23/2024  Medication Contract: 9/18/2024   Last Fill: 9/13/2024    Requested Prescriptions     Pending Prescriptions Disp Refills    gabapentin (NEURONTIN) 100 MG capsule [Pharmacy Med Name: gabapentin 100 mg capsule] 540 capsule 0     Sig: TAKE 1 TO 2 CAPSULES BY MOUTH UP TO THREE TIMES DAILY         Please approve or refuse this medication.   Mali Whittaker LPN

## 2024-12-27 ENCOUNTER — TRANSCRIBE ORDERS (OUTPATIENT)
Dept: ADMINISTRATIVE | Facility: HOSPITAL | Age: 64
End: 2024-12-27
Payer: COMMERCIAL

## 2024-12-27 DIAGNOSIS — C79.52 SECONDARY MALIGNANT NEOPLASM OF BONE AND BONE MARROW: ICD-10-CM

## 2024-12-27 DIAGNOSIS — C50.812 MALIGNANT NEOPLASM OF OVERLAPPING SITES OF LEFT FEMALE BREAST, UNSPECIFIED ESTROGEN RECEPTOR STATUS: Primary | ICD-10-CM

## 2024-12-27 DIAGNOSIS — C79.51 SECONDARY MALIGNANT NEOPLASM OF BONE AND BONE MARROW: ICD-10-CM

## 2024-12-27 DIAGNOSIS — Z17.0 ESTROGEN RECEPTOR POSITIVE: ICD-10-CM

## 2024-12-31 DIAGNOSIS — Z79.899 DRUG THERAPY CONTINUED: ICD-10-CM

## 2024-12-31 DIAGNOSIS — G25.81 RLS (RESTLESS LEGS SYNDROME): ICD-10-CM

## 2024-12-31 DIAGNOSIS — G47.00 INSOMNIA, UNSPECIFIED TYPE: ICD-10-CM

## 2024-12-31 NOTE — TELEPHONE ENCOUNTER
Aggie Beckham called to request a refill on her medication.      Last office visit : 9/13/2024   Next office visit : Visit date not found     Last UDS:   Amphetamines   Date Value Ref Range Status   06/18/2014 Negative NEGATIVE Final     Comment:     URINE AMPHETAMINE VHWETC=8205 NG/ML     Benzodiazepines   Date Value Ref Range Status   06/18/2014 Positive NEGATIVE Final     Comment:     URINE BENZODIAZEPINE AUMXLZ=137 NG/ML     Benzodiazepine Screen, Urine   Date Value Ref Range Status   09/13/2024 neg  Final     Buprenorphine Urine   Date Value Ref Range Status   09/13/2024 neg  Final     Cocaine Metabolite Screen, Urine   Date Value Ref Range Status   09/13/2024 neg  Final     Gabapentin Screen, Urine   Date Value Ref Range Status   10/17/2022 not tested  Final     Oxycodone Screen, Ur   Date Value Ref Range Status   09/13/2024 neg  Final     Propoxyphene Screen, Urine   Date Value Ref Range Status   10/17/2022 not tested  Final     THC Screen, Urine   Date Value Ref Range Status   09/13/2024 neg  Final     Tricyclic Antidepressants, Urine   Date Value Ref Range Status   09/13/2024 neg  Final       Last Gerardo: 12/26/2024  Medication Contract: 9/18/2024   Last Fill: 12/23/2024 Patient received 100mg due to the script not being discontinued when dose increased.       Requested Prescriptions     Pending Prescriptions Disp Refills    gabapentin (NEURONTIN) 300 MG capsule 270 capsule 2     Sig: TAKE ONE CAPSULE BY MOUTH THREE TIMES DAILY         Please approve or refuse this medication.   Mali Whittaker LPN

## 2025-01-02 RX ORDER — GABAPENTIN 300 MG/1
CAPSULE ORAL
Qty: 270 CAPSULE | Refills: 2 | Status: SHIPPED | OUTPATIENT
Start: 2025-01-02 | End: 2025-03-31

## 2025-01-16 ENCOUNTER — HOSPITAL ENCOUNTER (OUTPATIENT)
Dept: CT IMAGING | Facility: HOSPITAL | Age: 65
Discharge: HOME | End: 2025-01-16
Payer: COMMERCIAL

## 2025-01-16 DIAGNOSIS — C50.812 MALIGNANT NEOPLASM OF OVERLAPPING SITES OF LEFT FEMALE BREAST, UNSPECIFIED ESTROGEN RECEPTOR STATUS: ICD-10-CM

## 2025-01-16 DIAGNOSIS — Z17.0 ESTROGEN RECEPTOR POSITIVE: ICD-10-CM

## 2025-01-16 DIAGNOSIS — C79.51 SECONDARY MALIGNANT NEOPLASM OF BONE AND BONE MARROW: ICD-10-CM

## 2025-01-16 DIAGNOSIS — C79.52 SECONDARY MALIGNANT NEOPLASM OF BONE AND BONE MARROW: ICD-10-CM

## 2025-01-16 PROCEDURE — A9552 F18 FDG: HCPCS | Performed by: NURSE PRACTITIONER

## 2025-01-16 PROCEDURE — 78815 PET IMAGE W/CT SKULL-THIGH: CPT

## 2025-01-16 PROCEDURE — 34310000005 FLUDEOXYGLUCOSE F18 SOLUTION: Performed by: NURSE PRACTITIONER

## 2025-01-16 RX ADMIN — FLUDEOXYGLUCOSE F18 1 DOSE: 300 INJECTION INTRAVENOUS at 13:00

## 2025-01-24 DIAGNOSIS — E79.0 ELEVATED URIC ACID IN BLOOD: ICD-10-CM

## 2025-01-24 RX ORDER — ALLOPURINOL 100 MG/1
TABLET ORAL
Qty: 90 TABLET | Refills: 1 | Status: SHIPPED | OUTPATIENT
Start: 2025-01-24

## 2025-01-24 ASSESSMENT — PATIENT HEALTH QUESTIONNAIRE - PHQ9
SUM OF ALL RESPONSES TO PHQ QUESTIONS 1-9: 16
SUM OF ALL RESPONSES TO PHQ9 QUESTIONS 1 & 2: 3
3. TROUBLE FALLING OR STAYING ASLEEP: MORE THAN HALF THE DAYS
5. POOR APPETITE OR OVEREATING: MORE THAN HALF THE DAYS
6. FEELING BAD ABOUT YOURSELF - OR THAT YOU ARE A FAILURE OR HAVE LET YOURSELF OR YOUR FAMILY DOWN: MORE THAN HALF THE DAYS
8. MOVING OR SPEAKING SO SLOWLY THAT OTHER PEOPLE COULD HAVE NOTICED. OR THE OPPOSITE - BEING SO FIDGETY OR RESTLESS THAT YOU HAVE BEEN MOVING AROUND A LOT MORE THAN USUAL: MORE THAN HALF THE DAYS
10. IF YOU CHECKED OFF ANY PROBLEMS, HOW DIFFICULT HAVE THESE PROBLEMS MADE IT FOR YOU TO DO YOUR WORK, TAKE CARE OF THINGS AT HOME, OR GET ALONG WITH OTHER PEOPLE: VERY DIFFICULT
5. POOR APPETITE OR OVEREATING: MORE THAN HALF THE DAYS
4. FEELING TIRED OR HAVING LITTLE ENERGY: NEARLY EVERY DAY
1. LITTLE INTEREST OR PLEASURE IN DOING THINGS: SEVERAL DAYS
4. FEELING TIRED OR HAVING LITTLE ENERGY: NEARLY EVERY DAY
7. TROUBLE CONCENTRATING ON THINGS, SUCH AS READING THE NEWSPAPER OR WATCHING TELEVISION: MORE THAN HALF THE DAYS
SUM OF ALL RESPONSES TO PHQ QUESTIONS 1-9: 16
7. TROUBLE CONCENTRATING ON THINGS, SUCH AS READING THE NEWSPAPER OR WATCHING TELEVISION: MORE THAN HALF THE DAYS
9. THOUGHTS THAT YOU WOULD BE BETTER OFF DEAD, OR OF HURTING YOURSELF: NOT AT ALL
2. FEELING DOWN, DEPRESSED OR HOPELESS: MORE THAN HALF THE DAYS
9. THOUGHTS THAT YOU WOULD BE BETTER OFF DEAD, OR OF HURTING YOURSELF: NOT AT ALL
3. TROUBLE FALLING OR STAYING ASLEEP: MORE THAN HALF THE DAYS
1. LITTLE INTEREST OR PLEASURE IN DOING THINGS: SEVERAL DAYS
2. FEELING DOWN, DEPRESSED OR HOPELESS: MORE THAN HALF THE DAYS
8. MOVING OR SPEAKING SO SLOWLY THAT OTHER PEOPLE COULD HAVE NOTICED. OR THE OPPOSITE, BEING SO FIGETY OR RESTLESS THAT YOU HAVE BEEN MOVING AROUND A LOT MORE THAN USUAL: MORE THAN HALF THE DAYS
6. FEELING BAD ABOUT YOURSELF - OR THAT YOU ARE A FAILURE OR HAVE LET YOURSELF OR YOUR FAMILY DOWN: MORE THAN HALF THE DAYS
SUM OF ALL RESPONSES TO PHQ QUESTIONS 1-9: 16
10. IF YOU CHECKED OFF ANY PROBLEMS, HOW DIFFICULT HAVE THESE PROBLEMS MADE IT FOR YOU TO DO YOUR WORK, TAKE CARE OF THINGS AT HOME, OR GET ALONG WITH OTHER PEOPLE: VERY DIFFICULT
SUM OF ALL RESPONSES TO PHQ QUESTIONS 1-9: 16
SUM OF ALL RESPONSES TO PHQ QUESTIONS 1-9: 16

## 2025-01-26 SDOH — ECONOMIC STABILITY: FOOD INSECURITY: WITHIN THE PAST 12 MONTHS, THE FOOD YOU BOUGHT JUST DIDN'T LAST AND YOU DIDN'T HAVE MONEY TO GET MORE.: NEVER TRUE

## 2025-01-26 SDOH — ECONOMIC STABILITY: FOOD INSECURITY: WITHIN THE PAST 12 MONTHS, YOU WORRIED THAT YOUR FOOD WOULD RUN OUT BEFORE YOU GOT MONEY TO BUY MORE.: NEVER TRUE

## 2025-01-26 SDOH — ECONOMIC STABILITY: INCOME INSECURITY: IN THE LAST 12 MONTHS, WAS THERE A TIME WHEN YOU WERE NOT ABLE TO PAY THE MORTGAGE OR RENT ON TIME?: NO

## 2025-01-29 ENCOUNTER — OFFICE VISIT (OUTPATIENT)
Age: 65
End: 2025-01-29
Payer: COMMERCIAL

## 2025-01-29 ENCOUNTER — OFFICE VISIT (OUTPATIENT)
Dept: ENT CLINIC | Age: 65
End: 2025-01-29
Payer: COMMERCIAL

## 2025-01-29 ENCOUNTER — HOSPITAL ENCOUNTER (OUTPATIENT)
Dept: GENERAL RADIOLOGY | Age: 65
Discharge: HOME | End: 2025-01-29
Payer: COMMERCIAL

## 2025-01-29 VITALS
SYSTOLIC BLOOD PRESSURE: 118 MMHG | DIASTOLIC BLOOD PRESSURE: 68 MMHG | TEMPERATURE: 97.8 F | OXYGEN SATURATION: 94 % | WEIGHT: 193 LBS | HEART RATE: 88 BPM | HEIGHT: 60 IN | BODY MASS INDEX: 37.89 KG/M2

## 2025-01-29 VITALS
BODY MASS INDEX: 38.68 KG/M2 | HEIGHT: 60 IN | DIASTOLIC BLOOD PRESSURE: 64 MMHG | WEIGHT: 197 LBS | SYSTOLIC BLOOD PRESSURE: 112 MMHG

## 2025-01-29 DIAGNOSIS — D64.9 ANEMIA, UNSPECIFIED TYPE: ICD-10-CM

## 2025-01-29 DIAGNOSIS — G89.29 CHRONIC MIDLINE LOW BACK PAIN WITHOUT SCIATICA: ICD-10-CM

## 2025-01-29 DIAGNOSIS — J32.0 CHRONIC MAXILLARY SINUSITIS: Primary | ICD-10-CM

## 2025-01-29 DIAGNOSIS — J34.89 NASAL OBSTRUCTION: ICD-10-CM

## 2025-01-29 DIAGNOSIS — D64.9 ANEMIA, UNSPECIFIED TYPE: Primary | ICD-10-CM

## 2025-01-29 DIAGNOSIS — F31.9 BIPOLAR I DISORDER WITH MIXED FEATURES (HCC): ICD-10-CM

## 2025-01-29 DIAGNOSIS — E11.9 TYPE 2 DIABETES MELLITUS TREATED WITHOUT INSULIN (HCC): ICD-10-CM

## 2025-01-29 DIAGNOSIS — M54.50 CHRONIC MIDLINE LOW BACK PAIN WITHOUT SCIATICA: ICD-10-CM

## 2025-01-29 DIAGNOSIS — J32.0 CHRONIC MAXILLARY SINUSITIS: ICD-10-CM

## 2025-01-29 DIAGNOSIS — C79.81: ICD-10-CM

## 2025-01-29 LAB
25(OH)D3 SERPL-MCNC: 93.5 NG/ML
BASOPHILS # BLD: 0.1 K/UL (ref 0–0.2)
BASOPHILS NFR BLD: 1.1 % (ref 0–1)
CHOLEST SERPL-MCNC: 116 MG/DL (ref 0–199)
EOSINOPHIL # BLD: 0.1 K/UL (ref 0–0.6)
EOSINOPHIL NFR BLD: 1.3 % (ref 0–5)
ERYTHROCYTE [DISTWIDTH] IN BLOOD BY AUTOMATED COUNT: 13.8 % (ref 11.5–14.5)
FERRITIN SERPL-MCNC: 549.5 NG/ML (ref 13–150)
FOLATE SERPL-MCNC: >20 NG/ML (ref 4.8–37.3)
HBA1C MFR BLD: 6 % (ref 4–5.6)
HCT VFR BLD AUTO: 39.1 % (ref 37–47)
HDLC SERPL-MCNC: 56 MG/DL (ref 40–60)
HGB BLD-MCNC: 13.1 G/DL (ref 12–16)
IMM GRANULOCYTES # BLD: 0 K/UL
IRON SATN MFR SERPL: 17 % (ref 15–50)
IRON SERPL-MCNC: 52 UG/DL (ref 37–145)
LDLC SERPL CALC-MCNC: 40 MG/DL
LYMPHOCYTES # BLD: 1.3 K/UL (ref 1.1–4.5)
LYMPHOCYTES NFR BLD: 22.9 % (ref 20–40)
MCH RBC QN AUTO: 36 PG (ref 27–31)
MCHC RBC AUTO-ENTMCNC: 33.5 G/DL (ref 33–37)
MCV RBC AUTO: 107.4 FL (ref 81–99)
MONOCYTES # BLD: 0.3 K/UL (ref 0–0.9)
MONOCYTES NFR BLD: 5.3 % (ref 0–10)
NEUTROPHILS # BLD: 3.8 K/UL (ref 1.5–7.5)
NEUTS SEG NFR BLD: 69 % (ref 50–65)
PLATELET # BLD AUTO: 253 K/UL (ref 130–400)
PMV BLD AUTO: 9.6 FL (ref 9.4–12.3)
RBC # BLD AUTO: 3.64 M/UL (ref 4.2–5.4)
RETICS # AUTO: 0.1 M/UL (ref 0.03–0.12)
RETICS/RBC NFR: 2.61 % (ref 0.5–1.5)
TIBC SERPL-MCNC: 315 UG/DL (ref 250–400)
TRIGL SERPL-MCNC: 102 MG/DL (ref 0–149)
TSH SERPL DL<=0.005 MIU/L-ACNC: 2.14 UIU/ML (ref 0.27–4.2)
VIT B12 SERPL-MCNC: 625 PG/ML (ref 232–1245)
WBC # BLD AUTO: 5.5 K/UL (ref 4.8–10.8)

## 2025-01-29 PROCEDURE — 3078F DIAST BP <80 MM HG: CPT | Performed by: NURSE PRACTITIONER

## 2025-01-29 PROCEDURE — 3074F SYST BP LT 130 MM HG: CPT | Performed by: NURSE PRACTITIONER

## 2025-01-29 PROCEDURE — 70486 CT MAXILLOFACIAL W/O DYE: CPT

## 2025-01-29 PROCEDURE — 99214 OFFICE O/P EST MOD 30 MIN: CPT | Performed by: NURSE PRACTITIONER

## 2025-01-29 PROCEDURE — 3074F SYST BP LT 130 MM HG: CPT | Performed by: PHYSICIAN ASSISTANT

## 2025-01-29 PROCEDURE — 3078F DIAST BP <80 MM HG: CPT | Performed by: PHYSICIAN ASSISTANT

## 2025-01-29 PROCEDURE — 96372 THER/PROPH/DIAG INJ SC/IM: CPT | Performed by: NURSE PRACTITIONER

## 2025-01-29 PROCEDURE — 3044F HG A1C LEVEL LT 7.0%: CPT | Performed by: NURSE PRACTITIONER

## 2025-01-29 PROCEDURE — 99203 OFFICE O/P NEW LOW 30 MIN: CPT | Performed by: PHYSICIAN ASSISTANT

## 2025-01-29 RX ORDER — CYANOCOBALAMIN 1000 UG/ML
1000 INJECTION, SOLUTION INTRAMUSCULAR; SUBCUTANEOUS ONCE
Status: COMPLETED | OUTPATIENT
Start: 2025-01-29 | End: 2025-01-29

## 2025-01-29 RX ORDER — DICLOFENAC SODIUM 75 MG/1
75 TABLET, DELAYED RELEASE ORAL 2 TIMES DAILY WITH MEALS
Qty: 180 TABLET | Refills: 0 | Status: SHIPPED | OUTPATIENT
Start: 2025-01-29

## 2025-01-29 RX ORDER — CELECOXIB 200 MG/1
200 CAPSULE ORAL 2 TIMES DAILY
COMMUNITY

## 2025-01-29 RX ORDER — BREXPIPRAZOLE 2 MG/1
2 TABLET ORAL DAILY
COMMUNITY
Start: 2024-12-23

## 2025-01-29 RX ADMIN — CYANOCOBALAMIN 1000 MCG: 1000 INJECTION, SOLUTION INTRAMUSCULAR; SUBCUTANEOUS at 12:12

## 2025-01-29 SDOH — ECONOMIC STABILITY: FOOD INSECURITY: WITHIN THE PAST 12 MONTHS, THE FOOD YOU BOUGHT JUST DIDN'T LAST AND YOU DIDN'T HAVE MONEY TO GET MORE.: NEVER TRUE

## 2025-01-29 SDOH — ECONOMIC STABILITY: FOOD INSECURITY: WITHIN THE PAST 12 MONTHS, YOU WORRIED THAT YOUR FOOD WOULD RUN OUT BEFORE YOU GOT MONEY TO BUY MORE.: NEVER TRUE

## 2025-01-29 ASSESSMENT — ENCOUNTER SYMPTOMS
SORE THROAT: 0
VOICE CHANGE: 0
FACIAL SWELLING: 0
EYE DISCHARGE: 0
RESPIRATORY NEGATIVE: 1
RHINORRHEA: 1
EYE PAIN: 0
SINUS PAIN: 0
BACK PAIN: 1
TROUBLE SWALLOWING: 0
SINUS PRESSURE: 1

## 2025-01-29 NOTE — PROGRESS NOTES
[facility] OTOLARYNGOLOGY/ENT  Mrs. Beckham is a pleasant 65-year-old  female that was referred by Dr. Perkins due to problems with a chronic cough.  She reports that she has had this for over a year.  Pulmonary workup was unremarkable for a pulmonary etiology.  She admits to having a previous septoplasty after trauma and now has issues with breathing through her nose as well as chronic sinus pressure.  Denies any dysphagia but does admit to some sporadic hoarseness with postnasal drip.        Allergies: Clindamycin/lincomycin, Bactrim [sulfamethoxazole-trimethoprim], Hctz [hydrochlorothiazide], Sulfamethoxazole, Trimethoprim, Metformin and related, and Sitagliptin      Current Outpatient Medications   Medication Sig Dispense Refill    REXULTI 2 MG TABS tablet Take 1 tablet by mouth daily      diclofenac (VOLTAREN) 75 MG EC tablet Take 1 tablet by mouth with breakfast and with evening meal As needed for joint pain-TAKE WITH FOOD 180 tablet 0    celecoxib (CELEBREX) 200 MG capsule Take 1 capsule by mouth 2 times daily      allopurinol (ZYLOPRIM) 100 MG tablet TAKE ONE TABLET BY MOUTH EVERY DAY 90 tablet 1    gabapentin (NEURONTIN) 300 MG capsule TAKE ONE CAPSULE BY MOUTH THREE TIMES DAILY 270 capsule 2    carvedilol (COREG) 3.125 MG tablet TAKE ONE TABLET BY MOUTH TWICE DAILY WITH MEALS 180 tablet 1    levothyroxine (SYNTHROID) 75 MCG tablet TAKE ONE TABLET BY MOUTH EVERY DAY 90 tablet 1    pilocarpine (SALAGEN) 5 MG tablet TAKE ONE TABLET BY MOUTH THREE TIMES DAILY 270 tablet 1    OZEMPIC, 0.25 OR 0.5 MG/DOSE, 2 MG/3ML SOPN INJECT 0.25MG UNDER THE SKIN EVERY WEEK 3 mL 1    omeprazole (PRILOSEC) 40 MG delayed release capsule Take 1 capsule by mouth every morning (before breakfast) 30 capsule 11    anastrozole (ARIMIDEX) 1 MG tablet TAKE ONE TABLET BY MOUTH DAILY 90 tablet 0    pramipexole (MIRAPEX) 1.5 MG tablet TAKE 1 AND 1/2 TABLETS BY MOUTH DAILY 135 tablet 1    spironolactone (ALDACTONE) 25 MG tablet TAKE

## 2025-01-29 NOTE — PROGRESS NOTES
After obtaining consent, and per orders of Tea MCFARLANE, injection of Cyanocobalamin given in Left deltoid by Saadia Pang MA. Patient instructed to remain in clinic for 20 minutes afterwards, and to report any adverse reaction to me immediately.    
  Skin:     General: Skin is warm and dry.      Capillary Refill: Capillary refill takes less than 2 seconds.   Neurological:      General: No focal deficit present.      Mental Status: She is alert and oriented to person, place, and time. Mental status is at baseline.   Psychiatric:         Mood and Affect: Mood normal. Mood is not anxious or depressed. Affect is not angry.         Speech: Speech normal.         Behavior: Behavior normal.         Thought Content: Thought content normal.         Judgment: Judgment normal.         /68 (Site: Right Upper Arm, Position: Sitting, Cuff Size: Large Adult)   Pulse 88   Temp 97.8 °F (36.6 °C) (Temporal)   Ht 1.524 m (5')   Wt 87.5 kg (193 lb)   SpO2 94%   BMI 37.69 kg/m²      ASSESSMENT:      ICD-10-CM    1. Anemia, unspecified type  D64.9 CBC with Auto Differential     Ferritin     Folate     Transferrin     Vitamin B12     Iron and TIBC     Reticulocytes     TSH reflex to FT4     cyanocobalamin injection 1,000 mcg      2. Type 2 diabetes mellitus treated without insulin (HCC)  E11.9 Lipid Panel     CBC with Auto Differential     TSH reflex to FT4     Hemoglobin A1C     Vitamin D 25 Hydroxy      3. Chronic midline low back pain without sciatica  M54.50 diclofenac (VOLTAREN) 75 MG EC tablet    G89.29       4. Malignant neoplasm metastatic to left breast (HCC)  C79.81 Continue f/u with oncology as you are doing.      5. Bipolar I disorder with mixed features (HCC) -stable. F31.9 Continue f/u with psych and your therapist          PLAN:    Aggie Beckham: Arthritis (Pt is here for arthritis it's getting worse and want to discuss the next course of action )  [facility] or [facility].   Once lab is drawn, b12 injection today  Plan as above.  Stop celebrex with trial of diclofenac EC tablet.  She is taking omeprazole 40mg daily.   Diagnosis and orders for this visit are above.      Please note that this chart was generated using dragon

## 2025-01-30 NOTE — ASSESSMENT & PLAN NOTE
Chronic cough questionably secondary to chronic sinus disease based on history  Plan: I recommended a CT of the sinuses for evaluation of the nasal passageway and sinuses.  I will call her the results with further recommendations to follow.  If the sinuses are unremarkable, will reattempt flex laryngoscopy.

## 2025-02-01 LAB — TRANSFERRIN SERPL-MCNC: 258 MG/DL (ref 200–360)

## 2025-02-03 ENCOUNTER — OFFICE VISIT (OUTPATIENT)
Dept: PULMONOLOGY | Age: 65
End: 2025-02-03
Payer: COMMERCIAL

## 2025-02-03 VITALS
TEMPERATURE: 96.8 F | SYSTOLIC BLOOD PRESSURE: 126 MMHG | WEIGHT: 196 LBS | DIASTOLIC BLOOD PRESSURE: 84 MMHG | HEIGHT: 60 IN | OXYGEN SATURATION: 93 % | BODY MASS INDEX: 38.48 KG/M2 | HEART RATE: 79 BPM | RESPIRATION RATE: 20 BRPM

## 2025-02-03 DIAGNOSIS — C79.81: ICD-10-CM

## 2025-02-03 DIAGNOSIS — J32.0 CHRONIC MAXILLARY SINUSITIS: ICD-10-CM

## 2025-02-03 DIAGNOSIS — F17.210 CIGARETTE NICOTINE DEPENDENCE WITHOUT COMPLICATION: ICD-10-CM

## 2025-02-03 DIAGNOSIS — J45.30 MILD PERSISTENT REACTIVE AIRWAY DISEASE WITHOUT COMPLICATION: Primary | ICD-10-CM

## 2025-02-03 DIAGNOSIS — C50.912 INFLAMMATORY CARCINOMA OF LEFT BREAST (HCC): ICD-10-CM

## 2025-02-03 DIAGNOSIS — R49.0 HOARSENESS: ICD-10-CM

## 2025-02-03 DIAGNOSIS — K22.89 PRESBYESOPHAGUS: ICD-10-CM

## 2025-02-03 DIAGNOSIS — G47.33 SLEEP APNEA, OBSTRUCTIVE: ICD-10-CM

## 2025-02-03 PROCEDURE — 3079F DIAST BP 80-89 MM HG: CPT | Performed by: INTERNAL MEDICINE

## 2025-02-03 PROCEDURE — 99214 OFFICE O/P EST MOD 30 MIN: CPT | Performed by: INTERNAL MEDICINE

## 2025-02-03 PROCEDURE — 3074F SYST BP LT 130 MM HG: CPT | Performed by: INTERNAL MEDICINE

## 2025-02-03 RX ORDER — ALBUTEROL SULFATE 90 UG/1
INHALANT RESPIRATORY (INHALATION)
COMMUNITY
Start: 2025-01-31

## 2025-02-03 RX ORDER — FLUTICASONE PROPIONATE 50 MCG
2 SPRAY, SUSPENSION (ML) NASAL DAILY
Qty: 16 G | Refills: 5 | Status: SHIPPED | OUTPATIENT
Start: 2025-02-03

## 2025-02-03 ASSESSMENT — ENCOUNTER SYMPTOMS
SHORTNESS OF BREATH: 0
ANAL BLEEDING: 0
ABDOMINAL PAIN: 0
COUGH: 1
RHINORRHEA: 0
CHEST TIGHTNESS: 0
WHEEZING: 1
APNEA: 0
BACK PAIN: 0
ABDOMINAL DISTENTION: 0

## 2025-02-03 NOTE — PROGRESS NOTES
for difficulty urinating, dysuria, enuresis and flank pain.   Musculoskeletal:  Negative for arthralgias, back pain and gait problem.   Allergic/Immunologic: Negative for environmental allergies.   Neurological:  Negative for dizziness, facial asymmetry, light-headedness and headaches.       Vitals:    02/03/25 1346   BP: 126/84   Pulse: 79   Resp: 20   Temp: 96.8 °F (36 °C)   SpO2: 93%   Weight: 88.9 kg (196 lb)   Height: 1.524 m (5')      BMI Readings from Last 1 Encounters:   02/03/25 38.28 kg/m²         Physical Exam  Vitals reviewed.   Constitutional:       Appearance: Normal appearance. She is obese.   HENT:      Head: Normocephalic and atraumatic.      Nose: Nose normal.   Eyes:      Extraocular Movements: Extraocular movements intact.      Conjunctiva/sclera: Conjunctivae normal.   Cardiovascular:      Rate and Rhythm: Normal rate and regular rhythm.      Heart sounds: No murmur heard.     No friction rub.   Pulmonary:      Effort: Pulmonary effort is normal. No respiratory distress.      Breath sounds: Normal breath sounds. No stridor. No wheezing, rhonchi or rales.   Abdominal:      General: There is no distension.      Palpations: There is no mass.      Tenderness: There is no abdominal tenderness. There is no guarding or rebound.   Musculoskeletal:      Cervical back: Normal range of motion and neck supple.   Neurological:      Mental Status: She is alert and oriented to person, place, and time.             This note was generated using a voice recognition software. Errors in voice recognition may have occurred.      An electronic signature was used to authenticate this note.    --Lionel Goldstein MD

## 2025-02-24 ENCOUNTER — TELEPHONE (OUTPATIENT)
Dept: ENT CLINIC | Age: 65
End: 2025-02-24

## 2025-02-24 RX ORDER — PREDNISONE 20 MG/1
TABLET ORAL
Qty: 20 TABLET | Refills: 0 | Status: SHIPPED | OUTPATIENT
Start: 2025-02-24

## 2025-02-24 NOTE — TELEPHONE ENCOUNTER
I called and left a message on the patient's cell phone voicemail that I reviewed her CT of the sinuses after being read.  This demonstrated mild disease to the right maxillary sinus with no opacification.  Will treat with 2 weeks of Augmentin and oral prednisone.  This was called to her pharmacy on file. She is to follow-up in 1 month.      Electronically signed by ERLIN BAKER PA-C on 2/24/25 at 5:40 PM CST

## 2025-03-03 DIAGNOSIS — K21.9 GASTROESOPHAGEAL REFLUX DISEASE WITHOUT ESOPHAGITIS: ICD-10-CM

## 2025-03-03 RX ORDER — OMEPRAZOLE 40 MG/1
40 CAPSULE, DELAYED RELEASE ORAL
Qty: 30 CAPSULE | Refills: 11 | Status: SHIPPED | OUTPATIENT
Start: 2025-03-03

## 2025-03-23 DIAGNOSIS — E11.9 TYPE 2 DIABETES MELLITUS TREATED WITHOUT INSULIN (HCC): ICD-10-CM

## 2025-03-24 RX ORDER — SEMAGLUTIDE 0.68 MG/ML
INJECTION, SOLUTION SUBCUTANEOUS
Qty: 3 ML | Refills: 1 | Status: SHIPPED | OUTPATIENT
Start: 2025-03-24

## 2025-03-24 NOTE — TELEPHONE ENCOUNTER
Aggie LYNSEY Beckham called to request a refill on her medication.      Last office visit : 1/29/2025  Next office visit : Visit date not found     Requested Prescriptions     Pending Prescriptions Disp Refills    OZEMPIC, 0.25 OR 0.5 MG/DOSE, 2 MG/3ML SOPN [Pharmacy Med Name: Ozempic 0.25 mg or 0.5 mg (2 mg/3 mL) subcutaneous pen injector] 3 mL 1     Sig: INJECT 0.25MG UNDER THE SKIN EVERY WEEK            Mali Whittaker LPN

## 2025-03-25 DIAGNOSIS — G25.81 RLS (RESTLESS LEGS SYNDROME): ICD-10-CM

## 2025-03-25 DIAGNOSIS — E78.2 MIXED HYPERLIPIDEMIA: ICD-10-CM

## 2025-03-25 DIAGNOSIS — I10 ESSENTIAL HYPERTENSION: ICD-10-CM

## 2025-03-25 RX ORDER — SPIRONOLACTONE 25 MG/1
TABLET ORAL
Qty: 270 TABLET | Refills: 1 | Status: SHIPPED | OUTPATIENT
Start: 2025-03-25

## 2025-03-25 RX ORDER — PRAMIPEXOLE DIHYDROCHLORIDE 1.5 MG/1
2.25 TABLET ORAL DAILY
Qty: 135 TABLET | Refills: 1 | Status: SHIPPED | OUTPATIENT
Start: 2025-03-25

## 2025-03-25 RX ORDER — ATORVASTATIN CALCIUM 40 MG/1
TABLET, FILM COATED ORAL
Qty: 90 TABLET | Refills: 2 | Status: CANCELLED | OUTPATIENT
Start: 2025-03-25

## 2025-03-25 NOTE — TELEPHONE ENCOUNTER
Aggie LYNSEY Beckham called to request a refill on her medication.      Last office visit : 1/29/2025  Next office visit : Visit date not found     Requested Prescriptions     Pending Prescriptions Disp Refills    spironolactone (ALDACTONE) 25 MG tablet [Pharmacy Med Name: spironolactone 25 mg tablet] 270 tablet 1     Sig: TAKE TWO TABLETS BY MOUTH EVERY MORNING AND ONE TABLET AT BEDTIME    pramipexole (MIRAPEX) 1.5 MG tablet [Pharmacy Med Name: pramipexole 1.5 mg tablet] 135 tablet 1     Sig: TAKE 1 AND 1/2 TABLETS BY MOUTH DAILY            Mali Whittaker LPN

## 2025-03-25 NOTE — TELEPHONE ENCOUNTER
Aggie Beckham called to request a refill on her medication.      Last office visit : 1/29/2025   Next office visit : Visit date not found     Requested Prescriptions     Pending Prescriptions Disp Refills    atorvastatin (LIPITOR) 40 MG tablet 90 tablet 2     Sig: Take one tablet by mouth nightly            Judy Lei CMA

## 2025-03-26 DIAGNOSIS — E78.2 MIXED HYPERLIPIDEMIA: ICD-10-CM

## 2025-03-26 RX ORDER — ATORVASTATIN CALCIUM 40 MG/1
TABLET, FILM COATED ORAL
Qty: 90 TABLET | Refills: 0 | Status: SHIPPED | OUTPATIENT
Start: 2025-03-26

## 2025-03-28 ENCOUNTER — TRANSCRIBE ORDERS (OUTPATIENT)
Dept: ADMINISTRATIVE | Facility: HOSPITAL | Age: 65
End: 2025-03-28
Payer: COMMERCIAL

## 2025-03-28 DIAGNOSIS — Z17.0 MALIGNANT NEOPLASM OF OVERLAPPING SITES OF LEFT BREAST IN FEMALE, ESTROGEN RECEPTOR POSITIVE: Primary | ICD-10-CM

## 2025-03-28 DIAGNOSIS — C50.812 MALIGNANT NEOPLASM OF OVERLAPPING SITES OF LEFT BREAST IN FEMALE, ESTROGEN RECEPTOR POSITIVE: Primary | ICD-10-CM

## 2025-04-09 ENCOUNTER — HOSPITAL ENCOUNTER (OUTPATIENT)
Dept: CT IMAGING | Facility: HOSPITAL | Age: 65
Discharge: HOME | End: 2025-04-09
Payer: COMMERCIAL

## 2025-04-09 DIAGNOSIS — Z17.0 MALIGNANT NEOPLASM OF OVERLAPPING SITES OF LEFT BREAST IN FEMALE, ESTROGEN RECEPTOR POSITIVE: ICD-10-CM

## 2025-04-09 DIAGNOSIS — C50.812 MALIGNANT NEOPLASM OF OVERLAPPING SITES OF LEFT BREAST IN FEMALE, ESTROGEN RECEPTOR POSITIVE: ICD-10-CM

## 2025-04-09 PROCEDURE — 34310000005 FLUDEOXYGLUCOSE F18 SOLUTION: Performed by: INTERNAL MEDICINE

## 2025-04-09 PROCEDURE — A9552 F18 FDG: HCPCS | Performed by: INTERNAL MEDICINE

## 2025-04-09 PROCEDURE — 78815 PET IMAGE W/CT SKULL-THIGH: CPT

## 2025-04-09 RX ADMIN — FLUDEOXYGLUCOSE F18 1 DOSE: 300 INJECTION INTRAVENOUS at 10:25

## 2025-04-17 ENCOUNTER — TRANSCRIBE ORDERS (OUTPATIENT)
Dept: ADMINISTRATIVE | Facility: HOSPITAL | Age: 65
End: 2025-04-17
Payer: COMMERCIAL

## 2025-04-17 DIAGNOSIS — M54.16 LUMBAR RADICULOPATHY: Primary | ICD-10-CM

## 2025-04-29 DIAGNOSIS — M54.50 CHRONIC MIDLINE LOW BACK PAIN WITHOUT SCIATICA: ICD-10-CM

## 2025-04-29 DIAGNOSIS — G89.29 CHRONIC MIDLINE LOW BACK PAIN WITHOUT SCIATICA: ICD-10-CM

## 2025-04-29 RX ORDER — DICLOFENAC SODIUM 75 MG/1
TABLET, DELAYED RELEASE ORAL
Qty: 180 TABLET | Refills: 0 | Status: SHIPPED | OUTPATIENT
Start: 2025-04-29

## 2025-04-29 NOTE — TELEPHONE ENCOUNTER
Aggie Beckham called to request a refill on her medication.      Last office visit : 1/29/2025  Next office visit : Visit date not found     Requested Prescriptions     Pending Prescriptions Disp Refills    diclofenac (VOLTAREN) 75 MG EC tablet [Pharmacy Med Name: diclofenac sodium 75 mg tablet,delayed release] 180 tablet 0     Sig: TAKE ONE TABLET BY MOUTH TWICE DAILY WITH FOOD AS NEEDED            Mali Whittaker LPN

## 2025-05-06 ENCOUNTER — OFFICE VISIT (OUTPATIENT)
Dept: PULMONOLOGY | Age: 65
End: 2025-05-06
Payer: COMMERCIAL

## 2025-05-06 VITALS
TEMPERATURE: 97.8 F | HEIGHT: 60 IN | BODY MASS INDEX: 38.68 KG/M2 | HEART RATE: 70 BPM | OXYGEN SATURATION: 94 % | DIASTOLIC BLOOD PRESSURE: 78 MMHG | SYSTOLIC BLOOD PRESSURE: 119 MMHG | RESPIRATION RATE: 20 BRPM | WEIGHT: 197 LBS

## 2025-05-06 DIAGNOSIS — J45.30 MILD PERSISTENT REACTIVE AIRWAY DISEASE WITHOUT COMPLICATION: ICD-10-CM

## 2025-05-06 DIAGNOSIS — C50.912 INFLAMMATORY CARCINOMA OF LEFT BREAST (HCC): ICD-10-CM

## 2025-05-06 DIAGNOSIS — R05.3 CHRONIC COUGH: ICD-10-CM

## 2025-05-06 DIAGNOSIS — K21.9 GASTROESOPHAGEAL REFLUX DISEASE WITHOUT ESOPHAGITIS: ICD-10-CM

## 2025-05-06 DIAGNOSIS — J01.00 ACUTE MAXILLARY SINUSITIS, RECURRENCE NOT SPECIFIED: Primary | ICD-10-CM

## 2025-05-06 PROCEDURE — 3078F DIAST BP <80 MM HG: CPT | Performed by: INTERNAL MEDICINE

## 2025-05-06 PROCEDURE — 99214 OFFICE O/P EST MOD 30 MIN: CPT | Performed by: INTERNAL MEDICINE

## 2025-05-06 PROCEDURE — 3074F SYST BP LT 130 MM HG: CPT | Performed by: INTERNAL MEDICINE

## 2025-05-06 RX ORDER — CEFUROXIME AXETIL 500 MG/1
500 TABLET ORAL 2 TIMES DAILY
Qty: 28 TABLET | Refills: 0 | Status: SHIPPED | OUTPATIENT
Start: 2025-05-06 | End: 2025-05-20

## 2025-05-06 RX ORDER — PREDNISONE 10 MG/1
TABLET ORAL
Qty: 35 TABLET | Refills: 0 | Status: SHIPPED | OUTPATIENT
Start: 2025-05-06

## 2025-05-06 ASSESSMENT — ENCOUNTER SYMPTOMS
RHINORRHEA: 0
ABDOMINAL PAIN: 0
CHEST TIGHTNESS: 0
WHEEZING: 0
BACK PAIN: 0
COUGH: 0
ANAL BLEEDING: 0
ABDOMINAL DISTENTION: 0
SHORTNESS OF BREATH: 0
APNEA: 0

## 2025-05-06 NOTE — PROGRESS NOTES
Pulmonary and Sleep Medicine    Aggie Beckham (:  1960) is a 65 y.o. female,Established patient, here for evaluation of the following chief complaint(s):  Follow-up (3 month follow up- Mild persistent RAD. /Managing with her albuterol inhaler occasionally. /)      Referring physician:  No referring provider defined for this encounter.     ASSESSMENT/PLAN:  1. Acute maxillary sinusitis, recurrence not specified  -     predniSONE (DELTASONE) 10 MG tablet; 40 mg a day and taper by 10 mg every third day to off, Disp-35 tablet, R-0Normal  -     cefUROXime (CEFTIN) 500 MG tablet; Take 1 tablet by mouth 2 times daily for 14 days, Disp-28 tablet, R-0Normal  2. Chronic cough  3. Mild persistent reactive airway disease without complication  4. Gastroesophageal reflux disease without esophagitis  5. Inflammatory carcinoma of left breast (HCC)      Acute sinusitis.  We will treat her with steroids and antibiotics.  Recommended the patient to continue to use nasal steroids.  Cough is likely secondary to postnasal drainage and the above.  Will reevaluate again in 4 weeks.       Lionel Goldstein MD, [facility], [facility]    Return in about 4 weeks (around 6/3/2025).    SUBJECTIVE/OBJECTIVE:        The patient is here for follow-up on shortness of breath and cough.  She says her cough improves and gets worse.  She says that now she is having sinus infection.  She is having green discharge.  She is not using nasal steroids at this time.  She said they did not help her much.          Continue the following medications as reported by the patient:    Prior to Visit Medications    Medication Sig Taking? Authorizing Provider   diclofenac (VOLTAREN) 75 MG EC tablet TAKE ONE TABLET BY MOUTH TWICE DAILY WITH FOOD AS NEEDED Yes Tea Lu APRN   atorvastatin (LIPITOR) 40 MG tablet Take one tablet by mouth nightly Yes Tea Lu APRN   spironolactone (ALDACTONE) 25 MG tablet TAKE TWO TABLETS BY MOUTH EVERY MORNING AND ONE TABLET

## 2025-05-14 ENCOUNTER — OFFICE VISIT (OUTPATIENT)
Age: 65
End: 2025-05-14
Payer: COMMERCIAL

## 2025-05-14 VITALS
BODY MASS INDEX: 37.55 KG/M2 | HEART RATE: 70 BPM | HEIGHT: 60 IN | WEIGHT: 191.25 LBS | SYSTOLIC BLOOD PRESSURE: 128 MMHG | DIASTOLIC BLOOD PRESSURE: 78 MMHG | TEMPERATURE: 97.7 F | OXYGEN SATURATION: 94 %

## 2025-05-14 DIAGNOSIS — G25.0 TREMOR, ESSENTIAL: ICD-10-CM

## 2025-05-14 DIAGNOSIS — S81.852A ANIMAL BITE OF LEFT LOWER LEG, INITIAL ENCOUNTER: Primary | ICD-10-CM

## 2025-05-14 PROCEDURE — 99213 OFFICE O/P EST LOW 20 MIN: CPT | Performed by: NURSE PRACTITIONER

## 2025-05-14 PROCEDURE — G8417 CALC BMI ABV UP PARAM F/U: HCPCS | Performed by: NURSE PRACTITIONER

## 2025-05-14 PROCEDURE — 3078F DIAST BP <80 MM HG: CPT | Performed by: NURSE PRACTITIONER

## 2025-05-14 PROCEDURE — 4004F PT TOBACCO SCREEN RCVD TLK: CPT | Performed by: NURSE PRACTITIONER

## 2025-05-14 PROCEDURE — G8427 DOCREV CUR MEDS BY ELIG CLIN: HCPCS | Performed by: NURSE PRACTITIONER

## 2025-05-14 PROCEDURE — 3074F SYST BP LT 130 MM HG: CPT | Performed by: NURSE PRACTITIONER

## 2025-05-14 RX ORDER — MUPIROCIN 20 MG/G
OINTMENT TOPICAL
Qty: 30 G | Refills: 0 | Status: SHIPPED | OUTPATIENT
Start: 2025-05-14

## 2025-05-14 RX ORDER — DOXYCYCLINE HYCLATE 100 MG
100 TABLET ORAL 2 TIMES DAILY
Qty: 20 TABLET | Refills: 0 | Status: SHIPPED | OUTPATIENT
Start: 2025-05-14 | End: 2025-05-24

## 2025-05-14 ASSESSMENT — ENCOUNTER SYMPTOMS: RESPIRATORY NEGATIVE: 1

## 2025-05-14 NOTE — PROGRESS NOTES
(1/29/2025)    Housing Stability Vital Sign     Unable to Pay for Housing in the Last Year: No     Number of Times Moved in the Last Year: 0     Homeless in the Last Year: No       Review of Systems   Constitutional: Negative.  Negative for fever.   Respiratory: Negative.     Cardiovascular: Negative.    Skin:  Positive for wound.   Neurological:  Positive for tremors.       OBJECTIVE:  Physical Exam  - Respiratory: Clear to auscultation, no wheezing, rales or rhonchi  - Skin: Area on left lower leg shows signs of infection, but is improving. No erythema or exudate noted     Physical Exam  Vitals and nursing note reviewed.   Constitutional:       General: She is not in acute distress.     Appearance: Normal appearance. She is well-developed. She is obese. She is not ill-appearing.   HENT:      Head: Normocephalic and atraumatic.   Eyes:      Extraocular Movements: Extraocular movements intact.      Conjunctiva/sclera: Conjunctivae normal.      Pupils: Pupils are equal, round, and reactive to light.   Cardiovascular:      Rate and Rhythm: Normal rate and regular rhythm.      Heart sounds: Normal heart sounds. No murmur heard.  Pulmonary:      Effort: Pulmonary effort is normal. No respiratory distress.      Breath sounds: Normal breath sounds.   Musculoskeletal:      Cervical back: Neck supple. No rigidity.   Skin:     General: Skin is warm and dry.      Capillary Refill: Capillary refill takes less than 2 seconds.          Neurological:      General: No focal deficit present.      Mental Status: She is alert and oriented to person, place, and time. Mental status is at baseline.   Psychiatric:         Mood and Affect: Mood normal.         Behavior: Behavior normal.         Thought Content: Thought content normal.         Judgment: Judgment normal.          /78 (BP Site: Left Upper Arm, Patient Position: Sitting, BP Cuff Size: Large Adult)   Pulse 70   Temp 97.7 °F (36.5 °C) (Temporal)   Ht 1.524 m (5')   Wt

## 2025-05-22 DIAGNOSIS — E11.9 TYPE 2 DIABETES MELLITUS TREATED WITHOUT INSULIN (HCC): ICD-10-CM

## 2025-05-22 RX ORDER — SEMAGLUTIDE 0.68 MG/ML
INJECTION, SOLUTION SUBCUTANEOUS
Qty: 3 ML | Refills: 1 | Status: SHIPPED | OUTPATIENT
Start: 2025-05-22

## 2025-05-22 NOTE — TELEPHONE ENCOUNTER
Received fax from pharmacy requesting refill on pts medication(s). Pt was last seen in office on 5/14/2025  and has a follow up scheduled for 5/23/2025. Will send request to  Tea Hendrix  for patient.     Requested Prescriptions     Pending Prescriptions Disp Refills    OZEMPIC, 0.25 OR 0.5 MG/DOSE, 2 MG/3ML SOPN [Pharmacy Med Name: Ozempic 0.25 mg or 0.5 mg (2 mg/3 mL) subcutaneous pen injector] 3 mL 1     Sig: INJECT 0.25MG UNDER THE SKIN EVERY WEEK

## 2025-05-23 ENCOUNTER — OFFICE VISIT (OUTPATIENT)
Age: 65
End: 2025-05-23
Payer: COMMERCIAL

## 2025-05-23 VITALS
HEART RATE: 78 BPM | SYSTOLIC BLOOD PRESSURE: 126 MMHG | RESPIRATION RATE: 16 BRPM | WEIGHT: 194 LBS | DIASTOLIC BLOOD PRESSURE: 80 MMHG | HEIGHT: 60 IN | TEMPERATURE: 97.4 F | OXYGEN SATURATION: 97 % | BODY MASS INDEX: 38.09 KG/M2

## 2025-05-23 DIAGNOSIS — S81.852D: Primary | ICD-10-CM

## 2025-05-23 PROCEDURE — 3079F DIAST BP 80-89 MM HG: CPT | Performed by: NURSE PRACTITIONER

## 2025-05-23 PROCEDURE — G8417 CALC BMI ABV UP PARAM F/U: HCPCS | Performed by: NURSE PRACTITIONER

## 2025-05-23 PROCEDURE — 99214 OFFICE O/P EST MOD 30 MIN: CPT | Performed by: NURSE PRACTITIONER

## 2025-05-23 PROCEDURE — 4004F PT TOBACCO SCREEN RCVD TLK: CPT | Performed by: NURSE PRACTITIONER

## 2025-05-23 PROCEDURE — G8427 DOCREV CUR MEDS BY ELIG CLIN: HCPCS | Performed by: NURSE PRACTITIONER

## 2025-05-23 PROCEDURE — 3074F SYST BP LT 130 MM HG: CPT | Performed by: NURSE PRACTITIONER

## 2025-05-23 ASSESSMENT — ENCOUNTER SYMPTOMS
GASTROINTESTINAL NEGATIVE: 1
RESPIRATORY NEGATIVE: 1

## 2025-05-23 NOTE — PROGRESS NOTES
EUGEN[facility] MEDICINE, INTERNAL MEDICINE AND PEDIATRICS  [address]  [place] [address]       SUBJECTIVE:    Patient ID: Aggie Beckham is a65 y.o. female.  Aggie Beckham is here today for Follow-up (Patient is here today for her 1 week follow up due to an animal bite to her left lower leg. )  .    HPI:   History of Present Illness  The patient is a 65-year-old female who presents today for a 2-week follow-up visit.  Animal bite (ferret) 8-9wks ago.     Left Lower Leg Infection  Patient was seen in office on May 14.  At that time she reported approximately 8 weeks prior she was with a family member who had a ferret and that a ferret was playing and bit her left leg.  She does state that it has improved but it has not further improved from the initial improvement that was noted even prior to her last visit.  No fevers.  Does report the animal is vaccinated.  Is up-to-date on tetanus.  She had been on cefuroxime from ENT but upon evaluation here I did have to start doxycycline.    - No significant change in the condition of her left lower leg  - The initial skin injury occurred approximately 8 to 9 weeks ago.  - She has been applying an ointment to the wound.  - No febrile episodes experienced.  - Currently on a course of doxycycline, which she tolerates well without any gastrointestinal discomfort.  - The wound was previously treated by Dr. Alegre in 09/2020, during which she had a referral to wound care.       Past Medical History:   Diagnosis Date    Arthritis     Bipolar I disorder, most recent episode (or current) mixed, unspecified     Chronic back pain     Depression     Dry mouth     Gastroesophageal reflux disease without esophagitis 12/12/2024    Hyperlipidemia     Hypertension     Hypokalemia     Hypothyroidism     Inflammatory carcinoma of left breast (HCC) 06/23/2023    Menopause     Morbidly obese (HCC) 10/28/2020    Overactive bladder     Plantar fasciitis

## 2025-06-02 ENCOUNTER — OFFICE VISIT (OUTPATIENT)
Dept: PULMONOLOGY | Age: 65
End: 2025-06-02

## 2025-06-02 VITALS
SYSTOLIC BLOOD PRESSURE: 124 MMHG | TEMPERATURE: 98.2 F | HEIGHT: 60 IN | HEART RATE: 82 BPM | DIASTOLIC BLOOD PRESSURE: 69 MMHG | BODY MASS INDEX: 38.28 KG/M2 | OXYGEN SATURATION: 90 % | WEIGHT: 195 LBS

## 2025-06-02 DIAGNOSIS — R05.3 CHRONIC COUGH: Primary | ICD-10-CM

## 2025-06-02 DIAGNOSIS — J01.00 ACUTE MAXILLARY SINUSITIS, RECURRENCE NOT SPECIFIED: ICD-10-CM

## 2025-06-02 DIAGNOSIS — K21.9 GASTROESOPHAGEAL REFLUX DISEASE WITHOUT ESOPHAGITIS: ICD-10-CM

## 2025-06-02 DIAGNOSIS — R06.09 DOE (DYSPNEA ON EXERTION): ICD-10-CM

## 2025-06-02 DIAGNOSIS — J45.30 MILD PERSISTENT REACTIVE AIRWAY DISEASE WITHOUT COMPLICATION: ICD-10-CM

## 2025-06-02 RX ORDER — OMEPRAZOLE 40 MG/1
40 CAPSULE, DELAYED RELEASE ORAL 2 TIMES DAILY
Qty: 60 CAPSULE | Refills: 6 | Status: SHIPPED | OUTPATIENT
Start: 2025-06-02 | End: 2025-07-02

## 2025-06-02 ASSESSMENT — ENCOUNTER SYMPTOMS
RHINORRHEA: 0
BACK PAIN: 1
COUGH: 0
WHEEZING: 0
APNEA: 0
CHEST TIGHTNESS: 0
ABDOMINAL PAIN: 0
SHORTNESS OF BREATH: 1
ABDOMINAL DISTENTION: 0
ANAL BLEEDING: 0

## 2025-06-02 NOTE — PROGRESS NOTES
EVERY TWELVE HOURS Yes Angel Vargas MD   lurasidone (LATUDA) 60 MG TABS tablet TAKE ONE TABLET BY MOUTH ONCE A DAY AS NEEDED WITH 350 CHEWABLE CALORIES Yes Angel Vargas MD   traZODone (DESYREL) 150 MG tablet TAKE 1 TO 2 TABLETS BY MOUTH EVERY NIGHT AS NEEDED FOR SLEEP Yes Angel Vargas MD   FLUoxetine (PROZAC) 10 MG capsule Take 5 capsules by mouth daily Yes Angel Vargas MD   orphenadrine (NORFLEX) 100 MG extended release tablet Take 1 tablet by mouth in the morning and 1 tablet at noon and 1 tablet in the evening. Yes Angel Vargas MD   UNABLE TO FIND Viviscal for hair growth Yes Angel Vargas MD   HYDROcodone-acetaminophen (NORCO)  MG per tablet TAKE ONE TABLET BY MOUTH EVERY 8 HOURS AS NEEDED 01/30 Yes Angel Vargas MD   IBRANCE 125 MG tablet Take 125 mg by mouth daily Yes Angel Vargas MD   FIBER PO Take 500 mg by mouth Yes Angel Vargas MD   FLUoxetine (PROZAC) 40 MG capsule TAKE ONE CAPSULE BY MOUTH EVERY MORNING AS DIRECTED FOR DEPRESSION/ANXIETY Yes Tea Lu APRN   UNABLE TO FIND Please adjust medication refills to limit pharmacy visits--I will authorize most quantities to allow this to happen Yes Tea Lu APRN   niacin (SLO-NIACIN) 500 MG extended release tablet Take 1 tablet by mouth daily Yes Angel Vargas MD   NARCAN 4 MG/0.1ML LIQD nasal spray  Yes Angel Vargas MD   Vitamin D (CHOLECALCIFEROL) 1000 UNITS CAPS capsule Take 1 capsule by mouth daily Yes Angel Vargas MD   Multiple Vitamins-Minerals (MULTIVITAMIN PO) Take by mouth daily Yes Angel Vargas MD   calcium carbonate 600 MG TABS tablet Take 1 tablet by mouth daily Yes Angel Vargas MD        Review of Systems   Constitutional:  Negative for activity change, appetite change, chills, diaphoresis and fatigue.   HENT:  Negative for congestion, dental problem, drooling, ear discharge, postnasal drip and rhinorrhea.    Eyes:

## 2025-06-12 ENCOUNTER — OFFICE VISIT (OUTPATIENT)
Dept: WOUND CARE | Facility: HOSPITAL | Age: 65
End: 2025-06-12
Payer: COMMERCIAL

## 2025-06-12 PROCEDURE — G0463 HOSPITAL OUTPT CLINIC VISIT: HCPCS

## 2025-06-18 ENCOUNTER — OFFICE VISIT (OUTPATIENT)
Dept: WOUND CARE | Facility: HOSPITAL | Age: 65
End: 2025-06-18
Payer: COMMERCIAL

## 2025-06-20 ENCOUNTER — HOSPITAL ENCOUNTER (OUTPATIENT)
Dept: CT IMAGING | Facility: HOSPITAL | Age: 65
Discharge: HOME | End: 2025-06-20
Payer: COMMERCIAL

## 2025-06-20 ENCOUNTER — APPOINTMENT (OUTPATIENT)
Dept: GENERAL RADIOLOGY | Facility: HOSPITAL | Age: 65
End: 2025-06-20
Payer: COMMERCIAL

## 2025-06-20 ENCOUNTER — TRANSCRIBE ORDERS (OUTPATIENT)
Dept: ADMINISTRATIVE | Facility: HOSPITAL | Age: 65
End: 2025-06-20
Payer: COMMERCIAL

## 2025-06-20 ENCOUNTER — HOSPITAL ENCOUNTER (OUTPATIENT)
Dept: GENERAL RADIOLOGY | Facility: HOSPITAL | Age: 65
Discharge: HOME | End: 2025-06-20
Payer: COMMERCIAL

## 2025-06-20 DIAGNOSIS — M54.16 LUMBAR RADICULOPATHY: Primary | ICD-10-CM

## 2025-06-20 DIAGNOSIS — M54.16 LUMBAR RADICULOPATHY: ICD-10-CM

## 2025-06-20 PROCEDURE — 72110 X-RAY EXAM L-2 SPINE 4/>VWS: CPT

## 2025-06-20 PROCEDURE — 72131 CT LUMBAR SPINE W/O DYE: CPT

## 2025-06-23 ENCOUNTER — HOSPITAL ENCOUNTER (OUTPATIENT)
Dept: GENERAL RADIOLOGY | Age: 65
Discharge: HOME | End: 2025-06-23
Payer: COMMERCIAL

## 2025-06-23 DIAGNOSIS — R06.09 DOE (DYSPNEA ON EXERTION): ICD-10-CM

## 2025-06-23 DIAGNOSIS — I10 ESSENTIAL HYPERTENSION: ICD-10-CM

## 2025-06-23 PROCEDURE — 71046 X-RAY EXAM CHEST 2 VIEWS: CPT

## 2025-06-23 RX ORDER — CARVEDILOL 3.12 MG/1
3.12 TABLET ORAL 2 TIMES DAILY WITH MEALS
Qty: 180 TABLET | Refills: 1 | Status: SHIPPED | OUTPATIENT
Start: 2025-06-23

## 2025-06-23 RX ORDER — PILOCARPINE HYDROCHLORIDE 5 MG/1
5 TABLET, FILM COATED ORAL 3 TIMES DAILY
Qty: 270 TABLET | Refills: 0 | Status: SHIPPED | OUTPATIENT
Start: 2025-06-23

## 2025-06-23 NOTE — TELEPHONE ENCOUNTER
Aggie Beckham called to request a refill on her medication.      Last office visit : 5/23/2025  Next office visit : Visit date not found     Requested Prescriptions     Pending Prescriptions Disp Refills    carvedilol (COREG) 3.125 MG tablet [Pharmacy Med Name: carvedilol 3.125 mg tablet] 180 tablet 1     Sig: TAKE ONE TABLET BY MOUTH TWICE DAILY WITH MEALS    pilocarpine (SALAGEN) 5 MG tablet [Pharmacy Med Name: pilocarpine 5 mg tablet] 270 tablet 0     Sig: TAKE ONE TABLET BY MOUTH THREE TIMES DAILY            Mali Whittaker LPN

## 2025-06-26 ENCOUNTER — OFFICE VISIT (OUTPATIENT)
Dept: WOUND CARE | Facility: HOSPITAL | Age: 65
End: 2025-06-26
Payer: COMMERCIAL

## 2025-06-27 DIAGNOSIS — E78.2 MIXED HYPERLIPIDEMIA: ICD-10-CM

## 2025-06-27 RX ORDER — ATORVASTATIN CALCIUM 40 MG/1
40 TABLET, FILM COATED ORAL NIGHTLY
Qty: 90 TABLET | Refills: 0 | Status: SHIPPED | OUTPATIENT
Start: 2025-06-27

## 2025-06-27 NOTE — TELEPHONE ENCOUNTER
Aggie Beckham called to request a refill on her medication.      Last office visit : 5/23/2025  Next office visit : Visit date not found     Requested Prescriptions     Pending Prescriptions Disp Refills    atorvastatin (LIPITOR) 40 MG tablet [Pharmacy Med Name: atorvastatin 40 mg tablet] 90 tablet 0     Sig: TAKE ONE TABLET BY MOUTH NIGHTLY            Mali Whittaker LPN

## 2025-06-30 ENCOUNTER — TRANSCRIBE ORDERS (OUTPATIENT)
Dept: ADMINISTRATIVE | Facility: HOSPITAL | Age: 65
End: 2025-06-30
Payer: COMMERCIAL

## 2025-06-30 DIAGNOSIS — C50.812 MALIGNANT NEOPLASM OF OVERLAPPING SITES OF LEFT BREAST IN FEMALE, ESTROGEN RECEPTOR POSITIVE: Primary | ICD-10-CM

## 2025-06-30 DIAGNOSIS — Z17.0 MALIGNANT NEOPLASM OF OVERLAPPING SITES OF LEFT BREAST IN FEMALE, ESTROGEN RECEPTOR POSITIVE: Primary | ICD-10-CM

## 2025-06-30 DIAGNOSIS — Z17.0 ESTROGEN RECEPTOR POSITIVE: ICD-10-CM

## 2025-07-02 ENCOUNTER — OFFICE VISIT (OUTPATIENT)
Dept: WOUND CARE | Facility: HOSPITAL | Age: 65
End: 2025-07-02
Payer: COMMERCIAL

## 2025-07-07 ENCOUNTER — OFFICE VISIT (OUTPATIENT)
Dept: PULMONOLOGY | Age: 65
End: 2025-07-07
Payer: COMMERCIAL

## 2025-07-07 VITALS
RESPIRATION RATE: 88 BRPM | DIASTOLIC BLOOD PRESSURE: 86 MMHG | HEART RATE: 79 BPM | BODY MASS INDEX: 38.09 KG/M2 | TEMPERATURE: 97.1 F | OXYGEN SATURATION: 3 % | HEIGHT: 60 IN | SYSTOLIC BLOOD PRESSURE: 144 MMHG | WEIGHT: 194 LBS

## 2025-07-07 DIAGNOSIS — R06.09 DOE (DYSPNEA ON EXERTION): ICD-10-CM

## 2025-07-07 DIAGNOSIS — J96.11 CHRONIC RESPIRATORY FAILURE WITH HYPOXIA (HCC): ICD-10-CM

## 2025-07-07 DIAGNOSIS — J01.00 ACUTE MAXILLARY SINUSITIS, RECURRENCE NOT SPECIFIED: ICD-10-CM

## 2025-07-07 DIAGNOSIS — F17.210 CIGARETTE NICOTINE DEPENDENCE WITHOUT COMPLICATION: ICD-10-CM

## 2025-07-07 DIAGNOSIS — R05.3 CHRONIC COUGH: Primary | ICD-10-CM

## 2025-07-07 DIAGNOSIS — K21.9 GASTROESOPHAGEAL REFLUX DISEASE WITHOUT ESOPHAGITIS: ICD-10-CM

## 2025-07-07 DIAGNOSIS — J45.30 MILD PERSISTENT REACTIVE AIRWAY DISEASE WITHOUT COMPLICATION: ICD-10-CM

## 2025-07-07 PROCEDURE — 3079F DIAST BP 80-89 MM HG: CPT | Performed by: INTERNAL MEDICINE

## 2025-07-07 PROCEDURE — 3077F SYST BP >= 140 MM HG: CPT | Performed by: INTERNAL MEDICINE

## 2025-07-07 PROCEDURE — 99214 OFFICE O/P EST MOD 30 MIN: CPT | Performed by: INTERNAL MEDICINE

## 2025-07-07 RX ORDER — FLUTICASONE PROPIONATE 50 MCG
2 SPRAY, SUSPENSION (ML) NASAL DAILY
Qty: 16 G | Refills: 5 | Status: SHIPPED | OUTPATIENT
Start: 2025-07-07

## 2025-07-07 ASSESSMENT — ENCOUNTER SYMPTOMS
APNEA: 0
RHINORRHEA: 0
CHEST TIGHTNESS: 0
SHORTNESS OF BREATH: 1
WHEEZING: 0
ABDOMINAL DISTENTION: 0
ABDOMINAL PAIN: 0
COUGH: 1
BACK PAIN: 0
ANAL BLEEDING: 0

## 2025-07-07 NOTE — PROGRESS NOTES
Pulmonary and Sleep Medicine    Aggie Beckham (:  1960) is a 65 y.o. female,Established patient, here for evaluation of the following chief complaint(s):  Follow-up (4 week follow up- Chronic cough./CXR completed on 2025.)      Referring physician:  No referring provider defined for this encounter.     ASSESSMENT/PLAN:  1. Chronic cough  2. Mild persistent reactive airway disease without complication  3. Gastroesophageal reflux disease without esophagitis  4. FRIAS (dyspnea on exertion)  5. Cigarette nicotine dependence without complication  6. Chronic respiratory failure with hypoxia (HCC)  -     Full PFT Study With Bronchodilator; Future  7. Acute maxillary sinusitis, recurrence not specified  -     fluticasone (FLONASE) 50 MCG/ACT nasal spray; 2 sprays by Each Nostril route daily, Disp-16 g, R-5Normal        She would benefit from a portable concentrator.  We will with portable concentrator in order to preserve her mobility and ability to travel.  Pulmonary function test.       Lionel Goldstein MD, [facility], [facility]    Return in about 3 months (around 10/7/2025).    SUBJECTIVE/OBJECTIVE:        Patient is here for follow-up on cough and shortness of breath and hypoxic respiratory failure.  She is currently on supplemental oxygen.  She is complaining that her oxygen tank does not last very long.  She does not feel that it is conducive for traveling.  She had to rent a portable portable concentrator.          Continue the following medications as reported by the patient:    Prior to Visit Medications    Medication Sig Taking? Authorizing Provider   atorvastatin (LIPITOR) 40 MG tablet TAKE ONE TABLET BY MOUTH NIGHTLY Yes Tea Lu APRN   carvedilol (COREG) 3.125 MG tablet TAKE ONE TABLET BY MOUTH TWICE DAILY WITH MEALS Yes Tea Lu APRN   pilocarpine (SALAGEN) 5 MG tablet TAKE ONE TABLET BY MOUTH THREE TIMES DAILY Yes Tea Lu APRN   OZEMPIC, 0.25 OR 0.5 MG/DOSE, 2 MG/3ML SOPN INJECT

## 2025-07-08 ENCOUNTER — HOSPITAL ENCOUNTER (OUTPATIENT)
Dept: CT IMAGING | Facility: HOSPITAL | Age: 65
Discharge: HOME | End: 2025-07-08
Payer: COMMERCIAL

## 2025-07-08 DIAGNOSIS — C50.812 MALIGNANT NEOPLASM OF OVERLAPPING SITES OF LEFT BREAST IN FEMALE, ESTROGEN RECEPTOR POSITIVE: ICD-10-CM

## 2025-07-08 DIAGNOSIS — Z17.0 MALIGNANT NEOPLASM OF OVERLAPPING SITES OF LEFT BREAST IN FEMALE, ESTROGEN RECEPTOR POSITIVE: ICD-10-CM

## 2025-07-08 DIAGNOSIS — Z17.0 ESTROGEN RECEPTOR POSITIVE: ICD-10-CM

## 2025-07-09 ENCOUNTER — OFFICE VISIT (OUTPATIENT)
Dept: WOUND CARE | Facility: HOSPITAL | Age: 65
End: 2025-07-09
Payer: COMMERCIAL

## 2025-07-09 ENCOUNTER — HOSPITAL ENCOUNTER (OUTPATIENT)
Dept: CT IMAGING | Facility: HOSPITAL | Age: 65
Discharge: HOME | End: 2025-07-09
Payer: COMMERCIAL

## 2025-07-09 LAB — GLUCOSE BLDC GLUCOMTR-MCNC: 95 MG/DL (ref 70–130)

## 2025-07-09 PROCEDURE — 82948 REAGENT STRIP/BLOOD GLUCOSE: CPT

## 2025-07-09 PROCEDURE — 34310000005 FLUDEOXYGLUCOSE F18 SOLUTION: Performed by: INTERNAL MEDICINE

## 2025-07-09 PROCEDURE — A9552 F18 FDG: HCPCS | Performed by: INTERNAL MEDICINE

## 2025-07-09 PROCEDURE — 78815 PET IMAGE W/CT SKULL-THIGH: CPT

## 2025-07-09 RX ADMIN — FLUDEOXYGLUCOSE F18 1 DOSE: 300 INJECTION INTRAVENOUS at 07:45

## 2025-07-16 ENCOUNTER — OFFICE VISIT (OUTPATIENT)
Dept: WOUND CARE | Facility: HOSPITAL | Age: 65
End: 2025-07-16
Payer: COMMERCIAL

## 2025-07-22 DIAGNOSIS — E03.9 ACQUIRED HYPOTHYROIDISM: ICD-10-CM

## 2025-07-22 DIAGNOSIS — E79.0 ELEVATED URIC ACID IN BLOOD: ICD-10-CM

## 2025-07-22 DIAGNOSIS — E11.9 TYPE 2 DIABETES MELLITUS TREATED WITHOUT INSULIN (HCC): ICD-10-CM

## 2025-07-22 RX ORDER — SEMAGLUTIDE 0.68 MG/ML
INJECTION, SOLUTION SUBCUTANEOUS
Qty: 3 ML | Refills: 1 | Status: SHIPPED | OUTPATIENT
Start: 2025-07-22

## 2025-07-22 RX ORDER — LEVOTHYROXINE SODIUM 75 UG/1
75 TABLET ORAL DAILY
Qty: 90 TABLET | Refills: 1 | Status: SHIPPED | OUTPATIENT
Start: 2025-07-22

## 2025-07-22 RX ORDER — ALLOPURINOL 100 MG/1
100 TABLET ORAL DAILY
Qty: 90 TABLET | Refills: 1 | Status: SHIPPED | OUTPATIENT
Start: 2025-07-22

## 2025-07-22 NOTE — TELEPHONE ENCOUNTER
Aggie MENON Chapincito called to request a refill on her medication.      Last office visit : 5/23/2025  Next office visit : Visit date not found     Requested Prescriptions     Pending Prescriptions Disp Refills    allopurinol (ZYLOPRIM) 100 MG tablet [Pharmacy Med Name: allopurinol 100 mg tablet] 90 tablet 1     Sig: TAKE ONE TABLET BY MOUTH DAILY    levothyroxine (SYNTHROID) 75 MCG tablet [Pharmacy Med Name: levothyroxine 75 mcg tablet] 90 tablet 1     Sig: TAKE ONE TABLET BY MOUTH DAILY    OZEMPIC, 0.25 OR 0.5 MG/DOSE, 2 MG/3ML SOPN [Pharmacy Med Name: Ozempic 0.25 mg or 0.5 mg (2 mg/3 mL) subcutaneous pen injector] 3 mL 1     Sig: INJECT 0.25MG UNDER THE SKIN EVERY WEEK            Mali Whittaker LPN

## 2025-07-28 DIAGNOSIS — G89.29 CHRONIC MIDLINE LOW BACK PAIN WITHOUT SCIATICA: ICD-10-CM

## 2025-07-28 DIAGNOSIS — M54.50 CHRONIC MIDLINE LOW BACK PAIN WITHOUT SCIATICA: ICD-10-CM

## 2025-07-28 RX ORDER — DICLOFENAC SODIUM 75 MG/1
TABLET, DELAYED RELEASE ORAL
Qty: 180 TABLET | Refills: 0 | Status: SHIPPED | OUTPATIENT
Start: 2025-07-28

## 2025-07-28 NOTE — TELEPHONE ENCOUNTER
Aggie Beckham called to request a refill on her medication.      Last office visit : 5/23/2025  Next office visit : Visit date not found     Requested Prescriptions     Pending Prescriptions Disp Refills    diclofenac (VOLTAREN) 75 MG EC tablet [Pharmacy Med Name: diclofenac sodium 75 mg tablet,delayed release] 180 tablet 0     Sig: TAKE ONE TABLET BY MOUTH TWICE DAILY WITH FOOD AS NEEDED            Mali Whittaker LPN

## 2025-08-06 ENCOUNTER — OFFICE VISIT (OUTPATIENT)
Dept: WOUND CARE | Facility: HOSPITAL | Age: 65
End: 2025-08-06
Payer: COMMERCIAL

## 2025-08-14 ENCOUNTER — OFFICE VISIT (OUTPATIENT)
Age: 65
End: 2025-08-14
Payer: COMMERCIAL

## 2025-08-14 VITALS
SYSTOLIC BLOOD PRESSURE: 122 MMHG | DIASTOLIC BLOOD PRESSURE: 78 MMHG | BODY MASS INDEX: 38.13 KG/M2 | HEART RATE: 87 BPM | RESPIRATION RATE: 14 BRPM | WEIGHT: 194.2 LBS | OXYGEN SATURATION: 87 % | HEIGHT: 60 IN | TEMPERATURE: 97.4 F

## 2025-08-14 DIAGNOSIS — J96.11 CHRONIC RESPIRATORY FAILURE WITH HYPOXIA (HCC): ICD-10-CM

## 2025-08-14 DIAGNOSIS — E11.9 TYPE 2 DIABETES MELLITUS TREATED WITHOUT INSULIN (HCC): ICD-10-CM

## 2025-08-14 DIAGNOSIS — M25.50 ARTHRALGIA, UNSPECIFIED JOINT: ICD-10-CM

## 2025-08-14 DIAGNOSIS — R53.83 FATIGUE, UNSPECIFIED TYPE: ICD-10-CM

## 2025-08-14 DIAGNOSIS — E03.9 ACQUIRED HYPOTHYROIDISM: ICD-10-CM

## 2025-08-14 DIAGNOSIS — R06.09 DOE (DYSPNEA ON EXERTION): ICD-10-CM

## 2025-08-14 DIAGNOSIS — G89.29 CHRONIC RIGHT HIP PAIN: ICD-10-CM

## 2025-08-14 DIAGNOSIS — M25.551 CHRONIC RIGHT HIP PAIN: ICD-10-CM

## 2025-08-14 DIAGNOSIS — D64.9 ANEMIA, UNSPECIFIED TYPE: ICD-10-CM

## 2025-08-14 DIAGNOSIS — Z12.11 SCREEN FOR COLON CANCER: ICD-10-CM

## 2025-08-14 DIAGNOSIS — E78.2 MIXED HYPERLIPIDEMIA: ICD-10-CM

## 2025-08-14 DIAGNOSIS — E11.9 TYPE 2 DIABETES MELLITUS TREATED WITHOUT INSULIN (HCC): Primary | ICD-10-CM

## 2025-08-14 DIAGNOSIS — G47.19 EXCESSIVE DAYTIME SLEEPINESS: ICD-10-CM

## 2025-08-14 LAB
ALBUMIN SERPL-MCNC: 4.3 G/DL (ref 3.5–5.2)
ALP SERPL-CCNC: 83 U/L (ref 35–104)
ALT SERPL-CCNC: 16 U/L (ref 10–35)
ANION GAP SERPL CALCULATED.3IONS-SCNC: 13 MMOL/L (ref 8–16)
AST SERPL-CCNC: 26 U/L (ref 10–35)
BASOPHILS # BLD: 0.1 K/UL (ref 0–0.2)
BASOPHILS NFR BLD: 1 % (ref 0–1)
BILIRUB SERPL-MCNC: 0.3 MG/DL (ref 0.2–1.2)
BUN SERPL-MCNC: 20 MG/DL (ref 8–23)
CALCIUM SERPL-MCNC: 9.9 MG/DL (ref 8.8–10.2)
CHLORIDE SERPL-SCNC: 101 MMOL/L (ref 98–107)
CO2 SERPL-SCNC: 26 MMOL/L (ref 22–29)
CREAT SERPL-MCNC: 1.8 MG/DL (ref 0.5–0.9)
CRP SERPL-MCNC: 9.8 MG/L (ref 0–5)
EOSINOPHIL # BLD: 0.1 K/UL (ref 0–0.6)
EOSINOPHIL NFR BLD: 2.5 % (ref 0–5)
ERYTHROCYTE [DISTWIDTH] IN BLOOD BY AUTOMATED COUNT: 13.9 % (ref 11.5–14.5)
ERYTHROCYTE [SEDIMENTATION RATE] IN BLOOD BY WESTERGREN METHOD: 72 MM/HR (ref 0–25)
FERRITIN SERPL-MCNC: 488 NG/ML (ref 13–150)
FOLATE SERPL-MCNC: 30.5 NG/ML (ref 4.8–37.3)
GLUCOSE SERPL-MCNC: 106 MG/DL (ref 70–99)
HBA1C MFR BLD: 6.1 % (ref 4–5.6)
HCT VFR BLD AUTO: 32.8 % (ref 37–47)
HGB BLD-MCNC: 10.4 G/DL (ref 12–16)
IMM GRANULOCYTES # BLD: 0 K/UL
IRON SATN MFR SERPL: 33 % (ref 15–50)
IRON SERPL-MCNC: 106 UG/DL (ref 37–145)
LYMPHOCYTES # BLD: 1.4 K/UL (ref 1.1–4.5)
LYMPHOCYTES NFR BLD: 29.6 % (ref 20–40)
MCH RBC QN AUTO: 35.7 PG (ref 27–31)
MCHC RBC AUTO-ENTMCNC: 31.7 G/DL (ref 33–37)
MCV RBC AUTO: 112.7 FL (ref 81–99)
MONOCYTES # BLD: 0.2 K/UL (ref 0–0.9)
MONOCYTES NFR BLD: 4.8 % (ref 0–10)
NEUTROPHILS # BLD: 2.9 K/UL (ref 1.5–7.5)
NEUTS SEG NFR BLD: 61.3 % (ref 50–65)
PLATELET # BLD AUTO: 208 K/UL (ref 130–400)
PMV BLD AUTO: 9.8 FL (ref 9.4–12.3)
POTASSIUM SERPL-SCNC: 4.7 MMOL/L (ref 3.5–5.1)
PROT SERPL-MCNC: 6.9 G/DL (ref 6.4–8.3)
RBC # BLD AUTO: 2.91 M/UL (ref 4.2–5.4)
RETICS # AUTO: 0.11 M/UL (ref 0.03–0.12)
RETICS/RBC NFR: 3.83 % (ref 0.5–1.5)
RHEUMATOID FACT SER NEPH-ACNC: <10 IU/ML
SODIUM SERPL-SCNC: 140 MMOL/L (ref 136–145)
T4 FREE SERPL-MCNC: 1.32 NG/DL (ref 0.93–1.7)
TIBC SERPL-MCNC: 319 UG/DL (ref 250–400)
TSH SERPL DL<=0.005 MIU/L-ACNC: 2.94 UIU/ML (ref 0.27–4.2)
URATE SERPL-MCNC: 3.9 MG/DL (ref 2.4–5.7)
VIT B12 SERPL-MCNC: 654 PG/ML (ref 232–1245)
WBC # BLD AUTO: 4.8 K/UL (ref 4.8–10.8)

## 2025-08-14 PROCEDURE — G8417 CALC BMI ABV UP PARAM F/U: HCPCS | Performed by: NURSE PRACTITIONER

## 2025-08-14 PROCEDURE — 4004F PT TOBACCO SCREEN RCVD TLK: CPT | Performed by: NURSE PRACTITIONER

## 2025-08-14 PROCEDURE — 3074F SYST BP LT 130 MM HG: CPT | Performed by: NURSE PRACTITIONER

## 2025-08-14 PROCEDURE — G8427 DOCREV CUR MEDS BY ELIG CLIN: HCPCS | Performed by: NURSE PRACTITIONER

## 2025-08-14 PROCEDURE — 99214 OFFICE O/P EST MOD 30 MIN: CPT | Performed by: NURSE PRACTITIONER

## 2025-08-14 PROCEDURE — 3044F HG A1C LEVEL LT 7.0%: CPT | Performed by: NURSE PRACTITIONER

## 2025-08-14 PROCEDURE — 3078F DIAST BP <80 MM HG: CPT | Performed by: NURSE PRACTITIONER

## 2025-08-14 ASSESSMENT — ENCOUNTER SYMPTOMS
SHORTNESS OF BREATH: 1
TROUBLE SWALLOWING: 0

## 2025-08-16 LAB
NUCLEAR IGG SER QL IA: NORMAL
TRANSFERRIN SERPL-MCNC: 262 MG/DL (ref 200–360)

## 2025-08-17 LAB — ASO AB SERPL-ACNC: <55 IU/ML

## 2025-08-25 ENCOUNTER — OFFICE VISIT (OUTPATIENT)
Dept: GASTROENTEROLOGY | Age: 65
End: 2025-08-25
Payer: COMMERCIAL

## 2025-08-25 VITALS
BODY MASS INDEX: 37.5 KG/M2 | HEART RATE: 75 BPM | SYSTOLIC BLOOD PRESSURE: 107 MMHG | DIASTOLIC BLOOD PRESSURE: 60 MMHG | HEIGHT: 60 IN | OXYGEN SATURATION: 92 % | WEIGHT: 191 LBS

## 2025-08-25 DIAGNOSIS — D50.8 OTHER IRON DEFICIENCY ANEMIA: ICD-10-CM

## 2025-08-25 DIAGNOSIS — K62.5 BRBPR (BRIGHT RED BLOOD PER RECTUM): ICD-10-CM

## 2025-08-25 DIAGNOSIS — K21.9 GASTROESOPHAGEAL REFLUX DISEASE, UNSPECIFIED WHETHER ESOPHAGITIS PRESENT: Primary | ICD-10-CM

## 2025-08-25 DIAGNOSIS — K59.00 CONSTIPATION, UNSPECIFIED CONSTIPATION TYPE: ICD-10-CM

## 2025-08-25 PROCEDURE — 99214 OFFICE O/P EST MOD 30 MIN: CPT

## 2025-08-25 PROCEDURE — 3074F SYST BP LT 130 MM HG: CPT

## 2025-08-25 PROCEDURE — 3078F DIAST BP <80 MM HG: CPT

## 2025-08-25 ASSESSMENT — ENCOUNTER SYMPTOMS
VOMITING: 0
ANAL BLEEDING: 1
ABDOMINAL PAIN: 0
RESPIRATORY NEGATIVE: 1
DIARRHEA: 1
ALLERGIC/IMMUNOLOGIC NEGATIVE: 1
NAUSEA: 0
TROUBLE SWALLOWING: 0
EYES NEGATIVE: 1
CONSTIPATION: 1
RECTAL PAIN: 0

## 2025-08-28 PROBLEM — K62.5 BRBPR (BRIGHT RED BLOOD PER RECTUM): Status: ACTIVE | Noted: 2025-08-28

## 2025-08-28 PROBLEM — D64.9 ABSOLUTE ANEMIA: Status: ACTIVE | Noted: 2025-08-28

## (undated) DEVICE — PAD MINOR UNIVERSAL: Brand: MEDLINE INDUSTRIES, INC.

## (undated) DEVICE — 1010 S-DRAPE TOWEL DRAPE 10/BX: Brand: STERI-DRAPE™

## (undated) DEVICE — CODMAN® SURGICAL PATTIES 1/2" X 3" (1.27CM X 7.62CM): Brand: CODMAN®

## (undated) DEVICE — GLV SURG BIOGEL LTX PF 8

## (undated) DEVICE — HANDPIECE SET WITH HIGH FLOW TIP AND SUCTION TUBE: Brand: INTERPULSE

## (undated) DEVICE — SKIN AFFIX SURG ADHESIVE 72/CS 0.55ML: Brand: MEDLINE

## (undated) DEVICE — YANKAUER,BULB TIP WITH VENT: Brand: ARGYLE

## (undated) DEVICE — CONTAINER,SPECIMEN,OR STERILE,4OZ: Brand: MEDLINE

## (undated) DEVICE — DRSNG TELFA PAD NONADH STR 1S 3X8IN

## (undated) DEVICE — Device

## (undated) DEVICE — SUT GUT PLN FAST ABS 5/0 PC1 18IN 1915G

## (undated) DEVICE — PK TURNOVER RM ADV

## (undated) DEVICE — ADHS SKIN PREMIERPRO EXOFIN TOPICAL HI/VISC .5ML

## (undated) DEVICE — SUT ETHLN 5/0 PS2 18IN 1666G

## (undated) DEVICE — PK EXTRM 30

## (undated) DEVICE — INTENDED FOR TISSUE SEPARATION, AND OTHER PROCEDURES THAT REQUIRE A SHARP SURGICAL BLADE TO PUNCTURE OR CUT.: Brand: BARD-PARKER ® STAINLESS STEEL BLADES

## (undated) DEVICE — 1016 S-DRAPE IRRIG POUCH 10/BOX: Brand: STERI-DRAPE™

## (undated) DEVICE — ADHS SKIN MASTISOL CAP 2OZ DISP

## (undated) DEVICE — SUT GUT PLN FAST ABS 6/0 PC1 18IN 1916G

## (undated) DEVICE — BNDG ELAS CO-FLEX SLF ADHR 4IN5YD LF STRL

## (undated) DEVICE — NDL SPINE 25G 31/2IN BLU

## (undated) DEVICE — CVR BRD ARM 13X30

## (undated) DEVICE — VAGINAL PREP TRAY: Brand: MEDLINE INDUSTRIES, INC.

## (undated) DEVICE — ANTIBACTERIAL UNDYED BRAIDED (POLYGLACTIN 910), SYNTHETIC ABSORBABLE SUTURE: Brand: COATED VICRYL

## (undated) DEVICE — SPONGE,NEURO,0.5"X3",XR,STRL,LF,10/PK: Brand: MEDLINE

## (undated) DEVICE — DRSNG WND GZ CURAD OIL EMULSION 3X8IN STRL PK/3

## (undated) DEVICE — PK ENT HD AND NK 30

## (undated) DEVICE — CVR PROB GEN PURP W ISOSILK 6X48

## (undated) DEVICE — BANDAGE,GAUZE,BULKEE II,4.5"X4.1YD,STRL: Brand: MEDLINE

## (undated) DEVICE — SYR LUERLOK 20CC

## (undated) DEVICE — NDL FORAMAN 18.5G 5IN

## (undated) DEVICE — 4-PORT MANIFOLD: Brand: NEPTUNE 2

## (undated) DEVICE — SPNG GZ STRL 2S 4X4 12PLY

## (undated) DEVICE — TBG SMPL FLTR LINE NASL 02/C02 A/ BX/100

## (undated) DEVICE — CABL SACRAL NEUROSTM INTERSTIM 218CM

## (undated) DEVICE — SPNG GZ 2S 2X2 8PLY STRL PK/2

## (undated) DEVICE — UTILITY MARKER W/MED LABELS: Brand: MEDLINE

## (undated) DEVICE — CHRGR BATRY INTERSTIM/MIC NEUROSTM

## (undated) DEVICE — Device: Brand: DEFENDO AIR/WATER/SUCTION AND BIOPSY VALVE

## (undated) DEVICE — DRAPE,UTILITY,TAPE,15X26,STERILE: Brand: MEDLINE

## (undated) DEVICE — CLN CAUTRY TP 2X2 STRL

## (undated) DEVICE — CUFF,BP,DISP,1 TUBE,ADULT,HP: Brand: MEDLINE

## (undated) DEVICE — DRSNG GZ CURAD XEROFORM NONADHS 5X9IN STRL

## (undated) DEVICE — ADHS LIQ MASTISOL 2/3ML

## (undated) DEVICE — 3M™ IOBAN™ 2 ANTIMICROBIAL INCISE DRAPE 6650EZ: Brand: IOBAN™ 2

## (undated) DEVICE — THE CHANNEL CLEANING BRUSH IS A NYLON FLEXI BRUSH ATTACHED TO A FLEXIBLE PLASTIC SHEATH DESIGNED TO SAFELY REMOVE DEBRIS FROM FLEXIBLE ENDOSCOPES.

## (undated) DEVICE — APPL CHLORAPREP HI/LITE 26ML ORNG

## (undated) DEVICE — BLADE 1884012 RAD12 5PK CURVED 4MM: Brand: RAD®

## (undated) DEVICE — TUBING, SUCTION, 1/4" X 12', STRAIGHT: Brand: MEDLINE

## (undated) DEVICE — GLV SURG DERMASSURE GRN LF PF 8.0

## (undated) DEVICE — ENDOGATOR AUXILIARY WATER JET CONNECTOR: Brand: ENDOGATOR

## (undated) DEVICE — DRP C/ARMOR

## (undated) DEVICE — SUT MNCRYL 4/0 PS2 27IN UD MCP426H

## (undated) DEVICE — DRSNG SURESITE123 4X10IN

## (undated) DEVICE — SUT PROLN 4/0 PS2 18IN 8682G

## (undated) DEVICE — GLV SURG PREMIERPRO ORTHO LTX PF SZ7 BRN

## (undated) DEVICE — FRCP BX RADJAW4 NDL 2.8 240 STD OG

## (undated) DEVICE — 3M™ TEGADERM™ ABSORBENT CLEAR ACRYLIC DRESSING, 90802, OVAL, 5-7/8 IN X 6 IN (14.9 CM X 15.2 CM), 10/CT 4CT/CASE: Brand: 3M™ TEGADERM™

## (undated) DEVICE — COLLECTION SOCK, GENERAL: Brand: DEROYAL

## (undated) DEVICE — PENCL ES MEGADINE EZ/CLEAN BUTN W/HOLSTR 10FT

## (undated) DEVICE — NERVE BLOCK TRAY (FACET)-LF: Brand: MEDLINE INDUSTRIES, INC.

## (undated) DEVICE — BNDG ELAS ECON W/CLIP 4IN 5YD LF STRL

## (undated) DEVICE — PACK,SET UP,NO DRAPES: Brand: MEDLINE

## (undated) DEVICE — CONMED SCOPE SAVER BITE BLOCK, 20X27 MM: Brand: SCOPE SAVER

## (undated) DEVICE — DISPOSABLE TOURNIQUET CUFF SINGLE BLADDER, SINGLE PORT AND QUICK CONNECT CONNECTOR: Brand: COLOR CUFF

## (undated) DEVICE — WIPE MEROCEL 3.625X3IN

## (undated) DEVICE — DEFOGGER!" ANTI FOG KIT: Brand: DEROYAL

## (undated) DEVICE — TRY PREP SCRB VAG PVP

## (undated) DEVICE — DRSNG SURESITE WNDW 4X4.5

## (undated) DEVICE — DRSNG WND VAC GRANUFOAM SENSATRAC MD 10PK

## (undated) DEVICE — PROXIMATE RH ROTATING HEAD SKIN STAPLERS (35 WIDE) CONTAINS 35 STAINLESS STEEL STAPLES: Brand: PROXIMATE

## (undated) DEVICE — CVR UNIV C/ARM

## (undated) DEVICE — SOL IRR BSS 15ML STRL

## (undated) DEVICE — NDL HYPO PRECISIONGLIDE REG 25G 1 1/2

## (undated) DEVICE — TUBING 1895522 5PK STRAIGHTSHOT TO XPS: Brand: STRAIGHTSHOT®

## (undated) DEVICE — MASK,OXYGEN,MED CONC,ADLT,7' TUB, UC: Brand: PENDING

## (undated) DEVICE — GLV SURG SENSICARE W/ALOE PF LF 7.5 STRL

## (undated) DEVICE — SPNG GZ WOVN 4X4IN 12PLY 10/BX STRL

## (undated) DEVICE — SENSR O2 OXIMAX FNGR A/ 18IN NONSTR

## (undated) DEVICE — REFLEX ULTRA 45 WITH INTEGRATED CABLE: Brand: COBLATION

## (undated) DEVICE — 3M™ STERI-STRIP™ REINFORCED ADHESIVE SKIN CLOSURES, R1547, 1/2 IN X 4 IN (12 MM X 100 MM), 6 STRIPS/ENVELOPE: Brand: 3M™ STERI-STRIP™

## (undated) DEVICE — SUT ETHLN 3/0 FS1 30IN 669H

## (undated) DEVICE — KT COMMUNIC HANDSET INTERSTIM

## (undated) DEVICE — SUT GUT CHRM 3/0 RB1 27IN U204H

## (undated) DEVICE — 3M™ STERI-STRIP™ REINFORCED ADHESIVE SKIN CLOSURES, R1546, 1/4 IN X 4 IN (6 MM X 100 MM), 10 STRIPS/ENVELOPE: Brand: 3M™ STERI-STRIP™

## (undated) DEVICE — GLV SURG BIOGEL M LTX PF 7 1/2

## (undated) DEVICE — HDRST INTUB GENTLETOUCH SLOT 7IN RT

## (undated) DEVICE — ELECTRD NDL EDGE/INSUL/PFTE.787MM 2.84IN

## (undated) DEVICE — GLV SURG PREMIERPRO ORTHO LTX PF SZ7.5 BRN

## (undated) DEVICE — BNDG ELAS ECON W/CLIP 6IN 5YD LF STRL

## (undated) DEVICE — KT ANTI FOG W/FLD AND SPNG

## (undated) DEVICE — SUT ETHLN 5/0 P3 18IN 698H

## (undated) DEVICE — KT PROG/PT NEUROSTM SMRT INTERSTEM2 W/COMMUNIC

## (undated) DEVICE — BLD TONG INDIV/WRP A/ 6IN STRL

## (undated) DEVICE — DRSNG GZ CURAD XEROFORM NONADHR OVERWRAP 5X9IN

## (undated) DEVICE — BELT RECHG INTERSTIM/MIC

## (undated) DEVICE — KT INTRO LD INTERSTIM 2 355018

## (undated) DEVICE — TOWEL,OR,DSP,ST,BLUE,STD,4/PK,20PK/CS: Brand: MEDLINE

## (undated) DEVICE — SPLINT 1529000 10PK THERMASPLINT SMALL

## (undated) DEVICE — DERMATOME BLADES: Brand: DERMATOME

## (undated) DEVICE — PREP SOL POVIDONE/IODINE BT 4OZ

## (undated) DEVICE — SUT SILK 2/0 FS BLK 18IN 685G

## (undated) DEVICE — KT CANSTR VAC WND W/ISOLYSER SENSATRAC 500CC 10CS

## (undated) DEVICE — SUT SILK 0 SUTUPAK TIES 24IN SA76G

## (undated) DEVICE — DRSNG WND GZ CURAD OIL EMULSION 3X3IN STRL

## (undated) DEVICE — TOWEL,OR,DSP,ST,NATURAL,DLX,4/PK,20PK/CS: Brand: MEDLINE

## (undated) DEVICE — DISPOSABLE BIPOLAR CABLE 12FT. (3.6M): Brand: KIRWAN

## (undated) DEVICE — 1.5 TO 1 DERMACARRIER: Brand: MESHGRAFTTM  II TISSUE EXPANSION SYSTEM

## (undated) DEVICE — SUT PROLN 4/0 SH D/A 36IN 8521H